# Patient Record
Sex: FEMALE | Race: WHITE | Employment: OTHER | URBAN - METROPOLITAN AREA
[De-identification: names, ages, dates, MRNs, and addresses within clinical notes are randomized per-mention and may not be internally consistent; named-entity substitution may affect disease eponyms.]

---

## 2017-01-31 ENCOUNTER — OFFICE VISIT (OUTPATIENT)
Dept: CARDIOLOGY CLINIC | Age: 82
End: 2017-01-31

## 2017-01-31 VITALS
SYSTOLIC BLOOD PRESSURE: 166 MMHG | WEIGHT: 127 LBS | HEIGHT: 59 IN | HEART RATE: 68 BPM | BODY MASS INDEX: 25.6 KG/M2 | DIASTOLIC BLOOD PRESSURE: 70 MMHG

## 2017-01-31 DIAGNOSIS — Z95.1 S/P CABG X 3: Primary | Chronic | ICD-10-CM

## 2017-01-31 DIAGNOSIS — R06.02 SOBOE (SHORTNESS OF BREATH ON EXERTION): ICD-10-CM

## 2017-01-31 PROCEDURE — 99214 OFFICE O/P EST MOD 30 MIN: CPT | Performed by: INTERNAL MEDICINE

## 2017-01-31 RX ORDER — CARVEDILOL 6.25 MG/1
6.25 TABLET ORAL 2 TIMES DAILY WITH MEALS
Qty: 180 TABLET | Refills: 3 | Status: ON HOLD | OUTPATIENT
Start: 2017-01-31

## 2017-01-31 RX ORDER — DIPHENHYDRAMINE HYDROCHLORIDE 25 MG/1
1 TABLET ORAL DAILY
Status: ON HOLD | COMMUNITY

## 2017-01-31 RX ORDER — TIMOLOL MALEATE 5 MG/ML
1 SOLUTION/ DROPS OPHTHALMIC NIGHTLY
Status: ON HOLD | COMMUNITY

## 2017-01-31 RX ORDER — TRIAMTERENE AND HYDROCHLOROTHIAZIDE 37.5; 25 MG/1; MG/1
1 TABLET ORAL 2 TIMES DAILY
Qty: 180 TABLET | Refills: 3 | Status: SHIPPED | OUTPATIENT
Start: 2017-01-31 | End: 2018-04-14 | Stop reason: SDUPTHER

## 2017-02-03 ENCOUNTER — TELEPHONE (OUTPATIENT)
Dept: CARDIOLOGY CLINIC | Age: 82
End: 2017-02-03

## 2017-02-03 ENCOUNTER — PROCEDURE VISIT (OUTPATIENT)
Dept: CARDIOLOGY CLINIC | Age: 82
End: 2017-02-03

## 2017-02-03 DIAGNOSIS — Z95.1 S/P CABG X 3: Chronic | ICD-10-CM

## 2017-02-03 DIAGNOSIS — R06.02 SOBOE (SHORTNESS OF BREATH ON EXERTION): Primary | ICD-10-CM

## 2017-02-03 LAB
LV EF: 53 %
LVEF MODALITY: NORMAL

## 2017-02-03 PROCEDURE — 93306 TTE W/DOPPLER COMPLETE: CPT | Performed by: INTERNAL MEDICINE

## 2017-02-04 DIAGNOSIS — I25.700 CORONARY ARTERY DISEASE INVOLVING CORONARY BYPASS GRAFT OF NATIVE HEART WITH UNSTABLE ANGINA PECTORIS (HCC): Primary | Chronic | ICD-10-CM

## 2017-02-06 ENCOUNTER — TELEPHONE (OUTPATIENT)
Dept: CARDIOLOGY CLINIC | Age: 82
End: 2017-02-06

## 2017-02-06 ENCOUNTER — HOSPITAL ENCOUNTER (OUTPATIENT)
Dept: GENERAL RADIOLOGY | Age: 82
Discharge: HOME | End: 2017-02-06

## 2017-02-06 DIAGNOSIS — Z01.811 PRE-OP CHEST EXAM: ICD-10-CM

## 2017-02-08 ENCOUNTER — TELEPHONE (OUTPATIENT)
Dept: CARDIOLOGY CLINIC | Age: 82
End: 2017-02-08

## 2017-02-17 ENCOUNTER — OFFICE VISIT (OUTPATIENT)
Dept: PULMONOLOGY | Age: 82
End: 2017-02-17

## 2017-02-17 VITALS
OXYGEN SATURATION: 93 % | HEIGHT: 59 IN | SYSTOLIC BLOOD PRESSURE: 132 MMHG | DIASTOLIC BLOOD PRESSURE: 80 MMHG | RESPIRATION RATE: 20 BRPM | HEART RATE: 71 BPM | WEIGHT: 126 LBS | BODY MASS INDEX: 25.4 KG/M2

## 2017-02-17 DIAGNOSIS — R06.02 SOBOE (SHORTNESS OF BREATH ON EXERTION): ICD-10-CM

## 2017-02-17 DIAGNOSIS — J44.9 COPD, MILD (HCC): ICD-10-CM

## 2017-02-17 DIAGNOSIS — I27.20 PULMONARY HTN (HCC): Primary | Chronic | ICD-10-CM

## 2017-02-17 PROCEDURE — 99213 OFFICE O/P EST LOW 20 MIN: CPT | Performed by: INTERNAL MEDICINE

## 2017-03-01 ENCOUNTER — PROCEDURE VISIT (OUTPATIENT)
Dept: CARDIOLOGY CLINIC | Age: 82
End: 2017-03-01

## 2017-03-01 VITALS — HEART RATE: 64 BPM | SYSTOLIC BLOOD PRESSURE: 136 MMHG | DIASTOLIC BLOOD PRESSURE: 80 MMHG

## 2017-03-01 DIAGNOSIS — Z95.0 CARDIAC PACEMAKER IN SITU: Primary | ICD-10-CM

## 2017-03-01 PROCEDURE — 93280 PM DEVICE PROGR EVAL DUAL: CPT | Performed by: INTERNAL MEDICINE

## 2017-03-06 ENCOUNTER — TELEPHONE (OUTPATIENT)
Dept: CARDIOLOGY CLINIC | Age: 82
End: 2017-03-06

## 2017-03-08 ENCOUNTER — OFFICE VISIT (OUTPATIENT)
Dept: CARDIOLOGY CLINIC | Age: 82
End: 2017-03-08

## 2017-03-08 VITALS
SYSTOLIC BLOOD PRESSURE: 132 MMHG | HEIGHT: 59 IN | HEART RATE: 68 BPM | WEIGHT: 128 LBS | BODY MASS INDEX: 25.8 KG/M2 | DIASTOLIC BLOOD PRESSURE: 72 MMHG

## 2017-03-08 DIAGNOSIS — Z95.0 CARDIAC PACEMAKER IN SITU: ICD-10-CM

## 2017-03-08 DIAGNOSIS — Z95.1 S/P CABG X 3: Primary | ICD-10-CM

## 2017-03-08 PROCEDURE — 99214 OFFICE O/P EST MOD 30 MIN: CPT | Performed by: INTERNAL MEDICINE

## 2017-03-28 ENCOUNTER — HOSPITAL ENCOUNTER (OUTPATIENT)
Dept: WOMENS IMAGING | Age: 82
Discharge: HOME | End: 2017-03-28
Attending: FAMILY MEDICINE | Admitting: FAMILY MEDICINE

## 2017-03-28 ENCOUNTER — TELEPHONE (OUTPATIENT)
Dept: CARDIOLOGY CLINIC | Age: 82
End: 2017-03-28

## 2017-03-28 DIAGNOSIS — Z95.1 S/P CABG X 3: Primary | Chronic | ICD-10-CM

## 2017-03-28 DIAGNOSIS — Z12.31 SCREENING MAMMOGRAM, ENCOUNTER FOR: ICD-10-CM

## 2017-04-14 ENCOUNTER — INITIAL CONSULT (OUTPATIENT)
Dept: CARDIOLOGY CLINIC | Age: 82
End: 2017-04-14

## 2017-04-14 VITALS
HEIGHT: 58 IN | BODY MASS INDEX: 26.95 KG/M2 | WEIGHT: 128.4 LBS | HEART RATE: 66 BPM | DIASTOLIC BLOOD PRESSURE: 80 MMHG | OXYGEN SATURATION: 93 % | SYSTOLIC BLOOD PRESSURE: 138 MMHG

## 2017-04-14 DIAGNOSIS — R06.02 SHORTNESS OF BREATH: ICD-10-CM

## 2017-04-14 DIAGNOSIS — I42.9 CARDIOMYOPATHY (HCC): Primary | ICD-10-CM

## 2017-04-14 PROCEDURE — 99204 OFFICE O/P NEW MOD 45 MIN: CPT | Performed by: INTERNAL MEDICINE

## 2017-04-23 ASSESSMENT — ENCOUNTER SYMPTOMS
CONSTIPATION: 0
BLOOD IN STOOL: 0
ABDOMINAL PAIN: 0
SHORTNESS OF BREATH: 1
WHEEZING: 0
COLOR CHANGE: 0
CHEST TIGHTNESS: 0
DIARRHEA: 0
VOMITING: 0
NAUSEA: 0
EYE PAIN: 0
BACK PAIN: 0
COUGH: 0
PHOTOPHOBIA: 0

## 2017-05-01 ENCOUNTER — PROCEDURE VISIT (OUTPATIENT)
Dept: CARDIOLOGY CLINIC | Age: 82
End: 2017-05-01

## 2017-05-01 DIAGNOSIS — R06.02 SOB (SHORTNESS OF BREATH): ICD-10-CM

## 2017-05-01 DIAGNOSIS — I42.9 CARDIOMYOPATHY (HCC): ICD-10-CM

## 2017-05-01 DIAGNOSIS — R00.2 HEART PALPITATIONS: ICD-10-CM

## 2017-05-01 DIAGNOSIS — R00.1 BRADYCARDIA: ICD-10-CM

## 2017-05-01 DIAGNOSIS — R06.02 SOB (SHORTNESS OF BREATH): Primary | ICD-10-CM

## 2017-05-01 LAB
LV EF: 53 %
LVEF MODALITY: NORMAL

## 2017-05-01 PROCEDURE — 78472 GATED HEART PLANAR SINGLE: CPT | Performed by: INTERNAL MEDICINE

## 2017-05-01 PROCEDURE — A9560 TC99M LABELED RBC: HCPCS | Performed by: INTERNAL MEDICINE

## 2017-05-03 ENCOUNTER — TELEPHONE (OUTPATIENT)
Dept: CARDIOLOGY CLINIC | Age: 82
End: 2017-05-03

## 2017-05-16 ENCOUNTER — OFFICE VISIT (OUTPATIENT)
Dept: PULMONOLOGY | Age: 82
End: 2017-05-16

## 2017-05-16 VITALS
SYSTOLIC BLOOD PRESSURE: 158 MMHG | RESPIRATION RATE: 20 BRPM | WEIGHT: 130 LBS | DIASTOLIC BLOOD PRESSURE: 80 MMHG | HEART RATE: 72 BPM | BODY MASS INDEX: 27.29 KG/M2 | OXYGEN SATURATION: 95 % | HEIGHT: 58 IN

## 2017-05-16 DIAGNOSIS — I27.20 PULMONARY HTN (HCC): Primary | Chronic | ICD-10-CM

## 2017-05-16 DIAGNOSIS — G47.33 OBSTRUCTIVE SLEEP APNEA: ICD-10-CM

## 2017-05-16 DIAGNOSIS — J44.9 COPD, MILD (HCC): ICD-10-CM

## 2017-05-16 PROCEDURE — 99213 OFFICE O/P EST LOW 20 MIN: CPT | Performed by: INTERNAL MEDICINE

## 2017-07-05 LAB
BASOPHILS ABSOLUTE: 0 /ΜL
BASOPHILS RELATIVE PERCENT: 0.7 %
CHOLESTEROL, TOTAL: 168 MG/DL
CHOLESTEROL/HDL RATIO: NORMAL
EOSINOPHILS ABSOLUTE: 0.4 /ΜL
EOSINOPHILS RELATIVE PERCENT: 5.4 %
HCT VFR BLD CALC: 46.1 % (ref 36–46)
HDLC SERPL-MCNC: 49 MG/DL (ref 35–70)
HEMOGLOBIN: 15.3 G/DL (ref 12–16)
LDL CHOLESTEROL CALCULATED: 100 MG/DL (ref 0–160)
LYMPHOCYTES ABSOLUTE: 0.9 /ΜL
LYMPHOCYTES RELATIVE PERCENT: 13.7 %
MCH RBC QN AUTO: 30.5 PG
MCHC RBC AUTO-ENTMCNC: 33.2 G/DL
MCV RBC AUTO: 91.8 FL
MONOCYTES ABSOLUTE: 0.5 /ΜL
MONOCYTES RELATIVE PERCENT: 7.4 %
NEUTROPHILS ABSOLUTE: 4.7 /ΜL
NEUTROPHILS RELATIVE PERCENT: 72.8 %
PLATELET # BLD: 405 K/ΜL
PMV BLD AUTO: 15.6 FL
RBC # BLD: 5.02 10^6/ΜL
TRIGL SERPL-MCNC: 94 MG/DL
VLDLC SERPL CALC-MCNC: 19 MG/DL
WBC # BLD: 6.5 10^3/ML

## 2017-07-17 LAB
ALBUMIN SERPL-MCNC: 4.8 G/DL
ALP BLD-CCNC: 134 U/L
ALT SERPL-CCNC: 134 U/L
AST SERPL-CCNC: 21 U/L
BILIRUB SERPL-MCNC: 1 MG/DL (ref 0.1–1.4)
BUN BLDV-MCNC: 9 MG/DL
CALCIUM SERPL-MCNC: 9.7 MG/DL
CHLORIDE BLD-SCNC: 97 MMOL/L
CO2: 30 MMOL/L
CREAT SERPL-MCNC: 0.6 MG/DL
GFR CALCULATED: 82
GLUCOSE BLD-MCNC: 98 MG/DL
POTASSIUM SERPL-SCNC: 4.6 MMOL/L
SODIUM BLD-SCNC: 138 MMOL/L
TOTAL PROTEIN: 6.7

## 2017-08-07 ENCOUNTER — OFFICE VISIT (OUTPATIENT)
Dept: PULMONOLOGY | Age: 82
End: 2017-08-07

## 2017-08-07 VITALS
BODY MASS INDEX: 27.29 KG/M2 | HEIGHT: 58 IN | OXYGEN SATURATION: 97 % | WEIGHT: 130 LBS | HEART RATE: 78 BPM | DIASTOLIC BLOOD PRESSURE: 74 MMHG | SYSTOLIC BLOOD PRESSURE: 122 MMHG

## 2017-08-07 DIAGNOSIS — G47.33 OBSTRUCTIVE SLEEP APNEA: Primary | ICD-10-CM

## 2017-08-07 DIAGNOSIS — R06.02 SOBOE (SHORTNESS OF BREATH ON EXERTION): ICD-10-CM

## 2017-08-07 DIAGNOSIS — J44.9 COPD, MILD (HCC): ICD-10-CM

## 2017-08-07 DIAGNOSIS — I27.20 PULMONARY HTN (HCC): Chronic | ICD-10-CM

## 2017-08-07 PROCEDURE — 99213 OFFICE O/P EST LOW 20 MIN: CPT | Performed by: INTERNAL MEDICINE

## 2017-08-30 ENCOUNTER — TELEPHONE (OUTPATIENT)
Dept: PULMONOLOGY | Age: 82
End: 2017-08-30

## 2017-08-30 ENCOUNTER — OFFICE VISIT (OUTPATIENT)
Dept: CARDIOLOGY CLINIC | Age: 82
End: 2017-08-30

## 2017-08-30 VITALS
BODY MASS INDEX: 27.33 KG/M2 | HEART RATE: 56 BPM | DIASTOLIC BLOOD PRESSURE: 98 MMHG | RESPIRATION RATE: 16 BRPM | WEIGHT: 130.2 LBS | SYSTOLIC BLOOD PRESSURE: 168 MMHG

## 2017-08-30 DIAGNOSIS — Z95.0 CARDIAC PACEMAKER IN SITU: ICD-10-CM

## 2017-08-30 DIAGNOSIS — R42 DIZZINESS: ICD-10-CM

## 2017-08-30 DIAGNOSIS — Z95.1 S/P CABG X 3: Primary | Chronic | ICD-10-CM

## 2017-08-30 PROCEDURE — 99214 OFFICE O/P EST MOD 30 MIN: CPT | Performed by: INTERNAL MEDICINE

## 2017-08-30 PROCEDURE — 93279 PRGRMG DEV EVAL PM/LDLS PM: CPT | Performed by: INTERNAL MEDICINE

## 2017-09-06 ENCOUNTER — PROCEDURE VISIT (OUTPATIENT)
Dept: CARDIOLOGY CLINIC | Age: 82
End: 2017-09-06

## 2017-09-06 DIAGNOSIS — Z95.1 S/P CABG X 3: Chronic | ICD-10-CM

## 2017-09-06 DIAGNOSIS — R42 DIZZINESS: Primary | ICD-10-CM

## 2017-09-06 PROCEDURE — 93880 EXTRACRANIAL BILAT STUDY: CPT | Performed by: INTERNAL MEDICINE

## 2017-09-07 ENCOUNTER — TELEPHONE (OUTPATIENT)
Dept: CARDIOLOGY CLINIC | Age: 82
End: 2017-09-07

## 2017-09-25 ENCOUNTER — HOSPITAL ENCOUNTER (OUTPATIENT)
Dept: SLEEP CENTER | Age: 82
Discharge: HOME | End: 2017-09-25
Attending: INTERNAL MEDICINE | Admitting: INTERNAL MEDICINE

## 2017-09-25 VITALS — BODY MASS INDEX: 27.29 KG/M2 | WEIGHT: 130 LBS | HEIGHT: 58 IN

## 2017-09-25 DIAGNOSIS — G47.33 OBSTRUCTIVE SLEEP APNEA: ICD-10-CM

## 2017-09-25 DIAGNOSIS — I27.20 PULMONARY HTN (HCC): Chronic | ICD-10-CM

## 2017-09-25 DIAGNOSIS — R06.02 SOBOE (SHORTNESS OF BREATH ON EXERTION): ICD-10-CM

## 2017-09-26 ENCOUNTER — TELEPHONE (OUTPATIENT)
Dept: PULMONOLOGY | Age: 82
End: 2017-09-26

## 2017-09-26 NOTE — TELEPHONE ENCOUNTER
Pt states she was recently taking Anoro and states she has a lot of heart issues.  Pt is concerned that taking the Anoro is causing leg, arm, and heart issues and would like to know what you would suggest

## 2017-10-12 ENCOUNTER — HOSPITAL ENCOUNTER (OUTPATIENT)
Dept: ULTRASOUND IMAGING | Age: 82
Discharge: HOME | End: 2017-10-12
Attending: FAMILY MEDICINE | Admitting: FAMILY MEDICINE

## 2017-10-12 DIAGNOSIS — M79.661 PAIN OF RIGHT LOWER LEG: ICD-10-CM

## 2017-10-12 DIAGNOSIS — M79.661 PAIN IN RIGHT LOWER LEG: ICD-10-CM

## 2017-10-12 DIAGNOSIS — R07.9 CHEST PAIN, UNSPECIFIED: ICD-10-CM

## 2017-10-12 DIAGNOSIS — M79.661 BILATERAL CALF PAIN: ICD-10-CM

## 2017-10-12 DIAGNOSIS — R60.1 GENERALIZED EDEMA: ICD-10-CM

## 2017-10-12 DIAGNOSIS — M79.662 BILATERAL CALF PAIN: ICD-10-CM

## 2017-10-12 DIAGNOSIS — R60.9 EDEMA, UNSPECIFIED TYPE: ICD-10-CM

## 2017-10-12 LAB
ALBUMIN SERPL-MCNC: 4.7 GM/DL (ref 3.4–5)
ALP BLD-CCNC: 122 IU/L (ref 40–128)
ALT SERPL-CCNC: 18 U/L (ref 10–40)
ANION GAP SERPL CALCULATED.3IONS-SCNC: 12 MMOL/L (ref 4–16)
AST SERPL-CCNC: 24 IU/L (ref 15–37)
BASOPHILS ABSOLUTE: 0.1 K/CU MM
BASOPHILS RELATIVE PERCENT: 0.8 % (ref 0–1)
BILIRUB SERPL-MCNC: 1.2 MG/DL (ref 0–1)
BUN BLDV-MCNC: 10 MG/DL (ref 6–23)
CALCIUM SERPL-MCNC: 9.9 MG/DL (ref 8.3–10.6)
CHLORIDE BLD-SCNC: 95 MMOL/L (ref 99–110)
CO2: 29 MMOL/L (ref 21–32)
CREAT SERPL-MCNC: 0.5 MG/DL (ref 0.6–1.1)
DIFFERENTIAL TYPE: ABNORMAL
EOSINOPHILS ABSOLUTE: 0.4 K/CU MM
EOSINOPHILS RELATIVE PERCENT: 4.2 % (ref 0–3)
GFR AFRICAN AMERICAN: >60 ML/MIN/1.73M2
GFR NON-AFRICAN AMERICAN: >60 ML/MIN/1.73M2
GLUCOSE FASTING: 92 MG/DL (ref 70–99)
HCT VFR BLD CALC: 46.6 % (ref 37–47)
HEMOGLOBIN: 15 GM/DL (ref 12.5–16)
IMMATURE NEUTROPHIL %: 0.5 % (ref 0–0.43)
INR BLD: 1.94 INDEX
LYMPHOCYTES ABSOLUTE: 1.2 K/CU MM
LYMPHOCYTES RELATIVE PERCENT: 14.2 % (ref 24–44)
MCH RBC QN AUTO: 30.6 PG (ref 27–31)
MCHC RBC AUTO-ENTMCNC: 32.2 % (ref 32–36)
MCV RBC AUTO: 95.1 FL (ref 78–100)
MONOCYTES ABSOLUTE: 0.6 K/CU MM
MONOCYTES RELATIVE PERCENT: 7.1 % (ref 0–4)
NUCLEATED RBC %: 0 %
PDW BLD-RTO: 15.7 % (ref 11.7–14.9)
PLATELET # BLD: 429 K/CU MM (ref 140–440)
PMV BLD AUTO: 11.5 FL (ref 7.5–11.1)
POTASSIUM SERPL-SCNC: 5 MMOL/L (ref 3.5–5.1)
PROTHROMBIN TIME: 22.6 SECONDS (ref 9.12–12.5)
RBC # BLD: 4.9 M/CU MM (ref 4.2–5.4)
SEGMENTED NEUTROPHILS ABSOLUTE COUNT: 6.1 K/CU MM
SEGMENTED NEUTROPHILS RELATIVE PERCENT: 73.2 % (ref 36–66)
SODIUM BLD-SCNC: 136 MMOL/L (ref 135–145)
TOTAL IMMATURE NEUTOROPHIL: 0.04 K/CU MM
TOTAL NUCLEATED RBC: 0 K/CU MM
TOTAL PROTEIN: 6.5 GM/DL (ref 6.4–8.2)
TSH HIGH SENSITIVITY: 0.28 UIU/ML (ref 0.27–4.2)
WBC # BLD: 8.3 K/CU MM (ref 4–10.5)

## 2017-11-02 ENCOUNTER — OFFICE VISIT (OUTPATIENT)
Dept: PULMONOLOGY | Age: 82
End: 2017-11-02

## 2017-11-02 VITALS
HEIGHT: 59 IN | BODY MASS INDEX: 27.2 KG/M2 | WEIGHT: 134.92 LBS | SYSTOLIC BLOOD PRESSURE: 118 MMHG | OXYGEN SATURATION: 92 % | HEART RATE: 86 BPM | DIASTOLIC BLOOD PRESSURE: 76 MMHG

## 2017-11-02 DIAGNOSIS — G47.33 OBSTRUCTIVE SLEEP APNEA: Primary | ICD-10-CM

## 2017-11-02 DIAGNOSIS — R06.02 SOBOE (SHORTNESS OF BREATH ON EXERTION): ICD-10-CM

## 2017-11-02 DIAGNOSIS — J44.9 COPD, MILD (HCC): ICD-10-CM

## 2017-11-02 DIAGNOSIS — I27.20 PULMONARY HTN (HCC): Chronic | ICD-10-CM

## 2017-11-02 PROCEDURE — 99213 OFFICE O/P EST LOW 20 MIN: CPT | Performed by: INTERNAL MEDICINE

## 2017-11-02 NOTE — PROGRESS NOTES
verbal recognition program and it was checked for errors. It is possible that there are still dictated errors within this office note. Any errors should be brought immediately to my attention for correction. All efforts were made to ensure that this office note is accurate.

## 2017-11-26 ENCOUNTER — HOSPITAL ENCOUNTER (OUTPATIENT)
Dept: SLEEP CENTER | Age: 82
Discharge: HOME | End: 2017-11-26
Attending: INTERNAL MEDICINE | Admitting: INTERNAL MEDICINE

## 2017-11-27 VITALS — BODY MASS INDEX: 27.01 KG/M2 | HEIGHT: 59 IN | WEIGHT: 134 LBS

## 2017-11-27 ASSESSMENT — SLEEP AND FATIGUE QUESTIONNAIRES
HOW LIKELY ARE YOU TO NOD OFF OR FALL ASLEEP WHILE SITTING AND TALKING TO SOMEONE: 0
HOW LIKELY ARE YOU TO NOD OFF OR FALL ASLEEP WHEN YOU ARE A PASSENGER IN A CAR FOR AN HOUR WITHOUT A BREAK: 1
NECK CIRCUMFERENCE (INCHES): 13.5
HOW LIKELY ARE YOU TO NOD OFF OR FALL ASLEEP WHILE SITTING AND READING: 3
HOW LIKELY ARE YOU TO NOD OFF OR FALL ASLEEP WHILE WATCHING TV: 2
HOW LIKELY ARE YOU TO NOD OFF OR FALL ASLEEP WHILE SITTING INACTIVE IN A PUBLIC PLACE: 0
HOW LIKELY ARE YOU TO NOD OFF OR FALL ASLEEP WHILE SITTING QUIETLY AFTER LUNCH WITHOUT ALCOHOL: 1
ESS TOTAL SCORE: 10
HOW LIKELY ARE YOU TO NOD OFF OR FALL ASLEEP WHILE LYING DOWN TO REST IN THE AFTERNOON WHEN CIRCUMSTANCES PERMIT: 3
HOW LIKELY ARE YOU TO NOD OFF OR FALL ASLEEP IN A CAR, WHILE STOPPED FOR A FEW MINUTES IN TRAFFIC: 0

## 2017-12-01 NOTE — PROGRESS NOTES
11/26/2017 sleep study results for Luiz Campbell  3/18/1929 are finalized and available. Please see media tab.     Electronically signed by Lesley Armenta on 12/1/2017 at 6:13 PM

## 2017-12-14 ENCOUNTER — OFFICE VISIT (OUTPATIENT)
Dept: PULMONOLOGY | Age: 82
End: 2017-12-14

## 2017-12-14 VITALS — HEART RATE: 50 BPM | OXYGEN SATURATION: 92 % | HEIGHT: 59 IN | BODY MASS INDEX: 27.02 KG/M2 | WEIGHT: 134.04 LBS

## 2017-12-14 DIAGNOSIS — J44.9 COPD, MILD (HCC): ICD-10-CM

## 2017-12-14 DIAGNOSIS — G47.33 OBSTRUCTIVE SLEEP APNEA: ICD-10-CM

## 2017-12-14 DIAGNOSIS — I27.20 PULMONARY HTN (HCC): Chronic | ICD-10-CM

## 2017-12-14 DIAGNOSIS — R06.02 SOBOE (SHORTNESS OF BREATH ON EXERTION): Primary | ICD-10-CM

## 2017-12-14 PROCEDURE — 99213 OFFICE O/P EST LOW 20 MIN: CPT | Performed by: INTERNAL MEDICINE

## 2018-03-01 ENCOUNTER — OFFICE VISIT (OUTPATIENT)
Dept: CARDIOLOGY CLINIC | Age: 83
End: 2018-03-01

## 2018-03-01 VITALS
WEIGHT: 127.8 LBS | DIASTOLIC BLOOD PRESSURE: 82 MMHG | HEIGHT: 59 IN | SYSTOLIC BLOOD PRESSURE: 130 MMHG | HEART RATE: 60 BPM | BODY MASS INDEX: 25.76 KG/M2

## 2018-03-01 DIAGNOSIS — Z95.1 S/P CABG X 3: Primary | ICD-10-CM

## 2018-03-01 DIAGNOSIS — Z95.0 CARDIAC PACEMAKER IN SITU: ICD-10-CM

## 2018-03-01 PROCEDURE — G8484 FLU IMMUNIZE NO ADMIN: HCPCS | Performed by: INTERNAL MEDICINE

## 2018-03-01 PROCEDURE — 4040F PNEUMOC VAC/ADMIN/RCVD: CPT | Performed by: INTERNAL MEDICINE

## 2018-03-01 PROCEDURE — 1123F ACP DISCUSS/DSCN MKR DOCD: CPT | Performed by: INTERNAL MEDICINE

## 2018-03-01 PROCEDURE — 99214 OFFICE O/P EST MOD 30 MIN: CPT | Performed by: INTERNAL MEDICINE

## 2018-03-01 PROCEDURE — 1090F PRES/ABSN URINE INCON ASSESS: CPT | Performed by: INTERNAL MEDICINE

## 2018-03-01 PROCEDURE — 1036F TOBACCO NON-USER: CPT | Performed by: INTERNAL MEDICINE

## 2018-03-01 PROCEDURE — G8427 DOCREV CUR MEDS BY ELIG CLIN: HCPCS | Performed by: INTERNAL MEDICINE

## 2018-03-01 PROCEDURE — 93280 PM DEVICE PROGR EVAL DUAL: CPT | Performed by: INTERNAL MEDICINE

## 2018-03-01 PROCEDURE — G8419 CALC BMI OUT NRM PARAM NOF/U: HCPCS | Performed by: INTERNAL MEDICINE

## 2018-03-01 PROCEDURE — G8598 ASA/ANTIPLAT THER USED: HCPCS | Performed by: INTERNAL MEDICINE

## 2018-03-01 NOTE — PROGRESS NOTES
CARDIOLOGY NOTE      3/1/2018    RE: Brenda Calvo  (3/18/1929)                               TO:  Dr. Denise Germain, DO      Thank you for involving me in taking care of your  patient Brenda Calvo, who is a  80y.o. year old      female with past medical  history of  CAD, HTN, hyperlipidimea, PPM  is  seen today Patient  during this  visit has no cardiac complains. Vitals:    03/01/18 1507   BP: 130/82   Pulse: 60       Current Outpatient Prescriptions   Medication Sig Dispense Refill    umeclidinium-vilanterol (ANORO ELLIPTA) 62.5-25 MCG/INH AEPB inhaler Inhale 1 puff into the lungs daily 1 puff 11    Biotin (BIOTIN 5000) 5 MG CAPS Take 1 capsule by mouth daily      Misc Natural Products (OSTEO BI-FLEX ADV DOUBLE ST PO) Take 1 tablet by mouth daily      Tafluprost (ZIOPTAN OP) Apply to eye      timolol (TIMOPTIC-XE) 0.25 % ophthalmic gel-forming 1 drop daily      triamterene-hydrochlorothiazide (MAXZIDE-25) 37.5-25 MG per tablet Take 1 tablet by mouth 2 times daily (Patient taking differently: Take 1 tablet by mouth daily ) 180 tablet 3    carvedilol (COREG) 6.25 MG tablet Take 1 tablet by mouth 2 times daily (with meals) 180 tablet 3    albuterol (PROVENTIL HFA;VENTOLIN HFA) 108 (90 BASE) MCG/ACT inhaler Inhale 2 puffs into the lungs 2 times daily AND EVERY 4-6 HOURS AS NEEDED      Multiple Vitamins-Minerals (ICAPS) CAPS Take 1 capsule by mouth daily.  loratadine (CLARITIN) 10 MG tablet Take 10 mg by mouth as needed.  vitamin B-12 (CYANOCOBALAMIN) 1000 MCG tablet Take 1,000 mcg by mouth daily.  Probiotic Product (PROBIOTIC DAILY PO) Take  by mouth.  levothyroxine (SYNTHROID) 88 MCG tablet Take 88 mcg by mouth daily.  aspirin 81 MG chewable tablet Take 81 mg by mouth daily.  warfarin (COUMADIN) 5 MG tablet Take 7.5 mg by mouth daily at 1800 1.5 Tablets daily      Nutritional Supplements (JUICE PLUS FIBRE PO) Take  by mouth daily.  Chewable given dietary advice. NCEP- ATP III guidelines reviewed with patient. -   Changes  in medicines made: No            -  Atrial fibrillation: has Paroxysmal  Atrial fibrillation. On coumadin      . chadsvasc of 6               ·  PACER  ANALYSIS:    Pacer analysis is reviewed and filed in the pacer chart. Analysis is consistent with normal  function with stable leads and appropriate battery status for the age of the device. Remaining average battery life is 23 months. Patient is using pacer  V pace95%. Recommend continued every three month check and follow up office visit as scheduled.     Jone Hayden MD, 3/1/2018 3:26 PM

## 2018-04-02 ENCOUNTER — HOSPITAL ENCOUNTER (OUTPATIENT)
Dept: GENERAL RADIOLOGY | Age: 83
Discharge: HOME | End: 2018-04-02
Attending: FAMILY MEDICINE | Admitting: FAMILY MEDICINE

## 2018-04-02 LAB
ALBUMIN SERPL-MCNC: 4.4 GM/DL (ref 3.4–5)
ALP BLD-CCNC: 116 IU/L (ref 40–128)
ALT SERPL-CCNC: 15 U/L (ref 10–40)
ANION GAP SERPL CALCULATED.3IONS-SCNC: 11 MMOL/L (ref 4–16)
AST SERPL-CCNC: 21 IU/L (ref 15–37)
BASOPHILS ABSOLUTE: 0.1 K/CU MM
BASOPHILS RELATIVE PERCENT: 1.1 % (ref 0–1)
BILIRUB SERPL-MCNC: 0.9 MG/DL (ref 0–1)
BUN BLDV-MCNC: 11 MG/DL (ref 6–23)
CALCIUM SERPL-MCNC: 10 MG/DL (ref 8.3–10.6)
CHLORIDE BLD-SCNC: 95 MMOL/L (ref 99–110)
CHOLESTEROL: 154 MG/DL
CO2: 30 MMOL/L (ref 21–32)
CREAT SERPL-MCNC: 0.5 MG/DL (ref 0.6–1.1)
DIFFERENTIAL TYPE: ABNORMAL
EOSINOPHILS ABSOLUTE: 0.3 K/CU MM
EOSINOPHILS RELATIVE PERCENT: 4.9 % (ref 0–3)
GFR AFRICAN AMERICAN: >60 ML/MIN/1.73M2
GFR NON-AFRICAN AMERICAN: >60 ML/MIN/1.73M2
GLUCOSE FASTING: 98 MG/DL (ref 70–99)
HCT VFR BLD CALC: 46.1 % (ref 37–47)
HDLC SERPL-MCNC: 52 MG/DL
HEMOGLOBIN: 14.7 GM/DL (ref 12.5–16)
IMMATURE NEUTROPHIL %: 0.5 % (ref 0–0.43)
LDL CHOLESTEROL DIRECT: 104 MG/DL
LYMPHOCYTES ABSOLUTE: 0.7 K/CU MM
LYMPHOCYTES RELATIVE PERCENT: 10.2 % (ref 24–44)
MCH RBC QN AUTO: 29.9 PG (ref 27–31)
MCHC RBC AUTO-ENTMCNC: 31.9 % (ref 32–36)
MCV RBC AUTO: 93.9 FL (ref 78–100)
MONOCYTES ABSOLUTE: 0.6 K/CU MM
MONOCYTES RELATIVE PERCENT: 8.6 % (ref 0–4)
NUCLEATED RBC %: 0 %
PDW BLD-RTO: 16.4 % (ref 11.7–14.9)
PLATELET # BLD: 379 K/CU MM (ref 140–440)
PMV BLD AUTO: 11.6 FL (ref 7.5–11.1)
POTASSIUM SERPL-SCNC: 5 MMOL/L (ref 3.5–5.1)
RBC # BLD: 4.91 M/CU MM (ref 4.2–5.4)
SEGMENTED NEUTROPHILS ABSOLUTE COUNT: 4.8 K/CU MM
SEGMENTED NEUTROPHILS RELATIVE PERCENT: 74.7 % (ref 36–66)
SODIUM BLD-SCNC: 136 MMOL/L (ref 135–145)
T3 FREE: 3 PG/ML (ref 2.3–4.2)
T4 FREE: 2.08 NG/DL (ref 0.9–1.8)
TOTAL IMMATURE NEUTOROPHIL: 0.03 K/CU MM
TOTAL NUCLEATED RBC: 0 K/CU MM
TOTAL PROTEIN: 6.4 GM/DL (ref 6.4–8.2)
TRIGL SERPL-MCNC: 68 MG/DL
TSH HIGH SENSITIVITY: 0.12 UIU/ML (ref 0.27–4.2)
VITAMIN D 25-HYDROXY: 31.63 NG/ML
WBC # BLD: 6.4 K/CU MM (ref 4–10.5)

## 2018-04-03 LAB — PARATHYROID HORMONE INTACT: 63

## 2018-04-12 ENCOUNTER — HOSPITAL ENCOUNTER (OUTPATIENT)
Dept: WOMENS IMAGING | Age: 83
Discharge: HOME | End: 2018-04-12
Attending: FAMILY MEDICINE | Admitting: FAMILY MEDICINE

## 2018-04-12 DIAGNOSIS — Z12.31 VISIT FOR SCREENING MAMMOGRAM: ICD-10-CM

## 2018-04-17 RX ORDER — TRIAMTERENE AND HYDROCHLOROTHIAZIDE 37.5; 25 MG/1; MG/1
1 TABLET ORAL DAILY
Qty: 180 TABLET | Refills: 3 | Status: SHIPPED | OUTPATIENT
Start: 2018-04-17 | End: 2019-05-09 | Stop reason: SDUPTHER

## 2018-04-30 ENCOUNTER — OFFICE VISIT (OUTPATIENT)
Dept: PULMONOLOGY | Age: 83
End: 2018-04-30

## 2018-04-30 VITALS
SYSTOLIC BLOOD PRESSURE: 138 MMHG | HEIGHT: 58 IN | WEIGHT: 127 LBS | OXYGEN SATURATION: 97 % | BODY MASS INDEX: 26.66 KG/M2 | HEART RATE: 80 BPM | DIASTOLIC BLOOD PRESSURE: 84 MMHG

## 2018-04-30 DIAGNOSIS — I27.20 PULMONARY HTN (HCC): Chronic | ICD-10-CM

## 2018-04-30 DIAGNOSIS — G47.33 OBSTRUCTIVE SLEEP APNEA: ICD-10-CM

## 2018-04-30 DIAGNOSIS — J44.9 COPD, MILD (HCC): Primary | ICD-10-CM

## 2018-04-30 DIAGNOSIS — R06.02 SOBOE (SHORTNESS OF BREATH ON EXERTION): ICD-10-CM

## 2018-04-30 PROCEDURE — 1090F PRES/ABSN URINE INCON ASSESS: CPT | Performed by: INTERNAL MEDICINE

## 2018-04-30 PROCEDURE — G8926 SPIRO NO PERF OR DOC: HCPCS | Performed by: INTERNAL MEDICINE

## 2018-04-30 PROCEDURE — G8419 CALC BMI OUT NRM PARAM NOF/U: HCPCS | Performed by: INTERNAL MEDICINE

## 2018-04-30 PROCEDURE — 99213 OFFICE O/P EST LOW 20 MIN: CPT | Performed by: INTERNAL MEDICINE

## 2018-04-30 PROCEDURE — G8427 DOCREV CUR MEDS BY ELIG CLIN: HCPCS | Performed by: INTERNAL MEDICINE

## 2018-04-30 PROCEDURE — 1123F ACP DISCUSS/DSCN MKR DOCD: CPT | Performed by: INTERNAL MEDICINE

## 2018-04-30 PROCEDURE — 1036F TOBACCO NON-USER: CPT | Performed by: INTERNAL MEDICINE

## 2018-04-30 PROCEDURE — G8598 ASA/ANTIPLAT THER USED: HCPCS | Performed by: INTERNAL MEDICINE

## 2018-04-30 PROCEDURE — 4040F PNEUMOC VAC/ADMIN/RCVD: CPT | Performed by: INTERNAL MEDICINE

## 2018-04-30 PROCEDURE — 3023F SPIROM DOC REV: CPT | Performed by: INTERNAL MEDICINE

## 2018-05-07 RX ORDER — UMECLIDINIUM BROMIDE AND VILANTEROL TRIFENATATE 62.5; 25 UG/1; UG/1
POWDER RESPIRATORY (INHALATION)
Qty: 1 PUFF | Refills: 11 | Status: SHIPPED | OUTPATIENT
Start: 2018-05-07 | End: 2018-10-03 | Stop reason: SDUPTHER

## 2018-07-03 ENCOUNTER — TELEPHONE (OUTPATIENT)
Dept: PULMONOLOGY | Age: 83
End: 2018-07-03

## 2018-07-03 ENCOUNTER — NURSE ONLY (OUTPATIENT)
Dept: PULMONOLOGY | Age: 83
End: 2018-07-03

## 2018-07-03 DIAGNOSIS — J44.9 COPD, MODERATE (HCC): Primary | ICD-10-CM

## 2018-07-03 PROCEDURE — 94060 EVALUATION OF WHEEZING: CPT | Performed by: INTERNAL MEDICINE

## 2018-07-03 NOTE — TELEPHONE ENCOUNTER
Pt would like to switch from Anoro to something that is not as strong. Pt states she is worried about the effects that Anoro may have on her Heart.

## 2018-07-09 NOTE — PROGRESS NOTES
Nicolás Scott came to office for a PFT. Her chief complaint is mild COPD with pulmonary hypertension. She demonstrates an FEV1 of 0.61 liters. She demonstrates  a moderate obstructive lung defect. She shows  no significant response to bronchodilators. Overall, her lung function has  remained stable or slightly decreased over the past year. I will make no  changes to her current bronchodilator therapy.  These results will be discussed with her at a later date or her next appointment

## 2018-07-09 NOTE — TELEPHONE ENCOUNTER
I spoke to Desirae directly over the phone concerning Anoro. It is my opinion that the cardiac risks are probably greater not using this medication or a similar medication for her moderate COPD than going without a similar medication.   She seemed to understand this and will continue using her present bronchodilator therapy

## 2018-08-30 ENCOUNTER — OFFICE VISIT (OUTPATIENT)
Dept: CARDIOLOGY CLINIC | Age: 83
End: 2018-08-30

## 2018-08-30 VITALS
OXYGEN SATURATION: 97 % | HEIGHT: 58 IN | SYSTOLIC BLOOD PRESSURE: 132 MMHG | HEART RATE: 62 BPM | WEIGHT: 130.6 LBS | BODY MASS INDEX: 27.41 KG/M2 | DIASTOLIC BLOOD PRESSURE: 72 MMHG

## 2018-08-30 DIAGNOSIS — Z95.0 CARDIAC PACEMAKER IN SITU: ICD-10-CM

## 2018-08-30 DIAGNOSIS — Z98.61 POST PTCA: Primary | Chronic | ICD-10-CM

## 2018-08-30 PROCEDURE — 1036F TOBACCO NON-USER: CPT | Performed by: INTERNAL MEDICINE

## 2018-08-30 PROCEDURE — G8598 ASA/ANTIPLAT THER USED: HCPCS | Performed by: INTERNAL MEDICINE

## 2018-08-30 PROCEDURE — G8427 DOCREV CUR MEDS BY ELIG CLIN: HCPCS | Performed by: INTERNAL MEDICINE

## 2018-08-30 PROCEDURE — G8419 CALC BMI OUT NRM PARAM NOF/U: HCPCS | Performed by: INTERNAL MEDICINE

## 2018-08-30 PROCEDURE — 1101F PT FALLS ASSESS-DOCD LE1/YR: CPT | Performed by: INTERNAL MEDICINE

## 2018-08-30 PROCEDURE — 4040F PNEUMOC VAC/ADMIN/RCVD: CPT | Performed by: INTERNAL MEDICINE

## 2018-08-30 PROCEDURE — 93279 PRGRMG DEV EVAL PM/LDLS PM: CPT | Performed by: INTERNAL MEDICINE

## 2018-08-30 PROCEDURE — 99214 OFFICE O/P EST MOD 30 MIN: CPT | Performed by: INTERNAL MEDICINE

## 2018-08-30 PROCEDURE — 1123F ACP DISCUSS/DSCN MKR DOCD: CPT | Performed by: INTERNAL MEDICINE

## 2018-08-30 PROCEDURE — 1090F PRES/ABSN URINE INCON ASSESS: CPT | Performed by: INTERNAL MEDICINE

## 2018-08-30 NOTE — PROGRESS NOTES
Fruit  Over The Counter       Cholecalciferol (VITAMIN D3) 2000 UNIT CAPS Take 1,000 Units by mouth daily Over The Counter, Also take \"prescribed Vitamin D 2 once a week on Monday\"        No current facility-administered medications for this visit. Allergies: Darvocet [propoxyphene n-acetaminophen] and Sulfa antibiotics  Past Medical History:   Diagnosis Date    Arthritis     Bradycardia 2001    requiring dual chamber pacemaker at [facility] CAD (coronary artery disease)     Cardiac pacemaker 10/2001    [employer] #[id number]  PPM- Serial # [id number]- Dr Denis Hairston CHF (congestive heart failure) ([address]     COPD, mild ([address] 2/17/2017    CVA (cerebrovascular accident) ([address]     DJD (degenerative joint disease) of cervical spine     C5-C6, C6-C7    Exhaustion of cardiac pacemaker battery 11/21/2011    PPM battery replacement- [facility]    Family history of cardiovascular disease     Glaucoma Dx 2010    H/O 24 hour EKG monitoring 8/6/2000 8/6/2000- Intermittent episonde of a-fib/flutter    H/O cardiac catheterization 4/20/2010, 2/1989 4/20/2010-Severe native vessel disease and has graft disease as well. LAD, CX totally occluded. RCA totally occluded in proximal segment. VG to RCA widely patent. PDA 90% LAD afer LIMA anastomosis 80-90% stenosis. Proceeded with PTCA with stent next day.  H/O cardiovascular stress test 10/14/2011, 6/2/2010,4/8/2010, 5/18/2009,4/6/2009, 10/30/2007, 11/12/2004, 11/6/2003, 8/9/2002, 8/9/2001,6/5/2000,     10/14/2011-Lexiscan-Abnormal Myocardial Perfusion study. Evidence of mild ischemia in the Left CX region. Abnomal study. Rest EF 63%. Global LV systolic function normal. No ECG changes. Unremarkable pharmacological stress test.    H/O cardiovascular stress test 6/10/2013    thallium--mild ischemia left circumflex EF63% no change from 10/2011 study.     H/O cardiovascular stress test 10/16/2014    cardiolite-mild ischemia left circumflex,EF70%    H/O BMI 27.30 kg/m²   Wt Readings from Last 3 Encounters:   08/30/18 130 lb 9.6 oz (59.2 kg)   04/30/18 127 lb (57.6 kg)   03/01/18 127 lb 12.8 oz (58 kg)     Body mass index is 27.3 kg/m². GENERAL - Alert, oriented, pleasant, in no apparent distress. Head unremarkable  Eyes  Not injected conjunctiva  ENT  normal mucosa  Neck - Supple. No jugular venous distention noted. No carotid bruits. Cardiovascular  Normal S1 and S2 without obvious murmur or gallop. Extremities - No cyanosis, clubbing, or significant edema. Pulmonary  No respiratory distress. No wheezes or rales. Pulses: Bilateral radial and pedal pulses normal  Abdomen  no tenderness  Musculoskeletal  normal strength  Neurologic    There are  no gross focal neurologic abnormalities. Skin-  No rash  Affect; normal mood    DATA:  Lab Results   Component Value Date    CKTOTAL 42 09/28/2013    TROPONINI <0.006 09/28/2013     BNP:    Lab Results   Component Value Date    BNP 90 09/28/2013     PT/INR:  No results found for: PTINR  No results found for: LABA1C  Lab Results   Component Value Date    CHOL 154 04/02/2018    TRIG 68 04/02/2018    HDL 52 04/02/2018    LDLCALC 100 07/05/2017    LDLDIRECT 104 (H) 04/02/2018     Lab Results   Component Value Date    ALT 15 04/02/2018    AST 21 04/02/2018     TSH:    Lab Results   Component Value Date    TSH 1.0 05/07/2010             Assessment/ Plan:     Patient seen , interviewed and examined                  -     CORONARY ARTERY DISEASE:  asymptomatic     All available  tests in chart reviewed. Management discussed . Testing ordered  no                                    -  Hypertension: Patients blood pressure is normal. Patient is advised about low sodium diet. Present medical regimen will not be changed.          - Pul HTN as per pulmonary                -  LIPID MANAGEMENT:  Available lipid  lab data reviewed  and patient was given dietary advice.  NCEP- ATP III guidelines reviewed with

## 2018-09-22 ENCOUNTER — HOSPITAL ENCOUNTER (EMERGENCY)
Age: 83
Discharge: HOME | End: 2018-09-22
Payer: MEDICARE

## 2018-09-22 VITALS
OXYGEN SATURATION: 98 % | SYSTOLIC BLOOD PRESSURE: 180 MMHG | HEART RATE: 87 BPM | TEMPERATURE: 98.2 F | RESPIRATION RATE: 20 BRPM | DIASTOLIC BLOOD PRESSURE: 94 MMHG

## 2018-09-22 DIAGNOSIS — R58 BLEEDING: Primary | ICD-10-CM

## 2018-09-22 PROCEDURE — 99283 EMERGENCY DEPT VISIT LOW MDM: CPT

## 2018-09-22 PROCEDURE — 6370000000 HC RX 637 (ALT 250 FOR IP)

## 2018-09-22 PROCEDURE — 4500000027

## 2018-09-22 PROCEDURE — 37719: CPT

## 2018-09-22 PROCEDURE — SP035

## 2018-09-22 RX ORDER — LIDOCAINE HYDROCHLORIDE AND EPINEPHRINE BITARTRATE 20; .01 MG/ML; MG/ML
20 INJECTION, SOLUTION SUBCUTANEOUS ONCE
Status: DISCONTINUED | OUTPATIENT
Start: 2018-09-22 | End: 2018-09-22

## 2018-09-22 RX ORDER — LIDOCAINE HYDROCHLORIDE AND EPINEPHRINE 10; 10 MG/ML; UG/ML
20 INJECTION, SOLUTION INFILTRATION; PERINEURAL ONCE
Status: DISCONTINUED | OUTPATIENT
Start: 2018-09-22 | End: 2018-09-22 | Stop reason: HOSPADM

## 2018-10-05 RX ORDER — UMECLIDINIUM BROMIDE AND VILANTEROL TRIFENATATE 62.5; 25 UG/1; UG/1
POWDER RESPIRATORY (INHALATION)
Qty: 1 PUFF | Refills: 11 | Status: SHIPPED | OUTPATIENT
Start: 2018-10-05 | End: 2019-04-09 | Stop reason: SDUPTHER

## 2018-10-29 ENCOUNTER — HOSPITAL ENCOUNTER (OUTPATIENT)
Age: 83
Discharge: HOME | End: 2018-10-29
Payer: MEDICARE

## 2018-10-29 ENCOUNTER — OFFICE VISIT (OUTPATIENT)
Dept: PULMONOLOGY | Age: 83
End: 2018-10-29
Payer: MEDICARE

## 2018-10-29 VITALS
WEIGHT: 130 LBS | BODY MASS INDEX: 27.29 KG/M2 | OXYGEN SATURATION: 96 % | HEART RATE: 78 BPM | DIASTOLIC BLOOD PRESSURE: 72 MMHG | SYSTOLIC BLOOD PRESSURE: 130 MMHG | HEIGHT: 58 IN | RESPIRATION RATE: 18 BRPM

## 2018-10-29 DIAGNOSIS — R06.02 SOBOE (SHORTNESS OF BREATH ON EXERTION): ICD-10-CM

## 2018-10-29 DIAGNOSIS — J44.9 COPD, MILD (HCC): Primary | ICD-10-CM

## 2018-10-29 DIAGNOSIS — I27.20 PULMONARY HTN (HCC): Chronic | ICD-10-CM

## 2018-10-29 DIAGNOSIS — G47.33 OBSTRUCTIVE SLEEP APNEA: ICD-10-CM

## 2018-10-29 LAB
CHOLESTEROL: 171 MG/DL
HDLC SERPL-MCNC: 49 MG/DL
LDL CHOLESTEROL DIRECT: 116 MG/DL
TRIGL SERPL-MCNC: 82 MG/DL

## 2018-10-29 PROCEDURE — 36415 COLL VENOUS BLD VENIPUNCTURE: CPT

## 2018-10-29 PROCEDURE — G8419 CALC BMI OUT NRM PARAM NOF/U: HCPCS | Performed by: INTERNAL MEDICINE

## 2018-10-29 PROCEDURE — 1036F TOBACCO NON-USER: CPT | Performed by: INTERNAL MEDICINE

## 2018-10-29 PROCEDURE — 4040F PNEUMOC VAC/ADMIN/RCVD: CPT | Performed by: INTERNAL MEDICINE

## 2018-10-29 PROCEDURE — G8484 FLU IMMUNIZE NO ADMIN: HCPCS | Performed by: INTERNAL MEDICINE

## 2018-10-29 PROCEDURE — G8926 SPIRO NO PERF OR DOC: HCPCS | Performed by: INTERNAL MEDICINE

## 2018-10-29 PROCEDURE — G8598 ASA/ANTIPLAT THER USED: HCPCS | Performed by: INTERNAL MEDICINE

## 2018-10-29 PROCEDURE — 1101F PT FALLS ASSESS-DOCD LE1/YR: CPT | Performed by: INTERNAL MEDICINE

## 2018-10-29 PROCEDURE — 3023F SPIROM DOC REV: CPT | Performed by: INTERNAL MEDICINE

## 2018-10-29 PROCEDURE — 1090F PRES/ABSN URINE INCON ASSESS: CPT | Performed by: INTERNAL MEDICINE

## 2018-10-29 PROCEDURE — 80061 LIPID PANEL: CPT

## 2018-10-29 PROCEDURE — 1123F ACP DISCUSS/DSCN MKR DOCD: CPT | Performed by: INTERNAL MEDICINE

## 2018-10-29 PROCEDURE — 99213 OFFICE O/P EST LOW 20 MIN: CPT | Performed by: INTERNAL MEDICINE

## 2018-10-29 PROCEDURE — 83721 ASSAY OF BLOOD LIPOPROTEIN: CPT

## 2018-10-29 PROCEDURE — G8427 DOCREV CUR MEDS BY ELIG CLIN: HCPCS | Performed by: INTERNAL MEDICINE

## 2018-11-13 ENCOUNTER — TELEPHONE (OUTPATIENT)
Dept: PULMONOLOGY | Age: 83
End: 2018-11-13

## 2018-12-21 ENCOUNTER — OFFICE VISIT (OUTPATIENT)
Dept: CARDIOLOGY CLINIC | Age: 83
End: 2018-12-21
Payer: MEDICARE

## 2018-12-21 VITALS
WEIGHT: 133 LBS | DIASTOLIC BLOOD PRESSURE: 80 MMHG | SYSTOLIC BLOOD PRESSURE: 150 MMHG | HEIGHT: 59 IN | HEART RATE: 77 BPM | BODY MASS INDEX: 26.81 KG/M2

## 2018-12-21 DIAGNOSIS — Z95.0 CARDIAC PACEMAKER: Primary | Chronic | ICD-10-CM

## 2018-12-21 DIAGNOSIS — I25.700 CORONARY ARTERY DISEASE INVOLVING CORONARY BYPASS GRAFT OF NATIVE HEART WITH UNSTABLE ANGINA PECTORIS (HCC): Chronic | ICD-10-CM

## 2018-12-21 PROCEDURE — 1101F PT FALLS ASSESS-DOCD LE1/YR: CPT | Performed by: INTERNAL MEDICINE

## 2018-12-21 PROCEDURE — 1036F TOBACCO NON-USER: CPT | Performed by: INTERNAL MEDICINE

## 2018-12-21 PROCEDURE — G8427 DOCREV CUR MEDS BY ELIG CLIN: HCPCS | Performed by: INTERNAL MEDICINE

## 2018-12-21 PROCEDURE — 1090F PRES/ABSN URINE INCON ASSESS: CPT | Performed by: INTERNAL MEDICINE

## 2018-12-21 PROCEDURE — G8484 FLU IMMUNIZE NO ADMIN: HCPCS | Performed by: INTERNAL MEDICINE

## 2018-12-21 PROCEDURE — 4040F PNEUMOC VAC/ADMIN/RCVD: CPT | Performed by: INTERNAL MEDICINE

## 2018-12-21 PROCEDURE — G8598 ASA/ANTIPLAT THER USED: HCPCS | Performed by: INTERNAL MEDICINE

## 2018-12-21 PROCEDURE — G8419 CALC BMI OUT NRM PARAM NOF/U: HCPCS | Performed by: INTERNAL MEDICINE

## 2018-12-21 PROCEDURE — 1123F ACP DISCUSS/DSCN MKR DOCD: CPT | Performed by: INTERNAL MEDICINE

## 2018-12-21 PROCEDURE — 99214 OFFICE O/P EST MOD 30 MIN: CPT | Performed by: INTERNAL MEDICINE

## 2018-12-21 NOTE — PROGRESS NOTES
4/20/2010, 2/1989 4/20/2010-Severe native vessel disease and has graft disease as well. LAD, CX totally occluded. RCA totally occluded in proximal segment. VG to RCA widely patent. PDA 90% LAD afer LIMA anastomosis 80-90% stenosis. Proceeded with PTCA with stent next day.  H/O cardiovascular stress test 10/14/2011, 6/2/2010,4/8/2010, 5/18/2009,4/6/2009, 10/30/2007, 11/12/2004, 11/6/2003, 8/9/2002, 8/9/2001,6/5/2000,     10/14/2011-Lexiscan-Abnormal Myocardial Perfusion study. Evidence of mild ischemia in the Left CX region. Abnomal study. Rest EF 63%. Global LV systolic function normal. No ECG changes. Unremarkable pharmacological stress test.    H/O cardiovascular stress test 6/10/2013    thallium--mild ischemia left circumflex EF63% no change from 10/2011 study.  H/O cardiovascular stress test 10/16/2014    cardiolite-mild ischemia left circumflex,EF70%    H/O chest x-ray 4/19/2009 4/19/2009-Stable cardiomegaly. No acute cardiopulmonary disease.  H/O Doppler ultrasound 3/31/2010    CAROTID- 3/31/2010-INtimal thickening but no significant atherosclerotic plaque noted in ANA PAULA. Doppler flow velocities within ANA PAULA are WNL. Heterogeneous, irregular atherosclerotic plaque noted in LICA. Doppler flow velocities within the LICA are elevated, consistent with a mild, less than 50% stenosis.  H/O Doppler ultrasound 5/24/2016    Carotid- normal study    H/O Doppler ultrasound 09/06/2017    carotid - normal study    H/O echocardiogram 10/13    EF=60%, Severe Pulm. HTN, & Sclerotic aortic valve w/stenosis.  H/O echocardiogram 10/16/14     EF 55-60% Normal LV. Normal LV systolic function. Severe tricuspid insufficiency with severe hypertension.      H/O echocardiogram 02/03/2017    heart cath performed this morning    History of complete ECG     10/14/2011(Lexiscan);5/6/2010, 4/30/2009,10/24/2008,9/21/2007, 10/13/2006    History of nuclear stress test 11/17/2016    lexiscan-normal,EF70%    HX OTHER MEDICAL 05/01/2017    MUGA-normal, EF53%    Hyperlipidemia     Hypertension     Mild intermittent asthma 7/28/2016    Nausea & vomiting     Obstructive sleep apnea 5/16/2017    Paroxysmal atrial fibrillation (HCC)     Post PTCA 4/21/2010    PTCA with 2.25 stent of the LIMA to LAD    Pulmonary HTN ([facility] [address]     Severe per last echo on 10/13. 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 Followed by MAZE procedure by pulmonary vein isolation.-  Dr John Kan S/P PTCA (percutaneous transluminal coronary angioplasty) 11/2012    PTCA with stent to RCA    Thyroid disease     hypothyroi    Unspecified cerebral artery occlusion with cerebral infarction Unsure When    No Residual     Past Surgical History:   Procedure Laterality Date    APPENDECTOMY  1941    CARDIAC SURGERY  4/09    CABG (3 Bypasses), One Heart Stent in 2010    COLONOSCOPY  In 2000's    X1    CORONARY ANGIOPLASTY WITH STENT PLACEMENT  4/21/2010    PTCA with stent LIMA ->LAD    CORONARY ARTERY BYPASS GRAFT  4/8/2009    LIMA->Diag,  LIMA -> LAD;  SVG->RCA going to the PDA.  Followed by MAZE procedure by pulmonary vein isolation.-  Dr Slim Blevins, TOTAL ABDOMINAL  1990's    MIDDLE EAR SURGERY      OTHER SURGICAL HISTORY      Ear surgery-hearing    PACEMAKER PLACEMENT      battery change 11/21/2011 [employer]    PTCA  11/2012    Ptca with stent to RCA    TONSILLECTOMY  1950's      As reviewed   Family History   Problem Relation Age of Onset    Cancer Mother         \"Liver Cancer\"    Arthritis Mother     Depression Mother     Hearing Loss Mother     High Blood Pressure Mother     High Cholesterol Mother     Mental Illness Mother     Miscarriages / [place] Mother     Heart Disease Father     Early Death Father 36        \"Instant Death\"    High Blood Pressure Father     High Cholesterol Father     Coronary Art Dis Father         Massive

## 2018-12-28 ENCOUNTER — PROCEDURE VISIT (OUTPATIENT)
Dept: CARDIOLOGY CLINIC | Age: 83
End: 2018-12-28
Payer: MEDICARE

## 2018-12-28 DIAGNOSIS — R06.02 SHORTNESS OF BREATH: ICD-10-CM

## 2018-12-28 DIAGNOSIS — I25.700 CORONARY ARTERY DISEASE INVOLVING CORONARY BYPASS GRAFT OF NATIVE HEART WITH UNSTABLE ANGINA PECTORIS (HCC): Chronic | ICD-10-CM

## 2018-12-28 DIAGNOSIS — R07.9 CHEST PAIN, UNSPECIFIED TYPE: Primary | ICD-10-CM

## 2018-12-28 LAB
LV EF: 60 %
LVEF MODALITY: NORMAL

## 2018-12-28 PROCEDURE — 93017 CV STRESS TEST TRACING ONLY: CPT | Performed by: INTERNAL MEDICINE

## 2018-12-28 PROCEDURE — A9500 TC99M SESTAMIBI: HCPCS | Performed by: INTERNAL MEDICINE

## 2018-12-28 PROCEDURE — 93018 CV STRESS TEST I&R ONLY: CPT | Performed by: INTERNAL MEDICINE

## 2018-12-28 PROCEDURE — 93016 CV STRESS TEST SUPVJ ONLY: CPT | Performed by: INTERNAL MEDICINE

## 2018-12-28 PROCEDURE — 78452 HT MUSCLE IMAGE SPECT MULT: CPT | Performed by: INTERNAL MEDICINE

## 2019-01-02 ENCOUNTER — TELEPHONE (OUTPATIENT)
Dept: CARDIOLOGY CLINIC | Age: 84
End: 2019-01-02

## 2019-01-09 ENCOUNTER — OFFICE VISIT (OUTPATIENT)
Dept: CARDIOLOGY CLINIC | Age: 84
End: 2019-01-09
Payer: MEDICARE

## 2019-01-09 VITALS
BODY MASS INDEX: 27.62 KG/M2 | WEIGHT: 131.6 LBS | HEIGHT: 58 IN | DIASTOLIC BLOOD PRESSURE: 78 MMHG | SYSTOLIC BLOOD PRESSURE: 140 MMHG

## 2019-01-09 DIAGNOSIS — R06.02 SHORTNESS OF BREATH: Primary | ICD-10-CM

## 2019-01-09 PROCEDURE — 4040F PNEUMOC VAC/ADMIN/RCVD: CPT | Performed by: INTERNAL MEDICINE

## 2019-01-09 PROCEDURE — 1090F PRES/ABSN URINE INCON ASSESS: CPT | Performed by: INTERNAL MEDICINE

## 2019-01-09 PROCEDURE — 1123F ACP DISCUSS/DSCN MKR DOCD: CPT | Performed by: INTERNAL MEDICINE

## 2019-01-09 PROCEDURE — 1101F PT FALLS ASSESS-DOCD LE1/YR: CPT | Performed by: INTERNAL MEDICINE

## 2019-01-09 PROCEDURE — G8484 FLU IMMUNIZE NO ADMIN: HCPCS | Performed by: INTERNAL MEDICINE

## 2019-01-09 PROCEDURE — 99212 OFFICE O/P EST SF 10 MIN: CPT | Performed by: INTERNAL MEDICINE

## 2019-01-09 PROCEDURE — G8419 CALC BMI OUT NRM PARAM NOF/U: HCPCS | Performed by: INTERNAL MEDICINE

## 2019-01-09 PROCEDURE — 1036F TOBACCO NON-USER: CPT | Performed by: INTERNAL MEDICINE

## 2019-01-09 PROCEDURE — G8598 ASA/ANTIPLAT THER USED: HCPCS | Performed by: INTERNAL MEDICINE

## 2019-01-09 PROCEDURE — G8427 DOCREV CUR MEDS BY ELIG CLIN: HCPCS | Performed by: INTERNAL MEDICINE

## 2019-04-02 ENCOUNTER — OFFICE VISIT (OUTPATIENT)
Dept: CARDIOLOGY CLINIC | Age: 84
End: 2019-04-02
Payer: MEDICARE

## 2019-04-02 VITALS
WEIGHT: 128 LBS | BODY MASS INDEX: 25.8 KG/M2 | HEART RATE: 67 BPM | DIASTOLIC BLOOD PRESSURE: 86 MMHG | HEIGHT: 59 IN | SYSTOLIC BLOOD PRESSURE: 134 MMHG

## 2019-04-02 DIAGNOSIS — R07.9 CHEST PAIN, UNSPECIFIED TYPE: Primary | ICD-10-CM

## 2019-04-02 DIAGNOSIS — Z95.0 CARDIAC PACEMAKER IN SITU: ICD-10-CM

## 2019-04-02 PROCEDURE — 93279 PRGRMG DEV EVAL PM/LDLS PM: CPT | Performed by: INTERNAL MEDICINE

## 2019-04-02 PROCEDURE — G8427 DOCREV CUR MEDS BY ELIG CLIN: HCPCS | Performed by: INTERNAL MEDICINE

## 2019-04-02 PROCEDURE — 99214 OFFICE O/P EST MOD 30 MIN: CPT | Performed by: INTERNAL MEDICINE

## 2019-04-02 PROCEDURE — 4040F PNEUMOC VAC/ADMIN/RCVD: CPT | Performed by: INTERNAL MEDICINE

## 2019-04-02 PROCEDURE — G8598 ASA/ANTIPLAT THER USED: HCPCS | Performed by: INTERNAL MEDICINE

## 2019-04-02 PROCEDURE — 1123F ACP DISCUSS/DSCN MKR DOCD: CPT | Performed by: INTERNAL MEDICINE

## 2019-04-02 PROCEDURE — 1090F PRES/ABSN URINE INCON ASSESS: CPT | Performed by: INTERNAL MEDICINE

## 2019-04-02 PROCEDURE — G8419 CALC BMI OUT NRM PARAM NOF/U: HCPCS | Performed by: INTERNAL MEDICINE

## 2019-04-02 PROCEDURE — 1036F TOBACCO NON-USER: CPT | Performed by: INTERNAL MEDICINE

## 2019-04-02 RX ORDER — RANOLAZINE 500 MG/1
500 TABLET, EXTENDED RELEASE ORAL 2 TIMES DAILY
Qty: 56 TABLET | Refills: 0 | Status: ON HOLD | COMMUNITY
Start: 2019-04-02 | End: 2020-12-04

## 2019-04-02 NOTE — PROGRESS NOTES
CARDIOLOGY NOTE      4/2/2019    RE: Rosemarie Villarreal  (3/18/1929)                               TO:  Dr. Fina Davidson, DO            CHIEF Albertdonna Watters is a 80 y.o. female who was seen today for management of  cad                                    HPI:   Patient is here for    - Coronary artery disease, has chest pain. Patient is  compliant with prescribed medicines. - Hypertension,isnot  well controlled, pt is  compliant with medicines  - Hyperlipidimea, lipids are in acceptable range. Pt  is  compliant with medicines                  The patient has cardiac complaints of recurrent moderate lt sided ch pain nonexertional with mild SOB for  afew days,  gets better with Tums.  Pain radiates to neck last for 15 minutes  Rosemarie Villarreal has the following history recorded in care path:  Patient Active Problem List    Diagnosis Date Noted    Dizziness 08/30/2017    Obstructive sleep apnea 05/16/2017    COPD, mild ([address] 02/17/2017    Coronary artery disease involving coronary bypass graft of native heart with unstable angina pectoris ([address]     SOBOE (shortness of breath on exertion) 01/31/2017    Bradycardia 08/11/2016    Hypertension 09/30/2013    TIA (transient ischemic attack) 09/29/2013    Confusion 09/29/2013    Headache 09/29/2013    CAD (coronary artery disease)     Pulmonary HTN ([address]     Post PTCA 04/21/2010    S/P CABG x 3 04/08/2009    Cardiac pacemaker 10/01/2001     Current Outpatient Medications   Medication Sig Dispense Refill    ANORO ELLIPTA 62.5-25 MCG/INH AEPB inhaler INHALE ONE DOSE BY MOUTH DAILY 1 puff 11    triamterene-hydrochlorothiazide (MAXZIDE-25) 37.5-25 MG per tablet Take 1 tablet by mouth daily 180 tablet 3    Biotin (BIOTIN 5000) 5 MG CAPS Take 1 capsule by mouth daily      Misc Natural Products (OSTEO BI-FLEX ADV DOUBLE ST PO) Take 1 tablet by mouth daily      Tafluprost (ZIOPTAN OP) Apply to eye      timolol (TIMOPTIC-XE) 0.25 % ophthalmic gel-forming 1 drop daily      carvedilol (COREG) 6.25 MG tablet Take 1 tablet by mouth 2 times daily (with meals) 180 tablet 3    albuterol (PROVENTIL HFA;VENTOLIN HFA) 108 (90 BASE) MCG/ACT inhaler Inhale 2 puffs into the lungs 2 times daily AND EVERY 4-6 HOURS AS NEEDED      Multiple Vitamins-Minerals (ICAPS) CAPS Take 1 capsule by mouth daily.  loratadine (CLARITIN) 10 MG tablet Take 10 mg by mouth as needed.  vitamin B-12 (CYANOCOBALAMIN) 1000 MCG tablet Take 1,000 mcg by mouth daily.  Probiotic Product (PROBIOTIC DAILY PO) Take  by mouth.  levothyroxine (SYNTHROID) 88 MCG tablet Take 88 mcg by mouth daily.  aspirin 81 MG chewable tablet Take 81 mg by mouth daily.  warfarin (COUMADIN) 5 MG tablet Take 7.5 mg by mouth daily at 1800 1.5 Tablets daily      Nutritional Supplements (JUICE PLUS FIBRE PO) Take  by mouth daily. Chewable \"Gummy\" One Vegetable , One Fruit  Over The Counter       Cholecalciferol (VITAMIN D3) 2000 UNIT CAPS Take 1,000 Units by mouth daily Over The Counter, Also take \"prescribed Vitamin D 2 once a week on Monday\"        No current facility-administered medications for this visit.       Allergies: Darvocet [propoxyphene n-acetaminophen] and Sulfa antibiotics  Past Medical History:   Diagnosis Date    Arthritis     Bradycardia 2001    requiring dual chamber pacemaker at [facility] CAD (coronary artery disease)     Cardiac pacemaker 10/2001    [employer] #[id number]  PPM- Serial # 26-[facility]- Dr Melina Miller CHF (congestive heart failure) [address] [address] [facility]     COPD, mild ([address] 2/17/2017    CVA (cerebrovascular accident) ([address]     DJD (degenerative joint disease) of cervical spine     C5-C6, C6-C7    Exhaustion of cardiac pacemaker battery 11/21/2011    PPM battery replacement- [employer]tronic    Family history of cardiovascular disease     Glaucoma Dx 2010    H/O 24 hour EKG monitoring 8/6/2000 8/6/2000- Intermittent episonde of a-fib/flutter    H/O cardiac catheterization 4/20/2010, 2/1989 4/20/2010-Severe native vessel disease and has graft disease as well. LAD, CX totally occluded. RCA totally occluded in proximal segment. VG to RCA widely patent. PDA 90% LAD afer LIMA anastomosis 80-90% stenosis. Proceeded with PTCA with stent next day.  H/O cardiovascular stress test 10/14/2011, 6/2/2010,4/8/2010, 5/18/2009,4/6/2009, 10/30/2007, 11/12/2004, 11/6/2003, 8/9/2002, 8/9/2001,6/5/2000,     10/14/2011-Lexiscan-Abnormal Myocardial Perfusion study. Evidence of mild ischemia in the Left CX region. Abnomal study. Rest EF 63%. Global LV systolic function normal. No ECG changes. Unremarkable pharmacological stress test.    H/O cardiovascular stress test 6/10/2013    thallium--mild ischemia left circumflex EF63% no change from 10/2011 study.  H/O cardiovascular stress test 10/16/2014    cardiolite-mild ischemia left circumflex,EF70%    H/O chest x-ray 4/19/2009 4/19/2009-Stable cardiomegaly. No acute cardiopulmonary disease.  H/O Doppler ultrasound 3/31/2010    CAROTID- 3/31/2010-INtimal thickening but no significant atherosclerotic plaque noted in ANA PAULA. Doppler flow velocities within ANA PAULA are WNL. Heterogeneous, irregular atherosclerotic plaque noted in LICA. Doppler flow velocities within the LICA are elevated, consistent with a mild, less than 50% stenosis.  H/O Doppler ultrasound 5/24/2016    Carotid- normal study    H/O Doppler ultrasound 09/06/2017    carotid - normal study    H/O echocardiogram 10/13    EF=60%, Severe Pulm. HTN, & Sclerotic aortic valve w/stenosis.  H/O echocardiogram 10/16/14     EF 55-60% Normal LV. Normal LV systolic function. Severe tricuspid insufficiency with severe hypertension.      H/O echocardiogram 02/03/2017    heart cath performed this morning    History of complete ECG     10/14/2011(Lexiscan);5/6/2010, 4/30/2009,10/24/2008,9/21/2007, 10/13/2006    History of nuclear stress test 11/17/2016 lexiscan-normal,EF70%    Hx of cardiovascular stress test 12/28/2018    EF 60%  Normal study.  HX OTHER MEDICAL 05/01/2017    MUGA-normal, EF53%    Hyperlipidemia     Hypertension     Mild intermittent asthma 7/28/2016    Nausea & vomiting     Obstructive sleep apnea 5/16/2017    Paroxysmal atrial fibrillation (HCC)     Post PTCA 4/21/2010    PTCA with 2.25 stent of the LIMA to LAD    Pulmonary HTN ([address]     Severe per last echo on 10/13. 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 Followed by MAZE procedure by pulmonary vein isolation.-  Dr Park Nugent S/P PTCA (percutaneous transluminal coronary angioplasty) 11/2012    PTCA with stent to RCA    Thyroid disease     hypothyroi    Unspecified cerebral artery occlusion with cerebral infarction Unsure When    No Residual     Past Surgical History:   Procedure Laterality Date    APPENDECTOMY  1941    CARDIAC SURGERY  4/09    CABG (3 Bypasses), One Heart Stent in 2010    COLONOSCOPY  In 2000's    X1    CORONARY ANGIOPLASTY WITH STENT PLACEMENT  4/21/2010    PTCA with stent LIMA ->LAD    CORONARY ARTERY BYPASS GRAFT  4/8/2009    LIMA->Diag,  LIMA -> LAD;  SVG->RCA going to the PDA.  Followed by MAZE procedure by pulmonary vein isolation.-  Dr Fabienne Aparicio, TOTAL ABDOMINAL  1990's    MIDDLE EAR SURGERY      OTHER SURGICAL HISTORY      Ear surgery-hearing    PACEMAKER PLACEMENT      battery change 11/21/2011 Medtronic    PTCA  11/2012    Ptca with stent to RCA    TONSILLECTOMY  1950's      As reviewed   Family History   Problem Relation Age of Onset    Cancer Mother         \"Liver Cancer\"    Arthritis Mother     Depression Mother     Hearing Loss Mother     High Blood Pressure Mother     High Cholesterol Mother     Mental Illness Mother     Miscarriages / Stillbirths Mother     Heart Disease Father     Early Death Father 36        \"Instant Death\"    High Blood Pressure Father     High Cholesterol Father     Coronary Art Dis Father         Massive MI    Heart Disease Sister     Depression Sister     Cancer Sister         \"Breast Cancer, Cancer Free Now\"    High Blood Pressure Sister     Other Daughter         \"She's Had Stomach Surgery\"    High Blood Pressure Son     High Blood Pressure Son     Early Death Paternal Grandfather      Social History     Tobacco Use    Smoking status: Former Smoker     Packs/day: 0.25     Years: 5.00     Pack years: 1.25     Types: Cigarettes     Last attempt to quit: 1970     Years since quittin.3    Smokeless tobacco: Never Used   Substance Use Topics    Alcohol use: Yes     Comment:      occasional wine  a week                         CAFFEINE: 3-4 cups coffee daily      Review of Systems:    Constitutional: Negative for diaphoresis and fatigue  Psychological:Negative for anxiety or depression  HENT: Negative for headaches, nasal congestion, sinus pain or vertigo  Eyes: Negative for visual disturbance. Endocrine: Negative for polydipsia/polyuria  Respiratory: Negative for shortness of breath  Cardiovascular:  cp  Gastrointestinal: Negative for abdominal pain or heartburn  Genito-Urinary: Negative for urinary frequency/urgency  Musculoskeletal: Negative for muscle pain, muscular weakness, negative for pain in arm and leg or swelling in foot and leg  Neurological: Negative for dizziness, headaches, memory loss, numbness/tingling, visual changes, syncope  Dermatological: Negative for rash    Objective:  /86   Pulse 67   Ht 4' 11\" (1.499 m)   Wt 128 lb (58.1 kg)   BMI 25.85 kg/m²   Wt Readings from Last 3 Encounters:   19 128 lb (58.1 kg)   19 131 lb 9.6 oz (59.7 kg)   18 133 lb (60.3 kg)     Body mass index is 25.85 kg/m². GENERAL - Alert, oriented, pleasant, in no apparent distress. EYES: No jaundice, no conjunctival pallor. SKIN: It is warm & dry. No rashes.  No Echhymosis    HEENT - No clinically significant abnormalities seen. Neck - Supple. No jugular venous distention noted. No carotid bruits. Cardiovascular - Normal S1 and S2 without obvious murmur or gallop. Extremities - No cyanosis, clubbing, or significant edema. Pulmonary - No respiratory distress. No wheezes or rales. Abdomen - No masses, tenderness, or organomegaly. Musculoskeletal - No significant edema. No joint deformities. No muscle wasting. Neurologic - Cranial nerves II through XII are grossly intact. There were no gross focal neurologic abnormalities. Lab Review   Lab Results   Component Value Date    CKTOTAL 42 09/28/2013     BNP:    Lab Results   Component Value Date    BNP 90 09/28/2013     PT/INR:    Lab Results   Component Value Date    INR 1.94 10/12/2017     No results found for: LABA1C  Lab Results   Component Value Date    WBC 6.4 04/02/2018    HCT 46.1 04/02/2018    MCV 93.9 04/02/2018     04/02/2018     Lab Results   Component Value Date    CHOL 171 10/29/2018    TRIG 82 10/29/2018    HDL 49 10/29/2018    LDLCALC 100 07/05/2017    LDLDIRECT 116 (H) 10/29/2018     Lab Results   Component Value Date    ALT 15 04/02/2018    AST 21 04/02/2018     BMP:    Lab Results   Component Value Date     04/02/2018    K 5.0 04/02/2018    CL 95 04/02/2018    CO2 30 04/02/2018    BUN 11 04/02/2018    CREATININE 0.5 04/02/2018     CMP:   Lab Results   Component Value Date     04/02/2018    K 5.0 04/02/2018    CL 95 04/02/2018    CO2 30 04/02/2018    BUN 11 04/02/2018    PROT 6.4 04/02/2018    PROT 6.8 11/28/2012     TSH:    Lab Results   Component Value Date    TSH 1.0 05/07/2010    TSHHS 0.122 04/02/2018       QUALITY MEASURES REVIEWED:    Impression:    1.  Chest pain, unspecified type       Patient Active Problem List   Diagnosis Code    CAD (coronary artery disease) I25.10    Cardiac pacemaker Z95.0    S/P CABG x 3 Z95.1    Post PTCA Z98.61    Pulmonary HTN ([address] I27.20    TIA (transient ischemic attack) G45.9    Confusion R41.0    Headache R51    Hypertension I10    Bradycardia R00.1    SOBOE (shortness of breath on exertion) R06.02    Coronary artery disease involving coronary bypass graft of native heart with unstable angina pectoris (HCC) I25.700    COPD, mild (HCC) J44.9    Obstructive sleep apnea G47.33    Dizziness R42       Assessment & Plan:    -     CORONARY ARTERY DISEASE:  symptomatic     All available  tests in chart reviewed. Management discussed . Testing ordered   [facility] offered , wants to wait , add ranexa 500 bid                                -  Hypertension: Patients blood pressure is abnormal. Patient is advised about low sodium diet. Present medical regimen will not be changed.          - Pul HTN as per pulmonary                -  LIPID MANAGEMENT:  Available lipid  lab data reviewed  and patient was given dietary advice. NCEP- ATP III guidelines reviewed with patient. -   Changes  in medicines made: No            -  Atrial fibrillation: has Paroxysmal  Atrial fibrillation. On coumadin      . chadsvasc of 6               PACER  ANALYSIS:    Pacer analysis is reviewed and filed in the pacer chart. Analysis is consistent with normal  function with stable leads and appropriate battery status for the age of the device. Remaining average battery life is 14 mos. Patient is using pacer %, V pace4%. Recommend continued every three month check and follow up office visit as scheduled.     Susana Ro MD, 4/2/2019 4:58 PM       Susana Ro MD, 4/2/2019 4:58 PM       ·                   [address]

## 2019-04-03 ENCOUNTER — TELEPHONE (OUTPATIENT)
Dept: CARDIOLOGY CLINIC | Age: 84
End: 2019-04-03

## 2019-04-03 NOTE — TELEPHONE ENCOUNTER
Left messages at both numbers to call the office and schedule pacemaker check for patient. Appointment was not scheduled when she was here on 4/02/19.

## 2019-04-04 ENCOUNTER — TELEPHONE (OUTPATIENT)
Dept: CARDIOLOGY CLINIC | Age: 84
End: 2019-04-04

## 2019-04-04 NOTE — TELEPHONE ENCOUNTER
Patient wants to try 1/2 dose. Patient advised it still having problems she can stop and let us know. Patient does state no chest pain.

## 2019-04-10 ENCOUNTER — TELEPHONE (OUTPATIENT)
Dept: CARDIOLOGY CLINIC | Age: 84
End: 2019-04-10

## 2019-04-25 NOTE — TELEPHONE ENCOUNTER
The patient is having side effects from the Xarelto samples and would like to speak with someone about something different

## 2019-05-09 RX ORDER — TRIAMTERENE AND HYDROCHLOROTHIAZIDE 37.5; 25 MG/1; MG/1
TABLET ORAL
Qty: 77 TABLET | Refills: 2 | Status: ON HOLD | OUTPATIENT
Start: 2019-05-09 | End: 2021-10-18

## 2019-05-10 ENCOUNTER — OFFICE VISIT (OUTPATIENT)
Dept: CARDIOLOGY CLINIC | Age: 84
End: 2019-05-10
Payer: MEDICARE

## 2019-05-10 VITALS
BODY MASS INDEX: 27.79 KG/M2 | HEART RATE: 68 BPM | WEIGHT: 132.4 LBS | DIASTOLIC BLOOD PRESSURE: 82 MMHG | SYSTOLIC BLOOD PRESSURE: 124 MMHG | HEIGHT: 58 IN

## 2019-05-10 DIAGNOSIS — Z95.0 CARDIAC PACEMAKER: Chronic | ICD-10-CM

## 2019-05-10 DIAGNOSIS — R06.02 SOB (SHORTNESS OF BREATH): ICD-10-CM

## 2019-05-10 DIAGNOSIS — I48.0 PAF (PAROXYSMAL ATRIAL FIBRILLATION) (HCC): ICD-10-CM

## 2019-05-10 DIAGNOSIS — I10 HYPERTENSION, UNSPECIFIED TYPE: Chronic | ICD-10-CM

## 2019-05-10 DIAGNOSIS — I25.700 CORONARY ARTERY DISEASE INVOLVING CORONARY BYPASS GRAFT OF NATIVE HEART WITH UNSTABLE ANGINA PECTORIS (HCC): Primary | Chronic | ICD-10-CM

## 2019-05-10 PROCEDURE — 1123F ACP DISCUSS/DSCN MKR DOCD: CPT | Performed by: NURSE PRACTITIONER

## 2019-05-10 PROCEDURE — 99214 OFFICE O/P EST MOD 30 MIN: CPT | Performed by: NURSE PRACTITIONER

## 2019-05-10 PROCEDURE — 1090F PRES/ABSN URINE INCON ASSESS: CPT | Performed by: NURSE PRACTITIONER

## 2019-05-10 PROCEDURE — G8427 DOCREV CUR MEDS BY ELIG CLIN: HCPCS | Performed by: NURSE PRACTITIONER

## 2019-05-10 PROCEDURE — 4040F PNEUMOC VAC/ADMIN/RCVD: CPT | Performed by: NURSE PRACTITIONER

## 2019-05-10 PROCEDURE — G8419 CALC BMI OUT NRM PARAM NOF/U: HCPCS | Performed by: NURSE PRACTITIONER

## 2019-05-10 PROCEDURE — G8598 ASA/ANTIPLAT THER USED: HCPCS | Performed by: NURSE PRACTITIONER

## 2019-05-10 PROCEDURE — 1036F TOBACCO NON-USER: CPT | Performed by: NURSE PRACTITIONER

## 2019-05-10 RX ORDER — FUROSEMIDE 20 MG/1
20 TABLET ORAL DAILY
Qty: 30 TABLET | Refills: 2 | Status: SHIPPED | OUTPATIENT
Start: 2019-05-10 | End: 2019-07-24

## 2019-05-10 NOTE — PROGRESS NOTES
Malinda Keith  [facility] [address] [facility],[address]  Phone: [phone]    Fax [phone]                  Aneudy Cardenas MD, Lorenza Zavala MD, Geno Otto MD, MD Jessi Ovalles MD Bobbe Mace, MD Shelle Lacy, APRN Meryl Meigs APRN Doyal Pacer, APRN      5/10/2019    RE: Leticia Montgomery  (3/18/1929)                               TO:  Dr. Justice Gibbons DO  The primary cardiologist is Dr. Jillian Mcgee    CC: CAD- HTN- HLD- PAF- PPM- SOB  HPI:    Thank you for involving me in taking care of your patient Leticia Montgomery. She is a 80y.o. year old female with a history of CAD- HTN- HLD- PAF- PPM and is being seen in the office today. She reports that is feeling more SOB. She notes that she is SOB with exertion. This has been going on for the past few weeks. She notes PND on occasion. She notes abdominal bloating as well. There is no chest pain. There are no reported palpitations or dizziness. She is compliant with medications. She is not active, however no organized exercise. She is using her Cpap at  and is sleeping well.         Vitals:    05/10/19 1508   BP: 124/82   Pulse: 68       Current Outpatient Medications   Medication Sig Dispense Refill    furosemide (LASIX) 20 MG tablet Take 1 tablet by mouth daily 30 tablet 2    triamterene-hydrochlorothiazide (MAXZIDE-25) 37.5-25 MG per tablet TAKE ONE TABLET BY MOUTH DAILY 77 tablet 2    umeclidinium-vilanterol (ANORO ELLIPTA) 62.5-25 MCG/INH AEPB inhaler INHALE ONE DOSE BY MOUTH DAILY 1 puff 11    ranolazine (RANEXA) 500 MG extended release tablet Take 1 tablet by mouth 2 times daily 56 tablet 0    Biotin (BIOTIN 5000) 5 MG CAPS Take 1 capsule by mouth daily      Misc Natural Products (OSTEO BI-FLEX ADV DOUBLE ST PO) Take 1 tablet by mouth daily      Tafluprost (ZIOPTAN OP) Apply to eye      timolol (TIMOPTIC-XE) 0.25 % ophthalmic gel-forming 1 drop daily      carvedilol (COREG) 6.25 MG tablet Take 1 tablet by mouth 2 times daily (with meals) 180 tablet 3    albuterol (PROVENTIL HFA;VENTOLIN HFA) 108 (90 BASE) MCG/ACT inhaler Inhale 2 puffs into the lungs 2 times daily AND EVERY 4-6 HOURS AS NEEDED      Multiple Vitamins-Minerals (ICAPS) CAPS Take 1 capsule by mouth daily.  loratadine (CLARITIN) 10 MG tablet Take 10 mg by mouth as needed.  vitamin B-12 (CYANOCOBALAMIN) 1000 MCG tablet Take 1,000 mcg by mouth daily.  Probiotic Product (PROBIOTIC DAILY PO) Take  by mouth.  levothyroxine (SYNTHROID) 88 MCG tablet Take 88 mcg by mouth daily.  aspirin 81 MG chewable tablet Take 81 mg by mouth daily.  warfarin (COUMADIN) 5 MG tablet Take 7.5 mg by mouth daily at 1800 1.5 Tablets daily      Nutritional Supplements (JUICE PLUS FIBRE PO) Take  by mouth daily. Chewable \"Gummy\" One Vegetable , One Fruit  Over The Counter       Cholecalciferol (VITAMIN D3) 2000 UNIT CAPS Take 1,000 Units by mouth daily Over The Counter, Also take \"prescribed Vitamin D 2 once a week on Monday\"        No current facility-administered medications for this visit.       Allergies: Darvocet [propoxyphene n-acetaminophen]; Ranexa [ranolazine er]; and Sulfa antibiotics  Past Medical History:   Diagnosis Date    Arthritis     Bradycardia 2001    [facility] CAD (coronary artery disease)     Cardiac pacemaker 10/2001    [employer] #[id number]  PPM- Serial # 26-[facility]- Dr Adelaida Hurtado CHF (congestive heart failure) ([facility] [address]     COPD, mild ([address] 2/17/2017    CVA (cerebrovascular accident) ([address]     DJD (degenerative joint disease) of cervical spine     C5-C6, C6-C7    Exhaustion of cardiac pacemaker battery 11/21/2011    PPM battery replacement- [employer]    Family history of cardiovascular disease     Glaucoma Dx 2010    H/O 24 hour EKG monitoring 8/6/2000 8/6/2000- Intermittent episonde of a-fib/flutter    H/O cardiac catheterization 4/20/2010, 2/1989 4/20/2010-Severe native vessel disease and has graft disease as well. LAD, CX totally occluded. RCA totally occluded in proximal segment. VG to RCA widely patent. PDA 90% LAD afer LIMA anastomosis 80-90% stenosis. Proceeded with PTCA with stent next day.  H/O cardiovascular stress test 10/14/2011, 6/2/2010,4/8/2010, 5/18/2009,4/6/2009, 10/30/2007, 11/12/2004, 11/6/2003, 8/9/2002, 8/9/2001,6/5/2000,     10/14/2011-Lexiscan-Abnormal Myocardial Perfusion study. Evidence of mild ischemia in the Left CX region. Abnomal study. Rest EF 63%. Global LV systolic function normal. No ECG changes. Unremarkable pharmacological stress test.    H/O cardiovascular stress test 6/10/2013    thallium--mild ischemia left circumflex EF63% no change from 10/2011 study.  H/O cardiovascular stress test 10/16/2014    cardiolite-mild ischemia left circumflex,EF70%    H/O chest x-ray 4/19/2009 4/19/2009-Stable cardiomegaly. No acute cardiopulmonary disease.  H/O Doppler ultrasound 3/31/2010    CAROTID- 3/31/2010-INtimal thickening but no significant atherosclerotic plaque noted in ANA PAULA. Doppler flow velocities within ANA PAULA are WNL. Heterogeneous, irregular atherosclerotic plaque noted in LICA. Doppler flow velocities within the LICA are elevated, consistent with a mild, less than 50% stenosis.  H/O Doppler ultrasound 5/24/2016    Carotid- normal study    H/O Doppler ultrasound 09/06/2017    carotid - normal study    H/O echocardiogram 10/13    EF=60%, Severe Pulm. HTN, & Sclerotic aortic valve w/stenosis.  H/O echocardiogram 10/16/14     EF 55-60% Normal LV. Normal LV systolic function. Severe tricuspid insufficiency with severe hypertension.      H/O echocardiogram 02/03/2017    heart cath performed this morning    History of complete ECG     10/14/2011(Lexiscan);5/6/2010, 4/30/2009,10/24/2008,9/21/2007, 10/13/2006    History of nuclear stress test 11/17/2016    lexiscan-normal,EF70%    Hx of cardiovascular stress test 12/28/2018    EF 60%  Normal study.  HX OTHER MEDICAL 05/01/2017    MUGA-normal, EF53%    Hyperlipidemia     Hypertension     Mild intermittent asthma 7/28/2016    Nausea & vomiting     Obstructive sleep apnea 5/16/2017    Paroxysmal atrial fibrillation (HCC)     Post PTCA 4/21/2010    PTCA with 2.25 stent of the LIMA to LAD    Pulmonary HTN ([address]     Severe per last echo on 10/13. 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 Followed by MAZE procedure by pulmonary vein isolation.-  Dr Ean Fraga S/P PTCA (percutaneous transluminal coronary angioplasty) 11/2012    PTCA with stent to RCA    Thyroid disease     hypothyroi    Unspecified cerebral artery occlusion with cerebral infarction Unsure When    No Residual     Past Surgical History:   Procedure Laterality Date    APPENDECTOMY  1941    CARDIAC SURGERY  4/09    CABG (3 Bypasses), One Heart Stent in 2010    COLONOSCOPY  In 2000's    X1    CORONARY ANGIOPLASTY WITH STENT PLACEMENT  4/21/2010    PTCA with stent LIMA ->LAD    CORONARY ARTERY BYPASS GRAFT  4/8/2009    LIMA->Diag,  LIMA -> LAD;  SVG->RCA going to the PDA.  Followed by MAZE procedure by pulmonary vein isolation.-  Dr Mitchel Ardon, TOTAL ABDOMINAL  1990's    MIDDLE EAR SURGERY      OTHER SURGICAL HISTORY      Ear surgery-hearing    PACEMAKER PLACEMENT      battery change 11/21/2011 [employer]tr[employer]    PTCA  11/2012    Ptca with stent to RCA    TONSILLECTOMY  65's     Family History   Problem Relation Age of Onset    Cancer Mother         \"Liver Cancer\"    Arthritis Mother     Depression Mother     Hearing Loss Mother     High Blood Pressure Mother     High Cholesterol Mother     Mental Illness Mother     Miscarriages / Stillbirths Mother     Heart Disease Father     Early Death Father 36        \"Instant Rate controlled yes. She is  on anticoagulation. Current medications include: warfarin   She is to continue current medications     PPM  Placed D/T bradycardia  Analysis reviewed. Estimated 14 months of battery life   Stable remote monitoring noted. Continue with Q3 month monitoring        pulmonary HTN   RVSP 85 mm HG   Followed per pulmonary     Fluid overload  Will add low dose lasix, check BMP repeat echo. History of severe TR with dilated RV which may be contributing factor. ? Right sided heart failure; ( PND abdominal bloating, edema, + JVD)  To change to low salt diet        Lifestyle and risk factor modificatons discussed. Various goals are discussed and questions answered. Continue current medications. Appropriate prescriptions are addressed. Questions answered and patient verbalizes understanding. Call for any problems, questions, or concerns.  OV to discuss results

## 2019-05-15 ENCOUNTER — HOSPITAL ENCOUNTER (OUTPATIENT)
Age: 84
Discharge: HOME | End: 2019-05-15
Payer: MEDICARE

## 2019-05-15 ENCOUNTER — TELEPHONE (OUTPATIENT)
Dept: CARDIOLOGY CLINIC | Age: 84
End: 2019-05-15

## 2019-05-15 LAB
ANION GAP SERPL CALCULATED.3IONS-SCNC: 13 MMOL/L (ref 4–16)
BUN BLDV-MCNC: 14 MG/DL (ref 6–23)
CALCIUM SERPL-MCNC: 9.6 MG/DL (ref 8.3–10.6)
CHLORIDE BLD-SCNC: 96 MMOL/L (ref 99–110)
CO2: 30 MMOL/L (ref 21–32)
CREAT SERPL-MCNC: 0.6 MG/DL (ref 0.6–1.1)
GFR AFRICAN AMERICAN: >60 ML/MIN/1.73M2
GFR NON-AFRICAN AMERICAN: >60 ML/MIN/1.73M2
GLUCOSE BLD-MCNC: 101 MG/DL (ref 70–99)
POTASSIUM SERPL-SCNC: 3.9 MMOL/L (ref 3.5–5.1)
SODIUM BLD-SCNC: 139 MMOL/L (ref 135–145)

## 2019-05-15 PROCEDURE — 36415 COLL VENOUS BLD VENIPUNCTURE: CPT

## 2019-05-15 PROCEDURE — 80048 BASIC METABOLIC PNL TOTAL CA: CPT

## 2019-05-15 NOTE — TELEPHONE ENCOUNTER
Called patient to schedule Echo, no answer. Left message for patient to call office to schedule.  [facility] NO AUTH NEEDED OK TO SCHEDULE

## 2019-05-16 ENCOUNTER — TELEPHONE (OUTPATIENT)
Dept: CARDIOLOGY CLINIC | Age: 84
End: 2019-05-16

## 2019-05-16 NOTE — TELEPHONE ENCOUNTER
The patient called and wants to know if the test was absolutely necessary   .  She would like someone to double check and get back with her if they would please

## 2019-05-20 ENCOUNTER — TELEPHONE (OUTPATIENT)
Dept: CARDIOLOGY CLINIC | Age: 84
End: 2019-05-20

## 2019-05-24 ENCOUNTER — TELEPHONE (OUTPATIENT)
Dept: CARDIOLOGY CLINIC | Age: 84
End: 2019-05-24

## 2019-07-10 ENCOUNTER — PROCEDURE VISIT (OUTPATIENT)
Dept: CARDIOLOGY CLINIC | Age: 84
End: 2019-07-10
Payer: MEDICARE

## 2019-07-10 VITALS — SYSTOLIC BLOOD PRESSURE: 148 MMHG | DIASTOLIC BLOOD PRESSURE: 78 MMHG | HEART RATE: 68 BPM

## 2019-07-10 DIAGNOSIS — Z95.0 CARDIAC PACEMAKER IN SITU: Primary | ICD-10-CM

## 2019-07-10 PROCEDURE — 93279 PRGRMG DEV EVAL PM/LDLS PM: CPT | Performed by: INTERNAL MEDICINE

## 2019-07-10 NOTE — PROCEDURES
Taryn Black  80 y.o., female  3/18/1929    Skippy Fraction, DO    Chief Complaint   Patient presents with    Procedure     pacemaker check     Vitals:    07/10/19 1431   BP: (!) 148/78   Pulse: 68     Pacer analysis is reviewed and filed in the pacer chart. Analysis is consistent with normal single-chamber non-MRI Medtronic Adapta pacer function with stable leads and appropriate battery status for the age of the device. Remaining battery is 11 months. Pacer is in the 5% pacing in the ventricle. Recommend continued every three month pacer check and follow up office visit as scheduled.     Mariposa Patterson MD, 7/10/2019 5:05 PM

## 2019-07-24 ENCOUNTER — OFFICE VISIT (OUTPATIENT)
Dept: CARDIOLOGY CLINIC | Age: 84
End: 2019-07-24
Payer: MEDICARE

## 2019-07-24 VITALS
HEIGHT: 58 IN | HEART RATE: 68 BPM | WEIGHT: 134 LBS | SYSTOLIC BLOOD PRESSURE: 164 MMHG | DIASTOLIC BLOOD PRESSURE: 86 MMHG | BODY MASS INDEX: 28.13 KG/M2

## 2019-07-24 DIAGNOSIS — I27.20 PULMONARY HTN (HCC): Primary | Chronic | ICD-10-CM

## 2019-07-24 DIAGNOSIS — I25.700 CORONARY ARTERY DISEASE INVOLVING CORONARY BYPASS GRAFT OF NATIVE HEART WITH UNSTABLE ANGINA PECTORIS (HCC): ICD-10-CM

## 2019-07-24 DIAGNOSIS — I10 HYPERTENSION, UNSPECIFIED TYPE: Chronic | ICD-10-CM

## 2019-07-24 DIAGNOSIS — I48.0 PAF (PAROXYSMAL ATRIAL FIBRILLATION) (HCC): ICD-10-CM

## 2019-07-24 DIAGNOSIS — Z95.0 CARDIAC PACEMAKER: Chronic | ICD-10-CM

## 2019-07-24 PROCEDURE — 1123F ACP DISCUSS/DSCN MKR DOCD: CPT | Performed by: NURSE PRACTITIONER

## 2019-07-24 PROCEDURE — 4040F PNEUMOC VAC/ADMIN/RCVD: CPT | Performed by: NURSE PRACTITIONER

## 2019-07-24 PROCEDURE — G8427 DOCREV CUR MEDS BY ELIG CLIN: HCPCS | Performed by: NURSE PRACTITIONER

## 2019-07-24 PROCEDURE — 1090F PRES/ABSN URINE INCON ASSESS: CPT | Performed by: NURSE PRACTITIONER

## 2019-07-24 PROCEDURE — 99213 OFFICE O/P EST LOW 20 MIN: CPT | Performed by: NURSE PRACTITIONER

## 2019-07-24 PROCEDURE — G8419 CALC BMI OUT NRM PARAM NOF/U: HCPCS | Performed by: NURSE PRACTITIONER

## 2019-07-24 PROCEDURE — G8598 ASA/ANTIPLAT THER USED: HCPCS | Performed by: NURSE PRACTITIONER

## 2019-07-24 PROCEDURE — 1036F TOBACCO NON-USER: CPT | Performed by: NURSE PRACTITIONER

## 2019-07-24 RX ORDER — POTASSIUM CHLORIDE 750 MG/1
10 TABLET, EXTENDED RELEASE ORAL DAILY
Qty: 30 TABLET | Refills: 1 | Status: SHIPPED | OUTPATIENT
Start: 2019-07-24 | End: 2019-08-08 | Stop reason: SDUPTHER

## 2019-07-24 RX ORDER — FUROSEMIDE 20 MG/1
40 TABLET ORAL DAILY
Qty: 30 TABLET | Refills: 2
Start: 2019-07-24 | End: 2019-08-08 | Stop reason: SDUPTHER

## 2019-07-24 NOTE — PROGRESS NOTES
EF 55-60% Normal LV. Normal LV systolic function. Severe tricuspid insufficiency with severe hypertension.  H/O echocardiogram 02/03/2017    heart cath performed this morning    History of complete ECG     10/14/2011(Lexiscan);5/6/2010, 4/30/2009,10/24/2008,9/21/2007, 10/13/2006    History of nuclear stress test 11/17/2016    lexiscan-normal,EF70%    Hx of cardiovascular stress test 12/28/2018    EF 60%  Normal study.  HX OTHER MEDICAL 05/01/2017    MUGA-normal, EF53%    Hyperlipidemia     Hypertension     Mild intermittent asthma 7/28/2016    Nausea & vomiting     Obstructive sleep apnea 5/16/2017    Paroxysmal atrial fibrillation (HCC)     Post PTCA 4/21/2010    PTCA with 2.25 stent of the LIMA to LAD    Pulmonary HTN ([address]     Severe per last echo on 10/13. 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 Followed by MAZE procedure by pulmonary vein isolation.-  Dr Agnieszka Borja S/P PTCA (percutaneous transluminal coronary angioplasty) 11/2012    PTCA with stent to RCA    Thyroid disease     hypothyroi    Unspecified cerebral artery occlusion with cerebral infarction Unsure When    No Residual     Past Surgical History:   Procedure Laterality Date    APPENDECTOMY  1941    CARDIAC SURGERY  4/09    CABG (3 Bypasses), One Heart Stent in 2010    COLONOSCOPY  In 2000's    X1    CORONARY ANGIOPLASTY WITH STENT PLACEMENT  4/21/2010    PTCA with stent LIMA ->LAD    CORONARY ARTERY BYPASS GRAFT  4/8/2009    LIMA->Diag,  LIMA -> LAD;  SVG->RCA going to the PDA.  Followed by MAZE procedure by pulmonary vein isolation.-  Dr Jomar Scales, TOTAL ABDOMINAL  1990's    MIDDLE EAR SURGERY      OTHER SURGICAL HISTORY      Ear surgery-hearing    PACEMAKER PLACEMENT      battery change 11/21/2011 [employer]    PTCA  11/2012    Ptca with stent to RCA    TONSILLECTOMY  1950's     Family History   Problem Relation Age

## 2019-08-08 ENCOUNTER — OFFICE VISIT (OUTPATIENT)
Dept: CARDIOLOGY CLINIC | Age: 84
End: 2019-08-08
Payer: MEDICARE

## 2019-08-08 VITALS
HEART RATE: 64 BPM | SYSTOLIC BLOOD PRESSURE: 136 MMHG | DIASTOLIC BLOOD PRESSURE: 70 MMHG | HEIGHT: 59 IN | BODY MASS INDEX: 25.8 KG/M2 | WEIGHT: 128 LBS

## 2019-08-08 DIAGNOSIS — I50.22 CHRONIC SYSTOLIC CONGESTIVE HEART FAILURE (HCC): Primary | ICD-10-CM

## 2019-08-08 DIAGNOSIS — I10 HYPERTENSION, UNSPECIFIED TYPE: Chronic | ICD-10-CM

## 2019-08-08 PROCEDURE — 99213 OFFICE O/P EST LOW 20 MIN: CPT | Performed by: NURSE PRACTITIONER

## 2019-08-08 RX ORDER — POTASSIUM CHLORIDE 750 MG/1
10 TABLET, EXTENDED RELEASE ORAL DAILY
Qty: 30 TABLET | Refills: 2 | Status: SHIPPED | OUTPATIENT
Start: 2019-08-08 | End: 2019-11-12 | Stop reason: ALTCHOICE

## 2019-08-08 RX ORDER — FUROSEMIDE 20 MG/1
40 TABLET ORAL DAILY
Qty: 30 TABLET | Refills: 3 | Status: SHIPPED | OUTPATIENT
Start: 2019-08-08 | End: 2019-11-12 | Stop reason: ALTCHOICE

## 2019-08-08 NOTE — PROGRESS NOTES
[facility] [place]  [address], [address]  Phone: [phone]    Fax [phone]                  Don Lees MD, Jackie Pennington MD, [address] Asha Ross MD, MD Philip Whittaker MD Milas Boer, MD MALLARD FILLMORE GATES, LISA Gordon, LISA Pascual, APRROLDAN Wiggins, APRROLDAN    CARDIOLOGY  NOTE      8/8/2019    RE: Joel Anaya  (3/18/1929)                               TO:  Dr. Sandra López DO  The primary cardiologist is Dr. Danika Ball    CC:   1. Chronic systolic congestive heart failure ([facility]    2. Hypertension, unspecified type        Patient denies all of the following:  Chest Pain  Palpitations  Shortness of Breath  Edema  Dizziness  Syncope      HPI: Thank you for involving me in taking care of your patient Joel Anaya, who is a  80y.o. year old female with a history as listed above. Patient is  active and female does walks regularly. Patient is compliant with she medications. Patient denies any cardiac complaints or needs.      Vitals:    08/08/19 1516   BP: 136/70   Pulse: 64       Current Outpatient Medications   Medication Sig Dispense Refill    furosemide (LASIX) 20 MG tablet Take 2 tablets by mouth daily 30 tablet 3    potassium chloride (KLOR-CON M) 10 MEQ extended release tablet Take 1 tablet by mouth daily 30 tablet 2    triamterene-hydrochlorothiazide (MAXZIDE-25) 37.5-25 MG per tablet TAKE ONE TABLET BY MOUTH DAILY 77 tablet 2    umeclidinium-vilanterol (ANORO ELLIPTA) 62.5-25 MCG/INH AEPB inhaler INHALE ONE DOSE BY MOUTH DAILY 1 puff 11    ranolazine (RANEXA) 500 MG extended release tablet Take 1 tablet by mouth 2 times daily 56 tablet 0    Biotin (BIOTIN 5000) 5 MG CAPS Take 1 capsule by mouth daily      Misc Natural Products (OSTEO BI-FLEX ADV DOUBLE ST PO) Take 1 tablet by mouth daily      Tafluprost (ZIOPTAN OP) Apply to eye      timolol (TIMOPTIC-XE) 0.25 % ophthalmic ECG     10/14/2011(Lexiscan);5/6/2010, 4/30/2009,10/24/2008,9/21/2007, 10/13/2006    History of nuclear stress test 11/17/2016    lexiscan-normal,EF70%    Hx of cardiovascular stress test 12/28/2018    EF 60%  Normal study.  HX OTHER MEDICAL 05/01/2017    MUGA-normal, EF53%    Hyperlipidemia     Hypertension     Mild intermittent asthma 7/28/2016    Nausea & vomiting     Obstructive sleep apnea 5/16/2017    Paroxysmal atrial fibrillation (HCC)     Post PTCA 4/21/2010    PTCA with 2.25 stent of the LIMA to LAD    Pulmonary HTN ([address]     Severe per last echo on 10/13. 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 Followed by MAZE procedure by pulmonary vein isolation.-  Dr Ramos Kamara S/P PTCA (percutaneous transluminal coronary angioplasty) 11/2012    PTCA with stent to RCA    Thyroid disease     hypothyroi    Unspecified cerebral artery occlusion with cerebral infarction Unsure When    No Residual     Past Surgical History:   Procedure Laterality Date    APPENDECTOMY  1941    CARDIAC SURGERY  4/09    CABG (3 Bypasses), One Heart Stent in 2010    COLONOSCOPY  In 2000's    X1    CORONARY ANGIOPLASTY WITH STENT PLACEMENT  4/21/2010    PTCA with stent LIMA ->LAD    CORONARY ARTERY BYPASS GRAFT  4/8/2009    LIMA->Diag,  LIMA -> LAD;  SVG->RCA going to the PDA.  Followed by MAZE procedure by pulmonary vein isolation.-  Dr Fidelina Boo, TOTAL ABDOMINAL  1990's    MIDDLE EAR SURGERY      OTHER SURGICAL HISTORY      Ear surgery-hearing    PACEMAKER PLACEMENT      battery change 11/21/2011 [employer]    PTCA  11/2012    Ptca with stent to RCA    TONSILLECTOMY  65's     Family History   Problem Relation Age of Onset    Cancer Mother         \"Liver Cancer\"    Arthritis Mother     Depression Mother     Hearing Loss Mother     High Blood Pressure Mother     High Cholesterol Mother     Mental Illness Readings from Last 3 Encounters:   08/08/19 128 lb (58.1 kg)   07/24/19 134 lb (60.8 kg)   05/10/19 132 lb 6.4 oz (60.1 kg)     Body mass index is 25.85 kg/m². GENERAL - Alert, oriented, pleasant, in no apparent distress. Head unremarkable  Eyes - pupils equal and reactive to light - bilateral conjunctiva are pink: sclera are white   ENT - external ears intact, nose is intact:  tongue is midline pink and moist  Neck - Supple. No jugular venous distention noted. Yes carotid bruits appreciated. Cardiovascular - Normal S1 and S2:  murmur appreciated Yes, No gallop. Regular rate- Yes,  rhythm regular-Yes. Extremities - No cyanosis, clubbing, mild edema to lower legs. Pulmonary - No respiratory distress. No wheezes or rales. Chest is clear  Pulses: Bilateral radial and pedal pulses normal  Abdomen -  Soft no tenderness, non distended   Musculoskeletal - Normal movement of all extremities   Neurologic - alert and oriented: There are no gross focal neurologic abnormalities.    Skin-  No rash: No echymosis   Affect- normal mood and pleasant       DATA:  Lab Results   Component Value Date    CKTOTAL 42 09/28/2013    TROPONINI <0.006 09/28/2013     BNP:    Lab Results   Component Value Date    BNP 90 09/28/2013     PT/INR:  No results found for: PTINR  No results found for: LABA1C  Lab Results   Component Value Date    CHOL 171 10/29/2018    TRIG 82 10/29/2018    HDL 49 10/29/2018    LDLCALC 100 07/05/2017    LDLDIRECT 116 (H) 10/29/2018     Lab Results   Component Value Date    ALT 15 04/02/2018    AST 21 04/02/2018     TSH:    Lab Results   Component Value Date    TSH 1.0 05/07/2010       Echo:2/3/17    Ejection fraction is visually estimated at 50-55%.   Moderate concentric left ventricular hypertrophy.  [place]  dilated left atrium.   Severely dilated right atrium.   Severely dilated right ventricle.   Mild to Moderate mitral regurgitation is present.   Severely elevated pulmonary artery pressure at 85

## 2019-09-13 ENCOUNTER — TELEPHONE (OUTPATIENT)
Dept: CARDIOLOGY CLINIC | Age: 84
End: 2019-09-13

## 2019-09-17 ENCOUNTER — OFFICE VISIT (OUTPATIENT)
Dept: CARDIOLOGY CLINIC | Age: 84
End: 2019-09-17
Payer: MEDICARE

## 2019-09-17 VITALS
BODY MASS INDEX: 26.45 KG/M2 | DIASTOLIC BLOOD PRESSURE: 80 MMHG | SYSTOLIC BLOOD PRESSURE: 128 MMHG | HEART RATE: 64 BPM | HEIGHT: 58 IN | WEIGHT: 126 LBS

## 2019-09-17 DIAGNOSIS — M79.89 SWELLING OF EXTREMITY: Primary | ICD-10-CM

## 2019-09-17 DIAGNOSIS — R06.02 SOBOE (SHORTNESS OF BREATH ON EXERTION): ICD-10-CM

## 2019-09-17 PROCEDURE — 1090F PRES/ABSN URINE INCON ASSESS: CPT | Performed by: INTERNAL MEDICINE

## 2019-09-17 PROCEDURE — 99214 OFFICE O/P EST MOD 30 MIN: CPT | Performed by: INTERNAL MEDICINE

## 2019-09-17 PROCEDURE — G8419 CALC BMI OUT NRM PARAM NOF/U: HCPCS | Performed by: INTERNAL MEDICINE

## 2019-09-17 PROCEDURE — 4040F PNEUMOC VAC/ADMIN/RCVD: CPT | Performed by: INTERNAL MEDICINE

## 2019-09-17 PROCEDURE — 1123F ACP DISCUSS/DSCN MKR DOCD: CPT | Performed by: INTERNAL MEDICINE

## 2019-09-17 PROCEDURE — G8428 CUR MEDS NOT DOCUMENT: HCPCS | Performed by: INTERNAL MEDICINE

## 2019-09-17 PROCEDURE — G8598 ASA/ANTIPLAT THER USED: HCPCS | Performed by: INTERNAL MEDICINE

## 2019-09-17 PROCEDURE — 1036F TOBACCO NON-USER: CPT | Performed by: INTERNAL MEDICINE

## 2019-09-17 NOTE — PROGRESS NOTES
Carotid- normal study    H/O Doppler ultrasound 09/06/2017    carotid - normal study    H/O echocardiogram 10/13    EF=60%, Severe Pulm. HTN, & Sclerotic aortic valve w/stenosis.  H/O echocardiogram 10/16/14     EF 55-60% Normal LV. Normal LV systolic function. Severe tricuspid insufficiency with severe hypertension.  H/O echocardiogram 02/03/2017    heart cath performed this morning    History of complete ECG     10/14/2011(Lexiscan);5/6/2010, 4/30/2009,10/24/2008,9/21/2007, 10/13/2006    History of nuclear stress test 11/17/2016    lexiscan-normal,EF70%    Hx of cardiovascular stress test 12/28/2018    EF 60%  Normal study.  HX OTHER MEDICAL 05/01/2017    MUGA-normal, EF53%    Hyperlipidemia     Hypertension     Mild intermittent asthma 7/28/2016    Nausea & vomiting     Obstructive sleep apnea 5/16/2017    Paroxysmal atrial fibrillation (HCC)     Post PTCA 4/21/2010    PTCA with 2.25 stent of the LIMA to LAD    Pulmonary HTN ([address]     Severe per last echo on 10/13. 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 Followed by MAZE procedure by pulmonary vein isolation.-  Dr Randell Rodgers S/P PTCA (percutaneous transluminal coronary angioplasty) 11/2012    PTCA with stent to RCA    Thyroid disease     hypothyroi    Unspecified cerebral artery occlusion with cerebral infarction Unsure When    No Residual     Past Surgical History:   Procedure Laterality Date    APPENDECTOMY  1941    CARDIAC SURGERY  4/09    CABG (3 Bypasses), One Heart Stent in 2010    COLONOSCOPY  In 2000's    X1    CORONARY ANGIOPLASTY WITH STENT PLACEMENT  4/21/2010    PTCA with stent LIMA ->LAD    CORONARY ARTERY BYPASS GRAFT  4/8/2009    LIMA->Diag,  LIMA -> LAD;  SVG->RCA going to the PDA.  Followed by MAZE procedure by pulmonary vein isolation.-  Dr Joyce Haddad, TOTAL ABDOMINAL  1990's    MIDDLE EAR SURGERY      OTHER swelling in foot and leg  Neurological: Negative for dizziness, headaches, memory loss, numbness/tingling, visual changes, syncope  Dermatological: Negative for rash    Objective:  /80   Pulse 64   Ht 4' 10\" (1.473 m)   Wt 126 lb (57.2 kg)   BMI 26.33 kg/m²   Wt Readings from Last 3 Encounters:   09/17/19 126 lb (57.2 kg)   08/08/19 128 lb (58.1 kg)   07/24/19 134 lb (60.8 kg)     Body mass index is 26.33 kg/m². GENERAL - Alert, oriented, pleasant, in no apparent distress. EYES: No jaundice, no conjunctival pallor. SKIN: It is warm & dry. No rashes. No Echhymosis    HEENT - No clinically significant abnormalities seen. Neck - Supple. No jugular venous distention noted. No carotid bruits. Cardiovascular - Normal S1 and S2 without obvious murmur or gallop. Extremities - No cyanosis, clubbing, or significant edema. Pulmonary - No respiratory distress. No wheezes or rales. Abdomen - No masses, tenderness, or organomegaly. Musculoskeletal - No significant edema. No joint deformities. No muscle wasting. Neurologic - Cranial nerves II through XII are grossly intact. There were no gross focal neurologic abnormalities.     Lab Review   Lab Results   Component Value Date    CKTOTAL 42 09/28/2013     BNP:    Lab Results   Component Value Date    BNP 90 09/28/2013     PT/INR:    Lab Results   Component Value Date    INR 1.94 10/12/2017     No results found for: LABA1C  Lab Results   Component Value Date    WBC 6.4 04/02/2018    HCT 46.1 04/02/2018    MCV 93.9 04/02/2018     04/02/2018     Lab Results   Component Value Date    CHOL 171 10/29/2018    TRIG 82 10/29/2018    HDL 49 10/29/2018    LDLCALC 100 07/05/2017    LDLDIRECT 116 (H) 10/29/2018     Lab Results   Component Value Date    ALT 15 04/02/2018    AST 21 04/02/2018     BMP:    Lab Results   Component Value Date     05/15/2019    K 3.9 05/15/2019    CL 96 05/15/2019    CO2 30 05/15/2019    BUN 14 05/15/2019    CREATININE 0.6

## 2019-09-18 ENCOUNTER — PROCEDURE VISIT (OUTPATIENT)
Dept: CARDIOLOGY CLINIC | Age: 84
End: 2019-09-18
Payer: MEDICARE

## 2019-09-18 DIAGNOSIS — M79.89 SWELLING OF EXTREMITY: ICD-10-CM

## 2019-09-18 DIAGNOSIS — R06.02 SOBOE (SHORTNESS OF BREATH ON EXERTION): ICD-10-CM

## 2019-09-18 DIAGNOSIS — R06.02 SOBOE (SHORTNESS OF BREATH ON EXERTION): Primary | ICD-10-CM

## 2019-09-18 LAB
LV EF: 53 %
LVEF MODALITY: NORMAL

## 2019-09-18 PROCEDURE — 93970 EXTREMITY STUDY: CPT | Performed by: INTERNAL MEDICINE

## 2019-09-18 PROCEDURE — 93306 TTE W/DOPPLER COMPLETE: CPT | Performed by: INTERNAL MEDICINE

## 2019-09-19 ENCOUNTER — TELEPHONE (OUTPATIENT)
Dept: CARDIOLOGY CLINIC | Age: 84
End: 2019-09-19

## 2019-09-19 DIAGNOSIS — I27.20 PULMONARY HTN (HCC): Primary | Chronic | ICD-10-CM

## 2019-09-26 ENCOUNTER — TELEPHONE (OUTPATIENT)
Dept: CARDIOLOGY CLINIC | Age: 84
End: 2019-09-26

## 2019-10-04 ENCOUNTER — TELEPHONE (OUTPATIENT)
Dept: CARDIOLOGY CLINIC | Age: 84
End: 2019-10-04

## 2019-10-04 ENCOUNTER — HOSPITAL ENCOUNTER (OUTPATIENT)
Age: 84
Discharge: HOME | End: 2019-10-04
Payer: MEDICARE

## 2019-10-04 LAB
ANION GAP SERPL CALCULATED.3IONS-SCNC: 12 MMOL/L (ref 4–16)
BUN BLDV-MCNC: 15 MG/DL (ref 6–23)
CALCIUM SERPL-MCNC: 10.2 MG/DL (ref 8.3–10.6)
CHLORIDE BLD-SCNC: 91 MMOL/L (ref 99–110)
CO2: 31 MMOL/L (ref 21–32)
CREAT SERPL-MCNC: 0.6 MG/DL (ref 0.6–1.1)
GFR AFRICAN AMERICAN: >60 ML/MIN/1.73M2
GFR NON-AFRICAN AMERICAN: >60 ML/MIN/1.73M2
GLUCOSE BLD-MCNC: 90 MG/DL (ref 70–99)
POTASSIUM SERPL-SCNC: 5 MMOL/L (ref 3.5–5.1)
PRO-BNP: 790.3 PG/ML
SODIUM BLD-SCNC: 134 MMOL/L (ref 135–145)

## 2019-10-04 PROCEDURE — 80048 BASIC METABOLIC PNL TOTAL CA: CPT

## 2019-10-04 PROCEDURE — 36415 COLL VENOUS BLD VENIPUNCTURE: CPT

## 2019-10-04 PROCEDURE — 83880 ASSAY OF NATRIURETIC PEPTIDE: CPT

## 2019-10-08 ENCOUNTER — HOSPITAL ENCOUNTER (OUTPATIENT)
Age: 84
Discharge: HOME | End: 2019-10-08
Payer: MEDICARE

## 2019-10-08 ENCOUNTER — INITIAL CONSULT (OUTPATIENT)
Dept: PULMONOLOGY | Age: 84
End: 2019-10-08
Payer: MEDICARE

## 2019-10-08 VITALS
BODY MASS INDEX: 26.24 KG/M2 | HEIGHT: 58 IN | DIASTOLIC BLOOD PRESSURE: 62 MMHG | OXYGEN SATURATION: 93 % | HEART RATE: 79 BPM | WEIGHT: 125 LBS | SYSTOLIC BLOOD PRESSURE: 122 MMHG | RESPIRATION RATE: 16 BRPM

## 2019-10-08 DIAGNOSIS — R06.02 SOBOE (SHORTNESS OF BREATH ON EXERTION): ICD-10-CM

## 2019-10-08 DIAGNOSIS — I27.20 PULMONARY HTN (HCC): Chronic | ICD-10-CM

## 2019-10-08 DIAGNOSIS — I27.20 PULMONARY HYPERTENSION (HCC): Primary | ICD-10-CM

## 2019-10-08 DIAGNOSIS — G47.33 OBSTRUCTIVE SLEEP APNEA: ICD-10-CM

## 2019-10-08 PROCEDURE — 4040F PNEUMOC VAC/ADMIN/RCVD: CPT | Performed by: INTERNAL MEDICINE

## 2019-10-08 PROCEDURE — G8419 CALC BMI OUT NRM PARAM NOF/U: HCPCS | Performed by: INTERNAL MEDICINE

## 2019-10-08 PROCEDURE — 99214 OFFICE O/P EST MOD 30 MIN: CPT | Performed by: INTERNAL MEDICINE

## 2019-10-08 PROCEDURE — 86430 RHEUMATOID FACTOR TEST QUAL: CPT

## 2019-10-08 PROCEDURE — 1090F PRES/ABSN URINE INCON ASSESS: CPT | Performed by: INTERNAL MEDICINE

## 2019-10-08 PROCEDURE — 86038 ANTINUCLEAR ANTIBODIES: CPT

## 2019-10-08 PROCEDURE — G8427 DOCREV CUR MEDS BY ELIG CLIN: HCPCS | Performed by: INTERNAL MEDICINE

## 2019-10-08 PROCEDURE — 1123F ACP DISCUSS/DSCN MKR DOCD: CPT | Performed by: INTERNAL MEDICINE

## 2019-10-08 PROCEDURE — G8484 FLU IMMUNIZE NO ADMIN: HCPCS | Performed by: INTERNAL MEDICINE

## 2019-10-08 PROCEDURE — 36415 COLL VENOUS BLD VENIPUNCTURE: CPT

## 2019-10-08 PROCEDURE — G8598 ASA/ANTIPLAT THER USED: HCPCS | Performed by: INTERNAL MEDICINE

## 2019-10-08 PROCEDURE — 1036F TOBACCO NON-USER: CPT | Performed by: INTERNAL MEDICINE

## 2019-10-08 ASSESSMENT — SLEEP AND FATIGUE QUESTIONNAIRES
HOW LIKELY ARE YOU TO NOD OFF OR FALL ASLEEP WHEN YOU ARE A PASSENGER IN A CAR FOR AN HOUR WITHOUT A BREAK: 3
HOW LIKELY ARE YOU TO NOD OFF OR FALL ASLEEP WHILE SITTING AND READING: 3
HOW LIKELY ARE YOU TO NOD OFF OR FALL ASLEEP WHILE WATCHING TV: 3
HOW LIKELY ARE YOU TO NOD OFF OR FALL ASLEEP IN A CAR, WHILE STOPPED FOR A FEW MINUTES IN TRAFFIC: 0
HOW LIKELY ARE YOU TO NOD OFF OR FALL ASLEEP WHILE SITTING AND TALKING TO SOMEONE: 0
HOW LIKELY ARE YOU TO NOD OFF OR FALL ASLEEP WHILE LYING DOWN TO REST IN THE AFTERNOON WHEN CIRCUMSTANCES PERMIT: 3
NECK CIRCUMFERENCE (INCHES): 13
HOW LIKELY ARE YOU TO NOD OFF OR FALL ASLEEP WHILE SITTING INACTIVE IN A PUBLIC PLACE: 3
HOW LIKELY ARE YOU TO NOD OFF OR FALL ASLEEP WHILE SITTING QUIETLY AFTER LUNCH WITHOUT ALCOHOL: 2
ESS TOTAL SCORE: 17

## 2019-10-08 ASSESSMENT — ENCOUNTER SYMPTOMS
EYE DISCHARGE: 0
BACK PAIN: 0
COUGH: 1
SHORTNESS OF BREATH: 1
ABDOMINAL PAIN: 0
EYE ITCHING: 0
ABDOMINAL DISTENTION: 0

## 2019-10-10 LAB
RHEUMATOID FACTOR: <10 IU/ML (ref 0–14)
RHEUMATOID FACTOR: NORMAL IU/ML (ref 0–14)

## 2019-10-11 ENCOUNTER — HOSPITAL ENCOUNTER (OUTPATIENT)
Age: 84
Discharge: HOME | End: 2019-10-11
Payer: MEDICARE

## 2019-10-11 ENCOUNTER — HOSPITAL ENCOUNTER (OUTPATIENT)
Dept: GENERAL RADIOLOGY | Age: 84
Discharge: HOME | End: 2019-10-11
Payer: MEDICARE

## 2019-10-11 ENCOUNTER — HOSPITAL ENCOUNTER (OUTPATIENT)
Dept: NUCLEAR MEDICINE | Age: 84
Discharge: HOME | End: 2019-10-11
Payer: MEDICARE

## 2019-10-11 DIAGNOSIS — I27.20 PULMONARY HYPERTENSION (HCC): ICD-10-CM

## 2019-10-11 DIAGNOSIS — R06.02 SOBOE (SHORTNESS OF BREATH ON EXERTION): ICD-10-CM

## 2019-10-11 LAB
ANTI-NUCLEAR ANTIBODY (ANA): NORMAL
ANTI-NUCLEAR ANTIBODY (ANA): NORMAL

## 2019-10-11 PROCEDURE — 78582 LUNG VENTILAT&PERFUS IMAGING: CPT

## 2019-10-11 PROCEDURE — 3430000000 HC RX DIAGNOSTIC RADIOPHARMACEUTICAL: Performed by: INTERNAL MEDICINE

## 2019-10-11 PROCEDURE — A9540 TC99M MAA: HCPCS | Performed by: INTERNAL MEDICINE

## 2019-10-11 PROCEDURE — 71046 X-RAY EXAM CHEST 2 VIEWS: CPT

## 2019-10-11 PROCEDURE — A9539 TC99M PENTETATE: HCPCS | Performed by: INTERNAL MEDICINE

## 2019-10-11 RX ORDER — KIT FOR THE PREPARATION OF TECHNETIUM TC 99M PENTETATE 20 MG/1
3 INJECTION, POWDER, LYOPHILIZED, FOR SOLUTION INTRAVENOUS; RESPIRATORY (INHALATION)
Status: COMPLETED | OUTPATIENT
Start: 2019-10-11 | End: 2019-10-11

## 2019-10-11 RX ADMIN — KIT FOR THE PREPARATION OF TECHNETIUM TC 99M PENTETATE 3 MILLICURIE: 20 INJECTION, POWDER, LYOPHILIZED, FOR SOLUTION INTRAVENOUS; RESPIRATORY (INHALATION) at 12:20

## 2019-10-11 RX ADMIN — Medication 5 MILLICURIE: at 12:35

## 2019-10-21 ENCOUNTER — PROCEDURE VISIT (OUTPATIENT)
Dept: CARDIOLOGY CLINIC | Age: 84
End: 2019-10-21
Payer: MEDICARE

## 2019-10-21 ENCOUNTER — TELEPHONE (OUTPATIENT)
Dept: CARDIOLOGY CLINIC | Age: 84
End: 2019-10-21

## 2019-10-21 VITALS — DIASTOLIC BLOOD PRESSURE: 68 MMHG | HEART RATE: 72 BPM | SYSTOLIC BLOOD PRESSURE: 134 MMHG

## 2019-10-21 DIAGNOSIS — Z95.0 CARDIAC PACEMAKER IN SITU: Primary | ICD-10-CM

## 2019-10-21 PROCEDURE — 93279 PRGRMG DEV EVAL PM/LDLS PM: CPT | Performed by: INTERNAL MEDICINE

## 2019-10-24 ENCOUNTER — TELEPHONE (OUTPATIENT)
Dept: CARDIOLOGY CLINIC | Age: 84
End: 2019-10-24

## 2019-10-24 ENCOUNTER — HOSPITAL ENCOUNTER (OUTPATIENT)
Age: 84
Discharge: HOME | End: 2019-10-24
Payer: MEDICARE

## 2019-10-24 LAB
ABO/RH: NORMAL
ANION GAP SERPL CALCULATED.3IONS-SCNC: 13 MMOL/L (ref 4–16)
ANTIBODY SCREEN: NEGATIVE
APTT: 34.3 SECONDS (ref 25.1–37.1)
BASOPHILS ABSOLUTE: 0.1 K/CU MM
BASOPHILS RELATIVE PERCENT: 0.6 % (ref 0–1)
BUN BLDV-MCNC: 11 MG/DL (ref 6–23)
CALCIUM SERPL-MCNC: 10.4 MG/DL (ref 8.3–10.6)
CHLORIDE BLD-SCNC: 89 MMOL/L (ref 99–110)
CO2: 30 MMOL/L (ref 21–32)
COMMENT: NORMAL
CREAT SERPL-MCNC: 0.6 MG/DL (ref 0.6–1.1)
DIFFERENTIAL TYPE: ABNORMAL
EOSINOPHILS ABSOLUTE: 0.3 K/CU MM
EOSINOPHILS RELATIVE PERCENT: 3.4 % (ref 0–3)
GFR AFRICAN AMERICAN: >60 ML/MIN/1.73M2
GFR NON-AFRICAN AMERICAN: >60 ML/MIN/1.73M2
GLUCOSE BLD-MCNC: 85 MG/DL (ref 70–99)
HCT VFR BLD CALC: 45.2 % (ref 37–47)
HEMOGLOBIN: 14.1 GM/DL (ref 12.5–16)
IMMATURE NEUTROPHIL %: 0.6 % (ref 0–0.43)
INR BLD: 1.88 INDEX
LYMPHOCYTES ABSOLUTE: 1.2 K/CU MM
LYMPHOCYTES RELATIVE PERCENT: 12.1 % (ref 24–44)
MCH RBC QN AUTO: 29.9 PG (ref 27–31)
MCHC RBC AUTO-ENTMCNC: 31.2 % (ref 32–36)
MCV RBC AUTO: 96 FL (ref 78–100)
MONOCYTES ABSOLUTE: 0.7 K/CU MM
MONOCYTES RELATIVE PERCENT: 7.5 % (ref 0–4)
NUCLEATED RBC %: 0 %
PDW BLD-RTO: 16.9 % (ref 11.7–14.9)
PLATELET # BLD: 528 K/CU MM (ref 140–440)
PMV BLD AUTO: 11.1 FL (ref 7.5–11.1)
POTASSIUM SERPL-SCNC: 4.9 MMOL/L (ref 3.5–5.1)
PROTHROMBIN TIME: 21.6 SECONDS (ref 11.7–14.5)
RBC # BLD: 4.71 M/CU MM (ref 4.2–5.4)
SEGMENTED NEUTROPHILS ABSOLUTE COUNT: 7.3 K/CU MM
SEGMENTED NEUTROPHILS RELATIVE PERCENT: 75.8 % (ref 36–66)
SODIUM BLD-SCNC: 132 MMOL/L (ref 135–145)
TOTAL IMMATURE NEUTOROPHIL: 0.06 K/CU MM
TOTAL NUCLEATED RBC: 0 K/CU MM
WBC # BLD: 9.6 K/CU MM (ref 4–10.5)

## 2019-10-24 PROCEDURE — 86901 BLOOD TYPING SEROLOGIC RH(D): CPT

## 2019-10-24 PROCEDURE — 36415 COLL VENOUS BLD VENIPUNCTURE: CPT

## 2019-10-24 PROCEDURE — 93005 ELECTROCARDIOGRAM TRACING: CPT | Performed by: INTERNAL MEDICINE

## 2019-10-24 PROCEDURE — 80048 BASIC METABOLIC PNL TOTAL CA: CPT

## 2019-10-24 PROCEDURE — 93010 ELECTROCARDIOGRAM REPORT: CPT | Performed by: INTERNAL MEDICINE

## 2019-10-24 PROCEDURE — 86900 BLOOD TYPING SEROLOGIC ABO: CPT

## 2019-10-24 PROCEDURE — 86850 RBC ANTIBODY SCREEN: CPT

## 2019-10-24 PROCEDURE — 85025 COMPLETE CBC W/AUTO DIFF WBC: CPT

## 2019-10-24 PROCEDURE — 85730 THROMBOPLASTIN TIME PARTIAL: CPT

## 2019-10-24 PROCEDURE — 85610 PROTHROMBIN TIME: CPT

## 2019-10-29 ENCOUNTER — HOSPITAL ENCOUNTER (OUTPATIENT)
Dept: CARDIAC CATH/INVASIVE PROCEDURES | Age: 84
Discharge: HOME | End: 2019-10-29
Attending: INTERNAL MEDICINE | Admitting: INTERNAL MEDICINE
Payer: MEDICARE

## 2019-10-29 VITALS
RESPIRATION RATE: 17 BRPM | TEMPERATURE: 97.1 F | SYSTOLIC BLOOD PRESSURE: 122 MMHG | HEART RATE: 71 BPM | BODY MASS INDEX: 26.24 KG/M2 | WEIGHT: 125 LBS | HEIGHT: 58 IN | DIASTOLIC BLOOD PRESSURE: 56 MMHG | OXYGEN SATURATION: 98 %

## 2019-10-29 PROCEDURE — 2500000003 HC RX 250 WO HCPCS

## 2019-10-29 PROCEDURE — 6360000004 HC RX CONTRAST MEDICATION

## 2019-10-29 PROCEDURE — 2709999900 HC NON-CHARGEABLE SUPPLY

## 2019-10-29 PROCEDURE — C1751 CATH, INF, PER/CENT/MIDLINE: HCPCS

## 2019-10-29 PROCEDURE — 6370000000 HC RX 637 (ALT 250 FOR IP): Performed by: INTERNAL MEDICINE

## 2019-10-29 PROCEDURE — 6360000002 HC RX W HCPCS

## 2019-10-29 PROCEDURE — 93451 RIGHT HEART CATH: CPT

## 2019-10-29 PROCEDURE — 2580000003 HC RX 258: Performed by: INTERNAL MEDICINE

## 2019-10-29 PROCEDURE — C1894 INTRO/SHEATH, NON-LASER: HCPCS

## 2019-10-29 PROCEDURE — 93451 RIGHT HEART CATH: CPT | Performed by: INTERNAL MEDICINE

## 2019-10-29 PROCEDURE — C1769 GUIDE WIRE: HCPCS

## 2019-10-29 RX ORDER — SODIUM CHLORIDE 9 MG/ML
INJECTION, SOLUTION INTRAVENOUS CONTINUOUS
Status: DISCONTINUED | OUTPATIENT
Start: 2019-10-29 | End: 2019-10-29 | Stop reason: HOSPADM

## 2019-10-29 RX ORDER — SODIUM CHLORIDE 0.9 % (FLUSH) 0.9 %
10 SYRINGE (ML) INJECTION EVERY 12 HOURS SCHEDULED
Status: DISCONTINUED | OUTPATIENT
Start: 2019-10-29 | End: 2019-10-29 | Stop reason: HOSPADM

## 2019-10-29 RX ORDER — SODIUM CHLORIDE 0.9 % (FLUSH) 0.9 %
10 SYRINGE (ML) INJECTION PRN
Status: DISCONTINUED | OUTPATIENT
Start: 2019-10-29 | End: 2019-10-29 | Stop reason: HOSPADM

## 2019-10-29 RX ORDER — DIPHENHYDRAMINE HCL 25 MG
25 TABLET ORAL ONCE
Status: COMPLETED | OUTPATIENT
Start: 2019-10-29 | End: 2019-10-29

## 2019-10-29 RX ORDER — ACETAMINOPHEN 325 MG/1
650 TABLET ORAL EVERY 4 HOURS PRN
Status: DISCONTINUED | OUTPATIENT
Start: 2019-10-29 | End: 2019-10-29 | Stop reason: HOSPADM

## 2019-10-29 RX ORDER — SODIUM CHLORIDE 9 MG/ML
INJECTION, SOLUTION INTRAVENOUS CONTINUOUS
Status: DISCONTINUED | OUTPATIENT
Start: 2019-10-29 | End: 2019-10-29 | Stop reason: ALTCHOICE

## 2019-10-29 RX ORDER — DIAZEPAM 5 MG/1
5 TABLET ORAL ONCE
Status: COMPLETED | OUTPATIENT
Start: 2019-10-29 | End: 2019-10-29

## 2019-10-29 RX ADMIN — DIAZEPAM 5 MG: 5 TABLET ORAL at 07:01

## 2019-10-29 RX ADMIN — SODIUM CHLORIDE: 9 INJECTION, SOLUTION INTRAVENOUS at 07:01

## 2019-10-29 RX ADMIN — DIPHENHYDRAMINE HYDROCHLORIDE 25 MG: 25 TABLET ORAL at 07:01

## 2019-10-30 ENCOUNTER — TELEPHONE (OUTPATIENT)
Dept: CARDIOLOGY CLINIC | Age: 84
End: 2019-10-30

## 2019-11-06 LAB
EKG ATRIAL RATE: 312 BPM
EKG DIAGNOSIS: NORMAL
EKG Q-T INTERVAL: 486 MS
EKG QRS DURATION: 154 MS
EKG QTC CALCULATION (BAZETT): 486 MS
EKG R AXIS: -85 DEGREES
EKG T AXIS: 104 DEGREES
EKG VENTRICULAR RATE: 60 BPM

## 2019-11-12 ENCOUNTER — OFFICE VISIT (OUTPATIENT)
Dept: CARDIOLOGY CLINIC | Age: 84
End: 2019-11-12
Payer: MEDICARE

## 2019-11-12 VITALS
WEIGHT: 127.8 LBS | HEIGHT: 58 IN | BODY MASS INDEX: 26.83 KG/M2 | DIASTOLIC BLOOD PRESSURE: 86 MMHG | SYSTOLIC BLOOD PRESSURE: 136 MMHG | HEART RATE: 70 BPM

## 2019-11-12 DIAGNOSIS — Z95.0 CARDIAC PACEMAKER: Chronic | ICD-10-CM

## 2019-11-12 DIAGNOSIS — I27.20 PULMONARY HTN (HCC): Chronic | ICD-10-CM

## 2019-11-12 DIAGNOSIS — I25.700 CORONARY ARTERY DISEASE INVOLVING CORONARY BYPASS GRAFT OF NATIVE HEART WITH UNSTABLE ANGINA PECTORIS (HCC): Chronic | ICD-10-CM

## 2019-11-12 DIAGNOSIS — I10 HYPERTENSION, UNSPECIFIED TYPE: Chronic | ICD-10-CM

## 2019-11-12 DIAGNOSIS — I25.810 CORONARY ARTERY DISEASE INVOLVING CORONARY BYPASS GRAFT OF NATIVE HEART WITHOUT ANGINA PECTORIS: ICD-10-CM

## 2019-11-12 DIAGNOSIS — I48.0 PAF (PAROXYSMAL ATRIAL FIBRILLATION) (HCC): Primary | ICD-10-CM

## 2019-11-12 PROCEDURE — 99214 OFFICE O/P EST MOD 30 MIN: CPT | Performed by: NURSE PRACTITIONER

## 2019-12-16 ENCOUNTER — OFFICE VISIT (OUTPATIENT)
Dept: CARDIOLOGY CLINIC | Age: 84
End: 2019-12-16
Payer: MEDICARE

## 2019-12-16 VITALS
WEIGHT: 126 LBS | BODY MASS INDEX: 26.45 KG/M2 | SYSTOLIC BLOOD PRESSURE: 142 MMHG | DIASTOLIC BLOOD PRESSURE: 78 MMHG | HEART RATE: 64 BPM | HEIGHT: 58 IN

## 2019-12-16 DIAGNOSIS — R07.9 CHEST PAIN, UNSPECIFIED TYPE: Primary | ICD-10-CM

## 2019-12-16 PROCEDURE — 1090F PRES/ABSN URINE INCON ASSESS: CPT | Performed by: INTERNAL MEDICINE

## 2019-12-16 PROCEDURE — 4040F PNEUMOC VAC/ADMIN/RCVD: CPT | Performed by: INTERNAL MEDICINE

## 2019-12-16 PROCEDURE — G8484 FLU IMMUNIZE NO ADMIN: HCPCS | Performed by: INTERNAL MEDICINE

## 2019-12-16 PROCEDURE — G8427 DOCREV CUR MEDS BY ELIG CLIN: HCPCS | Performed by: INTERNAL MEDICINE

## 2019-12-16 PROCEDURE — 99214 OFFICE O/P EST MOD 30 MIN: CPT | Performed by: INTERNAL MEDICINE

## 2019-12-16 PROCEDURE — G8417 CALC BMI ABV UP PARAM F/U: HCPCS | Performed by: INTERNAL MEDICINE

## 2019-12-16 PROCEDURE — 93000 ELECTROCARDIOGRAM COMPLETE: CPT | Performed by: INTERNAL MEDICINE

## 2019-12-16 PROCEDURE — 1036F TOBACCO NON-USER: CPT | Performed by: INTERNAL MEDICINE

## 2019-12-16 PROCEDURE — G8598 ASA/ANTIPLAT THER USED: HCPCS | Performed by: INTERNAL MEDICINE

## 2019-12-16 PROCEDURE — 1123F ACP DISCUSS/DSCN MKR DOCD: CPT | Performed by: INTERNAL MEDICINE

## 2019-12-16 RX ORDER — POTASSIUM CHLORIDE 750 MG/1
10 TABLET, FILM COATED, EXTENDED RELEASE ORAL DAILY
COMMUNITY
End: 2019-12-16

## 2019-12-16 RX ORDER — SPIRONOLACTONE 25 MG/1
25 TABLET ORAL DAILY
Qty: 30 TABLET | Refills: 3 | Status: SHIPPED | OUTPATIENT
Start: 2019-12-16 | End: 2020-06-15

## 2019-12-18 ENCOUNTER — HOSPITAL ENCOUNTER (OUTPATIENT)
Age: 84
Discharge: HOME | End: 2019-12-18
Payer: MEDICARE

## 2019-12-18 LAB
ANION GAP SERPL CALCULATED.3IONS-SCNC: 10 MMOL/L (ref 4–16)
BUN BLDV-MCNC: 12 MG/DL (ref 6–23)
CALCIUM SERPL-MCNC: 10.2 MG/DL (ref 8.3–10.6)
CHLORIDE BLD-SCNC: 90 MMOL/L (ref 99–110)
CO2: 31 MMOL/L (ref 21–32)
CREAT SERPL-MCNC: 0.6 MG/DL (ref 0.6–1.1)
GFR AFRICAN AMERICAN: >60 ML/MIN/1.73M2
GFR NON-AFRICAN AMERICAN: >60 ML/MIN/1.73M2
GLUCOSE BLD-MCNC: 100 MG/DL (ref 70–99)
POTASSIUM SERPL-SCNC: 4.7 MMOL/L (ref 3.5–5.1)
SODIUM BLD-SCNC: 131 MMOL/L (ref 135–145)

## 2019-12-18 PROCEDURE — 36415 COLL VENOUS BLD VENIPUNCTURE: CPT

## 2019-12-18 PROCEDURE — 80048 BASIC METABOLIC PNL TOTAL CA: CPT

## 2019-12-26 ENCOUNTER — TELEPHONE (OUTPATIENT)
Dept: CARDIOLOGY CLINIC | Age: 84
End: 2019-12-26

## 2020-01-27 ENCOUNTER — TELEPHONE (OUTPATIENT)
Dept: CARDIOLOGY CLINIC | Age: 85
End: 2020-01-27

## 2020-03-31 ENCOUNTER — TELEPHONE (OUTPATIENT)
Dept: PULMONOLOGY | Age: 85
End: 2020-03-31

## 2020-03-31 NOTE — TELEPHONE ENCOUNTER
Called patient to see if she would like to sign up for mychart. No answer and unable to leave a message.

## 2020-04-27 ENCOUNTER — TELEPHONE (OUTPATIENT)
Dept: CARDIOLOGY CLINIC | Age: 85
End: 2020-04-27

## 2020-04-27 NOTE — TELEPHONE ENCOUNTER
Patient called she has some swelling in her   Ankles & legs , This has been going on for a few weeks  Has a noticed some shortness of breath also

## 2020-05-19 ENCOUNTER — VIRTUAL VISIT (OUTPATIENT)
Dept: CARDIOLOGY CLINIC | Age: 85
End: 2020-05-19
Payer: MEDICARE

## 2020-05-19 VITALS
DIASTOLIC BLOOD PRESSURE: 64 MMHG | HEIGHT: 58 IN | TEMPERATURE: 96.6 F | SYSTOLIC BLOOD PRESSURE: 128 MMHG | BODY MASS INDEX: 26.45 KG/M2 | WEIGHT: 126 LBS

## 2020-05-19 PROCEDURE — 99443 PR PHYS/QHP TELEPHONE EVALUATION 21-30 MIN: CPT | Performed by: INTERNAL MEDICINE

## 2020-05-19 RX ORDER — POTASSIUM CHLORIDE 750 MG/1
10 TABLET, EXTENDED RELEASE ORAL DAILY
COMMUNITY
End: 2020-05-19 | Stop reason: SDUPTHER

## 2020-05-19 RX ORDER — TRIAMTERENE AND HYDROCHLOROTHIAZIDE 37.5; 25 MG/1; MG/1
TABLET ORAL
Qty: 77 TABLET | Refills: 2 | Status: CANCELLED | OUTPATIENT
Start: 2020-05-19

## 2020-05-19 RX ORDER — WARFARIN SODIUM 5 MG/1
7.5 TABLET ORAL DAILY
Qty: 30 TABLET | Refills: 2 | Status: SHIPPED | OUTPATIENT
Start: 2020-05-19 | End: 2020-07-08

## 2020-05-19 RX ORDER — POTASSIUM CHLORIDE 750 MG/1
10 TABLET, EXTENDED RELEASE ORAL DAILY
Qty: 90 TABLET | Refills: 2 | Status: SHIPPED | OUTPATIENT
Start: 2020-05-19 | End: 2020-12-09

## 2020-05-19 NOTE — PROGRESS NOTES
CARDIOLOGY NOTE      5/19/2020    RE: Portillomeet Espana  (3/18/1929)                               TO:  Dr. Maggy Hernandez, DO            CHIEF Ying Zuluaga is a 80 y.o. female who was seen today for management of  cad                        HPI:   Patient is here for    - Coronary artery disease, does not have chest pain. Patient is  compliant with prescribed medicines. - Hypertension,is  well controlled, pt is  compliant with medicines  - Hyperlipidimea, lipids are in acceptable range. Pt  is  compliant with medicines  - Atrial fibrillation, pt is  compliant with meds.  Patient does not have symptoms from atrial fibrillation  - PPM  stable                The patient has no cardiac comaplins  Portillo Givens has the following history recorded in care path:  Patient Active Problem List    Diagnosis Date Noted    PAF (paroxysmal atrial fibrillation) ([facility] 05/10/2019    Dizziness 08/30/2017    Obstructive sleep apnea 05/16/2017    COPD, mild ([facility] [address] 02/17/2017    Coronary artery disease involving coronary bypass graft of native heart without angina pectoris     SOBOE (shortness of breath on exertion) 01/31/2017    Bradycardia 08/11/2016    Hypertension 09/30/2013    TIA (transient ischemic attack) 09/29/2013    Confusion 09/29/2013    Headache 09/29/2013    CAD (coronary artery disease)     Pulmonary HTN ([facility] [address]     Post PTCA 04/21/2010    S/P CABG x 3 04/08/2009    Cardiac pacemaker 10/01/2001     Current Outpatient Medications   Medication Sig Dispense Refill    potassium chloride (KLOR-CON M) 10 MEQ extended release tablet Take 1 tablet by mouth daily 90 tablet 2    warfarin (COUMADIN) 5 MG tablet Take 1.5 tablets by mouth daily 1.5 Tablets daily 30 tablet 2    spironolactone (ALDACTONE) 25 MG tablet Take 1 tablet by mouth daily 30 tablet 3    CPAP Machine MISC by Does not apply route      triamterene-hydrochlorothiazide (MAXZIDE-25) 37.5-25 MG per tablet TAKE ONE TABLET BY MOUTH H/O echocardiogram 10/13    EF=60%, Severe Pulm. HTN, & Sclerotic aortic valve w/stenosis.  H/O echocardiogram 10/16/14     EF 55-60% Normal LV. Normal LV systolic function. Severe tricuspid insufficiency with severe hypertension.  H/O echocardiogram 02/03/2017    heart cath performed this morning    H/O echocardiogram 09/18/2019    EF 50-55%, Left atrium is mild to moderately dilated, right atrium is severely dilated, mildly dilated right ventricle, Mod MR, Severe TR, Severe Pulm HTN, no pericardial effusion     History of complete ECG     10/14/2011(Lexiscan);5/6/2010, 4/30/2009,10/24/2008,9/21/2007, 10/13/2006    History of nuclear stress test 11/17/2016    lexiscan-normal,EF70%    Hx of cardiovascular stress test 12/28/2018    EF 60%  Normal study.  HX OTHER MEDICAL 05/01/2017    MUGA-normal, EF53%    Hyperlipidemia     Hypertension     Mild intermittent asthma 7/28/2016    Nausea & vomiting     Obstructive sleep apnea 5/16/2017    Paroxysmal atrial fibrillation (HCC)     Post PTCA 4/21/2010    PTCA with 2.25 stent of the LIMA to LAD    Pulmonary HTN ([address]     Severe per last echo on 10/13. 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 Followed by MAZE procedure by pulmonary vein isolation.-  Dr Ricki Valentin S/P PTCA (percutaneous transluminal coronary angioplasty) 11/2012    PTCA with stent to RCA    Thyroid disease     hypothyroi    Unspecified cerebral artery occlusion with cerebral infarction Unsure When    No Residual     Past Surgical History:   Procedure Laterality Date    APPENDECTOMY  1941    CARDIAC SURGERY  4/09    CABG (3 Bypasses), One Heart Stent in 2010    COLONOSCOPY  In 2000's    X1    CORONARY ANGIOPLASTY WITH STENT PLACEMENT  4/21/2010    PTCA with stent LIMA ->LAD    CORONARY ARTERY BYPASS GRAFT  4/8/2009    LIMA->Diag,  LIMA -> LAD;  SVG->RCA going to the PDA.  Followed by MAZE urinary frequency/urgency  Musculoskeletal: Negative for muscle pain, muscular weakness, negative for pain in arm and leg or swelling in foot and leg  Neurological: Negative for dizziness, headaches, memory loss, numbness/tingling, visual changes, syncope  Dermatological: Negative for rash    Objective:  /64   Temp 96.6 °F (35.9 °C)   Ht 4' 10\" (1.473 m)   Wt 126 lb (57.2 kg)   BMI 26.33 kg/m²   Wt Readings from Last 3 Encounters:   05/19/20 126 lb (57.2 kg)   12/16/19 126 lb (57.2 kg)   11/12/19 127 lb 12.8 oz (58 kg)     Body mass index is 26.33 kg/m². GENERAL - Alert, oriented, pleasant, in no apparent distress. Lab Review   Lab Results   Component Value Date    CKTOTAL 42 09/28/2013     BNP:    Lab Results   Component Value Date    BNP 90 09/28/2013     PT/INR:    Lab Results   Component Value Date    INR 1.88 10/24/2019     No results found for: LABA1C  Lab Results   Component Value Date    WBC 9.6 10/24/2019    HCT 45.2 10/24/2019    MCV 96.0 10/24/2019     (H) 10/24/2019     Lab Results   Component Value Date    CHOL 171 10/29/2018    TRIG 82 10/29/2018    HDL 49 10/29/2018    LDLCALC 100 07/05/2017    LDLDIRECT 116 (H) 10/29/2018     Lab Results   Component Value Date    ALT 15 04/02/2018    AST 21 04/02/2018     BMP:    Lab Results   Component Value Date     12/18/2019    K 4.7 12/18/2019    CL 90 12/18/2019    CO2 31 12/18/2019    BUN 12 12/18/2019    CREATININE 0.6 12/18/2019     CMP:   Lab Results   Component Value Date     12/18/2019    K 4.7 12/18/2019    CL 90 12/18/2019    CO2 31 12/18/2019    BUN 12 12/18/2019    PROT 6.4 04/02/2018    PROT 6.8 11/28/2012     TSH:    Lab Results   Component Value Date    TSH 1.0 05/07/2010    TSHHS 0.122 04/02/2018       . Impression:    No diagnosis found.    Patient Active Problem List   Diagnosis Code    CAD (coronary artery disease) I25.10    Cardiac pacemaker Z95.0    S/P CABG x 3 Z95.1    Post PTCA Z98.61    Pulmonary

## 2020-06-15 RX ORDER — SPIRONOLACTONE 25 MG/1
25 TABLET ORAL DAILY
Qty: 30 TABLET | Refills: 3 | Status: SHIPPED | OUTPATIENT
Start: 2020-06-15 | End: 2021-01-27

## 2020-07-08 RX ORDER — WARFARIN SODIUM 5 MG/1
TABLET ORAL
Qty: 30 TABLET | Refills: 2 | Status: ON HOLD | OUTPATIENT
Start: 2020-07-08 | End: 2021-10-27 | Stop reason: HOSPADM

## 2020-07-23 ENCOUNTER — TELEPHONE (OUTPATIENT)
Dept: PULMONOLOGY | Age: 85
End: 2020-07-23

## 2020-07-29 RX ORDER — WARFARIN SODIUM 5 MG/1
TABLET ORAL
Qty: 30 TABLET | Refills: 2 | OUTPATIENT
Start: 2020-07-29

## 2020-11-18 ENCOUNTER — OFFICE VISIT (OUTPATIENT)
Dept: CARDIOLOGY CLINIC | Age: 85
End: 2020-11-18
Payer: MEDICARE

## 2020-11-18 VITALS
HEART RATE: 65 BPM | BODY MASS INDEX: 27.54 KG/M2 | DIASTOLIC BLOOD PRESSURE: 80 MMHG | WEIGHT: 136.6 LBS | SYSTOLIC BLOOD PRESSURE: 130 MMHG | HEIGHT: 59 IN

## 2020-11-18 PROBLEM — Z45.010 PACER AT END OF BATTERY LIFE: Status: ACTIVE | Noted: 2020-11-18

## 2020-11-18 PROCEDURE — 99214 OFFICE O/P EST MOD 30 MIN: CPT | Performed by: INTERNAL MEDICINE

## 2020-11-18 PROCEDURE — G8417 CALC BMI ABV UP PARAM F/U: HCPCS | Performed by: INTERNAL MEDICINE

## 2020-11-18 PROCEDURE — 93279 PRGRMG DEV EVAL PM/LDLS PM: CPT | Performed by: INTERNAL MEDICINE

## 2020-11-18 PROCEDURE — G8427 DOCREV CUR MEDS BY ELIG CLIN: HCPCS | Performed by: INTERNAL MEDICINE

## 2020-11-18 PROCEDURE — 1036F TOBACCO NON-USER: CPT | Performed by: INTERNAL MEDICINE

## 2020-11-18 PROCEDURE — G8484 FLU IMMUNIZE NO ADMIN: HCPCS | Performed by: INTERNAL MEDICINE

## 2020-11-18 PROCEDURE — 4040F PNEUMOC VAC/ADMIN/RCVD: CPT | Performed by: INTERNAL MEDICINE

## 2020-11-18 PROCEDURE — 1123F ACP DISCUSS/DSCN MKR DOCD: CPT | Performed by: INTERNAL MEDICINE

## 2020-11-18 PROCEDURE — 1090F PRES/ABSN URINE INCON ASSESS: CPT | Performed by: INTERNAL MEDICINE

## 2020-11-18 PROCEDURE — 93000 ELECTROCARDIOGRAM COMPLETE: CPT | Performed by: INTERNAL MEDICINE

## 2020-11-18 RX ORDER — ATORVASTATIN CALCIUM 10 MG/1
10 TABLET, FILM COATED ORAL NIGHTLY
Status: ON HOLD | COMMUNITY
Start: 2020-10-20

## 2020-11-18 NOTE — LETTER
Orlando Honeycutt Palm  3/18/1929  [id number]    Have you had any Chest Pain that is not new? - No  ? DO EKG IF: Patient has a Heart Rate above 100 or below 40     CAD (Coronary Artery Disease) patient should have one on file every 6 months        Have you had any Shortness of Breath - Yes   If Yes - When on exertion    Have you had any dizziness - No      ? Sitting wait 5 minutes do supine (laying down) wait 5 minutes then do standing - log each in \"vitals\" area in Epic  ? Be sure to ask what symptoms they are having if they get dizzy while completing ortho stats such as room spinning, nausea, etc.    Have you had any palpitations that are not new? - No      Do you have any edema - No  Vein \"LEG PROBLEM Questionnaire\"  1. Do you have prominent leg veins? No   2. Do you have any skin discoloration? No  3. Do you have any healed or active sores? No  4. Do you have swelling of the legs? No  5. Do you have a family history of varicose veins? No  6. Does your profession involve pro-longed        standing or heavy lifting? No  7. Have you been fighting overweight problems? No  8. Do you have restless legs? No  9. Do you have any night time cramps? No  10.  Do you have any of the following in your legs:        NONE     Do you have a surgery or procedure scheduled in the near future - No

## 2020-11-18 NOTE — PROGRESS NOTES
CARDIOLOGY NOTE      11/18/2020    RE: Rosemary oMndragon  (3/18/1929)                               TO:  Dr. Chula Roque,             CHIEF Tonia Cardenas is a 80 y.o. female who was seen today for management of  cad                                    HPI:                   The patient does not have cardiac complaints  Has aches and pains  Patient also seen  for    - Coronary artery disease, does not have chest pain. Patient is  compliant with prescribed medicines. - Hypertension,is  well controlled, pt is  compliant with medicines  - Hyperlipidimea, importance of hyperlipidimea discussed with pt. - Atrial fibrillation, pt is  compliant with meds.  Patient does not have symptoms from atrial fibrillation  - PPM  Need ismael change    Rosemary Mondragon has the following history recorded in care path:  Patient Active Problem List    Diagnosis Date Noted    [facility] 05/10/2019    Dizziness 08/30/2017    Obstructive sleep apnea 05/16/2017    COPD, mild ([facility] 02/17/2017    Coronary artery disease involving coronary bypass graft of native heart without angina pectoris     SOBOE (shortness of breath on exertion) 01/31/2017    Bradycardia 08/11/2016    Hypertension 09/30/2013    TIA (transient ischemic attack) 09/29/2013    Confusion 09/29/2013    Headache 09/29/2013    CAD (coronary artery disease)     Pulmonary HTN ([facility]     Post PTCA 04/21/2010    S/P CABG x 3 04/08/2009    Cardiac pacemaker 10/01/2001     Current Outpatient Medications   Medication Sig Dispense Refill    atorvastatin (LIPITOR) 10 MG tablet Take 10 mg by mouth daily      umeclidinium-vilanterol (ANORO ELLIPTA) 62.5-25 MCG/INH AEPB inhaler Inhale 1 puff into the lungs daily 1 each 11    warfarin (COUMADIN) 5 MG tablet TAKE 1 AND 1/2 TABLET BY MOUTH DAILY 30 tablet 2    spironolactone (ALDACTONE) 25 MG tablet Take 1 tablet by mouth daily 30 tablet 3    potassium chloride (KLOR-CON M) 10 MEQ extended release tablet Take 1 tablet by mouth daily 90 tablet 2    CPAP Machine MISC by Does not apply route      triamterene-hydrochlorothiazide (MAXZIDE-25) 37.5-25 MG per tablet TAKE ONE TABLET BY MOUTH DAILY 77 tablet 2    Biotin (BIOTIN 5000) 5 MG CAPS Take 1 capsule by mouth daily      Misc Natural Products (OSTEO BI-FLEX ADV DOUBLE ST PO) Take 1 tablet by mouth daily      timolol (TIMOPTIC-XE) 0.25 % ophthalmic gel-forming 1 drop daily      carvedilol (COREG) 6.25 MG tablet Take 1 tablet by mouth 2 times daily (with meals) 180 tablet 3    albuterol (PROVENTIL HFA;VENTOLIN HFA) 108 (90 BASE) MCG/ACT inhaler Inhale 2 puffs into the lungs 2 times daily AND EVERY 4-6 HOURS AS NEEDED      Multiple Vitamins-Minerals (ICAPS) CAPS Take 1 capsule by mouth daily.  loratadine (CLARITIN) 10 MG tablet Take 10 mg by mouth as needed.  vitamin B-12 (CYANOCOBALAMIN) 1000 MCG tablet Take 1,000 mcg by mouth daily.  levothyroxine (SYNTHROID) 88 MCG tablet Take 88 mcg by mouth daily.  aspirin 81 MG chewable tablet Take 81 mg by mouth daily.  ranolazine (RANEXA) 500 MG extended release tablet Take 1 tablet by mouth 2 times daily (Patient not taking: Reported on 11/18/2020) 56 tablet 0    Tafluprost (ZIOPTAN OP) Apply to eye       No current facility-administered medications for this visit. Allergies: Darvocet [propoxyphene n-acetaminophen]; Ranexa [ranolazine er]; Ranolazine; Sulfa antibiotics; Sulfasalazine; Adhesive tape; Allantoin; Bacitracin; Gramicidin; Neomycin;  Polymyxin b; Pramoxine hcl; and Silicone  Past Medical History:   Diagnosis Date    Arthritis     Bradycardia 2001    requiring dual chamber pacemaker at [facility] CAD (coronary artery disease)     Cardiac pacemaker 10/2001    [employer] [id number]  [facility]- Serial # [id number]-[facility]- Dr Nnamdi Holliday CHF (congestive heart failure) ([facility]     COPD, mild ([facility] 2/17/2017    CVA (cerebrovascular accident) ([facility] Stent in     COLONOSCOPY  In     X1    CORONARY ANGIOPLASTY WITH STENT PLACEMENT  2010    PTCA with stent LIMA ->LAD    CORONARY ARTERY BYPASS GRAFT  2009    LIMA->Diag,  LIMA -> LAD;  SVG->RCA going to the PDA. Followed by MAZE procedure by pulmonary vein isolation.-  Dr Doyle Richey, TOTAL ABDOMINAL      MIDDLE EAR SURGERY      OTHER SURGICAL HISTORY      Ear surgery-hearing    PACEMAKER PLACEMENT      battery change 2011 [employer]    PTCA  2012    Ptca with stent to RCA    TONSILLECTOMY  's      As reviewed   Family History   Problem Relation Age of Onset    Cancer Mother         \"Liver Cancer\"    Arthritis Mother     Depression Mother     Hearing Loss Mother     High Blood Pressure Mother     High Cholesterol Mother     Mental Illness Mother    [place] / Stillbirths Mother     Heart Disease Father     Early Death Father 36        \"Instant Death\"    High Blood Pressure Father     High Cholesterol Father     Coronary Art Dis Father         Massive MI    Heart Disease Sister     Depression Sister     Cancer Sister         \"Breast Cancer, Cancer Free Now\"    High Blood Pressure Sister     Other Daughter         \"She's Had Stomach Surgery\"    High Blood Pressure Son     High Blood Pressure Son     Early Death Paternal Grandfather      Social History     Tobacco Use    Smoking status: Former Smoker     Packs/day: 0.25     Years: 5.00     Pack years: 1.25     Types: Cigarettes     Last attempt to quit: 1970     Years since quittin.0    Smokeless tobacco: Never Used   Substance Use Topics    Alcohol use: Not Currently      Review of Systems:    Constitutional: Negative for diaphoresis and fatigue  Psychological:Negative for anxiety or depression  HENT: Negative for headaches, nasal congestion, sinus pain or vertigo  Eyes: Negative for visual disturbance.    Endocrine: Negative for polydipsia/polyuria  Respiratory: Negative for shortness of breath  Cardiovascular: Negative for chest pain, dyspnea on exertion, claudication, edema, irregular heartbeat, murmur, palpitations or shortness of breath  Gastrointestinal: Negative for abdominal pain or heartburn  Genito-Urinary: Negative for urinary frequency/urgency  Musculoskeletal: Negative for muscle pain, muscular weakness, negative for pain in arm and leg or swelling in foot and leg  Neurological: Negative for dizziness, headaches, memory loss, numbness/tingling, visual changes, syncope  Dermatological: Negative for rash    Objective:    Vitals:    11/18/20 1523   BP: 130/80   Pulse: 65   Weight: 136 lb 9.6 oz (62 kg)   Height: 4' 11\" (1.499 m)     /80   Pulse 65   Ht 4' 11\" (1.499 m)   Wt 136 lb 9.6 oz (62 kg)   BMI 27.59 kg/m²     No flowsheet data found. Wt Readings from Last 3 Encounters:   11/18/20 136 lb 9.6 oz (62 kg)   05/19/20 126 lb (57.2 kg)   12/16/19 126 lb (57.2 kg)     Body mass index is 27.59 kg/m². GENERAL - Alert, oriented, pleasant, in no apparent distress. EYES: No jaundice, no conjunctival pallor. SKIN: It is warm & dry. No rashes. No Echhymosis    HEENT - No clinically significant abnormalities seen. Neck - Supple. No jugular venous distention noted. No carotid bruits. Cardiovascular - Normal S1 and S2 without obvious murmur or gallop. Extremities - No cyanosis, clubbing, or significant edema. Pulmonary - No respiratory distress. No wheezes or rales. Abdomen - No masses, tenderness, or organomegaly. Musculoskeletal - No significant edema. No joint deformities. No muscle wasting. Neurologic - Cranial nerves II through XII are grossly intact. There were no gross focal neurologic abnormalities.     Lab Review   Lab Results   Component Value Date    CKTOTAL 42 09/28/2013     BNP:    Lab Results   Component Value Date    BNP 90 09/28/2013     PT/INR:    Lab Results   Component Value Date    INR 1.88 10/24/2019     No results found htn; was supposed to see pulmonary      Summary   Left ventricular systolic function is normal with an ejection fraction of   50-55%. The left atrium is mild to moderately dilated. The right atrium is severely dilated. Mildly dilated right ventricle. Doppler evaluation reveals moderate mitral and severe tricuspid   regurgitation. Right ventricular systolic pressure of 70 mmHg consistent with severe   pulmonary hypertension. No evidence of pericardial effusion. NEED PULM CONS      Signature      ------------------------------------------------------------------   Electronically signed by Krissy Mckeon MD   (Interpreting physician) on 09/18/2019 at 04:54 PM      -  LIPID MANAGEMENT:  Importance of lipid levels discussed with patient   and patient was given dietary advice. NCEP- ATP III guidelines reviewed with patient. -   Changes  in medicines made: No                                  - Atrial fibrillation, pt is  compliant with meds.  Patient does not have symptoms from atrial fibrillation        Hellen Dickerson MD    [facility] [place]

## 2020-11-18 NOTE — PROGRESS NOTES
(2009); Coronary angioplasty with stent (2010); other surgical history; Middle ear surgery; and Percutaneous Transluminal Coronary Angio (2012). Social History:   Social History     Tobacco Use    Smoking status: Former Smoker     Packs/day: 0.25     Years: 5.00     Pack years: 1.25     Types: Cigarettes     Last attempt to quit: 1970     Years since quittin.0    Smokeless tobacco: Never Used   Substance Use Topics    Alcohol use: Not Currently     Family history: family history includes Arthritis in her mother; Cancer in her mother and sister; Coronary Art Dis in her father; Depression in her mother and sister; Early Death in her paternal grandfather; Early Death (age of onset: 36) in her father; Hearing Loss in her mother; Heart Disease in her father and sister; High Blood Pressure in her father, mother, sister, son, and son; High Cholesterol in her father and mother; Mental Illness in her mother; Bullard Riis / Denette Alexandre in her mother; Other in her daughter. ALLERGIES:  Darvocet [propoxyphene n-acetaminophen]; Ranexa [ranolazine er]; Ranolazine; Sulfa antibiotics; Sulfasalazine; Adhesive tape; Allantoin; Bacitracin; Gramicidin; Neomycin; Polymyxin b; Pramoxine hcl; and Silicone  Prior to Admission medications    Medication Sig Start Date End Date Taking? Authorizing Provider   chlorhexidine gluconate (ANTISEPTIC SKIN CLEANSER) 4 % SOLN external solution Apply topically daily as needed (PRIOR TO PROCEDURE) Use this solution to scrub the area and take a shower 48 hours prior to the procedure and in the morning of procedure. 20  Yes Umu Carmona MD   [facility] NASAL) 2 % nasal ointment by Nasal route 2 times daily Take by Nasal route 2 times daily. for 5 days prior to the procedure.  20  Yes Umu Carmona MD   atorvastatin (LIPITOR) 10 MG tablet Take 10 mg by mouth daily 10/20/20   Historical Provider, MD   umeclidinium-vilanterol [facility] ELLIPTA) 62.5-25 MCG/INH AEPB inhaler Inhale 1 puff into the lungs daily 7/27/20   Gricel Liang MD   warfarin (COUMADIN) 5 MG tablet TAKE 1 AND 1/2 TABLET BY MOUTH DAILY 7/8/20   Aishwarya Mcdonald MD   spironolactone (ALDACTONE) 25 MG tablet Take 1 tablet by mouth daily 6/15/20   Aishwarya Mcdonald MD   potassium chloride (KLOR-CON M) 10 MEQ extended release tablet Take 1 tablet by mouth daily 5/19/20   Aishwarya Mcdonald MD   CPAP Machine MISC by Does not apply route    Historical Provider, MD   triamterene-hydrochlorothiazide (MAXZIDE-25) 37.5-25 MG per tablet TAKE ONE TABLET BY MOUTH DAILY 5/9/19   Aishwarya Mcdonald MD   ranolazine (RANEXA) 500 MG extended release tablet Take 1 tablet by mouth 2 times daily  Patient not taking: Reported on 11/18/2020 4/2/19   Aishwarya Mcdonald MD   Biotin (BIOTIN 5000) 5 MG CAPS Take 1 capsule by mouth daily    Historical Provider, MD   Misc Natural Products (OSTEO BI-FLEX ADV DOUBLE ST PO) Take 1 tablet by mouth daily    Historical Provider, MD   Tafluprost (Doneen Paterson OP) Apply to eye    Historical Provider, MD   timolol (TIMOPTIC-XE) 0.25 % ophthalmic gel-forming 1 drop daily    Historical Provider, MD   carvedilol (COREG) 6.25 MG tablet Take 1 tablet by mouth 2 times daily (with meals) 1/31/17   Aishwarya Mcdonald MD   albuterol (PROVENTIL HFA;VENTOLIN HFA) 108 (90 BASE) MCG/ACT inhaler Inhale 2 puffs into the lungs 2 times daily AND EVERY 4-6 HOURS AS NEEDED    Historical Provider, MD   Multiple Vitamins-Minerals (ICAPS) CAPS Take 1 capsule by mouth daily. Historical Provider, MD   loratadine (CLARITIN) 10 MG tablet Take 10 mg by mouth as needed. Historical Provider, MD   vitamin B-12 (CYANOCOBALAMIN) 1000 MCG tablet Take 1,000 mcg by mouth daily. Historical Provider, MD   levothyroxine (SYNTHROID) 88 MCG tablet Take 88 mcg by mouth daily. Historical Provider, MD   aspirin 81 MG chewable tablet Take 81 mg by mouth daily.       Historical Provider, MD      /18/20 12/16/19    /80 142/78   Pulse 65 64

## 2020-11-18 NOTE — ASSESSMENT & PLAN NOTE
Patient is at end-of-life parameters. Elective generator replacement on December 4 at 11 AM discussed multiple questions are answered. Potential risks and complications discussed and she will bless understanding. We will hold carvedilol starting 24 hours before the procedure and hold warfarin 48 hours before the procedure. Bactroban ointment bid to each nostril for five days prior to the procedure and Chlorhexidine shower day before and morning of the procedure as instructed. Informed consent is obtained.

## 2020-11-27 ENCOUNTER — HOSPITAL ENCOUNTER (OUTPATIENT)
Age: 85
Discharge: HOME | End: 2020-11-27
Payer: MEDICARE

## 2020-11-27 ENCOUNTER — HOSPITAL ENCOUNTER (OUTPATIENT)
Dept: GENERAL RADIOLOGY | Age: 85
Discharge: HOME | End: 2020-11-27
Payer: MEDICARE

## 2020-11-27 LAB
ANION GAP SERPL CALCULATED.3IONS-SCNC: 8 MMOL/L (ref 4–16)
BASOPHILS ABSOLUTE: 0.1 K/CU MM
BASOPHILS RELATIVE PERCENT: 0.7 % (ref 0–1)
BUN BLDV-MCNC: 10 MG/DL (ref 6–23)
CALCIUM SERPL-MCNC: 9.9 MG/DL (ref 8.3–10.6)
CHLORIDE BLD-SCNC: 97 MMOL/L (ref 99–110)
CO2: 29 MMOL/L (ref 21–32)
CREAT SERPL-MCNC: 0.6 MG/DL (ref 0.6–1.1)
DIFFERENTIAL TYPE: ABNORMAL
EOSINOPHILS ABSOLUTE: 0.4 K/CU MM
EOSINOPHILS RELATIVE PERCENT: 3.8 % (ref 0–3)
GFR AFRICAN AMERICAN: >60 ML/MIN/1.73M2
GFR NON-AFRICAN AMERICAN: >60 ML/MIN/1.73M2
GLUCOSE BLD-MCNC: 103 MG/DL (ref 70–99)
HCT VFR BLD CALC: 41.7 % (ref 37–47)
HEMOGLOBIN: 13.2 GM/DL (ref 12.5–16)
IMMATURE NEUTROPHIL %: 0.8 % (ref 0–0.43)
LYMPHOCYTES ABSOLUTE: 1.3 K/CU MM
LYMPHOCYTES RELATIVE PERCENT: 12.7 % (ref 24–44)
MAGNESIUM: 1.8 MG/DL (ref 1.8–2.4)
MCH RBC QN AUTO: 31.4 PG (ref 27–31)
MCHC RBC AUTO-ENTMCNC: 31.7 % (ref 32–36)
MCV RBC AUTO: 99.3 FL (ref 78–100)
MONOCYTES ABSOLUTE: 0.7 K/CU MM
MONOCYTES RELATIVE PERCENT: 7.5 % (ref 0–4)
NUCLEATED RBC %: 0 %
PDW BLD-RTO: 15.5 % (ref 11.7–14.9)
PHOSPHORUS: 3.1 MG/DL (ref 2.5–4.9)
PLATELET # BLD: 456 K/CU MM (ref 140–440)
PMV BLD AUTO: 11.3 FL (ref 7.5–11.1)
POTASSIUM SERPL-SCNC: 4.5 MMOL/L (ref 3.5–5.1)
RBC # BLD: 4.2 M/CU MM (ref 4.2–5.4)
SEGMENTED NEUTROPHILS ABSOLUTE COUNT: 7.3 K/CU MM
SEGMENTED NEUTROPHILS RELATIVE PERCENT: 74.5 % (ref 36–66)
SODIUM BLD-SCNC: 134 MMOL/L (ref 135–145)
TOTAL IMMATURE NEUTOROPHIL: 0.08 K/CU MM
TOTAL NUCLEATED RBC: 0 K/CU MM
WBC # BLD: 9.8 K/CU MM (ref 4–10.5)

## 2020-11-27 PROCEDURE — 36415 COLL VENOUS BLD VENIPUNCTURE: CPT

## 2020-11-27 PROCEDURE — 85025 COMPLETE CBC W/AUTO DIFF WBC: CPT

## 2020-11-27 PROCEDURE — 80048 BASIC METABOLIC PNL TOTAL CA: CPT

## 2020-11-27 PROCEDURE — 71046 X-RAY EXAM CHEST 2 VIEWS: CPT

## 2020-11-27 PROCEDURE — 83735 ASSAY OF MAGNESIUM: CPT

## 2020-11-27 PROCEDURE — 84100 ASSAY OF PHOSPHORUS: CPT

## 2020-11-30 ENCOUNTER — HOSPITAL ENCOUNTER (OUTPATIENT)
Age: 85
Discharge: HOME | End: 2020-11-30
Payer: MEDICARE

## 2020-11-30 PROCEDURE — U0002 COVID-19 LAB TEST NON-CDC: HCPCS

## 2020-11-30 PROCEDURE — C9803 HOPD COVID-19 SPEC COLLECT: HCPCS

## 2020-12-01 ENCOUNTER — TELEPHONE (OUTPATIENT)
Dept: CARDIOLOGY CLINIC | Age: 85
End: 2020-12-01

## 2020-12-01 LAB
SARS-COV-2: NOT DETECTED
SOURCE: NORMAL

## 2020-12-01 NOTE — TELEPHONE ENCOUNTER
[facility] is not wanting to Auth the procedure that is for the patient on 12/4/2020 the paperwork was givin marbin Tellez RN and she is going to call and push it through she is also going to scan in the paper work to the patient chart .

## 2020-12-03 NOTE — H&P
HISTORY AND PHYSICAL    Iggy Boudreaux, 80 y.o., female     Reason for admission: Permanent pacemaker generator replacement. Primary care physician:  Rich Sanchez DO    History of Present Illness: 77-year-old patient of Dr. Madeline Hussein and Dr. Bandar Flynn has history of chronic atrial fibrillation and coronary artery disease status post bypass surgery had a recent pacer analysis consistent with generator battery depletion. Elective generator replacement was discussed in detail with her and multiple questions were answered on November 18, 2020 and she is brought in for the procedure. Past medical history:  has a past medical history of Arthritis, Bradycardia, CAD (coronary artery disease), Cardiac pacemaker, CHF (congestive heart failure) ([address] COPD, mild ([address] CVA (cerebrovascular accident) ([address] DJD (degenerative joint disease) of cervical spine, Exhaustion of cardiac pacemaker battery, Family history of cardiovascular disease, Glaucoma, H/O 24 hour EKG monitoring, H/O cardiac catheterization, H/O cardiovascular stress test, H/O cardiovascular stress test, H/O cardiovascular stress test, H/O chest x-ray, H/O Doppler lower venous ultrasound, H/O Doppler ultrasound, H/O Doppler ultrasound, H/O Doppler ultrasound, H/O Doppler ultrasound, H/O echocardiogram, H/O echocardiogram, H/O echocardiogram, H/O echocardiogram, History of complete ECG, History of nuclear stress test, Hx of cardiovascular stress test, HX OTHER MEDICAL, Hyperlipidemia, Hypertension, Mild intermittent asthma, Nausea & vomiting, Obstructive sleep apnea, Paroxysmal atrial fibrillation ([address] Post PTCA, Pulmonary HTN ([address] PVD (peripheral vascular disease) ([address] S/P CABG x 3, S/P PTCA (percutaneous transluminal coronary angioplasty), Thyroid disease, and Unspecified cerebral artery occlusion with cerebral infarction. Past surgical history:  has a past surgical history that includes Appendectomy (1941); Tonsillectomy (1950's);  Cardiac surgery (4/09); Hysterectomy, total abdominal (1990's); Colonoscopy (In 2000's); pacemaker placement; Coronary artery bypass graft (4/8/2009); Coronary angioplasty with stent (4/21/2010); other surgical history; Middle ear surgery; and Percutaneous Transluminal Coronary Angio (11/2012). Social History:  reports that she quit smoking about 50 years ago. Her smoking use included cigarettes. She has a 1.25 pack-year smoking history. She has never used smokeless tobacco. She reports previous alcohol use. She reports that she does not use drugs. Family history: family history includes Arthritis in her mother; Cancer in her mother and sister; Coronary Art Dis in her father; Depression in her mother and sister; Early Death in her paternal grandfather; Early Death (age of onset: 36) in her father; Hearing Loss in her mother; Heart Disease in her father and sister; High Blood Pressure in her father, mother, sister, son, and son; High Cholesterol in her father and mother; Mental Illness in her mother; Princess Cables / Scarlet Dates in her mother; Other in her daughter. Allergies   Allergen Reactions    Darvocet [Propoxyphene N-Acetaminophen]     Ranexa [Ranolazine Er]      Sick to her stomach    Ranolazine      Sick to her stomach    Sulfa Antibiotics Itching    Sulfasalazine Itching    Adhesive Tape Rash    Allantoin Rash    Bacitracin Rash    Gramicidin Rash    Neomycin Rash    Polymyxin B Rash    Pramoxine Hcl Rash    Silicone Rash       Prior to Admission medications    Medication Sig Start Date End Date Taking? Authorizing Provider   atorvastatin (LIPITOR) 10 MG tablet Take 10 mg by mouth daily 10/20/20  Yes Historical Provider, MD   chlorhexidine gluconate (ANTISEPTIC SKIN CLEANSER) 4 % SOLN external solution Apply topically daily as needed (PRIOR TO PROCEDURE) Use this solution to scrub the area and take a shower 48 hours prior to the procedure and in the morning of procedure.  11/18/20  Yes Félix Manley MD   mupirocin (BACTROBAN NASAL) 2 % nasal ointment by Nasal route 2 times daily Take by Nasal route 2 times daily. for 5 days prior to the procedure. 11/18/20  Yes Ron Todd MD   umeclidinium-vilanterol [facility] ELLIPTA) 62.5-25 MCG/INH AEPB inhaler Inhale 1 puff into the lungs daily 7/27/20  Yes Edyth Severe, MD   spironolactone (ALDACTONE) 25 MG tablet Take 1 tablet by mouth daily 6/15/20  Yes Elvie Ogden MD   potassium chloride (KLOR-CON M) 10 MEQ extended release tablet Take 1 tablet by mouth daily 5/19/20  Yes Elvie Ogden MD   CPAP Machine MISC by Does not apply route   Yes Historical Provider, MD   triamterene-hydrochlorothiazide (MAXZIDE-25) 37.5-25 MG per tablet TAKE ONE TABLET BY MOUTH DAILY 5/9/19  Yes Elvie Ogden MD   Biotin (BIOTIN 5000) 5 MG CAPS Take 1 capsule by mouth daily   Yes Historical Provider, MD   Misc Natural Products (OSTEO BI-FLEX ADV DOUBLE ST PO) Take 1 tablet by mouth daily   Yes Historical Provider, MD   Tafluprost ([place] MOELLER) Apply to eye   Yes Historical Provider, MD   timolol (TIMOPTIC-XE) 0.25 % ophthalmic gel-forming 1 drop daily   Yes Historical Provider, MD   carvedilol (COREG) 6.25 MG tablet Take 1 tablet by mouth 2 times daily (with meals) 1/31/17  Yes Elvie Ogden MD   albuterol (PROVENTIL HFA;VENTOLIN HFA) 108 (90 BASE) MCG/ACT inhaler Inhale 2 puffs into the lungs 2 times daily AND EVERY 4-6 HOURS AS NEEDED   Yes Historical Provider, MD   Multiple Vitamins-Minerals (ICAPS) CAPS Take 1 capsule by mouth daily. Yes Historical Provider, MD   loratadine (CLARITIN) 10 MG tablet Take 10 mg by mouth as needed. Yes Historical Provider, MD   vitamin B-12 (CYANOCOBALAMIN) 1000 MCG tablet Take 1,000 mcg by mouth daily. Yes Historical Provider, MD   levothyroxine (SYNTHROID) 88 MCG tablet Take 88 mcg by mouth daily. Yes Historical Provider, MD   aspirin 81 MG chewable tablet Take 81 mg by mouth daily.      Yes Historical Provider, MD   warfarin (COUMADIN) 5 MG tablet TAKE 1 AND 1/2 TABLET BY MOUTH DAILY 7/8/20   Kailee Weber MD     /18/20 12/16/19     /80 142/78   Pulse 65 64   Weight 136 lb 9.6 oz (62 kg) 126 lb (57.2 kg)   Height 4' 11\" (1.499 m) 4' 10\" (1.473 m)   BMI (Calculated) 27.6 26.4             Wt Readings from Last 3 Encounters:   11/18/20 136 lb 9.6 oz (62 kg)   05/19/20 126 lb (57.2 kg)   12/16/19 126 lb (57.2 kg)      Constitutional:  Patient is elderly pleasant normally built and nourished female in no apparent distress. Eyes:  She is wearing glasses. Regular facemask. NECK: No JVP or thyromegaly  Cardiovascular: Auscultation: Normal S1 and S2. No significant murmurs noted. Sided pacemaker pocket is intact. Respiratory:  Respiratory effort is normal. Breath sounds are clear to auscultation. Extremities:  No edema, clubbing,  Cyanosis, petechiae. SKIN: Warm and well perfused, no pallor or cyanosis  Abdomen:  No masses or tenderness. No organomegaly noted. Musculoskeletal:  No obvious spinal deformities noted. Gait is steady. Muscle strength is normal.  Neurologic:  Oriented to time, place, and person and non-anxious. No focal neurological deficit noted. Psychiatric: Normal mood and effect.      Pacemaker analysis is reviewed is consistent with a non-MRI single-chamber pacemaker with battery voltage 2.59 consistent with replacement indicator for this device. Lead impedance is 509 programmed amplitude 2 Volts at pulse width of 0.4 ms.   We did a underlying rhythm analysis test by inhibiting the pacemaker and she does have a heart rate of 40 bpm and she tolerates it well and sitting position.     Lab Review   Recent Labs     12/04/20  0855   WBC 10.3   HGB 13.4   HCT 43.8   *      Lab Results   Component Value Date    INR 2.02 12/04/2020    PROTIME 24.6 (H) 12/04/2020     Assessment:    Active Hospital Problems    Diagnosis Date Noted    Pacer at end of battery life [Z45.010] 11/18/2020    PAF ([facility]oxysmal [facility] fibrillation) [facility] [I48.0] 05/10/2019    Bradycardia [R00.1] 08/11/2016    Post PTCA [Z98.61] 04/21/2010     Mallampati airway class score = 3  . Plan:    Patient has the procedure described . Potential risks, complications, alternatives are discussed in detail. Questions are encouraged and addressed to patient's satisfaction. Patient verbalized understanding and asked relevant questions and agreed to proceed with the procedure. Informed consent obtained.     Albin Pineda MD, 12/4/2020 10:05 AM

## 2020-12-04 ENCOUNTER — HOSPITAL ENCOUNTER (OUTPATIENT)
Dept: CARDIAC CATH/INVASIVE PROCEDURES | Age: 85
Discharge: HOME | End: 2020-12-04
Attending: INTERNAL MEDICINE | Admitting: INTERNAL MEDICINE
Payer: MEDICARE

## 2020-12-04 VITALS
BODY MASS INDEX: 27.21 KG/M2 | TEMPERATURE: 96.9 F | SYSTOLIC BLOOD PRESSURE: 155 MMHG | DIASTOLIC BLOOD PRESSURE: 84 MMHG | RESPIRATION RATE: 20 BRPM | HEART RATE: 61 BPM | WEIGHT: 135 LBS | HEIGHT: 59 IN | OXYGEN SATURATION: 100 %

## 2020-12-04 LAB
APTT: 32.3 SECONDS (ref 25.1–37.1)
HCT VFR BLD CALC: 43.8 % (ref 37–47)
HEMOGLOBIN: 13.4 GM/DL (ref 12.5–16)
INR BLD: 2.02 INDEX
MCH RBC QN AUTO: 30 PG (ref 27–31)
MCHC RBC AUTO-ENTMCNC: 30.6 % (ref 32–36)
MCV RBC AUTO: 98.2 FL (ref 78–100)
PDW BLD-RTO: 15.3 % (ref 11.7–14.9)
PLATELET # BLD: 474 K/CU MM (ref 140–440)
PMV BLD AUTO: 11.1 FL (ref 7.5–11.1)
PROTHROMBIN TIME: 24.6 SECONDS (ref 11.7–14.5)
RBC # BLD: 4.46 M/CU MM (ref 4.2–5.4)
WBC # BLD: 10.3 K/CU MM (ref 4–10.5)

## 2020-12-04 PROCEDURE — 2720000010 HC SURG SUPPLY STERILE

## 2020-12-04 PROCEDURE — 85730 THROMBOPLASTIN TIME PARTIAL: CPT

## 2020-12-04 PROCEDURE — C1786 PMKR, SINGLE, RATE-RESP: HCPCS

## 2020-12-04 PROCEDURE — 85610 PROTHROMBIN TIME: CPT

## 2020-12-04 PROCEDURE — 2500000003 HC RX 250 WO HCPCS

## 2020-12-04 PROCEDURE — 6360000002 HC RX W HCPCS

## 2020-12-04 PROCEDURE — 33227 REMOVE&REPLACE PM GEN SINGL: CPT

## 2020-12-04 PROCEDURE — 2709999900 HC NON-CHARGEABLE SUPPLY

## 2020-12-04 PROCEDURE — 85027 COMPLETE CBC AUTOMATED: CPT

## 2020-12-04 PROCEDURE — 33228 REMV&REPLC PM GEN DUAL LEAD: CPT

## 2020-12-04 PROCEDURE — 33227 REMOVE&REPLACE PM GEN SINGL: CPT | Performed by: INTERNAL MEDICINE

## 2020-12-04 RX ORDER — SODIUM CHLORIDE 0.9 % (FLUSH) 0.9 %
10 SYRINGE (ML) INJECTION EVERY 12 HOURS SCHEDULED
Status: DISCONTINUED | OUTPATIENT
Start: 2020-12-04 | End: 2020-12-04 | Stop reason: HOSPADM

## 2020-12-04 RX ORDER — SODIUM CHLORIDE 9 MG/ML
INJECTION, SOLUTION INTRAVENOUS CONTINUOUS
Status: DISCONTINUED | OUTPATIENT
Start: 2020-12-04 | End: 2020-12-04 | Stop reason: HOSPADM

## 2020-12-04 RX ORDER — SODIUM CHLORIDE 0.9 % (FLUSH) 0.9 %
10 SYRINGE (ML) INJECTION PRN
Status: DISCONTINUED | OUTPATIENT
Start: 2020-12-04 | End: 2020-12-04 | Stop reason: HOSPADM

## 2020-12-04 RX ORDER — CEFAZOLIN SODIUM 2 G/100ML
2 INJECTION, SOLUTION INTRAVENOUS
Status: DISCONTINUED | OUTPATIENT
Start: 2020-12-04 | End: 2020-12-04 | Stop reason: HOSPADM

## 2020-12-04 NOTE — OP NOTE
Operative Note          Procedure:  Single Chamber Pacemaker Generator Changeout  Pocket Modification, Sedation monitoring, Intraoperative ventricular lead analysis. Indication:  Status Post Pacemaker with battery Near \"End of Life\"  Symptomatic Bradycardia in this patient with chronic atrial fibrillation    Procedure  After informed consent is obtained the patient was brought in the cath lab on empty stomach and positively identified. Antibiotic prophylaxis and premedications given in holding unit. Patient is draped in the usual sterile manner. 2% Xylocaine local anesthesia was infused below the left clavicle from the midline laterally. An incision was made inferior and parallel to the clavicle. The incision was carried down to the existing pacemaker pocket. The existing pacemaker was disconnected and removed. The existing lead was tested. The lead  attached to a new single chamber pacemaker using the setscrews. The pocket was irrigated with antibiotic solution. The pulse generator and lead was coiled and placed in the pocket. The pocket was closed using multiple layers of suture and a dry sterile dressing was applied. There were no complications, patient tolerated the procedure well. The patient left the Cath lab in stable condition. Patient is on warfarin and extra time is taken to achieve good hemostasis. Estimated blood loss is 5-8 ml. No immediate complications noted. Device and Lead information:    Device made by [employer]. Model:  [id number]   Serial:  [id number]    Old Device - Medtronic - [id number] - Serial #- [id number] - [facility] - 11/21/2011    Ventricular Lead Model: [employer] - [id number]  Serial: [id number]  Implanted -  04/10/2001      Intra Operative Sensing and Capture thresholds:    Right Ventricle:      R wave:  13.0  mv, Impedance: 475 Ohms ,  Cap Threshold: [email or web address] 4ms    Device Programmed to:   Mode:  VVIR   Lower rate:     60                          Amplitude:                     in RA    N/A

## 2020-12-04 NOTE — PROGRESS NOTES
Discharge instructions reviewed with patient. Voices understanding. Ambulated without difficulty to the bathroom and returned to room without incident. I.V. removed and clean dry dressing applied. Pt dressing at this time.

## 2020-12-09 RX ORDER — POTASSIUM CHLORIDE 750 MG/1
10 TABLET, FILM COATED, EXTENDED RELEASE ORAL DAILY
Qty: 90 TABLET | Refills: 3 | Status: ON HOLD | OUTPATIENT
Start: 2020-12-09 | End: 2021-10-27 | Stop reason: HOSPADM

## 2020-12-14 ENCOUNTER — PROCEDURE VISIT (OUTPATIENT)
Dept: CARDIOLOGY CLINIC | Age: 85
End: 2020-12-14

## 2020-12-14 VITALS — DIASTOLIC BLOOD PRESSURE: 80 MMHG | TEMPERATURE: 96.4 F | HEART RATE: 60 BPM | SYSTOLIC BLOOD PRESSURE: 130 MMHG

## 2020-12-14 PROCEDURE — 99024 POSTOP FOLLOW-UP VISIT: CPT | Performed by: INTERNAL MEDICINE

## 2020-12-14 NOTE — PROGRESS NOTES
Pt is S/P pacemaker implantation and is here today for a site check. Removed tegadern and 10 Staples from incision site. Incision site clean, dry, free from drainage and well approximated. Some slight bruising at insertion noted. Site clean,  1/2 steri strip applied. Patient tolerated the procedure without difficulty. Educated the patient on incisional care and to call office for concerns or questions. Follow up appointment made. Patient verbalized understanding of all information given.

## 2021-01-06 ENCOUNTER — TELEPHONE (OUTPATIENT)
Dept: CARDIOLOGY CLINIC | Age: 86
End: 2021-01-06

## 2021-01-28 RX ORDER — SPIRONOLACTONE 25 MG/1
25 TABLET ORAL DAILY
Qty: 30 TABLET | Refills: 5 | Status: SHIPPED | OUTPATIENT
Start: 2021-01-28 | End: 2022-03-17

## 2021-02-18 ENCOUNTER — TELEPHONE (OUTPATIENT)
Dept: CARDIOLOGY CLINIC | Age: 86
End: 2021-02-18

## 2021-02-18 NOTE — LETTER
Cardiology [address]  Phone: [phone]  Fax: [phone]    2/18/2021        [place] [address]            Dear Larisa Simpson: This is your [facility] schedule. You can nela your calendar with these dates. Remember that your device is wireless and should automatically do these checks while you are sleeping. If for any reason I do not get your transmission then I will call you and ask that you send a manual transmission. If you have any questions or concerns, please call and ask for Yohana Blanc at [phone]. Thank you.

## 2021-02-24 ENCOUNTER — OFFICE VISIT (OUTPATIENT)
Dept: CARDIOLOGY CLINIC | Age: 86
End: 2021-02-24
Payer: MEDICARE

## 2021-02-24 VITALS
BODY MASS INDEX: 24.8 KG/M2 | SYSTOLIC BLOOD PRESSURE: 128 MMHG | DIASTOLIC BLOOD PRESSURE: 80 MMHG | WEIGHT: 123 LBS | HEART RATE: 76 BPM | HEIGHT: 59 IN

## 2021-02-24 DIAGNOSIS — Z95.1 S/P CABG X 3: Primary | ICD-10-CM

## 2021-02-24 PROCEDURE — 1036F TOBACCO NON-USER: CPT | Performed by: INTERNAL MEDICINE

## 2021-02-24 PROCEDURE — 1090F PRES/ABSN URINE INCON ASSESS: CPT | Performed by: INTERNAL MEDICINE

## 2021-02-24 PROCEDURE — 1123F ACP DISCUSS/DSCN MKR DOCD: CPT | Performed by: INTERNAL MEDICINE

## 2021-02-24 PROCEDURE — G8427 DOCREV CUR MEDS BY ELIG CLIN: HCPCS | Performed by: INTERNAL MEDICINE

## 2021-02-24 PROCEDURE — G8420 CALC BMI NORM PARAMETERS: HCPCS | Performed by: INTERNAL MEDICINE

## 2021-02-24 PROCEDURE — G8484 FLU IMMUNIZE NO ADMIN: HCPCS | Performed by: INTERNAL MEDICINE

## 2021-02-24 PROCEDURE — 4040F PNEUMOC VAC/ADMIN/RCVD: CPT | Performed by: INTERNAL MEDICINE

## 2021-02-24 PROCEDURE — 99214 OFFICE O/P EST MOD 30 MIN: CPT | Performed by: INTERNAL MEDICINE

## 2021-02-24 RX ORDER — FUROSEMIDE 20 MG/1
20 TABLET ORAL 2 TIMES DAILY
Status: ON HOLD | COMMUNITY
End: 2021-10-27 | Stop reason: HOSPADM

## 2021-02-24 NOTE — PROGRESS NOTES
Javon Haney  is a  Established patient  ,80 y.o.   female here for evaluation of the following chief complaint(s):    cad        SUBJECTIVE/OBJECTIVE:  HPI : h/o  Cad, htn, hyperlipidimea, ppm now here  Has been having sob , has no cp    Review of Systems     no    Vitals:    02/24/21 1510   BP: 128/80   Pulse: 76   Weight: 123 lb (55.8 kg)   Height: 4' 11\" (1.499 m)     /80   Pulse 76   Ht 4' 11\" (1.499 m)   Wt 123 lb (55.8 kg)   BMI 24.84 kg/m²   No flowsheet data found. Wt Readings from Last 3 Encounters:   02/24/21 123 lb (55.8 kg)   12/04/20 135 lb (61.2 kg)   11/18/20 136 lb 9.6 oz (62 kg)     Body mass index is 24.84 kg/m². Physical Exam     Neck: JVD      Lungs : clear    Cardio : Si and S2 audilble      Ext: edema      All pertinent data reviewed      Meds : reviewed         Tests ordered        ASSESSMENT/PLAN:               -     CORONARY ARTERY DISEASE:  asymptomatic     All available  tests in chart reviewed. Management discussed . Testing ordered  no                                 -  Hypertension: Patients blood pressure is normal. Patient is advised about low sodium diet. Present medical regimen will not be changed. - Atrial fibrillation, pt is  compliant with meds. Patient does not have symptoms from atrial fibrillation       ·  - ppm PACER  ANALYSIS:    Pacer analysis is reviewed and filed in the pacer chart. Analysis is consistent with normal  function with stable leads and appropriate battery status for the age of the device. Remaining average battery life is 13 yrs. Patient is using pacer A pace%, V pace99%. Recommend continued every three month check and follow up office visit as scheduled. Maria L Russo MD, 2/24/2021 3:20 PM             -  LIPID MANAGEMENT:  Importance of lipid levels discussed with patient   and patient was given dietary advice. NCEP- ATP III guidelines reviewed with patient. -   Changes  in medicines made:  No                                    An electronic signature was used to authenticate this note.     --Emily Rangel MD

## 2021-02-24 NOTE — LETTER
Jaxon Oleary Monday JESSICA Razia Bella  3/18/1929  [id number]    Have you had any Chest Pain that is not new? - No        Have you had any Shortness of Breath - Yes  If Yes - When on exertion    Have you had any dizziness - No    Have you had any palpitations that are not new? - No    Is the patient on any of the following medications -no    Do you have any edema - swelling in No      Vein \"LEG PROBLEM Questionnaire\"  1. Do you have prominent leg veins? No   2. Do you have any skin discoloration? No  3. Do you have any healed or active sores? No  4. Do you have swelling of the legs? No  5. Do you have a family history of varicose veins? No  6. Does your profession involve pro-longed        standing or heavy lifting? No  7. Have you been fighting overweight problems? No  8. Do you have restless legs? No  9. Do you have any night time cramps? No  10.  Do you have any of the following in your legs:        No    Do you have a surgery or procedure scheduled in the near future - No

## 2021-03-08 ENCOUNTER — PROCEDURE VISIT (OUTPATIENT)
Dept: CARDIOLOGY CLINIC | Age: 86
End: 2021-03-08
Payer: MEDICARE

## 2021-03-08 DIAGNOSIS — Z95.0 CARDIAC PACEMAKER IN SITU: Primary | ICD-10-CM

## 2021-03-08 DIAGNOSIS — I48.21 PERMANENT ATRIAL FIBRILLATION (HCC): ICD-10-CM

## 2021-03-08 PROCEDURE — 93296 REM INTERROG EVL PM/IDS: CPT | Performed by: INTERNAL MEDICINE

## 2021-03-08 PROCEDURE — 93294 REM INTERROG EVL PM/LDLS PM: CPT | Performed by: INTERNAL MEDICINE

## 2021-04-29 ENCOUNTER — TELEPHONE (OUTPATIENT)
Dept: CARDIOLOGY CLINIC | Age: 86
End: 2021-04-29

## 2021-04-29 ENCOUNTER — OFFICE VISIT (OUTPATIENT)
Dept: CARDIOLOGY CLINIC | Age: 86
End: 2021-04-29
Payer: MEDICARE

## 2021-04-29 DIAGNOSIS — T82.7XXA INFECTION OF PACEMAKER POCKET, INITIAL ENCOUNTER (HCC): ICD-10-CM

## 2021-04-29 PROCEDURE — 1090F PRES/ABSN URINE INCON ASSESS: CPT | Performed by: INTERNAL MEDICINE

## 2021-04-29 PROCEDURE — 1123F ACP DISCUSS/DSCN MKR DOCD: CPT | Performed by: INTERNAL MEDICINE

## 2021-04-29 PROCEDURE — 99214 OFFICE O/P EST MOD 30 MIN: CPT | Performed by: INTERNAL MEDICINE

## 2021-04-29 PROCEDURE — 4040F PNEUMOC VAC/ADMIN/RCVD: CPT | Performed by: INTERNAL MEDICINE

## 2021-04-29 PROCEDURE — 1036F TOBACCO NON-USER: CPT | Performed by: INTERNAL MEDICINE

## 2021-04-29 PROCEDURE — G8428 CUR MEDS NOT DOCUMENT: HCPCS | Performed by: INTERNAL MEDICINE

## 2021-04-29 PROCEDURE — G8420 CALC BMI NORM PARAMETERS: HCPCS | Performed by: INTERNAL MEDICINE

## 2021-04-29 RX ORDER — DOXYCYCLINE HYCLATE 100 MG
100 TABLET ORAL 2 TIMES DAILY
Qty: 14 TABLET | Refills: 0 | Status: SHIPPED | OUTPATIENT
Start: 2021-04-29 | End: 2021-05-06

## 2021-04-29 NOTE — PROGRESS NOTES
route    Historical Provider, MD   triamterene-hydrochlorothiazide (MAXZIDE-25) 37.5-25 MG per tablet TAKE ONE TABLET BY MOUTH DAILY 5/9/19   Paulette Mcdaniels MD   Biotin (BIOTIN 5000) 5 MG CAPS Take 1 capsule by mouth daily    Historical Provider, MD   Misc Natural Products (OSTEO BI-FLEX ADV DOUBLE ST PO) Take 1 tablet by mouth daily    Historical Provider, MD   Tafluprost ([place] OP) Apply to eye    Historical Provider, MD   timolol (TIMOPTIC-XE) 0.25 % ophthalmic gel-forming 1 drop daily    Historical Provider, MD   carvedilol (COREG) 6.25 MG tablet Take 1 tablet by mouth 2 times daily (with meals) 1/31/17   Paulette Mcdaniels MD   albuterol (PROVENTIL HFA;VENTOLIN HFA) 108 (90 BASE) MCG/ACT inhaler Inhale 2 puffs into the lungs 2 times daily AND EVERY 4-6 HOURS AS NEEDED    Historical Provider, MD   Multiple Vitamins-Minerals (ICAPS) CAPS Take 1 capsule by mouth daily. Historical Provider, MD   loratadine (CLARITIN) 10 MG tablet Take 10 mg by mouth as needed. Historical Provider, MD   vitamin B-12 (CYANOCOBALAMIN) 1000 MCG tablet Take 1,000 mcg by mouth daily. Historical Provider, MD   levothyroxine (SYNTHROID) 88 MCG tablet Take 88 mcg by mouth daily. Historical Provider, MD   aspirin 81 MG chewable tablet Take 81 mg by mouth daily.       Historical Provider, MD     Past Medical History:   Diagnosis Date    Arthritis     Bradycardia 2001    requiring dual chamber pacemaker [facility] CAD (coronary artery disease)     Cardiac pacemaker 10/2001    [employer] #[id number]  PPM- Serial # 26-[facility]- Dr Kong Shoemaker CHF (congestive heart failure) ([address]     COPD, mild ([address] 2/17/2017    CVA (cerebrovascular accident) ([address]     DJD (degenerative joint disease) of cervical spine     C5-C6, C6-C7    Exhaustion of cardiac pacemaker battery 11/21/2011    PPM battery replacement- Medtronic    Family history of cardiovascular disease     Glaucoma Dx 2010    H/O 24 hour EKG monitoring 8/6/2000 8/6/2000- Intermittent episonde of a-fib/flutter    H/O cardiac catheterization 4/20/2010, 2/1989 4/20/2010-Severe native vessel disease and has graft disease as well. LAD, CX totally occluded. RCA totally occluded in proximal segment. VG to RCA widely patent. PDA 90% LAD afer LIMA anastomosis 80-90% stenosis. Proceeded with PTCA with stent next day.  H/O cardiovascular stress test 10/14/2011, 6/2/2010,4/8/2010, 5/18/2009,4/6/2009, 10/30/2007, 11/12/2004, 11/6/2003, 8/9/2002, 8/9/2001,6/5/2000,     10/14/2011-Lexiscan-Abnormal Myocardial Perfusion study. Evidence of mild ischemia in the Left CX region. Abnomal study. Rest EF 63%. Global LV systolic function normal. No ECG changes. Unremarkable pharmacological stress test.    H/O cardiovascular stress test 6/10/2013    thallium--mild ischemia left circumflex EF63% no change from 10/2011 study.  H/O cardiovascular stress test 10/16/2014    cardiolite-mild ischemia left circumflex,EF70%    H/O chest x-ray 4/19/2009 4/19/2009-Stable cardiomegaly. No acute cardiopulmonary disease.  H/O Doppler lower venous ultrasound 09/18/2019    Significant reflux noted in RGSV, RGSV is extremely tortuous and small along the thigh and calf and would be highly unlikely to be accessed, RSSV is non compressible and has occlusive chronic SVT, LSSV is non compressible with occlusive chronic SVT, LGSV removed s/p CABG, Significant reflux in LGSV tributary,    H/O Doppler ultrasound 3/31/2010    CAROTID- 3/31/2010-INtimal thickening but no significant atherosclerotic plaque noted in ANA PAULA. Doppler flow velocities within ANA PAULA are WNL. Heterogeneous, irregular atherosclerotic plaque noted in LICA. Doppler flow velocities within the LICA are elevated, consistent with a mild, less than 50% stenosis.     H/O Doppler ultrasound 5/24/2016    Carotid- normal study    H/O Doppler ultrasound 09/06/2017    carotid - normal study    H/O Doppler ultrasound     H/O echocardiogram 10/13 EF=60%, Severe Pulm. HTN, & Sclerotic aortic valve w/stenosis.  H/O echocardiogram 10/16/14     EF 55-60% Normal LV. Normal LV systolic function. Severe tricuspid insufficiency with severe hypertension.  H/O echocardiogram 02/03/2017    heart cath performed this morning    H/O echocardiogram 09/18/2019    EF 50-55%, Left atrium is mild to moderately dilated, right atrium is severely dilated, mildly dilated right ventricle, Mod MR, Severe TR, Severe Pulm HTN, no pericardial effusion     History of complete ECG     10/14/2011(Lexiscan);5/6/2010, 4/30/2009,10/24/2008,9/21/2007, 10/13/2006    History of nuclear stress test 11/17/2016    lexiscan-normal,EF70%    Hx of cardiovascular stress test 12/28/2018    EF 60%  Normal study.  HX OTHER MEDICAL 05/01/2017    MUGA-normal, EF53%    Hyperlipidemia     Hypertension     Mild intermittent asthma 7/28/2016    Nausea & vomiting     Obstructive sleep apnea 5/16/2017    Paroxysmal atrial fibrillation (HCC)     Post PTCA 4/21/2010    PTCA with 2.25 stent of the LIMA to LAD    Pulmonary HTN ([address]     Severe per last echo on 10/13. 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 Followed by MAZE procedure by pulmonary vein isolation.-  Dr Last Santiago S/P PTCA (percutaneous transluminal coronary angioplasty) 11/2012    PTCA with stent to RCA    Thyroid disease     hypothyroi    Unspecified cerebral artery occlusion with cerebral infarction Unsure When    No Residual       Constitutional:  Patient is is afebrile. She is sitting in chair. Awake alert comfortable. .    She has a light gauze on the pacer incision. She has redness with some induration without any discharge. Pocket does not appear to be swollen. There is no erosion at this point.          Pertinent records reviewed and discussed with patient and results are as follow:    Lab Results   Component Value Date    WBC 10.3 12/04/2020    HGB 13.4 12/04/2020    HCT 43.8 12/04/2020     12/04/2020     Lab Results   Component Value Date    CHOL 171 10/29/2018    TRIG 82 10/29/2018    HDL 49 10/29/2018    LDLCALC 100 07/05/2017    LDLDIRECT 116 (H) 10/29/2018     Lab Results   Component Value Date    BUN 10 11/27/2020    CREATININE 0.6 11/27/2020     11/27/2020    K 4.5 11/27/2020     Lab Results   Component Value Date    INR 2.02 12/04/2020     ASSESSMENT/PLAN:    1. Infection of pacemaker pocket, initial encounter [facility])  Assessment & Plan:  Infection appears to be involving the skin and superficial.  It does not appear to be a pocket infection at present. The skin is cleaned with alcohol and triple antibiotic is applied and sterile gauze is applied with Tegaderm. I discussed the case with Dr. Jen Odom. We start her on doxycycline 100 mg twice a day and watch her closely. She is counseled to call us if she has any fever chills or any drainage for earlier appointment otherwise she will be followed in office in 3 to 4 days. I discussed the case with her son Ayleen Ho   who is here and questions are addressed. Possibility of deeper pocket infection is there if things do not get better she may need to be admitted for explantation and IV antibiotics. Follow-up in 4 days sooner if needed. On this date 4/29/2021 I have spent 20 minutes reviewing previous notes, test results and face to face with the patient discussing the diagnosis and importance of compliance with the treatment plan as well as documenting on the day of the visit. An electronic signature was used to authenticate this note.     --Que Yang MD

## 2021-04-29 NOTE — ASSESSMENT & PLAN NOTE
Infection appears to be involving the skin and superficial.  It does not appear to be a pocket infection at present. The skin is cleaned with alcohol and triple antibiotic is applied and sterile gauze is applied with Tegaderm. I discussed the case with Dr. Joshua Parekh. We start her on doxycycline 100 mg twice a day and watch her closely. She is counseled to call us if she has any fever chills or any drainage for earlier appointment otherwise she will be followed in office in 3 to 4 days. I discussed the case with her son Sandip Hawkins   who is here and questions are addressed. Possibility of deeper pocket infection is there if things do not get better she may need to be admitted for explantation and IV antibiotics.

## 2021-04-29 NOTE — TELEPHONE ENCOUNTER
Patient called this morning and said that her pacemaker is coming through her skin. I advised patient to go to ED and she said that she did not want to go their and sit due to COVID-19. I asked patient to come into the office so that I can see the pacemaker site. She agreed. She said that he son was currently picking up her grocery order and she would be in when he came back. I agreed.

## 2021-05-03 ENCOUNTER — NURSE ONLY (OUTPATIENT)
Dept: CARDIOLOGY CLINIC | Age: 86
End: 2021-05-03

## 2021-05-03 DIAGNOSIS — G89.18 PAIN AT SURGICAL SITE: ICD-10-CM

## 2021-05-03 PROCEDURE — 99024 POSTOP FOLLOW-UP VISIT: CPT | Performed by: INTERNAL MEDICINE

## 2021-05-07 ENCOUNTER — NURSE ONLY (OUTPATIENT)
Dept: CARDIOLOGY CLINIC | Age: 86
End: 2021-05-07

## 2021-05-07 ENCOUNTER — CLINICAL DOCUMENTATION (OUTPATIENT)
Dept: CARDIOLOGY CLINIC | Age: 86
End: 2021-05-07

## 2021-05-07 VITALS — TEMPERATURE: 98.6 F

## 2021-05-07 DIAGNOSIS — Z48.89 ENCOUNTER FOR EXAMINATION OF SURGICAL SITE: Primary | ICD-10-CM

## 2021-05-07 PROCEDURE — 99024 POSTOP FOLLOW-UP VISIT: CPT | Performed by: INTERNAL MEDICINE

## 2021-05-10 ENCOUNTER — TELEPHONE (OUTPATIENT)
Dept: CARDIOLOGY CLINIC | Age: 86
End: 2021-05-10

## 2021-05-10 NOTE — TELEPHONE ENCOUNTER
Patient was in the office on Friday for site check with [id number] [place] [address]. Photo of device site was sent to Hakan Land and Shannon Tomas for review. Per Hakan Land, patient to apply triple antibiotic cream and follow up with another site check on 5/12. Spoke with patient. She states it is very difficult for her to get into the office. She asked if she could have her son send a picture to Amanda Mack, [address] [place] [address]. Per Hakan Land, ok for patients son to send a photo in. Advised patient that she may still need to be seen in the office if site isn't looking better. She states site is looking really good and isn't bothering her much at all anymore. Will route message to Efraín Lou to notify. Will contact patient on 5/12 to make sure photo is sent.

## 2021-05-12 ENCOUNTER — TELEPHONE (OUTPATIENT)
Dept: CARDIOLOGY CLINIC | Age: 86
End: 2021-05-12

## 2021-05-12 NOTE — TELEPHONE ENCOUNTER
Spoke with patient. She sent picture of pacemaker site today per Nanette Medina request. Per Nanette Medina, site looks to be healing better. Patient feels that she is allergic to triple antibiotic cream because the site is itching and pink where she has applied it. Per Nanette Medina, ok for patient to stop using cream and come see him on Friday for further evaluation.

## 2021-05-14 ENCOUNTER — OFFICE VISIT (OUTPATIENT)
Dept: CARDIOLOGY CLINIC | Age: 86
End: 2021-05-14
Payer: MEDICARE

## 2021-05-14 DIAGNOSIS — Z95.0 CARDIAC PACEMAKER: Chronic | ICD-10-CM

## 2021-05-14 PROCEDURE — 99211 OFF/OP EST MAY X REQ PHY/QHP: CPT | Performed by: INTERNAL MEDICINE

## 2021-05-14 RX ORDER — CAMPHOR 0.45 %
GEL (GRAM) TOPICAL
Qty: 1 TUBE | Refills: 0 | Status: ON HOLD | OUTPATIENT
Start: 2021-05-14 | End: 2021-10-27 | Stop reason: HOSPADM

## 2021-05-14 NOTE — PROGRESS NOTES
Patient in the office today for site check. Hilario Alert looked at device site. Site healing much better. Patient does have red itching/rash area around site. She feels that she is allergic to triple antibiotic cream. Hilario Alert had advised patient to stop using antibiotic cream and leave site open to air. Per Hilario Alert, patient can use benadryl cream. Prescription written. Patient to keep follow up with  in August and call the office if she has any questions. Patient voiced understanding.

## 2021-05-18 ENCOUNTER — HOSPITAL ENCOUNTER (EMERGENCY)
Age: 86
Discharge: HOME | End: 2021-05-18
Payer: MEDICARE

## 2021-05-18 ENCOUNTER — TELEPHONE (OUTPATIENT)
Dept: GASTROENTEROLOGY | Age: 86
End: 2021-05-18

## 2021-05-18 ENCOUNTER — APPOINTMENT (OUTPATIENT)
Dept: CT IMAGING | Age: 86
End: 2021-05-18
Payer: MEDICARE

## 2021-05-18 ENCOUNTER — TELEPHONE (OUTPATIENT)
Dept: CARDIOLOGY CLINIC | Age: 86
End: 2021-05-18

## 2021-05-18 VITALS
RESPIRATION RATE: 18 BRPM | HEIGHT: 59 IN | TEMPERATURE: 98.1 F | SYSTOLIC BLOOD PRESSURE: 150 MMHG | WEIGHT: 130 LBS | HEART RATE: 60 BPM | DIASTOLIC BLOOD PRESSURE: 67 MMHG | OXYGEN SATURATION: 97 % | BODY MASS INDEX: 26.21 KG/M2

## 2021-05-18 DIAGNOSIS — R31.9 HEMATURIA, UNSPECIFIED TYPE: Primary | ICD-10-CM

## 2021-05-18 DIAGNOSIS — R82.71 BACTERIURIA: ICD-10-CM

## 2021-05-18 LAB
ALBUMIN SERPL-MCNC: 4.1 GM/DL (ref 3.4–5)
ALP BLD-CCNC: 127 IU/L (ref 40–129)
ALT SERPL-CCNC: 21 U/L (ref 10–40)
ANION GAP SERPL CALCULATED.3IONS-SCNC: 7 MMOL/L (ref 4–16)
AST SERPL-CCNC: 28 IU/L (ref 15–37)
BACTERIA: ABNORMAL /HPF
BASOPHILS ABSOLUTE: 0.1 K/CU MM
BASOPHILS RELATIVE PERCENT: 0.5 % (ref 0–1)
BILIRUB SERPL-MCNC: 1.3 MG/DL (ref 0–1)
BILIRUBIN URINE: NEGATIVE MG/DL
BLOOD, URINE: ABNORMAL
BUN BLDV-MCNC: 13 MG/DL (ref 6–23)
CALCIUM SERPL-MCNC: 10.1 MG/DL (ref 8.3–10.6)
CHLORIDE BLD-SCNC: 98 MMOL/L (ref 99–110)
CLARITY: ABNORMAL
CO2: 28 MMOL/L (ref 21–32)
COLOR: ABNORMAL
CREAT SERPL-MCNC: 0.6 MG/DL (ref 0.6–1.1)
DIFFERENTIAL TYPE: ABNORMAL
EOSINOPHILS ABSOLUTE: 0.3 K/CU MM
EOSINOPHILS RELATIVE PERCENT: 2.5 % (ref 0–3)
GFR AFRICAN AMERICAN: >60 ML/MIN/1.73M2
GFR NON-AFRICAN AMERICAN: >60 ML/MIN/1.73M2
GLUCOSE BLD-MCNC: 107 MG/DL (ref 70–99)
GLUCOSE, URINE: NEGATIVE MG/DL
HCT VFR BLD CALC: 41.7 % (ref 37–47)
HEMOGLOBIN: 13.6 GM/DL (ref 12.5–16)
IMMATURE NEUTROPHIL %: 0.4 % (ref 0–0.43)
INR BLD: 2.26 INDEX
KETONES, URINE: NEGATIVE MG/DL
LEUKOCYTE ESTERASE, URINE: ABNORMAL
LYMPHOCYTES ABSOLUTE: 0.6 K/CU MM
LYMPHOCYTES RELATIVE PERCENT: 5.9 % (ref 24–44)
MCH RBC QN AUTO: 29.3 PG (ref 27–31)
MCHC RBC AUTO-ENTMCNC: 32.6 % (ref 32–36)
MCV RBC AUTO: 89.9 FL (ref 78–100)
MONOCYTES ABSOLUTE: 0.7 K/CU MM
MONOCYTES RELATIVE PERCENT: 6.7 % (ref 0–4)
NITRITE URINE, QUANTITATIVE: NEGATIVE
NUCLEATED RBC %: 0 %
PDW BLD-RTO: 16.2 % (ref 11.7–14.9)
PH, URINE: 7 (ref 5–8)
PLATELET # BLD: 442 K/CU MM (ref 140–440)
PMV BLD AUTO: 11.1 FL (ref 7.5–11.1)
POTASSIUM SERPL-SCNC: 4.6 MMOL/L (ref 3.5–5.1)
PROTEIN UA: 100 MG/DL
PROTHROMBIN TIME: 27.6 SECONDS (ref 11.7–14.5)
RBC # BLD: 4.64 M/CU MM (ref 4.2–5.4)
RBC URINE: 206 /HPF (ref 0–6)
SEGMENTED NEUTROPHILS ABSOLUTE COUNT: 9.2 K/CU MM
SEGMENTED NEUTROPHILS RELATIVE PERCENT: 84 % (ref 36–66)
SODIUM BLD-SCNC: 133 MMOL/L (ref 135–145)
SPECIFIC GRAVITY UA: 1.01 (ref 1–1.03)
SQUAMOUS EPITHELIAL: 1 /HPF
TOTAL IMMATURE NEUTOROPHIL: 0.04 K/CU MM
TOTAL NUCLEATED RBC: 0 K/CU MM
TOTAL PROTEIN: 6.2 GM/DL (ref 6.4–8.2)
TRICHOMONAS: ABNORMAL /HPF
UROBILINOGEN, URINE: NEGATIVE MG/DL (ref 0.2–1)
WBC # BLD: 10.9 K/CU MM (ref 4–10.5)
WBC CLUMP: ABNORMAL /HPF
WBC UA: 131 /HPF (ref 0–5)
YEAST: ABNORMAL /HPF

## 2021-05-18 PROCEDURE — 80053 COMPREHEN METABOLIC PANEL: CPT

## 2021-05-18 PROCEDURE — 2580000003 HC RX 258: Performed by: PHYSICIAN ASSISTANT

## 2021-05-18 PROCEDURE — 85025 COMPLETE CBC W/AUTO DIFF WBC: CPT

## 2021-05-18 PROCEDURE — 99285 EMERGENCY DEPT VISIT HI MDM: CPT

## 2021-05-18 PROCEDURE — 74177 CT ABD & PELVIS W/CONTRAST: CPT

## 2021-05-18 PROCEDURE — 96374 THER/PROPH/DIAG INJ IV PUSH: CPT

## 2021-05-18 PROCEDURE — 81001 URINALYSIS AUTO W/SCOPE: CPT

## 2021-05-18 PROCEDURE — 85610 PROTHROMBIN TIME: CPT

## 2021-05-18 PROCEDURE — 6360000004 HC RX CONTRAST MEDICATION: Performed by: PHYSICIAN ASSISTANT

## 2021-05-18 RX ORDER — CEPHALEXIN 500 MG/1
500 CAPSULE ORAL 3 TIMES DAILY
Qty: 21 CAPSULE | Refills: 0 | Status: SHIPPED | OUTPATIENT
Start: 2021-05-18 | End: 2021-05-25

## 2021-05-18 RX ORDER — SODIUM CHLORIDE 0.9 % (FLUSH) 0.9 %
10 SYRINGE (ML) INJECTION
Status: COMPLETED | OUTPATIENT
Start: 2021-05-18 | End: 2021-05-18

## 2021-05-18 RX ADMIN — SODIUM CHLORIDE, PRESERVATIVE FREE 10 ML: 5 INJECTION INTRAVENOUS at 12:04

## 2021-05-18 RX ADMIN — IOPAMIDOL 75 ML: 755 INJECTION, SOLUTION INTRAVENOUS at 12:04

## 2021-05-18 NOTE — TELEPHONE ENCOUNTER
Dr Albino Joseph reviewed CT result, only concern is gallbladder which should be brought to attention of PCP.  Left message for Cornell Smith

## 2021-05-18 NOTE — ED NOTES
Bed: H-02  Expected date:   Expected time:   Means of arrival:   Comments:  40 Josefina Ball RN  05/18/21 6037

## 2021-05-18 NOTE — ED PROVIDER NOTES
eMERGENCY dEPARTMENT eNCOUnter      PCP: Al Lyles, [facility]    Chief Complaint   Patient presents with    Hematuria     symptoms started this morning    Rectal Bleeding       HPI    Aurther Megan is a 80 y.o. female with past medical history of CAD status post CABG x3, COPD, paroxysmal A. fib who presents with dysuria, hematuria and rectal bleeding. Patient states she was at her baseline state of health last evening, noticed symptoms starting with dysuria this morning. She states she noticed some blood in her urine and then had feeling of having to have a bowel movement. She had a loose stool this morning mixed with light red blood. She endorses intermittent abdominal pain with significant across epigastrium. No associated nausea, vomiting or fevers. She states that she has had a history of similar bleeding in the past and states that symptoms typically resolve with antibiotics. She has had a colonoscopy in the past.  She is anticoagulated on Coumadin. She is status post hysterectomy, appendectomy. No associated chest pain, shortness of breath, lightheadedness. REVIEW OF SYSTEMS    Constitutional:  Denies fever, chills, weight loss or weakness   HENT:  Denies sore throat or ear pain   Cardiovascular:  Denies chest pain, palpitations or swelling   Respiratory:  Denies cough or shortness of breath   GI:  See HPI above  : See HPI. No vaginal symptoms. Musculoskeletal:  Denies back pain or groin pain or masses. No pain or swelling of extremities.   Skin:  Denies rash  Neurologic:  Denies headache, focal weakness or sensory changes   Endocrine:  Denies polyuria or polydypsia   Lymphatic:  Denies swollen glands     All other review of systems are negative  See HPI and nursing notes for additional information     PAST MEDICAL & SURGICAL HISTORY    Past Medical History:   Diagnosis Date    Arthritis     Bradycardia 2001    requiring dual chamber pacemaker at [facility]  CAD (coronary artery disease)     Cardiac pacemaker 10/2001    [employer] #[id number]  PPM- Serial # 26-[facility]- Dr Vivian Schmitt CHF (congestive heart failure) ([facility]     COPD, mild ([facility] 2/17/2017    CVA (cerebrovascular accident) ([facility]     DJD (degenerative joint disease) of cervical spine     C5-C6, C6-C7    Exhaustion of cardiac pacemaker battery 11/21/2011    PPM battery replacement- [employer]    Family history of cardiovascular disease     Glaucoma Dx 2010    H/O 24 hour EKG monitoring 8/6/2000 8/6/2000- Intermittent episonde of a-fib/flutter    H/O cardiac catheterization 4/20/2010, 2/1989 4/20/2010-Severe native vessel disease and has graft disease as well. LAD, CX totally occluded. RCA totally occluded in proximal segment. VG to RCA widely patent. PDA 90% LAD afer LIMA anastomosis 80-90% stenosis. Proceeded with PTCA with stent next day.  H/O cardiovascular stress test 10/14/2011, 6/2/2010,4/8/2010, 5/18/2009,4/6/2009, 10/30/2007, 11/12/2004, 11/6/2003, 8/9/2002, 8/9/2001,6/5/2000,     10/14/2011-Lexiscan-Abnormal Myocardial Perfusion study. Evidence of mild ischemia in the Left CX region. Abnomal study. Rest EF 63%. Global LV systolic function normal. No ECG changes. Unremarkable pharmacological stress test.    H/O cardiovascular stress test 6/10/2013    thallium--mild ischemia left circumflex EF63% no change from 10/2011 study.  H/O cardiovascular stress test 10/16/2014    cardiolite-mild ischemia left circumflex,EF70%    H/O chest x-ray 4/19/2009 4/19/2009-Stable cardiomegaly. No acute cardiopulmonary disease.     H/O Doppler lower venous ultrasound 09/18/2019    Significant reflux noted in RGSV, RGSV is extremely tortuous and small along the thigh and calf and would be highly unlikely to be accessed, RSSV is non compressible and has occlusive chronic SVT, LSSV is non compressible with occlusive chronic SVT, LGSV removed s/p CABG, Significant reflux in LGSV tributary,    H/O Doppler ultrasound 3/31/2010    CAROTID- 3/31/2010-INtimal thickening but no significant atherosclerotic plaque noted in ANA PAULA. Doppler flow velocities within ANA PAULA are WNL. Heterogeneous, irregular atherosclerotic plaque noted in LICA. Doppler flow velocities within the LICA are elevated, consistent with a mild, less than 50% stenosis.  H/O Doppler ultrasound 5/24/2016    Carotid- normal study    H/O Doppler ultrasound 09/06/2017    carotid - normal study    H/O Doppler ultrasound     H/O echocardiogram 10/13    EF=60%, Severe Pulm. HTN, & Sclerotic aortic valve w/stenosis.  H/O echocardiogram 10/16/14     EF 55-60% Normal LV. Normal LV systolic function. Severe tricuspid insufficiency with severe hypertension.  H/O echocardiogram 02/03/2017    heart cath performed this morning    H/O echocardiogram 09/18/2019    EF 50-55%, Left atrium is mild to moderately dilated, right atrium is severely dilated, mildly dilated right ventricle, Mod MR, Severe TR, Severe Pulm HTN, no pericardial effusion     History of complete ECG     10/14/2011(Lexiscan);5/6/2010, 4/30/2009,10/24/2008,9/21/2007, 10/13/2006    History of nuclear stress test 11/17/2016    lexiscan-normal,EF70%    Hx of cardiovascular stress test 12/28/2018    EF 60%  Normal study.  HX OTHER MEDICAL 05/01/2017    MUGA-normal, EF53%    Hyperlipidemia     Hypertension     Mild intermittent asthma 7/28/2016    Nausea & vomiting     Obstructive sleep apnea 5/16/2017    Paroxysmal atrial fibrillation (HCC)     Post PTCA 4/21/2010    PTCA with 2.25 stent of the LIMA to LAD    Pulmonary HTN ([address]     Severe per last echo on 10/13. 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  Followed by MAZE procedure by pulmonary vein isolation.-  Dr Alfred Vo S/P PTCA (percutaneous transluminal coronary angioplasty) 11/2012    PTCA with stent to RCA    Thyroid disease hypothyroi    Unspecified cerebral artery occlusion with cerebral infarction Unsure When    No Residual     Past Surgical History:   Procedure Laterality Date    APPENDECTOMY  80    CARDIAC SURGERY  4/09    CABG (3 Bypasses), One Heart Stent in 2010    COLONOSCOPY  In 2000's    X1    CORONARY ANGIOPLASTY WITH STENT PLACEMENT  4/21/2010    PTCA with stent LIMA ->LAD    CORONARY ARTERY BYPASS GRAFT  4/8/2009    LIMA->Diag,  LIMA -> LAD;  SVG->RCA going to the PDA. Followed by MAZE procedure by pulmonary vein isolation.-  Dr Pete Escobedo, TOTAL ABDOMINAL  1990's    MIDDLE EAR SURGERY      OTHER SURGICAL HISTORY      Ear surgery-hearing    PACEMAKER PLACEMENT      battery change 11/21/2011 Medtronic    PTCA  11/2012    Ptca with stent to RCA    TONSILLECTOMY  1950's       CURRENT MEDICATIONS    Current Outpatient Rx   Medication Sig Dispense Refill    cephALEXin (KEFLEX) 500 MG capsule Take 1 capsule by mouth 3 times daily for 7 days 21 capsule 0    diphenhydrAMINE-zinc acetate (BENADRYL ITCH STOPPING) 1-0.1 % cream Apply topically 3 times daily as needed.  1 Tube 0    furosemide (LASIX) 20 MG tablet Take 20 mg by mouth 2 times daily      Probiotic Product (PROBIOTIC-10) CHEW Take by mouth      spironolactone (ALDACTONE) 25 MG tablet Take 1 tablet by mouth daily 30 tablet 5    potassium chloride (KLOR-CON) 10 MEQ extended release tablet Take 1 tablet by mouth daily 90 tablet 3    atorvastatin (LIPITOR) 10 MG tablet Take 10 mg by mouth daily      umeclidinium-vilanterol (ANORO ELLIPTA) 62.5-25 MCG/INH AEPB inhaler Inhale 1 puff into the lungs daily 1 each 11    warfarin (COUMADIN) 5 MG tablet TAKE 1 AND 1/2 TABLET BY MOUTH DAILY 30 tablet 2    CPAP Machine MISC by Does not apply route      triamterene-hydrochlorothiazide (MAXZIDE-25) 37.5-25 MG per tablet TAKE ONE TABLET BY MOUTH DAILY 77 tablet 2    Biotin (BIOTIN 5000) 5 MG CAPS Take 1 capsule by mouth daily      Misc Natural Products (OSTEO BI-FLEX ADV DOUBLE ST PO) Take 1 tablet by mouth daily      Tafluprost (ZIOPTAN OP) Apply to eye      timolol (TIMOPTIC-XE) 0.25 % ophthalmic gel-forming 1 drop daily      carvedilol (COREG) 6.25 MG tablet Take 1 tablet by mouth 2 times daily (with meals) 180 tablet 3    albuterol (PROVENTIL HFA;VENTOLIN HFA) 108 (90 BASE) MCG/ACT inhaler Inhale 2 puffs into the lungs 2 times daily AND EVERY 4-6 HOURS AS NEEDED      Multiple Vitamins-Minerals (ICAPS) CAPS Take 1 capsule by mouth daily.  loratadine (CLARITIN) 10 MG tablet Take 10 mg by mouth as needed.  vitamin B-12 (CYANOCOBALAMIN) 1000 MCG tablet Take 1,000 mcg by mouth daily.  levothyroxine (SYNTHROID) 88 MCG tablet Take 88 mcg by mouth daily.  aspirin 81 MG chewable tablet Take 81 mg by mouth daily. ALLERGIES    Allergies   Allergen Reactions    Darvocet [Propoxyphene N-Acetaminophen]     Ranexa [Ranolazine Er]      Sick to her stomach    Ranolazine      Sick to her stomach    Sulfa Antibiotics Itching    Sulfasalazine Itching    Adhesive Tape Rash    Allantoin Rash    Bacitracin Rash    Gramicidin Rash    Neomycin Rash    Polymyxin B Rash    Pramoxine Hcl Rash    Silicone Rash       SOCIAL AND FAMILY HISTORY    Social History     Socioeconomic History    Marital status:       Spouse name: None    Number of children: 3    Years of education: None    Highest education level: None   Occupational History    Occupation: RETIRED     Comment: from [employer] Use    Smoking status: Former Smoker     Packs/day: 0.25     Years: 5.00     Pack years: 1.25     Types: Cigarettes     Quit date: 1970     Years since quittin.5    Smokeless tobacco: Never Used   Substance and Sexual Activity    Alcohol use: Not Currently    Drug use: No    Sexual activity: Yes     Partners: Male     Comment:    Other Topics Concern    None   Social History Narrative    None Social Determinants of Health     Financial Resource Strain:     Difficulty of Paying Living Expenses:    Food Insecurity:     Worried About Running Out of Food in the Last Year:     [address] in the Last Year:    Transportation Needs:     Lack of Transportation (Medical):  Lack of Transportation (Non-Medical):    Physical Activity:     Days of Exercise per Week:     Minutes of Exercise per Session:    Stress:     Feeling of Stress :    Social Connections:     Frequency of Communication with Friends and Family:     Frequency of Social Gatherings with Friends and Family:     Attends [employer] Services:     Active Member of Clubs or Organizations:     Attends Club or Organization Meetings:     Marital Status:    Intimate Partner Violence:     Fear of Current or Ex-Partner:     Emotionally Abused:     Physically Abused:     Sexually Abused:      Family History   Problem Relation Age of Onset    Cancer Mother         \"Liver Cancer\"    Arthritis Mother     Depression Mother     Hearing Loss Mother     High Blood Pressure Mother     High Cholesterol Mother     Mental Illness Mother    Forest Turpin / Montez Eisenmenger Mother     Heart Disease Father     Early Death Father 36        \"Instant Death\"    High Blood Pressure Father     High Cholesterol Father     Coronary Art Dis Father         Massive MI    Heart Disease Sister     Depression Sister     Cancer Sister         \"Breast Cancer, Cancer Free Now\"    High Blood Pressure Sister     Other Daughter         \"She's Had Stomach Surgery\"    High Blood Pressure Son     High Blood Pressure Son     Early Death Paternal Grandfather        PHYSICAL EXAM    VITAL SIGNS: BP (!) 161/106   Pulse 61   Temp 98.1 °F (36.7 °C)   Resp 21   Ht 4' 11\" (1.499 m)   Wt 130 lb (59 kg)   SpO2 97%   BMI 26.26 kg/m²   Constitutional:  Well developed, well nourished. No distress  Eyes:  Sclera nonicteric, conjunctiva moist  HENT:  Atraumatic. PERRL.  EOMI. Moist mucus membranes. Neck/Lymphatics: supple, no JVD, no swollen nodes  Respiratory:  No retractions, no accessory muscle use, normal breath sounds   Cardiovascular:   normal rate, normal rhythm, no murmurs    GI:     No gross discoloration. Bowel sounds present, no audible bruits. Soft,  no distention, no guarding, no rigidity,   + epigastric abdominal tenderness, no rebound tenderness, no palpable pulsatile masses   No McBurney's point tenderness   Negative Rovsing sign    Negative Chatterjee's sign. Back:   No CVA tenderness to percussion.   Musculoskeletal:  No edema, no deformity  Vascular: DP pulses 2+ equal bilaterally  Integument: No rash, dry skin  Neurologic:  Alert & oriented, normal speech  Psychiatric: Cooperative, pleasant affect       LABS:  Results for orders placed or performed during the hospital encounter of 05/18/21   Urinalysis   Result Value Ref Range    Color, UA RED (A) YELLOW    Clarity, UA HAZY (A) CLEAR    Glucose, Urine NEGATIVE NEGATIVE MG/DL    Bilirubin Urine NEGATIVE NEGATIVE MG/DL    Ketones, Urine NEGATIVE NEGATIVE MG/DL    Specific Gravity, UA 1.006 1.001 - 1.035    Blood, Urine LARGE (A) NEGATIVE    pH, Urine 7.0 5.0 - 8.0    Protein,  (A) NEGATIVE MG/DL    Urobilinogen, Urine NEGATIVE 0.2 - 1.0 MG/DL    Nitrite Urine, Quantitative NEGATIVE NEGATIVE    Leukocyte Esterase, Urine MODERATE (A) NEGATIVE    RBC,  (H) 0 - 6 /HPF    WBC,  (H) 0 - 5 /HPF    Bacteria, UA MODERATE (A) NEGATIVE /HPF    WBC Clumps, UA MANY /HPF    Yeast, UA RARE /HPF    Squam Epithel, UA 1 /HPF    Trichomonas, UA NONE SEEN NONE SEEN /HPF   CBC auto diff   Result Value Ref Range    WBC 10.9 (H) 4.0 - 10.5 K/CU MM    RBC 4.64 4.2 - 5.4 M/CU MM    Hemoglobin 13.6 12.5 - 16.0 GM/DL    Hematocrit 41.7 37 - 47 %    MCV 89.9 78 - 100 FL    MCH 29.3 27 - 31 PG    MCHC 32.6 32.0 - 36.0 %    RDW 16.2 (H) 11.7 - 14.9 %    Platelets 308 (H) 095 - 440 K/CU MM    MPV 11.1 7.5 - 11.1 FL Differential Type AUTOMATED DIFFERENTIAL     Segs Relative 84.0 (H) 36 - 66 %    Lymphocytes % 5.9 (L) 24 - 44 %    Monocytes % 6.7 (H) 0 - 4 %    Eosinophils % 2.5 0 - 3 %    Basophils % 0.5 0 - 1 %    Segs Absolute 9.2 K/CU MM    Lymphocytes Absolute 0.6 K/CU MM    Monocytes Absolute 0.7 K/CU MM    Eosinophils Absolute 0.3 K/CU MM    Basophils Absolute 0.1 K/CU MM    Nucleated RBC % 0.0 %    Total Nucleated RBC 0.0 K/CU MM    Total Immature Neutrophil 0.04 K/CU MM    Immature Neutrophil % 0.4 0 - 0.43 %   CMP   Result Value Ref Range    Sodium 133 (L) 135 - 145 MMOL/L    Potassium 4.6 3.5 - 5.1 MMOL/L    Chloride 98 (L) 99 - 110 mMol/L    CO2 28 21 - 32 MMOL/L    BUN 13 6 - 23 MG/DL    CREATININE 0.6 0.6 - 1.1 MG/DL    Glucose 107 (H) 70 - 99 MG/DL    Calcium 10.1 8.3 - 10.6 MG/DL    Albumin 4.1 3.4 - 5.0 GM/DL    Total Protein 6.2 (L) 6.4 - 8.2 GM/DL    Total Bilirubin 1.3 (H) 0.0 - 1.0 MG/DL    ALT 21 10 - 40 U/L    AST 28 15 - 37 IU/L    Alkaline Phosphatase 127 40 - 129 IU/L    GFR Non-African American >60 >60 mL/min/1.73m2    GFR African American >60 >60 mL/min/1.73m2    Anion Gap 7 4 - 16   PT - INR   Result Value Ref Range    Protime 27.6 (H) 11.7 - 14.5 SECONDS    INR 2.26 INDEX           RADIOLOGY/PROCEDURES    CT ABDOMEN PELVIS W IV CONTRAST Additional Contrast? None   Preliminary Result   1. Gallbladder wall thickening measuring up to 10 mm likely related to   contracted gallbladder. Findings are otherwise nonspecific for acute   cholecystitis. 2. Otherwise, no acute abdominal or pelvic abnormality. 3. Colonic diverticulosis without acute diverticulitis. 4. Cardiomegaly with dilated right atrium. There is reflux of contrast into   the IVC and hepatic veins which can be seen in the setting of right heart   failure. ED COURSE & MEDICAL DECISION MAKING       Vital signs and nursing notes reviewed during ED course. I have independently evaluated this patient .   Supervising MD present in the Emergency Department, available for consultation, throughout entirety of  patient care. Patient presents as above. She is hypertensive with blood pressure 141/66, afebrile, not tachycardic, oxygenating well on room air. Overall comfortable, very well-appearing on initial evaluation. Very mild discomfort to palpation over epigastrium. Pain medication offered, however declined by patient. We will plan obtaining labs including INR and imaging of abdomen and pelvis. Lab work with normal hemoglobin of 13.6. INR therapeutic at 2.26. T bili slightly elevated at 1.3. Urinalysis with moderate bacteria, 131 white blood cells and 206 red blood cells. CT imaging with gallbladder wall thickening likely related to contracted gallbladder. Findings are otherwise nonspecific for acute cholecystitis. No acute abdominal or pelvic abnormality. Colonic diverticulosis without diverticulitis noted cardiomegaly with dilated right atrium noted. Subsequent evaluation, patient states that she has had a bowel movement here in the emergency department without any associated bleeding. States that she is also not noticed any blood in her urine here. She is resting comfortably, denies any pain or current symptoms. Above labs and imaging discussed with patient. Does look like that there is infection and urine so we will treat with antibiotics. Discussed that she will need GI follow-up for further evaluation if rectal bleeding recurs, she has seen Dr. Carmelita Vernon in the past to have colonoscopy. As she remains hemodynamically stable here, believe she is appropriate for further outpatient evaluation and treatment with her primary care provider, Dr. Dania Erazo and GI provider. Discussed immediately returning here with any new or worsening symptoms. We will start patient on Keflex. Diagnosis, disposition, and treatment plan reviewed with patient and/or family who understands and agrees.  Patient understands and agrees to follow up with PCP in 2 days. Patient understands and agrees to return to the emergency department for any new or worsening symptoms. Clinical  IMPRESSION    1. Hematuria, unspecified type    2. Bacteriuria        Disposition referral (if applicable):  Mirella Reinoso DO  [address]  [address]  [phone]    Schedule an appointment as soon as possible for a visit in 2 days  For recheck of symptoms treated for today    [facility] Emergency Department  [address] [facility] [phone]  Go to   If symptoms worsen    MD Jalyn [address]  [phone]    Schedule an appointment as soon as possible for a visit   for GI follow up      Disposition medications (if applicable):  New Prescriptions    CEPHALEXIN (KEFLEX) 500 MG CAPSULE    Take 1 capsule by mouth 3 times daily for 7 days         Comment: Please note this report has been produced using speech recognition software and may contain errors related to that system including errors in grammar, punctuation, and spelling, as well as words and phrases that may be inappropriate. If there are any questions or concerns please feel free to contact the dictating provider for clarification.         MARY Page  05/18/21 [id number]

## 2021-05-18 NOTE — TELEPHONE ENCOUNTER
Patient's family called she was taken to Ed   For some blood in her urine and had a CT  Done that showed some cardiac concerns   Please call Son Carin Jian

## 2021-05-18 NOTE — TELEPHONE ENCOUNTER
PATIENT WAS IN ED THIS MORNING FOR HEMATURIA AND DIVERTCULLOSIS. WHAT IS THE NEXT STEP?   MAKING APPT    [facility] [employer]   [phone]

## 2021-05-18 NOTE — ED NOTES
Pt ambulated to bathroom with gait steady.  Clean catch urine specimen obtained and sent to lab     Marcia Glaser RN  05/18/21 1141

## 2021-06-14 ENCOUNTER — PROCEDURE VISIT (OUTPATIENT)
Dept: CARDIOLOGY CLINIC | Age: 86
End: 2021-06-14
Payer: MEDICARE

## 2021-06-14 DIAGNOSIS — Z95.0 CARDIAC PACEMAKER IN SITU: Primary | ICD-10-CM

## 2021-06-14 DIAGNOSIS — I48.21 PERMANENT ATRIAL FIBRILLATION (HCC): ICD-10-CM

## 2021-06-14 PROCEDURE — 93294 REM INTERROG EVL PM/LDLS PM: CPT | Performed by: INTERNAL MEDICINE

## 2021-06-14 PROCEDURE — 93296 REM INTERROG EVL PM/IDS: CPT | Performed by: INTERNAL MEDICINE

## 2021-06-21 ENCOUNTER — OFFICE VISIT (OUTPATIENT)
Dept: GASTROENTEROLOGY | Age: 86
End: 2021-06-21
Payer: MEDICARE

## 2021-06-21 VITALS
BODY MASS INDEX: 26.61 KG/M2 | WEIGHT: 132 LBS | OXYGEN SATURATION: 94 % | HEART RATE: 71 BPM | HEIGHT: 59 IN | SYSTOLIC BLOOD PRESSURE: 138 MMHG | DIASTOLIC BLOOD PRESSURE: 68 MMHG | TEMPERATURE: 97.3 F

## 2021-06-21 DIAGNOSIS — K62.5 RECTAL BLEEDING: Primary | ICD-10-CM

## 2021-06-21 PROCEDURE — G8427 DOCREV CUR MEDS BY ELIG CLIN: HCPCS | Performed by: SPECIALIST

## 2021-06-21 PROCEDURE — 4040F PNEUMOC VAC/ADMIN/RCVD: CPT | Performed by: SPECIALIST

## 2021-06-21 PROCEDURE — G8417 CALC BMI ABV UP PARAM F/U: HCPCS | Performed by: SPECIALIST

## 2021-06-21 PROCEDURE — 1036F TOBACCO NON-USER: CPT | Performed by: SPECIALIST

## 2021-06-21 PROCEDURE — 99213 OFFICE O/P EST LOW 20 MIN: CPT | Performed by: SPECIALIST

## 2021-06-21 PROCEDURE — 1090F PRES/ABSN URINE INCON ASSESS: CPT | Performed by: SPECIALIST

## 2021-06-21 PROCEDURE — 1123F ACP DISCUSS/DSCN MKR DOCD: CPT | Performed by: SPECIALIST

## 2021-06-21 NOTE — PROGRESS NOTES
Gastroenterology Consult Note  Chula Espinalchjanelle. Yolande RHODES      Reason for Consult:  Rectal bleeding  Primary Care Physician:  Consuelo Rodriguez DO      History Obtained From:  patient    HISTORY OF PRESENT ILLNESS:          Was in ED recently with a UTI and small amount of rectal bleeding. Has frequent small but formed BM's in the morning- average of 3 per day in the mornings- sometimes up to 5. Has some RLQ and LUQ abd pain for years . Last colonoscopy was more than 10 years ago. No nausea, vomiting, weight loss. Past Medical History:        Diagnosis Date    Arthritis     Bradycardia 2001    requiring dual chamber pacemaker at [facility] CAD (coronary artery disease)     Cardiac pacemaker 10/2001    [employer] #[id number]  PPM- Serial # 26-[facility]- Dr Maris Villafana CHF (congestive heart failure) ([facility] [address]     COPD, mild ([address] 2/17/2017    CVA (cerebrovascular accident) ([address]     DJD (degenerative joint disease) of cervical spine     C5-C6, C6-C7    Exhaustion of cardiac pacemaker battery 11/21/2011    PPM battery replacement- Medtronic    Family history of cardiovascular disease     Glaucoma Dx 2010    H/O 24 hour EKG monitoring 8/6/2000 8/6/2000- Intermittent episonde of a-fib/flutter    H/O cardiac catheterization 4/20/2010, 2/1989 4/20/2010-Severe native vessel disease and has graft disease as well. LAD, CX totally occluded. RCA totally occluded in proximal segment. VG to RCA widely patent. PDA 90% LAD afer LIMA anastomosis 80-90% stenosis. Proceeded with PTCA with stent next day.  H/O cardiovascular stress test 10/14/2011, 6/2/2010,4/8/2010, 5/18/2009,4/6/2009, 10/30/2007, 11/12/2004, 11/6/2003, 8/9/2002, 8/9/2001,6/5/2000,     10/14/2011-Lexiscan-Abnormal Myocardial Perfusion study. Evidence of mild ischemia in the Left CX region. Abnomal study. Rest EF 63%. Global LV systolic function normal. No ECG changes.  Unremarkable pharmacological stress test.    H/O cardiovascular stress test 6/10/2013    thallium--mild ischemia left circumflex EF63% no change from 10/2011 study.  H/O cardiovascular stress test 10/16/2014    cardiolite-mild ischemia left circumflex,EF70%    H/O chest x-ray 4/19/2009 4/19/2009-Stable cardiomegaly. No acute cardiopulmonary disease.  H/O Doppler lower venous ultrasound 09/18/2019    Significant reflux noted in RGSV, RGSV is extremely tortuous and small along the thigh and calf and would be highly unlikely to be accessed, RSSV is non compressible and has occlusive chronic SVT, LSSV is non compressible with occlusive chronic SVT, LGSV removed s/p CABG, Significant reflux in LGSV tributary,    H/O Doppler ultrasound 3/31/2010    CAROTID- 3/31/2010-INtimal thickening but no significant atherosclerotic plaque noted in ANA PAULA. Doppler flow velocities within ANA PAULA are WNL. Heterogeneous, irregular atherosclerotic plaque noted in LICA. Doppler flow velocities within the LICA are elevated, consistent with a mild, less than 50% stenosis.  H/O Doppler ultrasound 5/24/2016    Carotid- normal study    H/O Doppler ultrasound 09/06/2017    carotid - normal study    H/O Doppler ultrasound     H/O echocardiogram 10/13    EF=60%, Severe Pulm. HTN, & Sclerotic aortic valve w/stenosis.  H/O echocardiogram 10/16/14     EF 55-60% Normal LV. Normal LV systolic function. Severe tricuspid insufficiency with severe hypertension.  H/O echocardiogram 02/03/2017    heart cath performed this morning    H/O echocardiogram 09/18/2019    EF 50-55%, Left atrium is mild to moderately dilated, right atrium is severely dilated, mildly dilated right ventricle, Mod MR, Severe TR, Severe Pulm HTN, no pericardial effusion     History of complete ECG     10/14/2011(Lexiscan);5/6/2010, 4/30/2009,10/24/2008,9/21/2007, 10/13/2006    History of nuclear stress test 11/17/2016    lexiscan-normal,EF70%    Hx of cardiovascular stress test 12/28/2018    EF 60%  Normal study.    [address] [place] [address] 05/01/2017    MUGA-normal, EF53%    Hyperlipidemia     Hypertension     Mild intermittent asthma 7/28/2016    Nausea & vomiting     Obstructive sleep apnea 5/16/2017    Paroxysmal atrial fibrillation (HCC)     Post PTCA 4/21/2010    PTCA with 2.25 stent of the LIMA to LAD    Pulmonary HTN ([facility] [address]     Severe per last echo on 10/13.  PVD (peripheral vascular disease) ([facility] [address]     S/P CABG x 3 4/8/2009    LIMA->Diag,  LIMA to LAD;  SVG->RCA going to the PDA. Followed by MAZE procedure by pulmonary vein isolation.-  Dr Som Shultz S/P PTCA (percutaneous transluminal coronary angioplasty) 11/2012    PTCA with stent to RCA    Thyroid disease     hypothyroi    Unspecified cerebral artery occlusion with cerebral infarction Unsure When    No Residual       Past Surgical History:        Procedure Laterality Date    APPENDECTOMY  1941    CARDIAC SURGERY  4/09    CABG (3 Bypasses), One Heart Stent in 2010    COLONOSCOPY  In 2000's    X1    CORONARY ANGIOPLASTY WITH STENT PLACEMENT  4/21/2010    PTCA with stent LIMA ->LAD    CORONARY ARTERY BYPASS GRAFT  4/8/2009    LIMA->Diag,  LIMA -> LAD;  SVG->RCA going to the PDA. Followed by MAZE procedure by pulmonary vein isolation.-  Dr Pete Smith, TOTAL ABDOMINAL  1990's    MIDDLE EAR SURGERY      OTHER SURGICAL HISTORY      Ear surgery-hearing    PACEMAKER PLACEMENT      battery change 11/21/2011 Medtronic    PTCA  11/2012    Ptca with stent to RCA    TONSILLECTOMY  1950's       Medications:   Prior to Admission medications    Medication Sig Start Date End Date Taking? Authorizing Provider   diphenhydrAMINE-zinc acetate (BENADRYL ITCH STOPPING) 1-0.1 % cream Apply topically 3 times daily as needed.  5/14/21  Yes Heath Noel MD   furosemide (LASIX) 20 MG tablet Take 20 mg by mouth 2 times daily   Yes Historical Provider, MD   Probiotic Product (PROBIOTIC-10) CHEW Take by mouth   Yes Historical Provider, MD   spironolactone (ALDACTONE) 25 MG tablet Take 1 tablet by mouth daily 1/28/21  Yes Maryjane Dewitt MD   potassium chloride (KLOR-CON) 10 MEQ extended release tablet Take 1 tablet by mouth daily 12/9/20  Yes Maryjane Dewitt MD   atorvastatin (LIPITOR) 10 MG tablet Take 10 mg by mouth daily 10/20/20  Yes Historical Provider, MD   umeclidinium-vilanterol (ANORO ELLIPTA) 62.5-25 MCG/INH AEPB inhaler Inhale 1 puff into the lungs daily 7/27/20  Yes Ladarius Boles MD   warfarin (COUMADIN) 5 MG tablet TAKE 1 AND 1/2 TABLET BY MOUTH DAILY 7/8/20  Yes Maryjane Dewitt MD   CPAP Machine MISC by Does not apply route   Yes Historical Provider, MD   triamterene-hydrochlorothiazide (MAXZIDE-25) 37.5-25 MG per tablet TAKE ONE TABLET BY MOUTH DAILY 5/9/19  Yes Maryjane Dewitt MD   Biotin (BIOTIN 5000) 5 MG CAPS Take 1 capsule by mouth daily   Yes Historical Provider, MD   Misc Natural Products (OSTEO BI-FLEX ADV DOUBLE ST PO) Take 1 tablet by mouth daily   Yes Historical Provider, MD   Tafluprost (Sharyle Rena OP) Apply to eye   Yes Historical Provider, MD   timolol (TIMOPTIC-XE) 0.25 % ophthalmic gel-forming 1 drop daily   Yes Historical Provider, MD   carvedilol (COREG) 6.25 MG tablet Take 1 tablet by mouth 2 times daily (with meals) 1/31/17  Yes Maryjane Dewitt MD   albuterol (PROVENTIL HFA;VENTOLIN HFA) 108 (90 BASE) MCG/ACT inhaler Inhale 2 puffs into the lungs 2 times daily AND EVERY 4-6 HOURS AS NEEDED   Yes Historical Provider, MD   Multiple Vitamins-Minerals (ICAPS) CAPS Take 1 capsule by mouth daily. Yes Historical Provider, MD   loratadine (CLARITIN) 10 MG tablet Take 10 mg by mouth as needed. Yes Historical Provider, MD   vitamin B-12 (CYANOCOBALAMIN) 1000 MCG tablet Take 1,000 mcg by mouth daily. Yes Historical Provider, MD   levothyroxine (SYNTHROID) 88 MCG tablet Take 88 mcg by mouth daily. Yes Historical Provider, MD   aspirin 81 MG chewable tablet Take 81 mg by mouth daily. Yes Historical Provider, MD       Allergies:     Allergies   Allergen Reactions    Darvocet [Propoxyphene N-Acetaminophen]     Ranexa [Ranolazine Er]      Sick to her stomach    Ranolazine      Sick to her stomach    Sulfa Antibiotics Itching    Sulfasalazine Itching    Adhesive Tape Rash    Allantoin Rash    Bacitracin Rash    Gramicidin Rash    Neomycin Rash    Polymyxin B Rash    Pramoxine Hcl Rash    Silicone Rash   .     Family History:   Family History   Problem Relation Age of Onset   [employer] Cancer Mother         \"Liver Cancer\"    Arthritis Mother     Depression Mother     Hearing Loss Mother     High Blood Pressure Mother     High Cholesterol Mother     Mental Illness Mother    [de-identified] / Eual Bold Mother     Heart Disease Father     Early Death Father 36        \"Instant Death\"   [employer] High Blood Pressure Father     High Cholesterol Father     Coronary Art Dis Father         Massive MI    Heart Disease Sister     Depression Sister     Cancer Sister         \"Breast Cancer, Cancer Free Now\"    High Blood Pressure Sister     Other Daughter         \"She's Had Stomach Surgery\"    High Blood Pressure Son     High Blood Pressure Son     Early Death Paternal Grandfather        ROS: see written ROS sheet in soft chart    PHYSICAL EXAM:    Vitals:  /68   Pulse 71   Temp 97.3 °F (36.3 °C)   Ht 4' 11\" (1.499 m)   Wt 132 lb (59.9 kg)   SpO2 94%   BMI 26.66 kg/m²   CONSTITUTIONAL: alert, cooperative, no apparent distress,   EYES:  pupils equal, round and reactive to light and sclera clear  ENT:  normocepalic, without obvious abnormality  NECK:  supple, symmetrical, trachea midline  HEMATOLOGIC/LYMPHATICS:  no cervical lymphadenopathy and no supraclavicular lymphadenopathy  LUNGS:  clear to auscultation  CARDIOVASCULAR:  regular rate and rhythm and no murmur noted  ABDOMEN:  normal bowel sounds, soft, non-distended, non-tender and no masses palpated, no hepatosplenomegally  NEUROLOGIC: no focal deficit detected  SKIN:  no lesions  EXTREMITIES: no clubbing,

## 2021-06-28 ENCOUNTER — TELEPHONE (OUTPATIENT)
Dept: PULMONOLOGY | Age: 86
End: 2021-06-28

## 2021-06-28 RX ORDER — UMECLIDINIUM BROMIDE AND VILANTEROL TRIFENATATE 62.5; 25 UG/1; UG/1
POWDER RESPIRATORY (INHALATION)
Refills: 11 | OUTPATIENT
Start: 2021-06-28

## 2021-08-25 ENCOUNTER — OFFICE VISIT (OUTPATIENT)
Dept: CARDIOLOGY CLINIC | Age: 86
End: 2021-08-25
Payer: MEDICARE

## 2021-08-25 VITALS
WEIGHT: 128 LBS | DIASTOLIC BLOOD PRESSURE: 76 MMHG | HEIGHT: 59 IN | SYSTOLIC BLOOD PRESSURE: 118 MMHG | BODY MASS INDEX: 25.8 KG/M2 | HEART RATE: 73 BPM

## 2021-08-25 DIAGNOSIS — Z95.1 S/P CABG X 3: Primary | ICD-10-CM

## 2021-08-25 PROCEDURE — G8427 DOCREV CUR MEDS BY ELIG CLIN: HCPCS | Performed by: INTERNAL MEDICINE

## 2021-08-25 PROCEDURE — 4040F PNEUMOC VAC/ADMIN/RCVD: CPT | Performed by: INTERNAL MEDICINE

## 2021-08-25 PROCEDURE — 1090F PRES/ABSN URINE INCON ASSESS: CPT | Performed by: INTERNAL MEDICINE

## 2021-08-25 PROCEDURE — 99214 OFFICE O/P EST MOD 30 MIN: CPT | Performed by: INTERNAL MEDICINE

## 2021-08-25 PROCEDURE — 1036F TOBACCO NON-USER: CPT | Performed by: INTERNAL MEDICINE

## 2021-08-25 PROCEDURE — G8417 CALC BMI ABV UP PARAM F/U: HCPCS | Performed by: INTERNAL MEDICINE

## 2021-08-25 PROCEDURE — 1123F ACP DISCUSS/DSCN MKR DOCD: CPT | Performed by: INTERNAL MEDICINE

## 2021-08-25 NOTE — PATIENT INSTRUCTIONS
Please be informed that if you contact our office outside of normal business hours the physician on call cannot help with any scheduling or rescheduling issues, procedure instruction questions or any type of medication issue. We advise you for any urgent/emergency that you go to the nearest emergency room! PLEASE CALL OUR OFFICE DURING NORMAL BUSINESS HOURS    Monday - Friday   8 am to 5 pm    [place]: [address]: [phone]    [place]:  [phone]    **It is YOUR responsibilty to bring medication bottles and/or updated medication list to [address] [place] [address].  This will allow us to better serve you and all your healthcare needs**

## 2021-08-25 NOTE — PROGRESS NOTES
CARDIOLOGY NOTE      8/25/2021    RE: Kobi Kebede  (3/18/1929)                               TO:  Dr. Sandy Valdez, DO            CHIEF Kimberly Torres is a 80 y.o. female who was seen today for management of  cad                                    HPI:                   The patient does not have cardiac complaints  Patient also seen  for    - Coronary artery disease, does not have chest pain. Patient is  compliant with prescribed medicines. ,htna  - Hyperlipidimea, importance of hyperlipidimea discussed with pt. '  - Atrial fibrillation, pt is  compliant with meds. Patient does not have symptoms from atrial fibrillation  - PPM    Kobi Kebede has the following history recorded in care path:  Patient Active Problem List    Diagnosis Date Noted    Infection of pacemaker pocket ([address] 04/29/2021    Pacer at end of battery life 11/18/2020    PAF (paroxysmal atrial fibrillation) ([address] 05/10/2019    Dizziness 08/30/2017    Obstructive sleep apnea 05/16/2017    COPD, mild ([address] 02/17/2017    Coronary artery disease involving coronary bypass graft of native heart without angina pectoris     SOBOE (shortness of breath on exertion) 01/31/2017    Bradycardia 08/11/2016    Hypertension 09/30/2013    TIA (transient ischemic attack) 09/29/2013    Confusion 09/29/2013    Headache 09/29/2013    CAD (coronary artery disease)     Pulmonary HTN ([address]     Post PTCA 04/21/2010    S/P CABG x 3 04/08/2009    Cardiac pacemaker 10/01/2001     Current Outpatient Medications   Medication Sig Dispense Refill    diphenhydrAMINE-zinc acetate (BENADRYL ITCH STOPPING) 1-0.1 % cream Apply topically 3 times daily as needed.  1 Tube 0    furosemide (LASIX) 20 MG tablet Take 20 mg by mouth 2 times daily       Probiotic Product (PROBIOTIC-10) CHEW Take by mouth      atorvastatin (LIPITOR) 10 MG tablet Take 10 mg by mouth daily       umeclidinium-vilanterol (ANORO ELLIPTA) 62.5-25 MCG/INH AEPB inhaler 10/2001    [employer] #[id number]  PPM- Serial # 26-[facility]- Dr Luis Russell CHF (congestive heart failure) ([facility])     COPD, mild ([facility] [address] 2/17/2017    CVA (cerebrovascular accident) ([facility] [address]     DJD (degenerative joint disease) of cervical spine     C5-C6, C6-C7    Exhaustion of cardiac pacemaker battery 11/21/2011    PPM battery replacement- Medtronic    Family history of cardiovascular disease     Glaucoma Dx 2010    H/O 24 hour EKG monitoring 8/6/2000 8/6/2000- Intermittent episonde of a-fib/flutter    H/O cardiac catheterization 4/20/2010, 2/1989 4/20/2010-Severe native vessel disease and has graft disease as well. LAD, CX totally occluded. RCA totally occluded in proximal segment. VG to RCA widely patent. PDA 90% LAD afer LIMA anastomosis 80-90% stenosis. Proceeded with PTCA with stent next day.  H/O cardiovascular stress test 10/14/2011, 6/2/2010,4/8/2010, 5/18/2009,4/6/2009, 10/30/2007, 11/12/2004, 11/6/2003, 8/9/2002, 8/9/2001,6/5/2000,     10/14/2011-Lexiscan-Abnormal Myocardial Perfusion study. Evidence of mild ischemia in the Left CX region. Abnomal study. Rest EF 63%. Global LV systolic function normal. No ECG changes. Unremarkable pharmacological stress test.    H/O cardiovascular stress test 6/10/2013    thallium--mild ischemia left circumflex EF63% no change from 10/2011 study.  H/O cardiovascular stress test 10/16/2014    cardiolite-mild ischemia left circumflex,EF70%    H/O chest x-ray 4/19/2009 4/19/2009-Stable cardiomegaly. No acute cardiopulmonary disease.     H/O Doppler lower venous ultrasound 09/18/2019    Significant reflux noted in RGSV, RGSV is extremely tortuous and small along the thigh and calf and would be highly unlikely to be accessed, RSSV is non compressible and has occlusive chronic SVT, LSSV is non compressible with occlusive chronic SVT, LGSV removed s/p CABG, Significant reflux in LGSV tributary,    H/O Doppler ultrasound 3/31/2010    CAROTID- 3/31/2010-INtimal thickening but no significant atherosclerotic plaque noted in ANA PAULA. Doppler flow velocities within ANA PAULA are WNL. Heterogeneous, irregular atherosclerotic plaque noted in LICA. Doppler flow velocities within the LICA are elevated, consistent with a mild, less than 50% stenosis.  H/O Doppler ultrasound 5/24/2016    Carotid- normal study    H/O Doppler ultrasound 09/06/2017    carotid - normal study    H/O Doppler ultrasound     H/O echocardiogram 10/13    EF=60%, Severe Pulm. HTN, & Sclerotic aortic valve w/stenosis.  H/O echocardiogram 10/16/14     EF 55-60% Normal LV. Normal LV systolic function. Severe tricuspid insufficiency with severe hypertension.  H/O echocardiogram 02/03/2017    heart cath performed this morning    H/O echocardiogram 09/18/2019    EF 50-55%, Left atrium is mild to moderately dilated, right atrium is severely dilated, mildly dilated right ventricle, Mod MR, Severe TR, Severe Pulm HTN, no pericardial effusion     History of complete ECG     10/14/2011(Lexiscan);5/6/2010, 4/30/2009,10/24/2008,9/21/2007, 10/13/2006    History of nuclear stress test 11/17/2016    lexiscan-normal,EF70%    Hx of cardiovascular stress test 12/28/2018    EF 60%  Normal study.  HX OTHER MEDICAL 05/01/2017    MUGA-normal, EF53%    Hyperlipidemia     Hypertension     Mild intermittent asthma 7/28/2016    Nausea & vomiting     Obstructive sleep apnea 5/16/2017    Paroxysmal atrial fibrillation (HCC)     Post PTCA 4/21/2010    PTCA with 2.25 stent of the LIMA to LAD    Pulmonary HTN ([address]     Severe per last echo on 10/13. 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  Followed by MAZE procedure by pulmonary vein isolation.-  Dr Connie Hankins S/P PTCA (percutaneous transluminal coronary angioplasty) 11/2012    PTCA with stent to RCA    Thyroid disease     hypothyroi    Unspecified cerebral artery occlusion with cerebral infarction Unsure When    No Residual     Past Surgical History:   Procedure Laterality Date    APPENDECTOMY  80    CARDIAC SURGERY      CABG (3 Bypasses), One Heart Stent in     COLONOSCOPY  In     X1    CORONARY ANGIOPLASTY WITH STENT PLACEMENT  2010    PTCA with stent LIMA ->LAD    CORONARY ARTERY BYPASS GRAFT  2009    LIMA->Diag,  LIMA -> LAD;  SVG->RCA going to the PDA.  Followed by MAZE procedure by pulmonary vein isolation.-  Dr Sánchez Hernandez, TOTAL ABDOMINAL      MIDDLE EAR SURGERY      OTHER SURGICAL HISTORY      Ear surgery-hearing    PACEMAKER PLACEMENT      battery change 2011 Medtronic    PTCA  2012    Ptca with stent to RCA    TONSILLECTOMY  's      As reviewed   Family History   Problem Relation Age of Onset    Cancer Mother         \"Liver Cancer\"    Arthritis Mother     Depression Mother     Hearing Loss Mother     High Blood Pressure Mother     High Cholesterol Mother     Mental Illness Mother    [de-identified] / Stillbirths Mother     Heart Disease Father     Early Death Father 36        \"Instant Death\"    High Blood Pressure Father     High Cholesterol Father     Coronary Art Dis Father         Massive MI    Heart Disease Sister     Depression Sister     Cancer Sister         \"Breast Cancer, Cancer Free Now\"    High Blood Pressure Sister     Other Daughter         \"She's Had Stomach Surgery\"    High Blood Pressure Son     High Blood Pressure Son     Early Death Paternal Grandfather      Social History     Tobacco Use    Smoking status: Former Smoker     Packs/day: 0.25     Years: 5.00     Pack years: 1.25     Types: Cigarettes     Quit date: 1970     Years since quittin.7    Smokeless tobacco: Never Used   Substance Use Topics    Alcohol use: Not Currently      Review of Systems:    Constitutional: Negative for diaphoresis and fatigue  Psychological:Negative for anxiety or depression  HENT: Negative for headaches, nasal congestion, sinus pain or vertigo  Eyes: Negative for visual disturbance. Endocrine: Negative for polydipsia/polyuria  Respiratory: Negative for shortness of breath  Cardiovascular: Negative for chest pain, dyspnea on exertion, claudication, edema, irregular heartbeat, murmur, palpitations or shortness of breath  Gastrointestinal: Negative for abdominal pain or heartburn  Genito-Urinary: Negative for urinary frequency/urgency  Musculoskeletal: Negative for muscle pain, muscular weakness, negative for pain in arm and leg or swelling in foot and leg  Neurological: Negative for dizziness, headaches, memory loss, numbness/tingling, visual changes, syncope  Dermatological: Negative for rash    Objective:    Vitals:    08/25/21 1507   BP: 118/76   Pulse: 73   Weight: 128 lb (58.1 kg)   Height: 4' 11\" (1.499 m)     /76   Pulse 73   Ht 4' 11\" (1.499 m)   Wt 128 lb (58.1 kg)   BMI 25.85 kg/m²     No flowsheet data found. Wt Readings from Last 3 Encounters:   08/25/21 128 lb (58.1 kg)   06/21/21 132 lb (59.9 kg)   05/18/21 130 lb (59 kg)     Body mass index is 25.85 kg/m². GENERAL - Alert, oriented, pleasant, in no apparent distress. EYES: No jaundice, no conjunctival pallor. SKIN: It is warm & dry. No rashes. No Echhymosis    HEENT  No clinically significant abnormalities seen. Neck - Supple. No jugular venous distention noted. No carotid bruits. Cardiovascular  Normal S1 and S2 without obvious murmur or gallop. Extremities - No cyanosis, clubbing, or significant edema. Pulmonary  No respiratory distress. No wheezes or rales. Abdomen  No masses, tenderness, or organomegaly. Musculoskeletal  No significant edema. No joint deformities. No muscle wasting. Neurologic  Cranial nerves II through XII are grossly intact. There were no gross focal neurologic abnormalities.     Lab Review   Lab Results   Component Value Date    CKTOTAL 43 09/28/2013     BNP:    Lab Results   Component Value Date    BNP 90 09/28/2013     PT/INR:    Lab Results   Component Value Date    INR 2.26 05/18/2021     No results found for: LABA1C  Lab Results   Component Value Date    WBC 10.9 (H) 05/18/2021    HCT 41.7 05/18/2021    MCV 89.9 05/18/2021     (H) 05/18/2021     Lab Results   Component Value Date    CHOL 171 10/29/2018    TRIG 82 10/29/2018    HDL 49 10/29/2018    LDLCALC 100 07/05/2017    LDLDIRECT 116 (H) 10/29/2018     Lab Results   Component Value Date    ALT 21 05/18/2021    AST 28 05/18/2021     BMP:    Lab Results   Component Value Date     05/18/2021    K 4.6 05/18/2021    CL 98 05/18/2021    CO2 28 05/18/2021    BUN 13 05/18/2021    CREATININE 0.6 05/18/2021     CMP:   Lab Results   Component Value Date     05/18/2021    K 4.6 05/18/2021    CL 98 05/18/2021    CO2 28 05/18/2021    BUN 13 05/18/2021    PROT 6.2 05/18/2021    PROT 6.8 11/28/2012     TSH:    Lab Results   Component Value Date    TSH 1.0 05/07/2010    TSHHS 0.122 04/02/2018           Assessment & Plan:               -     CORONARY ARTERY DISEASE:  asymptomatic     All available  tests in chart reviewed. Management discussed . Testing ordered  no                  On asa               -  Hypertension: Patients blood pressure is normal. Patient is advised about low sodium diet. Present medical regimen will not be changed. on maxzide    -  LIPID MANAGEMENT:  Importance of lipid levels discussed with patient   and patient was given dietary advice. NCEP- ATP III guidelines reviewed with patient. -   Changes  in medicines made: No                     On lipitor      - PPM:   On carelink              - Atrial fibrillation, pt is  compliant with meds.  Patient does not have symptoms from atrial fibrillation     - SAULO on CPAP     - has a daughter and son has cancer            Rosey Thompson MD    [facility]

## 2021-09-20 ENCOUNTER — PROCEDURE VISIT (OUTPATIENT)
Dept: CARDIOLOGY CLINIC | Age: 86
End: 2021-09-20
Payer: MEDICARE

## 2021-09-20 DIAGNOSIS — I48.21 PERMANENT ATRIAL FIBRILLATION (HCC): ICD-10-CM

## 2021-09-20 DIAGNOSIS — Z95.0 CARDIAC PACEMAKER IN SITU: Primary | ICD-10-CM

## 2021-09-20 PROCEDURE — 93294 REM INTERROG EVL PM/LDLS PM: CPT | Performed by: INTERNAL MEDICINE

## 2021-09-20 PROCEDURE — 93296 REM INTERROG EVL PM/IDS: CPT | Performed by: INTERNAL MEDICINE

## 2021-10-07 ENCOUNTER — APPOINTMENT (OUTPATIENT)
Dept: GENERAL RADIOLOGY | Age: 86
DRG: 640 | End: 2021-10-07
Payer: MEDICARE

## 2021-10-07 ENCOUNTER — HOSPITAL ENCOUNTER (INPATIENT)
Age: 86
LOS: 11 days | Discharge: NURSING, REHAB OR LONG-TERM CARE | DRG: 640 | End: 2021-10-18
Attending: EMERGENCY MEDICINE | Admitting: INTERNAL MEDICINE
Payer: MEDICARE

## 2021-10-07 ENCOUNTER — APPOINTMENT (OUTPATIENT)
Dept: CT IMAGING | Age: 86
DRG: 640 | End: 2021-10-07
Payer: MEDICARE

## 2021-10-07 DIAGNOSIS — E87.1 HYPONATREMIA: Primary | ICD-10-CM

## 2021-10-07 DIAGNOSIS — R79.1 ELEVATED INR: ICD-10-CM

## 2021-10-07 LAB
ALBUMIN SERPL-MCNC: 4 GM/DL (ref 3.4–5)
ALP BLD-CCNC: 164 IU/L (ref 40–128)
ALT SERPL-CCNC: 18 U/L (ref 10–40)
AMMONIA: 30 UMOL/L (ref 11–51)
ANION GAP SERPL CALCULATED.3IONS-SCNC: 11 MMOL/L (ref 4–16)
AST SERPL-CCNC: 24 IU/L (ref 15–37)
BASE EXCESS: 3 (ref 0–2.4)
BASOPHILS ABSOLUTE: 0.1 K/CU MM
BASOPHILS RELATIVE PERCENT: 0.5 % (ref 0–1)
BILIRUB SERPL-MCNC: 1.7 MG/DL (ref 0–1)
BUN BLDV-MCNC: 19 MG/DL (ref 6–23)
CALCIUM SERPL-MCNC: 9.6 MG/DL (ref 8.3–10.6)
CHLORIDE BLD-SCNC: 87 MMOL/L (ref 99–110)
CO2: 21 MMOL/L (ref 21–32)
COMMENT: ABNORMAL
CREAT SERPL-MCNC: 0.8 MG/DL (ref 0.6–1.1)
DIFFERENTIAL TYPE: ABNORMAL
EOSINOPHILS ABSOLUTE: 0.6 K/CU MM
EOSINOPHILS RELATIVE PERCENT: 4.5 % (ref 0–3)
GFR AFRICAN AMERICAN: >60 ML/MIN/1.73M2
GFR NON-AFRICAN AMERICAN: >60 ML/MIN/1.73M2
GLUCOSE BLD-MCNC: 132 MG/DL (ref 70–99)
HCO3 VENOUS: 24.2 MMOL/L (ref 19–25)
HCT VFR BLD CALC: 43.5 % (ref 37–47)
HEMOGLOBIN: 13.5 GM/DL (ref 12.5–16)
IMMATURE NEUTROPHIL %: 1.4 % (ref 0–0.43)
INR BLD: 4.92 INDEX
LYMPHOCYTES ABSOLUTE: 0.4 K/CU MM
LYMPHOCYTES RELATIVE PERCENT: 3 % (ref 24–44)
MCH RBC QN AUTO: 27.4 PG (ref 27–31)
MCHC RBC AUTO-ENTMCNC: 31 % (ref 32–36)
MCV RBC AUTO: 88.4 FL (ref 78–100)
MONOCYTES ABSOLUTE: 1 K/CU MM
MONOCYTES RELATIVE PERCENT: 7.2 % (ref 0–4)
NUCLEATED RBC %: 0 %
O2 SAT, VEN: 71 % (ref 50–70)
PCO2, VEN: 48 MMHG (ref 38–52)
PDW BLD-RTO: 16.4 % (ref 11.7–14.9)
PH VENOUS: 7.31 (ref 7.32–7.42)
PLATELET # BLD: 434 K/CU MM (ref 140–440)
PMV BLD AUTO: 11.1 FL (ref 7.5–11.1)
PO2, VEN: 41 MMHG (ref 28–48)
POTASSIUM SERPL-SCNC: 4.8 MMOL/L (ref 3.5–5.1)
PROTHROMBIN TIME: 64.5 SECONDS (ref 11.7–14.5)
RBC # BLD: 4.92 M/CU MM (ref 4.2–5.4)
SARS-COV-2, NAAT: NOT DETECTED
SEGMENTED NEUTROPHILS ABSOLUTE COUNT: 11 K/CU MM
SEGMENTED NEUTROPHILS RELATIVE PERCENT: 83.4 % (ref 36–66)
SODIUM BLD-SCNC: 118 MMOL/L (ref 136–145)
SODIUM BLD-SCNC: 119 MMOL/L (ref 135–145)
SOURCE: NORMAL
TOTAL IMMATURE NEUTOROPHIL: 0.18 K/CU MM
TOTAL NUCLEATED RBC: 0 K/CU MM
TOTAL PROTEIN: 6.7 GM/DL (ref 6.4–8.2)
TSH HIGH SENSITIVITY: 0.13 UIU/ML (ref 0.27–4.2)
VITAMIN B-12: 1752 PG/ML (ref 211–911)
WBC # BLD: 13.2 K/CU MM (ref 4–10.5)

## 2021-10-07 PROCEDURE — 2580000003 HC RX 258: Performed by: NURSE PRACTITIONER

## 2021-10-07 PROCEDURE — 72170 X-RAY EXAM OF PELVIS: CPT

## 2021-10-07 PROCEDURE — 87040 BLOOD CULTURE FOR BACTERIA: CPT

## 2021-10-07 PROCEDURE — 87635 SARS-COV-2 COVID-19 AMP PRB: CPT

## 2021-10-07 PROCEDURE — 82805 BLOOD GASES W/O2 SATURATION: CPT

## 2021-10-07 PROCEDURE — 84295 ASSAY OF SERUM SODIUM: CPT

## 2021-10-07 PROCEDURE — 72125 CT NECK SPINE W/O DYE: CPT

## 2021-10-07 PROCEDURE — 84443 ASSAY THYROID STIM HORMONE: CPT

## 2021-10-07 PROCEDURE — 70450 CT HEAD/BRAIN W/O DYE: CPT

## 2021-10-07 PROCEDURE — 85610 PROTHROMBIN TIME: CPT

## 2021-10-07 PROCEDURE — 82140 ASSAY OF AMMONIA: CPT

## 2021-10-07 PROCEDURE — 2580000003 HC RX 258: Performed by: EMERGENCY MEDICINE

## 2021-10-07 PROCEDURE — 80053 COMPREHEN METABOLIC PANEL: CPT

## 2021-10-07 PROCEDURE — 71045 X-RAY EXAM CHEST 1 VIEW: CPT

## 2021-10-07 PROCEDURE — 82607 VITAMIN B-12: CPT

## 2021-10-07 PROCEDURE — 1200000000 HC SEMI PRIVATE

## 2021-10-07 PROCEDURE — 85025 COMPLETE CBC W/AUTO DIFF WBC: CPT

## 2021-10-07 PROCEDURE — 99284 EMERGENCY DEPT VISIT MOD MDM: CPT

## 2021-10-07 RX ORDER — LEVOTHYROXINE SODIUM 88 UG/1
88 TABLET ORAL DAILY
Status: DISCONTINUED | OUTPATIENT
Start: 2021-10-07 | End: 2021-10-18 | Stop reason: HOSPADM

## 2021-10-07 RX ORDER — ONDANSETRON 4 MG/1
4 TABLET, ORALLY DISINTEGRATING ORAL EVERY 8 HOURS PRN
Status: DISCONTINUED | OUTPATIENT
Start: 2021-10-07 | End: 2021-10-18 | Stop reason: HOSPADM

## 2021-10-07 RX ORDER — POLYETHYLENE GLYCOL 3350 17 G/17G
17 POWDER, FOR SOLUTION ORAL DAILY PRN
Status: DISCONTINUED | OUTPATIENT
Start: 2021-10-07 | End: 2021-10-18 | Stop reason: HOSPADM

## 2021-10-07 RX ORDER — SODIUM CHLORIDE 9 MG/ML
INJECTION, SOLUTION INTRAVENOUS CONTINUOUS
Status: DISCONTINUED | OUTPATIENT
Start: 2021-10-07 | End: 2021-10-07

## 2021-10-07 RX ORDER — SODIUM CHLORIDE 9 MG/ML
25 INJECTION, SOLUTION INTRAVENOUS PRN
Status: DISCONTINUED | OUTPATIENT
Start: 2021-10-07 | End: 2021-10-18 | Stop reason: HOSPADM

## 2021-10-07 RX ORDER — ASPIRIN 81 MG/1
81 TABLET, CHEWABLE ORAL DAILY
Status: DISCONTINUED | OUTPATIENT
Start: 2021-10-07 | End: 2021-10-18 | Stop reason: HOSPADM

## 2021-10-07 RX ORDER — SODIUM CHLORIDE 9 MG/ML
INJECTION, SOLUTION INTRAVENOUS CONTINUOUS
Status: DISCONTINUED | OUTPATIENT
Start: 2021-10-07 | End: 2021-10-08

## 2021-10-07 RX ORDER — SODIUM CHLORIDE 0.9 % (FLUSH) 0.9 %
5-40 SYRINGE (ML) INJECTION EVERY 12 HOURS SCHEDULED
Status: DISCONTINUED | OUTPATIENT
Start: 2021-10-07 | End: 2021-10-18 | Stop reason: HOSPADM

## 2021-10-07 RX ORDER — LANOLIN ALCOHOL/MO/W.PET/CERES
1000 CREAM (GRAM) TOPICAL DAILY
Status: DISCONTINUED | OUTPATIENT
Start: 2021-10-07 | End: 2021-10-18 | Stop reason: HOSPADM

## 2021-10-07 RX ORDER — POTASSIUM CHLORIDE 750 MG/1
10 TABLET, FILM COATED, EXTENDED RELEASE ORAL DAILY
Status: DISCONTINUED | OUTPATIENT
Start: 2021-10-07 | End: 2021-10-12

## 2021-10-07 RX ORDER — ACETAMINOPHEN 650 MG/1
650 SUPPOSITORY RECTAL EVERY 6 HOURS PRN
Status: DISCONTINUED | OUTPATIENT
Start: 2021-10-07 | End: 2021-10-18 | Stop reason: HOSPADM

## 2021-10-07 RX ORDER — ACETAMINOPHEN 325 MG/1
650 TABLET ORAL EVERY 6 HOURS PRN
Status: DISCONTINUED | OUTPATIENT
Start: 2021-10-07 | End: 2021-10-18 | Stop reason: HOSPADM

## 2021-10-07 RX ORDER — TIMOLOL MALEATE 2.5 MG/ML
1 SOLUTION OPHTHALMIC DAILY
Status: DISCONTINUED | OUTPATIENT
Start: 2021-10-07 | End: 2021-10-07 | Stop reason: CLARIF

## 2021-10-07 RX ORDER — SODIUM CHLORIDE 0.9 % (FLUSH) 0.9 %
5-40 SYRINGE (ML) INJECTION PRN
Status: DISCONTINUED | OUTPATIENT
Start: 2021-10-07 | End: 2021-10-18 | Stop reason: HOSPADM

## 2021-10-07 RX ORDER — ATORVASTATIN CALCIUM 20 MG/1
10 TABLET, FILM COATED ORAL NIGHTLY
Status: DISCONTINUED | OUTPATIENT
Start: 2021-10-07 | End: 2021-10-18 | Stop reason: HOSPADM

## 2021-10-07 RX ORDER — GLIMEPIRIDE 2 MG/1
1 TABLET ORAL 2 TIMES DAILY
Status: DISCONTINUED | OUTPATIENT
Start: 2021-10-07 | End: 2021-10-18 | Stop reason: HOSPADM

## 2021-10-07 RX ORDER — ALBUTEROL SULFATE 90 UG/1
2 AEROSOL, METERED RESPIRATORY (INHALATION) 2 TIMES DAILY
Status: DISCONTINUED | OUTPATIENT
Start: 2021-10-07 | End: 2021-10-18 | Stop reason: HOSPADM

## 2021-10-07 RX ORDER — CARVEDILOL 6.25 MG/1
6.25 TABLET ORAL 2 TIMES DAILY WITH MEALS
Status: DISCONTINUED | OUTPATIENT
Start: 2021-10-07 | End: 2021-10-18 | Stop reason: HOSPADM

## 2021-10-07 RX ORDER — VITS A,C,E/LUTEIN/MINERALS 300MCG-200
1 TABLET ORAL DAILY
Status: DISCONTINUED | OUTPATIENT
Start: 2021-10-07 | End: 2021-10-18 | Stop reason: HOSPADM

## 2021-10-07 RX ORDER — ONDANSETRON 2 MG/ML
4 INJECTION INTRAMUSCULAR; INTRAVENOUS EVERY 6 HOURS PRN
Status: DISCONTINUED | OUTPATIENT
Start: 2021-10-07 | End: 2021-10-18 | Stop reason: HOSPADM

## 2021-10-07 RX ADMIN — SODIUM CHLORIDE, PRESERVATIVE FREE 10 ML: 5 INJECTION INTRAVENOUS at 22:02

## 2021-10-07 RX ADMIN — SODIUM CHLORIDE: 9 INJECTION, SOLUTION INTRAVENOUS at 15:15

## 2021-10-07 RX ADMIN — SODIUM CHLORIDE: 9 INJECTION, SOLUTION INTRAVENOUS at 22:05

## 2021-10-07 ASSESSMENT — ENCOUNTER SYMPTOMS
SHORTNESS OF BREATH: 0
EYE PAIN: 0
RHINORRHEA: 0
ABDOMINAL PAIN: 0
VOMITING: 0
BACK PAIN: 0
NAUSEA: 0
COUGH: 0
EYE DISCHARGE: 0

## 2021-10-07 NOTE — ED NOTES
Pt taken to restroom by wheelchair without incident. Complete bed change performed. Warm blankets and ice water given. Pt denies any further assistance or needs/wants.      Chaitanya Tabares RN  10/07/21 [id number]

## 2021-10-07 NOTE — ED PROVIDER NOTES
[address] ENCOUNTER      Pt Name: Ricky Hernandez  MRN: [id number]  [place] 3/18/1929  Date of evaluation: 10/7/2021  Provider: Yamil Trevino MD    CHIEF COMPLAINT       Chief Complaint   Patient presents with    Fall      Trauma Alert    Headache     Hematoma to back of head, no LOC         HISTORY OF PRESENT ILLNESS      Ricky Hernandez is a 80 y.o. female who presents to the emergency department  for   Chief Complaint   Patient presents with    Fall      Trauma Alert    Headache     Hematoma to back of head, no LOC       75-year-old female presents for evaluation of head injury after mechanical fall. She has a history of CAD, CABG and COPD with nighttime oxygen requirement. She was loading somewhat into her garage when she fell and hit her head. Denies any loss of consciousness. She is amnestic about events. She is not ambulated since the fall. She does come to the emergency department by EMS. She is on Coumadin. She is very hard of hearing. She is grossly moving all extremities emergency department. She does not note any remarkable abdominal pain or chest pain. She does have a contusion hematoma on the right side of her head. She has not identified exacerbating alleviating factors. We did obtain some collateral information from family. They do note that she has had a recent [employer] [employer] exposure. Nursing Notes, Triage Notes & Vital Signs were reviewed. REVIEW OF SYSTEMS    (2-9 systems for level 4, 10 or more for level 5)     Review of Systems   Constitutional: Negative for chills and fever. HENT: Negative for congestion and rhinorrhea. Eyes: Negative for pain and discharge. Respiratory: Negative for cough and shortness of breath. Cardiovascular: Negative for chest pain and palpitations. Gastrointestinal: Negative for abdominal pain, nausea and vomiting.    Endocrine: Negative for polydipsia and polyuria. Genitourinary: Negative for dysuria and flank pain. Musculoskeletal: Negative for back pain and neck pain. Skin: Negative for pallor and wound. Neurological: Negative for dizziness, facial asymmetry, light-headedness, numbness and headaches. Psychiatric/Behavioral: Negative for confusion. Except as noted above the remainder of the review of systems was reviewed and negative. PAST MEDICAL HISTORY     Past Medical History:   Diagnosis Date    Arthritis     Bradycardia 2001    requiring dual chamber pacemaker at [facility] CAD (coronary artery disease)     Cardiac pacemaker 10/2001    [employer] #[id number]  PPM- Serial # 26-[facility]- [facility] [place] Late CHF (congestive heart failure) ([address]     COPD, mild ([address] 2/17/2017    CVA (cerebrovascular accident) ([address]     DJD (degenerative joint disease) of cervical spine     C5-C6, C6-C7    Exhaustion of cardiac pacemaker battery 11/21/2011    PPM battery replacement- [employer]    Family history of cardiovascular disease     Glaucoma Dx 2010    H/O 24 hour EKG monitoring 8/6/2000 8/6/2000- Intermittent episonde of a-fib/flutter    H/O cardiac catheterization 4/20/2010, 2/1989 4/20/2010-Severe native vessel disease and has graft disease as well. LAD, CX totally occluded. RCA totally occluded in proximal segment. VG to RCA widely patent. PDA 90% LAD afer LIMA anastomosis 80-90% stenosis. Proceeded with PTCA with stent next day.  H/O cardiovascular stress test 10/14/2011, 6/2/2010,4/8/2010, 5/18/2009,4/6/2009, 10/30/2007, 11/12/2004, 11/6/2003, 8/9/2002, 8/9/2001,6/5/2000,     10/14/2011-Lexiscan-Abnormal Myocardial Perfusion study. Evidence of mild ischemia in the Left CX region. Abnomal study. Rest EF 63%. Global LV systolic function normal. No ECG changes.  Unremarkable pharmacological stress test.    H/O cardiovascular stress test 6/10/2013    thallium--mild ischemia left circumflex EF63% no change from 10/2011 study.  H/O cardiovascular stress test 10/16/2014    cardiolite-mild ischemia left circumflex,EF70%    H/O chest x-ray 4/19/2009 4/19/2009-Stable cardiomegaly. No acute cardiopulmonary disease.  H/O Doppler lower venous ultrasound 09/18/2019    Significant reflux noted in RGSV, RGSV is extremely tortuous and small along the thigh and calf and would be highly unlikely to be accessed, RSSV is non compressible and has occlusive chronic SVT, LSSV is non compressible with occlusive chronic SVT, LGSV removed s/p CABG, Significant reflux in LGSV tributary,    H/O Doppler ultrasound 3/31/2010    CAROTID- 3/31/2010-INtimal thickening but no significant atherosclerotic plaque noted in ANA PAULA. Doppler flow velocities within ANA PAULA are WNL. Heterogeneous, irregular atherosclerotic plaque noted in LICA. Doppler flow velocities within the LICA are elevated, consistent with a mild, less than 50% stenosis.  H/O Doppler ultrasound 5/24/2016    Carotid- normal study    H/O Doppler ultrasound 09/06/2017    carotid - normal study    H/O Doppler ultrasound     H/O echocardiogram 10/13    EF=60%, Severe Pulm. HTN, & Sclerotic aortic valve w/stenosis.  H/O echocardiogram 10/16/14     EF 55-60% Normal LV. Normal LV systolic function. Severe tricuspid insufficiency with severe hypertension.  H/O echocardiogram 02/03/2017    heart cath performed this morning    H/O echocardiogram 09/18/2019    EF 50-55%, Left atrium is mild to moderately dilated, right atrium is severely dilated, mildly dilated right ventricle, Mod MR, Severe TR, Severe Pulm HTN, no pericardial effusion     History of complete ECG     10/14/2011(Lexiscan);5/6/2010, 4/30/2009,10/24/2008,9/21/2007, 10/13/2006    History of nuclear stress test 11/17/2016    lexiscan-normal,EF70%    Hx of cardiovascular stress test 12/28/2018    EF 60%  Normal study.     HX OTHER MEDICAL 05/01/2017    MUGA-normal, EF53%    Hyperlipidemia     Hypertension  Mild intermittent asthma 7/28/2016    Nausea & vomiting     Obstructive sleep apnea 5/16/2017    Paroxysmal atrial fibrillation (HCC)     Post PTCA 4/21/2010    PTCA with 2.25 stent of the LIMA to LAD    Pulmonary HTN ([facility]     Severe per last echo on 10/13.  PVD (peripheral vascular disease) ([facility]     S/P CABG x 3 4/8/2009    LIMA->Diag,  LIMA to LAD;  SVG->RCA going to the PDA. Followed by MAZE procedure by pulmonary vein isolation.-  Dr Demarcus Ayala S/P PTCA (percutaneous transluminal coronary angioplasty) 11/2012    PTCA with stent to RCA    Thyroid disease     hypothyroi    Unspecified cerebral artery occlusion with cerebral infarction Unsure When    No Residual       Prior to Admission medications    Medication Sig Start Date End Date Taking? Authorizing Provider   diphenhydrAMINE-zinc acetate (BENADRYL ITCH STOPPING) 1-0.1 % cream Apply topically 3 times daily as needed.  5/14/21   Pradeep Gilford Girt, MD   furosemide (LASIX) 20 MG tablet Take 20 mg by mouth 2 times daily     Historical Provider, MD   Probiotic Product (PROBIOTIC-10) CHEW Take by mouth    Historical Provider, MD   spironolactone (ALDACTONE) 25 MG tablet Take 1 tablet by mouth daily  Patient not taking: Reported on 8/25/2021 1/28/21   Yamile Greco MD   potassium chloride (KLOR-CON) 10 MEQ extended release tablet Take 1 tablet by mouth daily  Patient not taking: Reported on 8/25/2021 12/9/20   Yamile Greco MD   atorvastatin (LIPITOR) 10 MG tablet Take 10 mg by mouth daily  10/20/20   Historical Provider, MD   umeclidinium-vilanterol (ANORO ELLIPTA) 62.5-25 MCG/INH AEPB inhaler Inhale 1 puff into the lungs daily 7/27/20   Carilyn Cheadle, MD   warfarin (COUMADIN) 5 MG tablet TAKE 1 AND 1/2 TABLET BY MOUTH DAILY 7/8/20   Yamile Greco MD   CPAP Machine MISC by Does not apply route    Historical Provider, MD   triamterene-hydrochlorothiazide (MAXZIDE-25) 37.5-25 MG per tablet TAKE ONE TABLET BY MOUTH DAILY 5/9/19   Yamile Greco MD Biotin (BIOTIN 5000) 5 MG CAPS Take 1 capsule by mouth daily    Historical Provider, MD   Misc Natural Products (OSTEO BI-FLEX ADV DOUBLE ST PO) Take 1 tablet by mouth daily    Historical Provider, MD   Tafluprost (Koleen Bale OP) Apply to eye    Historical Provider, MD   timolol (TIMOPTIC-XE) 0.25 % ophthalmic gel-forming 1 drop daily    Historical Provider, MD   carvedilol (COREG) 6.25 MG tablet Take 1 tablet by mouth 2 times daily (with meals) 1/31/17   Luma MedicMD layne   albuterol (PROVENTIL HFA;VENTOLIN HFA) 108 (90 BASE) MCG/ACT inhaler Inhale 2 puffs into the lungs 2 times daily AND EVERY 4-6 HOURS AS NEEDED    Historical Provider, MD   Multiple Vitamins-Minerals (ICAPS) CAPS Take 1 capsule by mouth daily. Historical Provider, MD   loratadine (CLARITIN) 10 MG tablet Take 10 mg by mouth as needed. Historical Provider, MD   vitamin B-12 (CYANOCOBALAMIN) 1000 MCG tablet Take 1,000 mcg by mouth daily. Historical Provider, MD   levothyroxine (SYNTHROID) 88 MCG tablet Take 88 mcg by mouth daily. Historical Provider, MD   aspirin 81 MG chewable tablet Take 81 mg by mouth daily.       Historical Provider, MD        Patient Active Problem List   Diagnosis    CAD (coronary artery disease)    Cardiac pacemaker    S/P CABG x 3    Post PTCA    Pulmonary HTN ([address]    TIA (transient ischemic attack)    Confusion    Headache    Hypertension    Bradycardia    SOBOE (shortness of breath on exertion)    Coronary artery disease involving coronary bypass graft of native heart without angina pectoris    COPD, mild ([address]    Obstructive sleep apnea    Dizziness    PAF (paroxysmal atrial fibrillation) ([address]    Pacer at end of battery life    Infection of pacemaker pocket ([address]    Hyponatremia         SURGICAL HISTORY       Past Surgical History:   Procedure Laterality Date    APPENDECTOMY  80    CARDIAC SURGERY  4/09    CABG (3 Bypasses), One Heart Stent in 2010    COLONOSCOPY  In 2000's    X1    CORONARY ANGIOPLASTY WITH STENT PLACEMENT  4/21/2010    PTCA with stent LIMA ->LAD    CORONARY ARTERY BYPASS GRAFT  4/8/2009    LIMA->Diag,  LIMA -> LAD;  SVG->RCA going to the PDA. Followed by MAZE procedure by pulmonary vein isolation.-  Dr Carl Hercules, TOTAL ABDOMINAL  1990's    MIDDLE EAR SURGERY      OTHER SURGICAL HISTORY      Ear surgery-hearing    PACEMAKER PLACEMENT      battery change 11/21/2011 [employer]tr[employer]    PTCA  11/2012    Ptca with stent to RCA    TONSILLECTOMY  1950's         CURRENT MEDICATIONS       Previous Medications    ALBUTEROL (PROVENTIL HFA;VENTOLIN HFA) 108 (90 BASE) MCG/ACT INHALER    Inhale 2 puffs into the lungs 2 times daily AND EVERY 4-6 HOURS AS NEEDED    ASPIRIN 81 MG CHEWABLE TABLET    Take 81 mg by mouth daily. ATORVASTATIN (LIPITOR) 10 MG TABLET    Take 10 mg by mouth daily     BIOTIN (BIOTIN 5000) 5 MG CAPS    Take 1 capsule by mouth daily    CARVEDILOL (COREG) 6.25 MG TABLET    Take 1 tablet by mouth 2 times daily (with meals)    CPAP MACHINE MISC    by Does not apply route    DIPHENHYDRAMINE-ZINC ACETATE (BENADRYL ITCH STOPPING) 1-0.1 % CREAM    Apply topically 3 times daily as needed. FUROSEMIDE (LASIX) 20 MG TABLET    Take 20 mg by mouth 2 times daily     LEVOTHYROXINE (SYNTHROID) 88 MCG TABLET    Take 88 mcg by mouth daily. LORATADINE (CLARITIN) 10 MG TABLET    Take 10 mg by mouth as needed. MISC NATURAL PRODUCTS (OSTEO BI-FLEX ADV DOUBLE ST PO)    Take 1 tablet by mouth daily    MULTIPLE VITAMINS-MINERALS (ICAPS) CAPS    Take 1 capsule by mouth daily.     POTASSIUM CHLORIDE (KLOR-CON) 10 MEQ EXTENDED RELEASE TABLET    Take 1 tablet by mouth daily    PROBIOTIC PRODUCT (PROBIOTIC-10) CHEW    Take by mouth    SPIRONOLACTONE (ALDACTONE) 25 MG TABLET    Take 1 tablet by mouth daily    TAFLUPROST (ZIOPTAN OP)    Apply to eye    TIMOLOL (TIMOPTIC-XE) 0.25 % OPHTHALMIC GEL-FORMING    1 drop daily    TRIAMTERENE-HYDROCHLOROTHIAZIDE (MAXZIDE-25) 37.5-25 MG PER TABLET    TAKE ONE TABLET BY MOUTH DAILY    UMECLIDINIUM-VILANTEROL (ANORO ELLIPTA) 62.5-25 MCG/INH AEPB INHALER    Inhale 1 puff into the lungs daily    VITAMIN B-12 (CYANOCOBALAMIN) 1000 MCG TABLET    Take 1,000 mcg by mouth daily. WARFARIN (COUMADIN) 5 MG TABLET    TAKE 1 AND 1/2 TABLET BY MOUTH DAILY       ALLERGIES     Darvocet [propoxyphene n-acetaminophen], Ranexa [ranolazine er], Ranolazine, Sulfa antibiotics, Sulfasalazine, Adhesive tape, Allantoin, Bacitracin, Gramicidin, Neomycin, Polymyxin b, Pramoxine hcl, and Silicone    FAMILY HISTORY       Family History   Problem Relation Age of Onset    Cancer Mother         \"Liver Cancer\"    Arthritis Mother     Depression Mother     Hearing Loss Mother     High Blood Pressure Mother     High Cholesterol Mother     Mental Illness Mother    [de-identified] / Vonda Anderson Mother     Heart Disease Father     Early Death Father 36        \"Instant Death\"    High Blood Pressure Father     High Cholesterol Father     Coronary Art Dis Father         Massive MI    Heart Disease Sister     Depression Sister     Cancer Sister         \"Breast Cancer, Cancer Free Now\"    High Blood Pressure Sister     Other Daughter         \"She's Had Stomach Surgery\"    High Blood Pressure Son     High Blood Pressure Son     Early Death Paternal Grandfather           SOCIAL HISTORY       Social History     Socioeconomic History    Marital status:       Spouse name: None    Number of children: 3    Years of education: None    Highest education level: None   Occupational History    Occupation: RETIRED     Comment: from [address] Use    Smoking status: Former Smoker     Packs/day: 0.25     Years: 5.00     Pack years: 1.25     Types: Cigarettes     Quit date: 1970     Years since quittin.8    Smokeless tobacco: Never Used   Substance and Sexual Activity    Alcohol use: Not Currently    Drug use: No    Sexual activity: Yes     Partners: Male     Comment:    Other Topics Concern    None   Social History Narrative    None     Social Determinants of Health     Financial Resource Strain:     Difficulty of Paying Living Expenses:    Food Insecurity:     Worried About Running Out of Food in the Last Year:     [address] in the Last Year:    Transportation Needs:     Lack of Transportation (Medical):  Lack of Transportation (Non-Medical):    Physical Activity:     Days of Exercise per Week:     Minutes of Exercise per Session:    Stress:     Feeling of Stress :    Social Connections:     Frequency of Communication with Friends and Family:     Frequency of Social Gatherings with Friends and Family:     Attends [employer] Services:     Active Member of Clubs or Organizations:     Attends Club or Organization Meetings:     Marital Status:    Intimate Partner Violence:     Fear of Current or Ex-Partner:     Emotionally Abused:     Physically Abused:     Sexually Abused:        SCREENINGS    [place] Coma Scale  Eye Opening: Spontaneous  Best Verbal Response: Oriented  Best Motor Response: Obeys commands  Vic Coma Scale Score: 15          PHYSICAL EXAM    (up to 7 for level 4, 8 or more for level 5)     ED Triage Vitals [10/07/21 1031]   BP Temp Temp Source Pulse Resp SpO2 Height Weight   (!) 165/90 97.6 °F (36.4 °C) Oral 85 20 93 % 5' (1.524 m) 125 lb (56.7 kg)       Physical Exam  Vitals reviewed. Constitutional:       Appearance: She is not toxic-appearing or diaphoretic. HENT:      Head: Normocephalic. Comments: Contusion on right posterior scalp     Nose: No congestion or rhinorrhea. Mouth/Throat:      Mouth: Mucous membranes are dry. Pharynx: No oropharyngeal exudate or posterior oropharyngeal erythema. Cardiovascular:      Rate and Rhythm: Normal rate. Heart sounds: No friction rub. No gallop. Pulmonary:      Comments:  Bl breath sounds  No retractions, no increased work of breathing  Chest:      Chest wall: No tenderness. Musculoskeletal:      Cervical back: Normal range of motion and neck supple. No tenderness. Lymphadenopathy:      Cervical: No cervical adenopathy. Skin:     General: Skin is warm. Findings: No erythema. Neurological:      Mental Status: She is alert. DIAGNOSTIC RESULTS     Labs Reviewed   COMPREHENSIVE METABOLIC PANEL W/ REFLEX TO MG FOR LOW K - Abnormal; Notable for the following components:       Result Value    Sodium 119 (*)     Chloride 87 (*)     Glucose 132 (*)     Total Bilirubin 1.7 (*)     Alkaline Phosphatase 164 (*)     All other components within normal limits   CBC WITH AUTO DIFFERENTIAL - Abnormal; Notable for the following components:    WBC 13.2 (*)     MCHC 31.0 (*)     RDW 16.4 (*)     Segs Relative 83.4 (*)     Lymphocytes % 3.0 (*)     Monocytes % 7.2 (*)     Eosinophils % 4.5 (*)     Immature Neutrophil % 1.4 (*)     All other components within normal limits   BLOOD GAS, VENOUS - Abnormal; Notable for the following components:    pH, Yunior 7.31 (*)     Base Excess 3 (*)     O2 Sat, Yunior 71.0 (*)     All other components within normal limits   PROTIME-INR - Abnormal; Notable for the following components:    Protime 64.5 (*)     All other components within normal limits   COVID-19, RAPID        RADIOLOGY:     Non-plain film images such as CT, Ultrasound and MRI are read by the radiologist. Plain radiographic images are visualized and preliminarily interpreted by the emergency physician. Interpretation per the Radiologist below, if available at the time of this note:    CT CERVICAL SPINE WO CONTRAST   Final Result   No acute abnormality of the cervical spine. CT HEAD WO CONTRAST   Final Result   1. No acute intracranial process identified   2. Right posterior parietal scalp soft tissue swelling. XR PELVIS (1-2 VIEWS)   Final Result   No pelvic fracture identified.          XR CHEST PORTABLE Final Result   1. No acute cardiopulmonary process identified. 2. Stable severe cardiomegaly. ED BEDSIDE ULTRASOUND:   Performed by ED Physician Jenni Adams MD       LABS:  Labs Reviewed   COMPREHENSIVE METABOLIC PANEL W/ REFLEX TO MG FOR LOW K - Abnormal; Notable for the following components:       Result Value    Sodium 119 (*)     Chloride 87 (*)     Glucose 132 (*)     Total Bilirubin 1.7 (*)     Alkaline Phosphatase 164 (*)     All other components within normal limits   CBC WITH AUTO DIFFERENTIAL - Abnormal; Notable for the following components:    WBC 13.2 (*)     MCHC 31.0 (*)     RDW 16.4 (*)     Segs Relative 83.4 (*)     Lymphocytes % 3.0 (*)     Monocytes % 7.2 (*)     Eosinophils % 4.5 (*)     Immature Neutrophil % 1.4 (*)     All other components within normal limits   BLOOD GAS, VENOUS - Abnormal; Notable for the following components:    pH, Yunior 7.31 (*)     Base Excess 3 (*)     O2 Sat, Yunior 71.0 (*)     All other components within normal limits   PROTIME-INR - Abnormal; Notable for the following components:    Protime 64.5 (*)     All other components within normal limits   COVID-19, RAPID       All other labs were within normal range or not returned as of this dictation. EMERGENCY DEPARTMENT COURSE and DIFFERENTIAL DIAGNOSIS/MDM:   Vitals:    Vitals:    10/07/21 1031 10/07/21 1037 10/07/21 1517   BP: (!) 165/90  (!) 158/92   Pulse: 85  60   Resp: 20  18   Temp: 97.6 °F (36.4 °C) 98.1 °F (36.7 °C)    TempSrc: Oral Oral    SpO2: 93%  93%   Weight: 125 lb (56.7 kg)     Height: 5' (1.524 m)             MDM  Number of Diagnoses or Management Options  Elevated INR  Hyponatremia  Diagnosis management comments: 77-year-old female who is on Coumadin presents after mechanical fall at home. Is attempted to let some internal garage when she fell and struck her head. No loss of conscious. She presents the emergency department for evaluation.   She has a history of COPD and a nighttime oxygen requirement. She presents with mildly low oxygen saturations and elevated blood pressure. Vitals otherwise unremarkable. She is have a hematoma on her right posterior scalp. She will extremity spontaneously. She is hard of hearing but is answering questions and following commands. Blood pressure is mildly elevated prolactin saturations are in the low to mid 90s on room air. No tachycardia. Did obtain a CT head, CT C-spine and chest x-ray and pelvis x-ray and those were all nonacute. Her labs do show hyponatremia with a sodium of 119. Her INR was also supratherapeutic at about 4. She does not have any significant signs of bleeding at this time. She is on multiple diuretic medications including Maxide, spironolactone and furosemide. These may be the cause of her hyponatremia. She is started on a low rate of sodium chloride and will be admitted for correction of her sodium. Family does note that she had a Covid19 exposure and so Covid19 test is ordered. Chest x-ray was overall nonacute. She is admitted in stable condition. Amount and/or Complexity of Data Reviewed  Clinical lab tests: reviewed  Tests in the radiology section of CPT®: reviewed        -  Patient seen and evaluated in the emergency department. -  Triage and nursing notes reviewed and incorporated. -  Old chart records reviewed and incorporated. -  Work-up included:  See above  -  Results discussed with patient. CRITICAL CARE TIME       Total critical care time today provided was 35 minutes. This excludes seperately billable procedure. Critical care time provided for hyponatremia, trauma alert that required close evaluation and/or intervention with concern for patient decompensation. This excludes seperately billable procedures and family discussion time.  Critical care time provided for obtaining history, conducting a physical exam, performing and monitoring interventions, ordering, collecting and interpreting tests, and establishing medical decision-making. There was a potential for life/limb threatening pathology requiring close evaluation and intervention with concern for patient decompensation. CONSULTS:  IP CONSULT TO HOSPITALIST  IP CONSULT TO PHARMACY    PROCEDURES:  None performed unless otherwise noted below     Procedures        FINAL IMPRESSION      1. Hyponatremia    2. Elevated INR          DISPOSITION/PLAN   DISPOSITION Admitted 10/07/2021 02:41:10 PM      PATIENT REFERRED TO:  No follow-up provider specified. DISCHARGE MEDICATIONS:  New Prescriptions    No medications on file       ED Provider Disposition Time  DISPOSITION Admitted 10/07/2021 02:41:10 PM      Appropriate personal protective equipment was worn during the patient's evaluation. These included surgical, eye protection, surgical mask or in 95 respirator and gloves. The patient was also placed in a surgical mask for the prevention of possible spread of respiratory viral illnesses. The Patient was instructed to read the package inserts with any medication that was prescribed. Major potential reactions and medication interactions were discussed. The Patient understands that there are numerous possible adverse reactions not covered. The patient was also instructed to arrange follow-up with his or her primary care provider for review of any pending labwork or incidental findings on any radiology results that were obtained. All efforts were made to discuss any incidental findings that require further monitoring. Controlled Substances Monitoring:     No flowsheet data found.     (Please note that portions of this note were completed with a voice recognition program.  Efforts were made to edit the dictations but occasionally words are mis-transcribed.)    Batsheva Tellez MD (electronically signed)  Attending Emergency Physician           Batsheva Tellez MD  10/07/21 1168

## 2021-10-07 NOTE — ED NOTES
Pt was found on the floor after defecating all over herself. No injuries noted, pt states she was trying to go to the restroom. Pt was cleaned up and checked for injuries, pt denies any injuries and was able to stand and shuffle back to her bed without incident. Small minor skin tear noticed on posterior, left upper arm. Wound cleaned and bandaged. Pt assisted back into bed and a sitter was requested at this time. I put pt hearing aids in. Pt states she is wanting to go home and sleep in her own bed. Charge RN Aleyda Martin ist, ER physician notified. Safe Care report completed.      Yesenia Schroeder, RN  10/07/21 [address], RN  10/07/21 2014

## 2021-10-07 NOTE — ED NOTES
Bed: 02TR-02  Expected date:   Expected time:   Means of arrival:   Comments:  EVS to sharon Pierre RN  10/07/21 1026

## 2021-10-07 NOTE — ED TRIAGE NOTES
Pt was walking out of her home, tripped and fell, striking the back of her head with a substantial hematoma resulting. Pt and bystanders report no LOC. Pt denies any pain or other discomforts at this time.

## 2021-10-07 NOTE — H&P
CAD, CVA, and CHF who presents with fall at home. Patient reported she tripped and fell to the floor, did not hit her head, did not lose consciousness. She was brought in for further evaluation. Patient has history of atrial fibrillation currently takes Coumadin. In the ED, patient blood pressure was elevated with SBP about 160. Labs reviewed hyponatremia with sodium 119, WBC is 13.2, PT was 64.5, INR 4.92.  CT of the head has no intracranial bleeding. CT of cervical spine was unremarkable. Due to hyponatremia, patient was admitted for further evaluation. Ten point ROS reviewed negative, unless as noted above    Objective:   No intake or output data in the 24 hours ending 10/07/21 1444   Vitals:   Vitals:    10/07/21 1037   BP:    Pulse:    Resp:    Temp: 98.1 °F (36.7 °C)   SpO2:      Physical Exam:   GEN Awake female, sitting upright in bed in no apparent distress. Appears given age. EYES Pupils are equally round. No scleral erythema, discharge, or conjunctivitis. HENT Mucous membranes are moist. Oral pharynx without exudates, no evidence of thrush. NECK Supple, no apparent thyromegaly or masses. RESP Clear to auscultation, no wheezes, rales or rhonchi. Symmetric chest movement while on room air. CARDIO/VASC S1/S2 auscultated. Regular rate without appreciable murmurs, rubs, or gallops. No JVD or carotid bruits. Peripheral pulses equal bilaterally and palpable. No peripheral edema. GI Abdomen is soft without significant tenderness, masses, or guarding. Bowel sounds are normoactive. Rectal exam deferred.  No costovertebral angle tenderness. Normal appearing external genitalia. Carnes catheter is not present. HEME/LYMPH No palpable cervical lymphadenopathy and no hepatosplenomegaly. No petechiae or ecchymoses. MSK No gross joint deformities. SKIN Normal coloration, warm, dry. NEURO Cranial nerves appear grossly intact, normal speech, no lateralizing weakness.   PSYCH Awake, alert, oriented x 4.  Affect appropriate. Past Medical History:      Past Medical History:   Diagnosis Date    Arthritis     Bradycardia 2001    [facility]iring dual chamber pacemaker [facility] CAD (coronary artery disease)     Cardiac pacemaker 10/2001    [employer] #[id number]  PPM- Serial # 26-[facility]- Dr Aurae Rodriguez CHF (congestive heart failure) ([facility] [address]     COPD, mild ([address] 2/17/2017    CVA (cerebrovascular accident) ([address]     DJD (degenerative joint disease) of cervical spine     C5-C6, C6-C7    Exhaustion of cardiac pacemaker battery 11/21/2011    PPM battery replacement- [employer]    Family history of cardiovascular disease     Glaucoma Dx 2010    H/O 24 hour EKG monitoring 8/6/2000 8/6/2000- Intermittent episonde of a-fib/flutter    H/O cardiac catheterization 4/20/2010, 2/1989 4/20/2010-Severe native vessel disease and has graft disease as well. LAD, CX totally occluded. RCA totally occluded in proximal segment. VG to RCA widely patent. PDA 90% LAD afer LIMA anastomosis 80-90% stenosis. Proceeded with PTCA with stent next day.  H/O cardiovascular stress test 10/14/2011, 6/2/2010,4/8/2010, 5/18/2009,4/6/2009, 10/30/2007, 11/12/2004, 11/6/2003, 8/9/2002, 8/9/2001,6/5/2000,     10/14/2011-Lexiscan-Abnormal Myocardial Perfusion study. Evidence of mild ischemia in the Left CX region. Abnomal study. Rest EF 63%. Global LV systolic function normal. No ECG changes. Unremarkable pharmacological stress test.    H/O cardiovascular stress test 6/10/2013    thallium--mild ischemia left circumflex EF63% no change from 10/2011 study.  H/O cardiovascular stress test 10/16/2014    cardiolite-mild ischemia left circumflex,EF70%    H/O chest x-ray 4/19/2009 4/19/2009-Stable cardiomegaly. No acute cardiopulmonary disease.     H/O Doppler lower venous ultrasound 09/18/2019    Significant reflux noted in RGSV, RGSV is extremely tortuous and small along the thigh and calf and would be highly unlikely to be accessed, RSSV is non compressible and has occlusive chronic SVT, LSSV is non compressible with occlusive chronic SVT, LGSV removed s/p CABG, Significant reflux in LGSV tributary,    H/O Doppler ultrasound 3/31/2010    CAROTID- 3/31/2010-INtimal thickening but no significant atherosclerotic plaque noted in ANA PAULA. Doppler flow velocities within ANA PAULA are WNL. Heterogeneous, irregular atherosclerotic plaque noted in LICA. Doppler flow velocities within the LICA are elevated, consistent with a mild, less than 50% stenosis.  H/O Doppler ultrasound 5/24/2016    Carotid- normal study    H/O Doppler ultrasound 09/06/2017    carotid - normal study    H/O Doppler ultrasound     H/O echocardiogram 10/13    EF=60%, Severe Pulm. HTN, & Sclerotic aortic valve w/stenosis.  H/O echocardiogram 10/16/14     EF 55-60% Normal LV. Normal LV systolic function. Severe tricuspid insufficiency with severe hypertension.  H/O echocardiogram 02/03/2017    heart cath performed this morning    H/O echocardiogram 09/18/2019    EF 50-55%, Left atrium is mild to moderately dilated, right atrium is severely dilated, mildly dilated right ventricle, Mod MR, Severe TR, Severe Pulm HTN, no pericardial effusion     History of complete ECG     10/14/2011(Lexiscan);5/6/2010, 4/30/2009,10/24/2008,9/21/2007, 10/13/2006    History of nuclear stress test 11/17/2016    lexiscan-normal,EF70%    Hx of cardiovascular stress test 12/28/2018    EF 60%  Normal study.  HX OTHER MEDICAL 05/01/2017    MUGA-normal, EF53%    Hyperlipidemia     Hypertension     Mild intermittent asthma 7/28/2016    Nausea & vomiting     Obstructive sleep apnea 5/16/2017    Paroxysmal atrial fibrillation (HCC)     Post PTCA 4/21/2010    PTCA with 2.25 stent of the LIMA to LAD    Pulmonary HTN ([address]     Severe per last echo on 10/13. 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 Followed by MAZE procedure by pulmonary vein isolation.-  Dr Maryellen Gonzalez S/P PTCA (percutaneous transluminal coronary angioplasty) 11/2012    PTCA with stent to RCA    Thyroid disease     hypothyroi    Unspecified cerebral artery occlusion with cerebral infarction Unsure When    No Residual     PSHX:  has a past surgical history that includes Appendectomy (1941); Tonsillectomy (1950's); Cardiac surgery (4/09); Hysterectomy, total abdominal (1990's); Colonoscopy (In 2000's); pacemaker placement; Coronary artery bypass graft (4/8/2009); Coronary angioplasty with stent (4/21/2010); other surgical history; Middle ear surgery; and Percutaneous Transluminal Coronary Angio (11/2012). Allergies: Allergies   Allergen Reactions    Darvocet [Propoxyphene N-Acetaminophen]     Ranexa [Ranolazine Er]      Sick to her stomach    Ranolazine      Sick to her stomach    Sulfa Antibiotics Itching    Sulfasalazine Itching    Adhesive Tape Rash    Allantoin Rash    Bacitracin Rash    Gramicidin Rash    Neomycin Rash    Polymyxin B Rash    Pramoxine Hcl Rash    Silicone Rash       FAM HX: family history includes Arthritis in her mother; Cancer in her mother and sister; Coronary Art Dis in her father; Depression in her mother and sister; Early Death in her paternal grandfather; Early Death (age of onset: 36) in her father; Hearing Loss in her mother; Heart Disease in her father and sister; High Blood Pressure in her father, mother, sister, son, and son; High Cholesterol in her father and mother; Mental Illness in her mother; Shaune Dross / Darrold Argue in her mother; Other in her daughter. Soc HX:   Social History     Socioeconomic History    Marital status:       Spouse name: None    Number of children: 4    Years of education: None    Highest education level: None   Occupational History    Occupation: RETIRED     Comment: from [employer]    Smoking status: Former Smoker Packs/day: 0.25     Years: 5.00     Pack years: 1.25     Types: Cigarettes     Quit date: 1970     Years since quittin.8    Smokeless tobacco: Never Used   Substance and Sexual Activity    Alcohol use: Not Currently    Drug use: No    Sexual activity: Yes     Partners: Male     Comment:    Other Topics Concern    None   Social History Narrative    None     Social Determinants of Health     Financial Resource Strain:     Difficulty of Paying Living Expenses:    Food Insecurity:     Worried About Running Out of Food in the Last Year:     Ran Out of Food in the Last Year:    Transportation Needs:     Lack of Transportation (Medical):      Lack of Transportation (Non-Medical):    Physical Activity:     Days of Exercise per Week:     Minutes of Exercise per Session:    Stress:     Feeling of Stress :    Social Connections:     Frequency of Communication with Friends and Family:     Frequency of Social Gatherings with Friends and Family:     Attends Mu-ism Services:     Active Member of Clubs or Organizations:     Attends Club or Organization Meetings:     Marital Status:    Intimate Partner Violence:     Fear of Current or Ex-Partner:     Emotionally Abused:     Physically Abused:     Sexually Abused:        Medications:   Medications:    Infusions:    sodium chloride       PRN Meds:        Electronically signed by LISA Pastor CNP on 10/7/2021 at 2:44 PM

## 2021-10-08 ENCOUNTER — APPOINTMENT (OUTPATIENT)
Dept: ULTRASOUND IMAGING | Age: 86
DRG: 640 | End: 2021-10-08
Payer: MEDICARE

## 2021-10-08 LAB
ALBUMIN SERPL-MCNC: 3.8 GM/DL (ref 3.4–5)
ALP BLD-CCNC: 152 IU/L (ref 40–129)
ALT SERPL-CCNC: 16 U/L (ref 10–40)
AST SERPL-CCNC: 23 IU/L (ref 15–37)
BACTERIA: ABNORMAL /HPF
BILIRUB SERPL-MCNC: 2 MG/DL (ref 0–1)
BILIRUBIN DIRECT: 1 MG/DL (ref 0–0.3)
BILIRUBIN URINE: NEGATIVE MG/DL
BILIRUBIN, INDIRECT: 1 MG/DL (ref 0–0.7)
BLOOD, URINE: NEGATIVE
CLARITY: CLEAR
COLOR: ABNORMAL
GLUCOSE, URINE: NEGATIVE MG/DL
HCT VFR BLD CALC: 40.3 % (ref 37–47)
HEMOGLOBIN: 12.9 GM/DL (ref 12.5–16)
INR BLD: 6.28 INDEX
INR BLD: 8.16 INDEX
KETONES, URINE: ABNORMAL MG/DL
LEUKOCYTE ESTERASE, URINE: ABNORMAL
MCH RBC QN AUTO: 27.6 PG (ref 27–31)
MCHC RBC AUTO-ENTMCNC: 32 % (ref 32–36)
MCV RBC AUTO: 86.3 FL (ref 78–100)
MUCUS: ABNORMAL HPF
NITRITE URINE, QUANTITATIVE: NEGATIVE
OSMOLALITY URINE: 383 MOS/L
PDW BLD-RTO: 16.3 % (ref 11.7–14.9)
PH, URINE: 5 (ref 5–8)
PLATELET # BLD: 443 K/CU MM (ref 140–440)
PMV BLD AUTO: 11.5 FL (ref 7.5–11.1)
PROTEIN UA: 30 MG/DL
PROTHROMBIN TIME: 107.5 SECONDS (ref 11.7–14.5)
PROTHROMBIN TIME: 82.5 SECONDS (ref 11.7–14.5)
RBC # BLD: 4.67 M/CU MM (ref 4.2–5.4)
RBC URINE: 5 /HPF (ref 0–6)
SODIUM BLD-SCNC: 116 MMOL/L (ref 135–145)
SODIUM BLD-SCNC: 119 MMOL/L (ref 135–145)
SODIUM BLD-SCNC: 119 MMOL/L (ref 135–145)
SODIUM BLD-SCNC: 121 MMOL/L (ref 135–145)
SODIUM URINE: 10 MMOL/L (ref 35–167)
SPECIFIC GRAVITY UA: 1.01 (ref 1–1.03)
SQUAMOUS EPITHELIAL: 1 /HPF
TOTAL PROTEIN: 5.8 GM/DL (ref 6.4–8.2)
TRANSITIONAL EPITHELIAL: <1 /HPF
TRICHOMONAS: ABNORMAL /HPF
UROBILINOGEN, URINE: NEGATIVE MG/DL (ref 0.2–1)
WBC # BLD: 16.7 K/CU MM (ref 4–10.5)
WBC UA: 5 /HPF (ref 0–5)

## 2021-10-08 PROCEDURE — 93880 EXTRACRANIAL BILAT STUDY: CPT

## 2021-10-08 PROCEDURE — 51798 US URINE CAPACITY MEASURE: CPT

## 2021-10-08 PROCEDURE — 83935 ASSAY OF URINE OSMOLALITY: CPT

## 2021-10-08 PROCEDURE — 6370000000 HC RX 637 (ALT 250 FOR IP): Performed by: NURSE PRACTITIONER

## 2021-10-08 PROCEDURE — 6360000002 HC RX W HCPCS: Performed by: INTERNAL MEDICINE

## 2021-10-08 PROCEDURE — 85610 PROTHROMBIN TIME: CPT

## 2021-10-08 PROCEDURE — 94761 N-INVAS EAR/PLS OXIMETRY MLT: CPT

## 2021-10-08 PROCEDURE — 85027 COMPLETE CBC AUTOMATED: CPT

## 2021-10-08 PROCEDURE — 2700000000 HC OXYGEN THERAPY PER DAY

## 2021-10-08 PROCEDURE — 2580000003 HC RX 258: Performed by: NURSE PRACTITIONER

## 2021-10-08 PROCEDURE — 80076 HEPATIC FUNCTION PANEL: CPT

## 2021-10-08 PROCEDURE — 81001 URINALYSIS AUTO W/SCOPE: CPT

## 2021-10-08 PROCEDURE — 36415 COLL VENOUS BLD VENIPUNCTURE: CPT

## 2021-10-08 PROCEDURE — 99221 1ST HOSP IP/OBS SF/LOW 40: CPT | Performed by: INTERNAL MEDICINE

## 2021-10-08 PROCEDURE — 84295 ASSAY OF SERUM SODIUM: CPT

## 2021-10-08 PROCEDURE — 84300 ASSAY OF URINE SODIUM: CPT

## 2021-10-08 PROCEDURE — 1200000000 HC SEMI PRIVATE

## 2021-10-08 PROCEDURE — APPSS60 APP SPLIT SHARED TIME 46-60 MINUTES: Performed by: NURSE PRACTITIONER

## 2021-10-08 RX ORDER — FUROSEMIDE 10 MG/ML
20 INJECTION INTRAMUSCULAR; INTRAVENOUS 2 TIMES DAILY
Status: DISCONTINUED | OUTPATIENT
Start: 2021-10-08 | End: 2021-10-09

## 2021-10-08 RX ADMIN — FUROSEMIDE 20 MG: 10 INJECTION, SOLUTION INTRAVENOUS at 22:27

## 2021-10-08 RX ADMIN — ACETAMINOPHEN 650 MG: 325 TABLET ORAL at 14:35

## 2021-10-08 RX ADMIN — TIOTROPIUM BROMIDE AND OLODATEROL 2 PUFF: 3.124; 2.736 SPRAY, METERED RESPIRATORY (INHALATION) at 00:17

## 2021-10-08 RX ADMIN — LEVOTHYROXINE SODIUM 88 MCG: 0.09 TABLET ORAL at 09:19

## 2021-10-08 RX ADMIN — TIMOLOL MALEATE 1 DROP: 2.5 SOLUTION/ DROPS OPHTHALMIC at 22:27

## 2021-10-08 RX ADMIN — SODIUM CHLORIDE: 9 INJECTION, SOLUTION INTRAVENOUS at 05:08

## 2021-10-08 RX ADMIN — FUROSEMIDE 20 MG: 10 INJECTION, SOLUTION INTRAVENOUS at 09:45

## 2021-10-08 RX ADMIN — SODIUM CHLORIDE, PRESERVATIVE FREE 10 ML: 5 INJECTION INTRAVENOUS at 18:47

## 2021-10-08 RX ADMIN — ATORVASTATIN CALCIUM 10 MG: 20 TABLET, FILM COATED ORAL at 22:27

## 2021-10-08 RX ADMIN — SODIUM CHLORIDE, PRESERVATIVE FREE 10 ML: 5 INJECTION INTRAVENOUS at 22:27

## 2021-10-08 RX ADMIN — CYANOCOBALAMIN TAB 1000 MCG 1000 MCG: 1000 TAB at 09:20

## 2021-10-08 RX ADMIN — TIMOLOL MALEATE 1 DROP: 2.5 SOLUTION/ DROPS OPHTHALMIC at 00:17

## 2021-10-08 RX ADMIN — CARVEDILOL 6.25 MG: 6.25 TABLET, FILM COATED ORAL at 09:18

## 2021-10-08 RX ADMIN — SODIUM CHLORIDE, PRESERVATIVE FREE 10 ML: 5 INJECTION INTRAVENOUS at 09:40

## 2021-10-08 RX ADMIN — POTASSIUM CHLORIDE 10 MEQ: 750 TABLET, FILM COATED, EXTENDED RELEASE ORAL at 09:19

## 2021-10-08 RX ADMIN — ASPIRIN 81 MG: 81 TABLET, CHEWABLE ORAL at 09:20

## 2021-10-08 RX ADMIN — Medication 1 TABLET: at 14:37

## 2021-10-08 ASSESSMENT — PAIN DESCRIPTION - ORIENTATION: ORIENTATION: LOWER

## 2021-10-08 ASSESSMENT — PAIN DESCRIPTION - ONSET: ONSET: ON-GOING

## 2021-10-08 ASSESSMENT — PAIN SCALES - GENERAL
PAINLEVEL_OUTOF10: 0
PAINLEVEL_OUTOF10: 6
PAINLEVEL_OUTOF10: 0
PAINLEVEL_OUTOF10: 3

## 2021-10-08 ASSESSMENT — PAIN DESCRIPTION - PAIN TYPE: TYPE: ACUTE PAIN

## 2021-10-08 ASSESSMENT — PAIN DESCRIPTION - LOCATION: LOCATION: HEAD;BACK

## 2021-10-08 ASSESSMENT — PAIN - FUNCTIONAL ASSESSMENT: PAIN_FUNCTIONAL_ASSESSMENT: PREVENTS OR INTERFERES SOME ACTIVE ACTIVITIES AND ADLS

## 2021-10-08 ASSESSMENT — PAIN DESCRIPTION - FREQUENCY: FREQUENCY: CONTINUOUS

## 2021-10-08 ASSESSMENT — PAIN DESCRIPTION - DESCRIPTORS: DESCRIPTORS: ACHING;SORE

## 2021-10-08 NOTE — PROGRESS NOTES
PS Dr Aakash Espinoza about the critical value sodium 119, waiting further orders will continue to monitor

## 2021-10-08 NOTE — ED NOTES
Pt is slightly AMS and is not able to swallow pills at this time, reaching out to hospitilist for IV meds that cant be missed.  Family states pt took all medications this morning            Maribeth Venegas RN  10/07/21 0207

## 2021-10-08 NOTE — PROGRESS NOTES
2 person skin assesment done by this RN and Rosi Fajardo RN. Issues noted on assessment.   Pt has rash on ble and bue

## 2021-10-08 NOTE — PROGRESS NOTES
Up to the bathroom with walker and 1 assist. Voided 300 ml of urine. Bladder scan =339 post void residual. Urine samples sent to the lab.

## 2021-10-08 NOTE — PROGRESS NOTES
PHARMACY ANTICOAGULATION MONITORING SERVICE    Tommy Walker is a 80 y.o. female on warfarin therapy for PAF. Pharmacy consulted by LISA Yates CNP for monitoring and adjustment of treatment. Indication for anticoagulation: PAF  INR goal: 2 - 3  Warfarin dose prior to admission: 7.5 mg po daily    Pertinent Laboratory Values   Recent Labs     10/07/21  1012   INR 4.92   HGB 13.5   HCT 43.5          Assessment/Plan:  Drug Interactions:   Aspirin (Home med)  Levothyroxine (Home med)  Acetaminophen (PRN)  INR supratherapeutic at 4.92  Will hold warfarin dosing at this time. Therapy will resume once INR falls within therapeutic range. Pharmacy will continue to monitor and adjust warfarin therapy as indicated    Thank you for the consult.   Soledad Yin [facility]  10/8/2021 12:54 AM

## 2021-10-08 NOTE — PROGRESS NOTES
Hospitalist Progress Note      Name:  Mari Navas /Age/Sex: 3/18/1929  (80 y.o. female)   MRN & CSN:  [id number] & [id number] Admission Date/Time: 10/7/2021 10:27 AM   Location:  [address] [place] [address] PCP: [address] [place] [address] Day: 2    Hospital Course:   Mari Navas is a 80 y.o. female with PMH A. fib, HTN, CHF, hypothyroidism, glaucoma and very hard of hearing presented to [facility] on 10/7/2021 after a fall at home. She was found to have severe hyponatremia and was hospitalized for further work-up/treatment and nephrology evaluation    Assessment and Plan:       Hyponatremia: Na 119 on arrival; appeared neurologically intact. Did not improve with IV fluids. IV fluids now stopped and on IV Lasix. Monitor serial sodium levels. Nephrology following    Fall: details unclear but sounds like a mechanical fall as she was attempting to load something in her garage when she fell. Head CT with right parietal scalp hematoma; otherwise nonacute. C-spine CT unremarkable. Bilateral carotid Dopplers with 0 to 50% stenosis. PT/OT eval penidng. Atrial fibrillation: per hx. Rate controlled. Cont home BB. INR supratherapeutic; 6.2. If continues to trend up give vitamin K. STAT INR pending. Risk may outweigh benefits of anticoagulation with advanced age and fall risk. Cardiology consulted     HTN: per hx. BP acceptable. Continue home BB. Monitor BP and titrate PRN     Chronic HFpEF: per hx. last TTE  with preserved EF. Appears euvolemic. Home spironolactone, Maxide on hold. On Lasix as noted above with hyponatremia    CAD: per hx. Denied chest pain. Cont home ASA, BB, statin. Hypothyroidism: per hx. Cont home levothyroxine. TSH pending    DVT prophylaxis: SCDs             Diet ADULT DIET;  Regular; Low Fat/Low Chol/High Fiber/2 gm Na   DVT Prophylaxis [] Lovenox, []  Heparin, [x] SCDs, []No VTE prophylaxis, patient ambulating   GI Prophylaxis [] PPI, [] H2 Blocker, [x] No GI prophylaxis, patient is receiving diet/Tube Feeds   Code Status Full Code   Disposition TBD; may need SNF    MDM [] Low, [] Moderate,[]  High  Patient's risk as above due to      Subjective:     Seen and examined at bedside. Patient is new to me, information obtained from chart review and patient report. Patient is very hard of hearing but appears alert and oriented. She can tell me her name, she knows she is in the hospital, accurately tells me the month and the year and who the president of [facility] is. Has no complaints. No family at bedside for collateral    Objective: Intake/Output Summary (Last 24 hours) at 10/8/2021 1337  Last data filed at 10/8/2021 0950  Gross per 24 hour   Intake 720.78 ml   Output 889 ml   Net -168.22 ml      Vitals:   Vitals:    10/08/21 0906   BP: 126/64   Pulse: 62   Resp: 20   Temp: 97.4 °F (36.3 °C)   SpO2: 96%         Labs:   CBC:   Lab Results   Component Value Date    WBC 16.7 10/08/2021    HGB 12.9 10/08/2021    HCT 40.3 10/08/2021    MCV 86.3 10/08/2021     10/08/2021     BMP:   Lab Results   Component Value Date     10/08/2021    K 4.8 10/07/2021    CL 87 10/07/2021    CO2 21 10/07/2021    PHOS 3.1 11/27/2020    BUN 19 10/07/2021    CREATININE 0.8 10/07/2021    CALCIUM 9.6 10/07/2021       Physical Exam:        General: 31-year-old who appears stated age, chronically ill and in no acute distress  Eyes: eyelids/lashes normal appearence. Pupils equal, round and reactive. No scleral erythema, discharge, or conjunctivitis. HENT: bilateral ear and nose normal in appearance. Mucous membranes moist. Hearing grossly intact. Neck supple, no apparent thyromegaly or masses. Respiratory: lungs lear to auscultation, no wheezes, rales or rhonchi. Symmetric chest movement while on room air. Cardiovascular: RRR, S1/S2 auscultated. No murmurs, rubs, or gallops. No JVD or carotid bruits. Peripheral pulses equal bilaterally and palpable. No peripheral edema. GI: abdomen soft. No tenderness, masses, or guarding. Bowel sounds are normoactive. Rectal exam deferred. : No costovertebral angle tenderness. Carnes catheter is not present. Heme/lymph: no palpable cervical lymphadenopathy and no hepatosplenomegaly. No petechiae or ecchymoses. Musculoskeletal: Full ROM of all 4 extremities, strength 5/5 in all extremities. No gross joint deformities. Skin: intact, no rashes or lesions. Neurological: cranial nerves 2-12 grossly intact. No slurred speech, no facial droop. Non-focal   Psych: LOC X4, calm and cooperative. Affect appropriate.       Medications:   Medications:    warfarin (COUMADIN) daily dosing (placeholder)   Other RX Placeholder    furosemide  20 mg IntraVENous BID    albuterol sulfate HFA  2 puff Inhalation BID    aspirin  81 mg Oral Daily    atorvastatin  10 mg Oral Nightly    carvedilol  6.25 mg Oral BID WC    levothyroxine  88 mcg Oral Daily    ocuvite-lutein  1 tablet Oral Daily    potassium chloride  10 mEq Oral Daily    vitamin B-12  1,000 mcg Oral Daily    sodium chloride flush  5-40 mL IntraVENous 2 times per day    tiotropium-olodaterol  2 puff Inhalation Daily    timolol  1 drop Both Eyes BID      Infusions:    sodium chloride       PRN Meds: sodium chloride flush, 5-40 mL, PRN  sodium chloride, 25 mL, PRN  ondansetron, 4 mg, Q8H PRN   Or  ondansetron, 4 mg, Q6H PRN  polyethylene glycol, 17 g, Daily PRN  acetaminophen, 650 mg, Q6H PRN   Or  acetaminophen, 650 mg, Q6H PRN          Electronically signed by LISA Lea CNP on 10/8/2021 at 1:37 PM

## 2021-10-08 NOTE — PROGRESS NOTES
Physical Therapy  RN requested therapy hold today due to very low sodium level. Will re-attempt at a later date.

## 2021-10-08 NOTE — CONSULTS
Patient:   Jeni Barcenas    Date:  10/08/21  :  3/18/1929, 80 y.o. Nephrologist: Kaur Diaz MD  Provider: Rosa Osorio DO    Reason for Consult: Acute on chronic hyponatremia    Consult requested by: Hannah Alonso MD    Chief Complaint:   Fall with headache and hematoma    HISTORY OF PRESENT ILLNESS:   20-year-old female was brought to the emergency room with above-mentioned symptoms. Unfortunately I was unable to get a good history from the patient-she is 89 and not feeling well so I completely understand. Apparently she fell in her garage and hit her head, without loss of consciousness. In the emergency department she was hemodynamically stable and underwent several diagnostic tests, which include biochemical and imaging. Imaging study which include carotid ultrasound, and several-CT including cervical CT, final CT, and head CT, along with x-ray of the pelvis and chest-fortunately no overt fracture-except posterior scalp soft tissue swelling-but does have significant cardiomegaly and bilateral pulmonary-suggestive of probably pulmonary edema-    Biochemical testing showed sodium of 119-with concomitant chloride of 87-but other acceptable electrolyte and creatinine was 0.8 and BUN of 19 . She was started on normal saline and subsequently admitted to    Unfortunately despite normal saline her sodium stays the same-renal consult was requested    Looks like she has had low sodium before-but not to the extent like this time-she was on Maxide at home-as well as loop and spironolactone    Unfortunately most of the history is taken from epic and other healthcare provider-supplemented by whatever the patient could tell me      PMH :   1.  Coronary artery disease-status post CABG x3 vessel  2. Dysrhythmia needing cardiac pacemaker  3. Presumed pulmonary hypertension  4,. Atherosclerotic cardiovascular disease including TIA  5. Atrial fibrillation  6.   Presumed COPD        PSH :  1./Status post CABG  2.  Status post pacemaker placement  3.   Also had hysterectomy, appendectomy and tonsillectomy-family also had coronary stent before      OB GYN Hx:    Patient  4 para 4  Presumably no history of eclampsia, preeclampsia, gestational diabetes or hypertension    Habits :   She quit smoking long time ago  Apparently only smoked for a few years  She does not consume alcohol or illicit drug      Soc Hx:  Her    She lives at home  Other details social history is unknown to [facility]   Currently mother had liver cancer  Father had hypertension  Also sister had hypertension and heart disease  No known kidney disease in the family        REVIEW OF SYSTEMS:     All pertinent ROS neg except   Fall hitting the head with hematoma    Current Facility-Administered Medications   Medication Dose Route Frequency Provider Last Rate Last Admin    warfarin (COUMADIN) daily dosing (placeholder)   Other RX Placeholder Jesús Hurtado, APRN - CNP        furosemide (LASIX) injection 20 mg  20 mg IntraVENous BID Kristel Lafleur MD   20 mg at 10/08/21 0945    albuterol sulfate  (90 Base) MCG/ACT inhaler 2 puff  2 puff Inhalation BID Jesús Hurtado, APRN - CNP        aspirin chewable tablet 81 mg  81 mg Oral Daily Jesús Broccoli, APRN - CNP   81 mg at 10/08/21 0920    atorvastatin (LIPITOR) tablet 10 mg  10 mg Oral Nightly Jesús Broccoli, APRN - CNP        carvedilol (COREG) tablet 6.25 mg  6.25 mg Oral BID WC Jesús Broccoli, APRN - CNP   6.25 mg at 10/08/21 0918    levothyroxine (SYNTHROID) tablet 88 mcg  88 mcg Oral Daily Jesús Broccoli, APRN - CNP   88 mcg at 10/08/21 0919    antioxidant multivitamin (OCUVITE) tablet  1 tablet Oral Daily Jesús Broccoli, APRN - CNP   1 tablet at 10/08/21 1437    potassium chloride (KLOR-CON) extended release tablet 10 mEq  10 mEq Oral Daily Dewight Broccoli, APRN - CNP   10 mEq at 10/08/21 0919    vitamin B-12 (CYANOCOBALAMIN) tablet 1,000 mcg 1,000 mcg Oral Daily Padmini Clause, APRN - CNP   1,000 mcg at 10/08/21 0920    sodium chloride flush 0.9 % injection 5-40 mL  5-40 mL IntraVENous 2 times per day Padmini Clause, APRN - CNP   10 mL at 10/08/21 0940    sodium chloride flush 0.9 % injection 5-40 mL  5-40 mL IntraVENous PRN Padmini Clause, APRN - CNP        0.9 % sodium chloride infusion  25 mL IntraVENous PRN Padmini Clause, APRN - CNP        ondansetron (ZOFRAN-ODT) disintegrating tablet 4 mg  4 mg Oral Q8H PRN Padmini Clause, APRN - CNP        Or    ondansetron (ZOFRAN) injection 4 mg  4 mg IntraVENous Q6H PRN Padmini Clause, APRN - CNP        polyethylene glycol (GLYCOLAX) packet 17 g  17 g Oral Daily PRN Padmini Clause, APRN - CNP        acetaminophen (TYLENOL) tablet 650 mg  650 mg Oral Q6H PRN Padmini Clause, APRN - CNP   650 mg at 10/08/21 1435    Or    acetaminophen (TYLENOL) suppository 650 mg  650 mg Rectal Q6H PRN Padmini Clause, APRN - CNP        tiotropium-olodaterol (STIOLTO) 2.5-2.5 MCG/ACT inhaler 2 puff  2 puff Inhalation Daily Padmini Clause, APRN - CNP   2 puff at 10/08/21 0017    timolol (TIMOPTIC) 0.25 % ophthalmic solution 1 drop  1 drop Both Eyes BID Padmini Clause, APRN - CNP   1 drop at 10/08/21 0017       BP (!) 128/56   Pulse 60   Temp 97.7 °F (36.5 °C) (Oral)   Resp 30   Ht 5' (1.524 m)   Wt 135 lb (61.2 kg)   SpO2 96%   BMI 26.37 kg/m²     PHYSICAL EXAM:  General appearance: Alert awake but not fully oriented-no overt distress-  Head: Supple  Heart: Seems regular rate and rhythm-well-healed incision previous sternotomy, left chest wall cardiac device, which is nontender     LUNGS: Coarse bilaterally on auscultation  Abdomen: Soft, nontender  Extremities: No edema  He has generalized maculopapular rash involving right upper extremity half of the trunk and lower extremity    LABS:  CBC:   Lab Results   Component Value Date    WBC 16.7 10/08/2021    WBC 13.2 10/07/2021    WBC 10.9 05/18/2021    HGB 12.9 10/08/2021    HGB 13.5 10/07/2021    HGB 13.6 05/18/2021     10/08/2021     10/07/2021     05/18/2021     Renal Panel:   Lab Results   Component Value Date     10/08/2021     10/08/2021     10/08/2021    K 4.8 10/07/2021    K 4.6 05/18/2021    K 4.5 11/27/2020    CL 87 10/07/2021    CL 98 05/18/2021    CL 97 11/27/2020    CO2 21 10/07/2021    CO2 28 05/18/2021    CO2 29 11/27/2020    BUN 19 10/07/2021    BUN 13 05/18/2021    BUN 10 11/27/2020    CREATININE 0.8 10/07/2021    CREATININE 0.6 05/18/2021    CREATININE 0.6 11/27/2020    GFRAA >60 10/07/2021    GFRAA >60 05/18/2021    GFRAA >60 11/27/2020    LABGLOM >60 10/07/2021    LABGLOM >60 05/18/2021    LABGLOM >60 11/27/2020    LABALBU 3.8 10/08/2021    LABALBU 4.0 10/07/2021    LABALBU 4.1 05/18/2021         Calcium:    Lab Results   Component Value Date    CALCIUM 9.6 10/07/2021    PTH 63 04/02/2018     Phosphorus:    Lab Results   Component Value Date    PHOS 3.1 11/27/2020       U/A:    Lab Results   Component Value Date    PROTEINU 30 10/08/2021    NITRU NEGATIVE 10/08/2021    COLORU KIET 10/08/2021    WBCUA 5 10/08/2021    RBCUA 5 10/08/2021    MUCUS RARE 10/08/2021    TRICHOMONAS NONE SEEN 10/08/2021    YEAST RARE 05/18/2021    BACTERIA OCCASIONAL 10/08/2021    CLARITYU CLEAR 10/08/2021    SPECGRAV 1.013 10/08/2021    UROBILINOGEN NEGATIVE 10/08/2021    BILIRUBINUR NEGATIVE 10/08/2021    BLOODU NEGATIVE 10/08/2021    KETUA MODERATE 10/08/2021           IMPRESSION:  1. Acute on chronic hyponatremia-suspect from hypervolemia-  2. Cardiomegaly with probable acute decompensated heart failure  3. Generalized rash-question etiology-  4.   Underlying atherosclerotic cardiovascular disease      PLAN:    Start with UA urinary indicis  Carnes insertion  Stop normal saline  Lasix 20 IV twice daily  Redo proBNP level tomorrow morning  Monitor sodium  If her sodium does not improve with IV loop-we will broaden the differential diagnosis-as she may have pulmonary fibrosis-rather than edema  Her thiazide has been discontinued  Follow clinically and biochemically

## 2021-10-08 NOTE — ED NOTES
This Rn requested a sitter whom, sits at bedside because pt jumped over railing falling on floor during day shift hours.       Ezekiel Laws RN  10/07/21 0709

## 2021-10-08 NOTE — CONSULTS
CARDIOLOGY CONSULT NOTE       Reason for consultation: Anticoagulation management    Referring physician:  Wilber Bowen MD     Primary care physician: Tavo Miguel      Dear  Dr. Wilber Bowen MD   Thanks for the consult. Chief Complaints :  Chief Complaint   Patient presents with    Fall      Trauma Alert    Headache     Hematoma to back of head, no LOC        History of present illness:Irene is a 80 y. o.year old who presents with mechanical fall and contusion with hematoma. Majority of information obtained through chart review due to patient's poor concentration. Patient was reportedly moving object in garage when she hit her head on unknown object. Patient has a history of atrial fibrillation and is on Coumadin for anticoagulation. On admission patient had elevated INR and significant hyponatremia. She had CABG x3 in 2009 LIMA-LAD, SVG-RCA and PDA. LHC in 2017 showed patent bypass grafts patient has single-chamber pacemaker, severe pulmonary hypertension, CVA, COPD, and dyslipidemia.      Past medical history:    has a past medical history of Arthritis, Bradycardia, CAD (coronary artery disease), Cardiac pacemaker, CHF (congestive heart failure) ([address] COPD, mild ([address] CVA (cerebrovascular accident) ([address] DJD (degenerative joint disease) of cervical spine, Exhaustion of cardiac pacemaker battery, Family history of cardiovascular disease, Glaucoma, H/O 24 hour EKG monitoring, H/O cardiac catheterization, H/O cardiovascular stress test, H/O cardiovascular stress test, H/O cardiovascular stress test, H/O chest x-ray, H/O Doppler lower venous ultrasound, H/O Doppler ultrasound, H/O Doppler ultrasound, H/O Doppler ultrasound, H/O Doppler ultrasound, H/O echocardiogram, H/O echocardiogram, H/O echocardiogram, H/O echocardiogram, History of complete ECG, History of nuclear stress test, Hx of cardiovascular stress test, HX OTHER MEDICAL, Hyperlipidemia, Hypertension, Mild intermittent asthma, Nausea & vomiting, Obstructive sleep apnea, Paroxysmal atrial fibrillation (HCC), Post PTCA, Pulmonary HTN ([facility] PVD (peripheral vascular disease) ([facility] [address] S/P CABG x 3, S/P PTCA (percutaneous transluminal coronary angioplasty), Thyroid disease, and Unspecified cerebral artery occlusion with cerebral infarction. Past surgical history:   has a past surgical history that includes Appendectomy (1941); Tonsillectomy (1950's); Cardiac surgery (4/09); Hysterectomy, total abdominal (1990's); Colonoscopy (In 2000's); pacemaker placement; Coronary artery bypass graft (4/8/2009); Coronary angioplasty with stent (4/21/2010); other surgical history; Middle ear surgery; and Percutaneous Transluminal Coronary Angio (11/2012). Social History:   reports that she quit smoking about 50 years ago. Her smoking use included cigarettes. She has a 1.25 pack-year smoking history. She has never used smokeless tobacco. She reports previous alcohol use. She reports that she does not use drugs.   Family history:   no family history of CAD, STROKE of DM at early age    Allergies   Allergen Reactions    Darvocet [Propoxyphene N-Acetaminophen]     Ranexa [Ranolazine Er]      Sick to her stomach    Ranolazine      Sick to her stomach    Sulfa Antibiotics Itching    Sulfasalazine Itching    Adhesive Tape Rash    Allantoin Rash    Bacitracin Rash    Gramicidin Rash    Neomycin Rash    Polymyxin B Rash    Pramoxine Hcl Rash    Silicone Rash       warfarin (COUMADIN) daily dosing (placeholder), RX Placeholder  furosemide (LASIX) injection 20 mg, BID  albuterol sulfate  (90 Base) MCG/ACT inhaler 2 puff, BID  aspirin chewable tablet 81 mg, Daily  atorvastatin (LIPITOR) tablet 10 mg, Nightly  carvedilol (COREG) tablet 6.25 mg, BID WC  levothyroxine (SYNTHROID) tablet 88 mcg, Daily  antioxidant multivitamin (OCUVITE) tablet, Daily  potassium chloride (KLOR-CON) extended release tablet 10 mEq, Daily  vitamin B-12 (CYANOCOBALAMIN) tablet 1,000 mcg, Daily  sodium chloride flush 0.9 % injection 5-40 mL, 2 times per day  sodium chloride flush 0.9 % injection 5-40 mL, PRN  0.9 % sodium chloride infusion, PRN  ondansetron (ZOFRAN-ODT) disintegrating tablet 4 mg, Q8H PRN   Or  ondansetron (ZOFRAN) injection 4 mg, Q6H PRN  polyethylene glycol (GLYCOLAX) packet 17 g, Daily PRN  acetaminophen (TYLENOL) tablet 650 mg, Q6H PRN   Or  acetaminophen (TYLENOL) suppository 650 mg, Q6H PRN  tiotropium-olodaterol (STIOLTO) 2.5-2.5 MCG/ACT inhaler 2 puff, Daily  timolol (TIMOPTIC) 0.25 % ophthalmic solution 1 drop, BID      Current Facility-Administered Medications   Medication Dose Route Frequency Provider Last Rate Last Admin    warfarin (COUMADIN) daily dosing (placeholder)   Other RX Placeholder LISA Levi - RANDI        furosemide (LASIX) injection 20 mg  20 mg IntraVENous BID Irene Tran MD   20 mg at 10/08/21 0945    albuterol sulfate  (90 Base) MCG/ACT inhaler 2 puff  2 puff Inhalation BID AnnieLISA Rollins - CNP        aspirin chewable tablet 81 mg  81 mg Oral Daily Warsaw Meme APRN - CNP   81 mg at 10/08/21 0920    atorvastatin (LIPITOR) tablet 10 mg  10 mg Oral Nightly Warsaw Meme APRN - RANDI        carvedilol (COREG) tablet 6.25 mg  6.25 mg Oral BID  Anniejorge Valentine APRN - CNP   6.25 mg at 10/08/21 0918    levothyroxine (SYNTHROID) tablet 88 mcg  88 mcg Oral Daily Warsaw Meme APRN - CNP   88 mcg at 10/08/21 0919    antioxidant multivitamin (OCUVITE) tablet  1 tablet Oral Daily Annie Valentine APRN - CNP        potassium chloride (KLOR-CON) extended release tablet 10 mEq  10 mEq Oral Daily Annie Meme APRN - CNP   10 mEq at 10/08/21 0919    vitamin B-12 (CYANOCOBALAMIN) tablet 1,000 mcg  1,000 mcg Oral Daily [place] Meme APRROLDAN - CNP   1,000 mcg at 10/08/21 0920    sodium chloride flush 0.9 % injection 5-40 mL  5-40 mL IntraVENous 2 times per day LISA Levi CNP   10 mL at 10/08/21 0940  sodium chloride flush 0.9 % injection 5-40 mL  5-40 mL IntraVENous PRN LISA Bro CNP        0.9 % sodium chloride infusion  25 mL IntraVENous PRN LISA Bro CNP        ondansetron (ZOFRAN-ODT) disintegrating tablet 4 mg  4 mg Oral Q8H PRN LISA Bro CNP        Or    ondansetron (ZOFRAN) injection 4 mg  4 mg IntraVENous Q6H PRN LISA Bro CNP        polyethylene glycol (GLYCOLAX) packet 17 g  17 g Oral Daily PRN LISA Bro - RANDI        acetaminophen (TYLENOL) tablet 650 mg  650 mg Oral Q6H PRN LISA Bro CNP        Or    acetaminophen (TYLENOL) suppository 650 mg  650 mg Rectal Q6H PRN LISA Bro CNP        tiotropium-olodaterol (STIOLTO) 2.5-2.5 MCG/ACT inhaler 2 puff  2 puff Inhalation Daily LISA Bro CNP   2 puff at 10/08/21 0017    timolol (TIMOPTIC) 0.25 % ophthalmic solution 1 drop  1 drop Both Eyes BID LISA Bro CNP   1 drop at 10/08/21 0017     Review of Systems:   Review of Systems   Unable to perform ROS: Mental status change          Physical Examination:    Physical Exam  Constitutional:       Appearance: She is well-developed. Cardiovascular:      Rate and Rhythm: Normal rate and regular rhythm. Pulses: Intact distal pulses. Dorsalis pedis pulses are 2+ on the right side and 2+ on the left side. Posterior tibial pulses are 2+ on the right side and 2+ on the left side. Heart sounds: Normal heart sounds, S1 normal and S2 normal.   Pulmonary:      Effort: Pulmonary effort is normal.      Breath sounds: Normal breath sounds. Musculoskeletal:         General: Normal range of motion. Skin:     General: Skin is warm and dry. Neurological:      Mental Status: She is alert. She is disoriented.            Medical decision making and Data review:    Lab Review   Recent Labs     10/08/21  0237   WBC 16.7*   HGB 12.9   HCT 40.3   *      Recent Labs     10/07/21  1012 10/07/21  2140 10/08/21  0758   *   < > 116*   K 4.8  --   --    CL 87*  --   --    CO2 21  --   --    BUN 19  --   --    CREATININE 0.8  --   --     < > = values in this interval not displayed. Recent Labs     10/08/21  0237   AST 23   ALT 16   BILIDIR 1.0*   BILITOT 2.0*   ALKPHOS 152*     No results for input(s): TROPONINT in the last 72 hours. No results for input(s): PROBNP in the last 72 hours. Lab Results   Component Value Date    INR 6.28 (HH) 10/08/2021    PROTIME 82.5 (H) 10/08/2021       EKG: (reviewed by myself)    Nella Seo   Left ventricular systolic function is normal with an ejection fraction of   50-55%. The left atrium is mild to moderately dilated. The right atrium is severely dilated. Mildly dilated right ventricle. Doppler evaluation reveals moderate mitral and severe tricuspid   regurgitation. Right ventricular systolic pressure of 70 mmHg consistent with severe   pulmonary hypertension. No evidence of pericardial effusion. NEED PULM CONS    LHC:2/*8/17  SEVERE NATIVE CAD    OCCLUDED LAD, CX (filling from collaterls), occluded RCA    LIMA to LAD and d1 patent (stents in LAD patent)    SVG to t RCA patent , patent stents    moderate pulmonary HTN ( PAP 45/20)     Chest Xray:(reviewed by myself)  XR PELVIS (1-2 VIEWS)    Result Date: 10/7/2021  EXAMINATION: ONE XRAY VIEW OF THE PELVIS 10/7/2021 10:57 am COMPARISON: None. HISTORY: ORDERING SYSTEM PROVIDED HISTORY: fall, concern for traumatic injury TECHNOLOGIST PROVIDED HISTORY: Reason for exam:->fall, concern for traumatic injury Reason for Exam: fall, concern for traumatic injury FINDINGS: The bony pelvis is intact. The femoral heads overlying the acetabula bilaterally. The sacroiliac joints are normal in appearance. No pelvic fracture identified.      CT HEAD WO CONTRAST    Result Date: 10/7/2021  EXAMINATION: CT OF THE HEAD WITHOUT CONTRAST  10/7/2021 8:33 pm TECHNIQUE: CT of the head was performed without the administration of intravenous contrast. Dose modulation, iterative reconstruction, and/or weight based adjustment of the mA/kV was utilized to reduce the radiation dose to as low as reasonably achievable. COMPARISON: 10/07/2021 CT head HISTORY: ORDERING SYSTEM PROVIDED HISTORY: unwitness TECHNOLOGIST PROVIDED HISTORY: Reason for exam:->unwitness Has a \"code stroke\" or \"stroke alert\" been called? ->No Reason for Exam: unwitness Acuity: Acute Type of Exam: Initial Mechanism of Injury: Pt was walking out of her home, tripped and fell, striking the back of her head with a substantial hematoma resulting FINDINGS: BRAIN/VENTRICLES: There is no acute intracranial hemorrhage, mass effect or midline shift. No abnormal extra-axial fluid collection. The gray-white differentiation is maintained without evidence of an acute infarct. There is no evidence of hydrocephalus. Involutional changes. Ectatic appearance of basilar artery noted unchanged. Basal ganglia hypodensities identified worrisome for chronic lacunar infarcts. .  Evidence of moderate severe chronic microvascular disease. Vascular calcifications. ORBITS: Cataract surgery. SINUSES: Mild paranasal sinus disease. SOFT TISSUES/SKULL:  No calvarial fracture moderate to severe soft tissue swelling identified overlying the right parietal temporal calvarium. No acute intracranial abnormality. Right-sided soft tissue swelling posteriorly. Chronic microvascular disease and involutional change. CT HEAD WO CONTRAST    Result Date: 10/7/2021  EXAMINATION: CT OF THE HEAD WITHOUT CONTRAST  10/7/2021 12:26 pm TECHNIQUE: CT of the head was performed without the administration of intravenous contrast. Dose modulation, iterative reconstruction, and/or weight based adjustment of the mA/kV was utilized to reduce the radiation dose to as low as reasonably achievable.  COMPARISON: 09/28/2013 HISTORY: ORDERING SYSTEM PROVIDED HISTORY: fall, head injury, concern for traumatic injury TECHNOLOGIST PROVIDED HISTORY: Reason for exam:->fall, head injury, concern for traumatic injury Has a \"code stroke\" or \"stroke alert\" been called? ->No Decision Support Exception - unselect if not a suspected or confirmed emergency medical condition->Emergency Medical Condition (MA) Reason for Exam: fall, head injury, concern for traumatic injury Acuity: Acute Type of Exam: Initial FINDINGS: BRAIN/VENTRICLES: There is no acute infarct or acute intracranial hemorrhage present. There is no mass effect or midline shift present. There is diffuse cerebral volume loss. There is periventricular hypoattenuation. No abnormal extra-axial fluid collection is identified. ORBITS: Limited evaluation of the orbits is unremarkable. SINUSES: The paranasal sinuses and mastoid air cells are clear. SOFT TISSUES/SKULL:  No skull fracture is identified. Right posterior parietal scalp soft tissue swelling is present     1. No acute intracranial process identified 2. Right posterior parietal scalp soft tissue swelling. CT CERVICAL SPINE WO CONTRAST    Result Date: 10/7/2021  EXAMINATION: CT OF THE CERVICAL SPINE WITHOUT CONTRAST 10/7/2021 12:42 pm TECHNIQUE: CT of the cervical spine was performed without the administration of intravenous contrast. Multiplanar reformatted images are provided for review. Dose modulation, iterative reconstruction, and/or weight based adjustment of the mA/kV was utilized to reduce the radiation dose to as low as reasonably achievable. COMPARISON: None.  HISTORY: ORDERING SYSTEM PROVIDED HISTORY: fall, head injury, concern for traumatic injury TECHNOLOGIST PROVIDED HISTORY: Reason for exam:->fall, head injury, concern for traumatic injury Decision Support Exception - unselect if not a suspected or confirmed emergency medical condition->Emergency Medical Condition (MA) Reason for Exam: fall, head injury, concern for traumatic injury Acuity: Acute Type of Exam: Initial FINDINGS: BONES/ALIGNMENT: There is no acute fracture or traumatic malalignment. DEGENERATIVE CHANGES: Moderate degenerative change identified within the cervical spine. SOFT TISSUES: There is no prevertebral soft tissue swelling. No acute abnormality of the cervical spine. XR CHEST PORTABLE    Result Date: 10/7/2021  EXAMINATION: ONE XRAY VIEW OF THE CHEST 10/7/2021 10:57 am COMPARISON: 11/27/2020 HISTORY: ORDERING SYSTEM PROVIDED HISTORY: fall, concern for traumatic injury TECHNOLOGIST PROVIDED HISTORY: Reason for exam:->fall, concern for traumatic injury Reason for Exam: fall, concern for traumatic injury FINDINGS: Severe cardiomegaly is unchanged. A left pacemaker is present. Chronic increased interstitial markings are noted throughout the lungs. There is no focal consolidation, pleural effusion or pneumothorax. There is no acute fracture identified. 1. No acute cardiopulmonary process identified. 2. Stable severe cardiomegaly. VL DUP CAROTID BILATERAL    Result Date: 10/8/2021  EXAMINATION: ULTRASOUND EVALUATION OF THE CAROTID ARTERIES 10/8/2021 TECHNIQUE: Duplex ultrasound using B-mode/gray scaled imaging, Doppler spectral analysis and color flow Doppler was obtained of the carotid arteries. COMPARISON: None HISTORY: ORDERING SYSTEM PROVIDED HISTORY: fall TECHNOLOGIST PROVIDED HISTORY: Reason for exam:->fall Reason for Exam: SYNCOPE Acuity: Acute Type of Exam: Initial FINDINGS: RIGHT: The right common carotid artery demonstrates peak systolic velocities of 73, 79 cm/sec in the proximal and distal segments respectively. The right internal carotid artery demonstrates the systolic velocities of 97, 58, 77 cm/sec in the proximal, mid and distal segments respectively. The external carotid artery is patent. The vertebral artery demonstrates normal antegrade flow. No significant atherosclerotic plaque.  ICA/CCA ratio of 1.2 LEFT: The left common carotid artery demonstrates peak systolic velocities of 459, 68 cm/sec in the proximal and distal segments respectively. The left internal carotid artery demonstrates the systolic velocities of 79, 89, 98 cm/sec in the proximal, mid and distal segments respectively. The external carotid artery is patent. The vertebral artery demonstrates normal antegrade flow. No significant atherosclerotic plaque. ICA/CCA ratio of 0.6     The right internal carotid artery demonstrates 0-50% stenosis. The left internal carotid artery demonstrates 0-50% stenosis. Bilateral vertebral arteries are patent with flow in the normal direction. All labs, medications and tests reviewed by myself including data  from outside source , patient and available family . Continue all other medications of all above medical condition listed as is. Impression:  Active Problems:    Hyponatremia  Resolved Problems:    * No resolved hospital problems. *      Assessment: 80 y. o.year old with PMH of  has a past medical history of Arthritis, Bradycardia, CAD (coronary artery disease), Cardiac pacemaker, CHF (congestive heart failure) ([address] COPD, mild ([address] CVA (cerebrovascular accident) ([address] DJD (degenerative joint disease) of cervical spine, Exhaustion of cardiac pacemaker battery, Family history of cardiovascular disease, Glaucoma, H/O 24 hour EKG monitoring, H/O cardiac catheterization, H/O cardiovascular stress test, H/O cardiovascular stress test, H/O cardiovascular stress test, H/O chest x-ray, H/O Doppler lower venous ultrasound, H/O Doppler ultrasound, H/O Doppler ultrasound, H/O Doppler ultrasound, H/O Doppler ultrasound, H/O echocardiogram, H/O echocardiogram, H/O echocardiogram, H/O echocardiogram, History of complete ECG, History of nuclear stress test, Hx of cardiovascular stress test, HX OTHER MEDICAL, Hyperlipidemia, Hypertension, Mild intermittent asthma, Nausea & vomiting, Obstructive sleep apnea, Paroxysmal atrial fibrillation ([address] Post PTCA, Pulmonary HTN ([address] PVD (peripheral vascular disease) ([address] S/P CABG x 3, S/P PTCA (percutaneous transluminal coronary angioplasty), Thyroid disease, and Unspecified cerebral artery occlusion with cerebral infarction. Plan and Recommendations:    1. Atrial Fibrillation: Chads Vasc score is 6, recent hematoma due to mechanical fall. Hold Coumadin due to elevated INR. Patient is 98 % V-paced. Patient living independently prior to hospitalization. She is V-paced without breakthrough episodes. Would not recommend anticoagulation given advanced age and high risk for fall. 2. CAD: CABG x 3 in 2009, LHC in pain showed occluded LAD, circumflex filling from collaterals, occluded RCA. LIMALAD and D1 patent, SVGRCA patent. Had normal nuc med in 2018. Continue with Coreg, Lipitor, aspirin. 3. Mechanical Fall: Denies syncope, contusion hematoma mild appears resolved. 4. Dyslipidemia: continue statins   5. PPM: 98% pacer dependent. 6. Hyponatremia: nephrology on board, . Did not respond to IV and became hypervolemic, Lasix restarted. 7. Severe pulmonary hypertension seen on right heart cath in 2019. Patient does not follow with pulmonary. Will sign off     Thank you  much for consult and giving us the opportunity in contributing in the care of this patient. Please feel free to call me for any questions. Shahzad Kee, LISA - CNP, 10/8/2021 1:30 PM           CARDIOLOGY ATTENDING ADDENDUM    I have seen, spoken to and examined this patient personally, independently of the nurse practitioner. I have reviewed the hospital care given to date and reviewed all pertinent labs and imaging. The plan was developed mutually at the time of the visit with the patient,  NP   and myself. I have spoken with patient, nursing staff and provided written and verbal instructions . The above note has been reviewed and I agree with the assessment, diagnosis, and treatment plan with changes made by me as follows       HPI:  I have reviewed the above HPI  And agree with above   Please review addendum/changes made to note above     Pt seen and examined in [facility] unit      Physical Exam:  General:    NAD  Head:normal  Eye:normal  Neck:  No JVD   Chest:  Clear to auscultation, respiration easy  Cardiovascular:  s1s2  Abdomen:   nontender  Extremities:   edema  Pulses; palpable  Neuro: grossly normal      MEDICAL DECISION MAKING;    I agree with the above plan, which was planned by myself and discussed with NP. We will advised against oral anticoagulation given fall risk, with recurrent falls, and advanced age. Severe hyponatremia: Management as per nephrology.     Please call us with any further questions    Dr. Sarah Penn MD

## 2021-10-08 NOTE — PROGRESS NOTES
PHARMACY ANTICOAGULATION MONITORING SERVICE    May Smalls is a 80 y.o. female on warfarin therapy for PAF. Pharmacy consulted by LISA Silva CNP for monitoring and adjustment of treatment. Indication for anticoagulation: PAF  INR goal: 2 - 3  Warfarin dose prior to admission: 7.5 mg po daily    Pertinent Laboratory Values   Recent Labs     10/07/21  1012 10/07/21  1012 10/08/21  0237   INR 4.92   < > 6.28*   HGB 13.5   < > 12.9   HCT 43.5   < > 40.3     --  443*    < > = values in this interval not displayed. Assessment/Plan:   Drug Interactions:   o Aspirin (Home med)  o Levothyroxine (Home med)  o Acetaminophen (PRN)   INR supratherapeutic on admission, remains supra-therapeutic today @6.28   Will continue to hold warfarin and look to restart once the INR is back down under 3.   Pharmacy will continue to monitor and adjust warfarin therapy as indicated    Thank you for the consult. ABBI Reed [facility]  10/8/2021 3:03 PM

## 2021-10-08 NOTE — CARE COORDINATION
Attempted to meet with pt to initiate discharge planning. Pt was sleeping soundly- did not respond to name/touch. CM to follow up.

## 2021-10-09 LAB
ALBUMIN SERPL-MCNC: 3.9 GM/DL (ref 3.4–5)
ALP BLD-CCNC: 144 IU/L (ref 40–128)
ALT SERPL-CCNC: 21 U/L (ref 10–40)
ANION GAP SERPL CALCULATED.3IONS-SCNC: 11 MMOL/L (ref 4–16)
AST SERPL-CCNC: 27 IU/L (ref 15–37)
BILIRUB SERPL-MCNC: 1.3 MG/DL (ref 0–1)
BUN BLDV-MCNC: 34 MG/DL (ref 6–23)
CALCIUM SERPL-MCNC: 9.5 MG/DL (ref 8.3–10.6)
CHLORIDE BLD-SCNC: 89 MMOL/L (ref 99–110)
CO2: 21 MMOL/L (ref 21–32)
CREAT SERPL-MCNC: 0.8 MG/DL (ref 0.6–1.1)
GFR AFRICAN AMERICAN: >60 ML/MIN/1.73M2
GFR NON-AFRICAN AMERICAN: >60 ML/MIN/1.73M2
GLUCOSE BLD-MCNC: 103 MG/DL (ref 70–99)
HCT VFR BLD CALC: 42.7 % (ref 37–47)
HEMOGLOBIN: 13.4 GM/DL (ref 12.5–16)
INR BLD: 6.84 INDEX
MAGNESIUM: 1.7 MG/DL (ref 1.8–2.4)
MCH RBC QN AUTO: 27.5 PG (ref 27–31)
MCHC RBC AUTO-ENTMCNC: 31.4 % (ref 32–36)
MCV RBC AUTO: 87.7 FL (ref 78–100)
PDW BLD-RTO: 16.6 % (ref 11.7–14.9)
PHOSPHORUS: 2.9 MG/DL (ref 2.5–4.9)
PLATELET # BLD: 441 K/CU MM (ref 140–440)
PMV BLD AUTO: 11.2 FL (ref 7.5–11.1)
POTASSIUM SERPL-SCNC: 4.4 MMOL/L (ref 3.5–5.1)
PRO-BNP: 7265 PG/ML
PROTHROMBIN TIME: 90 SECONDS (ref 11.7–14.5)
RBC # BLD: 4.87 M/CU MM (ref 4.2–5.4)
SODIUM BLD-SCNC: 119 MMOL/L (ref 135–145)
SODIUM BLD-SCNC: 120 MMOL/L (ref 135–145)
SODIUM BLD-SCNC: 121 MMOL/L (ref 135–145)
SODIUM BLD-SCNC: 121 MMOL/L (ref 135–145)
TOTAL PROTEIN: 5.9 GM/DL (ref 6.4–8.2)
WBC # BLD: 14.8 K/CU MM (ref 4–10.5)

## 2021-10-09 PROCEDURE — 97535 SELF CARE MNGMENT TRAINING: CPT

## 2021-10-09 PROCEDURE — 94664 DEMO&/EVAL PT USE INHALER: CPT

## 2021-10-09 PROCEDURE — 83735 ASSAY OF MAGNESIUM: CPT

## 2021-10-09 PROCEDURE — 97530 THERAPEUTIC ACTIVITIES: CPT

## 2021-10-09 PROCEDURE — 80053 COMPREHEN METABOLIC PANEL: CPT

## 2021-10-09 PROCEDURE — 97116 GAIT TRAINING THERAPY: CPT

## 2021-10-09 PROCEDURE — 83880 ASSAY OF NATRIURETIC PEPTIDE: CPT

## 2021-10-09 PROCEDURE — 1200000000 HC SEMI PRIVATE

## 2021-10-09 PROCEDURE — 6370000000 HC RX 637 (ALT 250 FOR IP): Performed by: NURSE PRACTITIONER

## 2021-10-09 PROCEDURE — 6370000000 HC RX 637 (ALT 250 FOR IP): Performed by: INTERNAL MEDICINE

## 2021-10-09 PROCEDURE — 97162 PT EVAL MOD COMPLEX 30 MIN: CPT

## 2021-10-09 PROCEDURE — 2580000003 HC RX 258: Performed by: NURSE PRACTITIONER

## 2021-10-09 PROCEDURE — 97166 OT EVAL MOD COMPLEX 45 MIN: CPT

## 2021-10-09 PROCEDURE — 84100 ASSAY OF PHOSPHORUS: CPT

## 2021-10-09 PROCEDURE — 94761 N-INVAS EAR/PLS OXIMETRY MLT: CPT

## 2021-10-09 PROCEDURE — 85027 COMPLETE CBC AUTOMATED: CPT

## 2021-10-09 PROCEDURE — 6360000002 HC RX W HCPCS: Performed by: INTERNAL MEDICINE

## 2021-10-09 PROCEDURE — 84295 ASSAY OF SERUM SODIUM: CPT

## 2021-10-09 PROCEDURE — 94640 AIRWAY INHALATION TREATMENT: CPT

## 2021-10-09 PROCEDURE — 36415 COLL VENOUS BLD VENIPUNCTURE: CPT

## 2021-10-09 PROCEDURE — 85610 PROTHROMBIN TIME: CPT

## 2021-10-09 RX ORDER — FUROSEMIDE 10 MG/ML
20 INJECTION INTRAMUSCULAR; INTRAVENOUS 3 TIMES DAILY
Status: DISCONTINUED | OUTPATIENT
Start: 2021-10-09 | End: 2021-10-11

## 2021-10-09 RX ORDER — PHYTONADIONE 5 MG/1
5 TABLET ORAL ONCE
Status: COMPLETED | OUTPATIENT
Start: 2021-10-09 | End: 2021-10-09

## 2021-10-09 RX ADMIN — Medication 1 TABLET: at 08:49

## 2021-10-09 RX ADMIN — ATORVASTATIN CALCIUM 10 MG: 20 TABLET, FILM COATED ORAL at 22:34

## 2021-10-09 RX ADMIN — CARVEDILOL 6.25 MG: 6.25 TABLET, FILM COATED ORAL at 08:49

## 2021-10-09 RX ADMIN — ASPIRIN 81 MG: 81 TABLET, CHEWABLE ORAL at 08:49

## 2021-10-09 RX ADMIN — TIMOLOL MALEATE 1 DROP: 2.5 SOLUTION/ DROPS OPHTHALMIC at 08:50

## 2021-10-09 RX ADMIN — SODIUM CHLORIDE, PRESERVATIVE FREE 10 ML: 5 INJECTION INTRAVENOUS at 08:50

## 2021-10-09 RX ADMIN — PHYTONADIONE 5 MG: 5 TABLET ORAL at 06:11

## 2021-10-09 RX ADMIN — CARVEDILOL 6.25 MG: 6.25 TABLET, FILM COATED ORAL at 16:51

## 2021-10-09 RX ADMIN — FUROSEMIDE 20 MG: 10 INJECTION, SOLUTION INTRAMUSCULAR; INTRAVENOUS at 22:34

## 2021-10-09 RX ADMIN — SODIUM CHLORIDE, PRESERVATIVE FREE 10 ML: 5 INJECTION INTRAVENOUS at 22:34

## 2021-10-09 RX ADMIN — TIOTROPIUM BROMIDE AND OLODATEROL 2 PUFF: 3.124; 2.736 SPRAY, METERED RESPIRATORY (INHALATION) at 08:03

## 2021-10-09 RX ADMIN — LEVOTHYROXINE SODIUM 88 MCG: 0.09 TABLET ORAL at 08:49

## 2021-10-09 RX ADMIN — ALBUTEROL SULFATE 2 PUFF: 90 AEROSOL, METERED RESPIRATORY (INHALATION) at 08:02

## 2021-10-09 RX ADMIN — FUROSEMIDE 20 MG: 10 INJECTION, SOLUTION INTRAVENOUS at 08:49

## 2021-10-09 RX ADMIN — CYANOCOBALAMIN TAB 1000 MCG 1000 MCG: 1000 TAB at 08:49

## 2021-10-09 RX ADMIN — POTASSIUM CHLORIDE 10 MEQ: 750 TABLET, FILM COATED, EXTENDED RELEASE ORAL at 08:49

## 2021-10-09 ASSESSMENT — PAIN SCALES - GENERAL
PAINLEVEL_OUTOF10: 0
PAINLEVEL_OUTOF10: 0

## 2021-10-09 NOTE — PROGRESS NOTES
Critical INR of 8.16 last night, Dr. Pio Silvestre notified, 5 mg of Vitamin K ordered. Will continue to monitor.

## 2021-10-09 NOTE — PROGRESS NOTES
Son [phone] Karan Lewis daughter wants to be updated on CT results. Tried to message Dr. Tushar Chaudhry but it sent me to Dr. Pablito Saunders but Dr. Pablito Saunders stated he is not the attending today.

## 2021-10-09 NOTE — PROGRESS NOTES
Physical Therapy  [facility] ACUTE CARE PHYSICAL THERAPY EVALUATION  Yazan Rojas, 3/18/1929, 4109/4109-A, 10/9/2021    History  [place]:  The primary encounter diagnosis was Hyponatremia. A diagnosis of Elevated INR was also pertinent to this visit. Patient  has a past medical history of Arthritis, Bradycardia, CAD (coronary artery disease), Cardiac pacemaker, CHF (congestive heart failure) ([address] COPD, mild ([address] CVA (cerebrovascular accident) ([address] DJD (degenerative joint disease) of cervical spine, Exhaustion of cardiac pacemaker battery, Family history of cardiovascular disease, Glaucoma, H/O 24 hour EKG monitoring, H/O cardiac catheterization, H/O cardiovascular stress test, H/O cardiovascular stress test, H/O cardiovascular stress test, H/O chest x-ray, H/O Doppler lower venous ultrasound, H/O Doppler ultrasound, H/O Doppler ultrasound, H/O Doppler ultrasound, H/O Doppler ultrasound, H/O echocardiogram, H/O echocardiogram, H/O echocardiogram, H/O echocardiogram, History of complete ECG, History of nuclear stress test, Hx of cardiovascular stress test, HX OTHER MEDICAL, Hyperlipidemia, Hypertension, Mild intermittent asthma, Nausea & vomiting, Obstructive sleep apnea, Paroxysmal atrial fibrillation ([address] Post PTCA, Pulmonary HTN ([address] PVD (peripheral vascular disease) ([address] S/P CABG x 3, S/P PTCA (percutaneous transluminal coronary angioplasty), Thyroid disease, and Unspecified cerebral artery occlusion with cerebral infarction. Patient  has a past surgical history that includes Appendectomy (1941); Tonsillectomy (1950's); Cardiac surgery (4/09); Hysterectomy, total abdominal (1990's); Colonoscopy (In 2000's); pacemaker placement; Coronary artery bypass graft (4/8/2009); Coronary angioplasty with stent (4/21/2010); other surgical history; Middle ear surgery; and Percutaneous Transluminal Coronary Angio (11/2012).     Subjective:  Patient states:  \"I'm ghulam to have made it to this age!\"  Pain:  Denies   Communication with other providers:   RN, co-eval with OTAyleen   Restrictions: general precautions, falls, LOU    Home Setup/Prior level of function  Social/Functional History  Lives With: Alone  Type of Home:  (Condo)  Home Layout: One level  Home Access: Level entry  Bathroom Shower/Tub: Walk-in shower  Bathroom Toilet: Standard  Bathroom Equipment: Shower chair, Grab bars in shower, Grab bars around toilet  [address]: 4 wheeled walker, Cane (Pt does not ambulate with AD at baseline.)  ADL Assistance: Independent  Homemaking Assistance: Independent  Homemaking Responsibilities: Yes  Ambulation Assistance: Independent  Transfer Assistance: Independent  Active : No  Patient's  Info: Daughter in law does grocery shopping. Examination of body systems (includes body structures/functions, activity/participation limitations):  · Observation: Supine in bed upon arrival. Cooperative to work with therapy   · Vision:  [employer]  · Hearing:  Very [place], hearing aids but did not help much  · Cardiopulmonary:  Stable vitals on room air   · Orientation: oriented to person,place, time. Slightly disoriented to situation. Musculoskeletal  · ROM R/L:  WFL BLEs   · Strength R/L:  BLEs grossly 4/5    Mobility/treatment:   · Rolling L/R:  NT   · Supine to sit:  [place] for trunk steadying/boost. HOB elevated ~60 deg with use of bed rail  · Transfers:   · Sit to stand: [place] from EOB and toilet. Poor ability to hear commands to transition UEs from seat surface to AD. Ended up pulling from AD to stand  · Stand to sit: [place] for controlling sit to low toilet and recliner. Dec command following due to being very [place] to transition UEs back to chair for support   · Step pivot: CGA with RW, [place] with HHA needing increased steadying assist   · Sitting balance:  Varied from SBA to [place]. Tactile cues for attaining midline. Demonstrated increased trunk sway in a circular motion.  Reported it was [employer] though did the same when sitting on the toilet. · Standing balance:  CGA to [place] while therapist assisted with tucker care. Demonstrated fwd lean on walker with dec awareness to walker moving in front of her. CGA while at sink performing hand hygiene tasks without UE support. ~2 minute + 5 minutes. · Gait: ~10ft with HHA and [place] for steadying assist. Timid steps with slow pace. 10ft with RW CGA for safety. VCS for body positioning within RW. Slightly improved fluency with RW though still demonstrated a shorter stride with slower pace  · Educated pt on POC, role of PT, DME. Cues and demonstrations provided for sequencing to inc safety and indep with mobility     [facility] 6 Clicks Inpatient Mobility:  AM-PAC Inpatient Mobility Raw Score : 18    Safety: patient left in chair with alarm and , call light within reach,  gait belt used. Assessment:  Pt is a 80year old female admitted after a fall sustaining a hematoma to the back of her head. Diagnosed with hyponatremia. Recommend subacute rehab once medically stable. At baseline she reports being indep with gross mobility and ADLs. She performed below her typical baseline this date and would benefit from continued therapy to address her current deficits, dec potential fall risk, and restore function. Complexity: Moderate  Prognosis: Good, no significant barriers to participation at this time. Plan Times per week: 3+/week  Discharge Recommendations: [address].  If pt elects to return home, would strongly recommend 24/7 supervision/assist and  PT with use of walker for mobility  Equipment: no needs at this time     Goals:  Short term goals  Time Frame for Short term goals: 1 week  Short term goal 1: Pt will perform bed mobility SBA  Short term goal 2: Pt will transfer from all surfaces SBA  Short term goal 3: Pt will ambulate 75ft with LRAD SBA  Short term goal 4: Pt will perform standing light dynamic activity without UE support x 3

## 2021-10-09 NOTE — PLAN OF CARE
Problem: Falls - Risk of:  Goal: Will remain free from falls  Description: Will remain free from falls  10/9/2021 1021 by Alok Coe RN  Outcome: Ongoing  10/9/2021 1018 by Alok Coe RN  Outcome: Ongoing  Goal: Absence of physical injury  Description: Absence of physical injury  10/9/2021 1021 by Alok Coe RN  Outcome: Ongoing  10/9/2021 1018 by Alok Coe RN  Outcome: Ongoing     Problem: Infection:  Goal: Will remain free from infection  Description: Will remain free from infection  10/9/2021 1021 by Alok Coe RN  Outcome: Ongoing  10/9/2021 1018 by Alok Coe RN  Outcome: Ongoing     Problem: Safety:  Goal: Free from accidental physical injury  Description: Free from accidental physical injury  10/9/2021 1021 by Alok Coe RN  Outcome: Ongoing  10/9/2021 1018 by Alok Coe RN  Outcome: Ongoing  Goal: Free from intentional harm  Description: Free from intentional harm  10/9/2021 1021 by Alok Coe RN  Outcome: Ongoing  10/9/2021 1018 by Alok Coe RN  Outcome: Ongoing     Problem: Daily Care:  Goal: Daily care needs are met  Description: Daily care needs are met  10/9/2021 1021 by Alok Coe RN  Outcome: Ongoing  10/9/2021 1018 by Alok Coe RN  Outcome: Met This Shift     Problem: Pain:  Goal: Patient's pain/discomfort is manageable  Description: Patient's pain/discomfort is manageable  10/9/2021 1021 by Alok Coe RN  Outcome: Ongoing  10/9/2021 1018 by Alok Coe RN  Outcome: Met This Shift     Problem: Skin Integrity:  Goal: Skin integrity will stabilize  Description: Skin integrity will stabilize  10/9/2021 1021 by Alok Coe RN  Outcome: Ongoing  10/9/2021 1018 by Alok Coe RN  Outcome: Ongoing     Problem: Discharge Planning:  Goal: Patients continuum of care needs are met  Description: Patients continuum of care needs are met  10/9/2021 1021 by Alok Coe RN  Outcome: Ongoing  10/9/2021 1018 by Alok Coe RN  Outcome: Ongoing Problem: Neurological  Goal: Maximum potential motor/sensory/cognitive function  10/9/2021 1021 by Judith Amin RN  Outcome: Ongoing  10/9/2021 1018 by Judith Amin RN  Outcome: Ongoing     Problem: Nutrition  Goal: Optimal nutrition therapy  10/9/2021 1021 by Judith Amin RN  Outcome: Ongoing  10/9/2021 1018 by Judith Amin RN  Outcome: Ongoing  Goal: Understanding of nutritional guidelines  10/9/2021 1021 by Judith Amin RN  Outcome: Ongoing  10/9/2021 1018 by Judith Amin RN  Outcome: Ongoing     Problem: Musculor/Skeletal Functional Status  Goal: Highest potential functional level  10/9/2021 1021 by Judith Amin RN  Outcome: Ongoing  10/9/2021 1018 by Judith Amin RN  Outcome: Ongoing  Goal: Absence of falls  10/9/2021 1021 by Judith Amin RN  Outcome: Ongoing  10/9/2021 1018 by Judith Amin RN  Outcome: Ongoing

## 2021-10-09 NOTE — PROGRESS NOTES
PHARMACY ANTICOAGULATION MONITORING SERVICE    Nela Sue is a 80 y.o. female on warfarin therapy for PAF. Pharmacy consulted by LISA Levi CNP for monitoring and adjustment of treatment. Indication for anticoagulation: PAF  INR goal: 2 - 3  Warfarin dose prior to admission: 7.5 mg po daily    Pertinent Laboratory Values   Recent Labs     10/07/21  1012 10/07/21  1012 10/08/21  0237 10/08/21  2215 10/09/21  0445   INR 4.92   < > 6.28*   < > 6.84*   HGB 13.5   < > 12.9  --  13.4   HCT 43.5   < > 40.3  --  42.7     --  443*  --  441*    < > = values in this interval not displayed.        Assessment/Plan:   Drug Interactions:   o Aspirin (Home med)  o Levothyroxine (Home med)  o Acetaminophen (PRN)   INR supratherapeutic on admission, remains supra-therapeutic today @6.84   Continue to hold warfarin and look to restart once the INR is back down under [place] will continue to monitor and adjust warfarin therapy as indicated    Thank you for the consult,  Emily Torrez [facility]  10/9/2021 5:14 PM

## 2021-10-09 NOTE — CONSULTS
[address] ACUTE CARE OCCUPATIONAL THERAPY EVALUATION    Juanjose Feliz, 3/18/1929, 4109/4109-A, 10/9/2021    Discharge Recommendation: [place]      History:  [place]:  The primary encounter diagnosis was Hyponatremia. A diagnosis of Elevated INR was also pertinent to this visit. Subjective:  Patient states: Agreeable to therapy. Pain: Pt denied pain this date (0/10). Communication with other providers: PT, RN  Restrictions: General Precautions, Fall Risk    Home Setup/Prior level of function:  Social/Functional History  Lives With: Alone  Type of Home:  (Condo)  Home Layout: One level  Home Access: Level entry  Bathroom Shower/Tub: Walk-in shower  Bathroom Toilet: Standard  Bathroom Equipment: Shower chair, Grab bars in shower, Grab bars around toilet  [address]: 4 wheeled walker, Cane (Pt does not ambulate with AD at baseline.)  ADL Assistance: Independent  Homemaking Assistance: Independent  Homemaking Responsibilities: Yes  Ambulation Assistance: Independent  Transfer Assistance: Independent  Active : No  Patient's  Info: Daughter in law does grocery shopping. Examination:  · Observation: Supine in bed upon arrival  · Vision: [employer] [facility]  · Hearing: [place]  · Vitals: Stable vitals throughout session    Body Systems and functions:  · ROM: WFL   · Strength: WFL   · Sensation: WFL  · Tone: Normal  · Coordination: WFL  · Perception: WNL    Activities of Daily Living (ADLs):  · Feeding: Danica  setup and increased time. · Grooming: CGA pt performed hand hygiene in standing with CGA for balance, VC throughout for sequencing, and increased time.    · UB bathing: SBA in seated with setup, increased time, VC.   · LB bathing: Chava in seated with assist for dynamic sitting balance and distal reaching, setup, increased time, VC.  · UB dressing: SBA in seated with setup, increased time, VC.    · LB dressing: MaxA pt required assistance to thread depend in seated and manage depend in standing this date. Setup, increased time, VC. · Toileting: MaxA pt required assistance for commode transfers dynamic sitting balance while on the commode, LB clothing management, and tucker care in standing. Cognitive and Psychosocial Functioning:  · Overall cognitive status: Oriented time, date, person, place, city. Intermittent episodes of confusion this date. · Affect: Normal     Balance:   · Sitting: SBA-Chava (pt sat EOB demo fair to fair+ dynamic sitting balance). · Standing: CGA-Chava (pt stood with RW. Demo fair dynamic standing balance). Functional Mobility:   · Bed Mobility: Chava (pt performed supine to seated bed mobility with assist for trunk support, and VC for sequencing throughout). · Ambulation: CGA-Chava (pt ambulated approx 10ft with unilateral HHA. Demo fair pace throughout. Pt then ambulated approx 10ft with CGA and RW, demo improved balance, however required VC for RW management. · Transfers:   · Chava (pt performed STS from EOB with unilateral HHA. Required VC throughout for sequencing and increased time). · Chava (stand to sit to commode)  · Chava (STS from commode with RW and VC throughout for sequencing)      AM-PAC 6 click short form for inpatient daily activity:   How much help from another person does the patient currently need. .. Unable  Dep A Lot  Max A A Lot   Mod A A Little  Min A A Little   CGA  SBA None   Mod I  Indep  Sup   1. Putting on and taking off regular lower body clothing? [] 1    [x] 2   [] 2   [] 3   [] 3   [] 4      2. Bathing (including washing, rinsing, drying)? [] 1   [] 2   [] 2 [x] 3 [] 3 [] 4   3. Toileting, which includes using toilet, bedpan, or urinal? [] 1    [x] 2   [] 2   [] 3   [] 3   [] 4     4. Putting on and taking off regular upper body clothing? [] 1   [] 2   [] 2   [] 3   [x] 3    [] 4      5. Taking care of personal grooming such as brushing teeth? [] 1   [] 2    [] 2 [] 3    [x] 3   [] 4      6. Eating meals?    [] 1   [] 2   [] 2   [] 3   [] 3   [x] 4      Raw Score:  17     [24=0% impaired(CH), 23=1-19%(CI), 20-22=20-39%(CJ), 15-19=40-59%(CK), 10-14=60-79%(CL), 7-9=80-99%(CM), 6=100%(CN)]    Treatment:  Therapeutic Activity Training:   Therapeutic activity training was instructed today. Cues were given for safety, sequence, UE/LE placement, awareness, and balance. Activities performed today included bed mobility training, sup-sit, sit-stand, ambulation. Self Care Training:   Cues were given for safety, sequence, UE/LE placement, visual cues, and balance. Activities performed today included dressing, toileting, hand hygiene. Safety Measures: Gait belt used, Left in chair, Alarm in place    Assessment:  Assessment  Performance deficits / Impairments: Decreased functional mobility , Decreased high-level IADLs, Decreased balance, Decreased ADL status, Decreased strength, Decreased endurance  Treatment Diagnosis: hyponatremia  Prognosis: Good  Decision Making: Medium Complexity  REQUIRES OT FOLLOW UP: Yes  Discharge Recommendations: [address]    Pt is a 80year old F admitted for hyponatremia. Pt reports that prior to admission she was IND in ADLs, IADLs, and functional mobility. This date, pt demo decreased strength, balance, activity tolerance, and overall functional mobility impacting ADL status. Pt is currently functioning below baseline and would benefit from skilled OT services at SNF. Plan:  Plan  Times per week: 2+  Times per day: Daily      Goals:  1. Pt will complete all aspects of bed mobility for EOB/OOB ADLs CGA. 2. Pt will complete LB bathing with Chava and AE as needed. 3. Pt will complete all aspects of LB dressing with Chava and AE as needed. 4. Pt will complete all functional transfers to and from bed, chair, toilet, shower chair with CGA and AD. 5. Pt will ambulate HH distance to bathroom for toileting with SBA. 6. Pt will complete all aspects of toileting task with Chava.   7. Pt will complete oral hygiene/grooming routine in standing at sink with SBA demo good dynamic standing balance for approx 8 minutes. 8. Pt will complete ther ex/ther act with focus on UB strengthening. Time:   Time in: 913  Time out: 950  Timed treatment minutes: 25  Total time: 37      Electronically signed by:       ADRIEL Shen/L, [address] [place] [id number]

## 2021-10-09 NOTE — PROGRESS NOTES
Hospitalist Progress Note      Name:  Kelli Hoyt /Age/Sex: 3/18/1929  (80 y.o. female)   MRN & CSN:  [id number] & [id number] Admission Date/Time: 10/7/2021 10:27 AM   Location:  [address] [place] [address] PCP: [address] Day: 3    Assessment and Plan:   Kelli Hoyt is a 80 y.o.  female  who presents with <principal problem not specified>    Hyponatremia: appreciate Nephrology input, loop diuretics, continue to monitor    2:Supratherapeutic INR: continue to hold per pharmacy    3: Acute decompensated heart failure on Lasix making good urine    4\" Dispo: continue to monitor     Diet ADULT DIET; Regular; Low Fat/Low Chol/High Fiber/2 gm Na   DVT Prophylaxis [] Lovenox, []  Heparin, [] SCDs, [] Ambulation   GI Prophylaxis [] PPI,  [] H2 Blocker,  [] Carafate,  [] Diet/Tube Feeds   Code Status Full Code   Disposition Patient requires continued admission due to hyponatremia    MDM [] Low, [] Moderate,[x]  High  Patient's risk as above due to advance age      History of Present Illness:     Chief Complaint: <principal problem not specified>  Kelli Hoyt is a 80 y.o.  female  who presents with supratherapeutic INR hyponatremia        Ten point ROS reviewed negative, unless as noted above    Objective: Intake/Output Summary (Last 24 hours) at 10/9/2021 1716  Last data filed at 10/9/2021 0703  Gross per 24 hour   Intake 120 ml   Output 1200 ml   Net -1080 ml      Vitals:   Vitals:    10/09/21 1522   BP: (!) 127/96   Pulse: 65   Resp: 16   Temp: 97.2 °F (36.2 °C)   SpO2: 96%     Physical Exam:   GEN Awake female, sitting upright in bed in no apparent distress. Appears given age.hard of hearing   RESP Crackles bilaterally  CARDIO/VASC S1/S2 auscultated. Regular rate without appreciable murmurs, rubs, or gallops. No JVD or carotid bruits. Peripheral pulses equal bilaterally and palpable. No peripheral edema. GI Abdomen is soft without significant tenderness, masses, or guarding.  Bowel sounds are normoactive. Rectal exam deferred. NEURO Cranial nerves appear grossly intact, normal speech, no lateralizing weakness. PSYCH Awake, alert, oriented x 4. Affect appropriate. Medications:   Medications:    furosemide  20 mg IntraVENous TID    warfarin (COUMADIN) daily dosing (placeholder)   Other RX Placeholder    albuterol sulfate HFA  2 puff Inhalation BID    aspirin  81 mg Oral Daily    atorvastatin  10 mg Oral Nightly    carvedilol  6.25 mg Oral BID WC    levothyroxine  88 mcg Oral Daily    ocuvite-lutein  1 tablet Oral Daily    potassium chloride  10 mEq Oral Daily    vitamin B-12  1,000 mcg Oral Daily    sodium chloride flush  5-40 mL IntraVENous 2 times per day    tiotropium-olodaterol  2 puff Inhalation Daily    timolol  1 drop Both Eyes BID      Infusions:    sodium chloride       PRN Meds: sodium chloride flush, 5-40 mL, PRN  sodium chloride, 25 mL, PRN  ondansetron, 4 mg, Q8H PRN   Or  ondansetron, 4 mg, Q6H PRN  polyethylene glycol, 17 g, Daily PRN  acetaminophen, 650 mg, Q6H PRN   Or  acetaminophen, 650 mg, Q6H PRN          Patient is still admitted because hyponatremia. The anticipated discharge is in greater than 24 hours.      Electronically signed by Comfort Lopez MD on 10/9/2021 at 5:16 PM

## 2021-10-10 LAB
ALBUMIN SERPL-MCNC: 3.4 GM/DL (ref 3.4–5)
ALP BLD-CCNC: 145 IU/L (ref 40–128)
ALT SERPL-CCNC: 22 U/L (ref 10–40)
ANION GAP SERPL CALCULATED.3IONS-SCNC: 12 MMOL/L (ref 4–16)
AST SERPL-CCNC: 22 IU/L (ref 15–37)
BILIRUB SERPL-MCNC: 1.2 MG/DL (ref 0–1)
BUN BLDV-MCNC: 27 MG/DL (ref 6–23)
CALCIUM SERPL-MCNC: 9.2 MG/DL (ref 8.3–10.6)
CHLORIDE BLD-SCNC: 90 MMOL/L (ref 99–110)
CO2: 23 MMOL/L (ref 21–32)
CREAT SERPL-MCNC: 0.7 MG/DL (ref 0.6–1.1)
GFR AFRICAN AMERICAN: >60 ML/MIN/1.73M2
GFR NON-AFRICAN AMERICAN: >60 ML/MIN/1.73M2
GLUCOSE BLD-MCNC: 100 MG/DL (ref 70–99)
INR BLD: 1.96 INDEX
MAGNESIUM: 1.5 MG/DL (ref 1.8–2.4)
PHOSPHORUS: 2.8 MG/DL (ref 2.5–4.9)
POTASSIUM SERPL-SCNC: 4.5 MMOL/L (ref 3.5–5.1)
PROTHROMBIN TIME: 25.5 SECONDS (ref 11.7–14.5)
SODIUM BLD-SCNC: 123 MMOL/L (ref 135–145)
SODIUM BLD-SCNC: 124 MMOL/L (ref 135–145)
SODIUM BLD-SCNC: 125 MMOL/L (ref 135–145)
TOTAL PROTEIN: 5.3 GM/DL (ref 6.4–8.2)

## 2021-10-10 PROCEDURE — 2700000000 HC OXYGEN THERAPY PER DAY

## 2021-10-10 PROCEDURE — 36415 COLL VENOUS BLD VENIPUNCTURE: CPT

## 2021-10-10 PROCEDURE — 6370000000 HC RX 637 (ALT 250 FOR IP): Performed by: NURSE PRACTITIONER

## 2021-10-10 PROCEDURE — 84100 ASSAY OF PHOSPHORUS: CPT

## 2021-10-10 PROCEDURE — 6370000000 HC RX 637 (ALT 250 FOR IP): Performed by: INTERNAL MEDICINE

## 2021-10-10 PROCEDURE — 85610 PROTHROMBIN TIME: CPT

## 2021-10-10 PROCEDURE — 94761 N-INVAS EAR/PLS OXIMETRY MLT: CPT

## 2021-10-10 PROCEDURE — 84295 ASSAY OF SERUM SODIUM: CPT

## 2021-10-10 PROCEDURE — 80053 COMPREHEN METABOLIC PANEL: CPT

## 2021-10-10 PROCEDURE — 6360000002 HC RX W HCPCS: Performed by: INTERNAL MEDICINE

## 2021-10-10 PROCEDURE — 2580000003 HC RX 258: Performed by: NURSE PRACTITIONER

## 2021-10-10 PROCEDURE — 83735 ASSAY OF MAGNESIUM: CPT

## 2021-10-10 PROCEDURE — 94640 AIRWAY INHALATION TREATMENT: CPT

## 2021-10-10 PROCEDURE — 1200000000 HC SEMI PRIVATE

## 2021-10-10 RX ORDER — WARFARIN SODIUM 5 MG/1
5 TABLET ORAL DAILY
Status: DISCONTINUED | OUTPATIENT
Start: 2021-10-10 | End: 2021-10-14

## 2021-10-10 RX ORDER — MAGNESIUM SULFATE IN WATER 40 MG/ML
2000 INJECTION, SOLUTION INTRAVENOUS ONCE
Status: COMPLETED | OUTPATIENT
Start: 2021-10-10 | End: 2021-10-10

## 2021-10-10 RX ADMIN — ALBUTEROL SULFATE 2 PUFF: 90 AEROSOL, METERED RESPIRATORY (INHALATION) at 13:11

## 2021-10-10 RX ADMIN — CYANOCOBALAMIN TAB 1000 MCG 1000 MCG: 1000 TAB at 09:29

## 2021-10-10 RX ADMIN — WARFARIN SODIUM 5 MG: 5 TABLET ORAL at 18:18

## 2021-10-10 RX ADMIN — ATORVASTATIN CALCIUM 10 MG: 20 TABLET, FILM COATED ORAL at 21:38

## 2021-10-10 RX ADMIN — Medication 1 TABLET: at 09:29

## 2021-10-10 RX ADMIN — ALBUTEROL SULFATE 2 PUFF: 90 AEROSOL, METERED RESPIRATORY (INHALATION) at 21:39

## 2021-10-10 RX ADMIN — TIMOLOL MALEATE 1 DROP: 2.5 SOLUTION/ DROPS OPHTHALMIC at 09:29

## 2021-10-10 RX ADMIN — MAGNESIUM SULFATE HEPTAHYDRATE 2000 MG: 40 INJECTION, SOLUTION INTRAVENOUS at 11:00

## 2021-10-10 RX ADMIN — ASPIRIN 81 MG: 81 TABLET, CHEWABLE ORAL at 09:29

## 2021-10-10 RX ADMIN — TIMOLOL MALEATE 1 DROP: 2.5 SOLUTION/ DROPS OPHTHALMIC at 00:42

## 2021-10-10 RX ADMIN — FUROSEMIDE 20 MG: 10 INJECTION, SOLUTION INTRAMUSCULAR; INTRAVENOUS at 12:21

## 2021-10-10 RX ADMIN — SODIUM CHLORIDE, PRESERVATIVE FREE 10 ML: 5 INJECTION INTRAVENOUS at 09:30

## 2021-10-10 RX ADMIN — FUROSEMIDE 20 MG: 10 INJECTION, SOLUTION INTRAMUSCULAR; INTRAVENOUS at 09:29

## 2021-10-10 RX ADMIN — CARVEDILOL 6.25 MG: 6.25 TABLET, FILM COATED ORAL at 16:53

## 2021-10-10 RX ADMIN — TIOTROPIUM BROMIDE AND OLODATEROL 2 PUFF: 3.124; 2.736 SPRAY, METERED RESPIRATORY (INHALATION) at 13:10

## 2021-10-10 RX ADMIN — CARVEDILOL 6.25 MG: 6.25 TABLET, FILM COATED ORAL at 09:29

## 2021-10-10 RX ADMIN — FUROSEMIDE 20 MG: 10 INJECTION, SOLUTION INTRAMUSCULAR; INTRAVENOUS at 22:13

## 2021-10-10 RX ADMIN — TIMOLOL MALEATE 1 DROP: 2.5 SOLUTION/ DROPS OPHTHALMIC at 21:41

## 2021-10-10 RX ADMIN — SODIUM CHLORIDE, PRESERVATIVE FREE 10 ML: 5 INJECTION INTRAVENOUS at 21:39

## 2021-10-10 RX ADMIN — POTASSIUM CHLORIDE 10 MEQ: 750 TABLET, FILM COATED, EXTENDED RELEASE ORAL at 09:29

## 2021-10-10 RX ADMIN — LEVOTHYROXINE SODIUM 88 MCG: 0.09 TABLET ORAL at 09:29

## 2021-10-10 ASSESSMENT — PAIN SCALES - GENERAL: PAINLEVEL_OUTOF10: 0

## 2021-10-10 NOTE — PROGRESS NOTES
PHARMACY ANTICOAGULATION MONITORING SERVICE    Jeni Barcenas is a 80 y.o. female on warfarin therapy for PAF. Pharmacy consulted by LISA Fields CNP for monitoring and adjustment of treatment. Indication for anticoagulation: PAF  INR goal: 2 - 3  Warfarin dose prior to admission: 7.5 mg po daily    Pertinent Laboratory Values   Recent Labs     10/08/21  0237 10/08/21  2215 10/09/21  0445 10/09/21  0445 10/10/21  0506   INR 6.28*   < > 6.84*   < > 1.96   HGB 12.9  --  13.4  --   --    HCT 40.3  --  42.7  --   --    *  --  441*  --   --     < > = values in this interval not displayed.        Assessment/Plan:   Drug Interactions:   o Aspirin (Home med)  o Levothyroxine (Home med)  o Acetaminophen (PRN)   INR supratherapeutic on admission, now below goal following held doses and vitamin K   Resume patient on warfarin 5 mg daily and look to make adjustments as needed based on INR trends, interacting medications, and other clinical factors   Pharmacy will continue to monitor and adjust warfarin therapy as indicated    Thank you for the consult,  Moy Ferrara, [facility]  10/10/2021 5:39 PM

## 2021-10-10 NOTE — PROGRESS NOTES
Nephrology Progress Note  10/10/2021 3:15 PM        Subjective:   Admit Date: 10/7/2021  PCP: Andrew Meade DO    Interval History: Seen earlier today, this is a late entry    Diet: Some oral intake    ROS: She is little bit of hard hearing  No PND orthopnea or shortness of breath  Alert awake but not completely oriented  1300 cc urine for the last shift    Data:     Current meds:    furosemide  20 mg IntraVENous TID    warfarin (COUMADIN) daily dosing (placeholder)   Other RX Placeholder    albuterol sulfate HFA  2 puff Inhalation BID    aspirin  81 mg Oral Daily    atorvastatin  10 mg Oral Nightly    carvedilol  6.25 mg Oral BID WC    levothyroxine  88 mcg Oral Daily    ocuvite-lutein  1 tablet Oral Daily    potassium chloride  10 mEq Oral Daily    vitamin B-12  1,000 mcg Oral Daily    sodium chloride flush  5-40 mL IntraVENous 2 times per day    tiotropium-olodaterol  2 puff Inhalation Daily    timolol  1 drop Both Eyes BID      sodium chloride           I/O last 3 completed shifts:  In: -   Out: 1300 [Urine:1300]    CBC:   Recent Labs     10/08/21  0237 10/09/21  0445   WBC 16.7* 14.8*   HGB 12.9 13.4   * 441*          Recent Labs     10/09/21  0445 10/09/21  1031 10/10/21  0506 10/10/21  0924 10/10/21  1426   *   < > 125* 123* 124*   K 4.4  --  4.5  --   --    CL 89*  --  90*  --   --    CO2 21  --  23  --   --    BUN 34*  --  27*  --   --    CREATININE 0.8  --  0.7  --   --    GLUCOSE 103*  --  100*  --   --     < > = values in this interval not displayed.        Lab Results   Component Value Date    CALCIUM 9.2 10/10/2021    PHOS 2.8 10/10/2021       Objective:     Vitals: BP (!) 147/68   Pulse 60   Temp 97.3 °F (36.3 °C) (Oral)   Resp 18   Ht 5' (1.524 m)   Wt 135 lb (61.2 kg)   SpO2 96%   BMI 26.37 kg/m²     General appearance: No acute distress  HEENT: No gross conjunctival pallor  Neck: Supple  Lungs: Positive crackles on auscultation  Heart: Irregularly irregular rhythm-left chest wall cardiac device-well-healed incision from previous sternotomy  Abdomen: Soft, nontender  Extremities: No edema      Problem List :         Impression :     1. Acute on chronic hyponatremia-sodium is increasing with IV loop-likely from hypervolemia  2. Probable acute decompensated heart failure-she of course has underlying coronary artery disease  3. Underlying dysrhythmia and COPD    Recommendation/Plan  :     1. No need for frequent sodium check  2. Check CMP magnesium phosphorus tomorrow morning  3. Keep IV diuretics now  4. She might be able to go home tomorrow with oral diuretics-with close follow-up  5.  Follow clinically and biochemically      Erick Ding MD MD

## 2021-10-10 NOTE — PROGRESS NOTES
Hospitalist Progress Note      Name:  Natasha Amaya /Age/Sex: 3/18/1929  (80 y.o. female)   MRN & CSN:  [id number] & [id number] Admission Date/Time: 10/7/2021 10:27 AM   Location:  [address] [place] [address] PCP: [address] Jennifer Day: 4    Assessment and Plan:   Natasha Amaya is a 80 y.o.  female  who presents with <principal problem not specified>    Hyponatremia: Secondary to: Hypervolemia appreciate nephrology input patient is responding to loop diuretics her sodium is trending up continue diuresis  Coagulopathy: Secondary to Coumadin patient INR significantly elevated on admission. She was seen by cardiology and they recommended stopping anticoagulation given her age and risk of falls  Frequent falls: PT O2 will likely need rehab discharge  Acute CHF exacerbation: Continue diuresis    Diet ADULT DIET; Regular; Low Fat/Low Chol/High Fiber/2 gm Na   DVT Prophylaxis [] Lovenox, []  Heparin, [] SCDs, [] Ambulation   GI Prophylaxis [] PPI,  [] H2 Blocker,  [] Carafate,  [] Diet/Tube Feeds   Code Status Full Code   Disposition Patient requires continued admission due to hyponatremia   MDM [] Low, [] Moderate,[x]  High  Patient's risk as above due to hypornatremia     History of Present Illness:     Chief Complaint: <principal problem not specified>  Natasha Amaya is a 80 y.o.  female  who presents with hyponatremia secondary to hypervolemia. Patient had a sodium of 119 on admission which is now trending up to 125. She was coagulopathic secondary to Coumadin which has since been discontinued by cardiology. Patient had a work-up including CT of the head spine pelvis these were all negative for fracture or bleed       Ten point ROS reviewed negative, unless as noted above    Objective:        Intake/Output Summary (Last 24 hours) at 10/10/2021 1730  Last data filed at 10/10/2021 1044  Gross per 24 hour   Intake    Output 1300 ml   Net -1300 ml      Vitals:   Vitals:    10/10/21 1518   BP: 111/60   Pulse: 59   Resp: 18   Temp: 98.1 °F (36.7 °C)   SpO2: 92%     Physical Exam:   GEN Awake female, sitting upright in bed in no apparent distress. Appears given age. HENT ecchymosis on scalp   Very hard of hearing NECK Supple, no apparent thyromegaly or masses. RESP decreased breath sounds bilaterally   CARDIO/VASC S1/S2 auscultated. Regular rate without appreciable murmurs, rubs, or gallops. No JVD or carotid bruits. Peripheral pulses equal bilaterally and palpable. No peripheral edema. GI Abdomen is soft without significant tenderness, masses, or guarding. Bowel sounds are normoactive. Rectal exam deferred.  No costovertebral angle tenderness. Normal appearing external genitalia. Carnes catheter is not present. HEME/LYMPH No palpable cervical lymphadenopathy and no hepatosplenomegaly. No petechiae or ecchymoses. MSK No gross joint deformities. SKIN ecchymosis  NEURO nonfocal  PSYCH  Affect appropriate. Medications:   Medications:    furosemide  20 mg IntraVENous TID    warfarin (COUMADIN) daily dosing (placeholder)   Other RX Placeholder    albuterol sulfate HFA  2 puff Inhalation BID    aspirin  81 mg Oral Daily    atorvastatin  10 mg Oral Nightly    carvedilol  6.25 mg Oral BID WC    levothyroxine  88 mcg Oral Daily    ocuvite-lutein  1 tablet Oral Daily    potassium chloride  10 mEq Oral Daily    vitamin B-12  1,000 mcg Oral Daily    sodium chloride flush  5-40 mL IntraVENous 2 times per day    tiotropium-olodaterol  2 puff Inhalation Daily    timolol  1 drop Both Eyes BID      Infusions:    sodium chloride       PRN Meds: sodium chloride flush, 5-40 mL, PRN  sodium chloride, 25 mL, PRN  ondansetron, 4 mg, Q8H PRN   Or  ondansetron, 4 mg, Q6H PRN  polyethylene glycol, 17 g, Daily PRN  acetaminophen, 650 mg, Q6H PRN   Or  acetaminophen, 650 mg, Q6H PRN          Patient is still admitted because hyponatremia the anticipated discharge is in greater than 24 hours. Electronically signed by Claudy Cook MD on 10/10/2021 at 5:30 PM

## 2021-10-11 LAB
ALBUMIN SERPL-MCNC: 3.9 GM/DL (ref 3.4–5)
ALP BLD-CCNC: 136 IU/L (ref 40–129)
ALT SERPL-CCNC: 22 U/L (ref 10–40)
ANION GAP SERPL CALCULATED.3IONS-SCNC: 13 MMOL/L (ref 4–16)
AST SERPL-CCNC: 18 IU/L (ref 15–37)
BILIRUB SERPL-MCNC: 1.4 MG/DL (ref 0–1)
BUN BLDV-MCNC: 20 MG/DL (ref 6–23)
CALCIUM SERPL-MCNC: 9.4 MG/DL (ref 8.3–10.6)
CHLORIDE BLD-SCNC: 89 MMOL/L (ref 99–110)
CO2: 27 MMOL/L (ref 21–32)
CREAT SERPL-MCNC: 0.4 MG/DL (ref 0.6–1.1)
GFR AFRICAN AMERICAN: >60 ML/MIN/1.73M2
GFR NON-AFRICAN AMERICAN: >60 ML/MIN/1.73M2
GLUCOSE BLD-MCNC: 249 MG/DL (ref 70–99)
INR BLD: 1.33 INDEX
MAGNESIUM: 1.5 MG/DL (ref 1.8–2.4)
PHOSPHORUS: 1.9 MG/DL (ref 2.5–4.9)
POTASSIUM SERPL-SCNC: 3.7 MMOL/L (ref 3.5–5.1)
PRO-BNP: 3952 PG/ML
PROTHROMBIN TIME: 17.2 SECONDS (ref 11.7–14.5)
SODIUM BLD-SCNC: 129 MMOL/L (ref 135–145)
TOTAL PROTEIN: 5.5 GM/DL (ref 6.4–8.2)

## 2021-10-11 PROCEDURE — 80053 COMPREHEN METABOLIC PANEL: CPT

## 2021-10-11 PROCEDURE — 99223 1ST HOSP IP/OBS HIGH 75: CPT | Performed by: INTERNAL MEDICINE

## 2021-10-11 PROCEDURE — 84295 ASSAY OF SERUM SODIUM: CPT

## 2021-10-11 PROCEDURE — 6360000002 HC RX W HCPCS: Performed by: NURSE PRACTITIONER

## 2021-10-11 PROCEDURE — 36415 COLL VENOUS BLD VENIPUNCTURE: CPT

## 2021-10-11 PROCEDURE — 6370000000 HC RX 637 (ALT 250 FOR IP): Performed by: NURSE PRACTITIONER

## 2021-10-11 PROCEDURE — 97535 SELF CARE MNGMENT TRAINING: CPT

## 2021-10-11 PROCEDURE — 2700000000 HC OXYGEN THERAPY PER DAY

## 2021-10-11 PROCEDURE — 2580000003 HC RX 258: Performed by: NURSE PRACTITIONER

## 2021-10-11 PROCEDURE — 6370000000 HC RX 637 (ALT 250 FOR IP): Performed by: INTERNAL MEDICINE

## 2021-10-11 PROCEDURE — 83735 ASSAY OF MAGNESIUM: CPT

## 2021-10-11 PROCEDURE — 97530 THERAPEUTIC ACTIVITIES: CPT

## 2021-10-11 PROCEDURE — 84100 ASSAY OF PHOSPHORUS: CPT

## 2021-10-11 PROCEDURE — 83880 ASSAY OF NATRIURETIC PEPTIDE: CPT

## 2021-10-11 PROCEDURE — 1200000000 HC SEMI PRIVATE

## 2021-10-11 PROCEDURE — 94761 N-INVAS EAR/PLS OXIMETRY MLT: CPT

## 2021-10-11 PROCEDURE — 85610 PROTHROMBIN TIME: CPT

## 2021-10-11 PROCEDURE — 94640 AIRWAY INHALATION TREATMENT: CPT

## 2021-10-11 RX ORDER — MAGNESIUM SULFATE IN WATER 40 MG/ML
2000 INJECTION, SOLUTION INTRAVENOUS ONCE
Status: COMPLETED | OUTPATIENT
Start: 2021-10-11 | End: 2021-10-11

## 2021-10-11 RX ORDER — TORSEMIDE 20 MG/1
20 TABLET ORAL DAILY
Status: DISCONTINUED | OUTPATIENT
Start: 2021-10-11 | End: 2021-10-13

## 2021-10-11 RX ADMIN — ATORVASTATIN CALCIUM 10 MG: 20 TABLET, FILM COATED ORAL at 21:33

## 2021-10-11 RX ADMIN — ALBUTEROL SULFATE 2 PUFF: 90 AEROSOL, METERED RESPIRATORY (INHALATION) at 08:08

## 2021-10-11 RX ADMIN — MAGNESIUM SULFATE 2000 MG: 2 INJECTION INTRAVENOUS at 12:45

## 2021-10-11 RX ADMIN — SODIUM CHLORIDE, PRESERVATIVE FREE 10 ML: 5 INJECTION INTRAVENOUS at 21:33

## 2021-10-11 RX ADMIN — LEVOTHYROXINE SODIUM 88 MCG: 0.09 TABLET ORAL at 09:51

## 2021-10-11 RX ADMIN — TIMOLOL MALEATE 1 DROP: 2.5 SOLUTION/ DROPS OPHTHALMIC at 09:53

## 2021-10-11 RX ADMIN — SODIUM CHLORIDE, PRESERVATIVE FREE 5 ML: 5 INJECTION INTRAVENOUS at 09:00

## 2021-10-11 RX ADMIN — TIOTROPIUM BROMIDE AND OLODATEROL 2 PUFF: 3.124; 2.736 SPRAY, METERED RESPIRATORY (INHALATION) at 08:08

## 2021-10-11 RX ADMIN — TORSEMIDE 20 MG: 20 TABLET ORAL at 09:51

## 2021-10-11 RX ADMIN — WARFARIN SODIUM 5 MG: 5 TABLET ORAL at 18:01

## 2021-10-11 RX ADMIN — ASPIRIN 81 MG: 81 TABLET, CHEWABLE ORAL at 09:51

## 2021-10-11 RX ADMIN — CYANOCOBALAMIN TAB 1000 MCG 1000 MCG: 1000 TAB at 09:51

## 2021-10-11 RX ADMIN — ALBUTEROL SULFATE 2 PUFF: 90 AEROSOL, METERED RESPIRATORY (INHALATION) at 21:17

## 2021-10-11 RX ADMIN — POTASSIUM CHLORIDE 10 MEQ: 750 TABLET, FILM COATED, EXTENDED RELEASE ORAL at 09:51

## 2021-10-11 RX ADMIN — TIMOLOL MALEATE 1 DROP: 2.5 SOLUTION/ DROPS OPHTHALMIC at 21:35

## 2021-10-11 ASSESSMENT — ENCOUNTER SYMPTOMS
VOMITING: 0
SHORTNESS OF BREATH: 0
COUGH: 0
NAUSEA: 0

## 2021-10-11 ASSESSMENT — PAIN SCALES - GENERAL
PAINLEVEL_OUTOF10: 0

## 2021-10-11 NOTE — PROGRESS NOTES
Occupational Therapy  Occupational Therapy Treatment Note      Name: Yazan Rojas MRN: [id number] :   3/18/1929   Date:  10/11/2021   Admission Date: 10/7/2021 Room:  [address] [place] [address]-A     Primary Problem: [place]:  The primary encounter diagnosis was Hyponatremia. A diagnosis of Elevated INR was also pertinent to this visit. Restrictions/Precautions:   General Precautions, Fall Risk    Communication with other providers:  RN    Subjective:  Patient states:  Agreeable to therapy. Pain: Pt denied pain this date (0/10). Objective:    Observation:  Pt was received supine in bed upon arrival.  Objective Measures:  Vitals stable throughout session. Treatment, including education:  Self Care Training:   Cues were given for safety, sequence, UE/LE placement, visual cues, and balance. Activities performed today included dressing, toileting, hand hygiene, and grooming. Pt performed supine to seated bed mobility from flat bed and Chava for trunk support, VC throughout for sequencing, and increased time. Pt sat EOB demo fair+ dynamic sitting balance with SBA. Pt attempted to don socks while seated upright at EOB, however was unable and socks were DEP donned for pt this date. Pt performed STS from EOB with CGA and ambulated approx 10ft to the bathroom for toileting with CGA and RW. DEP was doffed for pt in standing with MaxA d/t decreased command following and sequencing this date. Stand-sit to commode was performed with Chava for sequencing, positioning, and eccentric control. While seated on commode pt performed oral hygiene with SUP for setup, increased time, and VC. Pt demo good ability to perform tucker care in seated. Pt performed STS from commode with CGA and depend was managed in standing with ModA. Pt completed hand hygiene in standing at the sink with SBA for safety. Pt then ambulated approx 20ft with RW and SBA and slow pace throughout. Pt performed stand-sit to the chair with CGA.  In seated pt doffed gown and donned night gown with Chava to thread UB clothing. Pt then ambulated approx 100ft with RW and SBA-CGA and chair follow. Pt demo slow pace throughout and 1 LOB requiring CGA to recover. Pt performed stand-sit to chair with CGA. Pt was left seated upright in the chair, all needs met, alarm in place. Assessment / Impression:    Patient's tolerance of treatment: good  Adverse Reaction: None  Significant change in status and impact:  None  Barriers to improvement: Cognition      Plan for Next Session:    Continue with POC    Time in:  815  Time out:  908  Timed treatment minutes:  53  Total treatment time: 53      Electronically signed by:     Drake Odonnell OT,   10/11/2021, 8:31 AM

## 2021-10-11 NOTE — PROGRESS NOTES
Physician Progress Note      Laci Asp  CSN #:                  [id number]  :                       3/18/1929  ADMIT DATE:       10/7/2021 10:27 AM  DISCH DATE:  RESPONDING  PROVIDER #:        Zack Spence          QUERY TEXT:    Pt admitted with hyponatremia and has CHF documented. If possible, please   document in progress notes and discharge summary further specificity regarding   the type and acuity of CHF:    The medical record reflects the following:  Risk Factors: Age, a-fib  Clinical Indicators: 10/11 progress note \"Acute CHF exacerbation: Continue   diuresis. \", Pro-BNP 7265 - 8201, CXR on 10/7 \"No acute cardiopulmonary process   identified. \", Vesta Silvestre, APRN-CNP in 10. progress note \"Chronic   HFpEF: per hx. last TTE  with preserved EF. Appears euvolemic. \"  Treatment: IV Lasix 20 mg 2 - 3x/day, CARD consult, Pro-BNP, CXR. Thank you,  BHARATI Avina, RN, CDS  [phone]  Options provided:  -- Acute on Chronic Diastolic CHF/HFpEF  -- Acute Diastolic CHF/HFpEF  -- Chronic Diastolic CHF/HFpEF  -- Other - I will add my own diagnosis  -- Disagree - Not applicable / Not valid  -- Disagree - Clinically unable to determine / Unknown  -- Refer to Clinical Documentation Reviewer    PROVIDER RESPONSE TEXT:    This patient is in acute on chronic diastolic CHF/HFpEF.     Query created by: Teodoro Quinones on 10/11/2021 2:49 PM      Electronically signed by:  Zack Spence 10/11/2021 3:11 PM

## 2021-10-11 NOTE — CONSULTS
CARDIOLOGY CONSULT NOTE   Reason for consultation:  afib    Referring physician:  Derick England MD     Primary care physician: Joshua Godfrey      Dear  Dr. Derick England MD   Thanks for the consult. Chief Complaints :  Chief Complaint   Patient presents with    Fall      Trauma Alert    Headache     Hematoma to back of head, no LOC        History of present illness:Irene is a 80 y. o.year old who presents for fall in unclear circumstances at home she is somewhat confused and does not recall the event.   She is on chronic anticoagulation due to history of atrial fibrillation and pacemaker history of coronary artery disease s/p CABG in 2009 and stents in 2012 currently denies any chest pain was noted to be hyponatremic with supratherapeutic INR although her hematocrit and hemoglobin has been stable    Past medical history:    has a past medical history of Arthritis, Bradycardia, CAD (coronary artery disease), Cardiac pacemaker, CHF (congestive heart failure) ([address] COPD, mild ([address] CVA (cerebrovascular accident) ([address] DJD (degenerative joint disease) of cervical spine, Exhaustion of cardiac pacemaker battery, Family history of cardiovascular disease, Glaucoma, H/O 24 hour EKG monitoring, H/O cardiac catheterization, H/O cardiovascular stress test, H/O cardiovascular stress test, H/O cardiovascular stress test, H/O chest x-ray, H/O Doppler lower venous ultrasound, H/O Doppler ultrasound, H/O Doppler ultrasound, H/O Doppler ultrasound, H/O Doppler ultrasound, H/O echocardiogram, H/O echocardiogram, H/O echocardiogram, H/O echocardiogram, History of complete ECG, History of nuclear stress test, Hx of cardiovascular stress test, HX OTHER MEDICAL, Hyperlipidemia, Hypertension, Mild intermittent asthma, Nausea & vomiting, Obstructive sleep apnea, Paroxysmal atrial fibrillation ([address] Post PTCA, Pulmonary HTN ([address] PVD (peripheral vascular disease) ([address] S/P CABG x 3, S/P PTCA (percutaneous transluminal coronary angioplasty), Thyroid disease, and Unspecified cerebral artery occlusion with cerebral infarction. Past surgical history:   has a past surgical history that includes Appendectomy (1941); Tonsillectomy (1950's); Cardiac surgery (4/09); Hysterectomy, total abdominal (1990's); Colonoscopy (In 2000's); pacemaker placement; Coronary artery bypass graft (4/8/2009); Coronary angioplasty with stent (4/21/2010); other surgical history; Middle ear surgery; and Percutaneous Transluminal Coronary Angio (11/2012). Social History:   reports that she quit smoking about 50 years ago. Her smoking use included cigarettes. She has a 1.25 pack-year smoking history. She has never used smokeless tobacco. She reports previous alcohol use. She reports that she does not use drugs.   Family history:   no family history of CAD, STROKE of DM at early age    Allergies   Allergen Reactions    Darvocet [Propoxyphene N-Acetaminophen]     Ranexa [Ranolazine Er]      Sick to her stomach    Ranolazine      Sick to her stomach    Sulfa Antibiotics Itching    Sulfasalazine Itching    Adhesive Tape Rash    Allantoin Rash    Bacitracin Rash    Gramicidin Rash    Neomycin Rash    Polymyxin B Rash    Pramoxine Hcl Rash    Silicone Rash       torsemide (DEMADEX) tablet 20 mg, Daily  warfarin (COUMADIN) tablet 5 mg, Daily  albuterol sulfate  (90 Base) MCG/ACT inhaler 2 puff, BID  aspirin chewable tablet 81 mg, Daily  atorvastatin (LIPITOR) tablet 10 mg, Nightly  carvedilol (COREG) tablet 6.25 mg, BID WC  levothyroxine (SYNTHROID) tablet 88 mcg, Daily  antioxidant multivitamin (OCUVITE) tablet, Daily  potassium chloride (KLOR-CON) extended release tablet 10 mEq, Daily  vitamin B-12 (CYANOCOBALAMIN) tablet 1,000 mcg, Daily  sodium chloride flush 0.9 % injection 5-40 mL, 2 times per day  sodium chloride flush 0.9 % injection 5-40 mL, PRN  0.9 % sodium chloride infusion, PRN  ondansetron (ZOFRAN-ODT) disintegrating tablet 4 mg, Q8H PRN   Or  ondansetron (ZOFRAN) injection 4 mg, Q6H PRN  polyethylene glycol (GLYCOLAX) packet 17 g, Daily PRN  acetaminophen (TYLENOL) tablet 650 mg, Q6H PRN   Or  acetaminophen (TYLENOL) suppository 650 mg, Q6H PRN  tiotropium-olodaterol (STIOLTO) 2.5-2.5 MCG/ACT inhaler 2 puff, Daily  timolol (TIMOPTIC) 0.25 % ophthalmic solution 1 drop, BID      Current Facility-Administered Medications   Medication Dose Route Frequency Provider Last Rate Last Admin    torsemide (DEMADEX) tablet 20 mg  20 mg Oral Daily Darlin Muhammad MD   20 mg at 10/11/21 0951    warfarin (COUMADIN) tablet 5 mg  5 mg Oral Daily Arlon Comas, APRN - CNP   5 mg at 10/10/21 1818    albuterol sulfate  (90 Base) MCG/ACT inhaler 2 puff  2 puff Inhalation BID Arlon Comas, APRN - CNP   2 puff at 10/11/21 3199    aspirin chewable tablet 81 mg  81 mg Oral Daily Arlon Comas, APRN - CNP   81 mg at 10/11/21 0951    atorvastatin (LIPITOR) tablet 10 mg  10 mg Oral Nightly Arlon Comas, APRN - CNP   10 mg at 10/10/21 2138    carvedilol (COREG) tablet 6.25 mg  6.25 mg Oral BID WC Darlin Muhammad MD   6.25 mg at 10/10/21 1653    levothyroxine (SYNTHROID) tablet 88 mcg  88 mcg Oral Daily Arlon Comas, APRN - CNP   88 mcg at 10/11/21 0951    antioxidant multivitamin (OCUVITE) tablet  1 tablet Oral Daily Arlon Comas, APRN - CNP   1 tablet at 10/10/21 0929    potassium chloride (KLOR-CON) extended release tablet 10 mEq  10 mEq Oral Daily Arlon Comas, APRN - CNP   10 mEq at 10/11/21 0951    vitamin B-12 (CYANOCOBALAMIN) tablet 1,000 mcg  1,000 mcg Oral Daily Arlon Comas, APRN - CNP   1,000 mcg at 10/11/21 0951    sodium chloride flush 0.9 % injection 5-40 mL  5-40 mL IntraVENous 2 times per day Arlon Comas, APRN - CNP   5 mL at 10/11/21 0900    sodium chloride flush 0.9 % injection 5-40 mL  5-40 mL IntraVENous PRN Eloisa Forman, APRN - CNP   10 mL at 10/08/21 1847    0.9 % sodium chloride infusion  25 mL IntraVENous PRN Scotia Peers, APRN - CNP        ondansetron (ZOFRAN-ODT) disintegrating tablet 4 mg  4 mg Oral Q8H PRN Annie Peers, APRN - CNP        Or    ondansetron (ZOFRAN) injection 4 mg  4 mg IntraVENous Q6H PRN Scotia Peers, APRN - CNP        polyethylene glycol (GLYCOLAX) packet 17 g  17 g Oral Daily PRN Annie Peers, APRN - CNP        acetaminophen (TYLENOL) tablet 650 mg  650 mg Oral Q6H PRN Scotia Peers, APRN - CNP   650 mg at 10/08/21 1435    Or    acetaminophen (TYLENOL) suppository 650 mg  650 mg Rectal Q6H PRN Scotia Peers, APRN - CNP        tiotropium-olodaterol (STIOLTO) 2.5-2.5 MCG/ACT inhaler 2 puff  2 puff Inhalation Daily Scotia Peers, APRN - CNP   2 puff at 10/11/21 0808    timolol (TIMOPTIC) 0.25 % ophthalmic solution 1 drop  1 drop Both Eyes BID Annie Peers, APRN - CNP   1 drop at 10/11/21 9521     Review of Systems:   · Constitutional: No Fever or Weight Loss   · Eyes: No Decreased Vision  · ENT: No Headaches, Hearing Loss or Vertigo  · Cardiovascular: As per HPI  · Respiratory: As per HPI  · Gastrointestinal: No abdominal pain, appetite loss, blood in stools, constipation, diarrhea or heartburn  · Genitourinary: No dysuria, trouble voiding, or hematuria  · Musculoskeletal:  No gait disturbance, weakness or joint complaints  · Integumentary: No rash or pruritis  · Neurological: No TIA or stroke symptoms  · Psychiatric: No anxiety or depression  · Endocrine: No malaise, fatigue or temperature intolerance  · Hematologic/Lymphatic: No bleeding problems, blood clots or swollen lymph nodes  · Allergic/Immunologic: No nasal congestion or hives  All systems negative except as marked.         Physical Examination:    Vitals:    10/11/21 0803 10/11/21 0810 10/11/21 0812 10/11/21 1414   BP: (!) 104/50   (!) 111/44   Pulse: 58   65   Resp: 18   18   Temp: 97.5 °F (36.4 °C)   97.9 °F (36.6 °C)   TempSrc: Oral   Oral   SpO2: 97% 98% 96% 92%   Weight:       Height:           General Appearance: No distress, conversant    Constitutional:  Well developed, Well nourished, No acute distress, Non-toxic appearance. HENT:  Normocephalic, Atraumatic, Bilateral external ears normal, Oropharynx moist, No oral exudates, Nose normal. Neck- Normal range of motion, No tenderness, Supple, No stridor,no apical-carotid delay  Lymphatics : no palpable lymph nodes  Eyes:  PERRL, EOMI, Conjunctiva normal, No discharge. Respiratory:  Normal breath sounds, No respiratory distress, No wheezing, No chest tenderness. ,no use of accessory muscles, crackles Absent   Cardiovascular: (PMI) apex non displaced,no lifts no thrills, ankle swelling Absent  , 1+, s1 and s2 audible,Murmur. Present, JVD not noted    Abdomen /GI:  Bowel sounds normal, Soft, No tenderness, No masses, No gross visceromegaly   :  No costovertebral angle tenderness   Musculoskeletal:  No edema, no tenderness, no deformities. Back- no tenderness  Integument:  Well hydrated, no rash   Lymphatic:  No lymphadenopathy noted   Neurologic:  Alert & oriented x 3, CN 2-12 normal, normal motor function, normal sensory function, no focal deficits noted           Medical decision making and Data review:    Lab Review   Recent Labs     10/09/21  0445   WBC 14.8*   HGB 13.4   HCT 42.7   *      Recent Labs     10/11/21  1249   *   K 3.7   CL 89*   CO2 27   PHOS 1.9*   BUN 20   CREATININE 0.4*     Recent Labs     10/11/21  1249   AST 18   ALT 22   BILITOT 1.4*   ALKPHOS 136*     No results for input(s): TROPONINT in the last 72 hours. Recent Labs     10/09/21  0445 10/11/21  1249   PROBNP 7,265* 3,952*     Lab Results   Component Value Date    INR 1.33 10/11/2021    PROTIME 17.2 (H) 10/11/2021       EKG: (reviewed by myself)    ECHO:(reviewed by myself)    Chest Xray:(reviewed by myself)  XR PELVIS (1-2 VIEWS)    Result Date: 10/7/2021  EXAMINATION: ONE XRAY VIEW OF THE PELVIS 10/7/2021 10:57 am COMPARISON: None.  HISTORY: ORDERING SYSTEM PROVIDED HISTORY: fall, concern for traumatic injury TECHNOLOGIST PROVIDED HISTORY: Reason for exam:->fall, concern for traumatic injury Reason for Exam: fall, concern for traumatic injury FINDINGS: The bony pelvis is intact. The femoral heads overlying the acetabula bilaterally. The sacroiliac joints are normal in appearance. No pelvic fracture identified. CT HEAD WO CONTRAST    Result Date: 10/7/2021  EXAMINATION: CT OF THE HEAD WITHOUT CONTRAST  10/7/2021 8:33 pm TECHNIQUE: CT of the head was performed without the administration of intravenous contrast. Dose modulation, iterative reconstruction, and/or weight based adjustment of the mA/kV was utilized to reduce the radiation dose to as low as reasonably achievable. COMPARISON: 10/07/2021 CT head HISTORY: ORDERING SYSTEM PROVIDED HISTORY: unwitness TECHNOLOGIST PROVIDED HISTORY: Reason for exam:->unwitness Has a \"code stroke\" or \"stroke alert\" been called? ->No Reason for Exam: unwitness Acuity: Acute Type of Exam: Initial Mechanism of Injury: Pt was walking out of her home, tripped and fell, striking the back of her head with a substantial hematoma resulting FINDINGS: BRAIN/VENTRICLES: There is no acute intracranial hemorrhage, mass effect or midline shift. No abnormal extra-axial fluid collection. The gray-white differentiation is maintained without evidence of an acute infarct. There is no evidence of hydrocephalus. Involutional changes. Ectatic appearance of basilar artery noted unchanged. Basal ganglia hypodensities identified worrisome for chronic lacunar infarcts. .  Evidence of moderate severe chronic microvascular disease. Vascular calcifications. ORBITS: Cataract surgery. SINUSES: Mild paranasal sinus disease. SOFT TISSUES/SKULL:  No calvarial fracture moderate to severe soft tissue swelling identified overlying the right parietal temporal calvarium. No acute intracranial abnormality. Right-sided soft tissue swelling posteriorly.  Chronic microvascular disease and involutional change. CT HEAD WO CONTRAST    Result Date: 10/7/2021  EXAMINATION: CT OF THE HEAD WITHOUT CONTRAST  10/7/2021 12:26 pm TECHNIQUE: CT of the head was performed without the administration of intravenous contrast. Dose modulation, iterative reconstruction, and/or weight based adjustment of the mA/kV was utilized to reduce the radiation dose to as low as reasonably achievable. COMPARISON: 09/28/2013 HISTORY: ORDERING SYSTEM PROVIDED HISTORY: fall, head injury, concern for traumatic injury TECHNOLOGIST PROVIDED HISTORY: Reason for exam:->fall, head injury, concern for traumatic injury Has a \"code stroke\" or \"stroke alert\" been called? ->No Decision Support Exception - unselect if not a suspected or confirmed emergency medical condition->Emergency Medical Condition (MA) Reason for Exam: fall, head injury, concern for traumatic injury Acuity: Acute Type of Exam: Initial FINDINGS: BRAIN/VENTRICLES: There is no acute infarct or acute intracranial hemorrhage present. There is no mass effect or midline shift present. There is diffuse cerebral volume loss. There is periventricular hypoattenuation. No abnormal extra-axial fluid collection is identified. ORBITS: Limited evaluation of the orbits is unremarkable. SINUSES: The paranasal sinuses and mastoid air cells are clear. SOFT TISSUES/SKULL:  No skull fracture is identified. Right posterior parietal scalp soft tissue swelling is present     1. No acute intracranial process identified 2. Right posterior parietal scalp soft tissue swelling. CT CERVICAL SPINE WO CONTRAST    Result Date: 10/7/2021  EXAMINATION: CT OF THE CERVICAL SPINE WITHOUT CONTRAST 10/7/2021 12:42 pm TECHNIQUE: CT of the cervical spine was performed without the administration of intravenous contrast. Multiplanar reformatted images are provided for review.  Dose modulation, iterative reconstruction, and/or weight based adjustment of the mA/kV was utilized to reduce the radiation dose to as low as reasonably achievable. COMPARISON: None. HISTORY: ORDERING SYSTEM PROVIDED HISTORY: fall, head injury, concern for traumatic injury TECHNOLOGIST PROVIDED HISTORY: Reason for exam:->fall, head injury, concern for traumatic injury Decision Support Exception - unselect if not a suspected or confirmed emergency medical condition->Emergency Medical Condition (MA) Reason for Exam: fall, head injury, concern for traumatic injury Acuity: Acute Type of Exam: Initial FINDINGS: BONES/ALIGNMENT: There is no acute fracture or traumatic malalignment. DEGENERATIVE CHANGES: Moderate degenerative change identified within the cervical spine. SOFT TISSUES: There is no prevertebral soft tissue swelling. No acute abnormality of the cervical spine. XR CHEST PORTABLE    Result Date: 10/7/2021  EXAMINATION: ONE XRAY VIEW OF THE CHEST 10/7/2021 10:57 am COMPARISON: 11/27/2020 HISTORY: ORDERING SYSTEM PROVIDED HISTORY: fall, concern for traumatic injury TECHNOLOGIST PROVIDED HISTORY: Reason for exam:->fall, concern for traumatic injury Reason for Exam: fall, concern for traumatic injury FINDINGS: Severe cardiomegaly is unchanged. A left pacemaker is present. Chronic increased interstitial markings are noted throughout the lungs. There is no focal consolidation, pleural effusion or pneumothorax. There is no acute fracture identified. 1. No acute cardiopulmonary process identified. 2. Stable severe cardiomegaly. VL DUP CAROTID BILATERAL    Result Date: 10/9/2021  EXAMINATION: ULTRASOUND EVALUATION OF THE CAROTID ARTERIES 10/8/2021 TECHNIQUE: Duplex ultrasound using B-mode/gray scaled imaging, Doppler spectral analysis and color flow Doppler was obtained of the carotid arteries.  COMPARISON: None HISTORY: ORDERING SYSTEM PROVIDED HISTORY: fall TECHNOLOGIST PROVIDED HISTORY: Reason for exam:->fall Reason for Exam: SYNCOPE Acuity: Acute Type of Exam: Initial FINDINGS: RIGHT: The right common carotid artery demonstrates peak systolic velocities of 73, 79 cm/sec in the proximal and distal segments respectively. The right internal carotid artery demonstrates the systolic velocities of 97, 58, 77 cm/sec in the proximal, mid and distal segments respectively. The external carotid artery is patent. The vertebral artery demonstrates normal antegrade flow. No significant atherosclerotic plaque. ICA/CCA ratio of 1.2 LEFT: The left common carotid artery demonstrates peak systolic velocities of 778, 68 cm/sec in the proximal and distal segments respectively. The left internal carotid artery demonstrates the systolic velocities of 79, 89, 98 cm/sec in the proximal, mid and distal segments respectively. The external carotid artery is patent. The vertebral artery demonstrates normal antegrade flow. No significant atherosclerotic plaque. ICA/CCA ratio of 0.6     The right internal carotid artery demonstrates 0-50% stenosis. The left internal carotid artery demonstrates 0-50% stenosis. Bilateral vertebral arteries are patent with flow in the normal direction. All labs, medications and tests reviewed by myself including data  from outside source , patient and available family . Continue all other medications of all above medical condition listed as is. Impression:  Active Problems:    CAD (coronary artery disease)    Cardiac pacemaker    S/P CABG x 3    Post PTCA    Hypertension    Hyponatremia  Resolved Problems:    * No resolved hospital problems. *      Assessment: 80 y. o.year old with PMH of  has a past medical history of Arthritis, Bradycardia, CAD (coronary artery disease), Cardiac pacemaker, CHF (congestive heart failure) ([address] COPD, mild ([address] CVA (cerebrovascular accident) ([address] DJD (degenerative joint disease) of cervical spine, Exhaustion of cardiac pacemaker battery, Family history of cardiovascular disease, Glaucoma, H/O 24 hour EKG monitoring, H/O cardiac catheterization, H/O cardiovascular stress test, H/O cardiovascular stress test, H/O cardiovascular stress test, H/O chest x-ray, H/O Doppler lower venous ultrasound, H/O Doppler ultrasound, H/O Doppler ultrasound, H/O Doppler ultrasound, H/O Doppler ultrasound, H/O echocardiogram, H/O echocardiogram, H/O echocardiogram, H/O echocardiogram, History of complete ECG, History of nuclear stress test, Hx of cardiovascular stress test, HX OTHER MEDICAL, Hyperlipidemia, Hypertension, Mild intermittent asthma, Nausea & vomiting, Obstructive sleep apnea, Paroxysmal atrial fibrillation ([address] Post PTCA, Pulmonary HTN ([address] PVD (peripheral vascular disease) ([address] S/P CABG x 3, S/P PTCA (percutaneous transluminal coronary angioplasty), Thyroid disease, and Unspecified cerebral artery occlusion with cerebral infarction. Plan and Recommendations:    Hyponatremia in setting of confusion as per nephrology  Coagulopathy: Hold anticoagulation for now high risk for bleeding  HTN: stable, continue present medications and add  Coronary artery disease remote history of CABG and PCI currently stable angina free  DVT prophylaxis if no contraindication  6. Dyslipidemia: continue statins           Thank you  much for consult and giving us the opportunity in contributing in the care of this patient. Please feel free to call me for any questions.        Ayesha Malone MD, 10/11/2021 2:47 PM

## 2021-10-11 NOTE — PROGRESS NOTES
Progress Note  Date:10/11/2021       QI:[id number]  Patient Name:Irene Garcia     YOB: 1929     Age:89 y.o. Patient seen and examined in the room. Stated feels better. Patient wanted to go home with home health care rather than rehab center. Subjective    Subjective:  Symptoms:  Improved. No shortness of breath, malaise, cough, chest pain, weakness, headache or chest pressure. Diet:  Adequate intake. No nausea or vomiting. Activity level: Normal.    Pain:  She reports no pain. Review of Systems   Constitutional: Negative for fever. Respiratory: Negative for cough and shortness of breath. Cardiovascular: Negative for chest pain. Gastrointestinal: Negative for nausea and vomiting. Neurological: Negative for weakness. Objective         Vitals Last 24 Hours:  TEMPERATURE:  Temp  Av.8 °F (36.6 °C)  Min: 97.5 °F (36.4 °C)  Max: 98.1 °F (36.7 °C)  RESPIRATIONS RANGE: Resp  Av.8  Min: 15  Max: 18  PULSE OXIMETRY RANGE: SpO2  Av.6 %  Min: 92 %  Max: 98 %  PULSE RANGE: Pulse  Av.3  Min: 58  Max: 61  BLOOD PRESSURE RANGE: Systolic (72UVP), YDW:874 , Min:104 , YSM:279   ; Diastolic (58XHR), NPL:88, Min:50, Max:60    I/O (24Hr): Intake/Output Summary (Last 24 hours) at 10/11/2021 1053  Last data filed at 10/10/2021 2043  Gross per 24 hour   Intake 120 ml   Output 1750 ml   Net -1630 ml     Objective:  General Appearance: In no acute distress. Vital signs: (most recent): Blood pressure (!) 104/50, pulse 58, temperature 97.5 °F (36.4 °C), temperature source Oral, resp. rate 18, height 5' (1.524 m), weight 139 lb 5.3 oz (63.2 kg), SpO2 96 %. Vital signs are normal.  No fever. Output: Producing urine and producing stool. Lungs:  Normal effort and normal respiratory rate. Breath sounds clear to auscultation. Heart: Normal rate. Regular rhythm. S1 normal.    Abdomen: Abdomen is soft.   Bowel sounds are normal.   There is no abdominal tenderness. Neurological: Patient is alert. Skin:  Warm. Labs/Imaging/Diagnostics    Labs:  CBC:  Recent Labs     10/09/21  0445   WBC 14.8*   RBC 4.87   HGB 13.4   HCT 42.7   MCV 87.7   RDW 16.6*   *     CHEMISTRIES:  Recent Labs     10/09/21  0445 10/09/21  1031 10/10/21  0506 10/10/21  0924 10/10/21  1426   *   < > 125* 123* 124*   K 4.4  --  4.5  --   --    CL 89*  --  90*  --   --    CO2 21  --  23  --   --    BUN 34*  --  27*  --   --    CREATININE 0.8  --  0.7  --   --    GLUCOSE 103*  --  100*  --   --    PHOS 2.9  --  2.8  --   --    MG 1.7*  --  1.5*  --   --     < > = values in this interval not displayed. PT/INR:  Recent Labs     10/08/21  2215 10/09/21  0445 10/10/21  0506   PROTIME 107.5* 90.0* 25.5*   INR 8.16* 6.84* 1.96     APTT:No results for input(s): APTT in the last 72 hours. LIVER PROFILE:  Recent Labs     10/09/21  0445 10/10/21  0506   AST 27 22   ALT 21 22   BILITOT 1.3* 1.2*   ALKPHOS 144* 145*       Imaging Last 24 Hours:  No results found. Assessment//Plan           Hospital Problems         Last Modified POA    Hyponatremia 10/7/2021 Yes        Assessment & Plan  Nela Sue is a 80 y.o.  female  who presents with <principal problem not specified>     1. Hyponatremia: Secondary to: Hypervolemia appreciate nephrology input patient is responding to loop diuretics her sodium is trending up. Sodium 129 this morning, continue torsemide 20 mg daily. Plan to discharge tomorrow. 2. Coagulopathy: Secondary to Coumadin patient INR significantly elevated on admission. She was seen by cardiology and they recommended stopping anticoagulation given her age and risk of falls  3. Frequent falls: PT recommended home with home health care and 24-hour supervision. 4. Acute CHF exacerbation: Continue diuresis. 5. Hypomagnesemia, mag 1.5 this morning, received replacement, repeat labs in the morning.     Electronically signed by LISA Levi CNP on 10/11/21 at 10:53 AM EDT

## 2021-10-11 NOTE — PROGRESS NOTES
PHARMACY ANTICOAGULATION MONITORING SERVICE    Kelli Hoyt is a 80 y.o. female on warfarin therapy for PAF. Pharmacy consulted by LISA Gregg CNP for monitoring and adjustment of treatment. Indication for anticoagulation: PAF  INR goal: 2 - 3  Warfarin dose prior to admission: 7.5 mg po daily    Pertinent Laboratory Values   Recent Labs     10/09/21  0445 10/10/21  0506 10/11/21  1249   INR 6.84*   < > 1.33   HGB 13.4  --   --    HCT 42.7  --   --    *  --   --     < > = values in this interval not displayed. Assessment/Plan:   Drug Interactions:   o Aspirin (Home med)  o Levothyroxine (Home med)  o Acetaminophen (PRN)   INR supratherapeutic on admission, now subtherapeutic following held doses and vitamin K 5mg dose on 10/9. Warfarin just restarted last night with a 5mg dose.  Continue with reduced dose of warfarin 5mg daily and follow INR trends tomorrow. [address] [place] [address] will continue to monitor and adjust warfarin therapy as indicated    Thank you for the consult,  Aissatou Mccray.  Sanjana Holman, [facility]  10/11/2021 2:56 PM

## 2021-10-11 NOTE — PROGRESS NOTES
Nephrology Progress Note  10/11/2021 7:15 AM        Subjective:   Admit Date: 10/7/2021  PCP: Danyel Miller, DO    Interval History: No major event overnight    Diet: Some    ROS: No overt shortness of breath, PND or orthopnea  Had roughly 3 L/day of urine    Data:     Current meds:    warfarin  5 mg Oral Daily    furosemide  20 mg IntraVENous TID    albuterol sulfate HFA  2 puff Inhalation BID    aspirin  81 mg Oral Daily    atorvastatin  10 mg Oral Nightly    carvedilol  6.25 mg Oral BID WC    levothyroxine  88 mcg Oral Daily    ocuvite-lutein  1 tablet Oral Daily    potassium chloride  10 mEq Oral Daily    vitamin B-12  1,000 mcg Oral Daily    sodium chloride flush  5-40 mL IntraVENous 2 times per day    tiotropium-olodaterol  2 puff Inhalation Daily    timolol  1 drop Both Eyes BID      sodium chloride           I/O last 3 completed shifts: In: 120 [P.O.:120]  Out: 3050 [Urine:3050]    CBC:   Recent Labs     10/09/21  0445   WBC 14.8*   HGB 13.4   *          Recent Labs     10/09/21  0445 10/09/21  1031 10/10/21  0506 10/10/21  0924 10/10/21  1426   *   < > 125* 123* 124*   K 4.4  --  4.5  --   --    CL 89*  --  90*  --   --    CO2 21  --  23  --   --    BUN 34*  --  27*  --   --    CREATININE 0.8  --  0.7  --   --    GLUCOSE 103*  --  100*  --   --     < > = values in this interval not displayed.        Lab Results   Component Value Date    CALCIUM 9.2 10/10/2021    PHOS 2.8 10/10/2021       Objective:     Vitals: /60   Pulse 61   Temp 97.9 °F (36.6 °C) (Oral)   Resp 16   Ht 5' (1.524 m)   Wt 139 lb 5.3 oz (63.2 kg)   SpO2 96%   BMI 27.21 kg/m²     General appearance: Alert awake at least partially oriented, no distress  HEENT: 1+ conjunctival pallor  Neck: Supple  Lungs: Still has crackles on auscultation  Heart: Irregular irregular rhythm-left chest wall cardiac device-also has well-healed incision from previous sternotomy  Abdomen: Soft nontender  Extremities: No edema  Has Carnes      Problem List :         Impression :     1. Acute on chronic hyponatremia-hypokalemia was suspected  2. Also acute decompensated heart failure was suspected  3. He does have dysrhythmia as well as coronary artery disease  4. Fall with head injury-that is what led her to admission    Recommendation/Plan  :     1. May discharge from my standpoint  2. DC with torsemide 20 mg daily  3. She will need a CMP, magnesium, phosphorus in 1 week  4.  Follow-up with me in 2 weeks      Sarkis Ramesh MD MD

## 2021-10-11 NOTE — CARE COORDINATION
Met c pt and pt's dghtr at bedside to initiate discharge planning. Pt lives at home alone and typically does well. Pt was evaluated by therapy who rec'd SNF. Both pt and dghtr declined. Dghtr plans to take pt home and will stay with her upon discharge. Discussed HHC and pt/ dghtr agreeable no pref for provider. Sent referral to [facility] with [facility] to follow.

## 2021-10-12 LAB
ANION GAP SERPL CALCULATED.3IONS-SCNC: 9 MMOL/L (ref 4–16)
BUN BLDV-MCNC: 17 MG/DL (ref 6–23)
CALCIUM SERPL-MCNC: 9 MG/DL (ref 8.3–10.6)
CHLORIDE BLD-SCNC: 85 MMOL/L (ref 99–110)
CO2: 30 MMOL/L (ref 21–32)
CREAT SERPL-MCNC: 0.4 MG/DL (ref 0.6–1.1)
CULTURE: NORMAL
CULTURE: NORMAL
GFR AFRICAN AMERICAN: >60 ML/MIN/1.73M2
GFR NON-AFRICAN AMERICAN: >60 ML/MIN/1.73M2
GLUCOSE BLD-MCNC: 83 MG/DL (ref 70–99)
INR BLD: 1.25 INDEX
Lab: NORMAL
Lab: NORMAL
POTASSIUM SERPL-SCNC: 3.5 MMOL/L (ref 3.5–5.1)
PROTHROMBIN TIME: 16.1 SECONDS (ref 11.7–14.5)
SODIUM BLD-SCNC: 124 MMOL/L (ref 135–145)
SPECIMEN: NORMAL
SPECIMEN: NORMAL

## 2021-10-12 PROCEDURE — 85610 PROTHROMBIN TIME: CPT

## 2021-10-12 PROCEDURE — 99232 SBSQ HOSP IP/OBS MODERATE 35: CPT | Performed by: INTERNAL MEDICINE

## 2021-10-12 PROCEDURE — 1200000000 HC SEMI PRIVATE

## 2021-10-12 PROCEDURE — 97530 THERAPEUTIC ACTIVITIES: CPT

## 2021-10-12 PROCEDURE — 36415 COLL VENOUS BLD VENIPUNCTURE: CPT

## 2021-10-12 PROCEDURE — APPSS45 APP SPLIT SHARED TIME 31-45 MINUTES: Performed by: NURSE PRACTITIONER

## 2021-10-12 PROCEDURE — 6370000000 HC RX 637 (ALT 250 FOR IP): Performed by: INTERNAL MEDICINE

## 2021-10-12 PROCEDURE — 97535 SELF CARE MNGMENT TRAINING: CPT

## 2021-10-12 PROCEDURE — 80048 BASIC METABOLIC PNL TOTAL CA: CPT

## 2021-10-12 PROCEDURE — 94640 AIRWAY INHALATION TREATMENT: CPT

## 2021-10-12 PROCEDURE — 97116 GAIT TRAINING THERAPY: CPT

## 2021-10-12 PROCEDURE — 2580000003 HC RX 258: Performed by: NURSE PRACTITIONER

## 2021-10-12 PROCEDURE — 6370000000 HC RX 637 (ALT 250 FOR IP): Performed by: NURSE PRACTITIONER

## 2021-10-12 RX ORDER — SPIRONOLACTONE 25 MG/1
25 TABLET ORAL DAILY
Status: DISCONTINUED | OUTPATIENT
Start: 2021-10-12 | End: 2021-10-18 | Stop reason: HOSPADM

## 2021-10-12 RX ADMIN — ASPIRIN 81 MG: 81 TABLET, CHEWABLE ORAL at 10:37

## 2021-10-12 RX ADMIN — WARFARIN SODIUM 5 MG: 5 TABLET ORAL at 17:44

## 2021-10-12 RX ADMIN — CARVEDILOL 6.25 MG: 6.25 TABLET, FILM COATED ORAL at 10:36

## 2021-10-12 RX ADMIN — CYANOCOBALAMIN TAB 1000 MCG 1000 MCG: 1000 TAB at 10:37

## 2021-10-12 RX ADMIN — SPIRONOLACTONE 25 MG: 25 TABLET ORAL at 10:37

## 2021-10-12 RX ADMIN — SODIUM CHLORIDE, PRESERVATIVE FREE 10 ML: 5 INJECTION INTRAVENOUS at 19:58

## 2021-10-12 RX ADMIN — ALBUTEROL SULFATE 2 PUFF: 90 AEROSOL, METERED RESPIRATORY (INHALATION) at 20:36

## 2021-10-12 RX ADMIN — LEVOTHYROXINE SODIUM 88 MCG: 0.09 TABLET ORAL at 10:37

## 2021-10-12 RX ADMIN — SODIUM CHLORIDE, PRESERVATIVE FREE 10 ML: 5 INJECTION INTRAVENOUS at 10:37

## 2021-10-12 RX ADMIN — TIMOLOL MALEATE 1 DROP: 2.5 SOLUTION/ DROPS OPHTHALMIC at 10:37

## 2021-10-12 RX ADMIN — ALBUTEROL SULFATE 2 PUFF: 90 AEROSOL, METERED RESPIRATORY (INHALATION) at 11:18

## 2021-10-12 RX ADMIN — TIMOLOL MALEATE 1 DROP: 2.5 SOLUTION/ DROPS OPHTHALMIC at 19:58

## 2021-10-12 RX ADMIN — TIOTROPIUM BROMIDE AND OLODATEROL 2 PUFF: 3.124; 2.736 SPRAY, METERED RESPIRATORY (INHALATION) at 11:17

## 2021-10-12 RX ADMIN — Medication 1 TABLET: at 10:37

## 2021-10-12 RX ADMIN — TORSEMIDE 20 MG: 20 TABLET ORAL at 10:37

## 2021-10-12 RX ADMIN — CARVEDILOL 6.25 MG: 6.25 TABLET, FILM COATED ORAL at 17:44

## 2021-10-12 RX ADMIN — ATORVASTATIN CALCIUM 10 MG: 20 TABLET, FILM COATED ORAL at 19:58

## 2021-10-12 ASSESSMENT — PAIN SCALES - GENERAL: PAINLEVEL_OUTOF10: 0

## 2021-10-12 ASSESSMENT — ENCOUNTER SYMPTOMS
NAUSEA: 0
VOMITING: 0
SHORTNESS OF BREATH: 0
COUGH: 0

## 2021-10-12 NOTE — PLAN OF CARE
Problem: Falls - Risk of:  Goal: Will remain free from falls  Description: Will remain free from falls  Outcome: Ongoing  Goal: Absence of physical injury  Description: Absence of physical injury  Outcome: Ongoing     Problem: Infection:  Goal: Will remain free from infection  Description: Will remain free from infection  Outcome: Ongoing     Problem: Safety:  Goal: Free from accidental physical injury  Description: Free from accidental physical injury  Outcome: Ongoing  Goal: Free from intentional harm  Description: Free from intentional harm  Outcome: Ongoing     Problem: Daily Care:  Goal: Daily care needs are met  Description: Daily care needs are met  Outcome: Ongoing     Problem: Pain:  Goal: Patient's pain/discomfort is manageable  Description: Patient's pain/discomfort is manageable  Outcome: Ongoing     Problem: Skin Integrity:  Goal: Skin integrity will stabilize  Description: Skin integrity will stabilize  Outcome: Ongoing     Problem: Discharge Planning:  Goal: Patients continuum of care needs are met  Description: Patients continuum of care needs are met  Outcome: Ongoing     Problem: Neurological  Goal: Maximum potential motor/sensory/cognitive function  Outcome: Ongoing     Problem: Nutrition  Goal: Optimal nutrition therapy  Outcome: Ongoing  Goal: Understanding of nutritional guidelines  Outcome: Ongoing     Problem: Musculor/Skeletal Functional Status  Goal: Highest potential functional level  Outcome: Ongoing  Goal: Absence of falls  Outcome: Ongoing

## 2021-10-12 NOTE — PLAN OF CARE
Spoke with RN received word that MD requesting re-eval.    Discussed case with RN -- no new medical issues, patient leaning towards choosing home, which is not likley safest option, previous therapy recs encourage SNF. Reeval is not warranted in this situation -- patient medical condition has not significantly changed. We intend to continue providing care per established plans of care. We will be purposeful with discussion about safety with patient. CYNDY Box appreciates safety reinforcement.   Josephine, [address] [place], [place] [address]  1:53 PM 10/12/2021

## 2021-10-12 NOTE — CARE COORDINATION
Received referral for ARU. Patients primary insurance is [facility], pre-cert needed. Pre-cert initiated and pending at this time. Reference #[id number]. Will continue to follow for determination.

## 2021-10-12 NOTE — CARE COORDINATION
Spoke with Nito Bautista PT and Ayleen Walker OT who states pt did well with therapy today and they rec'd ARU (if not appropriate for ARU pt/dghtr would be open to Swing bed vs home with [facility] and family support). Referral sent to Koby santillan with ARU to review. Met c pt and dghtr, confirmed plan- advised CM will update once determination made.

## 2021-10-12 NOTE — PROGRESS NOTES
Occupational Therapy  Occupational Therapy Treatment Note      Name: Sj Saha MRN: [id number] :   3/18/1929   Date:  10/12/2021   Admission Date: 10/7/2021 Room:  [address] [place] [address]-A     Primary Problem: [place]:  The primary encounter diagnosis was Hyponatremia. A diagnosis of Elevated INR was also pertinent to this visit. Restrictions/Precautions:    General Precautions, Fall Risk     Communication with other providers:  PT, RN, LSW    Subjective:  Patient states:  Agreeable to therapy. Pain: Pt denied pain this date (0/10). Objective:    Observation: Pt was received seated upright in chair upon arrival.   Objective Measures:  Vitals stable throughout session. Treatment, including education:  Therapeutic Activity Training:   Therapeutic activity training was instructed today. Cues were given for safety, sequence, UE/LE placement, awareness, and balance. Activities performed today included bed mobility training, sup-sit, sit-stand, ambulation. Self Care Training:   Cues were given for safety, sequence, UE/LE placement, visual cues, and balance. Activities performed today included dressing, toileting, hand hygiene, and grooming. Pt performed STS from chair with Chava and RW. Pt then ambulated approx 10ft to the bathroom for toileting with CGA and RW. In standing, pt doffed depend with ModA. Pt performed stand-sit to commode with Chava for sequencing and eccentric control. Pt performed STS from commode with Chava and RW. Pt required CGA for dynamic standing balance while managing depend. Pt performed hand hygiene in standing at sink with CGA (pt noted to have washed hands 2x, VC required for sequencing). Pt then combed hair in standing with CGA for dynamic standing balance and Danica for task completion. Gown was doffed for pt in standing with ModA and clean gown was donned this date. Pt then ambulated approx 10ft to the chair with CGA and RW.  Pt performed stand-sit to chair with CGA for

## 2021-10-12 NOTE — PROGRESS NOTES
[facility] ([facility] PHYSICAL [facility]  [address]  Phone: [phone]    Fax [phone]                  Lucia Draper MD, Ad Fulton MD, Wilber Arnold MD, MD Ana Ann MD Refugia Newcomer, MD Cecilio Bach, MD Luan Crate, APRN      Tay Rothman, APRN  Ovidio Bueno, [address], LISA    Cardiology Progress Note     Today's Plan: sign off    Admit Date:  10/7/2021    Consult reason/ Seen today for: atrial fibrillation    Subjective and  Overnight Events:  Patient states that she is doing well. Denies any issues with shortness of breath or palpiations    Assessment / Plan / Recommendation:     1. Hyponatremia in setting of confusion as per nephrology  2. Coagulopathy:  On Coumadin but reevaluate anticoagulation long-term if she continues to have falls   3. HTN: stable, continue present medications. 4. Coronary artery disease remote history of CABG and PCI: Currently stable. angina free  5. DVT prophylaxis if no contraindication  6. Dyslipidemia: continue statins   7. Atrial fibrillation rate control strategy only. Rate is well controlled. Recommend keeping potassium 4-4.5 and magnesium 2-2.2 in patients with atrial fibrillation. History of Presenting Illness:    Chief complain on admission : 80 y. o.year old who is admitted for  Chief Complaint   Patient presents with    Fall      Trauma Alert    Headache     Hematoma to back of head, no LOC        Past medical history:    has a past medical history of Arthritis, Bradycardia, CAD (coronary artery disease), Cardiac pacemaker, CHF (congestive heart failure) ([address] COPD, mild ([address] CVA (cerebrovascular accident) ([address] DJD (degenerative joint disease) of cervical spine, Exhaustion of cardiac pacemaker battery, Family history of cardiovascular disease, Glaucoma, H/O 24 hour EKG monitoring, H/O cardiac catheterization, H/O cardiovascular stress test, H/O ocuvite-lutein  1 tablet Oral Daily    vitamin B-12  1,000 mcg Oral Daily    sodium chloride flush  5-40 mL IntraVENous 2 times per day    tiotropium-olodaterol  2 puff Inhalation Daily    timolol  1 drop Both Eyes BID      sodium chloride       sodium chloride flush, sodium chloride, ondansetron **OR** ondansetron, polyethylene glycol, acetaminophen **OR** acetaminophen    Lab Data:  CBC: No results for input(s): WBC, HGB, HCT, MCV, PLT in the last 72 hours. BMP:   Recent Labs     10/10/21  0506 10/10/21  0924 10/10/21  1426 10/11/21  1249 10/12/21  0718   *   < > 124* 129* 124*   K 4.5  --   --  3.7 3.5   CL 90*  --   --  89* 85*   CO2 23  --   --  27 30   PHOS 2.8  --   --  1.9*  --    BUN 27*  --   --  20 17   CREATININE 0.7  --   --  0.4* 0.4*    < > = values in this interval not displayed. PT/INR:   Recent Labs     10/10/21  0506 10/11/21  1249   PROTIME 25.5* 17.2*   INR 1.96 1.33     BNP:    Recent Labs     10/11/21  1249   PROBNP 3,952*     TROPONIN: No results for input(s): TROPONINT in the last 72 hours. ECHO :   echocardiogram       All labs, medications and tests reviewed by myself , continue all other medications of all above medical condition listed as is except for changes mentioned above. Thank yo    u very much for consult , please call with questions. Electronically signed by LISA Capone CNP on 10/12/2021 at 8:58 AM      CARDIOLOGY ATTENDING ADDENDUM    I have seen ,spoken to  and examined this patient personally, independently of the nurse practitioner. I have reviewed the hospital care given to date and reviewed all pertinent labs and imaging. The plan was developed mutually at the time of the visit with the patient,  NP   and myself. I have spoken with patient, nursing staff and provided written and verbal instructions . The above note has been reviewed and I agree with the assessment, diagnosis, and treatment plan with changes made by me as follows HPI:  I have reviewed the above HPI  And agree with above   [facility] is a 80 y. o.year old who and presents with had concerns including Fall ( Trauma Alert) and Headache (Hematoma to back of head, no LOC). Chief Complaint   Patient presents with    Fall      Trauma Alert    Headache     Hematoma to back of head, no LOC     Please review addendum/changes made to note above   Interval history:  Less confused     Physical Exam:  General:  Awake, alert, NAD  Head:normal  Eye:normal  Neck:  No JVD   Chest:  Clear to auscultation, respiration easy  Cardiovascular:  S1 and S2 audible, No added heart sounds, No signs of ankle edema, or volume overload, No evidence of JVD, No crackles  Abdomen:   nontender  Extremities:  *no  edema  Pulses; palpable  Neuro: grossly normal      MEDICAL DECISION MAKING;    I agree with the above plan, which was planned by myself and discussed with NP.   On Coumadin but reevaluate in case she has more falls  Angina free  Paced  Call with questions we will sign off    Bridgette Kincaid MD [facility] 10/12/21

## 2021-10-12 NOTE — PROGRESS NOTES
Progress Note  Date:10/12/2021       CZAX:[id number]  Patient Name:Irene Mcintyre     YOB: 1929     Age:89 y.o. Patient seen and examined in the room. Stated feels better. Patient wanted to go home with home health care rather than rehab center. Subjective    Subjective:  Symptoms:  Improved. No shortness of breath, malaise, cough, chest pain, weakness, headache or chest pressure. Diet:  Adequate intake. No nausea or vomiting. Activity level: Normal.    Pain:  She reports no pain. Review of Systems   Constitutional: Negative for fever. Respiratory: Negative for cough and shortness of breath. Cardiovascular: Negative for chest pain. Gastrointestinal: Negative for nausea and vomiting. Neurological: Negative for weakness. Objective         Vitals Last 24 Hours:  TEMPERATURE:  Temp  Av.2 °F (36.8 °C)  Min: 97.7 °F (36.5 °C)  Max: 98.6 °F (37 °C)  RESPIRATIONS RANGE: Resp  Av.5  Min: 14  Max: 18  PULSE OXIMETRY RANGE: SpO2  Av %  Min: 92 %  Max: 94 %  PULSE RANGE: Pulse  Av.2  Min: 59  Max: 62  BLOOD PRESSURE RANGE: Systolic (69UHU), QSQ:132 , Min:113 , CRY:265   ; Diastolic (52CMQ), TID:08, Min:48, Max:78    I/O (24Hr): Intake/Output Summary (Last 24 hours) at 10/12/2021 1852  Last data filed at 10/12/2021 1441  Gross per 24 hour   Intake    Output 2400 ml   Net -2400 ml     Objective:  General Appearance: In no acute distress. Vital signs: (most recent): Blood pressure (!) 144/76, pulse 60, temperature 98.6 °F (37 °C), temperature source Oral, resp. rate 14, height 5' (1.524 m), weight 138 lb 10.7 oz (62.9 kg), SpO2 92 %. Vital signs are normal.  No fever. Output: Producing urine and producing stool. Lungs:  Normal effort and normal respiratory rate. Breath sounds clear to auscultation. Heart: Normal rate. Regular rhythm. S1 normal.    Abdomen: Abdomen is soft. Bowel sounds are normal.   There is no abdominal tenderness. Neurological: Patient is alert. Skin:  Warm. Labs/Imaging/Diagnostics    Labs:  CBC:  No results for input(s): WBC, RBC, HGB, HCT, MCV, RDW, PLT in the last 72 hours. CHEMISTRIES:  Recent Labs     10/10/21  0506 10/10/21  0924 10/10/21  1426 10/11/21  1249 10/12/21  0718   *   < > 124* 129* 124*   K 4.5  --   --  3.7 3.5   CL 90*  --   --  89* 85*   CO2 23  --   --  27 30   BUN 27*  --   --  20 17   CREATININE 0.7  --   --  0.4* 0.4*   GLUCOSE 100*  --   --  249* 83   PHOS 2.8  --   --  1.9*  --    MG 1.5*  --   --  1.5*  --     < > = values in this interval not displayed. PT/INR:  Recent Labs     10/10/21  0506 10/11/21  1249 10/12/21  1300   PROTIME 25.5* 17.2* 16.1*   INR 1.96 1.33 1.25     APTT:No results for input(s): APTT in the last 72 hours. LIVER PROFILE:  Recent Labs     10/10/21  0506 10/11/21  1249   AST 22 18   ALT 22 22   BILITOT 1.2* 1.4*   ALKPHOS 145* 136*       Imaging Last 24 Hours:  No results found. Assessment//Plan           Hospital Problems         Last Modified POA    CAD (coronary artery disease) (Chronic) 10/11/2021 Yes    Cardiac pacemaker (Chronic) 10/11/2021 Yes    Overview Signed 11/6/2012 11:58 AM by MD Lio Baez Alfredo #[id number]  PPM- Serial # 26-[facility]- Dr Murray Virgen         S/P CABG x 3 (Chronic) 10/11/2021 Yes    Overview Signed 11/6/2012 11:58 AM by Lavern Garcia MD     LIMA->Diag,  LIMA to LAD;  SVG->RCA going to the PDA. Followed by MAZE procedure by pulmonary vein isolation.-  Dr Jessica Stafford PTCA (Chronic) 10/11/2021 Yes    Overview Signed 11/6/2012 11:58 AM by Lavern Garcia MD     PTCA with 2.25 stent of the LIMA to LAD         Hypertension (Chronic) 10/11/2021 Yes    Hyponatremia 10/7/2021 Yes        Assessment & Plan  Ricky Hernandez is a 80 y.o.  female  who presents with <principal problem not specified>     1.  Hyponatremia: Secondary to: Hypervolemia appreciate nephrology input patient is responding to loop diuretics her sodium is trending up. Sodium 129 this morning, continue torsemide 20 mg daily. Plan to discharge tomorrow. 2. Coagulopathy: Secondary to Coumadin patient INR significantly elevated on admission. She was seen by cardiology and they recommended stopping anticoagulation given her age and risk of falls  3. Frequent falls: PT recommended home with home health care and 24-hour supervision. 4. Acute CHF exacerbation: Continue diuresis. 5. Hypomagnesemia, mag 1.5 this morning, received replacement, repeat labs in the morning.     Electronically signed by LISA Thomas CNP on 10/11/21 at 10:53 AM EDT

## 2021-10-12 NOTE — PROGRESS NOTES
Nephrology Progress Note  10/12/2021 8:24 AM        Subjective:   Admit Date: 10/7/2021  PCP: Sharon Moran DO    Interval History: Sleeping-did wake up-she is with CPAP machine    Diet: Per patient some    ROS: No overt shortness of breath  Made more than 3 L/day-urine output        Data:     Current meds:    torsemide  20 mg Oral Daily    warfarin  5 mg Oral Daily    albuterol sulfate HFA  2 puff Inhalation BID    aspirin  81 mg Oral Daily    atorvastatin  10 mg Oral Nightly    carvedilol  6.25 mg Oral BID WC    levothyroxine  88 mcg Oral Daily    ocuvite-lutein  1 tablet Oral Daily    potassium chloride  10 mEq Oral Daily    vitamin B-12  1,000 mcg Oral Daily    sodium chloride flush  5-40 mL IntraVENous 2 times per day    tiotropium-olodaterol  2 puff Inhalation Daily    timolol  1 drop Both Eyes BID      sodium chloride           I/O last 3 completed shifts:  In: -   Out: 4196 [Urine:3075]    CBC: No results for input(s): WBC, HGB, PLT in the last 72 hours. Recent Labs     10/10/21  0506 10/10/21  0506 10/10/21  0924 10/10/21  1426 10/11/21  1249   *   < > 123* 124* 129*   K 4.5  --   --   --  3.7   CL 90*  --   --   --  89*   CO2 23  --   --   --  27   BUN 27*  --   --   --  20   CREATININE 0.7  --   --   --  0.4*   GLUCOSE 100*  --   --   --  249*    < > = values in this interval not displayed.        Lab Results   Component Value Date    CALCIUM 9.4 10/11/2021    PHOS 1.9 (L) 10/11/2021       Objective:     Vitals: BP (!) 138/48   Pulse 59   Temp 98.4 °F (36.9 °C) (Axillary)   Resp 18   Ht 5' (1.524 m)   Wt 138 lb 10.7 oz (62.9 kg)   SpO2 94%   BMI 27.08 kg/m²     General appearance: Thin, no acute distress  HEENT: 1+ conjunctival pallor  Neck: Supple  Lungs: Positive adventitious breath sounds-well-healed incision from previous sternotomy, also has left chest wall cardiac device  Heart: Irregularly irregular  Abdomen: Soft  Extremities: Mild pedal edema      Problem List : Impression :     1. Acute on chronic hyponatremia-hypervolemia was suspected-slowly improving  2. Acute decompensated heart failure-seems clinically better  3. Underlying coronary artery disease and dysrhythmia  4. Recent fall    Recommendation/Plan  :     1. May discharge with torsemide 20 daily  2. Continue potassium supplementation  3. .  Add spironolactone 25 daily  4. Low-salt, daily weight  5. Call me with weight gain more than 2 pounds  6. CMP, magnesium, phosphorus in 1 week  7.  Follow-up with me in 2 weeks      Breana Rodgers MD MD

## 2021-10-12 NOTE — PROGRESS NOTES
Physical Therapy    Physical Therapy Treatment Note  Name: Aldo Montgomery MRN: [id number] :   3/18/1929   Date:  10/12/2021   Admission Date: 10/7/2021 Room:  [address] [place], [place] [address]   Restrictions/Precautions:        general precautions, falls   Communication with other providers:  RN, OT, CM  Subjective:  Patient states:  \"I couldn't go home like this\"   Pain:   Location, Type, Intensity (0/10 to 10/10): Denies   Objective:    Observation:    Sitting in recliner. Daughter present. Pt cooperative with therapy. Very [place] needing inc VCs and demonstrations for sequencing. Treatment, including education/measures:  Sit to stand: [place] from recliner and toilet for steadying assist. Max VCs required for hand sequencing from grab bar/chair surface vs pulling on RW   Stand to sit: [place] for controlling sit to recliner and toilet. Unable to hear to follow cues for sequencing UEs back to recliner armrests but was able to reach for grab bar in the bathroom   Step pivot: CGA for safety with RW (2x). Slower pace with good use of walker    Ambulation: ~15ft x 2 with RW CGA for safety. Slower pace with short bilat step length. No major LOB noted though unsteady at times   Standing balance: CGA while at sink managing hygiene, grooming, and dressing tasks without UE support. Occasional anterior/posterior sway but able to correct. Educated pt and daughter thoroughly on discharge recommendations. Discussed ARU qualifications and requirements as well as home care with recommended 24/7 and DME needed if pt elects to return home.    Assessment / Impression:    Patient's tolerance of treatment:  Good   Adverse Reaction: no  Significant change in status and impact:  no  Barriers to improvement:  Activity tolerance, strength, balance, [place]  DME if pt were to d/c home: RW, BSC, shower chair   Plan for Next Session:    Activity tolerance, strength, balance, gait, transfers, bed mobility   Time in:  1415  Time out:  1440  Timed treatment minutes: 25  Total treatment time:  25    Previously filed items:  Social/Functional History  Lives With: Alone  Type of Home:  (Condo)  Home Layout: One level  Home Access: Level entry  Bathroom Shower/Tub: Walk-in shower  Bathroom Toilet: Standard  Bathroom Equipment: Shower chair, Grab bars in shower, Grab bars around toilet  Home Equipment: 4 wheeled walker, Cane (Pt does not ambulate with AD at baseline.)  ADL Assistance: Independent  Homemaking Assistance: Independent  Homemaking Responsibilities: Yes  Ambulation Assistance: Independent  Transfer Assistance: Independent  Active : No  Patient's  Info: Daughter in law does grocery shopping.   Short term goals  Time Frame for Short term goals: 1 week  Short term goal 1: Pt will perform bed mobility SBA  Short term goal 2: Pt will transfer from all surfaces SBA  Short term goal 3: Pt will ambulate 75ft with LRAD SBA  Short term goal 4: Pt will perform standing light dynamic activity without UE support x 3 minutes SBA       Electronically signed by:    DARWIN [id number]  10/12/2021, 2:20 PM

## 2021-10-13 LAB
ALBUMIN SERPL-MCNC: 3.5 GM/DL (ref 3.4–5)
ALP BLD-CCNC: 125 IU/L (ref 40–129)
ALT SERPL-CCNC: 18 U/L (ref 10–40)
ANION GAP SERPL CALCULATED.3IONS-SCNC: 10 MMOL/L (ref 4–16)
AST SERPL-CCNC: 17 IU/L (ref 15–37)
BACTERIA: ABNORMAL /HPF
BILIRUB SERPL-MCNC: 1.6 MG/DL (ref 0–1)
BILIRUBIN URINE: NEGATIVE MG/DL
BLOOD, URINE: ABNORMAL
BUN BLDV-MCNC: 13 MG/DL (ref 6–23)
CALCIUM SERPL-MCNC: 8.7 MG/DL (ref 8.3–10.6)
CHLORIDE BLD-SCNC: 85 MMOL/L (ref 99–110)
CLARITY: CLEAR
CO2: 31 MMOL/L (ref 21–32)
COLOR: YELLOW
CREAT SERPL-MCNC: 0.3 MG/DL (ref 0.6–1.1)
GFR AFRICAN AMERICAN: >60 ML/MIN/1.73M2
GFR NON-AFRICAN AMERICAN: >60 ML/MIN/1.73M2
GLUCOSE BLD-MCNC: 79 MG/DL (ref 70–99)
GLUCOSE, URINE: NEGATIVE MG/DL
INR BLD: 1.41 INDEX
KETONES, URINE: NEGATIVE MG/DL
LEUKOCYTE ESTERASE, URINE: ABNORMAL
MAGNESIUM: 1.4 MG/DL (ref 1.8–2.4)
MUCUS: ABNORMAL HPF
NITRITE URINE, QUANTITATIVE: NEGATIVE
PH, URINE: 6 (ref 5–8)
PHOSPHORUS: 2.4 MG/DL (ref 2.5–4.9)
POTASSIUM SERPL-SCNC: 3.6 MMOL/L (ref 3.5–5.1)
PRO-BNP: 2674 PG/ML
PROTEIN UA: NEGATIVE MG/DL
PROTHROMBIN TIME: 18.3 SECONDS (ref 11.7–14.5)
RBC URINE: 35 /HPF (ref 0–6)
RENAL EPITHELIAL, UA: <1 /HPF
SODIUM BLD-SCNC: 126 MMOL/L (ref 135–145)
SODIUM URINE: 21 MMOL/L (ref 35–167)
SPECIFIC GRAVITY UA: 1.01 (ref 1–1.03)
SQUAMOUS EPITHELIAL: <1 /HPF
T3 FREE: 1.6 PG/ML (ref 2.3–4.2)
T4 FREE: 1.6 NG/DL (ref 0.9–1.8)
TOTAL PROTEIN: 4.9 GM/DL (ref 6.4–8.2)
TRICHOMONAS: ABNORMAL /HPF
UROBILINOGEN, URINE: NEGATIVE MG/DL (ref 0.2–1)
WBC CLUMP: ABNORMAL /HPF
WBC UA: 19 /HPF (ref 0–5)

## 2021-10-13 PROCEDURE — 6370000000 HC RX 637 (ALT 250 FOR IP): Performed by: NURSE PRACTITIONER

## 2021-10-13 PROCEDURE — 6370000000 HC RX 637 (ALT 250 FOR IP): Performed by: INTERNAL MEDICINE

## 2021-10-13 PROCEDURE — 85610 PROTHROMBIN TIME: CPT

## 2021-10-13 PROCEDURE — 2580000003 HC RX 258: Performed by: NURSE PRACTITIONER

## 2021-10-13 PROCEDURE — 84300 ASSAY OF URINE SODIUM: CPT

## 2021-10-13 PROCEDURE — 36415 COLL VENOUS BLD VENIPUNCTURE: CPT

## 2021-10-13 PROCEDURE — 83880 ASSAY OF NATRIURETIC PEPTIDE: CPT

## 2021-10-13 PROCEDURE — 80053 COMPREHEN METABOLIC PANEL: CPT

## 2021-10-13 PROCEDURE — 83735 ASSAY OF MAGNESIUM: CPT

## 2021-10-13 PROCEDURE — 84439 ASSAY OF FREE THYROXINE: CPT

## 2021-10-13 PROCEDURE — 6360000002 HC RX W HCPCS: Performed by: INTERNAL MEDICINE

## 2021-10-13 PROCEDURE — 81001 URINALYSIS AUTO W/SCOPE: CPT

## 2021-10-13 PROCEDURE — 84481 FREE ASSAY (FT-3): CPT

## 2021-10-13 PROCEDURE — 94761 N-INVAS EAR/PLS OXIMETRY MLT: CPT

## 2021-10-13 PROCEDURE — 94640 AIRWAY INHALATION TREATMENT: CPT

## 2021-10-13 PROCEDURE — 1200000000 HC SEMI PRIVATE

## 2021-10-13 PROCEDURE — 84100 ASSAY OF PHOSPHORUS: CPT

## 2021-10-13 RX ORDER — MAGNESIUM SULFATE 4 G/50ML
4000 INJECTION INTRAVENOUS ONCE
Status: COMPLETED | OUTPATIENT
Start: 2021-10-13 | End: 2021-10-13

## 2021-10-13 RX ADMIN — SODIUM CHLORIDE, PRESERVATIVE FREE 10 ML: 5 INJECTION INTRAVENOUS at 09:22

## 2021-10-13 RX ADMIN — SODIUM CHLORIDE, PRESERVATIVE FREE 10 ML: 5 INJECTION INTRAVENOUS at 21:06

## 2021-10-13 RX ADMIN — TIMOLOL MALEATE 1 DROP: 2.5 SOLUTION/ DROPS OPHTHALMIC at 21:07

## 2021-10-13 RX ADMIN — Medication 1 TABLET: at 09:21

## 2021-10-13 RX ADMIN — CARVEDILOL 6.25 MG: 6.25 TABLET, FILM COATED ORAL at 16:49

## 2021-10-13 RX ADMIN — ATORVASTATIN CALCIUM 10 MG: 20 TABLET, FILM COATED ORAL at 21:06

## 2021-10-13 RX ADMIN — LEVOTHYROXINE SODIUM 88 MCG: 0.09 TABLET ORAL at 09:20

## 2021-10-13 RX ADMIN — ALBUTEROL SULFATE 2 PUFF: 90 AEROSOL, METERED RESPIRATORY (INHALATION) at 08:17

## 2021-10-13 RX ADMIN — WARFARIN SODIUM 5 MG: 5 TABLET ORAL at 16:50

## 2021-10-13 RX ADMIN — ALBUTEROL SULFATE 2 PUFF: 90 AEROSOL, METERED RESPIRATORY (INHALATION) at 21:29

## 2021-10-13 RX ADMIN — MAGNESIUM SULFATE HEPTAHYDRATE 4000 MG: 80 INJECTION, SOLUTION INTRAVENOUS at 09:23

## 2021-10-13 RX ADMIN — SPIRONOLACTONE 25 MG: 25 TABLET ORAL at 09:20

## 2021-10-13 RX ADMIN — TIOTROPIUM BROMIDE AND OLODATEROL 2 PUFF: 3.124; 2.736 SPRAY, METERED RESPIRATORY (INHALATION) at 08:18

## 2021-10-13 RX ADMIN — ASPIRIN 81 MG: 81 TABLET, CHEWABLE ORAL at 09:20

## 2021-10-13 RX ADMIN — CYANOCOBALAMIN TAB 1000 MCG 1000 MCG: 1000 TAB at 09:20

## 2021-10-13 RX ADMIN — CARVEDILOL 6.25 MG: 6.25 TABLET, FILM COATED ORAL at 09:20

## 2021-10-13 ASSESSMENT — PAIN SCALES - GENERAL
PAINLEVEL_OUTOF10: 0
PAINLEVEL_OUTOF10: 0

## 2021-10-13 NOTE — PROGRESS NOTES
Hospitalist Progress Note      Name:  Vani Lyle /Age/Sex: 3/18/1929  (80 y.o. female)   MRN & CSN:  [id number] & [id number] Admission Date/Time: 10/7/2021 10:27 AM   Location:  [address] [place] [address] PCP: Azalia Buckill Jennifer Day: 7    Assessment and Plan:   Vani Lyle is a 80 y.o.  female  who presents with     Hyponatremia: Improving, nephrology on board, appreciate recs   Coagulopathy: Secondary to Coumadin patient INR significantly elevated on admission.  She was seen by cardiology and they recommended stopping anticoagulation given her age and risk of falls  Frequent falls: Pursuing ARU placement versus inpatient rehab   Acute CHF exacerbation: Continue diuresis per nephro recs   5. Elevated TSH: ?Overtreatment hypothyroidism, check Free T3, T4, will adjust meds based on levels     History of Present Illness:     Chief Complaint: <principal problem not specified>  Vani Lyle is a 80 y.o.  female  who presents with above    No acute issues today      Ten point ROS reviewed negative, unless as noted above    Objective: Intake/Output Summary (Last 24 hours) at 10/13/2021 1811  Last data filed at 10/13/2021 1152  Gross per 24 hour   Intake 10 ml   Output 1880 ml   Net -1870 ml      Vitals:   Vitals:    10/13/21 1649   BP: (!) 149/68   Pulse: 94   Resp:    Temp:    SpO2:      Physical Exam:   GEN Awake female, sitting upright in bed in no apparent distress. Appears given age. EYES Pupils are equally round. No scleral erythema, discharge, or conjunctivitis. HENT Mucous membranes are moist. Oral pharynx without exudates, no evidence of thrush. NECK Supple, no apparent thyromegaly or masses. RESP Clear to auscultation, no wheezes, rales or rhonchi. Symmetric chest movement while on room air. CARDIO/VASC S1/S2 auscultated. Regular rate without appreciable murmurs, rubs, or gallops. No JVD or carotid bruits. Peripheral pulses equal bilaterally and palpable.  No peripheral edema.  GI Abdomen is soft without significant tenderness, masses, or guarding. Bowel sounds are normoactive. Rectal exam deferred.  No costovertebral angle tenderness. Normal appearing external genitalia. Carnes catheter is not present. HEME/LYMPH No palpable cervical lymphadenopathy and no hepatosplenomegaly. No petechiae or ecchymoses. MSK No gross joint deformities. SKIN Normal coloration, warm, dry. NEURO Cranial nerves appear grossly intact, normal speech, no lateralizing weakness. PSYCH Awake, alert, oriented x 4. Affect appropriate.     Medications:   Medications:    spironolactone  25 mg Oral Daily    warfarin  5 mg Oral Daily    albuterol sulfate HFA  2 puff Inhalation BID    aspirin  81 mg Oral Daily    atorvastatin  10 mg Oral Nightly    carvedilol  6.25 mg Oral BID WC    levothyroxine  88 mcg Oral Daily    ocuvite-lutein  1 tablet Oral Daily    vitamin B-12  1,000 mcg Oral Daily    sodium chloride flush  5-40 mL IntraVENous 2 times per day    tiotropium-olodaterol  2 puff Inhalation Daily    timolol  1 drop Both Eyes BID      Infusions:    sodium chloride       PRN Meds: sodium chloride flush, 5-40 mL, PRN  sodium chloride, 25 mL, PRN  ondansetron, 4 mg, Q8H PRN   Or  ondansetron, 4 mg, Q6H PRN  polyethylene glycol, 17 g, Daily PRN  acetaminophen, 650 mg, Q6H PRN   Or  acetaminophen, 650 mg, Q6H PRN          Electronically signed by Anastacio Levi MD on 10/13/2021 at 6:11 PM

## 2021-10-13 NOTE — PROGRESS NOTES
Nephrology Progress Note  10/13/2021 10:34 AM        Subjective:   Admit Date: 10/7/2021  PCP: Andrew Meade DO    Interval History: No major event that I am aware of    Diet: Good appetite eating her breakfast this morning    ROS: Alert awake without confusion  No shortness of breath  Made almost 2-1/2 L of urine for the last 24 hours    Data:     Current meds:    magnesium sulfate  4,000 mg IntraVENous Once    spironolactone  25 mg Oral Daily    warfarin  5 mg Oral Daily    albuterol sulfate HFA  2 puff Inhalation BID    aspirin  81 mg Oral Daily    atorvastatin  10 mg Oral Nightly    carvedilol  6.25 mg Oral BID WC    levothyroxine  88 mcg Oral Daily    ocuvite-lutein  1 tablet Oral Daily    vitamin B-12  1,000 mcg Oral Daily    sodium chloride flush  5-40 mL IntraVENous 2 times per day    tiotropium-olodaterol  2 puff Inhalation Daily    timolol  1 drop Both Eyes BID      sodium chloride           I/O last 3 completed shifts:  In: -   Out: 2450 [Urine:2450]    CBC: No results for input(s): WBC, HGB, PLT in the last 72 hours. Recent Labs     10/11/21  1249 10/12/21  0718 10/13/21  0630   * 124* 126*   K 3.7 3.5 3.6   CL 89* 85* 85*   CO2 27 30 31   BUN 20 17 13   CREATININE 0.4* 0.4* 0.3*   GLUCOSE 249* 83 79       Lab Results   Component Value Date    CALCIUM 8.7 10/13/2021    PHOS 2.4 (L) 10/13/2021       Objective:     Vitals: /76   Pulse 61   Temp 97.7 °F (36.5 °C) (Oral)   Resp 18   Ht 5' (1.524 m)   Wt 138 lb 10.7 oz (62.9 kg)   SpO2 93%   BMI 27.08 kg/m²     General appearance: No acute distress  HEENT: No gross conjunctival pallor  Neck: Supple  Lungs: Positive crackles  Heart: Irregular irregular rhythm-well-healed incision from previous sternotomy-left chest wall cardiac device  Abdomen: Soft nontender  Extremities: No edema  Has Carnes      Problem List :         Impression :     1.  Acute on chronic hyponatremia-hypervolemia was strongly suspected-labs showed variable sodium-sure whether is a lab error-true sodium  2. Acute decompensated heart failure was also suspected-underlying dysrhythmia and coronary artery disease  3. Recent fall of course  4. Hypomagnesemia likely from loop therapy-replete intravenously    Recommendation/Plan  :     1. Although the plan was to send her home with torsemide 20 daily  2. I will hold it until I get a proBNP level  3. Also check urine urine sodium  4. His proBNP level more than 1000-restart torsemide 20 in May discharge with it  5. Low-salt, daily weight  6. She will need a CMP, magnesium, phosphorus in 1 week  7.  Follow up with me in 2 weeks      Pura Saul MD MD

## 2021-10-14 LAB
INR BLD: 1.46 INDEX
PROTHROMBIN TIME: 18.9 SECONDS (ref 11.7–14.5)

## 2021-10-14 PROCEDURE — 2580000003 HC RX 258: Performed by: NURSE PRACTITIONER

## 2021-10-14 PROCEDURE — 6370000000 HC RX 637 (ALT 250 FOR IP): Performed by: INTERNAL MEDICINE

## 2021-10-14 PROCEDURE — 36415 COLL VENOUS BLD VENIPUNCTURE: CPT

## 2021-10-14 PROCEDURE — 97535 SELF CARE MNGMENT TRAINING: CPT

## 2021-10-14 PROCEDURE — 6370000000 HC RX 637 (ALT 250 FOR IP): Performed by: HOSPITALIST

## 2021-10-14 PROCEDURE — 97530 THERAPEUTIC ACTIVITIES: CPT

## 2021-10-14 PROCEDURE — 6370000000 HC RX 637 (ALT 250 FOR IP): Performed by: NURSE PRACTITIONER

## 2021-10-14 PROCEDURE — 97116 GAIT TRAINING THERAPY: CPT

## 2021-10-14 PROCEDURE — 85610 PROTHROMBIN TIME: CPT

## 2021-10-14 PROCEDURE — 94761 N-INVAS EAR/PLS OXIMETRY MLT: CPT

## 2021-10-14 PROCEDURE — 94640 AIRWAY INHALATION TREATMENT: CPT

## 2021-10-14 PROCEDURE — 1200000000 HC SEMI PRIVATE

## 2021-10-14 RX ORDER — TORSEMIDE 20 MG/1
20 TABLET ORAL DAILY
Status: DISCONTINUED | OUTPATIENT
Start: 2021-10-14 | End: 2021-10-18 | Stop reason: HOSPADM

## 2021-10-14 RX ORDER — WARFARIN SODIUM 6 MG/1
6 TABLET ORAL DAILY
Status: DISCONTINUED | OUTPATIENT
Start: 2021-10-14 | End: 2021-10-18 | Stop reason: HOSPADM

## 2021-10-14 RX ORDER — CETIRIZINE HYDROCHLORIDE 10 MG/1
10 TABLET ORAL DAILY
Status: DISCONTINUED | OUTPATIENT
Start: 2021-10-14 | End: 2021-10-18 | Stop reason: HOSPADM

## 2021-10-14 RX ADMIN — TIOTROPIUM BROMIDE AND OLODATEROL 2 PUFF: 3.124; 2.736 SPRAY, METERED RESPIRATORY (INHALATION) at 07:22

## 2021-10-14 RX ADMIN — ALBUTEROL SULFATE 2 PUFF: 90 AEROSOL, METERED RESPIRATORY (INHALATION) at 07:23

## 2021-10-14 RX ADMIN — ATORVASTATIN CALCIUM 10 MG: 20 TABLET, FILM COATED ORAL at 21:15

## 2021-10-14 RX ADMIN — SODIUM CHLORIDE, PRESERVATIVE FREE 10 ML: 5 INJECTION INTRAVENOUS at 09:45

## 2021-10-14 RX ADMIN — TORSEMIDE 20 MG: 20 TABLET ORAL at 09:44

## 2021-10-14 RX ADMIN — CARVEDILOL 6.25 MG: 6.25 TABLET, FILM COATED ORAL at 17:13

## 2021-10-14 RX ADMIN — TIMOLOL MALEATE 1 DROP: 2.5 SOLUTION/ DROPS OPHTHALMIC at 09:45

## 2021-10-14 RX ADMIN — ASPIRIN 81 MG: 81 TABLET, CHEWABLE ORAL at 09:44

## 2021-10-14 RX ADMIN — LEVOTHYROXINE SODIUM 88 MCG: 0.09 TABLET ORAL at 06:07

## 2021-10-14 RX ADMIN — TIMOLOL MALEATE 1 DROP: 2.5 SOLUTION/ DROPS OPHTHALMIC at 21:16

## 2021-10-14 RX ADMIN — SPIRONOLACTONE 25 MG: 25 TABLET ORAL at 09:44

## 2021-10-14 RX ADMIN — CYANOCOBALAMIN TAB 1000 MCG 1000 MCG: 1000 TAB at 09:44

## 2021-10-14 RX ADMIN — WARFARIN SODIUM 6 MG: 6 TABLET ORAL at 17:13

## 2021-10-14 RX ADMIN — Medication 1 TABLET: at 09:45

## 2021-10-14 RX ADMIN — CARVEDILOL 6.25 MG: 6.25 TABLET, FILM COATED ORAL at 09:44

## 2021-10-14 RX ADMIN — SODIUM CHLORIDE, PRESERVATIVE FREE 10 ML: 5 INJECTION INTRAVENOUS at 21:17

## 2021-10-14 RX ADMIN — CETIRIZINE HYDROCHLORIDE 10 MG: 10 TABLET, FILM COATED ORAL at 21:16

## 2021-10-14 ASSESSMENT — PAIN SCALES - WONG BAKER
WONGBAKER_NUMERICALRESPONSE: 0

## 2021-10-14 ASSESSMENT — PAIN SCALES - GENERAL
PAINLEVEL_OUTOF10: 0
PAINLEVEL_OUTOF10: 0

## 2021-10-14 NOTE — PROGRESS NOTES
Physical Therapy    Physical Therapy Treatment Note  Name: Kimberli Birmingham MRN: [id number] :   3/18/1929   Date:  10/14/2021   Admission Date: 10/7/2021 Room:  [address] [place] [address]   Restrictions/Precautions:        general precautions; fall risk      Subjective:  Patient states:  \"I would like to walk in the hallway; I need to go to the bathroom\"  Pain:   Location, Type, Intensity (0/10 to 10/10): Denies    Objective:    Observation:  Pt sitting upright In chair upon entry    Treatment, including education/measures:  Pt agreeable to participating in therapy at this time. Therapeutic Activity Training:   Therapeutic activity training was instructed today. Cues were given for safety, sequence, UE/LE placement, awareness, and balance. Activities performed today included bed mobility training, sup-sit, sit-stand. Pt completed sit to stand with minAx1 with verbal cues to push through chair and avoid pulling on walker. Pt completed stand to sit onto toilet with minAx1 with verbal cues to feel toilet against back of legs, reach back, and sit slowly. Pt completed sit to stand from toilet with minAx1 with verbal cues to push through grab bar and avoid pulling on walker. Pt completed stand to sit onto chair with minAx1 with verbal cues to feel chair against back of legs, reach back, and sit slowly. Gait Training:  Cues were given for safety, sequence, device management, balance, posture, awareness, path. Pt ambulated 45 feet + 45 feet with assist varying from CGAx1 to minAx1 with a front wheeled walker with a decreased gait speed, a decreased step length bilaterally, and an intermittent unsteady gait. Pt provided with verbal and tactile cues for BLE placement, walker placement, and sequence throughout ambulation. Pt provided with verbal and tactile cues to maintain upright posture in order to avoid COM shifting outside of [place].   Pt provided with verbal cues for directions in order to successfully navigate hallway and return to correct room. Safety  Patient left safely in the chair, with call light/phone in reach with alarm applied. Gait belt and mask were used for transfers and gait. Assessment / Impression:       Patient's tolerance of treatment:  Good; patient tolerated ambulation in hallway today  Adverse Reaction: none  Significant change in status and impact:  none  Barriers to improvement:  Generalized weakness      Plan for Next Session:    Continue progressing toward goals per plan of care. Progress independence with transfers and ambulation as tolerated and appropriate. Progress ambulation distance as tolerated and appropriate. Time in:  1141  Time out:  1220  Timed treatment minutes:  39  Total treatment time:  44    Previously filed items:  Social/Functional History  Lives With: Alone  Type of Home:  (Condo)  Home Layout: One level  Home Access: Level entry  Bathroom Shower/Tub: Walk-in shower  Bathroom Toilet: Standard  Bathroom Equipment: Shower chair, Grab bars in shower, Grab bars around toilet  Home Equipment: 4 wheeled walker, Cane (Pt does not ambulate with AD at baseline.)  ADL Assistance: Independent  Homemaking Assistance: Independent  Homemaking Responsibilities: Yes  Ambulation Assistance: Independent  Transfer Assistance: Independent  Active : No  Patient's  Info: Daughter in law does grocery shopping.   Short term goals  Time Frame for Short term goals: 1 week  Short term goal 1: Pt will perform bed mobility SBA  Short term goal 2: Pt will transfer from all surfaces SBA  Short term goal 3: Pt will ambulate 75ft with LRAD SBA  Short term goal 4: Pt will perform standing light dynamic activity without UE support x 3 minutes SBA       Electronically signed by:      Rubi Larose PT, DPT  License #: [id number]'

## 2021-10-14 NOTE — PROGRESS NOTES
PHARMACY ANTICOAGULATION MONITORING SERVICE    Shanika Knapp is a 80 y.o. female on warfarin therapy for PAF. Pharmacy consulted by LISA Quiroz CNP for monitoring and adjustment of treatment. Indication for anticoagulation: PAF  INR goal: 2 - 3  Warfarin dose prior to admission: 7.5 mg po daily    Pertinent Laboratory Values   Recent Labs     10/14/21  0630   INR 1.46       Assessment/Plan:   Drug Interactions:   o Aspirin (Home med)  o Levothyroxine (Home med)  o Acetaminophen (PRN)   INR supratherapeutic on admission, now subtherapeutic following held doses and vitamin K 5mg dose on 10/9. Warfarin just restarted on 10/10. INR now starting to trend back upward.  Increase warfarin dose slightly to 6mg daily and follow INR trends tomorrow  [address] [place] [address] will continue to monitor and adjust warfarin therapy as indicated    Thank you for the consult,  Sol Necessary.  Jenn Borges, [facility]  10/14/2021 3:25 PM

## 2021-10-14 NOTE — PLAN OF CARE
Problem: Falls - Risk of:  Goal: Will remain free from falls  Description: Will remain free from falls  Outcome: Ongoing  Goal: Absence of physical injury  Description: Absence of physical injury  Outcome: Ongoing     Problem: Safety:  Goal: Free from accidental physical injury  Description: Free from accidental physical injury  Outcome: Ongoing  Goal: Free from intentional harm  Description: Free from intentional harm  Outcome: Ongoing     Problem: Daily Care:  Goal: Daily care needs are met  Description: Daily care needs are met  Outcome: Ongoing     Problem: Neurological  Goal: Maximum potential motor/sensory/cognitive function  Outcome: Ongoing

## 2021-10-14 NOTE — PROGRESS NOTES
Occupational Therapy  Occupational Therapy Treatment Note      Name: Natasha Amaya MRN: [id number] :   3/18/1929   Date:  10/14/2021   Admission Date: 10/7/2021 Room:  [address] [place] [address]-A     Primary Problem: [place]:  The primary encounter diagnosis was Hyponatremia. A diagnosis of Elevated INR was also pertinent to this visit. Restrictions/Precautions:   General Precautions, Fall Risk    Communication with other providers:  PT, RN    Subjective:  Patient states:  Agreeable to therapy. Pain: Pt denied pain this date (0/10). Objective:    Observation:  Pt was received seated upright in chair upon arrival.   Objective Measures:  Vitals stable throughout session. Treatment, including education:  Self Care Training:   Cues were given for safety, sequence, UE/LE placement, visual cues, and balance. Activities performed today included dressing, toileting, hand hygiene, and grooming. Therapeutic Activity Training:   Therapeutic activity training was instructed today. Cues were given for safety, sequence, UE/LE placement, awareness, and balance. Activities performed today included bed mobility training, sup-sit, sit-stand, ambulation. Pt performed STS from chair with Chava, RW, VC throughout for sequencing, and increased time. Pt then ambulated approx 75ft x 2 with CGA, RW, and standing rest break between each bout. Pt returned to room and doffed depend in standing with Chava. Pt then performed stand-sit to commode with Chava for sequencing and eccentric control. Pt had BM and performed tucker care in seated with SBA. Pt performed STS from commode with Chava and RW. Pt required DEP assist to manage depend in standing this date. In standing at the sink pt performed hand hygiene, combed hair, and washed face/applied moisturizer with SBA-CGA for dynamic standing balance. Pt then ambulated approx 10ft to the chair with CGA and RW. Pt performed stand-sit to chair with Chava for sequencing and eccentric control. Pt was left seated upright in chair, all needs met, alarm in place. Assessment / Impression:    Patient's tolerance of treatment: good  Adverse Reaction: None  Significant change in status and impact:  None  Barriers to improvement: None      Plan for Next Session:    Continue with POC    Time in:  1141  Time out:  1220  Timed treatment minutes:  39  Total treatment time:  39      Electronically signed by:     Sheng Collins OT,   10/14/2021, 12:01 PM

## 2021-10-14 NOTE — PLAN OF CARE
Nutrition Problem #1: Inadequate oral intake  Intervention: Food and/or Nutrient Delivery: Continue Current Diet, Start Oral Nutrition Supplement  Nutritional Goals: Pt will consume at least 75% of meals TID

## 2021-10-14 NOTE — PROGRESS NOTES
Nephrology Progress Note  10/14/2021 7:37 AM        Subjective:   Admit Date: 10/7/2021  PCP: Jenny , DO    Interval History: No major event overnight that I am aware of    Diet: Reasonable    ROS: No overt shortness of breath  Urine output almost 1.5 L a day    Data:     Current meds:    spironolactone  25 mg Oral Daily    warfarin  5 mg Oral Daily    albuterol sulfate HFA  2 puff Inhalation BID    aspirin  81 mg Oral Daily    atorvastatin  10 mg Oral Nightly    carvedilol  6.25 mg Oral BID WC    levothyroxine  88 mcg Oral Daily    ocuvite-lutein  1 tablet Oral Daily    vitamin B-12  1,000 mcg Oral Daily    sodium chloride flush  5-40 mL IntraVENous 2 times per day    tiotropium-olodaterol  2 puff Inhalation Daily    timolol  1 drop Both Eyes BID      sodium chloride           I/O last 3 completed shifts: In: 10 [I.V.:10]  Out: 1430 [Urine:1430]    CBC: No results for input(s): WBC, HGB, PLT in the last 72 hours. Recent Labs     10/11/21  1249 10/12/21  0718 10/13/21  0630   * 124* 126*   K 3.7 3.5 3.6   CL 89* 85* 85*   CO2 27 30 31   BUN 20 17 13   CREATININE 0.4* 0.4* 0.3*   GLUCOSE 249* 83 79       Lab Results   Component Value Date    CALCIUM 8.7 10/13/2021    PHOS 2.4 (L) 10/13/2021       Objective:     Vitals: BP (!) 154/86   Pulse 60   Temp 98.1 °F (36.7 °C) (Oral)   Resp 16   Ht 5' (1.524 m)   Wt 138 lb 10.7 oz (62.9 kg)   SpO2 98%   BMI 27.08 kg/m²     General appearance: No acute distress, her head of the bed elevated about 30 to 40 degrees  HEENT: She has difficulty hearing, no gross conjunctival pallor  Neck: Supple  Lungs: Still crackles bilaterally  Heart: Seems regular rate and rhythm this morning-well-healed incision from previous sternotomy, she also have breast chest wall cardiac device which is nontender  Abdomen: Soft, nontender  Extremities: No overt edema  She has Carnes catheter      Problem List :         Impression :     1.  Acute on chronic hyponatremia-proBNP level still high so likely at least majority from hypervolemia-he is eating better so low protein diet is unlikely  2. Acute decompensated heart failure-she does have underlying coronary artery disease also complicated by dysrhythmia  3.  Recent fall as well as hypomagnesemia    Recommendation/Plan  :     Restart diuretics-we will try torsemide 20 mg daily  Spironolactone  Low-salt diet  Plant-based high-protein diet  May discharge from my standpoint  Need a CMP, magnesium, phosphorus weekly x4  Follow-up with me in 2 to 3 weeks    Kristan Sotelo MD MD  No major event

## 2021-10-14 NOTE — PROGRESS NOTES
Comprehensive Nutrition Assessment    Type and Reason for Visit:  Initial, RD Nutrition Re-Screen/LOS    Nutrition Recommendations/Plan:   · Replete phosphorus and magnesium levels   · Continue current diet   · Start low calorie, high protein oral nutrition supplement, encourage between meals   · Encourage adequate po intake, document in I/O     Nutrition Assessment:  Pt seen for length of stay. Presents with hyponatremia s/p fall, CAD s/p CABG x3. Pt on cardiac diet, reports poor appetite/intake, consuming 50% during admission due to low activity rate. C/o no BM over 10 days. PTA pt is active caring for condo, eating well with multiple meals with regular BMs. States UBW of 130 lbs. No recent wt loss. Pt was excited for PT/OT tx, encourage movement and adequate nutrition. Will follow as moderate nutrition risk. Malnutrition Assessment:  Malnutrition Status: At risk for malnutrition (Comment)    Context:  Acute Illness     Findings of the 6 clinical characteristics of malnutrition:  Energy Intake:  7 - 50% or less of estimated energy requirements for 5 or more days  Weight Loss:  No significant weight loss     Body Fat Loss:  1 - Mild body fat loss Orbital   Muscle Mass Loss:  1 - Mild muscle mass loss Temples (temporalis), Calf (gastrocnemius), Thigh (quadraceps)  Fluid Accumulation:  No significant fluid accumulation Extremities   Strength:  Not Performed    Estimated Daily Nutrient Needs:  Energy (kcal):  1386 + (at least 22 kcals/kg ABW); Weight Used for Energy Requirements:  Current     Protein (g):  45-54 (1-1.2 g/kg); Weight Used for Protein Requirements:  Ideal        Fluid (ml/day):  fluids per physician; Method Used for Fluid Requirements:  Other (Comment)      Nutrition Related Findings:  Mg 1.4, Na 126, T3 1.6, Phos 2.4  Plan for ARU     Wounds:  None       Current Nutrition Therapies:    ADULT DIET;  Regular; Low Fat/Low Chol/High Fiber/2 gm Na    Anthropometric Measures:  · Height: 5' (152.4

## 2021-10-14 NOTE — PROGRESS NOTES
Hospitalist Progress Note      Name:  Jnei Barcenas /Age/Sex: 3/18/1929  (80 y.o. female)   MRN & CSN:  [id number] & [id number] Admission Date/Time: 10/7/2021 10:27 AM   Location:  [address] [place] [address] PCP: [address] Jennifer Day: 8    Assessment and Plan:   Jeni Barcenas is a 80 y.o.  female  who presents with     1. Hyponatremia: Improving, nephrology on board, appreciate recs   2. Coagulopathy: Secondary to Coumadin patient INR significantly elevated on admission.  She was seen by cardiology and they recommended stopping anticoagulation given her age and risk of falls  3. Frequent falls: Pursuing ARU placement versus inpatient rehab   4. Acute CHF exacerbation: Continue diuresis per nephro recs   5. Elevated TSH: ?Overtreatment hypothyroidism, check Free T3, T4, will adjust meds based on levels     History of Present Illness:     Chief Complaint: <principal problem not specified>  Jeni Barcenas is a 80 y.o.  female  who presents with above    No acute issues today      Ten point ROS reviewed negative, unless as noted above    Objective: Intake/Output Summary (Last 24 hours) at 10/14/2021 1823  Last data filed at 10/14/2021 1540  Gross per 24 hour   Intake 240 ml   Output 2850 ml   Net -2610 ml      Vitals:   Vitals:    10/14/21 1539   BP: (!) 142/65   Pulse: 59   Resp: 16   Temp: 97.9 °F (36.6 °C)   SpO2: 90%     Physical Exam:   GEN Awake female, sitting upright in bed in no apparent distress. Appears given age. EYES Pupils are equally round. No scleral erythema, discharge, or conjunctivitis. HENT Mucous membranes are moist. Oral pharynx without exudates, no evidence of thrush. NECK Supple, no apparent thyromegaly or masses. RESP Clear to auscultation, no wheezes, rales or rhonchi. Symmetric chest movement while on room air. CARDIO/VASC S1/S2 auscultated. Regular rate without appreciable murmurs, rubs, or gallops. No JVD or carotid bruits.  Peripheral pulses equal bilaterally and palpable. No peripheral edema. GI Abdomen is soft without significant tenderness, masses, or guarding. Bowel sounds are normoactive. Rectal exam deferred.  No costovertebral angle tenderness. Normal appearing external genitalia. Carnes catheter is not present. HEME/LYMPH No palpable cervical lymphadenopathy and no hepatosplenomegaly. No petechiae or ecchymoses. MSK No gross joint deformities. SKIN Normal coloration, warm, dry. NEURO Cranial nerves appear grossly intact, normal speech, no lateralizing weakness. PSYCH Awake, alert, oriented x 4. Affect appropriate.     Medications:   Medications:    torsemide  20 mg Oral Daily    warfarin  6 mg Oral Daily    cetirizine  10 mg Oral Daily    spironolactone  25 mg Oral Daily    albuterol sulfate HFA  2 puff Inhalation BID    aspirin  81 mg Oral Daily    atorvastatin  10 mg Oral Nightly    carvedilol  6.25 mg Oral BID WC    levothyroxine  88 mcg Oral Daily    ocuvite-lutein  1 tablet Oral Daily    vitamin B-12  1,000 mcg Oral Daily    sodium chloride flush  5-40 mL IntraVENous 2 times per day    tiotropium-olodaterol  2 puff Inhalation Daily    timolol  1 drop Both Eyes BID      Infusions:    sodium chloride       PRN Meds: sodium chloride flush, 5-40 mL, PRN  sodium chloride, 25 mL, PRN  ondansetron, 4 mg, Q8H PRN   Or  ondansetron, 4 mg, Q6H PRN  polyethylene glycol, 17 g, Daily PRN  acetaminophen, 650 mg, Q6H PRN   Or  acetaminophen, 650 mg, Q6H PRN          Electronically signed by Marylen Beverage, MD on 10/14/2021 at 6:23 PM

## 2021-10-15 LAB
INR BLD: 1.57 INDEX
PROTHROMBIN TIME: 20.3 SECONDS (ref 11.7–14.5)

## 2021-10-15 PROCEDURE — 97530 THERAPEUTIC ACTIVITIES: CPT

## 2021-10-15 PROCEDURE — 94761 N-INVAS EAR/PLS OXIMETRY MLT: CPT

## 2021-10-15 PROCEDURE — 6370000000 HC RX 637 (ALT 250 FOR IP): Performed by: HOSPITALIST

## 2021-10-15 PROCEDURE — 6370000000 HC RX 637 (ALT 250 FOR IP): Performed by: INTERNAL MEDICINE

## 2021-10-15 PROCEDURE — 97116 GAIT TRAINING THERAPY: CPT

## 2021-10-15 PROCEDURE — 6370000000 HC RX 637 (ALT 250 FOR IP): Performed by: NURSE PRACTITIONER

## 2021-10-15 PROCEDURE — 1200000000 HC SEMI PRIVATE

## 2021-10-15 PROCEDURE — 2580000003 HC RX 258: Performed by: NURSE PRACTITIONER

## 2021-10-15 PROCEDURE — 85610 PROTHROMBIN TIME: CPT

## 2021-10-15 PROCEDURE — 36415 COLL VENOUS BLD VENIPUNCTURE: CPT

## 2021-10-15 RX ADMIN — CYANOCOBALAMIN TAB 1000 MCG 1000 MCG: 1000 TAB at 08:53

## 2021-10-15 RX ADMIN — WARFARIN SODIUM 6 MG: 6 TABLET ORAL at 18:16

## 2021-10-15 RX ADMIN — SODIUM CHLORIDE, PRESERVATIVE FREE 10 ML: 5 INJECTION INTRAVENOUS at 23:13

## 2021-10-15 RX ADMIN — Medication 1 TABLET: at 08:53

## 2021-10-15 RX ADMIN — CETIRIZINE HYDROCHLORIDE 10 MG: 10 TABLET, FILM COATED ORAL at 08:53

## 2021-10-15 RX ADMIN — CARVEDILOL 6.25 MG: 6.25 TABLET, FILM COATED ORAL at 18:16

## 2021-10-15 RX ADMIN — SPIRONOLACTONE 25 MG: 25 TABLET ORAL at 08:54

## 2021-10-15 RX ADMIN — CARVEDILOL 6.25 MG: 6.25 TABLET, FILM COATED ORAL at 08:54

## 2021-10-15 RX ADMIN — LEVOTHYROXINE SODIUM 88 MCG: 0.09 TABLET ORAL at 06:41

## 2021-10-15 RX ADMIN — SODIUM CHLORIDE, PRESERVATIVE FREE 10 ML: 5 INJECTION INTRAVENOUS at 08:53

## 2021-10-15 RX ADMIN — TORSEMIDE 20 MG: 20 TABLET ORAL at 08:54

## 2021-10-15 RX ADMIN — ASPIRIN 81 MG: 81 TABLET, CHEWABLE ORAL at 08:53

## 2021-10-15 ASSESSMENT — PAIN SCALES - GENERAL
PAINLEVEL_OUTOF10: 0

## 2021-10-15 ASSESSMENT — PAIN SCALES - WONG BAKER: WONGBAKER_NUMERICALRESPONSE: 0

## 2021-10-15 NOTE — CARE COORDINATION
Received call from [facility] Medicare stating patient has been denied for admission to ARU. Notified Candace Conklin. 1055:  Dr. Samantha Washington requesting to pursue expedited appeal.  Expedited appeal letter signed per MD and faxed to [facility] appeals department. Expedited appeal is pending at this time. Will continue to follow for determination.

## 2021-10-15 NOTE — PROGRESS NOTES
air.  CARDIO/VASC S1/S2 auscultated. Regular rate without appreciable murmurs, rubs, or gallops. GI Abdomen is soft without significant tenderness, masses, or guarding. MSK No gross joint deformities. SKIN Normal coloration, warm, dry. PSYCH Awake, alert, oriented x 4. Affect appropriate.       Medications:   Medications:    torsemide  20 mg Oral Daily    warfarin  6 mg Oral Daily    cetirizine  10 mg Oral Daily    spironolactone  25 mg Oral Daily    albuterol sulfate HFA  2 puff Inhalation BID    aspirin  81 mg Oral Daily    atorvastatin  10 mg Oral Nightly    carvedilol  6.25 mg Oral BID WC    levothyroxine  88 mcg Oral Daily    ocuvite-lutein  1 tablet Oral Daily    vitamin B-12  1,000 mcg Oral Daily    sodium chloride flush  5-40 mL IntraVENous 2 times per day    tiotropium-olodaterol  2 puff Inhalation Daily    timolol  1 drop Both Eyes BID      Infusions:    sodium chloride       PRN Meds: sodium chloride flush, 5-40 mL, PRN  sodium chloride, 25 mL, PRN  ondansetron, 4 mg, Q8H PRN   Or  ondansetron, 4 mg, Q6H PRN  polyethylene glycol, 17 g, Daily PRN  acetaminophen, 650 mg, Q6H PRN   Or  acetaminophen, 650 mg, Q6H PRN          Electronically signed by Fortino Whitney MD on 10/15/2021 at 7:30 PM

## 2021-10-15 NOTE — PROGRESS NOTES
Nephrology Progress Note  10/15/2021 8:38 AM        Subjective:   Admit Date: 10/7/2021  PCP: Maykel Disla DO    Interval History: Seen in early morning, no acute distress    Diet: Reasonable diet per patient    ROS: No overt shortness of breath, no orthopnea or PND  Seems to be alert awake and oriented  Urine output almost 2 L/day    Data:     Current meds:    torsemide  20 mg Oral Daily    warfarin  6 mg Oral Daily    cetirizine  10 mg Oral Daily    spironolactone  25 mg Oral Daily    albuterol sulfate HFA  2 puff Inhalation BID    aspirin  81 mg Oral Daily    atorvastatin  10 mg Oral Nightly    carvedilol  6.25 mg Oral BID WC    levothyroxine  88 mcg Oral Daily    ocuvite-lutein  1 tablet Oral Daily    vitamin B-12  1,000 mcg Oral Daily    sodium chloride flush  5-40 mL IntraVENous 2 times per day    tiotropium-olodaterol  2 puff Inhalation Daily    timolol  1 drop Both Eyes BID      sodium chloride           I/O last 3 completed shifts: In: 240 [P.O.:240]  Out: 1900 [Urine:1900]    CBC: No results for input(s): WBC, HGB, PLT in the last 72 hours. Recent Labs     10/13/21  0630   *   K 3.6   CL 85*   CO2 31   BUN 13   CREATININE 0.3*   GLUCOSE 79       Lab Results   Component Value Date    CALCIUM 8.7 10/13/2021    PHOS 2.4 (L) 10/13/2021       Objective:     Vitals: BP (!) 151/68   Pulse 59   Temp 97.7 °F (36.5 °C) (Oral)   Resp 16   Ht 5' (1.524 m)   Wt 138 lb 10.7 oz (62.9 kg)   SpO2 91%   BMI 27.08 kg/m²     General appearance: Alert awake, without distress  HEENT: No gross conjunctival pallor  Neck: Supple  Lungs: Coarse breath sound  Heart: Bradycardia this morning-well-healed incision from previous sternotomy-left chest wall cardiac device  Abdomen: Soft nontender  Extremities: No overt edema  Trice      Problem List :         Impression :     1. Acute on chronic hyponatremia-likely from hypervolemia  2.  Fluid overload-acute decompensated heart failure-clinically better  3. Recent fall-underlying dysrhythmia    Recommendation/Plan  :     1. May DC with torsemide spironolactone  2. DC [place]  3. Low-salt and daily weight  4. Call me with weight gain more than 2 pounds  5. CMP, magnesium, phosphorus weekly x4  6.  Follow-up with me in 2 to 3 weeks      Sidney Hoffman MD MD

## 2021-10-15 NOTE — PROGRESS NOTES
PHARMACY ANTICOAGULATION MONITORING SERVICE    Adán Johnson is a 80 y.o. female on warfarin therapy for PAF. Pharmacy consulted by LISA Mejia CNP for monitoring and adjustment of treatment. Indication for anticoagulation: PAF  INR goal: 2 - 3  Warfarin dose prior to admission: 7.5 mg po daily    Pertinent Laboratory Values   Recent Labs     10/15/21  0602   INR 1.57       Assessment/Plan:   Drug Interactions:   o Aspirin (Home med)  o Levothyroxine (Home med)  o Acetaminophen (PRN)   INR supratherapeutic on admission, now remains subtherapeutic and slowly trending back upward. Dose increased to 6mg daily last night.  Continue with increase warfarin dose of 6mg daily and follow INR trends tomorrow. [address] [place] [address] will continue to monitor and adjust warfarin therapy as indicated    Thank you for the consult,  Saumya Evans.  Akosua Alicea, [facility]  10/15/2021 3:29 PM

## 2021-10-15 NOTE — PROGRESS NOTES
Physical Therapy    Physical Therapy Treatment Note  Name: Kelli Hoyt MRN: [id number] :   3/18/1929   Date:  10/15/2021   Admission Date: 10/7/2021 Room:  [address] [place], [place] [address]   Restrictions/Precautions:         general precautions, falls   Communication with other providers:    Subjective:  Patient states: motivated and agreeable to tx. Pt stated\": I am so glad you came to take me for a walk\". Pain:   Location, Type, Intensity (0/10 to 10/10):  Pt denies having pain but did report the back of her head is sore. Objective:    Observation:  Alert and oriented  Treatment, including education/measures:  Sit<=>stand from chair and commode cga. Pt was unsteady x 1. Pt was sba for tucker care and standing at sink to wash hands. amb cga and cues for walker safety with turning with rw 10' x 1, 80' x 2 with 1 rest break. Safety  Patient left safely in the chair, with call light/phone in reach with alarm/LOU applied. Gait belt and mask were used for transfers and gait. Assessment / Impression:       Patient's tolerance of treatment:  good  Adverse Reaction: na  Significant change in status and impact:  na  Barriers to improvement:  Safety   Plan for Next Session:    Cont. POC  Time in:  1530  Time out:  1600  Timed treatment minutes:  30  Total treatment time:  30    Previously filed items:  Social/Functional History  Lives With: Alone  Type of Home:  (Condo)  Home Layout: One level  Home Access: Level entry  Bathroom Shower/Tub: Walk-in shower  Bathroom Toilet: Standard  Bathroom Equipment: Shower chair, Grab bars in shower, Grab bars around toilet  Home Equipment: 4 wheeled walker, Cane (Pt does not ambulate with AD at baseline.)  ADL Assistance: Independent  Homemaking Assistance: Independent  Homemaking Responsibilities: Yes  Ambulation Assistance: Independent  Transfer Assistance: Independent  Active : No  Patient's  Info: Daughter in law does grocery shopping.   Short term goals  Time Frame for Short term goals: 1 week  Short term goal 1: Pt will perform bed mobility SBA  Short term goal 2: Pt will transfer from all surfaces SBA  Short term goal 3: Pt will ambulate 75ft with LRAD SBA  Short term goal 4: Pt will perform standing light dynamic activity without UE support x 3 minutes SBA       Electronically signed by:    Felisha Trujillo PTA  10/15/2021, 8:38 AM

## 2021-10-16 LAB
ANION GAP SERPL CALCULATED.3IONS-SCNC: 9 MMOL/L (ref 4–16)
BUN BLDV-MCNC: 15 MG/DL (ref 6–23)
CALCIUM SERPL-MCNC: 9.2 MG/DL (ref 8.3–10.6)
CHLORIDE BLD-SCNC: 89 MMOL/L (ref 99–110)
CO2: 32 MMOL/L (ref 21–32)
CREAT SERPL-MCNC: 0.5 MG/DL (ref 0.6–1.1)
GFR AFRICAN AMERICAN: >60 ML/MIN/1.73M2
GFR NON-AFRICAN AMERICAN: >60 ML/MIN/1.73M2
GLUCOSE BLD-MCNC: 84 MG/DL (ref 70–99)
INR BLD: 1.83 INDEX
POTASSIUM SERPL-SCNC: 3.8 MMOL/L (ref 3.5–5.1)
PROTHROMBIN TIME: 23.7 SECONDS (ref 11.7–14.5)
SODIUM BLD-SCNC: 130 MMOL/L (ref 135–145)

## 2021-10-16 PROCEDURE — 85610 PROTHROMBIN TIME: CPT

## 2021-10-16 PROCEDURE — 36415 COLL VENOUS BLD VENIPUNCTURE: CPT

## 2021-10-16 PROCEDURE — 6370000000 HC RX 637 (ALT 250 FOR IP): Performed by: NURSE PRACTITIONER

## 2021-10-16 PROCEDURE — 6370000000 HC RX 637 (ALT 250 FOR IP): Performed by: HOSPITALIST

## 2021-10-16 PROCEDURE — 94761 N-INVAS EAR/PLS OXIMETRY MLT: CPT

## 2021-10-16 PROCEDURE — 80048 BASIC METABOLIC PNL TOTAL CA: CPT

## 2021-10-16 PROCEDURE — 6370000000 HC RX 637 (ALT 250 FOR IP): Performed by: INTERNAL MEDICINE

## 2021-10-16 PROCEDURE — 2580000003 HC RX 258: Performed by: NURSE PRACTITIONER

## 2021-10-16 PROCEDURE — 94640 AIRWAY INHALATION TREATMENT: CPT

## 2021-10-16 PROCEDURE — 1200000000 HC SEMI PRIVATE

## 2021-10-16 RX ADMIN — TIMOLOL MALEATE 1 DROP: 2.5 SOLUTION/ DROPS OPHTHALMIC at 11:07

## 2021-10-16 RX ADMIN — CARVEDILOL 6.25 MG: 6.25 TABLET, FILM COATED ORAL at 11:01

## 2021-10-16 RX ADMIN — SPIRONOLACTONE 25 MG: 25 TABLET ORAL at 11:02

## 2021-10-16 RX ADMIN — Medication 1 TABLET: at 11:05

## 2021-10-16 RX ADMIN — LEVOTHYROXINE SODIUM 88 MCG: 0.09 TABLET ORAL at 05:57

## 2021-10-16 RX ADMIN — WARFARIN SODIUM 6 MG: 6 TABLET ORAL at 18:36

## 2021-10-16 RX ADMIN — ASPIRIN 81 MG: 81 TABLET, CHEWABLE ORAL at 11:02

## 2021-10-16 RX ADMIN — CYANOCOBALAMIN TAB 1000 MCG 1000 MCG: 1000 TAB at 11:01

## 2021-10-16 RX ADMIN — TIOTROPIUM BROMIDE AND OLODATEROL 2 PUFF: 3.124; 2.736 SPRAY, METERED RESPIRATORY (INHALATION) at 12:22

## 2021-10-16 RX ADMIN — CETIRIZINE HYDROCHLORIDE 10 MG: 10 TABLET, FILM COATED ORAL at 11:01

## 2021-10-16 RX ADMIN — SODIUM CHLORIDE, PRESERVATIVE FREE 10 ML: 5 INJECTION INTRAVENOUS at 21:50

## 2021-10-16 RX ADMIN — ALBUTEROL SULFATE 2 PUFF: 90 AEROSOL, METERED RESPIRATORY (INHALATION) at 12:19

## 2021-10-16 RX ADMIN — ATORVASTATIN CALCIUM 10 MG: 20 TABLET, FILM COATED ORAL at 21:50

## 2021-10-16 RX ADMIN — SODIUM CHLORIDE, PRESERVATIVE FREE 10 ML: 5 INJECTION INTRAVENOUS at 11:02

## 2021-10-16 RX ADMIN — TIMOLOL MALEATE 1 DROP: 2.5 SOLUTION/ DROPS OPHTHALMIC at 21:49

## 2021-10-16 RX ADMIN — CARVEDILOL 6.25 MG: 6.25 TABLET, FILM COATED ORAL at 18:36

## 2021-10-16 RX ADMIN — TORSEMIDE 20 MG: 20 TABLET ORAL at 11:02

## 2021-10-16 ASSESSMENT — PAIN SCALES - GENERAL: PAINLEVEL_OUTOF10: 0

## 2021-10-16 ASSESSMENT — PAIN SCALES - WONG BAKER: WONGBAKER_NUMERICALRESPONSE: 0

## 2021-10-16 NOTE — PROGRESS NOTES
360 [P.O.:360]  Out: 1200 [Urine:1200]  I/O last 3 completed shifts: In: 360 [P.O.:360]  Out: 1200 [Urine:1200]  No intake/output data recorded. Vitals: BP (!) 105/51   Pulse 60   Temp 97.9 °F (36.6 °C) (Oral)   Resp 16   Ht 5' (1.524 m)   Wt 133 lb 6.1 oz (60.5 kg)   SpO2 93%   BMI 26.05 kg/m²  {  General appearance: awake weak  HEENT: Head: Normal, normocephalic, atraumatic. Neck: supple, symmetrical, trachea midline  Lungs: diminished breath sounds bilaterally  Heart: S1, S2 normal  Abdomen: abnormal findings:  soft nt  Extremities: edema trace  Neurologic: Mental status: alertness: Interactive        Assessment and Plan:      IMP:  Acute on chronic hyponatremia  2. Fluid overload with decompensated congestive heart failure  3. Prior falls with weakness    Plan     #1 sodium slowly improving 124 now up to 130. Will monitor for now  2.   Volume appears stable overall negative balance    #3 appear more alert will monitor overall affect and possible discharge planning if stable overall           Katherine Bloom MD, MD

## 2021-10-16 NOTE — PROGRESS NOTES
Physician Progress Note      Yandy Manning  CSN #:                  [id number]  :                       3/18/1929  ADMIT DATE:       10/7/2021 10:27 AM  DISCH DATE:  RESPONDING  PROVIDER #:        Dawit Plascencia          QUERY TEXT:    Pt admitted with hyponatremia. Pt noted to have confusion. If possible, please   document in the progress notes and discharge summary if you are evaluating   and / or treating any of the following: The medical record reflects the following:  Risk Factors: hyponatremia, age  Clinical Indicators: ED nursing note on 10/7 \"requested a sitter whom, sits at   bedside because pt jumped over railing falling on floor during day shift   hours. \", OT note on 10/9 \"Overall cognitive status: Oriented time, date,   person, place, city. Intermittent episodes of confusion this date. \",  Dr. Claire Melendrez consult note on 10/11 \"Hyponatremia in setting of confusion as per   nephrology\", noted in nursing flowsheets that pt is disoriented to situation. ,    -  129,  Treatment: Serial NA labs, head CT, sitter, nephron consult, 0.9% NS 75 ml/hr. Thank you,  JOSE L ClarkN, RN, CDS  [phone]  Options provided:  -- Metabolic encephalopathy  -- Other - I will add my own diagnosis  -- Disagree - Not applicable / Not valid  -- Disagree - Clinically unable to determine / Unknown  -- Refer to Clinical Documentation Reviewer    PROVIDER RESPONSE TEXT:    This patient has metabolic encephalopathy.     Query created by: Jaguar Lewis on 10/15/2021 9:46 AM      Electronically signed by:  Dawit Plascencia 10/16/2021 7:52 AM

## 2021-10-17 LAB
ANION GAP SERPL CALCULATED.3IONS-SCNC: 8 MMOL/L (ref 4–16)
BUN BLDV-MCNC: 14 MG/DL (ref 6–23)
CALCIUM SERPL-MCNC: 9.4 MG/DL (ref 8.3–10.6)
CHLORIDE BLD-SCNC: 90 MMOL/L (ref 99–110)
CO2: 33 MMOL/L (ref 21–32)
CREAT SERPL-MCNC: 0.4 MG/DL (ref 0.6–1.1)
GFR AFRICAN AMERICAN: >60 ML/MIN/1.73M2
GFR NON-AFRICAN AMERICAN: >60 ML/MIN/1.73M2
GLUCOSE BLD-MCNC: 92 MG/DL (ref 70–99)
INR BLD: 2.18 INDEX
POTASSIUM SERPL-SCNC: 4.1 MMOL/L (ref 3.5–5.1)
PROTHROMBIN TIME: 28.4 SECONDS (ref 11.7–14.5)
SODIUM BLD-SCNC: 131 MMOL/L (ref 135–145)

## 2021-10-17 PROCEDURE — 94761 N-INVAS EAR/PLS OXIMETRY MLT: CPT

## 2021-10-17 PROCEDURE — 6370000000 HC RX 637 (ALT 250 FOR IP): Performed by: NURSE PRACTITIONER

## 2021-10-17 PROCEDURE — 6370000000 HC RX 637 (ALT 250 FOR IP): Performed by: INTERNAL MEDICINE

## 2021-10-17 PROCEDURE — 80048 BASIC METABOLIC PNL TOTAL CA: CPT

## 2021-10-17 PROCEDURE — 1200000000 HC SEMI PRIVATE

## 2021-10-17 PROCEDURE — 6370000000 HC RX 637 (ALT 250 FOR IP): Performed by: HOSPITALIST

## 2021-10-17 PROCEDURE — 2580000003 HC RX 258: Performed by: NURSE PRACTITIONER

## 2021-10-17 PROCEDURE — 85610 PROTHROMBIN TIME: CPT

## 2021-10-17 PROCEDURE — 36415 COLL VENOUS BLD VENIPUNCTURE: CPT

## 2021-10-17 PROCEDURE — 94640 AIRWAY INHALATION TREATMENT: CPT

## 2021-10-17 RX ADMIN — ALBUTEROL SULFATE 2 PUFF: 90 AEROSOL, METERED RESPIRATORY (INHALATION) at 07:42

## 2021-10-17 RX ADMIN — SODIUM CHLORIDE, PRESERVATIVE FREE 10 ML: 5 INJECTION INTRAVENOUS at 08:58

## 2021-10-17 RX ADMIN — TORSEMIDE 20 MG: 20 TABLET ORAL at 08:57

## 2021-10-17 RX ADMIN — CYANOCOBALAMIN TAB 1000 MCG 1000 MCG: 1000 TAB at 08:57

## 2021-10-17 RX ADMIN — TIMOLOL MALEATE 1 DROP: 2.5 SOLUTION/ DROPS OPHTHALMIC at 08:58

## 2021-10-17 RX ADMIN — LEVOTHYROXINE SODIUM 88 MCG: 0.09 TABLET ORAL at 06:14

## 2021-10-17 RX ADMIN — ASPIRIN 81 MG: 81 TABLET, CHEWABLE ORAL at 08:56

## 2021-10-17 RX ADMIN — ATORVASTATIN CALCIUM 10 MG: 20 TABLET, FILM COATED ORAL at 20:22

## 2021-10-17 RX ADMIN — SPIRONOLACTONE 25 MG: 25 TABLET ORAL at 08:56

## 2021-10-17 RX ADMIN — WARFARIN SODIUM 6 MG: 6 TABLET ORAL at 16:16

## 2021-10-17 RX ADMIN — ACETAMINOPHEN 650 MG: 325 TABLET ORAL at 16:16

## 2021-10-17 RX ADMIN — CETIRIZINE HYDROCHLORIDE 10 MG: 10 TABLET, FILM COATED ORAL at 08:57

## 2021-10-17 RX ADMIN — Medication 1 TABLET: at 08:57

## 2021-10-17 RX ADMIN — TIOTROPIUM BROMIDE AND OLODATEROL 2 PUFF: 3.124; 2.736 SPRAY, METERED RESPIRATORY (INHALATION) at 07:45

## 2021-10-17 ASSESSMENT — PAIN SCALES - WONG BAKER: WONGBAKER_NUMERICALRESPONSE: 0

## 2021-10-17 ASSESSMENT — PAIN SCALES - GENERAL
PAINLEVEL_OUTOF10: 3
PAINLEVEL_OUTOF10: 0

## 2021-10-17 NOTE — PROGRESS NOTES
10/13/2021    CLARITYU CLEAR 10/13/2021    SPECGRAV 1.009 10/13/2021    UROBILINOGEN NEGATIVE 10/13/2021    BILIRUBINUR NEGATIVE 10/13/2021    BLOODU LARGE 10/13/2021    KETUA NEGATIVE 10/13/2021     ABG:  No results found for: PHART, ZUS1QDO, PO2ART, MYN3NHE, BEART, THGBART, EMY2YPN, A4VSULUA  HgBA1c:  No results found for: LABA1C  Microalbumen/Creatinine ratio:  No components found for: RUCREAT  TSH:    Lab Results   Component Value Date    TSH 1.0 05/07/2010     IRON:  No results found for: IRON  Iron Saturation:  No components found for: PERCENTFE  TIBC:  No results found for: TIBC  FERRITIN:  No results found for: FERRITIN  RPR:  No results found for: RPR  PATSY:    Lab Results   Component Value Date    PATSY None Detected 10/08/2019    PATSY  10/08/2019     (NOTE)  If suspicion of connective tissue disease is strong and PATSY EIA is   negative, consider testing for PATSY by IFA ([id number]). INTERPRETIVE INFORMATION: Anti-Nuclear Antibodies (PATSY), IgG by   SOBEIDA  Antinuclear Antibodies (PATSY), IgG by SOBEIDA: PATSY specimens are   screened using enzyme-linked immunosorbent assay (SOBEIDA)   methodology. All SOBEIDA results reported as Detected are further   tested by indirect fluorescent assay (IFA) using HEp-2 substrate   with an IgG-specific conjugate. The PATSY SOBEIDA screen is designed   to detect antibodies against dsDNA, histones, SS-A (Ro), SS-B   (La), Carlos, Carlos/RNP, Scl-70, Arlin-1, centromeric proteins, other   antigens extracted from the HEp-2 cell nucleus. PATSY SOBEIDA assays   have been reported to have lower sensitivities than PATSY IFA for   systemic autoimmune rheumatic diseases (SARD). Negative results do not necessarily rule out SARD. Performed by [facility] [place] [id number], [id number] [facility] [phone]  www. Martha Little MD, Lab. Director       24 Hour Urine for Creatinine Clearance:  No components found for: CREAT4, UHRS10, UTV10      Objective:   I/O: No intake/output data recorded.   No intake/output data recorded. No intake/output data recorded. Vitals: BP (!) 144/67   Pulse 59   Temp 98.5 °F (36.9 °C) (Oral)   Resp 16   Ht 5' (1.524 m)   Wt 133 lb 6.1 oz (60.5 kg)   SpO2 93%   BMI 26.05 kg/m²  {  General appearance: awake weak  HEENT: Head: Normal, normocephalic, atraumatic. Neck: supple, symmetrical, trachea midline  Lungs: diminished breath sounds bilaterally  Heart: S1, S2 normal  Abdomen: abnormal findings:  soft nt  Extremities: edema trace  Neurologic: Mental status: alertness: Interactive        Assessment and Plan:      IMP:  1 Acute on chronic hyponatremia  2. Fluid overload with decompensated congestive heart failure  3. Prior falls with weakness    Plan     #1 sodium low stable monitor for now  2. Try to maintain negative balance with diuresis with torsemide  3. INR 2.1 monitor closely for risk of falls and possible rehab  4.   We will follow monitor and above setting         Justin Bennett MD, MD

## 2021-10-17 NOTE — PROGRESS NOTES
PHARMACY ANTICOAGULATION MONITORING SERVICE    Juanjose Feliz is a 80 y.o. female on warfarin therapy for PAF. Pharmacy consulted by LISA Thomas CNP for monitoring and adjustment of treatment. Indication for anticoagulation: PAF  INR goal: 2 - 3  Warfarin dose prior to admission: 7.5 mg po daily    Pertinent Laboratory Values   Recent Labs     10/17/21  0623   INR 2.18     INR MONITORING  Lab Results   Component Value Date    INR 2.18 10/17/2021    INR 1.83 10/16/2021    INR 1.57 10/15/2021    INR 1.46 10/14/2021    INR 1.41 10/13/2021   _______________________________________________________________________  Assessment/Plan:  [facility] Drug Interactions:   o Aspirin (Home med)  o Levothyroxine (Home med)  o Acetaminophen (PRN)  o Atorvastatin (Home med)  o No New drug interactions- thus far- during this hospital say.  o No Tx bridge   INR supratherapeutic on admission, now WNL.  INR today= 2.18 = WNL.  CONTINUE SAME DOSE AND FREQUENCY =  6mg po daily.     Pharmacy will continue to monitor and adjust warfarin therapy as indicated    Thank you for the consult,  Ban Ruelas, [facility]  10/17/2021 12:09 PM

## 2021-10-17 NOTE — CARE COORDINATION
CM received call from [facility], requesting CM to call pt's daughter, Sky Christiansen at [phone] to discuss ARU denial.      CM contacted pt's daughter, Sky Christiansen ([phone]). CM introduced self and role. CM informed pt's daughter that the ARU referral had been denied by insurance but the pt's physician had appealed the decision. CM stated the decision could be available tomorrow 10/18/2021. Pt's daughter requested CM to call ([phone]) to provide updates. CM to continue to follow.        Darrick Bashir, MSSW, LSW

## 2021-10-17 NOTE — PROGRESS NOTES
Hospitalist Progress Note      Name:  Sj Saha /Age/Sex: 3/18/1929  (80 y.o. female)   MRN & CSN:  [id number] & [id number] Admission Date/Time: 10/7/2021 10:27 AM   Location:  [address] [place] [address] PCP: Azalia Rodriguez Day: 11    Assessment and Plan:   Sj Saha is a 80 y.o.  female  who presents with     Hyponatremia: Improving, nephrology on board, appreciate recs   Coagulopathy: Secondary to Coumadin patient INR significantly elevated on admission.  She was seen by cardiology and they recommended stopping anticoagulation given her age and risk of falls  Frequent falls: Pursuing ARU placement versus inpatient rehab   Acute CHF exacerbation: Continue diuresis per nephro recs   5. Elevated TSH: ?Overtreatment hypothyroidism, check Free T3, T4, will adjust meds based on levels--->T4 levels within normal limits, likely does not need any adjustments in regimen. T3 is low, however can fluctuate and not be a reliable indicator of thyroid function. Recommend outpatient labs in few months to continue monitoring. History of Present Illness:     Chief Complaint: <principal problem not specified>  Sj Saha is a 80 y.o.  female  who presents with above    No acute issues today, very pleasant     Ten point ROS reviewed negative, unless as noted above    Objective:     No intake or output data in the 24 hours ending 10/17/21 1614   Vitals:   Vitals:    10/17/21 1518   BP: (!) 120/52   Pulse: 59   Resp: 20   Temp: 98.9 °F (37.2 °C)   SpO2: 93%     Physical Exam:   GEN Awake female, sitting upright in bed in no apparent distress. Appears given age. EYES Pupils are equally round. No scleral erythema, discharge, or conjunctivitis. NECK Supple, no apparent thyromegaly or masses. RESP Clear to auscultation, no wheezes, rales or rhonchi. Symmetric chest movement while on room air. CARDIO/VASC S1/S2 auscultated. Regular rate without appreciable murmurs, rubs, or gallops.   GI Abdomen is soft without significant tenderness, masses, or guarding. MSK No gross joint deformities. SKIN Normal coloration, warm, dry. PSYCH Awake, alert, oriented x 4. Affect appropriate.       Medications:   Medications:    torsemide  20 mg Oral Daily    warfarin  6 mg Oral Daily    cetirizine  10 mg Oral Daily    spironolactone  25 mg Oral Daily    albuterol sulfate HFA  2 puff Inhalation BID    aspirin  81 mg Oral Daily    atorvastatin  10 mg Oral Nightly    carvedilol  6.25 mg Oral BID WC    levothyroxine  88 mcg Oral Daily    ocuvite-lutein  1 tablet Oral Daily    vitamin B-12  1,000 mcg Oral Daily    sodium chloride flush  5-40 mL IntraVENous 2 times per day    tiotropium-olodaterol  2 puff Inhalation Daily    timolol  1 drop Both Eyes BID      Infusions:    sodium chloride       PRN Meds: sodium chloride flush, 5-40 mL, PRN  sodium chloride, 25 mL, PRN  ondansetron, 4 mg, Q8H PRN   Or  ondansetron, 4 mg, Q6H PRN  polyethylene glycol, 17 g, Daily PRN  acetaminophen, 650 mg, Q6H PRN   Or  acetaminophen, 650 mg, Q6H PRN          Electronically signed by Eliza Laurent MD on 10/17/2021 at 4:14 PM

## 2021-10-18 ENCOUNTER — HOSPITAL ENCOUNTER (INPATIENT)
Age: 86
LOS: 9 days | Discharge: HOME HEALTH | DRG: 071 | End: 2021-10-27
Attending: PHYSICAL MEDICINE & REHABILITATION | Admitting: PHYSICAL MEDICINE & REHABILITATION
Payer: MEDICARE

## 2021-10-18 VITALS
BODY MASS INDEX: 26.19 KG/M2 | HEIGHT: 60 IN | HEART RATE: 63 BPM | WEIGHT: 133.38 LBS | DIASTOLIC BLOOD PRESSURE: 60 MMHG | TEMPERATURE: 97.5 F | RESPIRATION RATE: 18 BRPM | OXYGEN SATURATION: 95 % | SYSTOLIC BLOOD PRESSURE: 140 MMHG

## 2021-10-18 PROBLEM — G93.41 METABOLIC ENCEPHALOPATHY: Status: ACTIVE | Noted: 2021-10-18

## 2021-10-18 LAB
ANION GAP SERPL CALCULATED.3IONS-SCNC: 10 MMOL/L (ref 4–16)
BUN BLDV-MCNC: 14 MG/DL (ref 6–23)
CALCIUM SERPL-MCNC: 9.9 MG/DL (ref 8.3–10.6)
CHLORIDE BLD-SCNC: 88 MMOL/L (ref 99–110)
CO2: 31 MMOL/L (ref 21–32)
CREAT SERPL-MCNC: 0.6 MG/DL (ref 0.6–1.1)
GFR AFRICAN AMERICAN: >60 ML/MIN/1.73M2
GFR NON-AFRICAN AMERICAN: >60 ML/MIN/1.73M2
GLUCOSE BLD-MCNC: 138 MG/DL (ref 70–99)
GLUCOSE BLD-MCNC: 87 MG/DL (ref 70–99)
INR BLD: 2.44 INDEX
POTASSIUM SERPL-SCNC: 4.2 MMOL/L (ref 3.5–5.1)
PROTHROMBIN TIME: 31.7 SECONDS (ref 11.7–14.5)
SARS-COV-2, NAAT: NOT DETECTED
SODIUM BLD-SCNC: 129 MMOL/L (ref 135–145)

## 2021-10-18 PROCEDURE — 6370000000 HC RX 637 (ALT 250 FOR IP): Performed by: PHYSICAL MEDICINE & REHABILITATION

## 2021-10-18 PROCEDURE — 6370000000 HC RX 637 (ALT 250 FOR IP): Performed by: NURSE PRACTITIONER

## 2021-10-18 PROCEDURE — 6370000000 HC RX 637 (ALT 250 FOR IP): Performed by: INTERNAL MEDICINE

## 2021-10-18 PROCEDURE — 2580000003 HC RX 258: Performed by: HOSPITALIST

## 2021-10-18 PROCEDURE — 94664 DEMO&/EVAL PT USE INHALER: CPT

## 2021-10-18 PROCEDURE — 87635 SARS-COV-2 COVID-19 AMP PRB: CPT

## 2021-10-18 PROCEDURE — 97535 SELF CARE MNGMENT TRAINING: CPT

## 2021-10-18 PROCEDURE — 97530 THERAPEUTIC ACTIVITIES: CPT

## 2021-10-18 PROCEDURE — 94640 AIRWAY INHALATION TREATMENT: CPT

## 2021-10-18 PROCEDURE — 85610 PROTHROMBIN TIME: CPT

## 2021-10-18 PROCEDURE — 1280000000 HC REHAB R&B

## 2021-10-18 PROCEDURE — 82962 GLUCOSE BLOOD TEST: CPT

## 2021-10-18 PROCEDURE — 6370000000 HC RX 637 (ALT 250 FOR IP): Performed by: HOSPITALIST

## 2021-10-18 PROCEDURE — 94761 N-INVAS EAR/PLS OXIMETRY MLT: CPT

## 2021-10-18 PROCEDURE — 97116 GAIT TRAINING THERAPY: CPT

## 2021-10-18 PROCEDURE — 99223 1ST HOSP IP/OBS HIGH 75: CPT | Performed by: PHYSICAL MEDICINE & REHABILITATION

## 2021-10-18 PROCEDURE — 36415 COLL VENOUS BLD VENIPUNCTURE: CPT

## 2021-10-18 PROCEDURE — 80048 BASIC METABOLIC PNL TOTAL CA: CPT

## 2021-10-18 RX ORDER — SODIUM CHLORIDE 0.9 % (FLUSH) 0.9 %
5-40 SYRINGE (ML) INJECTION PRN
Status: CANCELLED | OUTPATIENT
Start: 2021-10-18

## 2021-10-18 RX ORDER — LEVOTHYROXINE SODIUM 88 UG/1
88 TABLET ORAL DAILY
Status: DISCONTINUED | OUTPATIENT
Start: 2021-10-19 | End: 2021-10-27 | Stop reason: HOSPADM

## 2021-10-18 RX ORDER — SODIUM CHLORIDE 9 MG/ML
25 INJECTION, SOLUTION INTRAVENOUS PRN
Status: DISCONTINUED | OUTPATIENT
Start: 2021-10-18 | End: 2021-10-26

## 2021-10-18 RX ORDER — ASPIRIN 81 MG/1
81 TABLET, CHEWABLE ORAL DAILY
Status: CANCELLED | OUTPATIENT
Start: 2021-10-19

## 2021-10-18 RX ORDER — WARFARIN SODIUM 6 MG/1
6 TABLET ORAL DAILY
Status: CANCELLED | OUTPATIENT
Start: 2021-10-18

## 2021-10-18 RX ORDER — TORSEMIDE 20 MG/1
20 TABLET ORAL DAILY
Status: DISCONTINUED | OUTPATIENT
Start: 2021-10-19 | End: 2021-10-27 | Stop reason: HOSPADM

## 2021-10-18 RX ORDER — ATORVASTATIN CALCIUM 10 MG/1
TABLET, FILM COATED ORAL
Status: ON HOLD | COMMUNITY
Start: 2021-07-13 | End: 2021-10-27 | Stop reason: HOSPADM

## 2021-10-18 RX ORDER — CARVEDILOL 6.25 MG/1
TABLET ORAL
Status: ON HOLD | COMMUNITY
Start: 2020-12-07 | End: 2021-10-27 | Stop reason: HOSPADM

## 2021-10-18 RX ORDER — SODIUM CHLORIDE 0.9 % (FLUSH) 0.9 %
5-40 SYRINGE (ML) INJECTION EVERY 12 HOURS SCHEDULED
Status: CANCELLED | OUTPATIENT
Start: 2021-10-18

## 2021-10-18 RX ORDER — ALBUTEROL SULFATE 90 UG/1
AEROSOL, METERED RESPIRATORY (INHALATION)
Status: ON HOLD | COMMUNITY
Start: 2021-09-29 | End: 2021-10-27 | Stop reason: HOSPADM

## 2021-10-18 RX ORDER — CETIRIZINE HYDROCHLORIDE 10 MG/1
10 TABLET ORAL DAILY
Status: CANCELLED | OUTPATIENT
Start: 2021-10-19

## 2021-10-18 RX ORDER — SODIUM CHLORIDE 0.9 % (FLUSH) 0.9 %
5-40 SYRINGE (ML) INJECTION EVERY 12 HOURS SCHEDULED
Status: DISCONTINUED | OUTPATIENT
Start: 2021-10-18 | End: 2021-10-27 | Stop reason: HOSPADM

## 2021-10-18 RX ORDER — ACETAMINOPHEN 325 MG/1
650 TABLET ORAL EVERY 6 HOURS PRN
Status: DISCONTINUED | OUTPATIENT
Start: 2021-10-18 | End: 2021-10-27

## 2021-10-18 RX ORDER — GLIMEPIRIDE 2 MG/1
1 TABLET ORAL 2 TIMES DAILY
Status: DISCONTINUED | OUTPATIENT
Start: 2021-10-18 | End: 2021-10-27 | Stop reason: HOSPADM

## 2021-10-18 RX ORDER — ONDANSETRON 2 MG/ML
4 INJECTION INTRAMUSCULAR; INTRAVENOUS EVERY 6 HOURS PRN
Status: CANCELLED | OUTPATIENT
Start: 2021-10-18

## 2021-10-18 RX ORDER — LEVOTHYROXINE SODIUM 88 UG/1
TABLET ORAL
Status: ON HOLD | COMMUNITY
Start: 2021-08-06 | End: 2021-10-27 | Stop reason: HOSPADM

## 2021-10-18 RX ORDER — SPIRONOLACTONE 25 MG/1
25 TABLET ORAL DAILY
Status: DISCONTINUED | OUTPATIENT
Start: 2021-10-19 | End: 2021-10-27 | Stop reason: HOSPADM

## 2021-10-18 RX ORDER — ASPIRIN 81 MG/1
81 TABLET ORAL DAILY
Status: ON HOLD | COMMUNITY
End: 2021-10-27 | Stop reason: HOSPADM

## 2021-10-18 RX ORDER — ACETAMINOPHEN 325 MG/1
650 TABLET ORAL EVERY 4 HOURS PRN
Status: DISCONTINUED | OUTPATIENT
Start: 2021-10-18 | End: 2021-10-27 | Stop reason: HOSPADM

## 2021-10-18 RX ORDER — POTASSIUM CHLORIDE 750 MG/1
10 TABLET, FILM COATED, EXTENDED RELEASE ORAL DAILY
Status: CANCELLED | OUTPATIENT
Start: 2021-10-18

## 2021-10-18 RX ORDER — VIT A/VIT C/VIT E/ZINC/COPPER 4296-226
1 CAPSULE ORAL DAILY
Status: CANCELLED | OUTPATIENT
Start: 2021-10-18

## 2021-10-18 RX ORDER — CARVEDILOL 6.25 MG/1
6.25 TABLET ORAL 2 TIMES DAILY WITH MEALS
Status: DISCONTINUED | OUTPATIENT
Start: 2021-10-18 | End: 2021-10-27 | Stop reason: HOSPADM

## 2021-10-18 RX ORDER — ATORVASTATIN CALCIUM 10 MG/1
10 TABLET, FILM COATED ORAL NIGHTLY
Status: DISCONTINUED | OUTPATIENT
Start: 2021-10-18 | End: 2021-10-27 | Stop reason: HOSPADM

## 2021-10-18 RX ORDER — DIPHENHYDRAMINE HYDROCHLORIDE 25 MG/1
1 TABLET ORAL DAILY
Status: CANCELLED | OUTPATIENT
Start: 2021-10-18

## 2021-10-18 RX ORDER — SODIUM CHLORIDE 0.9 % (FLUSH) 0.9 %
5-40 SYRINGE (ML) INJECTION PRN
Status: DISCONTINUED | OUTPATIENT
Start: 2021-10-18 | End: 2021-10-26

## 2021-10-18 RX ORDER — LANOLIN ALCOHOL/MO/W.PET/CERES
1000 CREAM (GRAM) TOPICAL DAILY
Status: CANCELLED | OUTPATIENT
Start: 2021-10-19

## 2021-10-18 RX ORDER — POTASSIUM CHLORIDE 750 MG/1
10 TABLET, FILM COATED, EXTENDED RELEASE ORAL DAILY
Status: DISCONTINUED | OUTPATIENT
Start: 2021-10-18 | End: 2021-10-27

## 2021-10-18 RX ORDER — WARFARIN SODIUM 6 MG/1
6 TABLET ORAL DAILY
Status: DISCONTINUED | OUTPATIENT
Start: 2021-10-18 | End: 2021-10-19

## 2021-10-18 RX ORDER — TRIAMTERENE AND HYDROCHLOROTHIAZIDE 37.5; 25 MG/1; MG/1
1 CAPSULE ORAL DAILY
Status: ON HOLD | COMMUNITY
Start: 2021-09-08 | End: 2021-10-27 | Stop reason: HOSPADM

## 2021-10-18 RX ORDER — ONDANSETRON 4 MG/1
4 TABLET, ORALLY DISINTEGRATING ORAL EVERY 8 HOURS PRN
Status: CANCELLED | OUTPATIENT
Start: 2021-10-18

## 2021-10-18 RX ORDER — WARFARIN SODIUM 5 MG/1
TABLET ORAL
Status: ON HOLD | COMMUNITY
Start: 2021-03-24 | End: 2021-10-27 | Stop reason: HOSPADM

## 2021-10-18 RX ORDER — GLIMEPIRIDE 2 MG/1
1 TABLET ORAL 2 TIMES DAILY
Status: CANCELLED | OUTPATIENT
Start: 2021-10-18

## 2021-10-18 RX ORDER — LEVOTHYROXINE SODIUM 88 UG/1
88 TABLET ORAL DAILY
Status: CANCELLED | OUTPATIENT
Start: 2021-10-19

## 2021-10-18 RX ORDER — SPIRONOLACTONE 25 MG/1
25 TABLET ORAL DAILY
Status: CANCELLED | OUTPATIENT
Start: 2021-10-19

## 2021-10-18 RX ORDER — POLYETHYLENE GLYCOL 3350 17 G/17G
17 POWDER, FOR SOLUTION ORAL DAILY PRN
Status: DISCONTINUED | OUTPATIENT
Start: 2021-10-18 | End: 2021-10-27 | Stop reason: HOSPADM

## 2021-10-18 RX ORDER — ACETAMINOPHEN 650 MG/1
650 SUPPOSITORY RECTAL EVERY 6 HOURS PRN
Status: DISCONTINUED | OUTPATIENT
Start: 2021-10-18 | End: 2021-10-18

## 2021-10-18 RX ORDER — CARVEDILOL 6.25 MG/1
6.25 TABLET ORAL 2 TIMES DAILY WITH MEALS
Status: CANCELLED | OUTPATIENT
Start: 2021-10-18

## 2021-10-18 RX ORDER — LANOLIN ALCOHOL/MO/W.PET/CERES
1000 CREAM (GRAM) TOPICAL DAILY
Status: DISCONTINUED | OUTPATIENT
Start: 2021-10-19 | End: 2021-10-27 | Stop reason: HOSPADM

## 2021-10-18 RX ORDER — TORSEMIDE 20 MG/1
20 TABLET ORAL DAILY
Status: CANCELLED | OUTPATIENT
Start: 2021-10-19

## 2021-10-18 RX ORDER — CYCLOSPORINE 0.5 MG/ML
EMULSION OPHTHALMIC
Status: ON HOLD | COMMUNITY
Start: 2021-07-30 | End: 2021-10-27 | Stop reason: HOSPADM

## 2021-10-18 RX ORDER — ALBUTEROL SULFATE 90 UG/1
2 AEROSOL, METERED RESPIRATORY (INHALATION) 2 TIMES DAILY
Status: CANCELLED | OUTPATIENT
Start: 2021-10-18

## 2021-10-18 RX ORDER — SODIUM CHLORIDE 9 MG/ML
25 INJECTION, SOLUTION INTRAVENOUS PRN
Status: CANCELLED | OUTPATIENT
Start: 2021-10-18

## 2021-10-18 RX ORDER — ACETAMINOPHEN 325 MG/1
650 TABLET ORAL EVERY 6 HOURS PRN
Status: CANCELLED | OUTPATIENT
Start: 2021-10-18

## 2021-10-18 RX ORDER — VITS A,C,E/LUTEIN/MINERALS 300MCG-200
1 TABLET ORAL DAILY
Status: CANCELLED | OUTPATIENT
Start: 2021-10-19

## 2021-10-18 RX ORDER — DIPHENHYDRAMINE HYDROCHLORIDE 25 MG/1
1 TABLET ORAL DAILY
Status: DISCONTINUED | OUTPATIENT
Start: 2021-10-18 | End: 2021-10-20

## 2021-10-18 RX ORDER — VITS A,C,E/LUTEIN/MINERALS 300MCG-200
1 TABLET ORAL DAILY
Status: DISCONTINUED | OUTPATIENT
Start: 2021-10-19 | End: 2021-10-27 | Stop reason: HOSPADM

## 2021-10-18 RX ORDER — ALBUTEROL SULFATE 90 UG/1
2 AEROSOL, METERED RESPIRATORY (INHALATION) 3 TIMES DAILY PRN
Status: CANCELLED | OUTPATIENT
Start: 2021-10-18

## 2021-10-18 RX ORDER — ACETAMINOPHEN 650 MG/1
650 SUPPOSITORY RECTAL EVERY 6 HOURS PRN
Status: CANCELLED | OUTPATIENT
Start: 2021-10-18

## 2021-10-18 RX ORDER — ALBUTEROL SULFATE 90 UG/1
2 AEROSOL, METERED RESPIRATORY (INHALATION) 3 TIMES DAILY PRN
Status: DISCONTINUED | OUTPATIENT
Start: 2021-10-18 | End: 2021-10-27

## 2021-10-18 RX ORDER — LORAZEPAM 0.5 MG/1
0.5 TABLET ORAL EVERY 8 HOURS PRN
Status: ON HOLD | COMMUNITY
Start: 2021-09-07 | End: 2021-10-27 | Stop reason: HOSPADM

## 2021-10-18 RX ORDER — POLYETHYLENE GLYCOL 3350 17 G/17G
17 POWDER, FOR SOLUTION ORAL DAILY PRN
Status: DISCONTINUED | OUTPATIENT
Start: 2021-10-18 | End: 2021-10-18

## 2021-10-18 RX ORDER — POLYETHYLENE GLYCOL 400 AND PROPYLENE GLYCOL 4; 3 MG/ML; MG/ML
1-2 SOLUTION/ DROPS OPHTHALMIC 2 TIMES DAILY
Status: ON HOLD | COMMUNITY
Start: 2021-03-23 | End: 2021-10-27 | Stop reason: HOSPADM

## 2021-10-18 RX ORDER — VIT A/VIT C/VIT E/ZINC/COPPER 4296-226
1 CAPSULE ORAL DAILY
Status: DISCONTINUED | OUTPATIENT
Start: 2021-10-18 | End: 2021-10-18 | Stop reason: SDUPTHER

## 2021-10-18 RX ORDER — ONDANSETRON 2 MG/ML
4 INJECTION INTRAMUSCULAR; INTRAVENOUS EVERY 6 HOURS PRN
Status: DISCONTINUED | OUTPATIENT
Start: 2021-10-18 | End: 2021-10-27

## 2021-10-18 RX ORDER — CETIRIZINE HYDROCHLORIDE 10 MG/1
10 TABLET ORAL DAILY
Status: DISCONTINUED | OUTPATIENT
Start: 2021-10-19 | End: 2021-10-27 | Stop reason: HOSPADM

## 2021-10-18 RX ORDER — ALBUTEROL SULFATE 90 UG/1
2 AEROSOL, METERED RESPIRATORY (INHALATION) 2 TIMES DAILY
Status: DISCONTINUED | OUTPATIENT
Start: 2021-10-18 | End: 2021-10-27 | Stop reason: HOSPADM

## 2021-10-18 RX ORDER — ASPIRIN 81 MG/1
81 TABLET, CHEWABLE ORAL DAILY
Status: DISCONTINUED | OUTPATIENT
Start: 2021-10-19 | End: 2021-10-27 | Stop reason: HOSPADM

## 2021-10-18 RX ORDER — ATORVASTATIN CALCIUM 10 MG/1
10 TABLET, FILM COATED ORAL NIGHTLY
Status: CANCELLED | OUTPATIENT
Start: 2021-10-18

## 2021-10-18 RX ORDER — ONDANSETRON 4 MG/1
4 TABLET, ORALLY DISINTEGRATING ORAL EVERY 8 HOURS PRN
Status: DISCONTINUED | OUTPATIENT
Start: 2021-10-18 | End: 2021-10-27 | Stop reason: HOSPADM

## 2021-10-18 RX ORDER — UMECLIDINIUM BROMIDE AND VILANTEROL TRIFENATATE 62.5; 25 UG/1; UG/1
1 POWDER RESPIRATORY (INHALATION) DAILY
Status: ON HOLD | COMMUNITY
Start: 2021-06-30 | End: 2021-10-27 | Stop reason: HOSPADM

## 2021-10-18 RX ORDER — POLYETHYLENE GLYCOL 3350 17 G/17G
17 POWDER, FOR SOLUTION ORAL DAILY PRN
Status: CANCELLED | OUTPATIENT
Start: 2021-10-18

## 2021-10-18 RX ADMIN — TORSEMIDE 20 MG: 20 TABLET ORAL at 09:43

## 2021-10-18 RX ADMIN — ALBUTEROL SULFATE 2 PUFF: 90 AEROSOL, METERED RESPIRATORY (INHALATION) at 19:10

## 2021-10-18 RX ADMIN — SPIRONOLACTONE 25 MG: 25 TABLET ORAL at 09:43

## 2021-10-18 RX ADMIN — Medication 1 TABLET: at 09:43

## 2021-10-18 RX ADMIN — WARFARIN SODIUM 6 MG: 6 TABLET ORAL at 17:49

## 2021-10-18 RX ADMIN — CARVEDILOL 6.25 MG: 6.25 TABLET, FILM COATED ORAL at 17:48

## 2021-10-18 RX ADMIN — TIOTROPIUM BROMIDE AND OLODATEROL 2 PUFF: 3.124; 2.736 SPRAY, METERED RESPIRATORY (INHALATION) at 07:59

## 2021-10-18 RX ADMIN — CETIRIZINE HYDROCHLORIDE 10 MG: 10 TABLET, FILM COATED ORAL at 09:43

## 2021-10-18 RX ADMIN — ATORVASTATIN CALCIUM 10 MG: 10 TABLET, FILM COATED ORAL at 21:56

## 2021-10-18 RX ADMIN — TIMOLOL MALEATE 1 DROP: 2.5 SOLUTION/ DROPS OPHTHALMIC at 09:43

## 2021-10-18 RX ADMIN — POTASSIUM CHLORIDE 10 MEQ: 750 TABLET, FILM COATED, EXTENDED RELEASE ORAL at 17:49

## 2021-10-18 RX ADMIN — CARVEDILOL 6.25 MG: 6.25 TABLET, FILM COATED ORAL at 09:43

## 2021-10-18 RX ADMIN — ACETAMINOPHEN 650 MG: 325 TABLET ORAL at 21:55

## 2021-10-18 RX ADMIN — LEVOTHYROXINE SODIUM 88 MCG: 0.09 TABLET ORAL at 09:43

## 2021-10-18 RX ADMIN — CYANOCOBALAMIN TAB 1000 MCG 1000 MCG: 1000 TAB at 09:43

## 2021-10-18 RX ADMIN — ALBUTEROL SULFATE 2 PUFF: 90 AEROSOL, METERED RESPIRATORY (INHALATION) at 07:58

## 2021-10-18 RX ADMIN — ASPIRIN 81 MG: 81 TABLET, CHEWABLE ORAL at 09:43

## 2021-10-18 RX ADMIN — SODIUM CHLORIDE, PRESERVATIVE FREE 10 ML: 5 INJECTION INTRAVENOUS at 22:04

## 2021-10-18 RX ADMIN — TIMOLOL MALEATE 1 DROP: 2.5 SOLUTION/ DROPS OPHTHALMIC at 21:55

## 2021-10-18 ASSESSMENT — PAIN SCALES - GENERAL
PAINLEVEL_OUTOF10: 4
PAINLEVEL_OUTOF10: 0

## 2021-10-18 NOTE — PROGRESS NOTES
Physical Therapy    Physical Therapy Treatment Note  Name: Ricky Hernandez MRN: [id number] :   3/18/1929   Date:  10/18/2021   Admission Date: 10/7/2021 Room:  [address] [place], [place] [address]   Restrictions/Precautions:        general precautions, falls, LOU   Subjective:  Patient states:  \"I'm going downstairs\"   Pain:   Location, Type, Intensity (0/10 to 10/10):  7/10 right shoulder and back (\"from lying here\")   Objective:    Observation:    Supine in bed. Cooperative with therapy. [place] needing inc VCs and demonstrations throughout session. Treatment, including education/measures:  Supine to sit: SBA with HOB elevated ~60 deg and use of bed rail   Sit to stand: CGA for safety from EOB and toilet. Dec carry over with sequencing UEs from seat surface/grab bar to RW. Educated on safer hand sequencing technique at end of session   Stand to sit: CGA to toilet and recliner. Dec carry over sequencing UEs from RW to grab bar/armrests for better support. Educated on safer hand sequencing technique at end of session   Step pivot:  CGA for safety with RW (2x)   Ambulation: ~10ft + 150ft with RW CGA to close SBA for safety. Fair and consistent pace. No major LOB noted but unsteady at times. Sitting balance: SBA at EOB, SBA to CGA at toilet performing tucker care and LB dressing tasks without UE support x ~10 minutes total.   Standing balance: CGA for safety at sink performing hand hygiene and grooming tasks x ~3 minutes without UE support. Educated pt on POC, role of PT, safe DME use.  Cues and demonstrations provided for sequencing to inc safety and indep with mobility   Assessment / Impression:    Patient's tolerance of treatment:  Good   Adverse Reaction: no  Significant change in status and impact:  no  Barriers to improvement:  Activity tolerance, strength, balance,insight, safety awareness, carry over/STM   Plan for Next Session:    Activity tolerance, strength, balance, gait, transfers, bed mobility   Time in:  0854  Time out: 4118  Timed treatment minutes:  28  Total treatment time:  28    Previously filed items:  Social/Functional History  Lives With: Alone  Type of Home:  (Condo)  Home Layout: One level  Home Access: Level entry  Bathroom Shower/Tub: Walk-in shower  Bathroom Toilet: Standard  Bathroom Equipment: Shower chair, Grab bars in shower, Grab bars around toilet  Home Equipment: 4 wheeled walker, Cane (Pt does not ambulate with AD at baseline.)  ADL Assistance: Independent  Homemaking Assistance: Independent  Homemaking Responsibilities: Yes  Ambulation Assistance: Independent  Transfer Assistance: Independent  Active : No  Patient's  Info: Daughter in law does grocery shopping.   Short term goals  Time Frame for Short term goals: 1 week  Short term goal 1: Pt will perform bed mobility SBA  Short term goal 2: Pt will transfer from all surfaces SBA  Short term goal 3: Pt will ambulate 75ft with LRAD SBA  Short term goal 4: Pt will perform standing light dynamic activity without UE support x 3 minutes SBA       Electronically signed by:    Karey Naylor [id number]  10/18/2021, 9:12 AM

## 2021-10-18 NOTE — PROGRESS NOTES
Occupational Therapy  . Occupational Therapy Treatment Note      Name: Nela Sue MRN: [id number] :   3/18/1929   Date:  10/18/2021   Admission Date: 10/7/2021 Room:  [address] [place] [address]A     Primary Problem:      Restrictions/Precautions:    General precautions, fall risk        Communication with other providers:  Per chart review, patient ok for tx. Subjective:  Patient states:  I can go now or later. Pain: denies   (location, type, intensity)    Objective:    Observation:  seated on recliner. Wanting to go \"downtstairs\" to therapy. Pleasant. Objective Measures:  Pocket tele    Treatment, including education:      ADL activity training was instructed today. Cues were given for safety, sequence, UE/LE placement, visual cues, and balance. Activities performed today included dressing, toileting, hand hygiene, and grooming. Oral care- Mod I in seated. Extensive oral care routine. patient demo putting in dentures, however did not have fixodent to hold dentures in place. Provided with denture cup to place dentures in.   toileting- CGA/SBA for safety. Tucker care in seated. Patient demo depend hike down, forgot to pull up depends and started walking towards sink requiring Mod A for assist plus vcs. Hand hygiene- CGA in stance at sink  Facial hygiene Mod I  Seated    Therapeutic activity training was instructed today. Cues were given for safety, sequence, UE/LE placement, awareness, and balance. Activities performed today included, sit-stand, SPT. Stand to FWW from recliner CGA for safety. Functional mobility x10 feet x2 with CGA for safety plus cues for walker management. Sit to commode- CGA for slow descent. patient demo good balance  weight shifting during tucker care in seated. Stand from commode- Min A   Stance at sink- CGA for safety plus cues for walker placement ar sink.    Sit to recliner- CGA    Patient educated on role of OT , benefits of OT and rationale for therapeutic intervention. All therapeutic intervention performed c emphasis on dynamic balance / standing tolerance to increase strength, endurance and activity tolerance for increased [place] c ADL tasks and func transfers / mobility. Patient left safely in bedside chair at end of session, with call light in reach, alarm on and nursing aware. Gait belt was used for func transfers / mobility.         Assessment / Impression:    Patient's tolerance of treatment: well  Adverse Reaction: none  Significant change in status and impact:  none  Barriers to improvement: weakness,       Plan for Next Session:    Continue with OT POC      Time in:  1429  Time out:  1502  Timed treatment minutes:  31  Total treatment time:  31      Electronically signed by:    LULA Young COTA/L [id number]  10/18/2021, 2:46 PM

## 2021-10-18 NOTE — CARE COORDINATION
Met with patientand discussed ARU. Explained to patient the required 3 hours of therapy a day. Also explained the average length of stay is 11 days, could be longer or shorter depending on recommendations of therapy and Dr. Polo Sanchez. Patient expresses her understanding and states she's agreeable to admit to ARU. Per patient her goal is to return home at discharge from ARU. Called and spoke with patients daughter and DIL regarding ARU. Per Shelton Crigler (patients daughter) goal is for patient to return home at discharge and her daughter will be staying with her at discharge. Patient has been approved for ARU per [facility] Medicare representative. Notified Debra Mcclain. COVID negative test noted.

## 2021-10-18 NOTE — H&P
(Pt does not ambulate with AD at baseline.)  ADL Assistance: [address]: Independent  Homemaking Responsibilities: Yes  Ambulation Assistance: Independent  Transfer Assistance: Independent  Active : No  Patient's  Info: Daughter in law does grocery shopping.     She reports that she quit smoking about 50 years ago. Her smoking use included cigarettes. She has a 1.25 pack-year smoking history. She has never used smokeless tobacco. She reports previous alcohol use. She reports that she does not use drugs. Prior (baseline) level of function: Modified independent with mobility and self-care. Current level of function: Moderate physical assistance needed for mobility and self-care. Allergies:  Darvocet [propoxyphene n-acetaminophen], Ranexa [ranolazine er], Ranolazine, Sulfa antibiotics, Sulfasalazine, Adhesive tape, Allantoin, Bacitracin, Gramicidin, Neomycin, Polymyxin b, Pramoxine hcl, and Silicone    Past Medical History:   Past Medical History:   Diagnosis Date    Arthritis     Bradycardia 2001    requiring dual chamber pacemaker at [facility] CAD (coronary artery disease)     Cardiac pacemaker 10/2001    [employer] #[id number]  PPM- Serial # [id number]- Dr Jensen Smiley CHF (congestive heart failure) ([facility] [address]     COPD, mild ([facility] [address] 2/17/2017    CVA (cerebrovascular accident) ([facility] [address] [facility]     DJD (degenerative joint disease) of cervical spine     C5-C6, C6-C7    Exhaustion of cardiac pacemaker battery 11/21/2011    PPM battery replacement- [employer]    Family history of cardiovascular disease     Glaucoma Dx 2010    H/O 24 hour EKG monitoring 8/6/2000 8/6/2000- Intermittent episonde of a-fib/flutter    H/O cardiac catheterization 4/20/2010, 2/1989 4/20/2010-Severe native vessel disease and has graft disease as well. LAD, CX totally occluded. RCA totally occluded in proximal segment. VG to RCA widely patent.   PDA 90% LAD afer LIMA anastomosis 80-90% stenosis. Proceeded with PTCA with stent next day.  H/O cardiovascular stress test 10/14/2011, 6/2/2010,4/8/2010, 5/18/2009,4/6/2009, 10/30/2007, 11/12/2004, 11/6/2003, 8/9/2002, 8/9/2001,6/5/2000,     10/14/2011-Lexiscan-Abnormal Myocardial Perfusion study. Evidence of mild ischemia in the Left CX region. Abnomal study. Rest EF 63%. Global LV systolic function normal. No ECG changes. Unremarkable pharmacological stress test.    H/O cardiovascular stress test 6/10/2013    thallium--mild ischemia left circumflex EF63% no change from 10/2011 study.  H/O cardiovascular stress test 10/16/2014    cardiolite-mild ischemia left circumflex,EF70%    H/O chest x-ray 4/19/2009 4/19/2009-Stable cardiomegaly. No acute cardiopulmonary disease.  H/O Doppler lower venous ultrasound 09/18/2019    Significant reflux noted in RGSV, RGSV is extremely tortuous and small along the thigh and calf and would be highly unlikely to be accessed, RSSV is non compressible and has occlusive chronic SVT, LSSV is non compressible with occlusive chronic SVT, LGSV removed s/p CABG, Significant reflux in LGSV tributary,    H/O Doppler ultrasound 3/31/2010    CAROTID- 3/31/2010-INtimal thickening but no significant atherosclerotic plaque noted in ANA PAULA. Doppler flow velocities within ANA PAULA are WNL. Heterogeneous, irregular atherosclerotic plaque noted in LICA. Doppler flow velocities within the LICA are elevated, consistent with a mild, less than 50% stenosis.  H/O Doppler ultrasound 5/24/2016    Carotid- normal study    H/O Doppler ultrasound 09/06/2017    carotid - normal study    H/O Doppler ultrasound     H/O echocardiogram 10/13    EF=60%, Severe Pulm. HTN, & Sclerotic aortic valve w/stenosis.  H/O echocardiogram 10/16/14     EF 55-60% Normal LV. Normal LV systolic function. Severe tricuspid insufficiency with severe hypertension.      H/O echocardiogram 02/03/2017    heart cath performed this morning    H/O echocardiogram 09/18/2019    EF 50-55%, Left atrium is mild to moderately dilated, right atrium is severely dilated, mildly dilated right ventricle, Mod MR, Severe TR, Severe Pulm HTN, no pericardial effusion     History of complete ECG     10/14/2011(Lexiscan);5/6/2010, 4/30/2009,10/24/2008,9/21/2007, 10/13/2006    History of nuclear stress test 11/17/2016    lexiscan-normal,EF70%    Hx of cardiovascular stress test 12/28/2018    EF 60%  Normal study.  HX OTHER MEDICAL 05/01/2017    MUGA-normal, EF53%    Hyperlipidemia     Hypertension     Mild intermittent asthma 7/28/2016    Nausea & vomiting     Obstructive sleep apnea 5/16/2017    Paroxysmal atrial fibrillation (HCC)     Post PTCA 4/21/2010    PTCA with 2.25 stent of the LIMA to LAD    Pulmonary HTN ([address]     Severe per last echo on 10/13. 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 Followed by MAZE procedure by pulmonary vein isolation.-  Dr Chacha Medrano S/P PTCA (percutaneous transluminal coronary angioplasty) 11/2012    PTCA with stent to RCA    Thyroid disease     hypothyroi    Unspecified cerebral artery occlusion with cerebral infarction Unsure When    No Residual        Past Surgical History:     Past Surgical History:   Procedure Laterality Date    APPENDECTOMY  80    CARDIAC SURGERY  4/09    CABG (3 Bypasses), One Heart Stent in 2010    COLONOSCOPY  In 2000's    X1    CORONARY ANGIOPLASTY WITH STENT PLACEMENT  4/21/2010    PTCA with stent LIMA ->LAD    CORONARY ARTERY BYPASS GRAFT  4/8/2009    LIMA->Diag,  LIMA -> LAD;  SVG->RCA going to the PDA.  Followed by MAZE procedure by pulmonary vein isolation.-  Dr Tamera Frank, TOTAL ABDOMINAL  1990's    MIDDLE EAR SURGERY      OTHER SURGICAL HISTORY      Ear surgery-hearing    PACEMAKER PLACEMENT      battery change 11/21/2011 [employer]    PTCA  11/2012    Ptca with stent to RCA    TONSILLECTOMY 1950's       Current Medications:     Current Facility-Administered Medications:     0.9 % sodium chloride infusion, 25 mL, IntraVENous, PRN, Court Harper MD    acetaminophen (TYLENOL) tablet 650 mg, 650 mg, Oral, Q6H PRN **OR** [DISCONTINUED] acetaminophen (TYLENOL) suppository 650 mg, 650 mg, Rectal, Q6H PRN, Court Harper MD    ondansetron (ZOFRAN-ODT) disintegrating tablet 4 mg, 4 mg, Oral, Q8H PRN **OR** ondansetron (ZOFRAN) injection 4 mg, 4 mg, IntraVENous, Q6H PRN, Court Harper MD    sodium chloride flush 0.9 % injection 5-40 mL, 5-40 mL, IntraVENous, 2 times per day, Court Harper MD    sodium chloride flush 0.9 % injection 5-40 mL, 5-40 mL, IntraVENous, PRN, Court Harper MD    albuterol sulfate  (90 Base) MCG/ACT inhaler 2 puff, 2 puff, Inhalation, BID, Court Harper MD    albuterol sulfate  (90 Base) MCG/ACT inhaler 2 puff, 2 puff, Inhalation, TID PRN, Court Harper MD    [START ON 10/19/2021] antioxidant multivitamin (OCUVITE) tablet, 1 tablet, Oral, Daily, Court Harper MD    [START ON 10/19/2021] aspirin chewable tablet 81 mg, 81 mg, Oral, Daily, Court Harper MD    atorvastatin (LIPITOR) tablet 10 mg, 10 mg, Oral, Nightly, Court Harper MD    Biotin CAPS 1 capsule, 1 capsule, Oral, Daily, Court Harper MD    carvedilol (COREG) tablet 6.25 mg, 6.25 mg, Oral, BID WC, Court Harper MD, 6.25 mg at 10/18/21 1748    [START ON 10/19/2021] cetirizine (ZYRTEC) tablet 10 mg, 10 mg, Oral, Daily, Scott Crenshaw MD    [START ON 10/19/2021] levothyroxine (SYNTHROID) tablet 88 mcg, 88 mcg, Oral, Daily, Court Harper MD    potassium chloride (KLOR-CON) extended release tablet 10 mEq, 10 mEq, Oral, Daily, Court Harper MD, 10 mEq at 10/18/21 1749    [START ON 10/19/2021] spironolactone (ALDACTONE) tablet 25 mg, 25 mg, Oral, Daily, Court Harper MD    timolol (TIMOPTIC) 0.25 % ophthalmic solution 1 drop, 1 drop, Both Eyes, BID, Jaquelyn Seip, MD    [START ON 10/19/2021] tiotropium-olodaterol (STIOLTO) 2.5-2.5 MCG/ACT inhaler 2 puff, 2 puff, Inhalation, Daily, Court Harper MD    [START ON 10/19/2021] torsemide (DEMADEX) tablet 20 mg, 20 mg, Oral, Daily, Jaquelyn Seip, MD    [START ON 10/19/2021] vitamin B-12 (CYANOCOBALAMIN) tablet 1,000 mcg, 1,000 mcg, Oral, Daily, Court Harper MD    warfarin (COUMADIN) tablet 6 mg, 6 mg, Oral, Daily, Court Harper MD, 6 mg at 10/18/21 1749    acetaminophen (TYLENOL) tablet 650 mg, 650 mg, Oral, Q4H PRN, C Kaia Saenz MD    polyethylene glycol [facility]) packet 17 g, 17 g, Oral, Daily PRN, C Kaia Saenz MD    Family History:   Family History   Problem Relation Age of Onset    Cancer Mother         \"Liver Cancer\"    Arthritis Mother     Depression Mother     Hearing Loss Mother     High Blood Pressure Mother     High Cholesterol Mother     Mental Illness Mother    Sarika Grooms / [place] Mother     Heart Disease Father     Early Death Father 36        \"Instant Death\"    High Blood Pressure Father     High Cholesterol Father     Coronary Art Dis Father         Massive MI    Heart Disease Sister     Depression Sister     Cancer Sister         \"Breast Cancer, Cancer Free Now\"    High Blood Pressure Sister     Other Daughter         \"She's Had Stomach Surgery\"    High Blood Pressure Son     High Blood Pressure Son     Early Death Paternal Grandfather        Exam:    Blood pressure 116/70, pulse 67, height 5' (1.524 m), weight 133 lb 6.1 oz (60.5 kg). General: Patient was seen sitting up in bed. She is hard of hearing and easily distracted. Inconsistently answers simple questions and struggles to follow single step commands. HEENT: No tenderness to palpation about her cranium. Neck supple within about 40 degrees of active cervical spine rotation bilaterally. Full visual field. MMM. Clear speech.     Pulmonary: Unlabored and clear.    Cardiac: Pansystolic murmur. Regular rate and rhythm. Abdomen: Patient's abdomen was soft and nondistended. Bowel sounds were present throughout. There was no rebound, guarding or masses noted. Spinal exam: Prominent bony landmarks but no skin breakdown. Upper extremities: Slow and deliberate with arm movements which can bring hands up to meet mine. Normal tone. No bruises or tremor. Fair  strength. Lower extremities: Very limited hip and knee movements with back pain. 1+ edema about her ankles. Heels are clear. Sitting balance was fair. Standing balance was poor. Lab Results   Component Value Date    WBC 14.8 (H) 10/09/2021    HGB 13.4 10/09/2021    HCT 42.7 10/09/2021    MCV 87.7 10/09/2021     (H) 10/09/2021     Lab Results   Component Value Date    INR 2.44 10/18/2021    INR 2.18 10/17/2021    INR 1.83 10/16/2021    PROTIME 31.7 (H) 10/18/2021    PROTIME 28.4 (H) 10/17/2021    PROTIME 23.7 (H) 10/16/2021     Lab Results   Component Value Date    CREATININE 0.6 10/18/2021    BUN 14 10/18/2021     (L) 10/18/2021    K 4.2 10/18/2021    CL 88 (L) 10/18/2021    CO2 31 10/18/2021     Lab Results   Component Value Date    ALT 18 10/13/2021    AST 17 10/13/2021    ALKPHOS 125 10/13/2021    BILITOT 1.6 (H) 10/13/2021         Impression: 80-year-old female with chronic diastolic congestive heart failure, hypothyroidism and hypertension who is developed hyponatremia concurrent with a fall with a blow to her head. She has also had acute urinary retention requiring Carnes catheter placement. Strengths for the patient: Independent habits prior to admission, accessible home and some experience with adaptive equipment. Limitations/barriers for the patient: Her age, she lives alone and her urinary retention. Recommendation: Acute inpatient rehabilitation with occupational and physical therapy 180 minutes 5 out of every 7 days.  Will address basic and  advancing

## 2021-10-18 NOTE — PLAN OF CARE
ARU Interdisciplinary Plan of Care (IPOC)  [facility] Dr. Keith Bella [facility], [address]  [phone]  Fax: [phone] JESSICA Chavis    : 3/18/1929  Acct #: [de-identified]  MRN: [id number]   PHYSICIAN:  Payton Duggan MD  Primary Active Problems:   Active Hospital Problems    Diagnosis Date Noted    Metabolic encephalopathy [V03.12] 10/18/2021       Rehabilitation Diagnosis:     Metabolic encephalopathy [P77.84]  Metabolic encephalopathy [I47.89]       ADMIT DATE:10/18/2021   CARE PLAN     NURSING:  Macie Comment while on this unit will:      Bowel and Bladder   [x] Be continent of bowel and bladder      [x] Have an adequate number of bowel movements   [x] Urinate with no urinary retention >300ml in bladder   [] Bladder Scan: (details)   [] Complete bladder protocol with connolly removal   [] Initiate Bladder Program to toilet every ___ hours   [] Initiate Bowel Program to toilet every ___hours   [] Bladder training    [] Bowel training  Pulmonary   [x] Maintain O2 SATs at 92% or greater  Pain Management   [x] Have pain managed while on ARU        [] Be pain free by discharge    [] Medication Management and Education  Maintenance of Skin Integrity/Wound Management   [x] Have no skin breakdown while on ARU   [] Have improved skin integrity via wound measurements   [] Have no signs/symptoms of infection via infection protection and monitoring at the          wound site  Fall Prevention   [x] Be free from injury during hospitalization via fall prevention measures     [] Disease management and Education  Precautions   [] Weight Bearing Precautions   [] Swallowing Precautions   [] Monitoring of Risks of Complications   [] DVT Prophylaxis    [] Fluid/electrolyte/Nutrition Management    [] Complete education with patient/family with understanding demonstrated for          in-room safety with transfers to bed, toilet, wheelchair, shower as well as                bathroom activities and hygiene. [] Adjustment   [] Other:   Nursing interventions may include bowel/bladder training, education for medical assistive devices, medication education, O2 saturation management, energy conservation, stress management techniques, fall prevention, alarms protocol, seating and positioning, skin/wound care, pressure relief instruction,dressing changes,  infection protection, DVT prophylaxis, and/or assistance with in room safety with transfers to bed, toilet, wheelchair, shower as well as bathroom activities and hygiene. Patient/caregiver education for:   [] Disease/sustained injury/management      [] Medication Use   [] Surgical intervention   [] Safety/Precautions   [] Body mechanics and or joint protection   [] Health maintenance         PHYSICAL THERAPY:  Goals:                               Long term goals  Time Frame for Long term goals : 5-7 days STG=LTG  Long term goal 1: Pt will perform all bed mobility with mod I  Long term goal 2: Pt will perform sit to stand, pivot and car transfers  with mod I  Long term goal 3: Pt will ambulate 150 feet on level surfaces and 10' on unlevel surface with  Jose  using 4ww  Long term goal 4: Pt will ascend/descend curb step with  4ww    and up to  12   steps with  rail(s) with supervision  Long term goal 5: Pt will retrieve light item from floor with  mod I using 4ww  These goals were reviewed with this patient at the time of assessment and Jay Cook is in agreement. Plan of Care: Pt to be seen 5 days per week for a minimum of 60 minutes for 7 days.                 Current Treatment Recommendations: Strengthening, Transfer Training, Endurance Training, Neuromuscular Re-education, Patient/Caregiver Education & Training, Equipment Evaluation, Education, & procurement, Balance Training, IADL Training, Gait Training, Home Exercise Program, Functional Mobility Training, Stair training, Safety Education & Training community reintegration,animal assisted therapy, and concurrent/group therapy.     PT IRF-YAMILKA scores and goals for initial assessment:   Bed Mobility:   Sit to Lying  Assistance Needed: Supervision or touching assistance  CARE Score: 4  Discharge Goal: Independent  Roll Left and Right  Assistance Needed: Independent  CARE Score: 6  Discharge Goal: Independent  Lying to Sitting on Side of Bed  Assistance Needed: Supervision or touching assistance  Comment: mod difficulty due to \"soreness\"  CARE Score: 4  Discharge Goal: Independent    Transfers:    Sit to Stand  Assistance Needed: Supervision or touching assistance  CARE Score: 4  Discharge Goal: Independent  Chair/Bed-to-Chair Transfer  Assistance Needed: Supervision or touching assistance  CARE Score: 4  Discharge Goal: Independent    Car Transfer  Assistance Needed: Supervision or touching assistance  CARE Score: 4  Discharge Goal: Independent    Ambulation:    Walking Ability  Does the Patient Walk?: Yes     Walk 10 Feet  Assistance Needed: Supervision or touching assistance  Comment: supervision 4ww  CARE Score: 4  Discharge Goal: Independent     Walk 50 Feet with Two Turns  Assistance Needed: Supervision or touching assistance  Comment: supervision with 4ww  CARE Score: 4  Discharge Goal: Independent     Walk 150 Feet  Assistance Needed: Supervision or touching assistance  Comment: supervision 4ww  CARE Score: 4  Discharge Goal: Independent     Walking 10 Feet on Uneven Surfaces  Assistance Needed: Supervision or touching assistance  Comment: CG 4ww  CARE Score: 4  Discharge Goal: Independent     1 Step (Curb)  Assistance Needed: Partial/moderate assistance  Comment: min assist with 4ww  CARE Score: 3  Discharge Goal: Supervision or touching assistance     4 Steps  Assistance Needed: Supervision or touching assistance  Comment: CG B rails  CARE Score: 4  Discharge Goal: Supervision or touching assistance     12 Steps  Assistance Needed: Supervision or touching assistance  Comment: CG with B rails, HR increased from 65 to 92, recovered to 80 within one min  CARE Score: 4  Discharge Goal: Supervision or touching assistance    Gait Deviations: []None []Slow alisha  [] Increased MANSOOR  [] Staggers []Deviated Path  [] Decreased step length  [] Decreased step height  []Decreased arm swing  [] Shuffles  [] Decreased head and trunk rotation  []other:        Wheelchair:  w/c Ability: Wheelchair Ability  Uses a Wheelchair and/or Scooter?: No                Balance:        Object: Picking Up Object  Assistance Needed: Supervision or touching assistance  Comment: supervision, no device  CARE Score: 4  Discharge Goal: Independent  OCCUPATIONAL THERAPY:  Goals:             Short term goals  Time Frame for Short term goals: STGs=LTGs :  Long term goals  Time Frame for Long term goals : 7-10 days or until d/c  Long term goal 1: Pt will complete grooming tasks Ind  Long term goal 2: Pt will complete total body bathing c supervision  Long term goal 3: Pt will complete UB dressing c setup  Long term goal 4: Pt will complete LB dressing c setup  Long term goal 5: Pt will doff/don footwear c setup  Long term goals 6: Pt will complete toileting mod I  Long term goal 7: Pt will complete functional transfers (bed, chair, shower, toilet) c DME PRN and mod I  Long term goal 8: Pt will perform therex/therax to facilitate increased strength/endurance/ax tolerance (c emphasis on dynamic standing balance/tolerance > 8 mins, BUE endurance, cognitive retraining) c SBA  Long term goal 9: Pt will complete simple homemaking tasks c DME PRN and S :    These goals were reviewed with this patient at the time of assessment and Tommy Walker is in agreement    Plan of Care:  Pt to be seen 5 days per week for a minimum of 60 minutes for 10 days.           Plan  Times per day: Daily  Current Treatment Recommendations: Strengthening, Balance Training, Functional Mobility Training, Endurance Training, perform hygiene seated, a bit perseverative  CARE Score: 4  Discharge Goal: Independent      Toilet Transfers: Toilet Transfer  Assistance Needed: Supervision or touching assistance  Comment: SBA c 4WW, cues to lock brakes and hand placement  CARE Score: 4  Discharge Goal: Independent      SPEECH THERAPY: (If ordered)  Plan of Care and Goals:   LTG   Short-term Goals  Timeframe for Short-term Goals: 3x/week x1 week 30 mins min  LTG: Improve overall fx for safe return home with modified independence  Goal 1: Pt will complete simulated med mgmt to determine needs post discharge by 10/22/21  Goal 2: Pt will demonstrate carryover of learned safety sequences in fx tasks with min cues. Patient/family involved in developing goals and treatment plan: pt in agreement                                                          LTG:                           Treatments may include speech/language/communication therapy, cognitive training, animal assisted therapy, group therapy, education, and/or dysphagia therapy based on the above goals. Co-treats where appropriate with PT or OT to facilitate patient goals in functional tasks. These goals were reviewed with this patient at the time of assessment and Lemuel Mancuso is in agreement. CASE MANAGEMENT:  Goals:   Assist patient/family with discharge planning, patient/family counseling, assistance in obtaining recommended equipment and other services, and coordination with insurance during ARU stay. Patient Goals: Return to maximum level of independent function. Activities Prior to Admit:   Homemaking Responsibilities: Yes  Active : No  Mode of Transportation: Car     Leisure & Hobbies: housework, tv, pt manages meds in a weekly pill box, finances are managed by son and dtr. Pt has macular degeneration. Able to read with magnifying glass.          Intensity of Therapy  Lemuel Mancuso will be seen a minimum of 3 hours of therapy per day/a minimum of 5 out of 7 days per week. [] In this rare instance due to the nature of this patient's medical involvement, this patient will be seen 15 hours per week (900 minutes within a 7 day period). Treatments may include therapeutic exercises, gait training, neuromuscular re-ed, transfer training, community reintegration, bed mobility, w/c mobility and training, self care, home mgmt, cognitive training, energy conservation,dysphagia tx, speech/language/communication therapy, group therapy, and patient/family education. In addition, dietician/nutritionist may monitor calorie count as well as intake and collaboratively work with SLP on dietary upgrades. Neuropsychology/Psychology may evaluate and provide necessary support. Group therapy as appropriate to facilitate improved endurance, STR, COORD, function, safety, transfers, awareness and insight into deficits, problem solving, memory, and social interaction and engagement. Medical issues being managed closely and that require 24 hour availability of a physician:   [x] Swallowing Precautions                                     [] Weight bearing precautions   [] Wound Care                             [x] Infection Prevention   [x] DVT Prophylaxis/assessment              [x] Monitoring for complications    [x] Fall Precautions/Prevention                         [x] Fluid/Electrolyte/Nutrition Balance   [x] Voice Protection                           [x] Medication Management   [x] Respiratory                   [x] Pain Mgmt   [x] Bowel/Bladder Fx    Medical Prognosis: [] Good  [x] Fair    [] Guarded   Total expected IRF days 14                                            Physician anticipated functional outcomes:  FWW and HHC OT/PT and supervision.   Rehab Goals:   [] Return to premorbid function of_______________________________.    [] Independent   [] Mod I  [x] Supervision  [] CGA   [] Min A   [] Mod A  Level for ambulation []without assistive device  [x] with assistive device        [] Independent   [] Mod I  [x] Supervision [] CGA   [] Min A   [] Mod A  Level for transfers []without assistive device  [x] with assistive device         [] Independent   [] Mod I  [x] Supervision [] CGA   [] Min A   [] Mod A Level with ADL's []without assistive device   [x] with assistive device     ___________________     Level with cognitive skills requiring [] No assist [x] Supervision  [] Active Assist/Cues     [] Maximize level of mobility and ADL's to decrease burden on caregiver    IPOC brief synthesis of Preadmission Screen, Post-Admission Evaluation, and Therapy Evaluations: Acute inpatient rehabilitation with occupational and physical therapy 180 minutes 5 out of every 7 days. Will address basic and  advancing mobility with self-care instruction and adaptive equipment training. Caregiver education will be offered. Expected length of stay  prior to a supervised level of function for discharge home with a walker and C OT/PT is 2 weeks.     Additional recommendation:     1. Metabolic encephalopathy with gait disturbance: Patient requires daily occupational and physical therapy with speech-language pathology. Must provide treatment in a calm and consistent environment to improve retention. She needs adaptive equipment training, pulmonary hygiene measures and nutritional support. Bowel and bladder retraining, DVT prophylaxis and probable caregiver education. 2. DVT prophylaxis: Patient is anticoagulated for her cardiac disease. This will protect her against acute DVT as her INR is therapeutic at this point. Target INR is 2.32.7.  GI prophylaxis offered. 3. Acute urinary retention: Carnes catheter now. Bladder training with a voiding trial as she strengthens. Minimizing anticholinergics. 4. Chronic diastolic congestive heart failure: Daily weights. Coreg for afterload reduction. Demadex and Aldactone for diuresis. 5. Hypothyroidism: Replacing her thyroid with supplement.

## 2021-10-18 NOTE — PROGRESS NOTES
Hospitalist Progress Note      Name:  Yves Caruso /Age/Sex: 3/18/1929  (80 y.o. female)   MRN & CSN:  [id number] & [id number] Admission Date/Time: 10/7/2021 10:27 AM   Location:  [address] [place] [address] PCP: Azalia Rodriguez Day: 12    Assessment and Plan:   Yves Caruso is a 80 y.o.  female  who presents with     1. Hyponatremia: Improving, nephrology on board, appreciate recs   2. Coagulopathy: Secondary to Coumadin patient INR significantly elevated on admission.  She was seen by cardiology and they recommended stopping anticoagulation given her age and risk of falls  3. Frequent falls: Pursuing ARU placement versus inpatient rehab   4. Acute CHF exacerbation: Continue diuresis per nephro recs   5. Elevated TSH: ?Overtreatment hypothyroidism, check Free T3, T4, will adjust meds based on levels--->T4 levels within normal limits, likely does not need any adjustments in regimen. T3 is low, however can fluctuate and not be a reliable indicator of thyroid function. Recommend outpatient labs in few months to continue monitoring. History of Present Illness:     Chief Complaint: <principal problem not specified>  Yves Caruso is a 80 y.o.  female  who presents with above    No acute issues today, very pleasant     Ten point ROS reviewed negative, unless as noted above    Objective:     No intake or output data in the 24 hours ending 10/18/21 0911   Vitals:   Vitals:    10/18/21 0429   BP: (!) 162/85   Pulse: 60   Resp:    Temp: 97.7 °F (36.5 °C)   SpO2: 96%     Physical Exam:   GEN Awake female, sitting upright in bed in no apparent distress. Appears given age. EYES Pupils are equally round. No scleral erythema, discharge, or conjunctivitis. NECK Supple, no apparent thyromegaly or masses. RESP Clear to auscultation, no wheezes, rales or rhonchi. Symmetric chest movement while on room air. CARDIO/VASC S1/S2 auscultated.  Regular rate without appreciable murmurs, rubs, or gallops. GI Abdomen is soft without significant tenderness, masses, or guarding. MSK No gross joint deformities. SKIN Normal coloration, warm, dry. PSYCH Awake, alert, oriented x 4. Affect appropriate.       Medications:   Medications:    torsemide  20 mg Oral Daily    warfarin  6 mg Oral Daily    cetirizine  10 mg Oral Daily    spironolactone  25 mg Oral Daily    albuterol sulfate HFA  2 puff Inhalation BID    aspirin  81 mg Oral Daily    atorvastatin  10 mg Oral Nightly    carvedilol  6.25 mg Oral BID WC    levothyroxine  88 mcg Oral Daily    ocuvite-lutein  1 tablet Oral Daily    vitamin B-12  1,000 mcg Oral Daily    sodium chloride flush  5-40 mL IntraVENous 2 times per day    tiotropium-olodaterol  2 puff Inhalation Daily    timolol  1 drop Both Eyes BID      Infusions:    sodium chloride       PRN Meds: sodium chloride flush, 5-40 mL, PRN  sodium chloride, 25 mL, PRN  ondansetron, 4 mg, Q8H PRN   Or  ondansetron, 4 mg, Q6H PRN  polyethylene glycol, 17 g, Daily PRN  acetaminophen, 650 mg, Q6H PRN   Or  acetaminophen, 650 mg, Q6H PRN          Electronically signed by Fortino Whitney MD on 10/18/2021 at 9:11 AM

## 2021-10-18 NOTE — PLAN OF CARE
Problem: Infection:  Goal: Will remain free from infection  Description: Will remain free from infection  Outcome: Ongoing     Problem: Safety:  Goal: Free from accidental physical injury  Description: Free from accidental physical injury  Outcome: Ongoing  Goal: Free from intentional harm  Description: Free from intentional harm  Outcome: Ongoing     Problem: Daily Care:  Goal: Daily care needs are met  Description: Daily care needs are met  Outcome: Ongoing     Problem: Pain:  Goal: Patient's pain/discomfort is manageable  Description: Patient's pain/discomfort is manageable  Outcome: Ongoing     Problem: Skin Integrity:  Goal: Skin integrity will stabilize  Description: Skin integrity will stabilize  Outcome: Ongoing     Problem: Discharge Planning:  Goal: Patients continuum of care needs are met  Description: Patients continuum of care needs are met  Outcome: Ongoing     Problem: Falls - Risk of:  Goal: Will remain free from falls  Description: Will remain free from falls  Outcome: Ongoing  Goal: Absence of physical injury  Description: Absence of physical injury  Outcome: Ongoing     Problem: Infection:  Goal: Will remain free from infection  Description: Will remain free from infection  Outcome: Ongoing     Problem: Safety:  Goal: Free from accidental physical injury  Description: Free from accidental physical injury  Outcome: Ongoing  Goal: Free from intentional harm  Description: Free from intentional harm  Outcome: Ongoing     Problem: Daily Care:  Goal: Daily care needs are met  Description: Daily care needs are met  Outcome: Ongoing     Problem: Pain:  Goal: Patient's pain/discomfort is manageable  Description: Patient's pain/discomfort is manageable  Outcome: Ongoing     Problem: Skin Integrity:  Goal: Skin integrity will stabilize  Description: Skin integrity will stabilize  Outcome: Ongoing     Problem: Discharge Planning:  Goal: Patients continuum of care needs are met  Description: Patients continuum of care needs are met  Outcome: Ongoing     Problem: Neurological  Goal: Maximum potential motor/sensory/cognitive function  Outcome: Ongoing     Problem: Nutrition  Goal: Optimal nutrition therapy  Outcome: Ongoing  Goal: Understanding of nutritional guidelines  Outcome: Ongoing     Problem: Musculor/Skeletal Functional Status  Goal: Highest potential functional level  Outcome: Ongoing  Goal: Absence of falls  Outcome: Ongoing

## 2021-10-18 NOTE — PROGRESS NOTES
PHARMACY ANTICOAGULATION MONITORING SERVICE    Tommy Walker is a 80 y.o. female on warfarin therapy for PAF. Pharmacy consulted by LISA Yates CNP for monitoring and adjustment of treatment. Indication for anticoagulation: PAF  INR goal: 2 - 3  Warfarin dose prior to admission: 7.5 mg po daily    Pertinent Laboratory Values   Recent Labs     10/18/21  0713   INR 2.44     INR MONITORING  Lab Results   Component Value Date    INR 2.44 10/18/2021    INR 2.18 10/17/2021    INR 1.83 10/16/2021    INR 1.57 10/15/2021    INR 1.46 10/14/2021     Assessment/Plan:   Drug Interactions:   o Aspirin (Home med)  o Levothyroxine (Home med)  o Acetaminophen (PRN)  o Atorvastatin (Home med)  o No New drug interactions- thus far- during this hospital say.  o No Tx bridge   INR therapeutic the past 2 days.  Continue warfarin 6mg daily and follow INR trends tomorrow. [address] [place] [address] will continue to monitor and adjust warfarin therapy as indicated    Thank you for the consult,  Stephanie Ayala.  Pietro Lui, [facility]  10/18/2021 2:19 PM

## 2021-10-18 NOTE — PROGRESS NOTES
Nephrology Progress Note  10/18/2021 8:16 AM        Subjective:   Admit Date: 10/7/2021  PCP: Len Ye DO    Interval History: Weekend event briefly reviewed    Diet: Reasonable per patient    ROS: No shortness of breath, PND or orthopnea  Urine output is not recorded    Data:     Current meds:    torsemide  20 mg Oral Daily    warfarin  6 mg Oral Daily    cetirizine  10 mg Oral Daily    spironolactone  25 mg Oral Daily    albuterol sulfate HFA  2 puff Inhalation BID    aspirin  81 mg Oral Daily    atorvastatin  10 mg Oral Nightly    carvedilol  6.25 mg Oral BID WC    levothyroxine  88 mcg Oral Daily    ocuvite-lutein  1 tablet Oral Daily    vitamin B-12  1,000 mcg Oral Daily    sodium chloride flush  5-40 mL IntraVENous 2 times per day    tiotropium-olodaterol  2 puff Inhalation Daily    timolol  1 drop Both Eyes BID      sodium chloride           No intake/output data recorded. CBC: No results for input(s): WBC, HGB, PLT in the last 72 hours. Recent Labs     10/16/21  0545 10/17/21  0623 10/18/21  0713   * 131* 129*   K 3.8 4.1 4.2   CL 89* 90* 88*   CO2 32 33* 31   BUN 15 14 14   CREATININE 0.5* 0.4* 0.6   GLUCOSE 84 92 87       Lab Results   Component Value Date    CALCIUM 9.9 10/18/2021    PHOS 2.4 (L) 10/13/2021       Objective:     Vitals: BP (!) 162/85   Pulse 60   Temp 97.7 °F (36.5 °C) (Axillary)   Resp 19   Ht 5' (1.524 m)   Wt 133 lb 6.1 oz (60.5 kg)   SpO2 96%   BMI 26.05 kg/m²     General appearance: No acute distress  HEENT: 1+ conjunctival pallor  Neck: Supple  Lungs: Right basilar crackles, left lung coarse breath sound  Heart: Seems regular rate and rhythm-well-healed incision from previous sternotomy, she also has left chest wall cardiac device  Abdomen: Nontender  Extremities: No overt edema      Problem List :         Impression :     1.  Acute on chronic hyponatremia-hypervolemia suspected-also may had low protein diet-sodium level is acceptable  2. Fluid overload-acceptable-  3. Fall as well as underlying dysrhythmia and atherosclerotic cardiovascular disease  4. Hypertension    Recommendation/Plan  :     1. She is waiting for her rehab placement  2. Okay to discharge from my standpoint  3. Low-salt, daily weight  4. May DC with torsemide and spironolactone  5. Plant-based high-protein diet  6.  Outpatient follow-up with marzena Hoffman MD MD

## 2021-10-18 NOTE — CARE COORDINATION
Spoke with Myranda from ARU and pt has been approved. Sent PS to Dr. Joshua Ingram to update.  Requested updated Covid

## 2021-10-18 NOTE — PROGRESS NOTES
Pt arrived on ARU unit at this time. Alert and pleasant with some forgetfulness. Able to indicate where she is and voice name and . No signs of distress. Vitals obtained. Oriented to room and use of call light. Bed alarm on. LOU in place for supervision. Skin check completed and no wounds noted. Skin pink with ecchymosis noted on legs and arms. Noted bruising to Bilateral upper arms purple in color and scattered. Noted Purple/red bruise to left side rib cage area. Denies any pain at this time. Skin check completed by this RN and [employer]. Pt tolerated well. IV Saline lock intact to left posterior hand. Dry and intact. Dressing clean and dry.

## 2021-10-19 LAB
INR BLD: 3 INDEX
PROTHROMBIN TIME: 39.1 SECONDS (ref 11.7–14.5)

## 2021-10-19 PROCEDURE — 99232 SBSQ HOSP IP/OBS MODERATE 35: CPT | Performed by: PHYSICAL MEDICINE & REHABILITATION

## 2021-10-19 PROCEDURE — 85610 PROTHROMBIN TIME: CPT

## 2021-10-19 PROCEDURE — 6370000000 HC RX 637 (ALT 250 FOR IP): Performed by: HOSPITALIST

## 2021-10-19 PROCEDURE — 97162 PT EVAL MOD COMPLEX 30 MIN: CPT

## 2021-10-19 PROCEDURE — 92523 SPEECH SOUND LANG COMPREHEN: CPT

## 2021-10-19 PROCEDURE — 2580000003 HC RX 258: Performed by: HOSPITALIST

## 2021-10-19 PROCEDURE — 97535 SELF CARE MNGMENT TRAINING: CPT

## 2021-10-19 PROCEDURE — 94761 N-INVAS EAR/PLS OXIMETRY MLT: CPT

## 2021-10-19 PROCEDURE — 1280000000 HC REHAB R&B

## 2021-10-19 PROCEDURE — 97530 THERAPEUTIC ACTIVITIES: CPT

## 2021-10-19 PROCEDURE — 6370000000 HC RX 637 (ALT 250 FOR IP): Performed by: PHYSICAL MEDICINE & REHABILITATION

## 2021-10-19 PROCEDURE — 94664 DEMO&/EVAL PT USE INHALER: CPT

## 2021-10-19 PROCEDURE — 36415 COLL VENOUS BLD VENIPUNCTURE: CPT

## 2021-10-19 PROCEDURE — 97116 GAIT TRAINING THERAPY: CPT

## 2021-10-19 PROCEDURE — 97166 OT EVAL MOD COMPLEX 45 MIN: CPT

## 2021-10-19 PROCEDURE — 94640 AIRWAY INHALATION TREATMENT: CPT

## 2021-10-19 PROCEDURE — 94150 VITAL CAPACITY TEST: CPT

## 2021-10-19 RX ORDER — WARFARIN SODIUM 3 MG/1
3 TABLET ORAL DAILY
Status: DISCONTINUED | OUTPATIENT
Start: 2021-10-19 | End: 2021-10-20

## 2021-10-19 RX ADMIN — TIMOLOL MALEATE 1 DROP: 2.5 SOLUTION/ DROPS OPHTHALMIC at 21:26

## 2021-10-19 RX ADMIN — WARFARIN SODIUM 3 MG: 3 TABLET ORAL at 17:30

## 2021-10-19 RX ADMIN — ATORVASTATIN CALCIUM 10 MG: 10 TABLET, FILM COATED ORAL at 21:26

## 2021-10-19 RX ADMIN — CYANOCOBALAMIN TAB 1000 MCG 1000 MCG: 1000 TAB at 08:24

## 2021-10-19 RX ADMIN — CETIRIZINE HYDROCHLORIDE 10 MG: 10 TABLET, FILM COATED ORAL at 08:24

## 2021-10-19 RX ADMIN — ACETAMINOPHEN 650 MG: 325 TABLET ORAL at 11:26

## 2021-10-19 RX ADMIN — SPIRONOLACTONE 25 MG: 25 TABLET ORAL at 08:24

## 2021-10-19 RX ADMIN — LEVOTHYROXINE SODIUM 88 MCG: 0.09 TABLET ORAL at 06:24

## 2021-10-19 RX ADMIN — POTASSIUM CHLORIDE 10 MEQ: 750 TABLET, FILM COATED, EXTENDED RELEASE ORAL at 08:24

## 2021-10-19 RX ADMIN — SODIUM CHLORIDE, PRESERVATIVE FREE 10 ML: 5 INJECTION INTRAVENOUS at 08:25

## 2021-10-19 RX ADMIN — TIMOLOL MALEATE 1 DROP: 2.5 SOLUTION/ DROPS OPHTHALMIC at 08:28

## 2021-10-19 RX ADMIN — Medication 1 TABLET: at 08:25

## 2021-10-19 RX ADMIN — CARVEDILOL 6.25 MG: 6.25 TABLET, FILM COATED ORAL at 08:24

## 2021-10-19 RX ADMIN — TORSEMIDE 20 MG: 20 TABLET ORAL at 08:24

## 2021-10-19 RX ADMIN — SODIUM CHLORIDE, PRESERVATIVE FREE 10 ML: 5 INJECTION INTRAVENOUS at 21:26

## 2021-10-19 RX ADMIN — ALBUTEROL SULFATE 2 PUFF: 90 AEROSOL, METERED RESPIRATORY (INHALATION) at 20:43

## 2021-10-19 RX ADMIN — ASPIRIN 81 MG: 81 TABLET, CHEWABLE ORAL at 08:24

## 2021-10-19 RX ADMIN — CARVEDILOL 6.25 MG: 6.25 TABLET, FILM COATED ORAL at 17:31

## 2021-10-19 ASSESSMENT — PAIN SCALES - WONG BAKER
WONGBAKER_NUMERICALRESPONSE: 0

## 2021-10-19 ASSESSMENT — PAIN SCALES - GENERAL
PAINLEVEL_OUTOF10: 0
PAINLEVEL_OUTOF10: 5
PAINLEVEL_OUTOF10: 2
PAINLEVEL_OUTOF10: 0

## 2021-10-19 NOTE — PROGRESS NOTES
Comprehensive Nutrition Assessment    Type and Reason for Visit:  Reassess    Nutrition Recommendations/Plan:   Continue current cardiac diet at this time   Resume oral nutrition supplement as needed/accepted by patient   Will continue to follow up during stay     Nutrition Assessment:  Rehab admit with hx fall, metabolic encephalopathy, CHF with fluid overload. Currently on cardiac diet with 1800 ml fluid restriction. Recent meal intake %. Will continue to follow at moderate nutrition risk with hx reduced appetite with intake starting to improve. Malnutrition Assessment:  Malnutrition Status: At risk for malnutrition (Comment)    Context:  Acute Illness       Estimated Daily Nutrient Needs:  Energy (kcal):  4948-7138 (25-27 ezequiel/kg); Weight Used for Energy Requirements:  Current     Protein (g):  59 ( 1 g/kg); Weight Used for Protein Requirements:  Ideal        Fluid (ml/day):  1600; Method Used for Fluid Requirements:  1 ml/kcal      Nutrition Related Findings:  not available on visit today, meds noted: coumadin, occuvite, biotin, B-12, KCl      Wounds:  None       Current Nutrition Therapies:    ADULT DIET; Regular; Low Fat/Low Chol/High Fiber/2 gm Na; 1800 ml    Anthropometric Measures:  · Height: 4' 11\" (149.9 cm)  · Current Body Weight: 130 lb 15.3 oz (59.4 kg)   · Admission Body Weight: 135 lb (61.2 kg)    · Usual Body Weight: 130 lb (59 kg) (per hx -patient stated)     · Ideal Body Weight: 95 lbs; % Ideal Body Weight 137.8 %   · BMI: 26.4  · Adjusted Body Weight:  ; No Adjustment   · BMI Categories: Overweight (BMI 25.0-29. 9)       Nutrition Diagnosis:   · Predicted inadequate energy intake related to other (comment) (hx reduced appetite in illness) as evidenced by poor intake prior to admission      Nutrition Interventions:   Food and/or Nutrient Delivery:  Continue Current Diet, Snacks (Comment), Start Oral Nutrition Supplement  Nutrition Education/Counseling:  No recommendation at this time Coordination of Nutrition Care:  Continue to monitor while inpatient    Goals:  Patient will consume at least 50-75% at meals during stay       Nutrition Monitoring and Evaluation:   Behavioral-Environmental Outcomes:  None Identified   Food/Nutrient Intake Outcomes:  Food and Nutrient Intake  Physical Signs/Symptoms Outcomes:  Biochemical Data, Meal Time Behavior, Skin, Weight     Discharge Planning:     Too soon to determine     Electronically signed by Eloisa Rey RD, LD on 10/19/21 at 2:46 PM EDT    Contact: [phone]

## 2021-10-19 NOTE — PROGRESS NOTES
Pt due for PT INR this am at 0600 and noted that lab has still not been drawn. Lab notified and state they have one phlebot and will notify them that they need to draw this. Lab states they will come draw. Pt on coumadin and need PTINR in order to take coumadin safely and effectively. Charge nurse notified.

## 2021-10-19 NOTE — PLAN OF CARE
Problem: Infection:  Goal: Will remain free from infection  Description: Will remain free from infection  Outcome: Ongoing     Problem: Safety:  Goal: Free from accidental physical injury  Description: Free from accidental physical injury  Outcome: Ongoing  Goal: Free from intentional harm  Description: Free from intentional harm  Outcome: Ongoing     Problem: Daily Care:  Goal: Daily care needs are met  Description: Daily care needs are met  Outcome: Ongoing     Problem: Pain:  Goal: Patient's pain/discomfort is manageable  Description: Patient's pain/discomfort is manageable  Outcome: Ongoing  Goal: Pain level will decrease  Description: Pain level will decrease  Outcome: Ongoing  Goal: Control of acute pain  Description: Control of acute pain  Outcome: Ongoing  Goal: Control of chronic pain  Description: Control of chronic pain  Outcome: Ongoing     Problem: Skin Integrity:  Goal: Skin integrity will stabilize  Description: Skin integrity will stabilize  Outcome: Ongoing     Problem: Discharge Planning:  Goal: Patients continuum of care needs are met  Description: Patients continuum of care needs are met  Outcome: Ongoing     Problem: Falls - Risk of:  Goal: Will remain free from falls  Description: Will remain free from falls  Outcome: Ongoing  Goal: Absence of physical injury  Description: Absence of physical injury  Outcome: Ongoing     Problem: Infection:  Goal: Will remain free from infection  Description: Will remain free from infection  Outcome: Ongoing     Problem: Safety:  Goal: Free from accidental physical injury  Description: Free from accidental physical injury  Outcome: Ongoing  Goal: Free from intentional harm  Description: Free from intentional harm  Outcome: Ongoing     Problem: Daily Care:  Goal: Daily care needs are met  Description: Daily care needs are met  Outcome: Ongoing     Problem: Pain:  Goal: Patient's pain/discomfort is manageable  Description: Patient's pain/discomfort is manageable  Outcome: Ongoing     Problem: Skin Integrity:  Goal: Skin integrity will stabilize  Description: Skin integrity will stabilize  Outcome: Ongoing     Problem: Discharge Planning:  Goal: Patients continuum of care needs are met  Description: Patients continuum of care needs are met  Outcome: Ongoing     Problem: Neurological  Goal: Maximum potential motor/sensory/cognitive function  Outcome: Ongoing     Problem: Nutrition  Goal: Optimal nutrition therapy  Outcome: Ongoing  Goal: Understanding of nutritional guidelines  Outcome: Ongoing     Problem: Musculor/Skeletal Functional Status  Goal: Highest potential functional level  Outcome: Ongoing  Goal: Absence of falls  Outcome: Ongoing

## 2021-10-19 NOTE — PROGRESS NOTES
Physical Therapy  [facility] ARU PHYSICAL THERAPY EVALUATION    Chart Review:  Past Medical History:   Diagnosis Date    Arthritis     Bradycardia 2001    requiring dual chamber pacemaker at [facility] CAD (coronary artery disease)     Cardiac pacemaker 10/2001    [employer] #[id number]  PPM- Serial # [id number]- Dr Cara Goldman CHF (congestive heart failure) ([facility] [address]     COPD, mild ([address] 2/17/2017    CVA (cerebrovascular accident) ([address]     DJD (degenerative joint disease) of cervical spine     C5-C6, C6-C7    Exhaustion of cardiac pacemaker battery 11/21/2011    PPM battery replacement- [employer]    Family history of cardiovascular disease     Glaucoma Dx 2010    H/O 24 hour EKG monitoring 8/6/2000 8/6/2000- Intermittent episonde of a-fib/flutter    H/O cardiac catheterization 4/20/2010, 2/1989 4/20/2010-Severe native vessel disease and has graft disease as well. LAD, CX totally occluded. RCA totally occluded in proximal segment. VG to RCA widely patent. PDA 90% LAD afer LIMA anastomosis 80-90% stenosis. Proceeded with PTCA with stent next day.  H/O cardiovascular stress test 10/14/2011, 6/2/2010,4/8/2010, 5/18/2009,4/6/2009, 10/30/2007, 11/12/2004, 11/6/2003, 8/9/2002, 8/9/2001,6/5/2000,     10/14/2011-Lexiscan-Abnormal Myocardial Perfusion study. Evidence of mild ischemia in the Left CX region. Abnomal study. Rest EF 63%. Global LV systolic function normal. No ECG changes. Unremarkable pharmacological stress test.    H/O cardiovascular stress test 6/10/2013    thallium--mild ischemia left circumflex EF63% no change from 10/2011 study.  H/O cardiovascular stress test 10/16/2014    cardiolite-mild ischemia left circumflex,EF70%    H/O chest x-ray 4/19/2009 4/19/2009-Stable cardiomegaly. No acute cardiopulmonary disease.     H/O Doppler lower venous ultrasound 09/18/2019    Significant reflux noted in RGSV, RGSV is extremely tortuous and small along the thigh and calf and would be highly unlikely to be accessed, RSSV is non compressible and has occlusive chronic SVT, LSSV is non compressible with occlusive chronic SVT, LGSV removed s/p CABG, Significant reflux in LGSV tributary,    H/O Doppler ultrasound 3/31/2010    CAROTID- 3/31/2010-INtimal thickening but no significant atherosclerotic plaque noted in ANA PAULA. Doppler flow velocities within ANA PAULA are WNL. Heterogeneous, irregular atherosclerotic plaque noted in LICA. Doppler flow velocities within the LICA are elevated, consistent with a mild, less than 50% stenosis.  H/O Doppler ultrasound 5/24/2016    Carotid- normal study    H/O Doppler ultrasound 09/06/2017    carotid - normal study    H/O Doppler ultrasound     H/O echocardiogram 10/13    EF=60%, Severe Pulm. HTN, & Sclerotic aortic valve w/stenosis.  H/O echocardiogram 10/16/14     EF 55-60% Normal LV. Normal LV systolic function. Severe tricuspid insufficiency with severe hypertension.  H/O echocardiogram 02/03/2017    heart cath performed this morning    H/O echocardiogram 09/18/2019    EF 50-55%, Left atrium is mild to moderately dilated, right atrium is severely dilated, mildly dilated right ventricle, Mod MR, Severe TR, Severe Pulm HTN, no pericardial effusion     History of complete ECG     10/14/2011(Lexiscan);5/6/2010, 4/30/2009,10/24/2008,9/21/2007, 10/13/2006    History of nuclear stress test 11/17/2016    lexiscan-normal,EF70%    Hx of cardiovascular stress test 12/28/2018    EF 60%  Normal study.  HX OTHER MEDICAL 05/01/2017    MUGA-normal, EF53%    Hyperlipidemia     Hypertension     Mild intermittent asthma 7/28/2016    Nausea & vomiting     Obstructive sleep apnea 5/16/2017    Paroxysmal atrial fibrillation (HCC)     Post PTCA 4/21/2010    PTCA with 2.25 stent of the LIMA to LAD    Pulmonary HTN ([address]     Severe per last echo on 10/13.    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 Followed by MAZE procedure by pulmonary vein isolation.-  Dr House Dys S/P PTCA (percutaneous transluminal coronary angioplasty) 11/2012    PTCA with stent to RCA    Thyroid disease     hypothyroi    Unspecified cerebral artery occlusion with cerebral infarction Unsure When    No Residual     Past Surgical History:   Procedure Laterality Date    APPENDECTOMY  1941    CARDIAC SURGERY  4/09    CABG (3 Bypasses), One Heart Stent in 2010    COLONOSCOPY  In 2000's    X1    CORONARY ANGIOPLASTY WITH STENT PLACEMENT  4/21/2010    PTCA with stent LIMA ->LAD    CORONARY ARTERY BYPASS GRAFT  4/8/2009    LIMA->Diag,  LIMA -> LAD;  SVG->RCA going to the PDA.  Followed by MAZE procedure by pulmonary vein isolation.-  Dr Ortega Median, TOTAL ABDOMINAL  1990's    MIDDLE EAR SURGERY      OTHER SURGICAL HISTORY      Ear surgery-hearing    PACEMAKER PLACEMENT      battery change 11/21/2011 [employer]    PTCA  11/2012    Ptca with stent to RCA    TONSILLECTOMY  1950's     Fall History: Pt states only this fall in past year, but is questionable historian  Social History:  Social/Functional History  Lives With: Alone  Type of Home: House (condo)  Home Layout: One level  Home Access: Level entry  Bathroom Shower/Tub: Walk-in shower  Bathroom Toilet: Standard  Bathroom Equipment: Shower chair, Grab bars in shower, Grab bars around toilet  Bathroom Accessibility: Walker accessible  Home Equipment: 4 wheeled walker, Cane, Grab bars (Pt uses 4ww at night for her evening routine, no device during the day)  ADL Assistance: Independent  Homemaking Assistance: Independent  Homemaking Responsibilities: Yes  Meal Prep Responsibility: Primary  Laundry Responsibility: Primary  Cleaning Responsibility: Primary  Bill Paying/Finance Responsibility: No (son does due to pt's macular degeneration)  Shopping Responsibility: No (DIL)  Health Care Management: Primary (fills up a weekly pill box)  Ambulation Goal: Independent  Toilet Transfer  Assistance Needed: Supervision or touching assistance  CARE Score: 4  Car Transfer  Assistance Needed: Supervision or touching assistance  CARE Score: 4  Discharge Goal: Independent    Ambulation:   Device used PTA: none during the day, 4ww in the evenings   Walking Ability  Does the Patient Walk?: Yes     Walk 10 Feet  Assistance Needed: Supervision or touching assistance  Comment: supervision 4ww  CARE Score: 4  Discharge Goal: Independent     Walk 50 Feet with Two Turns  Assistance Needed: Supervision or touching assistance  Comment: supervision with 4ww  CARE Score: 4  Discharge Goal: Independent     Walk 150 Feet  Assistance Needed: Supervision or touching assistance  Comment: supervision 4ww  CARE Score: 4  Discharge Goal: Independent     Walking 10 Feet on Uneven Surfaces  Assistance Needed: Supervision or touching assistance  Comment: CG 4ww  CARE Score: 4  Discharge Goal: Independent     1 Step (Curb)  Assistance Needed: Partial/moderate assistance  Comment: min assist with 4ww  CARE Score: 3  Discharge Goal: Supervision or touching assistance     4 Steps  Assistance Needed: Supervision or touching assistance  Comment: CG B rails  CARE Score: 4  Discharge Goal: Supervision or touching assistance     12 Steps  Assistance Needed: Supervision or touching assistance  Comment: CG with B rails, HR increased from 65 to 92, recovered to 80 within one min  CARE Score: 4  Discharge Goal: Supervision or touching assistance    Gait Deviations: []None []Slow alisha  [] Increased MANSOOR  [] Staggers []Deviated Path  [] Decreased step length  [] Decreased step height  []Decreased arm swing  [] Shuffles  [] Decreased head and trunk rotation  []other:        Wheelchair:  w/c Ability: Wheelchair Ability  Uses a Wheelchair and/or Scooter?: No                Balance:        Object: Picking Up Object  Assistance Needed: Supervision or touching assistance  Comment: supervision, no device  CARE Score: 4  Discharge Goal: Independent    Assessment:   The patient is a 80year old female admitted onto ARU after hospitalization for fall with hematoma on back of head. Pt fell again in ED. Pt found to have metabolic encephalopathy, CHF with fluid overload, along with chronic hyponatremia. Pt on O2 initially but now is on room air.      Body structures, Functions, Activity limitations: Decreased functional mobility , Decreased safe awareness, Decreased balance, Decreased ADL status, Decreased cognition, Decreased vision/visual deficit, Decreased endurance, Decreased high-level IADLs, Decreased strength     Prognosis: Good  Decision Making: Medium Complexity  Clinical Presentation: evolving presentation with changing characteristics      Patient education:   ARU schedule, ARU expectations for participation, plan of care,   Treatment Initiated:  Functional mobility training, gait training, patient education  Barriers to Improvement:  Cognition, advanced age  Discharge Recommendations:  home with 24 hour supervision initially  Equipment Recommendations:  2ww    Goals:  Patient Goals   Patient goals : return home with drop in help     Long term goals  Time Frame for Long term goals : 5-7 days STG=LTG  Long term goal 1: Pt will perform all bed mobility with mod I  Long term goal 2: Pt will perform sit to stand, pivot and car transfers  with mod I  Long term goal 3: Pt will ambulate 150 feet on level surfaces and 10' on unlevel surface with  Jose  using 4ww  Long term goal 4: Pt will ascend/descend curb step with  4ww    and up to  12   steps with  rail(s) with supervision  Long term goal 5: Pt will retrieve light item from floor with  mod I using 4ww  Plan:    REQUIRES PT FOLLOW UP: Yes  Pt will be seen at least 60 minutes per day for a minimum of 5 days per week, plus group therapy as appropriate  Plan  Current Treatment Recommendations: Strengthening, Transfer Training, Endurance Training, Neuromuscular Re-education, Patient/Caregiver Education & Training, Equipment Evaluation, Education, & procurement, Balance Training, IADL Training, Gait Training, Home Exercise Program, Functional Mobility Training, Stair training, Safety Education & Training    PT Individual Minutes  Time In: 0830  Time Out: 0945  Minutes: 76                 PT Evaluation 15   Gait Training 30   Therapeutic Exercise    Neuro Re-Ed    Therapeutic Activity 30   Wheelchair Propulsion    Group    Other:    TOTAL 75       Electronically signed by:    Zita Valerio, PT, PT   10/19/2021,

## 2021-10-19 NOTE — PROGRESS NOTES
Facility/Department: [facility] ARU  Initial Speech/Language/Cognitive Assessment    NAME: Darlene Fall  : 3/18/1929   MRN: [id number]  ADMISSION DATE: 10/18/2021  ADMITTING DIAGNOSIS: has CAD (coronary artery disease); Cardiac pacemaker; S/P CABG x 3; Post PTCA; Pulmonary HTN ([address] TIA (transient ischemic attack); Confusion; Headache; Hypertension; Bradycardia; SOBOE (shortness of breath on exertion); Coronary artery disease involving coronary bypass graft of native heart without angina pectoris; COPD, mild ([address] Obstructive sleep apnea; Dizziness; PAF (paroxysmal atrial fibrillation) ([facility] Pacer at end of battery life; Infection of pacemaker pocket ([facility] Hyponatremia; and Metabolic encephalopathy on their problem list.  DATE ONSET: 10/7/21    Date of Eval: 10/19/2021   Evaluating Therapist: LASHONDA Nesbitt    RECENT RESULTS  CT OF HEAD/MRI: Radiology Findings  CT of head: Impression No acute intracranial abnormality. Right-sided soft tissue swelling posteriorly. Chronic microvascular disease and involutional change. Primary Complaint: I can't piece together what happened when I fell. Pain:  Pain Assessment  Pain Assessment: 0-10  Pain Level: 2  Jacobs-Baker Pain Rating: No hurt  Patient's Stated Pain Goal: No pain  Response to Pain Intervention: Patient Satisfied    Assessment:  Per PAS: Darlene Fall is a 80 y.o. female with past medical history of hypertension, hyperlipidemia, glaucoma, hypothyroidism, CAD, CVA, and CHF who presented on with fall at home. Patient reported she tripped and fell to the floor, did not hit her head, did not lose consciousness. In the ED, patient blood pressure was elevated with SBP about 160. Labs reviewed hyponatremia with sodium 119. Patient also presenting with AMS and remains cognitively impaired throughout admission. Per MD patient dx with Acute on chronic hyponatremia-hypervolemia.  Patient also being medically managed for acute CHF exacerbation, and Coagulopathy. Patient lives alone and is IND with ADL's and ambulation. Currently patient is sba-maxA with ADL's and Celso with RW for transfers and ambulation. COVID negative on 10/7 and 10/18. Medical/Surgical History   Arthritis    Bradycardia 2001   requiring dual chamber pacemaker at [facility] MARYANA CAD (coronary artery disease)    Cardiac pacemaker 10/2001   [employer] #[id number] PPM- Serial # [id number]-[facility]- Dr Sterling Tellez CHF (congestive heart failure) ([facility]    COPD, mild ([address] 2/17/2017  CVA (cerebrovascular accident) ([facility]    DJD (degenerative joint disease) of cervical spine     C5-C6, C6-C7  Exhaustion of cardiac pacemaker battery 11/21/2011   PPM battery replacement- [employer]  Family history of cardiovascular disease    Glaucoma Dx 2010  H/O 24 hour EKG monitoring 8/6/2000 8/6/2000- Intermittent episonde of a-fib/flutter  H/O cardiac catheterization 4/20/2010, 2/1989 4/20/2010-Severe native vessel disease and has graft disease as well. LAD, CX totally occluded. RCA totally occluded in proximal segment. VG to RCA widely patent. PDA 90% LAD afer LIMA anastomosis 80-90% stenosis. Proceeded with PTCA with stent next day.  H/O cardiovascular stress test 10/14/2011, 6/2/2010,4/8/2010, 5/18/2009,4/6/2009, 10/30/2007, 11/12/2004, 11/6/2003, 8/9/2002, 8/9/2001,6/5/2000,   10/14/2011-Lexiscan-Abnormal Myocardial Perfusion study. Evidence of mild ischemia in the Left CX region. Abnomal study. Rest EF 63%. Global LV systolic function normal. No ECG changes. Unremarkable pharmacological stress test.  H/O cardiovascular stress test 6/10/2013   thallium--mild ischemia left circumflex EF63% no change from 10/2011 study.  H/O cardiovascular stress test 10/16/2014   cardiolite-mild ischemia left circumflex,EF70%  H/O chest x-ray 4/19/2009 4/19/2009-Stable cardiomegaly. No acute cardiopulmonary disease.   H/O Doppler lower venous ultrasound 09/18/2019   Significant reflux noted in RGSV, RGSV is extremely tortuous and small along the thigh and calf and would be highly unlikely to be accessed, RSSV is non compressible and has occlusive chronic SVT, LSSV is non compressible with occlusive chronic SVT, LGSV removed s/p CABG, Significant reflux in LGSV tributary,  H/O Doppler ultrasound 3/31/2010   CAROTID- 3/31/2010-INtimal thickening but no significant atherosclerotic plaque noted in ANA PAULA. Doppler flow velocities within ANA PAULA are WNL. Heterogeneous, irregular atherosclerotic plaque noted in LICA. Doppler flow velocities within the LICA are elevated, consistent with a mild, less than 50% stenosis.  H/O Doppler ultrasound 5/24/2016   Carotid- normal study  H/O Doppler ultrasound 09/06/2017   carotid - normal study  H/O Doppler ultrasound    H/O echocardiogram 10/13   EF=60%, Severe Pulm. HTN, & Sclerotic aortic valve w/stenosis.  H/O echocardiogram 10/16/14   EF 55-60% Normal LV. Normal LV systolic function. Severe tricuspid insufficiency with severe hypertension.  H/O echocardiogram 02/03/2017   heart cath performed this morning  H/O echocardiogram 09/18/2019   EF 50-55%, Left atrium is mild to moderately dilated, right atrium is severely dilated, mildly dilated right ventricle, Mod MR, Severe TR, Severe Pulm HTN, no pericardial effusion  History of complete ECG     10/14/2011(Lexiscan);5/6/2010, 4/30/2009,10/24/2008,9/21/2007, 10/13/2006  History of nuclear stress test 11/17/2016   lexiscan-normal,EF70%  Hx of cardiovascular stress test 12/28/2018   EF 60% Normal study.  HX OTHER MEDICAL 05/01/2017   MUGA-normal, EF53%  Hyperlipidemia    Hypertension    Mild intermittent asthma 7/28/2016  Nausea & vomiting    Obstructive sleep apnea 5/16/2017  Paroxysmal atrial fibrillation (HCC)    Post PTCA 4/21/2010   PTCA with 2.25 stent of the LIMA to LAD  Pulmonary HTN ([address]     Severe per last echo on 10/13.   PVD (peripheral vascular disease) ([address]    S/P CABG x 3 4/8/2009   LIMA->Diag, LIMA to LAD; SVG->RCA going to the PDA. Followed by MAZE procedure by pulmonary vein isolation.- Dr Renea Wyman S/P PTCA (percutaneous transluminal coronary angioplasty) 11/2012   PTCA with stent to RCA  Thyroid disease     hypothyroi  Unspecified cerebral artery occlusion with cerebral infarction Unsure When   No Residual   PSHX: has a past surgical history     Cognitive Diagnosis: Mild cognitive impairment characterized by working memory deficits, reduced problem solving and mental flexibility, and some reduced attn. Pt does improve with repetition and practical application and choice cues. Communication Diagnosis: WFL        Recommendations:  Requires SLP Intervention: Yes  Duration/Frequency of Treatment: 3x/week x1 week 30 mins min  D/C Recommendations: To be determined       Plan:   Goals:  Short-term Goals  Timeframe for Short-term Goals: 3x/week x1 week 30 mins min  LTG: Improve overall fx for safe return home with modified independence  Goal 1: Pt will complete simulated med mgmt to determine needs post discharge by 10/22/21  Goal 2: Pt will demonstrate carryover of learned safety sequences in fx tasks with min cues. Patient/family involved in developing goals and treatment plan: pt in agreement    Subjective:   Previous level of function and limitations: Darlene Fall was independent with activities of daily living prior to admit. General  Chart Reviewed: Yes  Family / Caregiver Present: No  Subjective  Subjective: alert and cooperative  Social/Functional History  Active : No  Patient's  Info: Daughter in law does grocery shopping. Education: 12th grade. 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 Pt has macular degeneration. Able to read with magnifying glass.   IADL Comments: cooking and cleaning, cousin comes every other Wed to clean and help  Additional response: 7/11  Strategies that improved performance included:   [x] repetition   [x] practical application    [] spaced retrieval    [x] choice cues    Pt taken to bathroom. Use RW with steadying assist.  Pt managed clothing and hygiene, washed hands at sink, and put in dentures with adhesive. QI scores added. Cues for safety were needed with RW. Prognosis:  Speech Therapy Prognosis  Prognosis: Good  Prognosis Considerations: Age;Previous Level of Function;Participation Level; Potential  Individuals consulted  Consulted and agree with results and recommendations: Patient    Education:  Patient Education: Results and recommendations; rehab expectations  Patient Education Response: Verbalizes understanding  Safety Devices in place: Yes  Type of devices:  All fall risk precautions in place (LOU active)    Therapy Time:   Individual Concurrent Group Co-treatment   Time In 1100         Time Out 1200         Minutes 60  Extra time needed for all tasks                 Jd Li, CCC-SLP  10/19/2021 4:36 PM

## 2021-10-20 LAB
INR BLD: 2.89 INDEX
PROTHROMBIN TIME: 37.7 SECONDS (ref 11.7–14.5)

## 2021-10-20 PROCEDURE — 97129 THER IVNTJ 1ST 15 MIN: CPT

## 2021-10-20 PROCEDURE — 6370000000 HC RX 637 (ALT 250 FOR IP): Performed by: HOSPITALIST

## 2021-10-20 PROCEDURE — 97130 THER IVNTJ EA ADDL 15 MIN: CPT

## 2021-10-20 PROCEDURE — 97116 GAIT TRAINING THERAPY: CPT

## 2021-10-20 PROCEDURE — 36415 COLL VENOUS BLD VENIPUNCTURE: CPT

## 2021-10-20 PROCEDURE — 97535 SELF CARE MNGMENT TRAINING: CPT

## 2021-10-20 PROCEDURE — 6370000000 HC RX 637 (ALT 250 FOR IP): Performed by: PHYSICAL MEDICINE & REHABILITATION

## 2021-10-20 PROCEDURE — 85610 PROTHROMBIN TIME: CPT

## 2021-10-20 PROCEDURE — 97530 THERAPEUTIC ACTIVITIES: CPT

## 2021-10-20 PROCEDURE — 99232 SBSQ HOSP IP/OBS MODERATE 35: CPT | Performed by: PHYSICAL MEDICINE & REHABILITATION

## 2021-10-20 PROCEDURE — 97110 THERAPEUTIC EXERCISES: CPT

## 2021-10-20 PROCEDURE — 94761 N-INVAS EAR/PLS OXIMETRY MLT: CPT

## 2021-10-20 PROCEDURE — 1280000000 HC REHAB R&B

## 2021-10-20 PROCEDURE — 94640 AIRWAY INHALATION TREATMENT: CPT

## 2021-10-20 PROCEDURE — 2580000003 HC RX 258: Performed by: HOSPITALIST

## 2021-10-20 RX ORDER — WARFARIN SODIUM 5 MG/1
5 TABLET ORAL DAILY
Status: DISCONTINUED | OUTPATIENT
Start: 2021-10-20 | End: 2021-10-27 | Stop reason: HOSPADM

## 2021-10-20 RX ORDER — GLUCOSAMINE/D3/BOSWELLIA SERRA 1500MG-400
10000 TABLET ORAL DAILY
Status: DISCONTINUED | OUTPATIENT
Start: 2021-10-20 | End: 2021-10-27 | Stop reason: HOSPADM

## 2021-10-20 RX ADMIN — TIMOLOL MALEATE 1 DROP: 2.5 SOLUTION/ DROPS OPHTHALMIC at 08:40

## 2021-10-20 RX ADMIN — SODIUM CHLORIDE, PRESERVATIVE FREE 10 ML: 5 INJECTION INTRAVENOUS at 11:27

## 2021-10-20 RX ADMIN — CYANOCOBALAMIN TAB 1000 MCG 1000 MCG: 1000 TAB at 08:40

## 2021-10-20 RX ADMIN — TIMOLOL MALEATE 1 DROP: 2.5 SOLUTION/ DROPS OPHTHALMIC at 21:13

## 2021-10-20 RX ADMIN — LEVOTHYROXINE SODIUM 88 MCG: 0.09 TABLET ORAL at 06:04

## 2021-10-20 RX ADMIN — SODIUM CHLORIDE, PRESERVATIVE FREE 10 ML: 5 INJECTION INTRAVENOUS at 21:18

## 2021-10-20 RX ADMIN — POTASSIUM CHLORIDE 10 MEQ: 750 TABLET, FILM COATED, EXTENDED RELEASE ORAL at 08:40

## 2021-10-20 RX ADMIN — ATORVASTATIN CALCIUM 10 MG: 10 TABLET, FILM COATED ORAL at 21:13

## 2021-10-20 RX ADMIN — Medication 1 TABLET: at 08:40

## 2021-10-20 RX ADMIN — CETIRIZINE HYDROCHLORIDE 10 MG: 10 TABLET, FILM COATED ORAL at 08:39

## 2021-10-20 RX ADMIN — ALBUTEROL SULFATE 2 PUFF: 90 AEROSOL, METERED RESPIRATORY (INHALATION) at 20:28

## 2021-10-20 RX ADMIN — TORSEMIDE 20 MG: 20 TABLET ORAL at 08:40

## 2021-10-20 RX ADMIN — CARVEDILOL 6.25 MG: 6.25 TABLET, FILM COATED ORAL at 08:40

## 2021-10-20 RX ADMIN — Medication 10000 MCG: at 11:26

## 2021-10-20 RX ADMIN — SPIRONOLACTONE 25 MG: 25 TABLET ORAL at 08:40

## 2021-10-20 RX ADMIN — ACETAMINOPHEN 650 MG: 325 TABLET ORAL at 06:04

## 2021-10-20 RX ADMIN — ALBUTEROL SULFATE 2 PUFF: 90 AEROSOL, METERED RESPIRATORY (INHALATION) at 07:01

## 2021-10-20 RX ADMIN — ACETAMINOPHEN 650 MG: 325 TABLET ORAL at 17:51

## 2021-10-20 RX ADMIN — TIOTROPIUM BROMIDE AND OLODATEROL 2 PUFF: 3.124; 2.736 SPRAY, METERED RESPIRATORY (INHALATION) at 07:00

## 2021-10-20 RX ADMIN — ASPIRIN 81 MG: 81 TABLET, CHEWABLE ORAL at 08:39

## 2021-10-20 RX ADMIN — WARFARIN SODIUM 5 MG: 5 TABLET ORAL at 17:48

## 2021-10-20 ASSESSMENT — PAIN DESCRIPTION - PAIN TYPE
TYPE: ACUTE PAIN;CHRONIC PAIN
TYPE: ACUTE PAIN

## 2021-10-20 ASSESSMENT — PAIN DESCRIPTION - ONSET: ONSET: ON-GOING

## 2021-10-20 ASSESSMENT — PAIN SCALES - WONG BAKER
WONGBAKER_NUMERICALRESPONSE: 0
WONGBAKER_NUMERICALRESPONSE: 0

## 2021-10-20 ASSESSMENT — PAIN DESCRIPTION - LOCATION
LOCATION: ABDOMEN
LOCATION: SHOULDER

## 2021-10-20 ASSESSMENT — PAIN DESCRIPTION - DESCRIPTORS
DESCRIPTORS: ACHING
DESCRIPTORS: ACHING;DISCOMFORT

## 2021-10-20 ASSESSMENT — PAIN DESCRIPTION - FREQUENCY
FREQUENCY: INTERMITTENT
FREQUENCY: INTERMITTENT

## 2021-10-20 ASSESSMENT — PAIN SCALES - GENERAL
PAINLEVEL_OUTOF10: 0
PAINLEVEL_OUTOF10: 10
PAINLEVEL_OUTOF10: 10
PAINLEVEL_OUTOF10: 0
PAINLEVEL_OUTOF10: 10

## 2021-10-20 ASSESSMENT — PAIN - FUNCTIONAL ASSESSMENT
PAIN_FUNCTIONAL_ASSESSMENT: ACTIVITIES ARE NOT PREVENTED
PAIN_FUNCTIONAL_ASSESSMENT: ACTIVITIES ARE NOT PREVENTED

## 2021-10-20 ASSESSMENT — PAIN DESCRIPTION - ORIENTATION
ORIENTATION: RIGHT
ORIENTATION: LOWER;MID

## 2021-10-20 NOTE — PROGRESS NOTES
Physical Therapy  [x] daily progress note       [] discharge       Patient Name:  Srinivasa Corrigan   :  3/18/1929 MRN: [id number]  Room:  [address] [place] [address] Date of Admission: 10/18/2021  Rehabilitation Diagnosis:   Metabolic encephalopathy [C95.29]  Metabolic encephalopathy [X46.77]       Date 10/20/2021       Day of ARU Week:  3   Time IN/OUT 1300 - 1400   Individual Tx Minutes 60   Group Tx Minutes    Co-Treat Minutes    Concurrent Tx Minutes    TOTAL Tx Time Mins 60   Variance Time    Variance Time []   Refusal due to:     []   Medical hold/reason:    []   Illness   []   Off Unit for test/procedure  []   Extra time needed to complete task  []   Therapeutic need  []   Other (specify):   Restrictions Restrictions/Precautions  Restrictions/Precautions: General Precautions, Fall Risk (1800mL fluid restriction)      Interdisciplinary communication [x]   Cleared for therapy per nursing     []   RN notified about issues during session  []   RN updated on pt performance  []   Spoke with   []   Spoke with OT  []   Spoke with MD  []   Other:    Subjective observations and cognitive status: I'll do whatever you tell me I need to so I can get this done.      Pain level/location:  Denies   Discharge recommendations  Anticipated discharge date:  TBD  Destination: []home alone   []home alone with assist PRN     [] home w/ family      [] Continuous supervision  []SNF    [] Assisted living     [x] Other: home with 24 hour supervision initially  Continued therapy: []HHC PT  []OUTPATIENT PT   [] No Further PT  []SNF PT  Caregiver training recommended: [x]Yes  [] No   Equipment needs: 2 [employer]     PT IRF-YAMILKA scores and goals for discharge assessment:     Sit to Stand  Assistance Needed: Supervision or touching assistance  Comment: CGA from recliner and EOB  CARE Score: 4  Discharge Goal: Independent    Chair/Bed-to-Chair Transfer  Assistance Needed: Supervision or touching assistance  Comment: CGA w/ FWW; stand step  CARE Score: 4  Discharge Goal: Independent    Car Transfer  Assistance Needed: Partial/moderate assistance  Comment: SBA for LE mgmt; min A to stand from passenger side  CARE Score: 3  Discharge Goal: Independent    Walk 10 Feet?   Walk 10 Feet?: Yes    1 Step  1 Step?: Yes    Picking Up Object  Assistance Needed: Partial/moderate assistance  Comment: min A to steady; min cues for set-up, completion  CARE Score: 3  Discharge Goal: Independent    Wheelchair Ability  Uses a Wheelchair and/or Scooter?: Yes    Wheel 50 Feet with Two Turns  Assistance Needed: Supervision or touching assistance  Comment: CGA  CARE Score: 4  Discharge Goal: Independent    Wheel 150 Feet  Assistance Needed: Partial/moderate assistance  Comment: min A 2/2 onset of fatigue  CARE Score: 3  Discharge Goal: Independent              Walking Ability  Does the Patient Walk?: Yes    Walk 10 Feet  Assistance Needed: Supervision or touching assistance  Comment: sup using 4WW  CARE Score: 4  Discharge Goal: Independent    Walk 50 Feet with Two Turns  Assistance Needed: Supervision or touching assistance  Comment: sup; 4WW  CARE Score: 4  Discharge Goal: Independent    Walk 150 Feet  Assistance Needed: Supervision or touching assistance  Comment: sup using 4WW  CARE Score: 4  Discharge Goal: Independent    Walking 10 Feet on Uneven Surfaces  Assistance Needed: Partial/moderate assistance  Comment: min A to attend to balance disturbance; dec safety awareness navigating over uneven surfaces  CARE Score: 3  Discharge Goal: Independent    1 Step (Curb)  Assistance Needed: Supervision or touching assistance, Partial/moderate assistance  Comment: min A for AD mgmt; mod cues for sequencing and set-up  CARE Score: 4  Discharge Goal: Independent    4 Steps  Assistance Needed: Supervision or touching assistance  Comment: CGA  CARE Score: 4  Discharge Goal: Independent    12 Steps  Assistance Needed: Supervision or touching assistance  Comment: CGA  CARE Score: 4  Discharge Goal: Independent      Additional Therapeutic activities/exercises completed this date:     []   Nu-step:  Time:        Level:         #Steps:       []   Rebounder:    []  Seated     []  Standing        [x]   Balance training  Standing static balance while accepting perturbations, standing dynamic reaching, semi-tandem standing       [x]   Postural training  attention to AD mgmt during functional mobility; postural corrections x 5 to emphasize improved posture/body positioning during functional mobility training   []   Supine ther ex (reps/sets):     [x]   Seated ther ex (reps/sets):  2 x 10 marches, ankle pumps, LAQ's ; hip abd kicks, mini squats   []   Standing ther ex (reps/sets):     []   Other: Toileting activity completed with    [x]   Other: ambulation, W/C training, stairs to QI distances ; refer to QI for further details    Comments:      Patient/Caregiver Education and Training:   [x]   Role of PT  [x]   Education about Dx  [x]   Use of call light for assist   []   HEP provided and explained   [x]   Treatment plan reviewed  [x]   Home safety  [x]   Wheelchair mobility/management   [x]   Body mechanics  [x]   Bed Mobility/Transfer technique  [x]   Gait technique/sequencing  [x]   Proper use of assistive device/adaptive equipment  [x]   Stair training/Advanced mobility safety and technique  [x]   Reinforced patient's precautions/mobility while maintaining precautions  [x]   Postural awareness  []   Family/caregiver training  []   Progress was updated and reviewed in Rehabtracker with patient and/or family this date. []   Other:    Treatment Plan for Next Session: Cont w/ est POC; strength, inc functional mobility, address balance, reinforce precautions, challenge endurance      Assessment: This pt demonstrated a positive response to today's treatment as evidenced by no reported pain and cont inc in functional activity.  The patient is making good progress toward established goals as evidenced by QI scores. Ongoing deficits are observed in the areas of functional mobility, balance, endurance, strength and continued focus on these deficits are recommended. Treatment/Activity Tolerance:   [x] Tolerated treatment with no adverse effects    [] Patient limited by fatigue  [] Patient limited by pain   [] Patient limited by medical complications:    [] Adverse reaction to Tx:   [] Significant change in status    Safety:       []  bed alarm set    [x]  chair alarm set    []  Pt refused alarms                []  Telesitter activated      [x]  Gait belt used during tx session      []other:       Number of Minutes/Billable Intervention  Gait Training 30   Therapeutic Exercise 20   Neuro Re-Ed    Therapeutic Activity    Wheelchair Propulsion 10   Group    Other:    TOTAL 60     Social History  Social/Functional History  Lives With: Alone  Type of Home: House (condo)  Home Layout: One level  Home Access: Level entry  Bathroom Shower/Tub: Walk-in shower  Bathroom Toilet: Standard  Bathroom Equipment: Shower chair, Grab bars in shower, Grab bars around toilet  Bathroom Accessibility: Walker accessible  Home Equipment: 4 wheeled walker, Cane, Grab bars (Pt uses 4ww at night for her evening routine, no device during the day)  ADL Assistance: Independent  Homemaking Assistance: Independent  Homemaking Responsibilities: Yes  Meal Prep Responsibility: Primary  Laundry Responsibility: Primary  Cleaning Responsibility: Primary  Bill Paying/Finance Responsibility: No (son does due to pt's macular degeneration)  Shopping Responsibility: No (DIL)  Health Care Management: Primary (fills up a weekly pill box)  Ambulation Assistance: Independent  Transfer Assistance: Independent  Active : No  Patient's  Info: Daughter in law does grocery shopping. Mode of Transportation: Car  Education: 12th grade.   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 Pt has macular degeneration. Able to read with magnifying glass. IADL Comments: cooking and cleaning, cousin comes every other Wed to clean and help  Additional Comments: Pt states fall precipitating this admission is her only fall this year, but pt is a questionable historian. Pt sleeps in flat bed with c-pap    Objective                                                                                    Goals:  (Update in navigator)   : Long term goals  Time Frame for Long term goals : 5-7 days STG=LTG  Long term goal 1: Pt will perform all bed mobility with mod I  Long term goal 2: Pt will perform sit to stand, pivot and car transfers  with mod I  Long term goal 3: Pt will ambulate 150 feet on level surfaces and 10' on unlevel surface with  Jose  using 4ww  Long term goal 4: Pt will ascend/descend curb step with  4ww    and up to  12   steps with  rail(s) with supervision  Long term goal 5: Pt will retrieve light item from floor with  mod I using 4ww:        Plan of Care                                                                              Times per week: 5 days per week for a minimum of 60 minutes/day plus group as appropriate for 60 minutes.   Treatment to include Current Treatment Recommendations: Strengthening, Transfer Training, Endurance Training, Neuromuscular Re-education, Patient/Caregiver Education & Training, Equipment Evaluation, Education, & procurement, Balance Training, IADL Training, Gait Training, Home Exercise Program, Functional Mobility Training, Stair training, Safety Education & Training    Electronically signed by   Guilford Nageotte PT, DPT  10/20/2021, 5:46 PM

## 2021-10-20 NOTE — PLAN OF CARE
continuum of care needs are met  10/20/2021 1550 by True Dudley RN  Outcome: Ongoing  10/20/2021 0154 by Vilma Alfred RN  Outcome: Ongoing     Problem: Falls - Risk of:  Goal: Will remain free from falls  Description: Will remain free from falls  10/20/2021 1550 by True Dudley RN  Outcome: Ongoing  10/20/2021 0154 by Vilma Alfred RN  Outcome: Ongoing  Goal: Absence of physical injury  Description: Absence of physical injury  10/20/2021 1550 by True Dudley RN  Outcome: Ongoing  10/20/2021 0154 by Vilma Alfred RN  Outcome: Ongoing     Problem: Infection:  Goal: Will remain free from infection  Description: Will remain free from infection  10/20/2021 1550 by True Dudley RN  Outcome: Ongoing  10/20/2021 0154 by Vilma Alfred RN  Outcome: Ongoing     Problem: Safety:  Goal: Free from accidental physical injury  Description: Free from accidental physical injury  10/20/2021 1550 by True Dudley RN  Outcome: Ongoing  10/20/2021 0154 by Vilma Alfred RN  Outcome: Ongoing  Goal: Free from intentional harm  Description: Free from intentional harm  10/20/2021 1550 by True Dudley RN  Outcome: Ongoing  10/20/2021 0154 by Vilma Alfred RN  Outcome: Ongoing     Problem: Daily Care:  Goal: Daily care needs are met  Description: Daily care needs are met  10/20/2021 1550 by True Dudley RN  Outcome: Ongoing  10/20/2021 0154 by Vilma Alfred RN  Outcome: Ongoing     Problem: Pain:  Goal: Patient's pain/discomfort is manageable  Description: Patient's pain/discomfort is manageable  10/20/2021 1550 by True Dudley RN  Outcome: Ongoing  10/20/2021 0154 by Vilma Alfred RN  Outcome: Ongoing     Problem: Skin Integrity:  Goal: Skin integrity will stabilize  Description: Skin integrity will stabilize  10/20/2021 1550 by True Dudley RN  Outcome: Ongoing  10/20/2021 0154 by Vilma Alfred RN  Outcome: Ongoing     Problem: Discharge Planning:  Goal: Patients continuum of care needs are met  Description: Patients continuum of care needs are met  10/20/2021 1550 by Nikki Garcia RN  Outcome: Ongoing  10/20/2021 0154 by Srinivasa Lerner RN  Outcome: Ongoing     Problem: Neurological  Goal: Maximum potential motor/sensory/cognitive function  10/20/2021 1550 by Nikki Garcia RN  Outcome: Ongoing  10/20/2021 0154 by Srinivasa Lerner RN  Outcome: Ongoing     Problem: Nutrition  Goal: Optimal nutrition therapy  10/20/2021 1550 by Nikki Garcia RN  Outcome: Ongoing  10/20/2021 0154 by Srinivasa Lerner RN  Outcome: Ongoing  Goal: Understanding of nutritional guidelines  10/20/2021 1550 by Nikki Garcia RN  Outcome: Ongoing  10/20/2021 0154 by Srinivasa Lerner RN  Outcome: Ongoing     Problem: Musculor/Skeletal Functional Status  Goal: Highest potential functional level  10/20/2021 1550 by Nikki Garcia RN  Outcome: Ongoing  10/20/2021 0154 by Srinivasa Lerner RN  Outcome: Ongoing  Goal: Absence of falls  10/20/2021 1550 by Nikki Garcia RN  Outcome: Ongoing  10/20/2021 0154 by Srinivasa Lerner RN  Outcome: Ongoing

## 2021-10-20 NOTE — PATIENT CARE CONFERENCE
Needed: Independent  CARE Score: 6  Discharge Goal: Independent    Lying to Sitting on Side of Bed  Assistance Needed: Independent  CARE Score: 6  Discharge Goal: Independent    Transfers:    Sit to Stand  Assistance Needed: Supervision or touching assistance  Comment: CGA from recliner and EOB  CARE Score: 4  Discharge Goal: Independent    Chair/Bed-to-Chair Transfer  Assistance Needed: Supervision or touching assistance  Comment: supervision  CARE Score: 4  Discharge Goal: Independent        Car Transfer  Assistance Needed: Supervision or touching assistance  Comment: supervision  CARE Score: 4  Discharge Goal: Independent    Ambulation:    Walking Ability  Does the Patient Walk?: Yes     Walk 10 Feet  Assistance Needed: Supervision or touching assistance  Comment: sup using 4WW  CARE Score: 4  Discharge Goal: Independent     Walk 50 Feet with Two Turns  Assistance Needed: Supervision or touching assistance  Comment: sup; 4WW  CARE Score: 4  Discharge Goal: Independent     Walk 150 Feet  Assistance Needed: Supervision or touching assistance  Comment: sup using 4WW  CARE Score: 4  Discharge Goal: Independent     Walking 10 Feet on Uneven Surfaces  Assistance Needed: Supervision or touching assistance  Comment: CG with verbal cues for awareness and strategies, 4ww  CARE Score: 4  Discharge Goal: Independent     1 Step (Curb)  Assistance Needed: Supervision or touching assistance  Comment: CG with cues for sequence  CARE Score: 4  Discharge Goal: Supervision or touching assistance     4 Steps  Assistance Needed: Supervision or touching assistance  Comment: CGA  CARE Score: 4  Discharge Goal: Supervision or touching assistance     12 Steps  Assistance Needed: Supervision or touching assistance  Comment: CGA  CARE Score: 4  Discharge Goal: Supervision or touching assistance           Wheelchair:  w/c Ability: Wheelchair Ability  Uses a Wheelchair and/or Scooter?: Yes    Wheel 50 Feet with Two Turns  Assistance Needed: Supervision or touching assistance  Comment: CGA  CARE Score: 4    Wheel 150 Feet  Assistance Needed: Partial/moderate assistance  Comment: min A 2/2 onset of fatigue  CARE Score: 3              Balance:        Object: Picking Up Object  Assistance Needed: Supervision or touching assistance  Comment: min A to steady; min cues for set-up, completion  CARE Score: 4  Discharge Goal: Independent    Fall Risk: [x]  Yes  []  No    OCCUPATIONAL THERAPY  (Updated in QI)  Short term goals  Time Frame for Short term goals: STGs=LTGs :   Long term goals  Time Frame for Long term goals : 7-10 days or until d/c  Long term goal 1: Pt will complete grooming tasks Ind  Long term goal 2: Pt will complete total body bathing c supervision  Long term goal 3: Pt will complete UB dressing c setup  Long term goal 4: Pt will complete LB dressing c setup  Long term goal 5: Pt will doff/don footwear c setup  Long term goals 6: Pt will complete toileting mod I  Long term goal 7: Pt will complete functional transfers (bed, chair, shower, toilet) c DME PRN and mod I  Long term goal 8: Pt will perform therex/therax to facilitate increased strength/endurance/ax tolerance (c emphasis on dynamic standing balance/tolerance > 8 mins, BUE endurance, cognitive retraining) c SBA  Long term goal 9: Pt will complete simple homemaking tasks c DME PRN and S :                                       OT IRF-YAMILKA scores and goals since initial assessment:    ADLs:    Eating: Eating  Assistance Needed: Setup or clean-up assistance  Comment: d/t low vision  CARE Score: 5  Discharge Goal: Set-up or clean-up assistance       Oral Hygiene: Oral Hygiene  Assistance Needed: Supervision or touching assistance  Comment: in stance at sink  CARE Score: 4  Discharge Goal: Independent    UB/LB Bathing: Shower/Bathe Self  Assistance Needed: Supervision or touching assistance  Comment: at sink, increased time, especially for distal reach to feet  CARE Score: 4  Discharge Short-term Goals: 3x/week x1 week 30 mins min  LTG: Improve overall fx for safe return home with modified independence  Goal 1: Pt will complete simulated med mgmt to determine needs post discharge by 10/22/21  Goal met 10/20/21  Pt would benefit by med box set up upon discharge by family or NSG. Goal 2: Pt will demonstrate carryover of learned safety sequences in fx tasks with min cues. Partially met, continue--verbal cues are needed for problem solving and sequencing for new changes in fx. At this time pt may benefit from more support at home due to initiation, processing, sequencing, and problem solving issues. Ongoing impairments or deficits or barriers: Cognition  Areas where progress has been made: Pt showing carryover of practiced tasks. Strategies that improve performance:repetition, visual cues; [place] so pt does miss info    Nursing Current Medical Status:   [] Is continent of bowel and bladder     [x] Is incontinent of  bladder    [x] Has had an adequate number of bowel movements   [] Urinates with no urinary retention >300ml in bladder   [] Targeting bladder protocol with connolly removal   [x] Maintaining O2 SATs at 92% or greater   [] Has pain managed while on ARU         [] Has had no skin breakdown while on ARU   [] Has improved skin integrity via wound measurements   [] Has no signs/symptoms of infection at the wound site   [] Pressure wounds Stage/Location:    [] Arrived on unit with pressure wound  [] Has been free from injury during hospitalization   [] Has experienced a fall during hospitalization  [] Ongoing education with patient/family with understanding demonstrated for:  [] Receives IV Fluids  [x] Other: PT 29.7--Picking        NUTRITION  Weight: 128 lb 1.4 oz (58.1 kg) / Body mass index is 25.87 kg/m². Current diet: ADULT DIET;  Regular; Low Fat/Low Chol/High Fiber/2 gm Na; 1800 ml  Intake: Usual meal intake %      Medical improvements/barriers: cognition, urinary frequency, safety/sequencing cues needed        Team goals for next treatment period/Intervention for current barriers:   [x] Pt will increase activity tolerance for daily tasks. [] Pt will improve bed mobility with reduced assist.  [x] Pt will improve safety in fx tasks with reduced cues/assist  [x] Pt will improve transfers with reduced assist  [x] Pt will improve toileting with reduced assist  [x] Pt will improve ADL's with use of adaptive equipment with reduced assist  [] Pt will improve pain mgmt for maximum participation in tx program  [] Pt will improve communication to get basic needs met on unit  [] Pt will improve swallowing for safe diet advancement with use of strategies  []  Plan for discharge to home. Patient Strengths: Motivated, Cooperative and Pleasant    Justification for Continued Stay  Based on my medical assessment of the patient and review of information from the interdisciplinary team as part of this weekly team conference, the patient continues to meet the following criteria for IRF level of care:   The patient requires active and ongoing intervention of multiple therapy disciplines   The patient requires and intensive rehabilitation therapy program   The patient requires continued physician supervision by a rehabilitation physician   The patient requires 24 hours rehab nursing care   The patient requires an intensive and coordinated interdisciplinary team approach to the delivery of rehabilitative care.      Assessment/Plan   [x]  The patient is making good progression towards their long term goals and is actively participating in and has a reasonable expectation to continue to benefit from the intensive rehabilitation therapy program   []  The estimated discharge date has been changed from initial team conference due to:   []  The estimated discharge destination has been changed from initial team conference due to:         Ongoing tx following discharge: [x]HHC  OT  PT []OUTPATIENT     [] No Further Treatment     [] Family/Caregiver Training  []  Transitional Living Arrangement   [] Home Assessment (date  )     [] Family Conference   []  Therapeutic Pass       []  Other: (specify)    Estimated Discharge Date: 10/27/21    Estimated Discharge Destination: []home alone   []home alone with assist prn  []Continuous supervision [x]Return home with s/o/spouse/family   [] Assisted living    []SNF     Team members participating in today's conference. [x] Araceli Gee, Medical Director  [x] Jing Dobbs,    [] Miguel Bello, Nurse Mgr [x] Saida Johnston, Nurse Supervisor   []  Rikki Nageotte, PT   [] Ashley Campoverde, OT   [x]  Barbara Hyman, PT  [x] Lázaro Gross OT      [x]  Norma Gandhi, SLP    []  Sole March, SLP   [] Jeff Vasquez, LASHONDA   []  Flor Vigil, RD     [x] Davina Curran,     []Muna Hinojosa,     [] Christophe Moscoso RN[] Adarsh Christine RN     [] Nicole Phoenix RN    [] Pablito Buitrago RN    [] Zeina Tucker,  CYNDY  [] Carmen Ash, CYNDY    []     I have led this Team Conference and agree with the plan, Justina Foster MD, 10/21/2021, 12:57 PM  Goals have been updated to reflect recent status.     Team conference note transcribed this date by: Frank Rodrigez MA, Wicho Aleman, Therapy Coordinator

## 2021-10-20 NOTE — PLAN OF CARE
Problem: Infection:  Goal: Will remain free from infection  Description: Will remain free from infection  10/20/2021 0154 by Jadon Hanna RN  Outcome: Ongoing  10/19/2021 1257 by Claudia Avalos RN  Outcome: Ongoing     Problem: Safety:  Goal: Free from accidental physical injury  Description: Free from accidental physical injury  10/20/2021 0154 by Jadon Hanna RN  Outcome: Ongoing  10/19/2021 1257 by Claudia Avalos RN  Outcome: Ongoing  Goal: Free from intentional harm  Description: Free from intentional harm  10/20/2021 0154 by Jadon Hanna RN  Outcome: Ongoing  10/19/2021 1257 by Claudia Avalos RN  Outcome: Ongoing     Problem: Daily Care:  Goal: Daily care needs are met  Description: Daily care needs are met  10/20/2021 0154 by Jadon Hanna RN  Outcome: Ongoing  10/19/2021 1257 by Claudia Avalos RN  Outcome: Ongoing     Problem: Pain:  Goal: Patient's pain/discomfort is manageable  Description: Patient's pain/discomfort is manageable  10/20/2021 0154 by Jadon Hanna RN  Outcome: Ongoing  10/19/2021 1257 by Claudia Avalos RN  Outcome: Ongoing  Goal: Pain level will decrease  Description: Pain level will decrease  10/20/2021 0154 by Jadon Hanna RN  Outcome: Ongoing  10/19/2021 1257 by Claudia Avalos RN  Outcome: Ongoing  Goal: Control of acute pain  Description: Control of acute pain  10/20/2021 0154 by Jadon Hanna RN  Outcome: Ongoing  10/19/2021 1257 by Claudia Avalos RN  Outcome: Ongoing  Goal: Control of chronic pain  Description: Control of chronic pain  10/20/2021 0154 by Jdaon Hanna RN  Outcome: Ongoing  10/19/2021 1257 by Claudia Avalos RN  Outcome: Ongoing     Problem: Skin Integrity:  Goal: Skin integrity will stabilize  Description: Skin integrity will stabilize  10/20/2021 0154 by Jadon Hanna RN  Outcome: Ongoing  10/19/2021 1257 by Claudia Avalos RN  Outcome: Ongoing     Problem: Discharge Planning:  Goal: Patients continuum of care needs are met  Description: Patients continuum of care needs are met  10/20/2021 0154 by Amanda Cuellar RN  Outcome: Ongoing  10/19/2021 1257 by Kelsey Sibley RN  Outcome: Ongoing     Problem: Infection:  Goal: Will remain free from infection  Description: Will remain free from infection  10/20/2021 0154 by Amanda Cuellar RN  Outcome: Ongoing  10/19/2021 1257 by Kelsey Sibley RN  Outcome: Ongoing     Problem: Safety:  Goal: Free from accidental physical injury  Description: Free from accidental physical injury  10/20/2021 0154 by Amanda Cuellar RN  Outcome: Ongoing  10/19/2021 1257 by Kelsey Sibley RN  Outcome: Ongoing  Goal: Free from intentional harm  Description: Free from intentional harm  10/20/2021 0154 by Amanda Cuellar RN  Outcome: Ongoing  10/19/2021 1257 by Kelsey Sibley RN  Outcome: Ongoing     Problem: Daily Care:  Goal: Daily care needs are met  Description: Daily care needs are met  10/20/2021 0154 by Amanda Cuellar RN  Outcome: Ongoing  10/19/2021 1257 by Kelsey Sibley RN  Outcome: Ongoing     Problem: Pain:  Goal: Patient's pain/discomfort is manageable  Description: Patient's pain/discomfort is manageable  10/20/2021 0154 by Amanda Cuellar RN  Outcome: Ongoing  10/19/2021 1257 by Kelsey Sibley RN  Outcome: Ongoing     Problem: Skin Integrity:  Goal: Skin integrity will stabilize  Description: Skin integrity will stabilize  10/20/2021 0154 by Amanda Cuellar RN  Outcome: Ongoing  10/19/2021 1257 by Kelsey Sibley RN  Outcome: Ongoing     Problem: Discharge Planning:  Goal: Patients continuum of care needs are met  Description: Patients continuum of care needs are met  10/20/2021 0154 by Amanda Cuellar RN  Outcome: Ongoing  10/19/2021 1257 by Kelsey Sibley RN  Outcome: Ongoing     Problem: Falls - Risk of:  Goal: Will remain free from falls  Description: Will remain free from falls  10/20/2021 0154 by Amanda Cuellar RN  Outcome: Ongoing  10/19/2021 1257 by Kelsey Sibley RN  Outcome: Ongoing  Goal: Absence of physical injury  Description: Absence of physical injury  10/20/2021 0154 by Jadon Hanna RN  Outcome: Ongoing  10/19/2021 1257 by Claudia Avalos RN  Outcome: Ongoing     Problem: Neurological  Goal: Maximum potential motor/sensory/cognitive function  10/20/2021 0154 by Jadon Hanna RN  Outcome: Ongoing  10/19/2021 1257 by Claudia Avalos RN  Outcome: Ongoing     Problem: Nutrition  Goal: Optimal nutrition therapy  10/20/2021 0154 by Jadon Hanna RN  Outcome: Ongoing  10/19/2021 1257 by Claudia Avalos RN  Outcome: Ongoing  Goal: Understanding of nutritional guidelines  10/20/2021 0154 by Jadon Hanna RN  Outcome: Ongoing  10/19/2021 1257 by Claudia Avalos RN  Outcome: Ongoing     Problem: Musculor/Skeletal Functional Status  Goal: Highest potential functional level  10/20/2021 0154 by Jadon Hanna RN  Outcome: Ongoing  10/19/2021 1257 by Claudia Avalos RN  Outcome: Ongoing  Goal: Absence of falls  10/20/2021 0154 by Jadon Hanna RN  Outcome: Ongoing  10/19/2021 1257 by Claudia Avalos RN  Outcome: Ongoing

## 2021-10-20 NOTE — PROGRESS NOTES
Score: 4  Discharge Goal: Set-up or clean-up assistance         LB Dressing: Lower Body Dressing  Assistance Needed: Partial/moderate assistance  Comment: was able to thread BLEs seated but donned pants backwards and 2* fatigue required assist to correct. Able to perform pants management  CARE Score: 3  Discharge Goal: Set-up or clean-up assistance    Donning and [place] Footwear: Putting On/Taking Off Footwear  Assistance Needed: Partial/moderate assistance  Comment: able to doff socks, required partial assist for R sock 2* fatigue and feeling winded; required cueing to perform figure 4 position to reduce level of exertion. Able to don shoes  CARE Score: 3  Discharge Goal: Set-up or clean-up assistance      Toileting: Toileting Hygiene  Assistance Needed: Supervision or touching assistance  Comment: SBA  CARE Score: 4  Discharge Goal: Independent      Toilet Transfers: Toilet Transfer  Assistance Needed: Supervision or touching assistance  Comment: SBA c 4WW, cues to lock brakes and hand placement  CARE Score: 4  Discharge Goal: Independent  Device Used:    []   Standard Toilet         [x]   Grab Bars           []  Bedside Commode       []   Elevated Toilet          []   Other:        Bed Mobility:           []   Pt received out of bed   Supine --> Sit:  Mod I       Transfers:    Sit--> Stand:  SBA  Stand --> Sit:   SBA  Stand-Pivot:   SBA, cues for 4WW  Other:    Assistive device required for transfer:   4WW    Functional Mobility:    Assistance:  SBA within room  Device:   []   Rolling Walker     []   Standard Walker []   Wheelchair        []   [employer]       [x]   4-Wheeled [place]         []   Cardiac Walker       []   Other:        Homemaking Tasks:  Pt required SBA and safety cues to use 4WW to retrieve clothing from closet, also required min cues for organization of task.       Additional Therapeutic activities/exercises completed this date:     [x]   ADL Training   [x]   Balance/Postural training     [x] Bed/Transfer Training   [x]   Endurance Training   []   Neuromuscular Re-ed   []   Nu-step:  Time:        Level:         #Steps:       []   Rebounder:    []  Seated     []  Standing        []   Supine Ther Ex (reps/sets):     [x]   Seated Ther Ex (reps/sets): To increase BUE endurance for ADLs and transfers, pt completed arm ergometer on min resistance x5 mins c 0 rest breaks   []   Standing Ther Ex (reps/sets):     []   Other:      Comments: All intervention performed to increase pt's endurance, ax tolerance, balance, and I c ADLs/IADLs and functional transfers/mobility. Patient/Caregiver Education and Training:   []   [employer] Equipment Use  [x]   Bed Mobility/Transfer Technique/Safety  []   Energy Conservation Tips  []   Family training  [x]   Postural Awareness  [x]   Safety During Functional Activities  []   Reinforced Patient's Precautions   []   Progress was updated and reviewed in Rehabtracker with patient and/or family this         date. Treatment Plan for Next Session: Continue OT POC     Assessment: This pt demonstrated a  Positive  response to today's treatment as evidenced by good engagement. The patient is making progress toward established goals as evidenced by QI scores. Ongoing deficits are observed in the areas of endurance, cognition and continued focus on this is recommended.        Treatment/Activity Tolerance:   [x] Tolerated treatment with no adverse effects    [] Patient limited by fatigue  [] Patient limited by pain   [] Patient limited by medical complications:    [] Adverse reaction to Tx:   [] Significant change in status    Safety:       []  bed alarm set    [x]  chair alarm set    []  Pt refused alarms                []  Telesitter activated      [x]  Gait belt used during tx session      []other:       Number of Minutes/Billable Intervention  Therapeutic Exercise    ADL Self-care 50   Neuro Re-Ed    Therapeutic Activity 10   Group    Other:    TOTAL 60       Social History  Social/Functional History  Lives With: Alone  Type of Home: House (condo)  Home Layout: One level  Home Access: Level entry  Bathroom Shower/Tub: Walk-in shower  Bathroom Toilet: Standard  Bathroom Equipment: Shower chair, Grab bars in shower, Grab bars around toilet  Bathroom Accessibility: Walker accessible  Home Equipment: 4 wheeled walker, Cane, Grab bars (Pt uses 4ww at night for her evening routine, no device during the day)  ADL Assistance: Independent  Homemaking Assistance: Independent  Homemaking Responsibilities: Yes  Meal Prep Responsibility: Primary  Laundry Responsibility: Primary  Cleaning Responsibility: Primary  Bill Paying/Finance Responsibility: No (son does due to pt's macular degeneration)  Shopping Responsibility: No (DIL)  Health Care Management: Primary (fills up a weekly pill box)  Ambulation Assistance: Independent  Transfer Assistance: Independent  Active : No  Patient's  Info: Daughter in law does grocery shopping. Mode of Transportation: Car  Education: 12th grade. 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 Pt has macular degeneration. Able to read with magnifying glass. IADL Comments: cooking and cleaning, cousin comes every other Wed to clean and help  Additional Comments: Pt states fall precipitating this admission is her only fall this year, but pt is a questionable historian.  Pt sleeps in flat bed with c-pap    Objective                                                                                    Goals:  (Update in navigator)  Short term goals  Time Frame for Short term goals: STGs=LTGs:  Long term goals  Time Frame for Long term goals : 7-10 days or until d/c  Long term goal 1: Pt will complete grooming tasks Ind  Long term goal 2: Pt will complete total body bathing c supervision  Long term goal 3: Pt will complete UB dressing c setup  Long term goal 4: Pt will complete LB dressing c setup  Long term goal 5: Pt will doff/don footwear c setup  Long term goals 6: Pt will complete toileting mod I  Long term goal 7: Pt will complete functional transfers (bed, chair, shower, toilet) c DME PRN and mod I  Long term goal 8: Pt will perform therex/therax to facilitate increased strength/endurance/ax tolerance (c emphasis on dynamic standing balance/tolerance > 8 mins, BUE endurance, cognitive retraining) c SBA  Long term goal 9: Pt will complete simple homemaking tasks c DME PRN and S:        Plan of Care                                                                              Times per week: 5 days per week for a minimum of 60 minutes/day plus group as appropriate for 60 minutes. Treatment to include Plan  Times per day: Daily  Current Treatment Recommendations: Strengthening, Balance Training, Functional Mobility Training, Endurance Training, Safety Education & Training, Patient/Caregiver Education & Training, Equipment Evaluation, Education, & procurement, Pain Management, Self-Care / ADL, Home Management Training, Cognitive/Perceptual Training    Electronically signed by   Xuan Graf MS, OTR/L  License #[id number]  10/20/2021, 12:59 PM

## 2021-10-20 NOTE — PROGRESS NOTES
Natasha Amaya    : 3/18/1929  Acct #: [de-identified]  MRN: [id number]              PM&R Progress Note      Admitting diagnosis: Metabolic encephalopathy ( [address])     Comorbid diagnoses impacting rehabilitation: Generalized weakness, gait disturbance, hyponatremia, acute urinary retention, essential hypertension, hypothyroidism, chronic diastolic congestive heart failure     Chief complaint: Some soreness in her shoulders. Did not sleep well. Prior (baseline) level of function: Independent.     Current level of function:         Current  IRF-YAMILKA and Goals:   Occupational Therapy:    Short term goals  Time Frame for Short term goals: STGs=LTGs :   Long term goals  Time Frame for Long term goals : 7-10 days or until d/c  Long term goal 1: Pt will complete grooming tasks Ind  Long term goal 2: Pt will complete total body bathing c supervision  Long term goal 3: Pt will complete UB dressing c setup  Long term goal 4: Pt will complete LB dressing c setup  Long term goal 5: Pt will doff/don footwear c setup  Long term goals 6: Pt will complete toileting mod I  Long term goal 7: Pt will complete functional transfers (bed, chair, shower, toilet) c DME PRN and mod I  Long term goal 8: Pt will perform therex/therax to facilitate increased strength/endurance/ax tolerance (c emphasis on dynamic standing balance/tolerance > 8 mins, BUE endurance, cognitive retraining) c SBA  Long term goal 9: Pt will complete simple homemaking tasks c DME PRN and S :                                       Eating: Eating  Assistance Needed: Setup or clean-up assistance  Comment: d/t low vision  CARE Score: 5  Discharge Goal: Set-up or clean-up assistance       Oral Hygiene: Oral Hygiene  Assistance Needed: Supervision or touching assistance  Comment: in stance, some perseveration noted  CARE Score: 4  Discharge Goal: Independent    UB/LB Bathing: Shower/Bathe Self  Assistance Needed: Supervision or touching assistance  Comment: at sink, increased time, especially for distal reach to feet  CARE Score: 4  Discharge Goal: Supervision or touching assistance    UB Dressing: Upper Body Dressing  Assistance Needed: Supervision or touching assistance  Comment: to don button up top  CARE Score: 4  Discharge Goal: Set-up or clean-up assistance         LB Dressing: Lower Body Dressing  Assistance Needed: Supervision or touching assistance  Comment: able to thread BLEs in brief and pants, SBA for pants management up  CARE Score: 4  Discharge Goal: Set-up or clean-up assistance    Donning and [place] Footwear: Putting On/Taking Off Footwear  Assistance Needed: Partial/moderate assistance  Comment: able to doff socks and don R sock, L sock became folded down inside itself and pt unable to correct (vision vs cognition?) requiring assist  CARE Score: 3  Discharge Goal: Set-up or clean-up assistance      Toileting: Toileting Hygiene  Assistance Needed: Supervision or touching assistance  Comment: CGA in stance for pants management, able to perform hygiene seated, a bit perseverative  CARE Score: 4  Discharge Goal: Independent      Toilet Transfers:   Toilet Transfer  Assistance Needed: Supervision or touching assistance  Comment: SBA c 4WW, cues to lock brakes and hand placement  CARE Score: 4  Discharge Goal: Independent    Physical Therapy:         Long term goals  Time Frame for Long term goals : 5-7 days STG=LTG  Long term goal 1: Pt will perform all bed mobility with mod I  Long term goal 2: Pt will perform sit to stand, pivot and car transfers  with mod I  Long term goal 3: Pt will ambulate 150 feet on level surfaces and 10' on unlevel surface with  Jose  using 4ww  Long term goal 4: Pt will ascend/descend curb step with  4ww    and up to  12   steps with  rail(s) with supervision  Long term goal 5: Pt will retrieve light item from floor with  mod I using 4ww      Bed Mobility:   Sit to Lying  Assistance Needed: Supervision or touching assistance  CARE Score: 4  Discharge Goal: Independent  Roll Left and Right  Assistance Needed: Independent  CARE Score: 6  Discharge Goal: Independent  Lying to Sitting on Side of Bed  Assistance Needed: Supervision or touching assistance  Comment: mod difficulty due to \"soreness\"  CARE Score: 4  Discharge Goal: Independent    Transfers:    Sit to Stand  Assistance Needed: Supervision or touching assistance  CARE Score: 4  Discharge Goal: Independent  Chair/Bed-to-Chair Transfer  Assistance Needed: Supervision or touching assistance  CARE Score: 4  Discharge Goal: Independent  Toilet Transfer  Assistance Needed: Supervision or touching assistance  CARE Score: 4  Car Transfer  Assistance Needed: Supervision or touching assistance  CARE Score: 4  Discharge Goal: Independent    Ambulation:    Walking Ability  Does the Patient Walk?: Yes     Walk 10 Feet  Assistance Needed: Supervision or touching assistance  Comment: supervision 4ww  CARE Score: 4  Discharge Goal: Independent     Walk 50 Feet with Two Turns  Assistance Needed: Supervision or touching assistance  Comment: supervision with 4ww  CARE Score: 4  Discharge Goal: Independent     Walk 150 Feet  Assistance Needed: Supervision or touching assistance  Comment: supervision 4ww  CARE Score: 4  Discharge Goal: Independent     Walking 10 Feet on Uneven Surfaces  Assistance Needed: Supervision or touching assistance  Comment: CG 4ww  CARE Score: 4  Discharge Goal: Independent     1 Step (Curb)  Assistance Needed: Partial/moderate assistance  Comment: min assist with 4ww  CARE Score: 3  Discharge Goal: Supervision or touching assistance     4 Steps  Assistance Needed: Supervision or touching assistance  Comment: CG B rails  CARE Score: 4  Discharge Goal: Supervision or touching assistance     12 Steps  Assistance Needed: Supervision or touching assistance  Comment: CG with B rails, HR increased from 65 to 92, recovered to 80 within one min  CARE Score: 4  Discharge Goal: Supervision or touching assistance       Wheelchair:  w/c Ability: Wheelchair Ability  Uses a Wheelchair and/or Scooter?: No                Balance:        Object: Picking Up Object  Assistance Needed: Supervision or touching assistance  Comment: supervision, no device  CARE Score: 4  Discharge Goal: Independent    I      Exam:    Blood pressure 139/67, pulse 60, temperature 97.5 °F (36.4 °C), temperature source Oral, resp. rate 16, height 4' 11\" (1.499 m), weight 140 lb 3.4 oz (63.6 kg), SpO2 94 %. General: Observed sitting up in bedside chair. Talkative with her family. Her dysuria is performed. HEENT: MMM. Guarded cervical rotation bilaterally. No JVD. Pulmonary: Shallow respirations with blunting of the bases. Cardiac: Regular rate and rhythm. Distant heart sounds. Abdomen: Patient's abdomen is soft and nondistended. Bowel sounds were present throughout. There was no rebound, guarding or masses noted. Upper extremities: Able to bring her hands up to meet mine. Fair  strength. Cecilia Loges coordination. Lower extremities: 1+ edema about the calves and ankles. Heels clear. No signs of DVT. Sitting balance was fair+. Standing balance was poor.     Lab Results   Component Value Date    WBC 14.8 (H) 10/09/2021    HGB 13.4 10/09/2021    HCT 42.7 10/09/2021    MCV 87.7 10/09/2021     (H) 10/09/2021     Lab Results   Component Value Date    INR 3.00 10/19/2021    INR 2.44 10/18/2021    INR 2.18 10/17/2021    PROTIME 39.1 (H) 10/19/2021    PROTIME 31.7 (H) 10/18/2021    PROTIME 28.4 (H) 10/17/2021     Lab Results   Component Value Date    CREATININE 0.6 10/18/2021    BUN 14 10/18/2021     (L) 10/18/2021    K 4.2 10/18/2021    CL 88 (L) 10/18/2021    CO2 31 10/18/2021     Lab Results   Component Value Date    ALT 18 10/13/2021    AST 17 10/13/2021    ALKPHOS 125 10/13/2021    BILITOT 1.6 (H) 10/13/2021       Expected length of stay  prior to a supervised level of function for discharge home with a walker and [facility] OT/PT is 2 weeks. Recommendations:    1. Metabolic encephalopathy with gait disturbance: Developing the routine for her daily occupational and physical therapy with speech-language pathology. Developing a plan for treatment in a calm and consistent environment to improve retention. Ongoing adaptive equipment training, pulmonary hygiene measures and nutritional support. Bowel and bladder retraining, DVT prophylaxis and probable caregiver education. Verbal cues and moderate physical assistance for transfers. 2. DVT prophylaxis: Patient is anticoagulated for her cardiac disease. This will protect her against acute DVT as her INR is therapeutic at this point. Target INR is 2.32.7.  GI prophylaxis offered. No signs of acute blood loss. 3. Acute urinary retention: Carnes catheter now. Bladder training with a voiding trial as she strengthens. Minimizing anticholinergics. No clots in the catheter bag.  4. Chronic diastolic congestive heart failure: Daily weights. Coreg for afterload reduction. Demadex and Aldactone for diuresis. Daily weight is steady at this point. 5. Hypothyroidism: Replacing her thyroid with supplement. Monitoring her vital signs and activity tolerance. 6. Hyponatremia: Cautious use of diuretics. Periodic monitoring of her chemistries.     Continue restricting her fluids to 1800 cc daily.

## 2021-10-20 NOTE — PROGRESS NOTES
Occupational Therapy  Physical Rehabilitation: OCCUPATIONAL THERAPY     [x] daily progress note       [] discharge       Patient Name:  Kimberli Birmingham   :  3/18/1929 MRN: [id number]  Room:  [address] [place] [address] Date of Admission: 10/18/2021  Rehabilitation Diagnosis:   Metabolic encephalopathy [M71.26]  Metabolic encephalopathy [T86.88]       Date 10/20/2021       Day of ARU Week:  3   Time IN/OUT [id number]   Individual Tx Minutes 30   Group Tx Minutes    Co-Treat Minutes    Concurrent Tx Minutes    TOTAL Tx Time Mins 30   Variance Time    Variance Time []   Refusal due to:     []   Medical hold/reason:    []   Illness   []   Off Unit for test/procedure  []   Extra time needed to complete task  []   Therapeutic need  []   Other (specify):   Restrictions Restrictions/Precautions: General Precautions, Fall Risk (1800mL fluid restriction)         Communication with other providers: [x]   OK to see per nursing:     []   Spoke with team member regarding:      Subjective observations and cognitive status: Pt sitting in chair on approach. Pleasant and agreeable to therapy session.     Pain level/location:    10       Location: none     Discharge recommendations  Anticipated discharge date:  TBD   Destination: []home alone   []home alone w assist prn   [] home w/ family    [] Continuous supervision       []SNF    [] Assisted living     [] Other:   Continued therapy: []HHC OT  []OUTPATIENT  OT   [] No Further OT  Equipment needs: TBD      Toileting:   Declined need       Toilet Transfers:   NA   Device Used:    []   Standard Toilet         []   Grab Bars           []  Bedside Commode       []   Elevated Toilet          []   Other:        Bed Mobility:           [x]   Pt received out of bed     Transfers:    Sit--> Stand:  SBA c cues for hand placement   Stand --> Sit:   SBA c cues for hand placement   Stand-Pivot:   SBA   Other:    Assistive device required for transfer:   RW       Functional Mobility:  Throughout room and around therapy gym   Assistance:  SBA   Device:   []   Rolling Walker     []   Standard Walker []   Wheelchair        []   Sana Fercho       [x]   4-Wheeled Angela Khan         []   Cardiac Walker       []   Other:            Additional Therapeutic activities/exercises completed this date:     []   ADL Training   [x]   Balance/Postural training: Pt stood x 2 min + 2 min at counter with SBA while completing dynamic stand task while reaching outside base of support to facilitate increased balance for ADL tasks and transfers. [x]   Bed/Transfer Training: Instructed pt in functional transfer training x 10 transfers c mod/max cueing for walker safety and hand placement.    []   Endurance Training   []   Neuromuscular Re-ed   []   Nu-step:  Time:        Level:         #Steps:       []   Rebounder:    []  Seated     []  Standing        []   Supine Ther Ex (reps/sets):     []   Seated Ther Ex (reps/sets):     []   Standing Ther Ex (reps/sets):     []   Other:      Comments:      Patient/Caregiver Education and Training:   []   [employer] Equipment Use  []   Bed Mobility/Transfer Technique/Safety  []   Energy Conservation Tips  []   Family training  []   Postural Awareness  []   Safety During Functional Activities  []   Reinforced Patient's Precautions   []   Progress was updated and reviewed in Rehabtracker with patient and/or family this         date. Treatment Plan for Next Session: Continue OT POC       Assessment: This pt demonstrated a positive response to today's treatment as evidenced by actively engaged in therapy sessions. The patient is making progress toward established goals as evidenced by QI scores. Ongoing deficits are observed in the areas of safety awareness and functional transfers and continued focus on this is recommended.        Treatment/Activity Tolerance:   [x] Tolerated treatment with no adverse effects    [] Patient limited by fatigue  [] Patient limited by pain   [] Patient limited by medical complications:    [] Adverse reaction to Tx:   [] Significant change in status    Safety:       []  bed alarm set    [x]  chair alarm set    []  Pt refused alarms                []  Telesitter activated      [x]  Gait belt used during tx session      []other:       Number of Minutes/Billable Intervention  Therapeutic Exercise    ADL Self-care    Neuro Re-Ed    Therapeutic Activity 30   Group    Other:    TOTAL 30       Social History  Social/Functional History  Lives With: Alone  Type of Home: House (condo)  Home Layout: One level  Home Access: Level entry  Bathroom Shower/Tub: Walk-in shower  Bathroom Toilet: Standard  Bathroom Equipment: Shower chair, Grab bars in shower, Grab bars around toilet  Bathroom Accessibility: Walker accessible  Home Equipment: 4 wheeled walker, Cane, Grab bars (Pt uses 4ww at night for her evening routine, no device during the day)  ADL Assistance: Independent  Homemaking Assistance: Independent  Homemaking Responsibilities: Yes  Meal Prep Responsibility: Primary  Laundry Responsibility: Primary  Cleaning Responsibility: Primary  Bill Paying/Finance Responsibility: No (son does due to pt's macular degeneration)  Shopping Responsibility: No (DIL)  Health Care Management: Primary (fills up a weekly pill box)  Ambulation Assistance: Independent  Transfer Assistance: Independent  Active : No  Patient's  Info: Daughter in law does grocery shopping. Mode of Transportation: Car  Education: 12th grade. 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 Pt has macular degeneration. Able to read with magnifying glass. IADL Comments: cooking and cleaning, cousin comes every other Wed to clean and help  Additional Comments: Pt states fall precipitating this admission is her only fall this year, but pt is a questionable historian.  Pt sleeps in flat bed with c-pap    Objective                                                                                    Goals:  (Update in navigator)  Short term goals  Time Frame for Short term goals: STGs=LTGs:  Long term goals  Time Frame for Long term goals : 7-10 days or until d/c  Long term goal 1: Pt will complete grooming tasks Ind  Long term goal 2: Pt will complete total body bathing c supervision  Long term goal 3: Pt will complete UB dressing c setup  Long term goal 4: Pt will complete LB dressing c setup  Long term goal 5: Pt will doff/don footwear c setup  Long term goals 6: Pt will complete toileting mod I  Long term goal 7: Pt will complete functional transfers (bed, chair, shower, toilet) c DME PRN and mod I  Long term goal 8: Pt will perform therex/therax to facilitate increased strength/endurance/ax tolerance (c emphasis on dynamic standing balance/tolerance > 8 mins, BUE endurance, cognitive retraining) c SBA  Long term goal 9: Pt will complete simple homemaking tasks c DME PRN and S:        Plan of Care                                                                              Times per week: 5 days per week for a minimum of 60 minutes/day plus group as appropriate for 60 minutes.   Treatment to include Plan  Times per day: Daily  Current Treatment Recommendations: Strengthening, Balance Training, Functional Mobility Training, Endurance Training, Safety Education & Training, Patient/Caregiver Education & Training, Equipment Evaluation, Education, & procurement, Pain Management, Self-Care / ADL, Home Management Training, Cognitive/Perceptual Training    Electronically signed by   LULA Rizo,  10/20/2021, 8:29 AM

## 2021-10-20 NOTE — PROGRESS NOTES
[facility]  ACUTE REHAB UNIT  SPEECH/LANGUAGE PATHOLOGY      [x] Daily           [] Discharge    Patient:Irene Mcintyre      :3/18/1929  KRN:[id number]  Rehab Dx/Hx: Metabolic encephalopathy [G01.79]  Metabolic encephalopathy [M02.85]   Allergies   Allergen Reactions    Darvocet [Propoxyphene N-Acetaminophen]     Ranexa [Ranolazine Er]      Sick to her stomach    Ranolazine      Sick to her stomach    Sulfa Antibiotics Itching    Sulfasalazine Itching    Adhesive Tape Rash    Allantoin Rash    Bacitracin Rash    Gramicidin Rash    Neomycin Rash    Polymyxin B Rash    Pramoxine Hcl Rash    Silicone Rash     Precautions: ; Restrictions/Precautions: General Precautions, Fall Risk (1800mL fluid restriction)          Home Situation/IADL:   Social/Functional History  Lives With: Alone  Type of Home: House (condo)  Home Layout: One level  Home Access: Level entry  Bathroom Shower/Tub: Walk-in shower  Bathroom Toilet: Standard  Bathroom Equipment: Shower chair, Grab bars in shower, Grab bars around toilet  Bathroom Accessibility: Walker accessible  Home Equipment: 4 wheeled walker, Cane, Grab bars (Pt uses 4ww at night for her evening routine, no device during the day)  ADL Assistance: Independent  Homemaking Assistance: Independent  Homemaking Responsibilities: Yes  Meal Prep Responsibility: Primary  Laundry Responsibility: Primary  Cleaning Responsibility: Primary  Bill Paying/Finance Responsibility: No (son does due to pt's macular degeneration)  Shopping Responsibility: No (DIL)  Health Care Management: Primary (fills up a weekly pill box)  Ambulation Assistance: Independent  Transfer Assistance: Independent  Active : No  Patient's  Info: Daughter in law does grocery shopping. Mode of Transportation: Car  Education: 12th grade.   Type of occupation: Homemaker, [employer], [employer]  Leisure & Hobbies: housework, tv, pt manages meds in a weekly pill box, finances are managed by son and dtr. Pt has macular degeneration. Able to read with magnifying glass. IADL Comments: cooking and cleaning, cousin comes every other Wed to clean and help  Additional Comments: Pt states fall precipitating this admission is her only fall this year, but pt is a questionable historian. Pt sleeps in flat bed with c-pap      Date of Admit: 10/18/2021  Room #: 1024/1024-A     ST Number of Minutes/Billable Intervention  Cog/Memory Deficits 30    Aphasia/Language     Dysarthria/Speech     Apraxia/Speech     Dysphagia/Swallowing     Group     Other    TOTAL Minutes Billed  30    Variance          Date: 10/20/2021  Day of ARU Week:  3       SLP Individual Minutes  Time In: 1050  Time Out: 1120  Minutes: 30         Variance/Reason:  [] Refusal due to   [] Medical hold/reason  [] Illness   [] Off Unit for test/procedure  [] Extra time needed to complete task  [] Other (specify)    Activity completed: Pt seen for medication mgmt tasks this date. Tasks were impeded by [facility] with pt hearing aide batteries dead; family to bring more in. Extra time needed for all tasks due to hearing issues and need for repetition    Pain: denies  Current Diet: ADULT DIET; Regular; Low Fat/Low Chol/High Fiber/2 gm Na; 1800 ml  Subjective: alert and motivated; very pleasant. Pt has shown some tangential responses. At times pt will redirect self and others requires cues. Goals and POC: Co-treats where appropriate with PT or OT to facilitate patient goals in functional tasks. LTG                           Short-term Goals  Timeframe for Short-term Goals: 3x/week x1 week 30 mins min  LTG: Improve overall fx for safe return home with modified independence  Goal 1: Pt will complete simulated med mgmt to determine needs post discharge by 10/22/21  Completed med mgmt tasks this date. Pt asked to read and interpret simulated pill bottles. Pt was asked to assign times for meds and give rationale.   Pt required mod cues to complete tasks due to [facility] Pt 5/8  62% with mod cues. Pt reportedly sets up pill box at home. Was able to read pill bottles with magnifying lens but would transpose numbers for pills which is a concern. Review of current meds shows some familiarity but pt would benefit from oversight of meds post discharge. She could take once med box was filled but due to vision and cognition would benefit from assist from family or NSG to set up. Goal 2: Pt will demonstrate carryover of learned safety sequences in fx tasks with min cues. At the end of session pt looked for call button \"In case I need something. \"                                   The patient demonstrated a positive response to todays treatment and is making gradual progress toward established goals. Ongoing deficits are observed in the areas of cognition and memory and continued focus on fx safety and sequencing is recommended. Alarm placed: []bed [x]chair   []other:   []LOU activated        Barriers to progress:   [] Fatigue        [x] Cognitive Deficits   [x] Memory Deficits   [x] Reduced Attn   [] Self Limiting Behaviors    [] Reduced insight/awareness     [x] Visual Deficits  Macular degeneration   [] Premorbid Conditions  [] Impulsivity     [] Other      Education/Interventions used this date: Recommendations given to pt regarding assist with meds post discharge and she was in agreement. []   Progress was updated and reviewed in Rehabtracker with patient and/or family this         date. [] Attending Care Conference for pt this date. See Team Patient Care Conference Note for updates.     Interventions used this date:  [] Speech/Language Treatment    [] Instruction in HEP    Group   [] Dysphagia Treatment   [x] Cognitive Skill Daniel    Other:         Assessment / Impression                                                          Treatment/Activity Tolerance:   [x] Tolerated Treatment well:     [] Patient limited by fatigue/pain: [] Patient limited by medical complications:    [] Adverse Reaction to Tx:   [] Significant change in status:      Electronically Signed by  LASHONDA Moya, MA, CCC-SLP    10/20/2021  3:59 PM

## 2021-10-20 NOTE — CARE COORDINATION
Case Management Admission Note      Patient:Irene Yu      :3/18/1929  JCB:[id number]  Rehab Dx/Hx: Metabolic encephalopathy [C42.96]  Metabolic encephalopathy [F62.50]    Chief Complaint:   Past Medical History:   Diagnosis Date    Arthritis     Bradycardia     requiring dual chamber pacemaker at [facility] CAD (coronary artery disease)     Cardiac pacemaker 10/2001    [employer] #[id number]  PPM- Serial # 26-[facility]- Dr Jensen Smiley CHF (congestive heart failure) ([facility] [address]     COPD, mild ([address] 2017    CVA (cerebrovascular accident) ([facility] [address] [address]     DJD (degenerative joint disease) of cervical spine     C5-C6, C6-C7    Exhaustion of cardiac pacemaker battery 2011    PPM battery replacement- Medtronic    Family history of cardiovascular disease     Glaucoma Dx     H/O 24 hour EKG monitoring 2000- Intermittent episonde of a-fib/flutter    H/O cardiac catheterization 2010, 2010-Severe native vessel disease and has graft disease as well. LAD, CX totally occluded. RCA totally occluded in proximal segment. VG to RCA widely patent. PDA 90% LAD afer LIMA anastomosis 80-90% stenosis. Proceeded with PTCA with stent next day.  H/O cardiovascular stress test 10/14/2011, 2010,2010, 2009,2009, 10/30/2007, 2004, 2003, 2002, 2001,2000,     10/14/2011-Lexiscan-Abnormal Myocardial Perfusion study. Evidence of mild ischemia in the Left CX region. Abnomal study. Rest EF 63%. Global LV systolic function normal. No ECG changes. Unremarkable pharmacological stress test.    H/O cardiovascular stress test 6/10/2013    thallium--mild ischemia left circumflex EF63% no change from 10/2011 study.  H/O cardiovascular stress test 10/16/2014    cardiolite-mild ischemia left circumflex,EF70%    H/O chest x-ray 2009-Stable cardiomegaly. No acute cardiopulmonary disease.     H/O Doppler lower venous ultrasound 09/18/2019    Significant reflux noted in RGSV, RGSV is extremely tortuous and small along the thigh and calf and would be highly unlikely to be accessed, RSSV is non compressible and has occlusive chronic SVT, LSSV is non compressible with occlusive chronic SVT, LGSV removed s/p CABG, Significant reflux in LGSV tributary,    H/O Doppler ultrasound 3/31/2010    CAROTID- 3/31/2010-INtimal thickening but no significant atherosclerotic plaque noted in ANA PAULA. Doppler flow velocities within ANA PAULA are WNL. Heterogeneous, irregular atherosclerotic plaque noted in LICA. Doppler flow velocities within the LICA are elevated, consistent with a mild, less than 50% stenosis.  H/O Doppler ultrasound 5/24/2016    Carotid- normal study    H/O Doppler ultrasound 09/06/2017    carotid - normal study    H/O Doppler ultrasound     H/O echocardiogram 10/13    EF=60%, Severe Pulm. HTN, & Sclerotic aortic valve w/stenosis.  H/O echocardiogram 10/16/14     EF 55-60% Normal LV. Normal LV systolic function. Severe tricuspid insufficiency with severe hypertension.  H/O echocardiogram 02/03/2017    heart cath performed this morning    H/O echocardiogram 09/18/2019    EF 50-55%, Left atrium is mild to moderately dilated, right atrium is severely dilated, mildly dilated right ventricle, Mod MR, Severe TR, Severe Pulm HTN, no pericardial effusion     History of complete ECG     10/14/2011(Lexiscan);5/6/2010, 4/30/2009,10/24/2008,9/21/2007, 10/13/2006    History of nuclear stress test 11/17/2016    lexiscan-normal,EF70%    Hx of cardiovascular stress test 12/28/2018    EF 60%  Normal study.     HX OTHER MEDICAL 05/01/2017    MUGA-normal, EF53%    Hyperlipidemia     Hypertension     Mild intermittent asthma 7/28/2016    Nausea & vomiting     Obstructive sleep apnea 5/16/2017    Paroxysmal atrial fibrillation (HCC)     Post PTCA 4/21/2010    PTCA with 2.25 stent of the LIMA to LAD    Pulmonary HTN (HCC)     Severe per last echo on 10/13. 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 Followed by MAZE procedure by pulmonary vein isolation.-  Dr Mauro Rene S/P PTCA (percutaneous transluminal coronary angioplasty) 11/2012    PTCA with stent to RCA    Thyroid disease     hypothyroi    Unspecified cerebral artery occlusion with cerebral infarction Unsure When    No Residual     Past Surgical History:   Procedure Laterality Date    APPENDECTOMY  1941    CARDIAC SURGERY  4/09    CABG (3 Bypasses), One Heart Stent in 2010    COLONOSCOPY  In 2000's    X1    CORONARY ANGIOPLASTY WITH STENT PLACEMENT  4/21/2010    PTCA with stent LIMA ->LAD    CORONARY ARTERY BYPASS GRAFT  4/8/2009    LIMA->Diag,  LIMA -> LAD;  SVG->RCA going to the PDA.  Followed by MAZE procedure by pulmonary vein isolation.-  Dr Jose Allison, TOTAL ABDOMINAL  1990's    MIDDLE EAR SURGERY      OTHER SURGICAL HISTORY      Ear surgery-hearing    PACEMAKER PLACEMENT      battery change 11/21/2011 Medtronic    PTCA  11/2012    Ptca with stent to RCA    TONSILLECTOMY  1950's     Allergies   Allergen Reactions    Darvocet [Propoxyphene N-Acetaminophen]     Ranexa [Ranolazine Er]      Sick to her stomach    Ranolazine      Sick to her stomach    Sulfa Antibiotics Itching    Sulfasalazine Itching    Adhesive Tape Rash    Allantoin Rash    Bacitracin Rash    Gramicidin Rash    Neomycin Rash    Polymyxin B Rash    Pramoxine Hcl Rash    Silicone Rash     Precautions: falls    Date of Admit: 10/18/2021  Room #: 5173/7346-T      Current functional status at time of admit:        Home Living/DME Available:      Type of Home: House (condo)  Home Access: Level entry  Bathroom Shower/Tub: Walk-in shower  Bathroom Toilet: Standard  Bathroom Equipment: Shower chair, Grab bars in shower, Grab bars around toilet  Home Equipment: 4 wheeled walker, Ashly Yani, Grab bars (Pt

## 2021-10-21 LAB
INR BLD: 2.28 INDEX
PROTHROMBIN TIME: 29.7 SECONDS (ref 11.7–14.5)

## 2021-10-21 PROCEDURE — 1280000000 HC REHAB R&B

## 2021-10-21 PROCEDURE — L8501 TRACHEOSTOMY SPEAKING VALVE: HCPCS

## 2021-10-21 PROCEDURE — 94640 AIRWAY INHALATION TREATMENT: CPT

## 2021-10-21 PROCEDURE — 6370000000 HC RX 637 (ALT 250 FOR IP): Performed by: PHYSICAL MEDICINE & REHABILITATION

## 2021-10-21 PROCEDURE — 94761 N-INVAS EAR/PLS OXIMETRY MLT: CPT

## 2021-10-21 PROCEDURE — 2580000003 HC RX 258: Performed by: HOSPITALIST

## 2021-10-21 PROCEDURE — 85610 PROTHROMBIN TIME: CPT

## 2021-10-21 PROCEDURE — 97116 GAIT TRAINING THERAPY: CPT

## 2021-10-21 PROCEDURE — 6370000000 HC RX 637 (ALT 250 FOR IP): Performed by: HOSPITALIST

## 2021-10-21 PROCEDURE — 97530 THERAPEUTIC ACTIVITIES: CPT

## 2021-10-21 PROCEDURE — 97130 THER IVNTJ EA ADDL 15 MIN: CPT

## 2021-10-21 PROCEDURE — 97129 THER IVNTJ 1ST 15 MIN: CPT

## 2021-10-21 PROCEDURE — 36415 COLL VENOUS BLD VENIPUNCTURE: CPT

## 2021-10-21 RX ADMIN — CYANOCOBALAMIN TAB 1000 MCG 1000 MCG: 1000 TAB at 07:58

## 2021-10-21 RX ADMIN — TIMOLOL MALEATE 1 DROP: 2.5 SOLUTION/ DROPS OPHTHALMIC at 23:42

## 2021-10-21 RX ADMIN — CARVEDILOL 6.25 MG: 6.25 TABLET, FILM COATED ORAL at 07:58

## 2021-10-21 RX ADMIN — CETIRIZINE HYDROCHLORIDE 10 MG: 10 TABLET, FILM COATED ORAL at 07:58

## 2021-10-21 RX ADMIN — TIMOLOL MALEATE 1 DROP: 2.5 SOLUTION/ DROPS OPHTHALMIC at 07:57

## 2021-10-21 RX ADMIN — SODIUM CHLORIDE, PRESERVATIVE FREE 10 ML: 5 INJECTION INTRAVENOUS at 23:42

## 2021-10-21 RX ADMIN — ACETAMINOPHEN 650 MG: 325 TABLET ORAL at 18:22

## 2021-10-21 RX ADMIN — ASPIRIN 81 MG: 81 TABLET, CHEWABLE ORAL at 07:58

## 2021-10-21 RX ADMIN — SPIRONOLACTONE 25 MG: 25 TABLET ORAL at 07:58

## 2021-10-21 RX ADMIN — WARFARIN SODIUM 5 MG: 5 TABLET ORAL at 18:29

## 2021-10-21 RX ADMIN — LEVOTHYROXINE SODIUM 88 MCG: 0.09 TABLET ORAL at 06:40

## 2021-10-21 RX ADMIN — POTASSIUM CHLORIDE 10 MEQ: 750 TABLET, FILM COATED, EXTENDED RELEASE ORAL at 07:58

## 2021-10-21 RX ADMIN — ATORVASTATIN CALCIUM 10 MG: 10 TABLET, FILM COATED ORAL at 23:42

## 2021-10-21 RX ADMIN — Medication 1 TABLET: at 07:57

## 2021-10-21 RX ADMIN — TORSEMIDE 20 MG: 20 TABLET ORAL at 07:59

## 2021-10-21 RX ADMIN — Medication 10000 MCG: at 07:56

## 2021-10-21 RX ADMIN — ALBUTEROL SULFATE 2 PUFF: 90 AEROSOL, METERED RESPIRATORY (INHALATION) at 20:40

## 2021-10-21 ASSESSMENT — PAIN SCALES - GENERAL
PAINLEVEL_OUTOF10: 10
PAINLEVEL_OUTOF10: 0
PAINLEVEL_OUTOF10: 4
PAINLEVEL_OUTOF10: 0

## 2021-10-21 ASSESSMENT — PAIN DESCRIPTION - FREQUENCY: FREQUENCY: CONTINUOUS

## 2021-10-21 ASSESSMENT — PAIN DESCRIPTION - DESCRIPTORS: DESCRIPTORS: ACHING;DISCOMFORT

## 2021-10-21 ASSESSMENT — PAIN DESCRIPTION - ONSET: ONSET: ON-GOING

## 2021-10-21 ASSESSMENT — PAIN - FUNCTIONAL ASSESSMENT: PAIN_FUNCTIONAL_ASSESSMENT: PREVENTS OR INTERFERES SOME ACTIVE ACTIVITIES AND ADLS

## 2021-10-21 ASSESSMENT — PAIN DESCRIPTION - ORIENTATION: ORIENTATION: RIGHT

## 2021-10-21 ASSESSMENT — PAIN DESCRIPTION - PROGRESSION: CLINICAL_PROGRESSION: NOT CHANGED

## 2021-10-21 ASSESSMENT — PAIN SCALES - WONG BAKER: WONGBAKER_NUMERICALRESPONSE: 0

## 2021-10-21 NOTE — CARE COORDINATION
LSW met with patient following Care Conference. LSW informed patient that there is no recommended DME. Patient agrees. LSW then informed of recommendation for [facility], OT, and Nursing and patient is agreeable to all. Patient verbalized understanding and will choose an agency closer to discharge. D/C Plan:  Date:  Oct. 27th  DME:  None recommended  HHC:  PT, OT, Nursing (Agency TBD)  To:   Home alone or with daughter (family will transport)

## 2021-10-21 NOTE — PLAN OF CARE
Problem: Infection:  Goal: Will remain free from infection  Description: Will remain free from infection  10/21/2021 0443 by Griselda Mauricio LPN  Outcome: Ongoing  10/20/2021 1550 by Myles Keith RN  Outcome: Ongoing     Problem: Safety:  Goal: Free from accidental physical injury  Description: Free from accidental physical injury  10/21/2021 0443 by Griselda Mauricio LPN  Outcome: Ongoing  10/20/2021 1550 by Myles Keith RN  Outcome: Ongoing  Goal: Free from intentional harm  Description: Free from intentional harm  10/21/2021 0443 by Griselda Mauricio LPN  Outcome: Ongoing  10/20/2021 1550 by Myles Keith RN  Outcome: Ongoing     Problem: Daily Care:  Goal: Daily care needs are met  Description: Daily care needs are met  10/21/2021 0443 by Griselda Mauricio LPN  Outcome: Ongoing  10/20/2021 1550 by Myles Keith RN  Outcome: Ongoing     Problem: Pain:  Goal: Patient's pain/discomfort is manageable  Description: Patient's pain/discomfort is manageable  10/21/2021 0443 by Griselda Mauricio LPN  Outcome: Ongoing  10/20/2021 1550 by Myles Keith RN  Outcome: Ongoing  Goal: Pain level will decrease  Description: Pain level will decrease  10/21/2021 0443 by Griselda Mauricio LPN  Outcome: Ongoing  10/20/2021 1550 by Myles Keith RN  Outcome: Ongoing  Goal: Control of acute pain  Description: Control of acute pain  10/21/2021 0443 by Griselda Mauricio LPN  Outcome: Ongoing  10/20/2021 1550 by Myles Keith RN  Outcome: Ongoing  Goal: Control of chronic pain  Description: Control of chronic pain  10/21/2021 0443 by Griselda Mauricio LPN  Outcome: Ongoing  10/20/2021 1550 by Myles Keith RN  Outcome: Ongoing     Problem: Skin Integrity:  Goal: Skin integrity will stabilize  Description: Skin integrity will stabilize  10/21/2021 0443 by Griselda Mauricio LPN  Outcome: Ongoing  10/20/2021 1550 by Myles Keith RN  Outcome: Ongoing     Problem: Discharge Planning:  Goal: Patients continuum of care needs are met  Description: Patients continuum of care needs are met  10/21/2021 0443 by Danish Gonzalez LPN  Outcome: Ongoing  10/20/2021 1550 by Monique Smalls RN  Outcome: Ongoing     Problem: Infection:  Goal: Will remain free from infection  Description: Will remain free from infection  10/21/2021 0443 by Danish Gonzalez LPN  Outcome: Ongoing  10/20/2021 1550 by Monique Smalls RN  Outcome: Ongoing     Problem: Safety:  Goal: Free from accidental physical injury  Description: Free from accidental physical injury  10/21/2021 0443 by Danish Gonzalez LPN  Outcome: Ongoing  10/20/2021 1550 by Monique Smalls RN  Outcome: Ongoing  Goal: Free from intentional harm  Description: Free from intentional harm  10/21/2021 0443 by Danish Gonzalez LPN  Outcome: Ongoing  10/20/2021 1550 by Monique Smalls RN  Outcome: Ongoing     Problem: Daily Care:  Goal: Daily care needs are met  Description: Daily care needs are met  10/21/2021 0443 by Danish Gonzalez LPN  Outcome: Ongoing  10/20/2021 1550 by Monique Smalls RN  Outcome: Ongoing     Problem: Pain:  Goal: Patient's pain/discomfort is manageable  Description: Patient's pain/discomfort is manageable  10/21/2021 0443 by Danish Gonzalez LPN  Outcome: Ongoing  10/20/2021 1550 by Monique Smalls RN  Outcome: Ongoing     Problem: Skin Integrity:  Goal: Skin integrity will stabilize  Description: Skin integrity will stabilize  10/21/2021 0443 by Danish Gonzalez LPN  Outcome: Ongoing  10/20/2021 1550 by Monique Smalls RN  Outcome: Ongoing     Problem: Discharge Planning:  Goal: Patients continuum of care needs are met  Description: Patients continuum of care needs are met  10/21/2021 0443 by Danish Gonzalez LPN  Outcome: Ongoing  10/20/2021 1550 by Monique Smalls RN  Outcome: Ongoing     Problem: Falls - Risk of:  Goal: Will remain free from falls  Description: Will remain free from falls  10/21/2021 0443 by Danish Gonzalez LPN  Outcome: Ongoing  10/20/2021 1550 by Monique Smalls RN  Outcome: Ongoing  Goal: Absence of physical injury  Description: Absence of physical injury  10/21/2021 0443 by Brigitte Asif LPN  Outcome: Ongoing  10/20/2021 1550 by Mi Harrison RN  Outcome: Ongoing     Problem: Neurological  Goal: Maximum potential motor/sensory/cognitive function  10/21/2021 0443 by Brigitte Asif LPN  Outcome: Ongoing  10/20/2021 1550 by Mi Harrison RN  Outcome: Ongoing     Problem: Nutrition  Goal: Optimal nutrition therapy  10/21/2021 0443 by Brigitte Asif LPN  Outcome: Ongoing  10/20/2021 1550 by Mi Harrison RN  Outcome: Ongoing  Goal: Understanding of nutritional guidelines  10/21/2021 0443 by Brigitte Asif LPN  Outcome: Ongoing  10/20/2021 1550 by Mi Harrison RN  Outcome: Ongoing     Problem: Musculor/Skeletal Functional Status  Goal: Highest potential functional level  10/21/2021 0443 by Brigitte Asif LPN  Outcome: Ongoing  10/20/2021 1550 by Mi Harrison RN  Outcome: Ongoing  Goal: Absence of falls  10/21/2021 0443 by Brigitte Asif LPN  Outcome: Ongoing  10/20/2021 1550 by Mi Harrison RN  Outcome: Ongoing     Problem: Skin Integrity:  Goal: Will show no infection signs and symptoms  Description: Will show no infection signs and symptoms  Outcome: Ongoing  Goal: Absence of new skin breakdown  Description: Absence of new skin breakdown  Outcome: Ongoing

## 2021-10-21 NOTE — PROGRESS NOTES
Physical Therapy  [x] daily progress note       [] discharge       Patient Name:  Josse Pack   :  3/18/1929 MRN: [id number]  Room:  [address] [place] [address] Date of Admission: 10/18/2021  Rehabilitation Diagnosis:   Metabolic encephalopathy [X74.65]  Metabolic encephalopathy [B14.76]       Date 10/21/2021       Day of ARU Week:  4   Time IN//931   Individual Tx Minutes 60   Group Tx Minutes    Co-Treat Minutes    Concurrent Tx Minutes    TOTAL Tx Time Mins 60   Variance Time    Variance Time []   Refusal due to:     []   Medical hold/reason:    []   Illness   []   Off Unit for test/procedure  []   Extra time needed to complete task  []   Therapeutic need  []   Other (specify):   Restrictions Restrictions/Precautions  Restrictions/Precautions: General Precautions, Fall Risk (1800mL fluid restriction)      Interdisciplinary communication [x]   Cleared for therapy per nursing     []   RN notified about issues during session  []   RN updated on pt performance  []   Spoke with   []   Spoke with OT  []   Spoke with MD  []   Other:    Subjective observations and cognitive status: Pt finishing breakfast, agreeable to therapy.       Pain level/location:    /10       Location:    Discharge recommendations  Anticipated discharge date:  tbd  Destination: []home alone   []home alone with assist PRN     [] home w/ family      [] Continuous supervision  []SNF    [] Assisted living     [] Other:  Continued therapy: [x]HHC PT  []OUTPATIENT PT   [] No Further PT  []SNF PT  Caregiver training recommended: [x]Yes  [] No   Equipment needs: 4ww     PT IRF-YAMILKA scores and goals for discharge assessment:   Roll Left and Right  Assistance Needed: Independent  CARE Score: 6  Discharge Goal: Independent    Sit to Lying  Assistance Needed: Independent  CARE Score: 6  Discharge Goal: Independent    Lying to Sitting on Side of Bed  Assistance Needed: Independent  CARE Score: 6  Discharge Goal: Independent    Sit to Stand  Assistance Needed: Supervision or touching assistance  Comment: CGA from recliner and EOB  CARE Score: 4  Discharge Goal: Independent    Chair/Bed-to-Chair Transfer  Assistance Needed: Supervision or touching assistance  Comment: supervision  CARE Score: 4  Discharge Goal: Independent    Toilet Transfer  Assistance Needed: Supervision or touching assistance  Comment: SBA c 4WW, cues to lock brakes and hand placement  CARE Score: 4  Discharge Goal: Independent    Car Transfer  Assistance Needed: Supervision or touching assistance  Comment: supervision  CARE Score: 4  Discharge Goal: Independent    Walk 10 Feet?   Walk 10 Feet?: Yes    1 Step  1 Step?: Yes    Picking Up Object  Assistance Needed: Supervision or touching assistance  Comment: CG with cues to lock brakes on 4ww  CARE Score: 4  Discharge Goal: Independent                          Walking Ability  Does the Patient Walk?: Yes    Walk 10 Feet  Assistance Needed: Supervision or touching assistance  Comment: sup using 4WW  CARE Score: 4  Discharge Goal: Independent    Walk 50 Feet with Two Turns  Assistance Needed: Supervision or touching assistance  Comment: sup; 4WW  CARE Score: 4  Discharge Goal: Independent    Walk 150 Feet  Assistance Needed: Supervision or touching assistance  Comment: sup using 4WW  CARE Score: 4  Discharge Goal: Independent    Walking 10 Feet on Uneven Surfaces  Assistance Needed: Supervision or touching assistance  Comment: CG with verbal cues for awareness and strategies, 4ww  CARE Score: 4  Discharge Goal: Independent    1 Step (Curb)  Assistance Needed: Supervision or touching assistance  Comment: CG with cues for sequence  CARE Score: 4  Discharge Goal: Independent              Additional Therapeutic activities/exercises completed this date:     []   Nu-step:  Time:        Level:         #Steps:       []   Rebounder:    []  Seated     []  Standing        []   Balance training         []   Postural training    []   Supine ther ex (reps/sets):     []   Seated ther ex (reps/sets):     []   Standing ther ex (reps/sets):     []   Other: Toileting activity completed with    []   Other:    Comments: gait trial with no device with pt requiring min assist with wide MANSOOR, variable step length and width with moderate path deviation, very little arm swing with trunk guarding and pt verbalizing no awareness of difficulties. Introduced cane as pt has used this some in past. On straight line gait pt was improved in path deviation and consistency of step pattern, however when introduced turns pt needed min assist to correct LOB with pt noting that she did lose her balance, but not concerned by it. Patient/Caregiver Education and Training:   []   Role of PT  [x]   Education about Dx  [x]   Use of call light for assist   []   HEP provided and explained   []   Treatment plan reviewed  []   Home safety  []   Wheelchair mobility/management   []   Body mechanics  [x]   Bed Mobility/Transfer technique  [x]   Gait technique/sequencing  []   Proper use of assistive device/adaptive equipment  [x]   Stair training/Advanced mobility safety and technique  []   Reinforced patient's precautions/mobility while maintaining precautions  []   Postural awareness  []   Family/caregiver training  []   Progress was updated and reviewed in Rehabtracker with patient and/or family this date. []   Other:    Treatment Plan for Next Session: progression of safety in functional gait, continue to use 4ww. Balance challenges     Assessment: This pt demonstrated a positive   response to today's treatment as evidenced by improved QI scores from yesterday. The patient is making progress toward established goals as evidenced by QI scores. Ongoing deficits are observed in the areas of safety in function and continued focus on balance in dual tasking are recommended.        Treatment/Activity Tolerance:   [] Tolerated treatment with no adverse effects    [x] Patient limited by fatigue  [] Patient limited by pain   [] Patient limited by medical complications:    [] Adverse reaction to Tx:   [] Significant change in status    Safety:       []  bed alarm set    [x]  chair alarm set    []  Pt refused alarms                []  Telesitter activated      [x]  Gait belt used during tx session      []other:       Number of Minutes/Billable Intervention  Gait Training 30   Therapeutic Exercise    Neuro Re-Ed    Therapeutic Activity 30   Wheelchair Propulsion    Group    Other:    TOTAL 60     Social History  Social/Functional History  Lives With: Alone  Type of Home: House (condo)  Home Layout: One level  Home Access: Level entry  Bathroom Shower/Tub: Walk-in shower  Bathroom Toilet: Standard  Bathroom Equipment: Shower chair, Grab bars in shower, Grab bars around toilet  Bathroom Accessibility: Walker accessible  Home Equipment: 4 wheeled walker, Cane, Grab bars (Pt uses 4ww at night for her evening routine, no device during the day)  ADL Assistance: [address] [place] [address] Avenue: Independent  Homemaking Responsibilities: Yes  Meal Prep Responsibility: Primary  Laundry Responsibility: Primary  Cleaning Responsibility: Primary  Bill Paying/Finance Responsibility: No (son does due to pt's macular degeneration)  Shopping Responsibility: No (DIL)  Health Care Management: Primary (fills up a weekly pill box)  Ambulation Assistance: Independent  Transfer Assistance: Independent  Active : No  Patient's  Info: Daughter in law does grocery shopping. Mode of Transportation: Car  Education: 12th grade. Type of occupation: Homemaker, photography, [address]: housework, tv, pt manages meds in a weekly pill box, finances are managed by son and dtr. Pt has macular degeneration. Able to read with magnifying glass.   IADL Comments: cooking and cleaning, cousin comes every other Wed to clean and help  Additional Comments: Pt states fall precipitating this admission is her only fall this year, but pt is a questionable historian. Pt sleeps in flat bed with c-pap    Objective                                                                                    Goals:  (Update in navigator)   : Long term goals  Time Frame for Long term goals : 5-7 days STG=LTG  Long term goal 1: Pt will perform all bed mobility with mod I  Long term goal 2: Pt will perform sit to stand, pivot and car transfers  with mod I  Long term goal 3: Pt will ambulate 150 feet on level surfaces and 10' on unlevel surface with  Jose  using 4ww  Long term goal 4: Pt will ascend/descend curb step with  4ww    and up to  12   steps with  rail(s) with supervision  Long term goal 5: Pt will retrieve light item from floor with  mod I using 4ww:        Plan of Care                                                                              Times per week: 5 days per week for a minimum of 60 minutes/day plus group as appropriate for 60 minutes.   Treatment to include Current Treatment Recommendations: Strengthening, Transfer Training, Endurance Training, Neuromuscular Re-education, Patient/Caregiver Education & Training, Equipment Evaluation, Education, & procurement, Balance Training, IADL Training, Gait Training, Home Exercise Program, Functional Mobility Training, Stair training, Safety Education & Training    Electronically signed by   Harry Bell PT,   10/21/2021, 9:38 AM

## 2021-10-21 NOTE — PROGRESS NOTES
Occupational Therapy  Physical Rehabilitation: OCCUPATIONAL THERAPY     [x] daily progress note       [] discharge       Patient Name:  Nilsa Mo   :  3/18/1929 MRN: [id number]  Room:  [address] [place], [place] [address] Date of Admission: 10/18/2021  Rehabilitation Diagnosis:   Metabolic encephalopathy [C40.51]  Metabolic encephalopathy [C08.86]       Date 10/21/2021       Day of ARU Week:  4   Time IN/OUT 1100/1130  1308/1403   Individual Tx Minutes 30+55   Group Tx Minutes    Co-Treat Minutes    Concurrent Tx Minutes    TOTAL Tx Time Mins 85   Variance Time -5; see SLP note for additional minutes   Variance Time []   Refusal due to:     []   Medical hold/reason:    []   Illness   []   Off Unit for test/procedure  []   Extra time needed to complete task  []   Therapeutic need  []   Other (specify):   Restrictions Restrictions/Precautions: General Precautions, Fall Risk (1800mL fluid restriction)         Communication with other providers: [x]   OK to see per nursing:     []   Spoke with team member regarding:      Subjective observations and cognitive status: Pt pleasant and agreeable to both tx sessions     Pain level/location:    /10       Location: Denied   Discharge recommendations  Anticipated discharge date:  10/27  Destination: []home alone   []home alone w assist prn   [] home w/ family    [x] Continuous supervision       []SNF    [] Assisted living     [] Other:   Continued therapy: [x]HHC OT  []OUTPATIENT  OT   [] No Further OT  Equipment needs: None     Toileting:    In PM: SBA       Toilet Transfers: SBA c 4WW, improved recall of brake management  Device Used:    []   Standard Toilet         [x]   Grab Bars           []  Bedside Commode       []   Elevated Toilet          []   Other:        Bed Mobility:           [x]   Pt received out of bed       Transfers:    Sit--> Stand:  Supervision  Stand --> Sit:   Supervision  Stand-Pivot:  SBA  Other:    Assistive device required for transfer:   4WW      Functional Mobility:    Assistance: In AM and PM: SBA ~120 ft x2. In AM pt cue dependent to navigate unit, in PM pt still required >50% cues   Device:   []   Rolling Walker     []   Standard [employer] []   Wheelchair        []   Ebenezer Grounds       [x]   4-Wheeled [employer]         []   Cardiac [employer]       []   Other:        Homemaking Tasks:   Educated pt on 4WW safety during IADL tasks such as meal preparation. Pt reported typically sitting on seat of 4WW to eat, but otherwise did not use in kitchen PTA. Educated pt that recommendation now is for pt to use 4WW all the time. After visual demonstrations, pt required 1 cue initially not to abandon 4WW, and to lock brakes while 4WW is off to the side; however pt then demo'ed carryover remainder of meal prep task using microwave. Even after visual demonstration however, pt required multiple attempts to safely bring 4WW behind her to then simulate sitting to eat at her counter. Pt agreeable to continuing to address 4WW safety during this tasks as ongoing carryover of safety needs to be assessed over treatment sessions. Additional Therapeutic activities/exercises completed this date:     []   ADL Training   [x]   Balance/Postural training: Pt stood x  6.5 min + 6 min at counter with SBA while completing dynamic stand task while reaching outside base of support to facilitate increased balance for ADL tasks and transfers. Pt required min cues for executive functioning (planning, organization) and error ID/correction during novel TTA       [x]   Bed/Transfer Training   [x]   Endurance Training   []   Neuromuscular Re-ed   []   Nu-step:  Time:        Level:         #Steps:       []   Rebounder:    []  Seated     []  Standing        []   Supine Ther Ex (reps/sets):     [x]   Seated Ther Ex (reps/sets):   To increase BUE endurance for ADLs and transfers, pt completed 1 set x10 reps of the following 2 exercises using blue theraband: alternating shoulder presses and simultaneous shoulder horizontal abduction/adduction. Educated pt on plan to continue education on HEP in future tx session      []   Standing Ther Ex (reps/sets):     []   Other:      Comments: All intervention performed to increase pt's endurance, ax tolerance, balance,  and I c ADLs/IADLs and functional transfers/mobility. Patient/Caregiver Education and Training:   []   [employer] Equipment Use  [x]   Bed Mobility/Transfer Technique/Safety  []   Energy Conservation Tips  []   Family training  [x]   Postural Awareness  [x]   Safety During Functional Activities  []   Reinforced Patient's Precautions   []   Progress was updated and reviewed in Rehabtracker with patient and/or family this         date. Treatment Plan for Next Session: Continue OT POC    Assessment: This pt demonstrated a positive  response to today's treatment as evidenced by good engagement in safety training. The patient is making progress toward established goals as evidenced by QI scores. Ongoing deficits are observed in the areas of safety, endurance and continued focus on this is recommended.        Treatment/Activity Tolerance:   [x] Tolerated treatment with no adverse effects    [] Patient limited by fatigue  [] Patient limited by pain   [] Patient limited by medical complications:    [] Adverse reaction to Tx:   [] Significant change in status    Safety:       []  bed alarm set    [x]  chair alarm set    []  Pt refused alarms                [x]  Telesitter activated      [x]  Gait belt used during tx session      []other:       Number of Minutes/Billable Intervention  Therapeutic Exercise    ADL Self-care    Neuro Re-Ed    Therapeutic Activity 85   Group    Other:    TOTAL 85       Social History  Social/Functional History  Lives With: Alone  Type of Home: House (condo)  Home Layout: One level  Home Access: Level entry  Bathroom Shower/Tub: Walk-in shower  Bathroom Toilet: Standard  Bathroom Equipment: Shower chair, Grab bars in shower, Grab bars around toilet  Bathroom Accessibility: Valentino Sings accessible  Home Equipment: 4 wheeled walker, Cane, Grab bars (Pt uses 4ww at night for her evening routine, no device during the day)  ADL Assistance: [address]: Independent  Homemaking Responsibilities: Yes  Meal Prep Responsibility: Primary  Laundry Responsibility: Primary  Cleaning Responsibility: Primary  Bill Paying/Finance Responsibility: No (son does due to pt's macular degeneration)  Shopping Responsibility: No (DIL)  Health Care Management: Primary (fills up a weekly pill box)  Ambulation Assistance: Independent  Transfer Assistance: Independent  Active : No  Patient's  Info: Daughter in law does grocery shopping. Mode of Transportation: Car  Education: 12th grade. 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 Pt has macular degeneration. Able to read with magnifying glass. IADL Comments: cooking and cleaning, cousin comes every other Wed to clean and help  Additional Comments: Pt states fall precipitating this admission is her only fall this year, but pt is a questionable historian.  Pt sleeps in flat bed with c-pap    Objective                                                                                    Goals:  (Update in navigator)  Short term goals  Time Frame for Short term goals: STGs=LTGs:  Long term goals  Time Frame for Long term goals : 7-10 days or until d/c  Long term goal 1: Pt will complete grooming tasks Ind  Long term goal 2: Pt will complete total body bathing c supervision  Long term goal 3: Pt will complete UB dressing c setup  Long term goal 4: Pt will complete LB dressing c setup  Long term goal 5: Pt will doff/don footwear c setup  Long term goals 6: Pt will complete toileting mod I  Long term goal 7: Pt will complete functional transfers (bed, chair, shower, toilet) c DME PRN and mod I  Long term goal 8: Pt will perform therex/therax to facilitate increased strength/endurance/ax tolerance (c emphasis on dynamic standing balance/tolerance > 8 mins, BUE endurance, cognitive retraining) c SBA  Long term goal 9: Pt will complete simple homemaking tasks c DME PRN and S:        Plan of Care                                                                              Times per week: 5 days per week for a minimum of 60 minutes/day plus group as appropriate for 60 minutes. Treatment to include Plan  Times per day: Daily  Current Treatment Recommendations: Strengthening, Balance Training, Functional Mobility Training, Endurance Training, Safety Education & Training, Patient/Caregiver Education & Training, Equipment Evaluation, Education, & procurement, Pain Management, Self-Care / ADL, Home Management Training, Cognitive/Perceptual Training    Electronically signed by   iKmberly Serrano MS, OTR/L  License #[id number]  10/21/2021, 1:19 PM

## 2021-10-21 NOTE — PROGRESS NOTES
Kimberli Birmingham    : 3/18/1929  Acct #: [de-identified]  MRN: [id number]              PM&R Progress Note      Admitting diagnosis: Metabolic encephalopathy ( [place] [address])     Comorbid diagnoses impacting rehabilitation: Generalized weakness, gait disturbance, hyponatremia, acute urinary retention, essential hypertension, hypothyroidism, chronic diastolic congestive heart failure     Chief complaint: No blood in her sputum or urine. Prior (baseline) level of function: Independent.     Current level of function:         Current  IRF-YAMILKA and Goals:   Occupational Therapy:    Short term goals  Time Frame for Short term goals: STGs=LTGs :   Long term goals  Time Frame for Long term goals : 7-10 days or until d/c  Long term goal 1: Pt will complete grooming tasks Ind  Long term goal 2: Pt will complete total body bathing c supervision  Long term goal 3: Pt will complete UB dressing c setup  Long term goal 4: Pt will complete LB dressing c setup  Long term goal 5: Pt will doff/don footwear c setup  Long term goals 6: Pt will complete toileting mod I  Long term goal 7: Pt will complete functional transfers (bed, chair, shower, toilet) c DME PRN and mod I  Long term goal 8: Pt will perform therex/therax to facilitate increased strength/endurance/ax tolerance (c emphasis on dynamic standing balance/tolerance > 8 mins, BUE endurance, cognitive retraining) c SBA  Long term goal 9: Pt will complete simple homemaking tasks c DME PRN and S :                                       Eating: Eating  Assistance Needed: Setup or clean-up assistance  Comment: d/t low vision  CARE Score: 5  Discharge Goal: Set-up or clean-up assistance       Oral Hygiene: Oral Hygiene  Assistance Needed: Supervision or touching assistance  Comment: in stance at sink  CARE Score: 4  Discharge Goal: Independent    UB/LB Bathing: Shower/Bathe Self  Assistance Needed: Supervision or touching assistance  Comment: at sink, increased time, especially for distal reach to feet  CARE Score: 4  Discharge Goal: Supervision or touching assistance    UB Dressing: Upper Body Dressing  Assistance Needed: Supervision or touching assistance  Comment: to don bra and pullover shirt  CARE Score: 4  Discharge Goal: Set-up or clean-up assistance         LB Dressing: Lower Body Dressing  Assistance Needed: Partial/moderate assistance  Comment: was able to thread BLEs seated but donned pants backwards and 2* fatigue required assist to correct. Able to perform pants management  CARE Score: 3  Discharge Goal: Set-up or clean-up assistance    Donning and [place] Footwear: Putting On/Taking Off Footwear  Assistance Needed: Partial/moderate assistance  Comment: able to doff socks, required partial assist for R sock 2* fatigue and feeling winded; required cueing to perform figure 4 position to reduce level of exertion. Able to don shoes  CARE Score: 3  Discharge Goal: Set-up or clean-up assistance      Toileting: Toileting Hygiene  Assistance Needed: Supervision or touching assistance  Comment: SBA  CARE Score: 4  Discharge Goal: Independent      Toilet Transfers:   Toilet Transfer  Assistance Needed: Supervision or touching assistance  Comment: SBA c 4WW, cues to lock brakes and hand placement  CARE Score: 4  Discharge Goal: Independent    Physical Therapy:         Long term goals  Time Frame for Long term goals : 5-7 days STG=LTG  Long term goal 1: Pt will perform all bed mobility with mod I  Long term goal 2: Pt will perform sit to stand, pivot and car transfers  with mod I  Long term goal 3: Pt will ambulate 150 feet on level surfaces and 10' on unlevel surface with  Jose  using 4ww  Long term goal 4: Pt will ascend/descend curb step with  4ww    and up to  12   steps with  rail(s) with supervision  Long term goal 5: Pt will retrieve light item from floor with  mod I using 4ww      Bed Mobility:   Sit to Lying  Assistance Needed: Supervision or touching assistance  CARE Score: 4  Discharge Goal: Independent  Roll Left and Right  Assistance Needed: Independent  CARE Score: 6  Discharge Goal: Independent  Lying to Sitting on Side of Bed  Assistance Needed: Supervision or touching assistance  Comment: mod difficulty due to \"soreness\"  CARE Score: 4  Discharge Goal: Independent    Transfers:    Sit to Stand  Assistance Needed: Supervision or touching assistance  Comment: CGA from recliner and EOB  CARE Score: 4  Discharge Goal: Independent  Chair/Bed-to-Chair Transfer  Assistance Needed: Supervision or touching assistance  Comment: CGA w/ FWW; stand step  CARE Score: 4  Discharge Goal: Independent  Toilet Transfer  Assistance Needed: Supervision or touching assistance  CARE Score: 4  Car Transfer  Assistance Needed: Partial/moderate assistance  Comment: SBA for LE mgmt; min A to stand from passenger side  CARE Score: 3  Discharge Goal: Independent    Ambulation:    Walking Ability  Does the Patient Walk?: Yes     Walk 10 Feet  Assistance Needed: Supervision or touching assistance  Comment: sup using 4WW  CARE Score: 4  Discharge Goal: Independent     Walk 50 Feet with Two Turns  Assistance Needed: Supervision or touching assistance  Comment: sup; 4WW  CARE Score: 4  Discharge Goal: Independent     Walk 150 Feet  Assistance Needed: Supervision or touching assistance  Comment: sup using 4WW  CARE Score: 4  Discharge Goal: Independent     Walking 10 Feet on Uneven Surfaces  Assistance Needed: Partial/moderate assistance  Comment: min A to attend to balance disturbance; dec safety awareness navigating over uneven surfaces  CARE Score: 3  Discharge Goal: Independent     1 Step (Curb)  Assistance Needed: Supervision or touching assistance, Partial/moderate assistance  Comment: min A for AD mgmt; mod cues for sequencing and set-up  CARE Score: 4  Discharge Goal: Independent     4 Steps  Assistance Needed: Supervision or touching assistance  Comment: CGA  CARE Score: 4  Discharge Goal: Independent     12 Component Value Date    ALT 18 10/13/2021    AST 17 10/13/2021    ALKPHOS 125 10/13/2021    BILITOT 1.6 (H) 10/13/2021       Expected length of stay  prior to a supervised level of function for discharge home with a walker and C OT/PT is 2 weeks. Recommendations:    1. Metabolic encephalopathy with gait disturbance:  Response to verbal cues to engage in her daily occupational and physical therapy with speech-language pathology.    Developing a plan for treatment in a calm and consistent environment to improve retention.    Ongoing adaptive equipment training, pulmonary hygiene measures and nutritional support.  Bowel and bladder retraining, DVT prophylaxis and probable caregiver education. Verbal cues and minimum to moderate physical assistance for transfers. 2. DVT prophylaxis: Patient is anticoagulated for her cardiac disease.  This will protect her against acute DVT as her INR is therapeutic at this point.  Target INR is 2.32.7.  GI prophylaxis offered. No new bruising or swelling. 3. Acute urinary retention: Carnes catheter now.  Bladder training with a voiding trial as she strengthens.  Minimizing anticholinergics. No clots in the catheter bag.  4. Chronic diastolic congestive heart failure: Daily weights.  Coreg for afterload reduction.  Demadex and Aldactone for diuresis. Daily weight measurements are little volatile at the moment. Their accuracy is in question. Blood pressure is just above target range. 5. Hypothyroidism: Replacing her thyroid with supplement.  Monitoring her vital signs and activity tolerance.   6. Hyponatremia: Cautious use of diuretics.  Periodic monitoring of her chemistries, will recheck sodium after the weekend.    Continue restricting her fluids to 1800 cc daily.

## 2021-10-21 NOTE — PROGRESS NOTES
[facility]  ACUTE REHAB UNIT  SPEECH/LANGUAGE PATHOLOGY      [x] Daily           [] Discharge    Patient:Irene Farmer      :3/18/1929  UKV:[id number]  Rehab Dx/Hx: Metabolic encephalopathy [X30.19]  Metabolic encephalopathy [E92.04]   Allergies   Allergen Reactions    Darvocet [Propoxyphene N-Acetaminophen]     Ranexa [Ranolazine Er]      Sick to her stomach    Ranolazine      Sick to her stomach    Sulfa Antibiotics Itching    Sulfasalazine Itching    Adhesive Tape Rash    Allantoin Rash    Bacitracin Rash    Gramicidin Rash    Neomycin Rash    Polymyxin B Rash    Pramoxine Hcl Rash    Silicone Rash     Precautions:  Restrictions/Precautions: General Precautions, Fall Risk (1800mL fluid restriction)          Home Situation/IADL:   Social/Functional History  Lives With: Alone  Type of Home: House (condo)  Home Layout: One level  Home Access: Level entry  Bathroom Shower/Tub: Walk-in shower  Bathroom Toilet: Standard  Bathroom Equipment: Shower chair, Grab bars in shower, Grab bars around toilet  Bathroom Accessibility: Walker accessible  Home Equipment: 4 wheeled walker, Cane, Grab bars (Pt uses 4ww at night for her evening routine, no device during the day)  ADL Assistance: Independent  Homemaking Assistance: Independent  Homemaking Responsibilities: Yes  Meal Prep Responsibility: Primary  Laundry Responsibility: Primary  Cleaning Responsibility: Primary  Bill Paying/Finance Responsibility: No (son does due to pt's macular degeneration)  Shopping Responsibility: No (DIL)  Health Care Management: Primary (fills up a weekly pill box)  Ambulation Assistance: Independent  Transfer Assistance: Independent  Active : No  Patient's  Info: Daughter in law does grocery shopping. Mode of Transportation: Car  Education: 12th grade.   Type of occupation: Homemaker, [employer], [employer] laundry  Leisure & Hobbies: housework, tv, pt manages meds in a weekly pill box, finances are managed by son and dtr. Pt has macular degeneration. Able to read with magnifying glass. IADL Comments: cooking and cleaning, cousin comes every other Wed to clean and help  Additional Comments: Pt states fall precipitating this admission is her only fall this year, but pt is a questionable historian. Pt sleeps in flat bed with c-pap      Date of Admit: 10/18/2021  Room #: 1024/1024-A     ST Number of Minutes/Billable Intervention  Cog/Memory Deficits 35    Aphasia/Language     Dysarthria/Speech     Apraxia/Speech     Dysphagia/Swallowing     Group     Other    TOTAL Minutes Billed  35    Variance          Date: 10/21/2021  Day of ARU Week:  4       SLP Individual Minutes  Time In: 3197  Time Out: 1100  Minutes: 35         Variance/Reason:  [] Refusal due to   [] Medical hold/reason  [] Illness   [] Off Unit for test/procedure  [] Extra time needed to complete task  [] Other (specify)    Activity completed: Pt seen for sequencing and safety for walker safety in a variety of situations around room. Pt wanted to go brush her teeth and put partials in, toilet and move around her room. Pain: denies  Current Diet: ADULT DIET; Regular; Low Fat/Low Chol/High Fiber/2 gm Na; 1800 ml  Subjective: Pt is responsive to cues; gets confused mid task and needs cues on how to continue. Goals and POC: Co-treats where appropriate with PT or OT to facilitate patient goals in functional tasks. LTG                           Short-term Goals  Timeframe for Short-term Goals: 3x/week x1 week 30 mins min  LTG: Improve overall fx for safe return home with modified independence    Goal 1: Pt will demonstrate carryover of learned safety sequences in fx tasks with min cues. Spoke with PT who indicated pt walker is a new device for her with sequencing cues needed. Targeted during session with carryover for pt.   Tasks targeted included locking breaks when stopped which pt demonstrated good carryover with cues mod to none by end of session. Additionally worked on carrying items and where she could put them. Pt did not problem solve that she could use her walker seat to hold her tooth care supplies as she walked to the bathroom or her pull-up when going to toilet. Sequencing cues were needed with incontinence in her pull up. Pt ripped off pull up and couldn't problem solve that to put the new one on she would need to remove her pants and shoes. Using pull ups is new for pt along with the incontinence. Will notify NSG that this is occurring. Cues were needed to keep walker close in tighter spaces as she wants to push it far out. At times pt will get lost within a task and require cues to put her back on the right sequence. The patient demonstrated a positive response to todays treatment and is making gradual progress toward established goals as evidenced by good response to cues. Ongoing deficits are observed in the areas of cognition and memory for problem solving and sequencing with carryover for new tasks and continued focus on fx safety is recommended. Alarm placed: []bed [x]chair   []other:   [] activated        Barriers to progress:   [] Fatigue        [x] Cognitive Deficits   [x] Memory Deficits   [] Reduced Attn   [] Self Limiting Behaviors    [] Reduced insight/awareness     [] Visual Deficits   [] Premorbid Conditions  [] Impulsivity     [] Other      Education/Interventions used this date: see above with cues needed    []   Progress was updated and reviewed in Rehabtracker with patient and/or family this         date. [x] Attending Care Conference for pt this date. See Team Patient Care Conference Note for updates.     Interventions used this date:  [] Speech/Language Treatment    [] Instruction in HEP    Group   [] Dysphagia Treatment   [x] Cognitive Skill Daniel    Other:         Assessment / Impression Treatment/Activity Tolerance:   [x] Tolerated Treatment well:     [] Patient limited by fatigue/pain:       [] Patient limited by medical complications:    [] Adverse Reaction to Tx:   [] Significant change in status:      Electronically Signed by  LASHONDA Conti, MA, CCC-SLP    10/21/2021  11:48 AM

## 2021-10-22 LAB
INR BLD: 2.47 INDEX
PROTHROMBIN TIME: 32.2 SECONDS (ref 11.7–14.5)

## 2021-10-22 PROCEDURE — 94640 AIRWAY INHALATION TREATMENT: CPT

## 2021-10-22 PROCEDURE — 36415 COLL VENOUS BLD VENIPUNCTURE: CPT

## 2021-10-22 PROCEDURE — 6370000000 HC RX 637 (ALT 250 FOR IP): Performed by: HOSPITALIST

## 2021-10-22 PROCEDURE — 97110 THERAPEUTIC EXERCISES: CPT

## 2021-10-22 PROCEDURE — 97129 THER IVNTJ 1ST 15 MIN: CPT

## 2021-10-22 PROCEDURE — 6370000000 HC RX 637 (ALT 250 FOR IP): Performed by: PHYSICAL MEDICINE & REHABILITATION

## 2021-10-22 PROCEDURE — 97530 THERAPEUTIC ACTIVITIES: CPT

## 2021-10-22 PROCEDURE — 1280000000 HC REHAB R&B

## 2021-10-22 PROCEDURE — 85610 PROTHROMBIN TIME: CPT

## 2021-10-22 PROCEDURE — 97116 GAIT TRAINING THERAPY: CPT

## 2021-10-22 PROCEDURE — 97535 SELF CARE MNGMENT TRAINING: CPT

## 2021-10-22 PROCEDURE — 94761 N-INVAS EAR/PLS OXIMETRY MLT: CPT

## 2021-10-22 PROCEDURE — 2580000003 HC RX 258: Performed by: HOSPITALIST

## 2021-10-22 PROCEDURE — 97130 THER IVNTJ EA ADDL 15 MIN: CPT

## 2021-10-22 RX ADMIN — SPIRONOLACTONE 25 MG: 25 TABLET ORAL at 11:22

## 2021-10-22 RX ADMIN — CARVEDILOL 6.25 MG: 6.25 TABLET, FILM COATED ORAL at 19:13

## 2021-10-22 RX ADMIN — ALBUTEROL SULFATE 2 PUFF: 90 AEROSOL, METERED RESPIRATORY (INHALATION) at 07:47

## 2021-10-22 RX ADMIN — TORSEMIDE 20 MG: 20 TABLET ORAL at 11:22

## 2021-10-22 RX ADMIN — ATORVASTATIN CALCIUM 10 MG: 10 TABLET, FILM COATED ORAL at 20:51

## 2021-10-22 RX ADMIN — CYANOCOBALAMIN TAB 1000 MCG 1000 MCG: 1000 TAB at 11:22

## 2021-10-22 RX ADMIN — Medication 1 TABLET: at 11:25

## 2021-10-22 RX ADMIN — LEVOTHYROXINE SODIUM 88 MCG: 0.09 TABLET ORAL at 05:41

## 2021-10-22 RX ADMIN — CARVEDILOL 6.25 MG: 6.25 TABLET, FILM COATED ORAL at 11:25

## 2021-10-22 RX ADMIN — POTASSIUM CHLORIDE 10 MEQ: 750 TABLET, FILM COATED, EXTENDED RELEASE ORAL at 11:22

## 2021-10-22 RX ADMIN — ACETAMINOPHEN 650 MG: 325 TABLET ORAL at 19:13

## 2021-10-22 RX ADMIN — Medication 10000 MCG: at 11:25

## 2021-10-22 RX ADMIN — TIOTROPIUM BROMIDE AND OLODATEROL 2 PUFF: 3.124; 2.736 SPRAY, METERED RESPIRATORY (INHALATION) at 07:47

## 2021-10-22 RX ADMIN — CETIRIZINE HYDROCHLORIDE 10 MG: 10 TABLET, FILM COATED ORAL at 11:23

## 2021-10-22 RX ADMIN — SODIUM CHLORIDE, PRESERVATIVE FREE 10 ML: 5 INJECTION INTRAVENOUS at 21:08

## 2021-10-22 RX ADMIN — WARFARIN SODIUM 5 MG: 5 TABLET ORAL at 19:13

## 2021-10-22 RX ADMIN — ALBUTEROL SULFATE 2 PUFF: 90 AEROSOL, METERED RESPIRATORY (INHALATION) at 19:37

## 2021-10-22 RX ADMIN — ASPIRIN 81 MG: 81 TABLET, CHEWABLE ORAL at 11:22

## 2021-10-22 RX ADMIN — TIMOLOL MALEATE 1 DROP: 2.5 SOLUTION/ DROPS OPHTHALMIC at 20:51

## 2021-10-22 RX ADMIN — TIMOLOL MALEATE 1 DROP: 2.5 SOLUTION/ DROPS OPHTHALMIC at 11:27

## 2021-10-22 ASSESSMENT — PAIN DESCRIPTION - LOCATION: LOCATION: GENERALIZED

## 2021-10-22 ASSESSMENT — PAIN SCALES - GENERAL
PAINLEVEL_OUTOF10: 0
PAINLEVEL_OUTOF10: 10

## 2021-10-22 NOTE — PROGRESS NOTES
Physical Therapy      [x] daily progress note       [] discharge       Patient Name:  Adán Ingram   :  3/18/1929 MRN: [id number]  Room:  [address] [place], [place] [address] Date of Admission: 10/18/2021  Rehabilitation Diagnosis:   Metabolic encephalopathy [M69.79]  Metabolic encephalopathy [E22.02]       Date 10/22/2021       Day of ARU Week:  5   Time IN/OUT 6653-5916   Individual Tx Minutes 66   TOTAL Tx Time Mins 66   Variance Time +6 ( communicated with Raeann Lowery OT regarding 6' overage)   Variance Time []   Refusal due to:     []   Medical hold/reason:    []   Illness   []   Off Unit for test/procedure  []   Extra time needed to complete task  []   Therapeutic need  []   Other (specify):   Restrictions Restrictions/Precautions  Restrictions/Precautions: General Precautions, Fall Risk (1800mL fluid restriction)      Communication with other providers: [x]   OK to see per nursing:     []   Spoke with team member regarding:      Subjective observations and cognitive status: Pt up in recliner, pleasant and agreeable; nsg present for assessment and medication at beginning of session     Pain level/location: 0/10       Location:    Discharge recommendations  Anticipated discharge date:  tbd  Destination: []?home alone   []?home alone with assist PRN     []? home w/ family      []? Continuous supervision  []? SNF    []? Assisted living     []? Other:  Continued therapy: [x]? [facility] Hubbard PT  []? OUTPATIENT PT   []? No Further PT  []? SNF PT  Caregiver training recommended: [x]? Yes  []? No   Equipment needs: 4ww     Bed Mobility:           [x]   Pt received out of bed     Transfers:    Sit--> Stand:  Supervision/CG-SBA; min-mod cues to keep brakes locked before standing and pushing up from chair vs pulling up on AD.   Stand --> Sit:   Supervision/ CG-SBA; pt automatically locks rollator brakes and reaches back without cueing  Stand-Pivot:   Supervision/ SB-CGA  Assistive device required for transfer:   Rollator / No AD    Gait:    Distance: 385'+35'/ 50' x 2    Assistance:  Supervision / CG-SBA   Device:  Rollator/ No AD  Gait Quality:   pt with improved speed, step length and heel to pattern amb with rollator; without AD slower speed, decreased stepl length, slightly guarded with LOB occurring with initial amb but improved second trial.     Additional Therapeutic activities/exercises completed this date:     []   Nu-step:  Time:        Level:         #Steps:       []   Rebounder:    []  Seated     []  Standing        [x]   Balance training: Dynamic ax at countertop with 1 UE support for fwd/backward walking, side stepping, tandem walking fwd req CG-SBA for balance with LOB x 2 during tandem walking. []   Postural training    []   Supine ther ex (reps/sets):     []   Seated ther ex (reps/sets):     []   Standing ther ex (reps/sets):     []   Picked up object from floor                       []   Reacher used   [x]   Other: Amb 10' x 3 trials fwd and around chair without AD, pt with improved balance progressing from Min A to CG-SBA by third trial.    [x]   Other: Amb around DR in small spaces and around obstacles while reaching for cones at all heights including to floor. Pt req CG-SBA for balance throughout ax, min cues for finding 1/4 cones due to visual deficit.    []   Other:      Patient/Caregiver Education and Training:   [x]   Bed Mobility/Transfer technique/safety  [x]   Gait technique/sequencing  [x]   Proper use of assistive device  []   Advanced mobility safety and technique  []   Reinforced patient's precautions with mobility/functional tasks  []   Postural awareness  []   Family training  []   Other:    Treatment Plan for Next Session: dynamic balance, gait progression        Treatment/Activity Tolerance:   [x] Tolerated treatment with no adverse effects    [] Patient limited by fatigue  [] Patient limited by pain   [] Patient limited by medical complications:    [] Adverse reaction to Tx:   [] Significant change in status    Safety: []  bed alarm set    []  chair alarm set    []  Pt refused alarms                []  Telesitter activated      []  Gait belt used during tx session      []other:         Number of Minutes/Billable Intervention  Gait Training 30   Therapeutic Exercise    Neuro Re-Ed    Therapeutic Activity 36   Wheelchair Propulsion    Group    Other:    TOTAL 66         Social History  Social/Functional History  Lives With: Alone  Type of Home: House (condo)  Home Layout: One level  Home Access: Level entry  Bathroom Shower/Tub: Walk-in shower  Bathroom Toilet: Standard  Bathroom Equipment: Shower chair, Grab bars in shower, Grab bars around toilet  Bathroom Accessibility: Walker accessible  Home Equipment: 4 wheeled walker, Cane, Grab bars (Pt uses 4ww at night for her evening routine, no device during the day)  ADL Assistance: Independent  Homemaking Assistance: Independent  Homemaking Responsibilities: Yes  Meal Prep Responsibility: Primary  Laundry Responsibility: Primary  Cleaning Responsibility: Primary  Bill Paying/Finance Responsibility: No (son does due to pt's macular degeneration)  Shopping Responsibility: No (DIL)  Health Care Management: Primary (fills up a weekly pill box)  Ambulation Assistance: Independent  Transfer Assistance: Independent  Active : No  Patient's  Info: Daughter in law does grocery shopping. Mode of Transportation: Car  Education: 12th grade. 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 Pt has macular degeneration. Able to read with magnifying glass. IADL Comments: cooking and cleaning, cousin comes every other Wed to clean and help  Additional Comments: Pt states fall precipitating this admission is her only fall this year, but pt is a questionable historian.  Pt sleeps in flat bed with c-pap    Objective Goals:  (Update in navigator)   : Long term goals  Time Frame for Long term goals : 5-7 days STG=LTG  Long term goal 1: Pt will perform all bed mobility with mod I  Long term goal 2: Pt will perform sit to stand, pivot and car transfers  with mod I  Long term goal 3: Pt will ambulate 150 feet on level surfaces and 10' on unlevel surface with  Jose  using 4ww  Long term goal 4: Pt will ascend/descend curb step with  4ww    and up to  12   steps with  rail(s) with supervision  Long term goal 5: Pt will retrieve light item from floor with  mod I using 4ww:        Plan of Care                                                                              Times per week: 5 days per week for a minimum of 60 minutes/day plus group as appropriate for 60 minutes.   Treatment to include Current Treatment Recommendations: Strengthening, Transfer Training, Endurance Training, Neuromuscular Re-education, Patient/Caregiver Education & Training, Equipment Evaluation, Education, & procurement, Balance Training, IADL Training, Gait Training, Home Exercise Program, Functional Mobility Training, Stair training, Safety Education & Training    Electronically signed by   Ilya Anaya PTA #[id number]  10/22/2021, 8:52 AM

## 2021-10-22 NOTE — PROGRESS NOTES
Occupational Therapy    Physical Rehabilitation: OCCUPATIONAL THERAPY     [x] daily progress note       [] discharge       Patient Name:  Jose Guadalupe Carlson   :  3/18/1929 MRN: [id number]  Room:  [address] [place] [address] Date of Admission: 10/18/2021  Rehabilitation Diagnosis:   Metabolic encephalopathy [Y49.25]  Metabolic encephalopathy [I04.63]       Date 10/22/2021       Day of ARU Week:  5   Time IN//1056   Individual Tx Minutes 60   Group Tx Minutes    Co-Treat Minutes    Concurrent Tx Minutes    TOTAL Tx Time Mins 60   Variance Time    Variance Time []   Refusal due to:     []   Medical hold/reason:    []   Illness   []   Off Unit for test/procedure  []   Extra time needed to complete task  []   Therapeutic need  []   Other (specify):   Restrictions Restrictions/Precautions: General Precautions, Fall Risk (1800mL fluid restriction)         Communication with other providers: [x]   OK to see per nursing:     []   Spoke with team member regarding:      Subjective observations and cognitive status: Pt received from SLP, pleasant and agreeable to tx session. Pain level/location:    /10       Location: Denied   Discharge recommendations  Anticipated discharge date:  10/27  Destination: []?home alone   []?home alone w assist prn   []? home w/ family    [x]? Continuous supervision       []? SNF    []? Assisted living     []? Other:   Continued therapy: [x]? Zion Hubbard OT  []? OUTPATIENT  OT   []?  No Further OT  Equipment needs: None       ADLs:     Oral Hygiene: Oral Hygiene  Assistance Needed: Supervision or touching assistance  Comment: in stance at sink  CARE Score: 4  Discharge Goal: Independent    UB/LB Bathing: Shower/Bathe Self  Assistance Needed: Supervision or touching assistance  Comment: supervision for full shower  CARE Score: 4  Discharge Goal: Supervision or touching assistance    UB Dressing: Upper Body Dressing  Assistance Needed: Setup or clean-up assistance  Comment: to don pullover sweatshirt  CARE Score: transfers/mobility. Patient/Caregiver Education and Training:   []   [employer] Equipment Use  [x]   Bed Mobility/Transfer Technique/Safety  []   Energy Conservation Tips  []   Family training  [x]   Postural Awareness  [x]   Safety During Functional Activities  []   Reinforced Patient's Precautions   []   Progress was updated and reviewed in Rehabtracker with patient and/or family this         date. Treatment Plan for Next Session: Continue OT POC     Assessment: This pt demonstrated a positive response to today's treatment as evidenced by improved LB dressing and footwear. The patient is making progress toward established goals as evidenced by QI scores. Ongoing deficits are observed in the areas of safety, memory and continued focus on this is recommended.        Treatment/Activity Tolerance:   [x] Tolerated treatment with no adverse effects    [] Patient limited by fatigue  [] Patient limited by pain   [] Patient limited by medical complications:    [] Adverse reaction to Tx:   [] Significant change in status    Safety:       []  bed alarm set    [x]  chair alarm set    []  Pt refused alarms                [x]  Telesitter activated      [x]  Gait belt used during tx session      []other:       Number of Minutes/Billable Intervention  Therapeutic Exercise 10   ADL Self-care 50   Neuro Re-Ed    Therapeutic Activity    Group    Other:    TOTAL 60       Social History  Social/Functional History  Lives With: Alone  Type of Home: House (condo)  Home Layout: One level  Home Access: Level entry  Bathroom Shower/Tub: Walk-in shower  Bathroom Toilet: Standard  Bathroom Equipment: Shower chair, Grab bars in shower, Grab bars around toilet  Bathroom Accessibility: Walker accessible  Home Equipment: 4 wheeled walker, Cane, Grab bars (Pt uses 4ww at night for her evening routine, no device during the day)  ADL Assistance: Independent  Homemaking Assistance: Independent  Homemaking Responsibilities: Yes  Meal Prep Responsibility: Primary  Laundry Responsibility: Primary  Cleaning Responsibility: Primary  Bill Paying/Finance Responsibility: No (son does due to pt's macular degeneration)  Shopping Responsibility: No (DIL)  Health Care Management: Primary (fills up a weekly pill box)  Ambulation Assistance: Independent  Transfer Assistance: Independent  Active : No  Patient's  Info: Daughter in law does grocery shopping. Mode of Transportation: Car  Education: 12th grade. 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 Pt has macular degeneration. Able to read with magnifying glass. IADL Comments: cooking and cleaning, cousin comes every other Wed to clean and help  Additional Comments: Pt states fall precipitating this admission is her only fall this year, but pt is a questionable historian.  Pt sleeps in flat bed with c-pap    Objective                                                                                    Goals:  (Update in navigator)  Short term goals  Time Frame for Short term goals: STGs=LTGs:  Long term goals  Time Frame for Long term goals : 7-10 days or until d/c  Long term goal 1: Pt will complete grooming tasks Ind  Long term goal 2: Pt will complete total body bathing c supervision  Long term goal 3: Pt will complete UB dressing c setup  Long term goal 4: Pt will complete LB dressing c setup  Long term goal 5: Pt will doff/don footwear c setup  Long term goals 6: Pt will complete toileting mod I  Long term goal 7: Pt will complete functional transfers (bed, chair, shower, toilet) c DME PRN and mod I  Long term goal 8: Pt will perform therex/therax to facilitate increased strength/endurance/ax tolerance (c emphasis on dynamic standing balance/tolerance > 8 mins, BUE endurance, cognitive retraining) c SBA  Long term goal 9: Pt will complete simple homemaking tasks c DME PRN and S:        Plan of Care                                                                              Times per week: 5 days per week for a minimum of 60 minutes/day plus group as appropriate for 60 minutes. Treatment to include Plan  Times per day: Daily  Current Treatment Recommendations: Strengthening, Balance Training, Functional Mobility Training, Endurance Training, Safety Education & Training, Patient/Caregiver Education & Training, Equipment Evaluation, Education, & procurement, Pain Management, Self-Care / ADL, Home Management Training, Cognitive/Perceptual Training    Electronically signed by   Henry Donohue MS, OTR/L  License #[id number]  10/22/2021, 10:37 AM

## 2021-10-22 NOTE — PROGRESS NOTES
[facility]  ACUTE REHAB UNIT  SPEECH/LANGUAGE PATHOLOGY      [x] Daily           [] Discharge    Patient:Irene Ascencio      :3/18/1929  MNK:[id number]  Rehab Dx/Hx: Metabolic encephalopathy [D36.11]  Metabolic encephalopathy [V94.24]   Allergies   Allergen Reactions    Darvocet [Propoxyphene N-Acetaminophen]     Ranexa [Ranolazine Er]      Sick to her stomach    Ranolazine      Sick to her stomach    Sulfa Antibiotics Itching    Sulfasalazine Itching    Adhesive Tape Rash    Allantoin Rash    Bacitracin Rash    Gramicidin Rash    Neomycin Rash    Polymyxin B Rash    Pramoxine Hcl Rash    Silicone Rash     Precautions:  Restrictions/Precautions: General Precautions, Fall Risk (1800mL fluid restriction)          Home Situation/IADL:   Social/Functional History  Lives With: Alone  Type of Home: House (condo)  Home Layout: One level  Home Access: Level entry  Bathroom Shower/Tub: Walk-in shower  Bathroom Toilet: Standard  Bathroom Equipment: Shower chair, Grab bars in shower, Grab bars around toilet  Bathroom Accessibility: Walker accessible  Home Equipment: 4 wheeled walker, Cane, Grab bars (Pt uses 4ww at night for her evening routine, no device during the day)  ADL Assistance: Independent  Homemaking Assistance: Independent  Homemaking Responsibilities: Yes  Meal Prep Responsibility: Primary  Laundry Responsibility: Primary  Cleaning Responsibility: Primary  Bill Paying/Finance Responsibility: No (son does due to pt's macular degeneration)  Shopping Responsibility: No (DIL)  Health Care Management: Primary (fills up a weekly pill box)  Ambulation Assistance: Independent  Transfer Assistance: Independent  Active : No  Patient's  Info: Daughter in law does grocery shopping. Mode of Transportation: Car  Education: 12th grade.   Type of occupation: Homemaker, [employer], [employer] laundry  Leisure & Hobbies: housework, tv, pt manages meds in a weekly pill box, finances are managed by son and dtr. Pt has macular degeneration. Able to read with magnifying glass. IADL Comments: cooking and cleaning, cousin comes every other Wed to clean and help  Additional Comments: Pt states fall precipitating this admission is her only fall this year, but pt is a questionable historian. Pt sleeps in flat bed with c-pap      Date of Admit: 10/18/2021  Room #: 1024/1024-A     ST Number of Minutes/Billable Intervention  Cog/Memory Deficits 16+42    Aphasia/Language     Dysarthria/Speech     Apraxia/Speech     Dysphagia/Swallowing     Group     Other    TOTAL Minutes Billed  58    Variance    +28 needed extra time to complete tasks; OT dalton notified      Date: 10/22/2021  Day of ARU Week:  5       SLP Individual Minutes  Time In: 1301  Time Out: 9876  Minutes: 42       SLP Individual Minutes  Time In: 0940  Time Out: 8298  Minutes: 16    Variance/Reason:  [] Refusal due to   [] Medical hold/reason  [] Illness   [] Off Unit for test/procedure  [] Extra time needed to complete task  [] Other (specify)    Activity completed: Sequencing for safety in fx via cog demo and practical application. Pain: denies  Current Diet: ADULT DIET; Regular; Low Fat/Low Chol/High Fiber/2 gm Na; 1800 ml  Subjective: alert and motivated. Showing carryover and talking herself through tasks. Goals and POC: Co-treats where appropriate with PT or OT to facilitate patient goals in functional tasks. LTG                           Short-term Goals  Timeframe for Short-term Goals: 3x/week x1 week 30 mins min  LTG: Improve overall fx for safe return home with modified independence  Goal 1: Pt will complete simulated med mgmt to determine needs post discharge by 10/22/21  Goal met 10/20/21  Pt would benefit by med box set up upon discharge by family or NSG. Goal 2: Pt will demonstrate carryover of learned safety sequences in fx tasks with min cues.    Safety and problem solving were targeted with pt  asked to identify and correct problems demonstrated by this therapist regarding: walker safety for obtaining items, transfers (sit to stand/stand to sit), managing situations for items too high/low, tight spaces, energy conservation. Otf Hernandez Pt also used bathroom so targeted fx sequencing and safety in small space. Pt with perseverations on tasks and even with questions; gets distracted and confused with cues to redirect. Mod cues were needed for verbal tasks with repetition for clarification and simplification. 60% acc this date. Practical application around room for transfers from chair and on and off toilet showed perseverations in movements and distraction. Pt left pants around her ankles and was about to walk after toileting. Pt has a sink routine when washing her hands that she completes by wiping down sink and then wiping handles of the walker. Each time she would unlock the walker brakes go back to wiping counter and back up and try to reach for walker as it was moving away from her rather than turn around and take the walker. Pt did not show awareness and behaviors in sequencing change from session to session necessitate cueing. Reviewed daily routine with pt and info provided was inconsistent with what she told OT. Pt reports she will make coffee and go sit at her kitchen table to eat breakfast but reported to OT that she uses her walker seat and eats at the counter. Carryover is observed within session once cued but doesn't continue beyond in a consistent manner. Extend POC x3 sessions x1 week 30 mins min. The patient demonstrated a fair response to todays treatment and is making slow progress toward established goals. Ongoing deficits are observed in the areas of cognition and memory and continued focus on fx sequencing, problem solving and carryover of safety is recommended.      Alarm placed: []bed [x]chair   []other:   []LOU activated        Barriers to progress:   [] Fatigue        [x] Cognitive Deficits   [x] Memory Deficits   [] Reduced Attn   [] Self Limiting Behaviors    [x] Reduced insight/awareness     [x] Visual Deficits--mac degeneration   [] Premorbid Conditions  [] Impulsivity     [] Other      Education/Interventions used this date: safety and sequencing expectations    []   Progress was updated and reviewed in Rehabtracker with patient and/or family this         date. [] Attending Care Conference for pt this date. See Team Patient Care Conference Note for updates.     Interventions used this date:  [] Speech/Language Treatment    [] Instruction in HEP    Group   [] Dysphagia Treatment   [x] Cognitive Skill Daniel    Other:         Assessment / Impression                                                          Treatment/Activity Tolerance:   [x] Tolerated Treatment well:     [] Patient limited by fatigue/pain:       [] Patient limited by medical complications:    [] Adverse Reaction to Tx:   [] Significant change in status:      Electronically Signed by  LASHONDA Moya, [place], [address] [place] [address]    10/22/2021  2:01 PM

## 2021-10-23 LAB
INR BLD: 2.37 INDEX
PROTHROMBIN TIME: 30.9 SECONDS (ref 11.7–14.5)

## 2021-10-23 PROCEDURE — 97535 SELF CARE MNGMENT TRAINING: CPT

## 2021-10-23 PROCEDURE — 6370000000 HC RX 637 (ALT 250 FOR IP): Performed by: HOSPITALIST

## 2021-10-23 PROCEDURE — 2580000003 HC RX 258: Performed by: HOSPITALIST

## 2021-10-23 PROCEDURE — 94761 N-INVAS EAR/PLS OXIMETRY MLT: CPT

## 2021-10-23 PROCEDURE — 97530 THERAPEUTIC ACTIVITIES: CPT

## 2021-10-23 PROCEDURE — 1280000000 HC REHAB R&B

## 2021-10-23 PROCEDURE — 97116 GAIT TRAINING THERAPY: CPT

## 2021-10-23 PROCEDURE — 94150 VITAL CAPACITY TEST: CPT

## 2021-10-23 PROCEDURE — 36415 COLL VENOUS BLD VENIPUNCTURE: CPT

## 2021-10-23 PROCEDURE — 97112 NEUROMUSCULAR REEDUCATION: CPT

## 2021-10-23 PROCEDURE — 85610 PROTHROMBIN TIME: CPT

## 2021-10-23 PROCEDURE — 94640 AIRWAY INHALATION TREATMENT: CPT

## 2021-10-23 PROCEDURE — 6370000000 HC RX 637 (ALT 250 FOR IP): Performed by: PHYSICAL MEDICINE & REHABILITATION

## 2021-10-23 RX ADMIN — TIMOLOL MALEATE 1 DROP: 2.5 SOLUTION/ DROPS OPHTHALMIC at 10:36

## 2021-10-23 RX ADMIN — TIMOLOL MALEATE 1 DROP: 2.5 SOLUTION/ DROPS OPHTHALMIC at 21:55

## 2021-10-23 RX ADMIN — TORSEMIDE 20 MG: 20 TABLET ORAL at 10:32

## 2021-10-23 RX ADMIN — POTASSIUM CHLORIDE 10 MEQ: 750 TABLET, FILM COATED, EXTENDED RELEASE ORAL at 10:32

## 2021-10-23 RX ADMIN — CETIRIZINE HYDROCHLORIDE 10 MG: 10 TABLET, FILM COATED ORAL at 10:35

## 2021-10-23 RX ADMIN — POLYETHYLENE GLYCOL (3350) 17 G: 17 POWDER, FOR SOLUTION ORAL at 10:56

## 2021-10-23 RX ADMIN — SPIRONOLACTONE 25 MG: 25 TABLET ORAL at 10:32

## 2021-10-23 RX ADMIN — ALBUTEROL SULFATE 2 PUFF: 90 AEROSOL, METERED RESPIRATORY (INHALATION) at 19:40

## 2021-10-23 RX ADMIN — Medication 1 TABLET: at 10:36

## 2021-10-23 RX ADMIN — CYANOCOBALAMIN TAB 1000 MCG 1000 MCG: 1000 TAB at 10:32

## 2021-10-23 RX ADMIN — CARVEDILOL 6.25 MG: 6.25 TABLET, FILM COATED ORAL at 10:34

## 2021-10-23 RX ADMIN — LEVOTHYROXINE SODIUM 88 MCG: 0.09 TABLET ORAL at 05:58

## 2021-10-23 RX ADMIN — WARFARIN SODIUM 5 MG: 5 TABLET ORAL at 17:20

## 2021-10-23 RX ADMIN — CARVEDILOL 6.25 MG: 6.25 TABLET, FILM COATED ORAL at 17:08

## 2021-10-23 RX ADMIN — SODIUM CHLORIDE, PRESERVATIVE FREE 10 ML: 5 INJECTION INTRAVENOUS at 10:38

## 2021-10-23 RX ADMIN — ASPIRIN 81 MG: 81 TABLET, CHEWABLE ORAL at 10:32

## 2021-10-23 RX ADMIN — ACETAMINOPHEN 650 MG: 325 TABLET ORAL at 17:08

## 2021-10-23 RX ADMIN — ATORVASTATIN CALCIUM 10 MG: 10 TABLET, FILM COATED ORAL at 21:54

## 2021-10-23 ASSESSMENT — PAIN - FUNCTIONAL ASSESSMENT: PAIN_FUNCTIONAL_ASSESSMENT: PREVENTS OR INTERFERES SOME ACTIVE ACTIVITIES AND ADLS

## 2021-10-23 ASSESSMENT — PAIN SCALES - GENERAL
PAINLEVEL_OUTOF10: 7
PAINLEVEL_OUTOF10: 0
PAINLEVEL_OUTOF10: 0
PAINLEVEL_OUTOF10: 7

## 2021-10-23 ASSESSMENT — PAIN DESCRIPTION - PAIN TYPE: TYPE: ACUTE PAIN

## 2021-10-23 ASSESSMENT — PAIN DESCRIPTION - FREQUENCY: FREQUENCY: CONTINUOUS

## 2021-10-23 ASSESSMENT — PAIN DESCRIPTION - DESCRIPTORS: DESCRIPTORS: ACHING

## 2021-10-23 ASSESSMENT — PAIN DESCRIPTION - ONSET: ONSET: ON-GOING

## 2021-10-23 ASSESSMENT — PAIN DESCRIPTION - LOCATION: LOCATION: GENERALIZED

## 2021-10-23 ASSESSMENT — PAIN DESCRIPTION - PROGRESSION: CLINICAL_PROGRESSION: GRADUALLY WORSENING

## 2021-10-23 NOTE — PROGRESS NOTES
Physical Therapy  [x] daily progress note       [] discharge       Patient Name:  Mari Navas   :  3/18/1929 MRN: [id number]  Room:  [address] [place] [address] Date of Admission: 10/18/2021  Rehabilitation Diagnosis:   Metabolic encephalopathy [C04.45]  Metabolic encephalopathy [T24.19]       Date 10/23/2021       Day of ARU Week:  6   Time IN//930   Individual Tx Minutes 60   Group Tx Minutes    Co-Treat Minutes    Concurrent Tx Minutes    TOTAL Tx Time Mins 60   Variance Time    Variance Time []   Refusal due to:     []   Medical hold/reason:    []   Illness   []   Off Unit for test/procedure  []   Extra time needed to complete task  []   Therapeutic need  []   Other (specify):   Restrictions Restrictions/Precautions  Restrictions/Precautions: General Precautions, Fall Risk (1800mL fluid restriction)      Interdisciplinary communication [x]   Cleared for therapy per nursing     []   RN notified about issues during session  []   RN updated on pt performance  []   Spoke with   []   Spoke with OT  []   Spoke with MD  []   Other:    Subjective observations and cognitive status: Pt completing wash up with aide, agreeable to therapy     Pain level/location:   0 /10       Location:    Discharge recommendations  Anticipated discharge date:  10/27  Destination: []home alone   []home alone with assist PRN     [] home w/ family      [x] Continuous supervision recommended initially  []SNF    [] Assisted living     [] Other:  Continued therapy: [x]HHC PT  []OUTPATIENT  PT   [] No Further PT  []SNF PT  Caregiver training recommended: [x]Yes  [] No   Equipment needs: pt has 4ww     Bed Mobility:           [x]   Pt received out of bed     Transfers:    Sit--> Stand:  Supervision with inconsistency in locking brakes and cues needed  Stand --> Sit:   Supervision as above    Gait:    Distance:  900'   Assistance:  supervision  Device:  4ww  Gait Quality:  Continuous step pattern, improving step consistency until 700' mobility safety and technique  []   Reinforced patient's precautions/mobility while maintaining precautions  []   Postural awareness  []   Family/caregiver training  []   Progress was updated and reviewed in Rehabtracker with patient and/or family this date. []   Other:      Treatment Plan for Next Session:  Continue to reinforce safety in home situations    Assessment:   Assessment: This pt demonstrated a neutral response to today's treatment as evidenced by improved gait distance, no improvements on safety. The patient is making slow progress toward established goals as evidenced by QI scores. Ongoing deficits are observed in the areas of safety , balanceand continued focus on balance and safety is recommended.      Treatment/Activity Tolerance:   [x] Tolerated treatment with no adverse effects    [] Patient limited by fatigue  [] Patient limited by pain   [] Patient limited by medical complications:    [] Adverse reaction to Tx:   [] Significant change in status    Barrier/s to progress/learning:   []   None  [x]   Cognition  []   Hearing deficit  []   Pre-morbid mental/psychological status   []   Motivation  []   Communication  []   Anxiety  []   Vision deficit  []   Attention  []   Other:      Safety:       []  bed alarm set    [x]  chair alarm set    []  Pt refused alarms                []  Telesitter activated      [x]  Gait belt used during tx session      []other:         Number of Minutes/Billable Intervention  Gait Training 15   Therapeutic Exercise    Neuro Re-Ed 15   Therapeutic Activity 30   Wheelchair Propulsion    Group    Other:    TOTAL 60         Social History  Social/Functional History  Lives With: Alone  Type of Home: House (condo)  Home Layout: One level  Home Access: Level entry  Bathroom Shower/Tub: Walk-in shower  Bathroom Toilet: Standard  Bathroom Equipment: Shower chair, Grab bars in shower, Grab bars around toilet  Bathroom Accessibility: Walker accessible  Home Equipment: 4 wheeled walker, Cane, Grab bars (Pt uses 4ww at night for her evening routine, no device during the day)  ADL Assistance: [address]: Independent  Homemaking Responsibilities: Yes  Meal Prep Responsibility: Primary  Laundry Responsibility: Primary  Cleaning Responsibility: Primary  Bill Paying/Finance Responsibility: No (son does due to pt's macular degeneration)  Shopping Responsibility: No (DIL)  Health Care Management: Primary (fills up a weekly pill box)  Ambulation Assistance: Independent  Transfer Assistance: Independent  Active : No  Patient's  Info: Daughter in law does grocery shopping. Mode of Transportation: Car  Education: 12th grade. 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 Pt has macular degeneration. Able to read with magnifying glass. IADL Comments: cooking and cleaning, cousin comes every other Wed to clean and help  Additional Comments: Pt states fall precipitating this admission is her only fall this year, but pt is a questionable historian. Pt sleeps in flat bed with c-pap    Objective                                                                                    Goals:  (Update in navigator)   :   Long term goals  Time Frame for Long term goals : 5-7 days STG=LTG  Long term goal 1: Pt will perform all bed mobility with mod I  Long term goal 2: Pt will perform sit to stand, pivot and car transfers  with mod I  Long term goal 3: Pt will ambulate 150 feet on level surfaces and 10' on unlevel surface with  Jose  using 4ww  Long term goal 4: Pt will ascend/descend curb step with  4ww    and up to  12   steps with  rail(s) with supervision  Long term goal 5: Pt will retrieve light item from floor with  mod I using 4ww:        Plan of Care                                                                              Times per week: 5 days per week for a minimum of 60 minutes/day plus group as appropriate for 60 minutes.   Treatment to include Current Treatment Recommendations: Strengthening, Transfer Training, Endurance Training, Neuromuscular Re-education, Patient/Caregiver Education & Training, Equipment Evaluation, Education, & procurement, Balance Training, IADL Training, Gait Training, Home Exercise Program, Functional Mobility Training, Stair training, Safety Education & Training    Electronically signed by   Nikhil Collins PT,  10/23/2021, 12:02 PM

## 2021-10-23 NOTE — PROGRESS NOTES
Physical Rehabilitation: OCCUPATIONAL THERAPY     [x] daily progress note       [] discharge       Patient Name:  Jeni Barcenas   :  3/18/1929 MRN: [id number]  Room:  [address] [place], [place] [address] Date of Admission: 10/18/2021  Rehabilitation Diagnosis:   Metabolic encephalopathy [U01.68]  Metabolic encephalopathy [B22.83]       Date 10/23/2021       Day of ARU Week:  6   Time IN/OUT  1048 / 1248   Individual Tx Minutes 120   Group Tx Minutes    Co-Treat Minutes    Concurrent Tx Minutes    TOTAL Tx Time Mins 120   Variance Time    Variance Time []   Refusal due to:     []   Medical hold/reason:    []   Illness   []   Off Unit for test/procedure  []   Extra time needed to complete task  []   Therapeutic need  []   Other (specify):   Restrictions Restrictions/Precautions: General Precautions, Fall Risk (1800mL fluid restriction)         Communication with other providers: [x]   OK to see per nursing:     [x]   Spoke with team member regarding:      Subjective observations and cognitive status:      Pain level/location:   0 /10       Location: n/a    Discharge recommendations  Anticipated discharge date:  10/27  Destination: []home alone   []home alone w assist prn   [] home w/ family    [x] Continuous supervision       []SNF    [] Assisted living     [] Other:   Continued therapy: []HHC OT  []OUTPATIENT  OT   [] No Further OT  Equipment needs: none     Toileting: On arrival patient reported that she feels like she has to have a BM. Patient was unable to have a BM. Patient voided. Patient completed toileting hygiene and clothing management with SBA. Toilet Transfers:   Completed  transfer using [id number]. Min verbal cues  for locking and checking brakes. Patient  Demo LOB prior to completing transfer due to stepping on wheel min A to regain balance. Patient completed toilet transfer with CGA. 2nd toilet transfer completed with sup and verbal cues to lock brakes on 4ww for safety.      Device Used:    [x]   Standard Toilet [x]   Grab Bars           []  Bedside Commode       []   Elevated Toilet          []   Other:        Bed Mobility:           [x]   Pt received out of bed   Patient sitting in recliner     Transfers:    Sit--> Stand:  Min verbal cues for locking brakes prior to standing; SUP to stand from recliner and w/c   Stand --> Sit:   Sup; to stand from recliner and w/c   Stand-Pivot:   SBA for safety   Other:    Assistive device required for transfer:   4ww       Functional Mobility:    Assistance:  SBA in room and gym environment   Device:   []   Rolling Walker     []   Standard Walker []   Wheelchair        []   [employer]       [x]   4-Wheeled [place] Showman         []   Cardiac [employer] Showman       []   Other:        Homemaking Tasks:   Cleaning dishes at sink. Additional Therapeutic activities/exercises completed this date:     [x]   ADL Training   [x]   Balance/Postural training     []   Bed/Transfer Training   [x]   Endurance Training   []   Neuromuscular Re-ed   []   Nu-step:  Time:        Level:         #Steps:       []   Rebounder:    []  Seated     []  Standing        []   Supine Ther Ex (reps/sets):     []   Seated Ther Ex (reps/sets):     []   Standing Ther Ex (reps/sets):     []   Other:    Comments: Patient completed functional mobility to/from bathroom. Patient completed a toilet transfer. Patient completed a sink bath. Patient completed grooming tasks sitting at sink. Patient required total A to apply lotion on bilateral feet and to don compression socks. Patient applied lotion to UEs and LEs. Patient was able to thread feet into briefs and pants then completed clothing management with SUP/ SBA. Patient verbal cues to sequence dressing (doffing pants to be able to don briefs). Patient donned shoes and was able to reach to feet. To improve standing endurance and UE endurance/ strength pt completed arm bike while standing for 10.30 minutes. Patient required a rest break in w/c after arm bike.  Patient ambulated in the kitchen to be able to complete IADLs of cleaning dishes. Patient required verbal cues to problem-solve placement of 4WW to reach sink and dishes. Patient placed 4WW to left side and locked the breaks. Patient used sink to stabilize self intermittently. Patient cleaned three dishes standing for approx. 7 minutes. Patient was returned to room via w/c. Patient completed eating and patient required assist to open 1x container. Patient completed a toilet transfer at end of session with SUP. Patient returned to recliner at end of session. Patient/Caregiver Education and Training:   []   [employer] Equipment Use  []   Bed Mobility/Transfer Technique/Safety  [x]   Energy Conservation Tips  []   Family training  []   Postural Awareness  [x]   Safety During Functional Activities  []   Reinforced Patient's Precautions   [x]   Progress was updated and reviewed in Rehabtracker with patient and/or family this         date. Treatment Plan for Next Session: Continue OT POC     Assessment: This pt demonstrated a positive  response to today's treatment as evidenced by improved standing endurance while completing UB exercises. The patient is making progress toward established goals as evidenced by QI scores. Ongoing deficits are observed in the areas of safety awareness with use of 4WW and memory and continued focus on this is recommended.        Treatment/Activity Tolerance:   [x] Tolerated treatment with no adverse effects    [] Patient limited by fatigue  [] Patient limited by pain   [] Patient limited by medical complications:    [] Adverse reaction to Tx:   [] Significant change in status    Safety:       []  bed alarm set    [x]  chair alarm set    []  Pt refused alarms                []  Telesitter activated      [x]  Gait belt used during tx session      []other:       Number of Minutes/Billable Intervention  Therapeutic Exercise    ADL Self-care 60   Neuro Re-Ed    Therapeutic Activity 60   Group    Other:    TOTAL 120 Social History  Social/Functional History  Lives With: Alone  Type of Home: House (condo)  Home Layout: One level  Home Access: Level entry  Bathroom Shower/Tub: Walk-in shower  Bathroom Toilet: Standard  Bathroom Equipment: Shower chair, Grab bars in shower, Grab bars around toilet  Bathroom Accessibility: Walker accessible  Home Equipment: 4 wheeled walker, Cane, Grab bars (Pt uses 4ww at night for her evening routine, no device during the day)  ADL Assistance: Independent  Homemaking Assistance: Independent  Homemaking Responsibilities: Yes  Meal Prep Responsibility: Primary  Laundry Responsibility: Primary  Cleaning Responsibility: Primary  Bill Paying/Finance Responsibility: No (son does due to pt's macular degeneration)  Shopping Responsibility: No (DIL)  Health Care Management: Primary (fills up a weekly pill box)  Ambulation Assistance: Independent  Transfer Assistance: Independent  Active : No  Patient's  Info: Daughter in law does grocery shopping. Mode of Transportation: Car  Education: 12th grade. 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 Pt has macular degeneration. Able to read with magnifying glass. IADL Comments: cooking and cleaning, cousin comes every other Wed to clean and help  Additional Comments: Pt states fall precipitating this admission is her only fall this year, but pt is a questionable historian.  Pt sleeps in flat bed with c-pap    Objective                                                                                    Goals:  (Update in navigator)  Short term goals  Time Frame for Short term goals: STGs=LTGs:  Long term goals  Time Frame for Long term goals : 7-10 days or until d/c  Long term goal 1: Pt will complete grooming tasks Ind  Long term goal 2: Pt will complete total body bathing c supervision  Long term goal 3: Pt will complete UB dressing c setup  Long term goal 4: Pt will complete LB dressing c setup  Long term goal 5: Pt will doff/don footwear c setup  Long term goals 6: Pt will complete toileting mod I  Long term goal 7: Pt will complete functional transfers (bed, chair, shower, toilet) c DME PRN and mod I  Long term goal 8: Pt will perform therex/therax to facilitate increased strength/endurance/ax tolerance (c emphasis on dynamic standing balance/tolerance > 8 mins, BUE endurance, cognitive retraining) c SBA  Long term goal 9: Pt will complete simple homemaking tasks c DME PRN and S:        Plan of Care                                                                              Times per week: 5 days per week for a minimum of 60 minutes/day plus group as appropriate for 60 minutes.   Treatment to include Plan  Times per day: Daily  Current Treatment Recommendations: Strengthening, Balance Training, Functional Mobility Training, Endurance Training, Safety Education & Training, Patient/Caregiver Education & Training, Equipment Evaluation, Education, & procurement, Pain Management, Self-Care / ADL, Home Management Training, Cognitive/Perceptual Training    Electronically signed by   ADRIEL Mosley/L, [facility]  [id number]  10/23/2021, 11:01 AM

## 2021-10-23 NOTE — PROGRESS NOTES
Jay Hand    : 3/18/1929  Acct #: [de-identified]  MRN: [id number]              PM&R Progress Note      Admitting diagnosis: ***    Comorbid diagnoses impacting rehabilitation: ***    Chief complaint: ***    Prior (baseline) level of function: Independent.     Current level of function:         Current  IRF-YAMILKA and Goals:   Occupational Therapy:    Short term goals  Time Frame for Short term goals: STGs=LTGs :   Long term goals  Time Frame for Long term goals : 7-10 days or until d/c  Long term goal 1: Pt will complete grooming tasks Ind  Long term goal 2: Pt will complete total body bathing c supervision  Long term goal 3: Pt will complete UB dressing c setup  Long term goal 4: Pt will complete LB dressing c setup  Long term goal 5: Pt will doff/don footwear c setup  Long term goals 6: Pt will complete toileting mod I  Long term goal 7: Pt will complete functional transfers (bed, chair, shower, toilet) c DME PRN and mod I  Long term goal 8: Pt will perform therex/therax to facilitate increased strength/endurance/ax tolerance (c emphasis on dynamic standing balance/tolerance > 8 mins, BUE endurance, cognitive retraining) c SBA  Long term goal 9: Pt will complete simple homemaking tasks c DME PRN and S :                                       Eating: Eating  Assistance Needed: Setup or clean-up assistance  Comment: d/t low vision  CARE Score: 5  Discharge Goal: Set-up or clean-up assistance       Oral Hygiene: Oral Hygiene  Assistance Needed: Supervision or touching assistance  Comment: in stance at sink  CARE Score: 4  Discharge Goal: Independent    UB/LB Bathing: Shower/Bathe Self  Assistance Needed: Supervision or touching assistance  Comment: supervision for full shower  CARE Score: 4  Discharge Goal: Supervision or touching assistance    UB Dressing: Upper Body Dressing  Assistance Needed: Setup or clean-up assistance  Comment: to don pullover sweatshirt  CARE Score: 5  Discharge Goal: Set-up or clean-up Transfer  Assistance Needed: Supervision or touching assistance  Comment: supervision  CARE Score: 4  Discharge Goal: Independent  Toilet Transfer  Assistance Needed: Supervision or touching assistance  CARE Score: 4  Car Transfer  Assistance Needed: Supervision or touching assistance  Comment: supervision  CARE Score: 4  Discharge Goal: Independent    Ambulation:    Walking Ability  Does the Patient Walk?: Yes     Walk 10 Feet  Assistance Needed: Supervision or touching assistance  Comment: sup using 4WW  CARE Score: 4  Discharge Goal: Independent     Walk 50 Feet with Two Turns  Assistance Needed: Supervision or touching assistance  Comment: sup; 4WW  CARE Score: 4  Discharge Goal: Independent     Walk 150 Feet  Assistance Needed: Supervision or touching assistance  Comment: sup using 4WW  CARE Score: 4  Discharge Goal: Independent     Walking 10 Feet on Uneven Surfaces  Assistance Needed: Supervision or touching assistance  Comment: CG with verbal cues for awareness and strategies, 4ww  CARE Score: 4  Discharge Goal: Independent     1 Step (Curb)  Assistance Needed: Supervision or touching assistance  Comment: CG with cues for sequence  CARE Score: 4  Discharge Goal: Supervision or touching assistance     4 Steps  Assistance Needed: Supervision or touching assistance  Comment: CGA  CARE Score: 4  Discharge Goal: Supervision or touching assistance     12 Steps  Assistance Needed: Supervision or touching assistance  Comment: CGA  CARE Score: 4  Discharge Goal: Supervision or touching assistance       Wheelchair:  w/c Ability: Wheelchair Ability  Uses a Wheelchair and/or Scooter?: Yes  Wheel 50 Feet with Two Turns  Assistance Needed: Supervision or touching assistance  Comment: CGA  CARE Score: 4  Wheel 150 Feet  Assistance Needed: Partial/moderate assistance  Comment: min A 2/2 onset of fatigue  CARE Score: 3          Balance:        Object: Picking Up Object  Assistance Needed: Supervision or touching assistance  Comment: min A to steady; min cues for set-up, completion  CARE Score: 4  Discharge Goal: Independent    I      Exam:    Blood pressure 125/66, pulse 62, temperature 97.5 °F (36.4 °C), temperature source Oral, resp. rate 16, height 4' 11\" (1.499 m), weight 129 lb 1.6 oz (58.6 kg), SpO2 91 %. General: ***    HEENT: ***    Pulmonary: ***    Cardiac: ***    Abdomen: Patient's abdomen is soft and nondistended. Bowel sounds were present throughout. There was no rebound, guarding or masses noted. Upper extremities: ***    Lower extremities: ***    Sitting balance was ***. Standing balance was ***.     Lab Results   Component Value Date    WBC 14.8 (H) 10/09/2021    HGB 13.4 10/09/2021    HCT 42.7 10/09/2021    MCV 87.7 10/09/2021     (H) 10/09/2021     Lab Results   Component Value Date    INR 2.47 10/22/2021    INR 2.28 10/21/2021    INR 2.89 10/20/2021    PROTIME 32.2 (H) 10/22/2021    PROTIME 29.7 (H) 10/21/2021    PROTIME 37.7 (H) 10/20/2021     Lab Results   Component Value Date    CREATININE 0.6 10/18/2021    BUN 14 10/18/2021     (L) 10/18/2021    K 4.2 10/18/2021    CL 88 (L) 10/18/2021    CO2 31 10/18/2021     Lab Results   Component Value Date    ALT 18 10/13/2021    AST 17 10/13/2021    ALKPHOS 125 10/13/2021    BILITOT 1.6 (H) 10/13/2021       Expected length of stay  prior to a supervised level of function for discharge home with a walker and HHC OT/PT is ***    Recommendations:    ***

## 2021-10-24 LAB
INR BLD: 2.41 INDEX
PROTHROMBIN TIME: 31.4 SECONDS (ref 11.7–14.5)

## 2021-10-24 PROCEDURE — 6370000000 HC RX 637 (ALT 250 FOR IP): Performed by: HOSPITALIST

## 2021-10-24 PROCEDURE — 36415 COLL VENOUS BLD VENIPUNCTURE: CPT

## 2021-10-24 PROCEDURE — 94640 AIRWAY INHALATION TREATMENT: CPT

## 2021-10-24 PROCEDURE — 85610 PROTHROMBIN TIME: CPT

## 2021-10-24 PROCEDURE — 94150 VITAL CAPACITY TEST: CPT

## 2021-10-24 PROCEDURE — 1280000000 HC REHAB R&B

## 2021-10-24 PROCEDURE — 94761 N-INVAS EAR/PLS OXIMETRY MLT: CPT

## 2021-10-24 PROCEDURE — 6370000000 HC RX 637 (ALT 250 FOR IP): Performed by: PHYSICAL MEDICINE & REHABILITATION

## 2021-10-24 RX ADMIN — LEVOTHYROXINE SODIUM 88 MCG: 0.09 TABLET ORAL at 05:57

## 2021-10-24 RX ADMIN — CYANOCOBALAMIN TAB 1000 MCG 1000 MCG: 1000 TAB at 10:22

## 2021-10-24 RX ADMIN — ASPIRIN 81 MG: 81 TABLET, CHEWABLE ORAL at 10:22

## 2021-10-24 RX ADMIN — ATORVASTATIN CALCIUM 10 MG: 10 TABLET, FILM COATED ORAL at 20:14

## 2021-10-24 RX ADMIN — TIMOLOL MALEATE 1 DROP: 2.5 SOLUTION/ DROPS OPHTHALMIC at 20:17

## 2021-10-24 RX ADMIN — POTASSIUM CHLORIDE 10 MEQ: 750 TABLET, FILM COATED, EXTENDED RELEASE ORAL at 10:23

## 2021-10-24 RX ADMIN — ALBUTEROL SULFATE 2 PUFF: 90 AEROSOL, METERED RESPIRATORY (INHALATION) at 06:57

## 2021-10-24 RX ADMIN — Medication 1 TABLET: at 10:22

## 2021-10-24 RX ADMIN — TIMOLOL MALEATE 1 DROP: 2.5 SOLUTION/ DROPS OPHTHALMIC at 10:23

## 2021-10-24 RX ADMIN — SPIRONOLACTONE 25 MG: 25 TABLET ORAL at 10:23

## 2021-10-24 RX ADMIN — CETIRIZINE HYDROCHLORIDE 10 MG: 10 TABLET, FILM COATED ORAL at 10:23

## 2021-10-24 RX ADMIN — TORSEMIDE 20 MG: 20 TABLET ORAL at 10:23

## 2021-10-24 RX ADMIN — CARVEDILOL 6.25 MG: 6.25 TABLET, FILM COATED ORAL at 10:22

## 2021-10-24 RX ADMIN — WARFARIN SODIUM 5 MG: 5 TABLET ORAL at 17:36

## 2021-10-24 RX ADMIN — Medication 10000 MCG: at 10:23

## 2021-10-24 RX ADMIN — CARVEDILOL 6.25 MG: 6.25 TABLET, FILM COATED ORAL at 17:36

## 2021-10-24 ASSESSMENT — PAIN DESCRIPTION - DESCRIPTORS: DESCRIPTORS: ACHING

## 2021-10-24 ASSESSMENT — PAIN DESCRIPTION - ONSET: ONSET: ON-GOING

## 2021-10-24 ASSESSMENT — PAIN DESCRIPTION - LOCATION: LOCATION: GENERALIZED

## 2021-10-24 ASSESSMENT — PAIN SCALES - GENERAL
PAINLEVEL_OUTOF10: 10
PAINLEVEL_OUTOF10: 0

## 2021-10-24 ASSESSMENT — PAIN SCALES - WONG BAKER: WONGBAKER_NUMERICALRESPONSE: 0

## 2021-10-24 ASSESSMENT — PAIN DESCRIPTION - PAIN TYPE: TYPE: ACUTE PAIN

## 2021-10-24 ASSESSMENT — PAIN DESCRIPTION - PROGRESSION: CLINICAL_PROGRESSION: NOT CHANGED

## 2021-10-24 ASSESSMENT — PAIN DESCRIPTION - FREQUENCY: FREQUENCY: CONTINUOUS

## 2021-10-25 LAB
ANION GAP SERPL CALCULATED.3IONS-SCNC: 11 MMOL/L (ref 4–16)
BUN BLDV-MCNC: 17 MG/DL (ref 6–23)
CALCIUM SERPL-MCNC: 9.7 MG/DL (ref 8.3–10.6)
CHLORIDE BLD-SCNC: 93 MMOL/L (ref 99–110)
CO2: 24 MMOL/L (ref 21–32)
CREAT SERPL-MCNC: 0.5 MG/DL (ref 0.6–1.1)
GFR AFRICAN AMERICAN: >60 ML/MIN/1.73M2
GFR NON-AFRICAN AMERICAN: >60 ML/MIN/1.73M2
GLUCOSE BLD-MCNC: 165 MG/DL (ref 70–99)
HCT VFR BLD CALC: 45.3 % (ref 37–47)
HEMOGLOBIN: 14.2 GM/DL (ref 12.5–16)
INR BLD: 2.37 INDEX
MCH RBC QN AUTO: 27.6 PG (ref 27–31)
MCHC RBC AUTO-ENTMCNC: 31.3 % (ref 32–36)
MCV RBC AUTO: 88 FL (ref 78–100)
PDW BLD-RTO: 18.6 % (ref 11.7–14.9)
PLATELET # BLD: 527 K/CU MM (ref 140–440)
PMV BLD AUTO: 10.8 FL (ref 7.5–11.1)
POTASSIUM SERPL-SCNC: 4.5 MMOL/L (ref 3.5–5.1)
PROTHROMBIN TIME: 30.8 SECONDS (ref 11.7–14.5)
RBC # BLD: 5.15 M/CU MM (ref 4.2–5.4)
SODIUM BLD-SCNC: 128 MMOL/L (ref 135–145)
WBC # BLD: 8.3 K/CU MM (ref 4–10.5)

## 2021-10-25 PROCEDURE — 6370000000 HC RX 637 (ALT 250 FOR IP): Performed by: PHYSICAL MEDICINE & REHABILITATION

## 2021-10-25 PROCEDURE — 97129 THER IVNTJ 1ST 15 MIN: CPT

## 2021-10-25 PROCEDURE — 6370000000 HC RX 637 (ALT 250 FOR IP): Performed by: HOSPITALIST

## 2021-10-25 PROCEDURE — 85610 PROTHROMBIN TIME: CPT

## 2021-10-25 PROCEDURE — 97116 GAIT TRAINING THERAPY: CPT

## 2021-10-25 PROCEDURE — 97112 NEUROMUSCULAR REEDUCATION: CPT

## 2021-10-25 PROCEDURE — 85027 COMPLETE CBC AUTOMATED: CPT

## 2021-10-25 PROCEDURE — 36415 COLL VENOUS BLD VENIPUNCTURE: CPT

## 2021-10-25 PROCEDURE — 1280000000 HC REHAB R&B

## 2021-10-25 PROCEDURE — 80048 BASIC METABOLIC PNL TOTAL CA: CPT

## 2021-10-25 PROCEDURE — 97530 THERAPEUTIC ACTIVITIES: CPT

## 2021-10-25 PROCEDURE — 94761 N-INVAS EAR/PLS OXIMETRY MLT: CPT

## 2021-10-25 PROCEDURE — 97130 THER IVNTJ EA ADDL 15 MIN: CPT

## 2021-10-25 PROCEDURE — 97535 SELF CARE MNGMENT TRAINING: CPT

## 2021-10-25 RX ADMIN — POTASSIUM CHLORIDE 10 MEQ: 750 TABLET, FILM COATED, EXTENDED RELEASE ORAL at 08:40

## 2021-10-25 RX ADMIN — CARVEDILOL 6.25 MG: 6.25 TABLET, FILM COATED ORAL at 08:40

## 2021-10-25 RX ADMIN — Medication 1 TABLET: at 08:41

## 2021-10-25 RX ADMIN — LEVOTHYROXINE SODIUM 88 MCG: 0.09 TABLET ORAL at 05:30

## 2021-10-25 RX ADMIN — ACETAMINOPHEN 650 MG: 325 TABLET ORAL at 05:36

## 2021-10-25 RX ADMIN — ATORVASTATIN CALCIUM 10 MG: 10 TABLET, FILM COATED ORAL at 20:33

## 2021-10-25 RX ADMIN — WARFARIN SODIUM 5 MG: 5 TABLET ORAL at 16:36

## 2021-10-25 RX ADMIN — ACETAMINOPHEN 650 MG: 325 TABLET ORAL at 20:41

## 2021-10-25 RX ADMIN — TIMOLOL MALEATE 1 DROP: 2.5 SOLUTION/ DROPS OPHTHALMIC at 08:41

## 2021-10-25 RX ADMIN — Medication 10000 MCG: at 08:39

## 2021-10-25 RX ADMIN — TIMOLOL MALEATE 1 DROP: 2.5 SOLUTION/ DROPS OPHTHALMIC at 20:33

## 2021-10-25 RX ADMIN — CETIRIZINE HYDROCHLORIDE 10 MG: 10 TABLET, FILM COATED ORAL at 08:40

## 2021-10-25 RX ADMIN — TORSEMIDE 20 MG: 20 TABLET ORAL at 08:40

## 2021-10-25 RX ADMIN — CARVEDILOL 6.25 MG: 6.25 TABLET, FILM COATED ORAL at 16:36

## 2021-10-25 RX ADMIN — ASPIRIN 81 MG: 81 TABLET, CHEWABLE ORAL at 08:40

## 2021-10-25 RX ADMIN — SPIRONOLACTONE 25 MG: 25 TABLET ORAL at 08:40

## 2021-10-25 RX ADMIN — CYANOCOBALAMIN TAB 1000 MCG 1000 MCG: 1000 TAB at 08:41

## 2021-10-25 ASSESSMENT — PAIN SCALES - GENERAL
PAINLEVEL_OUTOF10: 0
PAINLEVEL_OUTOF10: 0
PAINLEVEL_OUTOF10: 1
PAINLEVEL_OUTOF10: 0
PAINLEVEL_OUTOF10: 0
PAINLEVEL_OUTOF10: 1
PAINLEVEL_OUTOF10: 10

## 2021-10-25 NOTE — PROGRESS NOTES
[facility]  ACUTE REHAB UNIT  SPEECH/LANGUAGE PATHOLOGY      [x] Daily           [] Discharge    Patient:Irene Flores      :3/18/1929  PGK:[id number]  Rehab Dx/Hx: Metabolic encephalopathy [Z25.58]  Metabolic encephalopathy [S72.11]   Allergies   Allergen Reactions    Darvocet [Propoxyphene N-Acetaminophen]     Ranexa [Ranolazine Er]      Sick to her stomach    Ranolazine      Sick to her stomach    Sulfa Antibiotics Itching    Sulfasalazine Itching    Adhesive Tape Rash    Allantoin Rash    Bacitracin Rash    Gramicidin Rash    Neomycin Rash    Polymyxin B Rash    Pramoxine Hcl Rash    Silicone Rash     Precautions: ; Restrictions/Precautions: General Precautions, Fall Risk (1800mL fluid restriction)          Home Situation/IADL:   Social/Functional History  Lives With: Alone  Type of Home: House (condo)  Home Layout: One level  Home Access: Level entry  Bathroom Shower/Tub: Walk-in shower  Bathroom Toilet: Standard  Bathroom Equipment: Shower chair, Grab bars in shower, Grab bars around toilet  Bathroom Accessibility: Walker accessible  Home Equipment: 4 wheeled walker, Cane, Grab bars (Pt uses 4ww at night for her evening routine, no device during the day)  ADL Assistance: Independent  Homemaking Assistance: Independent  Homemaking Responsibilities: Yes  Meal Prep Responsibility: Primary  Laundry Responsibility: Primary  Cleaning Responsibility: Primary  Bill Paying/Finance Responsibility: No (son does due to pt's macular degeneration)  Shopping Responsibility: No (DIL)  Health Care Management: Primary (fills up a weekly pill box)  Ambulation Assistance: Independent  Transfer Assistance: Independent  Active : No  Patient's  Info: Daughter in law does grocery shopping. Mode of Transportation: Car  Education: 12th grade.   Type of occupation: Homemaker, [employer], [employer]  Leisure & Hobbies: housework, tv, pt manages meds in a weekly pill box, finances are managed by son and dtr. Pt has macular degeneration. Able to read with magnifying glass. IADL Comments: cooking and cleaning, cousin comes every other Wed to clean and help  Additional Comments: Pt states fall precipitating this admission is her only fall this year, but pt is a questionable historian. Pt sleeps in flat bed with c-pap      Date of Admit: 10/18/2021  Room #: 1024/1024-A     ST Number of Minutes/Billable Intervention  Cog/Memory Deficits  30   Aphasia/Language     Dysarthria/Speech     Apraxia/Speech     Dysphagia/Swallowing     Group     Other    TOTAL Minutes Billed  30    Variance          Date: 10/25/2021  Day of ARU Week:  1       SLP Individual Minutes  Time In: 8238  Time Out: 6143  Minutes: 30         Variance/Reason:  [] Refusal due to   [] Medical hold/reason  [] Illness   [] Off Unit for test/procedure  [] Extra time needed to complete task  [] Other (specify)    Activity completed: Pt seen for cognition for sequencing and carryover of learned tasks around the unit. Pt taken out of her room for safety and monitoring, sequencing for brakes for 4WW in given situations, and problem solving given situations such as fatigue, items on the floor, maneuvering around a crowded space. Pain: denies  Current Diet: ADULT DIET; Regular; Low Fat/Low Chol/High Fiber/2 gm Na; 1800 ml  Subjective: pt incontinent of urine with clothing change needed. Pt expressed frustration; notified NSG this appears to be worsening as typically pull up is damp with pt reporting \"small trickle\" when she stands to full breakthrough to clothing. Ongoing cues for safety needed in fx as pt gets distracted. Met pt's son briefly who reported pt will have continuous supervision upon discharge and stated \"If we can't be there we will bring someone in that can be. \"      Goals and POC: Co-treats where appropriate with PT or OT to facilitate patient goals in functional tasks.   LTG                           Short-term Goals  Timeframe for Short-term Goals: 3x/week x1 week 30 mins min  LTG: Improve overall fx for safe return home with modified independence  Goal 1: Pt will complete simulated med mgmt to determine needs post discharge by 10/22/21  Goal met 10/20/21  Pt would benefit by med box set up upon discharge by family or NSG. Goal 2: Pt will demonstrate carryover of learned safety sequences in fx tasks with min cues. Pt continues to require min cues for brake mgmt on 4ww. Pt gets confused when to lock and unlock and when put in different situations gets distracted and requires redirect. Repetition shows improvement however carryover from session to session does not happen without cues. PT had focused morning session on brakes and she continued to need cues for this therapist.  Problem solving required mod cues. Spoke with pt's son who expressed understanding of the need for supervision at home. The patient demonstrated a fair response to todays treatment and is making slow progress toward established goals as evidenced by the need for cues. Ongoing deficits are observed in the areas of cognition and memory and continued focus on fx safety cues is recommended. Alarm placed: []bed [x]chair   []other:   [x] activated        Barriers to progress:   [] Fatigue        [x] Cognitive Deficits   [x] Memory Deficits   [x] Reduced Attn   [] Self Limiting Behaviors    [x] Reduced insight/awareness     [] Visual Deficits   [] Premorbid Conditions  [] Impulsivity     [] Other      Education/Interventions used this date: fx safety    []   Progress was updated and reviewed in Rehabtracker with patient and/or family this         date. [] Attending Care Conference for pt this date. See Team Patient Care Conference Note for updates.     Interventions used this date:  [] Speech/Language Treatment    [] Instruction in HEP    Group   [] Dysphagia Treatment   [x] Cognitive Skill Daniel    Other: Assessment / Impression                                                          Treatment/Activity Tolerance:   [x] Tolerated Treatment well:     [] Patient limited by fatigue/pain:       [] Patient limited by medical complications:    [] Adverse Reaction to Tx:   [] Significant change in status:      Electronically Signed by  LASHONDA Woo, [place], [address] [place] [address]    10/25/2021  2:02 PM

## 2021-10-25 NOTE — PROGRESS NOTES
Comprehensive Nutrition Assessment    Type and Reason for Visit:  Reassess    Nutrition Recommendations/Plan:   · Continue current diet order  · Encourage adequate high fiber intake     Nutrition Assessment:  Currently on cardiac, low sodium diet with fluid restriction. Pt's appetite has improved with increased activity from PT/OT. Able to feed self, independent with eating. Per PT, pt's family assist with groceries and some cooking. +disorientation to time. Will follow as moderate nutrition risk. Malnutrition Assessment:  Malnutrition Status: At risk for malnutrition (Comment)    Context:  Acute Illness       Estimated Daily Nutrient Needs:  Energy (kcal):  5338-9980 (25-27 ezequiel/kg); Weight Used for Energy Requirements:  Current     Protein (g):  59 ( 1 g/kg); Weight Used for Protein Requirements:  Ideal        Fluid (ml/day):  1600; Method Used for Fluid Requirements:  1 ml/kcal      Nutrition Related Findings:  rx: ocuvite, coumadin; pt receiving PT at visit  Labs: 128, Glu 165    Wounds:  None       Current Nutrition Therapies:    ADULT DIET; Regular; Low Fat/Low Chol/High Fiber/2 gm Na; 1800 ml    Anthropometric Measures:  · Height: 4' 11.02\" (149.9 cm)  · Current Body Weight: 135 lb 9.3 oz (61.5 kg)   · Admission Body Weight: 135 lb (61.2 kg)    · Usual Body Weight: 130 lb (59 kg) (per hx -patient stated)     · Ideal Body Weight: 95 lbs; % Ideal Body Weight 142.7 %   · BMI: 27.4   · BMI Categories: Overweight (BMI 25.0-29. 9)       Nutrition Diagnosis:   · Predicted inadequate energy intake related to other (comment) (hx reduced appetite in illness) as evidenced by poor intake prior to admission    Nutrition Interventions:   Food and/or Nutrient Delivery:  Continue Current Diet, Snacks (Comment)  Nutrition Education/Counseling:  No recommendation at this time   Coordination of Nutrition Care:  Continue to monitor while inpatient, Coordination of Community Care    Goals:  Patient will consume at least 50-75% at meals during stay       Nutrition Monitoring and Evaluation:   Behavioral-Environmental Outcomes:  None Identified   Food/Nutrient Intake Outcomes:  Food and Nutrient Intake  Physical Signs/Symptoms Outcomes:  Biochemical Data, Fluid Status or Edema, Weight, Meal Time Behavior     Discharge Planning:    Continue current diet     Electronically signed by Randi Tiwari RD, LD on 10/25/21 at 1:32 PM EDT    Contact: [phone]

## 2021-10-25 NOTE — PLAN OF CARE
Problem: Infection:  Goal: Will remain free from infection  Description: Will remain free from infection  Outcome: Ongoing     Problem: Safety:  Goal: Free from accidental physical injury  Description: Free from accidental physical injury  Outcome: Ongoing  Goal: Free from intentional harm  Description: Free from intentional harm  Outcome: Ongoing     Problem: Daily Care:  Goal: Daily care needs are met  Description: Daily care needs are met  Outcome: Ongoing     Problem: Pain:  Goal: Patient's pain/discomfort is manageable  Description: Patient's pain/discomfort is manageable  Outcome: Ongoing  Goal: Pain level will decrease  Description: Pain level will decrease  Outcome: Ongoing  Goal: Control of acute pain  Description: Control of acute pain  Outcome: Ongoing  Goal: Control of chronic pain  Description: Control of chronic pain  Outcome: Ongoing     Problem: Skin Integrity:  Goal: Skin integrity will stabilize  Description: Skin integrity will stabilize  Outcome: Ongoing     Problem: Discharge Planning:  Goal: Patients continuum of care needs are met  Description: Patients continuum of care needs are met  Outcome: Ongoing     Problem: Falls - Risk of:  Goal: Will remain free from falls  Description: Will remain free from falls  Outcome: Ongoing  Goal: Absence of physical injury  Description: Absence of physical injury  Outcome: Ongoing     Problem: Infection:  Goal: Will remain free from infection  Description: Will remain free from infection  Outcome: Ongoing     Problem: Safety:  Goal: Free from accidental physical injury  Description: Free from accidental physical injury  Outcome: Ongoing  Goal: Free from intentional harm  Description: Free from intentional harm  Outcome: Ongoing     Problem: Daily Care:  Goal: Daily care needs are met  Description: Daily care needs are met  Outcome: Ongoing     Problem: Pain:  Goal: Patient's pain/discomfort is manageable  Description: Patient's pain/discomfort is manageable  Outcome: Ongoing     Problem: Skin Integrity:  Goal: Skin integrity will stabilize  Description: Skin integrity will stabilize  Outcome: Ongoing     Problem: Discharge Planning:  Goal: Patients continuum of care needs are met  Description: Patients continuum of care needs are met  Outcome: Ongoing     Problem: Neurological  Goal: Maximum potential motor/sensory/cognitive function  Outcome: Ongoing     Problem: Nutrition  Goal: Optimal nutrition therapy  Outcome: Ongoing  Goal: Understanding of nutritional guidelines  Outcome: Ongoing     Problem: Musculor/Skeletal Functional Status  Goal: Highest potential functional level  Outcome: Ongoing  Goal: Absence of falls  Outcome: Ongoing     Problem: Skin Integrity:  Goal: Will show no infection signs and symptoms  Description: Will show no infection signs and symptoms  Outcome: Ongoing  Goal: Absence of new skin breakdown  Description: Absence of new skin breakdown  Outcome: Ongoing

## 2021-10-25 NOTE — FLOWSHEET NOTE
[x] daily progress note       [] discharge       Patient Name:  Farrah Car   :  3/18/1929 MRN: [id number]  Room:  [address] [place] [address] Date of Admission: 10/18/2021  Rehabilitation Diagnosis:   Metabolic encephalopathy [U86.46]  Metabolic encephalopathy [G74.88]       Date 10/25/2021       Day of ARU Week:  1   Time IN/OUT 7659-9789   Individual Tx Minutes 60   TOTAL Tx Time Mins 60   Restrictions Restrictions/Precautions  Restrictions/Precautions: General Precautions, Fall Risk (1800mL fluid restriction)      Communication with other providers: [x]   OK to see per nursing:     []   Spoke with team member regarding:      Subjective observations and cognitive status: Pt seen sitting up in recliner at beginning of treatment. Agreeable to therapy. Pain level/location: 0/10          Discharge recommendations  Anticipated discharge date:  10/27/2021  Destination: []?home alone   []?home alone with assist PRN     []? home w/ family      [x]? Continuous supervision recommended initially  []? SNF    []? Assisted living     []? Other:  Continued therapy: [x]? [facility]  []? OUTPATIENT  PT   []? No Further PT  []? SNF PT  Caregiver training recommended: [x]? Yes  []? No   Equipment needs: pt has 4ww      [x]   Pt received out of bed     Transfers:    Sit--> Stand:  Supervision (min vcs for locking rollator brakes and for proper hand placement)   Stand --> Sit:  Supervision (min vcs for locking rollator brakes and for proper hand placement)   Assistive device required for transfer:  rollator    Gait:    Distance: 698'+60'   Assistance:  Supervision   Device:  rollator   Gait Quality:  Reciprocal pattern   Pt practiced multiple trials (17 trials) of ambulating towards chair with rollator and practicing safe sequencing with locking rollator brakes and positioning self safely for stand->sit. Pt initially required min-mod vcs for safety and sequencing with rollator brakes, and for proper hand placement.  Pt did well on last 4 trials and did not require vcs for safety or sequencing. Stairs   # Completed: 15  Assistance:  Supervision   Supportive Device:  2 rails     Uneven Surfaces:  (2 trials)      Assistance:Supervision   Device:   Rollator  Surfaces Completed:   [x]  Carpeted Surface with bean bags beneath      []  Throw rugs       []  Ramp       []  Outdoor pavements        []  Grass             []  Loose gravel        []  Other:      2 trials: Pt amb up/down 4\" curb step SBA with rollator. Mod vcs for proper management of rollator, sequencing of locking of rollator brakes. Patient/Caregiver Education and Training:   [x]   Bed Mobility/Transfer technique/safety  [x]   Gait technique/sequencing  [x]   Proper use of assistive device  [x]   Advanced mobility safety and technique  []   Reinforced patient's precautions/mobility while maintaining precautions  []   Postural awareness  []   Family training    Treatment Plan for Next Session: gait; transfers; advanced gait; stair training; exercises; balance training      Assessment: This pt demonstrated a positive response to today's treatment as evidenced by improved mobility. The patient is making progress toward established goals as evidenced by QI scores. Ongoing deficits are observed in the areas of strength, balance, safety, and endurance and continued focus on strengthening, balance training, safety training, and endurance training is recommended. Treatment/Activity Tolerance:   [x] Tolerated treatment with no adverse effects    [] Patient limited by fatigue  [] Patient limited by pain   [] Patient limited by medical complications:    [] Adverse reaction to Tx:   [] Significant change in status    Safety:       []  bed alarm set    [x]  chair alarm set    []  Pt refused alarms                [x]  Telesitter activated      [x]  Gait belt used during tx session      [x]other: pt left sitting up in recliner with call light at end of treatment.           Number of Minutes/Billable Intervention  Gait Training 45   Therapeutic Exercise    Neuro Re-Ed    Therapeutic Activity 15   Wheelchair Propulsion    Group    Other:    TOTAL 60         Social History  Social/Functional History  Lives With: Alone  Type of Home: House (condo)  Home Layout: One level  Home Access: Level entry  Bathroom Shower/Tub: Walk-in shower  Bathroom Toilet: Standard  Bathroom Equipment: Shower chair, Grab bars in shower, Grab bars around toilet  Bathroom Accessibility: Adam Jan accessible  Home Equipment: 4 wheeled walker, Cane, Grab bars (Pt uses 4ww at night for her evening routine, no device during the day)  ADL Assistance: Independent  Homemaking Assistance: Independent  Homemaking Responsibilities: Yes  Meal Prep Responsibility: Primary  Laundry Responsibility: Primary  Cleaning Responsibility: Primary  Bill Paying/Finance Responsibility: No (son does due to pt's macular degeneration)  Shopping Responsibility: No (DIL)  Health Care Management: Primary (fills up a weekly pill box)  Ambulation Assistance: Independent  Transfer Assistance: Independent  Active : No  Patient's  Info: Daughter in law does grocery shopping. Mode of Transportation: Car  Education: 12th grade. 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 Pt has macular degeneration. Able to read with magnifying glass. IADL Comments: cooking and cleaning, cousin comes every other Wed to clean and help  Additional Comments: Pt states fall precipitating this admission is her only fall this year, but pt is a questionable historian. Pt sleeps in flat bed with c-pap    Objective                                                                                    Goals:  (Update in navigator)   :   Long term goals  Time Frame for Long term goals : 5-7 days STG=LTG  Long term goal 1: Pt will perform all bed mobility with mod I  Long term goal 2: Pt will perform sit to stand, pivot and car transfers  with mod I  Long term goal 3: Pt will ambulate 150 feet on level surfaces and 10' on unlevel surface with  Jose  using 4ww  Long term goal 4: Pt will ascend/descend curb step with  4ww    and up to  12   steps with  rail(s) with supervision  Long term goal 5: Pt will retrieve light item from floor with  mod I using 4ww:        Plan of Care                                                                              Times per week: 5 days per week for a minimum of 60 minutes/day plus group as appropriate for 60 minutes.   Treatment to include Current Treatment Recommendations: Strengthening, Transfer Training, Endurance Training, Neuromuscular Re-education, Patient/Caregiver Education & Training, Equipment Evaluation, Education, & procurement, Balance Training, IADL Training, Gait Training, Home Exercise Program, Functional Mobility Training, Stair training, Safety Education & Training    Electronically signed by   Ant Bingham [id number]  10/25/2021, 10:04 AM

## 2021-10-25 NOTE — PROGRESS NOTES
Occupational Therapy    Physical Rehabilitation: OCCUPATIONAL THERAPY     [x] daily progress note       [] discharge       Patient Name:  Farrah Car   :  3/18/1929 MRN: [id number]  Room:  [address] [place], [place] [address] Date of Admission: 10/18/2021  Rehabilitation Diagnosis:   Metabolic encephalopathy [F83.20]  Metabolic encephalopathy [I96.71]       Date 10/25/2021       Day of ARU Week:  1   Time IN//930   Individual Tx Minutes 60   Group Tx Minutes    Co-Treat Minutes    Concurrent Tx Minutes    TOTAL Tx Time Mins 60   Variance Time    Variance Time []   Refusal due to:     []   Medical hold/reason:    []   Illness   []   Off Unit for test/procedure  []   Extra time needed to complete task  []   Therapeutic need  []   Other (specify):   Restrictions Restrictions/Precautions: General Precautions, Fall Risk (1800mL fluid restriction)         Communication with other providers: [x]   OK to see per nursing:     []   Spoke with team member regarding:      Subjective observations and cognitive status: Pt in high fowlers on approach, finishing breakfast and agreeable to tx session. Pain level/location:    /10       Location: Denied   Discharge recommendations  Anticipated discharge date:  10/27  Destination: []??home alone   []? ?home alone w assist prn   []? ? home w/ family    [x]? ? Continuous supervision       []? ?SNF    []? ? Assisted living     []? ? Other:   Continued therapy: [x]? ?[facility] OT  []??OUTPATIENT  OT   []?? No Further OT  Equipment needs: None       ADLs:     Oral Hygiene: Oral Hygiene  Assistance Needed: Independent  Comment: in stance at sink  CARE Score: 6  Discharge Goal: Independent    UB/LB Bathing: Shower/Bathe Self  Assistance Needed: Setup or clean-up assistance  Comment: declined full shower, performed sinkside bathing  CARE Score: 5  Discharge Goal: Supervision or touching assistance    UB Dressing: Upper Body Dressing  Assistance Needed: Independent  Comment: demo'ed good 4WW safety during item retrieval  CARE Score: 6  Discharge Goal: Set-up or clean-up assistance         LB Dressing: Lower Body Dressing  Assistance Needed: Independent  Comment: demo'ed good 4WW safety during item retrieval  CARE Score: 6  Discharge Goal: Set-up or clean-up assistance    Donning and [place] Footwear: Putting On/Taking Off Footwear  Assistance Needed: Independent  Comment: to doff/don shoes  CARE Score: 6  Discharge Goal: Set-up or clean-up assistance      Toileting: Toileting Hygiene  Assistance Needed: Supervision or touching assistance  Comment: supervision; required safety cue not to walk away from toilet with pants down (pt requesting to take off dirty clothes, educated to sit on commode to do this)  CARE Score: 4  Discharge Goal: Independent      Toilet Transfers: Toilet Transfer  Assistance Needed: Supervision or touching assistance  Comment: supervision c 2UK; cue for brake management  CARE Score: 4  Discharge Goal: Independent  Device Used:    []   Standard Toilet         [x]   Grab Bars           []  Bedside Commode       []   Elevated Toilet          []   Other:        Bed Mobility:           []   Pt received out of bed   Supine --> Sit:  Mod I       Transfers:    Sit--> Stand:    Mod I; despite inconsistent 4WW brake management is steady during task  Stand --> Sit:  Mod I; despite inconsistent 4WW brake management is steady during task  Stand-Pivot:   Supervision; at end of session did not recall to lock brakes or reach back from chair  Other:    Assistive device required for transfer:  4WW      Functional Mobility:    Assistance:  Supervision within room  Device:   []   Rolling Walker     []   Standard Guerrero Foster []   Wheelchair        []   Diane Peals       [x]   4-Wheeled Guerrero Foster         []   Cardiac Guerrero Foster       []   Other:          Additional Therapeutic activities/exercises completed this date:     [x]   ADL Training   [x]   Balance/Postural training     [x]   Bed/Transfer Training   [x]   Endurance Training   [] Neuromuscular Re-ed   []   Nu-step:  Time:        Level:         #Steps:       []   Rebounder:    []  Seated     []  Standing        []   Supine Ther Ex (reps/sets):     []   Seated Ther Ex (reps/sets):     []   Standing Ther Ex (reps/sets):     []   Other:      Comments: All intervention performed to increase pt's endurance, ax tolerance, balance, safety and I c ADLs/IADLs and functional transfers/mobility. Patient/Caregiver Education and Training:   []   [employer] Equipment Use  [x]   Bed Mobility/Transfer Technique/Safety  []   Energy Conservation Tips  []   Family training  [x]   Postural Awareness  [x]   Safety During Functional Activities  []   Reinforced Patient's Precautions   []   Progress was updated and reviewed in Rehabtracker with patient and/or family this         date.     Treatment Plan for Next Session: Continue OT POC     Assessment:  Pt ranges from a supervision-mod I level for tasks d/t inconsistent 4WW safety (for example was safe during dressing tasks and object retrieval; was unsafe after toileting in a way to safely sequence tasks)      Treatment/Activity Tolerance:   [x] Tolerated treatment with no adverse effects    [] Patient limited by fatigue  [] Patient limited by pain   [] Patient limited by medical complications:    [] Adverse reaction to Tx:   [] Significant change in status    Safety:       []  bed alarm set    [x]  chair alarm set    []  Pt refused alarms                [x]  Telesitter activated      [x]  Gait belt used during tx session      []other:       Number of Minutes/Billable Intervention  Therapeutic Exercise    ADL Self-care 60   Neuro Re-Ed    Therapeutic Activity    Group    Other:    TOTAL 60       Social History  Social/Functional History  Lives With: Alone  Type of Home: House (condo)  Home Layout: One level  Home Access: Level entry  Bathroom Shower/Tub: Walk-in shower  Bathroom Toilet: Standard  Bathroom Equipment: Shower chair, Grab bars in shower, Grab bars DME PRN and mod I  Long term goal 8: Pt will perform therex/therax to facilitate increased strength/endurance/ax tolerance (c emphasis on dynamic standing balance/tolerance > 8 mins, BUE endurance, cognitive retraining) c SBA  Long term goal 9: Pt will complete simple homemaking tasks c DME PRN and S:        Plan of Care                                                                              Times per week: 5 days per week for a minimum of 60 minutes/day plus group as appropriate for 60 minutes. Treatment to include Plan  Times per day: Daily  Current Treatment Recommendations: Strengthening, Balance Training, Functional Mobility Training, Endurance Training, Safety Education & Training, Patient/Caregiver Education & Training, Equipment Evaluation, Education, & procurement, Pain Management, Self-Care / ADL, Home Management Training, Cognitive/Perceptual Training    Electronically signed by   Julian King MS, OTR/L  License #[id number]  10/25/2021, 10:00 AM

## 2021-10-25 NOTE — PROGRESS NOTES
Physical Therapy  [x] daily progress note       [] discharge       Patient Name:  Jeni Barcenas   :  3/18/1929 MRN: [id number]  Room:  [address] [place] [address] Date of Admission: 10/18/2021  Rehabilitation Diagnosis:   Metabolic encephalopathy [L22.46]  Metabolic encephalopathy [V71.20]       Date 10/25/2021       Day of ARU Week:  1   Time IN/OUT 1444/1514   Individual Tx Minutes 30   Group Tx Minutes    Co-Treat Minutes    Concurrent Tx Minutes    TOTAL Tx Time Mins 30   Variance Time    Variance Time []   Refusal due to:     []   Medical hold/reason:    []   Illness   []   Off Unit for test/procedure  []   Extra time needed to complete task  []   Therapeutic need  []   Other (specify):   Restrictions Restrictions/Precautions  Restrictions/Precautions: General Precautions, Fall Risk (1800mL fluid restriction)      Interdisciplinary communication [x]   Cleared for therapy per nursing     []   RN notified about issues during session  []   RN updated on pt performance  [x]   Spoke with : regarding recommendation for life alert system  []   Spoke with OT  []   Spoke with MD  []   Other:    Subjective observations and cognitive status: Pt in recliner and agreeable to therapy. Pt states after training \"well I'll certainly try to do what you want\".       Pain level/location:   0 /10       Location:    Discharge recommendations  Anticipated discharge date:  10/27  Destination: []home alone   [x]home alone with assist PRN     [] home w/ family      [x] recommend Continuous supervision  []SNF    [] Assisted living     [] Other:  Continued therapy: [x]HHC PT  []OUTPATIENT  PT   [] No Further PT  []SNF PT  Caregiver training recommended: []Yes  [] No   Equipment needs: pt has 4ww           Additional Therapeutic activities/exercises completed this date:     []   Nu-step:  Time:        Level:         #Steps:       []   Rebounder:    []  Seated     []  Standing        []   Balance training         []   Postural training    []   Supine ther ex (reps/sets):     []   Seated ther ex (reps/sets):     []   Standing ther ex (reps/sets):     []   Picking up object from floor (standing):                   []   Reacher used   []   Other:   []   Other: pt ambulated without device this session to administer DGI. Pt was close supervision with occasional CG needed. Pt demonstrates mild path deviation, variable step width and length, variable alisha/discontinuous steps at timess  The Dynamic Gait Index was administered to Yazan Rojas on 10/25/2021. This index assesses the likelihood of falling in older adults. A 4 point scale was used with \"0\" being the lowest level of function and \"3\" the highest.    The following 8 facets of gait were assessed:  Task Score Pre-Treatment   Date: Score Post-Treatment  Date:   Gait Level Surface at 20'  2   Change in Gait Speed  2   Gait with Horizontal Head Turns (look right, left, center)  1   Gait with Vertical Head Turns  (look up, down, straight)  1   Gait and Pivot Turn (walk, turn, stop)  1   Step Over Obstacle (step over a box)  1   Step Around Obstacles (Move around cones)  2   Steps (rail vs no rail; alternating vs 2 feet to a stair)  2     Interpretation:  12/24(score is predictive of falls)      Total score: 24 points    < 19/24 = predictive of falls in the elderly     >22/24 = safe ambulators      Comments:  Reviewed results and meaning of test with elevated fall risk. Educated pt on ways to minimize fall risk including use of 4ww at all times, using safe transfer and walker practices, keeping activity within her level of ability. Also discussed recommendation for increased supervision and assist for higher level tasks, as well as having a system to prevent prolonged response to a fall. Pt states she is seriously considering some kind of life alert system.      Patient/Caregiver Education and Training:   []   Role of PT  []   Education about Dx  [x]   Use of call light for assist: pt asked after training and after 2 session of needing increased cues today if she could walk in her room alone. Re-educated on fall risk and need to use nurse call light   []   Wheelchair mobility/management  []   HEP provided and explained   []   Treatment plan reviewed  []   Home safety  []   Body mechanics  []   Positioning  [x]   Bed Mobility/Transfer technique  [x]   Gait technique/sequencing  []   Proper use of assistive device/adaptive equipment  [x]   Stair training/Advanced mobility safety and technique  []   Reinforced patient's precautions/mobility while maintaining precautions  []   Postural awareness  []   Family/caregiver training  []   Progress was updated and reviewed in Rehabtracker with patient and/or family this date. []   Other:      Treatment Plan for Next Session: balance training, safety with gait in 4ww    Assessment:   Assessment: This pt demonstrated a positive   response to today's treatment as evidenced by no decline. The patient is making slight progress toward established goals as evidenced by QI scores.    Treatment/Activity Tolerance:   [x] Tolerated treatment with no adverse effects    [] Patient limited by fatigue  [] Patient limited by pain   [] Patient limited by medical complications:    [] Adverse reaction to Tx:   [] Significant change in status    Barrier/s to progress/learning:   []   None  [x]   Cognition  []   Hearing deficit  []   Pre-morbid mental/psychological status   []   Motivation  []   Communication  []   Anxiety  []   Vision deficit  []   Attention  []   Other:      Safety:       []  bed alarm set    [x]  chair alarm set    []  Pt refused alarms                [x]  Telesitter activated      [x]  Gait belt used during tx session      []other:         Number of Minutes/Billable Intervention  Gait Training 20   Therapeutic Exercise    Neuro Re-Ed 10   Therapeutic Activity    Wheelchair Propulsion    Group    Other:    TOTAL 30         Social History  Social/Functional History  Lives With: Alone  Type of Home: House (condo)  Home Layout: One level  Home Access: Level entry  Bathroom Shower/Tub: Walk-in shower  Bathroom Toilet: Standard  Bathroom Equipment: Shower chair, Grab bars in shower, Grab bars around toilet  Bathroom Accessibility: Walker accessible  Home Equipment: 4 wheeled walker, Cane, Grab bars (Pt uses 4ww at night for her evening routine, no device during the day)  ADL Assistance: Independent  Homemaking Assistance: Independent  Homemaking Responsibilities: Yes  Meal Prep Responsibility: Primary  Laundry Responsibility: Primary  Cleaning Responsibility: Primary  Bill Paying/Finance Responsibility: No (son does due to pt's macular degeneration)  Shopping Responsibility: No (DIL)  Health Care Management: Primary (fills up a weekly pill box)  Ambulation Assistance: Independent  Transfer Assistance: Independent  Active : No  Patient's  Info: Daughter in law does grocery shopping. Mode of Transportation: Car  Education: 12th grade. 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 Pt has macular degeneration. Able to read with magnifying glass. IADL Comments: cooking and cleaning, cousin comes every other Wed to clean and help  Additional Comments: Pt states fall precipitating this admission is her only fall this year, but pt is a questionable historian. Pt sleeps in flat bed with c-pap    Objective                                                                                    Goals:  (Update in navigator)   :   Long term goals  Time Frame for Long term goals : 5-7 days STG=LTG  Long term goal 1: Pt will perform all bed mobility with mod I  Long term goal 2: Pt will perform sit to stand, pivot and car transfers  with mod I  Long term goal 3: Pt will ambulate 150 feet on level surfaces and 10' on unlevel surface with  Jose  using 4ww  Long term goal 4: Pt will ascend/descend curb step with  4ww    and up to  12   steps with  rail(s) with supervision  Long term goal 5: Pt will retrieve light item from floor with  mod I using 4ww:        Plan of Care                                                                              Times per week: 5 days per week for a minimum of 60 minutes/day plus group as appropriate for 60 minutes.   Treatment to include Current Treatment Recommendations: Strengthening, Transfer Training, Endurance Training, Neuromuscular Re-education, Patient/Caregiver Education & Training, Equipment Evaluation, Education, & procurement, Balance Training, IADL Training, Gait Training, Home Exercise Program, Functional Mobility Training, Stair training, Safety Education & Training    Electronically signed by   Malini Silverio PT,  10/25/2021, 2:56 PM

## 2021-10-26 LAB
INR BLD: 2.75 INDEX
PROTHROMBIN TIME: 35.8 SECONDS (ref 11.7–14.5)

## 2021-10-26 PROCEDURE — 85610 PROTHROMBIN TIME: CPT

## 2021-10-26 PROCEDURE — 1280000000 HC REHAB R&B

## 2021-10-26 PROCEDURE — 97116 GAIT TRAINING THERAPY: CPT

## 2021-10-26 PROCEDURE — 6370000000 HC RX 637 (ALT 250 FOR IP): Performed by: PHYSICAL MEDICINE & REHABILITATION

## 2021-10-26 PROCEDURE — 97129 THER IVNTJ 1ST 15 MIN: CPT

## 2021-10-26 PROCEDURE — 97530 THERAPEUTIC ACTIVITIES: CPT

## 2021-10-26 PROCEDURE — 97130 THER IVNTJ EA ADDL 15 MIN: CPT

## 2021-10-26 PROCEDURE — 97535 SELF CARE MNGMENT TRAINING: CPT

## 2021-10-26 PROCEDURE — 94150 VITAL CAPACITY TEST: CPT

## 2021-10-26 PROCEDURE — 99232 SBSQ HOSP IP/OBS MODERATE 35: CPT | Performed by: PHYSICAL MEDICINE & REHABILITATION

## 2021-10-26 PROCEDURE — 94761 N-INVAS EAR/PLS OXIMETRY MLT: CPT

## 2021-10-26 PROCEDURE — 36415 COLL VENOUS BLD VENIPUNCTURE: CPT

## 2021-10-26 PROCEDURE — 6370000000 HC RX 637 (ALT 250 FOR IP): Performed by: HOSPITALIST

## 2021-10-26 PROCEDURE — 97110 THERAPEUTIC EXERCISES: CPT

## 2021-10-26 PROCEDURE — 94640 AIRWAY INHALATION TREATMENT: CPT

## 2021-10-26 RX ADMIN — TIMOLOL MALEATE 1 DROP: 2.5 SOLUTION/ DROPS OPHTHALMIC at 09:19

## 2021-10-26 RX ADMIN — Medication 10000 MCG: at 09:15

## 2021-10-26 RX ADMIN — POLYETHYLENE GLYCOL (3350) 17 G: 17 POWDER, FOR SOLUTION ORAL at 18:39

## 2021-10-26 RX ADMIN — ATORVASTATIN CALCIUM 10 MG: 10 TABLET, FILM COATED ORAL at 21:26

## 2021-10-26 RX ADMIN — LEVOTHYROXINE SODIUM 88 MCG: 0.09 TABLET ORAL at 06:22

## 2021-10-26 RX ADMIN — SPIRONOLACTONE 25 MG: 25 TABLET ORAL at 09:15

## 2021-10-26 RX ADMIN — CYANOCOBALAMIN TAB 1000 MCG 1000 MCG: 1000 TAB at 09:15

## 2021-10-26 RX ADMIN — Medication 1 TABLET: at 09:14

## 2021-10-26 RX ADMIN — ACETAMINOPHEN 650 MG: 325 TABLET ORAL at 06:23

## 2021-10-26 RX ADMIN — TIOTROPIUM BROMIDE AND OLODATEROL 2 PUFF: 3.124; 2.736 SPRAY, METERED RESPIRATORY (INHALATION) at 08:25

## 2021-10-26 RX ADMIN — ASPIRIN 81 MG: 81 TABLET, CHEWABLE ORAL at 09:14

## 2021-10-26 RX ADMIN — ALBUTEROL SULFATE 2 PUFF: 90 AEROSOL, METERED RESPIRATORY (INHALATION) at 08:24

## 2021-10-26 RX ADMIN — TORSEMIDE 20 MG: 20 TABLET ORAL at 09:15

## 2021-10-26 RX ADMIN — CARVEDILOL 6.25 MG: 6.25 TABLET, FILM COATED ORAL at 16:55

## 2021-10-26 RX ADMIN — ACETAMINOPHEN 650 MG: 325 TABLET ORAL at 21:26

## 2021-10-26 RX ADMIN — WARFARIN SODIUM 5 MG: 5 TABLET ORAL at 16:55

## 2021-10-26 RX ADMIN — TIMOLOL MALEATE 1 DROP: 2.5 SOLUTION/ DROPS OPHTHALMIC at 21:26

## 2021-10-26 RX ADMIN — POTASSIUM CHLORIDE 10 MEQ: 750 TABLET, FILM COATED, EXTENDED RELEASE ORAL at 09:15

## 2021-10-26 RX ADMIN — CETIRIZINE HYDROCHLORIDE 10 MG: 10 TABLET, FILM COATED ORAL at 09:14

## 2021-10-26 ASSESSMENT — PAIN SCALES - GENERAL
PAINLEVEL_OUTOF10: 5
PAINLEVEL_OUTOF10: 0
PAINLEVEL_OUTOF10: 2

## 2021-10-26 ASSESSMENT — PAIN SCALES - WONG BAKER: WONGBAKER_NUMERICALRESPONSE: 0

## 2021-10-26 NOTE — PROGRESS NOTES
[facility]  ACUTE REHAB UNIT  SPEECH/LANGUAGE PATHOLOGY      [x] Daily           [x] Discharge    Patient:Irene Parisi      :3/18/1929  [facility]:[id number]  Rehab Dx/Hx: Metabolic encephalopathy [N05.76]  Metabolic encephalopathy [K57.95]   Allergies   Allergen Reactions    Darvocet [Propoxyphene N-Acetaminophen]     Ranexa [Ranolazine Er]      Sick to her stomach    Ranolazine      Sick to her stomach    Sulfa Antibiotics Itching    Sulfasalazine Itching    Adhesive Tape Rash    Allantoin Rash    Bacitracin Rash    Gramicidin Rash    Neomycin Rash    Polymyxin B Rash    Pramoxine Hcl Rash    Silicone Rash     Precautions: ; Restrictions/Precautions: General Precautions, Fall Risk (1800mL fluid restriction)          Home Situation/IADL:   Social/Functional History  Lives With: Alone  Type of Home: House (condo)  Home Layout: One level  Home Access: Level entry  Bathroom Shower/Tub: Walk-in shower  Bathroom Toilet: Standard  Bathroom Equipment: Shower chair, Grab bars in shower, Grab bars around toilet  Bathroom Accessibility: Walker accessible  Home Equipment: 4 wheeled walker, Cane, Grab bars (Pt uses 4ww at night for her evening routine, no device during the day)  ADL Assistance: Independent  Homemaking Assistance: Independent  Homemaking Responsibilities: Yes  Meal Prep Responsibility: Primary  Laundry Responsibility: Primary  Cleaning Responsibility: Primary  Bill Paying/Finance Responsibility: No (son does due to pt's macular degeneration)  Shopping Responsibility: No (DIL)  Health Care Management: Primary (fills up a weekly pill box)  Ambulation Assistance: Independent  Transfer Assistance: Independent  Active : No  Patient's  Info: Daughter in law does grocery shopping. Mode of Transportation: Car  Education: 12th grade.   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 Pt has macular degeneration. Able to read with magnifying glass. IADL Comments: cooking and cleaning, cousin comes every other Wed to clean and help  Additional Comments: Pt states fall precipitating this admission is her only fall this year, but pt is a questionable historian. Pt sleeps in flat bed with c-pap      Date of Admit: 10/18/2021  Room #: 1024/1024-A     ST Number of Minutes/Billable Intervention  Cog/Memory Deficits 30    Aphasia/Language     Dysarthria/Speech     Apraxia/Speech     Dysphagia/Swallowing     Group     Other    TOTAL Minutes Billed  30    Variance          Date: 10/26/2021  Day of ARU Week:  2       SLP Individual Minutes  Time In: 8741  Time Out: 8265  Minutes: 6       SLP Individual Minutes  Time In: 6405  Time Out: 1207  Minutes: 24    Variance/Reason:  [] Refusal due to   [] Medical hold/reason  [] Illness   [] Off Unit for test/procedure  [] Extra time needed to complete task  [] Other (specify)    Activity completed: Pt seen for cognition and memory with education and planning for discharge. Pain: denies  Current Diet: ADULT DIET; Regular; Low Fat/Low Chol/High Fiber/2 gm Na; 1800 ml  Subjective: alert and cooperative      Goals and POC: Co-treats where appropriate with PT or OT to facilitate patient goals in functional tasks. LTG                           Short-term Goals  Timeframe for Short-term Goals: 3x/week x1 week 30 mins min  LTG: Improve overall fx for safe return home with modified independence  Goal 1: Pt will complete simulated med mgmt to determine needs post discharge by 10/22/21  Goal met 10/20/21  Pt would benefit by med box set up upon discharge by family or NSG. Goal 2: Pt will demonstrate carryover of learned safety sequences in fx tasks with min cues. Pt continues to need intermittent min cues for safety with 4WW.   Targeted memory strategies this date for continued recall at home for safety and sequencing her day including stimulation activities, calendar reminders, a car emergency kit was put together for pt to have in the event of urinary incontinence along with a list of reminders to help with safety. Pt will have ongoing supervision with family for cues as well as looking into a Life alert button. Discussed how that typically works as pt had many questions and appeared satisfied. Handouts were left for memory stim tasks and to help with recall of info. Pt able to verbalize walker safety but does perseverate at times on a task and doesn't monitor for safety. No further ST recommended at this time. Prognosis for improvement is fair based on observations and repetition with sporadic carryover that continues to require cues for familiar tasks. Pt is pleasant and motivated. Pt with mild cognitive memory deficits. The patient demonstrated a positive response to todays treatment and is making slow progress toward established goals as evidenced by improved performance with cues. Ongoing deficits are observed in the areas of cognition and memory and continued focus on fx safety with cues is recommended. Alarm placed: []bed [x]chair   []other:   [x]LOU activated        Barriers to progress:   [] Fatigue        [x] Cognitive Deficits   [x] Memory Deficits   [x] Reduced Attn   [] Self Limiting Behaviors    [] Reduced insight/awareness     [] Visual Deficits   [] Premorbid Conditions  [] Impulsivity     [] Other      Education/Interventions used this date: see above    []   Progress was updated and reviewed in Rehabtracker with patient and/or family this         date. [] Attending Care Conference for pt this date. See Team Patient Care Conference Note for updates.     Interventions used this date:  [] Speech/Language Treatment    [] Instruction in HEP    Group   [] Dysphagia Treatment   [x] Cognitive Skill Daniel    Other:         Assessment / Impression Treatment/Activity Tolerance:   [x] Tolerated Treatment well:     [] Patient limited by fatigue/pain:       [] Patient limited by medical complications:    [] Adverse Reaction to Tx:   [] Significant change in status:      Electronically Signed by  Robbert Goldberg, SLP, Phillip Miles    10/26/2021  2:07 PM

## 2021-10-26 NOTE — PROGRESS NOTES
Occupational Therapy    Physical Rehabilitation: OCCUPATIONAL THERAPY     [x] daily progress note       [x] discharge       Patient Name:  Sallie Gonzalez   :  3/18/1929 MRN: [id number]  Room:  [address] [place], [place] [address] Date of Admission: 10/18/2021  Rehabilitation Diagnosis:   Metabolic encephalopathy [P29.60]  Metabolic encephalopathy [Z15.32]       Date 10/26/2021       Day of ARU Week:  2   Time IN//1047  1300/1330   Individual Tx Minutes 60+30   Group Tx Minutes    Co-Treat Minutes    Concurrent Tx Minutes    TOTAL Tx Time Mins 90   Variance Time    Variance Time []   Refusal due to:     []   Medical hold/reason:    []   Illness   []   Off Unit for test/procedure  []   Extra time needed to complete task  []   Therapeutic need  []   Other (specify):   Restrictions Restrictions/Precautions: General Precautions, Fall Risk (1800mL fluid restriction)         Communication with other providers: [x]   OK to see per nursing:     []   Spoke with team member regarding:      Subjective observations and cognitive status: AM: Pt in bed, pleasant and agreeable to tx session    PM: Pt in recliner dozing, woke up easily and agreeable to tx session     Pain level/location:    /10       Location: Denied   Discharge recommendations  Anticipated discharge date:  10/27  Destination: []???home alone   []? ??home alone w assist prn   []? ?? home w/ family    [x]? ?? Continuous supervision       []? ??SNF    []??? Assisted living     []? ?? Other:   Continued therapy: [x]? ? ? [facility] OT  []???OUTPATIENT  OT   []??? No Further OT  Equipment needs: None       ADLs:      Eating: Eating  Assistance Needed: Setup or clean-up assistance  Comment: d/t low vision  CARE Score: 5  Discharge Goal: Set-up or clean-up assistance       Oral Hygiene: Oral Hygiene  Assistance Needed: Independent  Comment: in stance at sink  CARE Score: 6  Discharge Goal: Independent    UB/LB Bathing: Shower/Bathe Self  Assistance Needed: Independent  Comment: for sinkside bathing, declined full shower  CARE Score: 6  Discharge Goal: Supervision or touching assistance    UB Dressing: Upper Body Dressing  Assistance Needed: Independent  Comment: including good 4WW safety to retrieve clothing  CARE Score: 6  Discharge Goal: Set-up or clean-up assistance         LB Dressing: Lower Body Dressing  Assistance Needed: Independent  Comment: including good 4WW safety to retrieve clothing  CARE Score: 6  Discharge Goal: Set-up or clean-up assistance    Donning and [place] Footwear: Putting On/Taking Off Footwear  Assistance Needed: Independent  Comment: to doff/don shoes  CARE Score: 6  Discharge Goal: Set-up or clean-up assistance      Toileting: Toileting Hygiene  Assistance Needed: Independent  Comment: mod I, improved safety this session. Performed in AM and PM, in PM pt required increased time to also change clothing d/t bladder incontinence  CARE Score: 6  Discharge Goal: Independent      Toilet Transfers: Toilet Transfer  Assistance Needed: Independent  Comment: mod I c [id number], good [id number] management today  CARE Score: 6  Discharge Goal: Independent  Device Used:    []   Standard Toilet         [x]   Grab Bars           []  Bedside Commode       []   Elevated Toilet          []   Other:        Bed Mobility:           []   Pt received out of bed   Supine --> Sit:  Mod I       Transfers:    Sit--> Stand: Mod I   Stand --> Sit:   Mod I   Stand-Pivot: Mod I   Other:    Assistive device required for transfer:   4WW      Functional Mobility:    Assistance:   Mod I within room and ~30 ft x2 in hallway in PM  Device:   []   Rolling Walker     []   Standard Eula Rota []   Wheelchair        []   [employer]       [x]   4-Wheeled Eula Rota         []   Cardiac Eula Rota       []   Other:         Additional Therapeutic activities/exercises completed this date:     [x]   ADL Training   [x]   Balance/Postural training     [x]   Bed/Transfer Training   [x]   Endurance Training   []   Neuromuscular Re-ed   [] Nu-step:  Time:        Level:         #Steps:       []   Rebounder:    []  Seated     []  Standing        []   Supine Ther Ex (reps/sets):     [x]   Seated Ther Ex (reps/sets): To increase BUE endurance for ADLs and transfers, pt completed arm ergometer on min resistance x 6 mins, 0 rest breaks    []   Standing Ther Ex (reps/sets):     []   Other:      Comments: All intervention performed to increase pt's endurance, ax tolerance, balance,  and I c ADLs/IADLs and functional transfers/mobility. Patient/Caregiver Education and Training:   []   [employer] Equipment Use  [x]   Bed Mobility/Transfer Technique/Safety  []   Energy Conservation Tips  []   Family training  [x]   Postural Awareness  [x]   Safety During Functional Activities  []   Reinforced Patient's Precautions   []   Progress was updated and reviewed in Rehabtracker with patient and/or family this         date.     Treatment Plan for Next Session: Planned d/c from ARU next date      Assessment:  Pt has met mod I goals in a structured setting, however 2* inconsistency with safety and impaired memory initial supervision is recommended at discharge      Treatment/Activity Tolerance:   [x] Tolerated treatment with no adverse effects    [] Patient limited by fatigue  [] Patient limited by pain   [] Patient limited by medical complications:    [] Adverse reaction to Tx:   [] Significant change in status    Safety:       []  bed alarm set    [x]  chair alarm set    []  Pt refused alarms                []  Telesitter activated      [x]  Gait belt used during tx session      []other:       Number of Minutes/Billable Intervention  Therapeutic Exercise    ADL Self-care 75   Neuro Re-Ed    Therapeutic Activity 15   Group    Other:    TOTAL 90       Social History  Social/Functional History  Lives With: Alone  Type of Home: House (condo)  Home Layout: One level  Home Access: Level entry  Bathroom Shower/Tub: Walk-in shower  Bathroom Toilet: Standard  Bathroom Equipment: Shower chair, Grab bars in shower, Grab bars around toilet  Bathroom Accessibility: Walker accessible  Home Equipment: 4 wheeled walker, Cane, Grab bars (Pt uses 4ww at night for her evening routine, no device during the day)  ADL Assistance: [address]: Independent  Homemaking Responsibilities: Yes  Meal Prep Responsibility: Primary  Laundry Responsibility: Primary  Cleaning Responsibility: Primary  Bill Paying/Finance Responsibility: No (son does due to pt's macular degeneration)  Shopping Responsibility: No (DIL)  Health Care Management: Primary (fills up a weekly pill box)  Ambulation Assistance: Independent  Transfer Assistance: Independent  Active : No  Patient's  Info: Daughter in law does grocery shopping. Mode of Transportation: Car  Education: 12th grade. 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 Pt has macular degeneration. Able to read with magnifying glass. IADL Comments: cooking and cleaning, cousin comes every other Wed to clean and help  Additional Comments: Pt states fall precipitating this admission is her only fall this year, but pt is a questionable historian.  Pt sleeps in flat bed with c-pap    Objective                                                                                    Goals:  (Update in navigator)  Short term goals  Time Frame for Short term goals: STGs=LTGs:  Long term goals  Time Frame for Long term goals : 7-10 days or until d/c  Long term goal 1: Pt will complete grooming tasks Ind  Long term goal 2: Pt will complete total body bathing c supervision  Long term goal 3: Pt will complete UB dressing c setup  Long term goal 4: Pt will complete LB dressing c setup  Long term goal 5: Pt will doff/don footwear c setup  Long term goals 6: Pt will complete toileting mod I  Long term goal 7: Pt will complete functional transfers (bed, chair, shower, toilet) c DME PRN and mod I  Long term goal 8: Pt will perform therex/therax to facilitate increased strength/endurance/ax tolerance (c emphasis on dynamic standing balance/tolerance > 8 mins, BUE endurance, cognitive retraining) c SBA  Long term goal 9: Pt will complete simple homemaking tasks c DME PRN and S:        Plan of Care                                                                              Times per week: 5 days per week for a minimum of 60 minutes/day plus group as appropriate for 60 minutes. Treatment to include Plan  Times per day: Daily  Current Treatment Recommendations: Strengthening, Balance Training, Functional Mobility Training, Endurance Training, Safety Education & Training, Patient/Caregiver Education & Training, Equipment Evaluation, Education, & procurement, Pain Management, Self-Care / ADL, Home Management Training, Cognitive/Perceptual Training    Electronically signed by   Anthony Gomez MS, OTR/L  License #[id number]  10/26/2021, 3:44 PM

## 2021-10-26 NOTE — PROGRESS NOTES
Nilsa Mo    : 3/18/1929  Acct #: [de-identified]  MRN: [id number]              PM&R Progress Note      Admitting diagnosis: ***    Comorbid diagnoses impacting rehabilitation: ***    Chief complaint: ***    Prior (baseline) level of function: Independent.     Current level of function:         Current  IRF-YAMILKA and Goals:   Occupational Therapy:    Short term goals  Time Frame for Short term goals: STGs=LTGs :   Long term goals  Time Frame for Long term goals : 7-10 days or until d/c  Long term goal 1: Pt will complete grooming tasks Ind  Long term goal 2: Pt will complete total body bathing c supervision  Long term goal 3: Pt will complete UB dressing c setup  Long term goal 4: Pt will complete LB dressing c setup  Long term goal 5: Pt will doff/don footwear c setup  Long term goals 6: Pt will complete toileting mod I  Long term goal 7: Pt will complete functional transfers (bed, chair, shower, toilet) c DME PRN and mod I  Long term goal 8: Pt will perform therex/therax to facilitate increased strength/endurance/ax tolerance (c emphasis on dynamic standing balance/tolerance > 8 mins, BUE endurance, cognitive retraining) c SBA  Long term goal 9: Pt will complete simple homemaking tasks c DME PRN and S :                                       Eating: Eating  Assistance Needed: Setup or clean-up assistance  Comment: d/t low vision  CARE Score: 5  Discharge Goal: Set-up or clean-up assistance       Oral Hygiene: Oral Hygiene  Assistance Needed: Independent  Comment: in stance at sink  CARE Score: 6  Discharge Goal: Independent    UB/LB Bathing: Shower/Bathe Self  Assistance Needed: Setup or clean-up assistance  Comment: declined full shower, performed sinkside bathing  CARE Score: 5  Discharge Goal: Supervision or touching assistance    UB Dressing: Upper Body Dressing  Assistance Needed: Independent  Comment: demo'ed good 4WW safety during item retrieval  CARE Score: 6  Discharge Goal: Set-up or clean-up assistance         LB Dressing: Lower Body Dressing  Assistance Needed: Independent  Comment: demo'ed good 4WW safety during item retrieval  CARE Score: 6  Discharge Goal: Set-up or clean-up assistance    Donning and [place] Footwear: Putting On/Taking Off Footwear  Assistance Needed: Independent  Comment: to doff/don shoes  CARE Score: 6  Discharge Goal: Set-up or clean-up assistance      Toileting: Toileting Hygiene  Assistance Needed: Supervision or touching assistance  Comment: supervision; required safety cue not to walk away from toilet with pants down (pt requesting to take off dirty clothes, educated to sit on commode to do this)  CARE Score: 4  Discharge Goal: Independent      Toilet Transfers:   Toilet Transfer  Assistance Needed: Supervision or touching assistance  Comment: supervision c 7KC; cue for brake management  CARE Score: 4  Discharge Goal: Independent    Physical Therapy:         Long term goals  Time Frame for Long term goals : 5-7 days STG=LTG  Long term goal 1: Pt will perform all bed mobility with mod I  Long term goal 2: Pt will perform sit to stand, pivot and car transfers  with mod I  Long term goal 3: Pt will ambulate 150 feet on level surfaces and 10' on unlevel surface with  Jose  using 4ww  Long term goal 4: Pt will ascend/descend curb step with  4ww    and up to  12   steps with  rail(s) with supervision  Long term goal 5: Pt will retrieve light item from floor with  mod I using 4ww      Bed Mobility:   Sit to Lying  Assistance Needed: Independent  CARE Score: 6  Discharge Goal: Independent  Roll Left and Right  Assistance Needed: Independent  CARE Score: 6  Discharge Goal: Independent  Lying to Sitting on Side of Bed  Assistance Needed: Independent  Comment: mod difficulty due to \"soreness\"  CARE Score: 6  Discharge Goal: Independent    Transfers:    Sit to Stand  Assistance Needed: Supervision or touching assistance  Comment: CGA from recliner and EOB  CARE Score: 4  Discharge Goal: Needed: Supervision or touching assistance  Comment: min A to steady; min cues for set-up, completion  CARE Score: 4  Discharge Goal: Independent    I      Exam:    Blood pressure 133/64, pulse 61, temperature 97.7 °F (36.5 °C), temperature source Oral, resp. rate 16, height 4' 11.02\" (1.499 m), weight 141 lb 5 oz (64.1 kg), SpO2 93 %. General: ***    HEENT: ***    Pulmonary: ***    Cardiac: ***    Abdomen: Patient's abdomen is soft and nondistended. Bowel sounds were present throughout. There was no rebound, guarding or masses noted. Upper extremities: ***    Lower extremities: ***    Sitting balance was ***. Standing balance was ***.     Lab Results   Component Value Date    WBC 8.3 10/25/2021    HGB 14.2 10/25/2021    HCT 45.3 10/25/2021    MCV 88.0 10/25/2021     (H) 10/25/2021     Lab Results   Component Value Date    INR 2.37 10/25/2021    INR 2.41 10/24/2021    INR 2.37 10/23/2021    PROTIME 30.8 (H) 10/25/2021    PROTIME 31.4 (H) 10/24/2021    PROTIME 30.9 (H) 10/23/2021     Lab Results   Component Value Date    CREATININE 0.5 (L) 10/25/2021    BUN 17 10/25/2021     (L) 10/25/2021    K 4.5 10/25/2021    CL 93 (L) 10/25/2021    CO2 24 10/25/2021     Lab Results   Component Value Date    ALT 18 10/13/2021    AST 17 10/13/2021    ALKPHOS 125 10/13/2021    BILITOT 1.6 (H) 10/13/2021       Expected length of stay  prior to a supervised level of function for discharge home with a walker and HHC OT/PT is ***    Recommendations:    ***

## 2021-10-26 NOTE — PROGRESS NOTES
Physical Therapy  [x] daily progress note       [x] discharge       Patient Name:  Kaleigh Banegas   :  3/18/1929 MRN: [id number]  Room:  [address] [place] [address] Date of Admission: 10/18/2021  Rehabilitation Diagnosis:   Metabolic encephalopathy [U89.50]  Metabolic encephalopathy [Q51.63]       Date 10/26/2021       Day of ARU Week:  2   Time IN/OUT 1402/1502   Individual Tx Minutes 60   Group Tx Minutes    Co-Treat Minutes    Concurrent Tx Minutes    TOTAL Tx Time Mins 60   Variance Time    Variance Time []   Refusal due to:     []   Medical hold/reason:    []   Illness   []   Off Unit for test/procedure  []   Extra time needed to complete task  []   Therapeutic need  []   Other (specify):   Restrictions Restrictions/Precautions  Restrictions/Precautions: General Precautions, Fall Risk (1800mL fluid restriction)      Interdisciplinary communication [x]   Cleared for therapy per nursing     []   RN notified about issues during session  []   RN updated on pt performance  []   Spoke with   []   Spoke with OT  []   Spoke with MD  []   Other:    Subjective observations and cognitive status: Pt in recliner, states she is tired from her other therapies, but agreeable to therapy   Pain level/location:   0 /10       Location:    Discharge recommendations  Anticipated discharge date:  10/27  Destination: []home alone   []home alone with assist PRN     [] home w/ family      [x] Continuous supervision to be provided by son and DIL  []SNF    [] Assisted living     [] Other:  Continued therapy: [x]HHC PT  []OUTPATIENT PT   [] No Further PT  []SNF PT  Caregiver training recommended: []Yes  [] No   Equipment needs: 4ww(pt has)     PT IRF-YAMILKA scores and goals for discharge assessment:   Roll Left and Right  Assistance Needed: Independent  CARE Score: 6  Discharge Goal: Independent    Sit to Lying  Assistance Needed: Independent  CARE Score: 6  Discharge Goal: Independent    Lying to Sitting on Side of Bed  Assistance Needed: Independent  Comment: mod difficulty due to \"soreness\"  CARE Score: 6  Discharge Goal: Independent    Sit to Stand  Assistance Needed: Independent  Comment: no use of UEs  CARE Score: 6  Discharge Goal: Independent    Chair/Bed-to-Chair Transfer  Assistance Needed: Independent  Comment: 4ww  CARE Score: 6  Discharge Goal: Independent        Car Transfer  Assistance Needed: Independent  Comment: 4ww  CARE Score: 6  Discharge Goal: Independent    Walk 10 Feet?   Walk 10 Feet?: Yes    1 Step  1 Step?: Yes    Picking Up Object  Assistance Needed: Independent  Comment: recalled all safety instructions  CARE Score: 6  Discharge Goal: Independent                          Walking Ability  Does the Patient Walk?: Yes    Walk 10 Feet  Assistance Needed: Supervision or touching assistance  Comment: pt took hands off 4ww to push up sleeve while continuing to walk  CARE Score: 4  Discharge Goal: Independent    Walk 50 Feet with Two Turns  Assistance Needed: Supervision or touching assistance  Comment: sup; 4WW  CARE Score: 4  Discharge Goal: Independent    Walk 150 Feet  Assistance Needed: Supervision or touching assistance  Comment: sup using 4WW  CARE Score: 4  Discharge Goal: Independent    Walking 10 Feet on Uneven Surfaces  Assistance Needed: Supervision or touching assistance  Comment: supervision with cues for uneven surface awareness  CARE Score: 4  Discharge Goal: Independent    1 Step (Curb)  Assistance Needed: Supervision or touching assistance  Comment: supervision  CARE Score: 4  Discharge Goal: Supervision or touching assistance    4 Steps  Assistance Needed: Supervision or touching assistance  Comment: supervision B rails  CARE Score: 4  Discharge Goal: Supervision or touching assistance    12 Steps  Assistance Needed: Supervision or touching assistance  Comment: supervision B rails  CARE Score: 4  Discharge Goal: Supervision or touching assistance      Additional Therapeutic activities/exercises completed this date:     []   Nu-step:  Time:        Level:         #Steps:       []   Rebounder:    []  Seated     []  Standing        []   Balance training         []   Postural training    []   Supine ther ex (reps/sets):     []   Seated ther ex (reps/sets):     []   Standing ther ex (reps/sets):     []   Other: Toileting activity completed with    []   Other:    Comments:      Patient/Caregiver Education and Training:   []   Role of PT  []   Education about Dx  []   Use of call light for assist   [x]   HEP provided and explained   []   Treatment plan reviewed  [x]   Home safety  []   Wheelchair mobility/management   []   Body mechanics  [x]   Bed Mobility/Transfer technique  [x]   Gait technique/sequencing  []   Proper use of assistive device/adaptive equipment  [x]   Stair training/Advanced mobility safety and technique  []   Reinforced patient's precautions/mobility while maintaining precautions  []   Postural awareness  []   Family/caregiver training  []   Progress was updated and reviewed in Rehabtracker with patient and/or family this date. []   Other:    Treatment Plan for Next Session: n/a      Assessment:  Pt met or nearly met all goals, with cognition and safety limiting her ability to be independent in some basic tasks. Pt's safety continues to decline with complexity of task and 24 hour supervision is recommended initially. Pt scored in high fall risk category on multiple balance/gait tests and it is recommended to use 4ww at all times. Pt is recommended to follow up with [facility]  PT to progress safety and gait as well as balance as pt was previously walking without a device.     Treatment/Activity Tolerance:   [] Tolerated treatment with no adverse effects    [x] Patient limited by fatigue  [] Patient limited by pain   [] Patient limited by medical complications:    [] Adverse reaction to Tx:   [] Significant change in status    Safety:       []  bed alarm set    [x]  chair alarm set    []  Pt refused alarms []  Telesitter activated      [x]  Gait belt used during tx session      []other:       Number of Minutes/Billable Intervention  Gait Training 30   Therapeutic Exercise 15   Neuro Re-Ed    Therapeutic Activity 15   Wheelchair Propulsion    Group    Other:    TOTAL 60     Social History  Social/Functional History  Lives With: Alone  Type of Home: House (condo)  Home Layout: One level  Home Access: Level entry  Bathroom Shower/Tub: Walk-in shower  Bathroom Toilet: Standard  Bathroom Equipment: Shower chair, Grab bars in shower, Grab bars around toilet  Bathroom Accessibility: Walker accessible  Home Equipment: 4 wheeled walker, Cane, Grab bars (Pt uses 4ww at night for her evening routine, no device during the day)  ADL Assistance: [facility]: Independent  Homemaking Responsibilities: Yes  Meal Prep Responsibility: Primary  Laundry Responsibility: Primary  Cleaning Responsibility: Primary  Bill Paying/Finance Responsibility: No (son does due to pt's macular degeneration)  Shopping Responsibility: No (DIL)  Health Care Management: Primary (fills up a weekly pill box)  Ambulation Assistance: Independent  Transfer Assistance: Independent  Active : No  Patient's  Info: Daughter in law does grocery shopping. Mode of Transportation: Car  Education: 12th grade. 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 Pt has macular degeneration. Able to read with magnifying glass. IADL Comments: cooking and cleaning, cousin comes every other Wed to clean and help  Additional Comments: Pt states fall precipitating this admission is her only fall this year, but pt is a questionable historian. Pt sleeps in flat bed with c-pap    Objective                                                                                    Goals:  (Update in navigator)   :   Long term goals  Time Frame for Long term goals : 5-7 days STG=LTG  Long term goal 1: Pt will perform all bed mobility with mod I  Long term goal 2: Pt will perform sit to stand, pivot and car transfers  with mod I  Long term goal 3: Pt will ambulate 150 feet on level surfaces and 10' on unlevel surface with  Jose  using 4ww  Long term goal 4: Pt will ascend/descend curb step with  4ww    and up to  12   steps with  rail(s) with supervision  Long term goal 5: Pt will retrieve light item from floor with  mod I using 4ww:        Plan of Care                                                                              Times per week: 5 days per week for a minimum of 60 minutes/day plus group as appropriate for 60 minutes.   Treatment to include Current Treatment Recommendations: Strengthening, Transfer Training, Endurance Training, Neuromuscular Re-education, Patient/Caregiver Education & Training, Equipment Evaluation, Education, & procurement, Balance Training, IADL Training, Gait Training, Home Exercise Program, Functional Mobility Training, Stair training, Safety Education & Training    Electronically signed by   Isac Quezada PT,   10/26/2021, 2:56 PM

## 2021-10-26 NOTE — CARE COORDINATION
LSW called patient's daughter-in-law Jimena ([phone]) to discuss discharge plan. LSW informed that patient is set to D/C tomorrow. Patient has recommended DME and will have referral for [facility], OT, and Nursing. Jimena verbalized understanding. Jimena then asked about patient progress. LSW informed of how patient has been doing in PT and OT sessions. LSW informed that patient has made great progress and main concern is patient being left alone due to confusion. Jimena states family is coming in this week due to a wedding in the family on Saturday. Cam  is outside on grass and asked if patient would be able to maneuver on this surface. LSW checked PT notes and informed that patient has been practicing on this, but that this question would be better addressed to therapy staff. Jimena states that family will be staying the night with patient until Sunday morning and asked if patient could be left alone at that time. LSW informed that I could not speak with any certainty except that therapy team has recommended continuous supervision. Current plan is to take patient home and see how she does. LSW informed on getting additional resources if needed. LSW explained what patient's insurance would cover and that they would have to look at private pay for the Aide services they are wanting to stay with patient through night. Jimena then asked about blood tests that patient had the previous day and bump on patient head. LSW to inform patient's Nurse of concerns so they can be address when Jimena visits later tonight. Jimena also informed that her , patient's son, will be here tomorrow at 5 PM to transport patient home. Jimena asked for LSW to update patient's daughter Sanjuanita Devine. 10:00 AM  LSW called patient's daughter Sanjuanita Devine ([phone]) to inform of discharge plan. Bonita verbalized understanding with current plan.   LSW ensured understanding of what [facility] services would be covered at discharge and how to obtain additional if needed. LSW asked of any additional needs and Bonita inquired to Life [employer] buttons. LSW to find information and include in patient discharge information. 10:15 AM  Information on [employer] obtained online as it may be free to patient with current [facility] Medicare coverage. Information printed and to be given to patient and/or family at discharge. 11:00 AM  LSW called patient's PCP, Dr. Nasra Sargent ([phone]), to set hospital follow-up. Message left on confidential scheduling line and LSW awaits call back. LSW then called office of Dr. Kyle Number ([phone]) and Nephrology follow-up set for 11/02/21 at 9:15 AM.    11:25 AM  [facility] referral for PT, OT, and Nursing given to [facility] with [facility].

## 2021-10-27 VITALS
OXYGEN SATURATION: 94 % | WEIGHT: 142.2 LBS | BODY MASS INDEX: 28.67 KG/M2 | HEIGHT: 59 IN | HEART RATE: 60 BPM | TEMPERATURE: 97.3 F | DIASTOLIC BLOOD PRESSURE: 74 MMHG | RESPIRATION RATE: 18 BRPM | SYSTOLIC BLOOD PRESSURE: 162 MMHG

## 2021-10-27 LAB
ANION GAP SERPL CALCULATED.3IONS-SCNC: 11 MMOL/L (ref 4–16)
BUN BLDV-MCNC: 20 MG/DL (ref 6–23)
CALCIUM SERPL-MCNC: 10.3 MG/DL (ref 8.3–10.6)
CHLORIDE BLD-SCNC: 93 MMOL/L (ref 99–110)
CO2: 28 MMOL/L (ref 21–32)
CREAT SERPL-MCNC: 0.7 MG/DL (ref 0.6–1.1)
GFR AFRICAN AMERICAN: >60 ML/MIN/1.73M2
GFR NON-AFRICAN AMERICAN: >60 ML/MIN/1.73M2
GLUCOSE BLD-MCNC: 89 MG/DL (ref 70–99)
INR BLD: 2.62 INDEX
POTASSIUM SERPL-SCNC: 4.8 MMOL/L (ref 3.5–5.1)
PROTHROMBIN TIME: 34.1 SECONDS (ref 11.7–14.5)
SODIUM BLD-SCNC: 132 MMOL/L (ref 135–145)

## 2021-10-27 PROCEDURE — 94761 N-INVAS EAR/PLS OXIMETRY MLT: CPT

## 2021-10-27 PROCEDURE — 94640 AIRWAY INHALATION TREATMENT: CPT

## 2021-10-27 PROCEDURE — 6370000000 HC RX 637 (ALT 250 FOR IP): Performed by: PHYSICAL MEDICINE & REHABILITATION

## 2021-10-27 PROCEDURE — 99239 HOSP IP/OBS DSCHRG MGMT >30: CPT | Performed by: PHYSICAL MEDICINE & REHABILITATION

## 2021-10-27 PROCEDURE — 94150 VITAL CAPACITY TEST: CPT

## 2021-10-27 PROCEDURE — 85610 PROTHROMBIN TIME: CPT

## 2021-10-27 PROCEDURE — 80048 BASIC METABOLIC PNL TOTAL CA: CPT

## 2021-10-27 PROCEDURE — 6370000000 HC RX 637 (ALT 250 FOR IP): Performed by: HOSPITALIST

## 2021-10-27 PROCEDURE — 36415 COLL VENOUS BLD VENIPUNCTURE: CPT

## 2021-10-27 RX ORDER — TORSEMIDE 20 MG/1
20 TABLET ORAL DAILY
Qty: 30 TABLET | Refills: 0 | Status: SHIPPED | OUTPATIENT
Start: 2021-10-28 | End: 2022-03-17

## 2021-10-27 RX ORDER — WARFARIN SODIUM 5 MG/1
5 TABLET ORAL DAILY
Qty: 30 TABLET | Refills: 0 | Status: ON HOLD | OUTPATIENT
Start: 2021-10-27 | End: 2022-03-24 | Stop reason: HOSPADM

## 2021-10-27 RX ADMIN — CARVEDILOL 6.25 MG: 6.25 TABLET, FILM COATED ORAL at 16:59

## 2021-10-27 RX ADMIN — WARFARIN SODIUM 5 MG: 5 TABLET ORAL at 16:59

## 2021-10-27 RX ADMIN — TORSEMIDE 20 MG: 20 TABLET ORAL at 09:34

## 2021-10-27 RX ADMIN — ASPIRIN 81 MG: 81 TABLET, CHEWABLE ORAL at 09:33

## 2021-10-27 RX ADMIN — POTASSIUM CHLORIDE 10 MEQ: 750 TABLET, FILM COATED, EXTENDED RELEASE ORAL at 09:33

## 2021-10-27 RX ADMIN — SPIRONOLACTONE 25 MG: 25 TABLET ORAL at 09:32

## 2021-10-27 RX ADMIN — Medication 1 TABLET: at 09:32

## 2021-10-27 RX ADMIN — TIOTROPIUM BROMIDE AND OLODATEROL 2 PUFF: 3.124; 2.736 SPRAY, METERED RESPIRATORY (INHALATION) at 09:03

## 2021-10-27 RX ADMIN — ALBUTEROL SULFATE 2 PUFF: 90 AEROSOL, METERED RESPIRATORY (INHALATION) at 09:02

## 2021-10-27 RX ADMIN — LEVOTHYROXINE SODIUM 88 MCG: 0.09 TABLET ORAL at 06:32

## 2021-10-27 RX ADMIN — CETIRIZINE HYDROCHLORIDE 10 MG: 10 TABLET, FILM COATED ORAL at 09:33

## 2021-10-27 RX ADMIN — CARVEDILOL 6.25 MG: 6.25 TABLET, FILM COATED ORAL at 09:38

## 2021-10-27 RX ADMIN — CYANOCOBALAMIN TAB 1000 MCG 1000 MCG: 1000 TAB at 09:32

## 2021-10-27 RX ADMIN — Medication 10000 MCG: at 09:34

## 2021-10-27 RX ADMIN — ACETAMINOPHEN 650 MG: 325 TABLET ORAL at 06:32

## 2021-10-27 RX ADMIN — TIMOLOL MALEATE 1 DROP: 2.5 SOLUTION/ DROPS OPHTHALMIC at 09:34

## 2021-10-27 ASSESSMENT — PAIN SCALES - GENERAL
PAINLEVEL_OUTOF10: 0
PAINLEVEL_OUTOF10: 0
PAINLEVEL_OUTOF10: 2
PAINLEVEL_OUTOF10: 2
PAINLEVEL_OUTOF10: 0

## 2021-10-27 NOTE — PROGRESS NOTES
shower  CARE Score: 6  Discharge Goal: Supervision or touching assistance    UB Dressing: Upper Body Dressing  Assistance Needed: Independent  Comment: including good 4WW safety to retrieve clothing  CARE Score: 6  Discharge Goal: Set-up or clean-up assistance         LB Dressing: Lower Body Dressing  Assistance Needed: Independent  Comment: including good 4WW safety to retrieve clothing  CARE Score: 6  Discharge Goal: Set-up or clean-up assistance    Donning and [place] Footwear: Putting On/Taking Off Footwear  Assistance Needed: Independent  Comment: to doff/don shoes  CARE Score: 6  Discharge Goal: Set-up or clean-up assistance      Toileting: Toileting Hygiene  Assistance Needed: Independent  Comment: mod I, improved safety this session  CARE Score: 6  Discharge Goal: Independent      Toilet Transfers:   Toilet Transfer  Assistance Needed: Independent  Comment: mod I c [id number], good [id number] management today  CARE Score: 6  Discharge Goal: Independent    Physical Therapy:         Long term goals  Time Frame for Long term goals : 5-7 days STG=LTG  Long term goal 1: Pt will perform all bed mobility with mod I  Long term goal 2: Pt will perform sit to stand, pivot and car transfers  with mod I  Long term goal 3: Pt will ambulate 150 feet on level surfaces and 10' on unlevel surface with  Jose  using 4ww  Long term goal 4: Pt will ascend/descend curb step with  4ww    and up to  12   steps with  rail(s) with supervision  Long term goal 5: Pt will retrieve light item from floor with  mod I using 4ww      Bed Mobility:   Sit to Lying  Assistance Needed: Independent  CARE Score: 6  Discharge Goal: Independent  Roll Left and Right  Assistance Needed: Independent  CARE Score: 6  Discharge Goal: Independent  Lying to Sitting on Side of Bed  Assistance Needed: Independent  Comment: mod difficulty due to \"soreness\"  CARE Score: 6  Discharge Goal: Independent    Transfers:    Sit to Stand  Assistance Needed: Independent  Comment: no use of UEs  CARE Score: 6  Discharge Goal: Independent  Chair/Bed-to-Chair Transfer  Assistance Needed: Independent  Comment: 4ww  CARE Score: 6  Discharge Goal: Independent  Toilet Transfer  Assistance Needed: Supervision or touching assistance  CARE Score: 4  Car Transfer  Assistance Needed: Independent  Comment: 4ww  CARE Score: 6  Discharge Goal: Independent    Ambulation:    Walking Ability  Does the Patient Walk?: Yes     Walk 10 Feet  Assistance Needed: Supervision or touching assistance  Comment: pt took hands off 4ww to push up sleeve while continuing to walk  CARE Score: 4  Discharge Goal: Independent     Walk 50 Feet with Two Turns  Assistance Needed: Supervision or touching assistance  Comment: sup; 4WW  CARE Score: 4  Discharge Goal: Independent     Walk 150 Feet  Assistance Needed: Supervision or touching assistance  Comment: sup using 4WW  CARE Score: 4  Discharge Goal: Independent     Walking 10 Feet on Uneven Surfaces  Assistance Needed: Supervision or touching assistance  Comment: supervision with cues for uneven surface awareness  CARE Score: 4  Discharge Goal: Independent     1 Step (Curb)  Assistance Needed: Supervision or touching assistance  Comment: supervision  CARE Score: 4  Discharge Goal: Supervision or touching assistance     4 Steps  Assistance Needed: Supervision or touching assistance  Comment: supervision B rails  CARE Score: 4  Discharge Goal: Supervision or touching assistance     12 Steps  Assistance Needed: Supervision or touching assistance  Comment: supervision B rails  CARE Score: 4  Discharge Goal: Supervision or touching assistance       Wheelchair:  w/c Ability: Wheelchair Ability  Uses a Wheelchair and/or Scooter?: No  Wheel 50 Feet with Two Turns  Assistance Needed: Supervision or touching assistance  Comment: CGA  CARE Score: 4  Wheel 150 Feet  Assistance Needed: Partial/moderate assistance  Comment: min A 2/2 onset of fatigue  CARE Score: 3          Balance:       Pick Up Object: Picking Up Object  Assistance Needed: Independent  Comment: recalled all safety instructions  CARE Score: 6  Discharge Goal: Independent    I      Exam:    Blood pressure 136/68, pulse 61, temperature 98.1 °F (36.7 °C), temperature source Oral, resp. rate 16, height 4' 11.02\" (1.499 m), weight 139 lb 15.9 oz (63.5 kg), SpO2 94 %. General: Up in a bedside chair with legs elevated. Gazing about the room. Passive in conversation but pleasant. HEENT: MMM. No JVD or adenopathy. Clear speech. Pulmonary: Blunted breath sounds inferiorly without wheezes or rales. Cardiac: Regular rate and rhythm. Abdomen: Patient's abdomen is soft and nondistended. Bowel sounds were present throughout. There was no rebound, guarding or masses noted. Upper extremities: Slow and deliberate with bringing her hands up to meet mine. Functional  strength with fair coordination. No new bruising. Lower extremities: Trace edema. Heels clear. Calf soft. Sitting balance was good. Standing balance was fair-.    Lab Results   Component Value Date    WBC 8.3 10/25/2021    HGB 14.2 10/25/2021    HCT 45.3 10/25/2021    MCV 88.0 10/25/2021     (H) 10/25/2021     Lab Results   Component Value Date    INR 2.75 10/26/2021    INR 2.37 10/25/2021    INR 2.41 10/24/2021    PROTIME 35.8 (H) 10/26/2021    PROTIME 30.8 (H) 10/25/2021    PROTIME 31.4 (H) 10/24/2021     Lab Results   Component Value Date    CREATININE 0.5 (L) 10/25/2021    BUN 17 10/25/2021     (L) 10/25/2021    K 4.5 10/25/2021    CL 93 (L) 10/25/2021    CO2 24 10/25/2021     Lab Results   Component Value Date    ALT 18 10/13/2021    AST 17 10/13/2021    ALKPHOS 125 10/13/2021    BILITOT 1.6 (H) 10/13/2021       Expected length of stay  prior to a supervised level of function for discharge home with a walker and [facility] OT/PT is 10/27/2021. Recommendations:    1.  Metabolic encephalopathy with gait disturbance:   Reasonable engagement in the daily occupational and physical therapy with speech-language pathology.  Transitioning to home care therapies now.  Ongoing adaptive equipment training, pulmonary hygiene measures and nutritional support. Mabel Kenyon has now completed caregiver education with her family and staff.  Verbal cues and CGA physical assistance for transfers. 2. DVT prophylaxis: Patient is anticoagulated for her cardiac disease.  This protects her against acute DVT as her INR is therapeutic at this point.  Target INR is 2.32.7.  GI prophylaxis offered.  INR therapeutic. Slight adjustment to her Coumadin. 3. Acute urinary retention: Her voiding trial was progressing well. Some urinary incontinence noted, however. Minimizing anticholinergics.   4. Chronic diastolic congestive heart failure: Daily weights.  Coreg for afterload reduction.  Demadex and Aldactone for diuresis.  Daily weight measurements oscillate around a reasonable endpoint. Blood pressure is within the target range. 5. Hypothyroidism: Replacing her thyroid with supplement.  Monitoring her vital signs and activity tolerance.   6. Hyponatremia: Cautious use of diuretics.  Periodic monitoring of her chemistries, will recheck sodium in the a.m. before discharge.  Continue restricting her fluids to 1800 cc daily.

## 2021-10-27 NOTE — CARE COORDINATION
LSW again attempted to set patient's follow-up with PCP, Dr. Brooke Salmeron ([phone]). Message left on confidential voicemail of office CMA and LSW informed of pending discharge and asked for them to contact patient to set. 4:25 PM  Patient follow-ups set. There was no new recommended DME. [facility] order sent to [facility] for PT, OT, and Nursing . Caregiver training was not recommended prior to discharge. Patient to discharge to home with alone and with family staying. Patient's son to provide transport home at 5 PM and Nursing is aware. Patient is set for discharge from case management standpoint.

## 2021-10-27 NOTE — PROGRESS NOTES
[facility] Liaison Jimena bangura daughter in law. .. aware of discharge & will initiate [facility].

## 2021-10-28 NOTE — CARE COORDINATION
10/28/21  9:40 AM  Discharge information submitted via routed fax to [facility] Medicare at [phone].

## 2021-11-16 NOTE — DISCHARGE SUMMARY
Patient Name: Darlene Fall  Patient :  3/18/1929  Patient MRN:   [id number]      Admission Date:  10/18/2021  Discharge Date:   10/27/2021. Admitting diagnosis: Metabolic encephalopathy ( [address]. 1)     Comorbid diagnoses impacting rehabilitation: Generalized weakness, gait disturbance, hyponatremia, acute urinary retention, essential hypertension, hypothyroidism, chronic diastolic congestive heart failure    Discharging diagnosis: Metabolic encephalopathy ( [address] 2. 1)     Comorbid diagnoses impacting rehabilitation: Generalized weakness, gait disturbance, hyponatremia, acute urinary retention, essential hypertension, hypothyroidism, chronic diastolic congestive heart failure    History of present illness: Patient is a 80-year-old right-hand-dominant female who presented to our ED on 10/7/2021 after suffering an unprovoked fall at home with trauma to her head. She does not believe there was any loss of consciousness. She has significant bleeding (she is on anticoagulation). Her INR was supratherapeutic but there was no intracranial hemorrhage noted. Blood pressures fluctuated significantly. She had neck stiffness but no long bone fractures. Early on in her admission it was recognized that her mental status was not at her baseline. At first a postconcussive syndrome was considered but then she was noted to have significant hyponatremia. She was diagnosed with a metabolic encephalopathy and was unable to do her own toileting, transfers and self-care. Her activity tolerance began slowly building and she became more appropriate with staff. Prior (baseline) level of function: Independent.     Current level of function:         Current  IRF-YAMILKA and Goals:   Occupational Therapy:    Short term goals  Time Frame for Short term goals: STGs=LTGs :   Long term goals  Time Frame for Long term goals : 7-10 days or until d/c  Long term goal 1: Pt will complete grooming tasks Ind  Long term goal 2: Pt will Independent  Comment: mod I c [id number], good [id number] management today  CARE Score: 6  Discharge Goal: Independent    Physical Therapy:         Long term goals  Time Frame for Long term goals : 5-7 days STG=LTG  Long term goal 1: Pt will perform all bed mobility with mod I  Long term goal 2: Pt will perform sit to stand, pivot and car transfers  with mod I  Long term goal 3: Pt will ambulate 150 feet on level surfaces and 10' on unlevel surface with  Jose  using 4ww  Long term goal 4: Pt will ascend/descend curb step with  4ww    and up to  12   steps with  rail(s) with supervision  Long term goal 5: Pt will retrieve light item from floor with  mod I using 4ww      Bed Mobility:   Sit to Lying  Assistance Needed: Independent  CARE Score: 6  Discharge Goal: Independent  Roll Left and Right  Assistance Needed: Independent  CARE Score: 6  Discharge Goal: Independent  Lying to Sitting on Side of Bed  Assistance Needed: Independent  Comment: mod difficulty due to \"soreness\"  CARE Score: 6  Discharge Goal: Independent    Transfers:    Sit to Stand  Assistance Needed: Independent  Comment: no use of UEs  CARE Score: 6  Discharge Goal: Independent  Chair/Bed-to-Chair Transfer  Assistance Needed: Independent  Comment: 4ww  CARE Score: 6  Discharge Goal: Independent  Toilet Transfer  Assistance Needed: Supervision or touching assistance  CARE Score: 4  Car Transfer  Assistance Needed: Independent  Comment: 4ww  CARE Score: 6  Discharge Goal: Independent    Ambulation:    Walking Ability  Does the Patient Walk?: Yes     Walk 10 Feet  Assistance Needed: Supervision or touching assistance  Comment: pt took hands off 4ww to push up sleeve while continuing to walk  CARE Score: 4  Discharge Goal: Independent     Walk 50 Feet with Two Turns  Assistance Needed: Supervision or touching assistance  Comment: sup; 7LT  CARE Score: 4  Discharge Goal: Independent     Walk 150 Feet  Assistance Needed: Supervision or touching assistance  Comment: sup using 4WW  CARE Score: 4  Discharge Goal: Independent     Walking 10 Feet on Uneven Surfaces  Assistance Needed: Supervision or touching assistance  Comment: supervision with cues for uneven surface awareness  CARE Score: 4  Discharge Goal: Independent     1 Step (Curb)  Assistance Needed: Supervision or touching assistance  Comment: supervision  CARE Score: 4  Discharge Goal: Supervision or touching assistance     4 Steps  Assistance Needed: Supervision or touching assistance  Comment: supervision B rails  CARE Score: 4  Discharge Goal: Supervision or touching assistance     12 Steps  Assistance Needed: Supervision or touching assistance  Comment: supervision B rails  CARE Score: 4  Discharge Goal: Supervision or touching assistance       Wheelchair:  w/c Ability: Wheelchair Ability  Uses a Wheelchair and/or Scooter?: No  Wheel 50 Feet with Two Turns  Assistance Needed: Supervision or touching assistance  Comment: CGA  CARE Score: 4  Wheel 150 Feet  Assistance Needed: Partial/moderate assistance  Comment: min A 2/2 onset of fatigue  CARE Score: 3          Balance:        Object: Picking Up Object  Assistance Needed: Independent  Comment: recalled all safety instructions  CARE Score: 6  Discharge Goal: Independent    I      Exam:    Blood pressure (!) 162/74, pulse 60, temperature 97.3 °F (36.3 °C), resp. rate 18, height 4' 11.02\" (1.499 m), weight 142 lb 3.2 oz (64.5 kg), SpO2 94 %. General: Sitting in a wheelchair. Using her legs to turn it some. Good trunk control. Fair problem solving. Oriented x3. HEENT: Neck supple. MMM. No JVD or adenopathy. Clear speech. Pulmonary: Shallow respirations without wheezes or rales. Cardiac: Regular rate and rhythm. Abdomen: Patient's abdomen is soft and nondistended. Bowel sounds were present throughout. There was no rebound, guarding or masses noted. Upper extremities: Slow and deliberate with bringing her hands up to meet mine.   Functional  strength with fair coordination. No new bruising. Lower extremities: Trace edema. Heels clear. Calf soft. Sitting balance was good. Standing balance was fair-.    Lab Results   Component Value Date    WBC 8.3 10/25/2021    HGB 14.2 10/25/2021    HCT 45.3 10/25/2021    MCV 88.0 10/25/2021     (H) 10/25/2021     Lab Results   Component Value Date    INR 2.62 10/27/2021    INR 2.75 10/26/2021    INR 2.37 10/25/2021    PROTIME 34.1 (H) 10/27/2021    PROTIME 35.8 (H) 10/26/2021    PROTIME 30.8 (H) 10/25/2021     Lab Results   Component Value Date    CREATININE 0.7 10/27/2021    BUN 20 10/27/2021     (L) 10/27/2021    K 4.8 10/27/2021    CL 93 (L) 10/27/2021    CO2 28 10/27/2021     Lab Results   Component Value Date    ALT 18 10/13/2021    AST 17 10/13/2021    ALKPHOS 125 10/13/2021    BILITOT 1.6 (H) 10/13/2021           The patient presented to the ARU with the above history requiring a multidisciplinary treatment plan including close medical supervision by the [facility] Rodriguez Director. The patient participated in the prescribed therapy treatment plan with reasonable compliance and progressive tolerance. They avoided significant medical complications. By the time of discharge the patient had become somewhat safer with adaptive equipment to transfer and toilet with a FWW with the supervision of family/caregivers. The patient was tolerating an oral diet without choking/coughing and was back to their baseline with regards to bowel and bladder control. Discharge instructions were reviewed with the patient and her family. The patient is to follow up with the PCP in 1 week. No driving. [facility] PT/OT/RN will be arranged.        Medication List      START taking these medications    torsemide 20 MG tablet  Commonly known as: DEMADEX  Take 1 tablet by mouth daily        CHANGE how you take these medications    albuterol sulfate  (90 Base) MCG/ACT inhaler  What changed: Another medication with the same name was removed. Continue taking this medication, and follow the directions you see here. aspirin 81 MG chewable tablet  What changed: Another medication with the same name was removed. Continue taking this medication, and follow the directions you see here. atorvastatin 10 MG tablet  Commonly known as: LIPITOR  What changed: Another medication with the same name was removed. Continue taking this medication, and follow the directions you see here. carvedilol 6.25 MG tablet  Commonly known as: COREG  Take 1 tablet by mouth 2 times daily (with meals)  What changed: Another medication with the same name was removed. Continue taking this medication, and follow the directions you see here. Synthroid 88 MCG tablet  Generic drug: levothyroxine  What changed: Another medication with the same name was removed. Continue taking this medication, and follow the directions you see here. umeclidinium-vilanterol 62.5-25 MCG/INH Aepb inhaler  Commonly known as: ANORO ELLIPTA  Inhale 1 puff into the lungs daily  What changed: Another medication with the same name was removed. Continue taking this medication, and follow the directions you see here. warfarin 5 MG tablet  Commonly known as: COUMADIN  Take 1 tablet by mouth daily  What changed:   · See the new instructions. · Another medication with the same name was removed. Continue taking this medication, and follow the directions you see here.         CONTINUE taking these medications    Biotin 5000 5 MG Caps  Generic drug: Biotin     CPAP Machine Misc     ICaps Caps     loratadine 10 MG tablet  Commonly known as: CLARITIN     OSTEO BI-FLEX ADV DOUBLE ST PO     Probiotic-10 Chew     spironolactone 25 MG tablet  Commonly known as: ALDACTONE  Take 1 tablet by mouth daily     timolol 0.25 % ophthalmic gel-forming  Commonly known as: TIMOPTIC-XE     vitamin B-12 1000 MCG tablet  Commonly known as: CYANOCOBALAMIN     vitamin D 25 MCG (1000 UT) Caps STOP taking these medications    Benadryl Itch Stopping 1-0.1 % cream  Generic drug: diphenhydrAMINE-zinc acetate     Lasix 20 MG tablet  Generic drug: furosemide     LORazepam 0.5 MG tablet  Commonly known as: ATIVAN     potassium chloride 10 MEQ extended release tablet  Commonly known as: KLOR-CON     Restasis 0.05 % ophthalmic emulsion  Generic drug: cycloSPORINE     Systane 0.4-0.3 % ophthalmic solution  Generic drug: polyethyl glycol-propyl glycol 0.4-0.3 %     triamterene-hydroCHLOROthiazide 37.5-25 MG per capsule  Commonly known as: Yecenia MOELLER           Where to Get Your Medications      You can get these medications from any pharmacy    Bring a paper prescription for each of these medications  · torsemide 20 MG tablet  · warfarin 5 MG tablet         CONDITION ON DISCHARGE: Stable. The prognosis is fair- for further improvements in ADL's and safety with adapted gait/transfers. Record review, patient exam, discharge instructions, medication reconciliation and summary for this discharge visit took more than 30 minutes.

## 2021-11-23 ENCOUNTER — HOSPITAL ENCOUNTER (OUTPATIENT)
Age: 86
Setting detail: SPECIMEN
Discharge: HOME | End: 2021-11-23
Payer: MEDICARE

## 2021-11-23 LAB
BACTERIA: ABNORMAL /HPF
BILIRUBIN URINE: NEGATIVE MG/DL
BLOOD, URINE: NEGATIVE
CLARITY: CLEAR
COLOR: YELLOW
GLUCOSE, URINE: NEGATIVE MG/DL
KETONES, URINE: NEGATIVE MG/DL
LEUKOCYTE ESTERASE, URINE: ABNORMAL
NITRITE URINE, QUANTITATIVE: NEGATIVE
PH, URINE: 5 (ref 5–8)
PROTEIN UA: NEGATIVE MG/DL
RBC URINE: 1 /HPF (ref 0–6)
SPECIFIC GRAVITY UA: 1 (ref 1–1.03)
SQUAMOUS EPITHELIAL: 1 /HPF
TRANSITIONAL EPITHELIAL: <1 /HPF
TRICHOMONAS: ABNORMAL /HPF
UROBILINOGEN, URINE: NEGATIVE MG/DL (ref 0.2–1)
WBC CLUMP: ABNORMAL /HPF
WBC UA: 21 /HPF (ref 0–5)
YEAST: ABNORMAL /HPF

## 2021-11-23 PROCEDURE — 81001 URINALYSIS AUTO W/SCOPE: CPT

## 2021-11-23 PROCEDURE — 87086 URINE CULTURE/COLONY COUNT: CPT

## 2021-11-24 LAB
CULTURE: NORMAL
Lab: NORMAL
SPECIMEN: NORMAL

## 2021-12-02 NOTE — DISCHARGE SUMMARY
age.  Kimberlyn Viera are equally round. No scleral erythema, discharge, or conjunctivitis. HENT Mucous membranes are moist. Oral pharynx without exudates, no evidence of thrush. NECK Supple, no apparent thyromegaly or masses. RESP Clear to auscultation, no wheezes, rales or rhonchi. Symmetric chest movement while on room air. CARDIO/VASC S1/S2 auscultated. Regular rate without appreciable murmurs, rubs, or gallops. No JVD or carotid bruits. Peripheral pulses equal bilaterally and palpable. No peripheral edema. GI Abdomen is soft without significant tenderness, masses, or guarding. Bowel sounds are normoactive. Rectal exam deferred.  No costovertebral angle tenderness. Normal appearing external genitalia. Carnes catheter is not present. HEME/LYMPH No palpable cervical lymphadenopathy and no hepatosplenomegaly. No petechiae or ecchymoses. MSK No gross joint deformities. SKIN Normal coloration, warm, dry. NEURO Cranial nerves appear grossly intact, normal speech, no lateralizing weakness. PSYCH Awake, alert, oriented x 4. Affect appropriate. BMP/CBC  No results for input(s): NA, K, CL, CO2, BUN, CREATININE, WBC, HEMOGLOBIN, HCT, PLT in the last 72 hours.     Invalid input(s): GLU    Discharge Time of 35 minutes    Electronically signed by Earlene Mayfield MD on 12/1/2021 at 9:59 PM

## 2021-12-29 ENCOUNTER — PROCEDURE VISIT (OUTPATIENT)
Dept: CARDIOLOGY CLINIC | Age: 86
End: 2021-12-29
Payer: MEDICARE

## 2021-12-29 ENCOUNTER — TELEPHONE (OUTPATIENT)
Dept: CARDIOLOGY CLINIC | Age: 86
End: 2021-12-29

## 2021-12-29 DIAGNOSIS — I48.21 PERMANENT ATRIAL FIBRILLATION (HCC): ICD-10-CM

## 2021-12-29 DIAGNOSIS — Z95.0 CARDIAC PACEMAKER IN SITU: Primary | ICD-10-CM

## 2021-12-29 PROCEDURE — 93296 REM INTERROG EVL PM/IDS: CPT | Performed by: INTERNAL MEDICINE

## 2021-12-29 PROCEDURE — 93294 REM INTERROG EVL PM/LDLS PM: CPT | Performed by: INTERNAL MEDICINE

## 2022-02-25 LAB
ALBUMIN SERPL-MCNC: 4.5 G/DL
ALP BLD-CCNC: 182 U/L
ALT SERPL-CCNC: 12 U/L
ANION GAP SERPL CALCULATED.3IONS-SCNC: NORMAL MMOL/L
AST SERPL-CCNC: 23 U/L
BASOPHILS ABSOLUTE: 0.1 /ΜL
BASOPHILS RELATIVE PERCENT: 1 %
BILIRUB SERPL-MCNC: 1.2 MG/DL (ref 0.1–1.4)
BUN BLDV-MCNC: 16 MG/DL
CALCIUM SERPL-MCNC: 10.5 MG/DL
CHLORIDE BLD-SCNC: 95 MMOL/L
CO2: 25 MMOL/L
CREAT SERPL-MCNC: 0.97 MG/DL
EOSINOPHILS ABSOLUTE: 0.4 /ΜL
EOSINOPHILS RELATIVE PERCENT: 5 %
GFR CALCULATED: NORMAL
GLUCOSE BLD-MCNC: 106 MG/DL
HCT VFR BLD CALC: 37.4 % (ref 36–46)
HEMOGLOBIN: 11.9 G/DL (ref 12–16)
LYMPHOCYTES ABSOLUTE: 1 /ΜL
LYMPHOCYTES RELATIVE PERCENT: 12 %
MCH RBC QN AUTO: 28 PG
MCHC RBC AUTO-ENTMCNC: 31.8 G/DL
MCV RBC AUTO: 88 FL
MONOCYTES ABSOLUTE: 0.6 /ΜL
MONOCYTES RELATIVE PERCENT: 7 %
NEUTROPHILS ABSOLUTE: 6.2 /ΜL
NEUTROPHILS RELATIVE PERCENT: 74 %
PLATELET # BLD: 467 K/ΜL
PMV BLD AUTO: ABNORMAL FL
POTASSIUM SERPL-SCNC: 4.7 MMOL/L
RBC # BLD: 4.25 10^6/ΜL
SODIUM BLD-SCNC: 137 MMOL/L
TOTAL PROTEIN: 6.7
WBC # BLD: 8.4 10^3/ML

## 2022-03-01 ENCOUNTER — HOSPITAL ENCOUNTER (OUTPATIENT)
Dept: GENERAL RADIOLOGY | Age: 87
Discharge: HOME | End: 2022-03-01
Payer: MEDICARE

## 2022-03-01 ENCOUNTER — OFFICE VISIT (OUTPATIENT)
Dept: CARDIOLOGY CLINIC | Age: 87
End: 2022-03-01
Payer: MEDICARE

## 2022-03-01 ENCOUNTER — HOSPITAL ENCOUNTER (OUTPATIENT)
Age: 87
Discharge: HOME | End: 2022-03-01
Payer: MEDICARE

## 2022-03-01 VITALS
WEIGHT: 146 LBS | HEIGHT: 59 IN | DIASTOLIC BLOOD PRESSURE: 74 MMHG | SYSTOLIC BLOOD PRESSURE: 132 MMHG | BODY MASS INDEX: 29.43 KG/M2 | HEART RATE: 61 BPM

## 2022-03-01 DIAGNOSIS — R06.02 SOB (SHORTNESS OF BREATH): ICD-10-CM

## 2022-03-01 DIAGNOSIS — Z95.1 S/P CABG X 3: ICD-10-CM

## 2022-03-01 DIAGNOSIS — Z95.1 S/P CABG X 3: Primary | ICD-10-CM

## 2022-03-01 PROCEDURE — G8427 DOCREV CUR MEDS BY ELIG CLIN: HCPCS | Performed by: INTERNAL MEDICINE

## 2022-03-01 PROCEDURE — 1123F ACP DISCUSS/DSCN MKR DOCD: CPT | Performed by: INTERNAL MEDICINE

## 2022-03-01 PROCEDURE — G8484 FLU IMMUNIZE NO ADMIN: HCPCS | Performed by: INTERNAL MEDICINE

## 2022-03-01 PROCEDURE — 99214 OFFICE O/P EST MOD 30 MIN: CPT | Performed by: INTERNAL MEDICINE

## 2022-03-01 PROCEDURE — 1036F TOBACCO NON-USER: CPT | Performed by: INTERNAL MEDICINE

## 2022-03-01 PROCEDURE — 4040F PNEUMOC VAC/ADMIN/RCVD: CPT | Performed by: INTERNAL MEDICINE

## 2022-03-01 PROCEDURE — 71046 X-RAY EXAM CHEST 2 VIEWS: CPT

## 2022-03-01 PROCEDURE — 1090F PRES/ABSN URINE INCON ASSESS: CPT | Performed by: INTERNAL MEDICINE

## 2022-03-01 PROCEDURE — 93000 ELECTROCARDIOGRAM COMPLETE: CPT | Performed by: INTERNAL MEDICINE

## 2022-03-01 PROCEDURE — G8417 CALC BMI ABV UP PARAM F/U: HCPCS | Performed by: INTERNAL MEDICINE

## 2022-03-01 RX ORDER — CYCLOSPORINE 0.5 MG/ML
1 EMULSION OPHTHALMIC 2 TIMES DAILY
COMMUNITY
End: 2022-07-07 | Stop reason: CLARIF

## 2022-03-01 RX ORDER — UMECLIDINIUM BROMIDE AND VILANTEROL TRIFENATATE 62.5; 25 UG/1; UG/1
POWDER RESPIRATORY (INHALATION)
COMMUNITY
Start: 2021-07-30 | End: 2022-03-01

## 2022-03-01 RX ORDER — CIPROFLOXACIN HYDROCHLORIDE 3.5 MG/ML
SOLUTION/ DROPS TOPICAL
COMMUNITY
Start: 2021-08-10 | End: 2022-07-07 | Stop reason: ALTCHOICE

## 2022-03-01 NOTE — PATIENT INSTRUCTIONS
Please be informed that if you contact our office outside of normal business hours the physician on call cannot help with any scheduling or rescheduling issues, procedure instruction questions or any type of medication issue. We advise you for any urgent/emergency that you go to the nearest emergency room! PLEASE CALL OUR OFFICE DURING NORMAL BUSINESS HOURS    Monday - Friday   8 am to 5 pm    [place]: [address]: [phone]    [place]:  [phone]    **It is YOUR responsibilty to bring medication bottles and/or updated medication list to [address] [place] [address].  This will allow us to better serve you and all your healthcare needs**

## 2022-03-01 NOTE — PROGRESS NOTES
CARDIOLOGY NOTE      3/1/2022    RE: Annalisa Pimentel  (3/18/1929)                               TO:  Dr. Maulik Pack DO            CHIEF Oksana Hanson is a 80 y.o. female who was seen today for management of coronary artery disease                                    HPI:                   Pt has h/o coronary disease post CABG post PCI, hypertension, hyperlipidemia, permanent pacemaker implantation, obstructive sleep apnea, hypothyroidism, seen today for follow-up.  Pt has increasing shortness of breath her saw her PCP had a BNP level which was not elevated for her age has no chest pain    Annalisa Pimentel has the following history recorded in care path:  Patient Active Problem List    Diagnosis Date Noted    Generalized weakness     Gait disturbance     Acute urinary retention     Hypothyroidism (acquired)     Chronic diastolic (congestive) heart failure (HCC)     Metabolic encephalopathy 87/28/9523    Hyponatremia 10/07/2021    Infection of pacemaker pocket ([address] 04/29/2021    Pacer at end of battery life 11/18/2020    PAF (paroxysmal atrial fibrillation) ([facility] [address] 05/10/2019    Dizziness 08/30/2017    Obstructive sleep apnea 05/16/2017    COPD, mild ([address] 02/17/2017    Coronary artery disease involving coronary bypass graft of native heart without angina pectoris     SOBOE (shortness of breath on exertion) 01/31/2017    Bradycardia 08/11/2016    Essential hypertension 09/30/2013    TIA (transient ischemic attack) 09/29/2013    Confusion 09/29/2013    Headache 09/29/2013    CAD (coronary artery disease)     Pulmonary HTN ([address]     Post PTCA 04/21/2010    S/P CABG x 3 04/08/2009    Cardiac pacemaker 10/01/2001     Current Outpatient Medications   Medication Sig Dispense Refill    cycloSPORINE (RESTASIS) 0.05 % ophthalmic emulsion 1 drop 2 times daily      ciprofloxacin (CILOXAN) 0.3 % ophthalmic solution 3 drops WEEKLY (route: otic (ear))      warfarin (COUMADIN) 5 MG tablet Take 1 tablet by mouth daily 30 tablet 0    torsemide (DEMADEX) 20 MG tablet Take 1 tablet by mouth daily 30 tablet 0    vitamin D 25 MCG (1000 UT) CAPS Take 5,000 Units by mouth daily      Probiotic Product (PROBIOTIC-10) CHEW Take by mouth      atorvastatin (LIPITOR) 10 MG tablet Take 10 mg by mouth daily       umeclidinium-vilanterol (ANORO ELLIPTA) 62.5-25 MCG/INH AEPB inhaler Inhale 1 puff into the lungs daily 1 each 11    CPAP Machine MISC by Does not apply route      Biotin (BIOTIN 5000) 5 MG CAPS Take 1 capsule by mouth daily      Misc Natural Products (OSTEO BI-FLEX ADV DOUBLE ST PO) Take 1 tablet by mouth daily      timolol (TIMOPTIC-XE) 0.25 % ophthalmic gel-forming 1 drop daily      carvedilol (COREG) 6.25 MG tablet Take 1 tablet by mouth 2 times daily (with meals) 180 tablet 3    albuterol (PROVENTIL HFA;VENTOLIN HFA) 108 (90 BASE) MCG/ACT inhaler Inhale 2 puffs into the lungs 2 times daily AND EVERY 4-6 HOURS AS NEEDED      Multiple Vitamins-Minerals (ICAPS) CAPS Take 1 capsule by mouth daily.  loratadine (CLARITIN) 10 MG tablet Take 10 mg by mouth as needed.  vitamin B-12 (CYANOCOBALAMIN) 1000 MCG tablet Take 1,000 mcg by mouth daily.  levothyroxine (SYNTHROID) 88 MCG tablet Take 88 mcg by mouth daily.  aspirin 81 MG chewable tablet Take 81 mg by mouth daily.  spironolactone (ALDACTONE) 25 MG tablet Take 1 tablet by mouth daily (Patient not taking: Reported on 8/25/2021) 30 tablet 5     No current facility-administered medications for this visit.      Allergies: Darvocet [propoxyphene n-acetaminophen], Ranexa [ranolazine er], Ranolazine, Sulfa antibiotics, Sulfasalazine, Adhesive tape, Allantoin, Bacitracin, Gramicidin, Neomycin, Polymyxin b, Pramoxine hcl, and Silicone  Past Medical History:   Diagnosis Date    Arthritis     Bradycardia 2001    requiring dual chamber pacemaker at [facility] CAD (coronary artery disease)     Cardiac pacemaker 10/2001    [employer] #[id number]  PPM- Serial # 26-[facility]- Dr Seema Ireland CHF (congestive heart failure) ([facility])     COPD, mild ([facility] [address] 2/17/2017    CVA (cerebrovascular accident) ([facility] [address]     DJD (degenerative joint disease) of cervical spine     C5-C6, C6-C7    Exhaustion of cardiac pacemaker battery 11/21/2011    PPM battery replacement- Medtronic    Family history of cardiovascular disease     Glaucoma Dx 2010    H/O 24 hour EKG monitoring 8/6/2000 8/6/2000- Intermittent episonde of a-fib/flutter    H/O cardiac catheterization 4/20/2010, 2/1989 4/20/2010-Severe native vessel disease and has graft disease as well. LAD, CX totally occluded. RCA totally occluded in proximal segment. VG to RCA widely patent. PDA 90% LAD afer LIMA anastomosis 80-90% stenosis. Proceeded with PTCA with stent next day.  H/O cardiovascular stress test 10/14/2011, 6/2/2010,4/8/2010, 5/18/2009,4/6/2009, 10/30/2007, 11/12/2004, 11/6/2003, 8/9/2002, 8/9/2001,6/5/2000,     10/14/2011-Lexiscan-Abnormal Myocardial Perfusion study. Evidence of mild ischemia in the Left CX region. Abnomal study. Rest EF 63%. Global LV systolic function normal. No ECG changes. Unremarkable pharmacological stress test.    H/O cardiovascular stress test 6/10/2013    thallium--mild ischemia left circumflex EF63% no change from 10/2011 study.  H/O cardiovascular stress test 10/16/2014    cardiolite-mild ischemia left circumflex,EF70%    H/O chest x-ray 4/19/2009 4/19/2009-Stable cardiomegaly. No acute cardiopulmonary disease.     H/O Doppler lower venous ultrasound 09/18/2019    Significant reflux noted in RGSV, RGSV is extremely tortuous and small along the thigh and calf and would be highly unlikely to be accessed, RSSV is non compressible and has occlusive chronic SVT, LSSV is non compressible with occlusive chronic SVT, LGSV removed s/p CABG, Significant reflux in LGSV tributary,    H/O Doppler ultrasound 3/31/2010    CAROTID- 3/31/2010-INtimal thickening but no significant atherosclerotic plaque noted in ANA PAULA. Doppler flow velocities within ANA PAULA are WNL. Heterogeneous, irregular atherosclerotic plaque noted in LICA. Doppler flow velocities within the LICA are elevated, consistent with a mild, less than 50% stenosis.  H/O Doppler ultrasound 5/24/2016    Carotid- normal study    H/O Doppler ultrasound 09/06/2017    carotid - normal study    H/O Doppler ultrasound     H/O echocardiogram 10/13    EF=60%, Severe Pulm. HTN, & Sclerotic aortic valve w/stenosis.  H/O echocardiogram 10/16/14     EF 55-60% Normal LV. Normal LV systolic function. Severe tricuspid insufficiency with severe hypertension.  H/O echocardiogram 02/03/2017    heart cath performed this morning    H/O echocardiogram 09/18/2019    EF 50-55%, Left atrium is mild to moderately dilated, right atrium is severely dilated, mildly dilated right ventricle, Mod MR, Severe TR, Severe Pulm HTN, no pericardial effusion     History of complete ECG     10/14/2011(Lexiscan);5/6/2010, 4/30/2009,10/24/2008,9/21/2007, 10/13/2006    History of nuclear stress test 11/17/2016    lexiscan-normal,EF70%    Hx of cardiovascular stress test 12/28/2018    EF 60%  Normal study.  HX OTHER MEDICAL 05/01/2017    MUGA-normal, EF53%    Hyperlipidemia     Hypertension     Mild intermittent asthma 7/28/2016    Nausea & vomiting     Obstructive sleep apnea 5/16/2017    Paroxysmal atrial fibrillation (HCC)     Post PTCA 4/21/2010    PTCA with 2.25 stent of the LIMA to LAD    Pulmonary HTN ([address]     Severe per last echo on 10/13. 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  Followed by MAZE procedure by pulmonary vein isolation.-  Dr Kallie Yin S/P PTCA (percutaneous transluminal coronary angioplasty) 11/2012    PTCA with stent to RCA    Thyroid disease     hypothyroi    Unspecified cerebral artery occlusion with cerebral infarction Unsure When    No Residual     Past Surgical History:   Procedure Laterality Date    APPENDECTOMY  80    CARDIAC SURGERY      CABG (3 Bypasses), One Heart Stent in     COLONOSCOPY  In     X1    CORONARY ANGIOPLASTY WITH STENT PLACEMENT  2010    PTCA with stent LIMA ->LAD    CORONARY ARTERY BYPASS GRAFT  2009    LIMA->Diag,  LIMA -> LAD;  SVG->RCA going to the PDA.  Followed by MAZE procedure by pulmonary vein isolation.-  Dr Omari Perez, TOTAL ABDOMINAL      MIDDLE EAR SURGERY      OTHER SURGICAL HISTORY      Ear surgery-hearing    PACEMAKER PLACEMENT      battery change 2011 Medtronic    PTCA  2012    Ptca with stent to RCA    TONSILLECTOMY  's      As reviewed   Family History   Problem Relation Age of Onset    Cancer Mother         \"Liver Cancer\"    Arthritis Mother     Depression Mother     Hearing Loss Mother     High Blood Pressure Mother     High Cholesterol Mother     Mental Illness Mother    Raynette Epp / Stillbirths Mother     Heart Disease Father     Early Death Father 36        \"Instant Death\"   [place] High Blood Pressure Father     High Cholesterol Father     Coronary Art Dis Father         Massive MI    Heart Disease Sister     Depression Sister     Cancer Sister         \"Breast Cancer, Cancer Free Now\"    High Blood Pressure Sister     Other Daughter         \"She's Had Stomach Surgery\"    High Blood Pressure Son     High Blood Pressure Son     Early Death Paternal Grandfather      Social History     Tobacco Use    Smoking status: Former Smoker     Packs/day: 0.25     Years: 5.00     Pack years: 1.25     Types: Cigarettes     Quit date: 1970     Years since quittin.2    Smokeless tobacco: Never Used   Substance Use Topics    Alcohol use: Not Currently        Objective:    Vitals:    22 1528   BP: 132/74   Site: Right Upper Arm   Position: Sitting   Cuff Size: Medium Adult   Pulse: 61   Weight: 146 lb (66.2 kg)   Height: 4' 11\" (1.499 m)     /74 (Site: Right Upper Arm, Position: Sitting, Cuff Size: Medium Adult)   Pulse 61   Ht 4' 11\" (1.499 m)   Wt 146 lb (66.2 kg)   BMI 29.49 kg/m²     No flowsheet data found. Wt Readings from Last 3 Encounters:   03/01/22 146 lb (66.2 kg)   10/27/21 142 lb 3.2 oz (64.5 kg)   10/16/21 133 lb 6.1 oz (60.5 kg)     Body mass index is 29.49 kg/m². GENERAL - Alert, oriented, pleasant, in no apparent distress. EYES: No jaundice, no conjunctival pallor. SKIN: It is warm & dry. No rashes. No Echhymosis    HEENT - No clinically significant abnormalities seen. Neck - Supple. No jugular venous distention noted. No carotid bruits. Cardiovascular - Normal S1 and S2 without obvious murmur or gallop. Extremities - No cyanosis, clubbing, or significant edema. Pulmonary - No respiratory distress. No wheezes or rales. Abdomen - No masses, tenderness, or organomegaly. Musculoskeletal - No significant edema. No joint deformities. No muscle wasting. Neurologic - Cranial nerves II through XII are grossly intact. There were no gross focal neurologic abnormalities.     Lab Review   Lab Results   Component Value Date    CKTOTAL 42 09/28/2013     BNP:    Lab Results   Component Value Date    BNP 90 09/28/2013     PT/INR:    Lab Results   Component Value Date    INR 2.62 10/27/2021     No results found for: LABA1C  Lab Results   Component Value Date    WBC 8.3 10/25/2021    HCT 45.3 10/25/2021    MCV 88.0 10/25/2021     (H) 10/25/2021     Lab Results   Component Value Date    CHOL 171 10/29/2018    TRIG 82 10/29/2018    HDL 49 10/29/2018    LDLCALC 100 07/05/2017    LDLDIRECT 116 (H) 10/29/2018     Lab Results   Component Value Date    ALT 18 10/13/2021    AST 17 10/13/2021     BMP:    Lab Results   Component Value Date     10/27/2021    K 4.8 10/27/2021    CL 93 10/27/2021    CO2 28 10/27/2021    BUN 20 10/27/2021 CREATININE 0.7 10/27/2021     CMP:   Lab Results   Component Value Date     10/27/2021    K 4.8 10/27/2021    CL 93 10/27/2021    CO2 28 10/27/2021    BUN 20 10/27/2021    PROT 4.9 10/13/2021    PROT 6.8 11/28/2012     TSH:    Lab Results   Component Value Date    TSH 1.0 05/07/2010    TSHHS 0.133 10/07/2021           Assessment & Plan:    -Shortness of breath is probably secondary to pulmonary hypertension her PA pressure was markedly elevated on the last echocardiogram I will obtain a chest x-ray and an echocardiogram to further evaluate her BNP level was not elevated to her age suggestive of euvolemic state           -     CORONARY ARTERY DISEASE:  asymptomatic     All available  tests in chart reviewed. Management discussed . Testing ordered  no                 On aspirin compliant                -  Hypertension: Patients blood pressure is normal. Patient is advised about low sodium diet. Present medical regimen will not be changed. On Aldactone, torsemide, Coreg compliant with blood pressures well controlled         - Pul HTN: Last echo showed severe pulmonary hypertension and going to repeat an echocardiogram to see what is the status of that           -  LIPID MANAGEMENT:  Importance of lipid levels discussed with patient   and patient was given dietary advice. NCEP- ATP III guidelines reviewed with patient. -   Changes  in medicines made: No     Lipitor 10 mg p.o. daily  Last LDL on file is 116                           -Permanent pacemaker implantation  Last [facility] check is as follows patient is in VVIR given that she is in atrial fibrillation  Pacer analysis is reviewed is consistent with normal dual-chamber MRI safe Medtronic pacer function with stable leads and appropriate battery status for the age of the device. Remaining average battery life is 11.4 years.  Device is programmed to VVIR mode lower rate of 60 bpm and 99% pacing in the ventricle.     Recommend continued every three month check and follow up office visit as scheduled.     Duong Kaiser MD, 12/29/2021 6:04 PM     -Hypothyroidism on Synthroid 88 mcg p.o. daily last TSH on file is 1.6    -Obstructive sleep apnea on CPAP which might be causing her pulmonary-    - Atrial fibrillation, pt is  compliant with meds. Patient does not have symptoms from atrial fibrillation on Coumadin    - CVI; patient has bilateral lower extremity swelling more on the left side and please note that she has reflux we have discussed the possibility of ablation which was deferred  Advise her for compression socks for now  Conclusions        Summary        Significant reflux noted of the Right GSV at the SFJ (4.0s).  The Right GSV is extremely tortuous and small along the thigh and calf and    would be highly unlikely to be accessed.    The Right SSV is non-compressible and has occlusive chronic SVT of the    proximal, mid and distal portions.        The Left Small Saphenous Vein is non-compressible with occlusive chronic SVT    of the proximal, mid and distal portions.    Left GSV was removed s/p CABG.    Significant reflux noted in the Left GSV Tributary at the mid calf (0.8s).    GSV Tributary runs 15 CM from mid calf to knee and is not tortuous.        Recommendations        COMPRESSION SOCKS BILATERLLAY    SEE DR SMILEY FOR V ABLATION         Monty Tong MD    [facility] [place]    Please note this report has been partially produced using speech recognition software and may contain errors related to that system including errors in grammar, punctuation, and spelling, as well as words and phrases that may be inappropriate. If there are any questions or concerns please feel free to contact the dictating provider for clarification.

## 2022-03-15 ENCOUNTER — OFFICE VISIT (OUTPATIENT)
Dept: PULMONOLOGY | Age: 87
End: 2022-03-15
Payer: MEDICARE

## 2022-03-15 VITALS
HEART RATE: 72 BPM | DIASTOLIC BLOOD PRESSURE: 80 MMHG | SYSTOLIC BLOOD PRESSURE: 118 MMHG | WEIGHT: 158 LBS | OXYGEN SATURATION: 96 % | BODY MASS INDEX: 31.85 KG/M2 | HEIGHT: 59 IN

## 2022-03-15 DIAGNOSIS — G47.33 OBSTRUCTIVE SLEEP APNEA: Primary | ICD-10-CM

## 2022-03-15 DIAGNOSIS — I27.81 COR PULMONALE (CHRONIC) (HCC): ICD-10-CM

## 2022-03-15 DIAGNOSIS — I50.812 CHRONIC RIGHT-SIDED HEART FAILURE (HCC): ICD-10-CM

## 2022-03-15 DIAGNOSIS — I27.20 SEVERE PULMONARY HYPERTENSION (HCC): ICD-10-CM

## 2022-03-15 DIAGNOSIS — R06.02 SHORTNESS OF BREATH: ICD-10-CM

## 2022-03-15 DIAGNOSIS — J44.9 MODERATE COPD (CHRONIC OBSTRUCTIVE PULMONARY DISEASE) (HCC): ICD-10-CM

## 2022-03-15 PROCEDURE — 99213 OFFICE O/P EST LOW 20 MIN: CPT | Performed by: INTERNAL MEDICINE

## 2022-03-15 PROCEDURE — 1090F PRES/ABSN URINE INCON ASSESS: CPT | Performed by: INTERNAL MEDICINE

## 2022-03-15 PROCEDURE — G8427 DOCREV CUR MEDS BY ELIG CLIN: HCPCS | Performed by: INTERNAL MEDICINE

## 2022-03-15 PROCEDURE — G8484 FLU IMMUNIZE NO ADMIN: HCPCS | Performed by: INTERNAL MEDICINE

## 2022-03-15 PROCEDURE — G8417 CALC BMI ABV UP PARAM F/U: HCPCS | Performed by: INTERNAL MEDICINE

## 2022-03-15 PROCEDURE — 4040F PNEUMOC VAC/ADMIN/RCVD: CPT | Performed by: INTERNAL MEDICINE

## 2022-03-15 PROCEDURE — 1036F TOBACCO NON-USER: CPT | Performed by: INTERNAL MEDICINE

## 2022-03-15 PROCEDURE — 1123F ACP DISCUSS/DSCN MKR DOCD: CPT | Performed by: INTERNAL MEDICINE

## 2022-03-15 PROCEDURE — 3023F SPIROM DOC REV: CPT | Performed by: INTERNAL MEDICINE

## 2022-03-15 NOTE — PROGRESS NOTES
SUBJECTIVE:  Chief Complaint: Severe pulmonary pretension, cor pulmonale, right heart failure, shortness of breath, obstructive sleep apnea on CPAP, moderate COPD  I was called by Irene's family to see her soon as possible because of worsening shortness of breath. At that time I was not aware that she carries a history of severe pulmonary hypertension and had seen Dr. Angela Sloan in 2019. I had seen her previous to that. She continues wear CPAP at night. She is not on supplemental oxygen. She denies any recent bronchitic infections. Her major complaint is severe shortness of breath and very severe generalized swelling and edema throughout her body. She is on diuretics which is directed by both cardiology and her family physician. She was to be scheduled for a right heart catheterization and further evaluation and treatment for pulmonary hypertension 2019 but had other cardiac problems including pacemaker insertion and heart failure and did not complete that evaluation. She denies hemoptysis and is not having chest pain. She has had no known COVID-19 exposure or infection      ROS:  Constitution:  HEENT: Negative for ear, throat pain  Cardiovascular: Negative for chest pain, syncope, edema  Pulmonary: See HPI  Musculoskeletal: Negative for DVT, myalgias, arthralgias    OBJECTIVE:  /80   Pulse 72   Ht 4' 11\" (1.499 m)   Wt 158 lb (71.7 kg)   SpO2 96%   BMI 31.91 kg/m²      Physical Exam:  Constitutional:  She appears in mild to moderate respiratory distress even at rest.  She does have a generalized anasarcous appearance  Neck:  Supple, No palpable lymphadenopathy, No JVD, mild facial edema  Cardiovascular:  S1, S2 Normal, Regular rhythm, no murmurs or gallops, No pericardial  rubs.   Pulmonary: Breath sounds are mildly diminished bilaterally but I hear no wheezing or rhonchi and there is no pleural rubs  Abdomen: Not examined, generalized swelling of the abdominal wall  Extremities: Lower leg edema, No DVT  Neurologic: Oriented x3, No focal deficits    Radiology: Chest x-ray on 3/1/2022 demonstrated no acute abnormality with moderate to severe cardiomegaly and chronic reticular opacities which are stable but no new infiltrates or consolidation  PFT: No recent PFT available with last being done in 2018 showing moderate COPD      Echocardiogram: 9/18/2019    Left ventricular systolic function is normal with an ejection fraction of   50-55%. The left atrium is mild to moderately dilated. The right atrium is severely dilated. Mildly dilated right ventricle. Doppler evaluation reveals moderate mitral and severe tricuspid   regurgitation. Right ventricular systolic pressure of 70 mmHg consistent with severe   pulmonary hypertension. No evidence of pericardial effusion. NEED PULM CONS    Home oxygen saturation study:  O2 saturation on room air at rest97%  O2 saturation on room air with ambulation97% although she became very short of breath with minimal exertion  ASSESSMENT:    1. Obstructive sleep apnea    2. Cor pulmonale (chronic) (HCC)    3. Chronic right-sided heart failure ([address]    4. Severe pulmonary hypertension ([address]    5. Shortness of breath    6. Moderate COPD (chronic obstructive pulmonary disease) (HCC)          PLAN:   At this time she does not qualify for home oxygen during the day. I will obtain a nocturnal oxygen saturation study while on CPAP to see if she would benefit from supplemental oxygen at nighttime. She is scheduled for an repeat echocardiogram next week I think it is very important for her to get back to see Dr. Fernando Wright for her pulmonary hypertension and reconsider right heart catheterization and possible medical therapy for pulmonary hypertension. This will be done in conjunction with cardiology. I also think she will need adjustments in her diuretic therapy and will speak directly to Dr. Leatha Ponce concerning this.     We have discussed the need to maintain yearly flu immunization, pneumococcal vaccination. We have discussed Coronavirus precaution including handwashing practice, wiping items touched in public such as gas pumps, door handles, shopping carts, etc. Self monitoring for infection - fever, chills, cough, SOB. Should they develop symptoms they should call office for further instructions. Return in about 3 weeks (around 4/5/2022) for Recheck for Shortness of Breath, recheck for pulmonary hypertension. This dictation was performed with a verbal recognition program and it was checked for errors. It is possible that there are still dictated errors within this office note. Any errors should be brought immediately to my attention for correction. All efforts were made to ensure that this office note is accurate.

## 2022-03-17 ENCOUNTER — TELEPHONE (OUTPATIENT)
Dept: CARDIOLOGY CLINIC | Age: 87
End: 2022-03-17

## 2022-03-17 RX ORDER — TORSEMIDE 20 MG/1
20 TABLET ORAL 2 TIMES DAILY
COMMUNITY
End: 2022-04-01

## 2022-03-17 RX ORDER — SPIRONOLACTONE 50 MG/1
50 TABLET, FILM COATED ORAL 2 TIMES DAILY
Status: ON HOLD | COMMUNITY
End: 2022-03-24 | Stop reason: SDUPTHER

## 2022-03-17 NOTE — TELEPHONE ENCOUNTER
Patient states she has swelling in legs, face and stomach which she had at last OV. Attempted to review meds but patient unsure. Spoke with pill pack at [facility] and Dr Suman Lopez increased Torsemide to 20 mg BID and Aldactone to 50mg BID today and she just got that pack. med list updated and patient advised.

## 2022-03-18 ENCOUNTER — APPOINTMENT (OUTPATIENT)
Dept: GENERAL RADIOLOGY | Age: 87
DRG: 291 | End: 2022-03-18
Payer: MEDICARE

## 2022-03-18 ENCOUNTER — APPOINTMENT (OUTPATIENT)
Dept: CT IMAGING | Age: 87
DRG: 291 | End: 2022-03-18
Payer: MEDICARE

## 2022-03-18 ENCOUNTER — APPOINTMENT (OUTPATIENT)
Dept: ULTRASOUND IMAGING | Age: 87
DRG: 291 | End: 2022-03-18
Payer: MEDICARE

## 2022-03-18 ENCOUNTER — HOSPITAL ENCOUNTER (INPATIENT)
Age: 87
LOS: 1 days | Discharge: HOME HEALTH | DRG: 291 | End: 2022-03-24
Attending: EMERGENCY MEDICINE | Admitting: INTERNAL MEDICINE
Payer: MEDICARE

## 2022-03-18 DIAGNOSIS — T14.8XXA AVULSION FRACTURE: ICD-10-CM
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
## 2022-03-18 DIAGNOSIS — M54.9 BACK PAIN, UNSPECIFIED BACK LOCATION, UNSPECIFIED BACK PAIN LATERALITY, UNSPECIFIED CHRONICITY: ICD-10-CM

## 2022-03-18 PROCEDURE — 73080 X-RAY EXAM OF ELBOW: CPT

## 2022-03-18 PROCEDURE — 72125 CT NECK SPINE W/O DYE: CPT

## 2022-03-18 PROCEDURE — 73110 X-RAY EXAM OF WRIST: CPT

## 2022-03-18 PROCEDURE — 83880 ASSAY OF NATRIURETIC PEPTIDE: CPT

## 2022-03-18 PROCEDURE — 72128 CT CHEST SPINE W/O DYE: CPT

## 2022-03-18 PROCEDURE — 82550 ASSAY OF CK (CPK): CPT

## 2022-03-18 PROCEDURE — 71045 X-RAY EXAM CHEST 1 VIEW: CPT

## 2022-03-18 PROCEDURE — 74176 CT ABD & PELVIS W/O CONTRAST: CPT

## 2022-03-18 PROCEDURE — 73030 X-RAY EXAM OF SHOULDER: CPT

## 2022-03-18 PROCEDURE — 84484 ASSAY OF TROPONIN QUANT: CPT

## 2022-03-18 PROCEDURE — 83735 ASSAY OF MAGNESIUM: CPT

## 2022-03-18 PROCEDURE — 83605 ASSAY OF LACTIC ACID: CPT

## 2022-03-18 PROCEDURE — 81001 URINALYSIS AUTO W/SCOPE: CPT

## 2022-03-18 PROCEDURE — 73060 X-RAY EXAM OF HUMERUS: CPT

## 2022-03-18 PROCEDURE — 80053 COMPREHEN METABOLIC PANEL: CPT

## 2022-03-18 PROCEDURE — 83690 ASSAY OF LIPASE: CPT

## 2022-03-18 PROCEDURE — 73130 X-RAY EXAM OF HAND: CPT

## 2022-03-18 PROCEDURE — 85025 COMPLETE CBC W/AUTO DIFF WBC: CPT

## 2022-03-18 PROCEDURE — 93970 EXTREMITY STUDY: CPT

## 2022-03-18 PROCEDURE — 84443 ASSAY THYROID STIM HORMONE: CPT

## 2022-03-18 PROCEDURE — 73090 X-RAY EXAM OF FOREARM: CPT

## 2022-03-18 PROCEDURE — 76376 3D RENDER W/INTRP POSTPROCES: CPT

## 2022-03-18 PROCEDURE — 70450 CT HEAD/BRAIN W/O DYE: CPT

## 2022-03-18 RX ORDER — ONDANSETRON 2 MG/ML
4 INJECTION INTRAMUSCULAR; INTRAVENOUS EVERY 30 MIN PRN
Status: DISCONTINUED | OUTPATIENT
Start: 2022-03-18 | End: 2022-03-24 | Stop reason: HOSPADM

## 2022-03-18 RX ORDER — SODIUM CHLORIDE 9 MG/ML
INJECTION, SOLUTION INTRAVENOUS CONTINUOUS
Status: DISCONTINUED | OUTPATIENT
Start: 2022-03-18 | End: 2022-03-19

## 2022-03-18 RX ORDER — FENTANYL CITRATE 50 UG/ML
12.5 INJECTION, SOLUTION INTRAMUSCULAR; INTRAVENOUS ONCE
Status: COMPLETED | OUTPATIENT
Start: 2022-03-18 | End: 2022-03-19

## 2022-03-19 LAB
ALBUMIN SERPL-MCNC: 3.5 GM/DL (ref 3.4–5)
ALP BLD-CCNC: 173 IU/L (ref 40–129)
ALT SERPL-CCNC: 16 U/L (ref 10–40)
ANION GAP SERPL CALCULATED.3IONS-SCNC: 13 MMOL/L (ref 4–16)
ANION GAP SERPL CALCULATED.3IONS-SCNC: 14 MMOL/L (ref 4–16)
ANION GAP SERPL CALCULATED.3IONS-SCNC: 15 MMOL/L (ref 4–16)
ANISOCYTOSIS: ABNORMAL
AST SERPL-CCNC: 32 IU/L (ref 15–37)
BACTERIA: NEGATIVE /HPF
BACTERIA: NEGATIVE /HPF
BASOPHILS ABSOLUTE: 0.1 K/CU MM
BASOPHILS ABSOLUTE: 0.1 K/CU MM
BASOPHILS RELATIVE PERCENT: 0.5 % (ref 0–1)
BASOPHILS RELATIVE PERCENT: 0.6 % (ref 0–1)
BILIRUB SERPL-MCNC: 1.6 MG/DL (ref 0–1)
BILIRUBIN URINE: NEGATIVE MG/DL
BILIRUBIN URINE: NEGATIVE MG/DL
BLOOD, URINE: ABNORMAL
BLOOD, URINE: NEGATIVE
BUN BLDV-MCNC: 27 MG/DL (ref 6–23)
BUN BLDV-MCNC: 30 MG/DL (ref 6–23)
BUN BLDV-MCNC: 31 MG/DL (ref 6–23)
CALCIUM SERPL-MCNC: 10 MG/DL (ref 8.3–10.6)
CALCIUM SERPL-MCNC: 9.6 MG/DL (ref 8.3–10.6)
CALCIUM SERPL-MCNC: 9.9 MG/DL (ref 8.3–10.6)
CHLORIDE BLD-SCNC: 88 MMOL/L (ref 99–110)
CHLORIDE BLD-SCNC: 90 MMOL/L (ref 99–110)
CHLORIDE BLD-SCNC: 92 MMOL/L (ref 99–110)
CLARITY: ABNORMAL
CLARITY: CLEAR
CO2: 22 MMOL/L (ref 21–32)
CO2: 23 MMOL/L (ref 21–32)
CO2: 24 MMOL/L (ref 21–32)
COLOR: YELLOW
COLOR: YELLOW
CREAT SERPL-MCNC: 0.8 MG/DL (ref 0.6–1.1)
CREAT SERPL-MCNC: 0.9 MG/DL (ref 0.6–1.1)
CREAT SERPL-MCNC: 1 MG/DL (ref 0.6–1.1)
CREATININE URINE: 57.2 MG/DL
DIFFERENTIAL TYPE: ABNORMAL
DIFFERENTIAL TYPE: ABNORMAL
EOSINOPHILS ABSOLUTE: 0.3 K/CU MM
EOSINOPHILS ABSOLUTE: 0.4 K/CU MM
EOSINOPHILS RELATIVE PERCENT: 3 % (ref 0–3)
EOSINOPHILS RELATIVE PERCENT: 3.2 % (ref 0–3)
FERRITIN: 26 NG/ML (ref 15–150)
FOLATE: 15.4 NG/ML (ref 3.1–17.5)
GFR AFRICAN AMERICAN: >60 ML/MIN/1.73M2
GFR NON-AFRICAN AMERICAN: 52 ML/MIN/1.73M2
GFR NON-AFRICAN AMERICAN: 58 ML/MIN/1.73M2
GFR NON-AFRICAN AMERICAN: >60 ML/MIN/1.73M2
GLUCOSE BLD-MCNC: 106 MG/DL (ref 70–99)
GLUCOSE BLD-MCNC: 153 MG/DL (ref 70–99)
GLUCOSE BLD-MCNC: 154 MG/DL (ref 70–99)
GLUCOSE, URINE: NEGATIVE MG/DL
GLUCOSE, URINE: NEGATIVE MG/DL
HCT VFR BLD CALC: 34.5 % (ref 37–47)
HCT VFR BLD CALC: 36.5 % (ref 37–47)
HEMOGLOBIN: 10.4 GM/DL (ref 12.5–16)
HEMOGLOBIN: 11.4 GM/DL (ref 12.5–16)
IMMATURE NEUTROPHIL %: 1.1 % (ref 0–0.43)
IMMATURE NEUTROPHIL %: 1.3 % (ref 0–0.43)
INR BLD: 1.26 INDEX
INR BLD: 1.36 INDEX
IRON: 18 UG/DL (ref 37–145)
KETONES, URINE: NEGATIVE MG/DL
KETONES, URINE: NEGATIVE MG/DL
LACTATE: 1.2 MMOL/L (ref 0.4–2)
LEUKOCYTE ESTERASE, URINE: ABNORMAL
LEUKOCYTE ESTERASE, URINE: ABNORMAL
LIPASE: 50 IU/L (ref 13–60)
LYMPHOCYTES ABSOLUTE: 0.8 K/CU MM
LYMPHOCYTES ABSOLUTE: 1 K/CU MM
LYMPHOCYTES RELATIVE PERCENT: 7 % (ref 24–44)
LYMPHOCYTES RELATIVE PERCENT: 8.5 % (ref 24–44)
MAGNESIUM: 2.1 MG/DL (ref 1.8–2.4)
MCH RBC QN AUTO: 26.3 PG (ref 27–31)
MCH RBC QN AUTO: 26.3 PG (ref 27–31)
MCHC RBC AUTO-ENTMCNC: 30.1 % (ref 32–36)
MCHC RBC AUTO-ENTMCNC: 31.2 % (ref 32–36)
MCV RBC AUTO: 84.1 FL (ref 78–100)
MCV RBC AUTO: 87.3 FL (ref 78–100)
MONOCYTES ABSOLUTE: 0.9 K/CU MM
MONOCYTES ABSOLUTE: 1 K/CU MM
MONOCYTES RELATIVE PERCENT: 7.7 % (ref 0–4)
MONOCYTES RELATIVE PERCENT: 8.5 % (ref 0–4)
NITRITE URINE, QUANTITATIVE: NEGATIVE
NITRITE URINE, QUANTITATIVE: NEGATIVE
NON SQUAM EPI CELLS: 1 /HPF
NUCLEATED RBC %: 0 %
NUCLEATED RBC %: 0 %
OSMOLALITY URINE: 382 MOS/L (ref 292–1090)
OSMOLALITY: 282 MOS/L (ref 280–300)
OVALOCYTES: ABNORMAL
PCT TRANSFERRIN: 7 % (ref 10–44)
PDW BLD-RTO: 16 % (ref 11.7–14.9)
PDW BLD-RTO: 16.1 % (ref 11.7–14.9)
PH, URINE: 6.5 (ref 5–8)
PH, URINE: 7.5 (ref 5–8)
PLATELET # BLD: 488 K/CU MM (ref 140–440)
PLATELET # BLD: 520 K/CU MM (ref 140–440)
PMV BLD AUTO: 11.1 FL (ref 7.5–11.1)
PMV BLD AUTO: 11.2 FL (ref 7.5–11.1)
POTASSIUM SERPL-SCNC: 4.6 MMOL/L (ref 3.5–5.1)
POTASSIUM SERPL-SCNC: 4.9 MMOL/L (ref 3.5–5.1)
POTASSIUM SERPL-SCNC: 5.1 MMOL/L (ref 3.5–5.1)
PRO-BNP: 1565 PG/ML
PROTEIN UA: 100 MG/DL
PROTEIN UA: NEGATIVE MG/DL
PROTHROMBIN TIME: 16.3 SECONDS (ref 11.7–14.5)
PROTHROMBIN TIME: 17.6 SECONDS (ref 11.7–14.5)
RBC # BLD: 3.95 M/CU MM (ref 4.2–5.4)
RBC # BLD: 4.34 M/CU MM (ref 4.2–5.4)
RBC URINE: 429 /HPF (ref 0–6)
RBC URINE: <1 /HPF (ref 0–6)
RETICULOCYTE COUNT PCT: 2.9 % (ref 0.2–2.2)
SEGMENTED NEUTROPHILS ABSOLUTE COUNT: 9 K/CU MM
SEGMENTED NEUTROPHILS ABSOLUTE COUNT: 9.2 K/CU MM
SEGMENTED NEUTROPHILS RELATIVE PERCENT: 79.1 % (ref 36–66)
SEGMENTED NEUTROPHILS RELATIVE PERCENT: 79.5 % (ref 36–66)
SODIUM BLD-SCNC: 125 MMOL/L (ref 135–145)
SODIUM BLD-SCNC: 127 MMOL/L (ref 135–145)
SODIUM BLD-SCNC: 129 MMOL/L (ref 135–145)
SODIUM URINE: 46 MMOL/L (ref 35–167)
SPECIFIC GRAVITY UA: 1.01 (ref 1–1.03)
SPECIFIC GRAVITY UA: 1.02 (ref 1–1.03)
SQUAMOUS EPITHELIAL: 1 /HPF
TOTAL CK: 89 IU/L (ref 26–140)
TOTAL IMMATURE NEUTOROPHIL: 0.12 K/CU MM
TOTAL IMMATURE NEUTOROPHIL: 0.15 K/CU MM
TOTAL IRON BINDING CAPACITY: 272 UG/DL (ref 250–450)
TOTAL NUCLEATED RBC: 0 K/CU MM
TOTAL NUCLEATED RBC: 0 K/CU MM
TOTAL PROTEIN: 6.2 GM/DL (ref 6.4–8.2)
TRICHOMONAS: ABNORMAL /HPF
TROPONIN T: <0.01 NG/ML
TSH HIGH SENSITIVITY: 3.44 UIU/ML (ref 0.27–4.2)
UNSATURATED IRON BINDING CAPACITY: 254 UG/DL (ref 110–370)
UROBILINOGEN, URINE: 0.2 MG/DL (ref 0.2–1)
UROBILINOGEN, URINE: 0.2 MG/DL (ref 0.2–1)
VITAMIN B-12: 1227 PG/ML (ref 211–911)
WBC # BLD: 11.4 K/CU MM (ref 4–10.5)
WBC # BLD: 11.6 K/CU MM (ref 4–10.5)
WBC CLUMP: ABNORMAL /HPF
WBC UA: 3 /HPF (ref 0–5)
WBC UA: 777 /HPF (ref 0–5)

## 2022-03-19 PROCEDURE — 6360000002 HC RX W HCPCS: Performed by: EMERGENCY MEDICINE

## 2022-03-19 PROCEDURE — 87077 CULTURE AEROBIC IDENTIFY: CPT

## 2022-03-19 PROCEDURE — 96375 TX/PRO/DX INJ NEW DRUG ADDON: CPT

## 2022-03-19 PROCEDURE — 2580000003 HC RX 258: Performed by: EMERGENCY MEDICINE

## 2022-03-19 PROCEDURE — 94640 AIRWAY INHALATION TREATMENT: CPT

## 2022-03-19 PROCEDURE — 83550 IRON BINDING TEST: CPT

## 2022-03-19 PROCEDURE — 6370000000 HC RX 637 (ALT 250 FOR IP): Performed by: INTERNAL MEDICINE

## 2022-03-19 PROCEDURE — 87086 URINE CULTURE/COLONY COUNT: CPT

## 2022-03-19 PROCEDURE — 83930 ASSAY OF BLOOD OSMOLALITY: CPT

## 2022-03-19 PROCEDURE — 94761 N-INVAS EAR/PLS OXIMETRY MLT: CPT

## 2022-03-19 PROCEDURE — 85045 AUTOMATED RETICULOCYTE COUNT: CPT

## 2022-03-19 PROCEDURE — 85025 COMPLETE CBC W/AUTO DIFF WBC: CPT

## 2022-03-19 PROCEDURE — 83935 ASSAY OF URINE OSMOLALITY: CPT

## 2022-03-19 PROCEDURE — 36415 COLL VENOUS BLD VENIPUNCTURE: CPT

## 2022-03-19 PROCEDURE — 6360000002 HC RX W HCPCS: Performed by: INTERNAL MEDICINE

## 2022-03-19 PROCEDURE — 96376 TX/PRO/DX INJ SAME DRUG ADON: CPT

## 2022-03-19 PROCEDURE — 85610 PROTHROMBIN TIME: CPT

## 2022-03-19 PROCEDURE — G0378 HOSPITAL OBSERVATION PER HR: HCPCS

## 2022-03-19 PROCEDURE — 83540 ASSAY OF IRON: CPT

## 2022-03-19 PROCEDURE — 93005 ELECTROCARDIOGRAM TRACING: CPT | Performed by: EMERGENCY MEDICINE

## 2022-03-19 PROCEDURE — 82570 ASSAY OF URINE CREATININE: CPT

## 2022-03-19 PROCEDURE — 82728 ASSAY OF FERRITIN: CPT

## 2022-03-19 PROCEDURE — 96374 THER/PROPH/DIAG INJ IV PUSH: CPT

## 2022-03-19 PROCEDURE — 99284 EMERGENCY DEPT VISIT MOD MDM: CPT

## 2022-03-19 PROCEDURE — 96361 HYDRATE IV INFUSION ADD-ON: CPT

## 2022-03-19 PROCEDURE — 82607 VITAMIN B-12: CPT

## 2022-03-19 PROCEDURE — 84300 ASSAY OF URINE SODIUM: CPT

## 2022-03-19 PROCEDURE — 6370000000 HC RX 637 (ALT 250 FOR IP): Performed by: PHYSICIAN ASSISTANT

## 2022-03-19 PROCEDURE — 80048 BASIC METABOLIC PNL TOTAL CA: CPT

## 2022-03-19 PROCEDURE — 82746 ASSAY OF FOLIC ACID SERUM: CPT

## 2022-03-19 RX ORDER — LIDOCAINE 4 G/G
1 PATCH TOPICAL DAILY
Status: DISCONTINUED | OUTPATIENT
Start: 2022-03-19 | End: 2022-03-24 | Stop reason: HOSPADM

## 2022-03-19 RX ORDER — ACETAMINOPHEN 325 MG/1
650 TABLET ORAL EVERY 4 HOURS PRN
Status: DISCONTINUED | OUTPATIENT
Start: 2022-03-19 | End: 2022-03-24 | Stop reason: HOSPADM

## 2022-03-19 RX ORDER — CARVEDILOL 6.25 MG/1
6.25 TABLET ORAL 2 TIMES DAILY WITH MEALS
Status: DISCONTINUED | OUTPATIENT
Start: 2022-03-19 | End: 2022-03-24 | Stop reason: HOSPADM

## 2022-03-19 RX ORDER — LANOLIN ALCOHOL/MO/W.PET/CERES
9 CREAM (GRAM) TOPICAL NIGHTLY PRN
Status: DISCONTINUED | OUTPATIENT
Start: 2022-03-19 | End: 2022-03-24 | Stop reason: HOSPADM

## 2022-03-19 RX ORDER — FENTANYL CITRATE 50 UG/ML
12.5 INJECTION, SOLUTION INTRAMUSCULAR; INTRAVENOUS ONCE
Status: COMPLETED | OUTPATIENT
Start: 2022-03-19 | End: 2022-03-19

## 2022-03-19 RX ORDER — ASPIRIN 81 MG/1
81 TABLET, CHEWABLE ORAL DAILY
Status: DISCONTINUED | OUTPATIENT
Start: 2022-03-19 | End: 2022-03-24 | Stop reason: HOSPADM

## 2022-03-19 RX ORDER — FUROSEMIDE 10 MG/ML
40 INJECTION INTRAMUSCULAR; INTRAVENOUS ONCE
Status: COMPLETED | OUTPATIENT
Start: 2022-03-19 | End: 2022-03-19

## 2022-03-19 RX ORDER — POLYVINYL ALCOHOL 14 MG/ML
1 SOLUTION/ DROPS OPHTHALMIC PRN
Status: DISCONTINUED | OUTPATIENT
Start: 2022-03-19 | End: 2022-03-24 | Stop reason: HOSPADM

## 2022-03-19 RX ORDER — SPIRONOLACTONE 50 MG/1
50 TABLET, FILM COATED ORAL 2 TIMES DAILY
Status: DISCONTINUED | OUTPATIENT
Start: 2022-03-19 | End: 2022-03-24 | Stop reason: HOSPADM

## 2022-03-19 RX ORDER — LANOLIN ALCOHOL/MO/W.PET/CERES
500 CREAM (GRAM) TOPICAL DAILY
Status: DISCONTINUED | OUTPATIENT
Start: 2022-03-20 | End: 2022-03-24 | Stop reason: HOSPADM

## 2022-03-19 RX ORDER — LANOLIN ALCOHOL/MO/W.PET/CERES
1000 CREAM (GRAM) TOPICAL DAILY
Status: DISCONTINUED | OUTPATIENT
Start: 2022-03-19 | End: 2022-03-19

## 2022-03-19 RX ORDER — ATORVASTATIN CALCIUM 20 MG/1
10 TABLET, FILM COATED ORAL DAILY
Status: DISCONTINUED | OUTPATIENT
Start: 2022-03-19 | End: 2022-03-24 | Stop reason: HOSPADM

## 2022-03-19 RX ORDER — HYDROCODONE BITARTRATE AND ACETAMINOPHEN 5; 325 MG/1; MG/1
1 TABLET ORAL EVERY 6 HOURS PRN
Status: DISCONTINUED | OUTPATIENT
Start: 2022-03-19 | End: 2022-03-24 | Stop reason: HOSPADM

## 2022-03-19 RX ORDER — GLIMEPIRIDE 2 MG/1
1 TABLET ORAL 2 TIMES DAILY
Status: DISCONTINUED | OUTPATIENT
Start: 2022-03-19 | End: 2022-03-24 | Stop reason: HOSPADM

## 2022-03-19 RX ORDER — LEVOTHYROXINE SODIUM 88 UG/1
88 TABLET ORAL DAILY
Status: DISCONTINUED | OUTPATIENT
Start: 2022-03-19 | End: 2022-03-24 | Stop reason: HOSPADM

## 2022-03-19 RX ORDER — FERROUS SULFATE 325(65) MG
325 TABLET ORAL 2 TIMES DAILY WITH MEALS
Status: DISCONTINUED | OUTPATIENT
Start: 2022-03-19 | End: 2022-03-24 | Stop reason: HOSPADM

## 2022-03-19 RX ORDER — ALBUTEROL SULFATE 90 UG/1
2 AEROSOL, METERED RESPIRATORY (INHALATION) 2 TIMES DAILY
Status: DISCONTINUED | OUTPATIENT
Start: 2022-03-19 | End: 2022-03-21

## 2022-03-19 RX ORDER — WARFARIN SODIUM 5 MG/1
5 TABLET ORAL DAILY
Status: DISCONTINUED | OUTPATIENT
Start: 2022-03-19 | End: 2022-03-24

## 2022-03-19 RX ADMIN — CARVEDILOL 6.25 MG: 6.25 TABLET, FILM COATED ORAL at 11:35

## 2022-03-19 RX ADMIN — SODIUM CHLORIDE: 9 INJECTION, SOLUTION INTRAVENOUS at 00:53

## 2022-03-19 RX ADMIN — WARFARIN SODIUM 5 MG: 5 TABLET ORAL at 18:55

## 2022-03-19 RX ADMIN — FUROSEMIDE 40 MG: 10 INJECTION, SOLUTION INTRAMUSCULAR; INTRAVENOUS at 12:06

## 2022-03-19 RX ADMIN — FENTANYL CITRATE 12.5 MCG: 50 INJECTION INTRAMUSCULAR; INTRAVENOUS at 06:59

## 2022-03-19 RX ADMIN — TIMOLOL MALEATE 1 DROP: 2.5 SOLUTION/ DROPS OPHTHALMIC at 20:28

## 2022-03-19 RX ADMIN — FENTANYL CITRATE 12.5 MCG: 50 INJECTION INTRAMUSCULAR; INTRAVENOUS at 00:50

## 2022-03-19 RX ADMIN — ONDANSETRON 4 MG: 2 INJECTION INTRAMUSCULAR; INTRAVENOUS at 20:28

## 2022-03-19 RX ADMIN — FERROUS SULFATE TAB 325 MG (65 MG ELEMENTAL FE) 325 MG: 325 (65 FE) TAB at 18:55

## 2022-03-19 RX ADMIN — ACETAMINOPHEN 650 MG: 325 TABLET ORAL at 11:39

## 2022-03-19 RX ADMIN — ATORVASTATIN CALCIUM 10 MG: 20 TABLET, FILM COATED ORAL at 11:35

## 2022-03-19 RX ADMIN — HYDROCODONE BITARTRATE AND ACETAMINOPHEN 1 TABLET: 5; 325 TABLET ORAL at 22:14

## 2022-03-19 RX ADMIN — ONDANSETRON 4 MG: 2 INJECTION INTRAMUSCULAR; INTRAVENOUS at 00:51

## 2022-03-19 RX ADMIN — CYANOCOBALAMIN TAB 1000 MCG 1000 MCG: 1000 TAB at 11:35

## 2022-03-19 RX ADMIN — ASPIRIN 81 MG 81 MG: 81 TABLET ORAL at 11:35

## 2022-03-19 RX ADMIN — ALBUTEROL SULFATE 2 PUFF: 90 AEROSOL, METERED RESPIRATORY (INHALATION) at 20:36

## 2022-03-19 RX ADMIN — TIMOLOL MALEATE 1 DROP: 2.5 SOLUTION/ DROPS OPHTHALMIC at 11:36

## 2022-03-19 RX ADMIN — LEVOTHYROXINE SODIUM 88 MCG: 0.09 TABLET ORAL at 11:35

## 2022-03-19 RX ADMIN — CARVEDILOL 6.25 MG: 6.25 TABLET, FILM COATED ORAL at 18:55

## 2022-03-19 ASSESSMENT — PAIN DESCRIPTION - PROGRESSION
CLINICAL_PROGRESSION: NOT CHANGED

## 2022-03-19 ASSESSMENT — PAIN SCALES - GENERAL
PAINLEVEL_OUTOF10: 0
PAINLEVEL_OUTOF10: 0
PAINLEVEL_OUTOF10: 8
PAINLEVEL_OUTOF10: 8
PAINLEVEL_OUTOF10: 0
PAINLEVEL_OUTOF10: 7
PAINLEVEL_OUTOF10: 5
PAINLEVEL_OUTOF10: 0

## 2022-03-19 ASSESSMENT — PAIN DESCRIPTION - ONSET
ONSET: ON-GOING
ONSET: ON-GOING

## 2022-03-19 ASSESSMENT — ENCOUNTER SYMPTOMS
SHORTNESS OF BREATH: 1
ALLERGIC/IMMUNOLOGIC NEGATIVE: 1
EYES NEGATIVE: 1
BACK PAIN: 1
ABDOMINAL DISTENTION: 1

## 2022-03-19 ASSESSMENT — PAIN DESCRIPTION - DESCRIPTORS
DESCRIPTORS: ACHING;DISCOMFORT
DESCRIPTORS: ACHING;DISCOMFORT

## 2022-03-19 ASSESSMENT — PAIN - FUNCTIONAL ASSESSMENT
PAIN_FUNCTIONAL_ASSESSMENT: PREVENTS OR INTERFERES SOME ACTIVE ACTIVITIES AND ADLS
PAIN_FUNCTIONAL_ASSESSMENT: PREVENTS OR INTERFERES SOME ACTIVE ACTIVITIES AND ADLS

## 2022-03-19 ASSESSMENT — PAIN DESCRIPTION - LOCATION
LOCATION: SHOULDER;BACK
LOCATION: SHOULDER

## 2022-03-19 ASSESSMENT — PAIN DESCRIPTION - PAIN TYPE
TYPE: ACUTE PAIN
TYPE: ACUTE PAIN

## 2022-03-19 ASSESSMENT — PAIN DESCRIPTION - ORIENTATION
ORIENTATION: RIGHT
ORIENTATION: RIGHT;LOWER

## 2022-03-19 ASSESSMENT — PAIN DESCRIPTION - FREQUENCY
FREQUENCY: CONTINUOUS
FREQUENCY: CONTINUOUS

## 2022-03-19 ASSESSMENT — LIFESTYLE VARIABLES: HOW OFTEN DO YOU HAVE A DRINK CONTAINING ALCOHOL: NEVER

## 2022-03-19 NOTE — PROGRESS NOTES
Note to Dr Marilyn Brown: Good morning. This patient is a new admission. HX of CHF. Noted on assessment VS stable; Oxygenation 97% on Room Air;  crackles bilateral lower bases Infusing NS @75. Continue?

## 2022-03-19 NOTE — ED PROVIDER NOTES
[address] [facility]      TRIAGE CHIEF COMPLAINT:   Back Pain and Fall (3/14)      CHAIRS:  Minor Remedies is a 80 y.o. female that presents with a complaint of swelling to her abdomen, legs, shortness of breath, frequent falls, right arm pain and back pain after fall. Patient lives alone has been seen her doctor put on diuretics. States still having swelling weight gain. She states she has been falling she fell on Monday she has bruising to her right arm no current blood thinners. Denies history of DVT or AAA. Denies any fevers nausea vomiting chest pain no abdominal pain is distention denies any bowel or bladder symptoms. No weakness numbness or tingling. No other questions or concerns brought in by family. She does live alone. REVIEW OF SYSTEMS:  At least 10 systems reviewed and otherwise acutely negative except as in the [address] Review of Systems   Constitutional: Positive for activity change and fatigue. HENT: Negative. Eyes: Negative. Respiratory: Positive for shortness of breath. Cardiovascular: Positive for leg swelling. Gastrointestinal: Positive for abdominal distention. Endocrine: Negative. Genitourinary: Negative. Musculoskeletal: Positive for arthralgias, back pain, joint swelling and myalgias. Skin: Negative. Allergic/Immunologic: Negative. Neurological: Positive for weakness and headaches. Hematological: Bruises/bleeds easily. Psychiatric/Behavioral: Negative. All other systems reviewed and are negative.       Past Medical History:   Diagnosis Date    Arthritis     Bradycardia 2001    requiring dual chamber pacemaker at [facility] CAD (coronary artery disease)     Cardiac pacemaker 10/2001    [id number]  [facility] ([place] # [id number]- Dr Arias Every CHF (congestive heart failure) ([address]     COPD, mild ([address] 2/17/2017    Cor pulmonale (chronic) ([address] 3/15/2022    CVA (cerebrovascular accident) ([address]     DJD (degenerative joint disease) of cervical spine     C5-C6, C6-C7    Exhaustion of cardiac pacemaker battery 11/21/2011    PPM battery replacement- [employer]    Family history of cardiovascular disease     Glaucoma Dx 2010    H/O 24 hour EKG monitoring 8/6/2000 8/6/2000- Intermittent episonde of a-fib/flutter    H/O cardiac catheterization 4/20/2010, 2/1989 4/20/2010-Severe native vessel disease and has graft disease as well. LAD, CX totally occluded. RCA totally occluded in proximal segment. VG to RCA widely patent. PDA 90% LAD afer LIMA anastomosis 80-90% stenosis. Proceeded with PTCA with stent next day.  H/O cardiovascular stress test 10/14/2011, 6/2/2010,4/8/2010, 5/18/2009,4/6/2009, 10/30/2007, 11/12/2004, 11/6/2003, 8/9/2002, 8/9/2001,6/5/2000,     10/14/2011-Lexiscan-Abnormal Myocardial Perfusion study. Evidence of mild ischemia in the Left CX region. Abnomal study. Rest EF 63%. Global LV systolic function normal. No ECG changes. Unremarkable pharmacological stress test.    H/O cardiovascular stress test 6/10/2013    thallium--mild ischemia left circumflex EF63% no change from 10/2011 study.  H/O cardiovascular stress test 10/16/2014    cardiolite-mild ischemia left circumflex,EF70%    H/O chest x-ray 4/19/2009 4/19/2009-Stable cardiomegaly. No acute cardiopulmonary disease.  H/O Doppler lower venous ultrasound 09/18/2019    Significant reflux noted in RGSV, RGSV is extremely tortuous and small along the thigh and calf and would be highly unlikely to be accessed, RSSV is non compressible and has occlusive chronic SVT, LSSV is non compressible with occlusive chronic SVT, LGSV removed s/p CABG, Significant reflux in LGSV tributary,    H/O Doppler ultrasound 3/31/2010    CAROTID- 3/31/2010-INtimal thickening but no significant atherosclerotic plaque noted in ANA PAULA. Doppler flow velocities within ANA PAULA are WNL. Heterogeneous, irregular atherosclerotic plaque noted in LICA.  Doppler flow velocities within the LICA are elevated, consistent with a mild, less than 50% stenosis.  H/O Doppler ultrasound 5/24/2016    Carotid- normal study    H/O Doppler ultrasound 09/06/2017    carotid - normal study    H/O Doppler ultrasound     H/O echocardiogram 10/13    EF=60%, Severe Pulm. HTN, & Sclerotic aortic valve w/stenosis.  H/O echocardiogram 10/16/14     EF 55-60% Normal LV. Normal LV systolic function. Severe tricuspid insufficiency with severe hypertension.  H/O echocardiogram 02/03/2017    heart cath performed this morning    H/O echocardiogram 09/18/2019    EF 50-55%, Left atrium is mild to moderately dilated, right atrium is severely dilated, mildly dilated right ventricle, Mod MR, Severe TR, Severe Pulm HTN, no pericardial effusion     History of complete ECG     10/14/2011(Lexiscan);5/6/2010, 4/30/2009,10/24/2008,9/21/2007, 10/13/2006    History of nuclear stress test 11/17/2016    lexiscan-normal,EF70%    Hx of cardiovascular stress test 12/28/2018    EF 60%  Normal study.  HX OTHER MEDICAL 05/01/2017    MUGA-normal, EF53%    Hyperlipidemia     Hypertension     Mild intermittent asthma 7/28/2016    Moderate COPD (chronic obstructive pulmonary disease) (HCC) 3/15/2022    Nausea & vomiting     Obstructive sleep apnea 5/16/2017    Paroxysmal atrial fibrillation (HCC)     Post PTCA 4/21/2010    PTCA with 2.25 stent of the LIMA to LAD    Pulmonary HTN ([address]     Severe per last echo on 10/13. 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  Followed by MAZE procedure by pulmonary vein isolation.-  Dr Hugo Snyder S/P PTCA (percutaneous transluminal coronary angioplasty) 11/2012    PTCA with stent to RCA    Severe pulmonary hypertension ([address] 3/15/2022    Shortness of breath 3/15/2022    Thyroid disease     hypothyroi    Unspecified cerebral artery occlusion with cerebral infarction Unsure When No Residual     Past Surgical History:   Procedure Laterality Date    APPENDECTOMY  80    CARDIAC SURGERY      CABG (3 Bypasses), One Heart Stent in     COLONOSCOPY  In     X1    CORONARY ANGIOPLASTY WITH STENT PLACEMENT  2010    PTCA with stent LIMA ->LAD    CORONARY ARTERY BYPASS GRAFT  2009    LIMA->Diag,  LIMA -> LAD;  SVG->RCA going to the PDA. Followed by MAZE procedure by pulmonary vein isolation.-  Dr Nick Michelle, TOTAL ABDOMINAL  's    MIDDLE EAR SURGERY      OTHER SURGICAL HISTORY      Ear surgery-hearing    PACEMAKER PLACEMENT      battery change 2011 Medtronic    PTCA  2012    Ptca with stent to RCA    TONSILLECTOMY  65's     Family History   Problem Relation Age of Onset    Cancer Mother         \"Liver Cancer\"    Arthritis Mother     Depression Mother     Hearing Loss Mother     High Blood Pressure Mother     High Cholesterol Mother     Mental Illness Mother    [de-identified] / [place] Mother     Heart Disease Father     Early Death Father 36        \"Instant Death\"    High Blood Pressure Father     High Cholesterol Father     Coronary Art Dis Father         Massive MI    Heart Disease Sister     Depression Sister     Cancer Sister         \"Breast Cancer, Cancer Free Now\"    High Blood Pressure Sister     Other Daughter         \"She's Had Stomach Surgery\"    High Blood Pressure Son     High Blood Pressure Son     Early Death Paternal Grandfather      Social History     Socioeconomic History    Marital status:       Spouse name: Not on file    Number of children: 3    Years of education: Not on file    Highest education level: Not on file   Occupational History    Occupation: RETIRED     Comment: from 8 [employer] Use    Smoking status: Former Smoker     Packs/day: 0.25     Years: 5.00     Pack years: 1.25     Types: Cigarettes     Quit date: 1970     Years since quittin.3    Smokeless tobacco: Never Used   Substance and Sexual Activity    Alcohol use: Not Currently    Drug use: No    Sexual activity: Yes     Partners: Male     Comment:    Other Topics Concern    Not on file   Social History Narrative    Not on file     Social Determinants of Health     Financial Resource Strain:     Difficulty of Paying Living Expenses: Not on file   Food Insecurity:     Worried About Running Out of Food in the Last Year: Not on file    Sarah of Food in the Last Year: Not on file   Transportation Needs:     Lack of Transportation (Medical): Not on file    Lack of Transportation (Non-Medical):  Not on file   Physical Activity:     Days of Exercise per Week: Not on file    Minutes of Exercise per Session: Not on file   Stress:     Feeling of Stress : Not on file   Social Connections:     Frequency of Communication with Friends and Family: Not on file    Frequency of Social Gatherings with Friends and Family: Not on file    Attends [employer] Services: Not on file    Active Member of [address] [place] [address] or Organizations: Not on file    Attends Club or Organization Meetings: Not on file    Marital Status: Not on file   Intimate Partner Violence:     Fear of Current or Ex-Partner: Not on file    Emotionally Abused: Not on file    Physically Abused: Not on file    Sexually Abused: Not on file   Housing Stability:     Unable to Pay for Housing in the Last Year: Not on file    Number of [place] in the Last Year: Not on file    Unstable Housing in the Last Year: Not on file     Current Facility-Administered Medications   Medication Dose Route Frequency Provider Last Rate Last Admin    albuterol sulfate  (90 Base) MCG/ACT inhaler 2 puff  2 puff Inhalation BID Za Lutz MD        aspirin chewable tablet 81 mg  81 mg Oral Daily Za Lutz MD        atorvastatin (LIPITOR) tablet 10 mg  10 mg Oral Daily Za Lutz MD        carvedilol (COREG) tablet 6.25 mg  6.25 mg Oral BID JARED Cintron MD        polyvinyl alcohol (LIQUIFILM TEARS) 1.4 % ophthalmic solution 1 drop  1 drop Both Eyes PRN Barrett Cintron MD        levothyroxine (SYNTHROID) tablet 88 mcg  88 mcg Oral Daily Barrett Cintron MD        [facility]] spironolactone (ALDACTONE) tablet 50 mg  50 mg Oral BID Barrett Cintron MD        timolol (TIMOPTIC) 0.25 % ophthalmic solution 1 drop  1 drop Both Eyes BID Barrett Cintron MD        tiotropium-olodaterol (STIOLTO) 2.5-2.5 MCG/ACT inhaler 2 puff  2 puff Inhalation Daily Barrett Cintron MD        vitamin B-12 (CYANOCOBALAMIN) tablet 1,000 mcg  1,000 mcg Oral Daily Barrett Cintron MD        warfarin (COUMADIN) tablet 5 mg  5 mg Oral Daily Barrett Cintron MD        acetaminophen (TYLENOL) tablet 650 mg  650 mg Oral Q4H PRN Claribel Miller PA-C        lidocaine 4 % external patch 1 patch  1 patch TransDERmal Daily Claribel Miller PA-C        melatonin tablet 9 mg  9 mg Oral Nightly PRN Claribel Miller PA-C        ondansetron [facility]) injection 4 mg  4 mg IntraVENous Q30 Min PRN Barrett Cintron MD   4 mg at 03/19/22 0051      Allergies   Allergen Reactions    Darvocet [Propoxyphene N-Acetaminophen]     Ranexa [Ranolazine Er]      Sick to her stomach    Ranolazine      Sick to her stomach    Sulfa Antibiotics Itching    Sulfasalazine Itching    Adhesive Tape Rash    Allantoin Rash    Bacitracin Rash    Gramicidin Rash    Neomycin Rash    Polymyxin B Rash    Pramoxine Hcl Rash    Silicone Rash     Current Facility-Administered Medications   Medication Dose Route Frequency Provider Last Rate Last Admin    albuterol sulfate  (90 Base) MCG/ACT inhaler 2 puff  2 puff Inhalation BID Barrett Cintron MD        aspirin chewable tablet 81 mg  81 mg Oral Daily Barrett Cintron MD        atorvastatin (LIPITOR) tablet 10 mg  10 mg Oral Daily Barrett Cintron MD        carvedilol (COREG) tablet 6.25 mg  6.25 mg Oral BID  Barrett Cintron MD       Lauren Me polyvinyl alcohol (LIQUIFILM TEARS) 1.4 % ophthalmic solution 1 drop  1 drop Both Eyes PRN Katie Frances MD        levothyroxine (SYNTHROID) tablet 88 mcg  88 mcg Oral Daily Katie Frances MD        [facility] [place] by provider] spironolactone (ALDACTONE) tablet 50 mg  50 mg Oral BID Katie Frances MD        timolol (TIMOPTIC) 0.25 % ophthalmic solution 1 drop  1 drop Both Eyes BID Katie Frances MD        tiotropium-olodaterol (STIOLTO) 2.5-2.5 MCG/ACT inhaler 2 puff  2 puff Inhalation Daily Katie Frances MD        vitamin B-12 (CYANOCOBALAMIN) tablet 1,000 mcg  1,000 mcg Oral Daily Katie Frances MD        warfarin (COUMADIN) tablet 5 mg  5 mg Oral Daily Katie Frances MD        acetaminophen (TYLENOL) tablet 650 mg  650 mg Oral Q4H PRN Roshan Muller PA-C        lidocaine 4 % external patch 1 patch  1 patch TransDERmal Daily Claribel Miller PA-C        melatonin tablet 9 mg  9 mg Oral Nightly PRN Roshan Muller PA-C        ondansetron [facility]) injection 4 mg  4 mg IntraVENous Q30 Min PRN Katie Frances MD   4 mg at 03/19/22 0051       Nursing Notes Reviewed    VITAL SIGNS:  ED Triage Vitals [03/18/22 2008]   Enc Vitals Group      BP (!) 112/53      Pulse 60      Resp 16      Temp       Temp src       SpO2 95 %      Weight       Height       Head Circumference       Peak Flow       Pain Score       Pain Loc       Pain Edu? Excl. in [address] PHYSICAL EXAM:  Physical Exam  Vitals and nursing note reviewed. Constitutional:       General: She is not in acute distress. Appearance: Normal appearance. She is well-developed and well-groomed. She is not ill-appearing, toxic-appearing or diaphoretic. HENT:      Head: Normocephalic and atraumatic. Right Ear: External ear normal.      Left Ear: External ear normal.   Eyes:      General: No scleral icterus. Right eye: No discharge. Left eye: No discharge. Extraocular Movements: Extraocular movements intact. Conjunctiva/sclera: Conjunctivae normal.   Cardiovascular:      Rate and Rhythm: Normal rate and regular rhythm. Pulses: Normal pulses. Heart sounds: Normal heart sounds. No murmur heard. No friction rub. No gallop. Pulmonary:      Effort: Pulmonary effort is normal. No respiratory distress. Breath sounds: Normal breath sounds. No stridor. No wheezing, rhonchi or rales. Abdominal:      General: Bowel sounds are normal. There is distension. Palpations: Abdomen is soft. There is no mass. Tenderness: There is no abdominal tenderness. There is no guarding or rebound. Negative signs include Chatterjee's sign, Rovsing's sign and McBurney's sign. Hernia: No hernia is present. Musculoskeletal:         General: Swelling, tenderness and signs of injury present. No deformity. Normal range of motion. Right upper arm: Tenderness present. Right elbow: Tenderness present. Right forearm: Tenderness present. Cervical back: Normal and normal range of motion. No rigidity or tenderness. Thoracic back: Tenderness present. Lumbar back: Tenderness present. Right lower leg: Swelling present. No tenderness. Edema present. Left lower leg: Swelling present. No tenderness. Edema present. Skin:     General: Skin is warm. Coloration: Skin is not jaundiced or pale. Findings: Bruising present. No erythema, lesion or rash. Neurological:      General: No focal deficit present. Mental Status: She is alert and oriented to person, place, and time. GCS: GCS eye subscore is 4. GCS verbal subscore is 5. GCS motor subscore is 6. Cranial Nerves: Cranial nerves are intact. No cranial nerve deficit, dysarthria or facial asymmetry. Sensory: Sensation is intact. No sensory deficit. Motor: Motor function is intact. No weakness, tremor, atrophy, abnormal muscle tone or seizure activity. Coordination: Coordination is intact.  Coordination normal.   Psychiatric:         Mood and Affect: Mood normal.         Behavior: Behavior normal. Behavior is cooperative. Thought Content:  Thought content normal.         Judgment: Judgment normal.           I have reviewed andinterpreted all of the currently available lab results from this visit (if applicable):    Results for orders placed or performed during the hospital encounter of 03/18/22   CBC with Auto Differential   Result Value Ref Range    WBC 11.4 (H) 4.0 - 10.5 K/CU MM    RBC 4.34 4.2 - 5.4 M/CU MM    Hemoglobin 11.4 (L) 12.5 - 16.0 GM/DL    Hematocrit 36.5 (L) 37 - 47 %    MCV 84.1 78 - 100 FL    MCH 26.3 (L) 27 - 31 PG    MCHC 31.2 (L) 32.0 - 36.0 %    RDW 16.0 (H) 11.7 - 14.9 %    Platelets 740 (H) 121 - 440 K/CU MM    MPV 11.1 7.5 - 11.1 FL    Differential Type AUTOMATED DIFFERENTIAL     Segs Relative 79.1 (H) 36 - 66 %    Lymphocytes % 8.5 (L) 24 - 44 %    Monocytes % 7.7 (H) 0 - 4 %    Eosinophils % 3.0 0 - 3 %    Basophils % 0.6 0 - 1 %    Segs Absolute 9.0 K/CU MM    Lymphocytes Absolute 1.0 K/CU MM    Monocytes Absolute 0.9 K/CU MM    Eosinophils Absolute 0.3 K/CU MM    Basophils Absolute 0.1 K/CU MM    Nucleated RBC % 0.0 %    Total Nucleated RBC 0.0 K/CU MM    Total Immature Neutrophil 0.12 K/CU MM    Immature Neutrophil % 1.1 (H) 0 - 0.43 %   Comprehensive Metabolic Panel   Result Value Ref Range    Sodium 125 (L) 135 - 145 MMOL/L    Potassium 4.6 3.5 - 5.1 MMOL/L    Chloride 88 (L) 99 - 110 mMol/L    CO2 23 21 - 32 MMOL/L    BUN 30 (H) 6 - 23 MG/DL    CREATININE 0.9 0.6 - 1.1 MG/DL    Glucose 106 (H) 70 - 99 MG/DL    Calcium 9.6 8.3 - 10.6 MG/DL    Albumin 3.5 3.4 - 5.0 GM/DL    Total Protein 6.2 (L) 6.4 - 8.2 GM/DL    Total Bilirubin 1.6 (H) 0.0 - 1.0 MG/DL    ALT 16 10 - 40 U/L    AST 32 15 - 37 IU/L    Alkaline Phosphatase 173 (H) 40 - 129 IU/L    GFR Non- 58 (L) >60 mL/min/1.73m2    GFR African American >60 >60 mL/min/1.73m2    Anion Gap 14 4 - 16   Troponin Result Value Ref Range    Troponin T <0.010 <0.01 NG/ML   Brain Natriuretic Peptide   Result Value Ref Range    Pro-BNP 1,565 (H) <300 PG/ML   CK   Result Value Ref Range    Total CK 89 26 - 140 IU/L   Lactic Acid   Result Value Ref Range    Lactate 1.2 0.4 - 2.0 mMOL/L   Magnesium   Result Value Ref Range    Magnesium 2.1 1.8 - 2.4 mg/dl   TSH   Result Value Ref Range    TSH, High Sensitivity 3.440 0.270 - 4.20 uIu/ml   Urinalysis   Result Value Ref Range    Color, UA YELLOW YELLOW    Clarity, UA CLEAR CLEAR    Glucose, Urine NEGATIVE NEGATIVE MG/DL    Bilirubin Urine NEGATIVE NEGATIVE MG/DL    Ketones, Urine NEGATIVE NEGATIVE MG/DL    Specific Gravity, UA 1.015 1.001 - 1.035    Blood, Urine NEGATIVE NEGATIVE    pH, Urine 7.5 5.0 - 8.0    Protein, UA NEGATIVE NEGATIVE MG/DL    Urobilinogen, Urine 0.2 0.2 - 1.0 MG/DL    Nitrite Urine, Quantitative NEGATIVE NEGATIVE    Leukocyte Esterase, Urine SMALL (A) NEGATIVE    RBC, UA <1 0 - 6 /HPF    WBC, UA 3 0 - 5 /HPF    Bacteria, UA NEGATIVE NEGATIVE /HPF    Squam Epithel, UA 1 /HPF    non squam epi cells 1 /HPF   Lipase   Result Value Ref Range    Lipase 50 13 - 60 IU/L   Protime-INR   Result Value Ref Range    Protime 16.3 (H) 11.7 - 14.5 SECONDS    INR 1.26 INDEX   Protime-INR   Result Value Ref Range    Protime 17.6 (H) 11.7 - 14.5 SECONDS    INR 1.36 INDEX   ANEMIA PANEL   Result Value Ref Range    WBC 11.6 (H) 4.0 - 10.5 K/CU MM    RBC 3.95 (L) 4.2 - 5.4 M/CU MM    Hemoglobin 10.4 (L) 12.5 - 16.0 GM/DL    Hematocrit 34.5 (L) 37 - 47 %    MCV 87.3 78 - 100 FL    MCH 26.3 (L) 27 - 31 PG    MCHC 30.1 (L) 32.0 - 36.0 %    RDW 16.1 (H) 11.7 - 14.9 %    Platelets 573 (H) 485 - 440 K/CU MM    MPV 11.2 (H) 7.5 - 11.1 FL    Differential Type AUTOMATED DIFFERENTIAL     Segs Relative 79.5 (H) 36 - 66 %    Lymphocytes % 7.0 (L) 24 - 44 %    Monocytes % 8.5 (H) 0 - 4 %    Eosinophils % 3.2 (H) 0 - 3 %    Basophils % 0.5 0 - 1 %    Segs Absolute 9.2 K/CU MM    Lymphocytes a cardiologist)    12 lead EKG per my interpretation:  Pace rhythm ventricular 60  Axis is Left  QTc is  398  There is inverted T waves I, AVL  There is no ST changes      MDM:      Patient here with complaint of frequent falls, back pain, headache, right arm pain after fall. No blood thinners. Also complains of shortness of breath, peripheral edema leg swelling abdominal swelling. Denies any chest pain or abdominal pain no diarrhea constipation. She did not mention any urine complaints to me but then mention later that she had some dysuria. Pending urinalysis. Patient lives alone family brought her in. On arrival she otherwise appears well she has been seeing her doctor and she was put on diuretics. On arrival her blood pressure was around 93 systolic. I did order gentle IV fluids but she does appear to be fluid overloaded also has peripheral edema abdominal distention, pitting edema to both lower extremities. She has good pulses good sensation no weakness numbness or tingling. Right arm has normal range of motion but is tender, bruised. Compartments are soft. She has good pulses. Patient likely has CHF. Denies any history of renal failure. Family brought her and she was alone she is a fall risk at home likely needs to be admitted for PT OT evaluation cardiology evaluation. Imaging of her head and spine came back as negative. Chest x-ray shows some mild CHF, pleural effusion currently not requiring oxygen. I gave her pain medicine nausea medicine as needed. Pending lab work. Again EKG is a paced rhythm similar T waves in lead I, aVL. Patient again likely needs admission but pending lab work I suspect again that she has CHF but currently no distress. I did sign out patient to Dr. Anna Fernandez will follow up with labs in disposition. Ultrasound performed for legs no DVT. Patient likely needs admission and patient okay with plan. Signed out. Stable.       CLINICAL IMPRESSION:  Final diagnoses: Fall, initial encounter   Back pain, unspecified back location, unspecified back pain laterality, unspecified chronicity   Avulsion fracture       (Please note that portions of this note may have been completed with a voice recognition program. Efforts were made to edit the dictations but occasionally words aremis-transcribed.)    DISPOSITION REFERRAL (if applicable):  No follow-up provider specified.     DISPOSITION MEDICATIONS (if applicable):  Current Discharge Medication List             DO Fab Britton DO  03/19/22 2115

## 2022-03-19 NOTE — H&P
History and Physical      Name:  Lord Lopez /Age/Sex: 3/18/1929  (80 y.o. female)   MRN & CSN:  [id number] & [id number] Encounter Date/Time: 3/19/2022 7:21 AM EDT   Location:  [facility]- PCP: [address] [place] [address] Day: 2    Assessment and Plan:   Medical decision making:   Hyponatremia  o History of hyponatremia, hospitalized last year for hyponatremia. Patient states that approximate 3 weeks ago, her spironolactone was increased to twice daily by her PCP. Her torsemide has been the same dose. Appears hypervolemic.  o Check serum osmolality and urine osmolality, urine creatinine and sodium to calculate FENa  o Giving one dose lasix given clinical hypervolemia, reassess BMP after  o Gentle fluids started in ER - hold in setting of clinical hypervolemia  o Monitor Na levels q6h, avoid overcorrection to avoid central pontine myelinolysis  o D5W if too-rapid overcorrection  o Consider nephrology consult - saw Dr. Zeb Borges last hospitalization     Generalized debility and frequent falls   Questionable avulsion fracture of olecranon   Right shoulder pain  o XR shoulder, humerus, right forearm, wrist hand normal. CT abdomen pelvis with no acute trauma. Lumbar, thoracic, cervical CT without acute fracure. Head CT without acute traumatic findings. Was hospitalized and required rehab last fall for falls. o X-ray of elbow shows questionable avulsion fracture of olecranon. Patient placed in a sling in ER. She has no point tenderness in the elbow region. o DVT study negative lower extremities  o May need discussion on risk vs benefit of continuing warfarin given falls  o CM consult     Pulmonary vascular congestion with right pleural effusion, cardiomegaly   Mass-like opacity of right lung base   History of severe pulmonary hypertension and HFpEF  o On room air. No respiratory distress or difficulty breathing. Seen on XR imaging  o Last echo 2019 EF 50-55%, severe pulmonary HTN noted. Severe tricuspd regergitation and moderate mitral valve regurgitation  o Monitor     Cirrhosis and ascites  o Seen on CT imaging     Hyperbilirubinemia  o Upon trending, history of elevated bilirubin  o Monitor, CT abdomen showing cirrhosis as stated above     Normocytic anemia  o Anemia panel   o Continue vitamin B12      Chronic conditions: home medications continued unless contraindicated   Cirrhosis    Bradycardia s/p pacemaker - EKG showing ventricular paced rhythm   CAD s/p CABG x 3 in 2009   CHF   COPD-continue Ellipta and albuterol   CVA-continue home aspirin   HLD-continue Lipitor   HTN   SAULO - nighttime CPAP   Paroxysmal atrial fibrillation - continue warfarin and Coreg   Pulmonary HTN seen on echo   PVD   Hypothyroidism-continue home Synthroid   Valvular heart disease      Disposition:   Current Living situation: Home alone in condo  Expected Disposition: Possibly home  Estimated D/C: Several days  Diet  regular   DVT Prophylaxis-warfarin [] Lovenox, []  Heparin, [] SCDs, [] Ambulation,  [] Eliquis, [] Xarelto   Code Status  full   Surrogate Decision Maker/ POA  son     History from:   Patient and EMR   History of Present Illness:   Chief Complaint: Yogi Wilder is a 80 y.o. female with pmh of cirrhosis, bradycardia status post pacemaker, CAD status post CABG, CHF, COPD, CVA, hyperlipidemia, hypertension, SAULO, paroxysmal atrial fibrillation, pulmonary hypertension, PVD, hypothyroidism who presents to the ER for evaluation of a fall in her garage. Patient denies any prodromal symptoms. She denies chest pain, shortness of breath, dizziness. She does not use a walker at home and is ambulatory. Her son was coming home right as she fell and there is no prolonged downtime. She has been eating and drinking normal amounts.   She states that her PCP increase her spironolactone and torsemide from daily to twice daily two to three weeks ago due to increased swelling in her lower extremities. Patient was found to have hyponatremia. She had scans of her head, back, abdomen, and right arm. Questionable avulsion fracture of right elbow however patient does not have any tenderness in her right elbow. She is placed in a sling. She was started on gentle IV fluids. Patient's past medical, social, and family history have been reviewed. Review of Systems:    10 point system reviewed, negative, unless as noted in above HPI. Objective:   No intake or output data in the 24 hours ending 03/19/22 0721   Vitals:   Vitals:    03/19/22 0300 03/19/22 0330 03/19/22 0400 03/19/22 0430   BP: (!) 145/85 (!) 144/80 138/61 133/73   Pulse:       Resp:       Temp:       TempSrc:       SpO2: 96% 95% 96% 92%     Medications Prior to Admission     Prior to Admission medications    Medication Sig Start Date End Date Taking?  Authorizing Provider   spironolactone (ALDACTONE) 50 MG tablet Take 50 mg by mouth 2 times daily    Historical Provider, MD   torsemide (DEMADEX) 20 MG tablet Take 20 mg by mouth 2 times daily    Historical Provider, MD   cycloSPORINE (RESTASIS) 0.05 % ophthalmic emulsion 1 drop 2 times daily    Historical Provider, MD   ciprofloxacin (CILOXAN) 0.3 % ophthalmic solution 3 drops WEEKLY (route: otic (ear)) 8/10/21   Historical Provider, MD   warfarin (COUMADIN) 5 MG tablet Take 1 tablet by mouth daily 10/27/21   ERICA Rodriguez MD   vitamin D 25 MCG (1000 UT) CAPS Take 5,000 Units by mouth daily    Historical Provider, MD   Probiotic Product (PROBIOTIC-10) CHEW Take by mouth    Historical Provider, MD   atorvastatin (LIPITOR) 10 MG tablet Take 10 mg by mouth daily  10/20/20   Historical Provider, MD   umeclidinium-vilanterol (ANORO ELLIPTA) 62.5-25 MCG/INH AEPB inhaler Inhale 1 puff into the lungs daily 7/27/20   Annemarie Parish MD   CPAP Machine MISC by Does not apply route    Historical Provider, MD   Biotin (BIOTIN 5000) 5 MG CAPS Take 1 capsule by mouth daily    Historical Provider, MD   Misc Natural Products (OSTEO BI-FLEX ADV DOUBLE ST PO) Take 1 tablet by mouth daily    Historical Provider, MD   timolol (TIMOPTIC-XE) 0.25 % ophthalmic gel-forming 1 drop daily    Historical Provider, MD   carvedilol (COREG) 6.25 MG tablet Take 1 tablet by mouth 2 times daily (with meals) 1/31/17   Carlos De Anda MD   albuterol (PROVENTIL HFA;VENTOLIN HFA) 108 (90 BASE) MCG/ACT inhaler Inhale 2 puffs into the lungs 2 times daily AND EVERY 4-6 HOURS AS NEEDED    Historical Provider, MD   Multiple Vitamins-Minerals (ICAPS) CAPS Take 1 capsule by mouth daily. Historical Provider, MD   loratadine (CLARITIN) 10 MG tablet Take 10 mg by mouth as needed. Historical Provider, MD   vitamin B-12 (CYANOCOBALAMIN) 1000 MCG tablet Take 1,000 mcg by mouth daily. Historical Provider, MD   levothyroxine (SYNTHROID) 88 MCG tablet Take 88 mcg by mouth daily. Historical Provider, MD   aspirin 81 MG chewable tablet Take 81 mg by mouth daily. Historical Provider, MD     Physical Exam:      GEN -Awake. NAD. Appears given age. EYES -PERRLA. No scleral erythema, discharge, or conjunctivitis. HENT -MM are moist. Oral pharynx without exudates, no evidence of thrush. NECK -Supple, no apparent thyromegaly or masses. RESP -CTA, no wheezes, rales or rhonchi. Symmetric chest movement while on room air. C/V -S1/S2 auscultated. RRR without appreciable M/R/G. No JVD or carotid bruits. Peripheral pulses equal bilaterally and palpable. Cap refill <3 sec. 1/2+ pitting edema bilateral lower extremities  GI -Abdomen is soft non distended and without significant tenderness to palpation. + BS. No masses or guarding.  -No CVA/ flank tenderness. Carnes catheter is not present. LYMPH-No palpable cervical lymphadenopathy and no hepatosplenomegaly. No petechiae or ecchymoses. MS -No gross joint deformities. No C-spine, T-spine, or L-spine tenderness point tenderness or step-off/deformities.   Generalized tenderness in the back region. Normal strength of upper and lower extremities. Bruising noted to right elbow. No point tenderness along the olecranon process of the right elbow. Generalized tenderness along the right posterior shoulder region. SKIN -Normal coloration, warm, dry. Catheline David, alert, oriented x  person, place, time, situation. Cranial nerves appear grossly intact, normal speech, no lateralizing weakness. PSYC- Appropriate affect. Past Medical History:   PMHx   Past Medical History:   Diagnosis Date    Arthritis     Bradycardia 2001    [facility] dual chamber pacemaker [facility] CAD (coronary artery disease)     Cardiac pacemaker 10/2001    [employer] #[id number]  [facility]- Serial # [id number]-[facility]- Dr Ervin Benavides CHF (congestive heart failure) ([address]     COPD, mild ([address] 2/17/2017    Cor pulmonale (chronic) ([address] 3/15/2022    CVA (cerebrovascular accident) ([address]     DJD (degenerative joint disease) of cervical spine     C5-C6, C6-C7    Exhaustion of cardiac pacemaker battery 11/21/2011    PPM battery replacement- [employer]    Family history of cardiovascular disease     Glaucoma Dx 2010    H/O 24 hour EKG monitoring 8/6/2000 8/6/2000- Intermittent episonde of a-fib/flutter    H/O cardiac catheterization 4/20/2010, 2/1989 4/20/2010-Severe native vessel disease and has graft disease as well. LAD, CX totally occluded. RCA totally occluded in proximal segment. VG to RCA widely patent. PDA 90% LAD afer LIMA anastomosis 80-90% stenosis. Proceeded with PTCA with stent next day.  H/O cardiovascular stress test 10/14/2011, 6/2/2010,4/8/2010, 5/18/2009,4/6/2009, 10/30/2007, 11/12/2004, 11/6/2003, 8/9/2002, 8/9/2001,6/5/2000,     10/14/2011-Lexiscan-Abnormal Myocardial Perfusion study. Evidence of mild ischemia in the Left CX region. Abnomal study. Rest EF 63%. Global LV systolic function normal. No ECG changes.  Unremarkable pharmacological stress test.    H/O cardiovascular stress test 6/10/2013    thallium--mild ischemia left circumflex EF63% no change from 10/2011 study.  H/O cardiovascular stress test 10/16/2014    cardiolite-mild ischemia left circumflex,EF70%    H/O chest x-ray 4/19/2009 4/19/2009-Stable cardiomegaly. No acute cardiopulmonary disease.  H/O Doppler lower venous ultrasound 09/18/2019    Significant reflux noted in RGSV, RGSV is extremely tortuous and small along the thigh and calf and would be highly unlikely to be accessed, RSSV is non compressible and has occlusive chronic SVT, LSSV is non compressible with occlusive chronic SVT, LGSV removed s/p CABG, Significant reflux in LGSV tributary,    H/O Doppler ultrasound 3/31/2010    CAROTID- 3/31/2010-INtimal thickening but no significant atherosclerotic plaque noted in ANA PAULA. Doppler flow velocities within ANA PAULA are WNL. Heterogeneous, irregular atherosclerotic plaque noted in LICA. Doppler flow velocities within the LICA are elevated, consistent with a mild, less than 50% stenosis.  H/O Doppler ultrasound 5/24/2016    Carotid- normal study    H/O Doppler ultrasound 09/06/2017    carotid - normal study    H/O Doppler ultrasound     H/O echocardiogram 10/13    EF=60%, Severe Pulm. HTN, & Sclerotic aortic valve w/stenosis.  H/O echocardiogram 10/16/14     EF 55-60% Normal LV. Normal LV systolic function. Severe tricuspid insufficiency with severe hypertension.  H/O echocardiogram 02/03/2017    heart cath performed this morning    H/O echocardiogram 09/18/2019    EF 50-55%, Left atrium is mild to moderately dilated, right atrium is severely dilated, mildly dilated right ventricle, Mod MR, Severe TR, Severe Pulm HTN, no pericardial effusion     History of complete ECG     10/14/2011(Lexiscan);5/6/2010, 4/30/2009,10/24/2008,9/21/2007, 10/13/2006    History of nuclear stress test 11/17/2016    lexiscan-normal,EF70%    Hx of cardiovascular stress test 12/28/2018    EF 60%  Normal study.    [address] [place] [address] 05/01/2017    MUGA-normal, EF53%    Hyperlipidemia     Hypertension     Mild intermittent asthma 7/28/2016    Moderate COPD (chronic obstructive pulmonary disease) (HCC) 3/15/2022    Nausea & vomiting     Obstructive sleep apnea 5/16/2017    Paroxysmal atrial fibrillation (HCC)     Post PTCA 4/21/2010    PTCA with 2.25 stent of the LIMA to LAD    Pulmonary HTN ([address]     Severe per last echo on 10/13. 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 Followed by MAZE procedure by pulmonary vein isolation.-  Dr Pierce Garcia S/P PTCA (percutaneous transluminal coronary angioplasty) 11/2012    PTCA with stent to RCA    Severe pulmonary hypertension ([address] 3/15/2022    Shortness of breath 3/15/2022    Thyroid disease     hypothyroi    Unspecified cerebral artery occlusion with cerebral infarction Unsure When    No Residual     PSHX:  has a past surgical history that includes Appendectomy (1941); Tonsillectomy (1950's); Cardiac surgery (4/09); Hysterectomy, total abdominal (1990's); Colonoscopy (In 2000's); pacemaker placement; Coronary artery bypass graft (4/8/2009); Coronary angioplasty with stent (4/21/2010); other surgical history; Middle ear surgery; and Percutaneous Transluminal Coronary Angio (11/2012). Allergies:    Allergies   Allergen Reactions    Darvocet [Propoxyphene N-Acetaminophen]     Ranexa [Ranolazine Er]      Sick to her stomach    Ranolazine      Sick to her stomach    Sulfa Antibiotics Itching    Sulfasalazine Itching    Adhesive Tape Rash    Allantoin Rash    Bacitracin Rash    Gramicidin Rash    Neomycin Rash    Polymyxin B Rash    Pramoxine Hcl Rash    Silicone Rash     Fam HX: family history includes Arthritis in her mother; Cancer in her mother and sister; Coronary Art Dis in her father; Depression in her mother and sister; Early Death in her paternal grandfather; Early Death (age of onset: 36) in her father; Hearing Loss in her mother; Heart Disease in her father and sister; High Blood Pressure in her father, mother, sister, son, and son; High Cholesterol in her father and mother; Mental Illness in her mother; [place] / [place] in her mother; Other in her daughter. Soc HX:   Social History     Socioeconomic History    Marital status:      Spouse name: None    Number of children: 3    Years of education: None    Highest education level: None   Occupational History    Occupation: RETIRED     Comment: from [employer] Use    Smoking status: Former Smoker     Packs/day: 0.25     Years: 5.00     Pack years: 1.25     Types: Cigarettes     Quit date: 1970     Years since quittin.3    Smokeless tobacco: Never Used   Substance and Sexual Activity    Alcohol use: Not Currently    Drug use: No    Sexual activity: Yes     Partners: Male     Comment:    Other Topics Concern    None   Social History Narrative    None     Social Determinants of Health     Financial Resource Strain:     Difficulty of Paying Living Expenses: Not on file   Food Insecurity:     Worried About Running Out of Food in the Last Year: Not on file    Sarah of Food in the Last Year: Not on file   Transportation Needs:     Lack of Transportation (Medical): Not on file    Lack of Transportation (Non-Medical):  Not on file   Physical Activity:     Days of Exercise per Week: Not on file    Minutes of Exercise per Session: Not on file   Stress:     Feeling of Stress : Not on file   Social Connections:     Frequency of Communication with Friends and Family: Not on file    Frequency of Social Gatherings with Friends and Family: Not on file    Attends Baptism Services: Not on file    Active Member of Clubs or Organizations: Not on file    Attends Club or Organization Meetings: Not on file    Marital Status: Not on file   Intimate Partner Violence:     Fear of Current or Ex-Partner: Not on file    Emotionally Abused: Not on file    Physically Abused: Not on file    Sexually Abused: Not on file   Housing Stability:     Unable to Pay for Housing in the Last Year: Not on file    Number of Places Lived in the Last Year: Not on file    Unstable Housing in the Last Year: Not on file     Medications:   Medications:    albuterol sulfate HFA  2 puff Inhalation BID    aspirin  81 mg Oral Daily    atorvastatin  10 mg Oral Daily    carvedilol  6.25 mg Oral BID WC    levothyroxine  88 mcg Oral Daily    spironolactone  50 mg Oral BID    timolol  1 drop Both Eyes BID    tiotropium-olodaterol  2 puff Inhalation Daily    vitamin B-12  1,000 mcg Oral Daily    warfarin  5 mg Oral Daily      Infusions:    sodium chloride 75 mL/hr at 03/19/22 0241     PRN Meds: polyvinyl alcohol, 1 drop, PRN  ondansetron, 4 mg, Q30 Min PRN      Labs    CT ABDOMEN PELVIS WO CONTRAST Additional Contrast? None    Result Date: 3/18/2022  EXAMINATION: CT OF THE HEAD WITHOUT CONTRAST; CT OF THE THORACIC SPINE WITHOUT CONTRAST; CT OF THE LUMBAR SPINE WITHOUT CONTRAST; CT OF THE CERVICAL SPINE WITHOUT CONTRAST; CT OF THE ABDOMEN AND PELVIS WITHOUT CONTRAST  3/18/2022 10:56 pm TECHNIQUE: CT of the head was performed without the administration of intravenous contrast. Dose modulation, iterative reconstruction, and/or weight based adjustment of the mA/kV was utilized to reduce the radiation dose to as low as reasonably achievable.; CT of the thoracic spine was performed without the administration of intravenous contrast. Multiplanar reformatted images are provided for review. Dose modulation, iterative reconstruction, and/or weight based adjustment of the mA/kV was utilized to reduce the radiation dose to as low as reasonably achievable.; CT of the lumbar spine was performed without the administration of intravenous contrast. Multiplanar reformatted images are provided for review.   Adjustment of mA and/or kV according to patient size was utilized. Dose modulation, iterative reconstruction, and/or weight based adjustment of the mA/kV was utilized to reduce the radiation dose to as low as reasonably achievable.; CT of the cervical spine was performed without the administration of intravenous contrast. Multiplanar reformatted images are provided for review. Dose modulation, iterative reconstruction, and/or weight based adjustment of the mA/kV was utilized to reduce the radiation dose to as low as reasonably achievable.; CT of the abdomen and pelvis was performed without the administration of intravenous contrast. Multiplanar reformatted images are provided for review. Dose modulation, iterative reconstruction, and/or weight based adjustment of the mA/kV was utilized to reduce the radiation dose to as low as reasonably achievable. COMPARISON: 05/18/2021 HISTORY: ORDERING SYSTEM PROVIDED HISTORY: HEAD TRAUMA, CLOSED, MILD, GCS >= 13, NO RISK FACTORS, NEURO EXAM NORMAL TECHNOLOGIST PROVIDED HISTORY: Has a \"code stroke\" or \"stroke alert\" been called? ->No Reason for exam:->fall/head injury Decision Support Exception - unselect if not a suspected or confirmed emergency medical condition->Emergency Medical Condition (MA) Reason for Exam: fall, head/neck pain. Additional signs and symptoms: no Relevant Medical/Surgical History: none FINDINGS: Cervical: BONES/ALIGNMENT: There is no acute fracture or traumatic malalignment. DEGENERATIVE CHANGES: Multilevel disc and facet degenerative disease most pronounced at C4-C5, C5-C6 and C6-C7. Grade 1 anterolisthesis of C4-C5. SOFT TISSUES: There is no prevertebral soft tissue swelling. Thoracic: BONES/ALIGNMENT: Unchanged chronic superior endplate compression fracture of T12 with 60% height loss. No retropulsion. . DEGENERATIVE CHANGES: No significant degenerative changes. SOFT TISSUES: There is no prevertebral soft tissue swelling.  Lumbar: BONES/ALIGNMENT: There is no acute fracture or traumatic malalignment. DEGENERATIVE CHANGES: Multilevel severe disc and facet degenerative disease with vacuum phenomenon. Grade 1 anterolisthesis at L5-S1. High-level neural foraminal narrowing at L5-S1. SOFT TISSUES: There is no prevertebral soft tissue swelling. Head CT: There is no acute infarction, intracranial hemorrhage or intracranial mass lesion. No mass effect, midline shift or extra-axial collection is noted. There are moderate nonspecific foci of periventricular and subcortical cerebral white matter hypodensity, most likely representing chronic microangiopathic disease in this age group. Chronic lacunar infarction right cerebellar hemisphere. The brain parenchyma is otherwise normal. The cerebellar tonsils are in normal position. The ventricles, sulci, and cisterns are mildly prominent suggestive of moderate generalized volume loss. The globes and orbits are within normal limits. The visualized extracranial structures including paranasal sinuses and mastoid air cells are unremarkable. No fracture is identified. CT abdomen and pelvis: Visualized lower chest: Massive cardiomegaly, predominantly involving right atrium and cardiac pacemaker leads. Small bilateral pleural effusion and atelectatic changes within the right lower lobe. Organs: The liver are has shrunken nodular contour suggestive of cirrhosis. There is associated mild splenomegaly. Small amount of ascites is also noted within the right upper quadrant area. The liver, spleen, adrenal glands, gallbladder, pancreas, kidneys and ureters and pelvic organs including the urinary bladder appear unremarkable. Peritoneum / Retroperitoneum:  No free air. Small free fluid adjacent to the liver. No pathologically enlarged lymphadenopathy. The vasculature do not demonstrate acute abnormality. GI Tract:  No distention or wall thickening. Diverticulosis with no evidence of diverticulitis. Bones and Soft Tissues: No fracture.   No concerning lytic or sclerotic lesions are noted. Cervical spine CT: No acute abnormality of the cervical spine. Thoracic spine CT: Chronic superior endplate compression fracture of T12 with 60% height loss. No acute fracture. Lumbar spine CT: No acute osseous abnormality. Head CT: No evidence for acute intracranial hemorrhage, territorial infarction or intracranial mass lesion. Moderate chronic microangiopathic ischemic disease. Moderate volume loss. CT of the abdomen and pelvis: No evidence of acute traumatic injury within the abdomen and pelvis. Massive cardiomegaly predominantly involving the right atrium. Findings suggestive of cirrhosis with a small amount of ascites within the right upper quadrant area. Diverticulosis with no evidence of diverticulitis. RECOMMENDATIONS: Unavailable     XR SHOULDER RIGHT (MIN 2 VIEWS)    Result Date: 3/18/2022  EXAMINATION: TWO XRAY VIEWS OF THE RIGHT SHOULDER; THREE XRAY VIEWS OF THE RIGHT HAND; TWO XRAY VIEWS OF THE RIGHT FOREARM; ONE XRAY VIEW OF THE CHEST;   XRAY VIEWS OF THE RIGHT WRIST; THREE XRAY VIEWS OF THE RIGHT ELBOW; TWO XRAY VIEWS OF THE RIGHT HUMERUS 3/18/2022 11:05 pm; 3/18/2022 11:04 pm; 3/18/2022 11:01 pm; 3/18/2022 11:06 pm; 3/18/2022 11:03 pm COMPARISON: None.  HISTORY: ORDERING SYSTEM PROVIDED HISTORY: pain TECHNOLOGIST PROVIDED HISTORY: Reason for exam:->pain Reason for Exam: pain fall Additional signs and symptoms: none Relevant Medical/Surgical History: none; ORDERING SYSTEM PROVIDED HISTORY: pain TECHNOLOGIST PROVIDED HISTORY: Reason for exam:->pain Reason for Exam: pain Additional signs and symptoms: fall Relevant Medical/Surgical History: none; ORDERING SYSTEM PROVIDED HISTORY: dyspnea TECHNOLOGIST PROVIDED HISTORY: Reason for exam:->dyspnea Reason for Exam: dyspnea Additional signs and symptoms: fall Relevant Medical/Surgical History: CAD, CHF, COPD; ORDERING SYSTEM PROVIDED HISTORY: pain/ TECHNOLOGIST PROVIDED HISTORY: Reason for exam:->pain/ Reason for Exam: pain fall Relevant Medical/Surgical History: none FINDINGS: Chest x-ray: No pneumothorax or pulmonary contusion or effusion is identified. There is pulmonary vascular congestion with a probable mild right pleural effusion. There is cardiomegaly. The patient is status post median sternotomy. A pulse generator is present over the left anterior chest wall with a single lead projecting over the heart. The thoracic aorta is tortuous. There is nonspecific masslike opacity at the right lung base measuring approximately 4.0 x 7.6 cm in diameter. Right shoulder: No acute fracture or dislocation is identified. Right humerus: No acute fracture or dislocation is identified. Right elbow: No dislocation or effusion is identified. There may be an avulsion fracture off of the olecranon, with associated soft tissue swelling. Right forearm: No acute fracture or dislocation of the forearm is identified. Right wrist: No acute fracture or dislocation is identified. There are severe degenerative changes along the radial aspect of the hand with carpal-carpal and carpometacarpal joint space narrowing, periarticular sclerosis, and osteophyte formation. Linear calcification within the expected location of the triangular fibrocartilaginous complex is consistent with chondrocalcinosis. Right hand: No acute fracture or dislocation is identified. Possible avulsion fracture of the right elbow. Otherwise, no acute posttraumatic abnormality detected. Mild CHF with trace right pleural effusion. XR HUMERUS RIGHT (MIN 2 VIEWS)    Result Date: 3/18/2022  EXAMINATION: TWO XRAY VIEWS OF THE RIGHT SHOULDER; THREE XRAY VIEWS OF THE RIGHT HAND; TWO XRAY VIEWS OF THE RIGHT FOREARM; ONE XRAY VIEW OF THE CHEST;   XRAY VIEWS OF THE RIGHT WRIST; THREE XRAY VIEWS OF THE RIGHT ELBOW; TWO XRAY VIEWS OF THE RIGHT HUMERUS 3/18/2022 11:05 pm; 3/18/2022 11:04 pm; 3/18/2022 11:01 pm; 3/18/2022 11:06 pm; 3/18/2022 11:03 pm COMPARISON: None.  HISTORY: ORDERING SYSTEM PROVIDED HISTORY: pain TECHNOLOGIST PROVIDED HISTORY: Reason for exam:->pain Reason for Exam: pain fall Additional signs and symptoms: none Relevant Medical/Surgical History: none; ORDERING SYSTEM PROVIDED HISTORY: pain TECHNOLOGIST PROVIDED HISTORY: Reason for exam:->pain Reason for Exam: pain Additional signs and symptoms: fall Relevant Medical/Surgical History: none; ORDERING SYSTEM PROVIDED HISTORY: dyspnea TECHNOLOGIST PROVIDED HISTORY: Reason for exam:->dyspnea Reason for Exam: dyspnea Additional signs and symptoms: fall Relevant Medical/Surgical History: CAD, CHF, COPD; ORDERING SYSTEM PROVIDED HISTORY: pain/ TECHNOLOGIST PROVIDED HISTORY: Reason for exam:->pain/ Reason for Exam: pain fall Relevant Medical/Surgical History: none FINDINGS: Chest x-ray: No pneumothorax or pulmonary contusion or effusion is identified. There is pulmonary vascular congestion with a probable mild right pleural effusion. There is cardiomegaly. The patient is status post median sternotomy. A pulse generator is present over the left anterior chest wall with a single lead projecting over the heart. The thoracic aorta is tortuous. There is nonspecific masslike opacity at the right lung base measuring approximately 4.0 x 7.6 cm in diameter. Right shoulder: No acute fracture or dislocation is identified. Right humerus: No acute fracture or dislocation is identified. Right elbow: No dislocation or effusion is identified. There may be an avulsion fracture off of the olecranon, with associated soft tissue swelling. Right forearm: No acute fracture or dislocation of the forearm is identified. Right wrist: No acute fracture or dislocation is identified. There are severe degenerative changes along the radial aspect of the hand with carpal-carpal and carpometacarpal joint space narrowing, periarticular sclerosis, and osteophyte formation.   Linear calcification within the expected location of the triangular fibrocartilaginous complex is consistent with chondrocalcinosis. Right hand: No acute fracture or dislocation is identified. Possible avulsion fracture of the right elbow. Otherwise, no acute posttraumatic abnormality detected. Mild CHF with trace right pleural effusion. XR ELBOW RIGHT (MIN 3 VIEWS)    Result Date: 3/18/2022  EXAMINATION: TWO XRAY VIEWS OF THE RIGHT SHOULDER; THREE XRAY VIEWS OF THE RIGHT HAND; TWO XRAY VIEWS OF THE RIGHT FOREARM; ONE XRAY VIEW OF THE CHEST;   XRAY VIEWS OF THE RIGHT WRIST; THREE XRAY VIEWS OF THE RIGHT ELBOW; TWO XRAY VIEWS OF THE RIGHT HUMERUS 3/18/2022 11:05 pm; 3/18/2022 11:04 pm; 3/18/2022 11:01 pm; 3/18/2022 11:06 pm; 3/18/2022 11:03 pm COMPARISON: None. HISTORY: ORDERING SYSTEM PROVIDED HISTORY: pain TECHNOLOGIST PROVIDED HISTORY: Reason for exam:->pain Reason for Exam: pain fall Additional signs and symptoms: none Relevant Medical/Surgical History: none; ORDERING SYSTEM PROVIDED HISTORY: pain TECHNOLOGIST PROVIDED HISTORY: Reason for exam:->pain Reason for Exam: pain Additional signs and symptoms: fall Relevant Medical/Surgical History: none; ORDERING SYSTEM PROVIDED HISTORY: dyspnea TECHNOLOGIST PROVIDED HISTORY: Reason for exam:->dyspnea Reason for Exam: dyspnea Additional signs and symptoms: fall Relevant Medical/Surgical History: CAD, CHF, COPD; ORDERING SYSTEM PROVIDED HISTORY: pain/ TECHNOLOGIST PROVIDED HISTORY: Reason for exam:->pain/ Reason for Exam: pain fall Relevant Medical/Surgical History: none FINDINGS: Chest x-ray: No pneumothorax or pulmonary contusion or effusion is identified. There is pulmonary vascular congestion with a probable mild right pleural effusion. There is cardiomegaly. The patient is status post median sternotomy. A pulse generator is present over the left anterior chest wall with a single lead projecting over the heart. The thoracic aorta is tortuous.   There is nonspecific masslike opacity at the right lung base measuring approximately 4.0 x 7.6 cm in diameter. Right shoulder: No acute fracture or dislocation is identified. Right humerus: No acute fracture or dislocation is identified. Right elbow: No dislocation or effusion is identified. There may be an avulsion fracture off of the olecranon, with associated soft tissue swelling. Right forearm: No acute fracture or dislocation of the forearm is identified. Right wrist: No acute fracture or dislocation is identified. There are severe degenerative changes along the radial aspect of the hand with carpal-carpal and carpometacarpal joint space narrowing, periarticular sclerosis, and osteophyte formation. Linear calcification within the expected location of the triangular fibrocartilaginous complex is consistent with chondrocalcinosis. Right hand: No acute fracture or dislocation is identified. Possible avulsion fracture of the right elbow. Otherwise, no acute posttraumatic abnormality detected. Mild CHF with trace right pleural effusion. XR RADIUS ULNA RIGHT (2 VIEWS)    Result Date: 3/18/2022  EXAMINATION: TWO XRAY VIEWS OF THE RIGHT SHOULDER; THREE XRAY VIEWS OF THE RIGHT HAND; TWO XRAY VIEWS OF THE RIGHT FOREARM; ONE XRAY VIEW OF THE CHEST;   XRAY VIEWS OF THE RIGHT WRIST; THREE XRAY VIEWS OF THE RIGHT ELBOW; TWO XRAY VIEWS OF THE RIGHT HUMERUS 3/18/2022 11:05 pm; 3/18/2022 11:04 pm; 3/18/2022 11:01 pm; 3/18/2022 11:06 pm; 3/18/2022 11:03 pm COMPARISON: None.  HISTORY: ORDERING SYSTEM PROVIDED HISTORY: pain TECHNOLOGIST PROVIDED HISTORY: Reason for exam:->pain Reason for Exam: pain fall Additional signs and symptoms: none Relevant Medical/Surgical History: none; ORDERING SYSTEM PROVIDED HISTORY: pain TECHNOLOGIST PROVIDED HISTORY: Reason for exam:->pain Reason for Exam: pain Additional signs and symptoms: fall Relevant Medical/Surgical History: none; ORDERING SYSTEM PROVIDED HISTORY: dyspnea TECHNOLOGIST PROVIDED HISTORY: Reason for exam:->dyspnea Reason for Exam: dyspnea Additional signs and symptoms: fall Relevant Medical/Surgical History: CAD, CHF, COPD; ORDERING SYSTEM PROVIDED HISTORY: pain/ TECHNOLOGIST PROVIDED HISTORY: Reason for exam:->pain/ Reason for Exam: pain fall Relevant Medical/Surgical History: none FINDINGS: Chest x-ray: No pneumothorax or pulmonary contusion or effusion is identified. There is pulmonary vascular congestion with a probable mild right pleural effusion. There is cardiomegaly. The patient is status post median sternotomy. A pulse generator is present over the left anterior chest wall with a single lead projecting over the heart. The thoracic aorta is tortuous. There is nonspecific masslike opacity at the right lung base measuring approximately 4.0 x 7.6 cm in diameter. Right shoulder: No acute fracture or dislocation is identified. Right humerus: No acute fracture or dislocation is identified. Right elbow: No dislocation or effusion is identified. There may be an avulsion fracture off of the olecranon, with associated soft tissue swelling. Right forearm: No acute fracture or dislocation of the forearm is identified. Right wrist: No acute fracture or dislocation is identified. There are severe degenerative changes along the radial aspect of the hand with carpal-carpal and carpometacarpal joint space narrowing, periarticular sclerosis, and osteophyte formation. Linear calcification within the expected location of the triangular fibrocartilaginous complex is consistent with chondrocalcinosis. Right hand: No acute fracture or dislocation is identified. Possible avulsion fracture of the right elbow. Otherwise, no acute posttraumatic abnormality detected. Mild CHF with trace right pleural effusion.      XR WRIST RIGHT (MIN 3 VIEWS)    Result Date: 3/18/2022  EXAMINATION: TWO XRAY VIEWS OF THE RIGHT SHOULDER; THREE XRAY VIEWS OF THE RIGHT HAND; TWO XRAY VIEWS OF THE RIGHT FOREARM; ONE XRAY VIEW OF THE CHEST;   XRAY VIEWS OF THE RIGHT WRIST; THREE XRAY VIEWS OF THE RIGHT ELBOW; TWO XRAY VIEWS OF THE RIGHT HUMERUS 3/18/2022 11:05 pm; 3/18/2022 11:04 pm; 3/18/2022 11:01 pm; 3/18/2022 11:06 pm; 3/18/2022 11:03 pm COMPARISON: None. HISTORY: ORDERING SYSTEM PROVIDED HISTORY: pain TECHNOLOGIST PROVIDED HISTORY: Reason for exam:->pain Reason for Exam: pain fall Additional signs and symptoms: none Relevant Medical/Surgical History: none; ORDERING SYSTEM PROVIDED HISTORY: pain TECHNOLOGIST PROVIDED HISTORY: Reason for exam:->pain Reason for Exam: pain Additional signs and symptoms: fall Relevant Medical/Surgical History: none; ORDERING SYSTEM PROVIDED HISTORY: dyspnea TECHNOLOGIST PROVIDED HISTORY: Reason for exam:->dyspnea Reason for Exam: dyspnea Additional signs and symptoms: fall Relevant Medical/Surgical History: CAD, CHF, COPD; ORDERING SYSTEM PROVIDED HISTORY: pain/ TECHNOLOGIST PROVIDED HISTORY: Reason for exam:->pain/ Reason for Exam: pain fall Relevant Medical/Surgical History: none FINDINGS: Chest x-ray: No pneumothorax or pulmonary contusion or effusion is identified. There is pulmonary vascular congestion with a probable mild right pleural effusion. There is cardiomegaly. The patient is status post median sternotomy. A pulse generator is present over the left anterior chest wall with a single lead projecting over the heart. The thoracic aorta is tortuous. There is nonspecific masslike opacity at the right lung base measuring approximately 4.0 x 7.6 cm in diameter. Right shoulder: No acute fracture or dislocation is identified. Right humerus: No acute fracture or dislocation is identified. Right elbow: No dislocation or effusion is identified. There may be an avulsion fracture off of the olecranon, with associated soft tissue swelling. Right forearm: No acute fracture or dislocation of the forearm is identified. Right wrist: No acute fracture or dislocation is identified.   There are severe degenerative changes along the radial aspect of the hand with carpal-carpal and carpometacarpal joint space narrowing, periarticular sclerosis, and osteophyte formation. Linear calcification within the expected location of the triangular fibrocartilaginous complex is consistent with chondrocalcinosis. Right hand: No acute fracture or dislocation is identified. Possible avulsion fracture of the right elbow. Otherwise, no acute posttraumatic abnormality detected. Mild CHF with trace right pleural effusion. XR HAND RIGHT (MIN 3 VIEWS)    Result Date: 3/18/2022  EXAMINATION: TWO XRAY VIEWS OF THE RIGHT SHOULDER; THREE XRAY VIEWS OF THE RIGHT HAND; TWO XRAY VIEWS OF THE RIGHT FOREARM; ONE XRAY VIEW OF THE CHEST;   XRAY VIEWS OF THE RIGHT WRIST; THREE XRAY VIEWS OF THE RIGHT ELBOW; TWO XRAY VIEWS OF THE RIGHT HUMERUS 3/18/2022 11:05 pm; 3/18/2022 11:04 pm; 3/18/2022 11:01 pm; 3/18/2022 11:06 pm; 3/18/2022 11:03 pm COMPARISON: None. HISTORY: ORDERING SYSTEM PROVIDED HISTORY: pain TECHNOLOGIST PROVIDED HISTORY: Reason for exam:->pain Reason for Exam: pain fall Additional signs and symptoms: none Relevant Medical/Surgical History: none; ORDERING SYSTEM PROVIDED HISTORY: pain TECHNOLOGIST PROVIDED HISTORY: Reason for exam:->pain Reason for Exam: pain Additional signs and symptoms: fall Relevant Medical/Surgical History: none; ORDERING SYSTEM PROVIDED HISTORY: dyspnea TECHNOLOGIST PROVIDED HISTORY: Reason for exam:->dyspnea Reason for Exam: dyspnea Additional signs and symptoms: fall Relevant Medical/Surgical History: CAD, CHF, COPD; ORDERING SYSTEM PROVIDED HISTORY: pain/ TECHNOLOGIST PROVIDED HISTORY: Reason for exam:->pain/ Reason for Exam: pain fall Relevant Medical/Surgical History: none FINDINGS: Chest x-ray: No pneumothorax or pulmonary contusion or effusion is identified. There is pulmonary vascular congestion with a probable mild right pleural effusion. There is cardiomegaly. The patient is status post median sternotomy.   A pulse generator is present over the left anterior chest wall with a single lead projecting over the heart. The thoracic aorta is tortuous. There is nonspecific masslike opacity at the right lung base measuring approximately 4.0 x 7.6 cm in diameter. Right shoulder: No acute fracture or dislocation is identified. Right humerus: No acute fracture or dislocation is identified. Right elbow: No dislocation or effusion is identified. There may be an avulsion fracture off of the olecranon, with associated soft tissue swelling. Right forearm: No acute fracture or dislocation of the forearm is identified. Right wrist: No acute fracture or dislocation is identified. There are severe degenerative changes along the radial aspect of the hand with carpal-carpal and carpometacarpal joint space narrowing, periarticular sclerosis, and osteophyte formation. Linear calcification within the expected location of the triangular fibrocartilaginous complex is consistent with chondrocalcinosis. Right hand: No acute fracture or dislocation is identified. Possible avulsion fracture of the right elbow. Otherwise, no acute posttraumatic abnormality detected. Mild CHF with trace right pleural effusion. CT HEAD WO CONTRAST    Result Date: 3/18/2022  EXAMINATION: CT OF THE HEAD WITHOUT CONTRAST; CT OF THE THORACIC SPINE WITHOUT CONTRAST; CT OF THE LUMBAR SPINE WITHOUT CONTRAST; CT OF THE CERVICAL SPINE WITHOUT CONTRAST; CT OF THE ABDOMEN AND PELVIS WITHOUT CONTRAST  3/18/2022 10:56 pm TECHNIQUE: CT of the head was performed without the administration of intravenous contrast. Dose modulation, iterative reconstruction, and/or weight based adjustment of the mA/kV was utilized to reduce the radiation dose to as low as reasonably achievable.; CT of the thoracic spine was performed without the administration of intravenous contrast. Multiplanar reformatted images are provided for review.  Dose modulation, iterative reconstruction, and/or weight based adjustment of the mA/kV was utilized to reduce the radiation dose to as low as reasonably achievable.; CT of the lumbar spine was performed without the administration of intravenous contrast. Multiplanar reformatted images are provided for review. Adjustment of mA and/or kV according to patient size was utilized. Dose modulation, iterative reconstruction, and/or weight based adjustment of the mA/kV was utilized to reduce the radiation dose to as low as reasonably achievable.; CT of the cervical spine was performed without the administration of intravenous contrast. Multiplanar reformatted images are provided for review. Dose modulation, iterative reconstruction, and/or weight based adjustment of the mA/kV was utilized to reduce the radiation dose to as low as reasonably achievable.; CT of the abdomen and pelvis was performed without the administration of intravenous contrast. Multiplanar reformatted images are provided for review. Dose modulation, iterative reconstruction, and/or weight based adjustment of the mA/kV was utilized to reduce the radiation dose to as low as reasonably achievable. COMPARISON: 05/18/2021 HISTORY: ORDERING SYSTEM PROVIDED HISTORY: HEAD TRAUMA, CLOSED, MILD, GCS >= 13, NO RISK FACTORS, NEURO EXAM NORMAL TECHNOLOGIST PROVIDED HISTORY: Has a \"code stroke\" or \"stroke alert\" been called? ->No Reason for exam:->fall/head injury Decision Support Exception - unselect if not a suspected or confirmed emergency medical condition->Emergency Medical Condition ([place]) Reason for Exam: fall, head/neck pain. Additional signs and symptoms: no Relevant Medical/Surgical History: none FINDINGS: Cervical: BONES/ALIGNMENT: There is no acute fracture or traumatic malalignment. DEGENERATIVE CHANGES: Multilevel disc and facet degenerative disease most pronounced at C4-C5, C5-C6 and C6-C7. Grade 1 anterolisthesis of C4-C5. SOFT TISSUES: There is no prevertebral soft tissue swelling.  Thoracic: BONES/ALIGNMENT: Unchanged chronic superior endplate compression fracture of T12 with 60% height loss. No retropulsion. . DEGENERATIVE CHANGES: No significant degenerative changes. SOFT TISSUES: There is no prevertebral soft tissue swelling. Lumbar: BONES/ALIGNMENT: There is no acute fracture or traumatic malalignment. DEGENERATIVE CHANGES: Multilevel severe disc and facet degenerative disease with vacuum phenomenon. Grade 1 anterolisthesis at L5-S1. High-level neural foraminal narrowing at L5-S1. SOFT TISSUES: There is no prevertebral soft tissue swelling. Head CT: There is no acute infarction, intracranial hemorrhage or intracranial mass lesion. No mass effect, midline shift or extra-axial collection is noted. There are moderate nonspecific foci of periventricular and subcortical cerebral white matter hypodensity, most likely representing chronic microangiopathic disease in this age group. Chronic lacunar infarction right cerebellar hemisphere. The brain parenchyma is otherwise normal. The cerebellar tonsils are in normal position. The ventricles, sulci, and cisterns are mildly prominent suggestive of moderate generalized volume loss. The globes and orbits are within normal limits. The visualized extracranial structures including paranasal sinuses and mastoid air cells are unremarkable. No fracture is identified. CT abdomen and pelvis: Visualized lower chest: Massive cardiomegaly, predominantly involving right atrium and cardiac pacemaker leads. Small bilateral pleural effusion and atelectatic changes within the right lower lobe. Organs: The liver are has shrunken nodular contour suggestive of cirrhosis. There is associated mild splenomegaly. Small amount of ascites is also noted within the right upper quadrant area. The liver, spleen, adrenal glands, gallbladder, pancreas, kidneys and ureters and pelvic organs including the urinary bladder appear unremarkable. Peritoneum / Retroperitoneum:  No free air. Small free fluid adjacent to the liver.   No pathologically enlarged lymphadenopathy. The vasculature do not demonstrate acute abnormality. GI Tract:  No distention or wall thickening. Diverticulosis with no evidence of diverticulitis. Bones and Soft Tissues: No fracture. No concerning lytic or sclerotic lesions are noted. Cervical spine CT: No acute abnormality of the cervical spine. Thoracic spine CT: Chronic superior endplate compression fracture of T12 with 60% height loss. No acute fracture. Lumbar spine CT: No acute osseous abnormality. Head CT: No evidence for acute intracranial hemorrhage, territorial infarction or intracranial mass lesion. Moderate chronic microangiopathic ischemic disease. Moderate volume loss. CT of the abdomen and pelvis: No evidence of acute traumatic injury within the abdomen and pelvis. Massive cardiomegaly predominantly involving the right atrium. Findings suggestive of cirrhosis with a small amount of ascites within the right upper quadrant area. Diverticulosis with no evidence of diverticulitis. RECOMMENDATIONS: Unavailable     CT CERVICAL SPINE WO CONTRAST    Result Date: 3/18/2022  EXAMINATION: CT OF THE HEAD WITHOUT CONTRAST; CT OF THE THORACIC SPINE WITHOUT CONTRAST; CT OF THE LUMBAR SPINE WITHOUT CONTRAST; CT OF THE CERVICAL SPINE WITHOUT CONTRAST; CT OF THE ABDOMEN AND PELVIS WITHOUT CONTRAST  3/18/2022 10:56 pm TECHNIQUE: CT of the head was performed without the administration of intravenous contrast. Dose modulation, iterative reconstruction, and/or weight based adjustment of the mA/kV was utilized to reduce the radiation dose to as low as reasonably achievable.; CT of the thoracic spine was performed without the administration of intravenous contrast. Multiplanar reformatted images are provided for review.  Dose modulation, iterative reconstruction, and/or weight based adjustment of the mA/kV was utilized to reduce the radiation dose to as low as reasonably achievable.; CT of the lumbar spine was performed without the administration of intravenous contrast. Multiplanar reformatted images are provided for review. Adjustment of mA and/or kV according to patient size was utilized. Dose modulation, iterative reconstruction, and/or weight based adjustment of the mA/kV was utilized to reduce the radiation dose to as low as reasonably achievable.; CT of the cervical spine was performed without the administration of intravenous contrast. Multiplanar reformatted images are provided for review. Dose modulation, iterative reconstruction, and/or weight based adjustment of the mA/kV was utilized to reduce the radiation dose to as low as reasonably achievable.; CT of the abdomen and pelvis was performed without the administration of intravenous contrast. Multiplanar reformatted images are provided for review. Dose modulation, iterative reconstruction, and/or weight based adjustment of the mA/kV was utilized to reduce the radiation dose to as low as reasonably achievable. COMPARISON: 05/18/2021 HISTORY: ORDERING SYSTEM PROVIDED HISTORY: HEAD TRAUMA, CLOSED, MILD, GCS >= 13, NO RISK FACTORS, NEURO EXAM NORMAL TECHNOLOGIST PROVIDED HISTORY: Has a \"code stroke\" or \"stroke alert\" been called? ->No Reason for exam:->fall/head injury Decision Support Exception - unselect if not a suspected or confirmed emergency medical condition->Emergency Medical Condition ([place]) Reason for Exam: fall, head/neck pain. Additional signs and symptoms: no Relevant Medical/Surgical History: none FINDINGS: Cervical: BONES/ALIGNMENT: There is no acute fracture or traumatic malalignment. DEGENERATIVE CHANGES: Multilevel disc and facet degenerative disease most pronounced at C4-C5, C5-C6 and C6-C7. Grade 1 anterolisthesis of C4-C5. SOFT TISSUES: There is no prevertebral soft tissue swelling. Thoracic: BONES/ALIGNMENT: Unchanged chronic superior endplate compression fracture of T12 with 60% height loss. No retropulsion. . DEGENERATIVE CHANGES: No significant degenerative changes. SOFT TISSUES: There is no prevertebral soft tissue swelling. Lumbar: BONES/ALIGNMENT: There is no acute fracture or traumatic malalignment. DEGENERATIVE CHANGES: Multilevel severe disc and facet degenerative disease with vacuum phenomenon. Grade 1 anterolisthesis at L5-S1. High-level neural foraminal narrowing at L5-S1. SOFT TISSUES: There is no prevertebral soft tissue swelling. Head CT: There is no acute infarction, intracranial hemorrhage or intracranial mass lesion. No mass effect, midline shift or extra-axial collection is noted. There are moderate nonspecific foci of periventricular and subcortical cerebral white matter hypodensity, most likely representing chronic microangiopathic disease in this age group. Chronic lacunar infarction right cerebellar hemisphere. The brain parenchyma is otherwise normal. The cerebellar tonsils are in normal position. The ventricles, sulci, and cisterns are mildly prominent suggestive of moderate generalized volume loss. The globes and orbits are within normal limits. The visualized extracranial structures including paranasal sinuses and mastoid air cells are unremarkable. No fracture is identified. CT abdomen and pelvis: Visualized lower chest: Massive cardiomegaly, predominantly involving right atrium and cardiac pacemaker leads. Small bilateral pleural effusion and atelectatic changes within the right lower lobe. Organs: The liver are has shrunken nodular contour suggestive of cirrhosis. There is associated mild splenomegaly. Small amount of ascites is also noted within the right upper quadrant area. The liver, spleen, adrenal glands, gallbladder, pancreas, kidneys and ureters and pelvic organs including the urinary bladder appear unremarkable. Peritoneum / Retroperitoneum:  No free air. Small free fluid adjacent to the liver. No pathologically enlarged lymphadenopathy. The vasculature do not demonstrate acute abnormality.  GI Tract:  No distention or wall thickening. Diverticulosis with no evidence of diverticulitis. Bones and Soft Tissues: No fracture. No concerning lytic or sclerotic lesions are noted. Cervical spine CT: No acute abnormality of the cervical spine. Thoracic spine CT: Chronic superior endplate compression fracture of T12 with 60% height loss. No acute fracture. Lumbar spine CT: No acute osseous abnormality. Head CT: No evidence for acute intracranial hemorrhage, territorial infarction or intracranial mass lesion. Moderate chronic microangiopathic ischemic disease. Moderate volume loss. CT of the abdomen and pelvis: No evidence of acute traumatic injury within the abdomen and pelvis. Massive cardiomegaly predominantly involving the right atrium. Findings suggestive of cirrhosis with a small amount of ascites within the right upper quadrant area. Diverticulosis with no evidence of diverticulitis. RECOMMENDATIONS: Unavailable     CT THORACIC SPINE WO CONTRAST    Result Date: 3/18/2022  EXAMINATION: CT OF THE HEAD WITHOUT CONTRAST; CT OF THE THORACIC SPINE WITHOUT CONTRAST; CT OF THE LUMBAR SPINE WITHOUT CONTRAST; CT OF THE CERVICAL SPINE WITHOUT CONTRAST; CT OF THE ABDOMEN AND PELVIS WITHOUT CONTRAST  3/18/2022 10:56 pm TECHNIQUE: CT of the head was performed without the administration of intravenous contrast. Dose modulation, iterative reconstruction, and/or weight based adjustment of the mA/kV was utilized to reduce the radiation dose to as low as reasonably achievable.; CT of the thoracic spine was performed without the administration of intravenous contrast. Multiplanar reformatted images are provided for review. Dose modulation, iterative reconstruction, and/or weight based adjustment of the mA/kV was utilized to reduce the radiation dose to as low as reasonably achievable.; CT of the lumbar spine was performed without the administration of intravenous contrast. Multiplanar reformatted images are provided for review.   Adjustment of mA and/or sclerotic lesions are noted. Cervical spine CT: No acute abnormality of the cervical spine. Thoracic spine CT: Chronic superior endplate compression fracture of T12 with 60% height loss. No acute fracture. Lumbar spine CT: No acute osseous abnormality. Head CT: No evidence for acute intracranial hemorrhage, territorial infarction or intracranial mass lesion. Moderate chronic microangiopathic ischemic disease. Moderate volume loss. CT of the abdomen and pelvis: No evidence of acute traumatic injury within the abdomen and pelvis. Massive cardiomegaly predominantly involving the right atrium. Findings suggestive of cirrhosis with a small amount of ascites within the right upper quadrant area. Diverticulosis with no evidence of diverticulitis. RECOMMENDATIONS: Unavailable     XR CHEST PORTABLE    Result Date: 3/18/2022  EXAMINATION: TWO XRAY VIEWS OF THE RIGHT SHOULDER; THREE XRAY VIEWS OF THE RIGHT HAND; TWO XRAY VIEWS OF THE RIGHT FOREARM; ONE XRAY VIEW OF THE CHEST;   XRAY VIEWS OF THE RIGHT WRIST; THREE XRAY VIEWS OF THE RIGHT ELBOW; TWO XRAY VIEWS OF THE RIGHT HUMERUS 3/18/2022 11:05 pm; 3/18/2022 11:04 pm; 3/18/2022 11:01 pm; 3/18/2022 11:06 pm; 3/18/2022 11:03 pm COMPARISON: None.  HISTORY: ORDERING SYSTEM PROVIDED HISTORY: pain TECHNOLOGIST PROVIDED HISTORY: Reason for exam:->pain Reason for Exam: pain fall Additional signs and symptoms: none Relevant Medical/Surgical History: none; ORDERING SYSTEM PROVIDED HISTORY: pain TECHNOLOGIST PROVIDED HISTORY: Reason for exam:->pain Reason for Exam: pain Additional signs and symptoms: fall Relevant Medical/Surgical History: none; ORDERING SYSTEM PROVIDED HISTORY: dyspnea TECHNOLOGIST PROVIDED HISTORY: Reason for exam:->dyspnea Reason for Exam: dyspnea Additional signs and symptoms: fall Relevant Medical/Surgical History: CAD, CHF, COPD; ORDERING SYSTEM PROVIDED HISTORY: pain/ TECHNOLOGIST PROVIDED HISTORY: Reason for exam:->pain/ Reason for Exam: pain fall Relevant Medical/Surgical History: none FINDINGS: Chest x-ray: No pneumothorax or pulmonary contusion or effusion is identified. There is pulmonary vascular congestion with a probable mild right pleural effusion. There is cardiomegaly. The patient is status post median sternotomy. A pulse generator is present over the left anterior chest wall with a single lead projecting over the heart. The thoracic aorta is tortuous. There is nonspecific masslike opacity at the right lung base measuring approximately 4.0 x 7.6 cm in diameter. Right shoulder: No acute fracture or dislocation is identified. Right humerus: No acute fracture or dislocation is identified. Right elbow: No dislocation or effusion is identified. There may be an avulsion fracture off of the olecranon, with associated soft tissue swelling. Right forearm: No acute fracture or dislocation of the forearm is identified. Right wrist: No acute fracture or dislocation is identified. There are severe degenerative changes along the radial aspect of the hand with carpal-carpal and carpometacarpal joint space narrowing, periarticular sclerosis, and osteophyte formation. Linear calcification within the expected location of the triangular fibrocartilaginous complex is consistent with chondrocalcinosis. Right hand: No acute fracture or dislocation is identified. Possible avulsion fracture of the right elbow. Otherwise, no acute posttraumatic abnormality detected. Mild CHF with trace right pleural effusion. VL DUP LOWER EXTREMITY VENOUS BILATERAL    Result Date: 3/18/2022  EXAMINATION: DUPLEX VENOUS ULTRASOUND OF THE BILATERAL LOWER EXTREMITIES3/18/2022 10:59 pm TECHNIQUE: Duplex ultrasound using B-mode/gray scaled imaging, Doppler spectral analysis and color flow Doppler was obtained of the deep venous structures of the lower bilateral extremities. COMPARISON: 10/12/2017.  HISTORY: ORDERING SYSTEM PROVIDED HISTORY: pain/swelling TECHNOLOGIST PROVIDED HISTORY: Reason for exam:->pain/swelling Reason for Exam: Bilat leg swelling and pain FINDINGS: Suboptimal evaluation secondary to body habitus and soft tissue edema. The visualized veins of the bilateral lower extremities are patent and free of echogenic thrombus. The veins demonstrate good compressibility with normal color flow study and spectral analysis. Suboptimal evaluation secondary to body habitus and soft tissue edema. No evidence of DVT in either lower extremity. CT LUMBAR RECONSTRUCTION WO POST PROCESS    Result Date: 3/18/2022  EXAMINATION: CT OF THE HEAD WITHOUT CONTRAST; CT OF THE THORACIC SPINE WITHOUT CONTRAST; CT OF THE LUMBAR SPINE WITHOUT CONTRAST; CT OF THE CERVICAL SPINE WITHOUT CONTRAST; CT OF THE ABDOMEN AND PELVIS WITHOUT CONTRAST  3/18/2022 10:56 pm TECHNIQUE: CT of the head was performed without the administration of intravenous contrast. Dose modulation, iterative reconstruction, and/or weight based adjustment of the mA/kV was utilized to reduce the radiation dose to as low as reasonably achievable.; CT of the thoracic spine was performed without the administration of intravenous contrast. Multiplanar reformatted images are provided for review. Dose modulation, iterative reconstruction, and/or weight based adjustment of the mA/kV was utilized to reduce the radiation dose to as low as reasonably achievable.; CT of the lumbar spine was performed without the administration of intravenous contrast. Multiplanar reformatted images are provided for review. Adjustment of mA and/or kV according to patient size was utilized. Dose modulation, iterative reconstruction, and/or weight based adjustment of the mA/kV was utilized to reduce the radiation dose to as low as reasonably achievable.; CT of the cervical spine was performed without the administration of intravenous contrast. Multiplanar reformatted images are provided for review.  Dose modulation, iterative reconstruction, and/or weight based adjustment of the mA/kV was utilized to reduce the radiation dose to as low as reasonably achievable.; CT of the abdomen and pelvis was performed without the administration of intravenous contrast. Multiplanar reformatted images are provided for review. Dose modulation, iterative reconstruction, and/or weight based adjustment of the mA/kV was utilized to reduce the radiation dose to as low as reasonably achievable. COMPARISON: 05/18/2021 HISTORY: ORDERING SYSTEM PROVIDED HISTORY: HEAD TRAUMA, CLOSED, MILD, GCS >= 13, NO RISK FACTORS, NEURO EXAM NORMAL TECHNOLOGIST PROVIDED HISTORY: Has a \"code stroke\" or \"stroke alert\" been called? ->No Reason for exam:->fall/head injury Decision Support Exception - unselect if not a suspected or confirmed emergency medical condition->Emergency Medical Condition ([place]) Reason for Exam: fall, head/neck pain. Additional signs and symptoms: no Relevant Medical/Surgical History: none FINDINGS: Cervical: BONES/ALIGNMENT: There is no acute fracture or traumatic malalignment. DEGENERATIVE CHANGES: Multilevel disc and facet degenerative disease most pronounced at C4-C5, C5-C6 and C6-C7. Grade 1 anterolisthesis of C4-C5. SOFT TISSUES: There is no prevertebral soft tissue swelling. Thoracic: BONES/ALIGNMENT: Unchanged chronic superior endplate compression fracture of T12 with 60% height loss. No retropulsion. . DEGENERATIVE CHANGES: No significant degenerative changes. SOFT TISSUES: There is no prevertebral soft tissue swelling. Lumbar: BONES/ALIGNMENT: There is no acute fracture or traumatic malalignment. DEGENERATIVE CHANGES: Multilevel severe disc and facet degenerative disease with vacuum phenomenon. Grade 1 anterolisthesis at L5-S1. High-level neural foraminal narrowing at L5-S1. SOFT TISSUES: There is no prevertebral soft tissue swelling. Head CT: There is no acute infarction, intracranial hemorrhage or intracranial mass lesion.  No mass effect, midline shift or extra-axial collection is noted. There are moderate nonspecific foci of periventricular and subcortical cerebral white matter hypodensity, most likely representing chronic microangiopathic disease in this age group. Chronic lacunar infarction right cerebellar hemisphere. The brain parenchyma is otherwise normal. The cerebellar tonsils are in normal position. The ventricles, sulci, and cisterns are mildly prominent suggestive of moderate generalized volume loss. The globes and orbits are within normal limits. The visualized extracranial structures including paranasal sinuses and mastoid air cells are unremarkable. No fracture is identified. CT abdomen and pelvis: Visualized lower chest: Massive cardiomegaly, predominantly involving right atrium and cardiac pacemaker leads. Small bilateral pleural effusion and atelectatic changes within the right lower lobe. Organs: The liver are has shrunken nodular contour suggestive of cirrhosis. There is associated mild splenomegaly. Small amount of ascites is also noted within the right upper quadrant area. The liver, spleen, adrenal glands, gallbladder, pancreas, kidneys and ureters and pelvic organs including the urinary bladder appear unremarkable. Peritoneum / Retroperitoneum:  No free air. Small free fluid adjacent to the liver. No pathologically enlarged lymphadenopathy. The vasculature do not demonstrate acute abnormality. GI Tract:  No distention or wall thickening. Diverticulosis with no evidence of diverticulitis. Bones and Soft Tissues: No fracture. No concerning lytic or sclerotic lesions are noted. Cervical spine CT: No acute abnormality of the cervical spine. Thoracic spine CT: Chronic superior endplate compression fracture of T12 with 60% height loss. No acute fracture. Lumbar spine CT: No acute osseous abnormality. Head CT: No evidence for acute intracranial hemorrhage, territorial infarction or intracranial mass lesion. Moderate chronic microangiopathic ischemic disease. Moderate volume loss. CT of the abdomen and pelvis: No evidence of acute traumatic injury within the abdomen and pelvis. Massive cardiomegaly predominantly involving the right atrium. Findings suggestive of cirrhosis with a small amount of ascites within the right upper quadrant area. Diverticulosis with no evidence of diverticulitis. RECOMMENDATIONS: Unavailable       CBC:   Recent Labs     03/18/22 2359   WBC 11.4*   HGB 11.4*   *     BMP:    Recent Labs     03/18/22 2359   *   K 4.6   CL 88*   CO2 23   BUN 30*   CREATININE 0.9   GLUCOSE 106*     Hepatic:   Recent Labs     03/18/22 2359   AST 32   ALT 16   BILITOT 1.6*   ALKPHOS 173*     Lipids:   Lab Results   Component Value Date    CHOL 171 10/29/2018    CHOL 168 07/05/2017    HDL 49 10/29/2018    TRIG 82 10/29/2018     Hemoglobin A1C: No results found for: LABA1C  TSH:   Lab Results   Component Value Date    TSH 1.0 05/07/2010     Troponin:   Lab Results   Component Value Date    TROPONINT <0.010 03/18/2022     Lactic Acid: No results for input(s): LACTA in the last 72 hours.   BNP:   Recent Labs     03/18/22 2359   PROBNP 1,565*     UA:  Lab Results   Component Value Date    NITRU NEGATIVE 03/18/2022    COLORU YELLOW 03/18/2022    WBCUA 3 03/18/2022    RBCUA <1 03/18/2022    MUCUS RARE 10/13/2021    TRICHOMONAS NONE SEEN 11/23/2021    YEAST RARE 11/23/2021    BACTERIA NEGATIVE 03/18/2022    CLARITYU CLEAR 03/18/2022    SPECGRAV 1.015 03/18/2022    LEUKOCYTESUR SMALL 03/18/2022    UROBILINOGEN 0.2 03/18/2022    BILIRUBINUR NEGATIVE 03/18/2022    BLOODU NEGATIVE 03/18/2022    KETUA NEGATIVE 03/18/2022     Urine Cultures: No results found for: LABURIN  Blood Cultures: No results found for: BC  No results found for: BLOODCULT2  Organism: No results found for: ORG  Imaging/Diagnostics Last 24 Hours   CT ABDOMEN PELVIS WO CONTRAST Additional Contrast? None    Result Date: 3/18/2022  EXAMINATION: CT OF THE HEAD WITHOUT CONTRAST; CT OF THE THORACIC SPINE WITHOUT CONTRAST; CT OF THE LUMBAR SPINE WITHOUT CONTRAST; CT OF THE CERVICAL SPINE WITHOUT CONTRAST; CT OF THE ABDOMEN AND PELVIS WITHOUT CONTRAST  3/18/2022 10:56 pm TECHNIQUE: CT of the head was performed without the administration of intravenous contrast. Dose modulation, iterative reconstruction, and/or weight based adjustment of the mA/kV was utilized to reduce the radiation dose to as low as reasonably achievable.; CT of the thoracic spine was performed without the administration of intravenous contrast. Multiplanar reformatted images are provided for review. Dose modulation, iterative reconstruction, and/or weight based adjustment of the mA/kV was utilized to reduce the radiation dose to as low as reasonably achievable.; CT of the lumbar spine was performed without the administration of intravenous contrast. Multiplanar reformatted images are provided for review. Adjustment of mA and/or kV according to patient size was utilized. Dose modulation, iterative reconstruction, and/or weight based adjustment of the mA/kV was utilized to reduce the radiation dose to as low as reasonably achievable.; CT of the cervical spine was performed without the administration of intravenous contrast. Multiplanar reformatted images are provided for review. Dose modulation, iterative reconstruction, and/or weight based adjustment of the mA/kV was utilized to reduce the radiation dose to as low as reasonably achievable.; CT of the abdomen and pelvis was performed without the administration of intravenous contrast. Multiplanar reformatted images are provided for review. Dose modulation, iterative reconstruction, and/or weight based adjustment of the mA/kV was utilized to reduce the radiation dose to as low as reasonably achievable.  COMPARISON: 05/18/2021 HISTORY: ORDERING SYSTEM PROVIDED HISTORY: HEAD TRAUMA, CLOSED, MILD, GCS >= 13, NO RISK FACTORS, NEURO EXAM NORMAL TECHNOLOGIST PROVIDED HISTORY: Has a \"code stroke\" or \"stroke alert\" been called? ->No Reason for exam:->fall/head injury Decision Support Exception - unselect if not a suspected or confirmed emergency medical condition->Emergency Medical Condition ([place]) Reason for Exam: fall, head/neck pain. Additional signs and symptoms: no Relevant Medical/Surgical History: none FINDINGS: Cervical: BONES/ALIGNMENT: There is no acute fracture or traumatic malalignment. DEGENERATIVE CHANGES: Multilevel disc and facet degenerative disease most pronounced at C4-C5, C5-C6 and C6-C7. Grade 1 anterolisthesis of C4-C5. SOFT TISSUES: There is no prevertebral soft tissue swelling. Thoracic: BONES/ALIGNMENT: Unchanged chronic superior endplate compression fracture of T12 with 60% height loss. No retropulsion. . DEGENERATIVE CHANGES: No significant degenerative changes. SOFT TISSUES: There is no prevertebral soft tissue swelling. Lumbar: BONES/ALIGNMENT: There is no acute fracture or traumatic malalignment. DEGENERATIVE CHANGES: Multilevel severe disc and facet degenerative disease with vacuum phenomenon. Grade 1 anterolisthesis at L5-S1. High-level neural foraminal narrowing at L5-S1. SOFT TISSUES: There is no prevertebral soft tissue swelling. Head CT: There is no acute infarction, intracranial hemorrhage or intracranial mass lesion. No mass effect, midline shift or extra-axial collection is noted. There are moderate nonspecific foci of periventricular and subcortical cerebral white matter hypodensity, most likely representing chronic microangiopathic disease in this age group. Chronic lacunar infarction right cerebellar hemisphere. The brain parenchyma is otherwise normal. The cerebellar tonsils are in normal position. The ventricles, sulci, and cisterns are mildly prominent suggestive of moderate generalized volume loss. The globes and orbits are within normal limits. The visualized extracranial structures including paranasal sinuses and mastoid air cells are unremarkable.  No fracture is identified. CT abdomen and pelvis: Visualized lower chest: Massive cardiomegaly, predominantly involving right atrium and cardiac pacemaker leads. Small bilateral pleural effusion and atelectatic changes within the right lower lobe. Organs: The liver are has shrunken nodular contour suggestive of cirrhosis. There is associated mild splenomegaly. Small amount of ascites is also noted within the right upper quadrant area. The liver, spleen, adrenal glands, gallbladder, pancreas, kidneys and ureters and pelvic organs including the urinary bladder appear unremarkable. Peritoneum / Retroperitoneum:  No free air. Small free fluid adjacent to the liver. No pathologically enlarged lymphadenopathy. The vasculature do not demonstrate acute abnormality. GI Tract:  No distention or wall thickening. Diverticulosis with no evidence of diverticulitis. Bones and Soft Tissues: No fracture. No concerning lytic or sclerotic lesions are noted. Cervical spine CT: No acute abnormality of the cervical spine. Thoracic spine CT: Chronic superior endplate compression fracture of T12 with 60% height loss. No acute fracture. Lumbar spine CT: No acute osseous abnormality. Head CT: No evidence for acute intracranial hemorrhage, territorial infarction or intracranial mass lesion. Moderate chronic microangiopathic ischemic disease. Moderate volume loss. CT of the abdomen and pelvis: No evidence of acute traumatic injury within the abdomen and pelvis. Massive cardiomegaly predominantly involving the right atrium. Findings suggestive of cirrhosis with a small amount of ascites within the right upper quadrant area. Diverticulosis with no evidence of diverticulitis.  RECOMMENDATIONS: Unavailable     XR SHOULDER RIGHT (MIN 2 VIEWS)    Result Date: 3/18/2022  EXAMINATION: TWO XRAY VIEWS OF THE RIGHT SHOULDER; THREE XRAY VIEWS OF THE RIGHT HAND; TWO XRAY VIEWS OF THE RIGHT FOREARM; ONE XRAY VIEW OF THE CHEST;   XRAY VIEWS OF THE RIGHT WRIST; THREE XRAY VIEWS OF THE RIGHT ELBOW; TWO XRAY VIEWS OF THE RIGHT HUMERUS 3/18/2022 11:05 pm; 3/18/2022 11:04 pm; 3/18/2022 11:01 pm; 3/18/2022 11:06 pm; 3/18/2022 11:03 pm COMPARISON: None. HISTORY: ORDERING SYSTEM PROVIDED HISTORY: pain TECHNOLOGIST PROVIDED HISTORY: Reason for exam:->pain Reason for Exam: pain fall Additional signs and symptoms: none Relevant Medical/Surgical History: none; ORDERING SYSTEM PROVIDED HISTORY: pain TECHNOLOGIST PROVIDED HISTORY: Reason for exam:->pain Reason for Exam: pain Additional signs and symptoms: fall Relevant Medical/Surgical History: none; ORDERING SYSTEM PROVIDED HISTORY: dyspnea TECHNOLOGIST PROVIDED HISTORY: Reason for exam:->dyspnea Reason for Exam: dyspnea Additional signs and symptoms: fall Relevant Medical/Surgical History: CAD, CHF, COPD; ORDERING SYSTEM PROVIDED HISTORY: pain/ TECHNOLOGIST PROVIDED HISTORY: Reason for exam:->pain/ Reason for Exam: pain fall Relevant Medical/Surgical History: none FINDINGS: Chest x-ray: No pneumothorax or pulmonary contusion or effusion is identified. There is pulmonary vascular congestion with a probable mild right pleural effusion. There is cardiomegaly. The patient is status post median sternotomy. A pulse generator is present over the left anterior chest wall with a single lead projecting over the heart. The thoracic aorta is tortuous. There is nonspecific masslike opacity at the right lung base measuring approximately 4.0 x 7.6 cm in diameter. Right shoulder: No acute fracture or dislocation is identified. Right humerus: No acute fracture or dislocation is identified. Right elbow: No dislocation or effusion is identified. There may be an avulsion fracture off of the olecranon, with associated soft tissue swelling. Right forearm: No acute fracture or dislocation of the forearm is identified. Right wrist: No acute fracture or dislocation is identified.   There are severe degenerative changes along the radial aspect of the hand with carpal-carpal and carpometacarpal joint space narrowing, periarticular sclerosis, and osteophyte formation. Linear calcification within the expected location of the triangular fibrocartilaginous complex is consistent with chondrocalcinosis. Right hand: No acute fracture or dislocation is identified. Possible avulsion fracture of the right elbow. Otherwise, no acute posttraumatic abnormality detected. Mild CHF with trace right pleural effusion. XR HUMERUS RIGHT (MIN 2 VIEWS)    Result Date: 3/18/2022  EXAMINATION: TWO XRAY VIEWS OF THE RIGHT SHOULDER; THREE XRAY VIEWS OF THE RIGHT HAND; TWO XRAY VIEWS OF THE RIGHT FOREARM; ONE XRAY VIEW OF THE CHEST;   XRAY VIEWS OF THE RIGHT WRIST; THREE XRAY VIEWS OF THE RIGHT ELBOW; TWO XRAY VIEWS OF THE RIGHT HUMERUS 3/18/2022 11:05 pm; 3/18/2022 11:04 pm; 3/18/2022 11:01 pm; 3/18/2022 11:06 pm; 3/18/2022 11:03 pm COMPARISON: None. HISTORY: ORDERING SYSTEM PROVIDED HISTORY: pain TECHNOLOGIST PROVIDED HISTORY: Reason for exam:->pain Reason for Exam: pain fall Additional signs and symptoms: none Relevant Medical/Surgical History: none; ORDERING SYSTEM PROVIDED HISTORY: pain TECHNOLOGIST PROVIDED HISTORY: Reason for exam:->pain Reason for Exam: pain Additional signs and symptoms: fall Relevant Medical/Surgical History: none; ORDERING SYSTEM PROVIDED HISTORY: dyspnea TECHNOLOGIST PROVIDED HISTORY: Reason for exam:->dyspnea Reason for Exam: dyspnea Additional signs and symptoms: fall Relevant Medical/Surgical History: CAD, CHF, COPD; ORDERING SYSTEM PROVIDED HISTORY: pain/ TECHNOLOGIST PROVIDED HISTORY: Reason for exam:->pain/ Reason for Exam: pain fall Relevant Medical/Surgical History: none FINDINGS: Chest x-ray: No pneumothorax or pulmonary contusion or effusion is identified. There is pulmonary vascular congestion with a probable mild right pleural effusion. There is cardiomegaly. The patient is status post median sternotomy.   A pulse generator is present over the left anterior chest wall with a single lead projecting over the heart. The thoracic aorta is tortuous. There is nonspecific masslike opacity at the right lung base measuring approximately 4.0 x 7.6 cm in diameter. Right shoulder: No acute fracture or dislocation is identified. Right humerus: No acute fracture or dislocation is identified. Right elbow: No dislocation or effusion is identified. There may be an avulsion fracture off of the olecranon, with associated soft tissue swelling. Right forearm: No acute fracture or dislocation of the forearm is identified. Right wrist: No acute fracture or dislocation is identified. There are severe degenerative changes along the radial aspect of the hand with carpal-carpal and carpometacarpal joint space narrowing, periarticular sclerosis, and osteophyte formation. Linear calcification within the expected location of the triangular fibrocartilaginous complex is consistent with chondrocalcinosis. Right hand: No acute fracture or dislocation is identified. Possible avulsion fracture of the right elbow. Otherwise, no acute posttraumatic abnormality detected. Mild CHF with trace right pleural effusion. XR ELBOW RIGHT (MIN 3 VIEWS)    Result Date: 3/18/2022  EXAMINATION: TWO XRAY VIEWS OF THE RIGHT SHOULDER; THREE XRAY VIEWS OF THE RIGHT HAND; TWO XRAY VIEWS OF THE RIGHT FOREARM; ONE XRAY VIEW OF THE CHEST;   XRAY VIEWS OF THE RIGHT WRIST; THREE XRAY VIEWS OF THE RIGHT ELBOW; TWO XRAY VIEWS OF THE RIGHT HUMERUS 3/18/2022 11:05 pm; 3/18/2022 11:04 pm; 3/18/2022 11:01 pm; 3/18/2022 11:06 pm; 3/18/2022 11:03 pm COMPARISON: None.  HISTORY: ORDERING SYSTEM PROVIDED HISTORY: pain TECHNOLOGIST PROVIDED HISTORY: Reason for exam:->pain Reason for Exam: pain fall Additional signs and symptoms: none Relevant Medical/Surgical History: none; ORDERING SYSTEM PROVIDED HISTORY: pain TECHNOLOGIST PROVIDED HISTORY: Reason for exam:->pain Reason for Exam: pain Additional signs and symptoms: fall Relevant Medical/Surgical History: none; ORDERING SYSTEM PROVIDED HISTORY: dyspnea TECHNOLOGIST PROVIDED HISTORY: Reason for exam:->dyspnea Reason for Exam: dyspnea Additional signs and symptoms: fall Relevant Medical/Surgical History: CAD, CHF, COPD; ORDERING SYSTEM PROVIDED HISTORY: pain/ TECHNOLOGIST PROVIDED HISTORY: Reason for exam:->pain/ Reason for Exam: pain fall Relevant Medical/Surgical History: none FINDINGS: Chest x-ray: No pneumothorax or pulmonary contusion or effusion is identified. There is pulmonary vascular congestion with a probable mild right pleural effusion. There is cardiomegaly. The patient is status post median sternotomy. A pulse generator is present over the left anterior chest wall with a single lead projecting over the heart. The thoracic aorta is tortuous. There is nonspecific masslike opacity at the right lung base measuring approximately 4.0 x 7.6 cm in diameter. Right shoulder: No acute fracture or dislocation is identified. Right humerus: No acute fracture or dislocation is identified. Right elbow: No dislocation or effusion is identified. There may be an avulsion fracture off of the olecranon, with associated soft tissue swelling. Right forearm: No acute fracture or dislocation of the forearm is identified. Right wrist: No acute fracture or dislocation is identified. There are severe degenerative changes along the radial aspect of the hand with carpal-carpal and carpometacarpal joint space narrowing, periarticular sclerosis, and osteophyte formation. Linear calcification within the expected location of the triangular fibrocartilaginous complex is consistent with chondrocalcinosis. Right hand: No acute fracture or dislocation is identified. Possible avulsion fracture of the right elbow. Otherwise, no acute posttraumatic abnormality detected. Mild CHF with trace right pleural effusion.      XR RADIUS ULNA RIGHT (2 VIEWS)    Result Date: 3/18/2022  EXAMINATION: TWO XRAY VIEWS OF THE RIGHT SHOULDER; THREE XRAY VIEWS OF THE RIGHT HAND; TWO XRAY VIEWS OF THE RIGHT FOREARM; ONE XRAY VIEW OF THE CHEST;   XRAY VIEWS OF THE RIGHT WRIST; THREE XRAY VIEWS OF THE RIGHT ELBOW; TWO XRAY VIEWS OF THE RIGHT HUMERUS 3/18/2022 11:05 pm; 3/18/2022 11:04 pm; 3/18/2022 11:01 pm; 3/18/2022 11:06 pm; 3/18/2022 11:03 pm COMPARISON: None. HISTORY: ORDERING SYSTEM PROVIDED HISTORY: pain TECHNOLOGIST PROVIDED HISTORY: Reason for exam:->pain Reason for Exam: pain fall Additional signs and symptoms: none Relevant Medical/Surgical History: none; ORDERING SYSTEM PROVIDED HISTORY: pain TECHNOLOGIST PROVIDED HISTORY: Reason for exam:->pain Reason for Exam: pain Additional signs and symptoms: fall Relevant Medical/Surgical History: none; ORDERING SYSTEM PROVIDED HISTORY: dyspnea TECHNOLOGIST PROVIDED HISTORY: Reason for exam:->dyspnea Reason for Exam: dyspnea Additional signs and symptoms: fall Relevant Medical/Surgical History: CAD, CHF, COPD; ORDERING SYSTEM PROVIDED HISTORY: pain/ TECHNOLOGIST PROVIDED HISTORY: Reason for exam:->pain/ Reason for Exam: pain fall Relevant Medical/Surgical History: none FINDINGS: Chest x-ray: No pneumothorax or pulmonary contusion or effusion is identified. There is pulmonary vascular congestion with a probable mild right pleural effusion. There is cardiomegaly. The patient is status post median sternotomy. A pulse generator is present over the left anterior chest wall with a single lead projecting over the heart. The thoracic aorta is tortuous. There is nonspecific masslike opacity at the right lung base measuring approximately 4.0 x 7.6 cm in diameter. Right shoulder: No acute fracture or dislocation is identified. Right humerus: No acute fracture or dislocation is identified. Right elbow: No dislocation or effusion is identified. There may be an avulsion fracture off of the olecranon, with associated soft tissue swelling.  Right forearm: No acute fracture or dislocation of the forearm is identified. Right wrist: No acute fracture or dislocation is identified. There are severe degenerative changes along the radial aspect of the hand with carpal-carpal and carpometacarpal joint space narrowing, periarticular sclerosis, and osteophyte formation. Linear calcification within the expected location of the triangular fibrocartilaginous complex is consistent with chondrocalcinosis. Right hand: No acute fracture or dislocation is identified. Possible avulsion fracture of the right elbow. Otherwise, no acute posttraumatic abnormality detected. Mild CHF with trace right pleural effusion. XR WRIST RIGHT (MIN 3 VIEWS)    Result Date: 3/18/2022  EXAMINATION: TWO XRAY VIEWS OF THE RIGHT SHOULDER; THREE XRAY VIEWS OF THE RIGHT HAND; TWO XRAY VIEWS OF THE RIGHT FOREARM; ONE XRAY VIEW OF THE CHEST;   XRAY VIEWS OF THE RIGHT WRIST; THREE XRAY VIEWS OF THE RIGHT ELBOW; TWO XRAY VIEWS OF THE RIGHT HUMERUS 3/18/2022 11:05 pm; 3/18/2022 11:04 pm; 3/18/2022 11:01 pm; 3/18/2022 11:06 pm; 3/18/2022 11:03 pm COMPARISON: None.  HISTORY: ORDERING SYSTEM PROVIDED HISTORY: pain TECHNOLOGIST PROVIDED HISTORY: Reason for exam:->pain Reason for Exam: pain fall Additional signs and symptoms: none Relevant Medical/Surgical History: none; ORDERING SYSTEM PROVIDED HISTORY: pain TECHNOLOGIST PROVIDED HISTORY: Reason for exam:->pain Reason for Exam: pain Additional signs and symptoms: fall Relevant Medical/Surgical History: none; ORDERING SYSTEM PROVIDED HISTORY: dyspnea TECHNOLOGIST PROVIDED HISTORY: Reason for exam:->dyspnea Reason for Exam: dyspnea Additional signs and symptoms: fall Relevant Medical/Surgical History: CAD, CHF, COPD; ORDERING SYSTEM PROVIDED HISTORY: pain/ TECHNOLOGIST PROVIDED HISTORY: Reason for exam:->pain/ Reason for Exam: pain fall Relevant Medical/Surgical History: none FINDINGS: Chest x-ray: No pneumothorax or pulmonary contusion or effusion is identified. There is pulmonary vascular congestion with a probable mild right pleural effusion. There is cardiomegaly. The patient is status post median sternotomy. A pulse generator is present over the left anterior chest wall with a single lead projecting over the heart. The thoracic aorta is tortuous. There is nonspecific masslike opacity at the right lung base measuring approximately 4.0 x 7.6 cm in diameter. Right shoulder: No acute fracture or dislocation is identified. Right humerus: No acute fracture or dislocation is identified. Right elbow: No dislocation or effusion is identified. There may be an avulsion fracture off of the olecranon, with associated soft tissue swelling. Right forearm: No acute fracture or dislocation of the forearm is identified. Right wrist: No acute fracture or dislocation is identified. There are severe degenerative changes along the radial aspect of the hand with carpal-carpal and carpometacarpal joint space narrowing, periarticular sclerosis, and osteophyte formation. Linear calcification within the expected location of the triangular fibrocartilaginous complex is consistent with chondrocalcinosis. Right hand: No acute fracture or dislocation is identified. Possible avulsion fracture of the right elbow. Otherwise, no acute posttraumatic abnormality detected. Mild CHF with trace right pleural effusion. XR HAND RIGHT (MIN 3 VIEWS)    Result Date: 3/18/2022  EXAMINATION: TWO XRAY VIEWS OF THE RIGHT SHOULDER; THREE XRAY VIEWS OF THE RIGHT HAND; TWO XRAY VIEWS OF THE RIGHT FOREARM; ONE XRAY VIEW OF THE CHEST;   XRAY VIEWS OF THE RIGHT WRIST; THREE XRAY VIEWS OF THE RIGHT ELBOW; TWO XRAY VIEWS OF THE RIGHT HUMERUS 3/18/2022 11:05 pm; 3/18/2022 11:04 pm; 3/18/2022 11:01 pm; 3/18/2022 11:06 pm; 3/18/2022 11:03 pm COMPARISON: None.  HISTORY: ORDERING SYSTEM PROVIDED HISTORY: pain TECHNOLOGIST PROVIDED HISTORY: Reason for exam:->pain Reason for Exam: pain fall Additional signs and symptoms: none Relevant Medical/Surgical History: none; ORDERING SYSTEM PROVIDED HISTORY: pain TECHNOLOGIST PROVIDED HISTORY: Reason for exam:->pain Reason for Exam: pain Additional signs and symptoms: fall Relevant Medical/Surgical History: none; ORDERING SYSTEM PROVIDED HISTORY: dyspnea TECHNOLOGIST PROVIDED HISTORY: Reason for exam:->dyspnea Reason for Exam: dyspnea Additional signs and symptoms: fall Relevant Medical/Surgical History: CAD, CHF, COPD; ORDERING SYSTEM PROVIDED HISTORY: pain/ TECHNOLOGIST PROVIDED HISTORY: Reason for exam:->pain/ Reason for Exam: pain fall Relevant Medical/Surgical History: none FINDINGS: Chest x-ray: No pneumothorax or pulmonary contusion or effusion is identified. There is pulmonary vascular congestion with a probable mild right pleural effusion. There is cardiomegaly. The patient is status post median sternotomy. A pulse generator is present over the left anterior chest wall with a single lead projecting over the heart. The thoracic aorta is tortuous. There is nonspecific masslike opacity at the right lung base measuring approximately 4.0 x 7.6 cm in diameter. Right shoulder: No acute fracture or dislocation is identified. Right humerus: No acute fracture or dislocation is identified. Right elbow: No dislocation or effusion is identified. There may be an avulsion fracture off of the olecranon, with associated soft tissue swelling. Right forearm: No acute fracture or dislocation of the forearm is identified. Right wrist: No acute fracture or dislocation is identified. There are severe degenerative changes along the radial aspect of the hand with carpal-carpal and carpometacarpal joint space narrowing, periarticular sclerosis, and osteophyte formation. Linear calcification within the expected location of the triangular fibrocartilaginous complex is consistent with chondrocalcinosis. Right hand: No acute fracture or dislocation is identified.      Possible avulsion fracture of the right elbow. Otherwise, no acute posttraumatic abnormality detected. Mild CHF with trace right pleural effusion. CT HEAD WO CONTRAST    Result Date: 3/18/2022  EXAMINATION: CT OF THE HEAD WITHOUT CONTRAST; CT OF THE THORACIC SPINE WITHOUT CONTRAST; CT OF THE LUMBAR SPINE WITHOUT CONTRAST; CT OF THE CERVICAL SPINE WITHOUT CONTRAST; CT OF THE ABDOMEN AND PELVIS WITHOUT CONTRAST  3/18/2022 10:56 pm TECHNIQUE: CT of the head was performed without the administration of intravenous contrast. Dose modulation, iterative reconstruction, and/or weight based adjustment of the mA/kV was utilized to reduce the radiation dose to as low as reasonably achievable.; CT of the thoracic spine was performed without the administration of intravenous contrast. Multiplanar reformatted images are provided for review. Dose modulation, iterative reconstruction, and/or weight based adjustment of the mA/kV was utilized to reduce the radiation dose to as low as reasonably achievable.; CT of the lumbar spine was performed without the administration of intravenous contrast. Multiplanar reformatted images are provided for review. Adjustment of mA and/or kV according to patient size was utilized. Dose modulation, iterative reconstruction, and/or weight based adjustment of the mA/kV was utilized to reduce the radiation dose to as low as reasonably achievable.; CT of the cervical spine was performed without the administration of intravenous contrast. Multiplanar reformatted images are provided for review. Dose modulation, iterative reconstruction, and/or weight based adjustment of the mA/kV was utilized to reduce the radiation dose to as low as reasonably achievable.; CT of the abdomen and pelvis was performed without the administration of intravenous contrast. Multiplanar reformatted images are provided for review.  Dose modulation, iterative reconstruction, and/or weight based adjustment of the mA/kV was utilized to reduce the radiation dose to as low as reasonably achievable. COMPARISON: 05/18/2021 HISTORY: ORDERING SYSTEM PROVIDED HISTORY: HEAD TRAUMA, CLOSED, MILD, GCS >= 13, NO RISK FACTORS, NEURO EXAM NORMAL TECHNOLOGIST PROVIDED HISTORY: Has a \"code stroke\" or \"stroke alert\" been called? ->No Reason for exam:->fall/head injury Decision Support Exception - unselect if not a suspected or confirmed emergency medical condition->Emergency Medical Condition ([place]) Reason for Exam: fall, head/neck pain. Additional signs and symptoms: no Relevant Medical/Surgical History: none FINDINGS: Cervical: BONES/ALIGNMENT: There is no acute fracture or traumatic malalignment. DEGENERATIVE CHANGES: Multilevel disc and facet degenerative disease most pronounced at C4-C5, C5-C6 and C6-C7. Grade 1 anterolisthesis of C4-C5. SOFT TISSUES: There is no prevertebral soft tissue swelling. Thoracic: BONES/ALIGNMENT: Unchanged chronic superior endplate compression fracture of T12 with 60% height loss. No retropulsion. . DEGENERATIVE CHANGES: No significant degenerative changes. SOFT TISSUES: There is no prevertebral soft tissue swelling. Lumbar: BONES/ALIGNMENT: There is no acute fracture or traumatic malalignment. DEGENERATIVE CHANGES: Multilevel severe disc and facet degenerative disease with vacuum phenomenon. Grade 1 anterolisthesis at L5-S1. High-level neural foraminal narrowing at L5-S1. SOFT TISSUES: There is no prevertebral soft tissue swelling. Head CT: There is no acute infarction, intracranial hemorrhage or intracranial mass lesion. No mass effect, midline shift or extra-axial collection is noted. There are moderate nonspecific foci of periventricular and subcortical cerebral white matter hypodensity, most likely representing chronic microangiopathic disease in this age group. Chronic lacunar infarction right cerebellar hemisphere. The brain parenchyma is otherwise normal. The cerebellar tonsils are in normal position.  The ventricles, sulci, and cisterns are mildly prominent suggestive of moderate generalized volume loss. The globes and orbits are within normal limits. The visualized extracranial structures including paranasal sinuses and mastoid air cells are unremarkable. No fracture is identified. CT abdomen and pelvis: Visualized lower chest: Massive cardiomegaly, predominantly involving right atrium and cardiac pacemaker leads. Small bilateral pleural effusion and atelectatic changes within the right lower lobe. Organs: The liver are has shrunken nodular contour suggestive of cirrhosis. There is associated mild splenomegaly. Small amount of ascites is also noted within the right upper quadrant area. The liver, spleen, adrenal glands, gallbladder, pancreas, kidneys and ureters and pelvic organs including the urinary bladder appear unremarkable. Peritoneum / Retroperitoneum:  No free air. Small free fluid adjacent to the liver. No pathologically enlarged lymphadenopathy. The vasculature do not demonstrate acute abnormality. GI Tract:  No distention or wall thickening. Diverticulosis with no evidence of diverticulitis. Bones and Soft Tissues: No fracture. No concerning lytic or sclerotic lesions are noted. Cervical spine CT: No acute abnormality of the cervical spine. Thoracic spine CT: Chronic superior endplate compression fracture of T12 with 60% height loss. No acute fracture. Lumbar spine CT: No acute osseous abnormality. Head CT: No evidence for acute intracranial hemorrhage, territorial infarction or intracranial mass lesion. Moderate chronic microangiopathic ischemic disease. Moderate volume loss. CT of the abdomen and pelvis: No evidence of acute traumatic injury within the abdomen and pelvis. Massive cardiomegaly predominantly involving the right atrium. Findings suggestive of cirrhosis with a small amount of ascites within the right upper quadrant area. Diverticulosis with no evidence of diverticulitis.  RECOMMENDATIONS: Unavailable     CT CERVICAL SPINE WO CONTRAST    Result Date: 3/18/2022  EXAMINATION: CT OF THE HEAD WITHOUT CONTRAST; CT OF THE THORACIC SPINE WITHOUT CONTRAST; CT OF THE LUMBAR SPINE WITHOUT CONTRAST; CT OF THE CERVICAL SPINE WITHOUT CONTRAST; CT OF THE ABDOMEN AND PELVIS WITHOUT CONTRAST  3/18/2022 10:56 pm TECHNIQUE: CT of the head was performed without the administration of intravenous contrast. Dose modulation, iterative reconstruction, and/or weight based adjustment of the mA/kV was utilized to reduce the radiation dose to as low as reasonably achievable.; CT of the thoracic spine was performed without the administration of intravenous contrast. Multiplanar reformatted images are provided for review. Dose modulation, iterative reconstruction, and/or weight based adjustment of the mA/kV was utilized to reduce the radiation dose to as low as reasonably achievable.; CT of the lumbar spine was performed without the administration of intravenous contrast. Multiplanar reformatted images are provided for review. Adjustment of mA and/or kV according to patient size was utilized. Dose modulation, iterative reconstruction, and/or weight based adjustment of the mA/kV was utilized to reduce the radiation dose to as low as reasonably achievable.; CT of the cervical spine was performed without the administration of intravenous contrast. Multiplanar reformatted images are provided for review. Dose modulation, iterative reconstruction, and/or weight based adjustment of the mA/kV was utilized to reduce the radiation dose to as low as reasonably achievable.; CT of the abdomen and pelvis was performed without the administration of intravenous contrast. Multiplanar reformatted images are provided for review. Dose modulation, iterative reconstruction, and/or weight based adjustment of the mA/kV was utilized to reduce the radiation dose to as low as reasonably achievable.  COMPARISON: 05/18/2021 HISTORY: ORDERING SYSTEM PROVIDED HISTORY: HEAD TRAUMA, CLOSED, MILD, GCS >= 13, NO RISK FACTORS, NEURO EXAM NORMAL TECHNOLOGIST PROVIDED HISTORY: Has a \"code stroke\" or \"stroke alert\" been called? ->No Reason for exam:->fall/head injury Decision Support Exception - unselect if not a suspected or confirmed emergency medical condition->Emergency Medical Condition ([place]) Reason for Exam: fall, head/neck pain. Additional signs and symptoms: no Relevant Medical/Surgical History: none FINDINGS: Cervical: BONES/ALIGNMENT: There is no acute fracture or traumatic malalignment. DEGENERATIVE CHANGES: Multilevel disc and facet degenerative disease most pronounced at C4-C5, C5-C6 and C6-C7. Grade 1 anterolisthesis of C4-C5. SOFT TISSUES: There is no prevertebral soft tissue swelling. Thoracic: BONES/ALIGNMENT: Unchanged chronic superior endplate compression fracture of T12 with 60% height loss. No retropulsion. . DEGENERATIVE CHANGES: No significant degenerative changes. SOFT TISSUES: There is no prevertebral soft tissue swelling. Lumbar: BONES/ALIGNMENT: There is no acute fracture or traumatic malalignment. DEGENERATIVE CHANGES: Multilevel severe disc and facet degenerative disease with vacuum phenomenon. Grade 1 anterolisthesis at L5-S1. High-level neural foraminal narrowing at L5-S1. SOFT TISSUES: There is no prevertebral soft tissue swelling. Head CT: There is no acute infarction, intracranial hemorrhage or intracranial mass lesion. No mass effect, midline shift or extra-axial collection is noted. There are moderate nonspecific foci of periventricular and subcortical cerebral white matter hypodensity, most likely representing chronic microangiopathic disease in this age group. Chronic lacunar infarction right cerebellar hemisphere. The brain parenchyma is otherwise normal. The cerebellar tonsils are in normal position. The ventricles, sulci, and cisterns are mildly prominent suggestive of moderate generalized volume loss.  The globes and orbits are within normal limits. The visualized extracranial structures including paranasal sinuses and mastoid air cells are unremarkable. No fracture is identified. CT abdomen and pelvis: Visualized lower chest: Massive cardiomegaly, predominantly involving right atrium and cardiac pacemaker leads. Small bilateral pleural effusion and atelectatic changes within the right lower lobe. Organs: The liver are has shrunken nodular contour suggestive of cirrhosis. There is associated mild splenomegaly. Small amount of ascites is also noted within the right upper quadrant area. The liver, spleen, adrenal glands, gallbladder, pancreas, kidneys and ureters and pelvic organs including the urinary bladder appear unremarkable. Peritoneum / Retroperitoneum:  No free air. Small free fluid adjacent to the liver. No pathologically enlarged lymphadenopathy. The vasculature do not demonstrate acute abnormality. GI Tract:  No distention or wall thickening. Diverticulosis with no evidence of diverticulitis. Bones and Soft Tissues: No fracture. No concerning lytic or sclerotic lesions are noted. Cervical spine CT: No acute abnormality of the cervical spine. Thoracic spine CT: Chronic superior endplate compression fracture of T12 with 60% height loss. No acute fracture. Lumbar spine CT: No acute osseous abnormality. Head CT: No evidence for acute intracranial hemorrhage, territorial infarction or intracranial mass lesion. Moderate chronic microangiopathic ischemic disease. Moderate volume loss. CT of the abdomen and pelvis: No evidence of acute traumatic injury within the abdomen and pelvis. Massive cardiomegaly predominantly involving the right atrium. Findings suggestive of cirrhosis with a small amount of ascites within the right upper quadrant area. Diverticulosis with no evidence of diverticulitis.  RECOMMENDATIONS: Unavailable     CT THORACIC SPINE WO CONTRAST    Result Date: 3/18/2022  EXAMINATION: CT OF THE HEAD WITHOUT CONTRAST; CT OF THE THORACIC SPINE WITHOUT CONTRAST; CT OF THE LUMBAR SPINE WITHOUT CONTRAST; CT OF THE CERVICAL SPINE WITHOUT CONTRAST; CT OF THE ABDOMEN AND PELVIS WITHOUT CONTRAST  3/18/2022 10:56 pm TECHNIQUE: CT of the head was performed without the administration of intravenous contrast. Dose modulation, iterative reconstruction, and/or weight based adjustment of the mA/kV was utilized to reduce the radiation dose to as low as reasonably achievable.; CT of the thoracic spine was performed without the administration of intravenous contrast. Multiplanar reformatted images are provided for review. Dose modulation, iterative reconstruction, and/or weight based adjustment of the mA/kV was utilized to reduce the radiation dose to as low as reasonably achievable.; CT of the lumbar spine was performed without the administration of intravenous contrast. Multiplanar reformatted images are provided for review. Adjustment of mA and/or kV according to patient size was utilized. Dose modulation, iterative reconstruction, and/or weight based adjustment of the mA/kV was utilized to reduce the radiation dose to as low as reasonably achievable.; CT of the cervical spine was performed without the administration of intravenous contrast. Multiplanar reformatted images are provided for review. Dose modulation, iterative reconstruction, and/or weight based adjustment of the mA/kV was utilized to reduce the radiation dose to as low as reasonably achievable.; CT of the abdomen and pelvis was performed without the administration of intravenous contrast. Multiplanar reformatted images are provided for review. Dose modulation, iterative reconstruction, and/or weight based adjustment of the mA/kV was utilized to reduce the radiation dose to as low as reasonably achievable.  COMPARISON: 05/18/2021 HISTORY: ORDERING SYSTEM PROVIDED HISTORY: HEAD TRAUMA, CLOSED, MILD, GCS >= 13, NO RISK FACTORS, NEURO EXAM NORMAL TECHNOLOGIST PROVIDED HISTORY: Has a \"code stroke\" or \"stroke alert\" been called? ->No Reason for exam:->fall/head injury Decision Support Exception - unselect if not a suspected or confirmed emergency medical condition->Emergency Medical Condition (MA) Reason for Exam: fall, head/neck pain. Additional signs and symptoms: no Relevant Medical/Surgical History: none FINDINGS: Cervical: BONES/ALIGNMENT: There is no acute fracture or traumatic malalignment. DEGENERATIVE CHANGES: Multilevel disc and facet degenerative disease most pronounced at C4-C5, C5-C6 and C6-C7. Grade 1 anterolisthesis of C4-C5. SOFT TISSUES: There is no prevertebral soft tissue swelling. Thoracic: BONES/ALIGNMENT: Unchanged chronic superior endplate compression fracture of T12 with 60% height loss. No retropulsion. . DEGENERATIVE CHANGES: No significant degenerative changes. SOFT TISSUES: There is no prevertebral soft tissue swelling. Lumbar: BONES/ALIGNMENT: There is no acute fracture or traumatic malalignment. DEGENERATIVE CHANGES: Multilevel severe disc and facet degenerative disease with vacuum phenomenon. Grade 1 anterolisthesis at L5-S1. High-level neural foraminal narrowing at L5-S1. SOFT TISSUES: There is no prevertebral soft tissue swelling. Head CT: There is no acute infarction, intracranial hemorrhage or intracranial mass lesion. No mass effect, midline shift or extra-axial collection is noted. There are moderate nonspecific foci of periventricular and subcortical cerebral white matter hypodensity, most likely representing chronic microangiopathic disease in this age group. Chronic lacunar infarction right cerebellar hemisphere. The brain parenchyma is otherwise normal. The cerebellar tonsils are in normal position. The ventricles, sulci, and cisterns are mildly prominent suggestive of moderate generalized volume loss. The globes and orbits are within normal limits.  The visualized extracranial structures including paranasal sinuses and mastoid air cells are unremarkable. No fracture is identified. CT abdomen and pelvis: Visualized lower chest: Massive cardiomegaly, predominantly involving right atrium and cardiac pacemaker leads. Small bilateral pleural effusion and atelectatic changes within the right lower lobe. Organs: The liver are has shrunken nodular contour suggestive of cirrhosis. There is associated mild splenomegaly. Small amount of ascites is also noted within the right upper quadrant area. The liver, spleen, adrenal glands, gallbladder, pancreas, kidneys and ureters and pelvic organs including the urinary bladder appear unremarkable. Peritoneum / Retroperitoneum:  No free air. Small free fluid adjacent to the liver. No pathologically enlarged lymphadenopathy. The vasculature do not demonstrate acute abnormality. GI Tract:  No distention or wall thickening. Diverticulosis with no evidence of diverticulitis. Bones and Soft Tissues: No fracture. No concerning lytic or sclerotic lesions are noted. Cervical spine CT: No acute abnormality of the cervical spine. Thoracic spine CT: Chronic superior endplate compression fracture of T12 with 60% height loss. No acute fracture. Lumbar spine CT: No acute osseous abnormality. Head CT: No evidence for acute intracranial hemorrhage, territorial infarction or intracranial mass lesion. Moderate chronic microangiopathic ischemic disease. Moderate volume loss. CT of the abdomen and pelvis: No evidence of acute traumatic injury within the abdomen and pelvis. Massive cardiomegaly predominantly involving the right atrium. Findings suggestive of cirrhosis with a small amount of ascites within the right upper quadrant area. Diverticulosis with no evidence of diverticulitis.  RECOMMENDATIONS: Unavailable     XR CHEST PORTABLE    Result Date: 3/18/2022  EXAMINATION: TWO XRAY VIEWS OF THE RIGHT SHOULDER; THREE XRAY VIEWS OF THE RIGHT HAND; TWO XRAY VIEWS OF THE RIGHT FOREARM; ONE XRAY VIEW OF THE CHEST;   XRAY VIEWS OF THE RIGHT WRIST; THREE XRAY VIEWS OF THE RIGHT ELBOW; TWO XRAY VIEWS OF THE RIGHT HUMERUS 3/18/2022 11:05 pm; 3/18/2022 11:04 pm; 3/18/2022 11:01 pm; 3/18/2022 11:06 pm; 3/18/2022 11:03 pm COMPARISON: None. HISTORY: ORDERING SYSTEM PROVIDED HISTORY: pain TECHNOLOGIST PROVIDED HISTORY: Reason for exam:->pain Reason for Exam: pain fall Additional signs and symptoms: none Relevant Medical/Surgical History: none; ORDERING SYSTEM PROVIDED HISTORY: pain TECHNOLOGIST PROVIDED HISTORY: Reason for exam:->pain Reason for Exam: pain Additional signs and symptoms: fall Relevant Medical/Surgical History: none; ORDERING SYSTEM PROVIDED HISTORY: dyspnea TECHNOLOGIST PROVIDED HISTORY: Reason for exam:->dyspnea Reason for Exam: dyspnea Additional signs and symptoms: fall Relevant Medical/Surgical History: CAD, CHF, COPD; ORDERING SYSTEM PROVIDED HISTORY: pain/ TECHNOLOGIST PROVIDED HISTORY: Reason for exam:->pain/ Reason for Exam: pain fall Relevant Medical/Surgical History: none FINDINGS: Chest x-ray: No pneumothorax or pulmonary contusion or effusion is identified. There is pulmonary vascular congestion with a probable mild right pleural effusion. There is cardiomegaly. The patient is status post median sternotomy. A pulse generator is present over the left anterior chest wall with a single lead projecting over the heart. The thoracic aorta is tortuous. There is nonspecific masslike opacity at the right lung base measuring approximately 4.0 x 7.6 cm in diameter. Right shoulder: No acute fracture or dislocation is identified. Right humerus: No acute fracture or dislocation is identified. Right elbow: No dislocation or effusion is identified. There may be an avulsion fracture off of the olecranon, with associated soft tissue swelling. Right forearm: No acute fracture or dislocation of the forearm is identified. Right wrist: No acute fracture or dislocation is identified.   There are severe degenerative changes along the radial aspect of the hand with carpal-carpal and carpometacarpal joint space narrowing, periarticular sclerosis, and osteophyte formation. Linear calcification within the expected location of the triangular fibrocartilaginous complex is consistent with chondrocalcinosis. Right hand: No acute fracture or dislocation is identified. Possible avulsion fracture of the right elbow. Otherwise, no acute posttraumatic abnormality detected. Mild CHF with trace right pleural effusion. VL DUP LOWER EXTREMITY VENOUS BILATERAL    Result Date: 3/18/2022  EXAMINATION: DUPLEX VENOUS ULTRASOUND OF THE BILATERAL LOWER EXTREMITIES3/18/2022 10:59 pm TECHNIQUE: Duplex ultrasound using B-mode/gray scaled imaging, Doppler spectral analysis and color flow Doppler was obtained of the deep venous structures of the lower bilateral extremities. COMPARISON: 10/12/2017. HISTORY: ORDERING SYSTEM PROVIDED HISTORY: pain/swelling TECHNOLOGIST PROVIDED HISTORY: Reason for exam:->pain/swelling Reason for Exam: Bilat leg swelling and pain FINDINGS: Suboptimal evaluation secondary to body habitus and soft tissue edema. The visualized veins of the bilateral lower extremities are patent and free of echogenic thrombus. The veins demonstrate good compressibility with normal color flow study and spectral analysis. Suboptimal evaluation secondary to body habitus and soft tissue edema. No evidence of DVT in either lower extremity.      CT LUMBAR RECONSTRUCTION WO POST PROCESS    Result Date: 3/18/2022  EXAMINATION: CT OF THE HEAD WITHOUT CONTRAST; CT OF THE THORACIC SPINE WITHOUT CONTRAST; CT OF THE LUMBAR SPINE WITHOUT CONTRAST; CT OF THE CERVICAL SPINE WITHOUT CONTRAST; CT OF THE ABDOMEN AND PELVIS WITHOUT CONTRAST  3/18/2022 10:56 pm TECHNIQUE: CT of the head was performed without the administration of intravenous contrast. Dose modulation, iterative reconstruction, and/or weight based adjustment of the mA/kV was utilized to reduce the radiation dose to as low as reasonably achievable.; CT of the thoracic spine was performed without the administration of intravenous contrast. Multiplanar reformatted images are provided for review. Dose modulation, iterative reconstruction, and/or weight based adjustment of the mA/kV was utilized to reduce the radiation dose to as low as reasonably achievable.; CT of the lumbar spine was performed without the administration of intravenous contrast. Multiplanar reformatted images are provided for review. Adjustment of mA and/or kV according to patient size was utilized. Dose modulation, iterative reconstruction, and/or weight based adjustment of the mA/kV was utilized to reduce the radiation dose to as low as reasonably achievable.; CT of the cervical spine was performed without the administration of intravenous contrast. Multiplanar reformatted images are provided for review. Dose modulation, iterative reconstruction, and/or weight based adjustment of the mA/kV was utilized to reduce the radiation dose to as low as reasonably achievable.; CT of the abdomen and pelvis was performed without the administration of intravenous contrast. Multiplanar reformatted images are provided for review. Dose modulation, iterative reconstruction, and/or weight based adjustment of the mA/kV was utilized to reduce the radiation dose to as low as reasonably achievable. COMPARISON: 05/18/2021 HISTORY: ORDERING SYSTEM PROVIDED HISTORY: HEAD TRAUMA, CLOSED, MILD, GCS >= 13, NO RISK FACTORS, NEURO EXAM NORMAL TECHNOLOGIST PROVIDED HISTORY: Has a \"code stroke\" or \"stroke alert\" been called? ->No Reason for exam:->fall/head injury Decision Support Exception - unselect if not a suspected or confirmed emergency medical condition->Emergency Medical Condition (MA) Reason for Exam: fall, head/neck pain.  Additional signs and symptoms: no Relevant Medical/Surgical History: none FINDINGS: Cervical: BONES/ALIGNMENT: There is no acute fracture or traumatic malalignment. DEGENERATIVE CHANGES: Multilevel disc and facet degenerative disease most pronounced at C4-C5, C5-C6 and C6-C7. Grade 1 anterolisthesis of C4-C5. SOFT TISSUES: There is no prevertebral soft tissue swelling. Thoracic: BONES/ALIGNMENT: Unchanged chronic superior endplate compression fracture of T12 with 60% height loss. No retropulsion. . DEGENERATIVE CHANGES: No significant degenerative changes. SOFT TISSUES: There is no prevertebral soft tissue swelling. Lumbar: BONES/ALIGNMENT: There is no acute fracture or traumatic malalignment. DEGENERATIVE CHANGES: Multilevel severe disc and facet degenerative disease with vacuum phenomenon. Grade 1 anterolisthesis at L5-S1. High-level neural foraminal narrowing at L5-S1. SOFT TISSUES: There is no prevertebral soft tissue swelling. Head CT: There is no acute infarction, intracranial hemorrhage or intracranial mass lesion. No mass effect, midline shift or extra-axial collection is noted. There are moderate nonspecific foci of periventricular and subcortical cerebral white matter hypodensity, most likely representing chronic microangiopathic disease in this age group. Chronic lacunar infarction right cerebellar hemisphere. The brain parenchyma is otherwise normal. The cerebellar tonsils are in normal position. The ventricles, sulci, and cisterns are mildly prominent suggestive of moderate generalized volume loss. The globes and orbits are within normal limits. The visualized extracranial structures including paranasal sinuses and mastoid air cells are unremarkable. No fracture is identified. CT abdomen and pelvis: Visualized lower chest: Massive cardiomegaly, predominantly involving right atrium and cardiac pacemaker leads. Small bilateral pleural effusion and atelectatic changes within the right lower lobe. Organs: The liver are has shrunken nodular contour suggestive of cirrhosis. There is associated mild splenomegaly.   Small amount of ascites is also noted within the right upper quadrant area. The liver, spleen, adrenal glands, gallbladder, pancreas, kidneys and ureters and pelvic organs including the urinary bladder appear unremarkable. Peritoneum / Retroperitoneum:  No free air. Small free fluid adjacent to the liver. No pathologically enlarged lymphadenopathy. The vasculature do not demonstrate acute abnormality. GI Tract:  No distention or wall thickening. Diverticulosis with no evidence of diverticulitis. Bones and Soft Tissues: No fracture. No concerning lytic or sclerotic lesions are noted. Cervical spine CT: No acute abnormality of the cervical spine. Thoracic spine CT: Chronic superior endplate compression fracture of T12 with 60% height loss. No acute fracture. Lumbar spine CT: No acute osseous abnormality. Head CT: No evidence for acute intracranial hemorrhage, territorial infarction or intracranial mass lesion. Moderate chronic microangiopathic ischemic disease. Moderate volume loss. CT of the abdomen and pelvis: No evidence of acute traumatic injury within the abdomen and pelvis. Massive cardiomegaly predominantly involving the right atrium. Findings suggestive of cirrhosis with a small amount of ascites within the right upper quadrant area. Diverticulosis with no evidence of diverticulitis.  RECOMMENDATIONS: Unavailable       Personally reviewed Lab Studies, Imaging, and discussed case with Dr. Anushka Diaz PA-C  Hospitalist  3/19/2022, 7:21 AM

## 2022-03-19 NOTE — PROGRESS NOTES
Anemia panel came back with low iron and elevated B12. I added iron tabs and halved home B12 dose.     Santina Aschoff, PA-C  Hospitalist  3/19/2022, 3:31 PM

## 2022-03-19 NOTE — PROGRESS NOTES
Note to Dr Aranza Ugalde: Please put in a diet order. Patient is also having dysuria and frequency. Burns with urination.  UA done in ED

## 2022-03-19 NOTE — ED PROVIDER NOTES
12:50 AM  Care patient transferred from Dr. Master Flores to me. Plan to follow-up on labs, and imaging and disposition patient appropriately. Patient had a fall on 314, and complains of right arm and back pain. Per family at bedside patient has had more frequent falls over the last few days. Patient also admits to shortness of breath. EKG- paced rhythm, rate 60, QRS 72, QTc 398, no acute ST elevation. #2 EKG done on 3/1/2022. Labs and imaging reviewed. Patient with mild leukocytosis of 11.4, hemoglobin 11.4, platelets 410. BUN and creatinine are within acceptable limits, patient sodium is 125, she is given gentle IV fluids for this. Acid is less than 2. Urine without signs of infection. CK, lipase and magnesium within acceptable limits. Troponin is not detectable. Mild elevation in BNP, 1565. CT head no acute disease. CT of the C, T, L-spine without acute process. CT of the abdomen pelvis without acute traumatic injury. Plain films of the right upper extremity with possible avulsion fraction of the right elbow, otherwise no acute posttraumatic abnormality detected. Mild CHF with trace right pleural effusion. No evidence of DVT in either lower extremity. Given patient's hyponatremia and frequent falls, will admit for further evaluation and management. All labs and imaging discussed with patient and family, they are agreeable with plan of care. Case discussed with hospitalist who agrees with admission.      [id number] [facility],   03/19/22 [id number]

## 2022-03-19 NOTE — PROGRESS NOTES
Note to Aboutialib for Aissatou Christine PA-C: Sodium level at 1619 was 129 Also please look at the UA.  Changed from previous one done in ED

## 2022-03-19 NOTE — ED TRIAGE NOTES
Pt reports to Ed for complaints of right arm pain and back pain following a fall on 3/14. Pt denies hitting her head. PERRLA. Pt skin warm and dry. Pt respirations easy and unlabored. Pt ambulated from EMS cot to ED stretcher.

## 2022-03-19 NOTE — ED NOTES
Pt doesn't want sling placed at this time. Pt states \"I just want to sleep for a bit\".      Lance Ang RN  03/19/22 [id number]

## 2022-03-19 NOTE — PROGRESS NOTES
Note to Dr. Johnathon Schultz: Good morning. I was atttempting to reach [facility]. Elida has extensive bruising to right arm and chest area. I marked the areas on admission this morning and the areas have grown. Soft to touch and not painful.   Just updating

## 2022-03-20 LAB
ANION GAP SERPL CALCULATED.3IONS-SCNC: 11 MMOL/L (ref 4–16)
ANION GAP SERPL CALCULATED.3IONS-SCNC: 13 MMOL/L (ref 4–16)
BUN BLDV-MCNC: 27 MG/DL (ref 6–23)
BUN BLDV-MCNC: 28 MG/DL (ref 6–23)
CALCIUM SERPL-MCNC: 10.1 MG/DL (ref 8.3–10.6)
CALCIUM SERPL-MCNC: 10.2 MG/DL (ref 8.3–10.6)
CHLORIDE BLD-SCNC: 93 MMOL/L (ref 99–110)
CHLORIDE BLD-SCNC: 93 MMOL/L (ref 99–110)
CO2: 23 MMOL/L (ref 21–32)
CO2: 25 MMOL/L (ref 21–32)
CREAT SERPL-MCNC: 0.9 MG/DL (ref 0.6–1.1)
CREAT SERPL-MCNC: 0.9 MG/DL (ref 0.6–1.1)
CULTURE: ABNORMAL
CULTURE: ABNORMAL
EKG ATRIAL RATE: 60 BPM
EKG DIAGNOSIS: NORMAL
EKG P AXIS: -18 DEGREES
EKG Q-T INTERVAL: 398 MS
EKG QRS DURATION: 72 MS
EKG QTC CALCULATION (BAZETT): 398 MS
EKG R AXIS: -72 DEGREES
EKG T AXIS: 55 DEGREES
EKG VENTRICULAR RATE: 60 BPM
GFR AFRICAN AMERICAN: >60 ML/MIN/1.73M2
GFR AFRICAN AMERICAN: >60 ML/MIN/1.73M2
GFR NON-AFRICAN AMERICAN: 58 ML/MIN/1.73M2
GFR NON-AFRICAN AMERICAN: 58 ML/MIN/1.73M2
GLUCOSE BLD-MCNC: 104 MG/DL (ref 70–99)
GLUCOSE BLD-MCNC: 112 MG/DL (ref 70–99)
HCT VFR BLD CALC: 38.5 % (ref 37–47)
HEMOGLOBIN: 11.8 GM/DL (ref 12.5–16)
INR BLD: 1.23 INDEX
Lab: ABNORMAL
MCH RBC QN AUTO: 26.3 PG (ref 27–31)
MCHC RBC AUTO-ENTMCNC: 30.6 % (ref 32–36)
MCV RBC AUTO: 85.7 FL (ref 78–100)
PDW BLD-RTO: 16.4 % (ref 11.7–14.9)
PLATELET # BLD: 520 K/CU MM (ref 140–440)
PMV BLD AUTO: 11.1 FL (ref 7.5–11.1)
POTASSIUM SERPL-SCNC: 4.8 MMOL/L (ref 3.5–5.1)
POTASSIUM SERPL-SCNC: 5.1 MMOL/L (ref 3.5–5.1)
PROTHROMBIN TIME: 15.9 SECONDS (ref 11.7–14.5)
RBC # BLD: 4.49 M/CU MM (ref 4.2–5.4)
SODIUM BLD-SCNC: 127 MMOL/L (ref 135–145)
SODIUM BLD-SCNC: 131 MMOL/L (ref 135–145)
SPECIMEN: ABNORMAL
WBC # BLD: 12.9 K/CU MM (ref 4–10.5)

## 2022-03-20 PROCEDURE — 97535 SELF CARE MNGMENT TRAINING: CPT

## 2022-03-20 PROCEDURE — 85027 COMPLETE CBC AUTOMATED: CPT

## 2022-03-20 PROCEDURE — G0378 HOSPITAL OBSERVATION PER HR: HCPCS

## 2022-03-20 PROCEDURE — 97530 THERAPEUTIC ACTIVITIES: CPT

## 2022-03-20 PROCEDURE — 93010 ELECTROCARDIOGRAM REPORT: CPT | Performed by: INTERNAL MEDICINE

## 2022-03-20 PROCEDURE — 97162 PT EVAL MOD COMPLEX 30 MIN: CPT

## 2022-03-20 PROCEDURE — 6370000000 HC RX 637 (ALT 250 FOR IP): Performed by: INTERNAL MEDICINE

## 2022-03-20 PROCEDURE — 97116 GAIT TRAINING THERAPY: CPT

## 2022-03-20 PROCEDURE — 94761 N-INVAS EAR/PLS OXIMETRY MLT: CPT

## 2022-03-20 PROCEDURE — 6370000000 HC RX 637 (ALT 250 FOR IP): Performed by: PHYSICIAN ASSISTANT

## 2022-03-20 PROCEDURE — 80048 BASIC METABOLIC PNL TOTAL CA: CPT

## 2022-03-20 PROCEDURE — 96376 TX/PRO/DX INJ SAME DRUG ADON: CPT

## 2022-03-20 PROCEDURE — 85610 PROTHROMBIN TIME: CPT

## 2022-03-20 PROCEDURE — 94640 AIRWAY INHALATION TREATMENT: CPT

## 2022-03-20 PROCEDURE — 6360000002 HC RX W HCPCS: Performed by: INTERNAL MEDICINE

## 2022-03-20 PROCEDURE — 97166 OT EVAL MOD COMPLEX 45 MIN: CPT

## 2022-03-20 PROCEDURE — 36415 COLL VENOUS BLD VENIPUNCTURE: CPT

## 2022-03-20 RX ORDER — FUROSEMIDE 10 MG/ML
20 INJECTION INTRAMUSCULAR; INTRAVENOUS 2 TIMES DAILY
Status: DISCONTINUED | OUTPATIENT
Start: 2022-03-20 | End: 2022-03-21

## 2022-03-20 RX ORDER — FUROSEMIDE 10 MG/ML
40 INJECTION INTRAMUSCULAR; INTRAVENOUS 2 TIMES DAILY
Status: DISCONTINUED | OUTPATIENT
Start: 2022-03-20 | End: 2022-03-20

## 2022-03-20 RX ADMIN — FERROUS SULFATE TAB 325 MG (65 MG ELEMENTAL FE) 325 MG: 325 (65 FE) TAB at 08:38

## 2022-03-20 RX ADMIN — FUROSEMIDE 20 MG: 10 INJECTION INTRAMUSCULAR; INTRAVENOUS at 18:01

## 2022-03-20 RX ADMIN — HYDROCODONE BITARTRATE AND ACETAMINOPHEN 1 TABLET: 5; 325 TABLET ORAL at 19:04

## 2022-03-20 RX ADMIN — CARVEDILOL 6.25 MG: 6.25 TABLET, FILM COATED ORAL at 08:39

## 2022-03-20 RX ADMIN — ASPIRIN 81 MG 81 MG: 81 TABLET ORAL at 08:38

## 2022-03-20 RX ADMIN — ATORVASTATIN CALCIUM 10 MG: 20 TABLET, FILM COATED ORAL at 08:38

## 2022-03-20 RX ADMIN — CARVEDILOL 6.25 MG: 6.25 TABLET, FILM COATED ORAL at 17:59

## 2022-03-20 RX ADMIN — WARFARIN SODIUM 5 MG: 5 TABLET ORAL at 08:38

## 2022-03-20 RX ADMIN — ALBUTEROL SULFATE 2 PUFF: 90 AEROSOL, METERED RESPIRATORY (INHALATION) at 08:41

## 2022-03-20 RX ADMIN — ALBUTEROL SULFATE 2 PUFF: 90 AEROSOL, METERED RESPIRATORY (INHALATION) at 20:32

## 2022-03-20 RX ADMIN — CYANOCOBALAMIN TAB 1000 MCG 500 MCG: 1000 TAB at 08:39

## 2022-03-20 RX ADMIN — FERROUS SULFATE TAB 325 MG (65 MG ELEMENTAL FE) 325 MG: 325 (65 FE) TAB at 18:00

## 2022-03-20 RX ADMIN — TIOTROPIUM BROMIDE AND OLODATEROL 2 PUFF: 3.124; 2.736 SPRAY, METERED RESPIRATORY (INHALATION) at 08:42

## 2022-03-20 RX ADMIN — LEVOTHYROXINE SODIUM 88 MCG: 0.09 TABLET ORAL at 08:38

## 2022-03-20 RX ADMIN — TIMOLOL MALEATE 1 DROP: 2.5 SOLUTION/ DROPS OPHTHALMIC at 08:42

## 2022-03-20 RX ADMIN — TIMOLOL MALEATE 1 DROP: 2.5 SOLUTION/ DROPS OPHTHALMIC at 21:35

## 2022-03-20 RX ADMIN — HYDROCODONE BITARTRATE AND ACETAMINOPHEN 1 TABLET: 5; 325 TABLET ORAL at 08:37

## 2022-03-20 ASSESSMENT — PAIN SCALES - GENERAL
PAINLEVEL_OUTOF10: 0
PAINLEVEL_OUTOF10: 6
PAINLEVEL_OUTOF10: 0
PAINLEVEL_OUTOF10: 8
PAINLEVEL_OUTOF10: 0

## 2022-03-20 ASSESSMENT — PAIN DESCRIPTION - FREQUENCY
FREQUENCY: INTERMITTENT
FREQUENCY: INTERMITTENT

## 2022-03-20 ASSESSMENT — PAIN DESCRIPTION - DESCRIPTORS
DESCRIPTORS: ACHING;DISCOMFORT
DESCRIPTORS: ACHING;DISCOMFORT

## 2022-03-20 ASSESSMENT — PAIN DESCRIPTION - ORIENTATION
ORIENTATION: RIGHT;LOWER
ORIENTATION: RIGHT;LOWER

## 2022-03-20 ASSESSMENT — PAIN DESCRIPTION - PAIN TYPE
TYPE: ACUTE PAIN
TYPE: ACUTE PAIN

## 2022-03-20 ASSESSMENT — PAIN DESCRIPTION - ONSET
ONSET: ON-GOING
ONSET: GRADUAL

## 2022-03-20 ASSESSMENT — PAIN DESCRIPTION - LOCATION
LOCATION: SHOULDER
LOCATION: SHOULDER

## 2022-03-20 ASSESSMENT — PAIN - FUNCTIONAL ASSESSMENT: PAIN_FUNCTIONAL_ASSESSMENT: PREVENTS OR INTERFERES SOME ACTIVE ACTIVITIES AND ADLS

## 2022-03-20 NOTE — CONSULTS
[facility] ACUTE CARE PHYSICAL THERAPY EVALUATION  Beatriz Pak, 3/18/1929, [id number], 3/20/2022    History  [place]:  The primary encounter diagnosis was Fall, initial encounter. Diagnoses of Back pain, unspecified back location, unspecified back pain laterality, unspecified chronicity and Avulsion fracture were also pertinent to this visit. Patient  has a past medical history of Arthritis, Bradycardia, CAD (coronary artery disease), Cardiac pacemaker, CHF (congestive heart failure) ([address] COPD, mild ([address] Cor pulmonale (chronic) ([address] CVA (cerebrovascular accident) ([address] DJD (degenerative joint disease) of cervical spine, Exhaustion of cardiac pacemaker battery, Family history of cardiovascular disease, Glaucoma, H/O 24 hour EKG monitoring, H/O cardiac catheterization, H/O cardiovascular stress test, H/O cardiovascular stress test, H/O cardiovascular stress test, H/O chest x-ray, H/O Doppler lower venous ultrasound, H/O Doppler ultrasound, H/O Doppler ultrasound, H/O Doppler ultrasound, H/O Doppler ultrasound, H/O echocardiogram, H/O echocardiogram, H/O echocardiogram, H/O echocardiogram, History of complete ECG, History of nuclear stress test, Hx of cardiovascular stress test, HX OTHER MEDICAL, Hyperlipidemia, Hypertension, Mild intermittent asthma, Moderate COPD (chronic obstructive pulmonary disease) ([address] Nausea & vomiting, Obstructive sleep apnea, Paroxysmal atrial fibrillation ([address] Post PTCA, Pulmonary HTN ([address] PVD (peripheral vascular disease) ([address] S/P CABG x 3, S/P PTCA (percutaneous transluminal coronary angioplasty), Severe pulmonary hypertension ([address] Shortness of breath, Thyroid disease, and Unspecified cerebral artery occlusion with cerebral infarction. Patient  has a past surgical history that includes Appendectomy (1941); Tonsillectomy (1950's); Cardiac surgery (4/09); Hysterectomy, total abdominal (1990's);  Colonoscopy (In 2000's); pacemaker placement; Coronary artery bypass graft (4/8/2009); Coronary angioplasty with stent (4/21/2010); other surgical history; Middle ear surgery; and Percutaneous Transluminal Coronary Angio (11/2012). Subjective:  Patient states: \"I fell into the TV\". Pt and son report a few falls in the last 6 months. Pain: Pt denies pain. Communication with other providers:  Handoff to RN, OT. Restrictions: R shoulder in sling, fall risk. Home Setup/Prior level of function  Social/Functional History  Lives With: Alone  Type of Home: Apartment  Home Layout: One level  Home Access: Level entry  Bathroom Shower/Tub: Tub/Shower unit,Walk-in shower  Bathroom Toilet: Standard  Bathroom Equipment: Grab bars in shower,Grab bars around toilet  Home Equipment: 4 wheeled walker  ADL Assistance: [address] [place] [address] Avenue: Independent (Has a relative that comes and cleans every other Wednesday)  Homemaking Responsibilities: Yes  Ambulation Assistance: Independent  Transfer Assistance: Independent  Active : No  Occupation: Retired   Additional: Per son's report, pt's daughter may come stay with her at times and he will be able to stop by for a few hours a day. Examination of body systems (includes body structures/functions, activity/participation limitations):  · Observation: Pt upright in chair upon arrival and agreeable to therapy. Son present. · Vision: Glasses  · Hearing: Limited  · Cardiopulmonary: Vitals intact, HR 61bpm, SO2 94%, RR 22 breaths per minute  · Cognition: AOX3, see OT/SLP note for further evaluation. Musculoskeletal  · ROM R/L:  WFL. · Strength R/L: 3+/5, Fair+ in function and endurance. · Neuro: No deficits found, coordination and light touch intact      Mobility:  · Rolling L/R: Mod I  · Supine to sit: Mod I  · Transfers: STS CGA with front wheeled walker, pt pushing off of L UE with R UE in sling, cues on avoiding use of R UE and to focus on upright posture  · Sitting balance: Good.     · Standing balance: Good, CGA with single UE assist for balance. · Gait: Pt ambulated ~120 feet with front wheeled walker PT CGA and holding onto R side/directing R side of walker. Pt educated to use walker that supports R UE to dec overuse/weight bearing of R arm. [facility] 6 Clicks Inpatient Mobility:  AM-PAC Inpatient Mobility Raw Score : 20    Treatment:  Sit to stand pushing off L UE from recliner chair to walker CGA, cues to maintain precautions and avoid use of R UE. Stand to sit pt reaching back with L UE, cues on eccentric control for safe descent to chair. Sit to/from stand at toilet min A, cues for hand placement of L UE and assist provided at walker due inability to assist with R UE. Standing balance at toilet for pericare CGA and cues to use L UE while maintaining balance. Assisted provided at donning/doffing brief. Standing balance at sink to wash hands CGA, continued cues on use of L UE to perform task. Ambulation x 120 feet CGA with assist provided at R side of walker to help with stirring/control of AD. Pt has shortened step length, dec alisha and forward flexed posture. Safety: patient left in recliner, son in room, call light within reach, RN notified, gait belt used. Assessment:  Pt is a 80year old female admitted to the hospital s/p fall, initial encounter. Diagnoses of Back pain, unspecified back location, unspecified back pain laterality, unspecified chronicity and Avulsion fracture were also pertinent to this visit. She is currently functioning below her baseline with impairments in her strength, balance, endurance and transfer/gait quality. She has functional impairments with walking, getting up from/seated to the toilet, and with her dynamic balance. Her MANSOOR/COG is thrown off at this time d/t her R UE in sling and she has increased difficulty with ADLs as a result. She will benefit from acute care PT followed by home health assist/home health PT.     Complexity: Mod  Prognosis: Good, no significant barriers to participation at this time. Plan Times per week: 3+/week     Equipment: [address] [facility] wheeled walker (with R UE assist), gait belt    Goals:  Short term goals  Time Frame for Short term goals: 1 Week  Short term goal 1: Patient will be able to perfom STS transfer from bed or standard chair mod I. Short term goal 2: Patient will be able to perform toilet mod I. Short term goal 3: Patient will be able to stand > 1 minute with one or no UE assist in order to perform tucker-care. Short term goal 4: Patient will ambulate > 200 feet with front wheeled walker or cane mod I      Treatment plan:  Bed mobility, transfers, balance, gait, TA, TX, endurance training, pt education, HEP education.     Recommendations for NURSING mobility: CGA with front wheeled walker (with arm rest to rest R UE on)    Time:   Time in: 1133  Time out: 1207  Timed treatment minutes: 24 minutes  Total time: 34 minutes    Electronically signed by:    Nick Lawson PT  3/20/2022, 12:53 PM

## 2022-03-20 NOTE — PROGRESS NOTES
Occupational [address] [place] [address] CARE OCCUPATIONAL THERAPY EVALUATION    Adele Jones, 3/18/1929, 2028/2028-A, 3/20/2022    Discharge Recommendation: Home with initial 24 hour supervision/assistance and daily HH aide vs family assist      History:  [place]:  The primary encounter diagnosis was Fall, initial encounter. Diagnoses of Back pain, unspecified back location, unspecified back pain laterality, unspecified chronicity and Avulsion fracture were also pertinent to this visit. Subjective:  Patient states: \"This arm won't hold me back for long.  I'm not going around in this sling like this forever\"  Pain: Pt reported no pain  Communication with other providers: co-eval w/ PT Shant Rivera RN  Restrictions: General Precautions, Fall Risk, R UE in sling    Home Setup/Prior level of function:  Social/Functional History  Lives With: Alone  Type of Home: Apartment  Home Layout: One level  Home Access: Level entry  Bathroom Shower/Tub: Tub/Shower unit,Walk-in shower  Bathroom Toilet: Standard  Bathroom Equipment: Grab bars in shower,Grab bars around toilet  Home Equipment: 4 wheeled walker  ADL Assistance: [address] [facility] [address]: Independent (Has a relative that comes and cleans every other Wednesday)  Homemaking Responsibilities: Yes  Ambulation Assistance: Independent  Transfer Assistance: Independent  Active : No  Occupation: Retired     Examination:  · Observation: Seated in [address] [place] [address] upon arrival, agreeable to therapy rhina. Son present  · Vision: [facility]  · Hearing: WFL  · Vitals: Stable vitals throughout session on room air    [address] and functions:  · ROM: WFL   · Strength: L UE grossly 4-/5 across all major joints, R UE NT  · Sensation: WFL  · Tone: Normal  · Coordination: WFL B UE FM, WFL L UE GM (R UE NT)  · Perception: WNL    Activities of Daily Living (ADLs):  · Feeding: set up A while seated  · Grooming: SBA  while standing at the sink, extra time to complete d/t use of non-dominant L UE  · UB bathing: min A   · LB bathing: min A   · UB dressing: min A w/ cuing to avoid using  R UE  · LB dressing: min A w/ cuing to avoid using R UE  · Toileting: min A clothing mgmt to pull up R side of depends, hygiene w/ SBA and extra time to complete d/t using non-dominant L UE    Cognitive and Psychosocial Functioning:  · Overall cognitive status: alert and oriented, WFL  · Affect: Normal       Functional Mobility:  · Supine to sit:  NT  · Transfers: STS from recliner w/ CGA using FWW w/ cues to avoid use of R UE  · Sitting balance:  SBA for dynamic and static. · Standing balance:  CGA dynamic, SBA static. · Functional Mobility: CGA for long distance ambulation using FWW w/ PT assisting R side FWW mgmt  · Toilet/Shower Transfers: SBA toilet transfer        AM-PAC 6 click short form for inpatient daily activity:   How much help from another person does the patient currently need. .. Unable  Dep A Lot  Max A A Lot   Mod A A Little  Min A A Little   CGA  SBA None   Mod I  Indep  Sup   1. Putting on and taking off regular lower body clothing? [] 1    [] 2   [] 2   [x] 3   [] 3   [] 4      2. Bathing (including washing, rinsing, drying)? [] 1   [] 2   [] 2 [x] 3 [] 3 [] 4   3. Toileting, which includes using toilet, bedpan, or urinal? [] 1    [] 2   [] 2   [x] 3   [] 3   [] 4     4. Putting on and taking off regular upper body clothing? [] 1   [] 2   [] 2   [x] 3   [] 3    [] 4      5. Taking care of personal grooming such as brushing teeth? [] 1   [] 2    [] 2 [] 3    [x] 3   [] 4      6. Eating meals? [] 1   [] 2   [] 2   [] 3   [] 3   [x] 4      Raw Score:  19     [24=0% impaired(CH), 23=1-19%(CI), 20-22=20-39%(CJ), 15-19=40-59%(CK), 10-14=60-79%(CL), 7-9=80-99%(CM), 6=100%(CN)]     Treatment:    Self Care Training:   Cues were given for safety, sequence, UE/LE placement, visual cues, and balance.     Activities performed today included toilet transfer/hygiene and clothing mgmt, hand hygiene while standing at the sink w/ FWW. Therapeutic Activity Training:   Therapeutic activity training was instructed today. Cues were given for safety, sequence, UE/LE placement, awareness, and balance. Activities performed today included sit-stand, long distance ambulation functional household distance using FWW w/ PT to assist w/ R side of FWW. Educated pt and son on role of OT, therapy POC and functional goals, progression w/ ADLs and transfers, d/c recommendations     Safety Measures: Gait belt used, Left in recliner, RN notified  Recommendations for NURSING activity:  Up to chair for all 3 meals and up to bathroom for all toileting needs     Assessment:  Pt is a 80 y o F admitted d/t fall, dx w/ hyponatremia and questionable avulsion fx of R olecranon w/ req to wear sling. Pt at baseline is independent for ADLs, independent for high level IADLs, and mod I for functional transfers/mobility w/ 1AJ. Pt currently presents w/ deficits in ADL and high level IADL independence, functional ADL transfers, strength, and functional activity tolerance. Continued OT services recommended to increase safety and independence with ADL routine and to address remaining functional deficits. Pt would benefit from continued acute care OT services w/ discharge to home w/ initial 24 hour supervision/assistance and daily [facility] aide until R UE sling no longer req. Complexity: Moderate  Prognosis: Good, no significant barriers to participation at this time. Plan  Times per week: 3+  Current Treatment Recommendations: Strengthening,ROM,Balance Training,Endurance Training,Functional Mobility [employer] Education & Training,Patient/Caregiver Education & Training,Equipment Evaluation, Education, & procurement,Self-Care / ADL,Cognitive/Perceptual Training,Positioning,Home Management Training         Goals:  1.  Pt will complete all aspects of bed mobility for EOB/OOB ADLs w/ mod I.  2. Pt will complete UB ADLs w/ SBA. 3. Pt will complete LB ADLs w/ SBA. 4. Pt will complete all functional transfers to and from bed, chair, toilet, shower chair w/ supervision. 5. Pt will ambulate functional household distance w/ supervision. 6. Pt will complete all aspects of toileting task w/ supervision. 7. Pt will perform therex/theract in order to increase strength and functional activity tolerance necessary for increased independence w/ ADL routine.     Pt goal: go home, get stronger  Time Frame for STGs: discharge        Time:   Time in: 1133  Time out: 1207  Total time: 34  Timed treatment minutes: 24        Electronically signed by:      Blaine Riedel, OTR/AYLA  [id number]

## 2022-03-20 NOTE — PROGRESS NOTES
03/20/22 0027   NIV Type   NIV Started/Stopped On   Equipment Type Home   Mode CPAP   Mask Type   (home interface)   Settings/Measurements   CPAP/EPAP 10 cmH2O  (home settings)   Resp 23   FiO2  21 %

## 2022-03-20 NOTE — PROGRESS NOTES
Hospital Medicine: Progress Note     Jillian Cordova  3/18/1929         Admit Date: 3/18/2022   Primary Care Physician:  Afshan Connell,     /65   Pulse 89   Temp 97.3 °F (36.3 °C) (Oral)   Resp 26   Ht 4' 11\" (1.499 m)   Wt 146 lb 6.2 oz (66.4 kg)   SpO2 95%   BMI 29.57 kg/m²     Assessment and Plan:    59-year-old female with background history of CAD: P A. fib and hypertension who presented to the ED after a fall at home. Before happened 3 days prior to presentation. She was brought in by his son due to concern for increasing generalized edema. At the ED biochemical studies showed low sodium, x-ray with questionable avulsion fracture of the right elbow. Abdominal CT showed findings suggestive of cirrhosis with a small amount of ascites within the right upper quadrant area. Given his massive cardiomegaly predominantly involving the right atrium. She was admitted for further evaluation and management. Hyponatremia:  Sodium noted at 125 on presentation. Likely volume expansion from fluid retention. Check urine sodium and osmolality. Interval rise in sodium to 127. Clinically overloaded. Continue Lasix. Monitor electrolyte closely. Fall:   Suffered a unwitnessed fall at home. Stated that she landed on her right side. Noted ecchymosis in the right arm and chest/torso area. Consult for blood shows fracture of the olecranon. DVT study is negative. Fall precautions. Continue to monitor. CAD:  Prior three-vessel CABG in 2009. Denies chest pain. Continue medical management. Cardiomegaly:  Predominantly involving the right atrium. She has known pulmonary hypertension with markedly elevated PA pressure. BNP of 1565. Continue diuresis. Repeat 2D echocardiogram.  Cardiology consultation. Paroxysmal atrial fibrillation:  HR is controlled. Continue Coreg and warfarin. INR of 1.26 on 3/19.     COPD:   Not in acute exacerbation. Continue Ellipta and albuterol. Probable cirrhosis:  Imaging studies with cirrhotic liver. Liver enzymes are okay. She will need further work-up when acute process is resolved. Right-sided ecchymosis:  Due to fall. Imaging also showed chronic compression fracture of T12. Pain control. Monitor for resolution of ecchymosis. Hemoglobin is holding. Discharge plan: Continue diuresis, await echo. Cardiology consultation. Activity as tolerated. Discharge planning. Interval History:    Seen and examined at bedside, sitting up in the chair, awake and alert, no apparent distress. Her son and daughter-in-law were present. She denies shortness of breath or chest pain. Physical exam:  General appearance: Elderly female, sitting up in the chair, conversant, no acute distress. Head/EENT: ncat, perrl, eomi, mmm. Neck: supple, no meningismus or JVD. Chest: symmetric with equal expansion, no retractions. Lungs: Bibasilar rhonchi. Heart: normal s1s2, no added sounds. Abdomen: soft, +ve bowel sound,non tender. Extremities: BLE pitting edema, good ROM. Pulses: palpable and strong bilaterally. Skin: warm and dry, ecchymosis of the right medial arm and chest.  Neurologic: no focal deficits. Data Review:     CBC: Recent Labs     03/18/22  2359 03/19/22  0848 03/20/22  0547   WBC 11.4* 11.6* 12.9*   HGB 11.4* 10.4* 11.8*   * 488* 520*     [place].  Panel:    Recent Labs     03/19/22  1619 03/19/22  1933 03/20/22  0547   * 127* 127*   K 4.9 5.1 5.1   CL 92* 90* 93*   CO2 24 22 23   BUN 31* 27* 28*   CREATININE 0.8 1.0 0.9   GLUCOSE 153* 154* 112*     Hepatic:   Recent Labs     03/18/22  2359   AST 32   ALT 16   BILITOT 1.6*   ALKPHOS 173*     INR:   Recent Labs     03/19/22  0702 03/19/22  0848 03/20/22  0547   INR 1.26 1.36 1.23      Meds:    albuterol sulfate HFA  2 puff Inhalation BID    aspirin  81 mg Oral Daily    atorvastatin  10 mg Oral Daily    carvedilol 6.25 mg Oral BID     levothyroxine  88 mcg Oral Daily    [Held by provider] spironolactone  50 mg Oral BID    timolol  1 drop Both Eyes BID    tiotropium-olodaterol  2 puff Inhalation Daily    warfarin  5 mg Oral Daily    lidocaine  1 patch TransDERmal Daily    vitamin B-12  500 mcg Oral Daily    ferrous sulfate  325 mg Oral BID JARED Carballo MD    Note: Computer voice recognition system was used in parts of this documentation. There is a possibility of sound alike word errors inherent to this technology that may be missed during proof-reading and may alter the context of this note.

## 2022-03-21 LAB
ANION GAP SERPL CALCULATED.3IONS-SCNC: 12 MMOL/L (ref 4–16)
BASOPHILS ABSOLUTE: 0.1 K/CU MM
BASOPHILS RELATIVE PERCENT: 0.8 % (ref 0–1)
BUN BLDV-MCNC: 24 MG/DL (ref 6–23)
CALCIUM SERPL-MCNC: 9.7 MG/DL (ref 8.3–10.6)
CHLORIDE BLD-SCNC: 94 MMOL/L (ref 99–110)
CO2: 27 MMOL/L (ref 21–32)
CREAT SERPL-MCNC: 0.9 MG/DL (ref 0.6–1.1)
DIFFERENTIAL TYPE: ABNORMAL
EOSINOPHILS ABSOLUTE: 0.5 K/CU MM
EOSINOPHILS RELATIVE PERCENT: 4.6 % (ref 0–3)
GFR AFRICAN AMERICAN: >60 ML/MIN/1.73M2
GFR NON-AFRICAN AMERICAN: 58 ML/MIN/1.73M2
GLUCOSE BLD-MCNC: 91 MG/DL (ref 70–99)
HCT VFR BLD CALC: 40.2 % (ref 37–47)
HEMOGLOBIN: 12 GM/DL (ref 12.5–16)
IMMATURE NEUTROPHIL %: 1.3 % (ref 0–0.43)
INR BLD: 1.34 INDEX
LV EF: 53 %
LVEF MODALITY: NORMAL
LYMPHOCYTES ABSOLUTE: 0.9 K/CU MM
LYMPHOCYTES RELATIVE PERCENT: 8.3 % (ref 24–44)
MAGNESIUM: 2 MG/DL (ref 1.8–2.4)
MCH RBC QN AUTO: 26.7 PG (ref 27–31)
MCHC RBC AUTO-ENTMCNC: 29.9 % (ref 32–36)
MCV RBC AUTO: 89.5 FL (ref 78–100)
MONOCYTES ABSOLUTE: 0.9 K/CU MM
MONOCYTES RELATIVE PERCENT: 7.7 % (ref 0–4)
NUCLEATED RBC %: 0.2 %
PDW BLD-RTO: 16.7 % (ref 11.7–14.9)
PLATELET # BLD: 508 K/CU MM (ref 140–440)
PMV BLD AUTO: 10.8 FL (ref 7.5–11.1)
POTASSIUM SERPL-SCNC: 4.5 MMOL/L (ref 3.5–5.1)
PRO-BNP: 1658 PG/ML
PROTHROMBIN TIME: 17.4 SECONDS (ref 11.7–14.5)
RBC # BLD: 4.49 M/CU MM (ref 4.2–5.4)
SEGMENTED NEUTROPHILS ABSOLUTE COUNT: 8.5 K/CU MM
SEGMENTED NEUTROPHILS RELATIVE PERCENT: 77.3 % (ref 36–66)
SODIUM BLD-SCNC: 133 MMOL/L (ref 135–145)
TOTAL IMMATURE NEUTOROPHIL: 0.14 K/CU MM
TOTAL NUCLEATED RBC: 0 K/CU MM
WBC # BLD: 11 K/CU MM (ref 4–10.5)

## 2022-03-21 PROCEDURE — 83880 ASSAY OF NATRIURETIC PEPTIDE: CPT

## 2022-03-21 PROCEDURE — 93306 TTE W/DOPPLER COMPLETE: CPT

## 2022-03-21 PROCEDURE — G0378 HOSPITAL OBSERVATION PER HR: HCPCS

## 2022-03-21 PROCEDURE — 96365 THER/PROPH/DIAG IV INF INIT: CPT

## 2022-03-21 PROCEDURE — 99214 OFFICE O/P EST MOD 30 MIN: CPT | Performed by: INTERNAL MEDICINE

## 2022-03-21 PROCEDURE — 80048 BASIC METABOLIC PNL TOTAL CA: CPT

## 2022-03-21 PROCEDURE — 2700000000 HC OXYGEN THERAPY PER DAY

## 2022-03-21 PROCEDURE — 2580000003 HC RX 258: Performed by: INTERNAL MEDICINE

## 2022-03-21 PROCEDURE — 6370000000 HC RX 637 (ALT 250 FOR IP): Performed by: INTERNAL MEDICINE

## 2022-03-21 PROCEDURE — 83735 ASSAY OF MAGNESIUM: CPT

## 2022-03-21 PROCEDURE — 97530 THERAPEUTIC ACTIVITIES: CPT

## 2022-03-21 PROCEDURE — 94640 AIRWAY INHALATION TREATMENT: CPT

## 2022-03-21 PROCEDURE — 85025 COMPLETE CBC W/AUTO DIFF WBC: CPT

## 2022-03-21 PROCEDURE — 6370000000 HC RX 637 (ALT 250 FOR IP): Performed by: PHYSICIAN ASSISTANT

## 2022-03-21 PROCEDURE — 6360000002 HC RX W HCPCS: Performed by: NURSE PRACTITIONER

## 2022-03-21 PROCEDURE — 85610 PROTHROMBIN TIME: CPT

## 2022-03-21 PROCEDURE — 94761 N-INVAS EAR/PLS OXIMETRY MLT: CPT

## 2022-03-21 PROCEDURE — 96376 TX/PRO/DX INJ SAME DRUG ADON: CPT

## 2022-03-21 PROCEDURE — 6360000002 HC RX W HCPCS: Performed by: INTERNAL MEDICINE

## 2022-03-21 PROCEDURE — 97116 GAIT TRAINING THERAPY: CPT

## 2022-03-21 RX ORDER — ALBUTEROL SULFATE 90 UG/1
2 AEROSOL, METERED RESPIRATORY (INHALATION) 4 TIMES DAILY
Status: DISCONTINUED | OUTPATIENT
Start: 2022-03-21 | End: 2022-03-24 | Stop reason: HOSPADM

## 2022-03-21 RX ORDER — FUROSEMIDE 10 MG/ML
80 INJECTION INTRAMUSCULAR; INTRAVENOUS 2 TIMES DAILY
Status: DISCONTINUED | OUTPATIENT
Start: 2022-03-21 | End: 2022-03-24 | Stop reason: HOSPADM

## 2022-03-21 RX ADMIN — HYDROCODONE BITARTRATE AND ACETAMINOPHEN 1 TABLET: 5; 325 TABLET ORAL at 07:03

## 2022-03-21 RX ADMIN — CEFTRIAXONE SODIUM 1000 MG: 1 INJECTION, POWDER, FOR SOLUTION INTRAMUSCULAR; INTRAVENOUS at 10:42

## 2022-03-21 RX ADMIN — FUROSEMIDE 20 MG: 10 INJECTION INTRAMUSCULAR; INTRAVENOUS at 09:11

## 2022-03-21 RX ADMIN — ALBUTEROL SULFATE 2 PUFF: 90 AEROSOL, METERED RESPIRATORY (INHALATION) at 07:11

## 2022-03-21 RX ADMIN — CARVEDILOL 6.25 MG: 6.25 TABLET, FILM COATED ORAL at 09:11

## 2022-03-21 RX ADMIN — LEVOTHYROXINE SODIUM 88 MCG: 0.09 TABLET ORAL at 07:03

## 2022-03-21 RX ADMIN — ASPIRIN 81 MG 81 MG: 81 TABLET ORAL at 09:11

## 2022-03-21 RX ADMIN — FERROUS SULFATE TAB 325 MG (65 MG ELEMENTAL FE) 325 MG: 325 (65 FE) TAB at 16:47

## 2022-03-21 RX ADMIN — WARFARIN SODIUM 5 MG: 5 TABLET ORAL at 09:11

## 2022-03-21 RX ADMIN — ATORVASTATIN CALCIUM 10 MG: 20 TABLET, FILM COATED ORAL at 09:10

## 2022-03-21 RX ADMIN — CARVEDILOL 6.25 MG: 6.25 TABLET, FILM COATED ORAL at 16:48

## 2022-03-21 RX ADMIN — TIMOLOL MALEATE 1 DROP: 2.5 SOLUTION/ DROPS OPHTHALMIC at 10:03

## 2022-03-21 RX ADMIN — TIOTROPIUM BROMIDE AND OLODATEROL 2 PUFF: 3.124; 2.736 SPRAY, METERED RESPIRATORY (INHALATION) at 07:11

## 2022-03-21 RX ADMIN — FUROSEMIDE 80 MG: 10 INJECTION, SOLUTION INTRAMUSCULAR; INTRAVENOUS at 16:48

## 2022-03-21 RX ADMIN — ALBUTEROL SULFATE 2 PUFF: 90 AEROSOL, METERED RESPIRATORY (INHALATION) at 21:04

## 2022-03-21 RX ADMIN — ALBUTEROL SULFATE 2 PUFF: 90 AEROSOL, METERED RESPIRATORY (INHALATION) at 17:17

## 2022-03-21 RX ADMIN — CYANOCOBALAMIN TAB 1000 MCG 500 MCG: 1000 TAB at 10:03

## 2022-03-21 RX ADMIN — FERROUS SULFATE TAB 325 MG (65 MG ELEMENTAL FE) 325 MG: 325 (65 FE) TAB at 09:11

## 2022-03-21 RX ADMIN — ALBUTEROL SULFATE 2 PUFF: 90 AEROSOL, METERED RESPIRATORY (INHALATION) at 12:57

## 2022-03-21 RX ADMIN — TIMOLOL MALEATE 1 DROP: 2.5 SOLUTION/ DROPS OPHTHALMIC at 20:43

## 2022-03-21 ASSESSMENT — PAIN DESCRIPTION - PAIN TYPE
TYPE: ACUTE PAIN
TYPE: ACUTE PAIN

## 2022-03-21 ASSESSMENT — PAIN SCALES - GENERAL
PAINLEVEL_OUTOF10: 0
PAINLEVEL_OUTOF10: 8

## 2022-03-21 ASSESSMENT — PAIN DESCRIPTION - PROGRESSION
CLINICAL_PROGRESSION: NOT CHANGED

## 2022-03-21 ASSESSMENT — PAIN DESCRIPTION - ONSET: ONSET: ON-GOING

## 2022-03-21 ASSESSMENT — PAIN DESCRIPTION - DESCRIPTORS: DESCRIPTORS: ACHING;DISCOMFORT

## 2022-03-21 ASSESSMENT — PAIN DESCRIPTION - LOCATION: LOCATION: SHOULDER

## 2022-03-21 ASSESSMENT — PAIN DESCRIPTION - ORIENTATION: ORIENTATION: RIGHT;LOWER

## 2022-03-21 ASSESSMENT — PAIN DESCRIPTION - FREQUENCY: FREQUENCY: INTERMITTENT

## 2022-03-21 NOTE — CONSULTS
INPATIENT CARDIOLOGY CONSULT NOTE         Reason for consultation:  Shortness of breath    Referring physician:  Rodney Ac MD     Primary care physician: Chula Roque DO      Dear Rodney Ac MD Thank you for the consult    Chief Complaint   Patient presents with    Back Pain    Fall     3/14       History of present illness:Irene is a 80 y. o.year old who  presents with   Chief Complaint   Patient presents with    Back Pain    Fall     3/14     Patient is a pleasant 80-year-old female who presents to the hospital with chief complaint of back pain, fall, mild shortness of breath. Patient has prior medical history significant for mechanical falls and confusion, history of atrial fibrillation on warfarin, history of coronary disease s/p CABG x3 in 2009 including BROOKS LAD SVG RCA and PDA grafts. Left heart cath in 2017 showing patent bypass grafts, history of single-chamber pacemaker, history of pulmonary hypertension, history of stroke COPD and hyperlipidemia. Patient denies any current chest pain, has chronic shortness of breath. Cardiology is consulted to evaluate patient for volume overload      Upon admission her sodium was noted to be 1 29-1 27 range. Creatinine normal.  White cell count 12.9  Negative cardiac troponin    proBNP elevated at 1565, however it is chronically elevated and usually much higher.     EKG shows ventricular paced rhythm at 60 bpm        Past medical history:    has a past medical history of Arthritis, Bradycardia, CAD (coronary artery disease), Cardiac pacemaker, CHF (congestive heart failure) ([address] COPD, mild ([address] Cor pulmonale (chronic) ([address] CVA (cerebrovascular accident) ([address] DJD (degenerative joint disease) of cervical spine, Exhaustion of cardiac pacemaker battery, Family history of cardiovascular disease, Glaucoma, H/O 24 hour EKG monitoring, H/O cardiac catheterization, H/O cardiovascular stress test, H/O cardiovascular stress test, H/O cardiovascular stress test, H/O chest x-ray, H/O Doppler lower venous ultrasound, H/O Doppler ultrasound, H/O Doppler ultrasound, H/O Doppler ultrasound, H/O Doppler ultrasound, H/O echocardiogram, H/O echocardiogram, H/O echocardiogram, H/O echocardiogram, History of complete ECG, History of nuclear stress test, Hx of cardiovascular stress test, HX OTHER MEDICAL, Hyperlipidemia, Hypertension, Mild intermittent asthma, Moderate COPD (chronic obstructive pulmonary disease) ([address] Nausea & vomiting, Obstructive sleep apnea, Paroxysmal atrial fibrillation ([address] Post PTCA, Pulmonary HTN ([address] PVD (peripheral vascular disease) ([address] S/P CABG x 3, S/P PTCA (percutaneous transluminal coronary angioplasty), Severe pulmonary hypertension ([address] Shortness of breath, Thyroid disease, and Unspecified cerebral artery occlusion with cerebral infarction. Past surgical history:   has a past surgical history that includes Appendectomy (1941); Tonsillectomy (1950's); Cardiac surgery (4/09); Hysterectomy, total abdominal (1990's); Colonoscopy (In 2000's); pacemaker placement; Coronary artery bypass graft (4/8/2009); Coronary angioplasty with stent (4/21/2010); other surgical history; Middle ear surgery; and Percutaneous Transluminal Coronary Angio (11/2012). Social History:   reports that she quit smoking about 51 years ago. Her smoking use included cigarettes. She has a 1.25 pack-year smoking history. She has never used smokeless tobacco. She reports previous alcohol use. She reports that she does not use drugs.   Family history:   no family history of CAD, STROKE of DM    Allergies   Allergen Reactions    Darvocet [Propoxyphene N-Acetaminophen]     Ranexa [Ranolazine Er]      Sick to her stomach    Ranolazine      Sick to her stomach    Sulfa Antibiotics Itching    Sulfasalazine Itching    Adhesive Tape Rash    Allantoin Rash    Bacitracin Rash    Gramicidin Rash    Neomycin Rash    Polymyxin B Rash    Pramoxine Hcl Rash    Silicone Rash       cefTRIAXone (ROCEPHIN) 1000 mg IVPB in 50 mL D5W minibag, Q24H  albuterol sulfate  (90 Base) MCG/ACT inhaler 2 puff, 4x daily  furosemide (LASIX) injection 20 mg, BID  aspirin chewable tablet 81 mg, Daily  atorvastatin (LIPITOR) tablet 10 mg, Daily  carvedilol (COREG) tablet 6.25 mg, BID   polyvinyl alcohol (LIQUIFILM TEARS) 1.4 % ophthalmic solution 1 drop, PRN  levothyroxine (SYNTHROID) tablet 88 mcg, Daily  [Held by provider] spironolactone (ALDACTONE) tablet 50 mg, BID  timolol (TIMOPTIC) 0.25 % ophthalmic solution 1 drop, BID  tiotropium-olodaterol (STIOLTO) 2.5-2.5 MCG/ACT inhaler 2 puff, Daily  warfarin (COUMADIN) tablet 5 mg, Daily  acetaminophen (TYLENOL) tablet 650 mg, Q4H PRN  lidocaine 4 % external patch 1 patch, Daily  melatonin tablet 9 mg, Nightly PRN  HYDROcodone-acetaminophen (NORCO) 5-325 MG per tablet 1 tablet, Q6H PRN  vitamin B-12 (CYANOCOBALAMIN) tablet 500 mcg, Daily  ferrous sulfate (IRON 325) tablet 325 mg, BID WC  ondansetron (ZOFRAN) injection 4 mg, Q30 Min PRN      Current Facility-Administered Medications   Medication Dose Route Frequency Provider Last Rate Last Admin    cefTRIAXone (ROCEPHIN) 1000 mg IVPB in 50 mL D5W minibag  1,000 mg IntraVENous Q24H Ayleen Rodriguez MD   Stopped at 03/21/22 1113    albuterol sulfate  (90 Base) MCG/ACT inhaler 2 puff  2 puff Inhalation 4x daily Ayleen Rodriguez MD        furosemide (LASIX) injection 20 mg  20 mg IntraVENous BID Ayleen Rodriguez MD   20 mg at 03/21/22 0911    aspirin chewable tablet 81 mg  81 mg Oral Daily Heaven Menezes MD   81 mg at 03/21/22 0911    atorvastatin (LIPITOR) tablet 10 mg  10 mg Oral Daily Heaven Menezes MD   10 mg at 03/21/22 0910    carvedilol (COREG) tablet 6.25 mg  6.25 mg Oral BID  Heaven Menezes MD   6.25 mg at 03/21/22 0911    polyvinyl alcohol (LIQUIFILM TEARS) 1.4 % ophthalmic solution 1 drop  1 drop Both Eyes NITZA Menezes MD       [facility] levothyroxine (SYNTHROID) tablet 88 mcg  88 mcg Oral Daily Alize Melendez MD   88 mcg at 03/21/22 0703    [Held by provider] spironolactone (ALDACTONE) tablet 50 mg  50 mg Oral BID Alize Melendez MD        timolol (TIMOPTIC) 0.25 % ophthalmic solution 1 drop  1 drop Both Eyes BID Alize Melendez MD   1 drop at 03/21/22 1003    tiotropium-olodaterol (STIOLTO) 2.5-2.5 MCG/ACT inhaler 2 puff  2 puff Inhalation Daily Alize Melendez MD   2 puff at 03/21/22 0711    warfarin (COUMADIN) tablet 5 mg  5 mg Oral Daily Alize Melendez MD   5 mg at 03/21/22 0911    acetaminophen (TYLENOL) tablet 650 mg  650 mg Oral Q4H PRN Nikhil Robbins PA-C   650 mg at 03/19/22 1139    lidocaine 4 % external patch 1 patch  1 patch TransDERmal Daily Nikhil Robbins PA-C   1 patch at 03/21/22 0912    melatonin tablet 9 mg  9 mg Oral Nightly PRN Leafcarolyn Robbins PA-C        HYDROcodone-acetaminophen [facility]) 5-325 MG per tablet 1 tablet  1 tablet Oral Q6H PRN Leafcarolyn El PA-C   1 tablet at 03/21/22 0703    vitamin B-12 (CYANOCOBALAMIN) tablet 500 mcg  500 mcg Oral Daily Nikhil Robbins PA-C   500 mcg at 03/21/22 1003    ferrous sulfate (IRON 325) tablet 325 mg  325 mg Oral BID WC Nikhil Robbins PA-C   325 mg at 03/21/22 0911    ondansetron (ZOFRAN) injection 4 mg  4 mg IntraVENous Q30 Min PRN Alize Melendez MD   4 mg at 03/19/22 2028         Review of Systems:     · Constitutional: No Fever or Weight Loss   · Eyes: No Decreased Vision  · ENT: No Headaches, Hearing Loss or Vertigo  · Cardiovascular:   no chest pain,  no dyspnea on exertion,  no palpitations or loss of consciousness  · Respiratory: No cough or wheezing    · Gastrointestinal: No abdominal pain, appetite loss, blood in stools, constipation, diarrhea or heartburn  · Genitourinary: No dysuria, trouble voiding, or hematuria  · Musculoskeletal:  No gait disturbance, weakness or joint complaints  · Integumentary: No rash or pruritis  · Neurological: No TIA or stroke symptoms  · Psychiatric: No anxiety or depression  · Endocrine: No malaise, fatigue or temperature intolerance  · Hematologic/Lymphatic: No bleeding problems, blood clots or swollen lymph nodes  · Allergic/Immunologic: No nasal congestion or hives    All other systems were reviewed and were negative otherwise. Physical Examination:      Vitals:    03/21/22 0811   BP: (!) 104/59   Pulse: 60   Resp: 17   Temp: 97.5 °F (36.4 °C)   SpO2: 91%      Wt Readings from Last 3 Encounters:   03/21/22 150 lb 2.1 oz (68.1 kg)   03/15/22 158 lb (71.7 kg)   03/01/22 146 lb (66.2 kg)     Body mass index is 30.32 kg/m². General Appearance:  No distress, conversant  Constitutional:  Well developed, Well nourished  HEENT:  Normocephalic, Atraumatic, Oropharynx moist   Nose normal. Neck Supple Carotid: no carotid bruit  Eyes:  Conjunctiva normal, No discharge. Respiratory:    Normal breath sounds, No respiratory distress, No wheezing, no use of accessory muscles, diaphragm movement is normal  No chest Tenderness  Cardiovascular: S1-S2 No murmurs auscultated. No rubs, thrills or gallops. Normal  rhythm. Pedal pulses are normal. No pedal edema  GI:  Soft Non tender, non distended. Musculoskeletal:   No tenderness, No cyanosis, No clubbing. Integument:  Warm, Dry, No erythema, No rash. Lymphatic:  No lymphadenopathy noted. Neurologic:  Alert & oriented x 3  No focal deficits noted. Psychiatric:  Affect normal, Judgment normal, Mood normal.       Lab Review     Recent Labs     03/20/22  0547   WBC 12.9*   HGB 11.8*   HCT 38.5   *      Recent Labs     03/20/22  1815   *   K 4.8   CL 93*   CO2 25   BUN 27*   CREATININE 0.9     Recent Labs     03/18/22  2359   AST 32   ALT 16   BILITOT 1.6*   ALKPHOS 173*     No results for input(s): TROPONINI in the last 72 hours.   Lab Results   Component Value Date    BNP 90 09/28/2013     Lab Results   Component Value Date    INR 1.23 03/20/2022    PROTIME 15.9 (H) 03/20/2022         All labs, images, EKGs were personally reviewed      Assessment: 80 y. o.year old with PMH of  has a past medical history of Arthritis, Bradycardia, CAD (coronary artery disease), Cardiac pacemaker, CHF (congestive heart failure) ([address] COPD, mild (H[facility]), Cor pulmonale (chronic) ([address] CVA (cerebrovascular accident) ([facility] DJD (degenerative joint disease) of cervical spine, Exhaustion of cardiac pacemaker battery, Family history of cardiovascular disease, Glaucoma, H/O 24 hour EKG monitoring, H/O cardiac catheterization, H/O cardiovascular stress test, H/O cardiovascular stress test, H/O cardiovascular stress test, H/O chest x-ray, H/O Doppler lower venous ultrasound, H/O Doppler ultrasound, H/O Doppler ultrasound, H/O Doppler ultrasound, H/O Doppler ultrasound, H/O echocardiogram, H/O echocardiogram, H/O echocardiogram, H/O echocardiogram, History of complete ECG, History of nuclear stress test, Hx of cardiovascular stress test, HX OTHER MEDICAL, Hyperlipidemia, Hypertension, Mild intermittent asthma, Moderate COPD (chronic obstructive pulmonary disease) ([facility] Nausea & vomiting, Obstructive sleep apnea, Paroxysmal atrial fibrillation ([facility] Post PTCA, Pulmonary HTN ([facility] PVD (peripheral vascular disease) ([facility] S/P CABG x 3, S/P PTCA (percutaneous transluminal coronary angioplasty), Severe pulmonary hypertension ([facility] Shortness of breath, Thyroid disease, and Unspecified cerebral artery occlusion with cerebral infarction. Medical Decision Making :       1. Mild acute on Ch.Diastolic Heart Failure: Last Echo in 2019 shows preserved LVEF. Moderate mitral and severe tricuspid regurgitation and severe pulmonary hypertension with RVSP of 70 mmHg. We will repeat echocardiogram.  Cont IV  Lasix, Strick I/O Monitoring, Daily weights and Low salt diet. 2. Hyponatremia -improving. Continue with Lasix  3. Atrial fibrillation, rate control strategy. Currently paced rhythm. INR target 2-3.   Last INR 1.23.  4. Hyperlipidemia: Continue with statins  5. History of coronary disease, CABG as outlined above in HPI, stable. Cardiac troponin negative. Patient denies any chest pain. Continue with aspirin statins and beta-blockers  6. Hyperlipidemia: Continue with Lipitor 10 mg daily  7.  Essential hypertension: Continue with Coreg 6.25 mg p.o. twice daily        Thank you for the consult    Dr. Simone Kearns  3/21/2022 11:33 AM

## 2022-03-21 NOTE — PROGRESS NOTES
03/21/22 0321   NIV Type   NIV Started/Stopped On   Equipment Type Home   Mode CPAP   Mask Type Other (Comment)  (home interface)   Mask Size   (home interface)   Settings/Measurements   CPAP/EPAP 10 cmH2O   Resp 18   FiO2  21 %

## 2022-03-21 NOTE — PROGRESS NOTES
PHARMACY ANTICOAGULATION MONITORING SERVICE    Jillian Cordova is a 80 y.o. female on warfarin therapy for A-fib. Pharmacy consulted by Dr. Martha Bronson for monitoring and adjustment of treatment. Indication for anticoagulation: A-fib  INR goal: 2-3  Warfarin dose prior to admission: warfarin 5 mg daily    Pertinent Laboratory Values Recent Labs     03/19/22  0848 03/19/22  0848 03/20/22  0547 03/20/22  0547 03/21/22  1500   INR 1.36   < > 1.23   < > 1.34   HGB 10.4*   < > 11.8*   < > 12.0*   HCT 34.5*   < > 38.5   < > 40.2   *  --  520*  --  508*    < > = values in this interval not displayed.        Assessment/Plan:   Drug Interactions:  o Aspirin (home medication), antiplatelet, may increase risk of bleeding (dual therapy clinically appropriate)  o Levothyroxine (home medication) may increase effects of warfarin   INR 1.34, sub-therapeutic   Continue warfarin 5 mg daily  [address] [place] [address] will continue to monitor and adjust warfarin therapy as indicated    Thank you for the consultOmer Modesto State HospitalMARCIN [facility], PharmD  3/21/2022 5:04 PM

## 2022-03-21 NOTE — PROGRESS NOTES
Hospital Medicine: Progress Note     Padmini Tenorio  3/18/1929         Admit Date: 3/18/2022   Primary Care Physician:  Gamal Hood, DO    /67   Pulse 63   Temp 97.6 °F (36.4 °C) (Axillary)   Resp 18   Ht 4' 11\" (1.499 m)   Wt 150 lb 2.1 oz (68.1 kg)   SpO2 96%   BMI 30.32 kg/m²     Assessment and Plan:    40-year-old female with background history of CAD, PAFIB and HTN who presented to the ED after a fall at home. The fall happened 3 days prior to presentation. She was brought in by his son due to concern for increasing generalized edema. At the ED biochemical studies showed low sodium, x-ray with questionable avulsion fracture of the right elbow. Abdominal CT showed findings suggestive of cirrhosis with a small amount of ascites within the right upper quadrant area. CXR with massive cardiomegaly predominantly involving the right atrium. She was admitted for further evaluation and management. Hyponatremia:  Likely due to volume expansion for fluid retention. Noted interval improvement in Na to 133. Continue diuresis. Monitor electrolytes closely. Fall:   Suffered an unwitnessed fall at home. Stated that she landed on her right side. Noted ecchymosis in the right arm and chest/torso area. Cxr showed fracture of the olecranon. DVT study is negative. Fall precautions, PT/OT eval.  Continue to monitor. CAD:  Prior three-vessel CABG in 2009. Denies chest pain. Continue medical management. Cardiology input noted. Cardiomegaly:  Predominantly involving the right atrium. She has known pulmonary hypertension with markedly elevated PA pressure. pBNP of 1565 -> 1658  2D echo with concentric LVH, normal LV systolic function, EF 50 to 55%. Bilateral atrial enlargement with severe tricuspid regurg. PPM is in situ. Continue diuresis and other medical management.     Paroxysmal atrial fibrillation:  HR is controlled. Continue Coreg and warfarin. INR of 1.34. COPD:   Not in acute exacerbation. Continue Ellipta and albuterol. UTI:  Reviewed UA. Urine culture growing Aerococcus. Treating with Rocephin. Probable cirrhosis:  Imaging studies with cirrhotic liver. Liver enzymes are okay. She will need further work-up when acute process is resolved. Right-sided ecchymosis:  Due to fall. Imaging also showed chronic compression fracture of T12. Pain control. Monitor for resolution of ecchymosis. Hemoglobin is holding. Fall precautions, PT eval.    Discharge plan: Continue diuresis, obtain daily weights. PT/OT evaluation in anticipation for discharge. Interval History:    She was seen and examined at bedside, resting in bed, awake and alert, no apparent distress. Stated that she did not get enough sleep last night. Denies discomfort. Nursing has no pressing concerns. Physical exam:  General appearance: Elderly female, resting in bed, conversant, no acute distress. His son was present. Head/EENT: ncat, perrl, eomi, mmm. Neck: supple, no meningismus or JVD. Chest: symmetric with equal expansion, no retractions. Lungs: Bibasilar rhonchi. Heart: normal s1s2, no audible murmurs. Abdomen: soft, +ve bowel sound,non tender. Extremities: BLE pitting edema, good ROM. Pulses: palpable and strong bilaterally. Skin: warm and dry, ecchymosis of the right medial arm and chest.  Neurologic: no focal deficits. Data Review:     CBC:   Recent Labs     03/19/22  0848 03/20/22  0547 03/21/22  1500   WBC 11.6* 12.9* 11.0*   HGB 10.4* 11.8* 12.0*   * 520* 508*     [place].  Panel:    Recent Labs     03/20/22  0547 03/20/22  1815 03/21/22  1500   * 131* 133*   K 5.1 4.8 4.5   CL 93* 93* 94*   CO2 23 25 27   BUN 28* 27* 24*   CREATININE 0.9 0.9 0.9   GLUCOSE 112* 104* 91     Hepatic:   Recent Labs     03/18/22  2359   AST 32   ALT 16   BILITOT 1.6*   ALKPHOS 173*     INR:   Recent Labs 03/19/22  0848 03/20/22  0547 03/21/22  1500   INR 1.36 1.23 1.34      Meds:    cefTRIAXone (ROCEPHIN) IV  1,000 mg IntraVENous Q24H    albuterol sulfate HFA  2 puff Inhalation 4x daily    furosemide  80 mg IntraVENous BID    aspirin  81 mg Oral Daily    atorvastatin  10 mg Oral Daily    carvedilol  6.25 mg Oral BID     levothyroxine  88 mcg Oral Daily    [Held by provider] spironolactone  50 mg Oral BID    timolol  1 drop Both Eyes BID    tiotropium-olodaterol  2 puff Inhalation Daily    warfarin  5 mg Oral Daily    lidocaine  1 patch TransDERmal Daily    vitamin B-12  500 mcg Oral Daily    ferrous sulfate  325 mg Oral BID      Gómez Corrigan MD    Note: Computer voice recognition system was used in parts of this documentation. There is a possibility of sound alike word errors inherent to this technology that may be missed during proof-reading and may alter the context of this note.

## 2022-03-22 LAB
ANION GAP SERPL CALCULATED.3IONS-SCNC: 14 MMOL/L (ref 4–16)
BASOPHILS ABSOLUTE: 0.1 K/CU MM
BASOPHILS RELATIVE PERCENT: 0.7 % (ref 0–1)
BUN BLDV-MCNC: 28 MG/DL (ref 6–23)
CALCIUM SERPL-MCNC: 10 MG/DL (ref 8.3–10.6)
CHLORIDE BLD-SCNC: 93 MMOL/L (ref 99–110)
CO2: 25 MMOL/L (ref 21–32)
CREAT SERPL-MCNC: 0.9 MG/DL (ref 0.6–1.1)
DIFFERENTIAL TYPE: ABNORMAL
EOSINOPHILS ABSOLUTE: 0.6 K/CU MM
EOSINOPHILS RELATIVE PERCENT: 5.4 % (ref 0–3)
GFR AFRICAN AMERICAN: >60 ML/MIN/1.73M2
GFR NON-AFRICAN AMERICAN: 58 ML/MIN/1.73M2
GLUCOSE BLD-MCNC: 112 MG/DL (ref 70–99)
HCT VFR BLD CALC: 39.6 % (ref 37–47)
HEMOGLOBIN: 12.2 GM/DL (ref 12.5–16)
IMMATURE NEUTROPHIL %: 1.6 % (ref 0–0.43)
INR BLD: 1.54 INDEX
LYMPHOCYTES ABSOLUTE: 1 K/CU MM
LYMPHOCYTES RELATIVE PERCENT: 8.5 % (ref 24–44)
MAGNESIUM: 2 MG/DL (ref 1.8–2.4)
MCH RBC QN AUTO: 26.8 PG (ref 27–31)
MCHC RBC AUTO-ENTMCNC: 30.8 % (ref 32–36)
MCV RBC AUTO: 86.8 FL (ref 78–100)
MONOCYTES ABSOLUTE: 0.8 K/CU MM
MONOCYTES RELATIVE PERCENT: 6.7 % (ref 0–4)
NUCLEATED RBC %: 0 %
PDW BLD-RTO: 17 % (ref 11.7–14.9)
PLATELET # BLD: 511 K/CU MM (ref 140–440)
PMV BLD AUTO: 10.8 FL (ref 7.5–11.1)
POTASSIUM SERPL-SCNC: 5.1 MMOL/L (ref 3.5–5.1)
PROTHROMBIN TIME: 20 SECONDS (ref 11.7–14.5)
RBC # BLD: 4.56 M/CU MM (ref 4.2–5.4)
SEGMENTED NEUTROPHILS ABSOLUTE COUNT: 8.9 K/CU MM
SEGMENTED NEUTROPHILS RELATIVE PERCENT: 77.1 % (ref 36–66)
SODIUM BLD-SCNC: 132 MMOL/L (ref 135–145)
TOTAL IMMATURE NEUTOROPHIL: 0.18 K/CU MM
TOTAL NUCLEATED RBC: 0 K/CU MM
WBC # BLD: 11.6 K/CU MM (ref 4–10.5)

## 2022-03-22 PROCEDURE — 6360000002 HC RX W HCPCS: Performed by: NURSE PRACTITIONER

## 2022-03-22 PROCEDURE — 6360000002 HC RX W HCPCS: Performed by: INTERNAL MEDICINE

## 2022-03-22 PROCEDURE — G0378 HOSPITAL OBSERVATION PER HR: HCPCS

## 2022-03-22 PROCEDURE — 96366 THER/PROPH/DIAG IV INF ADDON: CPT

## 2022-03-22 PROCEDURE — 6370000000 HC RX 637 (ALT 250 FOR IP): Performed by: INTERNAL MEDICINE

## 2022-03-22 PROCEDURE — 6370000000 HC RX 637 (ALT 250 FOR IP): Performed by: PHYSICIAN ASSISTANT

## 2022-03-22 PROCEDURE — 2700000000 HC OXYGEN THERAPY PER DAY

## 2022-03-22 PROCEDURE — 99226 PR SBSQ OBSERVATION CARE/DAY 35 MINUTES: CPT | Performed by: INTERNAL MEDICINE

## 2022-03-22 PROCEDURE — 83735 ASSAY OF MAGNESIUM: CPT

## 2022-03-22 PROCEDURE — 85025 COMPLETE CBC W/AUTO DIFF WBC: CPT

## 2022-03-22 PROCEDURE — 94640 AIRWAY INHALATION TREATMENT: CPT

## 2022-03-22 PROCEDURE — 85610 PROTHROMBIN TIME: CPT

## 2022-03-22 PROCEDURE — 94761 N-INVAS EAR/PLS OXIMETRY MLT: CPT

## 2022-03-22 PROCEDURE — 2580000003 HC RX 258: Performed by: INTERNAL MEDICINE

## 2022-03-22 PROCEDURE — 94618 PULMONARY STRESS TESTING: CPT

## 2022-03-22 PROCEDURE — 80048 BASIC METABOLIC PNL TOTAL CA: CPT

## 2022-03-22 PROCEDURE — 96376 TX/PRO/DX INJ SAME DRUG ADON: CPT

## 2022-03-22 PROCEDURE — 36415 COLL VENOUS BLD VENIPUNCTURE: CPT

## 2022-03-22 RX ORDER — POLYETHYLENE GLYCOL 3350 17 G/17G
17 POWDER, FOR SOLUTION ORAL DAILY
Status: DISCONTINUED | OUTPATIENT
Start: 2022-03-22 | End: 2022-03-24 | Stop reason: HOSPADM

## 2022-03-22 RX ADMIN — ASPIRIN 81 MG 81 MG: 81 TABLET ORAL at 08:41

## 2022-03-22 RX ADMIN — CEFTRIAXONE SODIUM 1000 MG: 1 INJECTION, POWDER, FOR SOLUTION INTRAMUSCULAR; INTRAVENOUS at 10:38

## 2022-03-22 RX ADMIN — TIMOLOL MALEATE 1 DROP: 2.5 SOLUTION/ DROPS OPHTHALMIC at 08:53

## 2022-03-22 RX ADMIN — CARVEDILOL 6.25 MG: 6.25 TABLET, FILM COATED ORAL at 08:41

## 2022-03-22 RX ADMIN — LEVOTHYROXINE SODIUM 88 MCG: 0.09 TABLET ORAL at 08:41

## 2022-03-22 RX ADMIN — HYDROCODONE BITARTRATE AND ACETAMINOPHEN 1 TABLET: 5; 325 TABLET ORAL at 02:14

## 2022-03-22 RX ADMIN — ALBUTEROL SULFATE 2 PUFF: 90 AEROSOL, METERED RESPIRATORY (INHALATION) at 14:36

## 2022-03-22 RX ADMIN — ATORVASTATIN CALCIUM 10 MG: 20 TABLET, FILM COATED ORAL at 08:41

## 2022-03-22 RX ADMIN — POLYETHYLENE GLYCOL (3350) 17 G: 17 POWDER, FOR SOLUTION ORAL at 14:04

## 2022-03-22 RX ADMIN — ALBUTEROL SULFATE 2 PUFF: 90 AEROSOL, METERED RESPIRATORY (INHALATION) at 09:02

## 2022-03-22 RX ADMIN — FUROSEMIDE 80 MG: 10 INJECTION, SOLUTION INTRAMUSCULAR; INTRAVENOUS at 17:15

## 2022-03-22 RX ADMIN — TIOTROPIUM BROMIDE AND OLODATEROL 2 PUFF: 3.124; 2.736 SPRAY, METERED RESPIRATORY (INHALATION) at 09:00

## 2022-03-22 RX ADMIN — CARVEDILOL 6.25 MG: 6.25 TABLET, FILM COATED ORAL at 17:15

## 2022-03-22 RX ADMIN — TIMOLOL MALEATE 1 DROP: 2.5 SOLUTION/ DROPS OPHTHALMIC at 22:02

## 2022-03-22 RX ADMIN — FERROUS SULFATE TAB 325 MG (65 MG ELEMENTAL FE) 325 MG: 325 (65 FE) TAB at 17:15

## 2022-03-22 RX ADMIN — FERROUS SULFATE TAB 325 MG (65 MG ELEMENTAL FE) 325 MG: 325 (65 FE) TAB at 08:41

## 2022-03-22 RX ADMIN — FUROSEMIDE 80 MG: 10 INJECTION, SOLUTION INTRAMUSCULAR; INTRAVENOUS at 08:42

## 2022-03-22 RX ADMIN — CYANOCOBALAMIN TAB 1000 MCG 500 MCG: 1000 TAB at 08:41

## 2022-03-22 RX ADMIN — WARFARIN SODIUM 5 MG: 5 TABLET ORAL at 08:41

## 2022-03-22 RX ADMIN — HYDROCODONE BITARTRATE AND ACETAMINOPHEN 1 TABLET: 5; 325 TABLET ORAL at 22:20

## 2022-03-22 ASSESSMENT — PAIN SCALES - GENERAL
PAINLEVEL_OUTOF10: 7

## 2022-03-22 ASSESSMENT — PAIN DESCRIPTION - PROGRESSION
CLINICAL_PROGRESSION: NOT CHANGED

## 2022-03-22 ASSESSMENT — PAIN DESCRIPTION - FREQUENCY: FREQUENCY: INTERMITTENT

## 2022-03-22 ASSESSMENT — PAIN DESCRIPTION - PAIN TYPE
TYPE: CHRONIC PAIN
TYPE: ACUTE PAIN
TYPE: ACUTE PAIN

## 2022-03-22 ASSESSMENT — PAIN DESCRIPTION - ONSET: ONSET: ON-GOING

## 2022-03-22 ASSESSMENT — PAIN DESCRIPTION - ORIENTATION
ORIENTATION: RIGHT
ORIENTATION: UPPER

## 2022-03-22 ASSESSMENT — PAIN DESCRIPTION - DESCRIPTORS: DESCRIPTORS: ACHING;DISCOMFORT

## 2022-03-22 ASSESSMENT — PAIN DESCRIPTION - LOCATION
LOCATION: BACK;BUTTOCKS;CHEST
LOCATION: BACK
LOCATION: BACK;BUTTOCKS;CHEST

## 2022-03-22 NOTE — PROGRESS NOTES
Physical Therapy    Physical Therapy Treatment Note  Name: Skinny Cantu MRN: [id number] :   3/18/1929   Date:  3/21/2022   Admission Date: 3/18/2022 Room:  -A   Restrictions/Precautions:        general precautions; fall risk    Subjective:  Patient states:  \"I can walk in the hallway if you want me to\"  Pain:   Location, Type, Intensity (0/10 to 10/10): denies    Objective:    Observation:  Pt supine in bed upon entry    Treatment, including education/measures:  Pt agreeable to participating in therapy at this time. Therapeutic Activity Training:   Therapeutic activity training was instructed today. Cues were given for safety, sequence, UE/LE placement, awareness, and balance. Activities performed today included bed mobility training, sup-sit, sit-stand. Pt completed supine to sit with CGAx1 with HOB elevated and with increased time for task completion. Pt completed sit to stand from bed with CGAx1. Pt completed stand to sit onto chair with CGAx1 with initial verbal cues to feel chair against back of legs, reach back, and sit slowly. Gait Training:  Cues were given for safety, sequence, device management, balance, posture, awareness, path. Pt ambulated 100 feet + 100 feet with CGAx1 with a front wheeled walker with a decreased gait speed, a decreased step length bilaterally, and an intermittent deviated path toward walls in hallway. Pt provided with initial verbal and tactile cues for BLE placement, walker placement, and sequence. Pt provided with verbal cues to ambulate in a straight path in order to avoid deviating toward walls in hallway. Pt provided with verbal cues for directions in order to successfully navigate hallway and return to correct room. Safety  Patient left safely in the chair, with call light/phone in reach with alarm applied. Gait belt and mask were used for transfers and gait.         Assessment / Impression:       Patient's tolerance of treatment:  Good; patient tolerated ambulation in hallway today  Adverse Reaction: none  Significant change in status and impact:  none  Barriers to improvement:  Generalized weakenss    Plan for Next Session:    Continue progressing toward goals per plan of care. Progress independence with transfers and ambulation as tolerated and appropriate. Progress ambulation distance as tolerated and appropriate. Time in:  1225  Time out:  1308  Timed treatment minutes:  42  Total treatment time:  43    Previously filed items:  Social/Functional History  Lives With: Alone  Type of Home: Apartment  Home Layout: One level  Home Access: Level entry  Bathroom Shower/Tub: Tub/Shower unit,Walk-in shower  Bathroom Toilet: Standard  Bathroom Equipment: Grab bars in shower,Grab bars around toilet  Home Equipment: 4 wheeled walker  ADL Assistance: [address]: Independent (Has a relative that comes and cleans every other Wednesday)  Homemaking Responsibilities: Yes  Ambulation Assistance: Independent  Transfer Assistance: Independent  Active : No  Occupation: Retired  Short term goals  Time Frame for [employer] term goals: 1 Week  Short term goal 1: Patient will be able to perfom STS transfer from bed or standard chair mod I. Short term goal 2: Patient will be able to perform toilet mod I. Short term goal 3: Patient will be able to stand > 1 minute with one or no UE assist in order to perform tucker-care.   Short term goal 4: Patient will ambulate > 200 feet with front wheeled walker or cane mod I       Electronically signed by:      Dann Castleman, PT, DPT  License #: [id number]

## 2022-03-22 NOTE — PROGRESS NOTES
PHARMACY ANTICOAGULATION MONITORING SERVICE    Fantasma Levi is a 80 y.o. female on warfarin therapy for A-fib. Pharmacy consulted by Dr. Sole Nam for monitoring and adjustment of treatment. Indication for anticoagulation: A-fib  INR goal: 2-3  Warfarin dose prior to admission: warfarin 5 mg daily    Pertinent Laboratory Values   Recent Labs     03/20/22  0547 03/20/22  0547 03/21/22  1500 03/21/22  1500 03/22/22  0203   INR 1.23   < > 1.34   < > 1.54   HGB 11.8*   < > 12.0*   < > 12.2*   HCT 38.5   < > 40.2   < > 39.6   *  --  508*  --  511*    < > = values in this interval not displayed.        Assessment/Plan:   Drug Interactions:  o Aspirin (home medication), antiplatelet, may increase risk of bleeding (dual therapy clinically appropriate)  o Levothyroxine (home medication) may increase effects of warfarin   INR 1.54, sub-therapeutic, trending up appropriately   Continue warfarin 5 mg daily  [address] [place] [address] will continue to monitor and adjust warfarin therapy as indicated    Thank you for the consult,  Destiny Choi [facility], PharmD  3/22/2022 5:08 PM

## 2022-03-22 NOTE — PROGRESS NOTES
Pt does not qualify for home oxygen while awake. Will complete Overnight Oximetry if the pt stays another night.

## 2022-03-22 NOTE — PROGRESS NOTES
CARDIOLOGY PROGRESS NOTE                                                  Name:  Padmini Tenorio /Age/Sex: 3/18/1929  (80 y.o. female)   MRN & CSN:  [id number] & [id number] Admission Date/Time: 3/18/2022  8:07 PM   Location:  -A PCP: Gamal Hood DO         Admit Date:  3/18/2022  Hospital Day: 5      SUBJECTIVE:   Seen patient as follow up as consultation for CHF     No chest pain. No shortness of breath  No palpations    TELEMETRY: SR       Intake/Output Summary (Last 24 hours) at 3/22/2022 1333  Last data filed at 3/22/2022 3468  Gross per 24 hour   Intake 720 ml   Output 2800 ml   Net -2080 ml       Assessment/Plan:        1. acute on Ch.Diastolic Heart Failure:     Echocardiogram outlined below. LV ejection fraction 55%  Severely enlarged right ventricle/Bi atrial enlargement with pulmonary hypertension. RVSP 57. IVC 3.6 cm  Continue with IV Lasix. Dose was increased to 80 mg IV twice daily. Continue      2. Hyponatremia -improving. Continue with Lasix  3. Atrial fibrillation, rate control strategy. Currently paced rhythm. INR target 2-3. Last INR 1.23.  4. History of pacemaker: Stable. Last interrogation in 2021, VVIR in A. fib. Battery longevity 11.4 years. 5. Hyperlipidemia: Continue with statins  6. History of coronary disease,  Cardiac troponin negative. Patient denies any chest pain. Continue with aspirin statins and beta-blockers  7. Hyperlipidemia: Continue with Lipitor 10 mg daily  8. Essential hypertension: Continue with Coreg 6.25 mg p.o. twice daily       Echo 3/21/22     Left ventricular systolic function is normal.   Concentric LVH   Ejection fraction is visually estimated at 50-55%. Small left ventricle cavity due to right ventricle volume overload. Bilateral atrial enlargement   Severely dilated right ventricle; right heart strain measurements were   normal.   PPM wiring visualized within the right heart.    Severe tricuspid regurgitation; RVSP: 57 mmHg due to free flow   regurgitation. No evidence of any pericardial effusion. Inferior vena cava is dilated, measuring at 3.6 cm, and does not collapse   with respiration. Past medical history:    has a past medical history of Arthritis, Bradycardia, CAD (coronary artery disease), Cardiac pacemaker, CHF (congestive heart failure) ([address] COPD, mild ([address] Cor pulmonale (chronic) ([address] CVA (cerebrovascular accident) ([facility] [address] DJD (degenerative joint disease) of cervical spine, Exhaustion of cardiac pacemaker battery, Family history of cardiovascular disease, Glaucoma, H/O 24 hour EKG monitoring, H/O cardiac catheterization, H/O cardiovascular stress test, H/O cardiovascular stress test, H/O cardiovascular stress test, H/O chest x-ray, H/O Doppler lower venous ultrasound, H/O Doppler ultrasound, H/O Doppler ultrasound, H/O Doppler ultrasound, H/O Doppler ultrasound, H/O echocardiogram, H/O echocardiogram, H/O echocardiogram, H/O echocardiogram, History of complete ECG, History of nuclear stress test, Hx of cardiovascular stress test, HX OTHER MEDICAL, Hyperlipidemia, Hypertension, Mild intermittent asthma, Moderate COPD (chronic obstructive pulmonary disease) ([facility] [address] Nausea & vomiting, Obstructive sleep apnea, Paroxysmal atrial fibrillation ([facility] [address] Post PTCA, Pulmonary HTN ([facility] [address] PVD (peripheral vascular disease) ([facility] [address] S/P CABG x 3, S/P PTCA (percutaneous transluminal coronary angioplasty), Severe pulmonary hypertension ([facility] [address] Shortness of breath, Thyroid disease, and Unspecified cerebral artery occlusion with cerebral infarction. Past surgical history:   has a past surgical history that includes Appendectomy (1941); Tonsillectomy (1950's); Cardiac surgery (4/09); Hysterectomy, total abdominal (1990's); Colonoscopy (In 2000's); pacemaker placement; Coronary artery bypass graft (4/8/2009);  Coronary angioplasty with stent (4/21/2010); other surgical history; Middle ear surgery; and Percutaneous Transluminal Musculoskeletal: No edema, No tenderness, No cyanosis, No clubbing. Back- No tenderness. Integument:  Warm, Dry, No erythema, No rash. Lymphatic:  No lymphadenopathy noted. Neurologic:  Alert & oriented x 3, No focal deficits noted. Psychiatric:  Affect normal, Judgment normal, Mood normal.           MEDICATIONS:     polyethylene glycol  17 g Oral Daily    cefTRIAXone (ROCEPHIN) IV  1,000 mg IntraVENous Q24H    albuterol sulfate HFA  2 puff Inhalation 4x daily    furosemide  80 mg IntraVENous BID    aspirin  81 mg Oral Daily    atorvastatin  10 mg Oral Daily    carvedilol  6.25 mg Oral BID WC    levothyroxine  88 mcg Oral Daily    [Held by provider] spironolactone  50 mg Oral BID    timolol  1 drop Both Eyes BID    tiotropium-olodaterol  2 puff Inhalation Daily    warfarin  5 mg Oral Daily    lidocaine  1 patch TransDERmal Daily    vitamin B-12  500 mcg Oral Daily    ferrous sulfate  325 mg Oral BID WC       polyvinyl alcohol, acetaminophen, melatonin, HYDROcodone-acetaminophen, ondansetron  Allergies   Allergen Reactions    Darvocet [Propoxyphene N-Acetaminophen]     Ranexa [Ranolazine Er]      Sick to her stomach    Ranolazine      Sick to her stomach    Sulfa Antibiotics Itching    Sulfasalazine Itching    Adhesive Tape Rash    Allantoin Rash    Bacitracin Rash    Gramicidin Rash    Neomycin Rash    Polymyxin B Rash    Pramoxine Hcl Rash    Silicone Rash       Lab Data:  CBC:   Recent Labs     03/20/22  0547 03/21/22  1500 03/22/22  0203   WBC 12.9* 11.0* 11.6*   HGB 11.8* 12.0* 12.2*   HCT 38.5 40.2 39.6   MCV 85.7 89.5 86.8   * 508* 511*     BMP:   Recent Labs     03/20/22  1815 03/21/22  1500 03/22/22  0203   * 133* 132*   K 4.8 4.5 5.1   CL 93* 94* 93*   CO2 25 27 25   BUN 27* 24* 28*   CREATININE 0.9 0.9 0.9     LIVER PROFILE: No results for input(s): AST, ALT, LIPASE, BILIDIR, BILITOT, ALKPHOS in the last 72 hours. Invalid input(s):   AMYLASE, ALB  PT/INR:   Recent Labs     03/20/22  0547 03/21/22  1500 03/22/22  0203   PROTIME 15.9* 17.4* 20.0*   INR 1.23 1.34 1.54        Laree Fothergill, MD, MD 3/22/2022 1:33 PM

## 2022-03-22 NOTE — PROGRESS NOTES
3/22/2022 1:52 PM  Patient Room #: 2028/2028-A  Patient Name: Nichole Butcher    (Step 1 Done by RN if possible otherwise call Pulmonary Diagnostics)  1. Place patient on room air at rest for at least 30 minutes. If patient falls below 88% before 30 minutes then you can record the level and stop. Record room air saturation level 95__ %. If patient is at 88% or below, they will qualify for home oxygen and you can stop. If level does not fall below 88%, fill in level above. If indicated continue to Step 2. Signature:_Maurisio Meyer RRT____ Date: 03/22/2022___  (Step 2&3 Done by P)  2. Ambulate patient on room air until saturation falls below 89%. Record level of room air saturation with ambulation__90_ %. Next, place patient back on ___lpm oxygen and ambulate, record level __%. (Note:  this level must show improvement from room air level done with ambulation.)  If patients saturation on room air with ambulation is 88% or below AND patient shows improvement with oxygen during ambulation, they will qualify for home oxygen and you can stop. If patient does not drop below 89%, then patient should have an overnight oximetry trending on room air to see if level falls below 88%. Complete level in Step 3 below. 3. Room air overnight oximetry level 88 % for___  cumulative minutes. If patients room air oxygen level is < 89% for at least 5 cumulative minutes, patient will qualify for home oxygen and you can stop. (Attach Night Trending Report)    Complete order below: Diagnosis:_COPD, CHF, CAD___  Home oxygen at:  Length of Need: X? Lifetime ?  3 Months     ___lpm or __%   via  [] nasal cannula  []mask  [] other: Conserving Device         []continuous []  with activity  []  Nocturnal   [] Portable Tanks []  Concentrator  [] Conserving Device        Therapist Signature:_______     Date:  ___  Physician Signature:  __Electronically Signed in EMR_    Date:___  Physician Printed Name:

## 2022-03-23 PROBLEM — I50.43 CHF (CONGESTIVE HEART FAILURE), NYHA CLASS I, ACUTE ON CHRONIC, COMBINED (HCC): Status: ACTIVE | Noted: 2022-03-23

## 2022-03-23 LAB
ANION GAP SERPL CALCULATED.3IONS-SCNC: 9 MMOL/L (ref 4–16)
BUN BLDV-MCNC: 22 MG/DL (ref 6–23)
CALCIUM SERPL-MCNC: 10.1 MG/DL (ref 8.3–10.6)
CHLORIDE BLD-SCNC: 92 MMOL/L (ref 99–110)
CO2: 32 MMOL/L (ref 21–32)
CREAT SERPL-MCNC: 0.9 MG/DL (ref 0.6–1.1)
GFR AFRICAN AMERICAN: >60 ML/MIN/1.73M2
GFR NON-AFRICAN AMERICAN: 58 ML/MIN/1.73M2
GLUCOSE BLD-MCNC: 103 MG/DL (ref 70–99)
HCT VFR BLD CALC: 41.1 % (ref 37–47)
HEMOGLOBIN: 12.5 GM/DL (ref 12.5–16)
INR BLD: 1.8 INDEX
MCH RBC QN AUTO: 26.3 PG (ref 27–31)
MCHC RBC AUTO-ENTMCNC: 30.4 % (ref 32–36)
MCV RBC AUTO: 86.5 FL (ref 78–100)
PDW BLD-RTO: 18.4 % (ref 11.7–14.9)
PLATELET # BLD: 513 K/CU MM (ref 140–440)
PMV BLD AUTO: 11 FL (ref 7.5–11.1)
POTASSIUM SERPL-SCNC: 4 MMOL/L (ref 3.5–5.1)
PROTHROMBIN TIME: 23.3 SECONDS (ref 11.7–14.5)
RBC # BLD: 4.75 M/CU MM (ref 4.2–5.4)
SODIUM BLD-SCNC: 133 MMOL/L (ref 135–145)
WBC # BLD: 10.3 K/CU MM (ref 4–10.5)

## 2022-03-23 PROCEDURE — 6370000000 HC RX 637 (ALT 250 FOR IP): Performed by: INTERNAL MEDICINE

## 2022-03-23 PROCEDURE — 97116 GAIT TRAINING THERAPY: CPT

## 2022-03-23 PROCEDURE — 6370000000 HC RX 637 (ALT 250 FOR IP): Performed by: PHYSICIAN ASSISTANT

## 2022-03-23 PROCEDURE — 94762 N-INVAS EAR/PLS OXIMTRY CONT: CPT

## 2022-03-23 PROCEDURE — 94640 AIRWAY INHALATION TREATMENT: CPT

## 2022-03-23 PROCEDURE — 85027 COMPLETE CBC AUTOMATED: CPT

## 2022-03-23 PROCEDURE — G0378 HOSPITAL OBSERVATION PER HR: HCPCS

## 2022-03-23 PROCEDURE — 36415 COLL VENOUS BLD VENIPUNCTURE: CPT

## 2022-03-23 PROCEDURE — 6360000002 HC RX W HCPCS: Performed by: NURSE PRACTITIONER

## 2022-03-23 PROCEDURE — 96376 TX/PRO/DX INJ SAME DRUG ADON: CPT

## 2022-03-23 PROCEDURE — 6360000002 HC RX W HCPCS: Performed by: INTERNAL MEDICINE

## 2022-03-23 PROCEDURE — 96366 THER/PROPH/DIAG IV INF ADDON: CPT

## 2022-03-23 PROCEDURE — 99233 SBSQ HOSP IP/OBS HIGH 50: CPT | Performed by: INTERNAL MEDICINE

## 2022-03-23 PROCEDURE — 80048 BASIC METABOLIC PNL TOTAL CA: CPT

## 2022-03-23 PROCEDURE — 97530 THERAPEUTIC ACTIVITIES: CPT

## 2022-03-23 PROCEDURE — 2580000003 HC RX 258: Performed by: INTERNAL MEDICINE

## 2022-03-23 PROCEDURE — 1200000000 HC SEMI PRIVATE

## 2022-03-23 PROCEDURE — 85610 PROTHROMBIN TIME: CPT

## 2022-03-23 RX ADMIN — FERROUS SULFATE TAB 325 MG (65 MG ELEMENTAL FE) 325 MG: 325 (65 FE) TAB at 16:45

## 2022-03-23 RX ADMIN — ALBUTEROL SULFATE 2 PUFF: 90 AEROSOL, METERED RESPIRATORY (INHALATION) at 21:41

## 2022-03-23 RX ADMIN — Medication 9 MG: at 23:46

## 2022-03-23 RX ADMIN — FERROUS SULFATE TAB 325 MG (65 MG ELEMENTAL FE) 325 MG: 325 (65 FE) TAB at 12:52

## 2022-03-23 RX ADMIN — WARFARIN SODIUM 5 MG: 5 TABLET ORAL at 09:44

## 2022-03-23 RX ADMIN — ASPIRIN 81 MG 81 MG: 81 TABLET ORAL at 09:45

## 2022-03-23 RX ADMIN — ALBUTEROL SULFATE 2 PUFF: 90 AEROSOL, METERED RESPIRATORY (INHALATION) at 12:25

## 2022-03-23 RX ADMIN — TIMOLOL MALEATE 1 DROP: 2.5 SOLUTION/ DROPS OPHTHALMIC at 09:54

## 2022-03-23 RX ADMIN — CYANOCOBALAMIN TAB 1000 MCG 500 MCG: 1000 TAB at 09:44

## 2022-03-23 RX ADMIN — ATORVASTATIN CALCIUM 10 MG: 20 TABLET, FILM COATED ORAL at 09:45

## 2022-03-23 RX ADMIN — LEVOTHYROXINE SODIUM 88 MCG: 0.09 TABLET ORAL at 07:58

## 2022-03-23 RX ADMIN — ALBUTEROL SULFATE 2 PUFF: 90 AEROSOL, METERED RESPIRATORY (INHALATION) at 16:14

## 2022-03-23 RX ADMIN — TIMOLOL MALEATE 1 DROP: 2.5 SOLUTION/ DROPS OPHTHALMIC at 20:59

## 2022-03-23 RX ADMIN — FUROSEMIDE 80 MG: 10 INJECTION, SOLUTION INTRAMUSCULAR; INTRAVENOUS at 16:45

## 2022-03-23 RX ADMIN — POLYETHYLENE GLYCOL (3350) 17 G: 17 POWDER, FOR SOLUTION ORAL at 09:44

## 2022-03-23 RX ADMIN — CARVEDILOL 6.25 MG: 6.25 TABLET, FILM COATED ORAL at 16:45

## 2022-03-23 RX ADMIN — FUROSEMIDE 80 MG: 10 INJECTION, SOLUTION INTRAMUSCULAR; INTRAVENOUS at 09:44

## 2022-03-23 RX ADMIN — CARVEDILOL 6.25 MG: 6.25 TABLET, FILM COATED ORAL at 12:53

## 2022-03-23 RX ADMIN — TIOTROPIUM BROMIDE AND OLODATEROL 2 PUFF: 3.124; 2.736 SPRAY, METERED RESPIRATORY (INHALATION) at 12:25

## 2022-03-23 RX ADMIN — CEFTRIAXONE SODIUM 1000 MG: 1 INJECTION, POWDER, FOR SOLUTION INTRAMUSCULAR; INTRAVENOUS at 10:14

## 2022-03-23 ASSESSMENT — PAIN SCALES - GENERAL
PAINLEVEL_OUTOF10: 0
PAINLEVEL_OUTOF10: 0

## 2022-03-23 NOTE — PLAN OF CARE
Problem: Falls - Risk of:  Goal: Will remain free from falls  Description: Will remain free from falls  Outcome: Met This Shift  Goal: Absence of physical injury  Description: Absence of physical injury  Outcome: Met This Shift     Problem: Pain:  Goal: Pain level will decrease  Description: Pain level will decrease  Outcome: Met This Shift  Goal: Control of acute pain  Description: Control of acute pain  Outcome: Met This Shift  Goal: Control of chronic pain  Description: Control of chronic pain  Outcome: Met This Shift     Problem: Fluid Volume:  Goal: Ability to achieve a balanced intake and output will improve  Description: Ability to achieve a balanced intake and output will improve  Outcome: Met This Shift     Problem: Physical Regulation:  Goal: Will show no signs and symptoms of electrolyte imbalance  Description: Will show no signs and symptoms of electrolyte imbalance  Outcome: Met This Shift     Problem: Physical Regulation:  Goal: Ability to maintain clinical measurements within normal limits will improve  Description: Ability to maintain clinical measurements within normal limits will improve  Outcome: Ongoing  Note: Routine labs drawn

## 2022-03-23 NOTE — PROGRESS NOTES
PHARMACY ANTICOAGULATION MONITORING SERVICE    Jomar Lujan is a 80 y.o. female on warfarin therapy for A-fib. Pharmacy consulted by Dr. Demond Chun for monitoring and adjustment of treatment. Indication for anticoagulation: A-fib  INR goal: 2-3  Warfarin dose prior to admission: warfarin 5 mg daily    Pertinent Laboratory Values   Recent Labs     03/21/22  1500 03/21/22  1500 03/22/22  0203   INR 1.34   < > 1.54   HGB 12.0*   < > 12.2*   HCT 40.2   < > 39.6   *  --  511*    < > = values in this interval not displayed. Assessment/Plan:   INR not drawn yet, but warfarin 5mg already administered today.    Continue warfarin 5 mg daily   Pharmacy will continue to monitor and adjust warfarin therapy as indicated    Thank you for the consult,  Yadira Aj [facility], PharmD  3/23/2022 1:33 PM

## 2022-03-23 NOTE — PROGRESS NOTES
CARDIOLOGY PROGRESS NOTE                                                  Name:  Wilfred Rice /Age/Sex: 3/18/1929  (80 y.o. female)   MRN & CSN:  [id number] & [id number] Admission Date/Time: 3/18/2022  8:07 PM   Location:  -A PCP: Dalton Boo DO         Admit Date:  3/18/2022  Hospital Day: 6      SUBJECTIVE:   Seen patient as follow up as consultation for CHF     No chest pain. + shortness of breath  No palpations    TELEMETRY: SR       Intake/Output Summary (Last 24 hours) at 3/23/2022 1100  Last data filed at 3/23/2022 0815  Gross per 24 hour   Intake 200 ml   Output 3200 ml   Net -3000 ml       Assessment/Plan:        1. Acute on Ch.Diastolic Heart Failure: (RV failure)    Echocardiogram outlined below. LV ejection fraction 55%  Severely enlarged right ventricle/Bi atrial enlargement with pulmonary hypertension. RVSP 57. IVC 3.6 cm  Continue with IV Lasix. 80 mg BID    Negative fluid balance    Aldactone on hold due to hyperkalemia    2. Hyponatremia -improving. Continue with Lasix    3. Atrial fibrillation, rate control strategy. Currently paced rhythm. INR target 2-3     4. History of pacemaker: Stable. Last interrogation in 2021, VVIR in A. fib. Battery longevity 11.4 years. 5. Hyperlipidemia: Continue with statins    6. History of coronary disease,  Cardiac troponin negative. Patient denies any chest pain. Continue with aspirin statins and beta-blockers    7. Hyperlipidemia: Continue with Lipitor 10 mg daily    8. Essential hypertension: Continue with Coreg 6.25 mg p.o. twice daily       Echo 3/21/22     Left ventricular systolic function is normal.   Concentric LVH   Ejection fraction is visually estimated at 50-55%. Small left ventricle cavity due to right ventricle volume overload. Bilateral atrial enlargement   Severely dilated right ventricle; right heart strain measurements were   normal.   PPM wiring visualized within the right heart.    Severe tricuspid regurgitation; RVSP: 57 mmHg due to free flow   regurgitation. No evidence of any pericardial effusion. Inferior vena cava is dilated, measuring at 3.6 cm, and does not collapse   with respiration. Past medical history:    has a past medical history of Arthritis, Bradycardia, CAD (coronary artery disease), Cardiac pacemaker, CHF (congestive heart failure) ([address] COPD, mild ([address] Cor pulmonale (chronic) ([address] CVA (cerebrovascular accident) ([facility] [address] DJD (degenerative joint disease) of cervical spine, Exhaustion of cardiac pacemaker battery, Family history of cardiovascular disease, Glaucoma, H/O 24 hour EKG monitoring, H/O cardiac catheterization, H/O cardiovascular stress test, H/O cardiovascular stress test, H/O cardiovascular stress test, H/O chest x-ray, H/O Doppler lower venous ultrasound, H/O Doppler ultrasound, H/O Doppler ultrasound, H/O Doppler ultrasound, H/O Doppler ultrasound, H/O echocardiogram, H/O echocardiogram, H/O echocardiogram, H/O echocardiogram, History of complete ECG, History of nuclear stress test, Hx of cardiovascular stress test, HX OTHER MEDICAL, Hyperlipidemia, Hypertension, Mild intermittent asthma, Moderate COPD (chronic obstructive pulmonary disease) ([facility] [address] Nausea & vomiting, Obstructive sleep apnea, Paroxysmal atrial fibrillation ([facility] [address] Post PTCA, Pulmonary HTN ([facility] [address] PVD (peripheral vascular disease) ([facility] [address] S/P CABG x 3, S/P PTCA (percutaneous transluminal coronary angioplasty), Severe pulmonary hypertension ([facility] [address] Shortness of breath, Thyroid disease, and Unspecified cerebral artery occlusion with cerebral infarction. Past surgical history:   has a past surgical history that includes Appendectomy (1941); Tonsillectomy (1950's); Cardiac surgery (4/09); Hysterectomy, total abdominal (1990's); Colonoscopy (In 2000's); pacemaker placement; Coronary artery bypass graft (4/8/2009);  Coronary angioplasty with stent (4/21/2010); other surgical history; Middle ear surgery; and tenderness. Musculoskeletal: No edema, No tenderness, No cyanosis, No clubbing. Back- No tenderness. Integument:  Warm, Dry, No erythema, No rash. Lymphatic:  No lymphadenopathy noted. Neurologic:  Alert & oriented x 3, No focal deficits noted. Psychiatric:  Affect normal, Judgment normal, Mood normal.           MEDICATIONS:     polyethylene glycol  17 g Oral Daily    cefTRIAXone (ROCEPHIN) IV  1,000 mg IntraVENous Q24H    albuterol sulfate HFA  2 puff Inhalation 4x daily    furosemide  80 mg IntraVENous BID    aspirin  81 mg Oral Daily    atorvastatin  10 mg Oral Daily    carvedilol  6.25 mg Oral BID WC    levothyroxine  88 mcg Oral Daily    [Held by provider] spironolactone  50 mg Oral BID    timolol  1 drop Both Eyes BID    tiotropium-olodaterol  2 puff Inhalation Daily    warfarin  5 mg Oral Daily    lidocaine  1 patch TransDERmal Daily    vitamin B-12  500 mcg Oral Daily    ferrous sulfate  325 mg Oral BID WC       polyvinyl alcohol, acetaminophen, melatonin, HYDROcodone-acetaminophen, ondansetron  Allergies   Allergen Reactions    Darvocet [Propoxyphene N-Acetaminophen]     Ranexa [Ranolazine Er]      Sick to her stomach    Ranolazine      Sick to her stomach    Sulfa Antibiotics Itching    Sulfasalazine Itching    Adhesive Tape Rash    Allantoin Rash    Bacitracin Rash    Gramicidin Rash    Neomycin Rash    Polymyxin B Rash    Pramoxine Hcl Rash    Silicone Rash       Lab Data:  CBC:   Recent Labs     03/21/22  1500 03/22/22  0203   WBC 11.0* 11.6*   HGB 12.0* 12.2*   HCT 40.2 39.6   MCV 89.5 86.8   * 511*     BMP:   Recent Labs     03/20/22  1815 03/21/22  1500 03/22/22  0203   * 133* 132*   K 4.8 4.5 5.1   CL 93* 94* 93*   CO2 25 27 25   BUN 27* 24* 28*   CREATININE 0.9 0.9 0.9     LIVER PROFILE: No results for input(s): AST, ALT, LIPASE, BILIDIR, BILITOT, ALKPHOS in the last 72 hours. Invalid input(s):   AMYLASE,  ALB  PT/INR:   Recent Labs 03/21/22  1500 03/22/22  0203   PROTIME 17.4* 20.0*   INR 1.34 1.54        Laree Fothergill, MD, MD 3/23/2022 11:00 AM

## 2022-03-23 NOTE — PROGRESS NOTES
Physical Therapy    Physical Therapy Treatment Note  Name: Viviana Harvey MRN: [id number] :   3/18/1929   Date:  3/23/2022   Admission Date: 3/18/2022 Room:  -A   Restrictions/Precautions:       general precautions    Subjective:  Patient states: \"Are we going to walk again? \"  Pain:   Location, Type, Intensity (0/10 to 10/10): Denies    Objective:    Observation:  Pt sitting upright in chair upon entry    Treatment, including education/measures:  Pt agreeable to participating in therapy at this time. Therapeutic Activity Training:   Therapeutic activity training was instructed today. Cues were given for safety, sequence, UE/LE placement, awareness, and balance. Activities performed today included bed mobility training, sup-sit, sit-stand. Pt completed sit to stand from chair with SBAx1 with verbal cues to push through chair and avoid pulling on walker. Pt completed stand to sit onto chair with SBAx1 with verbal cues to feel chair against back of legs, reach back, and sit slowly. Gait Training:  Cues were given for safety, sequence, device management, balance, posture, awareness, path. Pt ambulated 150 feet + 150 feet with SBAx1 with a front wheeled walker with a decreased gait speed and a decreased step length bilaterally. Pt provided with initial verbal and tactile cues for BLE placement, walker placement, and sequence throughout ambulation. Pt provided with initial verbal cues for directions in order to successfully navigate hallway and return to correct room. Therapeutic Exercise:  Cues were given for technique, safety, recruitment, and rationale. Cues were verbal and/or tactile. Pt completed 3 sets of 10 reps of BLE LAQs and ankle pumps in available and allowed ROM. Safety  Patient left safely in the chair, with call light/phone in reach with alarm applied. Gait belt and mask were used for transfers and gait.         Assessment / Impression:       Patient's tolerance of treatment:  Good; patient tolerated ambulation in hallway today   Adverse Reaction: none  Significant change in status and impact:  none  Barriers to improvement:  Decreased endurance      Plan for Next Session:    Continue progressing toward goals per plan of care. Progress independence with transfers and ambulation as tolerated and appropriate. Progress ambulation distance as tolerated and appropriate. Time in:  1646  Time out:  1710  Timed treatment minutes:  24  Total treatment time:  24    Previously filed items:  Social/Functional History  Lives With: Alone  Type of Home: Apartment  Home Layout: One level  Home Access: Level entry  Bathroom Shower/Tub: Tub/Shower unit,Walk-in shower  Bathroom Toilet: Standard  Bathroom Equipment: Grab bars in shower,Grab bars around toilet  Home Equipment: 4 wheeled walker  ADL Assistance: [address]: Independent (Has a relative that comes and cleans every other Wednesday)  Homemaking Responsibilities: Yes  Ambulation Assistance: Independent  Transfer Assistance: Independent  Active : No  Occupation: Retired  Short term goals  Time Frame for [employer] term goals: 1 Week  Short term goal 1: Patient will be able to perfom STS transfer from bed or standard chair mod I. Short term goal 2: Patient will be able to perform toilet mod I. Short term goal 3: Patient will be able to stand > 1 minute with one or no UE assist in order to perform tucker-care.   Short term goal 4: Patient will ambulate > 200 feet with front wheeled walker or cane mod I       Electronically signed by:      Dale Celis PT, DPT  License #: [id number]

## 2022-03-23 NOTE — PROGRESS NOTES
Hospital Medicine: Progress Note     Iggy Boudreaux  3/18/1929         Admit Date: 3/18/2022   Primary Care Physician:  Rich Sanchez,     BP (!) 111/52   Pulse 61   Temp 98.2 °F (36.8 °C) (Axillary)   Resp 25   Ht 4' 11\" (1.499 m)   Wt 147 lb 14.9 oz (67.1 kg)   SpO2 91%   BMI 29.88 kg/m²     Assessment and Plan:    42-year-old female with background history of CAD, PAFIB and HTN who presented to the ED after a fall at home. The fall happened 3 days prior to presentation. She was brought in by his son due to concern for increasing generalized edema. At the ED biochemical studies showed low sodium, x-ray with questionable avulsion fracture of the right elbow. Abdominal CT showed findings suggestive of cirrhosis with a small amount of ascites within the right upper quadrant area. CXR with massive cardiomegaly predominantly involving the right atrium. She was admitted for further evaluation and management. Acute on chronic hear failure - RV failure/diastolic  -Echo with severely enlarged right ventricule  -On IV Lasix  -Spironolactone on hold due to elevated potassium level    Hyponatremia:  Likely due to volume expansion for fluid retention. Noted interval improvement in Na to 133. Change IV Lasix to PO soon  Monitor electrolytes closely. Fall:   Suffered an unwitnessed fall at home. Stated that she landed on her right side. Noted ecchymosis in the right arm and chest/torso area. Cxr showed fracture of the olecranon. DVT study is negative. Fall precautions, PT/OT eval.  Continue to monitor. CAD:  Prior three-vessel CABG in 2009. Continue medical management. Cardiology input noted. Cardiomegaly:  Predominantly involving the right atrium. She has known pulmonary hypertension with markedly elevated PA pressure. pBNP of 1565 -> 1658  2D echo with concentric LVH, normal LV systolic function, EF 50 to 55%. Bilateral atrial enlargement with severe tricuspid regurg. PPM is in situ. Continue diuresis and other medical management. Paroxysmal atrial fibrillation:  HR is controlled. Continue Coreg and warfarin  INR of 1.54. COPD:   Not in acute exacerbation. Continue Ellipta and albuterol. UTI:  Reviewed UA. Urine culture growing Aerococcus. Treating with Rocephin. Probable cirrhosis:  Imaging studies with cirrhotic liver. Liver enzymes are okay. She will need further work-up when acute process is resolved. Right-sided ecchymosis:  Due to fall. Imaging also showed chronic compression fracture of T12. Pain control. Monitor for resolution of ecchymosis. Hemoglobin is holding. Fall precautions, PT eval.    Discharge plan: Continue diuresis, obtain daily weights.    -Home health care is being arranged  -On 3/23 family is not ready yet for dc, anticipate dc tomorrow    Interval History:    Sitting up in the chair. Family indicated that the lower extremity edema has significantly impmroved    Physical Exam  HENT:      Nose: No rhinorrhea. Eyes:      General:         Right eye: No discharge. Left eye: No discharge. Pulmonary:      Effort: Pulmonary effort is normal.   Skin:     Coloration: Skin is not jaundiced. Neurological:      Cranial Nerves: No cranial nerve deficit. Data Review:     CBC:   Recent Labs     03/21/22  1500 03/22/22  0203   WBC 11.0* 11.6*   HGB 12.0* 12.2*   * 511*     [facility]. Panel:    Recent Labs     03/21/22  1500 03/22/22  0203 03/23/22  1327   * 132* 133*   K 4.5 5.1 4.0   CL 94* 93* 92*   CO2 27 25 32   BUN 24* 28* 22   CREATININE 0.9 0.9 0.9   GLUCOSE 91 112* 103*     Hepatic:   No results for input(s): AST, ALT, ALB, BILITOT, ALKPHOS in the last 72 hours.   INR:   Recent Labs     03/21/22  1500 03/22/22  0203 03/23/22  1327   INR 1.34 1.54 1.80      Meds:    polyethylene glycol  17 g Oral Daily    cefTRIAXone (ROCEPHIN) IV  1,000 mg IntraVENous Q24H    albuterol sulfate HFA  2 puff Inhalation 4x daily    furosemide  80 mg IntraVENous BID    aspirin  81 mg Oral Daily    atorvastatin  10 mg Oral Daily    carvedilol  6.25 mg Oral BID     levothyroxine  88 mcg Oral Daily    [Held by provider] spironolactone  50 mg Oral BID    timolol  1 drop Both Eyes BID    tiotropium-olodaterol  2 puff Inhalation Daily    warfarin  5 mg Oral Daily    lidocaine  1 patch TransDERmal Daily    vitamin B-12  500 mcg Oral Daily    ferrous sulfate  325 mg Oral BID      Janes Barrera MD

## 2022-03-24 VITALS
HEART RATE: 61 BPM | SYSTOLIC BLOOD PRESSURE: 118 MMHG | RESPIRATION RATE: 17 BRPM | HEIGHT: 59 IN | OXYGEN SATURATION: 97 % | DIASTOLIC BLOOD PRESSURE: 63 MMHG | WEIGHT: 145.06 LBS | TEMPERATURE: 97.7 F | BODY MASS INDEX: 29.24 KG/M2

## 2022-03-24 PROBLEM — G47.33 OSA ON CPAP: Status: ACTIVE | Noted: 2022-03-24

## 2022-03-24 PROBLEM — S22.089A T12 VERTEBRAL FRACTURE (HCC): Status: ACTIVE | Noted: 2022-03-24

## 2022-03-24 PROBLEM — Z91.89 AT RISK FOR INJURY ASSOCIATED WITH ANTICOAGULATION: Status: ACTIVE | Noted: 2022-03-24

## 2022-03-24 PROBLEM — Z99.89 OSA ON CPAP: Status: ACTIVE | Noted: 2022-01-01

## 2022-03-24 PROBLEM — R93.2 ABNORMAL LIVER CT: Status: ACTIVE | Noted: 2022-03-24

## 2022-03-24 LAB
ANION GAP SERPL CALCULATED.3IONS-SCNC: 12 MMOL/L (ref 4–16)
BUN BLDV-MCNC: 23 MG/DL (ref 6–23)
CALCIUM SERPL-MCNC: 10.2 MG/DL (ref 8.3–10.6)
CHLORIDE BLD-SCNC: 91 MMOL/L (ref 99–110)
CO2: 30 MMOL/L (ref 21–32)
CREAT SERPL-MCNC: 0.9 MG/DL (ref 0.6–1.1)
GFR AFRICAN AMERICAN: >60 ML/MIN/1.73M2
GFR NON-AFRICAN AMERICAN: 58 ML/MIN/1.73M2
GLUCOSE BLD-MCNC: 84 MG/DL (ref 70–99)
INR BLD: 1.88 INDEX
POTASSIUM SERPL-SCNC: 4.2 MMOL/L (ref 3.5–5.1)
PROTHROMBIN TIME: 24.4 SECONDS (ref 11.7–14.5)
SODIUM BLD-SCNC: 133 MMOL/L (ref 135–145)

## 2022-03-24 PROCEDURE — 6370000000 HC RX 637 (ALT 250 FOR IP): Performed by: PHYSICIAN ASSISTANT

## 2022-03-24 PROCEDURE — 94640 AIRWAY INHALATION TREATMENT: CPT

## 2022-03-24 PROCEDURE — 99233 SBSQ HOSP IP/OBS HIGH 50: CPT | Performed by: INTERNAL MEDICINE

## 2022-03-24 PROCEDURE — 6360000002 HC RX W HCPCS: Performed by: INTERNAL MEDICINE

## 2022-03-24 PROCEDURE — 6370000000 HC RX 637 (ALT 250 FOR IP): Performed by: INTERNAL MEDICINE

## 2022-03-24 PROCEDURE — 94761 N-INVAS EAR/PLS OXIMETRY MLT: CPT

## 2022-03-24 PROCEDURE — 2580000003 HC RX 258: Performed by: INTERNAL MEDICINE

## 2022-03-24 PROCEDURE — 80048 BASIC METABOLIC PNL TOTAL CA: CPT

## 2022-03-24 PROCEDURE — 85610 PROTHROMBIN TIME: CPT

## 2022-03-24 PROCEDURE — 6360000002 HC RX W HCPCS: Performed by: NURSE PRACTITIONER

## 2022-03-24 PROCEDURE — 36415 COLL VENOUS BLD VENIPUNCTURE: CPT

## 2022-03-24 RX ORDER — SPIRONOLACTONE 50 MG/1
50 TABLET, FILM COATED ORAL DAILY
Qty: 30 TABLET | Refills: 0 | Status: ON HOLD | OUTPATIENT
Start: 2022-03-24

## 2022-03-24 RX ORDER — TAFLUPROST 0 MG/.3ML
1 SOLUTION/ DROPS OPHTHALMIC
Status: ON HOLD | COMMUNITY
Start: 2022-01-25

## 2022-03-24 RX ORDER — POLYETHYLENE GLYCOL 3350 17 G/17G
17 POWDER, FOR SOLUTION ORAL DAILY PRN
Qty: 30 EACH | Refills: 1 | Status: SHIPPED | OUTPATIENT
Start: 2022-03-24 | End: 2022-04-23

## 2022-03-24 RX ORDER — FERROUS SULFATE 325(65) MG
325 TABLET ORAL EVERY OTHER DAY
Qty: 15 TABLET | Refills: 1 | Status: SHIPPED | OUTPATIENT
Start: 2022-03-24 | End: 2022-04-19 | Stop reason: SDUPTHER

## 2022-03-24 RX ORDER — LIDOCAINE 4 G/G
1 PATCH TOPICAL DAILY
Qty: 30 PATCH | Refills: 0 | Status: SHIPPED | OUTPATIENT
Start: 2022-03-25 | End: 2022-09-16

## 2022-03-24 RX ORDER — AMOXICILLIN 500 MG/1
500 CAPSULE ORAL 3 TIMES DAILY
Qty: 18 CAPSULE | Refills: 0 | Status: SHIPPED | OUTPATIENT
Start: 2022-03-24 | End: 2022-03-30

## 2022-03-24 RX ADMIN — ALBUTEROL SULFATE 2 PUFF: 90 AEROSOL, METERED RESPIRATORY (INHALATION) at 12:03

## 2022-03-24 RX ADMIN — POLYETHYLENE GLYCOL (3350) 17 G: 17 POWDER, FOR SOLUTION ORAL at 08:46

## 2022-03-24 RX ADMIN — ATORVASTATIN CALCIUM 10 MG: 20 TABLET, FILM COATED ORAL at 08:53

## 2022-03-24 RX ADMIN — FUROSEMIDE 80 MG: 10 INJECTION, SOLUTION INTRAMUSCULAR; INTRAVENOUS at 08:53

## 2022-03-24 RX ADMIN — LEVOTHYROXINE SODIUM 88 MCG: 0.09 TABLET ORAL at 06:13

## 2022-03-24 RX ADMIN — ASPIRIN 81 MG 81 MG: 81 TABLET ORAL at 08:53

## 2022-03-24 RX ADMIN — TIOTROPIUM BROMIDE AND OLODATEROL 2 PUFF: 3.124; 2.736 SPRAY, METERED RESPIRATORY (INHALATION) at 08:41

## 2022-03-24 RX ADMIN — ALBUTEROL SULFATE 2 PUFF: 90 AEROSOL, METERED RESPIRATORY (INHALATION) at 08:41

## 2022-03-24 RX ADMIN — CEFTRIAXONE SODIUM 1000 MG: 1 INJECTION, POWDER, FOR SOLUTION INTRAMUSCULAR; INTRAVENOUS at 09:11

## 2022-03-24 RX ADMIN — FERROUS SULFATE TAB 325 MG (65 MG ELEMENTAL FE) 325 MG: 325 (65 FE) TAB at 08:53

## 2022-03-24 RX ADMIN — TIMOLOL MALEATE 1 DROP: 2.5 SOLUTION/ DROPS OPHTHALMIC at 08:56

## 2022-03-24 RX ADMIN — CYANOCOBALAMIN TAB 1000 MCG 500 MCG: 1000 TAB at 08:53

## 2022-03-24 RX ADMIN — CARVEDILOL 6.25 MG: 6.25 TABLET, FILM COATED ORAL at 08:53

## 2022-03-24 ASSESSMENT — PAIN SCALES - GENERAL
PAINLEVEL_OUTOF10: 0
PAINLEVEL_OUTOF10: 0

## 2022-03-24 NOTE — PROGRESS NOTES
.Outpatient Pharmacy Progress Note for Meds-to-Beds    Total number of Prescriptions Filled: 1  The following medications were dispensed to the patient during the discharge process:  Amoxicillin 500mg    Additional Documentation:  Medications given to nurse   to provide to patient due to COVID-19 precautions   The following prescription(s) were not covered under the patient's insurance because they can be purchased as OTC medications:  PEG 3350, lidocaine, and ferrous sulfate      Thank you for letting us serve your patients.   [facility]    [address], [address] [facility]    Phone: [phone]    Fax: [phone]

## 2022-03-24 NOTE — PROGRESS NOTES
Discharge note: Reviewed discharge instructions with patient and patient's son Jaydon Fowler), who was included with patient's permission. Also had reviewed instructions over the phone about 30min prior to patient's daughter-in-law (Jimena) with patient's permission. After review, patient, son, and DIL provided proper teachback about taking medications as prescribed (new Amoxicillin - given Rx delivered by Pharmacy, new OTC meds iron, miralax, lidocaine patch; change frequency of Spironolactone; stop Warfarin - which Jimena confirmed patient hadn't been taking for months), making/keeping follow-up appointments (PCP, Cardiology, Ortho), weighing self daily (report weight gain of >2lbs/day or >5lbs/week), coordinating with home health care [facility]), and returning for new or worsening problems. IV access and telemetry had already been discontinued. Patient dressed in own clothing. Patient/son state have all belongings, including CPAP machine and dentures. Patient taken by wheelchair escort to Main Entrance, accompanied by son.

## 2022-03-24 NOTE — PROGRESS NOTES
CARDIOLOGY PROGRESS NOTE                                                  Name:  Beatriz Pak /Age/Sex: 3/18/1929  (80 y.o. female)   MRN & CSN:  [id number] & [id number] Admission Date/Time: 3/18/2022  8:07 PM   Location:  -A PCP: Marika Vivas DO         Admit Date:  3/18/2022  Hospital Day: 7      SUBJECTIVE:   Seen patient as follow up as consultation for CHF     No chest pain. + shortness of breath  No palpations    TELEMETRY: SR       Intake/Output Summary (Last 24 hours) at 3/24/2022 1102  Last data filed at 3/24/2022 2336  Gross per 24 hour   Intake 100 ml   Output --   Net 100 ml       Assessment/Plan:        1. Acute on Ch.Diastolic Heart Failure: (RV failure)    Echocardiogram outlined below. LV ejection fraction 55%  Severely enlarged right ventricle/Bi atrial enlargement with pulmonary hypertension. RVSP 57. IVC 3.6 cm  Stop IV Lasix  Start patient on oral Lasix 60 mg daily  Negative fluid balance    Aldactone on hold due to hyperkalemia    2. Hyponatremia -improving. Continue with Lasix    3. Atrial fibrillation, rate control strategy. Currently paced rhythm. Patient does not take warfarin at home, due to high fall risk. Discussed with hospitalist physician. 4. History of pacemaker: Stable. Last interrogation in 2021, VVIR in A. fib. Battery longevity 11.4 years. 5. Hyperlipidemia: Continue with statins    6. History of coronary disease,  Cardiac troponin negative. Patient denies any chest pain. Continue with aspirin statins and beta-blockers    7. Hyperlipidemia: Continue with Lipitor 10 mg daily    8. Essential hypertension: Continue with Coreg 6.25 mg p.o. twice daily       Echo 3/21/22     Left ventricular systolic function is normal.   Concentric LVH   Ejection fraction is visually estimated at 50-55%. Small left ventricle cavity due to right ventricle volume overload.    Bilateral atrial enlargement   Severely dilated right ventricle; right heart strain measurements were   normal.   PPM wiring visualized within the right heart. Severe tricuspid regurgitation; RVSP: 57 mmHg due to free flow   regurgitation. No evidence of any pericardial effusion. Inferior vena cava is dilated, measuring at 3.6 cm, and does not collapse   with respiration. Past medical history:    has a past medical history of Arthritis, Bradycardia, CAD (coronary artery disease), Cardiac pacemaker, CHF (congestive heart failure) ([address] COPD, mild ([address] Cor pulmonale (chronic) ([address] CVA (cerebrovascular accident) ([address] DJD (degenerative joint disease) of cervical spine, Exhaustion of cardiac pacemaker battery, Family history of cardiovascular disease, Glaucoma, H/O 24 hour EKG monitoring, H/O cardiac catheterization, H/O cardiovascular stress test, H/O cardiovascular stress test, H/O cardiovascular stress test, H/O chest x-ray, H/O Doppler lower venous ultrasound, H/O Doppler ultrasound, H/O Doppler ultrasound, H/O Doppler ultrasound, H/O Doppler ultrasound, H/O echocardiogram, H/O echocardiogram, H/O echocardiogram, H/O echocardiogram, History of complete ECG, History of nuclear stress test, Hx of cardiovascular stress test, HX OTHER MEDICAL, Hyperlipidemia, Hypertension, Mild intermittent asthma, Moderate COPD (chronic obstructive pulmonary disease) ([address] Nausea & vomiting, Obstructive sleep apnea, Paroxysmal atrial fibrillation ([address] Post PTCA, Pulmonary HTN ([address] PVD (peripheral vascular disease) ([address] S/P CABG x 3, S/P PTCA (percutaneous transluminal coronary angioplasty), Severe pulmonary hypertension ([address] Shortness of breath, Thyroid disease, and Unspecified cerebral artery occlusion with cerebral infarction. Past surgical history:   has a past surgical history that includes Appendectomy (1941); Tonsillectomy (1950's); Cardiac surgery (4/09); Hysterectomy, total abdominal (1990's);  Colonoscopy (In 2000's); pacemaker placement; Coronary artery bypass graft (4/8/2009); Coronary angioplasty with stent (4/21/2010); other surgical history; Middle ear surgery; and Percutaneous Transluminal Coronary Angio (11/2012). Social History:   reports that she quit smoking about 51 years ago. Her smoking use included cigarettes. She has a 1.25 pack-year smoking history. She has never used smokeless tobacco. She reports previous alcohol use. She reports that she does not use drugs. Family history:  family history includes Arthritis in her mother; Cancer in her mother and sister; Coronary Art Dis in her father; Depression in her mother and sister; Early Death in her paternal grandfather; Early Death (age of onset: 36) in her father; Hearing Loss in her mother; Heart Disease in her father and sister; High Blood Pressure in her father, mother, sister, son, and son; High Cholesterol in her father and mother; Mental Illness in her mother; Nadean Irma / Lethaniel Slates in her mother; Other in her daughter. OBJECTIVE:     /89   Pulse 62   Temp 97.5 °F (36.4 °C) (Oral)   Resp 14   Ht 4' 11\" (1.499 m)   Wt 145 lb 1 oz (65.8 kg)   SpO2 97%   BMI 29.30 kg/m²       Intake/Output Summary (Last 24 hours) at 3/24/2022 1102  Last data filed at 3/24/2022 0625  Gross per 24 hour   Intake 100 ml   Output --   Net 100 ml       Physical Exam:    Constitutional:  Well developed, Well nourished, No acute distress, Non-toxic appearance. HENT:  Normocephalic, Atraumatic, Bilateral external ears normal, Oropharynx moist, No oral exudates, Nose normal. Neck- Normal range of motion, No tenderness, Supple, No stridor. Eyes:  EOMI, Conjunctiva normal, No discharge. Respiratory:  Normal breath sounds, No respiratory distress, No wheezing, No chest tenderness. ,no use of accessory muscles, diaphragm movement is normal  Cardiovascular D2-T1 + systolic Murmurs, added sounds. Normal rate rhythm. No rubs gallops. Carotid pulses and amplitude are normal no bruit noted.   Pedal pulses normal femoral pulses normal.  +1 pedal edema  GI:  Bowel sounds normal, Soft, No tenderness  : No CVA tenderness. Musculoskeletal: No edema, No tenderness, No cyanosis, No clubbing. Back- No tenderness. Integument:  Warm, Dry, No erythema, No rash. Lymphatic:  No lymphadenopathy noted. Neurologic:  Alert & oriented x 3, No focal deficits noted.    Psychiatric:  Affect normal, Judgment normal, Mood normal.           MEDICATIONS:     cefTRIAXone (ROCEPHIN) IV  1,000 mg IntraVENous Q24H    polyethylene glycol  17 g Oral Daily    albuterol sulfate HFA  2 puff Inhalation 4x daily    furosemide  80 mg IntraVENous BID    aspirin  81 mg Oral Daily    atorvastatin  10 mg Oral Daily    carvedilol  6.25 mg Oral BID WC    levothyroxine  88 mcg Oral Daily    [Held by provider] spironolactone  50 mg Oral BID    timolol  1 drop Both Eyes BID    tiotropium-olodaterol  2 puff Inhalation Daily    lidocaine  1 patch TransDERmal Daily    vitamin B-12  500 mcg Oral Daily    ferrous sulfate  325 mg Oral BID WC       polyvinyl alcohol, acetaminophen, melatonin, HYDROcodone-acetaminophen, ondansetron  Allergies   Allergen Reactions    Darvocet [Propoxyphene N-Acetaminophen]     Ranexa [Ranolazine Er]      Sick to her stomach    Ranolazine      Sick to her stomach    Sulfa Antibiotics Itching    Sulfasalazine Itching    Adhesive Tape Rash    Allantoin Rash    Bacitracin Rash    Gramicidin Rash    Neomycin Rash    Polymyxin B Rash    Pramoxine Hcl Rash    Silicone Rash       Lab Data:  CBC:   Recent Labs     03/21/22  1500 03/22/22  0203 03/23/22  2217   WBC 11.0* 11.6* 10.3   HGB 12.0* 12.2* 12.5   HCT 40.2 39.6 41.1   MCV 89.5 86.8 86.5   * 511* 513*     BMP:   Recent Labs     03/21/22  1500 03/22/22  0203 03/23/22  1327   * 132* 133*   K 4.5 5.1 4.0   CL 94* 93* 92*   CO2 27 25 32   BUN 24* 28* 22   CREATININE 0.9 0.9 0.9     LIVER PROFILE: No results for input(s): AST, ALT, LIPASE, BILIDIR, BILITOT, ALKPHOS in

## 2022-03-29 ENCOUNTER — HOSPITAL ENCOUNTER (OUTPATIENT)
Age: 87
Setting detail: SPECIMEN
Discharge: HOME | End: 2022-03-29
Payer: MEDICARE

## 2022-03-29 LAB
ANION GAP SERPL CALCULATED.3IONS-SCNC: 13 MMOL/L (ref 4–16)
BASOPHILS ABSOLUTE: 0.1 K/CU MM
BASOPHILS RELATIVE PERCENT: 0.9 % (ref 0–1)
BUN BLDV-MCNC: 27 MG/DL (ref 6–23)
CALCIUM SERPL-MCNC: 9.2 MG/DL (ref 8.3–10.6)
CHLORIDE BLD-SCNC: 92 MMOL/L (ref 99–110)
CO2: 27 MMOL/L (ref 21–32)
CREAT SERPL-MCNC: 1 MG/DL (ref 0.6–1.1)
DIFFERENTIAL TYPE: ABNORMAL
EOSINOPHILS ABSOLUTE: 0.4 K/CU MM
EOSINOPHILS RELATIVE PERCENT: 4 % (ref 0–3)
GFR AFRICAN AMERICAN: >60 ML/MIN/1.73M2
GFR NON-AFRICAN AMERICAN: 52 ML/MIN/1.73M2
GLUCOSE BLD-MCNC: 113 MG/DL (ref 70–99)
HCT VFR BLD CALC: 38.2 % (ref 37–47)
HEMOGLOBIN: 11.6 GM/DL (ref 12.5–16)
IMMATURE NEUTROPHIL %: 0.6 % (ref 0–0.43)
LYMPHOCYTES ABSOLUTE: 0.6 K/CU MM
LYMPHOCYTES RELATIVE PERCENT: 6.8 % (ref 24–44)
MCH RBC QN AUTO: 27 PG (ref 27–31)
MCHC RBC AUTO-ENTMCNC: 30.4 % (ref 32–36)
MCV RBC AUTO: 89 FL (ref 78–100)
MONOCYTES ABSOLUTE: 0.6 K/CU MM
MONOCYTES RELATIVE PERCENT: 6.4 % (ref 0–4)
NUCLEATED RBC %: 0 %
PDW BLD-RTO: 20.5 % (ref 11.7–14.9)
PLATELET # BLD: 380 K/CU MM (ref 140–440)
PMV BLD AUTO: 11.9 FL (ref 7.5–11.1)
POTASSIUM SERPL-SCNC: 4.5 MMOL/L (ref 3.5–5.1)
RBC # BLD: 4.29 M/CU MM (ref 4.2–5.4)
SEGMENTED NEUTROPHILS ABSOLUTE COUNT: 7.3 K/CU MM
SEGMENTED NEUTROPHILS RELATIVE PERCENT: 81.3 % (ref 36–66)
SODIUM BLD-SCNC: 132 MMOL/L (ref 135–145)
TOTAL IMMATURE NEUTOROPHIL: 0.05 K/CU MM
TOTAL NUCLEATED RBC: 0 K/CU MM
WBC # BLD: 9 K/CU MM (ref 4–10.5)

## 2022-03-29 PROCEDURE — 80048 BASIC METABOLIC PNL TOTAL CA: CPT

## 2022-03-29 PROCEDURE — 85025 COMPLETE CBC W/AUTO DIFF WBC: CPT

## 2022-03-30 NOTE — DISCHARGE SUMMARY
Discharge Summary    Name:  Marge Garcia /Age/Sex: 3/18/1929 (13 y.o. female)   Admit Date: 3/18/2022  Discharge Date: 3/24/22   MRN & CSN:  [id number] & [id number] Encounter Date and Time 3/24/22 1:50 PM EDT    Attending:  Dr. Destiny Saldana Discharging Provider: Mila Barreto MD       Hospital Course:     Brief HPI and Hospital course: Marge Garcia is a very pleasant 80 y.o. female with a known history of severe pulmonary hypertension. She follows with Dr. Julieta Garcia of cardiology. Recently she had been noticing significant lower extremity and abdominal swelling. Her diuretics had been increased 1 week prior to admission. Despite that she did not improve. She fell, presented to the ED and was admitted. While here she was diuresed with IV loop diuretics. She responded well. The edema is much better. She is now stable, and has been discharged home with home health care. She was here from  until  (6 days). Problem list    Acute on chronic heart failure/cor pulmonale  -Heart failure is felt to be right ventricular/diastolic-  -Echocardiogram shows a severely enlarged right ventricle, severe tricuspid regurgitation, and an RVSP of 57 due to free flow regurgitation.  -She had gained 18 pounds per family, and had a swollen abdomen and significant lower extremity edema  -She was given  furosemide 80 mg IV twice daily while here, and responded well  -She was discharged home with the medications listed below  -Loop diuretic: Continue torsemide 20 mg twice daily, which had been recently increased about 1 week prior to arrival to this dose  -Spironolactone: The dose has been increased to 50 mg twice daily recently, about 1 week prior to admission, and will go ahead and lowered to 50 mg daily as her potassium level was up to 5.1 at times while here  -Beta-blocker: Was on Coreg 6.25 mg twice daily at home-continue at the same dose  -ACE inhibitor/Entresto: EF is not low.   She is not on these medications    Sleep apnea  -She has CPAP at home. SAULO can lead to CHF if not optimally treated. I communicated with her pulmonologist Dr. Dimas Jensen who indicated that the patient has been referred to Dr. Cliff Gan for further management of SAULO. Other problems     Abnormal imaging of liver with suspected cirrhosis  -CT scan done in the ED because of a fall showed findings suggestive of cirrhosis. This can be followed up as outpatient. Aerococcus UTI-she had difficulty telling me if she had symptoms or not. Urine culture did grow this organism. We will treat with amoxicillin. Coronary artery disease status post three-vessel CABG in 2009  -She apparently had an RCA stent in 2012  -Continue aspirin     Paroxysmal atrial fibrillation  -Continue Coreg for rate control     Pacemaker present-it was placed in 2001     Anticoagulation for atrial fibrillation-high risk-she has been taken off of Coumadin in the past per Dr. Dinorah Mcconnell who had spoken to cardiology about this in the past.  Thus, I deleted Coumadin from her [facility] chart and informed Dr. Serene Pleitez of cardiology who was covering today, and he agreed. The patient had fallen last year, and fell prior admission again, thus agree that she is high risk. Moderate COPD - she sees Dr. Dimas Jensen - quit smoking when she was young in 1970     Hyponatremia-sodium level was 125 upon admission. This is likely due to fluid overload. With diuresis sodium improved to 132. Fall prior to admission  -She had an unwitnessed fall at home and stated that she landed on her right side  -She was noted to have ecchymosis on the right arm and chest/torso area on admission  -She has been taken off Coumadin as mentioned above     Suspected avulsion fracture of her right elbow-this finding was seen on right forearm x-ray-I communicated with Dr. Dash Pop about this. A sling was recommended. I spoke to her son. He indicated that she did not tolerate the sling.   The patient Biotin     carvedilol 6.25 MG tablet  Commonly known as: COREG  Take 1 tablet by mouth 2 times daily (with meals)     CETIRIZINE HCL PO     Ciloxan 0.3 % ophthalmic solution  Generic drug: ciprofloxacin     CPAP Machine Misc     cycloSPORINE 0.05 % ophthalmic emulsion  Commonly known as: RESTASIS     ICaps Caps     OSTEO BI-FLEX ADV DOUBLE ST PO     Probiotic-10 Chew     Proctozone-HC 2.5 % Crea rectal cream  Generic drug: hydrocortisone     Synthroid 88 MCG tablet  Generic drug: levothyroxine     timolol 0.25 % ophthalmic gel-forming  Commonly known as: TIMOPTIC-XE     torsemide 20 MG tablet  Commonly known as: DEMADEX     umeclidinium-vilanterol 62.5-25 MCG/INH Aepb inhaler  Commonly known as: ANORO ELLIPTA  Inhale 1 puff into the lungs daily     vitamin B-12 1000 MCG tablet  Commonly known as: CYANOCOBALAMIN     vitamin D 25 MCG (1000 UT) Caps     Zioptan 0.0015 % Soln  Generic drug: Tafluprost (PF)        STOP taking these medications    warfarin 5 MG tablet  Commonly known as: COUMADIN           Where to Get Your Medications      These medications were sent to [facility], [address] [place] [address]    Phone: [phone]   · spironolactone 50 MG tablet     These medications were sent to [address] [place] [address] [address] [id number]    Phone: [phone]   · amoxicillin 500 MG capsule  · ferrous sulfate 325 (65 Fe) MG tablet  · lidocaine 4 % external patch  · polyethylene glycol 17 g packet        Objective Findings at Discharge:   /63   Pulse 61   Temp 97.7 °F (36.5 °C) (Oral)   Resp 17   Ht 4' 11\" (1.499 m)   Wt 145 lb 1 oz (65.8 kg)   SpO2 97%   BMI 29.30 kg/m²       Physical Exam:   General: NAD  Respiratory effort nonlabored at rest        Labs and Imaging VL DUP LOWER EXTREMITY VENOUS BILATERAL [[id number]] Collected: 03/18/22 2345     Order Status: Completed Updated: 03/18/22 2349     Narrative:       EXAMINATION:   DUPLEX VENOUS ULTRASOUND OF THE BILATERAL LOWER EXTREMITIES3/18/2022 10:59 pm     TECHNIQUE:   Duplex ultrasound using B-mode/gray scaled imaging, Doppler spectral analysis   and color flow Doppler was obtained of the deep venous structures of the   lower bilateral extremities. COMPARISON:   10/12/2017. HISTORY:   ORDERING SYSTEM PROVIDED HISTORY: pain/swelling   TECHNOLOGIST PROVIDED HISTORY:   Reason for exam:->pain/swelling   Reason for Exam: Bilat leg swelling and pain     FINDINGS:   Suboptimal evaluation secondary to body habitus and soft tissue edema. The visualized veins of the bilateral lower extremities are patent and free   of echogenic thrombus. The veins demonstrate good compressibility with normal   color flow study and spectral analysis. Impression:       Suboptimal evaluation secondary to body habitus and soft tissue edema. No evidence of DVT in either lower extremity. CT LUMBAR RECONSTRUCTION WO POST PROCESS [[id number]] Collected: 03/18/22 2310     Order Status: Completed Updated: 03/18/22 2344     Narrative:       EXAMINATION:   CT OF THE HEAD WITHOUT CONTRAST; CT OF THE THORACIC SPINE WITHOUT CONTRAST;   CT OF THE LUMBAR SPINE WITHOUT CONTRAST; CT OF THE CERVICAL SPINE WITHOUT   CONTRAST; CT OF THE ABDOMEN AND PELVIS WITHOUT CONTRAST  3/18/2022 10:56 pm     TECHNIQUE:   CT of the head was performed without the administration of intravenous   contrast. Dose modulation, iterative reconstruction, and/or weight based   adjustment of the mA/kV was utilized to reduce the radiation dose to as low   as reasonably achievable.; CT of the thoracic spine was performed without the   administration of intravenous contrast. Multiplanar reformatted images are   provided for review.  Dose modulation, iterative reconstruction, and/or weight   based adjustment of the mA/kV was utilized to reduce the radiation dose to as   low as reasonably achievable.; CT of the lumbar spine was performed without   the administration of intravenous contrast. Multiplanar reformatted images   are provided for review. Adjustment of mA and/or kV according to patient   size was utilized. Dose modulation, iterative reconstruction, and/or weight   based adjustment of the mA/kV was utilized to reduce the radiation dose to as   low as reasonably achievable.; CT of the cervical spine was performed without   the administration of intravenous contrast. Multiplanar reformatted images   are provided for review. Dose modulation, iterative reconstruction, and/or   weight based adjustment of the mA/kV was utilized to reduce the radiation   dose to as low as reasonably achievable.; CT of the abdomen and pelvis was   performed without the administration of intravenous contrast. Multiplanar   reformatted images are provided for review. Dose modulation, iterative   reconstruction, and/or weight based adjustment of the mA/kV was utilized to   reduce the radiation dose to as low as reasonably achievable. COMPARISON:   05/18/2021     HISTORY:   ORDERING SYSTEM PROVIDED HISTORY: HEAD TRAUMA, CLOSED, MILD, GCS >= 13, NO   RISK FACTORS, NEURO EXAM NORMAL   TECHNOLOGIST PROVIDED HISTORY:   Has a \"code stroke\" or \"stroke alert\" been called? ->No   Reason for exam:->fall/head injury   Decision Support Exception - unselect if not a suspected or confirmed   emergency medical condition->Emergency Medical Condition (MA)   Reason for Exam: fall, head/neck pain. Additional signs and symptoms: no   Relevant Medical/Surgical History: none     FINDINGS:   Cervical:     BONES/ALIGNMENT: There is no acute fracture or traumatic malalignment. DEGENERATIVE CHANGES: Multilevel disc and facet degenerative disease most   pronounced at C4-C5, C5-C6 and C6-C7.   Grade 1 anterolisthesis of C4-C5.     SOFT TISSUES: There is no prevertebral soft tissue swelling. Thoracic:     BONES/ALIGNMENT: Unchanged chronic superior endplate compression fracture of   T12 with 60% height loss. No retropulsion. .     DEGENERATIVE CHANGES: No significant degenerative changes. SOFT TISSUES: There is no prevertebral soft tissue swelling. Lumbar:     BONES/ALIGNMENT: There is no acute fracture or traumatic malalignment. DEGENERATIVE CHANGES: Multilevel severe disc and facet degenerative disease   with vacuum phenomenon. Grade 1 anterolisthesis at L5-S1. High-level neural   foraminal narrowing at L5-S1. SOFT TISSUES: There is no prevertebral soft tissue swelling. Head CT:     There is no acute infarction, intracranial hemorrhage or intracranial mass   lesion. No mass effect, midline shift or extra-axial collection is noted. There are moderate nonspecific foci of periventricular and subcortical   cerebral white matter hypodensity, most likely representing chronic   microangiopathic disease in this age group. Chronic lacunar infarction right   cerebellar hemisphere. The brain parenchyma is otherwise normal. The   cerebellar tonsils are in normal position. The ventricles, sulci, and cisterns are mildly prominent suggestive of   moderate generalized volume loss. The globes and orbits are within normal limits. The visualized extracranial   structures including paranasal sinuses and mastoid air cells are   unremarkable. No fracture is identified. CT abdomen and pelvis:     Visualized lower chest: Massive cardiomegaly, predominantly involving right   atrium and cardiac pacemaker leads. Small bilateral pleural effusion and   atelectatic changes within the right lower lobe. Organs: The liver are has shrunken nodular contour suggestive of cirrhosis. There is associated mild splenomegaly. Small amount of ascites is also noted   within the right upper quadrant area.   The liver, spleen, adrenal glands,   gallbladder, pancreas, kidneys and ureters and pelvic organs including the   urinary bladder appear unremarkable. Peritoneum / Retroperitoneum:  No free air. Small free fluid adjacent to the   liver. No pathologically enlarged lymphadenopathy. The vasculature do not   demonstrate acute abnormality. GI Tract:  No distention or wall thickening. Diverticulosis with no evidence   of diverticulitis. Bones and Soft Tissues: No fracture. No concerning lytic or sclerotic   lesions are noted. Impression:         Cervical spine CT: No acute abnormality of the cervical spine. Thoracic spine CT: Chronic superior endplate compression fracture of T12 with   60% height loss. No acute fracture. Lumbar spine CT: No acute osseous abnormality. Head CT: No evidence for acute intracranial hemorrhage, territorial   infarction or intracranial mass lesion. Moderate chronic microangiopathic ischemic disease. Moderate volume loss. CT of the abdomen and pelvis: No evidence of acute traumatic injury within   the abdomen and pelvis. Massive cardiomegaly predominantly involving the right atrium. Findings suggestive of cirrhosis with a small amount of ascites within the   right upper quadrant area. Diverticulosis with no evidence of diverticulitis.      RECOMMENDATIONS:   Unavailable      CT HEAD WO CONTRAST [[id number]] Collected: 03/18/22 2310     Order Status: Completed Updated: 03/18/22 2344     Narrative:       EXAMINATION:   CT OF THE HEAD WITHOUT CONTRAST; CT OF THE THORACIC SPINE WITHOUT CONTRAST;   CT OF THE LUMBAR SPINE WITHOUT CONTRAST; CT OF THE CERVICAL SPINE WITHOUT   CONTRAST; CT OF THE ABDOMEN AND PELVIS WITHOUT CONTRAST  3/18/2022 10:56 pm     TECHNIQUE:   CT of the head was performed without the administration of intravenous   contrast. Dose modulation, iterative reconstruction, and/or weight based   adjustment of the mA/kV was utilized to reduce the radiation dose to as low   as reasonably achievable.; CT of the thoracic spine was performed without the   administration of intravenous contrast. Multiplanar reformatted images are   provided for review. Dose modulation, iterative reconstruction, and/or weight   based adjustment of the mA/kV was utilized to reduce the radiation dose to as   low as reasonably achievable.; CT of the lumbar spine was performed without   the administration of intravenous contrast. Multiplanar reformatted images   are provided for review. Adjustment of mA and/or kV according to patient   size was utilized. Dose modulation, iterative reconstruction, and/or weight   based adjustment of the mA/kV was utilized to reduce the radiation dose to as   low as reasonably achievable.; CT of the cervical spine was performed without   the administration of intravenous contrast. Multiplanar reformatted images   are provided for review. Dose modulation, iterative reconstruction, and/or   weight based adjustment of the mA/kV was utilized to reduce the radiation   dose to as low as reasonably achievable.; CT of the abdomen and pelvis was   performed without the administration of intravenous contrast. Multiplanar   reformatted images are provided for review. Dose modulation, iterative   reconstruction, and/or weight based adjustment of the mA/kV was utilized to   reduce the radiation dose to as low as reasonably achievable. COMPARISON:   05/18/2021     HISTORY:   ORDERING SYSTEM PROVIDED HISTORY: HEAD TRAUMA, CLOSED, MILD, GCS >= 13, NO   RISK FACTORS, NEURO EXAM NORMAL   TECHNOLOGIST PROVIDED HISTORY:   Has a \"code stroke\" or \"stroke alert\" been called? ->No   Reason for exam:->fall/head injury   Decision Support Exception - unselect if not a suspected or confirmed   emergency medical condition->Emergency Medical Condition (MA)   Reason for Exam: fall, head/neck pain.    Additional signs and symptoms: no   Relevant Medical/Surgical History: none     FINDINGS:   Cervical:     BONES/ALIGNMENT: There is no acute fracture or traumatic malalignment. DEGENERATIVE CHANGES: Multilevel disc and facet degenerative disease most   pronounced at C4-C5, C5-C6 and C6-C7. Grade 1 anterolisthesis of C4-C5. SOFT TISSUES: There is no prevertebral soft tissue swelling. Thoracic:     BONES/ALIGNMENT: Unchanged chronic superior endplate compression fracture of   T12 with 60% height loss. No retropulsion. .     DEGENERATIVE CHANGES: No significant degenerative changes. SOFT TISSUES: There is no prevertebral soft tissue swelling. Lumbar:     BONES/ALIGNMENT: There is no acute fracture or traumatic malalignment. DEGENERATIVE CHANGES: Multilevel severe disc and facet degenerative disease   with vacuum phenomenon. Grade 1 anterolisthesis at L5-S1. High-level neural   foraminal narrowing at L5-S1. SOFT TISSUES: There is no prevertebral soft tissue swelling. Head CT:     There is no acute infarction, intracranial hemorrhage or intracranial mass   lesion. No mass effect, midline shift or extra-axial collection is noted. There are moderate nonspecific foci of periventricular and subcortical   cerebral white matter hypodensity, most likely representing chronic   microangiopathic disease in this age group. Chronic lacunar infarction right   cerebellar hemisphere. The brain parenchyma is otherwise normal. The   cerebellar tonsils are in normal position. The ventricles, sulci, and cisterns are mildly prominent suggestive of   moderate generalized volume loss. The globes and orbits are within normal limits. The visualized extracranial   structures including paranasal sinuses and mastoid air cells are   unremarkable. No fracture is identified. CT abdomen and pelvis:     Visualized lower chest: Massive cardiomegaly, predominantly involving right   atrium and cardiac pacemaker leads.   Small bilateral pleural effusion and atelectatic changes within the right lower lobe. Organs: The liver are has shrunken nodular contour suggestive of cirrhosis. There is associated mild splenomegaly. Small amount of ascites is also noted   within the right upper quadrant area. The liver, spleen, adrenal glands,   gallbladder, pancreas, kidneys and ureters and pelvic organs including the   urinary bladder appear unremarkable. Peritoneum / Retroperitoneum:  No free air. Small free fluid adjacent to the   liver. No pathologically enlarged lymphadenopathy. The vasculature do not   demonstrate acute abnormality. GI Tract:  No distention or wall thickening. Diverticulosis with no evidence   of diverticulitis. Bones and Soft Tissues: No fracture. No concerning lytic or sclerotic   lesions are noted. Impression:         Cervical spine CT: No acute abnormality of the cervical spine. Thoracic spine CT: Chronic superior endplate compression fracture of T12 with   60% height loss. No acute fracture. Lumbar spine CT: No acute osseous abnormality. Head CT: No evidence for acute intracranial hemorrhage, territorial   infarction or intracranial mass lesion. Moderate chronic microangiopathic ischemic disease. Moderate volume loss. CT of the abdomen and pelvis: No evidence of acute traumatic injury within   the abdomen and pelvis. Massive cardiomegaly predominantly involving the right atrium. Findings suggestive of cirrhosis with a small amount of ascites within the   right upper quadrant area. Diverticulosis with no evidence of diverticulitis.      RECOMMENDATIONS:   Unavailable      CT CERVICAL SPINE WO CONTRAST [[id number]] Collected: 03/18/22 2310     Order Status: Completed Updated: 03/18/22 7687     Narrative:       EXAMINATION:   CT OF THE HEAD WITHOUT CONTRAST; CT OF THE THORACIC SPINE WITHOUT CONTRAST;   CT OF THE LUMBAR SPINE WITHOUT CONTRAST; CT OF THE CERVICAL SPINE WITHOUT   CONTRAST; CT OF THE ABDOMEN AND PELVIS WITHOUT CONTRAST  3/18/2022 10:56 pm     TECHNIQUE:   CT of the head was performed without the administration of intravenous   contrast. Dose modulation, iterative reconstruction, and/or weight based   adjustment of the mA/kV was utilized to reduce the radiation dose to as low   as reasonably achievable.; CT of the thoracic spine was performed without the   administration of intravenous contrast. Multiplanar reformatted images are   provided for review. Dose modulation, iterative reconstruction, and/or weight   based adjustment of the mA/kV was utilized to reduce the radiation dose to as   low as reasonably achievable.; CT of the lumbar spine was performed without   the administration of intravenous contrast. Multiplanar reformatted images   are provided for review. Adjustment of mA and/or kV according to patient   size was utilized. Dose modulation, iterative reconstruction, and/or weight   based adjustment of the mA/kV was utilized to reduce the radiation dose to as   low as reasonably achievable.; CT of the cervical spine was performed without   the administration of intravenous contrast. Multiplanar reformatted images   are provided for review. Dose modulation, iterative reconstruction, and/or   weight based adjustment of the mA/kV was utilized to reduce the radiation   dose to as low as reasonably achievable.; CT of the abdomen and pelvis was   performed without the administration of intravenous contrast. Multiplanar   reformatted images are provided for review. Dose modulation, iterative   reconstruction, and/or weight based adjustment of the mA/kV was utilized to   reduce the radiation dose to as low as reasonably achievable. COMPARISON:   05/18/2021     HISTORY:   ORDERING SYSTEM PROVIDED HISTORY: HEAD TRAUMA, CLOSED, MILD, GCS >= 13, NO   RISK FACTORS, NEURO EXAM NORMAL   TECHNOLOGIST PROVIDED HISTORY:   Has a \"code stroke\" or \"stroke alert\" been called? ->No   Reason for exam:->fall/head injury   Decision Support Exception - unselect if not a suspected or confirmed   emergency medical condition->Emergency Medical Condition (MA)   Reason for Exam: fall, head/neck pain. Additional signs and symptoms: no   Relevant Medical/Surgical History: none     FINDINGS:   Cervical:     BONES/ALIGNMENT: There is no acute fracture or traumatic malalignment. DEGENERATIVE CHANGES: Multilevel disc and facet degenerative disease most   pronounced at C4-C5, C5-C6 and C6-C7. Grade 1 anterolisthesis of C4-C5. SOFT TISSUES: There is no prevertebral soft tissue swelling. Thoracic:     BONES/ALIGNMENT: Unchanged chronic superior endplate compression fracture of   T12 with 60% height loss. No retropulsion. .     DEGENERATIVE CHANGES: No significant degenerative changes. SOFT TISSUES: There is no prevertebral soft tissue swelling. Lumbar:     BONES/ALIGNMENT: There is no acute fracture or traumatic malalignment. DEGENERATIVE CHANGES: Multilevel severe disc and facet degenerative disease   with vacuum phenomenon. Grade 1 anterolisthesis at L5-S1. High-level neural   foraminal narrowing at L5-S1. SOFT TISSUES: There is no prevertebral soft tissue swelling. Head CT:     There is no acute infarction, intracranial hemorrhage or intracranial mass   lesion. No mass effect, midline shift or extra-axial collection is noted. There are moderate nonspecific foci of periventricular and subcortical   cerebral white matter hypodensity, most likely representing chronic   microangiopathic disease in this age group. Chronic lacunar infarction right   cerebellar hemisphere. The brain parenchyma is otherwise normal. The   cerebellar tonsils are in normal position. The ventricles, sulci, and cisterns are mildly prominent suggestive of   moderate generalized volume loss. The globes and orbits are within normal limits.  The visualized extracranial   structures including paranasal sinuses and mastoid air cells are   unremarkable. No fracture is identified. CT abdomen and pelvis:     Visualized lower chest: Massive cardiomegaly, predominantly involving right   atrium and cardiac pacemaker leads. Small bilateral pleural effusion and   atelectatic changes within the right lower lobe. Organs: The liver are has shrunken nodular contour suggestive of cirrhosis. There is associated mild splenomegaly. Small amount of ascites is also noted   within the right upper quadrant area. The liver, spleen, adrenal glands,   gallbladder, pancreas, kidneys and ureters and pelvic organs including the   urinary bladder appear unremarkable. Peritoneum / Retroperitoneum:  No free air. Small free fluid adjacent to the   liver. No pathologically enlarged lymphadenopathy. The vasculature do not   demonstrate acute abnormality. GI Tract:  No distention or wall thickening. Diverticulosis with no evidence   of diverticulitis. Bones and Soft Tissues: No fracture. No concerning lytic or sclerotic   lesions are noted. Impression:         Cervical spine CT: No acute abnormality of the cervical spine. Thoracic spine CT: Chronic superior endplate compression fracture of T12 with   60% height loss. No acute fracture. Lumbar spine CT: No acute osseous abnormality. Head CT: No evidence for acute intracranial hemorrhage, territorial   infarction or intracranial mass lesion. Moderate chronic microangiopathic ischemic disease. Moderate volume loss. CT of the abdomen and pelvis: No evidence of acute traumatic injury within   the abdomen and pelvis. Massive cardiomegaly predominantly involving the right atrium. Findings suggestive of cirrhosis with a small amount of ascites within the   right upper quadrant area. Diverticulosis with no evidence of diverticulitis.      RECOMMENDATIONS:   Unavailable      CT THORACIC SPINE WO CONTRAST [[id number]] Collected: 03/18/22 2310     Order Status: Completed Updated: 03/18/22 2344     Narrative:       EXAMINATION:   CT OF THE HEAD WITHOUT CONTRAST; CT OF THE THORACIC SPINE WITHOUT CONTRAST;   CT OF THE LUMBAR SPINE WITHOUT CONTRAST; CT OF THE CERVICAL SPINE WITHOUT   CONTRAST; CT OF THE ABDOMEN AND PELVIS WITHOUT CONTRAST  3/18/2022 10:56 pm     TECHNIQUE:   CT of the head was performed without the administration of intravenous   contrast. Dose modulation, iterative reconstruction, and/or weight based   adjustment of the mA/kV was utilized to reduce the radiation dose to as low   as reasonably achievable.; CT of the thoracic spine was performed without the   administration of intravenous contrast. Multiplanar reformatted images are   provided for review. Dose modulation, iterative reconstruction, and/or weight   based adjustment of the mA/kV was utilized to reduce the radiation dose to as   low as reasonably achievable.; CT of the lumbar spine was performed without   the administration of intravenous contrast. Multiplanar reformatted images   are provided for review. Adjustment of mA and/or kV according to patient   size was utilized. Dose modulation, iterative reconstruction, and/or weight   based adjustment of the mA/kV was utilized to reduce the radiation dose to as   low as reasonably achievable.; CT of the cervical spine was performed without   the administration of intravenous contrast. Multiplanar reformatted images   are provided for review. Dose modulation, iterative reconstruction, and/or   weight based adjustment of the mA/kV was utilized to reduce the radiation   dose to as low as reasonably achievable.; CT of the abdomen and pelvis was   performed without the administration of intravenous contrast. Multiplanar   reformatted images are provided for review.  Dose modulation, iterative   reconstruction, and/or weight based adjustment of the mA/kV was utilized to   reduce the radiation dose to as low as reasonably achievable. COMPARISON:   05/18/2021     HISTORY:   ORDERING SYSTEM PROVIDED HISTORY: HEAD TRAUMA, CLOSED, MILD, GCS >= 13, NO   RISK FACTORS, NEURO EXAM NORMAL   TECHNOLOGIST PROVIDED HISTORY:   Has a \"code stroke\" or \"stroke alert\" been called? ->No   Reason for exam:->fall/head injury   Decision Support Exception - unselect if not a suspected or confirmed   emergency medical condition->Emergency Medical Condition (MA)   Reason for Exam: fall, head/neck pain. Additional signs and symptoms: no   Relevant Medical/Surgical History: none     FINDINGS:   Cervical:     BONES/ALIGNMENT: There is no acute fracture or traumatic malalignment. DEGENERATIVE CHANGES: Multilevel disc and facet degenerative disease most   pronounced at C4-C5, C5-C6 and C6-C7. Grade 1 anterolisthesis of C4-C5. SOFT TISSUES: There is no prevertebral soft tissue swelling. Thoracic:     BONES/ALIGNMENT: Unchanged chronic superior endplate compression fracture of   T12 with 60% height loss. No retropulsion. .     DEGENERATIVE CHANGES: No significant degenerative changes. SOFT TISSUES: There is no prevertebral soft tissue swelling. Lumbar:     BONES/ALIGNMENT: There is no acute fracture or traumatic malalignment. DEGENERATIVE CHANGES: Multilevel severe disc and facet degenerative disease   with vacuum phenomenon. Grade 1 anterolisthesis at L5-S1. High-level neural   foraminal narrowing at L5-S1. SOFT TISSUES: There is no prevertebral soft tissue swelling. Head CT:     There is no acute infarction, intracranial hemorrhage or intracranial mass   lesion. No mass effect, midline shift or extra-axial collection is noted. There are moderate nonspecific foci of periventricular and subcortical   cerebral white matter hypodensity, most likely representing chronic   microangiopathic disease in this age group. Chronic lacunar infarction right   cerebellar hemisphere.   The brain parenchyma is otherwise normal. The cerebellar tonsils are in normal position. The ventricles, sulci, and cisterns are mildly prominent suggestive of   moderate generalized volume loss. The globes and orbits are within normal limits. The visualized extracranial   structures including paranasal sinuses and mastoid air cells are   unremarkable. No fracture is identified. CT abdomen and pelvis:     Visualized lower chest: Massive cardiomegaly, predominantly involving right   atrium and cardiac pacemaker leads. Small bilateral pleural effusion and   atelectatic changes within the right lower lobe. Organs: The liver are has shrunken nodular contour suggestive of cirrhosis. There is associated mild splenomegaly. Small amount of ascites is also noted   within the right upper quadrant area. The liver, spleen, adrenal glands,   gallbladder, pancreas, kidneys and ureters and pelvic organs including the   urinary bladder appear unremarkable. Peritoneum / Retroperitoneum:  No free air. Small free fluid adjacent to the   liver. No pathologically enlarged lymphadenopathy. The vasculature do not   demonstrate acute abnormality. GI Tract:  No distention or wall thickening. Diverticulosis with no evidence   of diverticulitis. Bones and Soft Tissues: No fracture. No concerning lytic or sclerotic   lesions are noted. Impression:         Cervical spine CT: No acute abnormality of the cervical spine. Thoracic spine CT: Chronic superior endplate compression fracture of T12 with   60% height loss. No acute fracture. Lumbar spine CT: No acute osseous abnormality. Head CT: No evidence for acute intracranial hemorrhage, territorial   infarction or intracranial mass lesion. Moderate chronic microangiopathic ischemic disease. Moderate volume loss. CT of the abdomen and pelvis: No evidence of acute traumatic injury within   the abdomen and pelvis.      Massive cardiomegaly predominantly involving the right atrium. Findings suggestive of cirrhosis with a small amount of ascites within the   right upper quadrant area. Diverticulosis with no evidence of diverticulitis. RECOMMENDATIONS:   Unavailable      CT ABDOMEN PELVIS WO CONTRAST Additional Contrast? None [[id number]] Collected: 03/18/22 2310     Order Status: Completed Updated: 03/18/22 2344     Narrative:       EXAMINATION:   CT OF THE HEAD WITHOUT CONTRAST; CT OF THE THORACIC SPINE WITHOUT CONTRAST;   CT OF THE LUMBAR SPINE WITHOUT CONTRAST; CT OF THE CERVICAL SPINE WITHOUT   CONTRAST; CT OF THE ABDOMEN AND PELVIS WITHOUT CONTRAST  3/18/2022 10:56 pm     TECHNIQUE:   CT of the head was performed without the administration of intravenous   contrast. Dose modulation, iterative reconstruction, and/or weight based   adjustment of the mA/kV was utilized to reduce the radiation dose to as low   as reasonably achievable.; CT of the thoracic spine was performed without the   administration of intravenous contrast. Multiplanar reformatted images are   provided for review. Dose modulation, iterative reconstruction, and/or weight   based adjustment of the mA/kV was utilized to reduce the radiation dose to as   low as reasonably achievable.; CT of the lumbar spine was performed without   the administration of intravenous contrast. Multiplanar reformatted images   are provided for review. Adjustment of mA and/or kV according to patient   size was utilized. Dose modulation, iterative reconstruction, and/or weight   based adjustment of the mA/kV was utilized to reduce the radiation dose to as   low as reasonably achievable.; CT of the cervical spine was performed without   the administration of intravenous contrast. Multiplanar reformatted images   are provided for review.  Dose modulation, iterative reconstruction, and/or   weight based adjustment of the mA/kV was utilized to reduce the radiation   dose to as low as reasonably achievable.; CT of the abdomen and pelvis was   performed without the administration of intravenous contrast. Multiplanar   reformatted images are provided for review. Dose modulation, iterative   reconstruction, and/or weight based adjustment of the mA/kV was utilized to   reduce the radiation dose to as low as reasonably achievable. COMPARISON:   05/18/2021     HISTORY:   ORDERING SYSTEM PROVIDED HISTORY: HEAD TRAUMA, CLOSED, MILD, GCS >= 13, NO   RISK FACTORS, NEURO EXAM NORMAL   TECHNOLOGIST PROVIDED HISTORY:   Has a \"code stroke\" or \"stroke alert\" been called? ->No   Reason for exam:->fall/head injury   Decision Support Exception - unselect if not a suspected or confirmed   emergency medical condition->Emergency Medical Condition (MA)   Reason for Exam: fall, head/neck pain. Additional signs and symptoms: no   Relevant Medical/Surgical History: none     FINDINGS:   Cervical:     BONES/ALIGNMENT: There is no acute fracture or traumatic malalignment. DEGENERATIVE CHANGES: Multilevel disc and facet degenerative disease most   pronounced at C4-C5, C5-C6 and C6-C7. Grade 1 anterolisthesis of C4-C5. SOFT TISSUES: There is no prevertebral soft tissue swelling. Thoracic:     BONES/ALIGNMENT: Unchanged chronic superior endplate compression fracture of   T12 with 60% height loss. No retropulsion. .     DEGENERATIVE CHANGES: No significant degenerative changes. SOFT TISSUES: There is no prevertebral soft tissue swelling. Lumbar:     BONES/ALIGNMENT: There is no acute fracture or traumatic malalignment. DEGENERATIVE CHANGES: Multilevel severe disc and facet degenerative disease   with vacuum phenomenon. Grade 1 anterolisthesis at L5-S1. High-level neural   foraminal narrowing at L5-S1. SOFT TISSUES: There is no prevertebral soft tissue swelling. Head CT:     There is no acute infarction, intracranial hemorrhage or intracranial mass   lesion. No mass effect, midline shift or extra-axial collection is noted.      There are moderate nonspecific foci of periventricular and subcortical   cerebral white matter hypodensity, most likely representing chronic   microangiopathic disease in this age group. Chronic lacunar infarction right   cerebellar hemisphere. The brain parenchyma is otherwise normal. The   cerebellar tonsils are in normal position. The ventricles, sulci, and cisterns are mildly prominent suggestive of   moderate generalized volume loss. The globes and orbits are within normal limits. The visualized extracranial   structures including paranasal sinuses and mastoid air cells are   unremarkable. No fracture is identified. CT abdomen and pelvis:     Visualized lower chest: Massive cardiomegaly, predominantly involving right   atrium and cardiac pacemaker leads. Small bilateral pleural effusion and   atelectatic changes within the right lower lobe. Organs: The liver are has shrunken nodular contour suggestive of cirrhosis. There is associated mild splenomegaly. Small amount of ascites is also noted   within the right upper quadrant area. The liver, spleen, adrenal glands,   gallbladder, pancreas, kidneys and ureters and pelvic organs including the   urinary bladder appear unremarkable. Peritoneum / Retroperitoneum:  No free air. Small free fluid adjacent to the   liver. No pathologically enlarged lymphadenopathy. The vasculature do not   demonstrate acute abnormality. GI Tract:  No distention or wall thickening. Diverticulosis with no evidence   of diverticulitis. Bones and Soft Tissues: No fracture. No concerning lytic or sclerotic   lesions are noted. Impression:         Cervical spine CT: No acute abnormality of the cervical spine. Thoracic spine CT: Chronic superior endplate compression fracture of T12 with   60% height loss. No acute fracture. Lumbar spine CT: No acute osseous abnormality.      Head CT: No evidence for acute intracranial hemorrhage, territorial infarction or intracranial mass lesion. Moderate chronic microangiopathic ischemic disease. Moderate volume loss. CT of the abdomen and pelvis: No evidence of acute traumatic injury within   the abdomen and pelvis. Massive cardiomegaly predominantly involving the right atrium. Findings suggestive of cirrhosis with a small amount of ascites within the   right upper quadrant area. Diverticulosis with no evidence of diverticulitis. RECOMMENDATIONS:   Unavailable      XR HUMERUS RIGHT (MIN 2 VIEWS) [[id number]] Collected: 03/18/22 2327     Order Status: Completed Updated: 03/18/22 2335     Narrative:       EXAMINATION:   TWO XRAY VIEWS OF THE RIGHT SHOULDER; THREE XRAY VIEWS OF THE RIGHT HAND; TWO   XRAY VIEWS OF THE RIGHT FOREARM; ONE XRAY VIEW OF THE CHEST;   XRAY VIEWS OF   THE RIGHT WRIST; THREE XRAY VIEWS OF THE RIGHT ELBOW; TWO XRAY VIEWS OF THE   RIGHT HUMERUS     3/18/2022 11:05 pm; 3/18/2022 11:04 pm; 3/18/2022 11:01 pm; 3/18/2022 11:06   pm; 3/18/2022 11:03 pm     COMPARISON:   None.      HISTORY:   ORDERING SYSTEM PROVIDED HISTORY: pain   TECHNOLOGIST PROVIDED HISTORY:   Reason for exam:->pain   Reason for Exam: pain fall   Additional signs and symptoms: none   Relevant Medical/Surgical History: none; ORDERING SYSTEM PROVIDED HISTORY:   pain   TECHNOLOGIST PROVIDED HISTORY:   Reason for exam:->pain   Reason for Exam: pain   Additional signs and symptoms: fall   Relevant Medical/Surgical History: none; ORDERING SYSTEM PROVIDED HISTORY:   dyspnea   TECHNOLOGIST PROVIDED HISTORY:   Reason for exam:->dyspnea   Reason for Exam: dyspnea   Additional signs and symptoms: fall   Relevant Medical/Surgical History: CAD, CHF, COPD; ORDERING SYSTEM PROVIDED   HISTORY: pain/   TECHNOLOGIST PROVIDED HISTORY:   Reason for exam:->pain/   Reason for Exam: pain fall   Relevant Medical/Surgical History: none     FINDINGS:   Chest x-ray: No pneumothorax or pulmonary contusion or effusion is identified. There is pulmonary vascular congestion with a probable mild   right pleural effusion. There is cardiomegaly. The patient is status post   median sternotomy. A pulse generator is present over the left anterior chest   wall with a single lead projecting over the heart. The thoracic aorta is   tortuous. There is nonspecific masslike opacity at the right lung base   measuring approximately 4.0 x 7.6 cm in diameter. Right shoulder: No acute fracture or dislocation is identified. Right humerus: No acute fracture or dislocation is identified. Right elbow: No dislocation or effusion is identified. There may be an   avulsion fracture off of the olecranon, with associated soft tissue swelling. Right forearm: No acute fracture or dislocation of the forearm is identified. Right wrist: No acute fracture or dislocation is identified. There are   severe degenerative changes along the radial aspect of the hand with   carpal-carpal and carpometacarpal joint space narrowing, periarticular   sclerosis, and osteophyte formation. Linear calcification within the   expected location of the triangular fibrocartilaginous complex is consistent   with chondrocalcinosis. Right hand: No acute fracture or dislocation is identified. Impression:       Possible avulsion fracture of the right elbow. Otherwise, no acute posttraumatic abnormality detected. Mild CHF with trace right pleural effusion.       XR ELBOW RIGHT (MIN 3 VIEWS) [[id number]] Collected: 03/18/22 2327     Order Status: Completed Updated: 03/18/22 2335     Narrative:       EXAMINATION:   TWO XRAY VIEWS OF THE RIGHT SHOULDER; THREE XRAY VIEWS OF THE RIGHT HAND; TWO   XRAY VIEWS OF THE RIGHT FOREARM; ONE XRAY VIEW OF THE CHEST;   XRAY VIEWS OF   THE RIGHT WRIST; THREE XRAY VIEWS OF THE RIGHT ELBOW; TWO XRAY VIEWS OF THE   RIGHT HUMERUS     3/18/2022 11:05 pm; 3/18/2022 11:04 pm; 3/18/2022 11:01 pm; 3/18/2022 11:06   pm; 3/18/2022 11:03 pm     COMPARISON:   None. HISTORY:   ORDERING SYSTEM PROVIDED HISTORY: pain   TECHNOLOGIST PROVIDED HISTORY:   Reason for exam:->pain   Reason for Exam: pain fall   Additional signs and symptoms: none   Relevant Medical/Surgical History: none; ORDERING SYSTEM PROVIDED HISTORY:   pain   TECHNOLOGIST PROVIDED HISTORY:   Reason for exam:->pain   Reason for Exam: pain   Additional signs and symptoms: fall   Relevant Medical/Surgical History: none; ORDERING SYSTEM PROVIDED HISTORY:   dyspnea   TECHNOLOGIST PROVIDED HISTORY:   Reason for exam:->dyspnea   Reason for Exam: dyspnea   Additional signs and symptoms: fall   Relevant Medical/Surgical History: CAD, CHF, COPD; ORDERING SYSTEM PROVIDED   HISTORY: pain/   TECHNOLOGIST PROVIDED HISTORY:   Reason for exam:->pain/   Reason for Exam: pain fall   Relevant Medical/Surgical History: none     FINDINGS:   Chest x-ray: No pneumothorax or pulmonary contusion or effusion is   identified. There is pulmonary vascular congestion with a probable mild   right pleural effusion. There is cardiomegaly. The patient is status post   median sternotomy. A pulse generator is present over the left anterior chest   wall with a single lead projecting over the heart. The thoracic aorta is   tortuous. There is nonspecific masslike opacity at the right lung base   measuring approximately 4.0 x 7.6 cm in diameter. Right shoulder: No acute fracture or dislocation is identified. Right humerus: No acute fracture or dislocation is identified. Right elbow: No dislocation or effusion is identified. There may be an   avulsion fracture off of the olecranon, with associated soft tissue swelling. Right forearm: No acute fracture or dislocation of the forearm is identified. Right wrist: No acute fracture or dislocation is identified.   There are   severe degenerative changes along the radial aspect of the hand with   carpal-carpal and carpometacarpal joint space narrowing, periarticular   sclerosis, and osteophyte formation. Linear calcification within the   expected location of the triangular fibrocartilaginous complex is consistent   with chondrocalcinosis. Right hand: No acute fracture or dislocation is identified. Impression:       Possible avulsion fracture of the right elbow. Otherwise, no acute posttraumatic abnormality detected. Mild CHF with trace right pleural effusion. XR WRIST RIGHT (MIN 3 VIEWS) [[id number]] Collected: 03/18/22 2327     Order Status: Completed Updated: 03/18/22 2335     Narrative:       EXAMINATION:   TWO XRAY VIEWS OF THE RIGHT SHOULDER; THREE XRAY VIEWS OF THE RIGHT HAND; TWO   XRAY VIEWS OF THE RIGHT FOREARM; ONE XRAY VIEW OF THE CHEST;   XRAY VIEWS OF   THE RIGHT WRIST; THREE XRAY VIEWS OF THE RIGHT ELBOW; TWO XRAY VIEWS OF THE   RIGHT HUMERUS     3/18/2022 11:05 pm; 3/18/2022 11:04 pm; 3/18/2022 11:01 pm; 3/18/2022 11:06   pm; 3/18/2022 11:03 pm     COMPARISON:   None. HISTORY:   ORDERING SYSTEM PROVIDED HISTORY: pain   TECHNOLOGIST PROVIDED HISTORY:   Reason for exam:->pain   Reason for Exam: pain fall   Additional signs and symptoms: none   Relevant Medical/Surgical History: none; ORDERING SYSTEM PROVIDED HISTORY:   pain   TECHNOLOGIST PROVIDED HISTORY:   Reason for exam:->pain   Reason for Exam: pain   Additional signs and symptoms: fall   Relevant Medical/Surgical History: none; ORDERING SYSTEM PROVIDED HISTORY:   dyspnea   TECHNOLOGIST PROVIDED HISTORY:   Reason for exam:->dyspnea   Reason for Exam: dyspnea   Additional signs and symptoms: fall   Relevant Medical/Surgical History: CAD, CHF, COPD; ORDERING SYSTEM PROVIDED   HISTORY: pain/   TECHNOLOGIST PROVIDED HISTORY:   Reason for exam:->pain/   Reason for Exam: pain fall   Relevant Medical/Surgical History: none     FINDINGS:   Chest x-ray: No pneumothorax or pulmonary contusion or effusion is   identified.   There is pulmonary vascular congestion with a probable mild   right pleural effusion. There is cardiomegaly. The patient is status post   median sternotomy. A pulse generator is present over the left anterior chest   wall with a single lead projecting over the heart. The thoracic aorta is   tortuous. There is nonspecific masslike opacity at the right lung base   measuring approximately 4.0 x 7.6 cm in diameter. Right shoulder: No acute fracture or dislocation is identified. Right humerus: No acute fracture or dislocation is identified. Right elbow: No dislocation or effusion is identified. There may be an   avulsion fracture off of the olecranon, with associated soft tissue swelling. Right forearm: No acute fracture or dislocation of the forearm is identified. Right wrist: No acute fracture or dislocation is identified. There are   severe degenerative changes along the radial aspect of the hand with   carpal-carpal and carpometacarpal joint space narrowing, periarticular   sclerosis, and osteophyte formation. Linear calcification within the   expected location of the triangular fibrocartilaginous complex is consistent   with chondrocalcinosis. Right hand: No acute fracture or dislocation is identified. Impression:       Possible avulsion fracture of the right elbow. Otherwise, no acute posttraumatic abnormality detected. Mild CHF with trace right pleural effusion. XR CHEST PORTABLE [[id number]] Collected: 03/18/22 2327     Order Status: Completed Updated: 03/18/22 2335     Narrative:       EXAMINATION:   TWO XRAY VIEWS OF THE RIGHT SHOULDER; THREE XRAY VIEWS OF THE RIGHT HAND; TWO   XRAY VIEWS OF THE RIGHT FOREARM; ONE XRAY VIEW OF THE CHEST;   XRAY VIEWS OF   THE RIGHT WRIST; THREE XRAY VIEWS OF THE RIGHT ELBOW; TWO XRAY VIEWS OF THE   RIGHT HUMERUS     3/18/2022 11:05 pm; 3/18/2022 11:04 pm; 3/18/2022 11:01 pm; 3/18/2022 11:06   pm; 3/18/2022 11:03 pm     COMPARISON:   None.      HISTORY:   ORDERING SYSTEM PROVIDED HISTORY: pain   TECHNOLOGIST PROVIDED HISTORY:   Reason for exam:->pain   Reason for Exam: pain fall   Additional signs and symptoms: none   Relevant Medical/Surgical History: none; ORDERING SYSTEM PROVIDED HISTORY:   pain   TECHNOLOGIST PROVIDED HISTORY:   Reason for exam:->pain   Reason for Exam: pain   Additional signs and symptoms: fall   Relevant Medical/Surgical History: none; ORDERING SYSTEM PROVIDED HISTORY:   dyspnea   TECHNOLOGIST PROVIDED HISTORY:   Reason for exam:->dyspnea   Reason for Exam: dyspnea   Additional signs and symptoms: fall   Relevant Medical/Surgical History: CAD, CHF, COPD; ORDERING SYSTEM PROVIDED   HISTORY: pain/   TECHNOLOGIST PROVIDED HISTORY:   Reason for exam:->pain/   Reason for Exam: pain fall   Relevant Medical/Surgical History: none     FINDINGS:   Chest x-ray: No pneumothorax or pulmonary contusion or effusion is   identified. There is pulmonary vascular congestion with a probable mild   right pleural effusion. There is cardiomegaly. The patient is status post   median sternotomy. A pulse generator is present over the left anterior chest   wall with a single lead projecting over the heart. The thoracic aorta is   tortuous. There is nonspecific masslike opacity at the right lung base   measuring approximately 4.0 x 7.6 cm in diameter. Right shoulder: No acute fracture or dislocation is identified. Right humerus: No acute fracture or dislocation is identified. Right elbow: No dislocation or effusion is identified. There may be an   avulsion fracture off of the olecranon, with associated soft tissue swelling. Right forearm: No acute fracture or dislocation of the forearm is identified. Right wrist: No acute fracture or dislocation is identified. There are   severe degenerative changes along the radial aspect of the hand with   carpal-carpal and carpometacarpal joint space narrowing, periarticular   sclerosis, and osteophyte formation.   Linear calcification within the   expected location of the triangular fibrocartilaginous complex is consistent   with chondrocalcinosis. Right hand: No acute fracture or dislocation is identified. Impression:       Possible avulsion fracture of the right elbow. Otherwise, no acute posttraumatic abnormality detected. Mild CHF with trace right pleural effusion. XR SHOULDER RIGHT (MIN 2 VIEWS) [[id number]] Collected: 03/18/22 2327     Order Status: Completed Updated: 03/18/22 2335     Narrative:       EXAMINATION:   TWO XRAY VIEWS OF THE RIGHT SHOULDER; THREE XRAY VIEWS OF THE RIGHT HAND; TWO   XRAY VIEWS OF THE RIGHT FOREARM; ONE XRAY VIEW OF THE CHEST;   XRAY VIEWS OF   THE RIGHT WRIST; THREE XRAY VIEWS OF THE RIGHT ELBOW; TWO XRAY VIEWS OF THE   RIGHT HUMERUS     3/18/2022 11:05 pm; 3/18/2022 11:04 pm; 3/18/2022 11:01 pm; 3/18/2022 11:06   pm; 3/18/2022 11:03 pm     COMPARISON:   None. HISTORY:   ORDERING SYSTEM PROVIDED HISTORY: pain   TECHNOLOGIST PROVIDED HISTORY:   Reason for exam:->pain   Reason for Exam: pain fall   Additional signs and symptoms: none   Relevant Medical/Surgical History: none; ORDERING SYSTEM PROVIDED HISTORY:   pain   TECHNOLOGIST PROVIDED HISTORY:   Reason for exam:->pain   Reason for Exam: pain   Additional signs and symptoms: fall   Relevant Medical/Surgical History: none; ORDERING SYSTEM PROVIDED HISTORY:   dyspnea   TECHNOLOGIST PROVIDED HISTORY:   Reason for exam:->dyspnea   Reason for Exam: dyspnea   Additional signs and symptoms: fall   Relevant Medical/Surgical History: CAD, CHF, COPD; ORDERING SYSTEM PROVIDED   HISTORY: pain/   TECHNOLOGIST PROVIDED HISTORY:   Reason for exam:->pain/   Reason for Exam: pain fall   Relevant Medical/Surgical History: none     FINDINGS:   Chest x-ray: No pneumothorax or pulmonary contusion or effusion is   identified. There is pulmonary vascular congestion with a probable mild   right pleural effusion. There is cardiomegaly.   The patient is status post   median sternotomy. A pulse generator is present over the left anterior chest   wall with a single lead projecting over the heart. The thoracic aorta is   tortuous. There is nonspecific masslike opacity at the right lung base   measuring approximately 4.0 x 7.6 cm in diameter. Right shoulder: No acute fracture or dislocation is identified. Right humerus: No acute fracture or dislocation is identified. Right elbow: No dislocation or effusion is identified. There may be an   avulsion fracture off of the olecranon, with associated soft tissue swelling. Right forearm: No acute fracture or dislocation of the forearm is identified. Right wrist: No acute fracture or dislocation is identified. There are   severe degenerative changes along the radial aspect of the hand with   carpal-carpal and carpometacarpal joint space narrowing, periarticular   sclerosis, and osteophyte formation. Linear calcification within the   expected location of the triangular fibrocartilaginous complex is consistent   with chondrocalcinosis. Right hand: No acute fracture or dislocation is identified. Impression:       Possible avulsion fracture of the right elbow. Otherwise, no acute posttraumatic abnormality detected. Mild CHF with trace right pleural effusion. XR RADIUS ULNA RIGHT (2 VIEWS) [[id number]] Collected: 03/18/22 2327     Order Status: Completed Updated: 03/18/22 2335     Narrative:       EXAMINATION:   TWO XRAY VIEWS OF THE RIGHT SHOULDER; THREE XRAY VIEWS OF THE RIGHT HAND; TWO   XRAY VIEWS OF THE RIGHT FOREARM; ONE XRAY VIEW OF THE CHEST;   XRAY VIEWS OF   THE RIGHT WRIST; THREE XRAY VIEWS OF THE RIGHT ELBOW; TWO XRAY VIEWS OF THE   RIGHT HUMERUS     3/18/2022 11:05 pm; 3/18/2022 11:04 pm; 3/18/2022 11:01 pm; 3/18/2022 11:06   pm; 3/18/2022 11:03 pm     COMPARISON:   None.      HISTORY:   ORDERING SYSTEM PROVIDED HISTORY: pain   TECHNOLOGIST PROVIDED HISTORY: Reason for exam:->pain   Reason for Exam: pain fall   Additional signs and symptoms: none   Relevant Medical/Surgical History: none; ORDERING SYSTEM PROVIDED HISTORY:   pain   TECHNOLOGIST PROVIDED HISTORY:   Reason for exam:->pain   Reason for Exam: pain   Additional signs and symptoms: fall   Relevant Medical/Surgical History: none; ORDERING SYSTEM PROVIDED HISTORY:   dyspnea   TECHNOLOGIST PROVIDED HISTORY:   Reason for exam:->dyspnea   Reason for Exam: dyspnea   Additional signs and symptoms: fall   Relevant Medical/Surgical History: CAD, CHF, COPD; ORDERING SYSTEM PROVIDED   HISTORY: pain/   TECHNOLOGIST PROVIDED HISTORY:   Reason for exam:->pain/   Reason for Exam: pain fall   Relevant Medical/Surgical History: none     FINDINGS:   Chest x-ray: No pneumothorax or pulmonary contusion or effusion is   identified. There is pulmonary vascular congestion with a probable mild   right pleural effusion. There is cardiomegaly. The patient is status post   median sternotomy. A pulse generator is present over the left anterior chest   wall with a single lead projecting over the heart. The thoracic aorta is   tortuous. There is nonspecific masslike opacity at the right lung base   measuring approximately 4.0 x 7.6 cm in diameter. Right shoulder: No acute fracture or dislocation is identified. Right humerus: No acute fracture or dislocation is identified. Right elbow: No dislocation or effusion is identified. There may be an   avulsion fracture off of the olecranon, with associated soft tissue swelling. Right forearm: No acute fracture or dislocation of the forearm is identified. Right wrist: No acute fracture or dislocation is identified. There are   severe degenerative changes along the radial aspect of the hand with   carpal-carpal and carpometacarpal joint space narrowing, periarticular   sclerosis, and osteophyte formation.   Linear calcification within the   expected location of the triangular fibrocartilaginous complex is consistent   with chondrocalcinosis. Right hand: No acute fracture or dislocation is identified. Impression:       Possible avulsion fracture of the right elbow. Otherwise, no acute posttraumatic abnormality detected. Mild CHF with trace right pleural effusion. XR HAND RIGHT (MIN 3 VIEWS) [[id number]] Collected: 03/18/22 2327     Order Status: Completed Updated: 03/18/22 2335     Narrative:       EXAMINATION:   TWO XRAY VIEWS OF THE RIGHT SHOULDER; THREE XRAY VIEWS OF THE RIGHT HAND; TWO   XRAY VIEWS OF THE RIGHT FOREARM; ONE XRAY VIEW OF THE CHEST;   XRAY VIEWS OF   THE RIGHT WRIST; THREE XRAY VIEWS OF THE RIGHT ELBOW; TWO XRAY VIEWS OF THE   RIGHT HUMERUS     3/18/2022 11:05 pm; 3/18/2022 11:04 pm; 3/18/2022 11:01 pm; 3/18/2022 11:06   pm; 3/18/2022 11:03 pm     COMPARISON:   None. HISTORY:   ORDERING SYSTEM PROVIDED HISTORY: pain   TECHNOLOGIST PROVIDED HISTORY:   Reason for exam:->pain   Reason for Exam: pain fall   Additional signs and symptoms: none   Relevant Medical/Surgical History: none; ORDERING SYSTEM PROVIDED HISTORY:   pain   TECHNOLOGIST PROVIDED HISTORY:   Reason for exam:->pain   Reason for Exam: pain   Additional signs and symptoms: fall   Relevant Medical/Surgical History: none; ORDERING SYSTEM PROVIDED HISTORY:   dyspnea   TECHNOLOGIST PROVIDED HISTORY:   Reason for exam:->dyspnea   Reason for Exam: dyspnea   Additional signs and symptoms: fall   Relevant Medical/Surgical History: CAD, CHF, COPD; ORDERING SYSTEM PROVIDED   HISTORY: pain/   TECHNOLOGIST PROVIDED HISTORY:   Reason for exam:->pain/   Reason for Exam: pain fall   Relevant Medical/Surgical History: none     FINDINGS:   Chest x-ray: No pneumothorax or pulmonary contusion or effusion is   identified. There is pulmonary vascular congestion with a probable mild   right pleural effusion. There is cardiomegaly. The patient is status post   median sternotomy.   A pulse generator is present over the left anterior chest   wall with a single lead projecting over the heart. The thoracic aorta is   tortuous. There is nonspecific masslike opacity at the right lung base   measuring approximately 4.0 x 7.6 cm in diameter. Right shoulder: No acute fracture or dislocation is identified. Right humerus: No acute fracture or dislocation is identified. Right elbow: No dislocation or effusion is identified. There may be an   avulsion fracture off of the olecranon, with associated soft tissue swelling. Right forearm: No acute fracture or dislocation of the forearm is identified. Right wrist: No acute fracture or dislocation is identified. There are   severe degenerative changes along the radial aspect of the hand with   carpal-carpal and carpometacarpal joint space narrowing, periarticular   sclerosis, and osteophyte formation. Linear calcification within the   expected location of the triangular fibrocartilaginous complex is consistent   with chondrocalcinosis. Right hand: No acute fracture or dislocation is identified. Impression:       Possible avulsion fracture of the right elbow. Otherwise, no acute posttraumatic abnormality detected. Mild CHF with trace right pleural effusion.       CT LUMBAR SPINE WO CONTRAST [[id number]]      Order Status: Canceled          Time Spent Discharging patient 45 minutes    Electronically signed by Carolin Erazo MD on 3/30/2022 at 9:25 AM

## 2022-04-01 ENCOUNTER — OFFICE VISIT (OUTPATIENT)
Dept: CARDIOLOGY CLINIC | Age: 87
End: 2022-04-01
Payer: MEDICARE

## 2022-04-01 VITALS
HEART RATE: 60 BPM | BODY MASS INDEX: 27.01 KG/M2 | HEIGHT: 59 IN | WEIGHT: 134 LBS | DIASTOLIC BLOOD PRESSURE: 68 MMHG | OXYGEN SATURATION: 96 % | SYSTOLIC BLOOD PRESSURE: 130 MMHG

## 2022-04-01 DIAGNOSIS — I27.81 COR PULMONALE (CHRONIC) (HCC): ICD-10-CM

## 2022-04-01 DIAGNOSIS — I50.32 CHRONIC DIASTOLIC (CONGESTIVE) HEART FAILURE (HCC): Primary | ICD-10-CM

## 2022-04-01 PROCEDURE — 1036F TOBACCO NON-USER: CPT | Performed by: NURSE PRACTITIONER

## 2022-04-01 PROCEDURE — 1090F PRES/ABSN URINE INCON ASSESS: CPT | Performed by: NURSE PRACTITIONER

## 2022-04-01 PROCEDURE — 1123F ACP DISCUSS/DSCN MKR DOCD: CPT | Performed by: NURSE PRACTITIONER

## 2022-04-01 PROCEDURE — 99214 OFFICE O/P EST MOD 30 MIN: CPT | Performed by: NURSE PRACTITIONER

## 2022-04-01 PROCEDURE — G8417 CALC BMI ABV UP PARAM F/U: HCPCS | Performed by: NURSE PRACTITIONER

## 2022-04-01 PROCEDURE — 1111F DSCHRG MED/CURRENT MED MERGE: CPT | Performed by: NURSE PRACTITIONER

## 2022-04-01 PROCEDURE — G8427 DOCREV CUR MEDS BY ELIG CLIN: HCPCS | Performed by: NURSE PRACTITIONER

## 2022-04-01 PROCEDURE — 4040F PNEUMOC VAC/ADMIN/RCVD: CPT | Performed by: NURSE PRACTITIONER

## 2022-04-01 RX ORDER — TORSEMIDE 20 MG/1
20 TABLET ORAL 2 TIMES DAILY
Qty: 60 TABLET | Refills: 5 | Status: SHIPPED | OUTPATIENT
Start: 2022-04-01 | End: 2022-04-05

## 2022-04-01 RX ORDER — TORSEMIDE 20 MG/1
20 TABLET ORAL 2 TIMES DAILY
Qty: 40 TABLET | Refills: 5 | Status: SHIPPED | OUTPATIENT
Start: 2022-04-01 | End: 2022-04-01

## 2022-04-01 RX ORDER — TORSEMIDE 20 MG/1
20 TABLET ORAL PRN
Qty: 30 TABLET | Refills: 0 | Status: SHIPPED | OUTPATIENT
Start: 2022-04-01 | End: 2022-10-19 | Stop reason: SDUPTHER

## 2022-04-01 NOTE — PROGRESS NOTES
4/1/2022  Primary cardiologist: Dr. Jan Purcell  is an established 80 y.o.  female here for a follow up from [facility] for Heart Failure       SUBJECTIVE/OBJECTIVE:    CC: HFpEF/ shortness of breath   HPI : Cheyenne Crenshaw is a pleasant 77-year-old female patient who is is being seen today for follow-up hospital for CHF/shortness of breath. She presented to the ED after a fall. Cardiology was consulted for elevated BNP and shortness of breath shortness of breath and was noted to be in acute on chronic diastolic heart failure. She has a history of atrial fibrillation, permanent pacemaker, hyperlipidemia, coronary artery disease s/p CABG x3 in 2009 including BROOKS LAD SVG RCA and PDA grafts, pulmonary hypertension. BMP on admission 1565: Chest x-ray demonstrated pulmonary vascular restrictions with probable mild right pleural effusion:   Echocardiogram demonstrated severe dilated right ventricle : Bilateral atrial argument: Small left ventricle cavity due to RV overload    Cheyenne Crenshaw reports she has ongoing shortness of breath abdominal bloating and lower extremity edema. She is short of breath with exertion and at rest.    ROS    Vitals:    04/01/22 1448   Weight: 134 lb (60.8 kg)   Height: 4' 11\" (1.499 m)     No flowsheet data found. Wt Readings from Last 3 Encounters:   04/01/22 134 lb (60.8 kg)   03/24/22 145 lb 1 oz (65.8 kg)   03/15/22 158 lb (71.7 kg)     Body mass index is 27.06 kg/m².     Physical Exam           Current Outpatient Medications   Medication Sig Dispense Refill    spironolactone (ALDACTONE) 50 MG tablet Take 1 tablet by mouth daily 30 tablet 0    PROCTOZONE-HC 2.5 % CREA rectal cream INSERT RECTALLY TWICE DAILY as directed AS NEEDED FOR HEMORRHOIDS      ZIOPTAN 0.0015 % SOLN instill ONE drop IN EACH EYE DAILY      CETIRIZINE HCL PO Take by mouth      ferrous sulfate (IRON 325) 325 (65 Fe) MG tablet Take 1 tablet by mouth every other day 15 tablet 1    polyethylene glycol (GLYCOLAX) 17 g packet Severely dilated right ventricle; right heart strain measurements were   normal.   PPM wiring visualized within the right heart. Severe tricuspid regurgitation; RVSP: 57 mmHg due to free flow   regurgitation. No evidence of any pericardial effusion. Inferior vena cava is dilated, measuring at 3.6 cm, and does not collapse   with respiration. ASSESSMENT/PLAN:    Acute on chronic diastolic heart failure with a component of right heart failure  Continues with ongoing shortness of breath and peripheral edema. To take extra Demadex in the morning for the next 2 days then to use extra Demadex as needed for weight gain of 3 pounds overnight or increase in peripheral edema. Continue  Demadex 20 mg twice daily , Aldactone 50 mg twice daily. Will refer to outpatient heart failure center for management of fluid status. Continue with carvedilol  Need close monitoring of electrolytes as previously had hyperkalemia wit use of aldactone    Obstructive sleep apnea  Noted to have severely dilated right heart with severe tricuspid regurg RVSP 57 mmHg due to the FreeFlow regurgitation  Encouraged to use CPAP at at bedtime and with naps throughout the day to decrease thoracic pressure    Coronary artery disease  Troponin less than 0.010  Denies chest pain. Her shortness of breath is secondary to heart failure  Continue with atorvastatin, aspirin and carvedilol: Tolerating medications without side effect    Hypertension  Blood pressure is controlled with use of carvedilol: Continue present dose    Medications reviewed and confirmed with patient     Tests ordered: Heart failure center      Follow-up  6 months      Signed:  LISA Panchal CNP, 4/1/2022, 2:58 PM    An electronic signature was used to authenticate this note.     Please note this report has been partially produced using speech recognition software and may contain errors related to that system including errors in grammar, punctuation, and spelling, as well as words and phrases that may be inappropriate. If there are any questions or concerns please feel free to contact the dictating provider for clarification.

## 2022-04-01 NOTE — PATIENT INSTRUCTIONS
Please be informed that if you contact our office outside of normal business hours the physician on call cannot help with any scheduling or rescheduling issues, procedure instruction questions or any type of medication issue. We advise you for any urgent/emergency that you go to the nearest emergency room! PLEASE CALL OUR OFFICE DURING NORMAL BUSINESS HOURS    Monday - Friday   8 am to 5 pm    [place]: [address]: [phone]    [place]:  [phone]    **It is YOUR responsibilty to bring medication bottles and/or updated medication list to [address] [place] [address].  This will allow us to better serve you and all your healthcare needs**

## 2022-04-01 NOTE — LETTER
Josie Fuller  3/18/1929  89    Have you had any Chest Pain that is not new? - No    Have you had any Shortness of Breath - Yes  If Yes - When at rest and on exertion    Have you had any dizziness - No    Have you had any palpitations that are not new?  - No    Do you have any edema - swelling in legs    If Yes - CHECK TO SEE IF THE EDEMA IS PITTING  How long have they been having edema - Years  If Yes - Have they worn compression stockings No    When did you have your last labs drawn 3/29/22    Do you have a surgery or procedure scheduled in the near future - No

## 2022-04-05 ENCOUNTER — OFFICE VISIT (OUTPATIENT)
Dept: CARDIOLOGY CLINIC | Age: 87
End: 2022-04-05
Payer: MEDICARE

## 2022-04-05 VITALS
HEIGHT: 59 IN | SYSTOLIC BLOOD PRESSURE: 114 MMHG | DIASTOLIC BLOOD PRESSURE: 60 MMHG | OXYGEN SATURATION: 95 % | HEART RATE: 60 BPM | WEIGHT: 143 LBS | BODY MASS INDEX: 28.83 KG/M2

## 2022-04-05 DIAGNOSIS — I50.33 CHF (CONGESTIVE HEART FAILURE), NYHA CLASS IV, ACUTE ON CHRONIC, DIASTOLIC (HCC): Primary | ICD-10-CM

## 2022-04-05 PROCEDURE — 1090F PRES/ABSN URINE INCON ASSESS: CPT | Performed by: NURSE PRACTITIONER

## 2022-04-05 PROCEDURE — 99215 OFFICE O/P EST HI 40 MIN: CPT | Performed by: NURSE PRACTITIONER

## 2022-04-05 PROCEDURE — 1111F DSCHRG MED/CURRENT MED MERGE: CPT | Performed by: NURSE PRACTITIONER

## 2022-04-05 PROCEDURE — 4040F PNEUMOC VAC/ADMIN/RCVD: CPT | Performed by: NURSE PRACTITIONER

## 2022-04-05 PROCEDURE — G8427 DOCREV CUR MEDS BY ELIG CLIN: HCPCS | Performed by: NURSE PRACTITIONER

## 2022-04-05 PROCEDURE — G8417 CALC BMI ABV UP PARAM F/U: HCPCS | Performed by: NURSE PRACTITIONER

## 2022-04-05 PROCEDURE — 1036F TOBACCO NON-USER: CPT | Performed by: NURSE PRACTITIONER

## 2022-04-05 PROCEDURE — 1123F ACP DISCUSS/DSCN MKR DOCD: CPT | Performed by: NURSE PRACTITIONER

## 2022-04-05 RX ORDER — TORSEMIDE 20 MG/1
20 TABLET ORAL 2 TIMES DAILY
Qty: 60 TABLET | Refills: 5 | Status: ON HOLD
Start: 2022-04-05 | End: 2022-07-09 | Stop reason: HOSPADM

## 2022-04-05 ASSESSMENT — ENCOUNTER SYMPTOMS
SHORTNESS OF BREATH: 1
COUGH: 0

## 2022-04-05 NOTE — PROGRESS NOTES
physical activity brings on discomfort and symptoms occur at rest.    Sodium Restrictions: 2g  Fluid Restrictions: 48-64 oz/day  Sodium and fluid restriction compliance: poor reviewed today      Past Medical History:   Diagnosis Date    Arthritis     Bradycardia 2001    requiring dual chamber pacemaker at [facility] CAD (coronary artery disease)     Cardiac pacemaker 10/2001    [id number]  [facility]- Serial # [id number]- Dr Luis Russell CHF (congestive heart failure) ([address]     COPD, mild ([address] 2/17/2017    Cor pulmonale (chronic) ([address] 3/15/2022    CVA (cerebrovascular accident) ([address]     DJD (degenerative joint disease) of cervical spine     C5-C6, C6-C7    Exhaustion of cardiac pacemaker battery 11/21/2011    PPM battery replacement- [employer]    Family history of cardiovascular disease     Glaucoma Dx 2010    H/O 24 hour EKG monitoring 8/6/2000 8/6/2000- Intermittent episonde of a-fib/flutter    H/O cardiac catheterization 4/20/2010, 2/1989 4/20/2010-Severe native vessel disease and has graft disease as well. LAD, CX totally occluded. RCA totally occluded in proximal segment. VG to RCA widely patent. PDA 90% LAD afer LIMA anastomosis 80-90% stenosis. Proceeded with PTCA with stent next day.  H/O cardiovascular stress test 10/14/2011, 6/2/2010,4/8/2010, 5/18/2009,4/6/2009, 10/30/2007, 11/12/2004, 11/6/2003, 8/9/2002, 8/9/2001,6/5/2000,     10/14/2011-Lexiscan-Abnormal Myocardial Perfusion study. Evidence of mild ischemia in the Left CX region. Abnomal study. Rest EF 63%. Global LV systolic function normal. No ECG changes. Unremarkable pharmacological stress test.    H/O cardiovascular stress test 6/10/2013    thallium--mild ischemia left circumflex EF63% no change from 10/2011 study.  H/O cardiovascular stress test 10/16/2014    cardiolite-mild ischemia left circumflex,EF70%    H/O chest x-ray 4/19/2009 4/19/2009-Stable cardiomegaly. No acute cardiopulmonary disease.  H/O Doppler lower venous ultrasound 09/18/2019    Significant reflux noted in RGSV, RGSV is extremely tortuous and small along the thigh and calf and would be highly unlikely to be accessed, RSSV is non compressible and has occlusive chronic SVT, LSSV is non compressible with occlusive chronic SVT, LGSV removed s/p CABG, Significant reflux in LGSV tributary,    H/O Doppler ultrasound 3/31/2010    CAROTID- 3/31/2010-INtimal thickening but no significant atherosclerotic plaque noted in ANA PAULA. Doppler flow velocities within ANA PAULA are WNL. Heterogeneous, irregular atherosclerotic plaque noted in LICA. Doppler flow velocities within the LICA are elevated, consistent with a mild, less than 50% stenosis.  H/O Doppler ultrasound 5/24/2016    Carotid- normal study    H/O Doppler ultrasound 09/06/2017    carotid - normal study    H/O Doppler ultrasound     H/O echocardiogram 10/13    EF=60%, Severe Pulm. HTN, & Sclerotic aortic valve w/stenosis.  H/O echocardiogram 10/16/14     EF 55-60% Normal LV. Normal LV systolic function. Severe tricuspid insufficiency with severe hypertension.  H/O echocardiogram 02/03/2017    heart cath performed this morning    H/O echocardiogram 09/18/2019    EF 50-55%, Left atrium is mild to moderately dilated, right atrium is severely dilated, mildly dilated right ventricle, Mod MR, Severe TR, Severe Pulm HTN, no pericardial effusion     History of complete ECG     10/14/2011(Lexiscan);5/6/2010, 4/30/2009,10/24/2008,9/21/2007, 10/13/2006    History of nuclear stress test 11/17/2016    lexiscan-normal,EF70%    Hx of cardiovascular stress test 12/28/2018    EF 60%  Normal study.     HX OTHER MEDICAL 05/01/2017    MUGA-normal, EF53%    Hyperlipidemia     Hypertension     Mild intermittent asthma 7/28/2016    Moderate COPD (chronic obstructive pulmonary disease) ([facility]) 3/15/2022    Nausea & vomiting     Obstructive sleep apnea 5/16/2017    Paroxysmal atrial fibrillation ([facility])  Post PTCA 4/21/2010    PTCA with 2.25 stent of the LIMA to LAD    Pulmonary HTN ([address]     Severe per last echo on 10/13. 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 Followed by MAZE procedure by pulmonary vein isolation.-  Dr Rosey King S/P PTCA (percutaneous transluminal coronary angioplasty) 11/2012    PTCA with stent to RCA    Severe pulmonary hypertension ([address] 3/15/2022    Shortness of breath 3/15/2022    Thyroid disease     hypothyroi    Unspecified cerebral artery occlusion with cerebral infarction Unsure When    No Residual     Past Surgical History:   Procedure Laterality Date    APPENDECTOMY  1941    CARDIAC SURGERY  4/09    CABG (3 Bypasses), One Heart Stent in 2010    COLONOSCOPY  In 2000's    X1    CORONARY ANGIOPLASTY WITH STENT PLACEMENT  4/21/2010    PTCA with stent LIMA ->LAD    CORONARY ARTERY BYPASS GRAFT  4/8/2009    LIMA->Diag,  LIMA -> LAD;  SVG->RCA going to the PDA.  Followed by MAZE procedure by pulmonary vein isolation.-  Dr Natasha Cassidy, TOTAL ABDOMINAL  1990's    MIDDLE EAR SURGERY      OTHER SURGICAL HISTORY      Ear surgery-hearing    PACEMAKER PLACEMENT      battery change 11/21/2011 [employer]    PTCA  11/2012    Ptca with stent to RCA    TONSILLECTOMY  65's     Family History   Problem Relation Age of Onset    Cancer Mother         \"Liver Cancer\"    Arthritis Mother     Depression Mother     Hearing Loss Mother     High Blood Pressure Mother     High Cholesterol Mother     Mental Illness Mother    [de-identified] / [place] Mother     Heart Disease Father     Early Death Father 36        \"Instant Death\"    High Blood Pressure Father     High Cholesterol Father     Coronary Art Dis Father         Massive MI    Heart Disease Sister     Depression Sister     Cancer Sister         \"Breast Cancer, Cancer Free Now\"    High Blood Pressure Sister     Other Daughter         \"She's Had Stomach Surgery\"    High Blood Pressure Son     High Blood Pressure Son     Early Death Paternal Grandfather      Social History     Tobacco Use    Smoking status: Former Smoker     Packs/day: 0.25     Years: 5.00     Pack years: 1.25     Types: Cigarettes     Quit date: 1970     Years since quittin.3    Smokeless tobacco: Never Used   Substance Use Topics    Alcohol use: Not Currently     Current Outpatient Medications   Medication Sig Dispense Refill    torsemide (DEMADEX) 20 MG tablet Take 1 tablet by mouth as needed (for fluid retention or weight gain of 3 lbs overnight) This is in addition to the 20 mg bid 30 tablet 0    torsemide (DEMADEX) 20 MG tablet Take 1 tablet by mouth 2 times daily 60 tablet 5    spironolactone (ALDACTONE) 50 MG tablet Take 1 tablet by mouth daily 30 tablet 0    PROCTOZONE-HC 2.5 % CREA rectal cream INSERT RECTALLY TWICE DAILY as directed AS NEEDED FOR HEMORRHOIDS      ZIOPTAN 0.0015 % SOLN instill ONE drop IN EACH EYE DAILY      CETIRIZINE HCL PO Take by mouth      ferrous sulfate (IRON 325) 325 (65 Fe) MG tablet Take 1 tablet by mouth every other day 15 tablet 1    polyethylene glycol (GLYCOLAX) 17 g packet Take 17 g by mouth daily as needed for Constipation 30 each 1    lidocaine 4 % external patch Place 1 patch onto the skin daily 30 patch 0    cycloSPORINE (RESTASIS) 0.05 % ophthalmic emulsion 1 drop 2 times daily      ciprofloxacin (CILOXAN) 0.3 % ophthalmic solution 3 drops WEEKLY (route: otic (ear))      vitamin D 25 MCG (1000 UT) CAPS Take 5,000 Units by mouth daily      Probiotic Product (PROBIOTIC-10) CHEW Take by mouth      atorvastatin (LIPITOR) 10 MG tablet Take 10 mg by mouth daily       umeclidinium-vilanterol (ANORO ELLIPTA) 62.5-25 MCG/INH AEPB inhaler Inhale 1 puff into the lungs daily 1 each 11    CPAP Machine MISC by Does not apply route      Biotin (BIOTIN 5000) 5 MG CAPS Take 1 capsule by mouth daily      Misc Natural Products (OSTEO BI-FLEX ADV DOUBLE ST PO) Take 1 tablet by mouth daily      timolol (TIMOPTIC-XE) 0.25 % ophthalmic gel-forming 1 drop daily (Patient not taking: Reported on 4/1/2022)      carvedilol (COREG) 6.25 MG tablet Take 1 tablet by mouth 2 times daily (with meals) 180 tablet 3    albuterol (PROVENTIL HFA;VENTOLIN HFA) 108 (90 BASE) MCG/ACT inhaler Inhale 2 puffs into the lungs 2 times daily AND EVERY 4-6 HOURS AS NEEDED      Multiple Vitamins-Minerals (ICAPS) CAPS Take 1 capsule by mouth daily.  vitamin B-12 (CYANOCOBALAMIN) 1000 MCG tablet Take 1,000 mcg by mouth daily.  levothyroxine (SYNTHROID) 88 MCG tablet Take 88 mcg by mouth daily.  aspirin 81 MG chewable tablet Take 81 mg by mouth daily. No current facility-administered medications for this visit. Allergies   Allergen Reactions    Darvocet [Propoxyphene N-Acetaminophen]     Ranexa [Ranolazine Er]      Sick to her stomach    Ranolazine      Sick to her stomach    Sulfa Antibiotics Itching    Sulfasalazine Itching    Adhesive Tape Rash    Allantoin Rash    Bacitracin Rash    Gramicidin Rash    Neomycin Rash    Polymyxin B Rash    Pramoxine Hcl Rash    Silicone Rash       SUBJECTIVE:     Review of Systems   Constitutional: Positive for fatigue. Negative for fever. Respiratory: Positive for shortness of breath. Negative for cough. Cardiovascular: Positive for chest pain and leg swelling. Negative for palpitations. Musculoskeletal: Negative for arthralgias and gait problem. Neurological: Positive for dizziness. Negative for syncope, weakness, light-headedness and headaches.        OBJECTIVE:   Today's Vitals:  /60   Pulse 60   Ht 4' 11\" (1.499 m)   Wt 143 lb (64.9 kg)   SpO2 95%   BMI 28.88 kg/m²     Wt Readings from Last 3 Encounters:   04/05/22 143 lb (64.9 kg)   04/01/22 134 lb (60.8 kg)   03/24/22 145 lb 1 oz (65.8 kg)     BP Readings from Last 3 Encounters: CL 92 03/29/2022    CO2 27 03/29/2022    BUN 27 03/29/2022    CREATININE 1.0 03/29/2022    GFRAA >60 03/29/2022    LABGLOM 52 03/29/2022    GLUCOSE 113 03/29/2022    CALCIUM 9.2 03/29/2022     Hepatic Function Panel:    Lab Results   Component Value Date    ALKPHOS 173 03/18/2022    ALT 16 03/18/2022    AST 32 03/18/2022    PROT 6.2 03/18/2022    PROT 6.8 11/28/2012    BILITOT 1.6 03/18/2022    BILIDIR 1.0 10/08/2021    IBILI 1.0 10/08/2021    LABALBU 3.5 03/18/2022     Magnesium:    Lab Results   Component Value Date    MG 2.0 03/22/2022     PT/INR:    Lab Results   Component Value Date    PROTIME 24.4 03/24/2022    PROTIME 39.6 11/18/2011    INR 1.88 03/24/2022     Lipids:    Lab Results   Component Value Date    TRIG 82 10/29/2018    HDL 49 10/29/2018    LDLCALC 100 07/05/2017    LDLDIRECT 116 10/29/2018       Iron Studies:  No components found for: FE,  TIBC,  FERRITIN    Iron Deficiency Anemia:  Yes IV Iron Therapy: Will check anemia panel in 3 months. 2017 ACC/AHA HF Guidelines:   intravenous iron replacement in patients with New York Heart Association (NYHA) class II and III HF and iron deficiency (ferritin <100 ng/ml or 100-300 ng/ml if transferrin saturation <20%), to improve functional status and QoL. ASSESSMENT AND PLAN:     No problem-specific Assessment & Plan notes found for this encounter. · Beta blocker carvedilol. · On diuretic? toresemide 20 mg BID  · IF NYHA class II-IV with eGFR >30/mil/min/173.m2, K <5.0 mEq/l and EF < 35% Aldactone? Yes  · ICD counseling: NA EF >40%  · SLGT2 inhibitor?  N/A  · Continue with daily weights- to bring in records on each office visit  · Fluid restriction of 2 Liters per day  · Limit sodium in diet to around 4179-4646 mg/day  · Monitor BP at home- to bring in records on each office visit   · Increase exercise as able    Referal to cardiac rehab if < 40% NA      Son had misunderstood that patient was to take torsemide 40 mg in the morning and 20 mg in the afternoon for two days then take torsemide BID. So she has not been getting any torsemide for 4 days. She appears overloaded will resume torsemide 20 mg BID and have patient follow up in 2 weeks with labs in 1 week. She is very fatigued and likely it is due to the fluid overload. Patient was instructed to call the [facility] for changes in the following symptoms:    Weight gain of 3 pounds in 1 day or 5 pounds in 1 week   Increased shortness of breath   Shortness of breath while laying down   Cough   Chest pain   Swelling in feet, ankles or legs   Tenderness or bloating in the abdomen   Fatigue    Decreased appetite or nausea    Confusion      Return in about 2 weeks (around 4/19/2022). or sooner if needed     Patient given educational materials - see patient instructions. We discussed the importance of weighing oneself and recording daily. We also discussed the importance of a lowsodium diet, higher sodium foods to avoid and better low sodium food options. Discussed use, benefit, and side effects of prescribed medications. All patient questions answered. Patient verbalizes understanding of plan of care using teach back method, and is agreeable to the treatment plan.      Copy of note to be sent to consulting provider and primary cardiologist   Electronicallysigned by LISA Bello CNP on 4/5/2022 at 11:10 AM

## 2022-04-05 NOTE — PROGRESS NOTES
CHF CLINICAL STAFF DOCUMENTATION    Have you had any Chest Pain? - Yes  Radiates from anterior chest around L side to back. Worse  9/10 with ovement.     Do you had any Shortness of Breath - Yes  If Yes - When at rest and on exertion    Have you had any dizziness - Yes  When do you feel dizzy walking   How long does it last .5-10  seconds     Do you have any edema -  Yes  swelling in feet ankles legs      Are you on fluid restrictions - No    Amount -   Are you on sodium restrictions - No   Amount -     Do you feel fatigued - Yes    Do you have a cough - No    Lung sounds -    Normal - Yes   Abnormal -     Do you have abdominal bloating - Yes    How is your appetite - \"good    Do you have difficulty sleeping - No  While laying - No    Do you have a history of sleep apnea - Yes    6 min. walk  Pre heart rate 60  Time walked 6 min  Distance 560   Post heart rate 67     Have you had Covid - No    Have you had Vaccine for Covid - Yes    Have you had Flu Vaccine - Yes    Have you had Pneumonia Vaccine - No

## 2022-04-06 ENCOUNTER — TELEPHONE (OUTPATIENT)
Dept: CARDIOLOGY CLINIC | Age: 87
End: 2022-04-06

## 2022-04-08 ENCOUNTER — PROCEDURE VISIT (OUTPATIENT)
Dept: CARDIOLOGY CLINIC | Age: 87
End: 2022-04-08
Payer: MEDICARE

## 2022-04-08 DIAGNOSIS — I48.21 PERMANENT ATRIAL FIBRILLATION (HCC): ICD-10-CM

## 2022-04-08 DIAGNOSIS — Z95.0 CARDIAC PACEMAKER IN SITU: Primary | ICD-10-CM

## 2022-04-09 PROCEDURE — 93294 REM INTERROG EVL PM/LDLS PM: CPT | Performed by: INTERNAL MEDICINE

## 2022-04-09 PROCEDURE — 93296 REM INTERROG EVL PM/IDS: CPT | Performed by: INTERNAL MEDICINE

## 2022-04-19 ENCOUNTER — NURSE ONLY (OUTPATIENT)
Dept: CARDIOLOGY CLINIC | Age: 87
End: 2022-04-19
Payer: MEDICARE

## 2022-04-19 VITALS
OXYGEN SATURATION: 94 % | SYSTOLIC BLOOD PRESSURE: 96 MMHG | HEART RATE: 60 BPM | HEIGHT: 59 IN | BODY MASS INDEX: 27.24 KG/M2 | WEIGHT: 135.13 LBS | DIASTOLIC BLOOD PRESSURE: 60 MMHG

## 2022-04-19 DIAGNOSIS — I50.43 CHF (CONGESTIVE HEART FAILURE), NYHA CLASS I, ACUTE ON CHRONIC, COMBINED (HCC): Primary | ICD-10-CM

## 2022-04-19 DIAGNOSIS — D50.8 IRON DEFICIENCY ANEMIA SECONDARY TO INADEQUATE DIETARY IRON INTAKE: ICD-10-CM

## 2022-04-19 PROCEDURE — 99214 OFFICE O/P EST MOD 30 MIN: CPT | Performed by: NURSE PRACTITIONER

## 2022-04-19 RX ORDER — FERROUS SULFATE 325(65) MG
325 TABLET ORAL
Qty: 30 TABLET | Refills: 3 | Status: SHIPPED | OUTPATIENT
Start: 2022-04-19 | End: 2022-09-16

## 2022-04-19 ASSESSMENT — ENCOUNTER SYMPTOMS
COUGH: 0
SHORTNESS OF BREATH: 1

## 2022-04-19 NOTE — PROGRESS NOTES
Date:        Time spent educating: This R.N.  provided Teaching following the Heart Failure book, the HF Zones, and the Sodium Content pamphlet. We reviewed the HF zones, signs and symptoms to report on day 1 of onset, medication compliance, daily weights and low sodium diet. Advised patient you can reduce the risk for heart failure exacerbations by modifying/controlling the risk factors they have. Pt informed to take medications as prescribed, follow a cardiac heart healthy / low sodium diet and exercise as tolerated.

## 2022-04-19 NOTE — PROGRESS NOTES
CHF CLINICAL STAFF DOCUMENTATION    Have you had any Chest Pain? - No    Do you had any Shortness of Breath - Yes  If Yes - When on exertion    Have you had any dizziness - yes  When do you feel dizzy with position change   How long does it last .1  minutes     Do you have any edema -  No  swelling in no      Are you on fluid restrictions - Yes    Amount -   Are you on sodium restrictions - Yes   Amount -     Do you feel fatigued - No    Do you have a cough - No    Lung sounds -    Normal - Yes   Abnormal -     Do you have abdominal bloating - No    How is your appetite - \"good    Do you have difficulty sleeping - No  While laying - sleep in a recliner    Do you have a history of sleep apnea - No    6 min. walk  Pre heart rate 60  Time walked 6 min  Distance 410 feet   80     Post heart rate 80    Have you had Covid - No    Have you had Vaccine for Covid - Yes    Have you had Flu Vaccine - Yes    Have you had Pneumonia Vaccine - not sure

## 2022-04-19 NOTE — PROGRESS NOTES
[address]      Visit Date: 4/19/2022  Cardiologist:  Dr. Ania Rosa  Primary Care Physician: Dr. Chula Roque,     Rosemary Mondragon is a 80 y.o. female who presents today for:  CC:   1. CHF (congestive heart failure), NYHA class I, acute on chronic, combined ([facility] [address]    2. Iron deficiency anemia secondary to inadequate dietary iron intake        HPI:   Rosemary Mondragon is a 80 y.o. female who presents to the office for a new patient visit in the heart failure clinic. She has been increasingly needing hospitalization from CHF over the last year. She reports that she is feeling much better than last OV. She has significantly less swelling and her breathing is not labored. She denies any dizziness light headed or near syncope. Chief Complaint   Patient presents with    Follow-up     last visit 4/5/22 - states her symptoms have improved mildly since last visit     Patient has:  Last hospital admission related to Heart Failure:  3/18/2022   baseline      baseline weight 140   Chest Pain: no  Worsening SOB/orthopnea/PND: no  Edema: improved significantly   Any extra diuretic use: yes  Weight gain: no  Compliant checking home weight: yes  Fatigued: no  Abdominal bloating: no  Appetite: better  Difficulty sleeping: improved  SAULO: yes  Cough: no  Compliant checking blood pressure: no, home care checks three times a week  Compliant with medication regimen: yes     Vaccinations:    flu Yes  pneumonia Yes  COVID 19 Yes      Device: No NA  EF 55%      Activity: improved  Can you walk 1-2 blocks or do a moderate amount of house/yard work? No    NYHA Class: III   Class I   Patients with no limitation of activities; they suffer no symptoms from ordinary activities. Class II   Patients with slight, mild limitation of activity; they are comfortable with rest or with mild exertion.    Class III   Patients with marked limitation of activity; they are comfortable only at rest.   Class IV   Patients who should be at complete rest, confined to bed or chair; any physical activity brings on discomfort and symptoms occur at rest.    Sodium Restrictions: 2g  Fluid Restrictions: 48-64 oz/day  Sodium and fluid restriction compliance: improved. Past Medical History:   Diagnosis Date    Arthritis     Bradycardia 2001    requiring dual chamber pacemaker at [facility] CAD (coronary artery disease)     Cardiac pacemaker 10/2001    [employer] #[id number]  [facility]- Serial # [id number]- Dr Sonya Patel CHF (congestive heart failure) ([address]     COPD, mild ([address] 2/17/2017    Cor pulmonale (chronic) ([address] 3/15/2022    CVA (cerebrovascular accident) ([address]     DJD (degenerative joint disease) of cervical spine     C5-C6, C6-C7    Exhaustion of cardiac pacemaker battery 11/21/2011    PPM battery replacement- [employer]    Family history of cardiovascular disease     Glaucoma Dx 2010    H/O 24 hour EKG monitoring 8/6/2000 8/6/2000- Intermittent episonde of a-fib/flutter    H/O cardiac catheterization 4/20/2010, 2/1989 4/20/2010-Severe native vessel disease and has graft disease as well. LAD, CX totally occluded. RCA totally occluded in proximal segment. VG to RCA widely patent. PDA 90% LAD afer LIMA anastomosis 80-90% stenosis. Proceeded with PTCA with stent next day.  H/O cardiovascular stress test 10/14/2011, 6/2/2010,4/8/2010, 5/18/2009,4/6/2009, 10/30/2007, 11/12/2004, 11/6/2003, 8/9/2002, 8/9/2001,6/5/2000,     10/14/2011-Lexiscan-Abnormal Myocardial Perfusion study. Evidence of mild ischemia in the Left CX region. Abnomal study. Rest EF 63%. Global LV systolic function normal. No ECG changes. Unremarkable pharmacological stress test.    H/O cardiovascular stress test 6/10/2013    thallium--mild ischemia left circumflex EF63% no change from 10/2011 study.     H/O cardiovascular stress test 10/16/2014    cardiolite-mild ischemia left circumflex,EF70%    H/O chest x-ray 4/19/2009 4/19/2009-Stable cardiomegaly. No acute cardiopulmonary disease.  H/O Doppler lower venous ultrasound 09/18/2019    Significant reflux noted in RGSV, RGSV is extremely tortuous and small along the thigh and calf and would be highly unlikely to be accessed, RSSV is non compressible and has occlusive chronic SVT, LSSV is non compressible with occlusive chronic SVT, LGSV removed s/p CABG, Significant reflux in LGSV tributary,    H/O Doppler ultrasound 3/31/2010    CAROTID- 3/31/2010-INtimal thickening but no significant atherosclerotic plaque noted in ANA PAULA. Doppler flow velocities within ANA PAULA are WNL. Heterogeneous, irregular atherosclerotic plaque noted in LICA. Doppler flow velocities within the LICA are elevated, consistent with a mild, less than 50% stenosis.  H/O Doppler ultrasound 5/24/2016    Carotid- normal study    H/O Doppler ultrasound 09/06/2017    carotid - normal study    H/O Doppler ultrasound     H/O echocardiogram 10/13    EF=60%, Severe Pulm. HTN, & Sclerotic aortic valve w/stenosis.  H/O echocardiogram 10/16/14     EF 55-60% Normal LV. Normal LV systolic function. Severe tricuspid insufficiency with severe hypertension.  H/O echocardiogram 02/03/2017    heart cath performed this morning    H/O echocardiogram 09/18/2019    EF 50-55%, Left atrium is mild to moderately dilated, right atrium is severely dilated, mildly dilated right ventricle, Mod MR, Severe TR, Severe Pulm HTN, no pericardial effusion     History of complete ECG     10/14/2011(Lexiscan);5/6/2010, 4/30/2009,10/24/2008,9/21/2007, 10/13/2006    History of nuclear stress test 11/17/2016    lexiscan-normal,EF70%    Hx of cardiovascular stress test 12/28/2018    EF 60%  Normal study.     HX OTHER MEDICAL 05/01/2017    MUGA-normal, EF53%    Hyperlipidemia     Hypertension     Mild intermittent asthma 7/28/2016    Moderate COPD (chronic obstructive pulmonary disease) (HCC) 3/15/2022    Nausea & vomiting     Obstructive sleep apnea 5/16/2017    Paroxysmal atrial fibrillation ([address]     Post PTCA 4/21/2010    PTCA with 2.25 stent of the LIMA to LAD    Pulmonary HTN ([address]     Severe per last echo on 10/13. 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 Followed by MAZE procedure by pulmonary vein isolation.-  Dr Sam Garcia S/P PTCA (percutaneous transluminal coronary angioplasty) 11/2012    PTCA with stent to RCA    Severe pulmonary hypertension ([address] 3/15/2022    Shortness of breath 3/15/2022    Thyroid disease     hypothyroi    Unspecified cerebral artery occlusion with cerebral infarction Unsure When    No Residual     Past Surgical History:   Procedure Laterality Date    APPENDECTOMY  1941    CARDIAC SURGERY  4/09    CABG (3 Bypasses), One Heart Stent in 2010    COLONOSCOPY  In 2000's    X1    CORONARY ANGIOPLASTY WITH STENT PLACEMENT  4/21/2010    PTCA with stent LIMA ->LAD    CORONARY ARTERY BYPASS GRAFT  4/8/2009    LIMA->Diag,  LIMA -> LAD;  SVG->RCA going to the PDA.  Followed by MAZE procedure by pulmonary vein isolation.-  Dr Andreea Hayden, TOTAL ABDOMINAL  1990's    MIDDLE EAR SURGERY      OTHER SURGICAL HISTORY      Ear surgery-hearing    PACEMAKER PLACEMENT      battery change 11/21/2011 [employer]    PTCA  11/2012    Ptca with stent to RCA    TONSILLECTOMY  65's     Family History   Problem Relation Age of Onset    Cancer Mother         \"Liver Cancer\"    Arthritis Mother     Depression Mother     Hearing Loss Mother     High Blood Pressure Mother     High Cholesterol Mother     Mental Illness Mother    [de-identified] / Stillbirths Mother     Heart Disease Father     Early Death Father 36        \"Instant Death\"   [place] High Blood Pressure Father     High Cholesterol Father     Coronary Art Dis Father         Massive MI    Heart Disease Sister     Depression Sister     Cancer Sister         \"Breast Cancer, Cancer Free Now\"    High Blood Pressure Sister     Other Daughter         \"She's Had Stomach Surgery\"    High Blood Pressure Son     High Blood Pressure Son     Early Death Paternal Grandfather      Social History     Tobacco Use    Smoking status: Former Smoker     Packs/day: 0.25     Years: 5.00     Pack years: 1.25     Types: Cigarettes     Quit date: 1970     Years since quittin.4    Smokeless tobacco: Never Used   Substance Use Topics    Alcohol use: Not Currently     Comment: no more than 3 cups of coffee each day     Current Outpatient Medications   Medication Sig Dispense Refill    torsemide (DEMADEX) 20 MG tablet Take 1 tablet by mouth 2 times daily 60 tablet 5    torsemide (DEMADEX) 20 MG tablet Take 1 tablet by mouth as needed (for fluid retention or weight gain of 3 lbs overnight) This is in addition to the 20 mg bid 30 tablet 0    cycloSPORINE (RESTASIS) 0.05 % ophthalmic emulsion 1 drop 2 times daily      vitamin D 25 MCG (1000 UT) CAPS Take 5,000 Units by mouth daily      atorvastatin (LIPITOR) 10 MG tablet Take 10 mg by mouth daily       umeclidinium-vilanterol (ANORO ELLIPTA) 62.5-25 MCG/INH AEPB inhaler Inhale 1 puff into the lungs daily 1 each 11    CPAP Machine MISC by Does not apply route      Biotin (BIOTIN 5000) 5 MG CAPS Take 1 capsule by mouth daily      carvedilol (COREG) 6.25 MG tablet Take 1 tablet by mouth 2 times daily (with meals) 180 tablet 3    albuterol (PROVENTIL HFA;VENTOLIN HFA) 108 (90 BASE) MCG/ACT inhaler Inhale 2 puffs into the lungs 2 times daily AND EVERY 4-6 HOURS AS NEEDED      Multiple Vitamins-Minerals (ICAPS) CAPS Take 1 capsule by mouth daily.  levothyroxine (SYNTHROID) 88 MCG tablet Take 88 mcg by mouth daily.  aspirin 81 MG chewable tablet Take 81 mg by mouth daily.         spironolactone (ALDACTONE) 50 MG tablet Take 1 tablet by mouth daily 30 tablet 0    PROCTOZONE-HC 2.5 % CREA rectal cream INSERT RECTALLY TWICE DAILY as directed AS NEEDED FOR HEMORRHOIDS      ZIOPTAN 0.0015 % SOLN instill ONE drop IN EACH EYE DAILY      CETIRIZINE HCL PO Take by mouth      ferrous sulfate (IRON 325) 325 (65 Fe) MG tablet Take 1 tablet by mouth every other day (Patient not taking: Reported on 4/19/2022) 15 tablet 1    polyethylene glycol (GLYCOLAX) 17 g packet Take 17 g by mouth daily as needed for Constipation 30 each 1    lidocaine 4 % external patch Place 1 patch onto the skin daily (Patient not taking: Reported on 4/19/2022) 30 patch 0    ciprofloxacin (CILOXAN) 0.3 % ophthalmic solution 3 drops WEEKLY (route: otic (ear)) (Patient not taking: Reported on 4/19/2022)      Probiotic Product (PROBIOTIC-10) CHEW Take by mouth      Misc Natural Products (OSTEO BI-FLEX ADV DOUBLE ST PO) Take 1 tablet by mouth daily      timolol (TIMOPTIC-XE) 0.25 % ophthalmic gel-forming 1 drop daily (Patient not taking: Reported on 4/1/2022)      vitamin B-12 (CYANOCOBALAMIN) 1000 MCG tablet Take 1,000 mcg by mouth daily. No current facility-administered medications for this visit. Allergies   Allergen Reactions    Darvocet [Propoxyphene N-Acetaminophen]     Ranexa [Ranolazine Er]      Sick to her stomach    Ranolazine      Sick to her stomach    Sulfa Antibiotics Itching    Sulfasalazine Itching    Adhesive Tape Rash    Allantoin Rash    Bacitracin Rash    Gramicidin Rash    Neomycin Rash    Polymyxin B Rash    Pramoxine Hcl Rash    Silicone Rash       SUBJECTIVE:     Review of Systems   Constitutional: Negative for fatigue and fever. Respiratory: Positive for shortness of breath (chronic). Negative for cough. Cardiovascular: Negative for chest pain, palpitations and leg swelling. Musculoskeletal: Negative for arthralgias and gait problem. Neurological: Positive for dizziness (with eye drops only now). Negative for syncope, weakness, light-headedness and headaches.        OBJECTIVE:   Today's Vitals:  BP 96/60 (Site: Right Upper Arm, Position: Sitting, Cuff Size: Medium Adult)   Pulse 60   Ht 4' 11\" (1.499 m)   Wt 135 lb 2 oz (61.3 kg)   SpO2 94%   BMI 27.29 kg/m²     Wt Readings from Last 3 Encounters:   04/19/22 135 lb 2 oz (61.3 kg)   04/05/22 143 lb (64.9 kg)   04/01/22 134 lb (60.8 kg)     BP Readings from Last 3 Encounters:   04/19/22 96/60   04/05/22 114/60   04/01/22 130/68     Pulse Readings from Last 3 Encounters:   04/19/22 60   04/05/22 60   04/01/22 60     Body mass index is 27.29 kg/m². Physical Exam  Vitals reviewed. Constitutional:       General: She is not in acute distress. Appearance: Normal appearance. She is not ill-appearing. HENT:      Head: Atraumatic. Neck:      Vascular: No carotid bruit. Cardiovascular:      Rate and Rhythm: Normal rate and regular rhythm. Pulses: Normal pulses. Heart sounds: Normal heart sounds. No murmur heard. Pulmonary:      Effort: Pulmonary effort is normal. No respiratory distress. Breath sounds: Normal breath sounds. Musculoskeletal:         General: No swelling or deformity. Cervical back: Neck supple. No muscular tenderness. Neurological:      Mental Status: She is alert. 6 minute walk test:  Time walked 00: 06: 00  Distance walked 410 ft  Required Oxygen No    Most recent ECHO:   3/21/2022   Summary   Left ventricular systolic function is normal.   Concentric LVH   Ejection fraction is visually estimated at 50-55%. Small left ventricle cavity due to right ventricle volume overload. Bilateral atrial enlargement   Severely dilated right ventricle; right heart strain measurements were   normal.   PPM wiring visualized within the right heart. Severe tricuspid regurgitation; RVSP: 57 mmHg due to free flow   regurgitation. No evidence of any pericardial effusion. Inferior vena cava is dilated, measuring at 3.6 cm, and does not collapse   with respiration.          Results reviewed:  BNP:   Lab Results   Component Value Date    BNP 90 09/28/2013    PROBNP 1,658 (H) 03/21/2022     CBC:   Lab Results   Component Value Date    WBC 9.0 03/29/2022    RBC 4.29 03/29/2022    HGB 11.6 03/29/2022    HCT 38.2 03/29/2022     03/29/2022     CMP:    Lab Results   Component Value Date     03/29/2022    K 4.5 03/29/2022    CL 92 03/29/2022    CO2 27 03/29/2022    BUN 27 03/29/2022    CREATININE 1.0 03/29/2022    GFRAA >60 03/29/2022    LABGLOM 52 03/29/2022    GLUCOSE 113 03/29/2022    CALCIUM 9.2 03/29/2022     Hepatic Function Panel:    Lab Results   Component Value Date    ALKPHOS 173 03/18/2022    ALT 16 03/18/2022    AST 32 03/18/2022    PROT 6.2 03/18/2022    PROT 6.8 11/28/2012    BILITOT 1.6 03/18/2022    BILIDIR 1.0 10/08/2021    IBILI 1.0 10/08/2021    LABALBU 3.5 03/18/2022     Magnesium:    Lab Results   Component Value Date    MG 2.0 03/22/2022     PT/INR:    Lab Results   Component Value Date    PROTIME 24.4 03/24/2022    PROTIME 39.6 11/18/2011    INR 1.88 03/24/2022     Lipids:    Lab Results   Component Value Date    TRIG 82 10/29/2018    HDL 49 10/29/2018    LDLCALC 100 07/05/2017    LDLDIRECT 116 10/29/2018       Iron Studies:  No components found for: FE,  TIBC,  FERRITIN    Iron Deficiency Anemia:  Yes IV Iron Therapy: Will check anemia panel in 3 months. 2017 ACC/AHA HF Guidelines:   intravenous iron replacement in patients with New York Heart Association (NYHA) class II and III HF and iron deficiency (ferritin <100 ng/ml or 100-300 ng/ml if transferrin saturation <20%), to improve functional status and QoL. ASSESSMENT AND PLAN:     CHF (congestive heart failure), NYHA class I, acute on chronic, combined (HCC)    · Beta blocker carvedilol. · On diuretic? toresemide 20 mg BID  · IF NYHA class II-IV with eGFR >30/mil/min/173.m2, K <5.0 mEq/l and EF < 35% Aldactone? Yes  · ICD counseling: NA EF >40%  · SLGT2 inhibitor?  N/A  · Continue with daily weights- to bring in records on each office visit  · Fluid restriction of 2 Liters per day  · Limit sodium in diet to around 6727-0941 mg/day  · Monitor BP at home- to bring in records on each office visit   · Increase exercise as able    Referal to cardiac rehab if < 40% NA    She is feeling better. Will continue torsemide 20 mg BID for now. Discussed symptoms of hypotension and dehydration. Patient and son state understanding. Iron deficiency anemia secondary to inadequate dietary iron intake  Recommend to restart iron replacement as she is slightly anemic with trans ferritin of 7 and iron level of 18. Patient was instructed to call the [facility] for changes in the following symptoms:    Weight gain of 3 pounds in 1 day or 5 pounds in 1 week   Increased shortness of breath   Shortness of breath while laying down   Cough   Chest pain   Swelling in feet, ankles or legs   Tenderness or bloating in the abdomen   Fatigue    Decreased appetite or nausea    Confusion      Return in about 2 months (around 6/19/2022). or sooner if needed     Patient given educational materials - see patient instructions. We discussed the importance of weighing oneself and recording daily. We also discussed the importance of a lowsodium diet, higher sodium foods to avoid and better low sodium food options. Discussed use, benefit, and side effects of prescribed medications. All patient questions answered. Patient verbalizes understanding of plan of care using teach back method, and is agreeable to the treatment plan.      Copy of note to be sent to consulting provider and primary cardiologist   Electronicallysigned by LISA Baires CNP on 4/19/2022 at 11:28 AM

## 2022-06-28 ENCOUNTER — NURSE ONLY (OUTPATIENT)
Dept: CARDIOLOGY CLINIC | Age: 87
End: 2022-06-28
Payer: MEDICARE

## 2022-06-28 VITALS
SYSTOLIC BLOOD PRESSURE: 98 MMHG | HEIGHT: 59 IN | DIASTOLIC BLOOD PRESSURE: 58 MMHG | BODY MASS INDEX: 26.61 KG/M2 | HEART RATE: 60 BPM | RESPIRATION RATE: 16 BRPM | OXYGEN SATURATION: 96 % | WEIGHT: 132 LBS

## 2022-06-28 DIAGNOSIS — I50.32 CHRONIC DIASTOLIC (CONGESTIVE) HEART FAILURE (HCC): Primary | ICD-10-CM

## 2022-06-28 DIAGNOSIS — D50.8 IRON DEFICIENCY ANEMIA SECONDARY TO INADEQUATE DIETARY IRON INTAKE: ICD-10-CM

## 2022-06-28 PROCEDURE — 1123F ACP DISCUSS/DSCN MKR DOCD: CPT | Performed by: NURSE PRACTITIONER

## 2022-06-28 PROCEDURE — 99214 OFFICE O/P EST MOD 30 MIN: CPT | Performed by: NURSE PRACTITIONER

## 2022-06-28 ASSESSMENT — ENCOUNTER SYMPTOMS
SHORTNESS OF BREATH: 1
COUGH: 0

## 2022-06-28 NOTE — PROGRESS NOTES
[address]      Visit Date: 6/28/2022  Cardiologist:  Dr. Tejal Guerra  Primary Care Physician: Dr. Ronnie Patel DO    Jessie Riley is a 80 y.o. female who presents today for:  CC:   1. Chronic diastolic (congestive) heart failure ([facility] [address]    2. Iron deficiency anemia secondary to inadequate dietary iron intake        HPI:   Jessie Riley is a 80 y.o. female who presents to the office for a new patient visit in the heart failure clinic. She has been increasingly needing hospitalization from CHF over the last year. She reports that she is feeling much better than last OV. She has significantly less swelling and her breathing is not labored. She denies any dizziness light headed or near syncope. Chief Complaint   Patient presents with    Follow-up     2 month f/u HF      Patient has:  Last hospital admission related to Heart Failure:  3/18/2022   baseline      baseline weight 140   Chest Pain: no  Worsening SOB/orthopnea/PND: yes, she thinks it is about the same. Edema: patient feels resolved. Any extra diuretic use: not since last OV  Weight gain: no  Compliant checking home weight: yes  Fatigued: no  Abdominal bloating: no  Appetite: better  Difficulty sleeping: improved  SAULO: yes  Cough: no  Compliant checking blood pressure: no, home care checks three times a week  Compliant with medication regimen: yes     Vaccinations:    flu Yes  pneumonia Yes  COVID 19 Yes      Device: No NA  EF 55%      Activity: improved  Can you walk 1-2 blocks or do a moderate amount of house/yard work? No    NYHA Class: III   Class I   Patients with no limitation of activities; they suffer no symptoms from ordinary activities. Class II   Patients with slight, mild limitation of activity; they are comfortable with rest or with mild exertion.    Class III   Patients with marked limitation of activity; they are comfortable only at rest.   Class IV   Patients who should be at complete rest, confined to bed or chair; any physical activity brings on discomfort and symptoms occur at rest.    Sodium Restrictions: 2g  Fluid Restrictions: 48-64 oz/day  Sodium and fluid restriction compliance: improved. Past Medical History:   Diagnosis Date    Arthritis     Bradycardia 2001    requiring dual chamber pacemaker at [facility] CAD (coronary artery disease)     Cardiac pacemaker 10/2001    [employer] #[id number]  [facility]- Serial # [id number]- Dr Melissa Bullock CHF (congestive heart failure) ([address]     COPD, mild ([address] 2/17/2017    Cor pulmonale (chronic) ([address] 3/15/2022    CVA (cerebrovascular accident) ([address]     DJD (degenerative joint disease) of cervical spine     C5-C6, C6-C7    Exhaustion of cardiac pacemaker battery 11/21/2011    PPM battery replacement- [employer]    Family history of cardiovascular disease     Glaucoma Dx 2010    H/O 24 hour EKG monitoring 8/6/2000 8/6/2000- Intermittent episonde of a-fib/flutter    H/O cardiac catheterization 4/20/2010, 2/1989 4/20/2010-Severe native vessel disease and has graft disease as well. LAD, CX totally occluded. RCA totally occluded in proximal segment. VG to RCA widely patent. PDA 90% LAD afer LIMA anastomosis 80-90% stenosis. Proceeded with PTCA with stent next day.  H/O cardiovascular stress test 10/14/2011, 6/2/2010,4/8/2010, 5/18/2009,4/6/2009, 10/30/2007, 11/12/2004, 11/6/2003, 8/9/2002, 8/9/2001,6/5/2000,     10/14/2011-Lexiscan-Abnormal Myocardial Perfusion study. Evidence of mild ischemia in the Left CX region. Abnomal study. Rest EF 63%. Global LV systolic function normal. No ECG changes. Unremarkable pharmacological stress test.    H/O cardiovascular stress test 6/10/2013    thallium--mild ischemia left circumflex EF63% no change from 10/2011 study.  H/O cardiovascular stress test 10/16/2014    cardiolite-mild ischemia left circumflex,EF70%    H/O chest x-ray 4/19/2009 4/19/2009-Stable cardiomegaly.  No acute cardiopulmonary disease.  H/O Doppler lower venous ultrasound 09/18/2019    Significant reflux noted in RGSV, RGSV is extremely tortuous and small along the thigh and calf and would be highly unlikely to be accessed, RSSV is non compressible and has occlusive chronic SVT, LSSV is non compressible with occlusive chronic SVT, LGSV removed s/p CABG, Significant reflux in LGSV tributary,    H/O Doppler ultrasound 3/31/2010    CAROTID- 3/31/2010-INtimal thickening but no significant atherosclerotic plaque noted in ANA PAULA. Doppler flow velocities within ANA PAULA are WNL. Heterogeneous, irregular atherosclerotic plaque noted in LICA. Doppler flow velocities within the LICA are elevated, consistent with a mild, less than 50% stenosis.  H/O Doppler ultrasound 5/24/2016    Carotid- normal study    H/O Doppler ultrasound 09/06/2017    carotid - normal study    H/O Doppler ultrasound     H/O echocardiogram 10/13    EF=60%, Severe Pulm. HTN, & Sclerotic aortic valve w/stenosis.  H/O echocardiogram 10/16/14     EF 55-60% Normal LV. Normal LV systolic function. Severe tricuspid insufficiency with severe hypertension.  H/O echocardiogram 02/03/2017    heart cath performed this morning    H/O echocardiogram 09/18/2019    EF 50-55%, Left atrium is mild to moderately dilated, right atrium is severely dilated, mildly dilated right ventricle, Mod MR, Severe TR, Severe Pulm HTN, no pericardial effusion     History of complete ECG     10/14/2011(Lexiscan);5/6/2010, 4/30/2009,10/24/2008,9/21/2007, 10/13/2006    History of nuclear stress test 11/17/2016    lexiscan-normal,EF70%    Hx of cardiovascular stress test 12/28/2018    EF 60%  Normal study.     HX OTHER MEDICAL 05/01/2017    MUGA-normal, EF53%    Hyperlipidemia     Hypertension     Mild intermittent asthma 7/28/2016    Moderate COPD (chronic obstructive pulmonary disease) (HCC) 3/15/2022    Nausea & vomiting     Obstructive sleep apnea 5/16/2017    Paroxysmal Now\"    High Blood Pressure Sister     Other Daughter         \"She's Had Stomach Surgery\"    High Blood Pressure Son     High Blood Pressure Son     Early Death Paternal Grandfather      Social History     Tobacco Use    Smoking status: Former Smoker     Packs/day: 0.25     Years: 5.00     Pack years: 1.25     Types: Cigarettes     Quit date: 1970     Years since quittin.6    Smokeless tobacco: Never Used   Substance Use Topics    Alcohol use: Not Currently     Comment: no more than 3 cups of coffee each day     Current Outpatient Medications   Medication Sig Dispense Refill    ferrous sulfate (IRON 325) 325 (65 Fe) MG tablet Take 1 tablet by mouth daily (with breakfast) 30 tablet 3    torsemide (DEMADEX) 20 MG tablet Take 1 tablet by mouth 2 times daily 60 tablet 5    torsemide (DEMADEX) 20 MG tablet Take 1 tablet by mouth as needed (for fluid retention or weight gain of 3 lbs overnight) This is in addition to the 20 mg bid (Patient taking differently: Take 20 mg by mouth as needed (for fluid retention or weight gain of 3 lbs overnight) This is in addition to the 20 mg bid-  Takes demadex 20 mg (2 tablets in a.m.) and 1 tablet at night) 30 tablet 0    vitamin D 25 MCG (1000 UT) CAPS Take 5,000 Units by mouth daily      Probiotic Product (PROBIOTIC-10) CHEW Take by mouth      atorvastatin (LIPITOR) 10 MG tablet Take 10 mg by mouth daily       CPAP Machine MISC by Does not apply route      Biotin (BIOTIN 5000) 5 MG CAPS Take 1 capsule by mouth daily      Misc Natural Products (OSTEO BI-FLEX ADV DOUBLE ST PO) Take 1 tablet by mouth daily      carvedilol (COREG) 6.25 MG tablet Take 1 tablet by mouth 2 times daily (with meals) 180 tablet 3    albuterol (PROVENTIL HFA;VENTOLIN HFA) 108 (90 BASE) MCG/ACT inhaler Inhale 2 puffs into the lungs 2 times daily AND EVERY 4-6 HOURS AS NEEDED      Multiple Vitamins-Minerals (ICAPS) CAPS Take 1 capsule by mouth daily.       vitamin B-12 (CYANOCOBALAMIN) 1000 MCG tablet Take 1,000 mcg by mouth daily.  levothyroxine (SYNTHROID) 88 MCG tablet Take 88 mcg by mouth daily.  aspirin 81 MG chewable tablet Take 81 mg by mouth daily.  spironolactone (ALDACTONE) 50 MG tablet Take 1 tablet by mouth daily (Patient taking differently: Take 25 mg by mouth daily ) 30 tablet 0    PROCTOZONE-HC 2.5 % CREA rectal cream INSERT RECTALLY TWICE DAILY as directed AS NEEDED FOR HEMORRHOIDS      ZIOPTAN 0.0015 % SOLN instill ONE drop IN EACH EYE DAILY      CETIRIZINE HCL PO Take by mouth (Patient not taking: Reported on 6/28/2022)      lidocaine 4 % external patch Place 1 patch onto the skin daily (Patient not taking: Reported on 4/19/2022) 30 patch 0    cycloSPORINE (RESTASIS) 0.05 % ophthalmic emulsion 1 drop 2 times daily (Patient not taking: Reported on 6/28/2022)      ciprofloxacin (CILOXAN) 0.3 % ophthalmic solution 3 drops WEEKLY (route: otic (ear)) (Patient not taking: Reported on 4/19/2022)      umeclidinium-vilanterol (ANORO ELLIPTA) 62.5-25 MCG/INH AEPB inhaler Inhale 1 puff into the lungs daily 1 each 11    timolol (TIMOPTIC-XE) 0.25 % ophthalmic gel-forming 1 drop daily (Patient not taking: Reported on 4/1/2022)       No current facility-administered medications for this visit. Allergies   Allergen Reactions    Darvocet [Propoxyphene N-Acetaminophen]     Ranexa [Ranolazine Er]      Sick to her stomach    Ranolazine      Sick to her stomach    Sulfa Antibiotics Itching    Sulfasalazine Itching    Adhesive Tape Rash    Allantoin Rash    Bacitracin Rash    Gramicidin Rash    Neomycin Rash    Polymyxin B Rash    Pramoxine Hcl Rash    Silicone Rash       SUBJECTIVE:     Review of Systems   Constitutional: Negative for fatigue and fever. Respiratory: Positive for shortness of breath (chronic). Negative for cough. Cardiovascular: Negative for chest pain, palpitations and leg swelling.    Musculoskeletal: Negative for arthralgias and gait problem. Neurological: Negative for dizziness (resolved), syncope, weakness, light-headedness and headaches. OBJECTIVE:   Today's Vitals:  BP (!) 98/58 (Site: Right Upper Arm, Position: Sitting, Cuff Size: Medium Adult)   Pulse 60   Resp 16   Ht 4' 11\" (1.499 m)   Wt 132 lb (59.9 kg)   SpO2 96%   BMI 26.66 kg/m²     Wt Readings from Last 3 Encounters:   06/28/22 132 lb (59.9 kg)   04/19/22 135 lb 2 oz (61.3 kg)   04/05/22 143 lb (64.9 kg)     BP Readings from Last 3 Encounters:   06/28/22 (!) 98/58   04/19/22 96/60   04/05/22 114/60     Pulse Readings from Last 3 Encounters:   06/28/22 60   04/19/22 60   04/05/22 60     Body mass index is 26.66 kg/m². Physical Exam  Vitals reviewed. Constitutional:       General: She is not in acute distress. Appearance: Normal appearance. She is not ill-appearing. HENT:      Head: Atraumatic. Neck:      Vascular: No carotid bruit. Cardiovascular:      Rate and Rhythm: Normal rate and regular rhythm. Pulses: Normal pulses. Heart sounds: Normal heart sounds. No murmur heard. Pulmonary:      Effort: Pulmonary effort is normal. No respiratory distress. Breath sounds: Normal breath sounds. Musculoskeletal:         General: No swelling or deformity. Cervical back: Neck supple. No muscular tenderness. Neurological:      Mental Status: She is alert. 6 minute walk test:  Not done today. Required Oxygen No    Most recent ECHO:   3/21/2022   Summary   Left ventricular systolic function is normal.   Concentric LVH   Ejection fraction is visually estimated at 50-55%. Small left ventricle cavity due to right ventricle volume overload. Bilateral atrial enlargement   Severely dilated right ventricle; right heart strain measurements were   normal.   PPM wiring visualized within the right heart. Severe tricuspid regurgitation; RVSP: 57 mmHg due to free flow regurgitation.    No evidence of any pericardial effusion. Inferior vena cava is dilated, measuring at 3.6 cm, and does not collapse with respiration. Results reviewed:  BNP:   Lab Results   Component Value Date    BNP 90 09/28/2013    PROBNP 1,658 (H) 03/21/2022     CBC:   Lab Results   Component Value Date    WBC 9.0 03/29/2022    RBC 4.29 03/29/2022    HGB 11.6 03/29/2022    HCT 38.2 03/29/2022     03/29/2022     CMP:    Lab Results   Component Value Date     03/29/2022    K 4.5 03/29/2022    CL 92 03/29/2022    CO2 27 03/29/2022    BUN 27 03/29/2022    CREATININE 1.0 03/29/2022    GFRAA >60 03/29/2022    LABGLOM 52 03/29/2022    GLUCOSE 113 03/29/2022    CALCIUM 9.2 03/29/2022     Hepatic Function Panel:    Lab Results   Component Value Date    ALKPHOS 173 03/18/2022    ALT 16 03/18/2022    AST 32 03/18/2022    PROT 6.2 03/18/2022    PROT 6.8 11/28/2012    BILITOT 1.6 03/18/2022    BILIDIR 1.0 10/08/2021    IBILI 1.0 10/08/2021    LABALBU 3.5 03/18/2022     Magnesium:    Lab Results   Component Value Date    MG 2.0 03/22/2022     PT/INR:    Lab Results   Component Value Date    PROTIME 24.4 03/24/2022    PROTIME 39.6 11/18/2011    INR 1.88 03/24/2022     Lipids:    Lab Results   Component Value Date    TRIG 82 10/29/2018    HDL 49 10/29/2018    LDLCALC 100 07/05/2017    LDLDIRECT 116 10/29/2018       Iron Studies:  No components found for: FE,  TIBC,  FERRITIN    Iron Deficiency Anemia:  Yes IV Iron Therapy: Will check anemia panel in 3 months. 2017 ACC/AHA HF Guidelines:   intravenous iron replacement in patients with New York Heart Association (NYHA) class II and III HF and iron deficiency (ferritin <100 ng/ml or 100-300 ng/ml if transferrin saturation <20%), to improve functional status and QoL. ASSESSMENT AND PLAN:     CHF (congestive heart failure), NYHA class I, acute on chronic, combined (HCC)    · Beta blocker carvedilol.   · On diuretic? toresemide 40 mg morning and 20 mg afternoon  · IF NYHA class II-IV with eGFR >30/mil/min/173.m2, K <5.0 mEq/l and EF < 35% Aldactone? Yes  · ICD counseling: NA EF >40%  · SLGT2 inhibitor? N/A  · Continue with daily weights- to bring in records on each office visit  · Fluid restriction of 2 Liters per day  · Limit sodium in diet to around 3096-4592 mg/day  · Monitor BP at home- to bring in records on each office visit   · Increase exercise as able    Referal to cardiac rehab if < 40% NA    Continue torsemide 40 mg in morning and 20 mg in afternoon. She appears Euvolemic  Check CBC and iron panel piror to next OV. Patient was instructed to call the [address] for changes in the following symptoms:    Weight gain of 3 pounds in 1 day or 5 pounds in 1 week   Increased shortness of breath   Shortness of breath while laying down   Cough   Chest pain   Swelling in feet, ankles or legs   Tenderness or bloating in the abdomen   Fatigue    Decreased appetite or nausea    Confusion      Return in about 6 months (around 12/28/2022). or sooner if needed     Patient given educational materials - see patient instructions. We discussed the importance of weighing oneself and recording daily. We also discussed the importance of a lowsodium diet, higher sodium foods to avoid and better low sodium food options. Discussed use, benefit, and side effects of prescribed medications. All patient questions answered. Patient verbalizes understanding of plan of care using teach back method, and is agreeable to the treatment plan.      Copy of note to be sent to consulting provider and primary cardiologist   Electronicallysigned by LISA Covarrubias CNP on 6/28/2022 at 11:36 AM

## 2022-06-28 NOTE — PROGRESS NOTES
CHF CLINICAL STAFF DOCUMENTATION    Have you had any Chest Pain? - No    Do you had any Shortness of Breath - Yes  If Yes - When on exertion    Have you had any dizziness - No  When do you feel dizzy none   How long does it last .none  none     Do you have any edema -  No  swelling in none      Are you on fluid restrictions - Yes    Amount - 64 oz   Are you on sodium restrictions - Yes   Amount - 2 gm +      Do you feel fatigued - No    Do you have a cough - No    Lung sounds -    Normal - Yes   Abnormal -     Do you have abdominal bloating - No    How is your appetite - good    Do you have difficulty sleeping - No  Able to lie flat? - Yes    Do you have a history of sleep apnea - yes  CPAP yes        6 min.  Walk: deferred c/o shortness of breath coming into office from parking lot   Pre heart rate   Time walked   Distance    Post heart rate        Have you had Vaccine for Covid - Yes    Have you had Flu Vaccine - N/A    Have you had Pneumonia Vaccine - Yes

## 2022-07-07 ENCOUNTER — HOSPITAL ENCOUNTER (INPATIENT)
Age: 87
LOS: 2 days | Discharge: HOME | DRG: 291 | End: 2022-07-09
Attending: EMERGENCY MEDICINE | Admitting: HOSPITALIST
Payer: MEDICARE

## 2022-07-07 ENCOUNTER — APPOINTMENT (OUTPATIENT)
Dept: CT IMAGING | Age: 87
DRG: 291 | End: 2022-07-07
Payer: MEDICARE

## 2022-07-07 ENCOUNTER — APPOINTMENT (OUTPATIENT)
Dept: GENERAL RADIOLOGY | Age: 87
DRG: 291 | End: 2022-07-07
Payer: MEDICARE

## 2022-07-07 DIAGNOSIS — I50.9 CONGESTIVE HEART FAILURE, UNSPECIFIED HF CHRONICITY, UNSPECIFIED HEART FAILURE TYPE (HCC): ICD-10-CM

## 2022-07-07 DIAGNOSIS — R06.00 DYSPNEA AND RESPIRATORY ABNORMALITIES: ICD-10-CM

## 2022-07-07 DIAGNOSIS — M80.08XA AGE-RELATED OSTEOPOROSIS WITH CURRENT PATHOLOGICAL FRACTURE OF VERTEBRA, INITIAL ENCOUNTER (HCC): ICD-10-CM

## 2022-07-07 DIAGNOSIS — J90 PLEURAL EFFUSION: ICD-10-CM

## 2022-07-07 DIAGNOSIS — R07.9 CHEST PAIN, UNSPECIFIED TYPE: Primary | ICD-10-CM

## 2022-07-07 DIAGNOSIS — R79.89 ELEVATED BRAIN NATRIURETIC PEPTIDE (BNP) LEVEL: ICD-10-CM

## 2022-07-07 DIAGNOSIS — R06.89 DYSPNEA AND RESPIRATORY ABNORMALITIES: ICD-10-CM

## 2022-07-07 DIAGNOSIS — S22.39XA CLOSED FRACTURE OF ONE RIB, UNSPECIFIED LATERALITY, INITIAL ENCOUNTER: ICD-10-CM

## 2022-07-07 LAB
ALBUMIN SERPL-MCNC: 4.4 GM/DL (ref 3.4–5)
ALP BLD-CCNC: 220 IU/L (ref 40–129)
ALT SERPL-CCNC: 19 U/L (ref 10–40)
ANION GAP SERPL CALCULATED.3IONS-SCNC: 11 MMOL/L (ref 4–16)
AST SERPL-CCNC: 28 IU/L (ref 15–37)
BACTERIA: NEGATIVE /HPF
BASOPHILS ABSOLUTE: 0.1 K/CU MM
BASOPHILS RELATIVE PERCENT: 0.7 % (ref 0–1)
BILIRUB SERPL-MCNC: 1.1 MG/DL (ref 0–1)
BILIRUBIN URINE: NEGATIVE MG/DL
BLOOD, URINE: ABNORMAL
BUN BLDV-MCNC: 31 MG/DL (ref 6–23)
CALCIUM SERPL-MCNC: 10.1 MG/DL (ref 8.3–10.6)
CHLORIDE BLD-SCNC: 99 MMOL/L (ref 99–110)
CLARITY: CLEAR
CO2: 28 MMOL/L (ref 21–32)
COLOR: YELLOW
CREAT SERPL-MCNC: 0.7 MG/DL (ref 0.6–1.1)
DIFFERENTIAL TYPE: ABNORMAL
EKG DIAGNOSIS: NORMAL
EKG P AXIS: 13 DEGREES
EKG Q-T INTERVAL: 414 MS
EKG QRS DURATION: 74 MS
EKG QTC CALCULATION (BAZETT): 420 MS
EKG R AXIS: -71 DEGREES
EKG T AXIS: 85 DEGREES
EKG VENTRICULAR RATE: 62 BPM
EOSINOPHILS ABSOLUTE: 0.4 K/CU MM
EOSINOPHILS RELATIVE PERCENT: 4.1 % (ref 0–3)
GFR AFRICAN AMERICAN: >60 ML/MIN/1.73M2
GFR NON-AFRICAN AMERICAN: >60 ML/MIN/1.73M2
GLUCOSE BLD-MCNC: 92 MG/DL (ref 70–99)
GLUCOSE, URINE: NEGATIVE MG/DL
HCT VFR BLD CALC: 41.1 % (ref 37–47)
HEMOGLOBIN: 12.8 GM/DL (ref 12.5–16)
HYALINE CASTS: 5 /LPF
IMMATURE NEUTROPHIL %: 0.8 % (ref 0–0.43)
KETONES, URINE: NEGATIVE MG/DL
LACTATE: 1 MMOL/L (ref 0.4–2)
LEUKOCYTE ESTERASE, URINE: ABNORMAL
LIPASE: 41 IU/L (ref 13–60)
LYMPHOCYTES ABSOLUTE: 0.8 K/CU MM
LYMPHOCYTES RELATIVE PERCENT: 9.2 % (ref 24–44)
MCH RBC QN AUTO: 31.4 PG (ref 27–31)
MCHC RBC AUTO-ENTMCNC: 31.1 % (ref 32–36)
MCV RBC AUTO: 101 FL (ref 78–100)
MONOCYTES ABSOLUTE: 0.7 K/CU MM
MONOCYTES RELATIVE PERCENT: 7.6 % (ref 0–4)
MUCUS: ABNORMAL HPF
NITRITE URINE, QUANTITATIVE: NEGATIVE
NUCLEATED RBC %: 0 %
PDW BLD-RTO: 19.2 % (ref 11.7–14.9)
PH, URINE: 6.5 (ref 5–8)
PLATELET # BLD: 314 K/CU MM (ref 140–440)
PMV BLD AUTO: 11 FL (ref 7.5–11.1)
POTASSIUM SERPL-SCNC: 4 MMOL/L (ref 3.5–5.1)
PRO-BNP: 1251 PG/ML
PROTEIN UA: NEGATIVE MG/DL
RBC # BLD: 4.07 M/CU MM (ref 4.2–5.4)
RBC URINE: 2 /HPF (ref 0–6)
SEGMENTED NEUTROPHILS ABSOLUTE COUNT: 6.8 K/CU MM
SEGMENTED NEUTROPHILS RELATIVE PERCENT: 77.6 % (ref 36–66)
SODIUM BLD-SCNC: 138 MMOL/L (ref 135–145)
SPECIFIC GRAVITY UA: 1.01 (ref 1–1.03)
TOTAL IMMATURE NEUTOROPHIL: 0.07 K/CU MM
TOTAL NUCLEATED RBC: 0 K/CU MM
TOTAL PROTEIN: 6.8 GM/DL (ref 6.4–8.2)
TRICHOMONAS: ABNORMAL /HPF
TROPONIN T: 0.01 NG/ML
TROPONIN T: <0.01 NG/ML
TSH HIGH SENSITIVITY: 5.21 UIU/ML (ref 0.27–4.2)
UROBILINOGEN, URINE: 0.2 MG/DL (ref 0.2–1)
WBC # BLD: 8.8 K/CU MM (ref 4–10.5)
WBC CLUMP: ABNORMAL /HPF
WBC UA: 53 /HPF (ref 0–5)

## 2022-07-07 PROCEDURE — 36415 COLL VENOUS BLD VENIPUNCTURE: CPT

## 2022-07-07 PROCEDURE — 83690 ASSAY OF LIPASE: CPT

## 2022-07-07 PROCEDURE — 2140000000 HC CCU INTERMEDIATE R&B

## 2022-07-07 PROCEDURE — 71275 CT ANGIOGRAPHY CHEST: CPT

## 2022-07-07 PROCEDURE — 99285 EMERGENCY DEPT VISIT HI MDM: CPT

## 2022-07-07 PROCEDURE — 83880 ASSAY OF NATRIURETIC PEPTIDE: CPT

## 2022-07-07 PROCEDURE — 85025 COMPLETE CBC W/AUTO DIFF WBC: CPT

## 2022-07-07 PROCEDURE — G0378 HOSPITAL OBSERVATION PER HR: HCPCS

## 2022-07-07 PROCEDURE — 71045 X-RAY EXAM CHEST 1 VIEW: CPT

## 2022-07-07 PROCEDURE — 84443 ASSAY THYROID STIM HORMONE: CPT

## 2022-07-07 PROCEDURE — 94660 CPAP INITIATION&MGMT: CPT

## 2022-07-07 PROCEDURE — 2580000003 HC RX 258: Performed by: EMERGENCY MEDICINE

## 2022-07-07 PROCEDURE — 93010 ELECTROCARDIOGRAM REPORT: CPT | Performed by: INTERNAL MEDICINE

## 2022-07-07 PROCEDURE — 81001 URINALYSIS AUTO W/SCOPE: CPT

## 2022-07-07 PROCEDURE — 94640 AIRWAY INHALATION TREATMENT: CPT

## 2022-07-07 PROCEDURE — 93005 ELECTROCARDIOGRAM TRACING: CPT | Performed by: EMERGENCY MEDICINE

## 2022-07-07 PROCEDURE — 80053 COMPREHEN METABOLIC PANEL: CPT

## 2022-07-07 PROCEDURE — 99222 1ST HOSP IP/OBS MODERATE 55: CPT | Performed by: INTERNAL MEDICINE

## 2022-07-07 PROCEDURE — 6370000000 HC RX 637 (ALT 250 FOR IP): Performed by: NURSE PRACTITIONER

## 2022-07-07 PROCEDURE — 2580000003 HC RX 258: Performed by: NURSE PRACTITIONER

## 2022-07-07 PROCEDURE — 83605 ASSAY OF LACTIC ACID: CPT

## 2022-07-07 PROCEDURE — 6360000004 HC RX CONTRAST MEDICATION: Performed by: EMERGENCY MEDICINE

## 2022-07-07 PROCEDURE — 2700000000 HC OXYGEN THERAPY PER DAY

## 2022-07-07 PROCEDURE — 84484 ASSAY OF TROPONIN QUANT: CPT

## 2022-07-07 RX ORDER — ASPIRIN 81 MG/1
81 TABLET, CHEWABLE ORAL DAILY
Status: DISCONTINUED | OUTPATIENT
Start: 2022-07-08 | End: 2022-07-09 | Stop reason: HOSPADM

## 2022-07-07 RX ORDER — TRAZODONE HYDROCHLORIDE 50 MG/1
50-100 TABLET ORAL NIGHTLY PRN
Status: ON HOLD | COMMUNITY

## 2022-07-07 RX ORDER — MORPHINE SULFATE 2 MG/ML
2 INJECTION, SOLUTION INTRAMUSCULAR; INTRAVENOUS EVERY 30 MIN PRN
Status: DISCONTINUED | OUTPATIENT
Start: 2022-07-07 | End: 2022-07-09 | Stop reason: HOSPADM

## 2022-07-07 RX ORDER — LEVOTHYROXINE SODIUM 88 UG/1
88 TABLET ORAL DAILY
Status: DISCONTINUED | OUTPATIENT
Start: 2022-07-08 | End: 2022-07-09 | Stop reason: HOSPADM

## 2022-07-07 RX ORDER — SODIUM CHLORIDE 9 MG/ML
INJECTION, SOLUTION INTRAVENOUS PRN
Status: DISCONTINUED | OUTPATIENT
Start: 2022-07-07 | End: 2022-07-09 | Stop reason: HOSPADM

## 2022-07-07 RX ORDER — SODIUM CHLORIDE 0.9 % (FLUSH) 0.9 %
5-40 SYRINGE (ML) INJECTION PRN
Status: DISCONTINUED | OUTPATIENT
Start: 2022-07-07 | End: 2022-07-09 | Stop reason: HOSPADM

## 2022-07-07 RX ORDER — ACETAMINOPHEN 650 MG/1
650 SUPPOSITORY RECTAL EVERY 6 HOURS PRN
Status: DISCONTINUED | OUTPATIENT
Start: 2022-07-07 | End: 2022-07-09 | Stop reason: HOSPADM

## 2022-07-07 RX ORDER — LIDOCAINE 4 G/G
1 PATCH TOPICAL DAILY
Status: DISCONTINUED | OUTPATIENT
Start: 2022-07-07 | End: 2022-07-09 | Stop reason: HOSPADM

## 2022-07-07 RX ORDER — ONDANSETRON 2 MG/ML
4 INJECTION INTRAMUSCULAR; INTRAVENOUS EVERY 30 MIN PRN
Status: DISCONTINUED | OUTPATIENT
Start: 2022-07-07 | End: 2022-07-09 | Stop reason: HOSPADM

## 2022-07-07 RX ORDER — ATORVASTATIN CALCIUM 10 MG/1
10 TABLET, FILM COATED ORAL DAILY
Status: DISCONTINUED | OUTPATIENT
Start: 2022-07-07 | End: 2022-07-09 | Stop reason: HOSPADM

## 2022-07-07 RX ORDER — ONDANSETRON 4 MG/1
4 TABLET, ORALLY DISINTEGRATING ORAL EVERY 8 HOURS PRN
Status: DISCONTINUED | OUTPATIENT
Start: 2022-07-07 | End: 2022-07-09 | Stop reason: HOSPADM

## 2022-07-07 RX ORDER — ACETAMINOPHEN 500 MG
1000 TABLET ORAL EVERY 4 HOURS PRN
Status: ON HOLD | COMMUNITY

## 2022-07-07 RX ORDER — GLIMEPIRIDE 2 MG/1
1 TABLET ORAL DAILY
Status: DISCONTINUED | OUTPATIENT
Start: 2022-07-08 | End: 2022-07-09 | Stop reason: HOSPADM

## 2022-07-07 RX ORDER — POLYETHYLENE GLYCOL 3350 17 G/17G
17 POWDER, FOR SOLUTION ORAL DAILY PRN
Status: DISCONTINUED | OUTPATIENT
Start: 2022-07-07 | End: 2022-07-09 | Stop reason: HOSPADM

## 2022-07-07 RX ORDER — CARVEDILOL 6.25 MG/1
6.25 TABLET ORAL 2 TIMES DAILY WITH MEALS
Status: DISCONTINUED | OUTPATIENT
Start: 2022-07-07 | End: 2022-07-09 | Stop reason: HOSPADM

## 2022-07-07 RX ORDER — 0.9 % SODIUM CHLORIDE 0.9 %
10 VIAL (ML) INJECTION
Status: COMPLETED | OUTPATIENT
Start: 2022-07-07 | End: 2022-07-07

## 2022-07-07 RX ORDER — ACETAMINOPHEN 325 MG/1
650 TABLET ORAL EVERY 6 HOURS PRN
Status: DISCONTINUED | OUTPATIENT
Start: 2022-07-07 | End: 2022-07-09 | Stop reason: HOSPADM

## 2022-07-07 RX ORDER — SODIUM CHLORIDE 0.9 % (FLUSH) 0.9 %
5-40 SYRINGE (ML) INJECTION EVERY 12 HOURS SCHEDULED
Status: DISCONTINUED | OUTPATIENT
Start: 2022-07-07 | End: 2022-07-09 | Stop reason: HOSPADM

## 2022-07-07 RX ORDER — LATANOPROST 50 UG/ML
1 SOLUTION/ DROPS OPHTHALMIC NIGHTLY
Status: DISCONTINUED | OUTPATIENT
Start: 2022-07-07 | End: 2022-07-09 | Stop reason: HOSPADM

## 2022-07-07 RX ORDER — GLIMEPIRIDE 2 MG/1
1 TABLET ORAL 2 TIMES DAILY
Status: DISCONTINUED | OUTPATIENT
Start: 2022-07-07 | End: 2022-07-07

## 2022-07-07 RX ORDER — ONDANSETRON 2 MG/ML
4 INJECTION INTRAMUSCULAR; INTRAVENOUS EVERY 6 HOURS PRN
Status: DISCONTINUED | OUTPATIENT
Start: 2022-07-07 | End: 2022-07-09 | Stop reason: HOSPADM

## 2022-07-07 RX ORDER — ENOXAPARIN SODIUM 100 MG/ML
30 INJECTION SUBCUTANEOUS DAILY
Status: DISCONTINUED | OUTPATIENT
Start: 2022-07-08 | End: 2022-07-09 | Stop reason: HOSPADM

## 2022-07-07 RX ORDER — FUROSEMIDE 10 MG/ML
20 INJECTION INTRAMUSCULAR; INTRAVENOUS ONCE
Status: DISCONTINUED | OUTPATIENT
Start: 2022-07-07 | End: 2022-07-09 | Stop reason: HOSPADM

## 2022-07-07 RX ORDER — ALBUTEROL SULFATE 90 UG/1
2 AEROSOL, METERED RESPIRATORY (INHALATION) EVERY 4 HOURS PRN
Status: DISCONTINUED | OUTPATIENT
Start: 2022-07-07 | End: 2022-07-09 | Stop reason: HOSPADM

## 2022-07-07 RX ORDER — SPIRONOLACTONE 50 MG/1
25 TABLET, FILM COATED ORAL DAILY
Status: DISCONTINUED | OUTPATIENT
Start: 2022-07-08 | End: 2022-07-09 | Stop reason: HOSPADM

## 2022-07-07 RX ORDER — TORSEMIDE 20 MG/1
20 TABLET ORAL DAILY
Status: DISCONTINUED | OUTPATIENT
Start: 2022-07-08 | End: 2022-07-09 | Stop reason: HOSPADM

## 2022-07-07 RX ADMIN — CARVEDILOL 6.25 MG: 6.25 TABLET, FILM COATED ORAL at 21:39

## 2022-07-07 RX ADMIN — LATANOPROST 1 DROP: 50 SOLUTION/ DROPS OPHTHALMIC at 21:39

## 2022-07-07 RX ADMIN — ATORVASTATIN CALCIUM 10 MG: 10 TABLET, FILM COATED ORAL at 21:39

## 2022-07-07 RX ADMIN — ALBUTEROL SULFATE 2 PUFF: 90 AEROSOL, METERED RESPIRATORY (INHALATION) at 21:52

## 2022-07-07 RX ADMIN — SODIUM CHLORIDE, PRESERVATIVE FREE 10 ML: 5 INJECTION INTRAVENOUS at 21:41

## 2022-07-07 RX ADMIN — Medication 10 ML: at 14:17

## 2022-07-07 RX ADMIN — IOPAMIDOL 75 ML: 755 INJECTION, SOLUTION INTRAVENOUS at 14:18

## 2022-07-07 ASSESSMENT — ENCOUNTER SYMPTOMS
SHORTNESS OF BREATH: 1
GASTROINTESTINAL NEGATIVE: 1
BACK PAIN: 1
EYES NEGATIVE: 1
ALLERGIC/IMMUNOLOGIC NEGATIVE: 1

## 2022-07-07 ASSESSMENT — PAIN SCALES - GENERAL: PAINLEVEL_OUTOF10: 0

## 2022-07-07 NOTE — CONSULTS
Chart reviewed  Full note to follow                      Name:  Bir Thompson /Age/Sex: 3/18/1929  (80 y.o. female)   MRN & CSN:  [id number] & [id number] Admission Date/Time: 2022 12:09 PM   Location:   PCP: [address] [place] [address] Day: 1          Referring physician:  Carolin Rose MD         Reason for consultation: Chest pain chest pain        Thanks for referral.    Information source: Patient/staff/chart    CC; chest pain      HPI:   Thank you for involving me in taking  care of Bri Thompson who  is a 80 y. o.year  Old female  Presents with history of CAD CABG PCI, atrial fibrillation, hypertension, hyperlipidemia, permanent pacemaker implantation not with complains of chest pain her EKG and tropes are unremarkable however the pain has been getting worse over the past few days I have seen her for the similar complaints in an office , and the work-up has been done in the remote past for recurrence of CAD              Past medical history:    has a past medical history of Arthritis, Bradycardia, CAD (coronary artery disease), Cardiac pacemaker, CHF (congestive heart failure) ([address] COPD, mild ([address] Cor pulmonale (chronic) ([address] CVA (cerebrovascular accident) ([address] DJD (degenerative joint disease) of cervical spine, Exhaustion of cardiac pacemaker battery, Family history of cardiovascular disease, Glaucoma, H/O 24 hour EKG monitoring, H/O cardiac catheterization, H/O cardiovascular stress test, H/O cardiovascular stress test, H/O cardiovascular stress test, H/O chest x-ray, H/O Doppler lower venous ultrasound, H/O Doppler ultrasound, H/O Doppler ultrasound, H/O Doppler ultrasound, H/O Doppler ultrasound, H/O echocardiogram, H/O echocardiogram, H/O echocardiogram, H/O echocardiogram, History of complete ECG, History of nuclear stress test, Hx of cardiovascular stress test, HX OTHER MEDICAL, Hyperlipidemia, Hypertension, Mild intermittent asthma, Moderate COPD (chronic obstructive pulmonary disease) ([facility] Nausea & vomiting, Obstructive sleep apnea, Paroxysmal atrial fibrillation (HCC), Post PTCA, Pulmonary HTN ([facility] PVD (peripheral vascular disease) ([facility] S/P CABG x 3, S/P PTCA (percutaneous transluminal coronary angioplasty), Severe pulmonary hypertension ([facility] Shortness of breath, Thyroid disease, and Unspecified cerebral artery occlusion with cerebral infarction. Past surgical history:   has a past surgical history that includes Appendectomy (1941); Tonsillectomy (1950's); Cardiac surgery (4/09); Hysterectomy, total abdominal (1990's); Colonoscopy (In 2000's); pacemaker placement; Coronary artery bypass graft (4/8/2009); Coronary angioplasty with stent (4/21/2010); other surgical history; Middle ear surgery; and Percutaneous Transluminal Coronary Angio (11/2012). Social History:   reports that she quit smoking about 51 years ago. Her smoking use included cigarettes. She has a 1.25 pack-year smoking history. She has never used smokeless tobacco. She reports previous alcohol use. She reports that she does not use drugs. Family history:  family history includes Arthritis in her mother; Cancer in her mother and sister; Coronary Art Dis in her father; Depression in her mother and sister; Early Death in her paternal grandfather; Early Death (age of onset: 36) in her father; Hearing Loss in her mother; Heart Disease in her father and sister; High Blood Pressure in her father, mother, sister, son, and son; High Cholesterol in her father and mother; Mental Illness in her mother; Jigar Basket / Edyth Santos in her mother; Other in her daughter.     Allergies   Allergen Reactions    Darvocet [Propoxyphene N-Acetaminophen]     Ranexa [Ranolazine Er]      Sick to her stomach    Ranolazine      Sick to her stomach    Sulfa Antibiotics Itching    Sulfasalazine Itching    Adhesive Tape Rash    Allantoin Rash    Bacitracin Rash    Gramicidin Rash    Neomycin Rash    Polymyxin B Rash    Pramoxine Hcl Rash    Silicone Rash       morphine injection 2 mg, Q30 Min PRN  ondansetron (ZOFRAN) injection 4 mg, Q30 Min PRN  furosemide (LASIX) injection 20 mg, Once  lidocaine 4 % external patch 1 patch, Daily      Current Facility-Administered Medications   Medication Dose Route Frequency Provider Last Rate Last Admin    morphine injection 2 mg  2 mg IntraVENous Q30 Min PRN Kohler Ralphs, DO        ondansetron (ZOFRAN) injection 4 mg  4 mg IntraVENous Q30 Min PRN Kohler Ralphs, DO        furosemide (LASIX) injection 20 mg  20 mg IntraVENous Once Kohler Ralphs, DO        lidocaine 4 % external patch 1 patch  1 patch TransDERmal Daily Haley Cortez, APRN - CNP         Current Outpatient Medications   Medication Sig Dispense Refill    Turmeric (QC TUMERIC COMPLEX PO) Take 1 capsule by mouth daily      traZODone (DESYREL) 50 MG tablet Take  mg by mouth nightly as needed for Sleep      acetaminophen (TYLENOL) 500 MG tablet Take 1,000 mg by mouth every 4 hours      ferrous sulfate (IRON 325) 325 (65 Fe) MG tablet Take 1 tablet by mouth daily (with breakfast) 30 tablet 3    torsemide (DEMADEX) 20 MG tablet Take 1 tablet by mouth 2 times daily (Patient taking differently: Take 20 mg by mouth at bedtime Takes in the afternoon) 60 tablet 5    torsemide (DEMADEX) 20 MG tablet Take 1 tablet by mouth as needed (for fluid retention or weight gain of 3 lbs overnight) This is in addition to the 20 mg bid (Patient taking differently: Take 40 mg by mouth every morning This is in addition to the 20 mg bid-  Takes demadex 20 mg (2 tablets in a.m.) and 1 tablet at night) 30 tablet 0    spironolactone (ALDACTONE) 50 MG tablet Take 1 tablet by mouth daily (Patient taking differently: Take 25 mg by mouth daily ) 30 tablet 0    PROCTOZONE-HC 2.5 % CREA rectal cream INSERT RECTALLY TWICE DAILY as directed AS NEEDED FOR HEMORRHOIDS (Patient not taking: Reported on 7/7/2022)      ZIOPTAN 0.0015 % SOLN Place 1 drop into both eyes Daily with supper       CETIRIZINE HCL PO Take by mouth (Patient not taking: Reported on 6/28/2022)      lidocaine 4 % external patch Place 1 patch onto the skin daily (Patient not taking: Reported on 4/19/2022) 30 patch 0    vitamin D 25 MCG (1000 UT) CAPS Take 5,000 Units by mouth daily      Probiotic Product (PROBIOTIC-10) CHEW Take by mouth (Patient not taking: Reported on 7/7/2022)      atorvastatin (LIPITOR) 10 MG tablet Take 10 mg by mouth daily       umeclidinium-vilanterol (ANORO ELLIPTA) 62.5-25 MCG/INH AEPB inhaler Inhale 1 puff into the lungs daily 1 each 11    CPAP Machine MISC by Does not apply route      Biotin (BIOTIN 5000) 5 MG CAPS Take 1 capsule by mouth daily      Misc Natural Products (OSTEO BI-FLEX ADV DOUBLE ST PO) Take 1 tablet by mouth daily      timolol (TIMOPTIC-XE) 0.25 % ophthalmic gel-forming Place 1 drop into both eyes daily       carvedilol (COREG) 6.25 MG tablet Take 1 tablet by mouth 2 times daily (with meals) 180 tablet 3    albuterol (PROVENTIL HFA;VENTOLIN HFA) 108 (90 BASE) MCG/ACT inhaler Inhale 2 puffs into the lungs 2 times daily AND EVERY 4-6 HOURS AS NEEDED      Multiple Vitamins-Minerals (ICAPS) CAPS Take 1 capsule by mouth 2 times daily       vitamin B-12 (CYANOCOBALAMIN) 1000 MCG tablet Take 1,000 mcg by mouth daily.  levothyroxine (SYNTHROID) 88 MCG tablet Take 88 mcg by mouth daily.  aspirin 81 MG chewable tablet Take 81 mg by mouth daily. Review of Systems:  All 14 systems reviewed, all negative except for  c all that lives with    Physical Examination:    /86   Pulse 62   Temp 97.9 °F (36.6 °C) (Oral)   Resp 16   Wt 132 lb (59.9 kg)   SpO2 98%   BMI 26.66 kg/m²      Wt Readings from Last 3 Encounters:   07/07/22 132 lb (59.9 kg)   06/28/22 132 lb (59.9 kg)   04/19/22 135 lb 2 oz (61.3 kg)     Body mass index is 26.66 kg/m².       General Appearance: Fair  Head: normocephalic     Eyes: normal, noninjected conjunctiva    ENT: normal mucosa, noninjected throat, normal     NECK: No JVP  No thyromegaly        Cardiovascular: No thrills palpated   Auscultation: Normal S1 and S2, no murmur   carotid bruit no   Abdominal Aorta no bruit    Respiratory:    Breath sounds diminished bilaterally    Extremities: Trace edema clubbing ,   no cyanosis    SKIN: Warm and well perfused, no pallor or cyanosis    Vascular exam:  Pedal Pulses: Palp bilaterally        Abdomen:  No masses or tenderness. No organomegaly noted. Neurological:  Oriented to time, place, and person   No focal neurological deficit noted.   Psychiatric:normal mood, no anxiety    Lab Review   Recent Results (from the past 24 hour(s))   EKG 12 Lead    Collection Time: 07/07/22 12:22 PM   Result Value Ref Range    Ventricular Rate 62 BPM    QRS Duration 74 ms    Q-T Interval 414 ms    QTc Calculation (Bazett) 420 ms    P Axis 13 degrees    R Axis -71 degrees    T Axis 85 degrees    Diagnosis       Ventricular-paced rhythm  Abnormal ECG  When compared with ECG of 19-MAR-2022 00:57,  No significant change was found    Confirmed by [facility] Marcella RHODES ([id number]) on 7/7/2022 2:48:07 PM     CBC with Auto Differential    Collection Time: 07/07/22 12:26 PM   Result Value Ref Range    WBC 8.8 4.0 - 10.5 K/CU MM    RBC 4.07 (L) 4.2 - 5.4 M/CU MM    Hemoglobin 12.8 12.5 - 16.0 GM/DL    Hematocrit 41.1 37 - 47 %    .0 (H) 78 - 100 FL    MCH 31.4 (H) 27 - 31 PG    MCHC 31.1 (L) 32.0 - 36.0 %    RDW 19.2 (H) 11.7 - 14.9 %    Platelets 562 209 - 355 K/CU MM    MPV 11.0 7.5 - 11.1 FL    Differential Type AUTOMATED DIFFERENTIAL     Segs Relative 77.6 (H) 36 - 66 %    Lymphocytes % 9.2 (L) 24 - 44 %    Monocytes % 7.6 (H) 0 - 4 %    Eosinophils % 4.1 (H) 0 - 3 %    Basophils % 0.7 0 - 1 %    Segs Absolute 6.8 K/CU MM    Lymphocytes Absolute 0.8 K/CU MM    Monocytes Absolute 0.7 K/CU MM    Eosinophils Absolute 0.4 K/CU MM    Basophils Absolute 0.1 K/CU MM

## 2022-07-07 NOTE — ED PROVIDER NOTES
[facility]      TRIAGE CHIEF COMPLAINT:   Chest Pain (L sided, radiates to back )      [place]:  Darlene Fall is a 80 y.o. female that presents patient here with complaint of left-sided chest pain rating to her back. Again symptoms started recently. She has a known T7 fracture. Patient states she has been having back pain that radiates to her chest and vice versa. She denies any fevers nausea vomiting and abdominal pain or swelling. She has no history of CHF. She has been short of breath. She has a pacemaker. She used to be on Coumadin but no longer. No other questions or concerns. REVIEW OF SYSTEMS:  At least 10 systems reviewed and otherwise acutely negative except as in the [address] Review of Systems   Constitutional: Negative. HENT: Negative. Eyes: Negative. Respiratory: Positive for shortness of breath. Cardiovascular: Positive for chest pain. Gastrointestinal: Negative. Endocrine: Negative. Genitourinary: Negative. Musculoskeletal: Positive for back pain. Skin: Negative. Allergic/Immunologic: Negative. Neurological: Negative. Hematological: Negative. Psychiatric/Behavioral: Negative. All other systems reviewed and are negative.       Past Medical History:   Diagnosis Date    Arthritis     Bradycardia 2001    requiring dual chamber pacemaker at [facility] CAD (coronary artery disease)     Cardiac pacemaker 10/2001    [employer] #[id number]  [facility]- Serial # [id number]- Dr Ata Nelson CHF (congestive heart failure) ([address]     COPD, mild ([address] 2/17/2017    Cor pulmonale (chronic) ([address] 3/15/2022    CVA (cerebrovascular accident) ([address]     DJD (degenerative joint disease) of cervical spine     C5-C6, C6-C7    Exhaustion of cardiac pacemaker battery 11/21/2011    PPM battery replacement- [employer]    Family history of cardiovascular disease     Glaucoma Dx 2010    H/O 24 hour EKG monitoring 8/6/2000 8/6/2000- Intermittent episonde of a-fib/flutter    H/O cardiac catheterization 4/20/2010, 2/1989 4/20/2010-Severe native vessel disease and has graft disease as well. LAD, CX totally occluded. RCA totally occluded in proximal segment. VG to RCA widely patent. PDA 90% LAD afer LIMA anastomosis 80-90% stenosis. Proceeded with PTCA with stent next day.  H/O cardiovascular stress test 10/14/2011, 6/2/2010,4/8/2010, 5/18/2009,4/6/2009, 10/30/2007, 11/12/2004, 11/6/2003, 8/9/2002, 8/9/2001,6/5/2000,     10/14/2011-Lexiscan-Abnormal Myocardial Perfusion study. Evidence of mild ischemia in the Left CX region. Abnomal study. Rest EF 63%. Global LV systolic function normal. No ECG changes. Unremarkable pharmacological stress test.    H/O cardiovascular stress test 6/10/2013    thallium--mild ischemia left circumflex EF63% no change from 10/2011 study.  H/O cardiovascular stress test 10/16/2014    cardiolite-mild ischemia left circumflex,EF70%    H/O chest x-ray 4/19/2009 4/19/2009-Stable cardiomegaly. No acute cardiopulmonary disease.  H/O Doppler lower venous ultrasound 09/18/2019    Significant reflux noted in RGSV, RGSV is extremely tortuous and small along the thigh and calf and would be highly unlikely to be accessed, RSSV is non compressible and has occlusive chronic SVT, LSSV is non compressible with occlusive chronic SVT, LGSV removed s/p CABG, Significant reflux in LGSV tributary,    H/O Doppler ultrasound 3/31/2010    CAROTID- 3/31/2010-INtimal thickening but no significant atherosclerotic plaque noted in ANA PAULA. Doppler flow velocities within ANA PAULA are WNL. Heterogeneous, irregular atherosclerotic plaque noted in LICA. Doppler flow velocities within the LICA are elevated, consistent with a mild, less than 50% stenosis.     H/O Doppler ultrasound 5/24/2016    Carotid- normal study    H/O Doppler ultrasound 09/06/2017    carotid - normal study    H/O Doppler ultrasound     H/O echocardiogram 10/13 EF=60%, Severe Pulm. HTN, & Sclerotic aortic valve w/stenosis.  H/O echocardiogram 10/16/14     EF 55-60% Normal LV. Normal LV systolic function. Severe tricuspid insufficiency with severe hypertension.  H/O echocardiogram 02/03/2017    heart cath performed this morning    H/O echocardiogram 09/18/2019    EF 50-55%, Left atrium is mild to moderately dilated, right atrium is severely dilated, mildly dilated right ventricle, Mod MR, Severe TR, Severe Pulm HTN, no pericardial effusion     History of complete ECG     10/14/2011(Lexiscan);5/6/2010, 4/30/2009,10/24/2008,9/21/2007, 10/13/2006    History of nuclear stress test 11/17/2016    lexiscan-normal,EF70%    Hx of cardiovascular stress test 12/28/2018    EF 60%  Normal study.  HX OTHER MEDICAL 05/01/2017    MUGA-normal, EF53%    Hyperlipidemia     Hypertension     Mild intermittent asthma 7/28/2016    Moderate COPD (chronic obstructive pulmonary disease) (HCC) 3/15/2022    Nausea & vomiting     Obstructive sleep apnea 5/16/2017    Paroxysmal atrial fibrillation (HCC)     Post PTCA 4/21/2010    PTCA with 2.25 stent of the LIMA to LAD    Pulmonary HTN ([address]     Severe per last echo on 10/13. 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  Followed by MAZE procedure by pulmonary vein isolation.-  Dr Jessika Goldman S/P PTCA (percutaneous transluminal coronary angioplasty) 11/2012    PTCA with stent to RCA    Severe pulmonary hypertension ([address] 3/15/2022    Shortness of breath 3/15/2022    Thyroid disease     hypothyroi    Unspecified cerebral artery occlusion with cerebral infarction Unsure When    No Residual     Past Surgical History:   Procedure Laterality Date    APPENDECTOMY  1941    CARDIAC SURGERY  4/09    CABG (3 Bypasses), One Heart Stent in 2010    COLONOSCOPY  In 2000's    X1    CORONARY ANGIOPLASTY WITH STENT PLACEMENT  4/21/2010    PTCA with stent LIMA ->LAD    CORONARY ARTERY BYPASS GRAFT  2009    LIMA->Diag,  LIMA -> LAD;  SVG->RCA going to the PDA. Followed by MAZE procedure by pulmonary vein isolation.-  Dr Rita Boswell, TOTAL ABDOMINAL (CERVIX REMOVED)  's    MIDDLE EAR SURGERY      OTHER SURGICAL HISTORY      Ear surgery-hearing    PACEMAKER PLACEMENT      battery change 2011 Medtronic    PTCA  2012    Ptca with stent to RCA    TONSILLECTOMY  65's     Family History   Problem Relation Age of Onset    Cancer Mother         \"Liver Cancer\"    Arthritis Mother     Depression Mother     Hearing Loss Mother     High Blood Pressure Mother     High Cholesterol Mother     Mental Illness Mother    [de-identified] / Stillbirths Mother     Heart Disease Father     Early Death Father 36        \"Instant Death\"    High Blood Pressure Father     High Cholesterol Father     Coronary Art Dis Father         Massive MI    Heart Disease Sister     Depression Sister     Cancer Sister         \"Breast Cancer, Cancer Free Now\"    High Blood Pressure Sister     Other Daughter         \"She's Had Stomach Surgery\"    High Blood Pressure Son     High Blood Pressure Son     Early Death Paternal Grandfather      Social History     Socioeconomic History    Marital status:       Spouse name: Not on file    Number of children: 3    Years of education: Not on file    Highest education level: Not on file   Occupational History    Occupation: RETIRED     Comment: from [address] Use    Smoking status: Former Smoker     Packs/day: 0.25     Years: 5.00     Pack years: 1.25     Types: Cigarettes     Quit date: 1970     Years since quittin.6    Smokeless tobacco: Never Used   Substance and Sexual Activity    Alcohol use: Not Currently     Comment: no more than 3 cups of coffee each day    Drug use: No    Sexual activity: Yes     Partners: Male     Comment:    Other Topics Concern    Not on file   Social History Narrative    Not on file     Social Determinants of Health     Financial Resource Strain:     Difficulty of Paying Living Expenses: Not on file   Food Insecurity:     Worried About Running Out of Food in the Last Year: Not on file    Sarah of Food in the Last Year: Not on file   Transportation Needs:     Lack of Transportation (Medical): Not on file    Lack of Transportation (Non-Medical):  Not on file   Physical Activity:     Days of Exercise per Week: Not on file    Minutes of Exercise per Session: Not on file   Stress:     Feeling of Stress : Not on file   Social Connections:     Frequency of Communication with Friends and Family: Not on file    Frequency of Social Gatherings with Friends and Family: Not on file    Attends Latter-day Services: Not on file    Active Member of [address] [place] [address] [employer] or Organizations: Not on file    Attends Club or Organization Meetings: Not on file    Marital Status: Not on file   Intimate Partner Violence:     Fear of Current or Ex-Partner: Not on file    Emotionally Abused: Not on file    Physically Abused: Not on file    Sexually Abused: Not on file   Housing Stability:     Unable to Pay for Housing in the Last Year: Not on file    Number of [place] in the Last Year: Not on file    Unstable Housing in the Last Year: Not on file     Current Facility-Administered Medications   Medication Dose Route Frequency Provider Last Rate Last Admin    morphine injection 2 mg  2 mg IntraVENous Q30 Min PRN Visteon Adyoulike, DO        [facility]ron [facility] injection 4 mg  4 mg IntraVENous Q30 Min PRN Visteon Adyoulike, DO        furosemide (LASIX) injection 20 mg  20 mg IntraVENous Once Visteon Adyoulike, DO         Current Outpatient Medications   Medication Sig Dispense Refill    Turmeric (QC TUMERIC COMPLEX PO) Take 1 capsule by mouth daily      traZODone (DESYREL) 50 MG tablet Take  mg by mouth nightly as needed for Sleep      acetaminophen (TYLENOL) 500 MG tablet Take 1,000 mg by mouth every 4 hours      ferrous sulfate (IRON 325) 325 (65 Fe) MG tablet Take 1 tablet by mouth daily (with breakfast) 30 tablet 3    torsemide (DEMADEX) 20 MG tablet Take 1 tablet by mouth 2 times daily (Patient taking differently: Take 20 mg by mouth at bedtime Takes in the afternoon) 60 tablet 5    torsemide (DEMADEX) 20 MG tablet Take 1 tablet by mouth as needed (for fluid retention or weight gain of 3 lbs overnight) This is in addition to the 20 mg bid (Patient taking differently: Take 40 mg by mouth every morning This is in addition to the 20 mg bid-  Takes demadex 20 mg (2 tablets in a.m.) and 1 tablet at night) 30 tablet 0    spironolactone (ALDACTONE) 50 MG tablet Take 1 tablet by mouth daily (Patient taking differently: Take 25 mg by mouth daily ) 30 tablet 0    PROCTOZONE-HC 2.5 % CREA rectal cream INSERT RECTALLY TWICE DAILY as directed AS NEEDED FOR HEMORRHOIDS (Patient not taking: Reported on 7/7/2022)      ZIOPTAN 0.0015 % SOLN Place 1 drop into both eyes Daily with supper       CETIRIZINE HCL PO Take by mouth (Patient not taking: Reported on 6/28/2022)      lidocaine 4 % external patch Place 1 patch onto the skin daily (Patient not taking: Reported on 4/19/2022) 30 patch 0    vitamin D 25 MCG (1000 UT) CAPS Take 5,000 Units by mouth daily      Probiotic Product (PROBIOTIC-10) CHEW Take by mouth (Patient not taking: Reported on 7/7/2022)      atorvastatin (LIPITOR) 10 MG tablet Take 10 mg by mouth daily       umeclidinium-vilanterol (ANORO ELLIPTA) 62.5-25 MCG/INH AEPB inhaler Inhale 1 puff into the lungs daily 1 each 11    CPAP Machine MISC by Does not apply route      Biotin (BIOTIN 5000) 5 MG CAPS Take 1 capsule by mouth daily      Misc Natural Products (OSTEO BI-FLEX ADV DOUBLE ST PO) Take 1 tablet by mouth daily      timolol (TIMOPTIC-XE) 0.25 % ophthalmic gel-forming Place 1 drop into both eyes daily       carvedilol (COREG) 6.25 MG tablet Take 1 tablet by mouth 2 times daily (with meals) 180 tablet 3    albuterol (PROVENTIL HFA;VENTOLIN HFA) 108 (90 BASE) MCG/ACT inhaler Inhale 2 puffs into the lungs 2 times daily AND EVERY 4-6 HOURS AS NEEDED      Multiple Vitamins-Minerals (ICAPS) CAPS Take 1 capsule by mouth 2 times daily       vitamin B-12 (CYANOCOBALAMIN) 1000 MCG tablet Take 1,000 mcg by mouth daily.  levothyroxine (SYNTHROID) 88 MCG tablet Take 88 mcg by mouth daily.  aspirin 81 MG chewable tablet Take 81 mg by mouth daily.           Allergies   Allergen Reactions    Darvocet [Propoxyphene N-Acetaminophen]     Ranexa [Ranolazine Er]      Sick to her stomach    Ranolazine      Sick to her stomach    Sulfa Antibiotics Itching    Sulfasalazine Itching    Adhesive Tape Rash    Allantoin Rash    Bacitracin Rash    Gramicidin Rash    Neomycin Rash    Polymyxin B Rash    Pramoxine Hcl Rash    Silicone Rash     Current Facility-Administered Medications   Medication Dose Route Frequency Provider Last Rate Last Admin    morphine injection 2 mg  2 mg IntraVENous Q30 Min PRN Alex James, DO        ondansetron (ZOFRAN) injection 4 mg  4 mg IntraVENous Q30 Min PRN Alex Little, DO        furosemide (LASIX) injection 20 mg  20 mg IntraVENous Once Alex Erika, DO         Current Outpatient Medications   Medication Sig Dispense Refill    Turmeric (QC TUMERIC COMPLEX PO) Take 1 capsule by mouth daily      traZODone (DESYREL) 50 MG tablet Take  mg by mouth nightly as needed for Sleep      acetaminophen (TYLENOL) 500 MG tablet Take 1,000 mg by mouth every 4 hours      ferrous sulfate (IRON 325) 325 (65 Fe) MG tablet Take 1 tablet by mouth daily (with breakfast) 30 tablet 3    torsemide (DEMADEX) 20 MG tablet Take 1 tablet by mouth 2 times daily (Patient taking differently: Take 20 mg by mouth at bedtime Takes in the afternoon) 60 tablet 5    torsemide (DEMADEX) 20 MG tablet Take 1 tablet by mouth as needed (for fluid retention or weight gain of 3 lbs overnight) This is in addition to the 20 mg bid (Patient taking differently: Take 40 mg by mouth every morning This is in addition to the 20 mg bid-  Takes demadex 20 mg (2 tablets in a.m.) and 1 tablet at night) 30 tablet 0    spironolactone (ALDACTONE) 50 MG tablet Take 1 tablet by mouth daily (Patient taking differently: Take 25 mg by mouth daily ) 30 tablet 0    PROCTOZONE-HC 2.5 % CREA rectal cream INSERT RECTALLY TWICE DAILY as directed AS NEEDED FOR HEMORRHOIDS (Patient not taking: Reported on 7/7/2022)      ZIOPTAN 0.0015 % SOLN Place 1 drop into both eyes Daily with supper       CETIRIZINE HCL PO Take by mouth (Patient not taking: Reported on 6/28/2022)      lidocaine 4 % external patch Place 1 patch onto the skin daily (Patient not taking: Reported on 4/19/2022) 30 patch 0    vitamin D 25 MCG (1000 UT) CAPS Take 5,000 Units by mouth daily      Probiotic Product (PROBIOTIC-10) CHEW Take by mouth (Patient not taking: Reported on 7/7/2022)      atorvastatin (LIPITOR) 10 MG tablet Take 10 mg by mouth daily       umeclidinium-vilanterol (ANORO ELLIPTA) 62.5-25 MCG/INH AEPB inhaler Inhale 1 puff into the lungs daily 1 each 11    CPAP Machine MISC by Does not apply route      Biotin (BIOTIN 5000) 5 MG CAPS Take 1 capsule by mouth daily      Misc Natural Products (OSTEO BI-FLEX ADV DOUBLE ST PO) Take 1 tablet by mouth daily      timolol (TIMOPTIC-XE) 0.25 % ophthalmic gel-forming Place 1 drop into both eyes daily       carvedilol (COREG) 6.25 MG tablet Take 1 tablet by mouth 2 times daily (with meals) 180 tablet 3    albuterol (PROVENTIL HFA;VENTOLIN HFA) 108 (90 BASE) MCG/ACT inhaler Inhale 2 puffs into the lungs 2 times daily AND EVERY 4-6 HOURS AS NEEDED      Multiple Vitamins-Minerals (ICAPS) CAPS Take 1 capsule by mouth 2 times daily       vitamin B-12 (CYANOCOBALAMIN) 1000 MCG tablet Take 1,000 mcg by mouth daily.       levothyroxine (SYNTHROID) 88 MCG tablet Take 88 mcg by mouth daily.  aspirin 81 MG chewable tablet Take 81 mg by mouth daily. Nursing Notes Reviewed    VITAL SIGNS:  ED Triage Vitals   Enc Vitals Group      BP       Pulse       Resp       Temp       Temp src       SpO2       Weight       Height       Head Circumference       Peak Flow       Pain Score       Pain Loc       Pain Edu? Excl. in [address]? PHYSICAL EXAM:  Physical Exam  Vitals and nursing note reviewed. Constitutional:       General: She is not in acute distress. Appearance: Normal appearance. She is not ill-appearing, toxic-appearing or diaphoretic. HENT:      Head: Normocephalic and atraumatic. Right Ear: External ear normal.      Left Ear: External ear normal.   Eyes:      General: No scleral icterus. Right eye: No discharge. Left eye: No discharge. Extraocular Movements: Extraocular movements intact. Conjunctiva/sclera: Conjunctivae normal.   Cardiovascular:      Rate and Rhythm: Normal rate and regular rhythm. Pulses: Normal pulses. Heart sounds: Normal heart sounds. No murmur heard. No friction rub. No gallop. Pulmonary:      Effort: No respiratory distress. Breath sounds: Normal breath sounds. No wheezing, rhonchi or rales. Chest:      Chest wall: Tenderness present. Abdominal:      General: Bowel sounds are normal. There is no distension. Palpations: There is no mass. Tenderness: There is no abdominal tenderness. There is no guarding or rebound. Negative signs include Chatterjee's sign, Rovsing's sign and McBurney's sign. Hernia: No hernia is present. Musculoskeletal:         General: Tenderness present. No swelling, deformity or signs of injury. Normal range of motion. Cervical back: Normal range of motion. No rigidity. Right lower leg: No edema. Left lower leg: No edema. Skin:     General: Skin is warm.       Coloration: Skin is not jaundiced or pale.      Findings: No bruising, erythema, lesion or rash. Neurological:      General: No focal deficit present. Mental Status: She is alert and oriented to person, place, and time. GCS: GCS eye subscore is 4. GCS verbal subscore is 5. GCS motor subscore is 6. Cranial Nerves: Cranial nerves are intact. No cranial nerve deficit, dysarthria or facial asymmetry. Sensory: Sensation is intact. No sensory deficit. Motor: Motor function is intact. No weakness, tremor, atrophy, abnormal muscle tone or seizure activity. Coordination: Coordination normal.   Psychiatric:         Mood and Affect: Mood normal.         Behavior: Behavior normal.         Thought Content:  Thought content normal.         Judgment: Judgment normal.           I have reviewed andinterpreted all of the currently available lab results from this visit (if applicable):    Results for orders placed or performed during the hospital encounter of 07/07/22   CBC with Auto Differential   Result Value Ref Range    WBC 8.8 4.0 - 10.5 K/CU MM    RBC 4.07 (L) 4.2 - 5.4 M/CU MM    Hemoglobin 12.8 12.5 - 16.0 GM/DL    Hematocrit 41.1 37 - 47 %    .0 (H) 78 - 100 FL    MCH 31.4 (H) 27 - 31 PG    MCHC 31.1 (L) 32.0 - 36.0 %    RDW 19.2 (H) 11.7 - 14.9 %    Platelets 404 297 - 260 K/CU MM    MPV 11.0 7.5 - 11.1 FL    Differential Type AUTOMATED DIFFERENTIAL     Segs Relative 77.6 (H) 36 - 66 %    Lymphocytes % 9.2 (L) 24 - 44 %    Monocytes % 7.6 (H) 0 - 4 %    Eosinophils % 4.1 (H) 0 - 3 %    Basophils % 0.7 0 - 1 %    Segs Absolute 6.8 K/CU MM    Lymphocytes Absolute 0.8 K/CU MM    Monocytes Absolute 0.7 K/CU MM    Eosinophils Absolute 0.4 K/CU MM    Basophils Absolute 0.1 K/CU MM    Nucleated RBC % 0.0 %    Total Nucleated RBC 0.0 K/CU MM    Total Immature Neutrophil 0.07 K/CU MM    Immature Neutrophil % 0.8 (H) 0 - 0.43 %   Comprehensive Metabolic Panel   Result Value Ref Range    Sodium 138 135 - 145 MMOL/L    Potassium 4.0 3.5 - 5.1 MMOL/L    Chloride 99 99 - 110 mMol/L    CO2 28 21 - 32 MMOL/L    BUN 31 (H) 6 - 23 MG/DL    CREATININE 0.7 0.6 - 1.1 MG/DL    Glucose 92 70 - 99 MG/DL    Calcium 10.1 8.3 - 10.6 MG/DL    Albumin 4.4 3.4 - 5.0 GM/DL    Total Protein 6.8 6.4 - 8.2 GM/DL    Total Bilirubin 1.1 (H) 0.0 - 1.0 MG/DL    ALT 19 10 - 40 U/L    AST 28 15 - 37 IU/L    Alkaline Phosphatase 220 (H) 40 - 129 IU/L    GFR Non-African American >60 >60 mL/min/1.73m2    GFR African American >60 >60 mL/min/1.73m2    Anion Gap 11 4 - 16   Lipase   Result Value Ref Range    Lipase 41 13 - 60 IU/L   Troponin   Result Value Ref Range    Troponin T 0.014 (H) <0.01 NG/ML   Brain Natriuretic Peptide   Result Value Ref Range    Pro-BNP 1,251 (H) <300 PG/ML   Lactic Acid   Result Value Ref Range    Lactate 1.0 0.4 - 2.0 mMOL/L   EKG 12 Lead   Result Value Ref Range    Ventricular Rate 62 BPM    QRS Duration 74 ms    Q-T Interval 414 ms    QTc Calculation (Bazett) 420 ms    P Axis 13 degrees    R Axis -71 degrees    T Axis 85 degrees    Diagnosis       Ventricular-paced rhythm  Abnormal ECG  When compared with ECG of 19-MAR-2022 00:57,  No significant change was found    Confirmed by [facility] Katie RHODES Covert ([id number]) on 7/7/2022 2:48:07 PM          Radiographs (if obtained):  [] The following radiograph was interpreted by myself in the absence of a radiologist:  [x] Radiologist's Report Reviewed:    CXR, CT PE    EKG (if obtained): (All EKG's are interpreted by myself in the absence of a cardiologist)    12 lead EKG per my interpretation:  Paced 62  Axis is   Normal  QTc is  420  There is specific T wave changes appreciated. Inverted T wave in aVL, lead I, V2  There is no specific ST wave changes appreciated. Prior EKG to compare with was not available       MDM:    Patient with complaint of chest pain shortness of breath back pain. Again she states she is a known T7 fracture.   She has been having left-sided chest pain shortness of breath that radiates to her back and chest.  She has been on Coumadin not anymore. Denies any fevers nausea vomiting she has been short of breath as mentioned. No abdominal pain no swelling. On arrival she otherwise appears well she is very hard of hearing. She has a pacemaker. Pacemaker was interrogated awaiting hear back from the representative. Otherwise work-up performed lab so far otherwise negative I did scan her chest look for dissection, PE given chest pain back pain. She has no abdominal pain. Otherwise patient stable. She was given pain nausea medicine. Patient recheck doing well. I did put her on oxygen given that she was 92% room air. Imaging and labs performed she has mild elevation troponin, BNP x-ray, CT scan shows pleural effusion she does have a broken eighth rib, no pneumothorax she has a trace blood. She denies any recent fall or trauma. Work-up otherwise negative I will give her Lasix I will contact her cardiologist and will admit her to hospital medicine given chest pain shortness of breath CHF, abnormal imaging and labs otherwise stable. Admitted. CLINICAL IMPRESSION:  Final diagnoses:   Chest pain, unspecified type   Elevated brain natriuretic peptide (BNP) level   Pleural effusion   Closed fracture of one rib, unspecified laterality, initial encounter   Congestive heart failure, unspecified HF chronicity, unspecified heart failure type (HCC)   Dyspnea and respiratory abnormalities       (Please note that portions of this note may have been completed with a voice recognition program. Efforts were made to edit the dictations but occasionally words aremis-transcribed.)    DISPOSITION REFERRAL (if applicable):  No follow-up provider specified.     DISPOSITION MEDICATIONS (if applicable):  New Prescriptions    No medications on file          [facility], [facility],   07/07/22 [id number]

## 2022-07-07 NOTE — FLOWSHEET NOTE
This note also relates to the following rows which could not be included:  Heart Rate - Cannot attach notes to unvalidated device data  BP - Cannot attach notes to unvalidated device data  MAP (mmHg) - Cannot attach notes to unvalidated device data  SpO2 - Cannot attach notes to unvalidated device data

## 2022-07-07 NOTE — ED NOTES
Medication History  [facility]    Patient Name: Yazan Rojas 3/18/1929     Medication history has been completed by: Chinmay Ayala CPhT    Source(s) of information: patient supplied medication list from retail pharmacy, insurance claims     Primary Care Physician: Geovanna Wilson DO     Pharmacy: [facility]    Allergies as of 07/07/2022 - Fully Reviewed 07/07/2022   Allergen Reaction Noted    Darvocet [propoxyphene n-acetaminophen]      Ranexa [ranolazine er]  04/25/2019    Ranolazine  04/25/2019    Sulfa antibiotics Itching 11/18/2011    Sulfasalazine Itching 11/18/2011    Adhesive tape Rash 06/22/2009    Allantoin Rash 06/22/2009    Bacitracin Rash 06/22/2009    Gramicidin Rash 06/22/2009    Neomycin Rash 06/22/2009    Polymyxin b Rash 06/22/2009    Pramoxine hcl Rash 52/54/1930    Silicone Rash 30/50/4745        Prior to Admission medications    Medication Sig Start Date End Date Taking?  Authorizing Provider   Turmeric (QC TUMERIC COMPLEX PO) Take 1 capsule by mouth daily   Yes Historical Provider, MD   traZODone (DESYREL) 50 MG tablet Take  mg by mouth nightly as needed for Sleep   Yes Historical Provider, MD   acetaminophen (TYLENOL) 500 MG tablet Take 1,000 mg by mouth every 4 hours   Yes Historical Provider, MD   ferrous sulfate (IRON 325) 325 (65 Fe) MG tablet Take 1 tablet by mouth daily (with breakfast) 4/19/22   Tylene LISA Jara CNP   torsemide (DEMADEX) 20 MG tablet Take 1 tablet by mouth Take 20 mg by mouth in afternoon 4/5/22   Tylene Campanile, APRN - CNP   torsemide (DEMADEX) 20 MG tablet  Take 40 mg by mouth every morning  Take demadex 20 mg (2 tablets in a.m.) and 1 tablet at night 4/1/22   LISA Sanabria CNP   spironolactone (ALDACTONE) 50 MG tablet Take 1 tablet by mouth daily   Take 25 mg by mouth daily  3/24/22   Helga Reyes MD   PROCTOZONE-HC 2.5 % CREA rectal cream INSERT RECTALLY TWICE DAILY as directed AS NEEDED FOR HEMORRHOIDS  Patient not taking: Reported on 7/7/2022 2/24/22   Historical Provider, MD   ZIOPTAN 0.0015 % SOLN Place 1 drop into both eyes Daily with supper  1/25/22   Historical Provider, MD   CETIRIZINE HCL PO Take by mouth  Patient not taking: Reported on 6/28/2022    Historical Provider, MD   lidocaine 4 % external patch Place 1 patch onto the skin daily  Patient not taking: Reported on 4/19/2022 3/25/22   Jero Edgar MD   vitamin D 25 MCG (1000 UT) CAPS Take 5,000 Units by mouth daily    Historical Provider, MD   Probiotic Product (PROBIOTIC-10) CHEW Take by mouth  Patient not taking: Reported on 7/7/2022    Historical Provider, MD   atorvastatin (LIPITOR) 10 MG tablet Take 10 mg by mouth daily  10/20/20   Historical Provider, MD   umeclidinium-vilanterol (ANORO ELLIPTA) 62.5-25 MCG/INH AEPB inhaler Inhale 1 puff into the lungs daily 7/27/20   Bon Brenner MD   CPAP Machine MISC by Does not apply route    Historical Provider, MD   Biotin (BIOTIN 5000) 5 MG CAPS Take 1 capsule by mouth daily    Historical Provider, MD   Misc Natural Products (OSTEO BI-FLEX ADV DOUBLE ST PO) Take 1 tablet by mouth daily    Historical Provider, MD   timolol (TIMOPTIC-XE) 0.25 % ophthalmic gel-forming Place 1 drop into both eyes daily     Historical Provider, MD   carvedilol (COREG) 6.25 MG tablet Take 1 tablet by mouth 2 times daily (with meals) 1/31/17   Edwin Nino MD   albuterol (PROVENTIL HFA;VENTOLIN HFA) 108 (90 BASE) MCG/ACT inhaler Inhale 2 puffs into the lungs 2 times daily AND EVERY 4-6 HOURS AS NEEDED    Historical Provider, MD   Multiple Vitamins-Minerals (ICAPS) CAPS Take 1 capsule by mouth 2 times daily     Historical Provider, MD   vitamin B-12 (CYANOCOBALAMIN) 1000 MCG tablet Take 1,000 mcg by mouth daily. Historical Provider, MD   levothyroxine (SYNTHROID) 88 MCG tablet Take 88 mcg by mouth daily. Historical Provider, MD   aspirin 81 MG chewable tablet Take 81 mg by mouth daily.       Historical Provider, MD Medications added or changed (ex. new medication, dosage change, interval change, formulation change):  Trazodone prn (added)  Tylenol (added)  Torsemide dosage clarified takes 40 mg q am and 20 mg in afternoon    Medications removed from list (include reason, ex. noncompliance, medication cost, therapy complete etc.):   Ciprofloxacin ear soln therapy complete  Restasis no longer using    Comments:  Medication list provided by daughter n law. Reviewed with patient. Patient receives medications pre-packaged from retail pharmacy, insurance claims verified. Patient reports she has taken first dose of medications today.     To my knowledge the above medication history is accurate as of 7/7/2022 1:37 PM.   Kristin Scott CPhT   7/7/2022 1:37 PM

## 2022-07-07 NOTE — ED TRIAGE NOTES
Pt presents to ED via EMS for complaints of chest pain. Pt states \"my chest hurts on the left side and it goes to my back. \" Pt received one nitro and 324mg ASA en route via EMS, with no relief. Pt AxOx4 during triage. Pt hard of hearing, daughter en route to be historian.

## 2022-07-07 NOTE — ED NOTES
PT declining pain medication at this time. Is aware we have a standing order for pain if needed.      Marilu Logan, RN  07/07/22 [id number]

## 2022-07-07 NOTE — PROGRESS NOTES
4 Eyes Skin Assessment     NAME:  Cristina Guaman OF BIRTH:  3/18/1929  MEDICAL RECORD NUMBER:  [id number]    The patient is being assess for  Admission    I agree that 2 RN's have performed a thorough Head to Toe Skin Assessment on the patient. ALL assessment sites listed below have been assessed. Areas assessed by both nurses:    Head, Face, Ears, Shoulders, Back, Chest, Arms, Elbows, Hands, Sacrum. Buttock, Coccyx, Ischium and Legs. Feet and Heels        Does the Patient have a Wound?  No noted wound(s)       Cruz Prevention initiated:  No   Wound Care Orders initiated:  No    Pressure Injury (Stage 3,4, Unstageable, DTI, NWPT, and Complex wounds) if present place referral/consult order under [de-identified] No    New and Established Ostomies if present place consult order under : No      Nurse 1 eSignature: Electronically signed by Mandeep Blackwood LPN on 2/2/97 at 7:07 PM EDT    **SHARE this note so that the co-signing nurse is able to place an eSignature**    Nurse 2 eSignature: {Esignature:[id number]}

## 2022-07-08 ENCOUNTER — APPOINTMENT (OUTPATIENT)
Dept: NUCLEAR MEDICINE | Age: 87
DRG: 291 | End: 2022-07-08
Payer: MEDICARE

## 2022-07-08 DIAGNOSIS — M80.08XA AGE-RELATED OSTEOPOROSIS WITH CURRENT PATHOLOGICAL FRACTURE OF VERTEBRA, INITIAL ENCOUNTER (HCC): ICD-10-CM

## 2022-07-08 LAB
ALBUMIN SERPL-MCNC: 4.3 GM/DL (ref 3.4–5)
ALP BLD-CCNC: 222 IU/L (ref 40–128)
ALT SERPL-CCNC: 17 U/L (ref 10–40)
ANION GAP SERPL CALCULATED.3IONS-SCNC: 9 MMOL/L (ref 4–16)
AST SERPL-CCNC: 23 IU/L (ref 15–37)
BILIRUB SERPL-MCNC: 1.1 MG/DL (ref 0–1)
BUN BLDV-MCNC: 27 MG/DL (ref 6–23)
CALCIUM SERPL-MCNC: 10.3 MG/DL (ref 8.3–10.6)
CHLORIDE BLD-SCNC: 97 MMOL/L (ref 99–110)
CHOLESTEROL: 105 MG/DL
CO2: 31 MMOL/L (ref 21–32)
CREAT SERPL-MCNC: 0.7 MG/DL (ref 0.6–1.1)
EKG ATRIAL RATE: 61 BPM
EKG DIAGNOSIS: NORMAL
EKG Q-T INTERVAL: 480 MS
EKG QRS DURATION: 142 MS
EKG QTC CALCULATION (BAZETT): 480 MS
EKG R AXIS: -88 DEGREES
EKG T AXIS: 112 DEGREES
EKG VENTRICULAR RATE: 60 BPM
GFR AFRICAN AMERICAN: >60 ML/MIN/1.73M2
GFR NON-AFRICAN AMERICAN: >60 ML/MIN/1.73M2
GLUCOSE BLD-MCNC: 95 MG/DL (ref 70–99)
HCT VFR BLD CALC: 40.4 % (ref 37–47)
HDLC SERPL-MCNC: 45 MG/DL
HEMOGLOBIN: 12.7 GM/DL (ref 12.5–16)
LDL CHOLESTEROL CALCULATED: 46 MG/DL
LV EF: 53 %
LV EF: 60 %
LVEF MODALITY: NORMAL
LVEF MODALITY: NORMAL
MCH RBC QN AUTO: 31.5 PG (ref 27–31)
MCHC RBC AUTO-ENTMCNC: 31.4 % (ref 32–36)
MCV RBC AUTO: 100.2 FL (ref 78–100)
PDW BLD-RTO: 19.2 % (ref 11.7–14.9)
PLATELET # BLD: 317 K/CU MM (ref 140–440)
PMV BLD AUTO: 11.7 FL (ref 7.5–11.1)
POTASSIUM SERPL-SCNC: 4.1 MMOL/L (ref 3.5–5.1)
PRO-BNP: 1317 PG/ML
RBC # BLD: 4.03 M/CU MM (ref 4.2–5.4)
SODIUM BLD-SCNC: 137 MMOL/L (ref 135–145)
TOTAL PROTEIN: 6.2 GM/DL (ref 6.4–8.2)
TRIGL SERPL-MCNC: 69 MG/DL
TROPONIN T: <0.01 NG/ML
WBC # BLD: 8 K/CU MM (ref 4–10.5)

## 2022-07-08 PROCEDURE — 2580000003 HC RX 258: Performed by: NURSE PRACTITIONER

## 2022-07-08 PROCEDURE — 93005 ELECTROCARDIOGRAM TRACING: CPT | Performed by: NURSE PRACTITIONER

## 2022-07-08 PROCEDURE — 99232 SBSQ HOSP IP/OBS MODERATE 35: CPT | Performed by: INTERNAL MEDICINE

## 2022-07-08 PROCEDURE — 78452 HT MUSCLE IMAGE SPECT MULT: CPT

## 2022-07-08 PROCEDURE — G0378 HOSPITAL OBSERVATION PER HR: HCPCS

## 2022-07-08 PROCEDURE — 80061 LIPID PANEL: CPT

## 2022-07-08 PROCEDURE — 93306 TTE W/DOPPLER COMPLETE: CPT

## 2022-07-08 PROCEDURE — 93017 CV STRESS TEST TRACING ONLY: CPT

## 2022-07-08 PROCEDURE — 83880 ASSAY OF NATRIURETIC PEPTIDE: CPT

## 2022-07-08 PROCEDURE — 2140000000 HC CCU INTERMEDIATE R&B

## 2022-07-08 PROCEDURE — 85027 COMPLETE CBC AUTOMATED: CPT

## 2022-07-08 PROCEDURE — A9500 TC99M SESTAMIBI: HCPCS | Performed by: INTERNAL MEDICINE

## 2022-07-08 PROCEDURE — 3430000000 HC RX DIAGNOSTIC RADIOPHARMACEUTICAL: Performed by: INTERNAL MEDICINE

## 2022-07-08 PROCEDURE — 6360000002 HC RX W HCPCS: Performed by: INTERNAL MEDICINE

## 2022-07-08 PROCEDURE — 94761 N-INVAS EAR/PLS OXIMETRY MLT: CPT

## 2022-07-08 PROCEDURE — 96372 THER/PROPH/DIAG INJ SC/IM: CPT

## 2022-07-08 PROCEDURE — 94640 AIRWAY INHALATION TREATMENT: CPT

## 2022-07-08 PROCEDURE — 36415 COLL VENOUS BLD VENIPUNCTURE: CPT

## 2022-07-08 PROCEDURE — 2700000000 HC OXYGEN THERAPY PER DAY

## 2022-07-08 PROCEDURE — 6370000000 HC RX 637 (ALT 250 FOR IP): Performed by: NURSE PRACTITIONER

## 2022-07-08 PROCEDURE — 93010 ELECTROCARDIOGRAM REPORT: CPT | Performed by: INTERNAL MEDICINE

## 2022-07-08 PROCEDURE — 80053 COMPREHEN METABOLIC PANEL: CPT

## 2022-07-08 PROCEDURE — 6360000002 HC RX W HCPCS: Performed by: NURSE PRACTITIONER

## 2022-07-08 PROCEDURE — APPSS45 APP SPLIT SHARED TIME 31-45 MINUTES: Performed by: NURSE PRACTITIONER

## 2022-07-08 RX ORDER — DIPHENHYDRAMINE HYDROCHLORIDE 50 MG/ML
50 INJECTION INTRAMUSCULAR; INTRAVENOUS
Status: CANCELLED | OUTPATIENT
Start: 2022-07-18

## 2022-07-08 RX ORDER — ONDANSETRON 2 MG/ML
8 INJECTION INTRAMUSCULAR; INTRAVENOUS
Status: CANCELLED | OUTPATIENT
Start: 2022-07-18

## 2022-07-08 RX ORDER — EPINEPHRINE 1 MG/ML
0.3 INJECTION, SOLUTION, CONCENTRATE INTRAVENOUS PRN
Status: CANCELLED | OUTPATIENT
Start: 2022-07-18

## 2022-07-08 RX ORDER — SODIUM CHLORIDE 9 MG/ML
INJECTION, SOLUTION INTRAVENOUS CONTINUOUS
Status: CANCELLED | OUTPATIENT
Start: 2022-07-18

## 2022-07-08 RX ORDER — FAMOTIDINE 10 MG/ML
20 INJECTION, SOLUTION INTRAVENOUS
Status: CANCELLED | OUTPATIENT
Start: 2022-07-18

## 2022-07-08 RX ORDER — ACETAMINOPHEN 325 MG/1
650 TABLET ORAL
Status: CANCELLED | OUTPATIENT
Start: 2022-07-18

## 2022-07-08 RX ORDER — ALBUTEROL SULFATE 90 UG/1
4 AEROSOL, METERED RESPIRATORY (INHALATION) PRN
Status: CANCELLED | OUTPATIENT
Start: 2022-07-18

## 2022-07-08 RX ADMIN — TORSEMIDE 20 MG: 20 TABLET ORAL at 12:20

## 2022-07-08 RX ADMIN — CARVEDILOL 6.25 MG: 6.25 TABLET, FILM COATED ORAL at 12:20

## 2022-07-08 RX ADMIN — SODIUM CHLORIDE, PRESERVATIVE FREE 10 ML: 5 INJECTION INTRAVENOUS at 20:30

## 2022-07-08 RX ADMIN — POLYETHYLENE GLYCOL (3350) 17 G: 17 POWDER, FOR SOLUTION ORAL at 20:29

## 2022-07-08 RX ADMIN — ENOXAPARIN SODIUM 30 MG: 100 INJECTION SUBCUTANEOUS at 12:43

## 2022-07-08 RX ADMIN — LEVOTHYROXINE SODIUM 88 MCG: 0.09 TABLET ORAL at 05:28

## 2022-07-08 RX ADMIN — TIOTROPIUM BROMIDE AND OLODATEROL 2 PUFF: 3.124; 2.736 SPRAY, METERED RESPIRATORY (INHALATION) at 07:39

## 2022-07-08 RX ADMIN — KIT FOR THE PREPARATION OF TECHNETIUM TC99M SESTAMIBI 10 MILLICURIE: 1 INJECTION, POWDER, LYOPHILIZED, FOR SOLUTION PARENTERAL at 12:04

## 2022-07-08 RX ADMIN — ASPIRIN 81 MG CHEWABLE TABLET 81 MG: 81 TABLET CHEWABLE at 12:19

## 2022-07-08 RX ADMIN — ATORVASTATIN CALCIUM 10 MG: 10 TABLET, FILM COATED ORAL at 20:29

## 2022-07-08 RX ADMIN — KIT FOR THE PREPARATION OF TECHNETIUM TC99M SESTAMIBI 30 MILLICURIE: 1 INJECTION, POWDER, LYOPHILIZED, FOR SOLUTION PARENTERAL at 12:04

## 2022-07-08 RX ADMIN — ACETAMINOPHEN 650 MG: 325 TABLET ORAL at 20:29

## 2022-07-08 RX ADMIN — ALBUTEROL SULFATE 2 PUFF: 90 AEROSOL, METERED RESPIRATORY (INHALATION) at 20:37

## 2022-07-08 RX ADMIN — SPIRONOLACTONE 25 MG: 50 TABLET ORAL at 12:19

## 2022-07-08 RX ADMIN — LATANOPROST 1 DROP: 50 SOLUTION/ DROPS OPHTHALMIC at 20:29

## 2022-07-08 RX ADMIN — CARVEDILOL 6.25 MG: 6.25 TABLET, FILM COATED ORAL at 17:20

## 2022-07-08 RX ADMIN — REGADENOSON 0.4 MG: 0.08 INJECTION, SOLUTION INTRAVENOUS at 10:15

## 2022-07-08 ASSESSMENT — PAIN SCALES - GENERAL
PAINLEVEL_OUTOF10: 4
PAINLEVEL_OUTOF10: 0

## 2022-07-08 ASSESSMENT — PAIN - FUNCTIONAL ASSESSMENT: PAIN_FUNCTIONAL_ASSESSMENT: PREVENTS OR INTERFERES SOME ACTIVE ACTIVITIES AND ADLS

## 2022-07-08 ASSESSMENT — PAIN DESCRIPTION - FREQUENCY: FREQUENCY: INTERMITTENT

## 2022-07-08 ASSESSMENT — PAIN DESCRIPTION - ORIENTATION: ORIENTATION: LEFT

## 2022-07-08 ASSESSMENT — PAIN DESCRIPTION - ONSET: ONSET: ON-GOING

## 2022-07-08 ASSESSMENT — PAIN DESCRIPTION - LOCATION: LOCATION: RIB CAGE

## 2022-07-08 ASSESSMENT — PAIN DESCRIPTION - PAIN TYPE: TYPE: ACUTE PAIN

## 2022-07-08 ASSESSMENT — PAIN DESCRIPTION - DESCRIPTORS: DESCRIPTORS: SHARP

## 2022-07-08 NOTE — PROGRESS NOTES
Cardiology Progress Note     Today's Plan alicia scan     Admit Date:  7/7/2022    Consult reason/ Seen today for: chest pain    Subjective and  Overnight Events:  States she had left sided chest pain during the night- lasting for few minutes- non radiating - not exacerbated with movement - went away on own    Telemetry  V paced     Assessment / Plan:     Chest pain - continue with intermittent chest pain  Troponin negative x 2  Stress test normal perfusion :  EF normal    Severe pulmonary hypertension: RVSP 77 mmHG with severely dilated right heart -    Right heart failure with cor pulmonale-on loop     Pacemaker -dual chamber pacemaker ; stable readings    Hyperlipidemia- on atorvastatin     History of Presenting Illness:    Chief complain on admission : 80 y. o.year old who is admitted for  Chief Complaint   Patient presents with    Chest Pain     L sided, radiates to back         Past medical history:    has a past medical history of Arthritis, Bradycardia, CAD (coronary artery disease), Cardiac pacemaker, CHF (congestive heart failure) ([address] COPD, mild ([address] Cor pulmonale (chronic) ([address] CVA (cerebrovascular accident) ([address] DJD (degenerative joint disease) of cervical spine, Exhaustion of cardiac pacemaker battery, Family history of cardiovascular disease, Glaucoma, H/O 24 hour EKG monitoring, H/O cardiac catheterization, H/O cardiovascular stress test, H/O cardiovascular stress test, H/O cardiovascular stress test, H/O chest x-ray, H/O Doppler lower venous ultrasound, H/O Doppler ultrasound, H/O Doppler ultrasound, H/O Doppler ultrasound, H/O Doppler ultrasound, H/O echocardiogram, H/O echocardiogram, H/O echocardiogram, H/O echocardiogram, History of complete ECG, History of nuclear stress test, Hx of cardiovascular stress test, HX OTHER MEDICAL, Hyperlipidemia, Hypertension, Mild intermittent asthma, Moderate COPD (chronic obstructive pulmonary disease) [address] [address] Nausea & vomiting, Obstructive sleep apnea, Paroxysmal atrial fibrillation (HCC), Post PTCA, Pulmonary HTN ([facility] [address] PVD (peripheral vascular disease) ([facility] [address] S/P CABG x 3, S/P PTCA (percutaneous transluminal coronary angioplasty), Severe pulmonary hypertension ([facility] [address] Shortness of breath, Thyroid disease, and Unspecified cerebral artery occlusion with cerebral infarction. Past surgical history:   has a past surgical history that includes Appendectomy (1941); Tonsillectomy (1950's); Cardiac surgery (4/09); Hysterectomy, total abdominal (1990's); Colonoscopy (In 2000's); pacemaker placement; Coronary artery bypass graft (4/8/2009); Coronary angioplasty with stent (4/21/2010); other surgical history; Middle ear surgery; and Percutaneous Transluminal Coronary Angio (11/2012). Social History:   reports that she quit smoking about 51 years ago. Her smoking use included cigarettes. She has a 1.25 pack-year smoking history. She has never used smokeless tobacco. She reports previous alcohol use. She reports that she does not use drugs. Family history:  family history includes Arthritis in her mother; Cancer in her mother and sister; Coronary Art Dis in her father; Depression in her mother and sister; Early Death in her paternal grandfather; Early Death (age of onset: 36) in her father; Hearing Loss in her mother; Heart Disease in her father and sister; High Blood Pressure in her father, mother, sister, son, and son; High Cholesterol in her father and mother; Mental Illness in her mother; Jose Alfredo Roly / [place] in her mother; Other in her daughter.     Allergies   Allergen Reactions    Darvocet [Propoxyphene N-Acetaminophen]     Ranexa [Ranolazine Er]      Sick to her stomach    Ranolazine      Sick to her stomach    Sulfa Antibiotics Itching    Sulfasalazine Itching    Adhesive Tape Rash    Allantoin Rash    Bacitracin Rash    Gramicidin Rash    Neomycin Rash    Polymyxin B Rash    Pramoxine Hcl Rash    Silicone Rash       Review of Systems:   All 14 systems were reviewed and are negative  Except for the positive findings which are documented     BP (!) 112/59   Pulse 60   Temp (!) 96.3 °F (35.7 °C) (Axillary)   Resp 21   Ht 4' 11\" (1.499 m)   Wt 134 lb 8 oz (61 kg)   SpO2 97%   BMI 27.17 kg/m²   No intake or output data in the 24 hours ending 07/08/22 0843    Physical Exam:  Physical Exam  Vitals reviewed. Constitutional:       Appearance: Normal appearance. HENT:      Head: Normocephalic and atraumatic. Neck:      Vascular: No carotid bruit. Cardiovascular:      Rate and Rhythm: Normal rate. Pulses: Normal pulses. Heart sounds: Normal heart sounds. Pulmonary:      Effort: Pulmonary effort is normal.      Breath sounds: Normal breath sounds. No wheezing. Abdominal:      General: There is no distension. Tenderness: There is no abdominal tenderness. Musculoskeletal:      Cervical back: No tenderness. Right lower leg: No edema. Left lower leg: No edema. Skin:     General: Skin is warm and dry. Capillary Refill: Capillary refill takes less than 2 seconds. Neurological:      General: No focal deficit present.    Psychiatric:         Mood and Affect: Mood normal.          Medications:    furosemide  20 mg IntraVENous Once    sodium chloride flush  5-40 mL IntraVENous 2 times per day    aspirin  81 mg Oral Daily    enoxaparin  30 mg SubCUTAneous Daily    atorvastatin  10 mg Oral Daily    levothyroxine  88 mcg Oral Daily    spironolactone  25 mg Oral Daily    torsemide  20 mg Oral Daily    tiotropium-olodaterol  2 puff Inhalation Daily    latanoprost  1 drop Both Eyes Nightly    lidocaine  1 patch TransDERmal Daily    carvedilol  6.25 mg Oral BID WC    timolol  1 drop Both Eyes Daily      sodium chloride       morphine, ondansetron, sodium chloride flush, sodium chloride, ondansetron **OR** ondansetron, acetaminophen **OR** acetaminophen, polyethylene glycol, albuterol sulfate HFA    Lab Data:  CBC:   Recent Labs     22  1226   WBC 8.8   HGB 12.8   HCT 41.1   .0*        BMP:   Recent Labs     22  1226      K 4.0   CL 99   CO2 28   BUN 31*   CREATININE 0.7     PT/INR: No results for input(s): PROTIME, INR in the last 72 hours. BNP:    Recent Labs     22  1226   PROBNP 1,251*     TROPONIN:   Recent Labs     22  1226 22  1937 22  2344   TROPONINT 0.014* <0.010 <0.010              Impression:  Principal Problem:    Chest pain  Resolved Problems:    * No resolved hospital problems. *       All labs, medications and tests reviewed by myself, continue all other medications of all above medical condition listed as is except for changes mentioned above. Thank you   Please call with questions. Electronically signed by LISA Gibbons CNP on 2022 at 8:43 AM     I have seen ,spoken to  and examined this patient personally, independently of the MOOKIE. I have reviewed the hospital care given to date and reviewed all pertinent labs and imaging. I have spoken with patient, nursing staff and provided written and verbal instructions . The above note has been reviewed     CARDIOLOGY ATTENDING ADDENDUM    HPI:  I have reviewed the above HPI  And agree with above     Pulse Range: Pulse  Av.4  Min: 60  Max: 68    Blood Presuure Range:  Systolic (37ICL), EUK:556 , Min:112 , XRB:869   ; Diastolic (16FNW), GDW:26, Min:59, Max:86      Pulse ox Range: SpO2  Av.3 %  Min: 95 %  Max: 100 %    24hr I & O:  No intake or output data in the 24 hours ending 22 1228      /72   Pulse 68   Temp 97.9 °F (36.6 °C) (Oral)   Resp 25   Ht 4' 11\" (1.499 m)   Wt 134 lb 8 oz (61 kg)   SpO2 97%   BMI 27.17 kg/m²       Physical Exam:  General:  Awake, alert, NAD  Head:normal  Eye:normal  Neck:  No JVD   Chest:  Clear to auscultation, respiration easy  Cardiovascular:  RRR

## 2022-07-08 NOTE — CARE COORDINATION
CM met with pt to initiate discharge planning. Role of CM explained. Pt has insurance and is able to afford medication. Pt has PCP. Patient plans to return home on discharge. Pt has the following DME:  walker with a seat and is normally independent with ADL's,  family assists patient at home as needed. In  The recent past she has had [address] with [facility]. No needs at this time. CM contact information given to pt. CM team available as needs arise.

## 2022-07-08 NOTE — PROGRESS NOTES
Admitting diagnosis-Chest pain  Cardiologist- Martha   Ejection fraction - 50 to 55% with severely dilated right ventricle, severe tricuspid regurgitation as of 3/21/22  Pro BNP-1,317 on 7/8/22  Cardiac rehabilitation referral-N/A  Smoking Cessation information and referral-N/A   Pneumonia vaccine- up to date   PCP-ANDREA [address] [place] [address] DO   Patient has a digital scale-yes   Able to obtain medications without difficulty- yes     Met with patient and daughter. Re-introduced myself as the Heart failure education R.N. Patient and daughter were recently seen at [facility] heart failure clinic on 6/28/22. Patient receives ongoing heart failure education and support through the clinic. Per daughter, patient's pain is most likely d/t rib fracture and pt. will be going home with 24 hour care provided by family for the next week.

## 2022-07-08 NOTE — PLAN OF CARE
Problem: Safety - Adult  Goal: Free from fall injury  7/8/2022 0930 by Nadja Monroe RN  Outcome: Progressing  7/7/2022 2243 by Ankit Bravo RN  Outcome: Progressing     Problem: ABCDS Injury Assessment  Goal: Absence of physical injury  7/8/2022 0930 by Nadja Monroe RN  Outcome: Progressing  7/7/2022 2243 by Ankit Bravo RN  Outcome: Progressing     Problem: Discharge Planning  Goal: Discharge to home or other facility with appropriate resources  Outcome: Progressing  Flowsheets (Taken 7/7/2022 2123 by Ankit Bravo RN)  Discharge to home or other facility with appropriate resources:   Identify barriers to discharge with patient and caregiver   Arrange for needed discharge resources and transportation as appropriate   Identify discharge learning needs (meds, wound care, etc)   Refer to discharge planning if patient needs post-hospital services based on physician order or complex needs related to functional status, cognitive ability or social support system     Problem: Pain  Goal: Verbalizes/displays adequate comfort level or baseline comfort level  7/8/2022 0930 by Nadja Monroe RN  Outcome: Progressing  7/7/2022 2243 by Ankit Bravo RN  Outcome: Progressing

## 2022-07-08 NOTE — PROGRESS NOTES
Hospitalist Progress Note      Name:  Gisela Hannah /Age/Sex: 3/18/1929  (80 y.o. female)   MRN & CSN:  [id number] & [id number] Admission Date/Time: 2022 12:09 PM   Location:  [address] [place], [place] [address] PCP: Azalia Rehill Avdonna Day: 2    Assessment and Plan:   Gisela Hannah is a 80 y.o.  female  who presents with Chest pain    # Chest pain with Elevated troponin/Hx CAD/CABG/PCI - C/RHC- 2017 showed severe native CAD, occluded LAD, CX (filling from collaterals ), occluded RCA LIMA to LAD and D1 patent stents in LAD patent, SVG to RCA patent, patent stents, moderate pulmonary hypertension. Cardiology consulted in ED. Continue to trend trops, TTE, tsh, asa, statin, beta-blocker BP allows, anticoag as per Cardiology recommendations. Further management per Cardiology eval/recs. Patient has a normal Cardiolite and normal EF. Echocardiogram reviewed. Left ventricular systolic function is normal.   Ejection fraction is visually estimated at 50-55%. Small left ventricle cavity due to right ventricle volume overload. Mildly dilated left atrium. Severely dilated right heart. PPM wiring visualized in right heart. Mild to moderate mitral regurgitation. Severe tricuspid regurgitation; RVSP: 77 mmHg suggestive of severe PHTN. No evidence of any pericardial effusion. IVC and hepatic veins are dilated. Inferior vena cava is dilated, measuring   at 4.3 cm, and does not collapse with respiration.     # Acute on chronic diastolic heart failure -TTE 3/21/2022 shows EF 50 to 55%, severely dilated right ventricle, severe tricuspid regurgitation. CTA chest noted for mild to moderate pulm edema and trace bilateral pleural effusions, BNP of 1251. Cardiology consulted in ED. F/u TTE, repeat trops, continue IV diuresis as per Cardiology recommendations, resume beta-blocker as BP allows, supplemental O2 as needed, daily wts, strict I/O, cardiac diet. Place on tele.   Further management per Cardiology     # Left rib fracture, nontraumatic -  Left posterior lateral sixth rib fracture without pneumothorax on CT imaging. Patient denies acute injury or falls. Suspect contrib to chest pain as well. Pain management, repeat chest imaging in am.     # Pyuria - f/u cx, pt asymptomatic     # Essential hypertension -we will hold antihypertensives as BP trending lower monitor for resuming     # SAULO on CPAP -resume CPAP per home setting     # s/p pacemaker     Other chronic medical conditions-resume home medications unless contraindicated  # History of COPD -not in exacerbation, resume home inhalers  # History of Pulmonary hypertension - Followed by Pulmonology  # History of paroxysmal atrial fibrillation -no longer on AC, managed on aspirin and beta-blocker  # History of TIA -on antiplatelet statin  # Mixed hyperlipidemia - on statin  # Acquired hypothyroidism -resume levothyroxine replacement, follow-up TSH  # Hx T7 veterbal fx -pain management as needed     Present on Admission:   Chest pain    Diet ADULT DIET; Regular   DVT Prophylaxis [] Lovenox, []  Heparin, [] SCDs, [] Ambulation   GI Prophylaxis [] PPI,  [] H2 Blocker,  [] Carafate,  [] Diet/Tube Feeds   Code Status Full Code   Disposition Patient requires continued admission due to chest pain   MDM [] Low, [] Moderate,[x]  High  Patient's risk as above due to chest pain     History of Present Illness:     Chief Complaint: Chest pain  Radha Lewis is a 80 y.o.  female  who presents with chest pain. Subjective-patient examined at bedside. She is comfortable in bed but still complains of chest soreness. Lidocaine patch has been applied to the chest.  She has a normal Cardiolite stress test with normal EF. Echocardiogram has been reviewed. Discussed with family at bedside that patient would not be able to manage herself at home given her acute pain. No further intervention from cardiology at this point.   Plan to discharge patient tomorrow to home if family can take care of her otherwise skilled nursing facility is an option. Ten point ROS reviewed negative, unless as noted above    Objective:   No intake or output data in the 24 hours ending 07/08/22 1222   Vitals:   Vitals:    07/08/22 1201   BP:    Pulse:    Resp:    Temp: 97.9 °F (36.6 °C)   SpO2: 97%          No results for input(s): PH, XFC2BLR, PO2ART, BE, YCF2XWK, CO2CT, O2SAT, LABCARB in the last 72 hours. Invalid input(s): METHGBART           Physical Exam:   GEN Awake female, sitting upright in bed in no apparent distress. Appears given age. EYES Pupils are equally round. No scleral erythema, discharge, or conjunctivitis. HENT Mucous membranes are moist. Oral pharynx without exudates, no evidence of thrush. NECK Supple, no apparent thyromegaly or masses. RESP Clear to auscultation, no wheezes, rales or rhonchi. Symmetric chest movement while on room air. CARDIO/VASC S1/S2 auscultated. Regular rate without appreciable murmurs, rubs, or gallops. No JVD or carotid bruits. Peripheral pulses equal bilaterally and palpable. No peripheral edema. GI Abdomen is soft without significant tenderness, masses, or guarding. Bowel sounds are normoactive. Rectal exam deferred.  No costovertebral angle tenderness. Normal appearing external genitalia. Carnes catheter is not present. HEME/LYMPH No palpable cervical lymphadenopathy and no hepatosplenomegaly. No petechiae or ecchymoses. MSK No gross joint deformities. SKIN Normal coloration, warm, dry. NEURO Cranial nerves appear grossly intact, normal speech, no lateralizing weakness. PSYCH Awake, alert, oriented x 4. Affect appropriate.     Data:   CBC with Differential:    Lab Results   Component Value Date/Time    WBC 8.0 07/08/2022 08:06 AM    RBC 4.03 07/08/2022 08:06 AM    HGB 12.7 07/08/2022 08:06 AM    HCT 40.4 07/08/2022 08:06 AM     07/08/2022 08:06 AM    .2 07/08/2022 08:06 AM    MCH 31.5 07/08/2022 08:06 AM MCHC 31.4 07/08/2022 08:06 AM    RDW 19.2 07/08/2022 08:06 AM    SEGSPCT 77.6 07/07/2022 12:26 PM    LYMPHOPCT 9.2 07/07/2022 12:26 PM    MONOPCT 7.6 07/07/2022 12:26 PM    EOSPCT 5 02/25/2022 12:00 AM    BASOPCT 0.7 07/07/2022 12:26 PM    MONOSABS 0.7 07/07/2022 12:26 PM    LYMPHSABS 0.8 07/07/2022 12:26 PM    EOSABS 0.4 07/07/2022 12:26 PM    BASOSABS 0.1 07/07/2022 12:26 PM    DIFFTYPE AUTOMATED DIFFERENTIAL 07/07/2022 12:26 PM       CMP:     Lab Results   Component Value Date/Time     07/08/2022 08:06 AM    K 4.1 07/08/2022 08:06 AM    CL 97 07/08/2022 08:06 AM    CO2 31 07/08/2022 08:06 AM    BUN 27 07/08/2022 08:06 AM    CREATININE 0.7 07/08/2022 08:06 AM    GFRAA >60 07/08/2022 08:06 AM    LABGLOM >60 07/08/2022 08:06 AM    GLUCOSE 95 07/08/2022 08:06 AM    PROT 6.2 07/08/2022 08:06 AM    PROT 6.8 11/28/2012 02:41 PM    LABALBU 4.3 07/08/2022 08:06 AM    CALCIUM 10.3 07/08/2022 08:06 AM    BILITOT 1.1 07/08/2022 08:06 AM    ALKPHOS 222 07/08/2022 08:06 AM    AST 23 07/08/2022 08:06 AM    ALT 17 07/08/2022 08:06 AM       Troponin:  Lab Results   Component Value Date/Time    TROPONINT <0.010 07/07/2022 11:44 PM       U/A:    Lab Results   Component Value Date/Time    COLORU YELLOW 07/07/2022 02:13 PM    PROTEINU NEGATIVE 07/07/2022 02:13 PM    WBCUA 53 07/07/2022 02:13 PM    RBCUA 2 07/07/2022 02:13 PM    MUCUS RARE 07/07/2022 02:13 PM    TRICHOMONAS NONE SEEN 07/07/2022 02:13 PM    YEAST RARE 11/23/2021 07:30 AM    BACTERIA NEGATIVE 07/07/2022 02:13 PM    CLARITYU CLEAR 07/07/2022 02:13 PM    SPECGRAV 1.010 07/07/2022 02:13 PM    LEUKOCYTESUR SMALL 07/07/2022 02:13 PM    UROBILINOGEN 0.2 07/07/2022 02:13 PM    BILIRUBINUR NEGATIVE 07/07/2022 02:13 PM    BLOODU TRACE 07/07/2022 02:13 PM       Urine Culture:  No components found for: CURINE    Radiology results:  NM MYOCARDIAL SPECT REST EXERCISE OR RX   Final Result      XR CHEST PORTABLE   Final Result   No significant change from the chest x-ray. Left posterolateral 6th rib fracture best seen on recent CT. CTA PULMONARY W CONTRAST   Final Result   1. No evidence of pulmonary embolism, noting that evaluation of the distal   pulmonary arteries is somewhat limited due to respiratory motion artifact. 2.  Cardiomegaly including severe right atrial dilation. Mild-to-moderate   pulmonary edema and trace bilateral pleural effusions. Dilation of the   central pulmonary arteries likely reflects pulmonary hypertension. 3.  Acute appearing, displaced left sixth posterolateral rib fracture with   trace adjacent blood products. XR CHEST PORTABLE   Final Result   Mild pulmonary vascular congestion. Small right pleural effusion.              Medications:   Medications:    furosemide  20 mg IntraVENous Once    sodium chloride flush  5-40 mL IntraVENous 2 times per day    aspirin  81 mg Oral Daily    enoxaparin  30 mg SubCUTAneous Daily    atorvastatin  10 mg Oral Daily    levothyroxine  88 mcg Oral Daily    spironolactone  25 mg Oral Daily    torsemide  20 mg Oral Daily    tiotropium-olodaterol  2 puff Inhalation Daily    latanoprost  1 drop Both Eyes Nightly    lidocaine  1 patch TransDERmal Daily    carvedilol  6.25 mg Oral BID WC    timolol  1 drop Both Eyes Daily      Infusions:    sodium chloride       PRN Meds: morphine, 2 mg, Q30 Min PRN  ondansetron, 4 mg, Q30 Min PRN  sodium chloride flush, 5-40 mL, PRN  sodium chloride, , PRN  ondansetron, 4 mg, Q8H PRN   Or  ondansetron, 4 mg, Q6H PRN  acetaminophen, 650 mg, Q6H PRN   Or  acetaminophen, 650 mg, Q6H PRN  polyethylene glycol, 17 g, Daily PRN  albuterol sulfate HFA, 2 puff, Q4H PRN          Electronically signed by Adriana Myers MD on 7/8/2022 at 12:22 PM

## 2022-07-09 VITALS
SYSTOLIC BLOOD PRESSURE: 130 MMHG | TEMPERATURE: 98 F | BODY MASS INDEX: 26.75 KG/M2 | HEIGHT: 59 IN | WEIGHT: 132.7 LBS | DIASTOLIC BLOOD PRESSURE: 96 MMHG | OXYGEN SATURATION: 93 % | RESPIRATION RATE: 26 BRPM | HEART RATE: 60 BPM

## 2022-07-09 PROCEDURE — G0378 HOSPITAL OBSERVATION PER HR: HCPCS

## 2022-07-09 PROCEDURE — 94761 N-INVAS EAR/PLS OXIMETRY MLT: CPT

## 2022-07-09 PROCEDURE — 2700000000 HC OXYGEN THERAPY PER DAY

## 2022-07-09 PROCEDURE — 6360000002 HC RX W HCPCS: Performed by: NURSE PRACTITIONER

## 2022-07-09 PROCEDURE — 6370000000 HC RX 637 (ALT 250 FOR IP): Performed by: NURSE PRACTITIONER

## 2022-07-09 PROCEDURE — 94640 AIRWAY INHALATION TREATMENT: CPT

## 2022-07-09 PROCEDURE — 94660 CPAP INITIATION&MGMT: CPT

## 2022-07-09 PROCEDURE — 96372 THER/PROPH/DIAG INJ SC/IM: CPT

## 2022-07-09 RX ORDER — OXYCODONE HYDROCHLORIDE 5 MG/1
5 TABLET ORAL EVERY 6 HOURS PRN
Qty: 12 TABLET | Refills: 0 | Status: SHIPPED | OUTPATIENT
Start: 2022-07-09 | End: 2022-07-12

## 2022-07-09 RX ADMIN — TIMOLOL MALEATE 1 DROP: 2.5 SOLUTION/ DROPS OPHTHALMIC at 09:23

## 2022-07-09 RX ADMIN — SPIRONOLACTONE 25 MG: 50 TABLET ORAL at 09:19

## 2022-07-09 RX ADMIN — ALBUTEROL SULFATE 2 PUFF: 90 AEROSOL, METERED RESPIRATORY (INHALATION) at 07:30

## 2022-07-09 RX ADMIN — POLYETHYLENE GLYCOL (3350) 17 G: 17 POWDER, FOR SOLUTION ORAL at 09:19

## 2022-07-09 RX ADMIN — LEVOTHYROXINE SODIUM 88 MCG: 0.09 TABLET ORAL at 09:20

## 2022-07-09 RX ADMIN — CARVEDILOL 6.25 MG: 6.25 TABLET, FILM COATED ORAL at 09:27

## 2022-07-09 RX ADMIN — TIOTROPIUM BROMIDE AND OLODATEROL 2 PUFF: 3.124; 2.736 SPRAY, METERED RESPIRATORY (INHALATION) at 07:30

## 2022-07-09 RX ADMIN — ASPIRIN 81 MG CHEWABLE TABLET 81 MG: 81 TABLET CHEWABLE at 09:19

## 2022-07-09 RX ADMIN — TORSEMIDE 20 MG: 20 TABLET ORAL at 09:20

## 2022-07-09 RX ADMIN — ENOXAPARIN SODIUM 30 MG: 100 INJECTION SUBCUTANEOUS at 09:20

## 2022-07-09 NOTE — DISCHARGE SUMMARY
Discharge Summary    Name:  Diego Mary /Age/Sex: 3/18/1929  (80 y.o. female)   MRN & CSN:  [id number] & [id number] Admission Date/Time: 2022 12:09 PM   Attending:  Margret Goldmann, MD Discharging Physician: Margret Goldmann, MD     Hospital Course:   Diego Mary is a 80 y.o.  female  who presents with Chest pain    Diego Mary is a 80 y.o.  female  who presents with Chest pain     # Chest pain with Elevated troponin/Hx CAD/CABG/PCI - LHC/RHC- 2017 showed severe native CAD, occluded LAD, CX (filling from collaterals ), occluded RCA LIMA to LAD and D1 patent stents in LAD patent, SVG to RCA patent, patent stents, moderate pulmonary hypertension.  Cardiology consulted in ED.  Continue to trend trops, TTE, tsh, asa, statin, beta-blocker BP allows, anticoag as per Cardiology recommendations. Further management per Cardiology eval/recs. Patient has a normal Cardiolite and normal EF. Echocardiogram reviewed.   Left ventricular systolic function is normal.   Ejection fraction is visually estimated at 50-55%.   Small left ventricle cavity due to right ventricle volume overload.  Charolotte Brow dilated left atrium.   Severely dilated right heart.   PPM wiring visualized in right heart.   Mild to moderate mitral regurgitation.   Severe tricuspid regurgitation; RVSP: 77 mmHg suggestive of severe PHTN.   No evidence of any pericardial effusion.  [address] and hepatic veins are dilated. Inferior vena cava is dilated, measuring   at 4.3 cm, and does not collapse with respiration.     # Acute on chronic diastolic heart failure -TTE 3/21/2022 shows EF 50 to 55%, severely dilated right ventricle, severe tricuspid regurgitation.  CTA chest noted for mild to moderate pulm edema and trace bilateral pleural effusions, BNP BZ 9888.  Cardiology consulted in ED.  F/u TTE, repeat trops, continue IV diuresis as per Cardiology recommendations, resume beta-blocker as BP allows, supplemental O2 as needed, daily wts, .       # Left rib fracture, nontraumatic -  Left posterior lateral sixth rib fracture without pneumothorax on CT imaging.  Patient denies acute injury or falls.  Suspect contrib to chest pain as well. Pain management, repeat chest imaging in am.     # Pyuria - f/u cx, pt asymptomatic     # Essential hypertension -we will hold antihypertensives as BP trending lower monitor for resuming     # SAULO on CPAP -resume CPAP per home setting     # s/p pacemaker     Other chronic medical conditions-resume home medications unless contraindicated  # History of COPD -not in exacerbation, resume home inhalers  # History of Pulmonary hypertension - Followed by Pulmonology  # History of paroxysmal atrial fibrillation -no longer on AC, managed on aspirin and beta-blocker  # History of TIA -on antiplatelet statin  # Mixed hyperlipidemia - on statin  # Acquired hypothyroidism -resume levothyroxine replacement, follow-up TSH  # Hx T7 veterbal fx -pain management as needed    The patient expressed appropriate understanding of and agreement with the discharge recommendations, medications, and plan. Consults this admission:  IP CONSULT TO HOSPITALIST  IP CONSULT TO CARDIOLOGY    Discharge Instruction:   Follow up appointments: As scheduled  Primary care physician:  within 2 weeks    Diet:  regular diet   Activity: activity as tolerated  Disposition: Discharged to:   [x]Home, [][facility], []SNF, []Acute Rehab, []Hospice   Condition on discharge: Stable    Discharge Medications:        Medication List      START taking these medications    oxyCODONE 5 MG immediate release tablet  Commonly known as: Roxicodone  Take 1 tablet by mouth every 6 hours as needed for Pain for up to 3 days. Intended supply: 3 days.  Take lowest dose possible to manage pain        CHANGE how you take these medications    spironolactone 50 MG tablet  Commonly known as: ALDACTONE  Take 1 tablet by mouth daily  What changed: how much to take     torsemide 20 MG tablet  Commonly known as: DEMADEX  Take 1 tablet by mouth as needed (for fluid retention or weight gain of 3 lbs overnight) This is in addition to the 20 mg bid  What changed:   · how much to take  · when to take this  · additional instructions  · Another medication with the same name was removed. Continue taking this medication, and follow the directions you see here.         CONTINUE taking these medications    acetaminophen 500 MG tablet  Commonly known as: TYLENOL     albuterol sulfate  (90 Base) MCG/ACT inhaler  Commonly known as: PROVENTIL;VENTOLIN;PROAIR     aspirin 81 MG chewable tablet     atorvastatin 10 MG tablet  Commonly known as: LIPITOR     Biotin 5000 5 MG Caps  Generic drug: Biotin     carvedilol 6.25 MG tablet  Commonly known as: COREG  Take 1 tablet by mouth 2 times daily (with meals)     CETIRIZINE HCL PO     CPAP Machine Misc     ferrous sulfate 325 (65 Fe) MG tablet  Commonly known as: IRON 325  Take 1 tablet by mouth daily (with breakfast)     ICaps Caps     lidocaine 4 % external patch  Place 1 patch onto the skin daily     OSTEO BI-FLEX ADV DOUBLE ST PO     Probiotic-10 Chew     Proctozone-HC 2.5 % Crea rectal cream  Generic drug: hydrocortisone     QC TUMERIC COMPLEX PO     Synthroid 88 MCG tablet  Generic drug: levothyroxine     timolol 0.25 % ophthalmic gel-forming  Commonly known as: TIMOPTIC-XE     traZODone 50 MG tablet  Commonly known as: DESYREL     umeclidinium-vilanterol 62.5-25 MCG/INH Aepb inhaler  Commonly known as: ANORO ELLIPTA  Inhale 1 puff into the lungs daily     vitamin B-12 1000 MCG tablet  Commonly known as: CYANOCOBALAMIN     vitamin D 25 MCG (1000 UT) Caps     Zioptan 0.0015 % Soln  Generic drug: Tafluprost (PF)        STOP taking these medications    Ciloxan 0.3 % ophthalmic solution  Generic drug: ciprofloxacin           Where to Get Your Medications      You can get these medications from any pharmacy    Bring a paper prescription for each of these medications  · oxyCODONE 5 MG immediate release tablet         Objective Findings at Discharge:   /66   Pulse 60   Temp 97.6 °F (36.4 °C) (Axillary)   Resp 19   Ht 4' 11\" (1.499 m)   Wt 132 lb 11.2 oz (60.2 kg)   SpO2 93%   BMI 26.80 kg/m²            PHYSICAL EXAM   GEN Awake female, sitting upright in bed in no apparent distress. Appears given age. EYES Pupils are equally round. No scleral erythema, discharge, or conjunctivitis. HENT Mucous membranes are moist. Oral pharynx without exudates, no evidence of thrush. NECK Supple, no apparent thyromegaly or masses. RESP Clear to auscultation, no wheezes, rales or rhonchi. Symmetric chest movement while on room air. CARDIO/VASC S1/S2 auscultated. Regular rate without appreciable murmurs, rubs, or gallops. No JVD or carotid bruits. Peripheral pulses equal bilaterally and palpable. No peripheral edema. GI Abdomen is soft without significant tenderness, masses, or guarding. Bowel sounds are normoactive. Rectal exam deferred.  No costovertebral angle tenderness. Normal appearing external genitalia. Carnes catheter is not present. HEME/LYMPH No palpable cervical lymphadenopathy and no hepatosplenomegaly. No petechiae or ecchymoses. MSK No gross joint deformities. SKIN Normal coloration, warm, dry. NEURO Cranial nerves appear grossly intact, normal speech, no lateralizing weakness. PSYCH Awake, alert, oriented x 4. Affect appropriate.     BMP/CBC  Recent Labs     07/07/22  1226 07/08/22  0806    137   K 4.0 4.1   CL 99 97*   CO2 28 31   BUN 31* 27*   CREATININE 0.7 0.7   WBC 8.8 8.0   HCT 41.1 40.4    317       IMAGING:  Reviewed    Discharge Time of 35 minutes    Electronically signed by Gallo Cool MD on 7/9/2022 at 10:33 AM

## 2022-07-09 NOTE — PROGRESS NOTES
3225 Discharge instructions reviewed with pt. Questions answered. Verbalized understanding. Daughter in law here  Pt states she qualifies for home oxygen. Message sent to DR. Adams he declined pt needing it . Three was and is no order for home evle.

## 2022-07-09 NOTE — PROGRESS NOTES
Hospitalist Progress Note      Name:  Shanika Knapp /Age/Sex: 3/18/1929  (80 y.o. female)   MRN & CSN:  [id number] & [id number] Admission Date/Time: 2022 12:09 PM   Location:  [address] [place] [address] PCP: Azalia Rehill Avdonna Day: 3    Assessment and Plan:   Shanika Knapp is a 80 y.o.  female  who presents with Chest pain    # Chest pain with Elevated troponin/Hx CAD/CABG/PCI - C/RHC- 2017 showed severe native CAD, occluded LAD, CX (filling from collaterals ), occluded RCA LIMA to LAD and D1 patent stents in LAD patent, SVG to RCA patent, patent stents, moderate pulmonary hypertension. Cardiology consulted in ED. Continue to trend trops, TTE, tsh, asa, statin, beta-blocker BP allows, anticoag as per Cardiology recommendations. Further management per Cardiology eval/recs. Patient has a normal Cardiolite and normal EF. Echocardiogram reviewed. Left ventricular systolic function is normal.   Ejection fraction is visually estimated at 50-55%. Small left ventricle cavity due to right ventricle volume overload. Mildly dilated left atrium. Severely dilated right heart. PPM wiring visualized in right heart. Mild to moderate mitral regurgitation. Severe tricuspid regurgitation; RVSP: 77 mmHg suggestive of severe PHTN. No evidence of any pericardial effusion. IVC and hepatic veins are dilated. Inferior vena cava is dilated, measuring   at 4.3 cm, and does not collapse with respiration.     # Acute on chronic diastolic heart failure -TTE 3/21/2022 shows EF 50 to 55%, severely dilated right ventricle, severe tricuspid regurgitation. CTA chest noted for mild to moderate pulm edema and trace bilateral pleural effusions, BNP of 1251. Cardiology consulted in ED. F/u TTE, repeat trops, continue IV diuresis as per Cardiology recommendations, resume beta-blocker as BP allows, supplemental O2 as needed, daily wts, strict I/O, cardiac diet. Place on tele.   Further management per Cardiology     # Left rib fracture, nontraumatic -  Left posterior lateral sixth rib fracture without pneumothorax on CT imaging. Patient denies acute injury or falls. Suspect contrib to chest pain as well. Pain management, repeat chest imaging in am.     # Pyuria - f/u cx, pt asymptomatic     # Essential hypertension -we will hold antihypertensives as BP trending lower monitor for resuming     # SAULO on CPAP -resume CPAP per home setting     # s/p pacemaker     Other chronic medical conditions-resume home medications unless contraindicated  # History of COPD -not in exacerbation, resume home inhalers  # History of Pulmonary hypertension - Followed by Pulmonology  # History of paroxysmal atrial fibrillation -no longer on AC, managed on aspirin and beta-blocker  # History of TIA -on antiplatelet statin  # Mixed hyperlipidemia - on statin  # Acquired hypothyroidism -resume levothyroxine replacement, follow-up TSH  # Hx T7 veterbal fx -pain management as needed     Present on Admission:   Chest pain    Diet ADULT DIET; Regular   DVT Prophylaxis [] Lovenox, []  Heparin, [] SCDs, [] Ambulation   GI Prophylaxis [] PPI,  [] H2 Blocker,  [] Carafate,  [] Diet/Tube Feeds   Code Status Full Code   Disposition Patient requires continued admission due to chest pain   MDM [] Low, [] Moderate,[x]  High  Patient's risk as above due to chest pain     History of Present Illness:     Chief Complaint: Chest pain  Mari Navas is a 80 y.o.  female  who presents with chest pain. Subjective-patient examined at bedside. She is comfortable in bed. No events overnight. Cardiac investigations reveal no evidence of acute ischemia. No further intervention from cardiology. Patient has already declined nursing home placement. I have been told that patient's daughter is coming to take care of her at home. Patient will be discharged later today.     Ten point ROS reviewed negative, unless as noted above    Objective: CTA PULMONARY W CONTRAST   Final Result   1. No evidence of pulmonary embolism, noting that evaluation of the distal   pulmonary arteries is somewhat limited due to respiratory motion artifact. 2.  Cardiomegaly including severe right atrial dilation. Mild-to-moderate   pulmonary edema and trace bilateral pleural effusions. Dilation of the   central pulmonary arteries likely reflects pulmonary hypertension. 3.  Acute appearing, displaced left sixth posterolateral rib fracture with   trace adjacent blood products. XR CHEST PORTABLE   Final Result   Mild pulmonary vascular congestion. Small right pleural effusion.              Medications:   Medications:    furosemide  20 mg IntraVENous Once    sodium chloride flush  5-40 mL IntraVENous 2 times per day    aspirin  81 mg Oral Daily    enoxaparin  30 mg SubCUTAneous Daily    atorvastatin  10 mg Oral Daily    levothyroxine  88 mcg Oral Daily    spironolactone  25 mg Oral Daily    torsemide  20 mg Oral Daily    tiotropium-olodaterol  2 puff Inhalation Daily    latanoprost  1 drop Both Eyes Nightly    lidocaine  1 patch TransDERmal Daily    carvedilol  6.25 mg Oral BID WC    timolol  1 drop Both Eyes Daily      Infusions:    sodium chloride       PRN Meds: morphine, 2 mg, Q30 Min PRN  ondansetron, 4 mg, Q30 Min PRN  sodium chloride flush, 5-40 mL, PRN  sodium chloride, , PRN  ondansetron, 4 mg, Q8H PRN   Or  ondansetron, 4 mg, Q6H PRN  acetaminophen, 650 mg, Q6H PRN   Or  acetaminophen, 650 mg, Q6H PRN  polyethylene glycol, 17 g, Daily PRN  albuterol sulfate HFA, 2 puff, Q4H PRN          Electronically signed by Gallo Cool MD on 7/9/2022 at 10:28 AM

## 2022-07-09 NOTE — PROGRESS NOTES
07/09/22 0253   NIV Type   $NIV $Daily Charge   NIV Started/Stopped On   Equipment Type Autopap   Mode CPAP   Mask Type Full face mask   Mask Size Small   Bonnet size Small   Settings/Measurements   CPAP/EPAP 5.6 cmH2O   Resp 26   O2 Flow Rate (L/min) 2 L/min   Vt Exhaled 147 mL   Minute Volume 6.6 Liters   Mask Leak (lpm) 34 lpm   Comfort Level Fair   Using Accessory Muscles Yes   SpO2 94   Patient's Home Machine No

## 2022-07-10 PROCEDURE — 93294 REM INTERROG EVL PM/LDLS PM: CPT | Performed by: INTERNAL MEDICINE

## 2022-07-10 PROCEDURE — 93296 REM INTERROG EVL PM/IDS: CPT | Performed by: INTERNAL MEDICINE

## 2022-07-11 ENCOUNTER — PROCEDURE VISIT (OUTPATIENT)
Dept: CARDIOLOGY CLINIC | Age: 87
End: 2022-07-11
Payer: MEDICARE

## 2022-07-11 DIAGNOSIS — I48.21 PERMANENT ATRIAL FIBRILLATION (HCC): ICD-10-CM

## 2022-07-11 DIAGNOSIS — Z95.0 CARDIAC PACEMAKER IN SITU: Primary | ICD-10-CM

## 2022-07-15 ENCOUNTER — HOSPITAL ENCOUNTER (OUTPATIENT)
Dept: INFUSION THERAPY | Age: 87
Setting detail: INFUSION SERIES
Discharge: HOME | End: 2022-07-15
Payer: MEDICARE

## 2022-07-15 VITALS
RESPIRATION RATE: 16 BRPM | SYSTOLIC BLOOD PRESSURE: 123 MMHG | DIASTOLIC BLOOD PRESSURE: 59 MMHG | HEART RATE: 65 BPM | OXYGEN SATURATION: 94 % | TEMPERATURE: 97.3 F

## 2022-07-15 DIAGNOSIS — M80.08XA AGE-RELATED OSTEOPOROSIS WITH CURRENT PATHOLOGICAL FRACTURE OF VERTEBRA, INITIAL ENCOUNTER (HCC): Primary | ICD-10-CM

## 2022-07-15 PROCEDURE — 6360000002 HC RX W HCPCS

## 2022-07-15 PROCEDURE — 99211 OFF/OP EST MAY X REQ PHY/QHP: CPT

## 2022-07-15 PROCEDURE — 96372 THER/PROPH/DIAG INJ SC/IM: CPT

## 2022-07-15 RX ORDER — SODIUM CHLORIDE 9 MG/ML
INJECTION, SOLUTION INTRAVENOUS CONTINUOUS
Status: CANCELLED | OUTPATIENT
Start: 2022-08-12

## 2022-07-15 RX ORDER — ACETAMINOPHEN 325 MG/1
650 TABLET ORAL
Status: CANCELLED | OUTPATIENT
Start: 2022-08-12

## 2022-07-15 RX ORDER — EPINEPHRINE 1 MG/ML
0.3 INJECTION, SOLUTION, CONCENTRATE INTRAVENOUS PRN
Status: CANCELLED | OUTPATIENT
Start: 2022-08-12

## 2022-07-15 RX ORDER — ONDANSETRON 2 MG/ML
8 INJECTION INTRAMUSCULAR; INTRAVENOUS
Status: CANCELLED | OUTPATIENT
Start: 2022-08-12

## 2022-07-15 RX ORDER — DIPHENHYDRAMINE HYDROCHLORIDE 50 MG/ML
50 INJECTION INTRAMUSCULAR; INTRAVENOUS
Status: CANCELLED | OUTPATIENT
Start: 2022-08-12

## 2022-07-15 RX ORDER — ALBUTEROL SULFATE 90 UG/1
4 AEROSOL, METERED RESPIRATORY (INHALATION) PRN
Status: CANCELLED | OUTPATIENT
Start: 2022-08-12

## 2022-07-15 RX ORDER — FAMOTIDINE 10 MG/ML
20 INJECTION, SOLUTION INTRAVENOUS
Status: CANCELLED | OUTPATIENT
Start: 2022-08-12

## 2022-07-15 RX ADMIN — ROMOSOZUMAB-AQQG 105 MG: 105 INJECTION, SOLUTION SUBCUTANEOUS at 14:11

## 2022-07-29 ENCOUNTER — HOSPITAL ENCOUNTER (OUTPATIENT)
Dept: WOUND CARE | Age: 87
Discharge: HOME | End: 2022-07-29
Payer: MEDICARE

## 2022-07-29 VITALS
HEART RATE: 67 BPM | SYSTOLIC BLOOD PRESSURE: 113 MMHG | RESPIRATION RATE: 16 BRPM | TEMPERATURE: 97.3 F | DIASTOLIC BLOOD PRESSURE: 72 MMHG

## 2022-07-29 DIAGNOSIS — L97.821 NON-PRESSURE CHRONIC ULCER OF OTHER PART OF LEFT LOWER LEG LIMITED TO BREAKDOWN OF SKIN (HCC): ICD-10-CM

## 2022-07-29 DIAGNOSIS — L97.929 IDIOPATHIC CHRONIC VENOUS HYPERTENSION OF LEFT LEG WITH ULCER (HCC): ICD-10-CM

## 2022-07-29 DIAGNOSIS — I87.312 IDIOPATHIC CHRONIC VENOUS HYPERTENSION OF LEFT LEG WITH ULCER (HCC): ICD-10-CM

## 2022-07-29 PROCEDURE — 97597 DBRDMT OPN WND 1ST 20 CM/<: CPT

## 2022-07-29 PROCEDURE — 87070 CULTURE OTHR SPECIMN AEROBIC: CPT

## 2022-07-29 PROCEDURE — 87077 CULTURE AEROBIC IDENTIFY: CPT

## 2022-07-29 PROCEDURE — 87075 CULTR BACTERIA EXCEPT BLOOD: CPT

## 2022-07-29 PROCEDURE — 11042 DBRDMT SUBQ TIS 1ST 20SQCM/<: CPT

## 2022-07-29 PROCEDURE — 99213 OFFICE O/P EST LOW 20 MIN: CPT

## 2022-07-29 RX ORDER — BETAMETHASONE DIPROPIONATE 0.05 %
OINTMENT (GRAM) TOPICAL ONCE
Status: CANCELLED | OUTPATIENT
Start: 2022-07-29 | End: 2022-07-29

## 2022-07-29 RX ORDER — CLOBETASOL PROPIONATE 0.5 MG/G
OINTMENT TOPICAL ONCE
Status: CANCELLED | OUTPATIENT
Start: 2022-07-29 | End: 2022-07-29

## 2022-07-29 ASSESSMENT — PAIN SCALES - GENERAL: PAINLEVEL_OUTOF10: 0

## 2022-07-29 NOTE — DISCHARGE INSTRUCTIONS
PHYSICIAN ORDERS AND DISCHARGE INSTRUCTIONS    NOTE: Upon discharge from the [address], you will receive a patient experience survey. We would be grateful if you would take the time to fill this survey out. Wound care order history:     MAY's   Right  1.2     Left 1.16   Date 2022   Vascular studies:   Date    Imaging:   Date    Cultures:   Date    Grafts:  Date    Antibiotics: KEFLEX PRIOR TO APPT. ON 2022              Earlier Wound care treatments:     Primary care physician:     Continuing wound care orders and information:              Residence:  PRIVATE               Continue home health care with; Wound Medications:    Wound cleansing:      Do not scrub or use excessive force. Wash hands with soap and water before and after dressing changes. Prior to applying a clean dressing, cleanse wound with normal saline,          wound cleanser, or mild soap and water. Ask the physician or nurse before getting the wound(s) wet in a shower   Daily Wound management:    Keep weight off wounds and reposition every 2 hours. Avoid standing for long periods of time. Apply wraps/stockings in AM and remove at bedtime. If swelling is present, elevate legs to the level of the heart or above for 30 minutes 4-5 times a day and/or when sitting. When taking antibiotics take entire prescription as ordered by physician do not stop taking until medicine is all gone. Orders for this week: 2022         LEFT LOWER LEG-- [address] WITH SOAP AND WATER, PAT DRY. COVER WITH CALCIUM ALGINATE                                                  COVER WITH SORBEX                                                  WRAP WITH CONFORM AND TAPE. TUBI F TO BILATERAL LOWER LEGS.   WRAP WITH ACE WRAP     Pharm:  RX:  Consult:  Central [phone]    Follow up with Dr Tom Naranjo In 1 weeks in the wound care center  Primary wound care

## 2022-07-29 NOTE — H&P
[facility] Initial Visit      Aldo Montgomery  AGE: 80 y.o. GENDER: female  : 3/18/1929  EPISODE DATE:  2022   Referred by: PCP    Subjective:     CHIEF COMPLAINT : left leg ulcer and drainage     HISTORY of PRESENT ILLNESS      Aldo Montgomery is a 80 y.o. female who presents to the [address] [place] [address] for an initial visit for evaluation and treatment of Chronic venous  ulcer(s) of  right calf. The condition is of mild severity. The ulcer has been present for 2 weeks. The underlying cause is thought to be edema. She has known CHF and recently had exacerbation. She had weepy left leg and open wound posteriorly. She was seen by PCP, who applied compression with ace wrap and referred to the wound center, she also was on antibiotics. Today, she has had improvement in the wound bed and improvement in the drainage. The patient has significant underlying medical conditions as below.      Wound Pain Timing/Severity: none  Quality of pain: N/A  Severity of pain:  3 / 10   Modifying Factors: edema  Associated Signs/Symptoms: drainage        PAST MEDICAL HISTORY        Diagnosis Date    Age-related osteoporosis with current pathological fracture of vertebra ([address] 2022    Arthritis     Bradycardia     requiring dual chamber pacemaker at [facility]    CAD (coronary artery disease)     Cardiac pacemaker 10/2001    [employer] #[id number]  PPM- Serial # [id number]- Dr Jaylyn Bella    CHF (congestive heart failure) (HCC)     COPD, mild ([address] 2017    Cor pulmonale (chronic) ([address] 3/15/2022    CVA (cerebrovascular accident) ([address]     DJD (degenerative joint disease) of cervical spine     C5-C6, C6-C7    Exhaustion of cardiac pacemaker battery 2011    PPM battery replacement- [employer]tr[employer]    Family history of cardiovascular disease     Glaucoma Dx     H/O 24 hour EKG monitoring 2000- Intermittent episonde of a-fib/flutter    H/O cardiac catheterization 2010, 1989 4/20/2010-Severe native vessel disease and has graft disease as well. LAD, CX totally occluded. RCA totally occluded in proximal segment. VG to RCA widely patent. PDA 90% LAD afer LIMA anastomosis 80-90% stenosis. Proceeded with PTCA with stent next day. H/O cardiovascular stress test 10/14/2011, 6/2/2010,4/8/2010, 5/18/2009,4/6/2009, 10/30/2007, 11/12/2004, 11/6/2003, 8/9/2002, 8/9/2001,6/5/2000,     10/14/2011-Lexiscan-Abnormal Myocardial Perfusion study. Evidence of mild ischemia in the Left CX region. Abnomal study. Rest EF 63%. Global LV systolic function normal. No ECG changes. Unremarkable pharmacological stress test.    H/O cardiovascular stress test 6/10/2013    thallium--mild ischemia left circumflex EF63% no change from 10/2011 study. H/O cardiovascular stress test 10/16/2014    cardiolite-mild ischemia left circumflex,EF70%    H/O chest x-ray 4/19/2009 4/19/2009-Stable cardiomegaly. No acute cardiopulmonary disease. H/O Doppler lower venous ultrasound 09/18/2019    Significant reflux noted in RGSV, RGSV is extremely tortuous and small along the thigh and calf and would be highly unlikely to be accessed, RSSV is non compressible and has occlusive chronic SVT, LSSV is non compressible with occlusive chronic SVT, LGSV removed s/p CABG, Significant reflux in LGSV tributary,    H/O Doppler ultrasound 3/31/2010    CAROTID- 3/31/2010-INtimal thickening but no significant atherosclerotic plaque noted in ANA PAULA. Doppler flow velocities within ANA PAULA are WNL. Heterogeneous, irregular atherosclerotic plaque noted in LICA. Doppler flow velocities within the LICA are elevated, consistent with a mild, less than 50% stenosis. H/O Doppler ultrasound 5/24/2016    Carotid- normal study    H/O Doppler ultrasound 09/06/2017    carotid - normal study    H/O Doppler ultrasound     H/O echocardiogram 10/13    EF=60%, Severe Pulm. HTN, & Sclerotic aortic valve w/stenosis.     H/O echocardiogram 10/16/14     EF 55-60% Normal LV. Normal LV systolic function. Severe tricuspid insufficiency with severe hypertension. H/O echocardiogram 02/03/2017    heart cath performed this morning    H/O echocardiogram 09/18/2019    EF 50-55%, Left atrium is mild to moderately dilated, right atrium is severely dilated, mildly dilated right ventricle, Mod MR, Severe TR, Severe Pulm HTN, no pericardial effusion     History of complete ECG     10/14/2011(Lexiscan);5/6/2010, 4/30/2009,10/24/2008,9/21/2007, 10/13/2006    History of nuclear stress test 11/17/2016    lexiscan-normal,EF70%    Hx of cardiovascular stress test 12/28/2018    EF 60%  Normal study. HX OTHER MEDICAL 05/01/2017    MUGA-normal, EF53%    Hyperlipidemia     Hypertension     Mild intermittent asthma 7/28/2016    Moderate COPD (chronic obstructive pulmonary disease) ([facility]) 3/15/2022    Nausea & vomiting     Obstructive sleep apnea 5/16/2017    Paroxysmal atrial fibrillation ([address]     Post PTCA 4/21/2010    PTCA with 2.25 stent of the LIMA to LAD    Pulmonary HTN ([address]     Severe per last echo on 10/13. 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  Followed by MAZE procedure by pulmonary vein isolation.-  Dr Ang Campos    S/P PTCA (percutaneous transluminal coronary angioplasty) 11/2012    PTCA with stent to RCA    Severe pulmonary hypertension ([address] 3/15/2022    Shortness of breath 3/15/2022    Thyroid disease     hypothyroi    Unspecified cerebral artery occlusion with cerebral infarction Unsure When    No Residual    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 ([address] 7/29/2022    WD-Non-pressure chronic ulcer of other part of left lower leg limited to breakdown of skin ([address] 7/29/2022       PAST SURGICAL HISTORY    Past Surgical History:   Procedure Laterality Date    APPENDECTOMY  1941    CARDIAC SURGERY  4/09    CABG (3 Bypasses), One Heart Stent in 2010    COLONOSCOPY  In 's    X1    CORONARY ANGIOPLASTY WITH STENT PLACEMENT  2010    PTCA with stent LIMA ->LAD    CORONARY ARTERY BYPASS GRAFT  2009    LIMA->Diag,  LIMA -> LAD;  SVG->RCA going to the PDA.  Followed by MAZE procedure by pulmonary vein isolation.-  Dr Coral Kaiser, TOTAL ABDOMINAL (CERVIX REMOVED)      MIDDLE EAR SURGERY      OTHER SURGICAL HISTORY      Ear surgery-hearing    PACEMAKER PLACEMENT      battery change 2011 Medtronic    PTCA  2012    Ptca with stent to RCA    TONSILLECTOMY  's       FAMILY HISTORY    Family History   Problem Relation Age of Onset    Cancer Mother         \"Liver Cancer\"    Arthritis Mother     Depression Mother     Hearing Loss Mother     High Blood Pressure Mother     High Cholesterol Mother     Mental Illness Mother     Miscarriages / Stillbirths Mother     Heart Disease Father     Early Death Father 36        \"Instant Death\"    High Blood Pressure Father     High Cholesterol Father     Coronary Art Dis Father         Massive MI    Heart Disease Sister     Depression Sister     Cancer Sister         \"Breast Cancer, Cancer Free Now\"    High Blood Pressure Sister     Other Daughter         \"She's Had Stomach Surgery\"    High Blood Pressure Son     High Blood Pressure Son     Early Death Paternal Grandfather        SOCIAL HISTORY    Social History     Tobacco Use    Smoking status: Former     Packs/day: 0.25     Years: 5.00     Pack years: 1.25     Types: Cigarettes     Quit date: 1970     Years since quittin.6    Smokeless tobacco: Never   Vaping Use    Vaping Use: Never used   Substance Use Topics    Alcohol use: Not Currently     Comment: no more than 3 cups of coffee each day    Drug use: No       ALLERGIES    Allergies   Allergen Reactions    Darvocet [Propoxyphene N-Acetaminophen]     Ranexa [Ranolazine Er]      Sick to her stomach    Ranolazine      Sick to her stomach    Sulfa Antibiotics Itching    Sulfasalazine Itching CAPS Take 1 capsule by mouth daily      Misc Natural Products (OSTEO BI-FLEX ADV DOUBLE ST PO) Take 1 tablet by mouth daily      timolol (TIMOPTIC-XE) 0.25 % ophthalmic gel-forming Place 1 drop into both eyes daily       carvedilol (COREG) 6.25 MG tablet Take 1 tablet by mouth 2 times daily (with meals) 180 tablet 3    albuterol (PROVENTIL HFA;VENTOLIN HFA) 108 (90 BASE) MCG/ACT inhaler Inhale 2 puffs into the lungs 2 times daily AND EVERY 4-6 HOURS AS NEEDED      Multiple Vitamins-Minerals (ICAPS) CAPS Take 1 capsule by mouth 2 times daily       vitamin B-12 (CYANOCOBALAMIN) 1000 MCG tablet Take 1,000 mcg by mouth daily. levothyroxine (SYNTHROID) 88 MCG tablet Take 88 mcg by mouth daily. aspirin 81 MG chewable tablet Take 81 mg by mouth daily. No current facility-administered medications on file prior to encounter.        PROBLEM LIST    Patient Active Problem List   Diagnosis    CAD (coronary artery disease)    Cardiac pacemaker    S/P CABG x 3    Post PTCA    TIA (transient ischemic attack)    Confusion    Headache    Essential hypertension    Bradycardia    Coronary artery disease involving coronary bypass graft of native heart without angina pectoris    Obstructive sleep apnea    Dizziness    PAF (paroxysmal atrial fibrillation) ([address]    Pacer at end of battery life    Infection of pacemaker pocket ([address]    Hyponatremia    Metabolic encephalopathy    Generalized weakness    Gait disturbance    Acute urinary retention    Hypothyroidism (acquired)    Chronic diastolic (congestive) heart failure (HCC)    Cor pulmonale (chronic) (HCC)    Chronic right-sided heart failure (HCC)    Severe pulmonary hypertension (HCC)    Shortness of breath    Moderate COPD (chronic obstructive pulmonary disease) (HCC)    CHF (congestive heart failure), NYHA class I, acute on chronic, combined (HCC)    Abnormal liver CT -findings suggestive of cirrhosis     At risk for injury associated with anticoagulation    T12 vertebral fracture (HCC)    SAULO on CPAP    Iron deficiency anemia secondary to inadequate dietary iron intake    Chest pain    Age-related osteoporosis with current pathological fracture of vertebra [facility])    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 ([facility]    WD-Non-pressure chronic ulcer of other part of left lower leg limited to breakdown of skin ([address]       REVIEW OF SYSTEMS    Pertinent items are noted in HPI. Objective:      /72   Pulse 67   Temp 97.3 °F (36.3 °C) (Temporal)   Resp 16     PHYSICAL EXAM  GEN: she looks well, no distress, not short of breath and appears at least 10-15 years younger than stated age. Dermatologic exam: Visual inspection of the periwound reveals the skin to be edematous  Wound exam: see wound description below in procedure note      Assessment:       Aldo Montgomery  appears to have a non-healing wound of the left leg. The etiology of the wound is felt to be venous. There are multiple complicating factors including edema. A comprehensive wound management program would be helpful to heal this wound. Assessments completed include fall risk and nutritional, functional,and psychological status. At this time appropriate care would include: periodic debridement and wound care as below. She will have to have compression stocking long-term. This will be difficult as she doubts she will be able to put on her own stocking. Problem List Items Addressed This Visit       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 ([address]    Relevant Orders    Culture, Wound    WD-Non-pressure chronic ulcer of other part of left lower leg limited to breakdown of skin ([address]         Procedure Note    Indications:  Based on my examination of this patient's wound(s) today, sharp excision into necrotic epidermis and dermis is required to promote healing and evaluate the extent of previous healing.     Performed by: Yunior Nicholas MD    Consent obtained: Yes    Time out taken:  No    Pain Control: lidocaine      Debridement:Non-excisional Debridement    Using #15 blade scalpel the wound(s) was/were sharply debrided down through and including the removal of epidermis and dermis. Devitalized Tissue Debrided:  fibrin, biofilm, and slough    Pre Debridement Measurements:  Are located in the Wound Documentation Flow Sheet    All active wounds listed below with today's date are evaluated  Wound(s)    debrided this date include # : 1     Post  Debridement Measurements:  Wound 07/29/22 Tibial Left;Posterior #1 (Active)   Wound Image   07/29/22 1402   Dressing Status New dressing applied 07/29/22 1435   Wound Cleansed Wound cleanser 07/29/22 1402   Wound Length (cm) 1 cm 07/29/22 1402   Wound Width (cm) 1.2 cm 07/29/22 1402   Wound Depth (cm) 0.1 cm 07/29/22 1402   Wound Surface Area (cm^2) 1.2 cm^2 07/29/22 1402   Wound Volume (cm^3) 0.12 cm^3 07/29/22 1402   Post-Procedure Length (cm) 1 cm 07/29/22 1430   Post-Procedure Width (cm) 1.2 cm 07/29/22 1430   Post-Procedure Depth (cm) 0.1 cm 07/29/22 1430   Post-Procedure Surface Area (cm^2) 1.2 cm^2 07/29/22 1430   Post-Procedure Volume (cm^3) 0.12 cm^3 07/29/22 1430   Distance Tunneling (cm) 0 cm 07/29/22 1402   Tunneling Position ___ O'Clock 0 07/29/22 1402   Undermining Starts ___ O'Clock 0 07/29/22 1402   Undermining Ends___ O'Clock 0 07/29/22 1402   Undermining Maxium Distance (cm) 0 07/29/22 1402   Wound Assessment Pink/red 07/29/22 1402   Drainage Amount Large 07/29/22 1402   Drainage Description Clear 07/29/22 1402   Odor None 07/29/22 1402   Safia-wound Assessment Intact 07/29/22 1402   Margins Defined edges 07/29/22 1402   Number of days: 0       Percent of Wound(s) Debrided: 100%    Total  Area  Debrided:  1.2 sq cm     Bleeding:  Minimal    Hemostasis Achieved:  by pressure    Procedural Pain:  2  / 10     Post Procedural Pain:  1 / 10     Response to treatment:  Well tolerated by patient.        Plan: Discharge Instructions         PHYSICIAN ORDERS AND DISCHARGE INSTRUCTIONS    NOTE: Upon discharge from the [address], you will receive a patient experience survey. We would be grateful if you would take the time to fill this survey out. Wound care order history:     MAY's   Right  1.2     Left 1.16   Date 2022   Vascular studies:   Date    Imaging:   Date    Cultures:   Date    Grafts:  Date    Antibiotics: KEFLEX PRIOR TO APPT. ON 2022              Earlier Wound care treatments:     Primary care physician:     Continuing wound care orders and information:              Residence:  PRIVATE               Continue home health care with; Wound Medications:    Wound cleansing:      Do not scrub or use excessive force. Wash hands with soap and water before and after dressing changes. Prior to applying a clean dressing, cleanse wound with normal saline,          wound cleanser, or mild soap and water. Ask the physician or nurse before getting the wound(s) wet in a shower   Daily Wound management:    Keep weight off wounds and reposition every 2 hours. Avoid standing for long periods of time. Apply wraps/stockings in AM and remove at bedtime. If swelling is present, elevate legs to the level of the heart or above for 30 minutes 4-5 times a day and/or when sitting. When taking antibiotics take entire prescription as ordered by physician do not stop taking until medicine is all gone. Orders for this week: 2022         LEFT LOWER LEG-- [address] WITH SOAP AND WATER, PAT DRY. COVER WITH CALCIUM ALGINATE                                                  COVER WITH SORBEX                                                  WRAP WITH CONFORM AND TAPE. TUBI F TO BILATERAL LOWER LEGS.   WRAP WITH ACE WRAP     Pharm:  RX:  Consult:  Central [phone]    Follow up with Dr Pily Purcell In 1 weeks in the wound [facility]  Primary wound care provider:  Call  for any questions or concerns.   Date__________   Time____________      Signature____________________________________________________         Treatment Note Wound 07/29/22 Tibial Left;Posterior #1-Dressing/Treatment:  (calcium alginate,sorbex,conform,tubi f)    Written Patient Dismissal Instructions Given          Electronically signed by Petar Calle MD on 7/29/2022 at 5:08 PM

## 2022-08-03 LAB
CULTURE: ABNORMAL
CULTURE: ABNORMAL
Lab: ABNORMAL
SPECIMEN: ABNORMAL

## 2022-08-05 ENCOUNTER — HOSPITAL ENCOUNTER (OUTPATIENT)
Dept: WOUND CARE | Age: 87
Discharge: HOME | End: 2022-08-05
Payer: MEDICARE

## 2022-08-05 VITALS
HEART RATE: 61 BPM | SYSTOLIC BLOOD PRESSURE: 121 MMHG | DIASTOLIC BLOOD PRESSURE: 74 MMHG | TEMPERATURE: 97.4 F | RESPIRATION RATE: 16 BRPM

## 2022-08-05 DIAGNOSIS — L97.821 NON-PRESSURE CHRONIC ULCER OF OTHER PART OF LEFT LOWER LEG LIMITED TO BREAKDOWN OF SKIN (HCC): ICD-10-CM

## 2022-08-05 DIAGNOSIS — L97.929 IDIOPATHIC CHRONIC VENOUS HYPERTENSION OF LEFT LEG WITH ULCER (HCC): Primary | ICD-10-CM

## 2022-08-05 DIAGNOSIS — I87.312 IDIOPATHIC CHRONIC VENOUS HYPERTENSION OF LEFT LEG WITH ULCER (HCC): Primary | ICD-10-CM

## 2022-08-05 PROCEDURE — 99213 OFFICE O/P EST LOW 20 MIN: CPT

## 2022-08-05 RX ORDER — CEPHALEXIN 500 MG/1
500 CAPSULE ORAL 3 TIMES DAILY
COMMUNITY
Start: 2022-07-26 | End: 2022-08-06

## 2022-08-05 RX ORDER — BETAMETHASONE DIPROPIONATE 0.05 %
OINTMENT (GRAM) TOPICAL ONCE
Status: CANCELLED | OUTPATIENT
Start: 2022-08-05 | End: 2022-08-05

## 2022-08-05 RX ORDER — CLOBETASOL PROPIONATE 0.5 MG/G
OINTMENT TOPICAL ONCE
Status: CANCELLED | OUTPATIENT
Start: 2022-08-05 | End: 2022-08-05

## 2022-08-05 ASSESSMENT — PAIN DESCRIPTION - ONSET: ONSET: ON-GOING

## 2022-08-05 ASSESSMENT — PAIN DESCRIPTION - DESCRIPTORS: DESCRIPTORS: SORE

## 2022-08-05 ASSESSMENT — PAIN DESCRIPTION - PAIN TYPE: TYPE: CHRONIC PAIN

## 2022-08-05 ASSESSMENT — PAIN DESCRIPTION - FREQUENCY: FREQUENCY: INTERMITTENT

## 2022-08-05 ASSESSMENT — PAIN - FUNCTIONAL ASSESSMENT: PAIN_FUNCTIONAL_ASSESSMENT: PREVENTS OR INTERFERES SOME ACTIVE ACTIVITIES AND ADLS

## 2022-08-05 ASSESSMENT — PAIN DESCRIPTION - ORIENTATION: ORIENTATION: LEFT;RIGHT

## 2022-08-05 ASSESSMENT — PAIN DESCRIPTION - LOCATION: LOCATION: LEG

## 2022-08-05 NOTE — PROGRESS NOTES
Wound Care Center Progress Note With Procedure    Srinivasa Corrigan  AGE: 80 y.o. GENDER: female  : 3/18/1929  EPISODE DATE:  2022     Subjective:     Chief Complaint   Patient presents with    Wound Check     Right leg         HISTORY of PRESENT ILLNESS      Srinivasa Corrigan is a 80 y.o. female who presents to the [address] [place] [address] for a visit for evaluation and treatment of Chronic venous  ulcer(s) of  right calf. The condition is of mild severity. The ulcer has been present for 2 weeks. The underlying cause is thought to be edema. She has known CHF and recently had exacerbation. She had weepy left leg and open wound posteriorly. She was seen by PCP, who applied compression with ace wrap and referred to the wound center, she also was on antibiotics. Today, she has had improvement in the wound bed and improvement in the drainage. The patient has significant underlying medical conditions as below. Patient states that she is still having some irritation, itching, and pain  Reviewed culture and was unremarkable  Patient tolerated wrap for entire week    Patient educated on the 6 essential components necessary for wound healing: Circulation, Debridements, Proper Dressings and Topical Wound Products, Infection Control, Edema Control and Offloading. Patient educated on those factors that negatively effect or impact wound healing: smoking, obesity, uncontrolled diabetes, anticoagulant and immunosuppressive regimens, inadequate nutrition, untreated arterial and venous disease if applicable and measures to manage edema.        Wound Pain Timing/Severity: none  Quality of pain: N/A  Severity of pain:  3 / 10  Modifying Factors: edema  Associated Signs/Symptoms: drainage        PAST MEDICAL HISTORY        Diagnosis Date    Age-related osteoporosis with current pathological fracture of vertebra ([address] 2022    Arthritis     Bradycardia     requiring dual chamber pacemaker at [facility]    CAD (coronary artery disease)     Cardiac pacemaker 10/2001    [employer] #[id number]  PPM- Serial # [id number]- Dr Isamar Heard    CHF (congestive heart failure) (HCC)     COPD, mild ([facility] 2/17/2017    Cor pulmonale (chronic) ([facility] 3/15/2022    CVA (cerebrovascular accident) ([facility]     DJD (degenerative joint disease) of cervical spine     C5-C6, C6-C7    Exhaustion of cardiac pacemaker battery 11/21/2011    PPM battery replacement- [employer]    Family history of cardiovascular disease     Glaucoma Dx 2010    H/O 24 hour EKG monitoring 8/6/2000 8/6/2000- Intermittent episonde of a-fib/flutter    H/O cardiac catheterization 4/20/2010, 2/1989 4/20/2010-Severe native vessel disease and has graft disease as well. LAD, CX totally occluded. RCA totally occluded in proximal segment. VG to RCA widely patent. PDA 90% LAD afer LIMA anastomosis 80-90% stenosis. Proceeded with PTCA with stent next day. H/O cardiovascular stress test 10/14/2011, 6/2/2010,4/8/2010, 5/18/2009,4/6/2009, 10/30/2007, 11/12/2004, 11/6/2003, 8/9/2002, 8/9/2001,6/5/2000,     10/14/2011-Lexiscan-Abnormal Myocardial Perfusion study. Evidence of mild ischemia in the Left CX region. Abnomal study. Rest EF 63%. Global LV systolic function normal. No ECG changes. Unremarkable pharmacological stress test.    H/O cardiovascular stress test 6/10/2013    thallium--mild ischemia left circumflex EF63% no change from 10/2011 study. H/O cardiovascular stress test 10/16/2014    cardiolite-mild ischemia left circumflex,EF70%    H/O chest x-ray 4/19/2009 4/19/2009-Stable cardiomegaly. No acute cardiopulmonary disease.     H/O Doppler lower venous ultrasound 09/18/2019    Significant reflux noted in RGSV, RGSV is extremely tortuous and small along the thigh and calf and would be highly unlikely to be accessed, RSSV is non compressible and has occlusive chronic SVT, LSSV is non compressible with occlusive chronic SVT, LGSV removed s/p CABG, Significant reflux in LGSV tributary,    H/O Doppler ultrasound 3/31/2010    CAROTID- 3/31/2010-INtimal thickening but no significant atherosclerotic plaque noted in ANA PAULA. Doppler flow velocities within ANA PAULA are WNL. Heterogeneous, irregular atherosclerotic plaque noted in LICA. Doppler flow velocities within the LICA are elevated, consistent with a mild, less than 50% stenosis. H/O Doppler ultrasound 5/24/2016    Carotid- normal study    H/O Doppler ultrasound 09/06/2017    carotid - normal study    H/O Doppler ultrasound     H/O echocardiogram 10/13    EF=60%, Severe Pulm. HTN, & Sclerotic aortic valve w/stenosis. H/O echocardiogram 10/16/14     EF 55-60% Normal LV. Normal LV systolic function. Severe tricuspid insufficiency with severe hypertension. H/O echocardiogram 02/03/2017    heart cath performed this morning    H/O echocardiogram 09/18/2019    EF 50-55%, Left atrium is mild to moderately dilated, right atrium is severely dilated, mildly dilated right ventricle, Mod MR, Severe TR, Severe Pulm HTN, no pericardial effusion     History of complete ECG     10/14/2011(Lexiscan);5/6/2010, 4/30/2009,10/24/2008,9/21/2007, 10/13/2006    History of nuclear stress test 11/17/2016    lexiscan-normal,EF70%    Hx of cardiovascular stress test 12/28/2018    EF 60%  Normal study. HX OTHER MEDICAL 05/01/2017    MUGA-normal, EF53%    Hyperlipidemia     Hypertension     Mild intermittent asthma 7/28/2016    Moderate COPD (chronic obstructive pulmonary disease) (HCC) 3/15/2022    Nausea & vomiting     Obstructive sleep apnea 5/16/2017    Paroxysmal atrial fibrillation ([address]     Post PTCA 4/21/2010    PTCA with 2.25 stent of the LIMA to LAD    Pulmonary HTN ([address]     Severe per last echo on 10/13. 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  Followed by MAZE procedure by pulmonary vein isolation.-  Dr Mansoor Dunlap    S/P PTCA (percutaneous transluminal coronary angioplasty) 11/2012    PTCA with stent to RCA    Severe pulmonary hypertension ([address] 3/15/2022    Shortness of breath 3/15/2022    Thyroid disease     hypothyroi    Unspecified cerebral artery occlusion with cerebral infarction Unsure When    No Residual    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 ([address] 7/29/2022    WD-Non-pressure chronic ulcer of other part of left lower leg limited to breakdown of skin ([address] 7/29/2022       PAST SURGICAL HISTORY    Past Surgical History:   Procedure Laterality Date    APPENDECTOMY  1941    CARDIAC SURGERY  4/09    CABG (3 Bypasses), One Heart Stent in 2010    COLONOSCOPY  In 2000's    X1    CORONARY ANGIOPLASTY WITH STENT PLACEMENT  4/21/2010    PTCA with stent LIMA ->LAD    CORONARY ARTERY BYPASS GRAFT  4/8/2009    LIMA->Diag,  LIMA -> LAD;  SVG->RCA going to the PDA.  Followed by MAZE procedure by pulmonary vein isolation.-  Dr Rodriguez Nurse, TOTAL ABDOMINAL (CERVIX REMOVED)  1990's    MIDDLE EAR SURGERY      OTHER SURGICAL HISTORY      Ear surgery-hearing    PACEMAKER PLACEMENT      battery change 11/21/2011 [employer]tr[employer]    PTCA  11/2012    Ptca with stent to RCA    TONSILLECTOMY  1950's       FAMILY HISTORY    Family History   Problem Relation Age of Onset    Cancer Mother         \"Liver Cancer\"    Arthritis Mother     Depression Mother     Hearing Loss Mother     High Blood Pressure Mother     High Cholesterol Mother     Mental Illness Mother     Miscarriages / Stillbirths Mother     Heart Disease Father     Early Death Father 36        \"Instant Death\"    High Blood Pressure Father     High Cholesterol Father     Coronary Art Dis Father         Massive MI    Heart Disease Sister     Depression Sister     Cancer Sister         \"Breast Cancer, Cancer Free Now\"    High Blood Pressure Sister     Other Daughter         \"She's Had Stomach Surgery\"    High Blood Pressure Son     High Blood Pressure Son     Early Death Paternal Grandfather SOCIAL HISTORY    Social History     Tobacco Use    Smoking status: Former     Packs/day: 0.25     Years: 5.00     Pack years: 1.25     Types: Cigarettes     Quit date: 1970     Years since quittin.7    Smokeless tobacco: Never   Vaping Use    Vaping Use: Never used   Substance Use Topics    Alcohol use: Not Currently     Comment: no more than 3 cups of coffee each day    Drug use: No       ALLERGIES    Allergies   Allergen Reactions    Darvocet [Propoxyphene N-Acetaminophen]     Ranexa [Ranolazine Er]      Sick to her stomach    Ranolazine      Sick to her stomach    Sulfa Antibiotics Itching    Sulfasalazine Itching    Adhesive Tape Rash    Allantoin Rash    Bacitracin Rash    Gramicidin Rash    Neomycin Rash    Polymyxin B Rash    Pramoxine Hcl Rash    Silicone Rash       MEDICATIONS    Current Outpatient Medications on File Prior to Encounter   Medication Sig Dispense Refill    cephALEXin (KEFLEX) 500 MG capsule Take 500 mg by mouth in the morning and 500 mg at noon and 500 mg before bedtime.       Turmeric (QC TUMERIC COMPLEX PO) Take 1 capsule by mouth daily      traZODone (DESYREL) 50 MG tablet Take  mg by mouth nightly as needed for Sleep      acetaminophen (TYLENOL) 500 MG tablet Take 1,000 mg by mouth every 4 hours      ferrous sulfate (IRON 325) 325 (65 Fe) MG tablet Take 1 tablet by mouth daily (with breakfast) 30 tablet 3    torsemide (DEMADEX) 20 MG tablet Take 1 tablet by mouth as needed (for fluid retention or weight gain of 3 lbs overnight) This is in addition to the 20 mg bid (Patient taking differently: Take 40 mg by mouth every morning This is in addition to the 20 mg bid-  Takes demadex 20 mg (2 tablets in a.m.) and 1 tablet at night) 30 tablet 0    spironolactone (ALDACTONE) 50 MG tablet Take 1 tablet by mouth daily (Patient taking differently: Take 25 mg by mouth in the morning.) 30 tablet 0    PROCTOZONE-HC 2.5 % CREA rectal cream INSERT RECTALLY TWICE DAILY as directed AS NEEDED FOR HEMORRHOIDS (Patient not taking: No sig reported)      ZIOPTAN 0.0015 % SOLN Place 1 drop into both eyes Daily with supper       CETIRIZINE HCL PO Take by mouth (Patient not taking: No sig reported)      lidocaine 4 % external patch Place 1 patch onto the skin daily (Patient not taking: No sig reported) 30 patch 0    vitamin D 25 MCG (1000 UT) CAPS Take 5,000 Units by mouth daily      Probiotic Product (PROBIOTIC-10) CHEW Take by mouth (Patient not taking: No sig reported)      atorvastatin (LIPITOR) 10 MG tablet Take 10 mg by mouth daily       umeclidinium-vilanterol (ANORO ELLIPTA) 62.5-25 MCG/INH AEPB inhaler Inhale 1 puff into the lungs daily 1 each 11    CPAP Machine MISC by Does not apply route      Biotin 5 MG CAPS Take 1 capsule by mouth daily      Misc Natural Products (OSTEO BI-FLEX ADV DOUBLE ST PO) Take 1 tablet by mouth daily      timolol (TIMOPTIC-XE) 0.25 % ophthalmic gel-forming Place 1 drop into both eyes daily       carvedilol (COREG) 6.25 MG tablet Take 1 tablet by mouth 2 times daily (with meals) 180 tablet 3    albuterol (PROVENTIL HFA;VENTOLIN HFA) 108 (90 BASE) MCG/ACT inhaler Inhale 2 puffs into the lungs 2 times daily AND EVERY 4-6 HOURS AS NEEDED      Multiple Vitamins-Minerals (ICAPS) CAPS Take 1 capsule by mouth 2 times daily       vitamin B-12 (CYANOCOBALAMIN) 1000 MCG tablet Take 1,000 mcg by mouth daily. levothyroxine (SYNTHROID) 88 MCG tablet Take 88 mcg by mouth daily. aspirin 81 MG chewable tablet Take 81 mg by mouth daily. No current facility-administered medications on file prior to encounter. REVIEW OF SYSTEMS    Pertinent items are noted in HPI. Constitutional: Negative for systemic symptoms including fever, chills and malaise. Objective:      /74   Pulse 61   Temp 97.4 °F (36.3 °C) (Temporal)   Resp 16     PHYSICAL EXAM      General: The patient is in no acute distress.     Mental status:  Patient is appropriate, is oriented to place and plan of care. Dermatologic exam: Visual inspection of the periwound reveals the skin to be dry and edematous  Wound exam: see wound description below in procedure note      Assessment:   \  Problem List Items Addressed This Visit          Circulatory    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 ([address] - Primary    Relevant Orders    Initiate Outpatient Wound Care Protocol       Other    WD-Non-pressure chronic ulcer of other part of left lower leg limited to breakdown of skin ([address]       Patient will not tolerate debridement    Patient has ride trouble and can only come every 2 weeks at most    Updating home health orders for regular changes  Patient states that she has received her supplies       Plan:       Discharge Instructions         [address]     NOTE: Upon discharge from the [address], you will receive a patient experience survey. We would be grateful if you would take the time to fill this survey out. Wound care order history:                 MAY's   Right  1.2     Left 1.16              Date 7/29/2022              Vascular studies:   Date              Imaging:   Date              Cultures:   Date              Grafts:  Date              Antibiotics: KEFLEX PRIOR TO APPT. ON 7/29/2022              Earlier Wound care treatments:                          Primary care physician:     Continuing wound care orders and information:              Residence:  PRIVATE               Continue home health care with; Wound Medications:              Wound cleansing:                          Do not scrub or use excessive force. Wash hands with soap and water before and after dressing changes. Prior to applying a clean dressing, cleanse wound with normal saline,                                wound cleanser, or mild soap and water.                           Ask the physician or nurse before getting the wound(s) wet in a shower              Daily Wound management:                          Keep weight off wounds and reposition every 2 hours. Avoid standing for long periods of time. Apply wraps/stockings in AM and remove at bedtime. If swelling is present, elevate legs to the level of the heart or above for 30 minutes 4-5 times a day and/or when sitting. When taking antibiotics take entire prescription as ordered by physician do not stop taking until medicine is all gone. Orders for this week: 2022                      LEFT LOWER LEG-- [address] WITH SOAP AND WATER, PAT DRY. COVER WITH CALCIUM ALGINATE                                                  COVER WITH SORBEX                                                  WRAP WITH CONFORM AND TAPE. TUBI F TO BILATERAL LOWER LEGS. WRAP WITH ACE WRAP     Pharm:  RX:  Consult:  Central [phone]     Follow up with Dr Sendy Red In 1 weeks in the wound care center  Primary wound care provider:  Call [phone] for any questions or concerns.   Date__________   Time____________        Treatment Note      Written Patient Dismissal Instructions Given            Electronically signed by LISA Gunn CNP on 2022 at 2:29 PM

## 2022-08-05 NOTE — DISCHARGE INSTRUCTIONS
PHYSICIAN ORDERS AND DISCHARGE INSTRUCTIONS     NOTE: Upon discharge from the [address], you will receive a patient experience survey. We would be grateful if you would take the time to fill this survey out. Wound care order history:                 MAY's   Right  1.2     Left 1.16              Date 7/29/2022              Vascular studies:   Date              Imaging:   Date              Cultures:   Date              Grafts:  Date              Antibiotics: KEFLEX PRIOR TO APPT. ON 7/29/2022              Earlier Wound care treatments:                          Primary care physician:     Continuing wound care orders and information:              Residence:  PRIVATE               Continue home health care with; Wound Medications:              Wound cleansing:                          Do not scrub or use excessive force. Wash hands with soap and water before and after dressing changes. Prior to applying a clean dressing, cleanse wound with normal saline,                                wound cleanser, or mild soap and water. Ask the physician or nurse before getting the wound(s) wet in a shower              Daily Wound management:                          Keep weight off wounds and reposition every 2 hours. Avoid standing for long periods of time. Apply wraps/stockings in AM and remove at bedtime. If swelling is present, elevate legs to the level of the heart or above for 30 minutes 4-5 times a day and/or when sitting. When taking antibiotics take entire prescription as ordered by physician do not stop taking until medicine is all gone. Orders for this week: 8/5/2022                      LEFT LOWER LEG-- [address] WITH SOAP AND WATER, PAT DRY. COVER WITH CALCIUM ALGINATE                                                  COVER WITH SORBEX                                                  WRAP WITH CONFORM AND TAPE. TUBI F TO BILATERAL LOWER LEGS. WRAP WITH ACE WRAP     Pharm:  RX:  Consult:  Central [phone]     Follow up with Dr Seven Gomes In 1 weeks in the wound care center  Primary wound care provider:  Call [phone] for any questions or concerns.   Date__________   Time____________

## 2022-08-17 ENCOUNTER — HOSPITAL ENCOUNTER (OUTPATIENT)
Dept: INFUSION THERAPY | Age: 87
Setting detail: INFUSION SERIES
Discharge: HOME | End: 2022-08-17
Payer: MEDICARE

## 2022-08-17 VITALS
HEART RATE: 82 BPM | OXYGEN SATURATION: 98 % | SYSTOLIC BLOOD PRESSURE: 141 MMHG | DIASTOLIC BLOOD PRESSURE: 76 MMHG | TEMPERATURE: 97.3 F | RESPIRATION RATE: 16 BRPM

## 2022-08-17 DIAGNOSIS — M80.08XA AGE-RELATED OSTEOPOROSIS WITH CURRENT PATHOLOGICAL FRACTURE OF VERTEBRA, INITIAL ENCOUNTER (HCC): Primary | ICD-10-CM

## 2022-08-17 PROCEDURE — 6360000002 HC RX W HCPCS

## 2022-08-17 PROCEDURE — 96372 THER/PROPH/DIAG INJ SC/IM: CPT

## 2022-08-17 PROCEDURE — 99211 OFF/OP EST MAY X REQ PHY/QHP: CPT

## 2022-08-17 RX ORDER — ALBUTEROL SULFATE 90 UG/1
4 AEROSOL, METERED RESPIRATORY (INHALATION) PRN
Status: CANCELLED | OUTPATIENT
Start: 2022-09-09

## 2022-08-17 RX ORDER — ACETAMINOPHEN 325 MG/1
650 TABLET ORAL
Status: CANCELLED | OUTPATIENT
Start: 2022-09-09

## 2022-08-17 RX ORDER — EPINEPHRINE 1 MG/ML
0.3 INJECTION, SOLUTION, CONCENTRATE INTRAVENOUS PRN
Status: CANCELLED | OUTPATIENT
Start: 2022-09-09

## 2022-08-17 RX ORDER — DIPHENHYDRAMINE HYDROCHLORIDE 50 MG/ML
50 INJECTION INTRAMUSCULAR; INTRAVENOUS
Status: CANCELLED | OUTPATIENT
Start: 2022-09-09

## 2022-08-17 RX ORDER — FAMOTIDINE 10 MG/ML
20 INJECTION, SOLUTION INTRAVENOUS
Status: CANCELLED | OUTPATIENT
Start: 2022-09-09

## 2022-08-17 RX ORDER — SODIUM CHLORIDE 9 MG/ML
INJECTION, SOLUTION INTRAVENOUS CONTINUOUS
Status: CANCELLED | OUTPATIENT
Start: 2022-09-09

## 2022-08-17 RX ORDER — ONDANSETRON 2 MG/ML
8 INJECTION INTRAMUSCULAR; INTRAVENOUS
Status: CANCELLED | OUTPATIENT
Start: 2022-09-09

## 2022-08-17 RX ADMIN — ROMOSOZUMAB-AQQG 105 MG: 105 INJECTION, SOLUTION SUBCUTANEOUS at 13:01

## 2022-08-17 NOTE — DISCHARGE INSTRUCTIONS
Evenity injections:  Increase liquid intake for the next 2-3 days, especially water. You may experience increase bone pain after injection. Take pain medication as directed   May be sore at injection site. Call your doctor should you experience jaw pain or new dental complaints  Continue home meds, diet and activity  Call your Doctor for any specific questions or problems. Prolia injection is given every month for 12 months. Your next appt is Sept. 14th    Thank you for choosing [facility]. It is our pleasure to serve you.       Hours 8:00 am - 5:00 pm  Phone Number: [phone]

## 2022-08-17 NOTE — PROGRESS NOTES
Pt taken to room 3, oriented to room, bed/chair controls, and call light. Needs met at present. Call light in reach. Pt aware of and agreeable for plan of care.

## 2022-08-17 NOTE — PROGRESS NOTES
Tolerated Injections well. Reviewed discharge instruction, voiced understanding. Copies of AVS given. Pt discharged home. Pt to exit via [address] per volunteer.     Orders Placed This Encounter   Medications    romosozumab-aqqg (EVENITY) injection 105 mg    romosozumab-aqqg (EVENITY) injection 105 mg

## 2022-08-19 ENCOUNTER — HOSPITAL ENCOUNTER (OUTPATIENT)
Dept: WOUND CARE | Age: 87
Discharge: HOME | End: 2022-08-19
Payer: MEDICARE

## 2022-08-19 VITALS
RESPIRATION RATE: 16 BRPM | DIASTOLIC BLOOD PRESSURE: 70 MMHG | HEART RATE: 73 BPM | SYSTOLIC BLOOD PRESSURE: 113 MMHG | TEMPERATURE: 97.2 F

## 2022-08-19 DIAGNOSIS — L97.929 IDIOPATHIC CHRONIC VENOUS HYPERTENSION OF LEFT LEG WITH ULCER (HCC): Primary | ICD-10-CM

## 2022-08-19 DIAGNOSIS — I87.312 IDIOPATHIC CHRONIC VENOUS HYPERTENSION OF LEFT LEG WITH ULCER (HCC): Primary | ICD-10-CM

## 2022-08-19 DIAGNOSIS — L97.822 NON-PRESSURE CHRONIC ULCER OF OTHER PART OF LEFT LOWER LEG WITH FAT LAYER EXPOSED (HCC): ICD-10-CM

## 2022-08-19 PROCEDURE — 11042 DBRDMT SUBQ TIS 1ST 20SQCM/<: CPT

## 2022-08-19 RX ORDER — CLOBETASOL PROPIONATE 0.5 MG/G
OINTMENT TOPICAL ONCE
Status: CANCELLED | OUTPATIENT
Start: 2022-08-19 | End: 2022-08-19

## 2022-08-19 RX ORDER — BETAMETHASONE DIPROPIONATE 0.05 %
OINTMENT (GRAM) TOPICAL ONCE
Status: CANCELLED | OUTPATIENT
Start: 2022-08-19 | End: 2022-08-19

## 2022-08-19 ASSESSMENT — PAIN SCALES - GENERAL: PAINLEVEL_OUTOF10: 0

## 2022-08-19 NOTE — PROGRESS NOTES
Wound Care Center Progress Note With Procedure    Katrina Bueno  AGE: 80 y.o. GENDER: female  : 3/18/1929  EPISODE DATE:  2022     Subjective:     No chief complaint on file. HISTORY of PRESENT ILLNESS      Katrina Bueno is a 80 y.o. female who presents today for wound evaluation of Chronic venous ulcer(s) of the left leg. The ulcer is of moderate severity. The underlying cause of the wound is trauma/venous. She is in for f/u- the wound is tender and about the same today. Wound Pain Timing/Severity: mild  Quality of pain: tender  Severity of pain:  5 / 10   Modifying Factors: edema and venous stasis  Associated Signs/Symptoms: drainage and pain        PAST MEDICAL HISTORY        Diagnosis Date    Age-related osteoporosis with current pathological fracture of vertebra ([address] 2022    Arthritis     Bradycardia     requiring dual chamber pacemaker at [facility]    CAD (coronary artery disease)     Cardiac pacemaker 10/2001    [employer] #[id number]  PPM- Serial # [id number]- Dr Mary Roberts    CHF (congestive heart failure) (HCC)     COPD, mild ([address] 2017    Cor pulmonale (chronic) ([address] 3/15/2022    CVA (cerebrovascular accident) ([address]     DJD (degenerative joint disease) of cervical spine     C5-C6, C6-C7    Exhaustion of cardiac pacemaker battery 2011    PPM battery replacement- [employer]    Family history of cardiovascular disease     Glaucoma Dx     H/O 24 hour EKG monitoring 2000- Intermittent episonde of a-fib/flutter    H/O cardiac catheterization 2010, 2010-Severe native vessel disease and has graft disease as well. LAD, CX totally occluded. RCA totally occluded in proximal segment. VG to RCA widely patent. PDA 90% LAD afer LIMA anastomosis 80-90% stenosis. Proceeded with PTCA with stent next day.     H/O cardiovascular stress test 10/14/2011, 2010,2010, 2009,2009, 10/30/2007, 2004, 2003, 8/9/2002, 8/9/2001,6/5/2000,     10/14/2011-Lexiscan-Abnormal Myocardial Perfusion study. Evidence of mild ischemia in the Left CX region. Abnomal study. Rest EF 63%. Global LV systolic function normal. No ECG changes. Unremarkable pharmacological stress test.    H/O cardiovascular stress test 6/10/2013    thallium--mild ischemia left circumflex EF63% no change from 10/2011 study. H/O cardiovascular stress test 10/16/2014    cardiolite-mild ischemia left circumflex,EF70%    H/O chest x-ray 4/19/2009 4/19/2009-Stable cardiomegaly. No acute cardiopulmonary disease. H/O Doppler lower venous ultrasound 09/18/2019    Significant reflux noted in RGSV, RGSV is extremely tortuous and small along the thigh and calf and would be highly unlikely to be accessed, RSSV is non compressible and has occlusive chronic SVT, LSSV is non compressible with occlusive chronic SVT, LGSV removed s/p CABG, Significant reflux in LGSV tributary,    H/O Doppler ultrasound 3/31/2010    CAROTID- 3/31/2010-INtimal thickening but no significant atherosclerotic plaque noted in ANA PAULA. Doppler flow velocities within ANA PAULA are WNL. Heterogeneous, irregular atherosclerotic plaque noted in LICA. Doppler flow velocities within the LICA are elevated, consistent with a mild, less than 50% stenosis. H/O Doppler ultrasound 5/24/2016    Carotid- normal study    H/O Doppler ultrasound 09/06/2017    carotid - normal study    H/O Doppler ultrasound     H/O echocardiogram 10/13    EF=60%, Severe Pulm. HTN, & Sclerotic aortic valve w/stenosis. H/O echocardiogram 10/16/14     EF 55-60% Normal LV. Normal LV systolic function. Severe tricuspid insufficiency with severe hypertension.      H/O echocardiogram 02/03/2017    heart cath performed this morning    H/O echocardiogram 09/18/2019    EF 50-55%, Left atrium is mild to moderately dilated, right atrium is severely dilated, mildly dilated right ventricle, Mod MR, Severe TR, Severe Pulm HTN, no pericardial effusion     History of complete ECG     10/14/2011(Lexiscan);5/6/2010, 4/30/2009,10/24/2008,9/21/2007, 10/13/2006    History of nuclear stress test 11/17/2016    lexiscan-normal,EF70%    Hx of cardiovascular stress test 12/28/2018    EF 60%  Normal study. HX OTHER MEDICAL 05/01/2017    MUGA-normal, EF53%    Hyperlipidemia     Hypertension     Mild intermittent asthma 7/28/2016    Moderate COPD (chronic obstructive pulmonary disease) ([facility]) 3/15/2022    Nausea & vomiting     Obstructive sleep apnea 5/16/2017    Paroxysmal atrial fibrillation ([address]     Post PTCA 4/21/2010    PTCA with 2.25 stent of the LIMA to LAD    Pulmonary HTN ([address]     Severe per last echo on 10/13. 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 Followed by MAZE procedure by pulmonary vein isolation.-  Dr Pinky Joe    S/P PTCA (percutaneous transluminal coronary angioplasty) 11/2012    PTCA with stent to RCA    Severe pulmonary hypertension ([address] 3/15/2022    Shortness of breath 3/15/2022    Thyroid disease     hypothyroi    Unspecified cerebral artery occlusion with cerebral infarction Unsure When    No Residual    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 ([address] 7/29/2022    WD-Non-pressure chronic ulcer of other part of left lower leg limited to breakdown of skin ([address] 7/29/2022       PAST SURGICAL HISTORY    Past Surgical History:   Procedure Laterality Date    APPENDECTOMY  1941    CARDIAC SURGERY  4/09    CABG (3 Bypasses), One Heart Stent in 2010    COLONOSCOPY  In 2000's    X1    CORONARY ANGIOPLASTY WITH STENT PLACEMENT  4/21/2010    PTCA with stent LIMA ->LAD    CORONARY ARTERY BYPASS GRAFT  4/8/2009    LIMA->Diag,  LIMA -> LAD;  SVG->RCA going to the PDA.  Followed by MAZE procedure by pulmonary vein isolation.-  Dr Mason Moore, TOTAL ABDOMINAL (CERVIX REMOVED)  1990's    MIDDLE EAR SURGERY      OTHER SURGICAL HISTORY Ear surgery-hearing    PACEMAKER PLACEMENT      battery change 2011 Medtronic    PTCA  2012    Ptca with stent to RCA    TONSILLECTOMY  1950's       FAMILY HISTORY    Family History   Problem Relation Age of Onset    Cancer Mother         \"Liver Cancer\"    Arthritis Mother     Depression Mother     Hearing Loss Mother     High Blood Pressure Mother     High Cholesterol Mother     Mental Illness Mother     Miscarriages / Stillbirths Mother     Heart Disease Father     Early Death Father 36        \"Instant Death\"    High Blood Pressure Father     High Cholesterol Father     Coronary Art Dis Father         Massive MI    Heart Disease Sister     Depression Sister     Cancer Sister         \"Breast Cancer, Cancer Free Now\"    High Blood Pressure Sister     Other Daughter         \"She's Had Stomach Surgery\"    High Blood Pressure Son     High Blood Pressure Son     Early Death Paternal Grandfather        SOCIAL HISTORY    Social History     Tobacco Use    Smoking status: Former     Packs/day: 0.25     Years: 5.00     Pack years: 1.25     Types: Cigarettes     Quit date: 1970     Years since quittin.7    Smokeless tobacco: Never   Vaping Use    Vaping Use: Never used   Substance Use Topics    Alcohol use: Not Currently     Comment: no more than 3 cups of coffee each day    Drug use: No       ALLERGIES    Allergies   Allergen Reactions    Darvocet [Propoxyphene N-Acetaminophen]     Ranexa [Ranolazine Er]      Sick to her stomach    Ranolazine      Sick to her stomach    Sulfa Antibiotics Itching    Sulfasalazine Itching    Adhesive Tape Rash    Allantoin Rash    Bacitracin Rash    Gramicidin Rash    Neomycin Rash    Polymyxin B Rash    Pramoxine Hcl Rash    Silicone Rash       MEDICATIONS    Current Outpatient Medications on File Prior to Encounter   Medication Sig Dispense Refill    Turmeric (QC TUMERIC COMPLEX PO) Take 1 capsule by mouth daily      traZODone (DESYREL) 50 MG tablet Take  mg by mouth nightly as needed for Sleep      acetaminophen (TYLENOL) 500 MG tablet Take 1,000 mg by mouth every 4 hours      ferrous sulfate (IRON 325) 325 (65 Fe) MG tablet Take 1 tablet by mouth daily (with breakfast) 30 tablet 3    torsemide (DEMADEX) 20 MG tablet Take 1 tablet by mouth as needed (for fluid retention or weight gain of 3 lbs overnight) This is in addition to the 20 mg bid (Patient taking differently: Take 40 mg by mouth every morning This is in addition to the 20 mg bid-  Takes demadex 20 mg (2 tablets in a.m.) and 1 tablet at night) 30 tablet 0    spironolactone (ALDACTONE) 50 MG tablet Take 1 tablet by mouth daily (Patient taking differently: Take 25 mg by mouth in the morning.) 30 tablet 0    PROCTOZONE-HC 2.5 % CREA rectal cream INSERT RECTALLY TWICE DAILY as directed AS NEEDED FOR HEMORRHOIDS (Patient not taking: No sig reported)      ZIOPTAN 0.0015 % SOLN Place 1 drop into both eyes Daily with supper       CETIRIZINE HCL PO Take by mouth (Patient not taking: No sig reported)      lidocaine 4 % external patch Place 1 patch onto the skin daily (Patient not taking: No sig reported) 30 patch 0    vitamin D 25 MCG (1000 UT) CAPS Take 5,000 Units by mouth daily      Probiotic Product (PROBIOTIC-10) CHEW Take by mouth (Patient not taking: No sig reported)      atorvastatin (LIPITOR) 10 MG tablet Take 10 mg by mouth daily       umeclidinium-vilanterol (ANORO ELLIPTA) 62.5-25 MCG/INH AEPB inhaler Inhale 1 puff into the lungs daily 1 each 11    CPAP Machine MISC by Does not apply route      Biotin 5 MG CAPS Take 1 capsule by mouth daily      Misc Natural Products (OSTEO BI-FLEX ADV DOUBLE ST PO) Take 1 tablet by mouth daily      timolol (TIMOPTIC-XE) 0.25 % ophthalmic gel-forming Place 1 drop into both eyes daily       carvedilol (COREG) 6.25 MG tablet Take 1 tablet by mouth 2 times daily (with meals) 180 tablet 3    albuterol (PROVENTIL HFA;VENTOLIN HFA) 108 (90 BASE) MCG/ACT inhaler Inhale 2 puffs into the lungs 2 times daily AND EVERY 4-6 HOURS AS NEEDED      Multiple Vitamins-Minerals (ICAPS) CAPS Take 1 capsule by mouth 2 times daily       vitamin B-12 (CYANOCOBALAMIN) 1000 MCG tablet Take 1,000 mcg by mouth daily. levothyroxine (SYNTHROID) 88 MCG tablet Take 88 mcg by mouth daily. aspirin 81 MG chewable tablet Take 81 mg by mouth daily. No current facility-administered medications on file prior to encounter. REVIEW OF SYSTEMS    Pertinent items are noted in HPI. Constitutional: Negative for systemic symptoms including fever, chills and malaise. Objective:      /70   Pulse 73   Temp 97.2 °F (36.2 °C) (Temporal)   Resp 16     PHYSICAL EXAM      General: The patient is in no acute distress. Mental status:  Patient is appropriate, is  oriented to place and plan of care. Dermatologic exam: Visual inspection of the periwound reveals the skin to be normal in turgor and texture  Wound exam: see wound description below in procedure note      Assessment:     Problem List Items Addressed This Visit       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 ([address] - Primary    Relevant Orders    Initiate Outpatient Wound Care Protocol    WD-Non-pressure chronic ulcer of other part of left lower leg limited to breakdown of skin ([address]     Procedure Note    Indications:  Based on my examination of this patient's wound(s) today, sharp excision into necrotic subcutaneous tissue is required to promote healing and evaluate the extent of previous healing. Performed by: Petar Calle MD    Consent obtained: Yes    Time out taken:  Yes    Pain Control: lidocaine gel      Debridement:Excisional Debridement    Using curette, #15 blade scalpel, and forceps the wound(s) was/were sharply debrided down through and including the removal of subcutaneous tissue.         Devitalized Tissue Debrided:  fibrin, biofilm, and slough    Pre Debridement Measurements:  Are located in the debridement today    Status of wound progress and description from last visit:   about the same. Given the pain and lack of improvement in such a previously shallow wound, I suspect arterial component. Will start hydrogel for autolytic debridement. Plan:       Discharge Instructions           Discharge Instructions           PHYSICIAN ORDERS AND DISCHARGE INSTRUCTIONS     NOTE: Upon discharge from the [address], you will receive a patient experience survey. We would be grateful if you would take the time to fill this survey out. Wound care order history:                 MAY's   Right  1.2     Left 1.16              Date 7/29/2022              Vascular studies:   Date              Imaging:   Date              Cultures:   Date              Grafts:  Date              Antibiotics: KEFLEX PRIOR TO APPT. ON 7/29/2022              Earlier Wound care treatments:                          Primary care physician:     Continuing wound care orders and information:              Residence:  PRIVATE               Continue home health care with;  Referral to [place] [id number]           Wound Medications:              Wound cleansing:                          Do not scrub or use excessive force. Wash hands with soap and water before and after dressing changes. Prior to applying a clean dressing, cleanse wound with normal saline,                                wound cleanser, or mild soap and water. Ask the physician or nurse before getting the wound(s) wet in a shower              Daily Wound management:                          Keep weight off wounds and reposition every 2 hours. Avoid standing for long periods of time. Apply wraps/stockings in AM and remove at bedtime.                           If swelling is present, elevate legs to the level of the heart or above for 30 minutes 4-5 times a day and/or when sitting. When taking antibiotics take entire prescription as ordered by physician do not stop taking until medicine is all gone. Orders for this week: 8/19/2022                      LEFT LOWER LEG-- [facility] WITH SOAP AND WATER, PAT DRY. COVER WITH STIMULEN AND ANASEPT                                                   COVER WITH SORBEX                                                  WRAP WITH CONFORM AND TAPE. Change every other day            TUBI F TO BILATERAL LOWER LEGS. WRAP WITH ACE WRAP     Pharm:  RX:  Consult:  Central Scheduling: [phone]     Follow up with Dr Blaine Nava In 1 weeks in the wound care center  Primary wound care provider:  Call [phone] for any questions or concerns.   Date__________   Time____________           Treatment Note Wound 07/29/22 Tibial Left;Posterior #1-Dressing/Treatment:  (STIMULEN,ANASEPT,SORSEX,CONFORM TAPE,TUBI F, ACE)    Written Patient Dismissal Instructions Given            Electronically signed by Elsie Snellen, MD on 8/19/2022 at 4:03 PM

## 2022-08-19 NOTE — DISCHARGE INSTRUCTIONS
Discharge Instructions           PHYSICIAN ORDERS AND DISCHARGE INSTRUCTIONS     NOTE: Upon discharge from the [address], you will receive a patient experience survey. We would be grateful if you would take the time to fill this survey out. Wound care order history:                 MAY's   Right  1.2     Left 1.16              Date 7/29/2022              Vascular studies:   Date              Imaging:   Date              Cultures:   Date              Grafts:  Date              Antibiotics: KEFLEX PRIOR TO APPT. ON 7/29/2022              Earlier Wound care treatments:                          Primary care physician:     Continuing wound care orders and information:              Residence:  PRIVATE               Continue home health care with;  Referral to [place] [id number]           Wound Medications:              Wound cleansing:                          Do not scrub or use excessive force. Wash hands with soap and water before and after dressing changes. Prior to applying a clean dressing, cleanse wound with normal saline,                                wound cleanser, or mild soap and water. Ask the physician or nurse before getting the wound(s) wet in a shower              Daily Wound management:                          Keep weight off wounds and reposition every 2 hours. Avoid standing for long periods of time. Apply wraps/stockings in AM and remove at bedtime. If swelling is present, elevate legs to the level of the heart or above for 30 minutes 4-5 times a day and/or when sitting. When taking antibiotics take entire prescription as ordered by physician do not stop taking until medicine is all gone.                                                                  Orders for this week: 8/19/2022                      LEFT LOWER LEG-- [address] WITH SOAP AND WATER, PAT DRY. COVER WITH STIMULEN AND ANASEPT                                                   COVER WITH SORBEX                                                  WRAP WITH CONFORM AND TAPE. Change every other day            TUBI F TO BILATERAL LOWER LEGS. WRAP WITH ACE WRAP     Pharm:  RX:  Consult:  Central [phone]     Follow up with Dr Matti Koehler In 1 weeks in the wound care center  Primary wound care provider:  Call [phone] for any questions or concerns.   Date__________   Time____________

## 2022-08-26 ENCOUNTER — HOSPITAL ENCOUNTER (OUTPATIENT)
Dept: WOUND CARE | Age: 87
Discharge: HOME | End: 2022-08-26
Payer: MEDICARE

## 2022-08-26 VITALS
HEART RATE: 80 BPM | DIASTOLIC BLOOD PRESSURE: 75 MMHG | TEMPERATURE: 99.1 F | RESPIRATION RATE: 20 BRPM | SYSTOLIC BLOOD PRESSURE: 126 MMHG

## 2022-08-26 DIAGNOSIS — I87.312 IDIOPATHIC CHRONIC VENOUS HYPERTENSION OF LEFT LEG WITH ULCER (HCC): Primary | ICD-10-CM

## 2022-08-26 DIAGNOSIS — L97.822 NON-PRESSURE CHRONIC ULCER OF OTHER PART OF LEFT LOWER LEG WITH FAT LAYER EXPOSED (HCC): ICD-10-CM

## 2022-08-26 DIAGNOSIS — L97.929 IDIOPATHIC CHRONIC VENOUS HYPERTENSION OF LEFT LEG WITH ULCER (HCC): Primary | ICD-10-CM

## 2022-08-26 PROCEDURE — 99213 OFFICE O/P EST LOW 20 MIN: CPT

## 2022-08-26 PROCEDURE — 87075 CULTR BACTERIA EXCEPT BLOOD: CPT

## 2022-08-26 PROCEDURE — 87070 CULTURE OTHR SPECIMN AEROBIC: CPT

## 2022-08-26 RX ORDER — CLOBETASOL PROPIONATE 0.5 MG/G
OINTMENT TOPICAL ONCE
Status: CANCELLED | OUTPATIENT
Start: 2022-08-26 | End: 2022-08-26

## 2022-08-26 RX ORDER — BETAMETHASONE DIPROPIONATE 0.05 %
OINTMENT (GRAM) TOPICAL ONCE
Status: CANCELLED | OUTPATIENT
Start: 2022-08-26 | End: 2022-08-26

## 2022-08-26 ASSESSMENT — PAIN DESCRIPTION - ONSET: ONSET: ON-GOING

## 2022-08-26 ASSESSMENT — PAIN DESCRIPTION - PAIN TYPE: TYPE: CHRONIC PAIN

## 2022-08-26 ASSESSMENT — PAIN DESCRIPTION - LOCATION: LOCATION: LEG

## 2022-08-26 ASSESSMENT — PAIN DESCRIPTION - ORIENTATION: ORIENTATION: LEFT

## 2022-08-26 ASSESSMENT — PAIN - FUNCTIONAL ASSESSMENT: PAIN_FUNCTIONAL_ASSESSMENT: PREVENTS OR INTERFERES SOME ACTIVE ACTIVITIES AND ADLS

## 2022-08-26 ASSESSMENT — PAIN DESCRIPTION - FREQUENCY: FREQUENCY: INTERMITTENT

## 2022-08-26 ASSESSMENT — PAIN SCALES - GENERAL: PAINLEVEL_OUTOF10: 9

## 2022-08-26 ASSESSMENT — PAIN DESCRIPTION - DESCRIPTORS: DESCRIPTORS: SORE

## 2022-08-26 NOTE — PROGRESS NOTES
Wound Care Center Progress Note       Gisela Hannah  AGE: 80 y.o. GENDER: female  : 3/18/1929  TODAY'S DATE:  2022        Subjective:     Chief Complaint   Patient presents with    Wound Check     Left leg         HISTORY of PRESENT ILLNESS     Gisela Hannah is a 80 y.o. female who presents today for wound evaluation of Chronic undetermined ulcer(s) of leg. The ulcer is of moderate severity. The underlying cause of the wound is probably venous. Today she complains of significant pain and I can't even touch her wound today. There is some redness, the pain is worse than last visit. I discuss going ahead and getting a culture and will put her on doxycycline as this would treat most skin infections and even provide some help with the inflammation. Wound Pain Timing/Severity: excruciating  Quality of pain: throbbing, tender  Severity of pain:  8 / 10   Modifying Factors: venous stasis  Associated Signs/Symptoms: none        PAST MEDICAL HISTORY        Diagnosis Date    Age-related osteoporosis with current pathological fracture of vertebra ([address] 2022    Arthritis     Bradycardia     requiring dual chamber pacemaker at [facility]    CAD (coronary artery disease)     Cardiac pacemaker 10/2001    [employer] #[id number]  PPM- Serial # 26-[facility]- Dr Jono Sánchez    CHF (congestive heart failure) (HCC)     COPD, mild ([address] 2017    Cor pulmonale (chronic) ([address] 3/15/2022    CVA (cerebrovascular accident) ([address]     DJD (degenerative joint disease) of cervical spine     C5-C6, C6-C7    Exhaustion of cardiac pacemaker battery 2011    PPM battery replacement- [employer]    Family history of cardiovascular disease     Glaucoma Dx     H/O 24 hour EKG monitoring 2000- Intermittent episonde of a-fib/flutter    H/O cardiac catheterization 2010, 2010-Severe native vessel disease and has graft disease as well. LAD, CX totally occluded.  RCA totally occluded in proximal segment. VG to RCA widely patent. PDA 90% LAD afer LIMA anastomosis 80-90% stenosis. Proceeded with PTCA with stent next day. H/O cardiovascular stress test 10/14/2011, 6/2/2010,4/8/2010, 5/18/2009,4/6/2009, 10/30/2007, 11/12/2004, 11/6/2003, 8/9/2002, 8/9/2001,6/5/2000,     10/14/2011-Lexiscan-Abnormal Myocardial Perfusion study. Evidence of mild ischemia in the Left CX region. Abnomal study. Rest EF 63%. Global LV systolic function normal. No ECG changes. Unremarkable pharmacological stress test.    H/O cardiovascular stress test 6/10/2013    thallium--mild ischemia left circumflex EF63% no change from 10/2011 study. H/O cardiovascular stress test 10/16/2014    cardiolite-mild ischemia left circumflex,EF70%    H/O chest x-ray 4/19/2009 4/19/2009-Stable cardiomegaly. No acute cardiopulmonary disease. H/O Doppler lower venous ultrasound 09/18/2019    Significant reflux noted in RGSV, RGSV is extremely tortuous and small along the thigh and calf and would be highly unlikely to be accessed, RSSV is non compressible and has occlusive chronic SVT, LSSV is non compressible with occlusive chronic SVT, LGSV removed s/p CABG, Significant reflux in LGSV tributary,    H/O Doppler ultrasound 3/31/2010    CAROTID- 3/31/2010-INtimal thickening but no significant atherosclerotic plaque noted in ANA PAULA. Doppler flow velocities within ANA PAULA are WNL. Heterogeneous, irregular atherosclerotic plaque noted in LICA. Doppler flow velocities within the LICA are elevated, consistent with a mild, less than 50% stenosis. H/O Doppler ultrasound 5/24/2016    Carotid- normal study    H/O Doppler ultrasound 09/06/2017    carotid - normal study    H/O Doppler ultrasound     H/O echocardiogram 10/13    EF=60%, Severe Pulm. HTN, & Sclerotic aortic valve w/stenosis. H/O echocardiogram 10/16/14     EF 55-60% Normal LV. Normal LV systolic function. Severe tricuspid insufficiency with severe hypertension.      H/O echocardiogram 02/03/2017    heart cath performed this morning    H/O echocardiogram 09/18/2019    EF 50-55%, Left atrium is mild to moderately dilated, right atrium is severely dilated, mildly dilated right ventricle, Mod MR, Severe TR, Severe Pulm HTN, no pericardial effusion     History of complete ECG     10/14/2011(Lexiscan);5/6/2010, 4/30/2009,10/24/2008,9/21/2007, 10/13/2006    History of nuclear stress test 11/17/2016    lexiscan-normal,EF70%    Hx of cardiovascular stress test 12/28/2018    EF 60%  Normal study. HX OTHER MEDICAL 05/01/2017    MUGA-normal, EF53%    Hyperlipidemia     Hypertension     Mild intermittent asthma 7/28/2016    Moderate COPD (chronic obstructive pulmonary disease) ([facility]) 3/15/2022    Nausea & vomiting     Obstructive sleep apnea 5/16/2017    Paroxysmal atrial fibrillation ([address]     Post PTCA 4/21/2010    PTCA with 2.25 stent of the LIMA to LAD    Pulmonary HTN ([address]     Severe per last echo on 10/13. 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  Followed by MAZE procedure by pulmonary vein isolation.-  Dr Eliseo Bailon    S/P PTCA (percutaneous transluminal coronary angioplasty) 11/2012    PTCA with stent to RCA    Severe pulmonary hypertension ([address] 3/15/2022    Shortness of breath 3/15/2022    Thyroid disease     hypothyroi    Unspecified cerebral artery occlusion with cerebral infarction Unsure When    No Residual    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 ([address] 7/29/2022    WD-Non-pressure chronic ulcer of other part of left lower leg limited to breakdown of skin ([address] 7/29/2022       PAST SURGICAL HISTORY    Past Surgical History:   Procedure Laterality Date    APPENDECTOMY  1941    CARDIAC SURGERY  4/09    CABG (3 Bypasses), One Heart Stent in 2010    COLONOSCOPY  In 2000's    X1    CORONARY ANGIOPLASTY WITH STENT PLACEMENT  4/21/2010    PTCA with stent LIMA ->LAD CORONARY ARTERY BYPASS GRAFT  2009    LIMA->Diag,  LIMA -> LAD;  SVG->RCA going to the PDA.  Followed by MAZE procedure by pulmonary vein isolation.-  Dr Rafael Soto, TOTAL ABDOMINAL (CERVIX REMOVED)  's    MIDDLE EAR SURGERY      OTHER SURGICAL HISTORY      Ear surgery-hearing    PACEMAKER PLACEMENT      battery change 2011 Medtronic    PTCA  2012    Ptca with stent to RCA    TONSILLECTOMY  's       FAMILY HISTORY    Family History   Problem Relation Age of Onset    Cancer Mother         \"Liver Cancer\"    Arthritis Mother     Depression Mother     Hearing Loss Mother     High Blood Pressure Mother     High Cholesterol Mother     Mental Illness Mother     Miscarriages / Stillbirths Mother     Heart Disease Father     Early Death Father 36        \"Instant Death\"    High Blood Pressure Father     High Cholesterol Father     Coronary Art Dis Father         Massive MI    Heart Disease Sister     Depression Sister     Cancer Sister         \"Breast Cancer, Cancer Free Now\"    High Blood Pressure Sister     Other Daughter         \"She's Had Stomach Surgery\"    High Blood Pressure Son     High Blood Pressure Son     Early Death Paternal Grandfather        SOCIAL HISTORY    Social History     Tobacco Use    Smoking status: Former     Packs/day: 0.25     Years: 5.00     Pack years: 1.25     Types: Cigarettes     Quit date: 1970     Years since quittin.7    Smokeless tobacco: Never   Vaping Use    Vaping Use: Never used   Substance Use Topics    Alcohol use: Not Currently     Comment: no more than 3 cups of coffee each day    Drug use: No       ALLERGIES    Allergies   Allergen Reactions    Darvocet [Propoxyphene N-Acetaminophen]     Ranexa [Ranolazine Er]      Sick to her stomach    Ranolazine      Sick to her stomach    Sulfa Antibiotics Itching    Sulfasalazine Itching    Adhesive Tape Rash    Allantoin Rash    Bacitracin Rash    Gramicidin Rash    Neomycin Rash    Polymyxin B Rash    Pramoxine Hcl Rash    Silicone Rash       MEDICATIONS    Current Outpatient Medications on File Prior to Encounter   Medication Sig Dispense Refill    Turmeric (QC TUMERIC COMPLEX PO) Take 1 capsule by mouth daily      traZODone (DESYREL) 50 MG tablet Take  mg by mouth nightly as needed for Sleep      acetaminophen (TYLENOL) 500 MG tablet Take 1,000 mg by mouth every 4 hours      ferrous sulfate (IRON 325) 325 (65 Fe) MG tablet Take 1 tablet by mouth daily (with breakfast) 30 tablet 3    torsemide (DEMADEX) 20 MG tablet Take 1 tablet by mouth as needed (for fluid retention or weight gain of 3 lbs overnight) This is in addition to the 20 mg bid (Patient taking differently: Take 40 mg by mouth every morning This is in addition to the 20 mg bid-  Takes demadex 20 mg (2 tablets in a.m.) and 1 tablet at night) 30 tablet 0    spironolactone (ALDACTONE) 50 MG tablet Take 1 tablet by mouth daily (Patient taking differently: Take 25 mg by mouth daily) 30 tablet 0    PROCTOZONE-HC 2.5 % CREA rectal cream INSERT RECTALLY TWICE DAILY as directed AS NEEDED FOR HEMORRHOIDS (Patient not taking: No sig reported)      ZIOPTAN 0.0015 % SOLN Place 1 drop into both eyes Daily with supper       CETIRIZINE HCL PO Take by mouth (Patient not taking: No sig reported)      lidocaine 4 % external patch Place 1 patch onto the skin daily (Patient not taking: No sig reported) 30 patch 0    vitamin D 25 MCG (1000 UT) CAPS Take 5,000 Units by mouth daily      Probiotic Product (PROBIOTIC-10) CHEW Take by mouth (Patient not taking: No sig reported)      atorvastatin (LIPITOR) 10 MG tablet Take 10 mg by mouth daily       umeclidinium-vilanterol (ANORO ELLIPTA) 62.5-25 MCG/INH AEPB inhaler Inhale 1 puff into the lungs daily 1 each 11    CPAP Machine MISC by Does not apply route      Biotin 5 MG CAPS Take 1 capsule by mouth daily      Misc Natural Products (OSTEO BI-FLEX ADV DOUBLE ST PO) Take 1 tablet by mouth daily      timolol (TIMOPTIC-XE) 0.25 % ophthalmic gel-forming Place 1 drop into both eyes daily       carvedilol (COREG) 6.25 MG tablet Take 1 tablet by mouth 2 times daily (with meals) 180 tablet 3    albuterol (PROVENTIL HFA;VENTOLIN HFA) 108 (90 BASE) MCG/ACT inhaler Inhale 2 puffs into the lungs 2 times daily AND EVERY 4-6 HOURS AS NEEDED      Multiple Vitamins-Minerals (ICAPS) CAPS Take 1 capsule by mouth 2 times daily       vitamin B-12 (CYANOCOBALAMIN) 1000 MCG tablet Take 1,000 mcg by mouth daily. levothyroxine (SYNTHROID) 88 MCG tablet Take 88 mcg by mouth daily. aspirin 81 MG chewable tablet Take 81 mg by mouth daily. No current facility-administered medications on file prior to encounter. REVIEW OF SYSTEMS    Pertinent items are noted in HPI. Constitutional: Negative for systemic symptoms including fever, chills and malaise. Objective:      /75   Pulse 80   Temp 99.1 °F (37.3 °C) (Temporal)   Resp 20     PHYSICAL EXAM      General: The patient is in no acute distress. Mental status:  Patient is appropriate, is  oriented to place and plan of care. Dermatologic exam: Visual inspection of the periwound reveals the skin to be edematous.   Wound exam:  see wound description below     All active wounds listed below with today's date are evaluated      Wound 07/29/22 Tibial Left;Posterior #1 (Active)   Wound Image   08/19/22 1345   Wound Etiology Venous 08/26/22 1336   Dressing Status New dressing applied 08/19/22 1446   Wound Cleansed Wound cleanser 08/26/22 1336   Offloading for Diabetic Foot Ulcers Offloading not required 08/19/22 1446   Wound Length (cm) 1.3 cm 08/26/22 1336   Wound Width (cm) 0.5 cm 08/26/22 1336   Wound Depth (cm) 0.1 cm 08/26/22 1336   Wound Surface Area (cm^2) 0.65 cm^2 08/26/22 1336   Change in Wound Size % (l*w) 45.83 08/26/22 1336   Wound Volume (cm^3) 0.065 cm^3 08/26/22 1336   Wound Healing % 46 08/26/22 1336   Post-Procedure Length (cm) 1.4 cm 08/19/22 1408   Post-Procedure Width (cm) 0.6 cm 08/19/22 1408   Post-Procedure Depth (cm) 0.1 cm 08/19/22 1408   Post-Procedure Surface Area (cm^2) 0.84 cm^2 08/19/22 1408   Post-Procedure Volume (cm^3) 0.084 cm^3 08/19/22 1408   Distance Tunneling (cm) 0 cm 08/26/22 1336   Tunneling Position ___ O'Clock 0 08/26/22 1336   Undermining Starts ___ O'Clock 0 08/26/22 1336   Undermining Ends___ O'Clock 0 08/26/22 1336   Undermining Maxium Distance (cm) 0 08/26/22 1336   Wound Assessment Eschar dry 08/26/22 1336   Drainage Amount None 08/26/22 1336   Drainage Description Serosanguinous; Yellow;Brown 08/19/22 1345   Odor None 08/26/22 1336   Safia-wound Assessment Dry/flaky 08/26/22 1336   Margins Defined edges 08/26/22 1336   Wound Thickness Description not for Pressure Injury Full thickness 08/26/22 1336   Number of days: 27       Assessment:       Problem List Items Addressed This Visit       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 ([facility] - Primary    Relevant Orders    Initiate Outpatient Wound Care Protocol    WD-Non-pressure chronic ulcer of other part of left lower leg with fat layer exposed ([facility]       Status of wound progress and description from last visit:   the same- but more red today. .        Plan:     Discharge Instructions           Discharge Instructions           PHYSICIAN ORDERS AND DISCHARGE INSTRUCTIONS     NOTE: Upon discharge from the [address], you will receive a patient experience survey. We would be grateful if you would take the time to fill this survey out. Wound care order history:                 MAY's   Right  1.2     Left 1.16              Date 7/29/2022              Vascular studies:   Date              Imaging:   Date              Cultures:   Date              Grafts:  Date              Antibiotics: KEFLEX PRIOR TO APPT.   ON 7/29/2022                                   Doxycycline 100 mg BID 7 days ordered on 8/26/2022              Earlier Wound care treatments: Primary care physician:     Continuing wound care orders and information:              Residence:  PRIVATE               Continue home health care with;  Referral to [place] [id number]           Wound Medications:              Wound cleansing:                          Do not scrub or use excessive force. Wash hands with soap and water before and after dressing changes. Prior to applying a clean dressing, cleanse wound with normal saline,                                wound cleanser, or mild soap and water. Ask the physician or nurse before getting the wound(s) wet in a shower              Daily Wound management:                          Keep weight off wounds and reposition every 2 hours. Avoid standing for long periods of time. Apply wraps/stockings in AM and remove at bedtime. If swelling is present, elevate legs to the level of the heart or above for 30 minutes 4-5 times a day and/or when sitting. When taking antibiotics take entire prescription as ordered by physician do not stop taking until medicine is all gone. Orders for this week: 2022                      LEFT LOWER LEG-- [address] WITH SOAP AND WATER, PAT DRY. COVER WITH STIMULEN AND ANASEPT                                                   COVER WITH ABD                                                  WRAP WITH CONFORM AND TAPE. Change every other day            TUBI F TO BILATERAL LOWER LEGS. WRAP WITH ACE WRAP     Pharm: [facility].   RX:  Consult:  [phone]     Follow up with Dr Mayuri Marsh In 1 weeks in the wound care center  Primary wound care provider:  Call [phone] for any questions or concerns.   Date__________   Time____________           Treatment Note      Written Patient Dismissal Instructions Given            Electronically signed by Singh Parekh MD on 8/26/2022 at 2:00 PM

## 2022-08-26 NOTE — DISCHARGE INSTRUCTIONS
Discharge Instructions           PHYSICIAN ORDERS AND DISCHARGE INSTRUCTIONS     NOTE: Upon discharge from the [address], you will receive a patient experience survey. We would be grateful if you would take the time to fill this survey out. Wound care order history:                 MAY's   Right  1.2     Left 1.16              Date 7/29/2022              Vascular studies:   Date              Imaging:   Date              Cultures:   Date              Grafts:  Date              Antibiotics: KEFLEX PRIOR TO APPT. ON 7/29/2022                                   Doxycycline 100 mg BID 7 days ordered on 8/26/2022              Earlier Wound care treatments:                          Primary care physician:     Continuing wound care orders and information:              Residence:  PRIVATE               Continue home health care with;  Referral to [facility]           Wound Medications:              Wound cleansing:                          Do not scrub or use excessive force. Wash hands with soap and water before and after dressing changes. Prior to applying a clean dressing, cleanse wound with normal saline,                                wound cleanser, or mild soap and water. Ask the physician or nurse before getting the wound(s) wet in a shower              Daily Wound management:                          Keep weight off wounds and reposition every 2 hours. Avoid standing for long periods of time. Apply wraps/stockings in AM and remove at bedtime. If swelling is present, elevate legs to the level of the heart or above for 30 minutes 4-5 times a day and/or when sitting. When taking antibiotics take entire prescription as ordered by physician do not stop taking until medicine is all gone. Orders for this week: 2022                      LEFT LOWER LEG-- [address] WITH SOAP AND WATER, PAT DRY. COVER WITH STIMULEN AND ANASEPT                                                   COVER WITH ABD                                                  WRAP WITH CONFORM AND TAPE. Change every other day            TUBI F TO BILATERAL LOWER LEGS. WRAP WITH ACE WRAP     Pharm: [facility]. RX:  Consult:  [phone]     Follow up with Dr Alie More In 1 weeks in the wound care center  Primary wound care provider:  Call [phone] for any questions or concerns.   Date__________   Time____________

## 2022-08-31 LAB
CULTURE: NORMAL
Lab: NORMAL
SPECIMEN: NORMAL

## 2022-09-02 ENCOUNTER — HOSPITAL ENCOUNTER (OUTPATIENT)
Dept: WOUND CARE | Age: 87
Discharge: HOME | End: 2022-09-02
Payer: MEDICARE

## 2022-09-02 VITALS
TEMPERATURE: 97.9 F | RESPIRATION RATE: 20 BRPM | HEART RATE: 70 BPM | SYSTOLIC BLOOD PRESSURE: 141 MMHG | DIASTOLIC BLOOD PRESSURE: 59 MMHG

## 2022-09-02 DIAGNOSIS — I87.312 IDIOPATHIC CHRONIC VENOUS HYPERTENSION OF LEFT LEG WITH ULCER (HCC): Primary | ICD-10-CM

## 2022-09-02 DIAGNOSIS — L97.929 IDIOPATHIC CHRONIC VENOUS HYPERTENSION OF LEFT LEG WITH ULCER (HCC): Primary | ICD-10-CM

## 2022-09-02 DIAGNOSIS — L97.822 NON-PRESSURE CHRONIC ULCER OF OTHER PART OF LEFT LOWER LEG WITH FAT LAYER EXPOSED (HCC): ICD-10-CM

## 2022-09-02 PROCEDURE — 97597 DBRDMT OPN WND 1ST 20 CM/<: CPT

## 2022-09-02 RX ORDER — CLOBETASOL PROPIONATE 0.5 MG/G
OINTMENT TOPICAL ONCE
Status: CANCELLED | OUTPATIENT
Start: 2022-09-02 | End: 2022-09-02

## 2022-09-02 RX ORDER — BETAMETHASONE DIPROPIONATE 0.05 %
OINTMENT (GRAM) TOPICAL ONCE
Status: CANCELLED | OUTPATIENT
Start: 2022-09-02 | End: 2022-09-02

## 2022-09-02 ASSESSMENT — PAIN DESCRIPTION - ONSET: ONSET: ON-GOING

## 2022-09-02 ASSESSMENT — PAIN DESCRIPTION - FREQUENCY: FREQUENCY: CONTINUOUS

## 2022-09-02 ASSESSMENT — PAIN DESCRIPTION - DESCRIPTORS: DESCRIPTORS: BURNING;OTHER (COMMENT)

## 2022-09-02 ASSESSMENT — PAIN DESCRIPTION - LOCATION: LOCATION: LEG

## 2022-09-02 ASSESSMENT — PAIN DESCRIPTION - ORIENTATION: ORIENTATION: RIGHT;LEFT

## 2022-09-02 ASSESSMENT — PAIN - FUNCTIONAL ASSESSMENT: PAIN_FUNCTIONAL_ASSESSMENT: PREVENTS OR INTERFERES SOME ACTIVE ACTIVITIES AND ADLS

## 2022-09-02 ASSESSMENT — PAIN SCALES - GENERAL: PAINLEVEL_OUTOF10: 9

## 2022-09-02 ASSESSMENT — PAIN DESCRIPTION - PAIN TYPE: TYPE: CHRONIC PAIN

## 2022-09-02 NOTE — PROGRESS NOTES
Wound Care Center Progress Note With Procedure    Darlene Fall  AGE: 80 y.o. GENDER: female  : 3/18/1929  EPISODE DATE:  2022     Subjective:     Chief Complaint   Patient presents with    Wound Check     ble         HISTORY of PRESENT ILLNESS      Darlene Fall is a 80 y.o. female who presents today for wound evaluation of Chronic venous ulcer(s) of the left posterior leg. The ulcer is of moderate severity. The underlying cause of the wound is edema of the leg with significant drainage. She has not really had any changes this week. The culture from last week was negative. She has unchanged pain and redness. I discuss changing the dressings this week due to lack of response with previous. Her venous stasis disease does require additional compression, she has trouble tolerating the compression. In addition, starting after next week she will only be able to come bi-weekly.     Wound Pain Timing/Severity: constant  Quality of pain: tender  Severity of pain:  3 / 10   Modifying Factors: edema and venous stasis  Associated Signs/Symptoms: none        PAST MEDICAL HISTORY        Diagnosis Date    Age-related osteoporosis with current pathological fracture of vertebra ([address] 2022    Arthritis     Bradycardia     requiring dual chamber pacemaker at [facility]    CAD (coronary artery disease)     Cardiac pacemaker 10/2001    [employer] #[id number]  PPM- Serial # 26-[facility]- Dr Gui Lorenzo    CHF (congestive heart failure) ([facility])     COPD, mild ([address] 2017    Cor pulmonale (chronic) ([address] 3/15/2022    CVA (cerebrovascular accident) ([address]     DJD (degenerative joint disease) of cervical spine     C5-C6, C6-C7    Exhaustion of cardiac pacemaker battery 2011    PPM battery replacement- Medtronic    Family history of cardiovascular disease     Glaucoma Dx     H/O 24 hour EKG monitoring 2000- Intermittent episonde of a-fib/flutter    H/O cardiac catheterization 4/20/2010, 2/1989 4/20/2010-Severe native vessel disease and has graft disease as well. LAD, CX totally occluded. RCA totally occluded in proximal segment. VG to RCA widely patent. PDA 90% LAD afer LIMA anastomosis 80-90% stenosis. Proceeded with PTCA with stent next day. H/O cardiovascular stress test 10/14/2011, 6/2/2010,4/8/2010, 5/18/2009,4/6/2009, 10/30/2007, 11/12/2004, 11/6/2003, 8/9/2002, 8/9/2001,6/5/2000,     10/14/2011-Lexiscan-Abnormal Myocardial Perfusion study. Evidence of mild ischemia in the Left CX region. Abnomal study. Rest EF 63%. Global LV systolic function normal. No ECG changes. Unremarkable pharmacological stress test.    H/O cardiovascular stress test 6/10/2013    thallium--mild ischemia left circumflex EF63% no change from 10/2011 study. H/O cardiovascular stress test 10/16/2014    cardiolite-mild ischemia left circumflex,EF70%    H/O chest x-ray 4/19/2009 4/19/2009-Stable cardiomegaly. No acute cardiopulmonary disease. H/O Doppler lower venous ultrasound 09/18/2019    Significant reflux noted in RGSV, RGSV is extremely tortuous and small along the thigh and calf and would be highly unlikely to be accessed, RSSV is non compressible and has occlusive chronic SVT, LSSV is non compressible with occlusive chronic SVT, LGSV removed s/p CABG, Significant reflux in LGSV tributary,    H/O Doppler ultrasound 3/31/2010    CAROTID- 3/31/2010-INtimal thickening but no significant atherosclerotic plaque noted in ANA PAULA. Doppler flow velocities within ANA PAULA are WNL. Heterogeneous, irregular atherosclerotic plaque noted in LICA. Doppler flow velocities within the LICA are elevated, consistent with a mild, less than 50% stenosis. H/O Doppler ultrasound 5/24/2016    Carotid- normal study    H/O Doppler ultrasound 09/06/2017    carotid - normal study    H/O Doppler ultrasound     H/O echocardiogram 10/13    EF=60%, Severe Pulm. HTN, & Sclerotic aortic valve w/stenosis.     H/O echocardiogram 10/16/14     EF 55-60% Normal LV. Normal LV systolic function. Severe tricuspid insufficiency with severe hypertension. H/O echocardiogram 02/03/2017    heart cath performed this morning    H/O echocardiogram 09/18/2019    EF 50-55%, Left atrium is mild to moderately dilated, right atrium is severely dilated, mildly dilated right ventricle, Mod MR, Severe TR, Severe Pulm HTN, no pericardial effusion     History of complete ECG     10/14/2011(Lexiscan);5/6/2010, 4/30/2009,10/24/2008,9/21/2007, 10/13/2006    History of nuclear stress test 11/17/2016    lexiscan-normal,EF70%    Hx of cardiovascular stress test 12/28/2018    EF 60%  Normal study. HX OTHER MEDICAL 05/01/2017    MUGA-normal, EF53%    Hyperlipidemia     Hypertension     Mild intermittent asthma 7/28/2016    Moderate COPD (chronic obstructive pulmonary disease) ([facility]) 3/15/2022    Nausea & vomiting     Obstructive sleep apnea 5/16/2017    Paroxysmal atrial fibrillation ([address]     Post PTCA 4/21/2010    PTCA with 2.25 stent of the LIMA to LAD    Pulmonary HTN ([address]     Severe per last echo on 10/13. 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  Followed by MAZE procedure by pulmonary vein isolation.-  Dr Ant Malcolm    S/P PTCA (percutaneous transluminal coronary angioplasty) 11/2012    PTCA with stent to RCA    Severe pulmonary hypertension ([address] 3/15/2022    Shortness of breath 3/15/2022    Thyroid disease     hypothyroi    Unspecified cerebral artery occlusion with cerebral infarction Unsure When    No Residual    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 ([address] 7/29/2022    WD-Non-pressure chronic ulcer of other part of left lower leg limited to breakdown of skin ([address] 7/29/2022       PAST SURGICAL HISTORY    Past Surgical History:   Procedure Laterality Date    APPENDECTOMY  1941    CARDIAC SURGERY  4/09    CABG (3 Bypasses), One Heart Stent in 2010 COLONOSCOPY  In     X1    CORONARY ANGIOPLASTY WITH STENT PLACEMENT  2010    PTCA with stent LIMA ->LAD    CORONARY ARTERY BYPASS GRAFT  2009    LIMA->Diag,  LIMA -> LAD;  SVG->RCA going to the PDA.  Followed by MAZE procedure by pulmonary vein isolation.-  Dr Jessica Nichols, TOTAL ABDOMINAL (CERVIX REMOVED)      MIDDLE EAR SURGERY      OTHER SURGICAL HISTORY      Ear surgery-hearing    PACEMAKER PLACEMENT      battery change 2011 Medtronic    PTCA  2012    Ptca with stent to RCA    TONSILLECTOMY  's       FAMILY HISTORY    Family History   Problem Relation Age of Onset    Cancer Mother         \"Liver Cancer\"    Arthritis Mother     Depression Mother     Hearing Loss Mother     High Blood Pressure Mother     High Cholesterol Mother     Mental Illness Mother     Miscarriages / Stillbirths Mother     Heart Disease Father     Early Death Father 36        \"Instant Death\"    High Blood Pressure Father     High Cholesterol Father     Coronary Art Dis Father         Massive MI    Heart Disease Sister     Depression Sister     Cancer Sister         \"Breast Cancer, Cancer Free Now\"    High Blood Pressure Sister     Other Daughter         \"She's Had Stomach Surgery\"    High Blood Pressure Son     High Blood Pressure Son     Early Death Paternal Grandfather        SOCIAL HISTORY    Social History     Tobacco Use    Smoking status: Former     Packs/day: 0.25     Years: 5.00     Pack years: 1.25     Types: Cigarettes     Quit date: 1970     Years since quittin.7    Smokeless tobacco: Never   Vaping Use    Vaping Use: Never used   Substance Use Topics    Alcohol use: Not Currently     Comment: no more than 3 cups of coffee each day    Drug use: No       ALLERGIES    Allergies   Allergen Reactions    Darvocet [Propoxyphene N-Acetaminophen]     Ranexa [Ranolazine Er]      Sick to her stomach    Ranolazine      Sick to her stomach    Sulfa Antibiotics Itching    Sulfasalazine Itching    Adhesive Tape Rash    Allantoin Rash    Bacitracin Rash    Gramicidin Rash    Neomycin Rash    Polymyxin B Rash    Pramoxine Hcl Rash    Silicone Rash       MEDICATIONS    Current Outpatient Medications on File Prior to Encounter   Medication Sig Dispense Refill    Turmeric (QC TUMERIC COMPLEX PO) Take 1 capsule by mouth daily      traZODone (DESYREL) 50 MG tablet Take  mg by mouth nightly as needed for Sleep      acetaminophen (TYLENOL) 500 MG tablet Take 1,000 mg by mouth every 4 hours      ferrous sulfate (IRON 325) 325 (65 Fe) MG tablet Take 1 tablet by mouth daily (with breakfast) 30 tablet 3    torsemide (DEMADEX) 20 MG tablet Take 1 tablet by mouth as needed (for fluid retention or weight gain of 3 lbs overnight) This is in addition to the 20 mg bid (Patient taking differently: Take 40 mg by mouth every morning This is in addition to the 20 mg bid-  Takes demadex 20 mg (2 tablets in a.m.) and 1 tablet at night) 30 tablet 0    spironolactone (ALDACTONE) 50 MG tablet Take 1 tablet by mouth daily (Patient taking differently: Take 25 mg by mouth daily) 30 tablet 0    PROCTOZONE-HC 2.5 % CREA rectal cream INSERT RECTALLY TWICE DAILY as directed AS NEEDED FOR HEMORRHOIDS (Patient not taking: No sig reported)      ZIOPTAN 0.0015 % SOLN Place 1 drop into both eyes Daily with supper       CETIRIZINE HCL PO Take by mouth (Patient not taking: No sig reported)      lidocaine 4 % external patch Place 1 patch onto the skin daily (Patient not taking: No sig reported) 30 patch 0    vitamin D 25 MCG (1000 UT) CAPS Take 5,000 Units by mouth daily      Probiotic Product (PROBIOTIC-10) CHEW Take by mouth (Patient not taking: No sig reported)      atorvastatin (LIPITOR) 10 MG tablet Take 10 mg by mouth daily       umeclidinium-vilanterol (ANORO ELLIPTA) 62.5-25 MCG/INH AEPB inhaler Inhale 1 puff into the lungs daily 1 each 11    CPAP Machine MISC by Does not apply route      Biotin 5 MG CAPS Take 1 capsule by mouth daily      Misc Natural Products (OSTEO BI-FLEX ADV DOUBLE ST PO) Take 1 tablet by mouth daily      timolol (TIMOPTIC-XE) 0.25 % ophthalmic gel-forming Place 1 drop into both eyes daily       carvedilol (COREG) 6.25 MG tablet Take 1 tablet by mouth 2 times daily (with meals) 180 tablet 3    albuterol (PROVENTIL HFA;VENTOLIN HFA) 108 (90 BASE) MCG/ACT inhaler Inhale 2 puffs into the lungs 2 times daily AND EVERY 4-6 HOURS AS NEEDED      Multiple Vitamins-Minerals (ICAPS) CAPS Take 1 capsule by mouth 2 times daily       vitamin B-12 (CYANOCOBALAMIN) 1000 MCG tablet Take 1,000 mcg by mouth daily. levothyroxine (SYNTHROID) 88 MCG tablet Take 88 mcg by mouth daily. aspirin 81 MG chewable tablet Take 81 mg by mouth daily. No current facility-administered medications on file prior to encounter. REVIEW OF SYSTEMS    Pertinent items are noted in HPI. Constitutional: Negative for systemic symptoms including fever, chills and malaise. Objective:      BP (!) 141/59   Pulse 70   Temp 97.9 °F (36.6 °C) (Temporal)   Resp 20     PHYSICAL EXAM      General: The patient is in no acute distress. Mental status:  Patient is appropriate, is  oriented to place and plan of care. Dermatologic exam: Visual inspection of the periwound reveals the skin to be normal in turgor and texture  Wound exam: see wound description below in procedure note      Assessment:     Problem List Items Addressed This Visit       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 ([address] - Primary    Relevant Orders    Initiate Outpatient Wound Care Protocol    WD-Non-pressure chronic ulcer of other part of left lower leg with fat layer exposed ([address]     Procedure Note    Indications:  Based on my examination of this patient's wound(s) today, sharp excision into necrotic epidermis s required to promote healing and evaluate the extent of previous healing.     Performed by: Elsie Snellen, MD    Consent obtained: Yes    Time out taken:  Yes    Pain Control: lidocaine topical gel      Debridement: non-Excisional Debridement    Using forceps and blade, the wound(s) was/were sharply debrided down through and including the removal of epidermis and dermis.         Devitalized Tissue Debrided:  fibrin, biofilm, slough, and necrotic/eschar    Pre Debridement Measurements:  Are located in the Wound Documentation Flow Sheet    All active wounds listed below with today's date are evaluated  Wound(s)    debrided this date include # : 1     Post  Debridement Measurements:  Wound 07/29/22 Tibial Left;Posterior #1 (Active)   Wound Image   09/02/22 1353   Wound Etiology Venous 09/02/22 1353   Dressing Status New dressing applied 08/26/22 1436   Wound Cleansed Soap and water 09/02/22 1353   Offloading for Diabetic Foot Ulcers Offloading not required 09/02/22 1353   Wound Length (cm) 1.5 cm 09/02/22 1353   Wound Width (cm) 1.5 cm 09/02/22 1353   Wound Depth (cm) 0.1 cm 09/02/22 1353   Wound Surface Area (cm^2) 2.25 cm^2 09/02/22 1353   Change in Wound Size % (l*w) -87.5 09/02/22 1353   Wound Volume (cm^3) 0.225 cm^3 09/02/22 1353   Wound Healing % -88 09/02/22 1353   Post-Procedure Length (cm) 1.5 cm 09/02/22 1412   Post-Procedure Width (cm) 1.5 cm 09/02/22 1412   Post-Procedure Depth (cm) 0.1 cm 09/02/22 1412   Post-Procedure Surface Area (cm^2) 2.25 cm^2 09/02/22 1412   Post-Procedure Volume (cm^3) 0.225 cm^3 09/02/22 1412   Distance Tunneling (cm) 0 cm 09/02/22 1353   Tunneling Position ___ O'Clock 0 09/02/22 1353   Undermining Starts ___ O'Clock 0 09/02/22 1353   Undermining Ends___ O'Clock 0 09/02/22 1353   Undermining Maxium Distance (cm) 0 09/02/22 1353   Wound Assessment Pink/red;Devitalized tissue;Slough 09/02/22 1353   Drainage Amount Small 09/02/22 1353   Drainage Description Yellow 09/02/22 1353   Odor None 09/02/22 1353   Safia-wound Assessment Dry/flaky;Fragile 09/02/22 1353   Margins Defined edges 09/02/22 1353   Wound Thickness Description not for Pressure Injury Full thickness 09/02/22 1353   Number of days: 35       Percent of Wound(s) Debrided: approximately 100%    Total  Area  Debrided:  2.25 sq cm     Bleeding:  Minimal    Hemostasis Achieved:  by pressure    Procedural Pain:  4  / 10     Post Procedural Pain:  2 / 10     Response to treatment:  With complaints of pain. Status of wound progress and description from last visit:   wound is almost exactly the same. Plan:       Discharge Instructions         PHYSICIAN ORDERS AND DISCHARGE INSTRUCTIONS     NOTE: [facility] will receive a patient experience survey. We would be grateful if you would take the time to fill this survey out. Wound care order history:                 MAY's   Right  1.2     Left 1.16              Date 7/29/2022              Vascular studies:   Date              Imaging:   Date              Cultures:   Date              Grafts:  Date              Antibiotics: KEFLEX PRIOR TO APPT. ON 7/29/2022                                   Doxycycline 100 mg BID 7 days ordered on 8/26/2022              Earlier Wound care treatments:                          Primary care physician:     Continuing wound care orders and information:              Residence:  PRIVATE               Continue home health care with:  [facility]          Wound Medications:              Wound cleansing:                          Do not scrub or use excessive force. Wash hands with soap and water before and after dressing changes. Prior to applying a clean dressing, cleanse wound with normal saline,                                wound cleanser, or mild soap and water. Ask the physician or nurse before getting the wound(s) wet in a shower              Daily Wound management:                          Keep weight off wounds and reposition every 2 hours.                           Avoid standing for long periods of time. Apply wraps/stockings in AM and remove at bedtime. If swelling is present, elevate legs to the level of the heart or above for 30 minutes 4-5 times a day and/or when sitting. When taking antibiotics take entire prescription as ordered by physician do not stop taking until medicine is all gone. Orders for this week: 9/2/2022     FAX ORDERS TO [facility]! LEFT LOWER LEG -- [address] WITH SOAP AND WATER, PAT DRY. COVER WITH saline damp hydrofera blue cut to size of wound in clinic (home care to order and use Hydrofera Blue Ready). COVER WITH ABD. WRAP WITH CONFORM AND TAPE. Home care to change every 2-3 days. TUBI F TO BILATERAL LOWER LEGS. WRAP WITH ACE WRAP. Pharm: Mindi Terry. Rx:  Consult:  Central Scheduling: [phone]     Follow up with Dr Sudhir Gross in 2 weeks in the wound care center. Primary wound care provider:  Call [phone] for any questions or concerns.   Date__________   Time____________        Treatment Note      Written Patient Dismissal Instructions Given            Electronically signed by Neo Rico MD on 9/2/2022 at 2:34 PM

## 2022-09-02 NOTE — DISCHARGE INSTRUCTIONS
PHYSICIAN ORDERS AND DISCHARGE INSTRUCTIONS     NOTE: Upon discharge from the [address], you will receive a patient experience survey. We would be grateful if you would take the time to fill this survey out. Wound care order history:                 MAY's   Right  1.2     Left 1.16              Date 7/29/2022              Vascular studies:   Date              Imaging:   Date              Cultures:   Date              Grafts:  Date              Antibiotics: KEFLEX PRIOR TO APPT. ON 7/29/2022                                   Doxycycline 100 mg BID 7 days ordered on 8/26/2022              Earlier Wound care treatments:                          Primary care physician:     Continuing wound care orders and information:              Residence:  PRIVATE               Continue home health care with:  [facility]          Wound Medications:              Wound cleansing:                          Do not scrub or use excessive force. Wash hands with soap and water before and after dressing changes. Prior to applying a clean dressing, cleanse wound with normal saline,                                wound cleanser, or mild soap and water. Ask the physician or nurse before getting the wound(s) wet in a shower              Daily Wound management:                          Keep weight off wounds and reposition every 2 hours. Avoid standing for long periods of time. Apply wraps/stockings in AM and remove at bedtime. If swelling is present, elevate legs to the level of the heart or above for 30 minutes 4-5 times a day and/or when sitting. When taking antibiotics take entire prescription as ordered by physician do not stop taking until medicine is all gone.                                                                    Orders for this week: 2022     FAX ORDERS TO [facility]! LEFT LOWER LEG -- [address] WITH SOAP AND WATER, PAT DRY. COVER WITH saline damp hydrofera blue cut to size of wound in clinic (home care to order and use Hydrofera Blue Ready). COVER WITH silicone border gauze                                                  Home care to change every 2-3 days. TUBI F TO BILATERAL LOWER LEGS. WRAP WITH ACE WRAP. Pharm: Yane Vera. Rx:  Consult:  Central [phone]     Follow up with Dr Cassadnra Cottrell in 1 week 22 in the wound care center. Then return to Dr Cassandra Cottrell in 2 weeks on 22  Primary wound care provider:  Call [phone] for any questions or concerns.   Date__________   Time____________

## 2022-09-03 LAB
BASOPHILS ABSOLUTE: 0.1 X10E3/UL (ref 0–0.2)
BASOPHILS RELATIVE PERCENT: 1 %
EOSINOPHILS ABSOLUTE: 0.3 X10E3/UL (ref 0–0.4)
EOSINOPHILS RELATIVE PERCENT: 4 %
ERYTHROCYTES, NUCLEATED/100 LEU: ABNORMAL
FERRITIN: 70 NG/ML (ref 15–150)
HCT VFR BLD CALC: 43.1 % (ref 34–46.6)
HEMOGLOBIN: 13.9 G/DL (ref 11.1–15.9)
IMMATURE CELLS ABSOLUTE COUNT: ABNORMAL
IMMATURE GRANS (ABS): 0 X10E3/UL (ref 0–0.1)
IMMATURE GRANULOCYTES: 1 %
IRON SATURATION: 54 % (ref 15–55)
IRON: 198 UG/DL (ref 27–139)
LYMPHOCYTES ABSOLUTE: 0.8 X10E3/UL (ref 0.7–3.1)
LYMPHOCYTES RELATIVE PERCENT: 9 %
MCH RBC QN AUTO: 32 PG (ref 26.6–33)
MCHC RBC AUTO-ENTMCNC: 32.3 G/DL (ref 31.5–35.7)
MCV RBC AUTO: 99 FL (ref 79–97)
MONOCYTES ABSOLUTE: 0.6 X10E3/UL (ref 0.1–0.9)
MONOCYTES RELATIVE PERCENT: 7 %
MORPHOLOGY: ABNORMAL
NEUTROPHILS ABSOLUTE: 6.6 X10E3/UL (ref 1.4–7)
PDW BLD-RTO: 13.8 % (ref 11.7–15.4)
PLATELET # BLD: 308 X10E3/UL (ref 150–450)
RBC # BLD: 4.34 X10E6/UL (ref 3.77–5.28)
SEGMENTED NEUTROPHILS RELATIVE PERCENT: 78 %
TOTAL IRON BINDING CAPACITY: 364 UG/DL (ref 250–450)
UNSATURATED IRON BINDING CAPACITY: 166 UG/DL (ref 118–369)
WBC # BLD: 8.4 X10E3/UL (ref 3.4–10.8)

## 2022-09-07 ENCOUNTER — OFFICE VISIT (OUTPATIENT)
Dept: CARDIOLOGY CLINIC | Age: 87
End: 2022-09-07
Payer: MEDICARE

## 2022-09-07 VITALS
BODY MASS INDEX: 26.61 KG/M2 | DIASTOLIC BLOOD PRESSURE: 60 MMHG | SYSTOLIC BLOOD PRESSURE: 104 MMHG | HEIGHT: 59 IN | HEART RATE: 64 BPM | WEIGHT: 132 LBS

## 2022-09-07 DIAGNOSIS — Z95.1 S/P CABG X 3: Primary | ICD-10-CM

## 2022-09-07 DIAGNOSIS — I27.81 COR PULMONALE (CHRONIC) (HCC): ICD-10-CM

## 2022-09-07 PROCEDURE — 1036F TOBACCO NON-USER: CPT | Performed by: INTERNAL MEDICINE

## 2022-09-07 PROCEDURE — G8417 CALC BMI ABV UP PARAM F/U: HCPCS | Performed by: INTERNAL MEDICINE

## 2022-09-07 PROCEDURE — 1123F ACP DISCUSS/DSCN MKR DOCD: CPT | Performed by: INTERNAL MEDICINE

## 2022-09-07 PROCEDURE — G8427 DOCREV CUR MEDS BY ELIG CLIN: HCPCS | Performed by: INTERNAL MEDICINE

## 2022-09-07 PROCEDURE — 1090F PRES/ABSN URINE INCON ASSESS: CPT | Performed by: INTERNAL MEDICINE

## 2022-09-07 PROCEDURE — 99214 OFFICE O/P EST MOD 30 MIN: CPT | Performed by: INTERNAL MEDICINE

## 2022-09-07 NOTE — PROGRESS NOTES
Vein \"LEG PROBLEM Questionnaire\"  Do you have prominent leg veins? Yes   Do you have any skin discoloration? Yes  Do you have any healed or active sores? Yes  Do you have swelling of the legs? Yes  Do you have a family history of varicose veins? Yes  Does your profession involve pro-longed        standing or heavy lifting? Yes  7. Have you been fighting overweight problems? Yes  8. Do you have restless legs? No  9. Do you have any night time cramps? No  10. Do you have any of the following in your legs:         I  11. If Yes - Have they worn compression stockings Yes  12.  If they have worn compression stockings  Months

## 2022-09-07 NOTE — PROGRESS NOTES
CARDIOLOGY NOTE      9/7/2022    RE: Mari Navas  (3/18/1929)                               TO:  Dr. Sj Oliveira, DO            CHIEF Mcbride Fuel is a 80 y.o. female who was seen today for management of coronary artery disease                                    HPI:                   Pt has h/o coronary disease post CABG post PCI, hypertension, hyperlipidemia, permanent pacemaker implantation, obstructive sleep apnea, hypothyroidism, seen today for follow-up.  Pt has increasing shortness of breath  Mari Navas has the following history recorded in care path:  Patient Active Problem List    Diagnosis Date Noted    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 ([address] 07/29/2022    WD-Non-pressure chronic ulcer of other part of left lower leg with fat layer exposed ([address] 07/29/2022    Age-related osteoporosis with current pathological fracture of vertebra ([address] 07/08/2022    Chest pain 07/07/2022    Iron deficiency anemia secondary to inadequate dietary iron intake 04/19/2022    Abnormal liver CT -findings suggestive of cirrhosis  03/24/2022    At risk for injury associated with anticoagulation 03/24/2022    T12 vertebral fracture ([address] 03/24/2022    SAULO on CPAP 03/24/2022    CHF (congestive heart failure), NYHA class I, acute on chronic, combined ([address] 03/23/2022    Cor pulmonale (chronic) ([address] 03/15/2022    Chronic right-sided heart failure ([address] 03/15/2022    Severe pulmonary hypertension ([address] 03/15/2022    Shortness of breath 03/15/2022    Moderate COPD (chronic obstructive pulmonary disease) ([address] 03/15/2022    Generalized weakness     Gait disturbance     Acute urinary retention     Hypothyroidism (acquired)     Chronic diastolic (congestive) heart failure ([address]     Metabolic encephalopathy 36/90/9753    Hyponatremia 10/07/2021    Infection of pacemaker pocket ([address] 04/29/2021    Pacer at end of battery life 11/18/2020    PAF (paroxysmal atrial fibrillation) ([facility] 05/10/2019    Dizziness 08/30/2017    Obstructive sleep apnea 05/16/2017    Coronary artery disease involving coronary bypass graft of native heart without angina pectoris     Bradycardia 08/11/2016    Essential hypertension 09/30/2013    TIA (transient ischemic attack) 09/29/2013    Confusion 09/29/2013    Headache 09/29/2013    CAD (coronary artery disease)     Post PTCA 04/21/2010    S/P CABG x 3 04/08/2009    Cardiac pacemaker 10/01/2001     Current Outpatient Medications   Medication Sig Dispense Refill    Turmeric (QC TUMERIC COMPLEX PO) Take 1 capsule by mouth daily      traZODone (DESYREL) 50 MG tablet Take  mg by mouth nightly as needed for Sleep      acetaminophen (TYLENOL) 500 MG tablet Take 1,000 mg by mouth every 4 hours      torsemide (DEMADEX) 20 MG tablet Take 1 tablet by mouth as needed (for fluid retention or weight gain of 3 lbs overnight) This is in addition to the 20 mg bid (Patient taking differently: Take 40 mg by mouth every morning This is in addition to the 20 mg bid-  Takes demadex 20 mg (2 tablets in a.m.) and 1 tablet at night) 30 tablet 0    spironolactone (ALDACTONE) 50 MG tablet Take 1 tablet by mouth daily (Patient taking differently: Take 25 mg by mouth daily) 30 tablet 0    PROCTOZONE-HC 2.5 % CREA rectal cream INSERT RECTALLY TWICE DAILY as directed AS NEEDED FOR HEMORRHOIDS      ZIOPTAN 0.0015 % SOLN Place 1 drop into both eyes Daily with supper       CETIRIZINE HCL PO Take by mouth      vitamin D 25 MCG (1000 UT) CAPS Take 5,000 Units by mouth daily      Probiotic Product (PROBIOTIC-10) CHEW Take by mouth      atorvastatin (LIPITOR) 10 MG tablet Take 10 mg by mouth daily       umeclidinium-vilanterol (ANORO ELLIPTA) 62.5-25 MCG/INH AEPB inhaler Inhale 1 puff into the lungs daily 1 each 11    CPAP Machine MISC by Does not apply route      Biotin 5 MG CAPS Take 1 capsule by mouth daily      Misc Natural Products (OSTEO BI-FLEX ADV DOUBLE ST PO) Take 1 tablet by mouth daily      carvedilol (COREG) 6.25 MG tablet Take 1 tablet by mouth 2 times daily (with meals) 180 tablet 3    albuterol (PROVENTIL HFA;VENTOLIN HFA) 108 (90 BASE) MCG/ACT inhaler Inhale 2 puffs into the lungs 2 times daily AND EVERY 4-6 HOURS AS NEEDED      Multiple Vitamins-Minerals (ICAPS) CAPS Take 1 capsule by mouth 2 times daily       vitamin B-12 (CYANOCOBALAMIN) 1000 MCG tablet Take 1,000 mcg by mouth daily. levothyroxine (SYNTHROID) 88 MCG tablet Take 88 mcg by mouth daily. ferrous sulfate (IRON 325) 325 (65 Fe) MG tablet Take 1 tablet by mouth daily (with breakfast) (Patient not taking: Reported on 9/7/2022) 30 tablet 3    lidocaine 4 % external patch Place 1 patch onto the skin daily (Patient not taking: Reported on 9/7/2022) 30 patch 0    timolol (TIMOPTIC-XE) 0.25 % ophthalmic gel-forming Place 1 drop into both eyes daily  (Patient not taking: Reported on 9/7/2022)      aspirin 81 MG chewable tablet Take 81 mg by mouth daily. (Patient not taking: Reported on 9/7/2022)       No current facility-administered medications for this visit.      Allergies: Darvocet [propoxyphene n-acetaminophen], Ranexa [ranolazine er], Ranolazine, Sulfa antibiotics, Sulfasalazine, Adhesive tape, Allantoin, Bacitracin, Gramicidin, Neomycin, Polymyxin b, Pramoxine hcl, and Silicone  Past Medical History:   Diagnosis Date    Age-related osteoporosis with current pathological fracture of vertebra ([address] 7/8/2022    Arthritis     Bradycardia 2001    requiring dual chamber pacemaker at [facility]    CAD (coronary artery disease)     Cardiac pacemaker 10/2001    [employer] #[id number]  PPM- Serial # 26-[facility]- Dr Debbie Mast    CHF (congestive heart failure) (HCC)     COPD, mild ([address] 2/17/2017    Cor pulmonale (chronic) ([address] 3/15/2022    CVA (cerebrovascular accident) ([address]     DJD (degenerative joint disease) of cervical spine     C5-C6, C6-C7    Exhaustion of cardiac pacemaker battery 11/21/2011    PPM battery replacement- Medtr[employer]    Family history of cardiovascular disease     Glaucoma Dx 2010    H/O 24 hour EKG monitoring 8/6/2000 8/6/2000- Intermittent episonde of a-fib/flutter    H/O cardiac catheterization 4/20/2010, 2/1989 4/20/2010-Severe native vessel disease and has graft disease as well. LAD, CX totally occluded. RCA totally occluded in proximal segment. VG to RCA widely patent. PDA 90% LAD afer LIMA anastomosis 80-90% stenosis. Proceeded with PTCA with stent next day. H/O cardiovascular stress test 10/14/2011, 6/2/2010,4/8/2010, 5/18/2009,4/6/2009, 10/30/2007, 11/12/2004, 11/6/2003, 8/9/2002, 8/9/2001,6/5/2000,     10/14/2011-Lexiscan-Abnormal Myocardial Perfusion study. Evidence of mild ischemia in the Left CX region. Abnomal study. Rest EF 63%. Global LV systolic function normal. No ECG changes. Unremarkable pharmacological stress test.    H/O cardiovascular stress test 6/10/2013    thallium--mild ischemia left circumflex EF63% no change from 10/2011 study. H/O cardiovascular stress test 10/16/2014    cardiolite-mild ischemia left circumflex,EF70%    H/O chest x-ray 4/19/2009 4/19/2009-Stable cardiomegaly. No acute cardiopulmonary disease. H/O Doppler lower venous ultrasound 09/18/2019    Significant reflux noted in RGSV, RGSV is extremely tortuous and small along the thigh and calf and would be highly unlikely to be accessed, RSSV is non compressible and has occlusive chronic SVT, LSSV is non compressible with occlusive chronic SVT, LGSV removed s/p CABG, Significant reflux in LGSV tributary,    H/O Doppler ultrasound 3/31/2010    CAROTID- 3/31/2010-INtimal thickening but no significant atherosclerotic plaque noted in ANA PAULA. Doppler flow velocities within ANA PAULA are WNL. Heterogeneous, irregular atherosclerotic plaque noted in LICA. Doppler flow velocities within the LICA are elevated, consistent with a mild, less than 50% stenosis.     H/O Doppler ultrasound 5/24/2016    Carotid- normal study    H/O Doppler ultrasound 09/06/2017    carotid - normal study    H/O Doppler ultrasound     H/O echocardiogram 10/13    EF=60%, Severe Pulm. HTN, & Sclerotic aortic valve w/stenosis. H/O echocardiogram 10/16/14     EF 55-60% Normal LV. Normal LV systolic function. Severe tricuspid insufficiency with severe hypertension. H/O echocardiogram 02/03/2017    heart cath performed this morning    H/O echocardiogram 09/18/2019    EF 50-55%, Left atrium is mild to moderately dilated, right atrium is severely dilated, mildly dilated right ventricle, Mod MR, Severe TR, Severe Pulm HTN, no pericardial effusion     History of complete ECG     10/14/2011(Lexiscan);5/6/2010, 4/30/2009,10/24/2008,9/21/2007, 10/13/2006    History of nuclear stress test 11/17/2016    lexiscan-normal,EF70%    Hx of cardiovascular stress test 12/28/2018    EF 60%  Normal study. HX OTHER MEDICAL 05/01/2017    MUGA-normal, EF53%    Hyperlipidemia     Hypertension     Mild intermittent asthma 7/28/2016    Moderate COPD (chronic obstructive pulmonary disease) ([facility]) 3/15/2022    Nausea & vomiting     Obstructive sleep apnea 5/16/2017    Paroxysmal atrial fibrillation ([address]     Post PTCA 4/21/2010    PTCA with 2.25 stent of the LIMA to LAD    Pulmonary HTN ([address]     Severe per last echo on 10/13. 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  Followed by MAZE procedure by pulmonary vein isolation.-  Dr Beny Caputo    S/P PTCA (percutaneous transluminal coronary angioplasty) 11/2012    PTCA with stent to RCA    Severe pulmonary hypertension ([address] 3/15/2022    Shortness of breath 3/15/2022    Thyroid disease     hypothyroi    Unspecified cerebral artery occlusion with cerebral infarction Unsure When    No Residual    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 ([address] 7/29/2022    WD-Non-pressure chronic ulcer of other part of left lower leg limited to breakdown of skin ([facility] 2022     Past Surgical History:   Procedure Laterality Date    APPENDECTOMY      CARDIAC SURGERY      CABG (3 Bypasses), One Heart Stent in     COLONOSCOPY  In     [address] [place] [address] WITH STENT PLACEMENT  2010    PTCA with stent LIMA ->LAD    CORONARY ARTERY BYPASS GRAFT  2009    LIMA->Diag,  LIMA -> LAD;  SVG->RCA going to the [facility].  Followed by MAZE procedure by pulmonary vein isolation.-  Dr Zach Ahumada, TOTAL ABDOMINAL (CERVIX REMOVED)      MIDDLE EAR SURGERY      OTHER SURGICAL HISTORY      Ear surgery-hearing    PACEMAKER PLACEMENT      battery change 2011 Medtronic    PTCA  2012    Ptca with stent to RCA    TONSILLECTOMY  's      As reviewed   Family History   Problem Relation Age of Onset    Cancer Mother         \"Liver Cancer\"    Arthritis Mother     Depression Mother     Hearing Loss Mother     High Blood Pressure Mother     High Cholesterol Mother     Mental Illness Mother     Miscarriages / Stillbirths Mother     Heart Disease Father     Early Death Father 36        \"Instant Death\"    High Blood Pressure Father     High Cholesterol Father     Coronary Art Dis Father         Massive MI    Heart Disease Sister     Depression Sister     Cancer Sister         \"Breast Cancer, Cancer Free Now\"    High Blood Pressure Sister     Other Daughter         \"She's Had Stomach Surgery\"    High Blood Pressure Son     High Blood Pressure Son     Early Death Paternal Grandfather      Social History     Tobacco Use    Smoking status: Former     Packs/day: 0.25     Years: 5.00     Pack years: 1.25     Types: Cigarettes     Quit date: 1970     Years since quittin.8    Smokeless tobacco: Never   Substance Use Topics    Alcohol use: Not Currently     Comment: no more than 3 cups of coffee each day        Objective:    Vitals:    22 1556   BP: 104/60   Site: Right Upper Arm   Position: Sitting   Cuff Size: Medium Adult   Pulse: 64   Weight: 132 lb (59.9 kg)   Height: 4' 11\" (1.499 m)     /60 (Site: Right Upper Arm, Position: Sitting, Cuff Size: Medium Adult)   Pulse 64   Ht 4' 11\" (1.499 m)   Wt 132 lb (59.9 kg)   BMI 26.66 kg/m²     No flowsheet data found. Wt Readings from Last 3 Encounters:   09/07/22 132 lb (59.9 kg)   07/09/22 132 lb 11.2 oz (60.2 kg)   06/28/22 132 lb (59.9 kg)     Body mass index is 26.66 kg/m². GENERAL - Alert, oriented, pleasant, in no apparent distress. EYES: No jaundice, no conjunctival pallor. SKIN: It is warm & dry. No rashes. No Echhymosis    HEENT - No clinically significant abnormalities seen. Neck - Supple. No jugular venous distention noted. No carotid bruits. Cardiovascular - Normal S1 and S2 without obvious murmur or gallop. Extremities - No cyanosis, clubbing, or significant edema. Pulmonary - No respiratory distress. No wheezes or rales. Abdomen - No masses, tenderness, or organomegaly. Musculoskeletal - No significant edema. No joint deformities. No muscle wasting. Neurologic - Cranial nerves II through XII are grossly intact. There were no gross focal neurologic abnormalities.     Lab Review   Lab Results   Component Value Date/Time    CKTOTAL 89 03/18/2022 11:59 PM    TROPONINT <0.010 07/07/2022 11:44 PM     BNP:    Lab Results   Component Value Date/Time    BNP 90 09/28/2013 11:52 PM     PT/INR:    Lab Results   Component Value Date    INR 1.88 03/24/2022     No results found for: LABA1C  Lab Results   Component Value Date    WBC 8.4 09/02/2022    HCT 43.1 09/02/2022    MCV 99 (H) 09/02/2022     09/02/2022     Lab Results   Component Value Date    CHOL 105 07/08/2022    TRIG 69 07/08/2022    HDL 45 07/08/2022    LDLCALC 46 07/08/2022    LDLDIRECT 116 (H) 10/29/2018     Lab Results   Component Value Date    ALT 17 07/08/2022    AST 23 07/08/2022     BMP:    Lab Results   Component Value Date/Time     07/08/2022 08:06 AM    K 4.1 Remaining average battery life is 11.8 years. Device is programmed to VVIR mode lower rate of 60 bpm and 99.1% pacing in the ventricle. Recommend continued every three month check and follow up office visit as scheduled. Ben Pierre MD, 7/12/2022 12:37 PM     -Hypothyroidism on Synthroid 88 mcg p.o. daily last TSH on file is 1.6    -Obstructive sleep apnea on CPAP which might be causing her pulmonary hypertension    - Atrial fibrillation, pt is  compliant with meds. Patient does not have symptoms from atrial fibrillation on Coumadin    - CVI; patient has bilateral lower extremity swelling more on the left side and please note that she has reflux we have discussed the possibility of ablation which was deferred  Advise her for compression socks for now      Bob Rico MD    [facility] [place]    Please note this report has been partially produced using speech recognition software and may contain errors related to that system including errors in grammar, punctuation, and spelling, as well as words and phrases that may be inappropriate. If there are any questions or concerns please feel free to contact the dictating provider for clarification.

## 2022-09-07 NOTE — PATIENT INSTRUCTIONS
Please be informed that if you contact our office outside of normal business hours the physician on call cannot help with any scheduling or rescheduling issues, procedure instruction questions or any type of medication issue. We advise you for any urgent/emergency that you go to the nearest emergency room! PLEASE CALL OUR OFFICE DURING NORMAL BUSINESS HOURS    Monday - Friday   8 am to 5 pm    [place] [address]: [phone]    [place]:  [phone]  . [address] [facility] Locations - No appointment necessary. Sites open Monday to Friday. Call your preferred location for test preparation, business hours and other information you need. [employer] accepts [employer]. [facility] [address] [place], [address] [facility]  Phone: [phone] [facility] [address] [facility]  [address].   [facility], [address] [facility] [address]  Phone: [phone]

## 2022-09-08 ENCOUNTER — PATIENT MESSAGE (OUTPATIENT)
Dept: CARDIOLOGY CLINIC | Age: 87
End: 2022-09-08

## 2022-09-09 ENCOUNTER — HOSPITAL ENCOUNTER (OUTPATIENT)
Dept: WOUND CARE | Age: 87
Discharge: HOME | End: 2022-09-09
Payer: MEDICARE

## 2022-09-09 VITALS
TEMPERATURE: 97.9 F | HEART RATE: 82 BPM | RESPIRATION RATE: 24 BRPM | DIASTOLIC BLOOD PRESSURE: 74 MMHG | SYSTOLIC BLOOD PRESSURE: 123 MMHG

## 2022-09-09 DIAGNOSIS — L97.929 IDIOPATHIC CHRONIC VENOUS HYPERTENSION OF LEFT LEG WITH ULCER (HCC): ICD-10-CM

## 2022-09-09 DIAGNOSIS — L97.929 IDIOPATHIC CHRONIC VENOUS HYPERTENSION OF LEFT LEG WITH ULCER (HCC): Primary | ICD-10-CM

## 2022-09-09 DIAGNOSIS — I87.312 IDIOPATHIC CHRONIC VENOUS HYPERTENSION OF LEFT LEG WITH ULCER (HCC): ICD-10-CM

## 2022-09-09 DIAGNOSIS — I87.312 IDIOPATHIC CHRONIC VENOUS HYPERTENSION OF LEFT LEG WITH ULCER (HCC): Primary | ICD-10-CM

## 2022-09-09 PROCEDURE — 97597 DBRDMT OPN WND 1ST 20 CM/<: CPT

## 2022-09-09 RX ORDER — BETAMETHASONE DIPROPIONATE 0.05 %
OINTMENT (GRAM) TOPICAL ONCE
Status: CANCELLED | OUTPATIENT
Start: 2022-09-09 | End: 2022-09-09

## 2022-09-09 RX ORDER — CLOBETASOL PROPIONATE 0.5 MG/G
OINTMENT TOPICAL ONCE
Status: CANCELLED | OUTPATIENT
Start: 2022-09-09 | End: 2022-09-09

## 2022-09-09 ASSESSMENT — PAIN DESCRIPTION - FREQUENCY: FREQUENCY: INTERMITTENT

## 2022-09-09 ASSESSMENT — PAIN DESCRIPTION - PAIN TYPE: TYPE: CHRONIC PAIN

## 2022-09-09 ASSESSMENT — PAIN DESCRIPTION - ORIENTATION: ORIENTATION: LEFT

## 2022-09-09 ASSESSMENT — PAIN DESCRIPTION - ONSET: ONSET: ON-GOING

## 2022-09-09 ASSESSMENT — PAIN DESCRIPTION - DESCRIPTORS: DESCRIPTORS: OTHER (COMMENT)

## 2022-09-09 ASSESSMENT — PAIN SCALES - GENERAL: PAINLEVEL_OUTOF10: 10

## 2022-09-09 ASSESSMENT — PAIN DESCRIPTION - LOCATION: LOCATION: LEG

## 2022-09-09 NOTE — PROGRESS NOTES
Wound Care Center Progress Note With Procedure    Aldo Montgomery  AGE: 80 y.o. GENDER: female  : 3/18/1929  EPISODE DATE:  2022     Subjective:     Chief Complaint   Patient presents with    Wound Check     Left leg         HISTORY of PRESENT ILLNESS      Aldo Montgomery is a 80 y.o. female who presents today for wound evaluation of Chronic venous ulcer(s) of the left posterior leg. The ulcer is of moderate severity. The underlying cause of the wound is unclear. She is in for f/u- she actually has improvement in the wound and in the pain today. Will continue current care. Wound Pain Timing/Severity: mild  Quality of pain: tender  Severity of pain:  1 / 10   Modifying Factors: edema and venous stasis  Associated Signs/Symptoms: none        PAST MEDICAL HISTORY        Diagnosis Date    Age-related osteoporosis with current pathological fracture of vertebra ([address] 2022    Arthritis     Bradycardia     requiring dual chamber pacemaker at [facility]    CAD (coronary artery disease)     Cardiac pacemaker 10/2001    [employer] #[id number]  PPM- Serial # [id number]- Dr Jaylyn Bella    CHF (congestive heart failure) (HCC)     COPD, mild ([address] 2017    Cor pulmonale (chronic) ([address] 3/15/2022    CVA (cerebrovascular accident) ([address]     DJD (degenerative joint disease) of cervical spine     C5-C6, C6-C7    Exhaustion of cardiac pacemaker battery 2011    PPM battery replacement- [employer]tr[employer]    Family history of cardiovascular disease     Glaucoma Dx     H/O 24 hour EKG monitoring 2000- Intermittent episonde of a-fib/flutter    H/O cardiac catheterization 2010, 2010-Severe native vessel disease and has graft disease as well. LAD, CX totally occluded. RCA totally occluded in proximal segment. VG to RCA widely patent. PDA 90% LAD afer LIMA anastomosis 80-90% stenosis. Proceeded with PTCA with stent next day.     H/O cardiovascular stress test 10/14/2011, 6/2/2010,4/8/2010, 5/18/2009,4/6/2009, 10/30/2007, 11/12/2004, 11/6/2003, 8/9/2002, 8/9/2001,6/5/2000,     10/14/2011-Lexiscan-Abnormal Myocardial Perfusion study. Evidence of mild ischemia in the Left CX region. Abnomal study. Rest EF 63%. Global LV systolic function normal. No ECG changes. Unremarkable pharmacological stress test.    H/O cardiovascular stress test 6/10/2013    thallium--mild ischemia left circumflex EF63% no change from 10/2011 study. H/O cardiovascular stress test 10/16/2014    cardiolite-mild ischemia left circumflex,EF70%    H/O chest x-ray 4/19/2009 4/19/2009-Stable cardiomegaly. No acute cardiopulmonary disease. H/O Doppler lower venous ultrasound 09/18/2019    Significant reflux noted in RGSV, RGSV is extremely tortuous and small along the thigh and calf and would be highly unlikely to be accessed, RSSV is non compressible and has occlusive chronic SVT, LSSV is non compressible with occlusive chronic SVT, LGSV removed s/p CABG, Significant reflux in LGSV tributary,    H/O Doppler ultrasound 3/31/2010    CAROTID- 3/31/2010-INtimal thickening but no significant atherosclerotic plaque noted in ANA PAULA. Doppler flow velocities within ANA PAULA are WNL. Heterogeneous, irregular atherosclerotic plaque noted in LICA. Doppler flow velocities within the LICA are elevated, consistent with a mild, less than 50% stenosis. H/O Doppler ultrasound 5/24/2016    Carotid- normal study    H/O Doppler ultrasound 09/06/2017    carotid - normal study    H/O Doppler ultrasound     H/O echocardiogram 10/13    EF=60%, Severe Pulm. HTN, & Sclerotic aortic valve w/stenosis. H/O echocardiogram 10/16/14     EF 55-60% Normal LV. Normal LV systolic function. Severe tricuspid insufficiency with severe hypertension.      H/O echocardiogram 02/03/2017    heart cath performed this morning    H/O echocardiogram 09/18/2019    EF 50-55%, Left atrium is mild to moderately dilated, right atrium is severely dilated, mildly dilated right ventricle, Mod MR, Severe TR, Severe Pulm HTN, no pericardial effusion     History of complete ECG     10/14/2011(Lexiscan);5/6/2010, 4/30/2009,10/24/2008,9/21/2007, 10/13/2006    History of nuclear stress test 11/17/2016    lexiscan-normal,EF70%    Hx of cardiovascular stress test 12/28/2018    EF 60%  Normal study. HX OTHER MEDICAL 05/01/2017    MUGA-normal, EF53%    Hyperlipidemia     Hypertension     Mild intermittent asthma 7/28/2016    Moderate COPD (chronic obstructive pulmonary disease) ([facility]) 3/15/2022    Nausea & vomiting     Obstructive sleep apnea 5/16/2017    Paroxysmal atrial fibrillation ([address]     Post PTCA 4/21/2010    PTCA with 2.25 stent of the LIMA to LAD    Pulmonary HTN ([address]     Severe per last echo on 10/13. 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 Followed by MAZE procedure by pulmonary vein isolation.-  Dr Nakul Thornton    S/P PTCA (percutaneous transluminal coronary angioplasty) 11/2012    PTCA with stent to RCA    Severe pulmonary hypertension ([address] 3/15/2022    Shortness of breath 3/15/2022    Thyroid disease     hypothyroi    Unspecified cerebral artery occlusion with cerebral infarction Unsure When    No Residual    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 ([address] 7/29/2022    WD-Non-pressure chronic ulcer of other part of left lower leg limited to breakdown of skin ([address] 7/29/2022       PAST SURGICAL HISTORY    Past Surgical History:   Procedure Laterality Date    APPENDECTOMY  1941    CARDIAC SURGERY  4/09    CABG (3 Bypasses), One Heart Stent in 2010    COLONOSCOPY  In 2000's    X1    CORONARY ANGIOPLASTY WITH STENT PLACEMENT  4/21/2010    PTCA with stent LIMA ->LAD    CORONARY ARTERY BYPASS GRAFT  4/8/2009    LIMA->Diag,  LIMA -> LAD;  SVG->RCA going to the PDA.  Followed by MAZE procedure by pulmonary vein isolation.-  Dr Rafael Soto, TOTAL ABDOMINAL (CERVIX REMOVED)      MIDDLE EAR SURGERY      OTHER SURGICAL HISTORY      Ear surgery-hearing    PACEMAKER PLACEMENT      battery change 2011 Medtronic    PTCA  2012    Ptca with stent to RCA    TONSILLECTOMY  's       FAMILY HISTORY    Family History   Problem Relation Age of Onset    Cancer Mother         \"Liver Cancer\"    Arthritis Mother     Depression Mother     Hearing Loss Mother     High Blood Pressure Mother     High Cholesterol Mother     Mental Illness Mother     Miscarriages / Stillbirths Mother     Heart Disease Father     Early Death Father 36        \"Instant Death\"    High Blood Pressure Father     High Cholesterol Father     Coronary Art Dis Father         Massive MI    Heart Disease Sister     Depression Sister     Cancer Sister         \"Breast Cancer, Cancer Free Now\"    High Blood Pressure Sister     Other Daughter         \"She's Had Stomach Surgery\"    High Blood Pressure Son     High Blood Pressure Son     Early Death Paternal Grandfather        SOCIAL HISTORY    Social History     Tobacco Use    Smoking status: Former     Packs/day: 0.25     Years: 5.00     Pack years: 1.25     Types: Cigarettes     Quit date: 1970     Years since quittin.8    Smokeless tobacco: Never   Vaping Use    Vaping Use: Never used   Substance Use Topics    Alcohol use: Not Currently     Comment: no more than 3 cups of coffee each day    Drug use: No       ALLERGIES    Allergies   Allergen Reactions    Darvocet [Propoxyphene N-Acetaminophen]     Ranexa [Ranolazine Er]      Sick to her stomach    Ranolazine      Sick to her stomach    Sulfa Antibiotics Itching    Sulfasalazine Itching    Adhesive Tape Rash    Allantoin Rash    Bacitracin Rash    Gramicidin Rash    Neomycin Rash    Polymyxin B Rash    Pramoxine Hcl Rash    Silicone Rash       MEDICATIONS    Current Outpatient Medications on File Prior to Encounter   Medication Sig Dispense Refill    Turmeric (QC TUMERIC COMPLEX PO) Take 1 capsule by mouth daily      traZODone (DESYREL) 50 MG tablet Take  mg by mouth nightly as needed for Sleep      acetaminophen (TYLENOL) 500 MG tablet Take 1,000 mg by mouth every 4 hours      ferrous sulfate (IRON 325) 325 (65 Fe) MG tablet Take 1 tablet by mouth daily (with breakfast) (Patient not taking: Reported on 9/7/2022) 30 tablet 3    torsemide (DEMADEX) 20 MG tablet Take 1 tablet by mouth as needed (for fluid retention or weight gain of 3 lbs overnight) This is in addition to the 20 mg bid (Patient taking differently: Take 40 mg by mouth every morning This is in addition to the 20 mg bid-  Takes demadex 20 mg (2 tablets in a.m.) and 1 tablet at night) 30 tablet 0    spironolactone (ALDACTONE) 50 MG tablet Take 1 tablet by mouth daily (Patient taking differently: Take 25 mg by mouth daily) 30 tablet 0    PROCTOZONE-HC 2.5 % CREA rectal cream INSERT RECTALLY TWICE DAILY as directed AS NEEDED FOR HEMORRHOIDS      ZIOPTAN 0.0015 % SOLN Place 1 drop into both eyes Daily with supper       CETIRIZINE HCL PO Take by mouth      lidocaine 4 % external patch Place 1 patch onto the skin daily (Patient not taking: Reported on 9/7/2022) 30 patch 0    vitamin D 25 MCG (1000 UT) CAPS Take 5,000 Units by mouth daily      Probiotic Product (PROBIOTIC-10) CHEW Take by mouth      atorvastatin (LIPITOR) 10 MG tablet Take 10 mg by mouth daily       umeclidinium-vilanterol (ANORO ELLIPTA) 62.5-25 MCG/INH AEPB inhaler Inhale 1 puff into the lungs daily 1 each 11    CPAP Machine MISC by Does not apply route      Biotin 5 MG CAPS Take 1 capsule by mouth daily      Misc Natural Products (OSTEO BI-FLEX ADV DOUBLE ST PO) Take 1 tablet by mouth daily      timolol (TIMOPTIC-XE) 0.25 % ophthalmic gel-forming Place 1 drop into both eyes daily  (Patient not taking: Reported on 9/7/2022)      carvedilol (COREG) 6.25 MG tablet Take 1 tablet by mouth 2 times daily (with meals) 180 tablet 3    albuterol (PROVENTIL HFA;VENTOLIN HFA) 108 (90 BASE) MCG/ACT inhaler Inhale 2 puffs into the lungs 2 times daily AND EVERY 4-6 HOURS AS NEEDED      Multiple Vitamins-Minerals (ICAPS) CAPS Take 1 capsule by mouth 2 times daily       vitamin B-12 (CYANOCOBALAMIN) 1000 MCG tablet Take 1,000 mcg by mouth daily. levothyroxine (SYNTHROID) 88 MCG tablet Take 88 mcg by mouth daily. aspirin 81 MG chewable tablet Take 81 mg by mouth daily. (Patient not taking: Reported on 9/7/2022)       No current facility-administered medications on file prior to encounter. REVIEW OF SYSTEMS    Pertinent items are noted in HPI. Constitutional: Negative for systemic symptoms including fever, chills and malaise. Objective:      /74   Pulse 82   Temp 97.9 °F (36.6 °C) (Temporal)   Resp 24     PHYSICAL EXAM      General: The patient is in no acute distress. Mental status:  Patient is appropriate, is  oriented to place and plan of care. Dermatologic exam: Visual inspection of the periwound reveals the skin to be normal in turgor and texture  Wound exam: see wound description below in procedure note      Assessment:     Problem List Items Addressed This Visit       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 ([address] - Primary    Relevant Orders    Initiate Outpatient Wound Care Protocol     Procedure Note    Indications:  Based on my examination of this patient's wound(s) today, sharp excision into necrotic dermis is required to promote healing and evaluate the extent of previous healing. Performed by: Ellyn Guaman MD    Consent obtained: Yes    Time out taken:  Yes    Pain Control: lidocaine      Debridement:Non-excisional Debridement    Using curette the wound(s) was/were sharply debrided down through and including the removal of dermis.         Devitalized Tissue Debrided:  fibrin, biofilm, and slough    Pre Debridement Measurements:  Are located in the Wound Documentation Flow Sheet    All active wounds listed below with today's date are evaluated  Wound(s)    debrided this date include # : 1     Post  Debridement Measurements:  Wound 07/29/22 Tibial Left;Posterior #1 (Active)   Wound Image   09/02/22 1353   Wound Etiology Venous 09/09/22 1330   Dressing Status New dressing applied 09/02/22 1412   Wound Cleansed Wound cleanser 09/09/22 1330   Offloading for Diabetic Foot Ulcers Offloading not required 09/02/22 1353   Wound Length (cm) 1.3 cm 09/09/22 1330   Wound Width (cm) 1.2 cm 09/09/22 1330   Wound Depth (cm) 0.1 cm 09/09/22 1330   Wound Surface Area (cm^2) 1.56 cm^2 09/09/22 1330   Change in Wound Size % (l*w) -30 09/09/22 1330   Wound Volume (cm^3) 0.156 cm^3 09/09/22 1330   Wound Healing % -30 09/09/22 1330   Post-Procedure Length (cm) 1.3 cm 09/09/22 1352   Post-Procedure Width (cm) 1.2 cm 09/09/22 1352   Post-Procedure Depth (cm) 0.1 cm 09/09/22 1352   Post-Procedure Surface Area (cm^2) 1.56 cm^2 09/09/22 1352   Post-Procedure Volume (cm^3) 0.156 cm^3 09/09/22 1352   Distance Tunneling (cm) 0 cm 09/09/22 1330   Tunneling Position ___ O'Clock 0 09/09/22 1330   Undermining Starts ___ O'Clock 0 09/09/22 1330   Undermining Ends___ O'Clock 00 09/09/22 1330   Undermining Maxium Distance (cm) 0 09/09/22 1330   Wound Assessment Pink/red 09/09/22 1330   Drainage Amount Moderate 09/09/22 1330   Drainage Description Yellow 09/09/22 1330   Odor None 09/09/22 1330   Safia-wound Assessment Dry/flaky 09/09/22 1330   Margins Defined edges 09/09/22 1330   Wound Thickness Description not for Pressure Injury Full thickness 09/09/22 1330   Number of days: 42       Percent of Wound(s) Debrided: approximately 100%    Total  Area  Debrided:  1.56 sq cm     Bleeding:  Minimal    Hemostasis Achieved:  by pressure    Procedural Pain:  0  / 10     Post Procedural Pain:  0 / 10     Response to treatment:  Well tolerated by patient. Status of wound progress and description from last visit:   improved today.       Plan: Discharge Instructions         PHYSICIAN ORDERS AND DISCHARGE INSTRUCTIONS     NOTE: Upon discharge from the [address], you will receive a patient experience survey. We would be grateful if you would take the time to fill this survey out. Wound care order history:                 MAY's   Right  1.2     Left 1.16              Date 7/29/2022              Vascular studies:   Date              Imaging:   Date              Cultures:   Date              Grafts:  Date              Antibiotics: KEFLEX PRIOR TO APPT. ON 7/29/2022                                   Doxycycline 100 mg BID 7 days ordered on 8/26/2022              Earlier Wound care treatments:                          Primary care physician:     Continuing wound care orders and information:              Residence:  PRIVATE               Continue home health care with:  [facility]          Wound Medications:              Wound cleansing:                          Do not scrub or use excessive force. Wash hands with soap and water before and after dressing changes. Prior to applying a clean dressing, cleanse wound with normal saline,                                wound cleanser, or mild soap and water. Ask the physician or nurse before getting the wound(s) wet in a shower              Daily Wound management:                          Keep weight off wounds and reposition every 2 hours. Avoid standing for long periods of time. Apply wraps/stockings in AM and remove at bedtime. If swelling is present, elevate legs to the level of the heart or above for 30 minutes 4-5 times a day and/or when sitting. When taking antibiotics take entire prescription as ordered by physician do not stop taking until medicine is all gone. Orders for this week: 2022     FAX ORDERS TO [facility]! LEFT LOWER LEG -- [address] WITH SOAP AND WATER, PAT DRY. COVER WITH saline damp hydrofera blue cut to size of wound in clinic (home care to order and use Hydrofera Blue Ready). COVER WITH silicone border gauze                                                  Home care to change every 2-3 days (will need an extra visit week of 22 as client will not be in for a wound clinic appointment that week.)                                                  TUBI F TO [address] WRAP WITH ACE WRAP. (Tubi F and ACE should be wrapped from behind the toes to just below the knee/top of the calf)     Pharm: [facility]. Rx:  Consult:  Central [phone]     Follow up with Dr Kevin Ramirez in 2 weeks on 22 in the wound care center. Primary wound care provider:  Call [phone] for any questions or concerns.   Date__________   Time____________        Treatment Note      Written Patient Dismissal Instructions Given            Electronically signed by Vivek Desai MD on 2022 at 2:43 PM

## 2022-09-09 NOTE — DISCHARGE INSTRUCTIONS
PHYSICIAN ORDERS AND DISCHARGE INSTRUCTIONS     NOTE: Upon discharge from the [address], you will receive a patient experience survey. We would be grateful if you would take the time to fill this survey out. Wound care order history:                 MAY's   Right  1.2     Left 1.16              Date 7/29/2022              Vascular studies:   Date              Imaging:   Date              Cultures:   Date              Grafts:  Date              Antibiotics: KEFLEX PRIOR TO APPT. ON 7/29/2022                                   Doxycycline 100 mg BID 7 days ordered on 8/26/2022              Earlier Wound care treatments:                          Primary care physician:     Continuing wound care orders and information:              Residence:  PRIVATE               Continue home health care with:  [facility]          Wound Medications:              Wound cleansing:                          Do not scrub or use excessive force. Wash hands with soap and water before and after dressing changes. Prior to applying a clean dressing, cleanse wound with normal saline,                                wound cleanser, or mild soap and water. Ask the physician or nurse before getting the wound(s) wet in a shower              Daily Wound management:                          Keep weight off wounds and reposition every 2 hours. Avoid standing for long periods of time. Apply wraps/stockings in AM and remove at bedtime. If swelling is present, elevate legs to the level of the heart or above for 30 minutes 4-5 times a day and/or when sitting. When taking antibiotics take entire prescription as ordered by physician do not stop taking until medicine is all gone.                                                                     Orders for this week: 2022     FAX ORDERS TO [facility]! LEFT LOWER LEG -- [address] WITH SOAP AND WATER, PAT DRY. COVER WITH saline damp hydrofera blue cut to size of wound in clinic (home care to order and use Hydrofera Blue Ready). COVER WITH silicone border gauze                                                  Home care to change every 2-3 days (will need an extra visit week of 22 as client will not be in for a wound clinic appointment that week.)                                                  TUBI F TO [address] WRAP WITH ACE WRAP. (Tubi F and ACE should be wrapped from behind the toes to just below the knee/top of the calf)     Pharm: [facility]. Rx:  Consult:  Central [phone]     Follow up with Dr Derek Pereyra in 2 weeks on 22 in the wound care center. Primary wound care provider:  Call [phone] for any questions or concerns.   Date__________   Time____________

## 2022-09-10 NOTE — TELEPHONE ENCOUNTER
From: Farrah Car  To: Dr. Rex Gleason: 9/8/2022 7:51 PM EDT  Subject: Question regarding IRON AND TIBC    Should we be concerned about Nitseh iron count? It is pretty high. Should she stop taking a multivitamin that includes iron?

## 2022-09-13 ENCOUNTER — TELEPHONE (OUTPATIENT)
Dept: CARDIOLOGY CLINIC | Age: 87
End: 2022-09-13

## 2022-09-13 NOTE — TELEPHONE ENCOUNTER
Rachel Loja called from [facility] stating that patient's iron level is high and patient was advised to stop taking multivitamin but that is a very small dosage. Please call her back at ph# [phone].

## 2022-09-13 NOTE — TELEPHONE ENCOUNTER
Spoke with pharmacist and she makes pill pack for patient, states the multivitamin she takes has very minimal iron in it, but patient also is taking iron 325mg daily (in chart is marked as not taking but pharmacist states she has been putting in pill pack that she makes for patient, wondering if any changes need made for her pill pack. Please advise.

## 2022-09-16 ENCOUNTER — HOSPITAL ENCOUNTER (OUTPATIENT)
Dept: INFUSION THERAPY | Age: 87
Setting detail: INFUSION SERIES
Discharge: HOME | End: 2022-09-16
Payer: MEDICARE

## 2022-09-16 VITALS
SYSTOLIC BLOOD PRESSURE: 105 MMHG | TEMPERATURE: 97.3 F | OXYGEN SATURATION: 95 % | RESPIRATION RATE: 16 BRPM | HEART RATE: 61 BPM | DIASTOLIC BLOOD PRESSURE: 61 MMHG

## 2022-09-16 DIAGNOSIS — M80.08XA AGE-RELATED OSTEOPOROSIS WITH CURRENT PATHOLOGICAL FRACTURE OF VERTEBRA, INITIAL ENCOUNTER (HCC): Primary | ICD-10-CM

## 2022-09-16 PROCEDURE — 96372 THER/PROPH/DIAG INJ SC/IM: CPT

## 2022-09-16 PROCEDURE — 99211 OFF/OP EST MAY X REQ PHY/QHP: CPT

## 2022-09-16 PROCEDURE — 6360000002 HC RX W HCPCS

## 2022-09-16 RX ORDER — FAMOTIDINE 10 MG/ML
20 INJECTION, SOLUTION INTRAVENOUS
OUTPATIENT
Start: 2022-10-14

## 2022-09-16 RX ORDER — ALBUTEROL SULFATE 90 UG/1
4 AEROSOL, METERED RESPIRATORY (INHALATION) PRN
OUTPATIENT
Start: 2022-10-14

## 2022-09-16 RX ORDER — ONDANSETRON 2 MG/ML
8 INJECTION INTRAMUSCULAR; INTRAVENOUS
OUTPATIENT
Start: 2022-10-14

## 2022-09-16 RX ORDER — EPINEPHRINE 1 MG/ML
0.3 INJECTION, SOLUTION, CONCENTRATE INTRAVENOUS PRN
OUTPATIENT
Start: 2022-10-14

## 2022-09-16 RX ORDER — DIPHENHYDRAMINE HYDROCHLORIDE 50 MG/ML
50 INJECTION INTRAMUSCULAR; INTRAVENOUS
OUTPATIENT
Start: 2022-10-14

## 2022-09-16 RX ORDER — ACETAMINOPHEN 325 MG/1
650 TABLET ORAL
OUTPATIENT
Start: 2022-10-14

## 2022-09-16 RX ORDER — SODIUM CHLORIDE 9 MG/ML
INJECTION, SOLUTION INTRAVENOUS CONTINUOUS
OUTPATIENT
Start: 2022-10-14

## 2022-09-16 RX ADMIN — ROMOSOZUMAB-AQQG 105 MG: 105 INJECTION, SOLUTION SUBCUTANEOUS at 12:44

## 2022-09-16 RX ADMIN — ROMOSOZUMAB-AQQG 105 MG: 105 INJECTION, SOLUTION SUBCUTANEOUS at 12:43

## 2022-09-23 ENCOUNTER — HOSPITAL ENCOUNTER (OUTPATIENT)
Dept: WOUND CARE | Age: 87
Discharge: HOME | End: 2022-09-23
Payer: MEDICARE

## 2022-09-23 VITALS
HEART RATE: 74 BPM | RESPIRATION RATE: 20 BRPM | TEMPERATURE: 97.4 F | SYSTOLIC BLOOD PRESSURE: 120 MMHG | DIASTOLIC BLOOD PRESSURE: 76 MMHG

## 2022-09-23 DIAGNOSIS — I87.312 IDIOPATHIC CHRONIC VENOUS HYPERTENSION OF LEFT LEG WITH ULCER (HCC): Primary | ICD-10-CM

## 2022-09-23 DIAGNOSIS — L97.929 IDIOPATHIC CHRONIC VENOUS HYPERTENSION OF LEFT LEG WITH ULCER (HCC): Primary | ICD-10-CM

## 2022-09-23 DIAGNOSIS — L97.822 NON-PRESSURE CHRONIC ULCER OF OTHER PART OF LEFT LOWER LEG WITH FAT LAYER EXPOSED (HCC): ICD-10-CM

## 2022-09-23 PROCEDURE — 97597 DBRDMT OPN WND 1ST 20 CM/<: CPT

## 2022-09-23 RX ORDER — BETAMETHASONE DIPROPIONATE 0.05 %
OINTMENT (GRAM) TOPICAL ONCE
Status: CANCELLED | OUTPATIENT
Start: 2022-09-23 | End: 2022-09-23

## 2022-09-23 RX ORDER — CLOBETASOL PROPIONATE 0.5 MG/G
OINTMENT TOPICAL ONCE
Status: CANCELLED | OUTPATIENT
Start: 2022-09-23 | End: 2022-09-23

## 2022-09-23 NOTE — DISCHARGE INSTRUCTIONS
PHYSICIAN ORDERS AND DISCHARGE INSTRUCTIONS     NOTE: Upon discharge from the [address], you will receive a patient experience survey. We would be grateful if you would take the time to fill this survey out. Wound care order history:                 MAY's   Right  1.2     Left 1.16              Date 7/29/2022              Vascular studies:   Date              Imaging:   Date              Cultures:   Date              Grafts:  Date              Antibiotics: KEFLEX PRIOR TO APPT. ON 7/29/2022                                   Doxycycline 100 mg BID 7 days ordered on 8/26/2022              Earlier Wound care treatments:                          Primary care physician:     Continuing wound care orders and information:              Residence:  PRIVATE               Continue [facility] health care with:  [facility]          Wound Medications:              Wound cleansing:                          Do not scrub or use excessive force. Wash hands with soap and water before and after dressing changes. Prior to applying a clean dressing, cleanse wound with normal saline,                                wound cleanser, or mild soap and water. Ask the physician or nurse before getting the wound(s) wet in a shower              Daily Wound management:                          Keep weight off wounds and reposition every 2 hours. Avoid standing for long periods of time. Apply wraps/stockings in AM and remove at bedtime. If swelling is present, elevate legs to the level of the heart or above for 30 minutes 4-5 times a day and/or when sitting. When taking antibiotics take entire prescription as ordered by physician do not stop taking until medicine is all gone.                                                                     Orders for this week: 2022     FAX ORDERS TO [facility]! LEFT LOWER LEG -- [address] WITH SOAP AND WATER, PAT DRY. COVER WITH saline damp hydrofera blue cut to size of wound in clinic (home care to order and use Hydrofera Blue Ready). COVER WITH silicone border gauze. Home care to change every 2-3 days (will need an extra visit week of 22 as client will not be in for a wound clinic appointment that week.)                                                  TUBI F TO [address] WRAP WITH ACE WRAP. (Tubi F and ACE should be wrapped from behind the toes to just below the knee/top of the calf)     Pharm: [facility]. Rx:  Consult:  Central [phone]     Follow up with Dr Laurel Saunders in 2 weeks on 10/7/22 in the wound care center. Primary wound care provider:  Call [phone] for any questions or concerns.   Date__________   Time____________

## 2022-09-23 NOTE — PROGRESS NOTES
Wound Care Center Progress Note With Procedure    Radha Lewis  AGE: 80 y.o. GENDER: female  : 3/18/1929  EPISODE DATE:  2022     Subjective:     Chief Complaint   Patient presents with    Wound Check     Left posterior leg         HISTORY of PRESENT ILLNESS      Radha Lewis is a 80 y.o. female who presents today for wound evaluation of Chronic venous ulcer(s) of the left leg. The ulcer is of mild severity. The underlying cause of the wound is venous. She is in for f/u- doing well, there are no new issues. The wound is smaller today. She will be having difficulty getting here in the next few weeks. Wound Pain Timing/Severity: mild  Quality of pain: tender  Severity of pain:  2 / 10   Modifying Factors: venous stasis  Associated Signs/Symptoms: none        PAST MEDICAL HISTORY        Diagnosis Date    Age-related osteoporosis with current pathological fracture of vertebra ([address] 2022    Arthritis     Bradycardia     requiring dual chamber pacemaker at [facility] [place]    CAD (coronary artery disease)     Cardiac pacemaker 10/2001    [employer] #[id number]  PPM- Serial # 26-[facility]- Dr Denilson Rodrigez    CHF (congestive heart failure) (HCC)     COPD, mild ([address] 2017    Cor pulmonale (chronic) ([address] 3/15/2022    CVA (cerebrovascular accident) ([address]     DJD (degenerative joint disease) of cervical spine     C5-C6, C6-C7    Exhaustion of cardiac pacemaker battery 2011    PPM battery replacement- [employer]    Family history of cardiovascular disease     Glaucoma Dx     H/O 24 hour EKG monitoring 2000- Intermittent episonde of a-fib/flutter    H/O cardiac catheterization 2010, 2010-Severe native vessel disease and has graft disease as well. LAD, CX totally occluded. RCA totally occluded in proximal segment. VG to RCA widely patent. PDA 90% LAD afer LIMA anastomosis 80-90% stenosis. Proceeded with PTCA with stent next day.     H/O cardiovascular stress test 10/14/2011, 6/2/2010,4/8/2010, 5/18/2009,4/6/2009, 10/30/2007, 11/12/2004, 11/6/2003, 8/9/2002, 8/9/2001,6/5/2000,     10/14/2011-Lexiscan-Abnormal Myocardial Perfusion study. Evidence of mild ischemia in the Left CX region. Abnomal study. Rest EF 63%. Global LV systolic function normal. No ECG changes. Unremarkable pharmacological stress test.    H/O cardiovascular stress test 6/10/2013    thallium--mild ischemia left circumflex EF63% no change from 10/2011 study. H/O cardiovascular stress test 10/16/2014    cardiolite-mild ischemia left circumflex,EF70%    H/O chest x-ray 4/19/2009 4/19/2009-Stable cardiomegaly. No acute cardiopulmonary disease. H/O Doppler lower venous ultrasound 09/18/2019    Significant reflux noted in RGSV, RGSV is extremely tortuous and small along the thigh and calf and would be highly unlikely to be accessed, RSSV is non compressible and has occlusive chronic SVT, LSSV is non compressible with occlusive chronic SVT, LGSV removed s/p CABG, Significant reflux in LGSV tributary,    H/O Doppler ultrasound 3/31/2010    CAROTID- 3/31/2010-INtimal thickening but no significant atherosclerotic plaque noted in ANA PAULA. Doppler flow velocities within ANA PAULA are WNL. Heterogeneous, irregular atherosclerotic plaque noted in LICA. Doppler flow velocities within the LICA are elevated, consistent with a mild, less than 50% stenosis. H/O Doppler ultrasound 5/24/2016    Carotid- normal study    H/O Doppler ultrasound 09/06/2017    carotid - normal study    H/O Doppler ultrasound     H/O echocardiogram 10/13    EF=60%, Severe Pulm. HTN, & Sclerotic aortic valve w/stenosis. H/O echocardiogram 10/16/14     EF 55-60% Normal LV. Normal LV systolic function. Severe tricuspid insufficiency with severe hypertension.      H/O echocardiogram 02/03/2017    heart cath performed this morning    H/O echocardiogram 09/18/2019    EF 50-55%, Left atrium is mild to moderately dilated, right atrium is severely dilated, mildly dilated right ventricle, Mod MR, Severe TR, Severe Pulm HTN, no pericardial effusion     History of complete ECG     10/14/2011(Lexiscan);5/6/2010, 4/30/2009,10/24/2008,9/21/2007, 10/13/2006    History of nuclear stress test 11/17/2016    lexiscan-normal,EF70%    Hx of cardiovascular stress test 12/28/2018    EF 60%  Normal study. HX OTHER MEDICAL 05/01/2017    MUGA-normal, EF53%    Hyperlipidemia     Hypertension     Mild intermittent asthma 7/28/2016    Moderate COPD (chronic obstructive pulmonary disease) ([facility]) 3/15/2022    Nausea & vomiting     Obstructive sleep apnea 5/16/2017    Paroxysmal atrial fibrillation ([address]     Post PTCA 4/21/2010    PTCA with 2.25 stent of the LIMA to LAD    Pulmonary HTN ([address]     Severe per last echo on 10/13. 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 Followed by MAZE procedure by pulmonary vein isolation.-  Dr Kranthi Roberts    S/P PTCA (percutaneous transluminal coronary angioplasty) 11/2012    PTCA with stent to RCA    Severe pulmonary hypertension ([address] 3/15/2022    Shortness of breath 3/15/2022    Thyroid disease     hypothyroi    Unspecified cerebral artery occlusion with cerebral infarction Unsure When    No Residual    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 ([address] 7/29/2022    WD-Non-pressure chronic ulcer of other part of left lower leg limited to breakdown of skin ([address] 7/29/2022       PAST SURGICAL HISTORY    Past Surgical History:   Procedure Laterality Date    APPENDECTOMY  1941    CARDIAC SURGERY  4/09    CABG (3 Bypasses), One Heart Stent in 2010    COLONOSCOPY  In 2000's    X1    CORONARY ANGIOPLASTY WITH STENT PLACEMENT  4/21/2010    PTCA with stent LIMA ->LAD    CORONARY ARTERY BYPASS GRAFT  4/8/2009    LIMA->Diag,  LIMA -> LAD;  SVG->RCA going to the PDA.  Followed by MAZE procedure by pulmonary vein isolation.-  Dr Kranthi Roberts HYSTERECTOMY, TOTAL ABDOMINAL (CERVIX REMOVED)      MIDDLE EAR SURGERY      OTHER SURGICAL HISTORY      Ear surgery-hearing    PACEMAKER PLACEMENT      battery change 2011 Medtronic    PTCA  2012    Ptca with stent to RCA    TONSILLECTOMY  's       FAMILY HISTORY    Family History   Problem Relation Age of Onset    Cancer Mother         \"Liver Cancer\"    Arthritis Mother     Depression Mother     Hearing Loss Mother     High Blood Pressure Mother     High Cholesterol Mother     Mental Illness Mother     Miscarriages / Stillbirths Mother     Heart Disease Father     Early Death Father 36        \"Instant Death\"    High Blood Pressure Father     High Cholesterol Father     Coronary Art Dis Father         Massive MI    Heart Disease Sister     Depression Sister     Cancer Sister         \"Breast Cancer, Cancer Free Now\"    High Blood Pressure Sister     Other Daughter         \"She's Had Stomach Surgery\"    High Blood Pressure Son     High Blood Pressure Son     Early Death Paternal Grandfather        SOCIAL HISTORY    Social History     Tobacco Use    Smoking status: Former     Packs/day: 0.25     Years: 5.00     Pack years: 1.25     Types: Cigarettes     Quit date: 1970     Years since quittin.8    Smokeless tobacco: Never   Vaping Use    Vaping Use: Never used   Substance Use Topics    Alcohol use: Not Currently     Comment: no more than 3 cups of coffee each day    Drug use: No       ALLERGIES    Allergies   Allergen Reactions    Darvocet [Propoxyphene N-Acetaminophen]     Ranexa [Ranolazine Er]      Sick to her stomach    Ranolazine      Sick to her stomach    Sulfa Antibiotics Itching    Sulfasalazine Itching    Adhesive Tape Rash    Allantoin Rash    Bacitracin Rash    Gramicidin Rash    Neomycin Rash    Polymyxin B Rash    Pramoxine Hcl Rash    Silicone Rash       MEDICATIONS    Current Outpatient Medications on File Prior to Encounter   Medication Sig Dispense Refill    Turmeric (QC TUMERIC COMPLEX PO) Take 1 capsule by mouth daily      traZODone (DESYREL) 50 MG tablet Take  mg by mouth nightly as needed for Sleep      acetaminophen (TYLENOL) 500 MG tablet Take 1,000 mg by mouth every 4 hours      torsemide (DEMADEX) 20 MG tablet Take 1 tablet by mouth as needed (for fluid retention or weight gain of 3 lbs overnight) This is in addition to the 20 mg bid (Patient taking differently: Take 40 mg by mouth every morning This is in addition to the 20 mg bid-  Takes demadex 20 mg (2 tablets in a.m.) and 1 tablet at night) 30 tablet 0    spironolactone (ALDACTONE) 50 MG tablet Take 1 tablet by mouth daily (Patient taking differently: Take 25 mg by mouth daily) 30 tablet 0    PROCTOZONE-HC 2.5 % CREA rectal cream INSERT RECTALLY TWICE DAILY as directed AS NEEDED FOR HEMORRHOIDS      ZIOPTAN 0.0015 % SOLN Place 1 drop into both eyes Daily with supper       CETIRIZINE HCL PO Take by mouth      vitamin D 25 MCG (1000 UT) CAPS Take 5,000 Units by mouth daily      Probiotic Product (PROBIOTIC-10) CHEW Take by mouth      atorvastatin (LIPITOR) 10 MG tablet Take 10 mg by mouth daily       umeclidinium-vilanterol (ANORO ELLIPTA) 62.5-25 MCG/INH AEPB inhaler Inhale 1 puff into the lungs daily 1 each 11    CPAP Machine MISC by Does not apply route      Biotin 5 MG CAPS Take 1 capsule by mouth daily      Misc Natural Products (OSTEO BI-FLEX ADV DOUBLE ST PO) Take 1 tablet by mouth daily      timolol (TIMOPTIC-XE) 0.25 % ophthalmic gel-forming Place 1 drop into both eyes daily  (Patient not taking: Reported on 9/7/2022)      carvedilol (COREG) 6.25 MG tablet Take 1 tablet by mouth 2 times daily (with meals) 180 tablet 3    albuterol (PROVENTIL HFA;VENTOLIN HFA) 108 (90 BASE) MCG/ACT inhaler Inhale 2 puffs into the lungs 2 times daily AND EVERY 4-6 HOURS AS NEEDED      Multiple Vitamins-Minerals (ICAPS) CAPS Take 1 capsule by mouth 2 times daily       vitamin B-12 (CYANOCOBALAMIN) 1000 MCG tablet Take 1,000 mcg by mouth daily. levothyroxine (SYNTHROID) 88 MCG tablet Take 88 mcg by mouth daily. No current facility-administered medications on file prior to encounter. REVIEW OF SYSTEMS    Pertinent items are noted in HPI. Constitutional: Negative for systemic symptoms including fever, chills and malaise. Objective:      /76   Pulse 74   Temp 97.4 °F (36.3 °C) (Temporal)   Resp 20     PHYSICAL EXAM      General: The patient is in no acute distress. Mental status:  Patient is appropriate, is  oriented to place and plan of care. Dermatologic exam: Visual inspection of the periwound reveals the skin to be normal in turgor and texture  Wound exam: see wound description below in procedure note      Assessment:     Problem List Items Addressed This Visit       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 ([address] - Primary    Relevant Orders    Initiate Outpatient Wound Care Protocol    WD-Non-pressure chronic ulcer of other part of left lower leg with fat layer exposed ([address]     Procedure Note    Indications:  Based on my examination of this patient's wound(s) today, sharp excision into necrotic epidermis and dermis is required to promote healing and evaluate the extent of previous healing. Performed by: Niles Fox MD    Consent obtained: Yes    Time out taken:  Yes    Pain Control: lidocaine topical      Debridement:Non-excisional Debridement    Using curette the wound(s) was/were sharply debrided down through and including the removal of dermis.         Devitalized Tissue Debrided:  fibrin and biofilm    Pre Debridement Measurements:  Are located in the Wound Documentation Flow Sheet    All active wounds listed below with today's date are evaluated  Wound(s)    debrided this date include # : 1     Post  Debridement Measurements:  Wound 07/29/22 Tibial Left;Posterior #1 (Active)   Wound Image   09/02/22 1353   Wound Etiology Venous 09/23/22 1326   Dressing Status New dressing applied 09/02/22 1412   Wound Cleansed Wound cleanser 09/23/22 1326   Offloading for Diabetic Foot Ulcers Offloading not required 09/02/22 1353   Wound Length (cm) 1 cm 09/23/22 1326   Wound Width (cm) 1 cm 09/23/22 1326   Wound Depth (cm) 0.1 cm 09/23/22 1326   Wound Surface Area (cm^2) 1 cm^2 09/23/22 1326   Change in Wound Size % (l*w) 16.67 09/23/22 1326   Wound Volume (cm^3) 0.1 cm^3 09/23/22 1326   Wound Healing % 17 09/23/22 1326   Post-Procedure Length (cm) 1 cm 09/23/22 1342   Post-Procedure Width (cm) 1 cm 09/23/22 1342   Post-Procedure Depth (cm) 0.1 cm 09/23/22 1342   Post-Procedure Surface Area (cm^2) 1 cm^2 09/23/22 1342   Post-Procedure Volume (cm^3) 0.1 cm^3 09/23/22 1342   Distance Tunneling (cm) 0 cm 09/23/22 1326   Tunneling Position ___ O'Clock 0 09/23/22 1326   Undermining Starts ___ O'Clock 0 09/23/22 1326   Undermining Ends___ O'Clock 0 09/23/22 1326   Undermining Maxium Distance (cm) 0 09/23/22 1326   Wound Assessment Pink/red 09/23/22 1326   Drainage Amount Moderate 09/23/22 1326   Drainage Description Yellow 09/23/22 1326   Odor None 09/23/22 1326   Safia-wound Assessment Fragile 09/23/22 1326   Margins Defined edges 09/23/22 1326   Wound Thickness Description not for Pressure Injury Full thickness 09/23/22 1326   Number of days: 56       Percent of Wound(s) Debrided: approximately 100%    Total  Area  Debrided:  1 sq cm     Bleeding:  Minimal    Hemostasis Achieved:  by pressure    Procedural Pain:  4/ 10     Post Procedural Pain:  3 / 10     Response to treatment:  Well tolerated by patient. Status of wound progress and description from last visit:   improved. Plan:       Discharge Instructions         PHYSICIAN ORDERS AND DISCHARGE INSTRUCTIONS     NOTE: Upon discharge from the [address], you will receive a patient experience survey. We would be grateful if you would take the time to fill this survey out.      Wound care order history:                 MAY's Right  1.2     Left 1.16              Date 7/29/2022              Vascular studies:   Date              Imaging:   Date              Cultures:   Date              Grafts:  Date              Antibiotics: KEFLEX PRIOR TO APPT. ON 7/29/2022                                   Doxycycline 100 mg BID 7 days ordered on 8/26/2022              Earlier Wound care treatments:                          Primary care physician:     Continuing wound care orders and information:              Residence:  [facility]               Continue home health care with:  [facility]          Wound Medications:              Wound cleansing:                          Do not scrub or use excessive force. Wash hands with soap and water before and after dressing changes. Prior to applying a clean dressing, cleanse wound with normal saline,                                wound cleanser, or mild soap and water. Ask the physician or nurse before getting the wound(s) wet in a shower              Daily Wound management:                          Keep weight off wounds and reposition every 2 hours. Avoid standing for long periods of time. Apply wraps/stockings in AM and remove at bedtime. If swelling is present, elevate legs to the level of the heart or above for 30 minutes 4-5 times a day and/or when sitting. When taking antibiotics take entire prescription as ordered by physician do not stop taking until medicine is all gone. Orders for this week: 9/23/2022     FAX ORDERS TO [facility]! LEFT LOWER LEG -- [address] WITH SOAP AND WATER, PAT DRY.                                                   COVER WITH saline damp hydrofera blue cut to size of wound in clinic (home care to order and use Hydrofera Blue Ready). COVER WITH silicone border gauze. Home care to change every 2-3 days (will need an extra visit week of 22 as client will not be in for a wound clinic appointment that week.)                                                  TUBI F TO [address] WRAP WITH ACE WRAP. (Tubi F and ACE should be wrapped from behind the toes to just below the knee/top of the calf)     Pharm: [facility]. Rx:  Consult:  Central [phone]     Follow up with Dr Derek Pereyra in 2 weeks on 10/7/22 in the wound care center. Primary wound care provider:  Call [phone] for any questions or concerns.   Date__________   Time____________        Treatment Note Wound 22 Tibial Left;Posterior #1-Dressing/Treatment:  (hydrofera blue silicone border tubi f ace bandage)    Written Patient Dismissal Instructions Given            Electronically signed by Pee Weeks MD on 2022 at 2:33 PM

## 2022-10-07 ENCOUNTER — HOSPITAL ENCOUNTER (OUTPATIENT)
Dept: WOUND CARE | Age: 87
Discharge: HOME | End: 2022-10-07
Payer: MEDICARE

## 2022-10-07 VITALS
DIASTOLIC BLOOD PRESSURE: 71 MMHG | SYSTOLIC BLOOD PRESSURE: 147 MMHG | HEART RATE: 63 BPM | TEMPERATURE: 97.1 F | RESPIRATION RATE: 20 BRPM

## 2022-10-07 DIAGNOSIS — L97.822 NON-PRESSURE CHRONIC ULCER OF OTHER PART OF LEFT LOWER LEG WITH FAT LAYER EXPOSED (HCC): ICD-10-CM

## 2022-10-07 DIAGNOSIS — L97.929 IDIOPATHIC CHRONIC VENOUS HYPERTENSION OF LEFT LEG WITH ULCER (HCC): Primary | ICD-10-CM

## 2022-10-07 DIAGNOSIS — I87.312 IDIOPATHIC CHRONIC VENOUS HYPERTENSION OF LEFT LEG WITH ULCER (HCC): Primary | ICD-10-CM

## 2022-10-07 PROCEDURE — 11042 DBRDMT SUBQ TIS 1ST 20SQCM/<: CPT

## 2022-10-07 RX ORDER — CLOBETASOL PROPIONATE 0.5 MG/G
OINTMENT TOPICAL ONCE
OUTPATIENT
Start: 2022-10-07 | End: 2022-10-07

## 2022-10-07 RX ORDER — BETAMETHASONE DIPROPIONATE 0.05 %
OINTMENT (GRAM) TOPICAL ONCE
OUTPATIENT
Start: 2022-10-07 | End: 2022-10-07

## 2022-10-07 ASSESSMENT — PAIN DESCRIPTION - LOCATION: LOCATION: LEG

## 2022-10-07 ASSESSMENT — PAIN DESCRIPTION - ORIENTATION: ORIENTATION: LEFT

## 2022-10-07 ASSESSMENT — PAIN SCALES - GENERAL: PAINLEVEL_OUTOF10: 0

## 2022-10-07 NOTE — DISCHARGE INSTRUCTIONS
PHYSICIAN ORDERS AND DISCHARGE INSTRUCTIONS     NOTE: Upon discharge from the [address], you will receive a patient experience survey. We would be grateful if you would take the time to fill this survey out. Wound care order history:                 MAY's   Right  1.2     Left 1.16              Date 7/29/2022              Vascular studies:   Date              Imaging:   Date              Cultures:   Date              Grafts:  Date              Antibiotics: KEFLEX PRIOR TO APPT. ON 7/29/2022                                   Doxycycline 100 mg BID 7 days ordered on 8/26/2022              Earlier Wound care treatments:                          Primary care physician:     Continuing wound care orders and information:              Residence:  PRIVATE               Continue home health care with:  [facility]          Wound Medications:              Wound cleansing:                          Do not scrub or use excessive force. Wash hands with soap and water before and after dressing changes. Prior to applying a clean dressing, cleanse wound with normal saline,                                wound cleanser, or mild soap and water. Ask the physician or nurse before getting the wound(s) wet in a shower              Daily Wound management:                          Keep weight off wounds and reposition every 2 hours. Avoid standing for long periods of time. Apply wraps/stockings in AM and remove at bedtime. If swelling is present, elevate legs to the level of the heart or above for 30 minutes 4-5 times a day and/or when sitting. When taking antibiotics take entire prescription as ordered by physician do not stop taking until medicine is all gone.                                                                     Orders for this

## 2022-10-07 NOTE — PLAN OF CARE
Problem: Chronic Conditions and Co-morbidities  Goal: Patient's chronic conditions and co-morbidity symptoms are monitored and maintained or improved  Outcome: Progressing
Dr. Barbosa:  I have personally performed a face to face bedside history and physical examination of this patient. I have discussed the history, examination, review of systems, assessment and plan of management with the resident. I have reviewed the electronic medical record and amended it to reflect my history, review of systems, physical exam, assessment and plan.
Dr. Barbosa:  I have personally performed a face to face bedside history and physical examination of this patient. I have discussed the history, examination, review of systems, assessment and plan of management with the resident. I have reviewed the electronic medical record and amended it to reflect my history, review of systems, physical exam, assessment and plan.

## 2022-10-11 NOTE — PROGRESS NOTES
Wound Care Center Progress Note With Procedure    Zahra Lozano  AGE: 80 y.o. GENDER: female  : 3/18/1929  EPISODE DATE:  10/7/2022     Subjective:     Chief Complaint   Patient presents with    Wound Check     Left lower leg         HISTORY of PRESENT ILLNESS      Zahra Lozano is a 80 y.o. female who presents today for wound evaluation of Chronic venous ulcer(s) of the left posterior leg. The ulcer is of mild severity. The underlying cause of the wound is venous. She is in for f/u- less pain today and the is improving. Wound Pain Timing/Severity: none  Quality of pain: N/A  Severity of pain:  0 / 10   Modifying Factors: edema and venous stasis  Associated Signs/Symptoms: none        PAST MEDICAL HISTORY        Diagnosis Date    Age-related osteoporosis with current pathological fracture of vertebra ([address] 2022    Arthritis     Bradycardia     requiring dual chamber pacemaker at [facility]    CAD (coronary artery disease)     Cardiac pacemaker 10/2001    [employer] #[id number]  PPM- Serial # [id number]- Dr Rita Rothman    CHF (congestive heart failure) (HCC)     COPD, mild ([address] 2017    Cor pulmonale (chronic) ([address] 3/15/2022    CVA (cerebrovascular accident) ([address]     DJD (degenerative joint disease) of cervical spine     C5-C6, C6-C7    Exhaustion of cardiac pacemaker battery 2011    PPM battery replacement- Medtronic    Family history of cardiovascular disease     Glaucoma Dx     H/O 24 hour EKG monitoring 2000- Intermittent episonde of a-fib/flutter    H/O cardiac catheterization 2010, 2010-Severe native vessel disease and has graft disease as well. LAD, CX totally occluded. RCA totally occluded in proximal segment. VG to RCA widely patent. PDA 90% LAD afer LIMA anastomosis 80-90% stenosis. Proceeded with PTCA with stent next day.     H/O cardiovascular stress test 10/14/2011, 2010,2010, 2009,2009, 10/30/2007, 11/12/2004, 11/6/2003, 8/9/2002, 8/9/2001,6/5/2000,     10/14/2011-Lexiscan-Abnormal Myocardial Perfusion study. Evidence of mild ischemia in the Left CX region. Abnomal study. Rest EF 63%. Global LV systolic function normal. No ECG changes. Unremarkable pharmacological stress test.    H/O cardiovascular stress test 6/10/2013    thallium--mild ischemia left circumflex EF63% no change from 10/2011 study. H/O cardiovascular stress test 10/16/2014    cardiolite-mild ischemia left circumflex,EF70%    H/O chest x-ray 4/19/2009 4/19/2009-Stable cardiomegaly. No acute cardiopulmonary disease. H/O Doppler lower venous ultrasound 09/18/2019    Significant reflux noted in RGSV, RGSV is extremely tortuous and small along the thigh and calf and would be highly unlikely to be accessed, RSSV is non compressible and has occlusive chronic SVT, LSSV is non compressible with occlusive chronic SVT, LGSV removed s/p CABG, Significant reflux in LGSV tributary,    H/O Doppler ultrasound 3/31/2010    CAROTID- 3/31/2010-INtimal thickening but no significant atherosclerotic plaque noted in ANA PAULA. Doppler flow velocities within ANA PAULA are WNL. Heterogeneous, irregular atherosclerotic plaque noted in LICA. Doppler flow velocities within the LICA are elevated, consistent with a mild, less than 50% stenosis. H/O Doppler ultrasound 5/24/2016    Carotid- normal study    H/O Doppler ultrasound 09/06/2017    carotid - normal study    H/O Doppler ultrasound     H/O echocardiogram 10/13    EF=60%, Severe Pulm. HTN, & Sclerotic aortic valve w/stenosis. H/O echocardiogram 10/16/14     EF 55-60% Normal LV. Normal LV systolic function. Severe tricuspid insufficiency with severe hypertension.      H/O echocardiogram 02/03/2017    heart cath performed this morning    H/O echocardiogram 09/18/2019    EF 50-55%, Left atrium is mild to moderately dilated, right atrium is severely dilated, mildly dilated right ventricle, Mod MR, Severe TR, Severe Pulm HTN, no pericardial effusion     History of complete ECG     10/14/2011(Lexiscan);5/6/2010, 4/30/2009,10/24/2008,9/21/2007, 10/13/2006    History of nuclear stress test 11/17/2016    lexiscan-normal,EF70%    Hx of cardiovascular stress test 12/28/2018    EF 60%  Normal study. HX OTHER MEDICAL 05/01/2017    MUGA-normal, EF53%    Hyperlipidemia     Hypertension     Mild intermittent asthma 7/28/2016    Moderate COPD (chronic obstructive pulmonary disease) (HCC) 3/15/2022    Nausea & vomiting     Obstructive sleep apnea 5/16/2017    Paroxysmal atrial fibrillation ([address]     Post PTCA 4/21/2010    PTCA with 2.25 stent of the LIMA to LAD    Pulmonary HTN ([address]     Severe per last echo on 10/13. 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 Followed by MAZE procedure by pulmonary vein isolation.-  Dr Ewa Richardson    S/P PTCA (percutaneous transluminal coronary angioplasty) 11/2012    PTCA with stent to RCA    Severe pulmonary hypertension ([address] 3/15/2022    Shortness of breath 3/15/2022    Thyroid disease     hypothyroi    Unspecified cerebral artery occlusion with cerebral infarction Unsure When    No Residual    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 ([address] 7/29/2022    WD-Non-pressure chronic ulcer of other part of left lower leg limited to breakdown of skin ([address] 7/29/2022       PAST SURGICAL HISTORY    Past Surgical History:   Procedure Laterality Date    APPENDECTOMY  1941    CARDIAC SURGERY  4/09    CABG (3 Bypasses), One Heart Stent in 2010    COLONOSCOPY  In 2000's    X1    CORONARY ANGIOPLASTY WITH STENT PLACEMENT  4/21/2010    PTCA with stent LIMA ->LAD    CORONARY ARTERY BYPASS GRAFT  4/8/2009    LIMA->Diag,  LIMA -> LAD;  SVG->RCA going to the PDA.  Followed by MAZE procedure by pulmonary vein isolation.-  Dr Julio Martinez, TOTAL ABDOMINAL (CERVIX REMOVED)  1990's    MIDDLE EAR SURGERY OTHER SURGICAL HISTORY      Ear surgery-hearing    PACEMAKER PLACEMENT      battery change 2011 Medtronic    PTCA  2012    Ptca with stent to RCA    TONSILLECTOMY  1950's       FAMILY HISTORY    Family History   Problem Relation Age of Onset    Cancer Mother         \"Liver Cancer\"    Arthritis Mother     Depression Mother     Hearing Loss Mother     High Blood Pressure Mother     High Cholesterol Mother     Mental Illness Mother     Miscarriages / Stillbirths Mother     Heart Disease Father     Early Death Father 36        \"Instant Death\"    High Blood Pressure Father     High Cholesterol Father     Coronary Art Dis Father         Massive MI    Heart Disease Sister     Depression Sister     Cancer Sister         \"Breast Cancer, Cancer Free Now\"    High Blood Pressure Sister     Other Daughter         \"She's Had Stomach Surgery\"    High Blood Pressure Son     High Blood Pressure Son     Early Death Paternal Grandfather        SOCIAL HISTORY    Social History     Tobacco Use    Smoking status: Former     Packs/day: 0.25     Years: 5.00     Pack years: 1.25     Types: Cigarettes     Quit date: 1970     Years since quittin.8    Smokeless tobacco: Never   Vaping Use    Vaping Use: Never used   Substance Use Topics    Alcohol use: Not Currently     Comment: no more than 3 cups of coffee each day    Drug use: No       ALLERGIES    Allergies   Allergen Reactions    Darvocet [Propoxyphene N-Acetaminophen]     Ranexa [Ranolazine Er]      Sick to her stomach    Ranolazine      Sick to her stomach    Sulfa Antibiotics Itching    Sulfasalazine Itching    Adhesive Tape Rash    Allantoin Rash    Bacitracin Rash    Gramicidin Rash    Neomycin Rash    Polymyxin B Rash    Pramoxine Hcl Rash    Silicone Rash       MEDICATIONS    Current Outpatient Medications on File Prior to Encounter   Medication Sig Dispense Refill    Turmeric (QC TUMERIC COMPLEX PO) Take 1 capsule by mouth daily      traZODone (DESYREL) 50 MG tablet Take  mg by mouth nightly as needed for Sleep      acetaminophen (TYLENOL) 500 MG tablet Take 1,000 mg by mouth every 4 hours      torsemide (DEMADEX) 20 MG tablet Take 1 tablet by mouth as needed (for fluid retention or weight gain of 3 lbs overnight) This is in addition to the 20 mg bid (Patient taking differently: Take 40 mg by mouth every morning This is in addition to the 20 mg bid-  Takes demadex 20 mg (2 tablets in a.m.) and 1 tablet at night) 30 tablet 0    spironolactone (ALDACTONE) 50 MG tablet Take 1 tablet by mouth daily (Patient taking differently: Take 25 mg by mouth daily) 30 tablet 0    PROCTOZONE-HC 2.5 % CREA rectal cream INSERT RECTALLY TWICE DAILY as directed AS NEEDED FOR HEMORRHOIDS      ZIOPTAN 0.0015 % SOLN Place 1 drop into both eyes Daily with supper       CETIRIZINE HCL PO Take by mouth      vitamin D 25 MCG (1000 UT) CAPS Take 5,000 Units by mouth daily      Probiotic Product (PROBIOTIC-10) CHEW Take by mouth      atorvastatin (LIPITOR) 10 MG tablet Take 10 mg by mouth daily       umeclidinium-vilanterol (ANORO ELLIPTA) 62.5-25 MCG/INH AEPB inhaler Inhale 1 puff into the lungs daily 1 each 11    CPAP Machine MISC by Does not apply route      Biotin 5 MG CAPS Take 1 capsule by mouth daily      Misc Natural Products (OSTEO BI-FLEX ADV DOUBLE ST PO) Take 1 tablet by mouth daily      timolol (TIMOPTIC-XE) 0.25 % ophthalmic gel-forming Place 1 drop into both eyes daily  (Patient not taking: No sig reported)      carvedilol (COREG) 6.25 MG tablet Take 1 tablet by mouth 2 times daily (with meals) 180 tablet 3    albuterol (PROVENTIL HFA;VENTOLIN HFA) 108 (90 BASE) MCG/ACT inhaler Inhale 2 puffs into the lungs 2 times daily AND EVERY 4-6 HOURS AS NEEDED      Multiple Vitamins-Minerals (ICAPS) CAPS Take 1 capsule by mouth 2 times daily       vitamin B-12 (CYANOCOBALAMIN) 1000 MCG tablet Take 1,000 mcg by mouth daily.       levothyroxine (SYNTHROID) 88 MCG tablet Take 88 mcg by mouth daily. No current facility-administered medications on file prior to encounter. REVIEW OF SYSTEMS    Pertinent items are noted in HPI. Constitutional: Negative for systemic symptoms including fever, chills and malaise. Objective:      BP (!) 147/71   Pulse 63   Temp 97.1 °F (36.2 °C) (Temporal)   Resp 20     PHYSICAL EXAM      General: The patient is in no acute distress. Mental status:  Patient is appropriate, is  oriented to place and plan of care. Dermatologic exam: Visual inspection of the periwound reveals the skin to be normal in turgor and texture  Wound exam: see wound description below in procedure note      Assessment:     Problem List Items Addressed This Visit       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 ([address] - Primary    WD-Non-pressure chronic ulcer of other part of left lower leg with fat layer exposed ([address]     Procedure Note    Indications:  Based on my examination of this patient's wound(s) today, sharp excision into necrotic subcutaneous tissue is required to promote healing and evaluate the extent of previous healing. Performed by: Ramesh Ferrari MD    Consent obtained: Yes    Time out taken:  Yes    Pain Control: lidocaine topical      Debridement:Excisional Debridement    Using curette the wound(s) was/were sharply debrided down through and including the removal of subcutaneous tissue.         Devitalized Tissue Debrided:  fibrin, biofilm, and slough    Pre Debridement Measurements:  Are located in the Wound Documentation Flow Sheet    All active wounds listed below with today's date are evaluated  Wound(s)    debrided this date include # : 1     Post  Debridement Measurements:  Wound 07/29/22 Tibial Left;Posterior #1 (Active)   Wound Image   10/07/22 1356   Wound Etiology Venous 10/07/22 1438   Dressing Status New dressing applied 10/07/22 1438   Wound Cleansed Wound cleanser 10/07/22 1356   Offloading for Diabetic Foot Ulcers Offloading not required 10/07/22 1438   Wound Length (cm) 1.5 cm 10/07/22 1356   Wound Width (cm) 1.5 cm 10/07/22 1356   Wound Depth (cm) 0.2 cm 10/07/22 1356   Wound Surface Area (cm^2) 2.25 cm^2 10/07/22 1356   Change in Wound Size % (l*w) -87.5 10/07/22 1356   Wound Volume (cm^3) 0.45 cm^3 10/07/22 1356   Wound Healing % -275 10/07/22 1356   Post-Procedure Length (cm) 1 cm 10/07/22 1417   Post-Procedure Width (cm) 1.1 cm 10/07/22 1417   Post-Procedure Depth (cm) 0.2 cm 10/07/22 1417   Post-Procedure Surface Area (cm^2) 1.1 cm^2 10/07/22 1417   Post-Procedure Volume (cm^3) 0.22 cm^3 10/07/22 1417   Distance Tunneling (cm) 0 cm 10/07/22 1356   Tunneling Position ___ O'Clock 0 10/07/22 1356   Undermining Starts ___ O'Clock 0 10/07/22 1356   Undermining Ends___ O'Clock 0 10/07/22 1356   Undermining Maxium Distance (cm) 0 10/07/22 1356   Wound Assessment Slough;Pink/red 10/07/22 1356   Drainage Amount Small 10/07/22 1356   Drainage Description Yellow 10/07/22 1356   Odor None 10/07/22 1356   Safia-wound Assessment Fragile 10/07/22 1356   Margins Defined edges 10/07/22 1356   Wound Thickness Description not for Pressure Injury Full thickness 10/07/22 1356   Number of days: 73       Percent of Wound(s) Debrided: approximately 100%    Total  Area  Debrided:  1.1 sq cm     Bleeding:  Minimal    Hemostasis Achieved:  by pressure    Procedural Pain:  1  / 10     Post Procedural Pain:  0 / 10     Response to treatment:  Well tolerated by patient. Status of wound progress and description from last visit:   improved, much less inflammation today. .      Plan:       Discharge Instructions         PHYSICIAN ORDERS AND DISCHARGE INSTRUCTIONS     NOTE: [facility] will receive a patient experience survey. We would be grateful if you would take the time to fill this survey out.      Wound care order history:                 MAY's   Right  1.2     Left 1.16              Date 7/29/2022 Vascular studies:   Date              Imaging:   Date              Cultures:   Date              Grafts:  Date              Antibiotics: KEFLEX PRIOR TO APPT. ON 7/29/2022                                   Doxycycline 100 mg BID 7 days ordered on 8/26/2022              Earlier Wound care treatments:                          Primary care physician:     Continuing wound care orders and information:              Residence:  PRIVATE               Continue home health care with:  [facility]          Wound Medications:              Wound cleansing:                          Do not scrub or use excessive force. Wash hands with soap and water before and after dressing changes. Prior to applying a clean dressing, cleanse wound with normal saline,                                wound cleanser, or mild soap and water. Ask the physician or nurse before getting the wound(s) wet in a shower              Daily Wound management:                          Keep weight off wounds and reposition every 2 hours. Avoid standing for long periods of time. Apply wraps/stockings in AM and remove at bedtime. If swelling is present, elevate legs to the level of the heart or above for 30 minutes 4-5 times a day and/or when sitting. When taking antibiotics take entire prescription as ordered by physician do not stop taking until medicine is all gone. Orders for this week: 10/7/2022     FAX ORDERS TO [facility]! LEFT LOWER LEG -- [address] WITH SOAP AND WATER, PAT DRY. COVER WITH saline damp hydrofera blue cut to size of wound in clinic (home care to order and use Hydrofera Blue Ready). COVER WITH silicone border gauze. Home care to change every 2-3 days  TUBI F TO BILATERAL LOWER LEGS. WRAP WITH ACE WRAP. (Tubi F and ACE should be wrapped from behind the toes to just below the knee/top of the calf)       Pharm: [facility]. Rx:  Consult:  Central Scheduling: [phone]     Follow up with Dr Devin Mg in 2 weeks in the wound care center. Primary wound care provider:  Call [phone] for any questions or concerns.   Date__________   Time____________        Treatment Note Wound 07/29/22 Tibial Left;Posterior #1-Dressing/Treatment:  (damp hydroferablue,border,tubi f and ace wrap)    Written Patient Dismissal Instructions Given            Electronically signed by Joselin Ridley MD on 10/10/2022 at 9:44 PM

## 2022-10-12 ENCOUNTER — TELEPHONE (OUTPATIENT)
Dept: CARDIOLOGY CLINIC | Age: 87
End: 2022-10-12

## 2022-10-12 NOTE — TELEPHONE ENCOUNTER
Placed call to Daughter in [place], [place] at [phone]. Reports patient saw PCP (Josh Erwin) on 10/7. At that time, patient's lungs were clear but swelling had increased. Instructed to take extra dose of Torsemide for 5 days. Symptoms have not improved. ER precautions given and patient scheduled for [facility] visit 10/13.

## 2022-10-12 NOTE — TELEPHONE ENCOUNTER
PCP instructed to take 4-5 diuretics a day and follow up in 3 weeks. Face is puffy, stomach is bloated. Pt is SOB on little exertion. Daughter in law (Jimena) is worried her CHF is is getting worse.

## 2022-10-12 NOTE — TELEPHONE ENCOUNTER
Daughter in law called patient has gained 15 lbs she was instructed by her PCP to increase her dieretic , she is short of breath with little activity ,Daughter In law concerned

## 2022-10-13 ENCOUNTER — OFFICE VISIT (OUTPATIENT)
Dept: CARDIOLOGY CLINIC | Age: 87
End: 2022-10-13
Payer: MEDICARE

## 2022-10-13 VITALS
HEART RATE: 57 BPM | BODY MASS INDEX: 31.12 KG/M2 | RESPIRATION RATE: 24 BRPM | OXYGEN SATURATION: 98 % | SYSTOLIC BLOOD PRESSURE: 118 MMHG | DIASTOLIC BLOOD PRESSURE: 68 MMHG | WEIGHT: 154.38 LBS | HEIGHT: 59 IN

## 2022-10-13 DIAGNOSIS — I25.810 CORONARY ARTERY DISEASE INVOLVING CORONARY BYPASS GRAFT OF NATIVE HEART WITHOUT ANGINA PECTORIS: ICD-10-CM

## 2022-10-13 DIAGNOSIS — I50.811 ACUTE RIGHT-SIDED CONGESTIVE HEART FAILURE (HCC): Primary | ICD-10-CM

## 2022-10-13 DIAGNOSIS — I50.32 CHRONIC DIASTOLIC (CONGESTIVE) HEART FAILURE (HCC): ICD-10-CM

## 2022-10-13 PROCEDURE — 99215 OFFICE O/P EST HI 40 MIN: CPT | Performed by: NURSE PRACTITIONER

## 2022-10-13 PROCEDURE — 1090F PRES/ABSN URINE INCON ASSESS: CPT | Performed by: NURSE PRACTITIONER

## 2022-10-13 PROCEDURE — T1502 MEDICATION ADMIN VISIT: HCPCS | Performed by: NURSE PRACTITIONER

## 2022-10-13 PROCEDURE — 1036F TOBACCO NON-USER: CPT | Performed by: NURSE PRACTITIONER

## 2022-10-13 PROCEDURE — G8428 CUR MEDS NOT DOCUMENT: HCPCS | Performed by: NURSE PRACTITIONER

## 2022-10-13 PROCEDURE — G8484 FLU IMMUNIZE NO ADMIN: HCPCS | Performed by: NURSE PRACTITIONER

## 2022-10-13 PROCEDURE — 1123F ACP DISCUSS/DSCN MKR DOCD: CPT | Performed by: NURSE PRACTITIONER

## 2022-10-13 PROCEDURE — G8417 CALC BMI ABV UP PARAM F/U: HCPCS | Performed by: NURSE PRACTITIONER

## 2022-10-13 ASSESSMENT — ENCOUNTER SYMPTOMS: SHORTNESS OF BREATH: 1

## 2022-10-13 NOTE — PROGRESS NOTES
Verbal order received from Geovanna Cottrell CNP for Bumex 3mg IVP. Right antecubital site prepped with alcohol wipe. IV site obtained in right arm with # 22 Insyte angiocath times 1 attempts. 3mg IV Bumex given IV push slowly. Angiocath removed dressing applied. Site with out redness or swelling. Patient waited in clinic for 15 minutes. Voided times 1 prior to leaving with family.

## 2022-10-13 NOTE — PROGRESS NOTES
[address]      Visit Date: 10/13/2022  Cardiologist:  Dr. Adolfo Vance   Primary Care Physician: Dr. Frances Reeves DO    Dorian Apple is a 80 y.o. female who presents today for:  CC:   1. Acute right-sided congestive heart failure ([address]    2. Coronary artery disease involving coronary bypass graft of native heart without angina pectoris    3. Chronic diastolic (congestive) heart failure (HCC)        HPI:   Dorian Apple is a 80 y.o. female who presents to the office for a follow up in the heart failure clinic. Patient had CABG x 3 in 2009 then PCi after. Patient had LHC/RHC in 2017 which showed severe CAD with occluded LAD, LCx with filling collaterals, occluded RCA, LIMA-LAD and D1 patent stents, SVG-RCA patent with patent stents. Patient has severe pulmonary hypertension. Patient has had over 20 pound weight gain in the last month since being seen by cardiology. She reports having increased shortness of breath and lower extremity edema. Chief Complaint   Patient presents with    Congestive Heart Failure     Increase in weight (22 # since last visit) SOB, swelling       Patient has:  Last hospital admission related to Heart Failure: 7/7/22  baseline    baseline weight 140 she is up 20 lbs in one month   Chest Pain: no  Worsening SOB/orthopnea/PND: yes  Edema: yes  Any extra diuretic use: no  Weight gain: yes  Compliant checking home weight: yes  Fatigue: no  Abdominal bloating: yes  Appetite: fair  Difficulty sleeping: no  SAULO: yes  Cough: no  Compliant checking blood pressure: yes  Compliant with medication regimen: yes    Vaccinations:    flu Yes  pneumonia Yes  COVID 19 Yes      Device: No       Activity: poor  Can you walk 1-2 blocks or do a moderate amount of house/yard work? No    NYHA Class: IV   Class I   Patients with no limitation of activities; they suffer no symptoms from ordinary activities.    Class II   Patients with slight, mild limitation of activity; they are comfortable with rest or with mild exertion. Class III   Patients with marked limitation of activity; they are comfortable only at rest.   Class IV   Patients who should be at complete rest, confined to bed or chair; any physical activity brings on discomfort and symptoms occur at rest.    Sodium Restrictions: 2g  Fluid Restrictions: 48-64 oz/day  Sodium and fluid restriction compliance: yes      Past Medical History:   Diagnosis Date    Age-related osteoporosis with current pathological fracture of vertebra ([address] 7/8/2022    Arthritis     Bradycardia 2001    requiring dual chamber pacemaker at [facility]    CAD (coronary artery disease)     Cardiac pacemaker 10/2001    [employer] #[id number]  PPM- Serial # [id number]- Dr Ariana Covarrubias    CHF (congestive heart failure) ([facility])     COPD, mild ([address] 2/17/2017    Cor pulmonale (chronic) ([address] 3/15/2022    CVA (cerebrovascular accident) ([address]     DJD (degenerative joint disease) of cervical spine     C5-C6, C6-C7    Exhaustion of cardiac pacemaker battery 11/21/2011    PPM battery replacement- [employer]    Family history of cardiovascular disease     Glaucoma Dx 2010    H/O 24 hour EKG monitoring 8/6/2000 8/6/2000- Intermittent episonde of a-fib/flutter    H/O cardiac catheterization 4/20/2010, 2/1989 4/20/2010-Severe native vessel disease and has graft disease as well. LAD, CX totally occluded. RCA totally occluded in proximal segment. VG to RCA widely patent. PDA 90% LAD afer LIMA anastomosis 80-90% stenosis. Proceeded with PTCA with stent next day. H/O cardiovascular stress test 10/14/2011, 6/2/2010,4/8/2010, 5/18/2009,4/6/2009, 10/30/2007, 11/12/2004, 11/6/2003, 8/9/2002, 8/9/2001,6/5/2000,     10/14/2011-Lexiscan-Abnormal Myocardial Perfusion study. Evidence of mild ischemia in the Left CX region. Abnomal study. Rest EF 63%. Global LV systolic function normal. No ECG changes.  Unremarkable pharmacological stress test.    H/O cardiovascular stress test 6/10/2013    thallium--mild ischemia left circumflex EF63% no change from 10/2011 study. H/O cardiovascular stress test 10/16/2014    cardiolite-mild ischemia left circumflex,EF70%    H/O chest x-ray 4/19/2009 4/19/2009-Stable cardiomegaly. No acute cardiopulmonary disease. H/O Doppler lower venous ultrasound 09/18/2019    Significant reflux noted in RGSV, RGSV is extremely tortuous and small along the thigh and calf and would be highly unlikely to be accessed, RSSV is non compressible and has occlusive chronic SVT, LSSV is non compressible with occlusive chronic SVT, LGSV removed s/p CABG, Significant reflux in LGSV tributary,    H/O Doppler ultrasound 3/31/2010    CAROTID- 3/31/2010-INtimal thickening but no significant atherosclerotic plaque noted in ANA PAULA. Doppler flow velocities within ANA PAULA are WNL. Heterogeneous, irregular atherosclerotic plaque noted in LICA. Doppler flow velocities within the LICA are elevated, consistent with a mild, less than 50% stenosis. H/O Doppler ultrasound 5/24/2016    Carotid- normal study    H/O Doppler ultrasound 09/06/2017    carotid - normal study    H/O Doppler ultrasound     H/O echocardiogram 10/13    EF=60%, Severe Pulm. HTN, & Sclerotic aortic valve w/stenosis. H/O echocardiogram 10/16/14     EF 55-60% Normal LV. Normal LV systolic function. Severe tricuspid insufficiency with severe hypertension. H/O echocardiogram 02/03/2017    heart cath performed this morning    H/O echocardiogram 09/18/2019    EF 50-55%, Left atrium is mild to moderately dilated, right atrium is severely dilated, mildly dilated right ventricle, Mod MR, Severe TR, Severe Pulm HTN, no pericardial effusion     History of complete ECG     10/14/2011(Lexiscan);5/6/2010, 4/30/2009,10/24/2008,9/21/2007, 10/13/2006    History of nuclear stress test 11/17/2016    lexiscan-normal,EF70%    Hx of cardiovascular stress test 12/28/2018    EF 60%  Normal study.     [address] [place] [address] 05/01/2017 MUGA-normal, EF53%    Hyperlipidemia     Hypertension     Mild intermittent asthma 7/28/2016    Moderate COPD (chronic obstructive pulmonary disease) ([facility]) 3/15/2022    Nausea & vomiting     Obstructive sleep apnea 5/16/2017    Paroxysmal atrial fibrillation ([address]     Post PTCA 4/21/2010    PTCA with 2.25 stent of the LIMA to LAD    Pulmonary HTN ([address]     Severe per last echo on 10/13. 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 Followed by MAZE procedure by pulmonary vein isolation.-  Dr Beacher Sicard    S/P PTCA (percutaneous transluminal coronary angioplasty) 11/2012    PTCA with stent to RCA    Severe pulmonary hypertension ([address] 3/15/2022    Shortness of breath 3/15/2022    Thyroid disease     hypothyroi    Unspecified cerebral artery occlusion with cerebral infarction Unsure When    No Residual    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 ([address] 7/29/2022    WD-Non-pressure chronic ulcer of other part of left lower leg limited to breakdown of skin ([address] 7/29/2022     Past Surgical History:   Procedure Laterality Date    APPENDECTOMY  1941    CARDIAC SURGERY  4/09    CABG (3 Bypasses), One Heart Stent in 2010    COLONOSCOPY  In 2000's    X1    CORONARY ANGIOPLASTY WITH STENT PLACEMENT  4/21/2010    PTCA with stent LIMA ->LAD    CORONARY ARTERY BYPASS GRAFT  4/8/2009    LIMA->Diag,  LIMA -> LAD;  SVG->RCA going to the PDA.  Followed by MAZE procedure by pulmonary vein isolation.-  Dr Eric Su, TOTAL ABDOMINAL (CERVIX REMOVED)  1990's    MIDDLE EAR SURGERY      OTHER SURGICAL HISTORY      Ear surgery-hearing    PACEMAKER PLACEMENT      battery change 11/21/2011 Medtronic    PTCA  11/2012    Ptca with stent to RCA    TONSILLECTOMY  1950's         Current Outpatient Medications   Medication Sig Dispense Refill    Turmeric (QC TUMERIC COMPLEX PO) Take 1 capsule by mouth daily      traZODone (DESYREL) 50 MG tablet Take  mg by mouth nightly as needed for Sleep      acetaminophen (TYLENOL) 500 MG tablet Take 1,000 mg by mouth every 4 hours      torsemide (DEMADEX) 20 MG tablet Take 1 tablet by mouth as needed (for fluid retention or weight gain of 3 lbs overnight) This is in addition to the 20 mg bid (Patient taking differently: Take 40 mg by mouth every morning This is in addition to the 20 mg bid-  Takes demadex 20 mg (2 tablets in a.m.) and 1 tablet at night) 30 tablet 0    spironolactone (ALDACTONE) 50 MG tablet Take 1 tablet by mouth daily (Patient taking differently: Take 25 mg by mouth daily) 30 tablet 0    PROCTOZONE-HC 2.5 % CREA rectal cream INSERT RECTALLY TWICE DAILY as directed AS NEEDED FOR HEMORRHOIDS      ZIOPTAN 0.0015 % SOLN Place 1 drop into both eyes Daily with supper       CETIRIZINE HCL PO Take by mouth      vitamin D 25 MCG (1000 UT) CAPS Take 5,000 Units by mouth daily      Probiotic Product (PROBIOTIC-10) CHEW Take by mouth      atorvastatin (LIPITOR) 10 MG tablet Take 10 mg by mouth daily       umeclidinium-vilanterol (ANORO ELLIPTA) 62.5-25 MCG/INH AEPB inhaler Inhale 1 puff into the lungs daily 1 each 11    CPAP Machine MISC by Does not apply route      Biotin 5 MG CAPS Take 1 capsule by mouth daily      Misc Natural Products (OSTEO BI-FLEX ADV DOUBLE ST PO) Take 1 tablet by mouth daily      timolol (TIMOPTIC-XE) 0.25 % ophthalmic gel-forming Place 1 drop into both eyes daily      carvedilol (COREG) 6.25 MG tablet Take 1 tablet by mouth 2 times daily (with meals) 180 tablet 3    albuterol (PROVENTIL HFA;VENTOLIN HFA) 108 (90 BASE) MCG/ACT inhaler Inhale 2 puffs into the lungs 2 times daily AND EVERY 4-6 HOURS AS NEEDED      Multiple Vitamins-Minerals (ICAPS) CAPS Take 1 capsule by mouth 2 times daily       vitamin B-12 (CYANOCOBALAMIN) 1000 MCG tablet Take 1,000 mcg by mouth daily. levothyroxine (SYNTHROID) 88 MCG tablet Take 88 mcg by mouth daily.          No current facility-administered medications for this visit. Allergies   Allergen Reactions    Darvocet [Propoxyphene N-Acetaminophen]     Ranexa [Ranolazine Er]      Sick to her stomach    Ranolazine      Sick to her stomach    Sulfa Antibiotics Itching    Sulfasalazine Itching    Adhesive Tape Rash    Allantoin Rash    Bacitracin Rash    Gramicidin Rash    Neomycin Rash    Polymyxin B Rash    Pramoxine Hcl Rash    Silicone Rash       SUBJECTIVE:     Review of Systems   Respiratory:  Positive for shortness of breath. Cardiovascular:  Positive for leg swelling. Negative for chest pain and palpitations. Musculoskeletal: Negative. Skin: Negative. Neurological:  Positive for weakness. Negative for dizziness. All other systems reviewed and are negative. OBJECTIVE:   Today's Vitals:  /68 (Site: Left Upper Arm, Position: Sitting, Cuff Size: Medium Adult)   Pulse 57   Resp 24   Ht 4' 11\" (1.499 m)   Wt 154 lb 6 oz (70 kg)   SpO2 98%   BMI 31.18 kg/m²     Wt Readings from Last 3 Encounters:   10/13/22 154 lb 6 oz (70 kg)   09/07/22 132 lb (59.9 kg)   07/09/22 132 lb 11.2 oz (60.2 kg)     BP Readings from Last 3 Encounters:   10/13/22 118/68   10/07/22 (!) 147/71   09/23/22 120/76     Pulse Readings from Last 3 Encounters:   10/13/22 57   10/07/22 63   09/23/22 74     Body mass index is 31.18 kg/m². Physical Exam  Constitutional:       Appearance: She is well-developed. Cardiovascular:      Rate and Rhythm: Normal rate and regular rhythm. Pulses: Intact distal pulses. Dorsalis pedis pulses are 2+ on the right side and 2+ on the left side. Posterior tibial pulses are 2+ on the right side and 2+ on the left side. Heart sounds: Normal heart sounds, S1 normal and S2 normal.   Pulmonary:      Effort: Pulmonary effort is normal.      Breath sounds: Rales present. Musculoskeletal:         General: Normal range of motion. Right lower leg: Edema present.       Left lower leg: Edema present. Skin:     General: Skin is warm and dry. Neurological:      Mental Status: She is alert and oriented to person, place, and time. 6 minute walk test:  Uses wheel chair    Most recent ECHO: 7/8/22  Left ventricular systolic function is normal.   Ejection fraction is visually estimated at 50-55%. Small left ventricle cavity due to right ventricle volume overload. Mildly dilated left atrium. Severely dilated right heart. PPM wiring visualized in right heart. Mild to moderate mitral regurgitation. Severe tricuspid regurgitation; RVSP: 77 mmHg suggestive of severe PHTN. No evidence of any pericardial effusion. IVC and hepatic veins are dilated. Inferior vena cava is dilated, measuring   at 4.3 cm, and does not collapse with respiration.        Results reviewed:  BNP:   Lab Results   Component Value Date    BNP 90 09/28/2013    PROBNP 1,317 (H) 07/08/2022     CBC:   Lab Results   Component Value Date/Time    WBC 8.4 09/02/2022 03:05 PM    RBC 4.34 09/02/2022 03:05 PM    HGB 13.9 09/02/2022 03:05 PM    HCT 43.1 09/02/2022 03:05 PM     09/02/2022 03:05 PM     CMP:    Lab Results   Component Value Date/Time     07/08/2022 08:06 AM    K 4.1 07/08/2022 08:06 AM    CL 97 07/08/2022 08:06 AM    CO2 31 07/08/2022 08:06 AM    BUN 27 07/08/2022 08:06 AM    CREATININE 0.7 07/08/2022 08:06 AM    GFRAA >60 07/08/2022 08:06 AM    LABGLOM >60 07/08/2022 08:06 AM    GLUCOSE 95 07/08/2022 08:06 AM    CALCIUM 10.3 07/08/2022 08:06 AM     Hepatic Function Panel:    Lab Results   Component Value Date/Time    ALKPHOS 222 07/08/2022 08:06 AM    ALT 17 07/08/2022 08:06 AM    AST 23 07/08/2022 08:06 AM    PROT 6.2 07/08/2022 08:06 AM    PROT 6.8 11/28/2012 02:41 PM    BILITOT 1.1 07/08/2022 08:06 AM    BILIDIR 1.0 10/08/2021 02:37 AM    IBILI 1.0 10/08/2021 02:37 AM    LABALBU 4.3 07/08/2022 08:06 AM     Magnesium:    Lab Results   Component Value Date/Time    MG 2.0 03/22/2022 02:03 AM PT/INR:    Lab Results   Component Value Date/Time    PROTIME 24.4 03/24/2022 02:37 PM    PROTIME 39.6 11/18/2011 03:45 PM    INR 1.88 03/24/2022 02:37 PM     Lipids:    Lab Results   Component Value Date/Time    TRIG 69 07/08/2022 08:06 AM    HDL 45 07/08/2022 08:06 AM    LDLCALC 46 07/08/2022 08:06 AM    LDLDIRECT 116 10/29/2018 09:49 AM       Iron Studies:  No components found for: FE,  TIBC,  FERRITIN    Iron Deficiency Anemia:  No IV Iron Therapy:  No  2017 ACC/AHA HF Guidelines:   intravenous iron replacement in patients with New York Heart Association (NYHA) class II and III HF and iron deficiency (ferritin <100 ng/ml or 100-300 ng/ml if transferrin saturation <20%), to improve functional status and QoL. ASSESSMENT AND PLAN:     Acute right sided heart failure  -Patient has gained >20 lbs since last visit a month prior. Patient takes torsemide 40 mg in a.m. and 20 mg in evening. Continue with carvedilol. No SLGT2 inhibitor or MRA due to EF>35%. -Give 3 mg of IV Bumex now and hold dose of torsemide this evening. Will recheck labs next week. >55 min spent educating patient and family members along with giving IV Bumex. PAF  -continue with rate control. Patient not on anticoagulation due to fall risk and age. CAD   -CABG x 3 in 2009 then PCi after. Patient had LHC/RHC in 2017 which showed severe CAD with occluded LAD, LCx with filling collaterals, occluded RCA, LIMA-LAD and D1 patent stents, SVG-RCA patent with patent stents. Patient was instructed to call the [facility] for changes in the following symptoms:   Weight gain of 3 pounds in 1 day or 5 pounds in 1 week  Increased shortness of breath  Shortness of breath while laying down  Cough  Chest pain  Swelling in feet, ankles or legs  Tenderness or bloating in the abdomen  Fatigue   Decreased appetite or nausea   Confusion      Return in about 1 week (around 10/20/2022).  or sooner if needed     Patient given educational materials - see patient instructions. We discussed the importance of weighing oneself and recording daily. We also discussed the importance of a lowsodium diet, higher sodium foods to avoid and better low sodium food options. Discussed use, benefit, and side effects of prescribed medications. All patient questions answered. Patient verbalizes understanding of plan of care using teach back method, and is agreeable to the treatment plan. Copy of note to be sent to consulting provider and primary cardiologist   Electronicallysigned by Reshma Aguilar.  LISA Chris - CNP on 10/13/2022 at 4:53 PM

## 2022-10-13 NOTE — PATIENT INSTRUCTIONS
HEART FAILURE - CONGESTIVE  HEART FAILURE  INSTRUCTIONS:         MEDICATIONS:  Please notify the the heart failure clinic R.N. or your doctor if you are not able to take your medications for any reason. WEIGHT MONITORING:   Weigh yourself  everyday in the morning after urination and record the weight on your weight log. Notify the doctor/clinic nurse of a weight gain of 3 pounds or more in 1 day   OR  a total of 5 pounds or more in 1 week             DIET  Cardiac heart healthy diet- Low saturated / low trans fat, no added salt, caffeine restricted,   Low sodium diet- no  more than 2,000mg (2 grams) of salt / sodium per day (which equals to a little less than  a teaspoon of salt)  The doctor may also recommend a fluid limit -  Fluid restriction- 2,000 ml (milliliters) = 64 ounces = you can have 8 glasses of fluid per day (each glass 8 ounces)    Avoid using salt at the table, avoid / limit use of canned soups, processed / packaged foods, salted snacks, olives and pickles. Do not use a salt substitute without checking with the doctor. (Mrs. Cristian Leo is safe to use). NOTIFY THE  DOCTOR THE FIRST DAY OF ONSET OF ANY OF THESE   SYMPTOMS:   Weight gain of 3 pounds or more in 1 day         OR 5 pounds or more in one week  More shortness of breath  More swelling in stomach, legs, ankles or feet  Feeling more tired, No energy  Dry hacky cough  Dizziness  More chest pain / discomfort           Please visit  American Heart Association Healthy Living with Heart Failure  at [email or web address]      [facility]  Heart Failure Clinic  FELIZ CALABRESE, R.N.  Phone: [phone] or [place]

## 2022-10-13 NOTE — PROGRESS NOTES
CHF CLINICAL STAFF DOCUMENTATION    Patient was instructed to increase Torsemide for 5 days- patient was confused and did not  take extra doses     Have you had any Chest Pain? -  one a day she has a short episode of chest pain  Do you had any Shortness of Breath - Yes  If Yes - When at rest and on exertion    Have you had any dizziness - No  When do you feel dizzy none   How long does it last .none  none     Do you have any edema -  Yes  swelling in feet ankles legs      Are you on fluid restrictions - states I thought I was supposed to have 3 quarts a day - drinking Gatorade daily- advised  against Gatorade  = Reeducated on fluid and sodium intake Amount - no  Are you on sodium restrictions - No   Amount - [place] meals -but poor appetite  Do you feel fatigued - Yes    Do you have a cough - No    Lung sounds -    Normal - No   Abnormal - rales     Do you have abdominal bloating - Yes    How is your appetite - poor    Do you have difficulty sleeping - Yes  Able to lie flat? - No    Do you have a history of sleep apnea - Yes  CPAP no  CPAP machine is broken    6 min.  Walk: deferred patient in wheelchair and only able to walk 10-20 feet  Pre heart rate   Time walked   Distance    Post heart rate      Have you had Flu Vaccine - No    Have you had Pneumonia Vaccine - Yes

## 2022-10-18 ENCOUNTER — PROCEDURE VISIT (OUTPATIENT)
Dept: CARDIOLOGY CLINIC | Age: 87
End: 2022-10-18
Payer: MEDICARE

## 2022-10-18 ENCOUNTER — TELEPHONE (OUTPATIENT)
Dept: CARDIOLOGY CLINIC | Age: 87
End: 2022-10-18

## 2022-10-18 DIAGNOSIS — Z95.0 CARDIAC PACEMAKER IN SITU: Primary | ICD-10-CM

## 2022-10-18 DIAGNOSIS — I48.21 PERMANENT ATRIAL FIBRILLATION (HCC): ICD-10-CM

## 2022-10-18 PROCEDURE — 93296 REM INTERROG EVL PM/IDS: CPT | Performed by: INTERNAL MEDICINE

## 2022-10-18 PROCEDURE — 93294 REM INTERROG EVL PM/LDLS PM: CPT | Performed by: INTERNAL MEDICINE

## 2022-10-18 NOTE — TELEPHONE ENCOUNTER
Spoke with nurse and states pt. Has still been gaining weight/not losing any weight and that her Resp  20-21, her 02 is good but states she thinks that the pt. Looks in distress, family said that is how she normally looks but she is concerned. Pt. Recently seen in office for [facility]. Please advise. [phone]. Has appt. Scheduled with Dr. Brooklyn christianson.

## 2022-10-18 NOTE — LETTER
Cardiology [address]  Phone: [phone]  Fax: [phone]    10/18/2022        1210  [address]            Dear Jakob Millsman: This is your [facility] schedule. You can nela your calendar with these dates. Remember that your device is wireless and should automatically do these checks while you are sleeping. If for any reason I do not get your transmission then I will call you and ask that you send a manual transmission. If you have any questions or concerns, please call and ask for [place] at [phone]. Thank you.

## 2022-10-19 ENCOUNTER — APPOINTMENT (OUTPATIENT)
Dept: CT IMAGING | Age: 87
DRG: 291 | End: 2022-10-19
Payer: MEDICARE

## 2022-10-19 ENCOUNTER — APPOINTMENT (OUTPATIENT)
Dept: ULTRASOUND IMAGING | Age: 87
DRG: 291 | End: 2022-10-19
Payer: MEDICARE

## 2022-10-19 ENCOUNTER — APPOINTMENT (OUTPATIENT)
Dept: GENERAL RADIOLOGY | Age: 87
DRG: 291 | End: 2022-10-19
Payer: MEDICARE

## 2022-10-19 ENCOUNTER — TELEPHONE (OUTPATIENT)
Dept: CARDIOLOGY CLINIC | Age: 87
End: 2022-10-19

## 2022-10-19 ENCOUNTER — HOSPITAL ENCOUNTER (OUTPATIENT)
Dept: INFUSION THERAPY | Age: 87
Setting detail: INFUSION SERIES
Discharge: HOME | End: 2022-10-19

## 2022-10-19 ENCOUNTER — HOSPITAL ENCOUNTER (INPATIENT)
Age: 87
LOS: 10 days | Discharge: NURSING, REHAB OR LONG-TERM CARE | DRG: 291 | End: 2022-10-29
Attending: HOSPITALIST | Admitting: INTERNAL MEDICINE
Payer: MEDICARE

## 2022-10-19 ENCOUNTER — OFFICE VISIT (OUTPATIENT)
Dept: CARDIOLOGY CLINIC | Age: 87
End: 2022-10-19
Payer: MEDICARE

## 2022-10-19 VITALS
BODY MASS INDEX: 30.24 KG/M2 | SYSTOLIC BLOOD PRESSURE: 110 MMHG | WEIGHT: 150 LBS | DIASTOLIC BLOOD PRESSURE: 60 MMHG | HEIGHT: 59 IN | HEART RATE: 68 BPM

## 2022-10-19 DIAGNOSIS — R60.1 ANASARCA: ICD-10-CM

## 2022-10-19 DIAGNOSIS — I50.9 CONGESTIVE HEART FAILURE, UNSPECIFIED HF CHRONICITY, UNSPECIFIED HEART FAILURE TYPE (HCC): Primary | ICD-10-CM

## 2022-10-19 DIAGNOSIS — Z95.1 S/P CABG X 3: ICD-10-CM

## 2022-10-19 DIAGNOSIS — Z95.0 CARDIAC PACEMAKER IN SITU: Primary | ICD-10-CM

## 2022-10-19 PROBLEM — I50.33 ACUTE ON CHRONIC DIASTOLIC (CONGESTIVE) HEART FAILURE (HCC): Status: ACTIVE | Noted: 2022-10-19

## 2022-10-19 LAB
ALBUMIN SERPL-MCNC: 4.2 GM/DL (ref 3.4–5)
ALP BLD-CCNC: 257 IU/L (ref 40–129)
ALT SERPL-CCNC: 20 U/L (ref 10–40)
ANION GAP SERPL CALCULATED.3IONS-SCNC: 10 MMOL/L (ref 4–16)
AST SERPL-CCNC: 29 IU/L (ref 15–37)
BASE EXCESS MIXED: 5.3 (ref 0–2.3)
BASOPHILS ABSOLUTE: 0.1 K/CU MM
BASOPHILS RELATIVE PERCENT: 0.6 % (ref 0–1)
BILIRUB SERPL-MCNC: 1.1 MG/DL (ref 0–1)
BUN BLDV-MCNC: 30 MG/DL (ref 6–23)
CALCIUM SERPL-MCNC: 9.7 MG/DL (ref 8.3–10.6)
CHLORIDE BLD-SCNC: 93 MMOL/L (ref 99–110)
CO2: 29 MMOL/L (ref 21–32)
COMMENT: ABNORMAL
CREAT SERPL-MCNC: 0.9 MG/DL (ref 0.6–1.1)
DIFFERENTIAL TYPE: ABNORMAL
EKG ATRIAL RATE: 267 BPM
EKG DIAGNOSIS: NORMAL
EKG Q-T INTERVAL: 494 MS
EKG QRS DURATION: 140 MS
EKG QTC CALCULATION (BAZETT): 494 MS
EKG R AXIS: 225 DEGREES
EKG T AXIS: 106 DEGREES
EKG VENTRICULAR RATE: 60 BPM
EOSINOPHILS ABSOLUTE: 0.3 K/CU MM
EOSINOPHILS RELATIVE PERCENT: 3.1 % (ref 0–3)
GFR SERPL CREATININE-BSD FRML MDRD: 60 ML/MIN/1.73M2
GLUCOSE BLD-MCNC: 150 MG/DL (ref 70–99)
HCO3 VENOUS: 34 MMOL/L (ref 19–25)
HCT VFR BLD CALC: 45.4 % (ref 37–47)
HEMOGLOBIN: 13.7 GM/DL (ref 12.5–16)
IMMATURE NEUTROPHIL %: 0.5 % (ref 0–0.43)
LACTATE: 1.9 MMOL/L (ref 0.4–2)
LYMPHOCYTES ABSOLUTE: 0.6 K/CU MM
LYMPHOCYTES RELATIVE PERCENT: 6.9 % (ref 24–44)
MCH RBC QN AUTO: 30.2 PG (ref 27–31)
MCHC RBC AUTO-ENTMCNC: 30.2 % (ref 32–36)
MCV RBC AUTO: 100 FL (ref 78–100)
MONOCYTES ABSOLUTE: 0.6 K/CU MM
MONOCYTES RELATIVE PERCENT: 7.3 % (ref 0–4)
NUCLEATED RBC %: 0 %
O2 SAT, VEN: 48.7 % (ref 50–70)
PCO2, VEN: 69 MMHG (ref 38–52)
PDW BLD-RTO: 15.5 % (ref 11.7–14.9)
PH VENOUS: 7.3 (ref 7.32–7.42)
PLATELET # BLD: 392 K/CU MM (ref 140–440)
PMV BLD AUTO: 11.2 FL (ref 7.5–11.1)
PO2, VEN: 33 MMHG (ref 28–48)
POTASSIUM SERPL-SCNC: 4.1 MMOL/L (ref 3.5–5.1)
PRO-BNP: 1508 PG/ML
RBC # BLD: 4.54 M/CU MM (ref 4.2–5.4)
SEGMENTED NEUTROPHILS ABSOLUTE COUNT: 6.6 K/CU MM
SEGMENTED NEUTROPHILS RELATIVE PERCENT: 81.6 % (ref 36–66)
SODIUM BLD-SCNC: 132 MMOL/L (ref 135–145)
TOTAL IMMATURE NEUTOROPHIL: 0.04 K/CU MM
TOTAL NUCLEATED RBC: 0 K/CU MM
TOTAL PROTEIN: 6.7 GM/DL (ref 6.4–8.2)
TROPONIN T: <0.01 NG/ML
WBC # BLD: 8.1 K/CU MM (ref 4–10.5)

## 2022-10-19 PROCEDURE — 71275 CT ANGIOGRAPHY CHEST: CPT

## 2022-10-19 PROCEDURE — 1036F TOBACCO NON-USER: CPT | Performed by: INTERNAL MEDICINE

## 2022-10-19 PROCEDURE — 1123F ACP DISCUSS/DSCN MKR DOCD: CPT | Performed by: INTERNAL MEDICINE

## 2022-10-19 PROCEDURE — 6360000002 HC RX W HCPCS: Performed by: NURSE PRACTITIONER

## 2022-10-19 PROCEDURE — 84484 ASSAY OF TROPONIN QUANT: CPT

## 2022-10-19 PROCEDURE — 2700000000 HC OXYGEN THERAPY PER DAY

## 2022-10-19 PROCEDURE — 99285 EMERGENCY DEPT VISIT HI MDM: CPT

## 2022-10-19 PROCEDURE — G8417 CALC BMI ABV UP PARAM F/U: HCPCS | Performed by: INTERNAL MEDICINE

## 2022-10-19 PROCEDURE — 6370000000 HC RX 637 (ALT 250 FOR IP): Performed by: NURSE PRACTITIONER

## 2022-10-19 PROCEDURE — 71045 X-RAY EXAM CHEST 1 VIEW: CPT

## 2022-10-19 PROCEDURE — 99214 OFFICE O/P EST MOD 30 MIN: CPT | Performed by: INTERNAL MEDICINE

## 2022-10-19 PROCEDURE — 83605 ASSAY OF LACTIC ACID: CPT

## 2022-10-19 PROCEDURE — 94660 CPAP INITIATION&MGMT: CPT

## 2022-10-19 PROCEDURE — G8427 DOCREV CUR MEDS BY ELIG CLIN: HCPCS | Performed by: INTERNAL MEDICINE

## 2022-10-19 PROCEDURE — 93000 ELECTROCARDIOGRAM COMPLETE: CPT | Performed by: INTERNAL MEDICINE

## 2022-10-19 PROCEDURE — 83880 ASSAY OF NATRIURETIC PEPTIDE: CPT

## 2022-10-19 PROCEDURE — 96374 THER/PROPH/DIAG INJ IV PUSH: CPT

## 2022-10-19 PROCEDURE — 85025 COMPLETE CBC W/AUTO DIFF WBC: CPT

## 2022-10-19 PROCEDURE — 1090F PRES/ABSN URINE INCON ASSESS: CPT | Performed by: INTERNAL MEDICINE

## 2022-10-19 PROCEDURE — 6360000002 HC RX W HCPCS: Performed by: PHYSICIAN ASSISTANT

## 2022-10-19 PROCEDURE — 82805 BLOOD GASES W/O2 SATURATION: CPT

## 2022-10-19 PROCEDURE — 80053 COMPREHEN METABOLIC PANEL: CPT

## 2022-10-19 PROCEDURE — 2060000000 HC ICU INTERMEDIATE R&B

## 2022-10-19 PROCEDURE — 2580000003 HC RX 258: Performed by: NURSE PRACTITIONER

## 2022-10-19 PROCEDURE — G8484 FLU IMMUNIZE NO ADMIN: HCPCS | Performed by: INTERNAL MEDICINE

## 2022-10-19 PROCEDURE — 6360000004 HC RX CONTRAST MEDICATION: Performed by: PHYSICIAN ASSISTANT

## 2022-10-19 PROCEDURE — 93970 EXTREMITY STUDY: CPT

## 2022-10-19 PROCEDURE — 93005 ELECTROCARDIOGRAM TRACING: CPT | Performed by: NURSE PRACTITIONER

## 2022-10-19 PROCEDURE — 36415 COLL VENOUS BLD VENIPUNCTURE: CPT

## 2022-10-19 RX ORDER — LATANOPROST 50 UG/ML
1 SOLUTION/ DROPS OPHTHALMIC
Status: DISCONTINUED | OUTPATIENT
Start: 2022-10-19 | End: 2022-10-29 | Stop reason: HOSPADM

## 2022-10-19 RX ORDER — LEVOTHYROXINE SODIUM 88 UG/1
88 TABLET ORAL
Status: DISCONTINUED | OUTPATIENT
Start: 2022-10-20 | End: 2022-10-29 | Stop reason: HOSPADM

## 2022-10-19 RX ORDER — FERROUS SULFATE 325(65) MG
325 TABLET ORAL EVERY OTHER DAY
COMMUNITY

## 2022-10-19 RX ORDER — FUROSEMIDE 10 MG/ML
40 INJECTION INTRAMUSCULAR; INTRAVENOUS ONCE
Status: COMPLETED | OUTPATIENT
Start: 2022-10-19 | End: 2022-10-19

## 2022-10-19 RX ORDER — ONDANSETRON 4 MG/1
4 TABLET, ORALLY DISINTEGRATING ORAL EVERY 8 HOURS PRN
Status: DISCONTINUED | OUTPATIENT
Start: 2022-10-19 | End: 2022-10-29 | Stop reason: HOSPADM

## 2022-10-19 RX ORDER — FERROUS SULFATE 325(65) MG
325 TABLET ORAL EVERY OTHER DAY
Status: DISCONTINUED | OUTPATIENT
Start: 2022-10-20 | End: 2022-10-29 | Stop reason: HOSPADM

## 2022-10-19 RX ORDER — ATORVASTATIN CALCIUM 10 MG/1
10 TABLET, FILM COATED ORAL NIGHTLY
Status: DISCONTINUED | OUTPATIENT
Start: 2022-10-19 | End: 2022-10-29 | Stop reason: HOSPADM

## 2022-10-19 RX ORDER — THIAMINE HCL 100 MG
500 TABLET ORAL DAILY
Status: ON HOLD | COMMUNITY
End: 2022-11-07 | Stop reason: HOSPADM

## 2022-10-19 RX ORDER — ACETAMINOPHEN 325 MG/1
650 TABLET ORAL EVERY 6 HOURS PRN
Status: DISCONTINUED | OUTPATIENT
Start: 2022-10-19 | End: 2022-10-29 | Stop reason: HOSPADM

## 2022-10-19 RX ORDER — SODIUM CHLORIDE 0.9 % (FLUSH) 0.9 %
5-40 SYRINGE (ML) INJECTION EVERY 12 HOURS SCHEDULED
Status: DISCONTINUED | OUTPATIENT
Start: 2022-10-19 | End: 2022-10-29 | Stop reason: HOSPADM

## 2022-10-19 RX ORDER — TORSEMIDE 20 MG/1
20 TABLET ORAL PRN
Qty: 30 TABLET | Refills: 0 | Status: ON HOLD | OUTPATIENT
Start: 2022-10-19

## 2022-10-19 RX ORDER — ALBUTEROL SULFATE 90 UG/1
2 AEROSOL, METERED RESPIRATORY (INHALATION) 2 TIMES DAILY
Status: DISCONTINUED | OUTPATIENT
Start: 2022-10-19 | End: 2022-10-29 | Stop reason: HOSPADM

## 2022-10-19 RX ORDER — LANOLIN ALCOHOL/MO/W.PET/CERES
1000 CREAM (GRAM) TOPICAL DAILY
Status: DISCONTINUED | OUTPATIENT
Start: 2022-10-20 | End: 2022-10-29 | Stop reason: HOSPADM

## 2022-10-19 RX ORDER — ASPIRIN 81 MG/1
81 TABLET, CHEWABLE ORAL DAILY
Status: DISCONTINUED | OUTPATIENT
Start: 2022-10-20 | End: 2022-10-29 | Stop reason: HOSPADM

## 2022-10-19 RX ORDER — VITAMIN B COMPLEX
5000 TABLET ORAL DAILY
Status: DISCONTINUED | OUTPATIENT
Start: 2022-10-20 | End: 2022-10-29 | Stop reason: HOSPADM

## 2022-10-19 RX ORDER — SODIUM CHLORIDE 0.9 % (FLUSH) 0.9 %
5-40 SYRINGE (ML) INJECTION PRN
Status: DISCONTINUED | OUTPATIENT
Start: 2022-10-19 | End: 2022-10-29 | Stop reason: HOSPADM

## 2022-10-19 RX ORDER — ONDANSETRON 2 MG/ML
4 INJECTION INTRAMUSCULAR; INTRAVENOUS EVERY 6 HOURS PRN
Status: DISCONTINUED | OUTPATIENT
Start: 2022-10-19 | End: 2022-10-29 | Stop reason: HOSPADM

## 2022-10-19 RX ORDER — SODIUM CHLORIDE 9 MG/ML
INJECTION, SOLUTION INTRAVENOUS PRN
Status: DISCONTINUED | OUTPATIENT
Start: 2022-10-19 | End: 2022-10-29 | Stop reason: HOSPADM

## 2022-10-19 RX ORDER — CARVEDILOL 6.25 MG/1
6.25 TABLET ORAL 2 TIMES DAILY WITH MEALS
Status: DISCONTINUED | OUTPATIENT
Start: 2022-10-20 | End: 2022-10-29 | Stop reason: HOSPADM

## 2022-10-19 RX ORDER — ASPIRIN 81 MG/1
81 TABLET, CHEWABLE ORAL DAILY
COMMUNITY

## 2022-10-19 RX ORDER — TIMOLOL MALEATE 5 MG/ML
1 SOLUTION/ DROPS OPHTHALMIC NIGHTLY
Status: DISCONTINUED | OUTPATIENT
Start: 2022-10-19 | End: 2022-10-29 | Stop reason: HOSPADM

## 2022-10-19 RX ORDER — ENOXAPARIN SODIUM 100 MG/ML
40 INJECTION SUBCUTANEOUS DAILY
Status: DISCONTINUED | OUTPATIENT
Start: 2022-10-20 | End: 2022-10-29 | Stop reason: HOSPADM

## 2022-10-19 RX ORDER — TRAZODONE HYDROCHLORIDE 50 MG/1
50 TABLET ORAL NIGHTLY PRN
Status: DISCONTINUED | OUTPATIENT
Start: 2022-10-19 | End: 2022-10-29 | Stop reason: HOSPADM

## 2022-10-19 RX ORDER — POLYETHYLENE GLYCOL 3350 17 G/17G
17 POWDER, FOR SOLUTION ORAL DAILY PRN
Status: DISCONTINUED | OUTPATIENT
Start: 2022-10-19 | End: 2022-10-29 | Stop reason: HOSPADM

## 2022-10-19 RX ORDER — TORSEMIDE 20 MG/1
20 TABLET ORAL 2 TIMES DAILY
Status: ON HOLD | COMMUNITY
End: 2022-11-07 | Stop reason: HOSPADM

## 2022-10-19 RX ADMIN — LATANOPROST 1 DROP: 50 SOLUTION/ DROPS OPHTHALMIC at 22:20

## 2022-10-19 RX ADMIN — FUROSEMIDE 40 MG: 10 INJECTION, SOLUTION INTRAVENOUS at 18:09

## 2022-10-19 RX ADMIN — SODIUM CHLORIDE, PRESERVATIVE FREE 10 ML: 5 INJECTION INTRAVENOUS at 22:13

## 2022-10-19 RX ADMIN — IOPAMIDOL 80 ML: 755 INJECTION, SOLUTION INTRAVENOUS at 17:47

## 2022-10-19 RX ADMIN — TRAZODONE HYDROCHLORIDE 50 MG: 50 TABLET ORAL at 22:12

## 2022-10-19 RX ADMIN — ACETAMINOPHEN 650 MG: 325 TABLET ORAL at 22:12

## 2022-10-19 RX ADMIN — TIMOLOL MALEATE 1 DROP: 5 SOLUTION OPHTHALMIC at 22:20

## 2022-10-19 RX ADMIN — ATORVASTATIN CALCIUM 10 MG: 10 TABLET, FILM COATED ORAL at 22:18

## 2022-10-19 RX ADMIN — FUROSEMIDE 10 MG/HR: 10 INJECTION INTRAMUSCULAR; INTRAVENOUS at 22:11

## 2022-10-19 ASSESSMENT — PAIN SCALES - WONG BAKER
WONGBAKER_NUMERICALRESPONSE: 2
WONGBAKER_NUMERICALRESPONSE: 4

## 2022-10-19 ASSESSMENT — ENCOUNTER SYMPTOMS
SHORTNESS OF BREATH: 1
ABDOMINAL PAIN: 0
VOMITING: 0
NAUSEA: 0
CHEST TIGHTNESS: 1
COUGH: 1
ABDOMINAL DISTENTION: 1

## 2022-10-19 ASSESSMENT — PAIN - FUNCTIONAL ASSESSMENT
PAIN_FUNCTIONAL_ASSESSMENT: NONE - DENIES PAIN
PAIN_FUNCTIONAL_ASSESSMENT: PREVENTS OR INTERFERES SOME ACTIVE ACTIVITIES AND ADLS

## 2022-10-19 ASSESSMENT — PAIN DESCRIPTION - ORIENTATION
ORIENTATION: LEFT
ORIENTATION: LEFT

## 2022-10-19 ASSESSMENT — PAIN SCALES - GENERAL
PAINLEVEL_OUTOF10: 7
PAINLEVEL_OUTOF10: 4
PAINLEVEL_OUTOF10: 8

## 2022-10-19 ASSESSMENT — PAIN DESCRIPTION - DESCRIPTORS
DESCRIPTORS: SORE
DESCRIPTORS: ACHING;SORE

## 2022-10-19 ASSESSMENT — PAIN DESCRIPTION - LOCATION
LOCATION: LEG
LOCATION: LEG

## 2022-10-19 NOTE — TELEPHONE ENCOUNTER
Placed call to patient regarding cancelled [facility] appointment. Left VM inquiring about patients condition and offer to reschedule appt.

## 2022-10-19 NOTE — ED NOTES
Medication History  [facility]    Patient Name: Mariia Whiting 3/18/1929     Medication history has been completed by: Rula Cobos CPhT    Source(s) of information: insurance claims and retail pharmacy     Primary Care Physician: Yosvany Orourke DO     Pharmacy: [facility]    Allergies as of 10/19/2022 - Fully Reviewed 10/19/2022   Allergen Reaction Noted    Darvocet [propoxyphene n-acetaminophen]      Ranexa [ranolazine er]  04/25/2019    Ranolazine  04/25/2019    Sulfa antibiotics Itching 11/18/2011    Sulfasalazine Itching 11/18/2011    Adhesive tape Rash 06/22/2009    Allantoin Rash 06/22/2009    Bacitracin Rash 06/22/2009    Gramicidin Rash 06/22/2009    Neomycin Rash 06/22/2009    Polymyxin b Rash 06/22/2009    Pramoxine hcl Rash 77/59/2605    Silicone Rash 94/88/5027        Prior to Admission medications    Medication Sig Start Date End Date Taking?  Authorizing Provider   torsemide (DEMADEX) 20 MG tablet Take 20 mg by mouth 2 times daily   Yes Historical Provider, MD   aspirin 81 MG chewable tablet Take 81 mg by mouth daily   Yes Historical Provider, MD   ferrous sulfate (IRON 325) 325 (65 Fe) MG tablet Take 325 mg by mouth every other day   Yes Historical Provider, MD   Magnesium 500 MG TABS Take 500 mg by mouth daily   Yes Historical Provider, MD   torsemide (DEMADEX) 20 MG tablet Take 1 tablet by mouth as needed (for fluid retention or weight gain of 3 lbs overnight) This is in addition to the 20 mg bid 10/19/22   Shahla Esquivel MD   Turmeric (QC TUMERIC COMPLEX PO) Take 1 capsule by mouth daily    Historical Provider, MD   traZODone (DESYREL) 50 MG tablet Take  mg by mouth nightly as needed for Sleep    Historical Provider, MD   acetaminophen (TYLENOL) 500 MG tablet Take 1,000 mg by mouth every 4 hours    Historical Provider, MD   spironolactone (ALDACTONE) 50 MG tablet Take 1 tablet by mouth daily  Patient taking differently: Take 25 mg by mouth daily 3/24/22   Steve Thompson Rajendra Christopher MD   PROCTOZONE-HC 2.5 % CREA rectal cream INSERT RECTALLY TWICE DAILY as directed AS NEEDED FOR HEMORRHOIDS 2/24/22   Historical Provider, MD   ZIOPTAN 0.0015 % SOLN Place 1 drop into both eyes Daily with supper  1/25/22   Historical Provider, MD   CETIRIZINE HCL PO Take by mouth    Historical Provider, MD   vitamin D 25 MCG (1000 UT) CAPS Take 5,000 Units by mouth daily    Historical Provider, MD   Probiotic Product (PROBIOTIC-10) CHEW Take by mouth    Historical Provider, MD   atorvastatin (LIPITOR) 10 MG tablet Take 10 mg by mouth nightly 10/20/20   Historical Provider, MD   umeclidinium-vilanterol (ANORO ELLIPTA) 62.5-25 MCG/INH AEPB inhaler Inhale 1 puff into the lungs daily 7/27/20   Gagandeep English MD   CPAP Machine MISC by Does not apply route    Historical Provider, MD   Biotin 5 MG CAPS Take 1 capsule by mouth daily    Historical Provider, MD   Misc Natural Products (OSTEO BI-FLEX ADV DOUBLE ST PO) Take 1 tablet by mouth daily    Historical Provider, MD   timolol (TIMOPTIC) 0.5 % ophthalmic solution Place 1 drop into both eyes nightly    Historical Provider, MD   carvedilol (COREG) 6.25 MG tablet Take 1 tablet by mouth 2 times daily (with meals) 1/31/17   Lu Muller MD   albuterol (PROVENTIL HFA;VENTOLIN HFA) 108 (90 BASE) MCG/ACT inhaler Inhale 2 puffs into the lungs 2 times daily AND EVERY 4-6 HOURS AS NEEDED    Historical Provider, MD   Multiple Vitamins-Minerals (ICAPS) CAPS Take 1 capsule by mouth 2 times daily     Historical Provider, MD   vitamin B-12 (CYANOCOBALAMIN) 1000 MCG tablet Take 1,000 mcg by mouth daily. Historical Provider, MD   levothyroxine (SYNTHROID) 88 MCG tablet Take 88 mcg by mouth daily. Historical Provider, MD     Medications added or changed (ex. new medication, dosage change, interval change, formulation change):  Aspirin (added)  Ferrous sulfate every other day (added)  Magnesium (added)    Comments:  Unable to assess patient.   Received med list from retail pharmacy as patient receives her medications pre-packaged. Family reports patient has taken first dose of medications today. Torsemide therapy re-adjusted per cardiology today.     To my knowledge the above medication history is accurate as of 10/19/2022 3:58 PM.   Daisy Santos CPhT   10/19/2022 3:58 PM

## 2022-10-19 NOTE — TELEPHONE ENCOUNTER
[facility] [facility] was called to transport patient to [facility] Emergency room for SOB as per request from Dr. Marina Jo.

## 2022-10-19 NOTE — TELEPHONE ENCOUNTER
Placed return call to home health nurse at [phone]. States she had hoped to get patient in for an appointment yesterday. Reports patient will be able to keep appointment today with Dr. Paula Lamb. Family has been given E.R. precautions for worsening symptoms.

## 2022-10-19 NOTE — ED NOTES
Received report from Linda Muniz, [facility]. Assumed care @ this time.       Breana Galvez RN  10/19/22 1927

## 2022-10-19 NOTE — TELEPHONE ENCOUNTER
Call placed to [facility]). She states patient will be able to come to appointment today. Heart failure appt scheduled for 10/20 rescheduled with [facility].

## 2022-10-19 NOTE — PROGRESS NOTES
Vein \"LEG PROBLEM Questionnaire\"  Do you have prominent leg veins? Yes   Do you have any skin discoloration? Yes  Do you have any healed or active sores? Yes  Do you have swelling of the legs? Yes  Do you have a family history of varicose veins? Yes  Does your profession involve pro-longed        standing or heavy lifting? Yes  7. Have you been fighting overweight problems? No  8. Do you have restless legs? No  9. Do you have any night time cramps? No  10. Do you have any of the following in your legs:        Aching I  11. If Yes - Have they worn compression stockings Yes  12.  If they have worn compression stockings  Months

## 2022-10-19 NOTE — PROGRESS NOTES
CARDIOLOGY NOTE      10/19/2022    RE: Klarissa Gomez  (3/18/1929)                               TO:  Dr. Fausto Anderson, DO            CHIEF Rayo Knott is a 80 y.o. female who was seen today for management of coronary artery disease                                    HPI:                   Pt has h/o coronary disease post CABG post PCI, hypertension, hyperlipidemia, permanent pacemaker implantation, obstructive sleep apnea, hypothyroidism, seen today for follow-up.  Pt has increasing shortness of breath, no swelling in the lower extremities abdomen is also swollen as well saw PCP got a Lasix injection however keeps on getting worse hence she is now here to follow-up and further management    Klarissa Gomez has the following history recorded in care path:  Patient Active Problem List    Diagnosis Date Noted    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 ([address] 07/29/2022    WD-Non-pressure chronic ulcer of other part of left lower leg with fat layer exposed ([address] 07/29/2022    Age-related osteoporosis with current pathological fracture of vertebra ([address] 07/08/2022    Chest pain 07/07/2022    Iron deficiency anemia secondary to inadequate dietary iron intake 04/19/2022    Abnormal liver CT -findings suggestive of cirrhosis  03/24/2022    At risk for injury associated with anticoagulation 03/24/2022    T12 vertebral fracture ([address] 03/24/2022    SAULO on CPAP 03/24/2022    CHF (congestive heart failure), NYHA class I, acute on chronic, combined ([address] 03/23/2022    Cor pulmonale (chronic) ([address] 03/15/2022    Chronic right-sided heart failure ([address] 03/15/2022    Severe pulmonary hypertension ([address] 03/15/2022    Shortness of breath 03/15/2022    Moderate COPD (chronic obstructive pulmonary disease) ([address] 03/15/2022    Generalized weakness     Gait disturbance     Acute urinary retention     Hypothyroidism (acquired)     Chronic diastolic (congestive) heart failure ([address] Metabolic encephalopathy 46/93/1845    Hyponatremia 10/07/2021    Infection of pacemaker pocket ([facility] 04/29/2021    Pacer at end of battery life 11/18/2020    PAF (paroxysmal atrial fibrillation) ([facility] 05/10/2019    Dizziness 08/30/2017    Obstructive sleep apnea 05/16/2017    Coronary artery disease involving coronary bypass graft of native heart without angina pectoris     Bradycardia 08/11/2016    Essential hypertension 09/30/2013    TIA (transient ischemic attack) 09/29/2013    Confusion 09/29/2013    Headache 09/29/2013    CAD (coronary artery disease)     Post PTCA 04/21/2010    S/P CABG x 3 04/08/2009    Cardiac pacemaker 10/01/2001     Current Outpatient Medications   Medication Sig Dispense Refill    torsemide (DEMADEX) 20 MG tablet Take 1 tablet by mouth as needed (for fluid retention or weight gain of 3 lbs overnight) This is in addition to the 20 mg bid 30 tablet 0    Turmeric (QC TUMERIC COMPLEX PO) Take 1 capsule by mouth daily      traZODone (DESYREL) 50 MG tablet Take  mg by mouth nightly as needed for Sleep      acetaminophen (TYLENOL) 500 MG tablet Take 1,000 mg by mouth every 4 hours      spironolactone (ALDACTONE) 50 MG tablet Take 1 tablet by mouth daily (Patient taking differently: Take 25 mg by mouth daily) 30 tablet 0    PROCTOZONE-HC 2.5 % CREA rectal cream INSERT RECTALLY TWICE DAILY as directed AS NEEDED FOR HEMORRHOIDS      ZIOPTAN 0.0015 % SOLN Place 1 drop into both eyes Daily with supper       CETIRIZINE HCL PO Take by mouth      vitamin D 25 MCG (1000 UT) CAPS Take 5,000 Units by mouth daily      Probiotic Product (PROBIOTIC-10) CHEW Take by mouth      atorvastatin (LIPITOR) 10 MG tablet Take 10 mg by mouth daily       umeclidinium-vilanterol (ANORO ELLIPTA) 62.5-25 MCG/INH AEPB inhaler Inhale 1 puff into the lungs daily 1 each 11    CPAP Machine MISC by Does not apply route      Biotin 5 MG CAPS Take 1 capsule by mouth daily      Misc Natural Products (OSTEO BI-FLEX ADV DOUBLE ST PO) Take 1 tablet by mouth daily      timolol (TIMOPTIC-XE) 0.25 % ophthalmic gel-forming Place 1 drop into both eyes daily      carvedilol (COREG) 6.25 MG tablet Take 1 tablet by mouth 2 times daily (with meals) 180 tablet 3    albuterol (PROVENTIL HFA;VENTOLIN HFA) 108 (90 BASE) MCG/ACT inhaler Inhale 2 puffs into the lungs 2 times daily AND EVERY 4-6 HOURS AS NEEDED      Multiple Vitamins-Minerals (ICAPS) CAPS Take 1 capsule by mouth 2 times daily       vitamin B-12 (CYANOCOBALAMIN) 1000 MCG tablet Take 1,000 mcg by mouth daily. levothyroxine (SYNTHROID) 88 MCG tablet Take 88 mcg by mouth daily. No current facility-administered medications for this visit. Allergies: Darvocet [propoxyphene n-acetaminophen], Ranexa [ranolazine er], Ranolazine, Sulfa antibiotics, Sulfasalazine, Adhesive tape, Allantoin, Bacitracin, Gramicidin, Neomycin, Polymyxin b, Pramoxine hcl, and Silicone  Past Medical History:   Diagnosis Date    Age-related osteoporosis with current pathological fracture of vertebra ([facility] 7/8/2022    Arthritis     Bradycardia 2001    requiring dual chamber pacemaker at [facility]    CAD (coronary artery disease)     Cardiac pacemaker 10/2001    [employer] #[id number]  PPM- Serial # [id number]- Dr Zachery De Anda    CHF (congestive heart failure) ([facility])     COPD, mild ([address] 2/17/2017    Cor pulmonale (chronic) ([address] 3/15/2022    CVA (cerebrovascular accident) ([address]     DJD (degenerative joint disease) of cervical spine     C5-C6, C6-C7    Exhaustion of cardiac pacemaker battery 11/21/2011    PPM battery replacement- [employer]    Family history of cardiovascular disease     Glaucoma Dx 2010    H/O 24 hour EKG monitoring 8/6/2000 8/6/2000- Intermittent episonde of a-fib/flutter    H/O cardiac catheterization 4/20/2010, 2/1989 4/20/2010-Severe native vessel disease and has graft disease as well. LAD, CX totally occluded. RCA totally occluded in proximal segment. VG to RCA widely patent. PDA 90% LAD afer LIMA anastomosis 80-90% stenosis. Proceeded with PTCA with stent next day. H/O cardiovascular stress test 10/14/2011, 6/2/2010,4/8/2010, 5/18/2009,4/6/2009, 10/30/2007, 11/12/2004, 11/6/2003, 8/9/2002, 8/9/2001,6/5/2000,     10/14/2011-Lexiscan-Abnormal Myocardial Perfusion study. Evidence of mild ischemia in the Left CX region. Abnomal study. Rest EF 63%. Global LV systolic function normal. No ECG changes. Unremarkable pharmacological stress test.    H/O cardiovascular stress test 6/10/2013    thallium--mild ischemia left circumflex EF63% no change from 10/2011 study. H/O cardiovascular stress test 10/16/2014    cardiolite-mild ischemia left circumflex,EF70%    H/O chest x-ray 4/19/2009 4/19/2009-Stable cardiomegaly. No acute cardiopulmonary disease. H/O Doppler lower venous ultrasound 09/18/2019    Significant reflux noted in RGSV, RGSV is extremely tortuous and small along the thigh and calf and would be highly unlikely to be accessed, RSSV is non compressible and has occlusive chronic SVT, LSSV is non compressible with occlusive chronic SVT, LGSV removed s/p CABG, Significant reflux in LGSV tributary,    H/O Doppler ultrasound 3/31/2010    CAROTID- 3/31/2010-INtimal thickening but no significant atherosclerotic plaque noted in ANA PAULA. Doppler flow velocities within ANA PAULA are WNL. Heterogeneous, irregular atherosclerotic plaque noted in LICA. Doppler flow velocities within the LICA are elevated, consistent with a mild, less than 50% stenosis. H/O Doppler ultrasound 5/24/2016    Carotid- normal study    H/O Doppler ultrasound 09/06/2017    carotid - normal study    H/O Doppler ultrasound     H/O echocardiogram 10/13    EF=60%, Severe Pulm. HTN, & Sclerotic aortic valve w/stenosis. H/O echocardiogram 10/16/14     EF 55-60% Normal LV. Normal LV systolic function. Severe tricuspid insufficiency with severe hypertension.      H/O echocardiogram 02/03/2017    heart cath performed this morning    H/O echocardiogram 09/18/2019    EF 50-55%, Left atrium is mild to moderately dilated, right atrium is severely dilated, mildly dilated right ventricle, Mod MR, Severe TR, Severe Pulm HTN, no pericardial effusion     History of complete ECG     10/14/2011(Lexiscan);5/6/2010, 4/30/2009,10/24/2008,9/21/2007, 10/13/2006    History of nuclear stress test 11/17/2016    lexiscan-normal,EF70%    Hx of cardiovascular stress test 12/28/2018    EF 60%  Normal study. HX OTHER MEDICAL 05/01/2017    MUGA-normal, EF53%    Hyperlipidemia     Hypertension     Mild intermittent asthma 7/28/2016    Moderate COPD (chronic obstructive pulmonary disease) ([facility]) 3/15/2022    Nausea & vomiting     Obstructive sleep apnea 5/16/2017    Paroxysmal atrial fibrillation ([address]     Post PTCA 4/21/2010    PTCA with 2.25 stent of the LIMA to LAD    Pulmonary HTN ([address]     Severe per last echo on 10/13. 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  Followed by MAZE procedure by pulmonary vein isolation.-  Dr Ewa Richardson    S/P PTCA (percutaneous transluminal coronary angioplasty) 11/2012    PTCA with stent to RCA    Severe pulmonary hypertension ([address] 3/15/2022    Shortness of breath 3/15/2022    Thyroid disease     hypothyroi    Unspecified cerebral artery occlusion with cerebral infarction Unsure When    No Residual    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 ([address] 7/29/2022    WD-Non-pressure chronic ulcer of other part of left lower leg limited to breakdown of skin ([address] 7/29/2022     Past Surgical History:   [facility]    [facility]  [facility]    CABG (3 Bypasses), One Heart Stent in 2010    COLONOSCOPY  In 2000's    X1    CORONARY ANGIOPLASTY WITH STENT PLACEMENT  4/21/2010    PTCA with stent LIMA ->LAD    CORONARY ARTERY BYPASS GRAFT  4/8/2009    LIMA->Diag,  LIMA -> LAD;  SVG->RCA going to the PDA. Followed by MAZE procedure by pulmonary vein isolation.-  Dr Juárez Lab, TOTAL ABDOMINAL (CERVIX REMOVED)      MIDDLE EAR SURGERY      OTHER SURGICAL HISTORY      Ear surgery-hearing    PACEMAKER PLACEMENT      battery change 2011 Medtronic    PTCA  2012    Ptca with stent to RCA    TONSILLECTOMY  's      As reviewed   Family History   Problem Relation Age of Onset    Cancer Mother         \"Liver Cancer\"    Arthritis Mother     Depression Mother     Hearing Loss Mother     High Blood Pressure Mother     High Cholesterol Mother     Mental Illness Mother     Miscarriages / Stillbirths Mother     Heart Disease Father     Early Death Father 36        \"Instant Death\"    High Blood Pressure Father     High Cholesterol Father     Coronary Art Dis Father         Massive MI    Heart Disease Sister     Depression Sister     Cancer Sister         \"Breast Cancer, Cancer Free Now\"    High Blood Pressure Sister     Other Daughter         \"She's Had Stomach Surgery\"    High Blood Pressure Son     High Blood Pressure Son     Early Death Paternal Grandfather      Social History     Tobacco Use    Smoking status: Former     Packs/day: 0.25     Years: 5.00     Pack years: 1.25     Types: Cigarettes     Quit date: 1970     Years since quittin.9    Smokeless tobacco: Never   Substance Use Topics    Alcohol use: Not Currently     Comment: Caffiene - no more than 3 cups of coffee each day        Objective:    Vitals:    10/19/22 1344   BP: 110/60   Site: Left Upper Arm   Position: Sitting   Cuff Size: Medium Adult   Pulse: 68   Weight: 150 lb (68 kg)   Height: 4' 11\" (1.499 m)     /60 (Site: Left Upper Arm, Position: Sitting, Cuff Size: Medium Adult)   Pulse 68   Ht 4' 11\" (1.499 m)   Wt 150 lb (68 kg)   BMI 30.30 kg/m²     No flowsheet data found.      Wt Readings from Last 3 Encounters:   10/19/22 150 lb (68 kg)   10/13/22 154 lb 6 oz (70 kg)   22 132 lb (59.9 kg) Body mass index is 30.3 kg/m². GENERAL - Alert, oriented, pleasant, in no apparent distress. EYES: No jaundice, no conjunctival pallor. SKIN: It is warm & dry. No rashes. No Echhymosis    HEENT - No clinically significant abnormalities seen. Neck - Supple. No jugular venous distention noted. No carotid bruits. Cardiovascular - Normal S1 and S2 without obvious murmur or gallop. Extremities - No cyanosis, clubbing, or significant edema. Pulmonary - No respiratory distress. No wheezes or rales. Abdomen - No masses, tenderness, or organomegaly. Musculoskeletal - No significant edema. No joint deformities. No muscle wasting. Neurologic - Cranial nerves II through XII are grossly intact. There were no gross focal neurologic abnormalities.     Lab Review   Lab Results   Component Value Date/Time    CKTOTAL 89 03/18/2022 11:59 PM    TROPONINT <0.010 07/07/2022 11:44 PM     BNP:    Lab Results   Component Value Date/Time    BNP 90 09/28/2013 11:52 PM     PT/INR:    Lab Results   Component Value Date    INR 1.88 03/24/2022     No results found for: LABA1C  Lab Results   Component Value Date    WBC 8.4 09/02/2022    HCT 43.1 09/02/2022    MCV 99 (H) 09/02/2022     09/02/2022     Lab Results   Component Value Date    CHOL 105 07/08/2022    TRIG 69 07/08/2022    HDL 45 07/08/2022    LDLCALC 46 07/08/2022    LDLDIRECT 116 (H) 10/29/2018     Lab Results   Component Value Date    ALT 17 07/08/2022    AST 23 07/08/2022     BMP:    Lab Results   Component Value Date/Time     07/08/2022 08:06 AM    K 4.1 07/08/2022 08:06 AM    CL 97 07/08/2022 08:06 AM    CO2 31 07/08/2022 08:06 AM    BUN 27 07/08/2022 08:06 AM    CREATININE 0.7 07/08/2022 08:06 AM     CMP:   Lab Results   Component Value Date/Time     07/08/2022 08:06 AM    K 4.1 07/08/2022 08:06 AM    CL 97 07/08/2022 08:06 AM    CO2 31 07/08/2022 08:06 AM    BUN 27 07/08/2022 08:06 AM    PROT 6.2 07/08/2022 08:06 AM    PROT 6.8 11/28/2012 02:41 PM     TSH:    Lab Results   Component Value Date/Time    TSH 1.0 05/07/2010 12:00 AM    TSHHS 5.210 07/07/2022 07:37 PM           Assessment & Plan:    -Shortness of breath is probably secondary to pulmonary hypertension her PA pressure was markedly elevated on the last echocardiogram   Patient breathing status has gotten worse and she is edematous has ascites as well I will send her to the emergency room for IV diuretic therapy and possible admission      Patient probably also has bowel edema hence her oral diuretic is not working we will need Lasix drip her LV function is normal however severe pulmonary hypertension cardiac causing cor pulmonale      -     CORONARY ARTERY DISEASE:  asymptomatic     All available  tests in chart reviewed. Management discussed . Testing ordered  no                 On aspirin compliant       Normal stress recently           -  Hypertension: Patients blood pressure is normal. Patient is advised about low sodium diet. Present medical regimen will not be changed. On Aldactone, torsemide, Coreg compliant with blood pressures well controlled         - Pul HTN: Last echo showed severe pulmonary hypertension and going to repeat an echocardiogram to see what is the status of that   PASP 77  Pt not interested        -  LIPID MANAGEMENT:  Importance of lipid levels discussed with patient   and patient was given dietary advice. NCEP- ATP III guidelines reviewed with patient. -   Changes  in medicines made: No     Lipitor 10 mg p.o. daily  Last LDL on file is 116                           -Permanent pacemaker implantation   Pacer analysis is reviewed is consistent with normal single-chamber MRI safe Medtronic pacer function with stable RV lead and appropriate battery status for the age of the device. Remaining average battery life is 11.8 years. Device is programmed to VVIR mode lower rate of 60 bpm and 99.1% pacing in the ventricle.      Recommend continued every three month check and follow up office visit as scheduled. Ivan Russell MD, 7/12/2022 12:37 PM     -Hypothyroidism on Synthroid 88 mcg p.o. daily last TSH on file is 1.6    -Obstructive sleep apnea on CPAP which might be causing her pulmonary hypertension    - Atrial fibrillation, pt is  compliant with meds. Patient does not have symptoms from atrial fibrillation on Coumadin    - CVI; patient has bilateral lower extremity swelling more on the left side and please note that she has reflux we have discussed the possibility of ablation which was deferred  Advise her for compression socks for now      Waqas Coates MD    [facility] [place]    Please note this report has been partially produced using speech recognition software and may contain errors related to that system including errors in grammar, punctuation, and spelling, as well as words and phrases that may be inappropriate. If there are any questions or concerns please feel free to contact the dictating provider for clarification.

## 2022-10-19 NOTE — H&P
History and Physical      Name:  Zahra Lozano /Age/Sex: 3/18/1929  (80 y.o. female)   MRN & CSN:  [id number] & [id number] Admission Date/Time: 10/19/2022  2:45 PM   Location:  -A PCP: Danish Davis, [address] Day: 1     Attending physician: Dr. Tami Erickson and Plan:   Zahra Lozano is a 80 y.o.  female  who presents with Acute on chronic diastolic (congestive) heart failure ([facility] [address]    Assessment and plan:     Acute on chronic diastolic heart failure with exacerbation:  Pulmonary hypertension with cor pulmonale:  Acute respiratory failure 2/2 CHF exacerbation   Last echo on file: EF 50 to 55%, severely dilated right heart, mild to moderate MR, severe tricuspid regurg.,  RSVP 77 mmHg, severe pulmonary hypertension, diastolic unable to determine due to arrhythmia. [facility] cardiology as outpatient. Sent into ER by cardiology due to diffuse edema as home diuretics are not effective. Patient reports approximately over 20 pound weight gain over the past few weeks. EKG demonstrates ventricular paced heart rate 60. ProBNP 1508, Troponin nondetectable. Chest x-ray findings most compatible with pulmonary interstitial edema. CTA pulmonary contrast-no central pulmonary embolism. Marked enlargement of the right atrium with extensive reflux into hepatic veins. Suggestion of right heart failure.   Small right pleural effusion, anasarca with subcutaneous edema and ascites  Outpatient regimen includes furosemide, spironolactone and carvedilol  -Inpatient - Stepdown  -cont NIV  -Cardiology consult, appreciate assistance  -last echo , limited echo pending  - Lasix gtt 10 mg per hour  -Strict intake and output  -Diet restriction sodium and fluids  -Telemetry monitoring  -[facility] daily  -Monitor diuresis closely  -Serial cardiac enzymes  -ekg on admission    Anasarca with subcutaneous edema and ascites as demonstrated on CTA chest  Abdominal distention  -Consult IR in a.m. volume ascites possible paracentesis  -Lower extremity venous ultrasound negative for DVTs    Chronic ulceration/wound: Left distal calf, has been following wound care outpatient  -Wound care consult  -Heels off bed    CAD s/p CABG x 3 vessels. Follows Dr. Ketan Goode, caridiology  Continue statin and aspirin therapy    Copd: Does not appear to be exacerbation there is no wheezing present, symptomatic in setting of fluid overload. -BiPAP as above  -Continue PTA MDI regimen  -Continuous pulse ox    Glaucoma: cont eye drops per PTA regimen    Bradycardia s/p cardiac pacemaker in situ:  -telemetry monitoring    SAULO: Has home CPAP  -Currently requiring BiPAP management for CHF exacerbation    History of CVA: Continue aspirin and statin therapy    Chronic Conditions: Continue all home medications except as stated above or contraindicated. BMI 30.30: kg/m2 Life style modifications  -Daily weights for fluid overload  -Follow-up PCP    Disposition:Inpatient. Patient was accepted for continue evaluation and treatment and improvement of clinical symptoms. Discussed assessment and treatment plan with the admitting and supervising physician who agrees with the plan. Diet ADULT DIET; Regular; Low Sodium (2 gm); 2000 ml   DVT Prophylaxis [x] Lovenox, []  Heparin, [] SCDs, [] Warfarin  [] NOAC     GI Prophylaxis [] PPI,  [] H2 Blocker,  [] Carafate,  [] Diet/Tube Feeds   Code Status Full Code  MPOA-daughter Sarah Gross. Alternate decision-maker son Sekou Rosario     History of Present Illness:     Chief Complaint: Diffuse edema, shortness of breath, worsening CHF scented by cardiologist  Ace Díaz is a 80 y.o.  female, with past medical history significant for diastolic heart failure, pulmonary hypertension, essential hypertension, bradycardia s/p cardiac pacemaker, glaucoma hypothyroidism, history of CVA. Presented to the emergency room with son and daughter-in-law at bedside.   Patient came from [facility] [place] cardiology office was evaluated for worsening lower extremity swelling, unexpected weight gain, orthopnea and diffuse edema, patient was sent in for admission for diuresis by cardiology due to failure of outpatient diuretic treatment. Patient endorses shortness of breath, chest tightness, worsening swelling bilateral lower extremities to abdomen, difficulty lying flat, she denies chest pain or palpitations. Patient had recently saw her PCP was given Lasix injection in the office with little improvement of symptoms. She is on spironolactone and furosemide as outpatient. On Examination, patient is wearing BiPAP she does report improvement in symptoms since being placed on machine and IV Lasix in ER states she is able to lay more flat. On exam crackles noted bilateral lower lungs. Respirations unlabored. Patient noted to have diffuse swelling 4+ pitting edema bilateral lower extremities to abdomen, anasarca present. Abdomen is firm and rounded. Patient does report a 20+ pound weight gain over the past 2 to 3 weeks. At the ED, vital signs;T 98.1,HR 65, RR 15, /79 mm/hg,on BIPAP. significant laboratories were the following: Sodium 132, chloride 93, BUN 30, GFR 60, proBNP 1508, alk phos 257,  EKG-ventricular paced rhythm heart rate 60. Reviewed  Imaging reviewed  The patient was brought to the ED and was subsequently admitted for  further evaluation. and management          Review of Systems   Constitutional:  Positive for unexpected weight change. Negative for chills, fatigue and fever. HENT:  Negative for congestion. Respiratory:  Positive for cough, chest tightness and shortness of breath. Cardiovascular:  Positive for leg swelling. Negative for chest pain. Gastrointestinal:  Positive for abdominal distention. Negative for abdominal pain, nausea and vomiting. Genitourinary:  Positive for difficulty urinating. Negative for dysuria. Musculoskeletal:  Positive for joint swelling.    Skin:  Positive for wound. Left calf, chronic       Neurological:  Negative for dizziness, weakness and light-headedness. Hematological: Negative. Psychiatric/Behavioral: Negative. Objective:   No intake or output data in the 24 hours ending 10/19/22 2147   Vitals:   Vitals:    10/19/22 2050   BP: (!) 148/66   Pulse: 60   Resp: 15   Temp: 98.6 °F (37 °C)   SpO2: 96%     Physical Exam:   GEN- Awake female,BiPap in place. lying in bed, appears to be stated age. EYES- Pupils are equally round. No scleral erythema, discharge, or conjunctivitis. HENT- Mucous membranes are moist.   NECK- Supple, no apparent thyromegaly or masses. RESP-on Bipap, crackles in bases. CARDIO/VASC- Regular rate and rhythm,  Peripheral pulses equal bilaterally and palpable. GI- Abdomen rounded and distended. MSK- No gross joint deformities. EXTREMITIES- pulses intact, anasarca, 4+ pedal edema  SKIN- Normal coloration, warm, dry. NEURO-normal speech, no lateralizing weakness. PSYCH-Awake, alert, oriented x 4.     Past Medical History:      Past Medical History:   Diagnosis Date    Age-related osteoporosis with current pathological fracture of vertebra ([address] 7/8/2022    Arthritis     Bradycardia 2001    requiring dual chamber pacemaker at [place]    CAD (coronary artery disease)     Cardiac pacemaker 10/2001    [employer] #[id number]  PPM- Serial # 26-[facility]- Dr Haydee Gibbons    CHF (congestive heart failure) (HCC)     COPD, mild ([address] 2/17/2017    Cor pulmonale (chronic) ([address] 3/15/2022    CVA (cerebrovascular accident) ([address]     DJD (degenerative joint disease) of cervical spine     C5-C6, C6-C7    Exhaustion of cardiac pacemaker battery 11/21/2011    PPM battery replacement- [employer]    Family history of cardiovascular disease     Glaucoma Dx 2010    H/O 24 hour EKG monitoring 8/6/2000 8/6/2000- Intermittent episonde of a-fib/flutter    H/O cardiac catheterization 4/20/2010, 2/1989 4/20/2010-Severe native vessel disease and has graft disease as well. LAD, CX totally occluded. RCA totally occluded in proximal segment. VG to RCA widely patent. PDA 90% LAD afer LIMA anastomosis 80-90% stenosis. Proceeded with PTCA with stent next day. H/O cardiovascular stress test 10/14/2011, 6/2/2010,4/8/2010, 5/18/2009,4/6/2009, 10/30/2007, 11/12/2004, 11/6/2003, 8/9/2002, 8/9/2001,6/5/2000,     10/14/2011-Lexiscan-Abnormal Myocardial Perfusion study. Evidence of mild ischemia in the Left CX region. Abnomal study. Rest EF 63%. Global LV systolic function normal. No ECG changes. Unremarkable pharmacological stress test.    H/O cardiovascular stress test 6/10/2013    thallium--mild ischemia left circumflex EF63% no change from 10/2011 study. H/O cardiovascular stress test 10/16/2014    cardiolite-mild ischemia left circumflex,EF70%    H/O chest x-ray 4/19/2009 4/19/2009-Stable cardiomegaly. No acute cardiopulmonary disease. H/O Doppler lower venous ultrasound 09/18/2019    Significant reflux noted in RGSV, RGSV is extremely tortuous and small along the thigh and calf and would be highly unlikely to be accessed, RSSV is non compressible and has occlusive chronic SVT, LSSV is non compressible with occlusive chronic SVT, LGSV removed s/p CABG, Significant reflux in LGSV tributary,    H/O Doppler ultrasound 3/31/2010    CAROTID- 3/31/2010-INtimal thickening but no significant atherosclerotic plaque noted in ANA PAULA. Doppler flow velocities within ANA PAULA are WNL. Heterogeneous, irregular atherosclerotic plaque noted in LICA. Doppler flow velocities within the LICA are elevated, consistent with a mild, less than 50% stenosis. H/O Doppler ultrasound 5/24/2016    Carotid- normal study    H/O Doppler ultrasound 09/06/2017    carotid - normal study    H/O Doppler ultrasound     H/O echocardiogram 10/13    EF=60%, Severe Pulm. HTN, & Sclerotic aortic valve w/stenosis. H/O echocardiogram 10/16/14     EF 55-60% Normal LV.  Normal LV systolic function. Severe tricuspid insufficiency with severe hypertension. H/O echocardiogram 02/03/2017    heart cath performed this morning    H/O echocardiogram 09/18/2019    EF 50-55%, Left atrium is mild to moderately dilated, right atrium is severely dilated, mildly dilated right ventricle, Mod MR, Severe TR, Severe Pulm HTN, no pericardial effusion     History of complete ECG     10/14/2011(Lexiscan);5/6/2010, 4/30/2009,10/24/2008,9/21/2007, 10/13/2006    History of nuclear stress test 11/17/2016    lexiscan-normal,EF70%    Hx of cardiovascular stress test 12/28/2018    EF 60%  Normal study. HX OTHER MEDICAL 05/01/2017    MUGA-normal, EF53%    Hyperlipidemia     Hypertension     Mild intermittent asthma 7/28/2016    Moderate COPD (chronic obstructive pulmonary disease) ([facility]) 3/15/2022    Nausea & vomiting     Obstructive sleep apnea 5/16/2017    Paroxysmal atrial fibrillation ([address]     Post PTCA 4/21/2010    PTCA with 2.25 stent of the LIMA to LAD    Pulmonary HTN ([address]     Severe per last echo on 10/13. 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 Followed by MAZE procedure by pulmonary vein isolation.-  Dr Adamaris Winslow    S/P PTCA (percutaneous transluminal coronary angioplasty) 11/2012    PTCA with stent to RCA    Severe pulmonary hypertension ([address] 3/15/2022    Shortness of breath 3/15/2022    Thyroid disease     hypothyroi    Unspecified cerebral artery occlusion with cerebral infarction Unsure When    No Residual    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 ([address] 7/29/2022    WD-Non-pressure chronic ulcer of other part of left lower leg limited to breakdown of skin ([address] 7/29/2022     PSHX:  has a past surgical history that includes Appendectomy (1941); Tonsillectomy (1950's); Cardiac surgery (4/09); Hysterectomy, total abdominal (1990's);  Colonoscopy (In 2000's); pacemaker placement; Coronary artery bypass graft (2009); Coronary angioplasty with stent (2010); other surgical history; Middle ear surgery; and Percutaneous Transluminal Coronary Angio (2012). Allergies: Allergies   Allergen Reactions    Darvocet [Propoxyphene N-Acetaminophen]     Ranexa [Ranolazine Er]      Sick to her stomach    Ranolazine      Sick to her stomach    Sulfa Antibiotics Itching    Sulfasalazine Itching    Adhesive Tape Rash    Allantoin Rash    Bacitracin Rash    Gramicidin Rash    Neomycin Rash    Polymyxin B Rash    Pramoxine Hcl Rash    Silicone Rash       FAM HX: family history includes Arthritis in her mother; Cancer in her mother and sister; Coronary Art Dis in her father; Depression in her mother and sister; Early Death in her paternal grandfather; Early Death (age of onset: 36) in her father; Hearing Loss in her mother; Heart Disease in her father and sister; High Blood Pressure in her father, mother, sister, son, and son; High Cholesterol in her father and mother; Mental Illness in her mother; Nannette Walker / Mike Arriaga in her mother; Other in her daughter. Soc HX:   Social History     Socioeconomic History    Marital status:     Number of children: 4   Occupational History    Occupation: RETIRED     Comment: from [address] Use    Smoking status: Former     Packs/day: 0.25     Years: 5.00     Pack years: 1.25     Types: Cigarettes     Quit date: 1970     Years since quittin.9    Smokeless tobacco: Never   Vaping Use    Vaping Use: Never used   Substance and Sexual Activity    Alcohol use: Not Currently     Comment: Caffiene - no more than 3 cups of coffee each day    Drug use: Never    Sexual activity: Yes     Partners: Male     Comment:        Social and family history reviewed with patient/family. Medications:     Home Medication   Prior to Admission medications    Medication Sig Start Date End Date Taking?  Authorizing Provider   torsemide (DEMADEX) 20 MG tablet Take 20 mg by mouth 2 times daily   Yes Historical Provider, MD   aspirin 81 MG chewable tablet Take 81 mg by mouth daily   Yes Historical Provider, MD   ferrous sulfate (IRON 325) 325 (65 Fe) MG tablet Take 325 mg by mouth every other day   Yes Historical Provider, MD   Magnesium 500 MG TABS Take 500 mg by mouth daily   Yes Historical Provider, MD   torsemide (DEMADEX) 20 MG tablet Take 1 tablet by mouth as needed (for fluid retention or weight gain of 3 lbs overnight) This is in addition to the 20 mg bid  Patient taking differently: Take 20 mg by mouth daily as needed (for fluid retention or weight gain of 3 lbs overnight) This is in addition to the 20 mg bid 10/19/22   Curtis Monzon MD   Turmeric (QC TUMERIC COMPLEX PO) Take 1 capsule by mouth daily    Historical Provider, MD   traZODone (DESYREL) 50 MG tablet Take  mg by mouth nightly as needed for Sleep    Historical Provider, MD   acetaminophen (TYLENOL) 500 MG tablet Take 1,000 mg by mouth every 4 hours as needed for Pain or Fever    Historical Provider, MD   spironolactone (ALDACTONE) 50 MG tablet Take 1 tablet by mouth daily  Patient taking differently: Take 25 mg by mouth daily 3/24/22   Kina Zavala MD   PROCTOZONE-HC 2.5 % CREA rectal cream INSERT RECTALLY TWICE DAILY as directed AS NEEDED FOR HEMORRHOIDS 2/24/22   Historical Provider, MD   ZIOPTAN 0.0015 % SOLN Place 1 drop into both eyes Daily with supper  1/25/22   Historical Provider, MD   CETIRIZINE HCL PO Take 1 tablet by mouth daily as needed (allergies)    Historical Provider, MD   vitamin D 25 MCG (1000 UT) CAPS Take 5,000 Units by mouth daily    Historical Provider, MD   Probiotic Product (PROBIOTIC-10) CHEW Take 1 tablet by mouth daily    Historical Provider, MD   atorvastatin (LIPITOR) 10 MG tablet Take 10 mg by mouth nightly 10/20/20   Historical Provider, MD   umeclidinium-vilanterol (ANORO ELLIPTA) 62.5-25 MCG/INH AEPB inhaler Inhale 1 puff into the lungs daily 7/27/20 Ciara Gomez MD   CPAP Machine MISC by Does not apply route    Historical Provider, MD   Biotin 5 MG CAPS Take 1 capsule by mouth daily    Historical Provider, MD   Misc Natural Products (OSTEO BI-FLEX ADV DOUBLE ST PO) Take 1 tablet by mouth daily    Historical Provider, MD   timolol (TIMOPTIC) 0.5 % ophthalmic solution Place 1 drop into both eyes nightly    Historical Provider, MD   carvedilol (COREG) 6.25 MG tablet Take 1 tablet by mouth 2 times daily (with meals) 1/31/17   Etelvina Purcell MD   albuterol (PROVENTIL HFA;VENTOLIN HFA) 108 (90 BASE) MCG/ACT inhaler Inhale 2 puffs into the lungs 2 times daily AND EVERY 4-6 HOURS AS NEEDED    Historical Provider, MD   Multiple Vitamins-Minerals (ICAPS) CAPS Take 1 capsule by mouth 2 times daily     Historical Provider, MD   vitamin B-12 (CYANOCOBALAMIN) 1000 MCG tablet Take 1,000 mcg by mouth daily. Historical Provider, MD   levothyroxine (SYNTHROID) 88 MCG tablet Take 88 mcg by mouth daily.       Historical Provider, MD     Medications:    albuterol sulfate HFA  2 puff Inhalation BID    [START ON 10/20/2022] aspirin  81 mg Oral Daily    atorvastatin  10 mg Oral Nightly    [START ON 10/20/2022] carvedilol  6.25 mg Oral BID WC    [START ON 10/20/2022] ferrous sulfate  325 mg Oral Every Other Day    [START ON 10/20/2022] levothyroxine  88 mcg Oral QAM AC    timolol  1 drop Both Eyes Nightly    [START ON 10/20/2022] vitamin B-12  1,000 mcg Oral Daily    [START ON 10/20/2022] Vitamin D  5,000 Units Oral Daily    latanoprost  1 drop Both Eyes Dinner    sodium chloride flush  5-40 mL IntraVENous 2 times per day    [START ON 10/20/2022] enoxaparin  40 mg SubCUTAneous Daily    [START ON 10/20/2022] tiotropium-olodaterol  2 puff Inhalation Daily      Infusions:    furosemide (LASIX) 1mg/ml infusion      sodium chloride       PRN Meds: traZODone, 50 mg, Nightly PRN  sodium chloride flush, 5-40 mL, PRN  sodium chloride, , PRN  ondansetron, 4 mg, Q8H PRN   Or  ondansetron, 4 mg, Q6H PRN  polyethylene glycol, 17 g, Daily PRN  acetaminophen, 650 mg, Q6H PRN   Or  acetaminophen, 650 mg, Q6H PRN      Recent Labs     10/19/22  1529   WBC 8.1   HGB 13.7   HCT 45.4         Recent Labs     10/19/22  1529   *   K 4.1   CL 93*   CO2 29   BUN 30*   CREATININE 0.9     Recent Labs     10/19/22  1529   AST 29   ALT 20   BILITOT 1.1*   ALKPHOS 257*     No results for input(s): INR in the last 72 hours. Recent Labs     10/19/22  1529   TROPONINT <0.010        Imaging reviewed  XR CHEST PORTABLE    Result Date: 10/19/2022  EXAMINATION: ONE XRAY VIEW OF THE CHEST 10/19/2022 12:53 pm COMPARISON: 07/07/2022 HISTORY: ORDERING SYSTEM PROVIDED HISTORY: SOB TECHNOLOGIST PROVIDED HISTORY: Reason for exam:->SOB Reason for Exam: SOB Additional signs and symptoms: unknown Relevant Medical/Surgical History: CAD, CHF, COPD, pulm. htn FINDINGS: Cardial pericardial silhouette is very enlarged but stable. Unipolar pacemaker is again seen, unchanged in position. Midline sternotomy wires are noted, unchanged in configuration. The pulmonary vascularity is congested and that is increased when compared to the previous exam.  Increased interstitial opacity is also noted. Bibasilar airspace opacity noted, greater on the right. At least a small moderate right pleural effusion is seen and a probable small left pleural effusion. No pneumothorax identified. No free air. No acute bony abnormality. Findings most compatible with pulmonary interstitial edema. However, please correlate with any clinical evidence of superimposed pneumonia, especially in the right lung base.      VL DUP LOWER EXTREMITY VENOUS BILATERAL    Result Date: 10/19/2022  EXAMINATION: DUPLEX VENOUS ULTRASOUND OF THE BILATERAL LOWER [id number]/19/2022 6:26 pm TECHNIQUE: Duplex ultrasound using B-mode/gray scaled imaging, Doppler spectral analysis and color flow Doppler was obtained of the deep venous structures of the lower bilateral extremities. COMPARISON: March 18, 2022 HISTORY: ORDERING SYSTEM PROVIDED HISTORY: leg swelling and pain, ro dvt TECHNOLOGIST PROVIDED HISTORY: Reason for exam:->leg swelling and pain, ro dvt FINDINGS: The visualized veins of the bilateral lower extremities are patent and free of echogenic thrombus. The veins demonstrate good compressibility with normal color flow study and spectral analysis. No evidence of DVT in either lower extremity. CTA PULMONARY W CONTRAST    Result Date: 10/19/2022  EXAMINATION: CTA OF THE CHEST 10/19/2022 2:09 pm TECHNIQUE: CTA of the chest was performed after the administration of intravenous contrast.  Multiplanar reformatted images are provided for review. MIP images are provided for review. Automated exposure control, iterative reconstruction, and/or weight based adjustment of the mA/kV was utilized to reduce the radiation dose to as low as reasonably achievable. COMPARISON: 07/07/2022 HISTORY: ORDERING SYSTEM PROVIDED HISTORY: SOB, ro acute intrathoracic pathology TECHNOLOGIST PROVIDED HISTORY: Reason for exam:->SOB, ro acute intrathoracic pathology Decision Support Exception - unselect if not a suspected or confirmed emergency medical condition->Emergency Medical Condition (MA) Reason for Exam: SOB, ro acute intrathoracic pathology FINDINGS: Pulmonary Arteries: There is a suboptimal contrast bolus, along with streak artifact generated by the patient's arms at her side, limiting evaluation of the segmental and subsegmental branches. No central pulmonary embolism is detected. Main pulmonary artery is dilated, suggesting pulmonary hypertension, similar when compared to the previous exam. Mediastinum: Visualized thyroid unremarkable. No lymphadenopathy. Esophagus unremarkable. There is marked enlargement of the right atrium. The remaining cardiac chambers are dilated, albeit to a lesser extent. There is a great deal of reflux of contrast seen into the hepatic veins.  No pericardial effusion is identified. Thoracic aorta is normal in caliber. Lungs/pleura: Moderate right pleural effusion has developed, new when compared to the previous exam.  Adjacent airspace disease is seen. Parenchymal bands of opacity noted in the lower lungs bilaterally. Diffuse bronchial wall thickening is seen. No pneumothorax. Upper Abdomen: Small to moderate volume of ascites, new when compared to the previous exam. Soft Tissues/Bones: Diffuse subcutaneous edema. Extensive collateral vessels are seen within the right chest wall presumably secondary to subclavian stenosis or occlusion. The bones appear demineralized. Multilevel compression fractures are seen within the thoracic spine, unchanged when compared to the previous exam from 4 months ago. No new fractures are detected. Limited evaluation of the segmental and subsegmental pulmonary arterial branches. No central pulmonary embolism is detected. Marked enlargement of the right atrium, with extensive reflux into the hepatic veins, suggesting right heart failure. Small right pleural effusion with adjacent airspace disease. That airspace disease may reflect passive atelectasis, but pneumonia and aspiration remain in the differential. Anasarca, with subcutaneous edema and ascites.           Electronically signed by LISA Spears CNP on 10/19/2022 at 9:47 PM

## 2022-10-19 NOTE — ED NOTES
ED TO INPATIENT SBAR HANDOFF    Patient Name: Paula Spangler   :  3/18/1929  80 y.o. MRN:  [id number]  Preferred Name  n/a  ED Room #:  ED18/ED-18  Family/Caregiver Present yes   Restraints no   Sitter no   Sepsis Risk Score Sepsis Risk Score: 1.78    Situation  Code Status: Prior No additional code details. Allergies: Darvocet [propoxyphene n-acetaminophen], Ranexa [ranolazine er], Ranolazine, Sulfa antibiotics, Sulfasalazine, Adhesive tape, Allantoin, Bacitracin, Gramicidin, Neomycin, Polymyxin b, Pramoxine hcl, and Silicone  Weight: Patient Vitals for the past 96 hrs (Last 3 readings):   Weight   10/19/22 1502 150 lb (68 kg)   10/19/22 1451 150 lb (68 kg)     Arrived from: home  Chief Complaint:   Chief Complaint   Patient presents with    Leg Swelling     On lasix requiring O2 now     Hospital Problem/Diagnosis:  Active Problems:    * No active hospital problems. *  Resolved Problems:    * No resolved hospital problems. *    Imaging:   CTA PULMONARY W CONTRAST   Final Result   Limited evaluation of the segmental and subsegmental pulmonary arterial   branches. No central pulmonary embolism is detected. Marked enlargement of the right atrium, with extensive reflux into the   hepatic veins, suggesting right heart failure. Small right pleural effusion with adjacent airspace disease. That airspace   disease may reflect passive atelectasis, but pneumonia and aspiration remain   in the differential.      Anasarca, with subcutaneous edema and ascites. XR CHEST PORTABLE   Final Result   Findings most compatible with pulmonary interstitial edema. However, please   correlate with any clinical evidence of superimposed pneumonia, especially in   the right lung base.          VL DUP LOWER EXTREMITY VENOUS BILATERAL    (Results Pending)     Abnormal labs:   Abnormal Labs Reviewed   CBC WITH AUTO DIFFERENTIAL - Abnormal; Notable for the following components:       Result Value    MCHC 30.2 (*) RDW 15.5 (*)     MPV 11.2 (*)     Segs Relative 81.6 (*)     Lymphocytes % 6.9 (*)     Monocytes % 7.3 (*)     Eosinophils % 3.1 (*)     Immature Neutrophil % 0.5 (*)     All other components within normal limits   COMPREHENSIVE METABOLIC PANEL - Abnormal; Notable for the following components:    Sodium 132 (*)     Chloride 93 (*)     BUN 30 (*)     Est, Glom Filt Rate 60 (*)     Glucose 150 (*)     Total Bilirubin 1.1 (*)     Alkaline Phosphatase 257 (*)     All other components within normal limits   BRAIN NATRIURETIC PEPTIDE - Abnormal; Notable for the following components:    Pro-BNP 1,508 (*)     All other components within normal limits   BLOOD GAS, VENOUS - Abnormal; Notable for the following components:    pH, Yunior 7.30 (*)     pCO2, Yunior 69 (*)     Base Exc, Mixed 5.3 (*)     HCO3, Venous 34.0 (*)     O2 Sat, Yunior 48.7 (*)     All other components within normal limits     Critical values: no     Abnormal Assessment Findings:  On bipap now d/t fluid overload    Background  History:   Past Medical History:   Diagnosis Date    Age-related osteoporosis with current pathological fracture of vertebra ([address] 7/8/2022    Arthritis     Bradycardia 2001    requiring dual chamber pacemaker at [facility]    CAD (coronary artery disease)     Cardiac pacemaker 10/2001    [employer] #[id number]  PPM- Serial # [id number]- Dr Bob Jon    CHF (congestive heart failure) ([facility])     COPD, mild ([address] 2/17/2017    Cor pulmonale (chronic) ([address] 3/15/2022    CVA (cerebrovascular accident) ([address]     DJD (degenerative joint disease) of cervical spine     C5-C6, C6-C7    Exhaustion of cardiac pacemaker battery 11/21/2011    PPM battery replacement- [employer]    Family history of cardiovascular disease     Glaucoma Dx 2010    H/O 24 hour EKG monitoring 8/6/2000 8/6/2000- Intermittent episonde of a-fib/flutter    H/O cardiac catheterization 4/20/2010, 2/1989 4/20/2010-Severe native vessel disease and has graft disease as well. LAD, CX totally occluded. RCA totally occluded in proximal segment. VG to RCA widely patent. PDA 90% LAD afer LIMA anastomosis 80-90% stenosis. Proceeded with PTCA with stent next day. H/O cardiovascular stress test 10/14/2011, 6/2/2010,4/8/2010, 5/18/2009,4/6/2009, 10/30/2007, 11/12/2004, 11/6/2003, 8/9/2002, 8/9/2001,6/5/2000,     10/14/2011-Lexiscan-Abnormal Myocardial Perfusion study. Evidence of mild ischemia in the Left CX region. Abnomal study. Rest EF 63%. Global LV systolic function normal. No ECG changes. Unremarkable pharmacological stress test.    H/O cardiovascular stress test 6/10/2013    thallium--mild ischemia left circumflex EF63% no change from 10/2011 study. H/O cardiovascular stress test 10/16/2014    cardiolite-mild ischemia left circumflex,EF70%    H/O chest x-ray 4/19/2009 4/19/2009-Stable cardiomegaly. No acute cardiopulmonary disease. H/O Doppler lower venous ultrasound 09/18/2019    Significant reflux noted in RGSV, RGSV is extremely tortuous and small along the thigh and calf and would be highly unlikely to be accessed, RSSV is non compressible and has occlusive chronic SVT, LSSV is non compressible with occlusive chronic SVT, LGSV removed s/p CABG, Significant reflux in LGSV tributary,    H/O Doppler ultrasound 3/31/2010    CAROTID- 3/31/2010-INtimal thickening but no significant atherosclerotic plaque noted in ANA PAULA. Doppler flow velocities within ANA PAULA are WNL. Heterogeneous, irregular atherosclerotic plaque noted in LICA. Doppler flow velocities within the LICA are elevated, consistent with a mild, less than 50% stenosis. H/O Doppler ultrasound 5/24/2016    Carotid- normal study    H/O Doppler ultrasound 09/06/2017    carotid - normal study    H/O Doppler ultrasound     H/O echocardiogram 10/13    EF=60%, Severe Pulm. HTN, & Sclerotic aortic valve w/stenosis. H/O echocardiogram 10/16/14     EF 55-60% Normal LV. Normal LV systolic function.  Severe tricuspid insufficiency with severe hypertension. H/O echocardiogram 02/03/2017    heart cath performed this morning    H/O echocardiogram 09/18/2019    EF 50-55%, Left atrium is mild to moderately dilated, right atrium is severely dilated, mildly dilated right ventricle, Mod MR, Severe TR, Severe Pulm HTN, no pericardial effusion     History of complete ECG     10/14/2011(Lexiscan);5/6/2010, 4/30/2009,10/24/2008,9/21/2007, 10/13/2006    History of nuclear stress test 11/17/2016    lexiscan-normal,EF70%    Hx of cardiovascular stress test 12/28/2018    EF 60%  Normal study. HX OTHER MEDICAL 05/01/2017    MUGA-normal, EF53%    Hyperlipidemia     Hypertension     Mild intermittent asthma 7/28/2016    Moderate COPD (chronic obstructive pulmonary disease) ([facility]) 3/15/2022    Nausea & vomiting     Obstructive sleep apnea 5/16/2017    Paroxysmal atrial fibrillation ([address]     Post PTCA 4/21/2010    PTCA with 2.25 stent of the LIMA to LAD    Pulmonary HTN ([address]     Severe per last echo on 10/13. 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 ([address] 7/29/2022    WD-Non-pressure chronic ulcer of other part of left lower leg limited to breakdown of skin ([address] 7/29/2022       Assessment    Vitals/MEWS:    Level of Consciousness: Alert (0)   Vitals:    10/19/22 1500 10/19/22 1502 10/19/22 1701 10/19/22 1800   BP: 110/72   123/65   Pulse: 63   61   Resp: 15  24 22   Temp:       TempSrc:       SpO2: 100%   97%   Weight: 150 lb (68 kg)     Height:  4' 11\" (1.499 m)       FiO2 (%): high humidity nasal cannula  O2 Flow Rate: O2 Device: Heated high flow cannula O2 Flow Rate (L/min): 40 L/min  Cardiac Rhythm:    Pain Assessment: n/a [] Verbal [] Mary Carrow Scale  Pain Scale: Pain Assessment  Pain Assessment: None - Denies Pain  Last documented pain score (0-10 scale)    Last documented pain medication administered: n/a  Mental Status: oriented  C-SSRS: Risk of Suicide: No Risk  Bedside swallow:    Vic Coma Scale (GCS): [place] Coma Scale  Eye Opening: Spontaneous  Best Verbal Response: Oriented  Best Motor Response: Obeys commands  [place] Coma Scale Score: 15  Active LDA's:   Peripheral IV 10/19/22 Right Antecubital (Active)   Site Assessment Clean, dry & intact 10/19/22 1536   Line Status Blood return noted;Normal saline locked 10/19/22 1536     PO Status: Regular  Pertinent or High Risk Medications/Drips: no   If Yes, please provide details: n/a  Pending Blood Product Administration: no     You may also review the ED PT Care Timeline found under the Summary Nursing Index tab. Recommendation    Pending orders n/a  Plan for Discharge (if known):    Additional Comments: n/a   If any further questions, please call Sending RN at [phone]    Electronically signed by: Electronically signed by Tabatha Roper RN on 10/19/2022 at 6:56 PM      Tabatha Roper RN  10/19/22 [id number] [facility]

## 2022-10-20 ENCOUNTER — APPOINTMENT (OUTPATIENT)
Dept: INTERVENTIONAL RADIOLOGY/VASCULAR | Age: 87
DRG: 291 | End: 2022-10-20
Payer: MEDICARE

## 2022-10-20 ENCOUNTER — HOSPITAL ENCOUNTER (OUTPATIENT)
Dept: INFUSION THERAPY | Age: 87
Setting detail: INFUSION SERIES
Discharge: HOME | End: 2022-10-20

## 2022-10-20 LAB
ALBUMIN FLUID: 2 GM/DL
AMYLASE FLUID: 23 U/L
ANION GAP SERPL CALCULATED.3IONS-SCNC: 11 MMOL/L (ref 4–16)
APTT: 34 SECONDS (ref 25.1–37.1)
BUN BLDV-MCNC: 27 MG/DL (ref 6–23)
CALCIUM SERPL-MCNC: 9.5 MG/DL (ref 8.3–10.6)
CHLORIDE BLD-SCNC: 97 MMOL/L (ref 99–110)
CO2: 27 MMOL/L (ref 21–32)
CREAT SERPL-MCNC: 0.7 MG/DL (ref 0.6–1.1)
FLUID TYPE: NORMAL INDEX
GFR SERPL CREATININE-BSD FRML MDRD: >60 ML/MIN/1.73M2
GLUCOSE BLD-MCNC: 132 MG/DL (ref 70–99)
GLUCOSE BLD-MCNC: 83 MG/DL (ref 70–99)
INR BLD: 1.23 INDEX
LV EF: 53 %
LVEF MODALITY: NORMAL
LYMPHOCYTES, BODY FLUID: 17 %
MAGNESIUM: 2.2 MG/DL (ref 1.8–2.4)
MESOTHELIAL FLUID: 0 /100 WBC
MONOCYTE, FLUID: 76 %
NEUTROPHIL, FLUID: 7 %
OTHER CELLS FLUID: 0
POTASSIUM SERPL-SCNC: 4.5 MMOL/L (ref 3.5–5.1)
PROTHROMBIN TIME: 15.9 SECONDS (ref 11.7–14.5)
RBC FLUID: 8000 /CU MM
SODIUM BLD-SCNC: 135 MMOL/L (ref 135–145)
SOURCE FLUID: NORMAL
TROPONIN T: <0.01 NG/ML
TROPONIN T: <0.01 NG/ML
WBC FLUID: 244 /CU MM

## 2022-10-20 PROCEDURE — 6370000000 HC RX 637 (ALT 250 FOR IP): Performed by: NURSE PRACTITIONER

## 2022-10-20 PROCEDURE — 36415 COLL VENOUS BLD VENIPUNCTURE: CPT

## 2022-10-20 PROCEDURE — 2060000000 HC ICU INTERMEDIATE R&B

## 2022-10-20 PROCEDURE — 88305 TISSUE EXAM BY PATHOLOGIST: CPT | Performed by: PATHOLOGY

## 2022-10-20 PROCEDURE — 2700000000 HC OXYGEN THERAPY PER DAY

## 2022-10-20 PROCEDURE — 85730 THROMBOPLASTIN TIME PARTIAL: CPT

## 2022-10-20 PROCEDURE — 85610 PROTHROMBIN TIME: CPT

## 2022-10-20 PROCEDURE — 0W9G3ZZ DRAINAGE OF PERITONEAL CAVITY, PERCUTANEOUS APPROACH: ICD-10-PCS | Performed by: RADIOLOGY

## 2022-10-20 PROCEDURE — 93308 TTE F-UP OR LMTD: CPT

## 2022-10-20 PROCEDURE — 88108 CYTOPATH CONCENTRATE TECH: CPT | Performed by: PATHOLOGY

## 2022-10-20 PROCEDURE — 82150 ASSAY OF AMYLASE: CPT

## 2022-10-20 PROCEDURE — 6370000000 HC RX 637 (ALT 250 FOR IP): Performed by: HOSPITALIST

## 2022-10-20 PROCEDURE — 89051 BODY FLUID CELL COUNT: CPT

## 2022-10-20 PROCEDURE — 84484 ASSAY OF TROPONIN QUANT: CPT

## 2022-10-20 PROCEDURE — 49083 ABD PARACENTESIS W/IMAGING: CPT

## 2022-10-20 PROCEDURE — 82042 OTHER SOURCE ALBUMIN QUAN EA: CPT

## 2022-10-20 PROCEDURE — 6360000002 HC RX W HCPCS: Performed by: NURSE PRACTITIONER

## 2022-10-20 PROCEDURE — 83735 ASSAY OF MAGNESIUM: CPT

## 2022-10-20 PROCEDURE — 82962 GLUCOSE BLOOD TEST: CPT

## 2022-10-20 PROCEDURE — 99223 1ST HOSP IP/OBS HIGH 75: CPT | Performed by: INTERNAL MEDICINE

## 2022-10-20 PROCEDURE — 80048 BASIC METABOLIC PNL TOTAL CA: CPT

## 2022-10-20 PROCEDURE — 87205 SMEAR GRAM STAIN: CPT

## 2022-10-20 PROCEDURE — 2580000003 HC RX 258: Performed by: NURSE PRACTITIONER

## 2022-10-20 PROCEDURE — 94761 N-INVAS EAR/PLS OXIMETRY MLT: CPT

## 2022-10-20 PROCEDURE — 87070 CULTURE OTHR SPECIMN AEROBIC: CPT

## 2022-10-20 PROCEDURE — 94640 AIRWAY INHALATION TREATMENT: CPT

## 2022-10-20 PROCEDURE — 99211 OFF/OP EST MAY X REQ PHY/QHP: CPT

## 2022-10-20 RX ORDER — ALBUTEROL SULFATE 90 UG/1
2 AEROSOL, METERED RESPIRATORY (INHALATION) EVERY 4 HOURS PRN
Status: DISCONTINUED | OUTPATIENT
Start: 2022-10-20 | End: 2022-10-29 | Stop reason: HOSPADM

## 2022-10-20 RX ADMIN — LATANOPROST 1 DROP: 50 SOLUTION/ DROPS OPHTHALMIC at 17:21

## 2022-10-20 RX ADMIN — CARVEDILOL 6.25 MG: 6.25 TABLET, FILM COATED ORAL at 08:03

## 2022-10-20 RX ADMIN — ALBUTEROL SULFATE 2 PUFF: 90 AEROSOL, METERED RESPIRATORY (INHALATION) at 07:23

## 2022-10-20 RX ADMIN — FUROSEMIDE 10 MG/HR: 10 INJECTION INTRAMUSCULAR; INTRAVENOUS at 07:52

## 2022-10-20 RX ADMIN — ALBUTEROL SULFATE 2 PUFF: 90 AEROSOL, METERED RESPIRATORY (INHALATION) at 19:53

## 2022-10-20 RX ADMIN — FERROUS SULFATE TAB 325 MG (65 MG ELEMENTAL FE) 325 MG: 325 (65 FE) TAB at 12:21

## 2022-10-20 RX ADMIN — FUROSEMIDE 10 MG/HR: 10 INJECTION INTRAMUSCULAR; INTRAVENOUS at 19:03

## 2022-10-20 RX ADMIN — SODIUM CHLORIDE, PRESERVATIVE FREE 10 ML: 5 INJECTION INTRAVENOUS at 08:02

## 2022-10-20 RX ADMIN — Medication 5000 UNITS: at 08:04

## 2022-10-20 RX ADMIN — TIOTROPIUM BROMIDE AND OLODATEROL 2 PUFF: 3.124; 2.736 SPRAY, METERED RESPIRATORY (INHALATION) at 07:25

## 2022-10-20 RX ADMIN — TIMOLOL MALEATE 1 DROP: 5 SOLUTION OPHTHALMIC at 21:08

## 2022-10-20 RX ADMIN — ASPIRIN 81 MG CHEWABLE TABLET 81 MG: 81 TABLET CHEWABLE at 08:03

## 2022-10-20 RX ADMIN — TRAZODONE HYDROCHLORIDE 50 MG: 50 TABLET ORAL at 21:52

## 2022-10-20 RX ADMIN — CARVEDILOL 6.25 MG: 6.25 TABLET, FILM COATED ORAL at 17:20

## 2022-10-20 RX ADMIN — CYANOCOBALAMIN TAB 1000 MCG 1000 MCG: 1000 TAB at 08:03

## 2022-10-20 RX ADMIN — LEVOTHYROXINE SODIUM 88 MCG: 0.09 TABLET ORAL at 06:46

## 2022-10-20 RX ADMIN — ATORVASTATIN CALCIUM 10 MG: 10 TABLET, FILM COATED ORAL at 21:07

## 2022-10-20 RX ADMIN — ALBUTEROL SULFATE 2 PUFF: 90 AEROSOL, METERED RESPIRATORY (INHALATION) at 17:56

## 2022-10-20 RX ADMIN — SODIUM CHLORIDE, PRESERVATIVE FREE 10 ML: 5 INJECTION INTRAVENOUS at 21:09

## 2022-10-20 ASSESSMENT — PAIN SCALES - GENERAL: PAINLEVEL_OUTOF10: 0

## 2022-10-20 NOTE — PROGRESS NOTES
4 Eyes Skin Assessment     NAME:  Angela Galvan OF BIRTH:  3/18/1929  MEDICAL RECORD NUMBER:  [id number]    The patient is being assess for  Admission    I agree that 2 RN's have performed a thorough Head to Toe Skin Assessment on the patient. ALL assessment sites listed below have been assessed. Areas assessed by both nurses:    Head, Face, Ears, Shoulders, Back, Chest, Arms, Elbows, Hands, Sacrum. Buttock, Coccyx, Ischium, and Legs. Feet and Heels        Does the Patient have a Wound?  No noted wound(s)       Cruz Prevention initiated:  NA   Wound Care Orders initiated:  No    Pressure Injury (Stage 3,4, Unstageable, DTI, NWPT, and Complex wounds) if present place referral/consult order under [de-identified] No    New and Established Ostomies if present place consult order under : No      Nurse 1 eSignature: Electronically signed by Rogelio Agee RN on 10/20/22 at 12:54 AM EDT    **SHARE this note so that the co-signing nurse is able to place an eSignature**    Nurse 2 eSignature: Electronically signed by Alicia Fallon RN on 10/20/22 at 1:10 AM EDT

## 2022-10-20 NOTE — ED PROVIDER NOTES
[address] [facility] ICU STEPDOWN  EMERGENCY DEPARTMENT ENCOUNTER        Pt Name: Saleem Ortega  MRN: [id number]  [place] 3/18/1929  Date of evaluation: 10/19/2022  Provider: MARY Milner  PCP: Xena Rodas DO    MOOKIE. I have evaluated this patient. My supervising physician was available for consultation. Triage CHIEF COMPLAINT       Chief Complaint   Patient presents with    Leg Swelling     On lasix requiring O2 now         HISTORY OF PRESENT ILLNESS      Chief Complaint: Shortness of breath, anasarca, fluid overload    Saleem Ortega is a 80 y.o. female who presents to the emergency department today sent in by cardiology for concern of developing shortness of breath, anasarca in the setting of likely heart failure. Patient has history of a pacemaker, has obstructive sleep apnea. Has history of pulmonary hypertension. Was seen evaluate by cardiology today for progressive shortness of breath and weight gain. Was ultimately sent to the emergency department secondary to a new oxygen requirement. Describing no chest pain. Does demonstrate some abdominal swelling/anasarca, not significant amount of lower extremity edema, is complaining of leg pain secondary to a wound and seen by the wound clinic. Has had no recent fevers or chills cough congestion, no wheezing. Seen by the heart Dr. Azalia beauchamp. Nursing Notes were all reviewed and agreed with or any disagreements were addressed in the HPI. REVIEW OF SYSTEMS     Constitutional:   Denies fever, chills, weight loss or weakness   HENT:   Denies sore throat or ear pain   Cardiovascular: See HPI  Respiratory:  Denies cough or shortness of breath    GI:   Denies abdominal pain, nausea, vomiting, or diarrhea  :  Denies any urinary symptoms or vaginal symptoms.    Musculoskeletal:   Denies back pain  Skin: See HPI  Neurologic:   Denies headache, focal weakness or sensory changes   Endocrine:  Denies polyuria or polydypsia   Lymphatic: Denies swollen glands     PAST MEDICAL HISTORY     Past Medical History:   Diagnosis Date    Age-related osteoporosis with current pathological fracture of vertebra ([facility] [address] 7/8/2022    Arthritis     Bradycardia 2001    requiring dual chamber pacemaker at [facility]    CAD (coronary artery disease)     Cardiac pacemaker 10/2001    [employer] #[id number]  PPM- Serial # [id number]- Dr Loco Barnhart    CHF (congestive heart failure) ([facility])     COPD, mild ([address] 2/17/2017    Cor pulmonale (chronic) ([address] 3/15/2022    CVA (cerebrovascular accident) ([address]     DJD (degenerative joint disease) of cervical spine     C5-C6, C6-C7    Exhaustion of cardiac pacemaker battery 11/21/2011    PPM battery replacement- [employer]    Family history of cardiovascular disease     Glaucoma Dx 2010    H/O 24 hour EKG monitoring 8/6/2000 8/6/2000- Intermittent episonde of a-fib/flutter    H/O cardiac catheterization 4/20/2010, 2/1989 4/20/2010-Severe native vessel disease and has graft disease as well. LAD, CX totally occluded. RCA totally occluded in proximal segment. VG to RCA widely patent. PDA 90% LAD afer LIMA anastomosis 80-90% stenosis. Proceeded with PTCA with stent next day. H/O cardiovascular stress test 10/14/2011, 6/2/2010,4/8/2010, 5/18/2009,4/6/2009, 10/30/2007, 11/12/2004, 11/6/2003, 8/9/2002, 8/9/2001,6/5/2000,     10/14/2011-Lexiscan-Abnormal Myocardial Perfusion study. Evidence of mild ischemia in the Left CX region. Abnomal study. Rest EF 63%. Global LV systolic function normal. No ECG changes. Unremarkable pharmacological stress test.    H/O cardiovascular stress test 6/10/2013    thallium--mild ischemia left circumflex EF63% no change from 10/2011 study. H/O cardiovascular stress test 10/16/2014    cardiolite-mild ischemia left circumflex,EF70%    H/O chest x-ray 4/19/2009 4/19/2009-Stable cardiomegaly. No acute cardiopulmonary disease.     H/O Doppler lower venous ultrasound 09/18/2019    Significant reflux noted in [place] is extremely tortuous and small along the thigh and calf and would be highly unlikely to be accessed, RSSV is non compressible and has occlusive chronic SVT, LSSV is non compressible with occlusive chronic SVT, LGSV removed s/p CABG, Significant reflux in LGSV tributary,    H/O Doppler ultrasound 3/31/2010    CAROTID- 3/31/2010-INtimal thickening but no significant atherosclerotic plaque noted in [place]. Doppler flow velocities within [place] are WNL. Heterogeneous, irregular atherosclerotic plaque noted in LICA. Doppler flow velocities within the LICA are elevated, consistent with a mild, less than 50% stenosis. H/O Doppler ultrasound 5/24/2016    Carotid- normal study    H/O Doppler ultrasound 09/06/2017    carotid - normal study    H/O Doppler ultrasound     H/O echocardiogram 10/13    EF=60%, Severe Pulm. HTN, & Sclerotic aortic valve w/stenosis. H/O echocardiogram 10/16/14     EF 55-60% Normal LV. Normal LV systolic function. Severe tricuspid insufficiency with severe hypertension. H/O echocardiogram 02/03/2017    heart cath performed this morning    H/O echocardiogram 09/18/2019    EF 50-55%, Left atrium is mild to moderately dilated, right atrium is severely dilated, mildly dilated right ventricle, Mod MR, Severe TR, Severe Pulm HTN, no pericardial effusion     History of complete ECG     10/14/2011(Lexiscan);5/6/2010, 4/30/2009,10/24/2008,9/21/2007, 10/13/2006    History of nuclear stress test 11/17/2016    lexiscan-normal,EF70%    Hx of cardiovascular stress test 12/28/2018    EF 60%  Normal study.     HX OTHER MEDICAL 05/01/2017    MUGA-normal, EF53%    Hyperlipidemia     Hypertension     Mild intermittent asthma 7/28/2016    Moderate COPD (chronic obstructive pulmonary disease) (HCC) 3/15/2022    Nausea & vomiting     Obstructive sleep apnea 5/16/2017    Paroxysmal atrial fibrillation (HCC)     Post PTCA 4/21/2010    PTCA with 2.25 stent of the LIMA to LAD    Pulmonary HTN ([address] Severe per last echo on 10/13. 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 Followed by MAZE procedure by pulmonary vein isolation.-  Dr Elvia Meyer    S/P PTCA (percutaneous transluminal coronary angioplasty) 11/2012    PTCA with stent to RCA    Severe pulmonary hypertension ([address] 3/15/2022    Shortness of breath 3/15/2022    Thyroid disease     hypothyroi    Unspecified cerebral artery occlusion with cerebral infarction Unsure When    No Residual    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 ([address] 7/29/2022    WD-Non-pressure chronic ulcer of other part of left lower leg limited to breakdown of skin ([address] 7/29/2022       SURGICAL HISTORY     Past Surgical History:   Procedure Laterality Date    APPENDECTOMY  1941    CARDIAC SURGERY  4/09    CABG (3 Bypasses), One Heart Stent in 2010    COLONOSCOPY  In 2000's    X1    CORONARY ANGIOPLASTY WITH STENT PLACEMENT  4/21/2010    PTCA with stent LIMA ->LAD    CORONARY ARTERY BYPASS GRAFT  4/8/2009    LIMA->Diag,  LIMA -> LAD;  SVG->RCA going to the PDA.  Followed by MAZE procedure by pulmonary vein isolation.-  Dr Annika Dubois, TOTAL ABDOMINAL (CERVIX REMOVED)  1990's    MIDDLE EAR SURGERY      OTHER SURGICAL HISTORY      Ear surgery-hearing    PACEMAKER PLACEMENT      battery change 11/21/2011 [employer]tr[employer]    PTCA  11/2012    Ptca with stent to RCA    TONSILLECTOMY  1950's       Cody Apodaca       Current Discharge Medication List        CONTINUE these medications which have NOT CHANGED    Details   !! torsemide (DEMADEX) 20 MG tablet Take 20 mg by mouth 2 times daily      aspirin 81 MG chewable tablet Take 81 mg by mouth daily      ferrous sulfate (IRON 325) 325 (65 Fe) MG tablet Take 325 mg by mouth every other day      Magnesium 500 MG TABS Take 500 mg by mouth daily      !! torsemide (DEMADEX) 20 MG tablet Take 1 tablet by mouth as needed (for fluid retention or weight gain of 3 lbs overnight) This is in addition to the 20 mg bid  Qty: 30 tablet, Refills: 0      Turmeric (QC TUMERIC COMPLEX PO) Take 1 capsule by mouth daily      traZODone (DESYREL) 50 MG tablet Take  mg by mouth nightly as needed for Sleep      acetaminophen (TYLENOL) 500 MG tablet Take 1,000 mg by mouth every 4 hours as needed for Pain or Fever      spironolactone (ALDACTONE) 50 MG tablet Take 1 tablet by mouth daily  Qty: 30 tablet, Refills: 0      PROCTOZONE-HC 2.5 % CREA rectal cream INSERT RECTALLY TWICE DAILY as directed AS NEEDED FOR HEMORRHOIDS      ZIOPTAN 0.0015 % SOLN Place 1 drop into both eyes Daily with supper       CETIRIZINE HCL PO Take 1 tablet by mouth daily as needed (allergies)      vitamin D 25 MCG (1000 UT) CAPS Take 5,000 Units by mouth daily      Probiotic Product (PROBIOTIC-10) CHEW Take 1 tablet by mouth daily      atorvastatin (LIPITOR) 10 MG tablet Take 10 mg by mouth nightly      umeclidinium-vilanterol (ANORO ELLIPTA) 62.5-25 MCG/INH AEPB inhaler Inhale 1 puff into the lungs daily  Qty: 1 each, Refills: 11      CPAP Machine MISC by Does not apply route      Biotin 5 MG CAPS Take 1 capsule by mouth daily      Misc Natural Products (OSTEO BI-FLEX ADV DOUBLE ST PO) Take 1 tablet by mouth daily      timolol (TIMOPTIC) 0.5 % ophthalmic solution Place 1 drop into both eyes nightly      carvedilol (COREG) 6.25 MG tablet Take 1 tablet by mouth 2 times daily (with meals)  Qty: 180 tablet, Refills: 3      albuterol (PROVENTIL HFA;VENTOLIN HFA) 108 (90 BASE) MCG/ACT inhaler Inhale 2 puffs into the lungs 2 times daily AND EVERY 4-6 HOURS AS NEEDED      Multiple Vitamins-Minerals (ICAPS) CAPS Take 1 capsule by mouth 2 times daily       vitamin B-12 (CYANOCOBALAMIN) 1000 MCG tablet Take 1,000 mcg by mouth daily. levothyroxine (SYNTHROID) 88 MCG tablet Take 88 mcg by mouth daily. !! - Potential duplicate medications found. Please discuss with provider. ALLERGIES     Darvocet [propoxyphene n-acetaminophen], Ranexa [ranolazine er], Ranolazine, Sulfa antibiotics, Sulfasalazine, Adhesive tape, Allantoin, Bacitracin, Gramicidin, Neomycin, Polymyxin b, Pramoxine hcl, and Silicone    FAMILYHISTORY       Family History   Problem Relation Age of Onset    Cancer Mother         \"Liver Cancer\"    Arthritis Mother     Depression Mother     Hearing Loss Mother     High Blood Pressure Mother     High Cholesterol Mother     Mental Illness Mother     Miscarriages / Stillbirths Mother     Heart Disease Father     Early Death Father 36        \"Instant Death\"    High Blood Pressure Father     High Cholesterol Father     Coronary Art Dis Father         Massive MI    Heart Disease Sister     Depression Sister     Cancer Sister         \"Breast Cancer, Cancer Free Now\"    High Blood Pressure Sister     Other Daughter         \"She's Had Stomach Surgery\"    High Blood Pressure Son     High Blood Pressure Son     Early Death Paternal Grandfather         SOCIAL HISTORY       Social History     Socioeconomic History    Marital status:      Number of children: 4   Occupational History    Occupation: RETIRED     Comment: from [employer] [address] Use    Smoking status: Former     Packs/day: 0.25     Years: 5.00     Pack years: 1.25     Types: Cigarettes     Quit date: 1970     Years since quittin.9    Smokeless tobacco: Never   Vaping Use    Vaping Use: Never used   Substance and Sexual Activity    Alcohol use: Not Currently     Comment: Caffiene - no more than 3 cups of coffee each day    Drug use: Never    Sexual activity: Yes     Partners: Male     Comment:        SCREENINGS    [place] Coma Scale  Eye Opening: Spontaneous  Best Verbal Response: Oriented  Best Motor Response: Obeys commands  [place] Coma Scale Score: 15      PHYSICAL EXAM       ED Triage Vitals   BP Temp Temp Source Heart Rate Resp SpO2 Height Weight   10/19/22 1448 10/19/22 1452 10/19/22 1452 10/19/22 1451 10/19/22 1451 10/19/22 1451 10/19/22 1502 10/19/22 1451   121/79 98.1 °F (36.7 °C) Oral 65 15 100 % 4' 11\" (1.499 m) 150 lb (68 kg)      Constitutional:  Well developed, Well nourished. No distress  HENT:  Normocephalic, Atraumatic, PERRL. EOMI. Sclera clear. Conjunctiva normal, No discharge. Neck/Lymphatics: supple, no JVD, no swollen nodes  Cardiovascular:   RRR,  no murmurs/rubs/gallops. Evidence of anasarca in the abdomen, 1+ pitting edema bilaterally. Respiratory: Accessory muscle use with breathing, slightly tachypneic, coarse lung sounds, rhonchi. No wheezing. Abdomen: Bowel sounds normal, Soft, No tenderness, no masses. Musculoskeletal:    There is 1+ pitting edema bilaterally, no significant asymmetry, no calf tenderness. No cyanosis, no significant erythema or warmth. No cool or pale-appearing limb. Distal cap refill and pulses intact bilateral upper and lower extremities  Bilateral upper and lower extremity ROM intact without pain or obvious deficit  Integument: Open wound to the left posterior leg, consistent with a pressure ulcer. Neurologic:  Alert & oriented , No focal deficits noted. Cranial nerves II through XII grossly intact. Normal gross motor coordination & motor strength bilateral upper and lower extremities  Sensation intact.   Psychiatric:  Affect normal, Mood normal.     DIAGNOSTIC RESULTS   LABS:    Labs Reviewed   CBC WITH AUTO DIFFERENTIAL - Abnormal; Notable for the following components:       Result Value    MCHC 30.2 (*)     RDW 15.5 (*)     MPV 11.2 (*)     Segs Relative 81.6 (*)     Lymphocytes % 6.9 (*)     Monocytes % 7.3 (*)     Eosinophils % 3.1 (*)     Immature Neutrophil % 0.5 (*)     All other components within normal limits   COMPREHENSIVE METABOLIC PANEL - Abnormal; Notable for the following components:    Sodium 132 (*)     Chloride 93 (*)     BUN 30 (*)     Est, Glom Filt Rate 60 (*)     Glucose 150 (*)     Total Bilirubin 1.1 (*)     Alkaline Phosphatase 257 (*)     All other components within normal limits   BRAIN NATRIURETIC PEPTIDE - Abnormal; Notable for the following components:    Pro-BNP 1,508 (*)     All other components within normal limits   BLOOD GAS, VENOUS - Abnormal; Notable for the following components:    pH, [place] 7.30 (*)     pCO2, [place] 69 (*)     Base Exc, Mixed 5.3 (*)     HCO3, Venous 34.0 (*)     O2 Sat, [place] 48.7 (*)     All other components within normal limits   BASIC METABOLIC PANEL - Abnormal; Notable for the following components:    Chloride 97 (*)     BUN 27 (*)     All other components within normal limits   PROTIME/INR & PTT - Abnormal; Notable for the following components:    Protime 15.9 (*)     All other components within normal limits   TROPONIN   LACTIC ACID   TROPONIN   TROPONIN   MAGNESIUM   URINALYSIS       When ordered, only abnormal lab results are displayed. All other labs were within normal range or not returned as of this dictation. EKG: When ordered, EKG's are interpreted by the Emergency Department Physician in the absence of a cardiologist.  Please see their note for interpretation of EKG. RADIOLOGY:   Non-plain film images such as CT, Ultrasound and MRI are read by the radiologist. Plain radiographic images are visualized and preliminarily interpreted by the  ED Provider with the below findings:    Interpretation perthe Radiologist below, if available at the time of this note:    VL DUP LOWER EXTREMITY VENOUS BILATERAL   Final Result   No evidence of DVT in either lower extremity. CTA PULMONARY W CONTRAST   Final Result   Limited evaluation of the segmental and subsegmental pulmonary arterial   branches. No central pulmonary embolism is detected. Marked enlargement of the right atrium, with extensive reflux into the   hepatic veins, suggesting right heart failure. Small right pleural effusion with adjacent airspace disease.   That airspace   disease may reflect passive atelectasis, but pneumonia and aspiration remain   in the differential.      Anasarca, with subcutaneous edema and ascites. XR CHEST PORTABLE   Final Result   Findings most compatible with pulmonary interstitial edema. However, please   correlate with any clinical evidence of superimposed pneumonia, especially in   the right lung base. IR US GUIDED PARACENTESIS    (Results Pending)     XR CHEST PORTABLE    Result Date: 10/19/2022  EXAMINATION: ONE XRAY VIEW OF THE CHEST 10/19/2022 12:53 pm COMPARISON: 07/07/2022 HISTORY: ORDERING SYSTEM PROVIDED HISTORY: SOB TECHNOLOGIST PROVIDED HISTORY: Reason for exam:->SOB Reason for Exam: SOB Additional signs and symptoms: unknown Relevant Medical/Surgical History: CAD, CHF, COPD, pulm. htn FINDINGS: Cardial pericardial silhouette is very enlarged but stable. Unipolar pacemaker is again seen, unchanged in position. Midline sternotomy wires are noted, unchanged in configuration. The pulmonary vascularity is congested and that is increased when compared to the previous exam.  Increased interstitial opacity is also noted. Bibasilar airspace opacity noted, greater on the right. At least a small moderate right pleural effusion is seen and a probable small left pleural effusion. No pneumothorax identified. No free air. No acute bony abnormality. Findings most compatible with pulmonary interstitial edema. However, please correlate with any clinical evidence of superimposed pneumonia, especially in the right lung base. VL DUP LOWER EXTREMITY VENOUS BILATERAL    Result Date: 10/19/2022  EXAMINATION: DUPLEX VENOUS ULTRASOUND OF THE BILATERAL LOWER [id number]/19/2022 6:26 pm TECHNIQUE: Duplex ultrasound using B-mode/gray scaled imaging, Doppler spectral analysis and color flow Doppler was obtained of the deep venous structures of the lower bilateral extremities.  COMPARISON: March 18, 2022 HISTORY: ORDERING SYSTEM PROVIDED HISTORY: leg swelling and pain, ro dvt TECHNOLOGIST PROVIDED HISTORY: Reason for exam:->leg swelling and pain, ro dvt FINDINGS: The visualized veins of the bilateral lower extremities are patent and free of echogenic thrombus. The veins demonstrate good compressibility with normal color flow study and spectral analysis. No evidence of DVT in either lower extremity. CTA PULMONARY W CONTRAST    Result Date: 10/19/2022  EXAMINATION: CTA OF THE CHEST 10/19/2022 2:09 pm TECHNIQUE: CTA of the chest was performed after the administration of intravenous contrast.  Multiplanar reformatted images are provided for review. MIP images are provided for review. Automated exposure control, iterative reconstruction, and/or weight based adjustment of the mA/kV was utilized to reduce the radiation dose to as low as reasonably achievable. COMPARISON: 07/07/2022 HISTORY: ORDERING SYSTEM PROVIDED HISTORY: SOB, ro acute intrathoracic pathology TECHNOLOGIST PROVIDED HISTORY: Reason for exam:->SOB, ro acute intrathoracic pathology Decision Support Exception - unselect if not a suspected or confirmed emergency medical condition->Emergency Medical Condition (MA) Reason for Exam: SOB, ro acute intrathoracic pathology FINDINGS: Pulmonary Arteries: There is a suboptimal contrast bolus, along with streak artifact generated by the patient's arms at her side, limiting evaluation of the segmental and subsegmental branches. No central pulmonary embolism is detected. Main pulmonary artery is dilated, suggesting pulmonary hypertension, similar when compared to the previous exam. Mediastinum: Visualized thyroid unremarkable. No lymphadenopathy. Esophagus unremarkable. There is marked enlargement of the right atrium. The remaining cardiac chambers are dilated, albeit to a lesser extent. There is a great deal of reflux of contrast seen into the hepatic veins. No pericardial effusion is identified. Thoracic aorta is normal in caliber. Lungs/pleura:  Moderate right pleural effusion has developed, new when compared to the previous exam.  Adjacent airspace disease is seen. Parenchymal bands of opacity noted in the lower lungs bilaterally. Diffuse bronchial wall thickening is seen. No pneumothorax. Upper Abdomen: Small to moderate volume of ascites, new when compared to the previous exam. Soft Tissues/Bones: Diffuse subcutaneous edema. Extensive collateral vessels are seen within the right chest wall presumably secondary to subclavian stenosis or occlusion. The bones appear demineralized. Multilevel compression fractures are seen within the thoracic spine, unchanged when compared to the previous exam from 4 months ago. No new fractures are detected. Limited evaluation of the segmental and subsegmental pulmonary arterial branches. No central pulmonary embolism is detected. Marked enlargement of the right atrium, with extensive reflux into the hepatic veins, suggesting right heart failure. Small right pleural effusion with adjacent airspace disease. That airspace disease may reflect passive atelectasis, but pneumonia and aspiration remain in the differential. Anasarca, with subcutaneous edema and ascites. PROCEDURES   Unless otherwise noted below, none      CRITICAL CARE   CRITICAL CARE NOTE:   CRITICAL CARE NOTE:  There was a high probability of clinically significant life-threatening deterioration of the patient's condition requiring my urgent intervention due to decompensated heart failure. Telemetry monitoring, oxygenation, IV Lasix, BiPAP, specialty consultation, frequent reevaluation was performed to address this. Total critical care time is  30 minutes. This includes vital sign monitoring, pulse oximetry monitoring, telemetry monitoring, clinical response to the IV medications, reviewing the nursing notes, consultation time, dictation/documentation time, and interpretation of the lab work.  This time excludes time spent performing procedures and separately billable procedures and family discussion time.      CONSULTS:  IP CONSULT TO CARDIOLOGY  IP CONSULT TO HOSPITALIST  IP CONSULT TO INTERVENTIONAL RADIOLOGY      EMERGENCY DEPARTMENT COURSE and MDM:   Vitals:    Vitals:    10/20/22 0800 10/20/22 0802 10/20/22 0900 10/20/22 1000   BP: 110/60 110/60 (!) 105/56 (!) 102/52   Pulse: 60 60 60 63   Resp: 24 27 17 18   Temp:       TempSrc:       SpO2: 96% 96% 99% 96%   Weight:       Height:           Patient was given thefollowing medications:  Medications   furosemide (LASIX) 100 mg in dextrose 5 % 100 mL infusion (10 mg/hr IntraVENous New Bag 10/20/22 0752)   albuterol sulfate HFA (PROVENTIL;VENTOLIN;PROAIR) 108 (90 Base) MCG/ACT inhaler 2 puff (2 puffs Inhalation Given 10/20/22 0723)   aspirin chewable tablet 81 mg (81 mg Oral Given 10/20/22 0803)   atorvastatin (LIPITOR) tablet 10 mg (10 mg Oral Given 10/19/22 2218)   carvedilol (COREG) tablet 6.25 mg (6.25 mg Oral Given 10/20/22 0803)   ferrous sulfate (IRON 325) tablet 325 mg (has no administration in time range)   levothyroxine (SYNTHROID) tablet 88 mcg (88 mcg Oral Given 10/20/22 0646)   timolol (TIMOPTIC) 0.5 % ophthalmic solution 1 drop (1 drop Both Eyes Given 10/19/22 2220)   traZODone (DESYREL) tablet 50 mg (50 mg Oral Given 10/19/22 2212)   vitamin B-12 (CYANOCOBALAMIN) tablet 1,000 mcg (1,000 mcg Oral Given 10/20/22 0803)   Vitamin D (CHOLECALCIFEROL) tablet 5,000 Units (5,000 Units Oral Given 10/20/22 0804)   latanoprost (XALATAN) 0.005 % ophthalmic solution 1 drop (1 drop Both Eyes Given 10/19/22 2220)   sodium chloride flush 0.9 % injection 5-40 mL (10 mLs IntraVENous Given 10/20/22 0802)   sodium chloride flush 0.9 % injection 5-40 mL (has no administration in time range)   0.9 % sodium chloride infusion (has no administration in time range)   ondansetron (ZOFRAN-ODT) disintegrating tablet 4 mg (has no administration in time range)     Or   ondansetron (ZOFRAN) injection 4 mg (has no administration in time range)   polyethylene glycol (GLYCOLAX) packet 17 g (has no administration in time range)   acetaminophen (TYLENOL) tablet 650 mg (650 mg Oral Given 10/19/22 2212)     Or   acetaminophen (TYLENOL) suppository 650 mg ( Rectal See Alternative 10/19/22 2212)   enoxaparin (LOVENOX) injection 40 mg (0 mg SubCUTAneous Held 10/20/22 0705)   tiotropium-olodaterol (STIOLTO) 2.5-2.5 MCG/ACT inhaler 2 puff (2 puffs Inhalation Given 10/20/22 0725)   collagenase ointment (has no administration in time range)   furosemide (LASIX) injection 40 mg (40 mg IntraVENous Given 10/19/22 1879)   iopamidol (ISOVUE-370) 76 % injection 80 mL (80 mLs IntraVENous Given 10/19/22 7627)         Is this patient to be included in the SEP-1 Core Measure due to severe sepsis or septic shock? No   Exclusion criteria - the patient is NOT to be included for SEP-1 Core Measure due to: Infection is not suspected    MDM:  Patient presents as above. Emergent etiologies considered. Patient seen and examined. Work-up initiated secondary to presentation, physical exam findings, vital signs and medical chart review. In brief, 80-year-old female presenting to the emergency department today sent in for worsening shortness of breath, hypoxia, heart failure. Patient requirin oxygen, his labored breathing maintaining her saturations, has abdominal swelling, lower extremity swelling. Describing no chest pain. Just more progressive shortness of breath, no fevers or chills. Is currently on Lasix, will send in for likely diuresis. Patient initially placed on Vapotherm and then had to be transition to BiPAP secondary to her rate of breathing. Was given Lasix. Did have signs of vascular congestion on x-ray, CTA was negative for any underlying pulmonary emboli, signs of developing right-sided heart failure evident. Ultrasound is still pending but we will look to admit.   Will admit to medicine, did get cardiology on board advised continue diuresis. Patient otherwise remained stable. CLINICAL IMPRESSION      1. Congestive heart failure, unspecified HF chronicity, unspecified heart failure type ([facility] [address]    2. Anasarca          DISPOSITION/PLAN   DISPOSITION Admitted 10/19/2022 07:53:29 PM      (Please note that portions ofthis note were completed with a voice recognition program.  Efforts were made to edit the dictations but occasionally words are mis-transcribed. )    MARY Torre (electronically signed)            MARY Haynes  10/20/22 0913

## 2022-10-20 NOTE — CONSULTS
CARDIOLOGY CONSULT NOTE   Reason for consultation: Shortness of breath    Referring physician:  Pelon Norton MD     Primary care physician: William Colin DO      Dear   Thanks for the consult. History of present illness:Irene is a 80 y. o.year old who was sent in from primary cardio office for dyspnea and shortness of breath requiring IV Lasix initially at primary care office, patient has history of: Severe pulmonary pretension, severe COPD  enlarged RV pacemaker insertion bypass surgery and PCI in past she has been a poor historian all information obtained after review of medical record and discussion with staff, at home she was only on 20mg torsemide twice daily with Aldactone  Chief Complaint   Patient presents with    Leg Swelling     On lasix requiring O2 now     Blood pressure, cholesterol, blood glucose and weight are well controlled.     Past medical history:    has a past medical history of Age-related osteoporosis with current pathological fracture of vertebra (HCC), Arthritis, Bradycardia, CAD (coronary artery disease), Cardiac pacemaker, CHF (congestive heart failure) ([address] COPD, mild ([address] Cor pulmonale (chronic) ([address] CVA (cerebrovascular accident) ([address] DJD (degenerative joint disease) of cervical spine, Exhaustion of cardiac pacemaker battery, Family history of cardiovascular disease, Glaucoma, H/O 24 hour EKG monitoring, H/O cardiac catheterization, H/O cardiovascular stress test, H/O cardiovascular stress test, H/O cardiovascular stress test, H/O chest x-ray, H/O Doppler lower venous ultrasound, H/O Doppler ultrasound, H/O Doppler ultrasound, H/O Doppler ultrasound, H/O Doppler ultrasound, H/O echocardiogram, H/O echocardiogram, H/O echocardiogram, H/O echocardiogram, History of complete ECG, History of nuclear stress test, Hx of cardiovascular stress test, HX OTHER MEDICAL, Hyperlipidemia, Hypertension, Mild intermittent asthma, Moderate COPD (chronic obstructive pulmonary disease) ([facility] [address] Nausea & vomiting, Obstructive sleep apnea, Paroxysmal atrial fibrillation (HCC), Post PTCA, Pulmonary HTN ([facility] [address] PVD (peripheral vascular disease) ([facility] [address] S/P CABG x 3, S/P PTCA (percutaneous transluminal coronary angioplasty), Severe pulmonary hypertension ([facility] [address] Shortness of breath, Thyroid disease, Unspecified cerebral artery occlusion with cerebral infarction, 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 Past surgical history:   has a past surgical history that includes Appendectomy (1941); Tonsillectomy (1950's); Cardiac surgery (4/09); Hysterectomy, total abdominal (1990's); Colonoscopy (In 2000's); pacemaker placement; Coronary artery bypass graft (4/8/2009); Coronary angioplasty with stent (4/21/2010); other surgical history; Middle ear surgery; and Percutaneous Transluminal Coronary Angio (11/2012). Social History:   reports that she quit smoking about 51 years ago. Her smoking use included cigarettes. She has a 1.25 pack-year smoking history. She has never used smokeless tobacco. She reports that she does not currently use alcohol. She reports that she does not use drugs.   Family history:   no family history of CAD, STROKE of DM    Allergies   Allergen Reactions    Darvocet [Propoxyphene N-Acetaminophen]     Ranexa [Ranolazine Er]      Sick to her stomach    Ranolazine      Sick to her stomach    Sulfa Antibiotics Itching    Sulfasalazine Itching    Adhesive Tape Rash    Allantoin Rash    Bacitracin Rash    Gramicidin Rash    Neomycin Rash    Polymyxin B Rash    Pramoxine Hcl Rash    Silicone Rash       collagenase ointment, Daily  furosemide (LASIX) 100 mg in dextrose 5 % 100 mL infusion, Continuous  albuterol sulfate HFA (PROVENTIL;VENTOLIN;PROAIR) 108 (90 Base) MCG/ACT inhaler 2 puff, BID  aspirin chewable tablet 81 mg, Daily  atorvastatin (LIPITOR) tablet 10 mg, Nightly  carvedilol (COREG) tablet 6.25 mg, BID WC  ferrous sulfate (IRON 325) tablet 325 mg, Every Other Day  levothyroxine (SYNTHROID) tablet 88 mcg, QAM AC  timolol (TIMOPTIC) 0.5 % ophthalmic solution 1 drop, Nightly  traZODone (DESYREL) tablet 50 mg, Nightly PRN  vitamin B-12 (CYANOCOBALAMIN) tablet 1,000 mcg, Daily  Vitamin D (CHOLECALCIFEROL) tablet 5,000 Units, Daily  latanoprost (XALATAN) 0.005 % ophthalmic solution 1 drop, Dinner  sodium chloride flush 0.9 % injection 5-40 mL, 2 times per day  sodium chloride flush 0.9 % injection 5-40 mL, PRN  0.9 % sodium chloride infusion, PRN  ondansetron (ZOFRAN-ODT) disintegrating tablet 4 mg, Q8H PRN   Or  ondansetron (ZOFRAN) injection 4 mg, Q6H PRN  polyethylene glycol (GLYCOLAX) packet 17 g, Daily PRN  acetaminophen (TYLENOL) tablet 650 mg, Q6H PRN   Or  acetaminophen (TYLENOL) suppository 650 mg, Q6H PRN  enoxaparin (LOVENOX) injection 40 mg, Daily  tiotropium-olodaterol (STIOLTO) 2.5-2.5 MCG/ACT inhaler 2 puff, Daily      Current Facility-Administered Medications   Medication Dose Route Frequency Provider Last Rate Last Admin    collagenase ointment   Topical Daily Sonja Mondragon MD        furosemide (LASIX) 100 mg in dextrose 5 % 100 mL infusion  10 mg/hr IntraVENous Continuous LISA Morris - CNP 10 mL/hr at 10/20/22 0752 10 mg/hr at 10/20/22 0752    albuterol sulfate HFA (PROVENTIL;VENTOLIN;PROAIR) 108 (90 Base) MCG/ACT inhaler 2 puff  2 puff Inhalation BID Verlan Heading, APRN - CNP   2 puff at 10/20/22 0723    aspirin chewable tablet 81 mg  81 mg Oral Daily Verlan Heading, APRN - CNP   81 mg at 10/20/22 0803    atorvastatin (LIPITOR) tablet 10 mg  10 mg Oral Nightly Verlan Heading, APRN - CNP   10 mg at 10/19/22 1351    carvedilol (COREG) tablet 6.25 mg  6.25 mg Oral BID  LISA Morris CNP   6.25 mg at 10/20/22 8252    ferrous sulfate (IRON 325) tablet 325 mg  325 mg Oral Every Other Day LISA Morris - RANDI        levothyroxine (SYNTHROID) tablet 88 mcg  88 mcg Oral QAM AC Verlan Heading, APRN - CNP   88 mcg at 10/20/22 0646    timolol (TIMOPTIC) 0.5 % ophthalmic solution 1 drop  1 drop Both Eyes Nightly Verlan Heading, APRN - CNP   1 drop at 10/19/22 2220    traZODone (DESYREL) tablet 50 mg  50 mg Oral Nightly PRN Verlan Heading, APRN - CNP   50 mg at 10/19/22 2212    vitamin B-12 (CYANOCOBALAMIN) tablet 1,000 mcg  1,000 mcg Oral Daily Verlan Heading, APRN - CNP   1,000 mcg at 10/20/22 2944    Vitamin D (CHOLECALCIFEROL) tablet 5,000 Units  5,000 Units Oral Daily Verlan Heading, APRN - CNP   5,000 Units at 10/20/22 0804    latanoprost (XALATAN) 0.005 % ophthalmic solution 1 drop  1 drop Both Eyes Dinner Verlan Heading, APRN - CNP   1 drop at 10/19/22 2220    sodium chloride flush 0.9 % injection 5-40 mL  5-40 mL IntraVENous 2 times per day Verlan Heading, APRN - CNP   10 mL at 10/20/22 0802    sodium chloride flush 0.9 % injection 5-40 mL  5-40 mL IntraVENous PRN Verlan Heading, APRN - CNP        0.9 % sodium chloride infusion   IntraVENous PRN Verlan Heading, APRN - CNP        ondansetron (ZOFRAN-ODT) disintegrating tablet 4 mg  4 mg Oral Q8H PRN Verlan Heading, APRN - CNP        Or    ondansetron [facility]) injection 4 mg  4 mg IntraVENous Q6H PRN Verlan Heading, APRN - CNP        polyethylene glycol (GLYCOLAX) packet 17 g  17 g Oral Daily PRN Verlan Heading, APRN - CNP        acetaminophen (TYLENOL) tablet 650 mg  650 mg Oral Q6H PRN Verlan Heading, APRN - CNP   650 mg at 10/19/22 2212    Or    acetaminophen (TYLENOL) suppository 650 mg  650 mg Rectal Q6H PRN Verlan Heading, APRN - CNP        enoxaparin (LOVENOX) injection 40 mg  40 mg SubCUTAneous Daily Verlan Heading, APRN - CNP        tiotropium-olodaterol (STIOLTO) 2.5-2.5 MCG/ACT inhaler 2 puff  2 puff Inhalation Daily Verlan Heading, APRN - CNP   2 puff at 10/20/22 2518     Review of Systems:   Constitutional: No Fever or Weight Loss   Eyes: No Decreased Vision  ENT: No Headaches, Hearing Loss or Vertigo  Cardiovascular: No chest pain, dyspnea on exertion, palpitations or loss of consciousness  Respiratory: No cough or wheezing    Gastrointestinal: No abdominal pain, appetite loss, blood in stools, constipation, diarrhea or heartburn  Genitourinary: No dysuria, trouble voiding, or hematuria  Musculoskeletal:  No gait disturbance, weakness or joint complaints  Integumentary: No rash or pruritis  Neurological: No TIA or stroke symptoms  Psychiatric: No anxiety or depression  Endocrine: No malaise, fatigue or temperature intolerance  Hematologic/Lymphatic: No bleeding problems, blood clots or swollen lymph nodes  Allergic/Immunologic: No nasal congestion or hives  All systems negative except as marked. Physical Examination:    Vitals:    10/20/22 0802   BP: 110/60   Pulse: 60   Resp: 18   Temp: afebrile   SpO2:       Wt Readings from Last 3 Encounters:   10/19/22 150 lb (68 kg)   10/19/22 150 lb (68 kg)   10/13/22 154 lb 6 oz (70 kg)     Body mass index is 30.3 kg/m². General Appearance:  No distress, conversant    Constitutional:  Well developed, Well nourished, No acute distress, Non-toxic appearance. HENT:  Normocephalic, Atraumatic, Bilateral external ears normal, Oropharynx moist, No oral exudates, Nose normal. Neck- Normal range of motion, No tenderness, Supple, No stridor,no apical-carotid delay, no carotid bruit  Eyes:  PERRL, EOMI, Conjunctiva normal, No discharge. Respiratory:  Normal breath sounds, No respiratory distress, No wheezing, No chest tenderness. ,no use of accessory muscles, diaphragm movement is normal  Cardiovascular: (PMI) apex non displaced,no lifts no thrills, no s3,no s4, Normal heart rate, Normal rhythm, No murmurs, No rubs, No gallops.  Carotid arteries pulse and amplitude are normal no bruit, no abdominal bruit noted ( normal abdominal aorta ausculation), femoral arteries pulse and amplitude are normal no bruit, pedal pulses are normal  GI:  Bowel sounds normal, Soft, No tenderness, No masses, No pulsatile masses, no hepatosplenomegally, no bruits  : External genitalia appear normal, No masses or lesions. No discharge. No CVA tenderness. Musculoskeletal:  Intact distal pulses, No edema, No tenderness, No cyanosis, No clubbing. Good range of motion in all major joints. No tenderness to palpation or major deformities noted. Back- No tenderness. Integument:  Warm, Dry, No erythema, No rash. Skin: no rash, no ulcers  Lymphatic:  No lymphadenopathy noted. Neurologic:  Alert & oriented x 3, Normal motor function, Normal sensory function, No focal deficits noted. Psychiatric:  Affect normal, Judgment normal, Mood normal.   Lab Review   Recent Labs     10/19/22  1529   WBC 8.1   HGB 13.7   HCT 45.4         Recent Labs     10/20/22  0225      K 4.5   CL 97*   CO2 27   BUN 27*   CREATININE 0.7     Recent Labs     10/19/22  1529   AST 29   ALT 20   BILITOT 1.1*   ALKPHOS 257*     No results for input(s): TROPONINI in the last 72 hours. Lab Results   Component Value Date    BNP 90 09/28/2013     Lab Results   Component Value Date    INR 1.23 10/20/2022    PROTIME 15.9 (H) 10/20/2022         EKG:paced    Chest Xray: Pulmonary edema    ECHO: Pending  Labs, echo, meds reviewed  Assessment: 80 y. o.year old with PMH of  has a past medical history of Age-related osteoporosis with current pathological fracture of vertebra ([address] Arthritis, Bradycardia, CAD (coronary artery disease), Cardiac pacemaker, CHF (congestive heart failure) ([address] COPD, mild ([address] Cor pulmonale (chronic) ([address] CVA (cerebrovascular accident) ([address] DJD (degenerative joint disease) of cervical spine, Exhaustion of cardiac pacemaker battery, Family history of cardiovascular disease, Glaucoma, H/O 24 hour EKG monitoring, H/O cardiac catheterization, H/O cardiovascular stress test, H/O cardiovascular stress test, H/O cardiovascular stress test, H/O chest x-ray, H/O Doppler lower venous ultrasound, H/O Doppler ultrasound, H/O Doppler ultrasound, H/O Doppler ultrasound, H/O Doppler ultrasound, H/O echocardiogram, H/O echocardiogram, H/O echocardiogram, H/O echocardiogram, History of complete ECG, History of nuclear stress test, Hx of cardiovascular stress test, HX OTHER MEDICAL, Hyperlipidemia, Hypertension, Mild intermittent asthma, Moderate COPD (chronic obstructive pulmonary disease) ([address] Nausea & vomiting, Obstructive sleep apnea, Paroxysmal atrial fibrillation ([address] Post PTCA, Pulmonary HTN ([address] PVD (peripheral vascular disease) ([address] S/P CABG x 3, S/P PTCA (percutaneous transluminal coronary angioplasty), Severe pulmonary hypertension ([address] Shortness of breath, Thyroid disease, Unspecified cerebral artery occlusion with cerebral infarction, 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       Recommendations:    Acute decompensated congestive heart failure with history of cor pulmonale severe LV dilatation and severe pulmonary hypertension, admit to ICU stepdown, start IV Lasix drip will need few days of Lasix IV drip echo ordered again, her previous right heart catheterization report reviewed from 2019, her pulmonary cavity wedge pressure at that time was 17 RA pressure was 13 PA pressure was 32 mean RV pressure mean was 11 however this was 2019 once she is euvolemic will recommend to repeat right heart cath she should see pulmonary for cor pulmonale and severe pulm hypertension  COPD not controlled recommend to see pulmonary  CAD stable history of bypass and PCI of RCA in 2012 and LIMA to LAD stent in 2010 bypass surgery was 2009, continue aspirin statin beta-blocker  History of pacemaker stable we will recheck pacemaker  Health maintenance: exerise and diet  All labs, medications and tests reviewed, continue all other medications of all above medical condition listed as is.          Maida Gamino MD, 10/20/2022 10:03 AM

## 2022-10-20 NOTE — PLAN OF CARE
Problem: Discharge Planning  Goal: Discharge to home or other facility with appropriate resources  Outcome: Progressing  Flowsheets (Taken 10/20/2022 1044 by Calvin Donohue RN)  Discharge to home or other facility with appropriate resources: Identify barriers to discharge with patient and caregiver     Problem: Pain  Goal: Verbalizes/displays adequate comfort level or baseline comfort level  Outcome: Progressing     Problem: Chronic Conditions and Co-morbidities  Goal: Patient's chronic conditions and co-morbidity symptoms are monitored and maintained or improved  Outcome: Progressing     Problem: Skin/Tissue Integrity  Goal: Absence of new skin breakdown  Description: 1. Monitor for areas of redness and/or skin breakdown  2. Assess vascular access sites hourly  3. Every 4-6 hours minimum:  Change oxygen saturation probe site  4. Every 4-6 hours:  If on nasal continuous positive airway pressure, respiratory therapy assess nares and determine need for appliance change or resting period.   Outcome: Progressing

## 2022-10-20 NOTE — PROGRESS NOTES
[place]2.[place]  [facility] [place]      Name:  Dorian Apple /Age/Sex: 3/18/1929  (80 y.o. female)   MRN & CSN:  [id number] & [id number] Encounter Date/Time: 10/20/2022 2:21 PM EDT    Location:  -A PCP: [address] [place] [address] Day: 2    Assessment and Plan:   Dorian Apple is a 80 y.o. female who presents with CHF exacerbation      Plan:  Acute on chronic diastolic heart failure-on Lasix drip. Echo pending. Cardiology consult. Monitor daily I's and O's and weights. CTA chest: Enlargement of the right atrium with reflux into hepatic veins; small right pleural effusion with adjacent airspace disease; anasarca. On 4 L nasal cannula. BNP 1.5K. Troponin negative. Anasarca and ascites-paracentesis by IR today. Left distal calf wound-wound care consult  Severe pulmonary hypertension    CAD Hx CABG  Glaucoma  Hx bradycardia W/cardiac pacemaker  SAULO  History of CVA  Diet ADULT DIET; Regular; Low Sodium (2 gm); 2000 ml    DVT Prophylaxis [x] Lovenox, []  Heparin, [] SCDs, [] Ambulation,  [] Eliquis, [] Xarelto  [] Coumadin   Code Status Full Code   Disposition From: Home  Expected Disposition: TBD  Estimated Date of Discharge: 10/25  Patient requires continued admission due to CHF   Home O2 None     Subjective/Interval history:   Chief Complaint: Leg Swelling (On lasix requiring O2 now)     Has been swelling up and getting short of breath for last two months. Had worsened recently. Does not wear oxygen at home. No fever, no cough, no chest pain. Does have palpitations. Had paracentesis today    Review of Systems:    Negative unless mentioned above    Objective:      Intake/Output Summary (Last 24 hours) at 10/20/2022 1421  Last data filed at 10/20/2022 1149  Gross per 24 hour   Intake 190 ml   Output 350 ml   Net -160 ml        Vitals:   BP (!) 145/64   Pulse 62   Temp 97.6 °F (36.4 °C) (Oral)   Resp 20   Ht 4' 11\" (1.499 m)   Wt 150 lb (68 kg)   SpO2 97%   BMI 30.30 kg/m²     Physical Exam:   General: NAD, mild respiratory distress  Eyes: no discharge  HENT: NCAT  Cardiovascular: RRR  Respiratory: rales b/l bs+  Gastrointestinal: Soft, nontender, mild distension  Genitourinary: no suprapubic tenderness  Musculoskeletal: no tenderness in LE, pitting edema in LE  Skin: has bandage on back of LLE near calf  Neuro: Alert. No gross deficits  Psych: Mood appropriate.      Medications:   Medications:    collagenase   Topical Daily    albuterol sulfate HFA  2 puff Inhalation BID    aspirin  81 mg Oral Daily    atorvastatin  10 mg Oral Nightly    carvedilol  6.25 mg Oral BID WC    ferrous sulfate  325 mg Oral Every Other Day    levothyroxine  88 mcg Oral QAM AC    timolol  1 drop Both Eyes Nightly    vitamin B-12  1,000 mcg Oral Daily    Vitamin D  5,000 Units Oral Daily    latanoprost  1 drop Both Eyes Dinner    sodium chloride flush  5-40 mL IntraVENous 2 times per day    enoxaparin  40 mg SubCUTAneous Daily    tiotropium-olodaterol  2 puff Inhalation Daily      Infusions:    furosemide (LASIX) 1mg/ml infusion 10 mg/hr (10/20/22 0752)    sodium chloride       PRN Meds: traZODone, 50 mg, Nightly PRN  sodium chloride flush, 5-40 mL, PRN  sodium chloride, , PRN  ondansetron, 4 mg, Q8H PRN   Or  ondansetron, 4 mg, Q6H PRN  polyethylene glycol, 17 g, Daily PRN  acetaminophen, 650 mg, Q6H PRN   Or  acetaminophen, 650 mg, Q6H PRN        Labs      Recent Labs     10/19/22  1529   WBC 8.1   HGB 13.7   HCT 45.4         Recent Labs     10/19/22  1529 10/20/22  0225   * 135   K 4.1 4.5   CL 93* 97*   CO2 29 27   BUN 30* 27*   CREATININE 0.9 0.7     Recent Labs     10/19/22  1529   AST 29   ALT 20   BILITOT 1.1*   ALKPHOS 257*     Recent Labs     10/20/22  0827   INR 1.23     Recent Labs     10/19/22  1529 10/19/22  2316 10/20/22  0225   TROPONINT <0.010 <0.010 <0.010     No results found for: LABA1C  CALCIUM:  9.5/27 (10/20 0225)  Lab Results   Component Value Date/Time    MG 2.2 10/20/2022 02:25 AM           Electronically signed by Barbara Jason MD on 10/20/2022 at 2:21 PM

## 2022-10-20 NOTE — PROGRESS NOTES
Received phone call from out pt pharmacy. Meds for pt already delivered to room. Placed in patient bin in med room. Primary RN made aware.

## 2022-10-20 NOTE — CARE COORDINATION
CM spoke with pt's daughter/POA, Bonita, to initiate discharge planning. Role of CM explained. Pt has insurance and is able to afford medication. Pt has PCP. Pt lives home alone in a one story home. She uses a walker and a c-PAP. She has a shower chair but  prefers to wash up at the sink. Bonita provides her transportation. Bonita stated that pt no longer sees Dr Hari Burgos as he has moved to [place] [facility]. She sees Dr Leyla Cooper. This information was changed in the chart. No needs, plan home. CM contact information given to pt. CM team available as needs arise.

## 2022-10-20 NOTE — CONSULTS
[address] Continence Nurse  Consult Note       Brigette Gaviria  AGE: 80 y.o. GENDER: female  : 3/18/1929  TODAY'S DATE:  10/20/2022    Subjective:     Reason for  Evaluation and Assessment: wound care evalJuan Gaviria is a 80 y.o. female referred by:   [x] Physician  [] Nursing  [] Other:     Wound Identification:  Wound Type: venous  Contributing Factors: edema and venous stasis        PAST MEDICAL HISTORY        Diagnosis Date    Age-related osteoporosis with current pathological fracture of vertebra ([address] 2022    Arthritis     Bradycardia     requiring dual chamber pacemaker at [facility]    CAD (coronary artery disease)     Cardiac pacemaker 10/2001    [employer] #[id number]  PPM- Serial # 26-[facility]- Dr Tony Davey    CHF (congestive heart failure) ([address]     COPD, mild ([address] 2017    Cor pulmonale (chronic) ([address] 3/15/2022    CVA (cerebrovascular accident) ([address]     DJD (degenerative joint disease) of cervical spine     C5-C6, C6-C7    Exhaustion of cardiac pacemaker battery 2011    PPM battery replacement- [employer]    Family history of cardiovascular disease     Glaucoma Dx     H/O 24 hour EKG monitoring 2000- Intermittent episonde of a-fib/flutter    H/O cardiac catheterization 2010, 2010-Severe native vessel disease and has graft disease as well. LAD, CX totally occluded. RCA totally occluded in proximal segment. VG to RCA widely patent. PDA 90% LAD afer LIMA anastomosis 80-90% stenosis. Proceeded with PTCA with stent next day. H/O cardiovascular stress test 10/14/2011, 2010,2010, 2009,2009, 10/30/2007, 2004, 2003, 2002, 2001,2000,     10/14/2011-Lexiscan-Abnormal Myocardial Perfusion study. Evidence of mild ischemia in the Left CX region. Abnomal study. Rest EF 63%. Global LV systolic function normal. No ECG changes.  Unremarkable pharmacological stress test.    H/O cardiovascular stress test 6/10/2013    thallium--mild ischemia left circumflex EF63% no change from 10/2011 study. H/O cardiovascular stress test 10/16/2014    cardiolite-mild ischemia left circumflex,EF70%    H/O chest x-ray 4/19/2009 4/19/2009-Stable cardiomegaly. No acute cardiopulmonary disease. H/O Doppler lower venous ultrasound 09/18/2019    Significant reflux noted in RGSV, RGSV is extremely tortuous and small along the thigh and calf and would be highly unlikely to be accessed, RSSV is non compressible and has occlusive chronic SVT, LSSV is non compressible with occlusive chronic SVT, LGSV removed s/p CABG, Significant reflux in LGSV tributary,    H/O Doppler ultrasound 3/31/2010    CAROTID- 3/31/2010-INtimal thickening but no significant atherosclerotic plaque noted in ANA PAULA. Doppler flow velocities within ANA PAULA are WNL. Heterogeneous, irregular atherosclerotic plaque noted in LICA. Doppler flow velocities within the LICA are elevated, consistent with a mild, less than 50% stenosis. H/O Doppler ultrasound 5/24/2016    Carotid- normal study    H/O Doppler ultrasound 09/06/2017    carotid - normal study    H/O Doppler ultrasound     H/O echocardiogram 10/13    EF=60%, Severe Pulm. HTN, & Sclerotic aortic valve w/stenosis. H/O echocardiogram 10/16/14     EF 55-60% Normal LV. Normal LV systolic function. Severe tricuspid insufficiency with severe hypertension. H/O echocardiogram 02/03/2017    heart cath performed this morning    H/O echocardiogram 09/18/2019    EF 50-55%, Left atrium is mild to moderately dilated, right atrium is severely dilated, mildly dilated right ventricle, Mod MR, Severe TR, Severe Pulm HTN, no pericardial effusion     History of complete ECG     10/14/2011(Lexiscan);5/6/2010, 4/30/2009,10/24/2008,9/21/2007, 10/13/2006    History of nuclear stress test 11/17/2016    lexiscan-normal,EF70%    Hx of cardiovascular stress test 12/28/2018    EF 60%  Normal study.     HX OTHER MEDICAL 05/01/2017    MUGA-normal, EF53%    Hyperlipidemia     Hypertension     Mild intermittent asthma 7/28/2016    Moderate COPD (chronic obstructive pulmonary disease) ([facility]) 3/15/2022    Nausea & vomiting     Obstructive sleep apnea 5/16/2017    Paroxysmal atrial fibrillation ([address]     Post PTCA 4/21/2010    PTCA with 2.25 stent of the LIMA to LAD    Pulmonary HTN ([address]     Severe per last echo on 10/13. 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 Followed by MAZE procedure by pulmonary vein isolation.-  Dr Beacher Sicard    S/P PTCA (percutaneous transluminal coronary angioplasty) 11/2012    PTCA with stent to RCA    Severe pulmonary hypertension ([address] 3/15/2022    Shortness of breath 3/15/2022    Thyroid disease     hypothyroi    Unspecified cerebral artery occlusion with cerebral infarction Unsure When    No Residual    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 ([address] 7/29/2022    WD-Non-pressure chronic ulcer of other part of left lower leg limited to breakdown of skin ([address] 7/29/2022       PAST SURGICAL HISTORY    Past Surgical History:   Procedure Laterality Date    APPENDECTOMY  1941    CARDIAC SURGERY  4/09    CABG (3 Bypasses), One Heart Stent in 2010    COLONOSCOPY  In 2000's    X1    CORONARY ANGIOPLASTY WITH STENT PLACEMENT  4/21/2010    PTCA with stent LIMA ->LAD    CORONARY ARTERY BYPASS GRAFT  4/8/2009    LIMA->Diag,  LIMA -> LAD;  SVG->RCA going to the PDA.  Followed by MAZE procedure by pulmonary vein isolation.-  Dr Eric Su, TOTAL ABDOMINAL (CERVIX REMOVED)  1990's    MIDDLE EAR SURGERY      OTHER SURGICAL HISTORY      Ear surgery-hearing    PACEMAKER PLACEMENT      battery change 11/21/2011 Medtronic    PTCA  11/2012    Ptca with stent to RCA    TONSILLECTOMY  1950's       FAMILY HISTORY    Family History   Problem Relation Age of Onset    Cancer Mother         Isamar Glez" Arthritis Mother     Depression Mother     Hearing Loss Mother     High Blood Pressure Mother     High Cholesterol Mother     Mental Illness Mother     Miscarriages / Stillbirths Mother     Heart Disease Father     Early Death Father 36        \"Instant Death\"    High Blood Pressure Father     High Cholesterol Father     Coronary Art Dis Father         Massive MI    Heart Disease Sister     Depression Sister     Cancer Sister         \"Breast Cancer, Cancer Free Now\"    High Blood Pressure Sister     Other Daughter         \"She's Had Stomach Surgery\"    High Blood Pressure Son     High Blood Pressure Son     Early Death Paternal Grandfather        SOCIAL HISTORY    Social History     Tobacco Use    Smoking status: Former     Packs/day: 0.25     Years: 5.00     Pack years: 1.25     Types: Cigarettes     Quit date: 1970     Years since quittin.9    Smokeless tobacco: Never   Vaping Use    Vaping Use: Never used   Substance Use Topics    Alcohol use: Not Currently     Comment: Caffiene - no more than 3 cups of coffee each day    Drug use: Never       ALLERGIES    Allergies   Allergen Reactions    Darvocet [Propoxyphene N-Acetaminophen]     Ranexa [Ranolazine Er]      Sick to her stomach    Ranolazine      Sick to her stomach    Sulfa Antibiotics Itching    Sulfasalazine Itching    Adhesive Tape Rash    Allantoin Rash    Bacitracin Rash    Gramicidin Rash    Neomycin Rash    Polymyxin B Rash    Pramoxine Hcl Rash    Silicone Rash       MEDICATIONS    No current facility-administered medications on file prior to encounter.      Current Outpatient Medications on File Prior to Encounter   Medication Sig Dispense Refill    torsemide (DEMADEX) 20 MG tablet Take 20 mg by mouth 2 times daily      aspirin 81 MG chewable tablet Take 81 mg by mouth daily      ferrous sulfate (IRON 325) 325 (65 Fe) MG tablet Take 325 mg by mouth every other day      Magnesium 500 MG TABS Take 500 mg by mouth daily      torsemide (DEMADEX) 20 MG tablet Take 1 tablet by mouth as needed (for fluid retention or weight gain of 3 lbs overnight) This is in addition to the 20 mg bid (Patient taking differently: Take 20 mg by mouth daily as needed (for fluid retention or weight gain of 3 lbs overnight) This is in addition to the 20 mg bid) 30 tablet 0    Turmeric (QC TUMERIC COMPLEX PO) Take 1 capsule by mouth daily      traZODone (DESYREL) 50 MG tablet Take  mg by mouth nightly as needed for Sleep      acetaminophen (TYLENOL) 500 MG tablet Take 1,000 mg by mouth every 4 hours as needed for Pain or Fever      spironolactone (ALDACTONE) 50 MG tablet Take 1 tablet by mouth daily (Patient taking differently: Take 25 mg by mouth daily) 30 tablet 0    PROCTOZONE-HC 2.5 % CREA rectal cream INSERT RECTALLY TWICE DAILY as directed AS NEEDED FOR HEMORRHOIDS      ZIOPTAN 0.0015 % SOLN Place 1 drop into both eyes Daily with supper       CETIRIZINE HCL PO Take 1 tablet by mouth daily as needed (allergies)      vitamin D 25 MCG (1000 UT) CAPS Take 5,000 Units by mouth daily      Probiotic Product (PROBIOTIC-10) CHEW Take 1 tablet by mouth daily      atorvastatin (LIPITOR) 10 MG tablet Take 10 mg by mouth nightly      umeclidinium-vilanterol (ANORO ELLIPTA) 62.5-25 MCG/INH AEPB inhaler Inhale 1 puff into the lungs daily 1 each 11    CPAP Machine MISC by Does not apply route      Biotin 5 MG CAPS Take 1 capsule by mouth daily      Misc Natural Products (OSTEO BI-FLEX ADV DOUBLE ST PO) Take 1 tablet by mouth daily      timolol (TIMOPTIC) 0.5 % ophthalmic solution Place 1 drop into both eyes nightly      carvedilol (COREG) 6.25 MG tablet Take 1 tablet by mouth 2 times daily (with meals) 180 tablet 3    albuterol (PROVENTIL HFA;VENTOLIN HFA) 108 (90 BASE) MCG/ACT inhaler Inhale 2 puffs into the lungs 2 times daily AND EVERY 4-6 HOURS AS NEEDED      Multiple Vitamins-Minerals (ICAPS) CAPS Take 1 capsule by mouth 2 times daily       vitamin B-12 (CYANOCOBALAMIN) 1000 MCG tablet Take 1,000 mcg by mouth daily. levothyroxine (SYNTHROID) 88 MCG tablet Take 88 mcg by mouth daily.              Objective:      /60   Pulse 60   Temp 97.6 °F (36.4 °C) (Oral)   Resp 21   Ht 4' 11\" (1.499 m)   Wt 150 lb (68 kg)   SpO2 97%   BMI 30.30 kg/m²   Cruz Risk Score: Cruz Scale Score: 18    LABS    CBC:   Lab Results   Component Value Date/Time    WBC 8.1 10/19/2022 03:29 PM    RBC 4.54 10/19/2022 03:29 PM    HGB 13.7 10/19/2022 03:29 PM    HCT 45.4 10/19/2022 03:29 PM    .0 10/19/2022 03:29 PM    MCH 30.2 10/19/2022 03:29 PM    MCHC 30.2 10/19/2022 03:29 PM    RDW 15.5 10/19/2022 03:29 PM     10/19/2022 03:29 PM    MPV 11.2 10/19/2022 03:29 PM     CMP:    Lab Results   Component Value Date/Time     10/20/2022 02:25 AM    K 4.5 10/20/2022 02:25 AM    CL 97 10/20/2022 02:25 AM    CO2 27 10/20/2022 02:25 AM    BUN 27 10/20/2022 02:25 AM    CREATININE 0.7 10/20/2022 02:25 AM    GFRAA >60 07/08/2022 08:06 AM    LABGLOM >60 10/20/2022 02:25 AM    GLUCOSE 83 10/20/2022 02:25 AM    PROT 6.7 10/19/2022 03:29 PM    PROT 6.8 11/28/2012 02:41 PM    LABALBU 4.2 10/19/2022 03:29 PM    CALCIUM 9.5 10/20/2022 02:25 AM    BILITOT 1.1 10/19/2022 03:29 PM    ALKPHOS 257 10/19/2022 03:29 PM    AST 29 10/19/2022 03:29 PM    ALT 20 10/19/2022 03:29 PM     Albumin:    Lab Results   Component Value Date/Time    LABALBU 4.2 10/19/2022 03:29 PM     PT/INR:    Lab Results   Component Value Date/Time    PROTIME 15.9 10/20/2022 08:27 AM    PROTIME 39.6 11/18/2011 03:45 PM    INR 1.23 10/20/2022 08:27 AM     HgBA1c:  No results found for: LABA1C      Assessment:     Patient Active Problem List   Diagnosis    CAD (coronary artery disease)    Cardiac pacemaker    S/P CABG x 3    Post PTCA    TIA (transient ischemic attack)    Confusion    Headache    Essential hypertension    Bradycardia    Coronary artery disease involving coronary bypass graft of native heart without angina pectoris Obstructive sleep apnea    Dizziness    PAF (paroxysmal atrial fibrillation) (HCC)    Pacer at end of battery life    Infection of pacemaker pocket ([address]    Hyponatremia    Metabolic encephalopathy    Generalized weakness    Gait disturbance    Acute urinary retention    Hypothyroidism (acquired)    Chronic diastolic (congestive) heart failure (HCC)    Cor pulmonale (chronic) (HCC)    Chronic right-sided heart failure (HCC)    Severe pulmonary hypertension (HCC)    Shortness of breath    Moderate COPD (chronic obstructive pulmonary disease) (HCC)    CHF (congestive heart failure), NYHA class I, acute on chronic, combined (HCC)    Abnormal liver CT -findings suggestive of cirrhosis     At risk for injury associated with anticoagulation    T12 vertebral fracture (HCC)    SAULO on CPAP    Iron deficiency anemia secondary to inadequate dietary iron intake    Chest pain    Age-related osteoporosis with current pathological fracture of vertebra [facility])    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 ([address]    WD-Non-pressure chronic ulcer of other part of left lower leg with fat layer exposed ([address]    Acute on chronic diastolic (congestive) heart failure (HCC)       Measurements:  Wound 07/29/22 Tibial Left;Posterior #1 (Active)   Wound Image   10/20/22 0816   Wound Etiology Venous 10/20/22 0816   Dressing Status New dressing applied 10/20/22 0816   Wound Cleansed Cleansed with saline 10/20/22 0816   Dressing/Treatment Collagen;Silicone border 77/73/08 0816   Offloading for Diabetic Foot Ulcers Offloading not required 10/07/22 1438   Wound Length (cm) 1.5 cm 10/20/22 0816   Wound Width (cm) 1.5 cm 10/20/22 0816   Wound Depth (cm) 0.2 cm 10/20/22 0816   Wound Surface Area (cm^2) 2.25 cm^2 10/20/22 0816   Change in Wound Size % (l*w) -87.5 10/20/22 0816   Wound Volume (cm^3) 0.45 cm^3 10/20/22 0816   Wound Healing % -275 10/20/22 0816   Post-Procedure Length (cm) 1 cm 10/07/22 1417   Post-Procedure Width (cm) 1.1 cm 10/07/22 1417   Post-Procedure Depth (cm) 0.2 cm 10/07/22 1417   Post-Procedure Surface Area (cm^2) 1.1 cm^2 10/07/22 1417   Post-Procedure Volume (cm^3) 0.22 cm^3 10/07/22 1417   Distance Tunneling (cm) 0 cm 10/20/22 0816   Tunneling Position ___ O'Clock 0 10/20/22 0816   Undermining Starts ___ O'Clock 0 10/20/22 0816   Undermining Ends___ O'Clock 0 10/20/22 0816   Undermining Maxium Distance (cm) 0 10/20/22 0816   Wound Assessment Slough;Pink/red 10/20/22 0816   Drainage Amount Moderate 10/20/22 0816   Drainage Description Serosanguinous; Yellow 10/20/22 0816   Odor None 10/20/22 0816   Safia-wound Assessment Intact;Edematous 10/20/22 0816   Margins Defined edges 10/20/22 0816   Wound Thickness Description not for Pressure Injury Full thickness 10/20/22 0816   Number of days: 82       Response to treatment:  Well tolerated by patient. Pain Assessment:  Severity:  none  Quality of pain:   Wound Pain Timing/Severity:   Premedicated: no    Plan:     Plan of Care: Wound 07/29/22 Tibial Left;Posterior #[id number]: Collagen, Silicone border    Patient in bed is hard of hearing agreeable to wound care eval for left leg. Pt has a venous wound to left posterior leg and is treated at the wound clinic. Cleansed with NS measured and pictured. Wound has slough tissue. Applied collagen and foam border. Recommend santyl. Buttocks and heels intact. Pt is at mild risk for skin breakdown AEB jus. Follow jus orders. Specialty Bed Required : yes  [x] Low Air Loss   [] Pressure Redistribution  [] Fluid Immersion  [] Bariatric  [] Total Pressure Relief  [] Other:     Discharge Plan:  Placement for patient upon discharge: tbd  Hospice Care: no  Patient appropriate for Outpatient [address]: yes to continue after discharge. Patient/Caregiver Teaching:  Level of patient/caregiver understanding able to: pt voiced understanding. Electronically signed by Shellie Becker RN, on 10/20/2022 at 9:30 AM

## 2022-10-20 NOTE — OR NURSING
1130 Patient arrived to IR for a paracentesis  Dr Kristal Martinez  1139  Procedure start  350 Ml of cloudy madelaine ascitic fluid removed and sent to the lab  1150  Procedure end  Report given to [facility] None

## 2022-10-21 ENCOUNTER — APPOINTMENT (OUTPATIENT)
Dept: GENERAL RADIOLOGY | Age: 87
DRG: 291 | End: 2022-10-21
Payer: MEDICARE

## 2022-10-21 LAB
ANION GAP SERPL CALCULATED.3IONS-SCNC: 11 MMOL/L (ref 4–16)
BACTERIA: NEGATIVE /HPF
BILIRUBIN URINE: NEGATIVE MG/DL
BLOOD, URINE: ABNORMAL
BUN BLDV-MCNC: 26 MG/DL (ref 6–23)
CALCIUM SERPL-MCNC: 9.3 MG/DL (ref 8.3–10.6)
CHLORIDE BLD-SCNC: 97 MMOL/L (ref 99–110)
CLARITY: CLEAR
CO2: 30 MMOL/L (ref 21–32)
COLOR: YELLOW
CREAT SERPL-MCNC: 0.8 MG/DL (ref 0.6–1.1)
EKG ATRIAL RATE: 267 BPM
EKG DIAGNOSIS: NORMAL
EKG Q-T INTERVAL: 494 MS
EKG QRS DURATION: 140 MS
EKG QTC CALCULATION (BAZETT): 494 MS
EKG R AXIS: 225 DEGREES
EKG T AXIS: 106 DEGREES
EKG VENTRICULAR RATE: 60 BPM
GFR SERPL CREATININE-BSD FRML MDRD: >60 ML/MIN/1.73M2
GLUCOSE BLD-MCNC: 110 MG/DL (ref 70–99)
GLUCOSE, URINE: NEGATIVE MG/DL
HYALINE CASTS: 10 /LPF
KETONES, URINE: NEGATIVE MG/DL
LEUKOCYTE ESTERASE, URINE: NEGATIVE
MAGNESIUM: 2.1 MG/DL (ref 1.8–2.4)
MUCUS: ABNORMAL HPF
NITRITE URINE, QUANTITATIVE: NEGATIVE
PH, URINE: 5.5 (ref 5–8)
POTASSIUM SERPL-SCNC: 4.5 MMOL/L (ref 3.5–5.1)
PROTEIN UA: NEGATIVE MG/DL
RBC URINE: 1 /HPF (ref 0–6)
SODIUM BLD-SCNC: 138 MMOL/L (ref 135–145)
SPECIFIC GRAVITY UA: 1.01 (ref 1–1.03)
SQUAMOUS EPITHELIAL: 1 /HPF
TRICHOMONAS: ABNORMAL /HPF
UROBILINOGEN, URINE: 0.2 MG/DL (ref 0.2–1)
WBC UA: 2 /HPF (ref 0–5)

## 2022-10-21 PROCEDURE — 36415 COLL VENOUS BLD VENIPUNCTURE: CPT

## 2022-10-21 PROCEDURE — 94640 AIRWAY INHALATION TREATMENT: CPT

## 2022-10-21 PROCEDURE — 97163 PT EVAL HIGH COMPLEX 45 MIN: CPT

## 2022-10-21 PROCEDURE — 2700000000 HC OXYGEN THERAPY PER DAY

## 2022-10-21 PROCEDURE — 6360000002 HC RX W HCPCS: Performed by: HOSPITALIST

## 2022-10-21 PROCEDURE — 6370000000 HC RX 637 (ALT 250 FOR IP): Performed by: HOSPITALIST

## 2022-10-21 PROCEDURE — 6370000000 HC RX 637 (ALT 250 FOR IP): Performed by: NURSE PRACTITIONER

## 2022-10-21 PROCEDURE — 6360000002 HC RX W HCPCS: Performed by: NURSE PRACTITIONER

## 2022-10-21 PROCEDURE — 97116 GAIT TRAINING THERAPY: CPT

## 2022-10-21 PROCEDURE — 94761 N-INVAS EAR/PLS OXIMETRY MLT: CPT

## 2022-10-21 PROCEDURE — 93010 ELECTROCARDIOGRAM REPORT: CPT | Performed by: INTERNAL MEDICINE

## 2022-10-21 PROCEDURE — 81001 URINALYSIS AUTO W/SCOPE: CPT

## 2022-10-21 PROCEDURE — 71045 X-RAY EXAM CHEST 1 VIEW: CPT

## 2022-10-21 PROCEDURE — 83735 ASSAY OF MAGNESIUM: CPT

## 2022-10-21 PROCEDURE — 80048 BASIC METABOLIC PNL TOTAL CA: CPT

## 2022-10-21 PROCEDURE — 97530 THERAPEUTIC ACTIVITIES: CPT

## 2022-10-21 PROCEDURE — 2580000003 HC RX 258: Performed by: NURSE PRACTITIONER

## 2022-10-21 PROCEDURE — 99233 SBSQ HOSP IP/OBS HIGH 50: CPT | Performed by: INTERNAL MEDICINE

## 2022-10-21 PROCEDURE — 2060000000 HC ICU INTERMEDIATE R&B

## 2022-10-21 RX ORDER — SPIRONOLACTONE 50 MG/1
25 TABLET, FILM COATED ORAL DAILY
Status: DISCONTINUED | OUTPATIENT
Start: 2022-10-21 | End: 2022-10-29 | Stop reason: HOSPADM

## 2022-10-21 RX ORDER — FUROSEMIDE 10 MG/ML
60 INJECTION INTRAMUSCULAR; INTRAVENOUS 2 TIMES DAILY
Status: DISCONTINUED | OUTPATIENT
Start: 2022-10-21 | End: 2022-10-22

## 2022-10-21 RX ORDER — FUROSEMIDE 10 MG/ML
60 INJECTION INTRAMUSCULAR; INTRAVENOUS ONCE
Status: DISCONTINUED | OUTPATIENT
Start: 2022-10-21 | End: 2022-10-21

## 2022-10-21 RX ADMIN — LEVOTHYROXINE SODIUM 88 MCG: 0.09 TABLET ORAL at 08:44

## 2022-10-21 RX ADMIN — LATANOPROST 1 DROP: 50 SOLUTION/ DROPS OPHTHALMIC at 18:37

## 2022-10-21 RX ADMIN — FUROSEMIDE 10 MG/HR: 10 INJECTION INTRAMUSCULAR; INTRAVENOUS at 07:11

## 2022-10-21 RX ADMIN — ALBUTEROL SULFATE 2 PUFF: 90 AEROSOL, METERED RESPIRATORY (INHALATION) at 07:36

## 2022-10-21 RX ADMIN — CYANOCOBALAMIN TAB 1000 MCG 1000 MCG: 1000 TAB at 08:43

## 2022-10-21 RX ADMIN — FUROSEMIDE 60 MG: 10 INJECTION, SOLUTION INTRAVENOUS at 18:30

## 2022-10-21 RX ADMIN — SODIUM CHLORIDE, PRESERVATIVE FREE 10 ML: 5 INJECTION INTRAVENOUS at 14:25

## 2022-10-21 RX ADMIN — ENOXAPARIN SODIUM 40 MG: 40 INJECTION SUBCUTANEOUS at 08:43

## 2022-10-21 RX ADMIN — TIMOLOL MALEATE 1 DROP: 5 SOLUTION OPHTHALMIC at 21:19

## 2022-10-21 RX ADMIN — TIOTROPIUM BROMIDE AND OLODATEROL 2 PUFF: 3.124; 2.736 SPRAY, METERED RESPIRATORY (INHALATION) at 07:38

## 2022-10-21 RX ADMIN — ASPIRIN 81 MG CHEWABLE TABLET 81 MG: 81 TABLET CHEWABLE at 08:44

## 2022-10-21 RX ADMIN — SODIUM CHLORIDE, PRESERVATIVE FREE 10 ML: 5 INJECTION INTRAVENOUS at 21:19

## 2022-10-21 RX ADMIN — Medication 5000 UNITS: at 08:44

## 2022-10-21 RX ADMIN — ATORVASTATIN CALCIUM 10 MG: 10 TABLET, FILM COATED ORAL at 21:19

## 2022-10-21 RX ADMIN — SPIRONOLACTONE 25 MG: 50 TABLET ORAL at 18:30

## 2022-10-21 RX ADMIN — CARVEDILOL 6.25 MG: 6.25 TABLET, FILM COATED ORAL at 18:30

## 2022-10-21 RX ADMIN — ALBUTEROL SULFATE 2 PUFF: 90 AEROSOL, METERED RESPIRATORY (INHALATION) at 20:04

## 2022-10-21 RX ADMIN — CARVEDILOL 6.25 MG: 6.25 TABLET, FILM COATED ORAL at 08:44

## 2022-10-21 ASSESSMENT — PAIN SCALES - GENERAL
PAINLEVEL_OUTOF10: 0

## 2022-10-21 ASSESSMENT — PAIN SCALES - WONG BAKER
WONGBAKER_NUMERICALRESPONSE: 0

## 2022-10-21 NOTE — PROGRESS NOTES
V2.0  [facility] Hospitalist Progress Note      Name:  Velvet Baxter /Age/Sex: 3/18/1929  (80 y.o. female)   MRN & CSN:  [id number] & [id number] Encounter Date/Time: 10/21/2022 2:21 PM EDT    Location:  -A PCP: Emmy Vazquez MD       Hospital Day: 3    Assessment and Plan:   Velvet Baxter is a 80 y.o. female who presents with CHF exacerbation      Plan:  Acute on chronic diastolic heart failure  -Monitor daily I's and O's and weights. CTA chest: Enlargement of the right atrium with reflux into hepatic veins; small right pleural effusion with adjacent airspace disease; anasarca. On 4 L nasal cannula. BNP 1.5K. Troponin negative.  -Nurse states that they have been unable to get an accurate urine output, but they state patient has had a significant amount of urine output. Cardiology feels that heart failure will be a chronic condition. Cardiology recommending to switch Lasix drip to oral, however patient is still fluid overloaded and hypoxic, will switch to Lasix IV 60 mg twice daily. Continue to wean oxygen. Echo 10/20: EF 50-55%; G3 DD; severely dilated right ventricle; severe TR; severely dilated right atrium. Repeat CXR. Resume home Aldactone. Anasarca and ascites  -Paracentesis 10/20: Removed only 350 cc. Radiology could only find a relatively small amount of ascites. Consult nephrology due to fluid overload and third spacing despite Lasix drip. Left distal calf wound  -Wound care following. Severe pulmonary hypertension    CAD Hx CABG  Glaucoma  Hx bradycardia W/cardiac pacemaker  SAULO  History of CVA  Diet ADULT DIET; Regular;  Low Sodium (2 gm); 2000 ml    DVT Prophylaxis [x] Lovenox, []  Heparin, [] SCDs, [] Ambulation,  [] Eliquis, [] Xarelto  [] Coumadin   Code Status Full Code   Disposition From: Home  Expected Disposition: HHC, PT/OT  Estimated Date of Discharge: 10/25  Patient requires continued admission due to CHF   Home O2 None     Subjective/Interval history: Chief Complaint: Leg Swelling (On lasix requiring O2 now)     She can't tell if she is feeling better, she is still on oxygen. Review of Systems:    Negative unless mentioned above    Objective: Intake/Output Summary (Last 24 hours) at 10/21/2022 1435  Last data filed at 10/21/2022 0804  Gross per 24 hour   Intake 360 ml   Output 450 ml   Net -90 ml        Vitals:   BP (!) 116/54   Pulse 60   Temp 97.5 °F (36.4 °C) (Oral)   Resp 26   Ht 4' 11\" (1.499 m)   Wt 161 lb 6 oz (73.2 kg)   SpO2 98%   BMI 32.59 kg/m²     Physical Exam:   General: NAD  Eyes: no discharge  HENT: NCAT  Cardiovascular: RRR  Respiratory: rales b/l bs+  Gastrointestinal: Soft, nontender, mild distension  Genitourinary: no suprapubic te nderness  Musculoskeletal: no tenderness in LE, pitting edema in LE  Skin: has bandage on back of LLE near calf  Neuro: Alert. No gross deficits  Psych: Mood appropriate.      Medications:   Medications:    collagenase   Topical Daily    albuterol sulfate HFA  2 puff Inhalation BID    aspirin  81 mg Oral Daily    atorvastatin  10 mg Oral Nightly    carvedilol  6.25 mg Oral BID WC    ferrous sulfate  325 mg Oral Every Other Day    levothyroxine  88 mcg Oral QAM AC    timolol  1 drop Both Eyes Nightly    vitamin B-12  1,000 mcg Oral Daily    Vitamin D  5,000 Units Oral Daily    latanoprost  1 drop Both Eyes Dinner    sodium chloride flush  5-40 mL IntraVENous 2 times per day    enoxaparin  40 mg SubCUTAneous Daily    tiotropium-olodaterol  2 puff Inhalation Daily      Infusions:    furosemide (LASIX) 1mg/ml infusion 10 mg/hr (10/21/22 0711)    sodium chloride       PRN Meds: albuterol sulfate HFA, 2 puff, Q4H PRN  traZODone, 50 mg, Nightly PRN  sodium chloride flush, 5-40 mL, PRN  sodium chloride, , PRN  ondansetron, 4 mg, Q8H PRN   Or  ondansetron, 4 mg, Q6H PRN  polyethylene glycol, 17 g, Daily PRN  acetaminophen, 650 mg, Q6H PRN   Or  acetaminophen, 650 mg, Q6H PRN      Labs      Recent Labs 10/19/22  1529   WBC 8.1   HGB 13.7   HCT 45.4         Recent Labs     10/19/22  1529 10/20/22  0225 10/21/22  0924   * 135 138   K 4.1 4.5 4.5   CL 93* 97* 97*   CO2 29 27 30   BUN 30* 27* 26*   CREATININE 0.9 0.7 0.8     Recent Labs     10/19/22  1529   AST 29   ALT 20   BILITOT 1.1*   ALKPHOS 257*     Recent Labs     10/20/22  0827   INR 1.23     Recent Labs     10/19/22  1529 10/19/22  2316 10/20/22  0225   TROPONINT <0.010 <0.010 <0.010     No results found for: LABA1C  CALCIUM:  9.3/30 (10/21 0924)  Lab Results   Component Value Date/Time    MG 2.1 10/21/2022 09:24 AM           Electronically signed by Carlitos Crenshaw MD on 10/21/2022 at 2:35 PM

## 2022-10-21 NOTE — PROGRESS NOTES
Admitting diagnosis- Acute on chronic diastolic heart failure   Cardiologist- Martha  Heart Failure Education Nurse consulted-yes  Ejection fraction 50-55% as of 10/19/22   Pro BNP- 1,508 on 10/19  Hospital follow up appt-  November 3rd at the [facility] (previously scheduled) and Home Health. Patient informed of appointment and appointment added to AVS  Cardiac rehabilitation referral-N/A     Smoking Cessation information and referral-N/A   Pneumonia vaccine- Up to date  PCP-Pierre RHODES   Patient has a digital scale?yes   Transportation- family transports     Able to obtain medications without difficulty? - Yes- Patient denies difficulty in obtaining or taking medications. Patient seen for in hospital heart failure education on 7/8/2022. Patient is also a regularly seen patient in the [facility] Heart failure clinic. She receives ongoing support and education through the clinic. Visited patient on 10/24/22. She is still very weak. Wants to return to her home at discharge. States she has support from family. She lives alone, but meals and groceries are delivered.

## 2022-10-21 NOTE — PROGRESS NOTES
Physical Therapy  Facility/Department: [facility] STEPDOWN  Physical Therapy Initial Assessment    Name: Ruth Sanderson  : 3/18/1929  MRN: [id number]  Date of Service: 10/21/2022    Discharge Recommendations:  IP Rehab             Assessment   Assessment: pt is a 80 y.o. female with medical history, surgical history, co-morbidities, and personal factors including Age-related osteoporosis with current pathological fracture of vertebra (HCC), Arthritis, Bradycardia, CAD (coronary artery disease), Cardiac pacemaker, CHF (congestive heart failure) ([facility] [address] [address] COPD, mild ([address] Cor pulmonale (chronic) ([facility] [address] CVA (cerebrovascular accident) ([facility] [address] DJD (degenerative joint disease) of cervical spine, Exhaustion of cardiac pacemaker battery, Family history of cardiovascular disease, Glaucoma, H/O 24 hour EKG monitoring, H/O cardiac catheterization, H/O cardiovascular stress test, H/O cardiovascular stress test, H/O cardiovascular stress test, H/O chest x-ray, H/O Doppler lower venous ultrasound, H/O Doppler ultrasound, H/O Doppler ultrasound, H/O Doppler ultrasound, H/O Doppler ultrasound, H/O echocardiogram, H/O echocardiogram, H/O echocardiogram, H/O echocardiogram, History of complete ECG, History of nuclear stress test, Hx of cardiovascular stress test, HX OTHER MEDICAL, Hyperlipidemia, Hypertension, Mild intermittent asthma, Moderate COPD (chronic obstructive pulmonary disease) ([address] Nausea & vomiting, Obstructive sleep apnea, Paroxysmal atrial fibrillation ([address] Post PTCA, Pulmonary HTN ([address] PVD (peripheral vascular disease) ([address] S/P CABG x 3, S/P PTCA (percutaneous transluminal coronary angioplasty), Severe pulmonary hypertension ([address] Shortness of breath, Thyroid disease, Unspecified cerebral artery occlusion with cerebral infarction, 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 Prior to admission, pt was modified independent with functional mobility and ADLs. Examination of body systems reveals decreased strength, decreased balance, decreased aerobic capacity, and decreased independence with functional mobility. Therapy Prognosis: Good  Decision Making: High Complexity  Clinical Presentation: unpredictable characteristics  Requires PT Follow-Up: Yes  Activity Tolerance  Activity Tolerance: Patient tolerated evaluation without incident     Plan   Physcial Therapy Plan  General Plan: 3-5 times per week  Current Treatment Recommendations: Strengthening, Balance training, ROM, Functional mobility training, Transfer training, ADL/Self-care training, IADL training, Cognitive/Perceptual training, Endurance training, Safety education & training, Patient/Caregiver education & training, Therapeutic activities, Gait training, Stair training, Equipment evaluation, education, & procurement, Pain management, Neuromuscular re-education, Positioning, Home exercise program, Cognitive reorientation  Safety Devices  Type of Devices:  All fall risk precautions in place, Patient at risk for falls, Call light within reach, Left in chair, Chair alarm in place, Gait belt, Nurse notified     Restrictions  Restrictions/Precautions  Restrictions/Precautions: Fall Risk, General Precautions     Subjective   General  Chart Reviewed: Yes  Patient assessed for rehabilitation services?: Yes  Family / Caregiver Present: No  Follows Commands: Impaired  Subjective  Subjective: pain: denies; 0/10         Social/Functional History  Social/Functional History  Lives With: Alone  Type of Home: Condo  Home Layout: One level  Home Access: Level entry  Bathroom Shower/Tub: Tub/Shower unit, Walk-in shower  Bathroom Toilet: Standard  Bathroom Equipment: Grab bars in shower, Grab bars around toilet  Home Equipment: Jeffy Sciara, [place], Jeffy Sciara, 4 wheeled  ADL Assistance: [address]: Independent  Transfer Assistance: Independent  Active : No  Patient's  Info: Daughter in law does grocery shopping. Occupation: Retired    Vision/Hearing  Vision  Vision: Within Functional Limits  Hearing  Hearing: Exceptions to [address] [email or web address] Exceptions: Hard of hearing/hearing concerns      Cognition   Orientation  Overall Orientation Status: Impaired  Orientation Level: Disoriented to time;Oriented to person  Cognition  Overall Cognitive Status: Exceptions  Arousal/Alertness: Appropriate responses to stimuli  Following Commands:  Follows all commands without difficulty  Attention Span: Appears intact  Safety Judgement: Decreased awareness of need for safety;Decreased awareness of need for assistance  Problem Solving: Decreased awareness of errors  Insights: Decreased awareness of deficits  Initiation: Requires cues for some  Sequencing: Requires cues for some     Objective           Gross Assessment  Sensation: Intact (BLEs)        Strength RLE  Comment: knee extension: 4-/5, ankle DF: 4/5  Strength LLE  Comment: knee extension: 4-/5, ankle DF: 4/5           Bed mobility  Supine to Sit: Moderate assistance (with verbal and tactile cues for BUE and BLE placement throughout transfer; with HOB elevated)  Transfers  Sit to Stand: Minimal Assistance (with verbal cues to push through chair/bed/grab bar/commode and avoid pulling on walker)  Stand to Sit: Minimal Assistance (with verbal cues to feel bed/chair/toilet against back of legs, reach back, and sit slowly)  Ambulation  Surface: Level tile  Device: [address]: Minimal assistance; Moderate assistance  Quality of Gait: decreased gait speed, decreased step length bilaterally, intermittent deviated path toward walls in hallway, unsteady  Distance: 10 feet + 35 feet + 35 feet  Comments: with verbal and tactile cues for BLE placement, walker placement, and sequence throughout ambulation; with verbal and tactile cues to maintain upright posture in order to avoid COM shifting outside of [place]; with verbal cues to practice pursed lip breathing throughout ambulation     Balance  Posture: Fair  Sitting - Static: Good  Sitting - Dynamic: Good  Standing - Static: Fair;-  Standing - Dynamic: Poor;+         Gait Training:  Cues were given for safety, sequence, device management, balance, posture, awareness, path. Therapeutic Activity Training:   Therapeutic activity training was instructed today. Cues were given for safety, sequence, UE/LE placement, awareness, and balance. Activities performed today included bed mobility training, sup-sit, sit-stand. AM-PAC Score  AM-PAC Inpatient Mobility Raw Score : 13 (10/21/22 1941)  AM-PAC Inpatient T-Scale Score : 36.74 (10/21/22 1941)  Mobility Inpatient CMS 0-100% Score: 64.91 (10/21/22 1941)  Mobility Inpatient CMS G-Code Modifier : CL (10/21/22 1941)            Goals  Long Term Goals  Time Frame for Long Term Goals : In one week:  Long Term Goal 1: Pt will complete all bed mobility with SBAx1  Long Term Goal 2: Pt will complete sit <> stand transfers with SBAx1  Long Term Goal 3: Pt will ambulate 200 feet with SBAx1 with LRAD  Long Term Goal 4: Pt will ascend/descend 6 steps with a handrail with minAx1  Long Term Goal 5: Pt will independently complete 3 sets of 10 reps of BLE AROM exercises in available and allowed ROM       Education  Patient Education  Education Given To: Patient  Education Provided: Role of Therapy; Energy Conservation; Fall Prevention Strategies; Plan of Care;IADL Safety; ADL Adaptive Strategies; Equipment;Transfer Training  Education Method: Demonstration;Verbal  Barriers to Learning: Hearing;Cognition  Education Outcome: Verbalized understanding;Demonstrated understanding;Continued education needed      Time In: 1825  Time Out: 1906  Total Treatment Time: 41  Timed Code Treatment Minutes: [facility] René Brito DPT  License #: [id number]

## 2022-10-21 NOTE — PROGRESS NOTES
Today's plan: Patient will go for paracentesis today okay to DC lasix drip and start oral Lasix 40 twice daily okay for discharge and outpatient follow-up    Call us again if needed  Admit Date:  10/19/2022    Subjective: Better      Chief complaints on admission  Chief Complaint   Patient presents with    Leg Swelling     On lasix requiring O2 now         History of present illness:Irene is a 80 y. o.year old who  presents with had concerns including Leg Swelling (On lasix requiring O2 now).       Past medical history:    has a past medical history of Age-related osteoporosis with current pathological fracture of vertebra (HCC), Arthritis, Bradycardia, CAD (coronary artery disease), Cardiac pacemaker, CHF (congestive heart failure) ([address] COPD, mild ([address] Cor pulmonale (chronic) ([address] CVA (cerebrovascular accident) ([address] DJD (degenerative joint disease) of cervical spine, Exhaustion of cardiac pacemaker battery, Family history of cardiovascular disease, Glaucoma, H/O 24 hour EKG monitoring, H/O cardiac catheterization, H/O cardiovascular stress test, H/O cardiovascular stress test, H/O cardiovascular stress test, H/O chest x-ray, H/O Doppler lower venous ultrasound, H/O Doppler ultrasound, H/O Doppler ultrasound, H/O Doppler ultrasound, H/O Doppler ultrasound, H/O echocardiogram, H/O echocardiogram, H/O echocardiogram, H/O echocardiogram, History of complete ECG, History of nuclear stress test, Hx of cardiovascular stress test, HX OTHER MEDICAL, Hyperlipidemia, Hypertension, Mild intermittent asthma, Moderate COPD (chronic obstructive pulmonary disease) ([address] Nausea & vomiting, Obstructive sleep apnea, Paroxysmal atrial fibrillation ([address] Post PTCA, Pulmonary HTN ([address] PVD (peripheral vascular disease) ([address] S/P CABG x 3, S/P PTCA (percutaneous transluminal coronary angioplasty), Severe pulmonary hypertension ([address] Shortness of breath, Thyroid disease, Unspecified cerebral artery occlusion with cerebral infarction, 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 [address] and WD-Non-pressure chronic ulcer of other part of left lower leg limited to breakdown of skin ([facility] [address] Past surgical history:   has a past surgical history that includes Appendectomy (1941); Tonsillectomy (1950's); Cardiac surgery (4/09); Hysterectomy, total abdominal (1990's); Colonoscopy (In 2000's); pacemaker placement; Coronary artery bypass graft (4/8/2009); Coronary angioplasty with stent (4/21/2010); other surgical history; Middle ear surgery; and Percutaneous Transluminal Coronary Angio (11/2012). Social History:   reports that she quit smoking about 51 years ago. Her smoking use included cigarettes. She has a 1.25 pack-year smoking history. She has never used smokeless tobacco. She reports that she does not currently use alcohol. She reports that she does not use drugs. Family history:  family history includes Arthritis in her mother; Cancer in her mother and sister; Coronary Art Dis in her father; Depression in her mother and sister; Early Death in her paternal grandfather; Early Death (age of onset: 36) in her father; Hearing Loss in her mother; Heart Disease in her father and sister; High Blood Pressure in her father, mother, sister, son, and son; High Cholesterol in her father and mother; Mental Illness in her mother; Blake Medici / Shelvia Colace in her mother; Other in her daughter.     Allergies   Allergen Reactions    Darvocet [Propoxyphene N-Acetaminophen]     Ranexa [Ranolazine Er]      Sick to her stomach    Ranolazine      Sick to her stomach    Sulfa Antibiotics Itching    Sulfasalazine Itching    Adhesive Tape Rash    Allantoin Rash    Bacitracin Rash    Gramicidin Rash    Neomycin Rash    Polymyxin B Rash    Pramoxine Hcl Rash    Silicone Rash         Objective:   BP (!) 116/54   Pulse 60   Temp 97.5 °F (36.4 °C) (Oral)   Resp 26   Ht 4' 11\" (1.499 m)   Wt 161 lb 6 oz (73.2 kg)   SpO2 98%   BMI 32.59 kg/m²     Intake/Output Summary (Last 24 hours) at 10/21/2022 1256  Last data filed at 10/20/2022 1758  Gross per 24 hour   Intake --   Output 450 ml   Net -450 ml           Physical Exam:  Constitutional:  Well developed, Well nourished, No acute distress, Non-toxic appearance. HENT:  Normocephalic, Atraumatic, Bilateral external ears normal, Oropharynx moist, No oral exudates, Nose normal. Neck- Normal range of motion, No tenderness, Supple, No stridor. Eyes:  PERRL, EOMI, Conjunctiva normal, No discharge. Respiratory:  Normal breath sounds, No respiratory distress, No wheezing, No chest tenderness. ,no use of accessory muscles, diaphragm movement is normal  Cardiovascular: (PMI) apex non displaced,no lifts no thrills, no s3,no s4, Normal heart rate, Normal rhythm, No murmurs, No rubs, No gallops. Carotid arteries pulse and amplitude are normal no bruit, no abdominal bruit noted ( normal abdominal aorta ausculation), femoral arteries pulse and amplitude are normal no bruit, pedal pulses are normal  GI:  Bowel sounds normal, Soft, No tenderness, No masses, No pulsatile masses. : External genitalia appear normal, No masses or lesions. No discharge. No CVA tenderness. Musculoskeletal:  Intact distal pulses, No edema, No tenderness, No cyanosis, No clubbing. Good range of motion in all major joints. No tenderness to palpation or major deformities noted. Back- No tenderness. Integument:  Warm, Dry, No erythema, No rash. Lymphatic:  No lymphadenopathy noted. Neurologic:  Alert & oriented x 3, Normal motor function, Normal sensory function, No focal deficits noted.    Psychiatric:  Affect normal, Judgment normal, Mood normal.     Medications:    collagenase   Topical Daily    albuterol sulfate HFA  2 puff Inhalation BID    aspirin  81 mg Oral Daily    atorvastatin  10 mg Oral Nightly    carvedilol  6.25 mg Oral BID WC    ferrous sulfate  325 mg Oral Every Other Day    levothyroxine  88 mcg Oral QAM AC timolol  1 drop Both Eyes Nightly    vitamin B-12  1,000 mcg Oral Daily    Vitamin D  5,000 Units Oral Daily    latanoprost  1 drop Both Eyes Dinner    sodium chloride flush  5-40 mL IntraVENous 2 times per day    enoxaparin  40 mg SubCUTAneous Daily    tiotropium-olodaterol  2 puff Inhalation Daily      furosemide (LASIX) 1mg/ml infusion 10 mg/hr (10/21/22 0711)    sodium chloride       albuterol sulfate HFA, traZODone, sodium chloride flush, sodium chloride, ondansetron **OR** ondansetron, polyethylene glycol, acetaminophen **OR** acetaminophen    Lab Data:  CBC:   Recent Labs     10/19/22  1529   WBC 8.1   HGB 13.7   HCT 45.4   .0        BMP:   Recent Labs     10/19/22  1529 10/20/22  0225 10/21/22  0924   * 135 138   K 4.1 4.5 4.5   CL 93* 97* 97*   CO2 29 27 30   BUN 30* 27* 26*   CREATININE 0.9 0.7 0.8     LIVER PROFILE:   Recent Labs     10/19/22  1529   AST 29   ALT 20   BILITOT 1.1*   ALKPHOS 257*     PT/INR:   Recent Labs     10/20/22  0827   PROTIME 15.9*   INR 1.23     APTT:   Recent Labs     10/20/22  0827   APTT 34.0     BNP:  No results for input(s): BNP in the last 72 hours. TROPONIN: @TROPONINI:3@      Assessment:  80 y. o.year old who is admitted for          Plan:  Acute decompensated congestive heart failure with history of cor pulmonale severe LV dilatation and severe pulmonary hypertension, admit to ICU stepdown, start IV Lasix drip will need few days of Lasix IV drip echo ordered again, her previous right heart catheterization report reviewed from 2019, her pulmonary cavity wedge pressure at that time was 17 RA pressure was 13 PA pressure was 32 mean RV pressure mean was 11 however this was 2019 once she is euvolemic will recommend to repeat right heart cath she should see pulmonary for cor pulmonale and severe pulm hypertension  COPD not controlled recommend to see pulmonary  CAD stable history of bypass and PCI of RCA in 2012 and LIMA to LAD stent in 2010 bypass surgery was 2009, continue aspirin statin beta-blocker  History of pacemaker stable we will recheck pacemaker as an outpatient  Health maintenance: exerise and diet  All labs, medications and tests reviewed, continue all other medications of all above medical condition listed as is.       Lit Sarabia MD, MD 10/21/2022 12:56 PM

## 2022-10-21 NOTE — PLAN OF CARE
Problem: Discharge Planning  Goal: Discharge to home or other facility with appropriate resources  Outcome: Progressing     Problem: Pain  Goal: Verbalizes/displays adequate comfort level or baseline comfort level  Outcome: Progressing  Flowsheets (Taken 10/21/2022 0804)  Verbalizes/displays adequate comfort level or baseline comfort level: Encourage patient to monitor pain and request assistance     Problem: Chronic Conditions and Co-morbidities  Goal: Patient's chronic conditions and co-morbidity symptoms are monitored and maintained or improved  Outcome: Progressing     Problem: Skin/Tissue Integrity  Goal: Absence of new skin breakdown  Description: 1. Monitor for areas of redness and/or skin breakdown  2. Assess vascular access sites hourly  3. Every 4-6 hours minimum:  Change oxygen saturation probe site  4. Every 4-6 hours:  If on nasal continuous positive airway pressure, respiratory therapy assess nares and determine need for appliance change or resting period.   Outcome: Progressing

## 2022-10-21 NOTE — PROGRESS NOTES
Pt is alert and oriented, but hard of hearing, cooperative with care. Pt is cleaned after incontinence with purewick. PT has family at bedside agreeable to plan of care, pt medicated without complication, pt denies any complaints, all needs met, call light in reach, thanks staff for care.

## 2022-10-21 NOTE — CARE COORDINATION
CM reviewed chart and discussed in IDR. Per Dr César Yanez pt may discharge on Saturday. She will go home with [facility] which has been set up. Discharging nurse please call the number in the Treatment Team Sticky Note.   CM will place this chart in the weekend discharge list.

## 2022-10-22 LAB
ANION GAP SERPL CALCULATED.3IONS-SCNC: 9 MMOL/L (ref 4–16)
BASE EXCESS MIXED: 6.3 (ref 0–2.3)
BILIRUBIN URINE: NEGATIVE MG/DL
BLOOD, URINE: NEGATIVE
BUN BLDV-MCNC: 30 MG/DL (ref 6–23)
CALCIUM SERPL-MCNC: 9.6 MG/DL (ref 8.3–10.6)
CHLORIDE BLD-SCNC: 95 MMOL/L (ref 99–110)
CHLORIDE URINE RANDOM: 29 MMOL/L (ref 43–210)
CLARITY: CLEAR
CO2: 29 MMOL/L (ref 21–32)
COLOR: YELLOW
COMMENT: ABNORMAL
CREAT SERPL-MCNC: 0.7 MG/DL (ref 0.6–1.1)
CREATININE URINE: 47.4 MG/DL (ref 28–217)
GFR SERPL CREATININE-BSD FRML MDRD: >60 ML/MIN/1.73M2
GLUCOSE BLD-MCNC: 127 MG/DL (ref 70–99)
GLUCOSE, URINE: NEGATIVE MG/DL
HCO3 VENOUS: 33.1 MMOL/L (ref 19–25)
KETONES, URINE: NEGATIVE MG/DL
LEUKOCYTE ESTERASE, URINE: NEGATIVE
MAGNESIUM: 2.2 MG/DL (ref 1.8–2.4)
NITRITE URINE, QUANTITATIVE: NEGATIVE
O2 SAT, VEN: 95 % (ref 50–70)
PCO2, VEN: 56 MMHG (ref 38–52)
PH VENOUS: 7.38 (ref 7.32–7.42)
PH, URINE: 5.5 (ref 5–8)
PO2, VEN: 175 MMHG (ref 28–48)
POTASSIUM SERPL-SCNC: 4.6 MMOL/L (ref 3.5–5.1)
POTASSIUM, UR: 28.9 MMOL/L (ref 22–119)
PRO-BNP: 1968 PG/ML
PROT/CREAT RATIO, UR: 0.2
PROTEIN UA: NEGATIVE MG/DL
SODIUM BLD-SCNC: 133 MMOL/L (ref 135–145)
SODIUM URINE: 15 MMOL/L (ref 35–167)
SPECIFIC GRAVITY UA: 1.01 (ref 1–1.03)
URINE TOTAL PROTEIN: 11.6 MG/DL
UROBILINOGEN, URINE: 0.2 MG/DL (ref 0.2–1)

## 2022-10-22 PROCEDURE — 6370000000 HC RX 637 (ALT 250 FOR IP): Performed by: NURSE PRACTITIONER

## 2022-10-22 PROCEDURE — 6370000000 HC RX 637 (ALT 250 FOR IP): Performed by: HOSPITALIST

## 2022-10-22 PROCEDURE — 94640 AIRWAY INHALATION TREATMENT: CPT

## 2022-10-22 PROCEDURE — 2580000003 HC RX 258: Performed by: NURSE PRACTITIONER

## 2022-10-22 PROCEDURE — 80048 BASIC METABOLIC PNL TOTAL CA: CPT

## 2022-10-22 PROCEDURE — 51798 US URINE CAPACITY MEASURE: CPT

## 2022-10-22 PROCEDURE — 51702 INSERT TEMP BLADDER CATH: CPT

## 2022-10-22 PROCEDURE — 6360000002 HC RX W HCPCS: Performed by: INTERNAL MEDICINE

## 2022-10-22 PROCEDURE — 2580000003 HC RX 258: Performed by: INTERNAL MEDICINE

## 2022-10-22 PROCEDURE — 6360000002 HC RX W HCPCS: Performed by: NURSE PRACTITIONER

## 2022-10-22 PROCEDURE — 36415 COLL VENOUS BLD VENIPUNCTURE: CPT

## 2022-10-22 PROCEDURE — 84156 ASSAY OF PROTEIN URINE: CPT

## 2022-10-22 PROCEDURE — 83735 ASSAY OF MAGNESIUM: CPT

## 2022-10-22 PROCEDURE — 94761 N-INVAS EAR/PLS OXIMETRY MLT: CPT

## 2022-10-22 PROCEDURE — 81003 URINALYSIS AUTO W/O SCOPE: CPT

## 2022-10-22 PROCEDURE — 82805 BLOOD GASES W/O2 SATURATION: CPT

## 2022-10-22 PROCEDURE — 82570 ASSAY OF URINE CREATININE: CPT

## 2022-10-22 PROCEDURE — 2060000000 HC ICU INTERMEDIATE R&B

## 2022-10-22 PROCEDURE — 84133 ASSAY OF URINE POTASSIUM: CPT

## 2022-10-22 PROCEDURE — 84300 ASSAY OF URINE SODIUM: CPT

## 2022-10-22 PROCEDURE — 83880 ASSAY OF NATRIURETIC PEPTIDE: CPT

## 2022-10-22 PROCEDURE — 6370000000 HC RX 637 (ALT 250 FOR IP): Performed by: INTERNAL MEDICINE

## 2022-10-22 PROCEDURE — 2700000000 HC OXYGEN THERAPY PER DAY

## 2022-10-22 PROCEDURE — 82436 ASSAY OF URINE CHLORIDE: CPT

## 2022-10-22 RX ORDER — MIDODRINE HYDROCHLORIDE 5 MG/1
5 TABLET ORAL
Status: DISCONTINUED | OUTPATIENT
Start: 2022-10-22 | End: 2022-10-29 | Stop reason: HOSPADM

## 2022-10-22 RX ORDER — METOLAZONE 2.5 MG/1
5 TABLET ORAL 2 TIMES DAILY
Status: DISCONTINUED | OUTPATIENT
Start: 2022-10-22 | End: 2022-10-29 | Stop reason: HOSPADM

## 2022-10-22 RX ADMIN — ALBUTEROL SULFATE 2 PUFF: 90 AEROSOL, METERED RESPIRATORY (INHALATION) at 22:39

## 2022-10-22 RX ADMIN — LATANOPROST 1 DROP: 50 SOLUTION/ DROPS OPHTHALMIC at 16:55

## 2022-10-22 RX ADMIN — SODIUM CHLORIDE: 9 INJECTION, SOLUTION INTRAVENOUS at 11:27

## 2022-10-22 RX ADMIN — MIDODRINE HYDROCHLORIDE 5 MG: 5 TABLET ORAL at 08:49

## 2022-10-22 RX ADMIN — TIMOLOL MALEATE 1 DROP: 5 SOLUTION OPHTHALMIC at 21:30

## 2022-10-22 RX ADMIN — SODIUM CHLORIDE, PRESERVATIVE FREE 10 ML: 5 INJECTION INTRAVENOUS at 21:29

## 2022-10-22 RX ADMIN — ASPIRIN 81 MG CHEWABLE TABLET 81 MG: 81 TABLET CHEWABLE at 08:41

## 2022-10-22 RX ADMIN — CARVEDILOL 6.25 MG: 6.25 TABLET, FILM COATED ORAL at 08:42

## 2022-10-22 RX ADMIN — METOLAZONE 5 MG: 5 TABLET ORAL at 08:41

## 2022-10-22 RX ADMIN — METOLAZONE 5 MG: 5 TABLET ORAL at 21:29

## 2022-10-22 RX ADMIN — SPIRONOLACTONE 25 MG: 50 TABLET ORAL at 08:41

## 2022-10-22 RX ADMIN — LEVOTHYROXINE SODIUM 88 MCG: 0.09 TABLET ORAL at 05:34

## 2022-10-22 RX ADMIN — TIOTROPIUM BROMIDE AND OLODATEROL 2 PUFF: 3.124; 2.736 SPRAY, METERED RESPIRATORY (INHALATION) at 08:12

## 2022-10-22 RX ADMIN — COLLAGENASE SANTYL: 250 OINTMENT TOPICAL at 08:47

## 2022-10-22 RX ADMIN — Medication 5000 UNITS: at 08:41

## 2022-10-22 RX ADMIN — ACETAMINOPHEN 650 MG: 325 TABLET ORAL at 08:41

## 2022-10-22 RX ADMIN — CHLOROTHIAZIDE SODIUM 500 MG: 500 INJECTION INTRAVENOUS at 11:28

## 2022-10-22 RX ADMIN — ENOXAPARIN SODIUM 40 MG: 40 INJECTION SUBCUTANEOUS at 08:42

## 2022-10-22 RX ADMIN — FUROSEMIDE 10 MG/HR: 10 INJECTION INTRAMUSCULAR; INTRAVENOUS at 21:28

## 2022-10-22 RX ADMIN — CYANOCOBALAMIN TAB 1000 MCG 1000 MCG: 1000 TAB at 08:41

## 2022-10-22 RX ADMIN — SODIUM CHLORIDE, PRESERVATIVE FREE 10 ML: 5 INJECTION INTRAVENOUS at 08:42

## 2022-10-22 RX ADMIN — MIDODRINE HYDROCHLORIDE 5 MG: 5 TABLET ORAL at 12:23

## 2022-10-22 RX ADMIN — FUROSEMIDE 10 MG/HR: 10 INJECTION INTRAMUSCULAR; INTRAVENOUS at 14:02

## 2022-10-22 RX ADMIN — ATORVASTATIN CALCIUM 10 MG: 10 TABLET, FILM COATED ORAL at 21:29

## 2022-10-22 RX ADMIN — FERROUS SULFATE TAB 325 MG (65 MG ELEMENTAL FE) 325 MG: 325 (65 FE) TAB at 12:23

## 2022-10-22 RX ADMIN — ALBUTEROL SULFATE 2 PUFF: 90 AEROSOL, METERED RESPIRATORY (INHALATION) at 08:12

## 2022-10-22 ASSESSMENT — PAIN DESCRIPTION - ORIENTATION: ORIENTATION: MID

## 2022-10-22 ASSESSMENT — PAIN DESCRIPTION - LOCATION: LOCATION: GENERALIZED

## 2022-10-22 ASSESSMENT — PAIN SCALES - GENERAL
PAINLEVEL_OUTOF10: 0
PAINLEVEL_OUTOF10: 3
PAINLEVEL_OUTOF10: 0

## 2022-10-22 ASSESSMENT — PAIN DESCRIPTION - DESCRIPTORS: DESCRIPTORS: ACHING

## 2022-10-22 ASSESSMENT — PAIN - FUNCTIONAL ASSESSMENT: PAIN_FUNCTIONAL_ASSESSMENT: ACTIVITIES ARE NOT PREVENTED

## 2022-10-22 NOTE — PROGRESS NOTES
Nephrology Service Consultation    Patient:  Roxane Yi  MRN: [id number]  Consulting physician:  Ayse Ureña MD  Reason for Consult: Anasarca/edema    History Obtained From:  patient, electronic medical record  PCP: Vy Davila MD    HISTORY OF PRESENT ILLNESS:   The patient is a 80 y.o. female who presents with weakness fatigue confusion and states lives at home prior to coming to the hospital history of bradycardia with pacemaker/coronary disease/congestive heart failure with cor pulmonale prior CVA, degenerative joint disease now presents with worsening CHF with shortness of breath. Noted significant edema with normal renal function. Despite diuretic use edema not improved patient is awake but weak and states has been getting worse over the last 1 month admitted for therapy and now renal asked to help with increased diuresis in the setting of risk of renal function    Past Medical History:        Diagnosis Date    Age-related osteoporosis with current pathological fracture of vertebra ([address] 7/8/2022    Arthritis     Bradycardia 2001    requiring dual chamber pacemaker at [facility]    CAD (coronary artery disease)     Cardiac pacemaker 10/2001    [employer] #[id number]  PPM- Serial # 26-[facility]- Dr Kitty Cummins    CHF (congestive heart failure) (HCC)     COPD, mild ([address] 2/17/2017    Cor pulmonale (chronic) ([address] 3/15/2022    CVA (cerebrovascular accident) ([address]     DJD (degenerative joint disease) of cervical spine     C5-C6, C6-C7    Exhaustion of cardiac pacemaker battery 11/21/2011    PPM battery replacement- [employer]    Family history of cardiovascular disease     Glaucoma Dx 2010    H/O 24 hour EKG monitoring 8/6/2000 8/6/2000- Intermittent episonde of a-fib/flutter    H/O cardiac catheterization 4/20/2010, 2/1989 4/20/2010-Severe native vessel disease and has graft disease as well. LAD, CX totally occluded. RCA totally occluded in proximal segment.  VG to RCA widely patent. PDA 90% LAD afer LIMA anastomosis 80-90% stenosis. Proceeded with PTCA with stent next day. H/O cardiovascular stress test 10/14/2011, 6/2/2010,4/8/2010, 5/18/2009,4/6/2009, 10/30/2007, 11/12/2004, 11/6/2003, 8/9/2002, 8/9/2001,6/5/2000,     10/14/2011-Lexiscan-Abnormal Myocardial Perfusion study. Evidence of mild ischemia in the Left CX region. Abnomal study. Rest EF 63%. Global LV systolic function normal. No ECG changes. Unremarkable pharmacological stress test.    H/O cardiovascular stress test 6/10/2013    thallium--mild ischemia left circumflex EF63% no change from 10/2011 study. H/O cardiovascular stress test 10/16/2014    cardiolite-mild ischemia left circumflex,EF70%    H/O chest x-ray 4/19/2009 4/19/2009-Stable cardiomegaly. No acute cardiopulmonary disease. H/O Doppler lower venous ultrasound 09/18/2019    Significant reflux noted in RGSV, RGSV is extremely tortuous and small along the thigh and calf and would be highly unlikely to be accessed, RSSV is non compressible and has occlusive chronic SVT, LSSV is non compressible with occlusive chronic SVT, LGSV removed s/p CABG, Significant reflux in LGSV tributary,    H/O Doppler ultrasound 3/31/2010    CAROTID- 3/31/2010-INtimal thickening but no significant atherosclerotic plaque noted in ANA PAULA. Doppler flow velocities within ANA PAULA are WNL. Heterogeneous, irregular atherosclerotic plaque noted in LICA. Doppler flow velocities within the LICA are elevated, consistent with a mild, less than 50% stenosis. H/O Doppler ultrasound 5/24/2016    Carotid- normal study    H/O Doppler ultrasound 09/06/2017    carotid - normal study    H/O Doppler ultrasound     H/O echocardiogram 10/13    EF=60%, Severe Pulm. HTN, & Sclerotic aortic valve w/stenosis. H/O echocardiogram 10/16/14     EF 55-60% Normal LV. Normal LV systolic function. Severe tricuspid insufficiency with severe hypertension.      H/O echocardiogram 02/03/2017    heart cath performed this morning    H/O echocardiogram 09/18/2019    EF 50-55%, Left atrium is mild to moderately dilated, right atrium is severely dilated, mildly dilated right ventricle, Mod MR, Severe TR, Severe Pulm HTN, no pericardial effusion     History of complete ECG     10/14/2011(Lexiscan);5/6/2010, 4/30/2009,10/24/2008,9/21/2007, 10/13/2006    History of nuclear stress test 11/17/2016    lexiscan-normal,EF70%    Hx of cardiovascular stress test 12/28/2018    EF 60%  Normal study. HX OTHER MEDICAL 05/01/2017    MUGA-normal, EF53%    Hyperlipidemia     Hypertension     Mild intermittent asthma 7/28/2016    Moderate COPD (chronic obstructive pulmonary disease) ([facility]) 3/15/2022    Nausea & vomiting     Obstructive sleep apnea 5/16/2017    Paroxysmal atrial fibrillation ([address]     Post PTCA 4/21/2010    PTCA with 2.25 stent of the LIMA to LAD    Pulmonary HTN ([address]     Severe per last echo on 10/13. 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  Followed by MAZE procedure by pulmonary vein isolation.-  Dr Adamaris Winslow    S/P PTCA (percutaneous transluminal coronary angioplasty) 11/2012    PTCA with stent to RCA    Severe pulmonary hypertension ([address] 3/15/2022    Shortness of breath 3/15/2022    Thyroid disease     hypothyroi    Unspecified cerebral artery occlusion with cerebral infarction Unsure When    No Residual    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 ([address] 7/29/2022    WD-Non-pressure chronic ulcer of other part of left lower leg limited to breakdown of skin ([address] 7/29/2022       Past Surgical History:        Procedure Laterality Date    APPENDECTOMY  1941    CARDIAC SURGERY  4/09    CABG (3 Bypasses), One Heart Stent in 2010    COLONOSCOPY  In 2000's    X1    CORONARY ANGIOPLASTY WITH STENT PLACEMENT  4/21/2010    PTCA with stent LIMA ->LAD    CORONARY ARTERY BYPASS GRAFT  4/8/2009    LIMA->Diag,  LIMA -> LAD; SVG->RCA going to the PDA. Followed by MAZE procedure by pulmonary vein isolation.-  Dr Elouise Duane, TOTAL ABDOMINAL (CERVIX REMOVED)  1990's    MIDDLE EAR SURGERY      OTHER SURGICAL HISTORY      Ear surgery-hearing    PACEMAKER PLACEMENT      battery change 11/21/2011 [employer]    PTCA  11/2012    Ptca with stent to RCA    TONSILLECTOMY  1950's       Medications:   Scheduled Meds:   spironolactone  25 mg Oral Daily    furosemide  60 mg IntraVENous BID    collagenase   Topical Daily    albuterol sulfate HFA  2 puff Inhalation BID    aspirin  81 mg Oral Daily    atorvastatin  10 mg Oral Nightly    carvedilol  6.25 mg Oral BID WC    ferrous sulfate  325 mg Oral Every Other Day    levothyroxine  88 mcg Oral QAM AC    timolol  1 drop Both Eyes Nightly    vitamin B-12  1,000 mcg Oral Daily    Vitamin D  5,000 Units Oral Daily    latanoprost  1 drop Both Eyes Dinner    sodium chloride flush  5-40 mL IntraVENous 2 times per day    enoxaparin  40 mg SubCUTAneous Daily    tiotropium-olodaterol  2 puff Inhalation Daily     Continuous Infusions:   sodium chloride       PRN Meds:.albuterol sulfate HFA, traZODone, sodium chloride flush, sodium chloride, ondansetron **OR** ondansetron, polyethylene glycol, acetaminophen **OR** acetaminophen    Allergies:  Darvocet [propoxyphene n-acetaminophen], Ranexa [ranolazine er], Ranolazine, Sulfa antibiotics, Sulfasalazine, Adhesive tape, Allantoin, Bacitracin, Gramicidin, Neomycin, Polymyxin b, Pramoxine hcl, and Silicone    Social History:   TOBACCO:   reports that she quit smoking about 51 years ago. Her smoking use included cigarettes. She has a 1.25 pack-year smoking history. She has never used smokeless tobacco.  ETOH:   reports that she does not currently use alcohol.   OCCUPATION:      Family History:       Problem Relation Age of Onset    Cancer Mother         \"Liver Cancer\"    Arthritis Mother     Depression Mother     Hearing Loss Mother     High Blood Pressure Mother     High Cholesterol Mother     Mental Illness Mother     Miscarriages / Stillbirths Mother     Heart Disease Father     Early Death Father 36        \"Instant Death\"    High Blood Pressure Father     High Cholesterol Father     Coronary Art Dis Father         Massive MI    Heart Disease Sister     Depression Sister     Cancer Sister         \"Breast Cancer, Cancer Free Now\"    High Blood Pressure Sister     Other Daughter         \"She's Had Stomach Surgery\"    High Blood Pressure Son     High Blood Pressure Son     Early Death Paternal Grandfather        REVIEW OF SYSTEMS:  Negative except for weak tired short of breath anasarca. Physical Exam:    I/O: 10/21 0701 - 10/22 0700  In: 360 [P.O.:360]  Out: 251 [Urine:251]    Vitals: /67   Pulse 60   Temp 97.9 °F (36.6 °C) (Oral)   Resp 23   Ht 4' 11\" (1.499 m)   Wt 160 lb 11.5 oz (72.9 kg)   SpO2 98%   BMI 32.46 kg/m²   General appearance: awake weak tired  HEENT: Head: Normal, normocephalic, atraumatic. Neck: supple, symmetrical, trachea midline  Lungs: diminished breath sounds bilaterally  Heart: S1, S2 normal  Abdomen: abnormal findings:  soft NT obese ascites  Extremities: edema +2  Neurologic: Mental status: alertness: Awake      CBC:   Recent Labs     10/19/22  1529   WBC 8.1   HGB 13.7        BMP:    Recent Labs     10/19/22  1529 10/20/22  0225 10/21/22  0924   * 135 138   K 4.1 4.5 4.5   CL 93* 97* 97*   CO2 29 27 30   BUN 30* 27* 26*   CREATININE 0.9 0.7 0.8   GLUCOSE 150* 83 110*     Hepatic:   Recent Labs     10/19/22  1529   AST 29   ALT 20   BILITOT 1.1*   ALKPHOS 257*     Troponin: No results for input(s): TROPONINI in the last 72 hours. BNP: No results for input(s): BNP in the last 72 hours. Lipids: No results for input(s): CHOL, HDL in the last 72 hours.     Invalid input(s): LDLCALCU  ABGs: No results found for: PHART, PO2ART, DXU4WOC  INR:   Recent Labs     10/20/22  0827   INR 1.23     Renal Labs  Albumin:    Lab Results   Component Value Date/Time    LABALBU 4.2 10/19/2022 03:29 PM     Calcium:    Lab Results   Component Value Date/Time    CALCIUM 9.3 10/21/2022 09:24 AM     Phosphorus:    Lab Results   Component Value Date/Time    PHOS 2.4 10/13/2021 06:30 AM     U/A:    Lab Results   Component Value Date/Time    NITRU NEGATIVE 10/21/2022 11:50 AM    COLORU YELLOW 10/21/2022 11:50 AM    WBCUA 2 10/21/2022 11:50 AM    RBCUA 1 10/21/2022 11:50 AM    MUCUS RARE 10/21/2022 11:50 AM    TRICHOMONAS NONE SEEN 10/21/2022 11:50 AM    YEAST RARE 11/23/2021 07:30 AM    BACTERIA NEGATIVE 10/21/2022 11:50 AM    CLARITYU CLEAR 10/21/2022 11:50 AM    SPECGRAV 1.010 10/21/2022 11:50 AM    UROBILINOGEN 0.2 10/21/2022 11:50 AM    BILIRUBINUR NEGATIVE 10/21/2022 11:50 AM    BLOODU SMALL 10/21/2022 11:50 AM    KETUA NEGATIVE 10/21/2022 11:50 AM     ABG:  No results found for: PHART, GCT8IZN, PO2ART, ABA6NWL, BEART, THGBART, WNM9NIL, P3YWSIAF  HgBA1c:  No results found for: LABA1C  Microalbumen/Creatinine ratio:  No components found for: RUCREAT  TSH:    Lab Results   Component Value Date/Time    TSH 1.0 05/07/2010 12:00 AM     IRON:    Lab Results   Component Value Date/Time    IRON 198 09/02/2022 03:05 PM     Iron Saturation:  No components found for: PERCENTFE  TIBC:    Lab Results   Component Value Date/Time    TIBC 364 09/02/2022 03:05 PM     FERRITIN:    Lab Results   Component Value Date/Time    FERRITIN 70 09/02/2022 03:05 PM     RPR:  No results found for: RPR  PATSY:    Lab Results   Component Value Date/Time    PATSY None Detected 10/08/2019 03:55 PM    PATSY  10/08/2019 03:55 PM     (NOTE)  If suspicion of connective tissue disease is strong and PATSY EIA is   negative, consider testing for PATSY by IFA ([id number]). INTERPRETIVE INFORMATION: Anti-Nuclear Antibodies (PATSY), IgG by   SOBEIDA  Antinuclear Antibodies (PATSY), IgG by SOBEIDA: PATSY specimens are   screened using enzyme-linked immunosorbent assay (SOBEIDA)   methodology.  All SOBEIDA results reported as Detected are further   tested by indirect fluorescent assay (IFA) using HEp-2 substrate   with an IgG-specific conjugate. The PATSY SOBEIDA screen is designed   to detect antibodies against dsDNA, histones, SS-A (Ro), SS-B   (La), Carlos, Carlos/RNP, Scl-70, Arlin-1, centromeric proteins, other   antigens extracted from the HEp-2 cell nucleus. PATSY SOBEIDA assays   have been reported to have lower sensitivities than PATSY IFA for   systemic autoimmune rheumatic diseases (SARD). Negative results do not necessarily rule out SARD. Performed by [facility]  [place] [address], [id number] [facility] [phone]  www. Roxy Rausch MD, Lab. Director       24 Hour Urine for Creatinine Clearance:  No components found for: CREAT4, UHRS10, UTV10  -----------------------------------------------------------------  Impression   Limited evaluation of the segmental and subsegmental pulmonary arterial   branches. No central pulmonary embolism is detected. Marked enlargement of the right atrium, with extensive reflux into the   hepatic veins, suggesting right heart failure. Small right pleural effusion with adjacent airspace disease. That airspace   disease may reflect passive atelectasis, but pneumonia and aspiration remain   in the differential.       Anasarca, with subcutaneous edema and ascites. Summary   This is a limited echocardiogram.   Left ventricular systolic function is normal.   Ejection fraction is visually estimated at 50-55%. Moderate left ventricular hypertrophy. Grade III diastolic dysfunction. Severely dilated right ventricle (6.39 cm). PPM wiring visualized within the right ventricle. Severe tricuspid regurgitation; RVSP: 39 mmHg. Severely dilated right atrium. No evidence of any pericardial effusion. Inferior vena cava is dilated, measuring at 5.2 cm, and does not collapse   with respiration.      Assessment and Recommendations     Patient Active Problem List Diagnosis Code    CAD (coronary artery disease) I25.10    Cardiac pacemaker Z95.0    S/P CABG x 3 Z95.1    Post PTCA Z98.61    TIA (transient ischemic attack) G45.9    Confusion R41.0    Headache R51.9    Essential hypertension I10    Bradycardia R00.1    Coronary artery disease involving coronary bypass graft of native heart without angina pectoris I25.810    Obstructive sleep apnea G47.33    Dizziness R42    PAF (paroxysmal atrial fibrillation) ([facility]) I48.0    Pacer at end of battery life Z45.010    Infection of pacemaker pocket ([facility] T82. 7XXA    Hyponatremia A23.5    Metabolic encephalopathy K65.07    Generalized weakness R53.1    Gait disturbance R26.9    Acute urinary retention R33.8    Hypothyroidism (acquired) E03.9    Chronic diastolic (congestive) heart failure (HCC) I50.32    Cor pulmonale (chronic) ([facility]) I27.81    Chronic right-sided heart failure (HCC) I50.812    Severe pulmonary hypertension ([facility]) I27.20    Shortness of breath R06.02    Moderate COPD (chronic obstructive pulmonary disease) ([facility]) J44.9    CHF (congestive heart failure), NYHA class I, acute on chronic, combined ([facility]) I50.43    Abnormal liver CT -findings suggestive of cirrhosis  R93.2    At risk for injury associated with anticoagulation Z91.89    T12 vertebral fracture ([facility]) S22.089A    SAULO on CPAP G47.33, Z99.89    Iron deficiency anemia secondary to inadequate dietary iron intake D50.8    Chest pain R07.9    Age-related osteoporosis with current pathological fracture of vertebra ([facility]) M80.08XA    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 ([facility]) I87.312, L97.929    WD-Non-pressure chronic ulcer of other part of left lower leg with fat layer exposed ([facility] O08.099    Acute on chronic diastolic (congestive) heart failure (HCC) I50.33       Impression plan  #1 CHF exacerbation diastolic dysfunction with valvular heart disease/cor pulmonale  #2 pulmonary hypertension with possible respiratory failure  #3 anasarca/edema with low urine output  #4 coronary disease prior CABG  #5 bradycardia with history of pacemaker  #6 obstructive sleep apnea with history of CVA    Plan  #1 cardiology on the case maintain aggressive diuresis as able start Lasix drip with Zaroxolyn recheck urine electrolytes  #2 at this point the best treatment is aggressive diuresis renal function should be able to tolerate today recent CAT scan with IV contrast which may affect urination we will check a bladder scan and maintain with Carnes as well  #3 attempted paracentesis only 350 cc out monitor for now  #4 cardiac monitor with cardiology  #5 monitor pacemaker  #6 trial of CPAP at night  Supportive care monitor for now maximize diuretic therapy and hopefully will not need dialysis as an option  Will follow    Electronically signed by Robby Rubio MD on 10/22/2022 at 7:51 AM

## 2022-10-22 NOTE — PLAN OF CARE
Problem: Discharge Planning  Goal: Discharge to home or other facility with appropriate resources  Outcome: Progressing  Flowsheets (Taken 10/22/2022 0815)  Discharge to home or other facility with appropriate resources:   Identify barriers to discharge with patient and caregiver   Arrange for needed discharge resources and transportation as appropriate   Identify discharge learning needs (meds, wound care, etc)     Problem: Pain  Goal: Verbalizes/displays adequate comfort level or baseline comfort level  Outcome: Progressing  Flowsheets (Taken 10/22/2022 0815)  Verbalizes/displays adequate comfort level or baseline comfort level:   Encourage patient to monitor pain and request assistance   Assess pain using appropriate pain scale   Administer analgesics based on type and severity of pain and evaluate response     Problem: Chronic Conditions and Co-morbidities  Goal: Patient's chronic conditions and co-morbidity symptoms are monitored and maintained or improved  Outcome: Progressing  Flowsheets (Taken 10/22/2022 0815)  Care Plan - Patient's Chronic Conditions and Co-Morbidity Symptoms are Monitored and Maintained or Improved:   Monitor and assess patient's chronic conditions and comorbid symptoms for stability, deterioration, or improvement   Collaborate with multidisciplinary team to address chronic and comorbid conditions and prevent exacerbation or deterioration   Update acute care plan with appropriate goals if chronic or comorbid symptoms are exacerbated and prevent overall improvement and discharge     Problem: Skin/Tissue Integrity  Goal: Absence of new skin breakdown  Description: 1. Monitor for areas of redness and/or skin breakdown  2. Assess vascular access sites hourly  3. Every 4-6 hours minimum:  Change oxygen saturation probe site  4.   Every 4-6 hours:  If on nasal continuous positive airway pressure, respiratory therapy assess nares and determine need for appliance change or resting period.   Outcome: Progressing 09-Jan-2018 22:58

## 2022-10-22 NOTE — PROGRESS NOTES
V2.0  [facility] [place] [facility] Hospitalist Progress Note      Name:  Desirae Stark /Age/Sex: 3/18/1929  (80 y.o. female)   MRN & CSN:  [id number] & [id number] Encounter Date/Time: 10/22/2022 11:02 AM EDT    Location:  -A PCP: Aubree Rico MD       Hospital Day: 4    Assessment and Plan:   Desirae Stark is a 80 y.o. female with pmh of CAD, cor pulmonale, COPD, CVA, bradycardia s/p dual-chamber pacemaker, who presents with Acute on chronic diastolic (congestive) heart failure ([facility] [address]      # Acute on chronic diastolic heart failure: [address] [facility] cardiology outpatient, sent by cardiology due to anasarca, on presentation proBNP 1408, chest x-ray compatible with pulmonary interstitial edema, CTA chest no PE, marked enlargement of the right atrium suspected right heart failure, small right pleural effusion, anasarca with subcutaneous edema and ascites, started on Lasix drip, consulted cardiology, echo on 10/20/2022 showed EF 50 to 23%, grade 3 diastolic dysfunction, severely dilated right atrium and ventricle, moderate left ventricular hypertrophy, severe tricuspid regurgitation, RVSP 39 mmHg, cardiology followed and recommended to stop Lasix drip and start on p.o. Lasix but patient still edematous so continued IV Lasix and consulted nephrology  - Strict ins/O's  - Daily weights  - Nephrology recommended IV Lasix and Diuril with strict ins/O's  - Fluid and salt restriction    # Anasarca and ascites: Paracentesis 10/20 remote 350 cc  - Nephrology following    # Left distal calf wound  - Wound care following. # Severe pulmonary hypertension     CAD Hx CABG  Glaucoma  Hx bradycardia W/cardiac pacemaker  SAULO  History of CVA    Diet ADULT DIET; Regular;  Low Sodium (2 gm); 2000 ml   DVT Prophylaxis [x] Lovenox, []  Heparin, [] SCDs, [] Ambulation,  [] Eliquis, [] Xarelto  [] Coumadin   Code Status Full Code   Disposition From: Home  Expected Disposition: Home   Estimated Date of Discharge: TBD  Patient requires continued admission due to IV diuretics   Surrogate Decision Maker/ POA Son     Subjective:     Chief Complaint: Leg Swelling (On lasix requiring O2 now)       Patient seen and examined at bedside. Patient states she still feel short of breath hard of hearing         Review of Systems:    Review of Systems    Constitutional: No fever no chills  HEENT: No headache no dizziness  Cardiovascular: No chest pain no palpitations  Respiratory: No cough shortness of breath+  Abdomen: No diarrhea, no nausea, no vomiting, no abdominal pain  Musculoskeletal: Swelling all over  Neuro: No numbness or tingling  Skin: No rash      Objective: Intake/Output Summary (Last 24 hours) at 10/22/2022 1102  Last data filed at 10/21/2022 1702  Gross per 24 hour   Intake --   Output 251 ml   Net -251 ml        Vitals:   Vitals:    10/22/22 0816   BP:    Pulse:    Resp:    Temp:    SpO2: 100%       Physical Exam:     General: Afebrile, in distress requiring O2  Eyes: EOMI  ENT: neck supple, elevated JVD  Cardiovascular: S1-S2 normal no murmur  Respiratory: Air entry decreased bilaterally with bibasal crackles  Gastrointestinal: Soft, non tender, mild distention bowel sounds normal  Genitourinary: no suprapubic tenderness  Musculoskeletal: 1+ pitting edema bilateral lower extremity  Skin: warm, dry  Neuro: Alert. Psych: Mood appropriate.      Medications:   Medications:    metOLazone  5 mg Oral BID    midodrine  5 mg Oral TID WC    chlorothiazide (DIURIL) IVPB  500 mg IntraVENous Q24H    spironolactone  25 mg Oral Daily    collagenase   Topical Daily    albuterol sulfate HFA  2 puff Inhalation BID    aspirin  81 mg Oral Daily    atorvastatin  10 mg Oral Nightly    carvedilol  6.25 mg Oral BID WC    ferrous sulfate  325 mg Oral Every Other Day    levothyroxine  88 mcg Oral QAM AC    timolol  1 drop Both Eyes Nightly    vitamin B-12  1,000 mcg Oral Daily    Vitamin D  5,000 Units Oral Daily    latanoprost  1 drop Both Eyes Dinner    sodium chloride flush  5-40 mL IntraVENous 2 times per day    enoxaparin  40 mg SubCUTAneous Daily    tiotropium-olodaterol  2 puff Inhalation Daily      Infusions:    furosemide (LASIX) 1mg/ml infusion      sodium chloride       PRN Meds: albuterol sulfate HFA, 2 puff, Q4H PRN  traZODone, 50 mg, Nightly PRN  sodium chloride flush, 5-40 mL, PRN  sodium chloride, , PRN  ondansetron, 4 mg, Q8H PRN   Or  ondansetron, 4 mg, Q6H PRN  polyethylene glycol, 17 g, Daily PRN  acetaminophen, 650 mg, Q6H PRN   Or  acetaminophen, 650 mg, Q6H PRN        Labs      Recent Results (from the past 24 hour(s))   Urinalysis    Collection Time: 10/21/22 11:50 AM   Result Value Ref Range    Color, UA YELLOW YELLOW    Clarity, UA CLEAR CLEAR    Glucose, Urine NEGATIVE NEGATIVE MG/DL    Bilirubin Urine NEGATIVE NEGATIVE MG/DL    Ketones, Urine NEGATIVE NEGATIVE MG/DL    Specific Gravity, UA 1.010 1.001 - 1.035    Blood, Urine SMALL (A) NEGATIVE    pH, Urine 5.5 5.0 - 8.0    Protein, UA NEGATIVE NEGATIVE MG/DL    Urobilinogen, Urine 0.2 0.2 - 1.0 MG/DL    Nitrite Urine, Quantitative NEGATIVE NEGATIVE    Leukocyte Esterase, Urine NEGATIVE NEGATIVE   Microscopic Urinalysis    Collection Time: 10/21/22 11:50 AM   Result Value Ref Range    RBC, UA 1 0 - 6 /HPF    WBC, UA 2 0 - 5 /HPF    Bacteria, UA NEGATIVE NEGATIVE /HPF    Squam Epithel, UA 1 /HPF    Mucus, UA RARE (A) NEGATIVE HPF    Trichomonas, UA NONE SEEN NONE SEEN /HPF    Hyaline Casts, UA 10 /LPF        Imaging/Diagnostics Last 24 Hours   XR CHEST PORTABLE    Result Date: 10/21/2022  EXAMINATION: ONE XRAY VIEW OF THE CHEST 10/21/2022 4:58 pm COMPARISON: 10/19/2022 HISTORY: ORDERING SYSTEM PROVIDED HISTORY: hypoxia TECHNOLOGIST PROVIDED HISTORY: Reason for exam:->hypoxia Reason for Exam: hypoxia Additional signs and symptoms: na Relevant Medical/Surgical History: cad, copd, chf FINDINGS: Stable marked cardiomegaly. Improved lung aeration with decreased edema. Stable small right pleural effusion.   No pneumothorax or left pleural effusion. Mediastinal contours stable. Pacemaker and sternotomy wires unchanged. No acute osseous abnormality. 1. Cardiomegaly with decreased pulmonary edema. 2. Stable small right pleural effusion. IR US GUIDED PARACENTESIS    Result Date: 10/20/2022  PROCEDURE: PARACENTESIS WITH IMAGE GUIDANCE US ABDOMEN LIMITED 10/20/2022 HISTORY: ORDERING SYSTEM PROVIDED HISTORY: ascites TECHNOLOGIST PROVIDED HISTORY: Reason for exam:->ascites TECHNIQUE: Informed consent was obtained after a detailed explanation of the procedure including risks, benefits, and alternatives. Universal protocol was followed. A limited ultrasound of the abdomen was performed. This reveals a relatively small amount of ascites concentrated in the right perihepatic region. The right abdomen was prepped and draped in sterile fashion and local anesthesia was achieved with lidocaine. A 5 Cypriot one-step sheathed needle was advanced into the focal collection of ascites using real-time sonographic guidance and paracentesis was performed. The patient tolerated the procedure well. FINDINGS: Limited ultrasound of the abdomen demonstrates ascites. 4 sonographic images were obtained. A total of 350 cc of cloudy madelaine fluid was removed. It was sent to the lab for analysis. Successful ultrasound paracentesis.        Electronically signed by Haim Early MD on 10/22/2022 at 11:02 AM

## 2022-10-22 NOTE — PROGRESS NOTES
Pt on rm air, O2 saturation consistently at 95%. Pt then fell asleep and O2 saturation dropped to 83-86%. This RN put oxygen back on at 2L while pt was sleeping and O2 saturation increased to 93% and remained.

## 2022-10-22 NOTE — CARE COORDINATION
Patient on the discharge follow up list. If the patient is discharged over the weekend, discharge RN please. call [facility] [phone]) to notify pt was discharged. Also, [facility] fax number is [phone]. Fax the AVS, d/c Summary if available & Inpatient Home Health Needs order to the above number. If the fax fails to send call [facility] Sherry Deshpande to receive a additional fax number to send the mentioned information. Patient is active with [facility]. Please re-order at discharge if appropriate.

## 2022-10-22 NOTE — PROGRESS NOTES
Carnes catheter placed at this time, 1025 mL clear yellow urine immediately drained. Pt BP currently 112/54. Dr. Ksenia Suarez notified. Pt resting comfortably at this time, call light and bedside table in reach. Family member waited in hallway during procedure and returned to room once finished. No questions or requests at this time.

## 2022-10-23 LAB
ALBUMIN SERPL-MCNC: 3.9 GM/DL (ref 3.4–5)
ANION GAP SERPL CALCULATED.3IONS-SCNC: 10 MMOL/L (ref 4–16)
BUN BLDV-MCNC: 30 MG/DL (ref 6–23)
CALCIUM SERPL-MCNC: 9.5 MG/DL (ref 8.3–10.6)
CHLORIDE BLD-SCNC: 92 MMOL/L (ref 99–110)
CO2: 34 MMOL/L (ref 21–32)
CREAT SERPL-MCNC: 0.8 MG/DL (ref 0.6–1.1)
CULTURE: ABNORMAL
GFR SERPL CREATININE-BSD FRML MDRD: >60 ML/MIN/1.73M2
GLUCOSE BLD-MCNC: 86 MG/DL (ref 70–99)
GRAM SMEAR: ABNORMAL
Lab: ABNORMAL
MAGNESIUM: 2 MG/DL (ref 1.8–2.4)
PHOSPHORUS: 3.6 MG/DL (ref 2.5–4.9)
POTASSIUM SERPL-SCNC: 4.4 MMOL/L (ref 3.5–5.1)
SODIUM BLD-SCNC: 136 MMOL/L (ref 135–145)
SPECIMEN: ABNORMAL

## 2022-10-23 PROCEDURE — 97166 OT EVAL MOD COMPLEX 45 MIN: CPT

## 2022-10-23 PROCEDURE — 6360000002 HC RX W HCPCS: Performed by: NURSE PRACTITIONER

## 2022-10-23 PROCEDURE — 36415 COLL VENOUS BLD VENIPUNCTURE: CPT

## 2022-10-23 PROCEDURE — 6370000000 HC RX 637 (ALT 250 FOR IP): Performed by: INTERNAL MEDICINE

## 2022-10-23 PROCEDURE — 80048 BASIC METABOLIC PNL TOTAL CA: CPT

## 2022-10-23 PROCEDURE — 80069 RENAL FUNCTION PANEL: CPT

## 2022-10-23 PROCEDURE — 94761 N-INVAS EAR/PLS OXIMETRY MLT: CPT

## 2022-10-23 PROCEDURE — 2700000000 HC OXYGEN THERAPY PER DAY

## 2022-10-23 PROCEDURE — 94640 AIRWAY INHALATION TREATMENT: CPT

## 2022-10-23 PROCEDURE — 2060000000 HC ICU INTERMEDIATE R&B

## 2022-10-23 PROCEDURE — 83735 ASSAY OF MAGNESIUM: CPT

## 2022-10-23 PROCEDURE — 6370000000 HC RX 637 (ALT 250 FOR IP): Performed by: HOSPITALIST

## 2022-10-23 PROCEDURE — 2580000003 HC RX 258: Performed by: NURSE PRACTITIONER

## 2022-10-23 PROCEDURE — 2580000003 HC RX 258: Performed by: INTERNAL MEDICINE

## 2022-10-23 PROCEDURE — 97530 THERAPEUTIC ACTIVITIES: CPT

## 2022-10-23 PROCEDURE — 6360000002 HC RX W HCPCS: Performed by: INTERNAL MEDICINE

## 2022-10-23 PROCEDURE — 6370000000 HC RX 637 (ALT 250 FOR IP): Performed by: NURSE PRACTITIONER

## 2022-10-23 RX ADMIN — CARVEDILOL 6.25 MG: 6.25 TABLET, FILM COATED ORAL at 09:43

## 2022-10-23 RX ADMIN — MIDODRINE HYDROCHLORIDE 5 MG: 5 TABLET ORAL at 11:38

## 2022-10-23 RX ADMIN — ATORVASTATIN CALCIUM 10 MG: 10 TABLET, FILM COATED ORAL at 22:02

## 2022-10-23 RX ADMIN — SODIUM CHLORIDE: 9 INJECTION, SOLUTION INTRAVENOUS at 10:33

## 2022-10-23 RX ADMIN — ALBUTEROL SULFATE 2 PUFF: 90 AEROSOL, METERED RESPIRATORY (INHALATION) at 20:51

## 2022-10-23 RX ADMIN — SPIRONOLACTONE 25 MG: 50 TABLET ORAL at 09:43

## 2022-10-23 RX ADMIN — TIOTROPIUM BROMIDE AND OLODATEROL 2 PUFF: 3.124; 2.736 SPRAY, METERED RESPIRATORY (INHALATION) at 11:13

## 2022-10-23 RX ADMIN — SODIUM CHLORIDE, PRESERVATIVE FREE 10 ML: 5 INJECTION INTRAVENOUS at 22:03

## 2022-10-23 RX ADMIN — TRAZODONE HYDROCHLORIDE 50 MG: 50 TABLET ORAL at 22:06

## 2022-10-23 RX ADMIN — METOLAZONE 5 MG: 5 TABLET ORAL at 22:02

## 2022-10-23 RX ADMIN — ASPIRIN 81 MG CHEWABLE TABLET 81 MG: 81 TABLET CHEWABLE at 09:43

## 2022-10-23 RX ADMIN — MIDODRINE HYDROCHLORIDE 5 MG: 5 TABLET ORAL at 09:44

## 2022-10-23 RX ADMIN — TIMOLOL MALEATE 1 DROP: 5 SOLUTION OPHTHALMIC at 22:03

## 2022-10-23 RX ADMIN — CHLOROTHIAZIDE SODIUM 500 MG: 500 INJECTION INTRAVENOUS at 10:34

## 2022-10-23 RX ADMIN — ALBUTEROL SULFATE 2 PUFF: 90 AEROSOL, METERED RESPIRATORY (INHALATION) at 11:13

## 2022-10-23 RX ADMIN — METOLAZONE 5 MG: 5 TABLET ORAL at 09:44

## 2022-10-23 RX ADMIN — CYANOCOBALAMIN TAB 1000 MCG 1000 MCG: 1000 TAB at 09:44

## 2022-10-23 RX ADMIN — FUROSEMIDE 10 MG/HR: 10 INJECTION INTRAMUSCULAR; INTRAVENOUS at 06:43

## 2022-10-23 RX ADMIN — CARVEDILOL 6.25 MG: 6.25 TABLET, FILM COATED ORAL at 17:17

## 2022-10-23 RX ADMIN — FUROSEMIDE 10 MG/HR: 10 INJECTION INTRAMUSCULAR; INTRAVENOUS at 22:12

## 2022-10-23 RX ADMIN — Medication 5000 UNITS: at 09:43

## 2022-10-23 RX ADMIN — ENOXAPARIN SODIUM 40 MG: 40 INJECTION SUBCUTANEOUS at 09:44

## 2022-10-23 RX ADMIN — LEVOTHYROXINE SODIUM 88 MCG: 0.09 TABLET ORAL at 09:43

## 2022-10-23 RX ADMIN — COLLAGENASE SANTYL: 250 OINTMENT TOPICAL at 09:44

## 2022-10-23 ASSESSMENT — PAIN SCALES - GENERAL
PAINLEVEL_OUTOF10: 0

## 2022-10-23 NOTE — PROGRESS NOTES
V2.0  [facility] [place] [facility] Hospitalist Progress Note      Name:  Angelique Marie /Age/Sex: 3/18/1929  (80 y.o. female)   MRN & CSN:  [id number] & [id number] Encounter Date/Time: 10/23/2022 11:02 AM EDT    Location:  -A PCP: Marisela Royal MD       Hospital Day: 5    Assessment and Plan:   Angelique Marie is a 80 y.o. female with pmh of CAD, cor pulmonale, COPD, CVA, bradycardia s/p dual-chamber pacemaker, who presents with Acute on chronic diastolic (congestive) heart failure ([facility] [address]      # Acute on chronic diastolic heart failure: 140 [facility] cardiology outpatient, sent by cardiology due to anasarca, on presentation proBNP 1408, chest x-ray compatible with pulmonary interstitial edema, CTA chest no PE, marked enlargement of the right atrium suspected right heart failure, small right pleural effusion, anasarca with subcutaneous edema and ascites, started on Lasix drip, consulted cardiology, echo on 10/20/2022 showed EF 50 to 94%, grade 3 diastolic dysfunction, severely dilated right atrium and ventricle, moderate left ventricular hypertrophy, severe tricuspid regurgitation, RVSP 39 mmHg, cardiology followed and recommended to stop Lasix drip and start on p.o. Lasix but patient still edematous so continued IV Lasix and consulted nephrology  - Strict ins/O's  - Daily weights  - Patient on BiPAP, continue to wean BiPAP  - Nephrology recommended IV Lasix and Diuril with strict ins/O's  - Fluid and salt restriction    # Anasarca and ascites: Paracentesis 10/20 remote 350 cc  - Nephrology following    # Left distal calf wound  - Wound care following. # Severe pulmonary hypertension     CAD Hx CABG  Glaucoma  Hx bradycardia W/cardiac pacemaker  SAULO  History of CVA    Diet ADULT DIET; Regular;  Low Sodium (2 gm); 2000 ml   DVT Prophylaxis [x] Lovenox, []  Heparin, [] SCDs, [] Ambulation,  [] Eliquis, [] Xarelto  [] Coumadin   Code Status Full Code   Disposition From: Home  Expected Disposition: Home   Estimated Date of Discharge: TBD  Patient requires continued admission due to IV diuretics   Surrogate Decision Maker/ POA Son     Subjective:     Chief Complaint: Leg Swelling (On lasix requiring O2 now)       Patient seen and examined at bedside. Patient states she still feel short of breath hard of hearing         Review of Systems:    Review of Systems    Constitutional: No fever no chills  HEENT: No headache no dizziness  Cardiovascular: No chest pain no palpitations  Respiratory: No cough shortness of breath+  Abdomen: No diarrhea, no nausea, no vomiting, no abdominal pain  Musculoskeletal: Swelling all over  Neuro: No numbness or tingling  Skin: No rash      Objective: Intake/Output Summary (Last 24 hours) at 10/23/2022 1100  Last data filed at 10/23/2022 1047  Gross per 24 hour   Intake 480 ml   Output 5000 ml   Net -4520 ml          Vitals:   Vitals:    10/23/22 0745   BP: (!) 111/54   Pulse: 60   Resp: 18   Temp: 97.9 °F (36.6 °C)   SpO2: 96%       Physical Exam:     General: Afebrile, in distress requiring O2  Eyes: EOMI  ENT: neck supple, elevated JVD  Cardiovascular: S1-S2 normal no murmur  Respiratory: Air entry decreased bilaterally with bibasal crackles  Gastrointestinal: Soft, non tender, mild distention bowel sounds normal  Genitourinary: no suprapubic tenderness  Musculoskeletal: 1+ pitting edema bilateral lower extremity  Skin: warm, dry  Neuro: Alert. Psych: Mood appropriate.      Medications:   Medications:    metOLazone  5 mg Oral BID    midodrine  5 mg Oral TID WC    chlorothiazide (DIURIL) IVPB  500 mg IntraVENous Q24H    spironolactone  25 mg Oral Daily    collagenase   Topical Daily    albuterol sulfate HFA  2 puff Inhalation BID    aspirin  81 mg Oral Daily    atorvastatin  10 mg Oral Nightly    carvedilol  6.25 mg Oral BID WC    ferrous sulfate  325 mg Oral Every Other Day    levothyroxine  88 mcg Oral QAM AC    timolol  1 drop Both Eyes Nightly    vitamin B-12  1,000 mcg Oral Daily    Vitamin D 5,000 Units Oral Daily    latanoprost  1 drop Both Eyes Dinner    sodium chloride flush  5-40 mL IntraVENous 2 times per day    enoxaparin  40 mg SubCUTAneous Daily    tiotropium-olodaterol  2 puff Inhalation Daily      Infusions:    furosemide (LASIX) 1mg/ml infusion 10 mg/hr (10/23/22 0643)    sodium chloride 20 mL/hr at 10/23/22 1033     PRN Meds: albuterol sulfate HFA, 2 puff, Q4H PRN  traZODone, 50 mg, Nightly PRN  sodium chloride flush, 5-40 mL, PRN  sodium chloride, , PRN  ondansetron, 4 mg, Q8H PRN   Or  ondansetron, 4 mg, Q6H PRN  polyethylene glycol, 17 g, Daily PRN  acetaminophen, 650 mg, Q6H PRN   Or  acetaminophen, 650 mg, Q6H PRN      Labs      Recent Results (from the past 24 hour(s))   Blood Gas, Venous    Collection Time: 10/22/22 12:17 PM   Result Value Ref Range    pH, Yunior 7.38 7.32 - 7.42    pCO2, Yunior 56 (H) 38 - 52 mmHG    pO2, Yunior 175 (H) 28 - 48 mmHG    Base Exc, Mixed 6.3 (H) 0 - 2.3    HCO3, Venous 33.1 (H) 19 - 25 MMOL/L    O2 Sat, Yunior 95.0 (H) 50 - 70 %    Comment VBG    Basic Metabolic Panel    Collection Time: 10/22/22 12:17 PM   Result Value Ref Range    Sodium 133 (L) 135 - 145 MMOL/L    Potassium 4.6 3.5 - 5.1 MMOL/L    Chloride 95 (L) 99 - 110 mMol/L    CO2 29 21 - 32 MMOL/L    Anion Gap 9 4 - 16    BUN 30 (H) 6 - 23 MG/DL    Creatinine 0.7 0.6 - 1.1 MG/DL    Est, Glom Filt Rate >60 >60 mL/min/1.73m2    Glucose 127 (H) 70 - 99 MG/DL    Calcium 9.6 8.3 - 10.6 MG/DL   Magnesium    Collection Time: 10/22/22 12:17 PM   Result Value Ref Range    Magnesium 2.2 1.8 - 2.4 mg/dl   Brain Natriuretic Peptide    Collection Time: 10/22/22 12:17 PM   Result Value Ref Range    Pro-BNP 1,968 (H) <300 PG/ML   Protein / creatinine ratio, urine    Collection Time: 10/22/22  1:20 PM   Result Value Ref Range    Urine Total Protein 11.6 <12 MG/DL    Creatinine, Ur 47.4 28 - 217 MG/DL    Prot/Creat Ratio, Ur 0.2 (H) <0.2   Electrolytes urine random    Collection Time: 10/22/22  1:20 PM   Result Value Ref Range    Sodium, Ur 15 (L) 35 - 167 MMOL/L    Potassium, Ur 28.9 22 - 119 MMOL/L    Chloride 29 (L) 43 - 210 MMOL/L   Urinalysis    Collection Time: 10/22/22  1:20 PM   Result Value Ref Range    Color, UA YELLOW YELLOW    Clarity, UA CLEAR CLEAR    Glucose, Urine NEGATIVE NEGATIVE MG/DL    Bilirubin Urine NEGATIVE NEGATIVE MG/DL    Ketones, Urine NEGATIVE NEGATIVE MG/DL    Specific Gravity, UA 1.015 1.001 - 1.035    Blood, Urine NEGATIVE NEGATIVE    pH, Urine 5.5 5.0 - 8.0    Protein, UA NEGATIVE NEGATIVE MG/DL    Urobilinogen, Urine 0.2 0.2 - 1.0 MG/DL    Nitrite Urine, Quantitative NEGATIVE NEGATIVE    Leukocyte Esterase, Urine NEGATIVE NEGATIVE   Magnesium    Collection Time: 10/23/22  4:15 AM   Result Value Ref Range    Magnesium 2.0 1.8 - 2.4 mg/dl   Renal Function Panel    Collection Time: 10/23/22  4:15 AM   Result Value Ref Range    Sodium 136 135 - 145 MMOL/L    Potassium 4.4 3.5 - 5.1 MMOL/L    Chloride 92 (L) 99 - 110 mMol/L    CO2 34 (H) 21 - 32 MMOL/L    Anion Gap 10 4 - 16    BUN 30 (H) 6 - 23 MG/DL    Creatinine 0.8 0.6 - 1.1 MG/DL    Est, Glom Filt Rate >60 >60 mL/min/1.73m2    Glucose 86 70 - 99 MG/DL    Calcium 9.5 8.3 - 10.6 MG/DL    Albumin 3.9 3.4 - 5.0 GM/DL    Phosphorus 3.6 2.5 - 4.9 MG/DL        Imaging/Diagnostics Last 24 Hours   XR CHEST PORTABLE    Result Date: 10/21/2022  EXAMINATION: ONE XRAY VIEW OF THE CHEST 10/21/2022 4:58 pm COMPARISON: 10/19/2022 HISTORY: ORDERING SYSTEM PROVIDED HISTORY: hypoxia TECHNOLOGIST PROVIDED HISTORY: Reason for exam:->hypoxia Reason for Exam: hypoxia Additional signs and symptoms: na Relevant Medical/Surgical History: cad, copd, chf FINDINGS: Stable marked cardiomegaly. Improved lung aeration with decreased edema. Stable small right pleural effusion. No pneumothorax or left pleural effusion. Mediastinal contours stable. Pacemaker and sternotomy wires unchanged. No acute osseous abnormality. 1. Cardiomegaly with decreased pulmonary edema.  2. Stable small right pleural effusion. IR US GUIDED PARACENTESIS    Result Date: 10/20/2022  PROCEDURE: PARACENTESIS WITH IMAGE GUIDANCE US ABDOMEN LIMITED 10/20/2022 HISTORY: ORDERING SYSTEM PROVIDED HISTORY: ascites TECHNOLOGIST PROVIDED HISTORY: Reason for exam:->ascites TECHNIQUE: Informed consent was obtained after a detailed explanation of the procedure including risks, benefits, and alternatives. Universal protocol was followed. A limited ultrasound of the abdomen was performed. This reveals a relatively small amount of ascites concentrated in the right perihepatic region. The right abdomen was prepped and draped in sterile fashion and local anesthesia was achieved with lidocaine. A 5 Lithuanian one-step sheathed needle was advanced into the focal collection of ascites using real-time sonographic guidance and paracentesis was performed. The patient tolerated the procedure well. FINDINGS: Limited ultrasound of the abdomen demonstrates ascites. 4 sonographic images were obtained. A total of 350 cc of cloudy madelaine fluid was removed. It was sent to the lab for analysis. Successful ultrasound paracentesis.        Electronically signed by Andi Whitt MD on 10/23/2022 at 11:00 AM

## 2022-10-23 NOTE — PROGRESS NOTES
10/22/22 9567   NIV Type   NIV Started/Stopped On   Equipment Type v60   Mode Bilevel   Mask Type Full face mask   Mask Size Medium   Bonnet size Medium   Settings/Measurements   PIP Observed 12 cm H20   IPAP 12 cmH20   CPAP/EPAP 6 cmH2O   Vt (Measured) 325 mL   Rate Ordered 16   Resp 21   FiO2  35 %   I Time/ I Time % 1 s   Minute Volume (L/min) 5.8 Liters   Mask Leak (lpm) 0 lpm   Comfort Level Good   SpO2 98   Patient's Home Machine No   Patient Observation   Observations Pt is resting in bed   Alarm Settings   Alarms On Y   Low Pressure (cmH2O) 5 cmH2O   High Pressure (cmH2O) 20 cmH2O   Delay Alarm 20 sec(s)   Apnea (secs) 20 secs   RR Low (bpm) 12   RR High (bpm) 40 br/min

## 2022-10-23 NOTE — PROGRESS NOTES
Occupational [address] [place] [address] ACUTE CARE OCCUPATIONAL THERAPY EVALUATION    Klarissa Gomez, 3/18/1929, 2017/2017-A, 10/23/2022    Discharge Recommendation: Billy Juve       History:  [place]:  The primary encounter diagnosis was Congestive heart failure, unspecified HF chronicity, unspecified heart failure type ([address] A diagnosis of Anasarca was also pertinent to this visit. Past Medical History:   Diagnosis Date    Age-related osteoporosis with current pathological fracture of vertebra ([address] 7/8/2022    Arthritis     Bradycardia 2001    requiring dual chamber pacemaker at [facility] [place]    CAD (coronary artery disease)     Cardiac pacemaker 10/2001    [employer] #[id number]  PPM- Serial # [id number]- Dr Kyra George    CHF (congestive heart failure) (HCC)     COPD, mild ([address] 2/17/2017    Cor pulmonale (chronic) ([address] 3/15/2022    CVA (cerebrovascular accident) ([address]     DJD (degenerative joint disease) of cervical spine     C5-C6, C6-C7    Exhaustion of cardiac pacemaker battery 11/21/2011    PPM battery replacement- [employer]    Family history of cardiovascular disease     Glaucoma Dx 2010    H/O 24 hour EKG monitoring 8/6/2000 8/6/2000- Intermittent episonde of a-fib/flutter    H/O cardiac catheterization 4/20/2010, 2/1989 4/20/2010-Severe native vessel disease and has graft disease as well. LAD, CX totally occluded. RCA totally occluded in proximal segment. VG to RCA widely patent. PDA 90% LAD afer LIMA anastomosis 80-90% stenosis. Proceeded with PTCA with stent next day. H/O cardiovascular stress test 10/14/2011, 6/2/2010,4/8/2010, 5/18/2009,4/6/2009, 10/30/2007, 11/12/2004, 11/6/2003, 8/9/2002, 8/9/2001,6/5/2000,     10/14/2011-Lexiscan-Abnormal Myocardial Perfusion study. Evidence of mild ischemia in the Left CX region. Abnomal study. Rest EF 63%. Global LV systolic function normal. No ECG changes.  Unremarkable pharmacological stress test.    H/O cardiovascular stress test 6/10/2013    thallium--mild ischemia left circumflex EF63% no change from 10/2011 study. H/O cardiovascular stress test 10/16/2014    cardiolite-mild ischemia left circumflex,EF70%    H/O chest x-ray 4/19/2009 4/19/2009-Stable cardiomegaly. No acute cardiopulmonary disease. H/O Doppler lower venous ultrasound 09/18/2019    Significant reflux noted in RGSV, RGSV is extremely tortuous and small along the thigh and calf and would be highly unlikely to be accessed, RSSV is non compressible and has occlusive chronic SVT, LSSV is non compressible with occlusive chronic SVT, LGSV removed s/p CABG, Significant reflux in LGSV tributary,    H/O Doppler ultrasound 3/31/2010    CAROTID- 3/31/2010-INtimal thickening but no significant atherosclerotic plaque noted in ANA PAULA. Doppler flow velocities within ANA PAULA are WNL. Heterogeneous, irregular atherosclerotic plaque noted in LICA. Doppler flow velocities within the LICA are elevated, consistent with a mild, less than 50% stenosis. H/O Doppler ultrasound 5/24/2016    Carotid- normal study    H/O Doppler ultrasound 09/06/2017    carotid - normal study    H/O Doppler ultrasound     H/O echocardiogram 10/13    EF=60%, Severe Pulm. HTN, & Sclerotic aortic valve w/stenosis. H/O echocardiogram 10/16/14     EF 55-60% Normal LV. Normal LV systolic function. Severe tricuspid insufficiency with severe hypertension. H/O echocardiogram 02/03/2017    heart cath performed this morning    H/O echocardiogram 09/18/2019    EF 50-55%, Left atrium is mild to moderately dilated, right atrium is severely dilated, mildly dilated right ventricle, Mod MR, Severe TR, Severe Pulm HTN, no pericardial effusion     History of complete ECG     10/14/2011(Lexiscan);5/6/2010, 4/30/2009,10/24/2008,9/21/2007, 10/13/2006    History of nuclear stress test 11/17/2016    lexiscan-normal,EF70%    Hx of cardiovascular stress test 12/28/2018    EF 60%  Normal study.     HX OTHER MEDICAL 05/01/2017    MUGA-normal, EF53%    Hyperlipidemia     Hypertension     Mild intermittent asthma 7/28/2016    Moderate COPD (chronic obstructive pulmonary disease) ([facility]) 3/15/2022    Nausea & vomiting     Obstructive sleep apnea 5/16/2017    Paroxysmal atrial fibrillation ([address]     Post PTCA 4/21/2010    PTCA with 2.25 stent of the LIMA to LAD    Pulmonary HTN ([address]     Severe per last echo on 10/13. 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 Followed by MAZE procedure by pulmonary vein isolation.-  Dr Isis Adam    S/P PTCA (percutaneous transluminal coronary angioplasty) 11/2012    PTCA with stent to RCA    Severe pulmonary hypertension ([address] 3/15/2022    Shortness of breath 3/15/2022    Thyroid disease     hypothyroi    Unspecified cerebral artery occlusion with cerebral infarction Unsure When    No Residual    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 ([address] 7/29/2022    WD-Non-pressure chronic ulcer of other part of left lower leg limited to breakdown of skin ([address] 7/29/2022     Subjective:  Patient states: \"Oh nothing is normal right now\"  Pain:  L ankle pain, did not rate  Communication with other providers: handoff to RN  Restrictions: General Precautions, Fall Risk    Home Setup/Prior level of function:  Social/Functional History  Lives With: Alone  Type of Home: Condo  Home Layout: One level  Home Access: Level entry  Bathroom Shower/Tub: Tub/Shower unit, Walk-in shower  Bathroom Toilet: Standard  Bathroom Equipment: Grab bars in shower, Grab bars around toilet  Home Equipment: Darrell Cesar, Tina Venegas, 4 wheeled  ADL Assistance: Independent  Homemaking Assistance: Independent  Ambulation Assistance: Independent  Transfer Assistance: Independent  Active : No  Patient's  Info: Daughter in law does grocery shopping.   Occupation: Retired     Examination:  Observation: Supine in bed upon arrival, agreeable to therapy eval.  Vision: WFL  Hearing: WFL  Vitals: Nurse reported weaning pt from O2, maintained >90% supine to sitting. Dropped to 77% during ambulation, improved to 84% with pursed lip breathing. Did not improve with seated RB, donned 2L O2. Max cuing for proper breathing technique with SPO2 improving to 86%. Nurse entered room, reported has a treatment to complete and will monitor O2 levels. Body Systems and functions:  ROM: WFL   Strength: B UE grossly 4-/5 across all major joints   Sensation: WFL  Tone: Normal  Coordination: WFL  Perception: WNL      Cognitive and Psychosocial Functioning:  Overall cognitive status: alert and oriented  Affect: Normal   Arousal/Alertness Appropriate responses to stimuli   Following Commands Follows all commands without difficulty   Attention Span Appears intact   Safety Judgement Decreased awareness of need for safety; Decreased awareness of need for assistance   Problem Solving Decreased awareness of errors   Insights Decreased awareness of deficits   Initiation Requires cues for some   Sequencing Requires cues for some       Functional Mobility:  Bed mobility:  supine to sitting EOB w/ SBA  Sitting balance:  SBA    Transfers: STS from EOB w/ CGA, stand to sit w/ recliner w/ CGA  Standing balance:  CGA static and dynamic w/ RW  Functional Mobility: ambulated short functional household distance using RW w/ CGA   Toilet/Shower Transfers: NT        Activities of Daily Living (ADLs):  Feeding: set up A  Grooming: CGA - min A  UB bathing: min A  LB bathing: mod A  UB dressing: min A  LB dressing: mod A   Toileting: mod A hygiene and clothing mgmt     *ADL determined per observation of functional mobility, balance, activity tolerance, cognition, or actual ADL performance. AM-PAC 6 click short form for inpatient daily activity:   How much help from another person does the patient currently need. ..  Unable  Dep A Lot  Max A A Lot   Mod A A Little  Min A A Little CGA  SBA None   Mod I  Indep  Sup   1. Putting on and taking off regular lower body clothing? [] 1    [] 2   [x] 2   [] 3   [] 3   [] 4      2. Bathing (including washing, rinsing, drying)? [] 1   [] 2   [x] 2 [] 3 [] 3 [] 4   3. Toileting, which includes using toilet, bedpan, or urinal? [] 1    [] 2   [x] 2   [] 3   [] 3   [] 4     4. Putting on and taking off regular upper body clothing? [] 1   [] 2   [] 2   [x] 3   [] 3    [] 4      5. Taking care of personal grooming such as brushing teeth? [] 1   [] 2    [] 2 [x] 3    [] 3   [] 4      6. Eating meals? [] 1   [] 2   [] 2   [] 3   [] 3   [x] 4        Raw Score:  16     [24=0% impaired(CH), 23=1-19%(CI), 20-22=20-39%(CJ), 15-19=40-59%(CK), 10-14=60-79%(CL), 7-9=80-99%(CM), 6=100%(CN)]     Treatment:    Therapeutic Activity Training:   Therapeutic activity training was instructed today. Cues were given for safety, sequence, UE/LE placement, awareness, and balance. Activities performed today included bed mobility training, sup-sit, sit-stand, ambulation. Educated pt on role of OT, therapy POC and functional goals, progression w/ ADLs and transfers, importance of movement and OOB activity, d/c recommendations     Safety Measures: Gait belt used, Left in recliner, Alarm in place  Recommendations for NURSING activity:  Up to chair for all 3 meals and up to bathroom for all toileting needs     Assessment:  Pt is a 80 y o F admitted d/t acute on chronic CHF. Pt at baseline is IND for ADLs, IND for high level IADLs, and mod I for functional transfers/mobility. Pt currently presents w/ deficits in ADL and high level IADL independence, functional ADL transfers, strength, and functional activity tolerance. Continued OT services recommended to increase safety and independence with ADL routine and to address remaining functional deficits. Pt would benefit from continued acute care OT services w/ discharge to SNF.     Complexity: Moderate  Prognosis: Good, no significant barriers to participation at this time. Occupational Therapy Plan  Times Per Week: 3+  Current Treatment Recommendations: Strengthening, ROM, Functional mobility training, Endurance training, Cognitive reorientation, Pain management, Patient/Caregiver education & training, Safety education & training, Equipment evaluation, education, & procurement, Positioning, Self-Care / ADL, Home management training, Cognitive/Perceptual training         Goals:  Pt will complete all aspects of bed mobility for EOB/OOB ADLs w/ mod I. Pt will complete UB ADLs w/ mod I. Pt will complete LB ADLs w/ SBA. Pt will complete all functional transfers to and from bed, chair, toilet, shower chair w/ SBA. Pt will ambulate functional household distance w/ SBA. Pt will complete all aspects of toileting task w/ SBA. Pt will perform therex/theract in order to increase strength and functional activity tolerance necessary for increased independence w/ ADL routine.     Pt goal: go home, get stronger  Time Frame for STGs: discharge    Equipment: Continue to assess at next LOC    Time:   Time in: 0935  Time out: 1009  Total time: 34  Timed treatment minutes: 24        Electronically signed by:      Mike Urbano OTR/L  [id number]

## 2022-10-23 NOTE — PLAN OF CARE
Problem: Discharge Planning  Goal: Discharge to home or other facility with appropriate resources  10/23/2022 0112 by Cortez Gomez RN  Outcome: Progressing  10/22/2022 1211 by Jesica Samuel RN  Outcome: Progressing  Flowsheets (Taken 10/22/2022 0815)  Discharge to home or other facility with appropriate resources:   Identify barriers to discharge with patient and caregiver   Arrange for needed discharge resources and transportation as appropriate   Identify discharge learning needs (meds, wound care, etc)     Problem: Pain  Goal: Verbalizes/displays adequate comfort level or baseline comfort level  10/23/2022 0112 by Cortez Gomez RN  Outcome: Progressing  10/22/2022 1211 by Jesica Samuel RN  Outcome: Progressing  Flowsheets (Taken 10/22/2022 0815)  Verbalizes/displays adequate comfort level or baseline comfort level:   Encourage patient to monitor pain and request assistance   Assess pain using appropriate pain scale   Administer analgesics based on type and severity of pain and evaluate response     Problem: Chronic Conditions and Co-morbidities  Goal: Patient's chronic conditions and co-morbidity symptoms are monitored and maintained or improved  10/23/2022 0112 by Cortez Gomez RN  Outcome: Progressing  10/22/2022 1211 by Jesica Samuel RN  Outcome: Progressing  Flowsheets (Taken 10/22/2022 0815)  Care Plan - Patient's Chronic Conditions and Co-Morbidity Symptoms are Monitored and Maintained or Improved:   Monitor and assess patient's chronic conditions and comorbid symptoms for stability, deterioration, or improvement   Collaborate with multidisciplinary team to address chronic and comorbid conditions and prevent exacerbation or deterioration   Update acute care plan with appropriate goals if chronic or comorbid symptoms are exacerbated and prevent overall improvement and discharge     Problem: Skin/Tissue Integrity  Goal: Absence of new skin breakdown  Description: 1. Monitor for areas of redness and/or skin breakdown  2. Assess vascular access sites hourly  3. Every 4-6 hours minimum:  Change oxygen saturation probe site  4. Every 4-6 hours:  If on nasal continuous positive airway pressure, respiratory therapy assess nares and determine need for appliance change or resting period.   10/23/2022 0112 by Severiano Dyer, RN  Outcome: Progressing  10/22/2022 1211 by Debbie Cruz RN  Outcome: Progressing

## 2022-10-23 NOTE — PROGRESS NOTES
Nephrology Progress Note  10/23/2022 9:16 AM  Subjective: Interval History: Manjinder Jose is a 80 y.o. female with overall doing okay currently on the BiPAP machine arousable has a Carnes with good urine output        Data:   Scheduled Meds:   metOLazone  5 mg Oral BID    midodrine  5 mg Oral TID WC    chlorothiazide (DIURIL) IVPB  500 mg IntraVENous Q24H    spironolactone  25 mg Oral Daily    collagenase   Topical Daily    albuterol sulfate HFA  2 puff Inhalation BID    aspirin  81 mg Oral Daily    atorvastatin  10 mg Oral Nightly    carvedilol  6.25 mg Oral BID WC    ferrous sulfate  325 mg Oral Every Other Day    levothyroxine  88 mcg Oral QAM AC    timolol  1 drop Both Eyes Nightly    vitamin B-12  1,000 mcg Oral Daily    Vitamin D  5,000 Units Oral Daily    latanoprost  1 drop Both Eyes Dinner    sodium chloride flush  5-40 mL IntraVENous 2 times per day    enoxaparin  40 mg SubCUTAneous Daily    tiotropium-olodaterol  2 puff Inhalation Daily     Continuous Infusions:   furosemide (LASIX) 1mg/ml infusion 10 mg/hr (10/23/22 0643)    sodium chloride 20 mL/hr at 10/22/22 1127         CBC No results for input(s): WBC, HGB, HCT, PLT in the last 72 hours. BMP   Recent Labs     10/21/22  0924 10/22/22  1217 10/23/22  0415    133* 136   K 4.5 4.6 4.4   CL 97* 95* 92*   CO2 30 29 34*   PHOS  --   --  3.6   BUN 26* 30* 30*   CREATININE 0.8 0.7 0.8     Hepatic: No results for input(s): AST, ALT, ALB, BILITOT, ALKPHOS in the last 72 hours. Troponin: No results for input(s): TROPONINI in the last 72 hours. BNP: No results for input(s): BNP in the last 72 hours. Lipids: No results for input(s): CHOL, HDL in the last 72 hours. Invalid input(s): LDLCALCU  ABGs: No results found for: PHART, PO2ART, CWU0VQH  INR: No results for input(s): INR in the last 72 hours.   Renal Labs  Albumin:    Lab Results   Component Value Date/Time    LABALBU 3.9 10/23/2022 04:15 AM     Calcium:    Lab Results   Component Value Date/Time    CALCIUM 9.5 10/23/2022 04:15 AM     Phosphorus:    Lab Results   Component Value Date/Time    PHOS 3.6 10/23/2022 04:15 AM     U/A:    Lab Results   Component Value Date/Time    NITRU NEGATIVE 10/22/2022 01:20 PM    COLORU YELLOW 10/22/2022 01:20 PM    WBCUA 2 10/21/2022 11:50 AM    RBCUA 1 10/21/2022 11:50 AM    MUCUS RARE 10/21/2022 11:50 AM    TRICHOMONAS NONE SEEN 10/21/2022 11:50 AM    YEAST RARE 11/23/2021 07:30 AM    BACTERIA NEGATIVE 10/21/2022 11:50 AM    CLARITYU CLEAR 10/22/2022 01:20 PM    SPECGRAV 1.015 10/22/2022 01:20 PM    UROBILINOGEN 0.2 10/22/2022 01:20 PM    BILIRUBINUR NEGATIVE 10/22/2022 01:20 PM    BLOODU NEGATIVE 10/22/2022 01:20 PM    KETUA NEGATIVE 10/22/2022 01:20 PM     ABG:  No results found for: PHART, EVM1XJM, PO2ART, MBH3QUO, BEART, THGBART, YJB2KYJ, F6QLKJTC  HgBA1c:  No results found for: LABA1C  Microalbumen/Creatinine ratio:  No components found for: RUCREAT  TSH:    Lab Results   Component Value Date/Time    TSH 1.0 05/07/2010 12:00 AM     IRON:    Lab Results   Component Value Date/Time    IRON 198 09/02/2022 03:05 PM     Iron Saturation:  No components found for: PERCENTFE  TIBC:    Lab Results   Component Value Date/Time    TIBC 364 09/02/2022 03:05 PM     FERRITIN:    Lab Results   Component Value Date/Time    FERRITIN 70 09/02/2022 03:05 PM     RPR:  No results found for: RPR  PATSY:    Lab Results   Component Value Date/Time    PATSY None Detected 10/08/2019 03:55 PM    PATSY  10/08/2019 03:55 PM     (NOTE)  If suspicion of connective tissue disease is strong and PATSY EIA is   negative, consider testing for PATSY by IFA ([id number]). INTERPRETIVE INFORMATION: Anti-Nuclear Antibodies (PATSY), IgG by   SOBEIDA  Antinuclear Antibodies (PATSY), IgG by SOBEIDA: PATSY specimens are   screened using enzyme-linked immunosorbent assay (SOBEIDA)   methodology.  All SOBEIDA results reported as Detected are further   tested by indirect fluorescent assay (IFA) using HEp-2 substrate   with an IgG-specific conjugate. The PATSY SOBEIDA screen is designed   to detect antibodies against dsDNA, histones, SS-A (Ro), SS-B   (La), Carlos, Carlos/RNP, Scl-70, Arlin-1, centromeric proteins, other   antigens extracted from the HEp-2 cell nucleus. PATSY SOBEIDA assays   have been reported to have lower sensitivities than PATSY IFA for   systemic autoimmune rheumatic diseases (SARD). Negative results do not necessarily rule out SARD. Performed by [place] [id number], [id number] [facility] [phone]  www. Jessica Croft MD, Lab. Director       24 Hour Urine for Creatinine Clearance:  No components found for: CREAT4, UHRS10, UTV10      Objective:   I/O: 10/22 0701 - 10/23 0700  In: 480 [P.O.:480]  Out: 4450 [Urine:4450]  I/O last 3 completed shifts: In: 480 [P.O.:480]  Out: 4450 [Urine:4450]  I/O this shift:  In: -   Out: 200 [Urine:200]  Vitals: BP (!) 111/54   Pulse 60   Temp 97.9 °F (36.6 °C) (Axillary)   Resp 18   Ht 4' 11\" (1.499 m)   Wt 153 lb 3.5 oz (69.5 kg)   SpO2 96%   BMI 30.95 kg/m²  {  General appearance: awake weak  HEENT: Head: Normal, normocephalic, atraumatic.   Neck: supple, symmetrical, trachea midline  Lungs: diminished breath sounds bilaterally  Heart: S1, S2 normal  Abdomen: abnormal findings:  soft nt  Extremities: edema trace improved after Carnes  Neurologic: Mental status: alertness: Awake on BiPAP somewhat anxious        Assessment and Plan:      IMP:  #1 CHF exacerbation diastolic dysfunction with valvular heart disease/cor pulmonale  #2 pulmonary hypertension with possible respiratory failure  #3 anasarca/edema with low urine output  #4 coronary disease prior CABG  #5 bradycardia with history of pacemaker  #6 obstructive sleep apnea with history of CVA    Plan     #1 oxygenation monitor wean off BiPAP as able maintain Lasix drip over 4 L urine output  #2 monitor pulmonary status in the setting of diuresis follow-up pulmonary recs  #3 edema improved maintain with Carnes and Lasix drip for now potassium stable  #4 cardiac status monitor  #5 heart rate 60 and paced  #6 wean off BiPAP as able  Overall for now supportive care slowly improving maintain diuresis           Najma Ayala MD, MD

## 2022-10-24 ENCOUNTER — APPOINTMENT (OUTPATIENT)
Dept: GENERAL RADIOLOGY | Age: 87
DRG: 291 | End: 2022-10-24
Payer: MEDICARE

## 2022-10-24 LAB
ANION GAP SERPL CALCULATED.3IONS-SCNC: 9 MMOL/L (ref 4–16)
BUN BLDV-MCNC: 30 MG/DL (ref 6–23)
CALCIUM SERPL-MCNC: 9.6 MG/DL (ref 8.3–10.6)
CHLORIDE BLD-SCNC: 87 MMOL/L (ref 99–110)
CO2: 37 MMOL/L (ref 21–32)
CREAT SERPL-MCNC: 0.7 MG/DL (ref 0.6–1.1)
GFR SERPL CREATININE-BSD FRML MDRD: >60 ML/MIN/1.73M2
GLUCOSE BLD-MCNC: 82 MG/DL (ref 70–99)
MAGNESIUM: 1.8 MG/DL (ref 1.8–2.4)
POTASSIUM SERPL-SCNC: 4.1 MMOL/L (ref 3.5–5.1)
PRO-BNP: 1990 PG/ML
SODIUM BLD-SCNC: 133 MMOL/L (ref 135–145)

## 2022-10-24 PROCEDURE — 2580000003 HC RX 258: Performed by: NURSE PRACTITIONER

## 2022-10-24 PROCEDURE — 94660 CPAP INITIATION&MGMT: CPT

## 2022-10-24 PROCEDURE — 83880 ASSAY OF NATRIURETIC PEPTIDE: CPT

## 2022-10-24 PROCEDURE — 80048 BASIC METABOLIC PNL TOTAL CA: CPT

## 2022-10-24 PROCEDURE — 74019 RADEX ABDOMEN 2 VIEWS: CPT

## 2022-10-24 PROCEDURE — 94640 AIRWAY INHALATION TREATMENT: CPT

## 2022-10-24 PROCEDURE — 6370000000 HC RX 637 (ALT 250 FOR IP): Performed by: HOSPITALIST

## 2022-10-24 PROCEDURE — 6370000000 HC RX 637 (ALT 250 FOR IP): Performed by: INTERNAL MEDICINE

## 2022-10-24 PROCEDURE — 6360000002 HC RX W HCPCS: Performed by: NURSE PRACTITIONER

## 2022-10-24 PROCEDURE — 2060000000 HC ICU INTERMEDIATE R&B

## 2022-10-24 PROCEDURE — 51702 INSERT TEMP BLADDER CATH: CPT

## 2022-10-24 PROCEDURE — 6370000000 HC RX 637 (ALT 250 FOR IP): Performed by: NURSE PRACTITIONER

## 2022-10-24 PROCEDURE — 83735 ASSAY OF MAGNESIUM: CPT

## 2022-10-24 PROCEDURE — 36415 COLL VENOUS BLD VENIPUNCTURE: CPT

## 2022-10-24 RX ORDER — TORSEMIDE 20 MG/1
20 TABLET ORAL 2 TIMES DAILY
Status: DISCONTINUED | OUTPATIENT
Start: 2022-10-24 | End: 2022-10-27

## 2022-10-24 RX ORDER — LACTULOSE 10 G/15ML
20 SOLUTION ORAL 3 TIMES DAILY
Status: COMPLETED | OUTPATIENT
Start: 2022-10-24 | End: 2022-10-24

## 2022-10-24 RX ADMIN — CARVEDILOL 6.25 MG: 6.25 TABLET, FILM COATED ORAL at 08:55

## 2022-10-24 RX ADMIN — Medication 5000 UNITS: at 08:55

## 2022-10-24 RX ADMIN — MIDODRINE HYDROCHLORIDE 5 MG: 5 TABLET ORAL at 12:47

## 2022-10-24 RX ADMIN — ATORVASTATIN CALCIUM 10 MG: 10 TABLET, FILM COATED ORAL at 20:39

## 2022-10-24 RX ADMIN — ENOXAPARIN SODIUM 40 MG: 40 INJECTION SUBCUTANEOUS at 08:54

## 2022-10-24 RX ADMIN — LACTULOSE 20 G: 10 SOLUTION ORAL at 08:55

## 2022-10-24 RX ADMIN — TORSEMIDE 20 MG: 20 TABLET ORAL at 08:54

## 2022-10-24 RX ADMIN — SODIUM CHLORIDE, PRESERVATIVE FREE 10 ML: 5 INJECTION INTRAVENOUS at 20:42

## 2022-10-24 RX ADMIN — LATANOPROST 1 DROP: 50 SOLUTION/ DROPS OPHTHALMIC at 17:14

## 2022-10-24 RX ADMIN — CARVEDILOL 6.25 MG: 6.25 TABLET, FILM COATED ORAL at 17:14

## 2022-10-24 RX ADMIN — LACTULOSE 20 G: 10 SOLUTION ORAL at 12:47

## 2022-10-24 RX ADMIN — FERROUS SULFATE TAB 325 MG (65 MG ELEMENTAL FE) 325 MG: 325 (65 FE) TAB at 12:47

## 2022-10-24 RX ADMIN — CYANOCOBALAMIN TAB 1000 MCG 1000 MCG: 1000 TAB at 08:55

## 2022-10-24 RX ADMIN — LACTULOSE 20 G: 10 SOLUTION ORAL at 20:39

## 2022-10-24 RX ADMIN — ASPIRIN 81 MG CHEWABLE TABLET 81 MG: 81 TABLET CHEWABLE at 08:55

## 2022-10-24 RX ADMIN — METOLAZONE 5 MG: 5 TABLET ORAL at 20:39

## 2022-10-24 RX ADMIN — ACETAMINOPHEN 650 MG: 325 TABLET ORAL at 06:04

## 2022-10-24 RX ADMIN — SPIRONOLACTONE 25 MG: 50 TABLET ORAL at 08:55

## 2022-10-24 RX ADMIN — COLLAGENASE SANTYL: 250 OINTMENT TOPICAL at 08:58

## 2022-10-24 RX ADMIN — TORSEMIDE 20 MG: 20 TABLET ORAL at 17:14

## 2022-10-24 RX ADMIN — ALBUTEROL SULFATE 2 PUFF: 90 AEROSOL, METERED RESPIRATORY (INHALATION) at 19:35

## 2022-10-24 RX ADMIN — LEVOTHYROXINE SODIUM 88 MCG: 0.09 TABLET ORAL at 06:04

## 2022-10-24 RX ADMIN — METOLAZONE 5 MG: 5 TABLET ORAL at 08:55

## 2022-10-24 RX ADMIN — MIDODRINE HYDROCHLORIDE 5 MG: 5 TABLET ORAL at 17:14

## 2022-10-24 RX ADMIN — SODIUM CHLORIDE, PRESERVATIVE FREE 10 ML: 5 INJECTION INTRAVENOUS at 08:54

## 2022-10-24 RX ADMIN — MIDODRINE HYDROCHLORIDE 5 MG: 5 TABLET ORAL at 08:55

## 2022-10-24 ASSESSMENT — PAIN DESCRIPTION - DESCRIPTORS: DESCRIPTORS: ACHING

## 2022-10-24 ASSESSMENT — PAIN SCALES - GENERAL
PAINLEVEL_OUTOF10: 0
PAINLEVEL_OUTOF10: 0
PAINLEVEL_OUTOF10: 4
PAINLEVEL_OUTOF10: 0

## 2022-10-24 ASSESSMENT — PAIN DESCRIPTION - LOCATION: LOCATION: NOSE

## 2022-10-24 NOTE — PROGRESS NOTES
10/24/22 0410   NIV Type   NIV Started/Stopped On   Equipment Type v60   Mode Bilevel   Mask Type Full face mask   Mask Size Medium   Bonnet size Medium   Settings/Measurements   PIP Observed 14 cm H20   IPAP 12 cmH20   CPAP/EPAP 6 cmH2O   Rate Ordered 16   Resp 28   FiO2  30 %   I Time/ I Time % 1 s   Minute Volume (L/min) 9.4 Liters   Mask Leak (lpm) 42 lpm   Comfort Level Good   Using Accessory Muscles No   SpO2 99   Patient's Home Machine No   Patient Observation   Observations pt is sleeping

## 2022-10-24 NOTE — PLAN OF CARE
Problem: Discharge Planning  Goal: Discharge to home or other facility with appropriate resources  Outcome: Progressing  Flowsheets (Taken 10/23/2022 2004)  Discharge to home or other facility with appropriate resources:   Identify barriers to discharge with patient and caregiver   Arrange for needed discharge resources and transportation as appropriate   Identify discharge learning needs (meds, wound care, etc)   Refer to discharge planning if patient needs post-hospital services based on physician order or complex needs related to functional status, cognitive ability or social support system     Problem: Pain  Goal: Verbalizes/displays adequate comfort level or baseline comfort level  Outcome: Progressing     Problem: Chronic Conditions and Co-morbidities  Goal: Patient's chronic conditions and co-morbidity symptoms are monitored and maintained or improved  Outcome: Progressing  Flowsheets (Taken 10/23/2022 2004)  Care Plan - Patient's Chronic Conditions and Co-Morbidity Symptoms are Monitored and Maintained or Improved: Monitor and assess patient's chronic conditions and comorbid symptoms for stability, deterioration, or improvement     Problem: Skin/Tissue Integrity  Goal: Absence of new skin breakdown  Description: 1. Monitor for areas of redness and/or skin breakdown  2. Assess vascular access sites hourly  3. Every 4-6 hours minimum:  Change oxygen saturation probe site  4. Every 4-6 hours:  If on nasal continuous positive airway pressure, respiratory therapy assess nares and determine need for appliance change or resting period.   Outcome: Progressing

## 2022-10-24 NOTE — PROGRESS NOTES
V2.0  [facility] Hospitalist Progress Note      Name:  Paula Spangler /Age/Sex: 3/18/1929  (80 y.o. female)   MRN & CSN:  [id number] & [id number] Encounter Date/Time: 10/24/2022 11:02 AM EDT    Location:  -A PCP: Andrew Ernst MD       Hospital Day: 6    Assessment and Plan:   Paula Spangler is a 80 y.o. female with pmh of CAD, cor pulmonale, COPD, CVA, bradycardia s/p dual-chamber pacemaker, who presents with Acute on chronic diastolic (congestive) heart failure ([facility] [address]      # Acute on chronic diastolic heart failure: [facility] cardiology outpatient, sent by cardiology due to anasarca, on presentation proBNP 1408, chest x-ray compatible with pulmonary interstitial edema, CTA chest no PE, marked enlargement of the right atrium suspected right heart failure, small right pleural effusion, anasarca with subcutaneous edema and ascites, started on Lasix drip, consulted cardiology, echo on 10/20/2022 showed EF 50 to 28%, grade 3 diastolic dysfunction, severely dilated right atrium and ventricle, moderate left ventricular hypertrophy, severe tricuspid regurgitation, RVSP 39 mmHg, cardiology followed and recommended to stop Lasix drip and start on p.o. Lasix but patient still edematous so continued IV Lasix and consulted nephrology  - Strict ins/O's  - Daily weights  - Patient currently on 2 L of oxygen, patient is off of IV Lasix  - Nephrology started on torsemide today  - Fluid and salt restriction    # Anasarca and ascites: Paracentesis 10/20 remote 350 cc  - Nephrology following    # Left distal calf wound  - Wound care following. # Severe pulmonary hypertension     CAD Hx CABG  Glaucoma  Hx bradycardia W/cardiac pacemaker  SAULO  History of CVA    Diet ADULT DIET; Regular;  Low Sodium (2 gm); 2000 ml   DVT Prophylaxis [x] Lovenox, []  Heparin, [] SCDs, [] Ambulation,  [] Eliquis, [] Xarelto  [] Coumadin   Code Status Full Code   Disposition From: Home  Expected Disposition: Home   Estimated Date of Discharge: TBD  Patient requires continued admission due to volume overload and anasarca   Surrogate Decision Maker/ POA Son     Subjective:     Chief Complaint: Leg Swelling (On lasix requiring O2 now)       Patient seen and examined at bedside. Patient states she still feel slight short of breath, sitting on the chair at bedside but looks weak and tired with some short of breath on 2 L of oxygen         Review of Systems:    Review of Systems    Constitutional: No fever no chills  HEENT: No headache no dizziness  Cardiovascular: No chest pain no palpitations  Respiratory: No cough shortness of breath+  Abdomen: No diarrhea, no nausea, no vomiting, no abdominal pain  Musculoskeletal: Swelling all over improved from yesterday  Neuro: No numbness or tingling  Skin: No rash      Objective: Intake/Output Summary (Last 24 hours) at 10/24/2022 1326  Last data filed at 10/24/2022 0720  Gross per 24 hour   Intake 300 ml   Output 3150 ml   Net -2850 ml          Vitals:   Vitals:    10/24/22 0720   BP: 104/62   Pulse: 62   Resp: 21   Temp: 98.1 °F (36.7 °C)   SpO2: 96%       Physical Exam:     General: Afebrile, in distress requiring O2  Eyes: EOMI  ENT: neck supple, elevated JVD  Cardiovascular: S1-S2 normal no murmur  Respiratory: Air entry decreased bilaterally with bibasal crackles  Gastrointestinal: Soft, non tender, mild distention bowel sounds normal  Genitourinary: no suprapubic tenderness  Musculoskeletal: 1+ pitting edema bilateral lower extremity  Skin: warm, dry  Neuro: Alert. Psych: Mood appropriate.      Medications:   Medications:    torsemide  20 mg Oral BID    lactulose  20 g Oral TID    metOLazone  5 mg Oral BID    midodrine  5 mg Oral TID WC    spironolactone  25 mg Oral Daily    collagenase   Topical Daily    albuterol sulfate HFA  2 puff Inhalation BID    aspirin  81 mg Oral Daily    atorvastatin  10 mg Oral Nightly    carvedilol  6.25 mg Oral BID WC    ferrous sulfate  325 mg Oral Every Other Day levothyroxine  88 mcg Oral QAM AC    timolol  1 drop Both Eyes Nightly    vitamin B-12  1,000 mcg Oral Daily    Vitamin D  5,000 Units Oral Daily    latanoprost  1 drop Both Eyes Dinner    sodium chloride flush  5-40 mL IntraVENous 2 times per day    enoxaparin  40 mg SubCUTAneous Daily    tiotropium-olodaterol  2 puff Inhalation Daily      Infusions:    sodium chloride 20 mL/hr at 10/23/22 1033     PRN Meds: albuterol sulfate HFA, 2 puff, Q4H PRN  traZODone, 50 mg, Nightly PRN  sodium chloride flush, 5-40 mL, PRN  sodium chloride, , PRN  ondansetron, 4 mg, Q8H PRN   Or  ondansetron, 4 mg, Q6H PRN  polyethylene glycol, 17 g, Daily PRN  acetaminophen, 650 mg, Q6H PRN   Or  acetaminophen, 650 mg, Q6H PRN      Labs      Recent Results (from the past 24 hour(s))   Basic Metabolic Panel    Collection Time: 10/24/22  5:59 AM   Result Value Ref Range    Sodium 133 (L) 135 - 145 MMOL/L    Potassium 4.1 3.5 - 5.1 MMOL/L    Chloride 87 (L) 99 - 110 mMol/L    CO2 37 (H) 21 - 32 MMOL/L    Anion Gap 9 4 - 16    BUN 30 (H) 6 - 23 MG/DL    Creatinine 0.7 0.6 - 1.1 MG/DL    Est, Glom Filt Rate >60 >60 mL/min/1.73m2    Glucose 82 70 - 99 MG/DL    Calcium 9.6 8.3 - 10.6 MG/DL   Magnesium    Collection Time: 10/24/22  5:59 AM   Result Value Ref Range    Magnesium 1.8 1.8 - 2.4 mg/dl   Brain Natriuretic Peptide    Collection Time: 10/24/22  5:59 AM   Result Value Ref Range    Pro-BNP 1,990 (H) <300 PG/ML        Imaging/Diagnostics Last 24 Hours   XR CHEST PORTABLE    Result Date: 10/21/2022  EXAMINATION: ONE XRAY VIEW OF THE CHEST 10/21/2022 4:58 pm COMPARISON: 10/19/2022 HISTORY: ORDERING SYSTEM PROVIDED HISTORY: hypoxia TECHNOLOGIST PROVIDED HISTORY: Reason for exam:->hypoxia Reason for Exam: hypoxia Additional signs and symptoms: na Relevant Medical/Surgical History: cad, copd, chf FINDINGS: Stable marked cardiomegaly. Improved lung aeration with decreased edema. Stable small right pleural effusion.   No pneumothorax or left pleural effusion. Mediastinal contours stable. Pacemaker and sternotomy wires unchanged. No acute osseous abnormality. 1. Cardiomegaly with decreased pulmonary edema. 2. Stable small right pleural effusion. IR US GUIDED PARACENTESIS    Result Date: 10/20/2022  PROCEDURE: PARACENTESIS WITH IMAGE GUIDANCE US ABDOMEN LIMITED 10/20/2022 HISTORY: ORDERING SYSTEM PROVIDED HISTORY: ascites TECHNOLOGIST PROVIDED HISTORY: Reason for exam:->ascites TECHNIQUE: Informed consent was obtained after a detailed explanation of the procedure including risks, benefits, and alternatives. Universal protocol was followed. A limited ultrasound of the abdomen was performed. This reveals a relatively small amount of ascites concentrated in the right perihepatic region. The right abdomen was prepped and draped in sterile fashion and local anesthesia was achieved with lidocaine. A 5 [place] one-step sheathed needle was advanced into the focal collection of ascites using real-time sonographic guidance and paracentesis was performed. The patient tolerated the procedure well. FINDINGS: Limited ultrasound of the abdomen demonstrates ascites. 4 sonographic images were obtained. A total of 350 cc of cloudy madelaine fluid was removed. It was sent to the lab for analysis. Successful ultrasound paracentesis.        Electronically signed by Hans Harvey MD on 10/24/2022 at 1:26 PM

## 2022-10-24 NOTE — PROGRESS NOTES
Nephrology Progress Note  10/24/2022 6:43 AM  Subjective: Interval History: Ruth Sanderson is a 80 y.o. female somewhat weak hard of hearing denies of hearing aids nurse with me and concerned that abdominal distention still present good urine output with Carnes      Data:   Scheduled Meds:   metOLazone  5 mg Oral BID    midodrine  5 mg Oral TID WC    spironolactone  25 mg Oral Daily    collagenase   Topical Daily    albuterol sulfate HFA  2 puff Inhalation BID    aspirin  81 mg Oral Daily    atorvastatin  10 mg Oral Nightly    carvedilol  6.25 mg Oral BID WC    ferrous sulfate  325 mg Oral Every Other Day    levothyroxine  88 mcg Oral QAM AC    timolol  1 drop Both Eyes Nightly    vitamin B-12  1,000 mcg Oral Daily    Vitamin D  5,000 Units Oral Daily    latanoprost  1 drop Both Eyes Dinner    sodium chloride flush  5-40 mL IntraVENous 2 times per day    enoxaparin  40 mg SubCUTAneous Daily    tiotropium-olodaterol  2 puff Inhalation Daily     Continuous Infusions:   furosemide (LASIX) 1mg/ml infusion 10 mg/hr (10/23/22 2212)    sodium chloride 20 mL/hr at 10/23/22 1033         CBC No results for input(s): WBC, HGB, HCT, PLT in the last 72 hours. BMP   Recent Labs     10/21/22  0924 10/22/22  1217 10/23/22  0415    133* 136   K 4.5 4.6 4.4   CL 97* 95* 92*   CO2 30 29 34*   PHOS  --   --  3.6   BUN 26* 30* 30*   CREATININE 0.8 0.7 0.8     Hepatic: No results for input(s): AST, ALT, ALB, BILITOT, ALKPHOS in the last 72 hours. Troponin: No results for input(s): TROPONINI in the last 72 hours. BNP: No results for input(s): BNP in the last 72 hours. Lipids: No results for input(s): CHOL, HDL in the last 72 hours. Invalid input(s): LDLCALCU  ABGs: No results found for: PHART, PO2ART, EGT4UVP  INR: No results for input(s): INR in the last 72 hours.   Renal Labs  Albumin:    Lab Results   Component Value Date/Time    LABALBU 3.9 10/23/2022 04:15 AM     Calcium:    Lab Results   Component Value Date/Time    CALCIUM 9.5 10/23/2022 04:15 AM     Phosphorus:    Lab Results   Component Value Date/Time    PHOS 3.6 10/23/2022 04:15 AM     U/A:    Lab Results   Component Value Date/Time    NITRU NEGATIVE 10/22/2022 01:20 PM    COLORU YELLOW 10/22/2022 01:20 PM    WBCUA 2 10/21/2022 11:50 AM    RBCUA 1 10/21/2022 11:50 AM    MUCUS RARE 10/21/2022 11:50 AM    TRICHOMONAS NONE SEEN 10/21/2022 11:50 AM    YEAST RARE 11/23/2021 07:30 AM    BACTERIA NEGATIVE 10/21/2022 11:50 AM    CLARITYU CLEAR 10/22/2022 01:20 PM    SPECGRAV 1.015 10/22/2022 01:20 PM    UROBILINOGEN 0.2 10/22/2022 01:20 PM    BILIRUBINUR NEGATIVE 10/22/2022 01:20 PM    BLOODU NEGATIVE 10/22/2022 01:20 PM    KETUA NEGATIVE 10/22/2022 01:20 PM     ABG:  No results found for: PHART, IBA4XXS, PO2ART, UCP3RBY, BEART, THGBART, VCJ0ETQ, V8ELPXMC  HgBA1c:  No results found for: LABA1C  Microalbumen/Creatinine ratio:  No components found for: RUCREAT  TSH:    Lab Results   Component Value Date/Time    TSH 1.0 05/07/2010 12:00 AM     IRON:    Lab Results   Component Value Date/Time    IRON 198 09/02/2022 03:05 PM     Iron Saturation:  No components found for: PERCENTFE  TIBC:    Lab Results   Component Value Date/Time    TIBC 364 09/02/2022 03:05 PM     FERRITIN:    Lab Results   Component Value Date/Time    FERRITIN 70 09/02/2022 03:05 PM     RPR:  No results found for: RPR  PATSY:    Lab Results   Component Value Date/Time    PATSY None Detected 10/08/2019 03:55 PM    PATSY  10/08/2019 03:55 PM     (NOTE)  If suspicion of connective tissue disease is strong and PATSY EIA is   negative, consider testing for PATSY by IFA ([id number]). INTERPRETIVE INFORMATION: Anti-Nuclear Antibodies (PATSY), IgG by   SOBEIDA  Antinuclear Antibodies (PATSY), IgG by SOBEIDA: PATSY specimens are   screened using enzyme-linked immunosorbent assay (SOBEIDA)   methodology.  All SOBEIDA results reported as Detected are further   tested by indirect fluorescent assay (IFA) using HEp-2 substrate   with an IgG-specific conjugate. The PATSY SOBEIDA screen is designed   to detect antibodies against dsDNA, histones, SS-A (Ro), SS-B   (La), Carlos, Carlos/RNP, Scl-70, Arlin-1, centromeric proteins, other   antigens extracted from the HEp-2 cell nucleus. PATSY SOBEIDA assays   have been reported to have lower sensitivities than PATSY IFA for   systemic autoimmune rheumatic diseases (SARD). Negative results do not necessarily rule out SARD. Performed by [place] [id number], [address] [facility] [phone]  www. Lady Joni MD, Lab. Director       24 Hour Urine for Creatinine Clearance:  No components found for: CREAT4, UHRS10, UTV10      Objective:   I/O: 10/23 0701 - 10/24 0700  In: 540 [P.O.:540]  Out: 3250 [Urine:3250]  I/O last 3 completed shifts: In: 720 [P.O.:720]  Out: 6550 [Urine:6550]  I/O this shift:  In: 300 [P.O.:300]  Out: 1150 [Urine:1150]  Vitals: BP (!) 108/54   Pulse 60   Temp 97.6 °F (36.4 °C) (Axillary)   Resp 19   Ht 4' 11\" (1.499 m)   Wt 158 lb 1.1 oz (71.7 kg)   SpO2 98%   BMI 31.93 kg/m²  {  General appearance: awake weak  HEENT: Head: Normal, normocephalic, atraumatic.   Neck: supple, symmetrical, trachea midline  Lungs: diminished breath sounds bilaterally  Heart: S1, S2 normal  Abdomen: abnormal findings:  soft nt  Extremities: edema trace improved after Carnes  Neurologic: Mental status: alertness: Awake off BiPAP this morning        Assessment and Plan:      IMP:  #1 CHF exacerbation diastolic dysfunction with valvular heart disease/cor pulmonale  #2 pulmonary hypertension with possible respiratory failure  #3 anasarca/edema with low urine output  #4 coronary disease prior CABG  #5 bradycardia with history of pacemaker  #6 obstructive sleep apnea with history of CVA    Plan     #1 monitor oxygenation overall improving with good urine output  #2 follow-up respiratory status try to keep off CPAP if able or BiPAP  #3 edema overall improved good urine output with Carnes catheter concern will be however she can urinate without a Carnes may need urology evaluation with urine retention and sodium potassium with holding stable diuretics and change Lasix drip to oral torsemide 20 mg p.o. twice daily with Zaroxolyn  #4 cardiac status monitor  #5 heart rate stable pacemaker  #6 neuro status appears improved does need a hearing aid ear better    Concern for abdominal distention will get abdominal x-rays at baseline evaluation no recent bowel movement may need laxatives and/or CT scan of the abdomen to follow-up         Casey Isidro MD, MD

## 2022-10-24 NOTE — PLAN OF CARE
Problem: Discharge Planning  Goal: Discharge to home or other facility with appropriate resources  10/24/2022 1036 by Marcial Smiley RN  Outcome: Progressing  Flowsheets (Taken 10/24/2022 0720)  Discharge to home or other facility with appropriate resources:   Identify barriers to discharge with patient and caregiver   Arrange for needed discharge resources and transportation as appropriate   Identify discharge learning needs (meds, wound care, etc)  10/24/2022 0345 by Margarita Centeno RN  Outcome: Progressing  [address] (Taken 10/23/2022 2004)  Discharge to home or other facility with appropriate resources:   Identify barriers to discharge with patient and caregiver   Arrange for needed discharge resources and transportation as appropriate   Identify discharge learning needs (meds, wound care, etc)   Refer to discharge planning if patient needs post-hospital services based on physician order or complex needs related to functional status, cognitive ability or social support system     Problem: Pain  Goal: Verbalizes/displays adequate comfort level or baseline comfort level  10/24/2022 1036 by Marcial Smiley RN  Outcome: Progressing  10/24/2022 0345 by Margarita Centeno RN  Outcome: Progressing     Problem: Chronic Conditions and Co-morbidities  Goal: Patient's chronic conditions and co-morbidity symptoms are monitored and maintained or improved  10/24/2022 1036 by Marcial Smiley RN  Outcome: Progressing  Flowsheets (Taken 10/24/2022 0720)  Care Plan - Patient's Chronic Conditions and Co-Morbidity Symptoms are Monitored and Maintained or Improved:   Monitor and assess patient's chronic conditions and comorbid symptoms for stability, deterioration, or improvement   Collaborate with multidisciplinary team to address chronic and comorbid conditions and prevent exacerbation or deterioration   Update acute care plan with appropriate goals if chronic or comorbid symptoms are exacerbated and prevent overall improvement and discharge  10/24/2022 0345 by Ana M Valerio RN  Outcome: Progressing  [address] (Taken 10/23/2022 2004)  Care Plan - Patient's Chronic Conditions and Co-Morbidity Symptoms are Monitored and Maintained or Improved: Monitor and assess patient's chronic conditions and comorbid symptoms for stability, deterioration, or improvement     Problem: Skin/Tissue Integrity  Goal: Absence of new skin breakdown  Description: 1. Monitor for areas of redness and/or skin breakdown  2. Assess vascular access sites hourly  3. Every 4-6 hours minimum:  Change oxygen saturation probe site  4. Every 4-6 hours:  If on nasal continuous positive airway pressure, respiratory therapy assess nares and determine need for appliance change or resting period.   10/24/2022 1036 by Lore Herndon RN  Outcome: Progressing  10/24/2022 0345 by Ana M Valerio RN  Outcome: Progressing

## 2022-10-24 NOTE — CARE COORDINATION
CM reviewed chart and discussed in IDR. Pt plans to discharge home with home health care. Pt nurse came to [facility] stating that pt's DIL was in the room and they wanted to speak with CM. CARINA, Jimena, had question regarding home care, therapy, ARU and SNF. They thought home care meant that someone would be with the pt in her home all day. Explained that the pt would be assessed for needs and they would develop a care plan for pt. She asked about an aide. Explained that her insurance would not pay for an aide and if they wanted one it would be private pay. She stated that her MIL, the pt, would refuse to go to a SNF but agreed to ARU. She had been to ARU in the past and felt that it was very helpful. ELOY called Myranda, liaison, with ARU.  CM left  with referral.

## 2022-10-25 ENCOUNTER — APPOINTMENT (OUTPATIENT)
Dept: ULTRASOUND IMAGING | Age: 87
DRG: 291 | End: 2022-10-25
Payer: MEDICARE

## 2022-10-25 LAB
ALBUMIN SERPL-MCNC: 4.1 GM/DL (ref 3.4–5)
ALP BLD-CCNC: 276 IU/L (ref 40–128)
ALT SERPL-CCNC: 19 U/L (ref 10–40)
ANION GAP SERPL CALCULATED.3IONS-SCNC: 10 MMOL/L (ref 4–16)
AST SERPL-CCNC: 28 IU/L (ref 15–37)
BASOPHILS ABSOLUTE: 0 K/CU MM
BASOPHILS RELATIVE PERCENT: 0.4 % (ref 0–1)
BILIRUB SERPL-MCNC: 1.6 MG/DL (ref 0–1)
BUN BLDV-MCNC: 29 MG/DL (ref 6–23)
CALCIUM SERPL-MCNC: 9.6 MG/DL (ref 8.3–10.6)
CHLORIDE BLD-SCNC: 87 MMOL/L (ref 99–110)
CO2: 35 MMOL/L (ref 21–32)
CREAT SERPL-MCNC: 0.7 MG/DL (ref 0.6–1.1)
DIFFERENTIAL TYPE: ABNORMAL
EOSINOPHILS ABSOLUTE: 0.4 K/CU MM
EOSINOPHILS RELATIVE PERCENT: 4 % (ref 0–3)
GFR SERPL CREATININE-BSD FRML MDRD: >60 ML/MIN/1.73M2
GLUCOSE BLD-MCNC: 100 MG/DL (ref 70–99)
HCT VFR BLD CALC: 41.7 % (ref 37–47)
HEMOGLOBIN: 13.5 GM/DL (ref 12.5–16)
IMMATURE NEUTROPHIL %: 0.6 % (ref 0–0.43)
LYMPHOCYTES ABSOLUTE: 0.5 K/CU MM
LYMPHOCYTES RELATIVE PERCENT: 5 % (ref 24–44)
MCH RBC QN AUTO: 30.2 PG (ref 27–31)
MCHC RBC AUTO-ENTMCNC: 32.4 % (ref 32–36)
MCV RBC AUTO: 93.3 FL (ref 78–100)
MONOCYTES ABSOLUTE: 0.9 K/CU MM
MONOCYTES RELATIVE PERCENT: 8.7 % (ref 0–4)
NUCLEATED RBC %: 0 %
PDW BLD-RTO: 15.9 % (ref 11.7–14.9)
PLATELET # BLD: 340 K/CU MM (ref 140–440)
PMV BLD AUTO: 10.9 FL (ref 7.5–11.1)
POTASSIUM SERPL-SCNC: 4.1 MMOL/L (ref 3.5–5.1)
RBC # BLD: 4.47 M/CU MM (ref 4.2–5.4)
SEGMENTED NEUTROPHILS ABSOLUTE COUNT: 8.6 K/CU MM
SEGMENTED NEUTROPHILS RELATIVE PERCENT: 81.3 % (ref 36–66)
SODIUM BLD-SCNC: 132 MMOL/L (ref 135–145)
TOTAL IMMATURE NEUTOROPHIL: 0.06 K/CU MM
TOTAL NUCLEATED RBC: 0 K/CU MM
TOTAL PROTEIN: 5.9 GM/DL (ref 6.4–8.2)
WBC # BLD: 10.5 K/CU MM (ref 4–10.5)

## 2022-10-25 PROCEDURE — 2580000003 HC RX 258: Performed by: NURSE PRACTITIONER

## 2022-10-25 PROCEDURE — 97110 THERAPEUTIC EXERCISES: CPT

## 2022-10-25 PROCEDURE — 6370000000 HC RX 637 (ALT 250 FOR IP): Performed by: INTERNAL MEDICINE

## 2022-10-25 PROCEDURE — 36415 COLL VENOUS BLD VENIPUNCTURE: CPT

## 2022-10-25 PROCEDURE — 97116 GAIT TRAINING THERAPY: CPT

## 2022-10-25 PROCEDURE — 6370000000 HC RX 637 (ALT 250 FOR IP): Performed by: NURSE PRACTITIONER

## 2022-10-25 PROCEDURE — 94761 N-INVAS EAR/PLS OXIMETRY MLT: CPT

## 2022-10-25 PROCEDURE — 6370000000 HC RX 637 (ALT 250 FOR IP): Performed by: HOSPITALIST

## 2022-10-25 PROCEDURE — 1200000000 HC SEMI PRIVATE

## 2022-10-25 PROCEDURE — 51702 INSERT TEMP BLADDER CATH: CPT

## 2022-10-25 PROCEDURE — 94640 AIRWAY INHALATION TREATMENT: CPT

## 2022-10-25 PROCEDURE — 94660 CPAP INITIATION&MGMT: CPT

## 2022-10-25 PROCEDURE — 97535 SELF CARE MNGMENT TRAINING: CPT

## 2022-10-25 PROCEDURE — 80053 COMPREHEN METABOLIC PANEL: CPT

## 2022-10-25 PROCEDURE — 85025 COMPLETE CBC W/AUTO DIFF WBC: CPT

## 2022-10-25 PROCEDURE — 2700000000 HC OXYGEN THERAPY PER DAY

## 2022-10-25 PROCEDURE — 2500000003 HC RX 250 WO HCPCS: Performed by: INTERNAL MEDICINE

## 2022-10-25 PROCEDURE — 6360000002 HC RX W HCPCS: Performed by: NURSE PRACTITIONER

## 2022-10-25 PROCEDURE — 76705 ECHO EXAM OF ABDOMEN: CPT

## 2022-10-25 PROCEDURE — 97530 THERAPEUTIC ACTIVITIES: CPT

## 2022-10-25 RX ADMIN — CARVEDILOL 6.25 MG: 6.25 TABLET, FILM COATED ORAL at 17:35

## 2022-10-25 RX ADMIN — METOLAZONE 5 MG: 5 TABLET ORAL at 21:30

## 2022-10-25 RX ADMIN — Medication 5000 UNITS: at 09:24

## 2022-10-25 RX ADMIN — COLLAGENASE SANTYL: 250 OINTMENT TOPICAL at 09:25

## 2022-10-25 RX ADMIN — METOLAZONE 5 MG: 5 TABLET ORAL at 09:25

## 2022-10-25 RX ADMIN — ATORVASTATIN CALCIUM 10 MG: 10 TABLET, FILM COATED ORAL at 21:30

## 2022-10-25 RX ADMIN — ASPIRIN 81 MG CHEWABLE TABLET 81 MG: 81 TABLET CHEWABLE at 09:25

## 2022-10-25 RX ADMIN — SPIRONOLACTONE 25 MG: 50 TABLET ORAL at 09:24

## 2022-10-25 RX ADMIN — ALBUTEROL SULFATE 2 PUFF: 90 AEROSOL, METERED RESPIRATORY (INHALATION) at 08:09

## 2022-10-25 RX ADMIN — SODIUM CHLORIDE, PRESERVATIVE FREE 10 ML: 5 INJECTION INTRAVENOUS at 09:31

## 2022-10-25 RX ADMIN — SODIUM CHLORIDE, PRESERVATIVE FREE 10 ML: 5 INJECTION INTRAVENOUS at 21:31

## 2022-10-25 RX ADMIN — TORSEMIDE 20 MG: 20 TABLET ORAL at 09:25

## 2022-10-25 RX ADMIN — LATANOPROST 1 DROP: 50 SOLUTION/ DROPS OPHTHALMIC at 21:30

## 2022-10-25 RX ADMIN — MICONAZOLE NITRATE: 2 POWDER TOPICAL at 17:38

## 2022-10-25 RX ADMIN — TRAZODONE HYDROCHLORIDE 50 MG: 50 TABLET ORAL at 21:30

## 2022-10-25 RX ADMIN — TORSEMIDE 20 MG: 20 TABLET ORAL at 17:36

## 2022-10-25 RX ADMIN — MICONAZOLE NITRATE: 2 POWDER TOPICAL at 21:30

## 2022-10-25 RX ADMIN — CYANOCOBALAMIN TAB 1000 MCG 1000 MCG: 1000 TAB at 09:25

## 2022-10-25 RX ADMIN — ACETAMINOPHEN 650 MG: 325 TABLET ORAL at 18:04

## 2022-10-25 RX ADMIN — TIOTROPIUM BROMIDE AND OLODATEROL 2 PUFF: 3.124; 2.736 SPRAY, METERED RESPIRATORY (INHALATION) at 08:11

## 2022-10-25 RX ADMIN — ENOXAPARIN SODIUM 40 MG: 40 INJECTION SUBCUTANEOUS at 09:25

## 2022-10-25 RX ADMIN — TIMOLOL MALEATE 1 DROP: 5 SOLUTION OPHTHALMIC at 21:30

## 2022-10-25 RX ADMIN — LEVOTHYROXINE SODIUM 88 MCG: 0.09 TABLET ORAL at 06:04

## 2022-10-25 ASSESSMENT — PAIN SCALES - GENERAL
PAINLEVEL_OUTOF10: 0
PAINLEVEL_OUTOF10: 5
PAINLEVEL_OUTOF10: 0

## 2022-10-25 ASSESSMENT — PAIN SCALES - WONG BAKER
WONGBAKER_NUMERICALRESPONSE: 0
WONGBAKER_NUMERICALRESPONSE: 0

## 2022-10-25 ASSESSMENT — PAIN DESCRIPTION - ORIENTATION: ORIENTATION: LEFT

## 2022-10-25 ASSESSMENT — PAIN DESCRIPTION - DESCRIPTORS: DESCRIPTORS: SHARP

## 2022-10-25 ASSESSMENT — PAIN DESCRIPTION - LOCATION: LOCATION: LEG

## 2022-10-25 ASSESSMENT — PAIN - FUNCTIONAL ASSESSMENT: PAIN_FUNCTIONAL_ASSESSMENT: ACTIVITIES ARE NOT PREVENTED

## 2022-10-25 NOTE — PROGRESS NOTES
Nephrology Progress Note  10/25/2022 8:37 AM  Subjective: Interval History: Duane Oliveira is a 80 y.o. female patient. Doing okay weak resting in bed      Data:   Scheduled Meds:   torsemide  20 mg Oral BID    metOLazone  5 mg Oral BID    midodrine  5 mg Oral TID WC    spironolactone  25 mg Oral Daily    collagenase   Topical Daily    albuterol sulfate HFA  2 puff Inhalation BID    aspirin  81 mg Oral Daily    atorvastatin  10 mg Oral Nightly    carvedilol  6.25 mg Oral BID WC    ferrous sulfate  325 mg Oral Every Other Day    levothyroxine  88 mcg Oral QAM AC    timolol  1 drop Both Eyes Nightly    vitamin B-12  1,000 mcg Oral Daily    Vitamin D  5,000 Units Oral Daily    latanoprost  1 drop Both Eyes Dinner    sodium chloride flush  5-40 mL IntraVENous 2 times per day    enoxaparin  40 mg SubCUTAneous Daily    tiotropium-olodaterol  2 puff Inhalation Daily     Continuous Infusions:   sodium chloride 20 mL/hr at 10/23/22 1033         CBC   Recent Labs     10/25/22  0716   WBC 10.5   HGB 13.5   HCT 41.7         BMP   Recent Labs     10/23/22  0415 10/24/22  0559 10/25/22  0716    133* 132*   K 4.4 4.1 4.1   CL 92* 87* 87*   CO2 34* 37* 35*   PHOS 3.6  --   --    BUN 30* 30* 29*   CREATININE 0.8 0.7 0.7     Hepatic:   Recent Labs     10/25/22  0716   AST 28   ALT 19   BILITOT 1.6*   ALKPHOS 276*     Troponin: No results for input(s): TROPONINI in the last 72 hours. BNP: No results for input(s): BNP in the last 72 hours. Lipids: No results for input(s): CHOL, HDL in the last 72 hours. Invalid input(s): LDLCALCU  ABGs: No results found for: PHART, PO2ART, KRS7SQX  INR: No results for input(s): INR in the last 72 hours.   Renal Labs  Albumin:    Lab Results   Component Value Date/Time    LABALBU 4.1 10/25/2022 07:16 AM     Calcium:    Lab Results   Component Value Date/Time    CALCIUM 9.6 10/25/2022 07:16 AM     Phosphorus:    Lab Results   Component Value Date/Time    PHOS 3.6 10/23/2022 04:15 AM     U/A:    Lab Results   Component Value Date/Time    NITRU NEGATIVE 10/22/2022 01:20 PM    COLORU YELLOW 10/22/2022 01:20 PM    WBCUA 2 10/21/2022 11:50 AM    RBCUA 1 10/21/2022 11:50 AM    MUCUS RARE 10/21/2022 11:50 AM    TRICHOMONAS NONE SEEN 10/21/2022 11:50 AM    YEAST RARE 11/23/2021 07:30 AM    BACTERIA NEGATIVE 10/21/2022 11:50 AM    CLARITYU CLEAR 10/22/2022 01:20 PM    SPECGRAV 1.015 10/22/2022 01:20 PM    UROBILINOGEN 0.2 10/22/2022 01:20 PM    BILIRUBINUR NEGATIVE 10/22/2022 01:20 PM    BLOODU NEGATIVE 10/22/2022 01:20 PM    KETUA NEGATIVE 10/22/2022 01:20 PM     ABG:  No results found for: PHART, SSA3ODF, PO2ART, TUX6UVZ, BEART, THGBART, CSP7BOK, M5RCMHNH  HgBA1c:  No results found for: LABA1C  Microalbumen/Creatinine ratio:  No components found for: RUCREAT  TSH:    Lab Results   Component Value Date/Time    TSH 1.0 05/07/2010 12:00 AM     IRON:    Lab Results   Component Value Date/Time    IRON 198 09/02/2022 03:05 PM     Iron Saturation:  No components found for: PERCENTFE  TIBC:    Lab Results   Component Value Date/Time    TIBC 364 09/02/2022 03:05 PM     FERRITIN:    Lab Results   Component Value Date/Time    FERRITIN 70 09/02/2022 03:05 PM     RPR:  No results found for: RPR  PATSY:    Lab Results   Component Value Date/Time    PATSY None Detected 10/08/2019 03:55 PM    PATSY  10/08/2019 03:55 PM     (NOTE)  If suspicion of connective tissue disease is strong and PATSY EIA is   negative, consider testing for PATSY by IFA ([id number]). INTERPRETIVE INFORMATION: Anti-Nuclear Antibodies (PATSY), IgG by   SOBEIDA  Antinuclear Antibodies (PATSY), IgG by SOBEIDA: PATSY specimens are   screened using enzyme-linked immunosorbent assay (SOBEIDA)   methodology. All SOBEIDA results reported as Detected are further   tested by indirect fluorescent assay (IFA) using HEp-2 substrate   with an IgG-specific conjugate.  The PATSY SOBEIDA screen is designed   to detect antibodies against dsDNA, histones, SS-A (Ro), SS-B   (La), Carlos, Smith/RNP, Scl-70, Arlin-1, centromeric proteins, other   antigens extracted from the HEp-2 cell nucleus. PATSY SOBEIDA assays   have been reported to have lower sensitivities than PATSY IFA for   systemic autoimmune rheumatic diseases (SARD). Negative results do not necessarily rule out SARD. Performed by [address], [address] [facility] [phone]  www. Jensen Longoria MD, Lab. Director       24 Hour Urine for Creatinine Clearance:  No components found for: CREAT4, UHRS10, UTV10      Objective:   I/O: 10/24 0701 - 10/25 0700  In: -   Out: 2250 [Urine:2250]  I/O last 3 completed shifts: In: 300 [P.O.:300]  Out: 3400 [Urine:3400]  No intake/output data recorded. Vitals: /62   Pulse 61   Temp 97.8 °F (36.6 °C)   Resp 26   Ht 4' 11\" (1.499 m)   Wt 158 lb 1.1 oz (71.7 kg)   SpO2 98%   BMI 31.93 kg/m²  {  General appearance: awake weak  HEENT: Head: Normal, normocephalic, atraumatic.   Neck: supple, symmetrical, trachea midline  Lungs: diminished breath sounds bilaterally  Heart: S1, S2 normal  Abdomen: abnormal findings:  soft nt  Extremities: edema trace improved after Carnes  Neurologic: Mental status: alertness: Awake off BiPAP this morning        Assessment and Plan:      IMP:  #1 CHF exacerbation diastolic dysfunction with valvular heart disease/cor pulmonale  #2 pulmonary hypertension with possible respiratory failure  #3 anasarca/edema with low urine output  #4 coronary disease prior CABG  #5 bradycardia with history of pacemaker  #6 obstructive sleep apnea with history of CVA    Plan     #1 noted grade 3 diastolic dysfunction fluid overload related to that and agree with maintaining on oral diuretics for now  #2 oxygenation improved monitor less anxious  #3 edema improved maintain with Carnes for now follow-up urology outpatient about when to remove as risk for urine retention  #4 cardiac status monitor maintain pacemaker  #5 patient hard of hearing and appears overall close to baseline  Okay to work on discharge planning from renal and recommend maintain on Zaroxolyn and torsemide Aldactone and midodrine to help with blood pressure most recent potassium is stable and sodium is low stable         Smitha Santos MD, MD

## 2022-10-25 NOTE — PROGRESS NOTES
V2.0  [facility] [place]      Name:  Desirae Stark /Age/Sex: 3/18/1929  (80 y.o. female)   MRN & CSN:  [id number] & [id number] Encounter Date/Time: 10/25/2022 2:00 PM EDT    Location:  -A PCP: Aubree Rico MD       Hospital Day: 7    Assessment and Plan:   Desirae Stark is a 80 y.o. female with PMH of HFpEF, CAD s/p CABG, bradycardia s/p pacemaker, SAULO, h/o CVA who presents with Acute on chronic diastolic (congestive) heart failure (HCC)    #Acute on chronic HFpEF  #Anasarca  #Severe pulm HTN with likely RHF  -CTA chest without PE, marked enlargement of the right atrium suspected right heart failure, small right pleural effusion, anasarca with subcutaneous edema and ascites  -Echo: EF 50 to 70%, grade 3 diastolic dysfunction, severely dilated right atrium and ventricle, moderate left ventricular hypertrophy, severe tricuspid regurgitation, RVSP 39 mmHg  -Cardiology was on consult who initially recommended lasix drip however it was stopped after 1 day  -s/p paracentesis 10/20--> 350 mL    Plan:  Continue torsemide, spironolactone, metolazone and midodrine  Strict I's and o's  Daily weights  Nephrology following, appreciate recs  Abd distension--> abd US ordered    #L distal calf wound  -wound care following    Chronic medical problems:   #CAD   #s/p CABG  #Bradycardia with pacemaker  Plan: continue aspirin, atorvastatin, carvedilol,     #SAULO  -CPAP at night    #h/o CVA  -continue aspirin    #Glaucoma  -continue latanoprost and timolol      Diet ADULT DIET; Regular;  Low Sodium (2 gm); 2000 ml   DVT Prophylaxis [x] Lovenox, []  Heparin, [] SCDs, [] Ambulation,  [] Eliquis, [] Xarelto  [] Coumadin   Code Status Full Code   Disposition From: home  Expected Disposition: ARU  Estimated Date of Discharge: >24h  Patient requires continued admission due to diuresis   Surrogate Decision Maker/ POBonita Garcia     Subjective:     Chief Complaint: Leg Swelling (On lasix requiring O2 now)       Patient states that she is feeling better now. Does not feel like her abd is bigger than when it was drained however the nurse states that her abdomen on Friday when she had her was less tense. Patient still complains of SOB and dry cough. Review of Systems:    General: No fever, no chills  Heart: No chest pain, no palpitations  Lungs: + shortness of breath, + cough  Abdomen: No abdominal pain, no nausea, no vomiting, no constipation, no diarrhea, +distension  : No frequency, no dysuria, no urgency, no decreased urination  MSK: + lower extremity swelling  Neuro: No confusion, no weakness  Skin: No rashes    Objective:      Intake/Output Summary (Last 24 hours) at 10/25/2022 1400  Last data filed at 10/25/2022 0900  Gross per 24 hour   Intake --   Output 1975 ml   Net -1975 ml        Vitals:   Vitals:    10/25/22 0941   BP: (!) 114/55   Pulse: 60   Resp:    Temp:    SpO2:        Physical Exam:     General: NAD, AAO x3-4  Eyes: EOMI  HENT: Atraumatic  CVS: Normal S1 and S2, no murmurs, RRR  Respiratory: Normal breath sounds, clear to auscultation bilaterally, no respiratory distress  GI: normal bowel sounds, nontender, distended  MSK: BLE 3+ pitting edema to upper thighs, hands are also swollen  Skin: Intact, dry, warm, no rashes  Neuro: CN II to XII grossly intact  Psych: Normal mood    Medications:   Medications:    miconazole   Topical BID    torsemide  20 mg Oral BID    metOLazone  5 mg Oral BID    midodrine  5 mg Oral TID WC    spironolactone  25 mg Oral Daily    collagenase   Topical Daily    albuterol sulfate HFA  2 puff Inhalation BID    aspirin  81 mg Oral Daily    atorvastatin  10 mg Oral Nightly    carvedilol  6.25 mg Oral BID WC    ferrous sulfate  325 mg Oral Every Other Day    levothyroxine  88 mcg Oral QAM AC    timolol  1 drop Both Eyes Nightly    vitamin B-12  1,000 mcg Oral Daily    Vitamin D  5,000 Units Oral Daily    latanoprost  1 drop Both Eyes Dinner sodium chloride flush  5-40 mL IntraVENous 2 times per day    enoxaparin  40 mg SubCUTAneous Daily    tiotropium-olodaterol  2 puff Inhalation Daily      Infusions:    sodium chloride 20 mL/hr at 10/23/22 1033     PRN Meds: albuterol sulfate HFA, 2 puff, Q4H PRN  traZODone, 50 mg, Nightly PRN  sodium chloride flush, 5-40 mL, PRN  sodium chloride, , PRN  ondansetron, 4 mg, Q8H PRN   Or  ondansetron, 4 mg, Q6H PRN  polyethylene glycol, 17 g, Daily PRN  acetaminophen, 650 mg, Q6H PRN   Or  acetaminophen, 650 mg, Q6H PRN        Labs      Recent Results (from the past 24 hour(s))   CBC with Auto Differential    Collection Time: 10/25/22  7:16 AM   Result Value Ref Range    WBC 10.5 4.0 - 10.5 K/CU MM    RBC 4.47 4.2 - 5.4 M/CU MM    Hemoglobin 13.5 12.5 - 16.0 GM/DL    Hematocrit 41.7 37 - 47 %    MCV 93.3 78 - 100 FL    MCH 30.2 27 - 31 PG    MCHC 32.4 32.0 - 36.0 %    RDW 15.9 (H) 11.7 - 14.9 %    Platelets 047 366 - 812 K/CU MM    MPV 10.9 7.5 - 11.1 FL    Differential Type AUTOMATED DIFFERENTIAL     Segs Relative 81.3 (H) 36 - 66 %    Lymphocytes % 5.0 (L) 24 - 44 %    Monocytes % 8.7 (H) 0 - 4 %    Eosinophils % 4.0 (H) 0 - 3 %    Basophils % 0.4 0 - 1 %    Segs Absolute 8.6 K/CU MM    Lymphocytes Absolute 0.5 K/CU MM    Monocytes Absolute 0.9 K/CU MM    Eosinophils Absolute 0.4 K/CU MM    Basophils Absolute 0.0 K/CU MM    Nucleated RBC % 0.0 %    Total Nucleated RBC 0.0 K/CU MM    Total Immature Neutrophil 0.06 K/CU MM    Immature Neutrophil % 0.6 (H) 0 - 0.43 %   Comprehensive Metabolic Panel w/ Reflex to MG    Collection Time: 10/25/22  7:16 AM   Result Value Ref Range    Sodium 132 (L) 135 - 145 MMOL/L    Potassium 4.1 3.5 - 5.1 MMOL/L    Chloride 87 (L) 99 - 110 mMol/L    CO2 35 (H) 21 - 32 MMOL/L    BUN 29 (H) 6 - 23 MG/DL    Creatinine 0.7 0.6 - 1.1 MG/DL    Est, Glom Filt Rate >60 >60 mL/min/1.73m2    Glucose 100 (H) 70 - 99 MG/DL    Calcium 9.6 8.3 - 10.6 MG/DL    Albumin 4.1 3.4 - 5.0 GM/DL    Total Protein 5.9 (L) 6.4 - 8.2 GM/DL    Total Bilirubin 1.6 (H) 0.0 - 1.0 MG/DL    ALT 19 10 - 40 U/L    AST 28 15 - 37 IU/L    Alkaline Phosphatase 276 (H) 40 - 128 IU/L    Anion Gap 10 4 - 16        Imaging/Diagnostics Last 24 Hours   XR ABDOMEN (2 VIEWS)    Result Date: 10/24/2022  EXAMINATION: TWO XRAY VIEWS OF THE ABDOMEN 10/24/2022 8:40 am COMPARISON: None. HISTORY: ORDERING SYSTEM PROVIDED HISTORY: distended abdomen TECHNOLOGIST PROVIDED HISTORY: Reason for exam:->distended abdomen Reason for Exam: distended abdomen FINDINGS: Mild gaseous distension of the stomach. No small bowel dilation. Moderate amount of stool throughout the colon. No displacement of the bowel loops. No large mass. Dense vascular calcifications of the splenic artery. Scoliosis and spondylosis. Right pleural effusion. Large fixed hiatus hernia. Negative for bowel obstruction.        Electronically signed by Pallavi Alvarez MD on 10/25/2022 at 2:00 PM

## 2022-10-25 NOTE — PLAN OF CARE
Problem: Discharge Planning  Goal: Discharge to home or other facility with appropriate resources  Outcome: Progressing     Problem: Pain  Goal: Verbalizes/displays adequate comfort level or baseline comfort level  Outcome: Progressing     Problem: Chronic Conditions and Co-morbidities  Goal: Patient's chronic conditions and co-morbidity symptoms are monitored and maintained or improved  Outcome: Progressing     Problem: Skin/Tissue Integrity  Goal: Absence of new skin breakdown  Description: 1. Monitor for areas of redness and/or skin breakdown  2. Assess vascular access sites hourly  3. Every 4-6 hours minimum:  Change oxygen saturation probe site  4. Every 4-6 hours:  If on nasal continuous positive airway pressure, respiratory therapy assess nares and determine need for appliance change or resting period.   Outcome: Progressing

## 2022-10-25 NOTE — PROGRESS NOTES
Occupational Therapy  . Occupational Therapy Treatment Note    Name: Duane Oliveira MRN: [id number] :   3/18/1929   Date:  10/25/2022   Admission Date: 10/19/2022 Room:  -A     Primary Problem:      Restrictions/Precautions:  Restrictions/Precautions  Restrictions/Precautions: Fall Risk, General Precautions           Communication with other providers: cotx with PTA [facility] for assist and safety as well as patient tolerance with therapy and CM note. RN      Subjective:  Patient states:  I need the theodore  Pain:   Location, Type, Intensity (0/10 to 10/10):  none stated    Objective:    Observation: patient supine. . family present. Agreeable and pleasant. 3L initially- on 1L patient remained 95-97% during movement. Catheter did come out during toileting. Patient unaware. Objective Measures:  tele, 02, catheter    Treatment, including education:    ADL activity training was instructed today. Cues were given for safety, sequence, UE/LE placement, visual cues, and balance. Activities performed today included toileting, hand hygiene, and grooming. Grooming- CGA  for oral hygiene in stance at sink as well as washing hands. Toileting on BSC placed behind patient at sink. Min A for completion of tucker care. Tucker care performed initially in seated and progressed to standing. Increased time needed on toilet. Therapeutic activity training was instructed today. Cues were given for safety, sequence, UE/LE placement, awareness, and balance. Activities performed today included bed mobility training, sup-sit, sit-stand, SPT. Supine to EOB- Min A mostly for trunk. Eob scooting- CGA for safety. Eob balance- x5 min- good. Used BUEs for support when needed. Stand to FWW from EOB- CGA  Functional mobility-Min A mostly for walker guidance. Cues throughout. Short HH distances. Standing balance at sink- good. Used Bues for support on sink intermittently.    Sit to [facility] with use of sink to ease down.  Stand from [address] with cues and Tcs to place Bues on walker. Sit to EOB- CGA  (HAMPTON left patient with PTA to return supine to talk to RN)  Bridging- good with cues for technique. Patient educated on role of OT , benefits of OT and rationale for therapeutic intervention. Benefit of OOB/EOB activities, benefit of movement. AE/AD, WS/EC,     All therapeutic intervention performed c emphasis on dynamic balance / standing tolerance to increase strength, endurance and activity tolerance for increased [place] c ADL tasks and func transfers / mobility. Patient left safely in bed at end of session, with call light in reach, alarm on and nursing aware. Gait belt was used for func transfers / mobility. Assessment / Impression:    Patient's tolerance of treatment: Well  Adverse Reaction: None  Significant change in status and impact: Improved from previous session  Barriers to improvement: hard of hearing.       Plan for Next Session:    Continue with OT POC      Time in:  1002  Time out:  1056  Timed treatment minutes:  54  Total treatment time:  54      Electronically signed by:    LULA Hammond COTA/L [id number]    10/25/2022, 11:38 AM

## 2022-10-25 NOTE — PROGRESS NOTES
Physical Therapy  Name: Felicia Romo MRN: [id number] :   3/18/1929   Date:  10/25/2022   Admission Date: 10/19/2022 Room:  -A   Restrictions/Precautions:  Restrictions/Precautions  Restrictions/Precautions: Fall Risk, General Precautions         Communication with other providers: Candy NAYLOR notified of catheter dislodging during bathroom activities. Cotx with Tonia Michelle for updated notes     Subjective:  Patient states:  Patient is agreeable for rehab. Does not c/o of pain throughout session  Pain:   Location, Type, Intensity (0/10 to 10/10):  denies    Objective:    Observation:  Patient is supine in bed on 3L NC O2 at SpO2 100%. Patient NC O2 decreased to 1L during today's activities with SpO2 stable at 95-98% with mobility. Patient places back on 2L NC O2, RN notified    Patient had catheter at beginning of the session. During standing pericare in restroom, Catheter appears to be dislodged, catheter balloon minimal in size. Treatment, including education/measures:    Transfers with line management of TeleMonitor, Pulse Ox  Rolling: SBA to the right with added time  Supine to sit :Mod A with trunk rise to EOB. Patient is able to being her BLE off EOB at SBA  Sit to supine : Mod A with BLE management  Scooting :Seated scooting to EOB, CGA. Supine scooting Max A x 2. Patient demos's ability to bridge  Seated balance: SBA on EOB, CGA on toilet during self perirectal care. Sit to stand :from EOB and BSC in restroom CGA-Min A to RW  Standing tolerance: Patient is ambreen to stand ~5' for self care x2 sets with BUE support at [facility] or counter top  Stand to sit :to EOB and recliner CGA for safe eccentric control  SPT:Min A with RW stand pivot d/t poor walker guidance    Gait:  Pt amb with RW for 10 ft + 10 ft with CGA assist  Pt needed VC's for walker guidance and increase step length.      Exercises  Supine Exercises: She required contact curing to complete most activities today  Ankle pumps x 10  Glute sets x 10  Bridges x 1 for depends donning in supine  Heel slides x 10  SLR x 10    Therapeutic exercises were instructed today. Cues were given for technique, safety, recruitment, and rationale. Cues were verbal and/or tactile. Safety  Patient left safely in the bed, with call light/phone in reach with alarm applied. Gait belt and mask were used for transfers and gait. Assessment / Impression:     Patient's tolerance of treatment:  Good   Adverse Reaction: none  Significant change in status and impact:  none  Barriers to improvement:  none    Plan for Next Session:    Will cont to work towards pt's goals per patient tolerance  Time in:  1002  Time out:  1056  Timed treatment minutes:  54  Total treatment time:  47  Previously filed items:  Social/Functional History  Lives With: Alone  Type of Home: Condo  Home Layout: One level  Home Access: Level entry  Bathroom Shower/Tub: Tub/Shower unit, Walk-in shower  Bathroom Toilet: Standard  Bathroom Equipment: Grab bars in shower, Grab bars around toilet  Home Equipment: Darrell Cesar Sherl Oscar, 4 wheeled  ADL Assistance: Independent  Homemaking Assistance: Independent  Ambulation Assistance: Independent  Transfer Assistance: Independent  Active : No  Patient's  Info: Daughter in law does grocery shopping. Occupation: Retired     Long Term Goals  Time Frame for [address] [place] [address] : In one week:  Long Term Goal 1: Pt will complete all bed mobility with SBAx1  Long Term Goal 2: Pt will complete sit <> stand transfers with SBAx1  Long Term Goal 3: Pt will ambulate 200 feet with SBAx1 with LRAD  Long Term Goal 4: Pt will ascend/descend 6 steps with a handrail with minAx1  Long Term Goal 5: Pt will independently complete 3 sets of 10 reps of BLE AROM exercises in available and allowed ROM    Electronically signed by:     Marilyn Champion PTA  10/25/2022, 11:47 AM

## 2022-10-25 NOTE — CARE COORDINATION
Reviewed updated therapy notes. ARU pre-cert initiated and pending at this time. Pending ref# [id number]. Will continue to follow for determination.

## 2022-10-26 LAB — PRO-BNP: 2308 PG/ML

## 2022-10-26 PROCEDURE — 6360000002 HC RX W HCPCS: Performed by: NURSE PRACTITIONER

## 2022-10-26 PROCEDURE — 1200000000 HC SEMI PRIVATE

## 2022-10-26 PROCEDURE — 94761 N-INVAS EAR/PLS OXIMETRY MLT: CPT

## 2022-10-26 PROCEDURE — 83880 ASSAY OF NATRIURETIC PEPTIDE: CPT

## 2022-10-26 PROCEDURE — 2500000003 HC RX 250 WO HCPCS: Performed by: INTERNAL MEDICINE

## 2022-10-26 PROCEDURE — 87070 CULTURE OTHR SPECIMN AEROBIC: CPT

## 2022-10-26 PROCEDURE — 2700000000 HC OXYGEN THERAPY PER DAY

## 2022-10-26 PROCEDURE — 6370000000 HC RX 637 (ALT 250 FOR IP): Performed by: INTERNAL MEDICINE

## 2022-10-26 PROCEDURE — 94660 CPAP INITIATION&MGMT: CPT

## 2022-10-26 PROCEDURE — 2580000003 HC RX 258: Performed by: NURSE PRACTITIONER

## 2022-10-26 PROCEDURE — 6370000000 HC RX 637 (ALT 250 FOR IP): Performed by: HOSPITALIST

## 2022-10-26 PROCEDURE — 6370000000 HC RX 637 (ALT 250 FOR IP): Performed by: NURSE PRACTITIONER

## 2022-10-26 PROCEDURE — 36415 COLL VENOUS BLD VENIPUNCTURE: CPT

## 2022-10-26 PROCEDURE — 87186 SC STD MICRODIL/AGAR DIL: CPT

## 2022-10-26 PROCEDURE — 94640 AIRWAY INHALATION TREATMENT: CPT

## 2022-10-26 PROCEDURE — 6360000002 HC RX W HCPCS: Performed by: INTERNAL MEDICINE

## 2022-10-26 PROCEDURE — 87075 CULTR BACTERIA EXCEPT BLOOD: CPT

## 2022-10-26 PROCEDURE — 87077 CULTURE AEROBIC IDENTIFY: CPT

## 2022-10-26 RX ORDER — FUROSEMIDE 10 MG/ML
60 INJECTION INTRAMUSCULAR; INTRAVENOUS 2 TIMES DAILY
Status: DISCONTINUED | OUTPATIENT
Start: 2022-10-26 | End: 2022-10-27

## 2022-10-26 RX ADMIN — Medication 5000 UNITS: at 08:57

## 2022-10-26 RX ADMIN — ENOXAPARIN SODIUM 40 MG: 40 INJECTION SUBCUTANEOUS at 08:57

## 2022-10-26 RX ADMIN — MICONAZOLE NITRATE: 2 POWDER TOPICAL at 20:57

## 2022-10-26 RX ADMIN — LEVOTHYROXINE SODIUM 88 MCG: 0.09 TABLET ORAL at 06:59

## 2022-10-26 RX ADMIN — ALBUTEROL SULFATE 2 PUFF: 90 AEROSOL, METERED RESPIRATORY (INHALATION) at 07:42

## 2022-10-26 RX ADMIN — METOLAZONE 5 MG: 5 TABLET ORAL at 20:57

## 2022-10-26 RX ADMIN — CYANOCOBALAMIN TAB 1000 MCG 1000 MCG: 1000 TAB at 08:57

## 2022-10-26 RX ADMIN — MIDODRINE HYDROCHLORIDE 5 MG: 5 TABLET ORAL at 17:50

## 2022-10-26 RX ADMIN — CARVEDILOL 6.25 MG: 6.25 TABLET, FILM COATED ORAL at 08:57

## 2022-10-26 RX ADMIN — LATANOPROST 1 DROP: 50 SOLUTION/ DROPS OPHTHALMIC at 17:54

## 2022-10-26 RX ADMIN — SODIUM CHLORIDE, PRESERVATIVE FREE 10 ML: 5 INJECTION INTRAVENOUS at 08:58

## 2022-10-26 RX ADMIN — ATORVASTATIN CALCIUM 10 MG: 10 TABLET, FILM COATED ORAL at 20:57

## 2022-10-26 RX ADMIN — MICONAZOLE NITRATE: 2 POWDER TOPICAL at 08:56

## 2022-10-26 RX ADMIN — MIDODRINE HYDROCHLORIDE 5 MG: 5 TABLET ORAL at 12:11

## 2022-10-26 RX ADMIN — TORSEMIDE 20 MG: 20 TABLET ORAL at 08:57

## 2022-10-26 RX ADMIN — ASPIRIN 81 MG CHEWABLE TABLET 81 MG: 81 TABLET CHEWABLE at 08:58

## 2022-10-26 RX ADMIN — SPIRONOLACTONE 25 MG: 50 TABLET ORAL at 08:57

## 2022-10-26 RX ADMIN — SODIUM CHLORIDE, PRESERVATIVE FREE 10 ML: 5 INJECTION INTRAVENOUS at 21:01

## 2022-10-26 RX ADMIN — MIDODRINE HYDROCHLORIDE 5 MG: 5 TABLET ORAL at 08:57

## 2022-10-26 RX ADMIN — FERROUS SULFATE TAB 325 MG (65 MG ELEMENTAL FE) 325 MG: 325 (65 FE) TAB at 12:11

## 2022-10-26 RX ADMIN — TIOTROPIUM BROMIDE AND OLODATEROL 2 PUFF: 3.124; 2.736 SPRAY, METERED RESPIRATORY (INHALATION) at 07:44

## 2022-10-26 RX ADMIN — FUROSEMIDE 60 MG: 10 INJECTION, SOLUTION INTRAVENOUS at 17:50

## 2022-10-26 RX ADMIN — TIMOLOL MALEATE 1 DROP: 5 SOLUTION OPHTHALMIC at 20:57

## 2022-10-26 RX ADMIN — METOLAZONE 5 MG: 5 TABLET ORAL at 08:56

## 2022-10-26 RX ADMIN — ALBUTEROL SULFATE 2 PUFF: 90 AEROSOL, METERED RESPIRATORY (INHALATION) at 19:58

## 2022-10-26 RX ADMIN — COLLAGENASE SANTYL: 250 OINTMENT TOPICAL at 08:56

## 2022-10-26 ASSESSMENT — PAIN SCALES - GENERAL
PAINLEVEL_OUTOF10: 0

## 2022-10-26 ASSESSMENT — PAIN SCALES - WONG BAKER: WONGBAKER_NUMERICALRESPONSE: 0

## 2022-10-26 NOTE — PLAN OF CARE
Problem: Discharge Planning  Goal: Discharge to home or other facility with appropriate resources  10/26/2022 0506 by Cliff Carballo RN  Outcome: Progressing  10/25/2022 1855 by Le Ayala RN  Outcome: Progressing     Problem: Pain  Goal: Verbalizes/displays adequate comfort level or baseline comfort level  10/26/2022 0506 by Cliff Carballo RN  Outcome: Progressing  Flowsheets (Taken 10/25/2022 2204)  Verbalizes/displays adequate comfort level or baseline comfort level:   Encourage patient to monitor pain and request assistance   Assess pain using appropriate pain scale   Administer analgesics based on type and severity of pain and evaluate response   Implement non-pharmacological measures as appropriate and evaluate response  10/25/2022 1855 by Le Ayala RN  Outcome: Progressing     Problem: Chronic Conditions and Co-morbidities  Goal: Patient's chronic conditions and co-morbidity symptoms are monitored and maintained or improved  10/26/2022 0506 by Cliff Carballo RN  Outcome: Progressing  10/25/2022 1855 by Le Ayala RN  Outcome: Progressing     Problem: Skin/Tissue Integrity  Goal: Absence of new skin breakdown  Description: 1. Monitor for areas of redness and/or skin breakdown  2. Assess vascular access sites hourly  3. Every 4-6 hours minimum:  Change oxygen saturation probe site  4. Every 4-6 hours:  If on nasal continuous positive airway pressure, respiratory therapy assess nares and determine need for appliance change or resting period.   10/26/2022 0506 by Cliff Carballo RN  Outcome: Progressing  10/25/2022 1855 by Le Ayala RN  Outcome: Progressing

## 2022-10-26 NOTE — CARE COORDINATION
ARU pre-cert still pending with [facility] Medicare at this time. Will continue to follow for determination.

## 2022-10-26 NOTE — PLAN OF CARE
Problem: Discharge Planning  Goal: Discharge to home or other facility with appropriate resources  10/26/2022 1347 by Arturo Hernandez RN  Outcome: Progressing  10/26/2022 0506 by Dalia Fairbanks RN  Outcome: Progressing     Problem: Pain  Goal: Verbalizes/displays adequate comfort level or baseline comfort level  10/26/2022 1347 by Arturo Hernandez RN  Outcome: Progressing  10/26/2022 0506 by Dalia Fairbanks RN  Outcome: Progressing  Flowsheets (Taken 10/25/2022 2204)  Verbalizes/displays adequate comfort level or baseline comfort level:   Encourage patient to monitor pain and request assistance   Assess pain using appropriate pain scale   Administer analgesics based on type and severity of pain and evaluate response   Implement non-pharmacological measures as appropriate and evaluate response     Problem: Chronic Conditions and Co-morbidities  Goal: Patient's chronic conditions and co-morbidity symptoms are monitored and maintained or improved  10/26/2022 1347 by Arturo Hernandez RN  Outcome: Progressing  10/26/2022 0506 by Dalia Fairbanks RN  Outcome: Progressing     Problem: Skin/Tissue Integrity  Goal: Absence of new skin breakdown  Description: 1. Monitor for areas of redness and/or skin breakdown  2. Assess vascular access sites hourly  3. Every 4-6 hours minimum:  Change oxygen saturation probe site  4. Every 4-6 hours:  If on nasal continuous positive airway pressure, respiratory therapy assess nares and determine need for appliance change or resting period.   10/26/2022 1347 by Arturo Hernandez RN  Outcome: Progressing  10/26/2022 0506 by Dalia Fairbanks RN  Outcome: Progressing

## 2022-10-26 NOTE — PROGRESS NOTES
V2.0  [facility] [place] Hospitalist Progress Note      Name:  Paula Spangler /Age/Sex: 3/18/1929  (80 y.o. female)   MRN & CSN:  [id number] & [id number] Encounter Date/Time: 10/26/2022 2:00 PM EDT    Location:  -A PCP: Andrew Ernst MD       Hospital Day: 8    Assessment and Plan:   Paula Spangler is a 80 y.o. female with PMH of HFpEF, CAD s/p CABG, bradycardia s/p pacemaker, SAULO, h/o CVA who presents with Acute on chronic diastolic (congestive) heart failure (HCC)    #Acute on chronic HFpEF  #Anasarca  #Severe pulm HTN with likely RHF  -CTA chest without PE, marked enlargement of the right atrium suspected right heart failure, small right pleural effusion, anasarca with subcutaneous edema and ascites  -Echo: EF 50 to 87%, grade 3 diastolic dysfunction, severely dilated right atrium and ventricle, moderate left ventricular hypertrophy, severe tricuspid regurgitation, RVSP 39 mmHg  -Cardiology was on consult who initially recommended lasix drip however it was stopped after 1 day  -s/p paracentesis 10/20--> 350 mL  -Abd US with small amount of ascites  -proBNP: 2308 (trended up)     Plan:  Continue torsemide, spironolactone, metolazone and midodrine  Will switch torsemide to lasix IV 40 mg until patient's anasarca improves  Strict I's and o's  Daily weights  Nephrology following, appreciate recs    #L distal calf wound  -wound care following    Chronic medical problems:   #CAD   #s/p CABG  #Bradycardia with pacemaker  Plan: continue aspirin, atorvastatin, carvedilol,     #SAULO  -CPAP at night    #h/o CVA  -continue aspirin    #Glaucoma  -continue latanoprost and timolol      Diet ADULT DIET; Regular;  Low Sodium (2 gm); 2000 ml   DVT Prophylaxis [x] Lovenox, []  Heparin, [] SCDs, [] Ambulation,  [] Eliquis, [] Xarelto  [] Coumadin   Code Status Full Code   Disposition From: home  Expected Disposition: ARU  Estimated Date of Discharge: >24h  Patient requires continued admission due to diuresis   Surrogate Decision Maker/ Bonita Narayan     Subjective:     Chief Complaint: Leg Swelling (On lasix requiring O2 now)     Patient was confused this morning. She wanted to have her toast buttered. She did not know of her respiratory status,       Review of Systems:    General: No fever, no chills  Heart: No chest pain, no palpitations  Lungs: + shortness of breath, + cough  Abdomen: No abdominal pain, no nausea, no vomiting, no constipation, no diarrhea, +distension  : No frequency, no dysuria, no urgency, no decreased urination  MSK: + lower extremity swelling  Neuro: No confusion, no weakness  Skin: No rashes    Objective:      Intake/Output Summary (Last 24 hours) at 10/26/2022 0757  Last data filed at 10/26/2022 0600  Gross per 24 hour   Intake 240 ml   Output 825 ml   Net -585 ml        Vitals:   Vitals:    10/26/22 0744   BP:    Pulse: 62   Resp: 18   Temp:    SpO2: 96%       Physical Exam:     General: NAD  Eyes: EOMI  HENT: Atraumatic  Respiratory: Normal breath sounds, clear to auscultation bilaterally, no respiratory distress  GI: normal bowel sounds, nontender, distended  MSK: BLE 3+ pitting edema to upper thighs  Skin: Intact, dry, warm, no rashes  Neuro: CN II to XII grossly intact  Psych: Normal mood    Medications:   Medications:    miconazole   Topical BID    torsemide  20 mg Oral BID    metOLazone  5 mg Oral BID    midodrine  5 mg Oral TID WC    spironolactone  25 mg Oral Daily    collagenase   Topical Daily    albuterol sulfate HFA  2 puff Inhalation BID    aspirin  81 mg Oral Daily    atorvastatin  10 mg Oral Nightly    carvedilol  6.25 mg Oral BID WC    ferrous sulfate  325 mg Oral Every Other Day    levothyroxine  88 mcg Oral QAM AC    timolol  1 drop Both Eyes Nightly    vitamin B-12  1,000 mcg Oral Daily    Vitamin D  5,000 Units Oral Daily    latanoprost  1 drop Both Eyes Dinner    sodium chloride flush  5-40 mL IntraVENous 2 times per day    enoxaparin  40 mg SubCUTAneous Daily tiotropium-olodaterol  2 puff Inhalation Daily      Infusions:    sodium chloride 20 mL/hr at 10/23/22 1033     PRN Meds: albuterol sulfate HFA, 2 puff, Q4H PRN  traZODone, 50 mg, Nightly PRN  sodium chloride flush, 5-40 mL, PRN  sodium chloride, , PRN  ondansetron, 4 mg, Q8H PRN   Or  ondansetron, 4 mg, Q6H PRN  polyethylene glycol, 17 g, Daily PRN  acetaminophen, 650 mg, Q6H PRN   Or  acetaminophen, 650 mg, Q6H PRN      Labs      Recent Results (from the past 24 hour(s))   Brain Natriuretic Peptide    Collection Time: 10/26/22  6:52 AM   Result Value Ref Range    Pro-BNP 2,308 (H) <300 PG/ML        Imaging/Diagnostics Last 24 Hours   XR ABDOMEN (2 VIEWS)    Result Date: 10/24/2022  EXAMINATION: TWO XRAY VIEWS OF THE ABDOMEN 10/24/2022 8:40 am COMPARISON: None. HISTORY: ORDERING SYSTEM PROVIDED HISTORY: distended abdomen TECHNOLOGIST PROVIDED HISTORY: Reason for exam:->distended abdomen Reason for Exam: distended abdomen FINDINGS: Mild gaseous distension of the stomach. No small bowel dilation. Moderate amount of stool throughout the colon. No displacement of the bowel loops. No large mass. Dense vascular calcifications of the splenic artery. Scoliosis and spondylosis. Right pleural effusion. Large fixed hiatus hernia. Negative for bowel obstruction.        Electronically signed by Isabelle Kirkpatrick MD on 10/26/2022 at 7:57 AM

## 2022-10-26 NOTE — PROGRESS NOTES
Nephrology Progress Note  10/26/2022 7:54 AM  Subjective: Interval History: Chris Donohue is a 80 y.o. female weak a little more agitated today not wearing her BiPAP and hard of hearing so hard to comprehend if she is confused or just disoriented      Data:   Scheduled Meds:   miconazole   Topical BID    torsemide  20 mg Oral BID    metOLazone  5 mg Oral BID    midodrine  5 mg Oral TID WC    spironolactone  25 mg Oral Daily    collagenase   Topical Daily    albuterol sulfate HFA  2 puff Inhalation BID    aspirin  81 mg Oral Daily    atorvastatin  10 mg Oral Nightly    carvedilol  6.25 mg Oral BID WC    ferrous sulfate  325 mg Oral Every Other Day    levothyroxine  88 mcg Oral QAM AC    timolol  1 drop Both Eyes Nightly    vitamin B-12  1,000 mcg Oral Daily    Vitamin D  5,000 Units Oral Daily    latanoprost  1 drop Both Eyes Dinner    sodium chloride flush  5-40 mL IntraVENous 2 times per day    enoxaparin  40 mg SubCUTAneous Daily    tiotropium-olodaterol  2 puff Inhalation Daily     Continuous Infusions:   sodium chloride 20 mL/hr at 10/23/22 1033         CBC   Recent Labs     10/25/22  0716   WBC 10.5   HGB 13.5   HCT 41.7         BMP   Recent Labs     10/24/22  0559 10/25/22  0716   * 132*   K 4.1 4.1   CL 87* 87*   CO2 37* 35*   BUN 30* 29*   CREATININE 0.7 0.7     Hepatic:   Recent Labs     10/25/22  0716   AST 28   ALT 19   BILITOT 1.6*   ALKPHOS 276*     Troponin: No results for input(s): TROPONINI in the last 72 hours. BNP: No results for input(s): BNP in the last 72 hours. Lipids: No results for input(s): CHOL, HDL in the last 72 hours. Invalid input(s): LDLCALCU  ABGs: No results found for: PHART, PO2ART, TBO4IWJ  INR: No results for input(s): INR in the last 72 hours.   Renal Labs  Albumin:    Lab Results   Component Value Date/Time    LABALBU 4.1 10/25/2022 07:16 AM     Calcium:    Lab Results   Component Value Date/Time    CALCIUM 9.6 10/25/2022 07:16 AM     Phosphorus: Lab Results   Component Value Date/Time    PHOS 3.6 10/23/2022 04:15 AM     U/A:    Lab Results   Component Value Date/Time    NITRU NEGATIVE 10/22/2022 01:20 PM    COLORU YELLOW 10/22/2022 01:20 PM    WBCUA 2 10/21/2022 11:50 AM    RBCUA 1 10/21/2022 11:50 AM    MUCUS RARE 10/21/2022 11:50 AM    TRICHOMONAS NONE SEEN 10/21/2022 11:50 AM    YEAST RARE 11/23/2021 07:30 AM    BACTERIA NEGATIVE 10/21/2022 11:50 AM    CLARITYU CLEAR 10/22/2022 01:20 PM    SPECGRAV 1.015 10/22/2022 01:20 PM    UROBILINOGEN 0.2 10/22/2022 01:20 PM    BILIRUBINUR NEGATIVE 10/22/2022 01:20 PM    BLOODU NEGATIVE 10/22/2022 01:20 PM    KETUA NEGATIVE 10/22/2022 01:20 PM     ABG:  No results found for: PHART, YAX2UBC, PO2ART, EXK3OYO, BEART, THGBART, VOA6ZGH, F2LGXQQJ  HgBA1c:  No results found for: LABA1C  Microalbumen/Creatinine ratio:  No components found for: RUCREAT  TSH:    Lab Results   Component Value Date/Time    TSH 1.0 05/07/2010 12:00 AM     IRON:    Lab Results   Component Value Date/Time    IRON 198 09/02/2022 03:05 PM     Iron Saturation:  No components found for: PERCENTFE  TIBC:    Lab Results   Component Value Date/Time    TIBC 364 09/02/2022 03:05 PM     FERRITIN:    Lab Results   Component Value Date/Time    FERRITIN 70 09/02/2022 03:05 PM     RPR:  No results found for: RPR  PATSY:    Lab Results   Component Value Date/Time    PATSY None Detected 10/08/2019 03:55 PM    PATSY  10/08/2019 03:55 PM     (NOTE)  If suspicion of connective tissue disease is strong and PATSY EIA is   negative, consider testing for PATSY by IFA ([id number]). INTERPRETIVE INFORMATION: Anti-Nuclear Antibodies (PATSY), IgG by   SOBEIDA  Antinuclear Antibodies (PATSY), IgG by SOBEIDA: PATSY specimens are   screened using enzyme-linked immunosorbent assay (SOBEIDA)   methodology. All SOBEIDA results reported as Detected are further   tested by indirect fluorescent assay (IFA) using HEp-2 substrate   with an IgG-specific conjugate.  The PATSY SOBEIDA screen is designed   to detect antibodies against dsDNA, histones, SS-A (Ro), SS-B   (La), Carlos, Carlos/RNP, Scl-70, Arlin-1, centromeric proteins, other   antigens extracted from the HEp-2 cell nucleus. PATSY SOBEIDA assays   have been reported to have lower sensitivities than PATSY IFA for   systemic autoimmune rheumatic diseases (SARD). Negative results do not necessarily rule out SARD. Performed by [facility] [address], [address] [phone]  www. Rosy Riggs MD, Lab. Director       24 Hour Urine for Creatinine Clearance:  No components found for: CREAT4, UHRS10, UTV10      Objective:   I/O: 10/25 0701 - 10/26 0700  In: -   Out: 825 [Urine:825]  I/O last 3 completed shifts:  In: -   Out: 1975 [Urine:1975]  No intake/output data recorded. Vitals: /65   Pulse 62   Temp 97.5 °F (36.4 °C) (Axillary)   Resp 18   Ht 4' 11\" (1.499 m)   Wt 154 lb 1.6 oz (69.9 kg)   SpO2 96%   BMI 31.12 kg/m²  {  General appearance: awake weak  HEENT: Head: Normal, normocephalic, atraumatic.   Neck: supple, symmetrical, trachea midline  Lungs: diminished breath sounds bilaterally  Heart: S1, S2 normal  Abdomen: abnormal findings:  soft nt  Extremities: edema trace   Neurologic: Mental status: alertness: Awake not wearing her BiPAP even when tried to help        Assessment and Plan:      IMP:  #1 CHF exacerbation diastolic dysfunction with valvular heart disease/cor pulmonale  #2 pulmonary hypertension with possible respiratory failure  #3 anasarca/edema with low urine output  #4 coronary disease prior CABG  #5 bradycardia with history of pacemaker  #6 obstructive sleep apnea with history of CVA    Plan     #1 in the setting of CHF maintain Carnes and diuresis  #2 patient refusing to wear BiPAP which will make her overall improvement and care for difficult  #3 maintain with Carnes for now  #4 cardiac status monitor  #5 maintain with pacemaker  #6 provide supportive care currently full code but she is 80years old number currently not appear want to follow the rules to be aggressive in care may recommend palliative care/hospice consult to reevaluate her overall CODE STATUS and plan of care  Will follow with supportive care         Donal Arevalo MD, MD

## 2022-10-27 LAB
ANION GAP SERPL CALCULATED.3IONS-SCNC: 9 MMOL/L (ref 4–16)
BUN BLDV-MCNC: 34 MG/DL (ref 6–23)
CALCIUM SERPL-MCNC: 9.9 MG/DL (ref 8.3–10.6)
CHLORIDE BLD-SCNC: 86 MMOL/L (ref 99–110)
CO2: 37 MMOL/L (ref 21–32)
CREAT SERPL-MCNC: 0.7 MG/DL (ref 0.6–1.1)
GFR SERPL CREATININE-BSD FRML MDRD: >60 ML/MIN/1.73M2
GLUCOSE BLD-MCNC: 128 MG/DL (ref 70–99)
MAGNESIUM: 1.9 MG/DL (ref 1.8–2.4)
PHOSPHORUS: 3.3 MG/DL (ref 2.5–4.9)
POTASSIUM SERPL-SCNC: 4.1 MMOL/L (ref 3.5–5.1)
SODIUM BLD-SCNC: 132 MMOL/L (ref 135–145)

## 2022-10-27 PROCEDURE — 6360000002 HC RX W HCPCS: Performed by: INTERNAL MEDICINE

## 2022-10-27 PROCEDURE — 6370000000 HC RX 637 (ALT 250 FOR IP): Performed by: INTERNAL MEDICINE

## 2022-10-27 PROCEDURE — 2500000003 HC RX 250 WO HCPCS: Performed by: INTERNAL MEDICINE

## 2022-10-27 PROCEDURE — 6360000002 HC RX W HCPCS: Performed by: NURSE PRACTITIONER

## 2022-10-27 PROCEDURE — 99211 OFF/OP EST MAY X REQ PHY/QHP: CPT

## 2022-10-27 PROCEDURE — 36415 COLL VENOUS BLD VENIPUNCTURE: CPT

## 2022-10-27 PROCEDURE — 6370000000 HC RX 637 (ALT 250 FOR IP): Performed by: HOSPITALIST

## 2022-10-27 PROCEDURE — 6370000000 HC RX 637 (ALT 250 FOR IP): Performed by: NURSE PRACTITIONER

## 2022-10-27 PROCEDURE — 94660 CPAP INITIATION&MGMT: CPT

## 2022-10-27 PROCEDURE — 1200000000 HC SEMI PRIVATE

## 2022-10-27 PROCEDURE — 84100 ASSAY OF PHOSPHORUS: CPT

## 2022-10-27 PROCEDURE — 2580000003 HC RX 258: Performed by: NURSE PRACTITIONER

## 2022-10-27 PROCEDURE — 2700000000 HC OXYGEN THERAPY PER DAY

## 2022-10-27 PROCEDURE — 94761 N-INVAS EAR/PLS OXIMETRY MLT: CPT

## 2022-10-27 PROCEDURE — 80048 BASIC METABOLIC PNL TOTAL CA: CPT

## 2022-10-27 PROCEDURE — 83735 ASSAY OF MAGNESIUM: CPT

## 2022-10-27 PROCEDURE — 94640 AIRWAY INHALATION TREATMENT: CPT

## 2022-10-27 RX ORDER — TORSEMIDE 20 MG/1
40 TABLET ORAL 2 TIMES DAILY
Status: DISCONTINUED | OUTPATIENT
Start: 2022-10-27 | End: 2022-10-29 | Stop reason: HOSPADM

## 2022-10-27 RX ORDER — MAGNESIUM SULFATE 1 G/100ML
1000 INJECTION INTRAVENOUS ONCE
Status: COMPLETED | OUTPATIENT
Start: 2022-10-27 | End: 2022-10-27

## 2022-10-27 RX ADMIN — MICONAZOLE NITRATE: 2 POWDER TOPICAL at 08:21

## 2022-10-27 RX ADMIN — LEVOTHYROXINE SODIUM 88 MCG: 0.09 TABLET ORAL at 06:40

## 2022-10-27 RX ADMIN — ENOXAPARIN SODIUM 40 MG: 40 INJECTION SUBCUTANEOUS at 08:20

## 2022-10-27 RX ADMIN — SPIRONOLACTONE 25 MG: 50 TABLET ORAL at 08:20

## 2022-10-27 RX ADMIN — LATANOPROST 1 DROP: 50 SOLUTION/ DROPS OPHTHALMIC at 18:57

## 2022-10-27 RX ADMIN — MIDODRINE HYDROCHLORIDE 5 MG: 5 TABLET ORAL at 06:40

## 2022-10-27 RX ADMIN — MIDODRINE HYDROCHLORIDE 5 MG: 5 TABLET ORAL at 18:54

## 2022-10-27 RX ADMIN — TIOTROPIUM BROMIDE AND OLODATEROL 2 PUFF: 3.124; 2.736 SPRAY, METERED RESPIRATORY (INHALATION) at 07:54

## 2022-10-27 RX ADMIN — TORSEMIDE 40 MG: 20 TABLET ORAL at 18:55

## 2022-10-27 RX ADMIN — SODIUM CHLORIDE, PRESERVATIVE FREE 10 ML: 5 INJECTION INTRAVENOUS at 21:40

## 2022-10-27 RX ADMIN — MAGNESIUM SULFATE IN DEXTROSE 1000 MG: 10 INJECTION, SOLUTION INTRAVENOUS at 10:54

## 2022-10-27 RX ADMIN — TIMOLOL MALEATE 1 DROP: 5 SOLUTION OPHTHALMIC at 21:39

## 2022-10-27 RX ADMIN — SODIUM CHLORIDE, PRESERVATIVE FREE 10 ML: 5 INJECTION INTRAVENOUS at 08:21

## 2022-10-27 RX ADMIN — MICONAZOLE NITRATE: 2 POWDER TOPICAL at 21:39

## 2022-10-27 RX ADMIN — Medication 5000 UNITS: at 08:20

## 2022-10-27 RX ADMIN — METOLAZONE 5 MG: 5 TABLET ORAL at 08:20

## 2022-10-27 RX ADMIN — ALBUTEROL SULFATE 2 PUFF: 90 AEROSOL, METERED RESPIRATORY (INHALATION) at 20:34

## 2022-10-27 RX ADMIN — CARVEDILOL 6.25 MG: 6.25 TABLET, FILM COATED ORAL at 18:55

## 2022-10-27 RX ADMIN — METOLAZONE 5 MG: 5 TABLET ORAL at 21:38

## 2022-10-27 RX ADMIN — FUROSEMIDE 60 MG: 10 INJECTION, SOLUTION INTRAVENOUS at 08:19

## 2022-10-27 RX ADMIN — COLLAGENASE SANTYL: 250 OINTMENT TOPICAL at 08:22

## 2022-10-27 RX ADMIN — ALBUTEROL SULFATE 2 PUFF: 90 AEROSOL, METERED RESPIRATORY (INHALATION) at 07:54

## 2022-10-27 RX ADMIN — CARVEDILOL 6.25 MG: 6.25 TABLET, FILM COATED ORAL at 08:20

## 2022-10-27 RX ADMIN — TORSEMIDE 40 MG: 20 TABLET ORAL at 10:50

## 2022-10-27 RX ADMIN — TRAZODONE HYDROCHLORIDE 50 MG: 50 TABLET ORAL at 21:38

## 2022-10-27 RX ADMIN — ATORVASTATIN CALCIUM 10 MG: 10 TABLET, FILM COATED ORAL at 21:38

## 2022-10-27 RX ADMIN — CYANOCOBALAMIN TAB 1000 MCG 1000 MCG: 1000 TAB at 08:20

## 2022-10-27 RX ADMIN — MIDODRINE HYDROCHLORIDE 5 MG: 5 TABLET ORAL at 12:07

## 2022-10-27 RX ADMIN — ASPIRIN 81 MG CHEWABLE TABLET 81 MG: 81 TABLET CHEWABLE at 08:20

## 2022-10-27 ASSESSMENT — PAIN SCALES - GENERAL: PAINLEVEL_OUTOF10: 0

## 2022-10-27 NOTE — PROGRESS NOTES
V2.0  [facility] Hospitalist Progress Note      Name:  Gail Tolentino /Age/Sex: 3/18/1929  (80 y.o. female)   MRN & CSN:  [id number] & [id number] Encounter Date/Time: 10/27/2022 2:00 PM EDT    Location:  -A PCP: Kristal Aguilera MD       Hospital Day: 9    Assessment and Plan:   Gail Tolentino is a 80 y.o. female with PMH of HFpEF, CAD s/p CABG, bradycardia s/p pacemaker, SAULO, h/o CVA who presents with Acute on chronic diastolic (congestive) heart failure (HCC)    #Acute on chronic HFpEF  #Anasarca  #Severe pulm HTN with likely RHF  -CTA chest without PE, marked enlargement of the right atrium suspected right heart failure, small right pleural effusion, anasarca with subcutaneous edema and ascites  -Echo: EF 50 to 98%, grade 3 diastolic dysfunction, severely dilated right atrium and ventricle, moderate left ventricular hypertrophy, severe tricuspid regurgitation, RVSP 39 mmHg  -Cardiology was on consult who initially recommended lasix drip however it was stopped after 1 day  -s/p paracentesis 10/20--> 350 mL  -Abd US with small amount of ascites  -proBNP: 2308 (trended up)     Plan:  Continue torsemide, spironolactone, metolazone and midodrine  Continue IV diuresis with lasix--> swelling and abd distension improving  Strict I's and o's  Daily weights  Nephrology following, appreciate recs    #L distal calf wound  -wound care following    Chronic medical problems:   #CAD   #s/p CABG  #Bradycardia with pacemaker  Plan: continue aspirin, atorvastatin, carvedilol,     #SAULO  -CPAP at night    #h/o CVA  -continue aspirin    #Glaucoma  -continue latanoprost and timolol      Diet ADULT DIET; Regular;  Low Sodium (2 gm); 2000 ml   DVT Prophylaxis [x] Lovenox, []  Heparin, [] SCDs, [] Ambulation,  [] Eliquis, [] Xarelto  [] Coumadin   Code Status Full Code   Disposition From: home  Expected Disposition: ARU  Estimated Date of Discharge: >24h  Patient requires continued admission due to diuresis   Surrogate Decision Maker/ POA Bonita Buck     Subjective:     Chief Complaint: Leg Swelling (On lasix requiring O2 now)     Patient states that she feels a little better than yesterday. Review of Systems:    General: No fever, no chills  Heart: No chest pain, no palpitations  Lungs: + shortness of breath, + cough  Abdomen: No abdominal pain, no nausea, no vomiting, no constipation, no diarrhea, +distension  : No frequency, no dysuria, no urgency, no decreased urination  MSK: + lower extremity swelling  Neuro: No confusion, no weakness  Skin: No rashes    Objective:      Intake/Output Summary (Last 24 hours) at 10/27/2022 0898  Last data filed at 10/27/2022 2758  Gross per 24 hour   Intake 10 ml   Output 2800 ml   Net -2790 ml        Vitals:   Vitals:    10/27/22 0754   BP:    Pulse: 60   Resp: 20   Temp:    SpO2: 95%       Physical Exam:     General: NAD  Eyes: EOMI  HENT: Atraumatic  Respiratory:no respiratory distress  GI: normal bowel sounds, nontender, distended (decreased compared to yesterday)  MSK: BLE 3+ pitting edema to upper thighs  Skin: Intact, dry, warm, no rashes  Neuro: CN II to XII grossly intact  Psych: Normal mood    Medications:   Medications:    furosemide  60 mg IntraVENous BID    miconazole   Topical BID    [Held by provider] torsemide  20 mg Oral BID    metOLazone  5 mg Oral BID    midodrine  5 mg Oral TID WC    spironolactone  25 mg Oral Daily    collagenase   Topical Daily    albuterol sulfate HFA  2 puff Inhalation BID    aspirin  81 mg Oral Daily    atorvastatin  10 mg Oral Nightly    carvedilol  6.25 mg Oral BID WC    ferrous sulfate  325 mg Oral Every Other Day    levothyroxine  88 mcg Oral QAM AC    timolol  1 drop Both Eyes Nightly    vitamin B-12  1,000 mcg Oral Daily    Vitamin D  5,000 Units Oral Daily    latanoprost  1 drop Both Eyes Dinner    sodium chloride flush  5-40 mL IntraVENous 2 times per day    enoxaparin  40 mg SubCUTAneous Daily    tiotropium-olodaterol 2 puff Inhalation Daily      Infusions:    sodium chloride 20 mL/hr at 10/23/22 1033     PRN Meds: albuterol sulfate HFA, 2 puff, Q4H PRN  traZODone, 50 mg, Nightly PRN  sodium chloride flush, 5-40 mL, PRN  sodium chloride, , PRN  ondansetron, 4 mg, Q8H PRN   Or  ondansetron, 4 mg, Q6H PRN  polyethylene glycol, 17 g, Daily PRN  acetaminophen, 650 mg, Q6H PRN   Or  acetaminophen, 650 mg, Q6H PRN      Labs      Recent Results (from the past 24 hour(s))   Basic Metabolic Panel    Collection Time: 10/27/22 12:21 AM   Result Value Ref Range    Sodium 132 (L) 135 - 145 MMOL/L    Potassium 4.1 3.5 - 5.1 MMOL/L    Chloride 86 (L) 99 - 110 mMol/L    CO2 37 (H) 21 - 32 MMOL/L    Anion Gap 9 4 - 16    BUN 34 (H) 6 - 23 MG/DL    Creatinine 0.7 0.6 - 1.1 MG/DL    Est, Glom Filt Rate >60 >60 mL/min/1.73m2    Glucose 128 (H) 70 - 99 MG/DL    Calcium 9.9 8.3 - 10.6 MG/DL   Magnesium    Collection Time: 10/27/22 12:21 AM   Result Value Ref Range    Magnesium 1.9 1.8 - 2.4 mg/dl   Phosphorus    Collection Time: 10/27/22 12:21 AM   Result Value Ref Range    Phosphorus 3.3 2.5 - 4.9 MG/DL        Imaging/Diagnostics Last 24 Hours   XR ABDOMEN (2 VIEWS)    Result Date: 10/24/2022  EXAMINATION: TWO XRAY VIEWS OF THE ABDOMEN 10/24/2022 8:40 am COMPARISON: None. HISTORY: ORDERING SYSTEM PROVIDED HISTORY: distended abdomen TECHNOLOGIST PROVIDED HISTORY: Reason for exam:->distended abdomen Reason for Exam: distended abdomen FINDINGS: Mild gaseous distension of the stomach. No small bowel dilation. Moderate amount of stool throughout the colon. No displacement of the bowel loops. No large mass. Dense vascular calcifications of the splenic artery. Scoliosis and spondylosis. Right pleural effusion. Large fixed hiatus hernia. Negative for bowel obstruction.        Electronically signed by Anneliese Mccoy MD on 10/27/2022 at 8:13 AM

## 2022-10-27 NOTE — CARE COORDINATION
Received call from [facility] [employer] with an option for a P2P prior to MD at [address] [place] [address] a determination for the ARU request.     Per [facility] [employer] P2P must be completed by 2:30pm today by calling [phone] if MD wished to pursue.     PS'd Dr. Sheila Ferrari regarding information and that it's optional.

## 2022-10-27 NOTE — CONSULTS
[address] [id number] Continence Nurse  Consult Note       Marin Weaver  AGE: 80 y.o. GENDER: female  : 3/18/1929  TODAY'S DATE:  10/27/2022    Subjective:     Reason for  Evaluation and Assessment: wound care reassessment. Marin Weaver is a 80 y.o. female referred by:   [x] Physician  [] Nursing  [] Other:     Wound Identification:  Wound Type: venous and pressure  Contributing Factors: edema, venous stasis, chronic pressure, and decreased mobility        PAST MEDICAL HISTORY        Diagnosis Date    Age-related osteoporosis with current pathological fracture of vertebra ([address] 2022    Arthritis     Bradycardia     requiring dual chamber pacemaker at [facility]    CAD (coronary artery disease)     Cardiac pacemaker 10/2001    [employer] #[id number]  PPM- Serial # 26-[facility]- Dr Kristen Blum    CHF (congestive heart failure) ([address]     COPD, mild ([address] 2017    Cor pulmonale (chronic) [facility] 3/15/2022    CVA (cerebrovascular accident) ([facility]     DJD (degenerative joint disease) of cervical spine     C5-C6, C6-C7    Exhaustion of cardiac pacemaker battery 2011    PPM battery replacement- [employer]    Family history of cardiovascular disease     Glaucoma Dx     H/O 24 hour EKG monitoring 2000- Intermittent episonde of a-fib/flutter    H/O cardiac catheterization 2010, 2010-Severe native vessel disease and has graft disease as well. LAD, CX totally occluded. RCA totally occluded in proximal segment. VG to RCA widely patent. PDA 90% LAD afer LIMA anastomosis 80-90% stenosis. Proceeded with PTCA with stent next day. H/O cardiovascular stress test 10/14/2011, 2010,2010, 2009,2009, 10/30/2007, 2004, 2003, 2002, 2001,2000,     10/14/2011-Lexiscan-Abnormal Myocardial Perfusion study. Evidence of mild ischemia in the Left CX region. Abnomal study. Rest EF 63%.  Global LV systolic function normal. No ECG changes. Unremarkable pharmacological stress test.    H/O cardiovascular stress test 6/10/2013    thallium--mild ischemia left circumflex EF63% no change from 10/2011 study. H/O cardiovascular stress test 10/16/2014    cardiolite-mild ischemia left circumflex,EF70%    H/O chest x-ray 4/19/2009 4/19/2009-Stable cardiomegaly. No acute cardiopulmonary disease. H/O Doppler lower venous ultrasound 09/18/2019    Significant reflux noted in RGSV, RGSV is extremely tortuous and small along the thigh and calf and would be highly unlikely to be accessed, RSSV is non compressible and has occlusive chronic SVT, LSSV is non compressible with occlusive chronic SVT, LGSV removed s/p CABG, Significant reflux in LGSV tributary,    H/O Doppler ultrasound 3/31/2010    CAROTID- 3/31/2010-INtimal thickening but no significant atherosclerotic plaque noted in ANA PAULA. Doppler flow velocities within ANA PAULA are WNL. Heterogeneous, irregular atherosclerotic plaque noted in LICA. Doppler flow velocities within the LICA are elevated, consistent with a mild, less than 50% stenosis. H/O Doppler ultrasound 5/24/2016    Carotid- normal study    H/O Doppler ultrasound 09/06/2017    carotid - normal study    H/O Doppler ultrasound     H/O echocardiogram 10/13    EF=60%, Severe Pulm. HTN, & Sclerotic aortic valve w/stenosis. H/O echocardiogram 10/16/14     EF 55-60% Normal LV. Normal LV systolic function. Severe tricuspid insufficiency with severe hypertension.      H/O echocardiogram 02/03/2017    heart cath performed this morning    H/O echocardiogram 09/18/2019    EF 50-55%, Left atrium is mild to moderately dilated, right atrium is severely dilated, mildly dilated right ventricle, Mod MR, Severe TR, Severe Pulm HTN, no pericardial effusion     History of complete ECG     10/14/2011(Lexiscan);5/6/2010, 4/30/2009,10/24/2008,9/21/2007, 10/13/2006    History of nuclear stress test 11/17/2016    lexiscan-normal,EF70%    Hx of cardiovascular stress test 12/28/2018    EF 60%  Normal study. HX OTHER MEDICAL 05/01/2017    MUGA-normal, EF53%    Hyperlipidemia     Hypertension     Mild intermittent asthma 7/28/2016    Moderate COPD (chronic obstructive pulmonary disease) ([facility]) 3/15/2022    Nausea & vomiting     Obstructive sleep apnea 5/16/2017    Paroxysmal atrial fibrillation ([address]     Post PTCA 4/21/2010    PTCA with 2.25 stent of the LIMA to LAD    Pulmonary HTN ([address]     Severe per last echo on 10/13. 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 Followed by MAZE procedure by pulmonary vein isolation.-  Dr Missy Bianchi    S/P PTCA (percutaneous transluminal coronary angioplasty) 11/2012    PTCA with stent to RCA    Severe pulmonary hypertension ([address] 3/15/2022    Shortness of breath 3/15/2022    Thyroid disease     hypothyroi    Unspecified cerebral artery occlusion with cerebral infarction Unsure When    No Residual    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 ([address] 7/29/2022    WD-Non-pressure chronic ulcer of other part of left lower leg limited to breakdown of skin ([address] 7/29/2022       PAST SURGICAL HISTORY    Past Surgical History:   Procedure Laterality Date    APPENDECTOMY  1941    CARDIAC SURGERY  4/09    CABG (3 Bypasses), One Heart Stent in 2010    COLONOSCOPY  In 2000's    X1    CORONARY ANGIOPLASTY WITH STENT PLACEMENT  4/21/2010    PTCA with stent LIMA ->LAD    CORONARY ARTERY BYPASS GRAFT  4/8/2009    LIMA->Diag,  LIMA -> LAD;  SVG->RCA going to the PDA.  Followed by MAZE procedure by pulmonary vein isolation.-  Dr Beatriz Davis, TOTAL ABDOMINAL (CERVIX REMOVED)  1990's    MIDDLE EAR SURGERY      OTHER SURGICAL HISTORY      Ear surgery-hearing    PACEMAKER PLACEMENT      battery change 11/21/2011 [employer]tronic    PTCA  11/2012    Ptca with stent to RCA    TONSILLECTOMY  1950's       FAMILY HISTORY    Family History   Problem Relation Age of Onset    Cancer Mother         \"Liver Cancer\"    Arthritis Mother     Depression Mother     Hearing Loss Mother     High Blood Pressure Mother     High Cholesterol Mother     Mental Illness Mother     Miscarriages / Stillbirths Mother     Heart Disease Father     Early Death Father 36        \"Instant Death\"    High Blood Pressure Father     High Cholesterol Father     Coronary Art Dis Father         Massive MI    Heart Disease Sister     Depression Sister     Cancer Sister         \"Breast Cancer, Cancer Free Now\"    High Blood Pressure Sister     Other Daughter         \"She's Had Stomach Surgery\"    High Blood Pressure Son     High Blood Pressure Son     Early Death Paternal Grandfather        SOCIAL HISTORY    Social History     Tobacco Use    Smoking status: Former     Packs/day: 0.25     Years: 5.00     Pack years: 1.25     Types: Cigarettes     Quit date: 1970     Years since quittin.9    Smokeless tobacco: Never   Vaping Use    Vaping Use: Never used   Substance Use Topics    Alcohol use: Not Currently     Comment: Caffiene - no more than 3 cups of coffee each day    Drug use: Never       ALLERGIES    Allergies   Allergen Reactions    Darvocet [Propoxyphene N-Acetaminophen]     Ranexa [Ranolazine Er]      Sick to her stomach    Ranolazine      Sick to her stomach    Sulfa Antibiotics Itching    Sulfasalazine Itching    Adhesive Tape Rash    Allantoin Rash    Bacitracin Rash    Gramicidin Rash    Neomycin Rash    Polymyxin B Rash    Pramoxine Hcl Rash    Silicone Rash       MEDICATIONS    No current facility-administered medications on file prior to encounter.      Current Outpatient Medications on File Prior to Encounter   Medication Sig Dispense Refill    torsemide (DEMADEX) 20 MG tablet Take 20 mg by mouth 2 times daily      aspirin 81 MG chewable tablet Take 81 mg by mouth daily      ferrous sulfate (IRON 325) 325 (65 Fe) MG tablet Take 325 mg by mouth every other day      Magnesium 500 MG TABS Take 500 mg by mouth daily      torsemide (DEMADEX) 20 MG tablet Take 1 tablet by mouth as needed (for fluid retention or weight gain of 3 lbs overnight) This is in addition to the 20 mg bid (Patient taking differently: Take 20 mg by mouth daily as needed (for fluid retention or weight gain of 3 lbs overnight) This is in addition to the 20 mg bid) 30 tablet 0    Turmeric (QC TUMERIC COMPLEX PO) Take 1 capsule by mouth daily      traZODone (DESYREL) 50 MG tablet Take  mg by mouth nightly as needed for Sleep      acetaminophen (TYLENOL) 500 MG tablet Take 1,000 mg by mouth every 4 hours as needed for Pain or Fever      spironolactone (ALDACTONE) 50 MG tablet Take 1 tablet by mouth daily (Patient taking differently: Take 25 mg by mouth daily) 30 tablet 0    PROCTOZONE-HC 2.5 % CREA rectal cream INSERT RECTALLY TWICE DAILY as directed AS NEEDED FOR HEMORRHOIDS      ZIOPTAN 0.0015 % SOLN Place 1 drop into both eyes Daily with supper       CETIRIZINE HCL PO Take 1 tablet by mouth daily as needed (allergies)      vitamin D 25 MCG (1000 UT) CAPS Take 5,000 Units by mouth daily      Probiotic Product (PROBIOTIC-10) CHEW Take 1 tablet by mouth daily      atorvastatin (LIPITOR) 10 MG tablet Take 10 mg by mouth nightly      umeclidinium-vilanterol (ANORO ELLIPTA) 62.5-25 MCG/INH AEPB inhaler Inhale 1 puff into the lungs daily 1 each 11    CPAP Machine MISC by Does not apply route      Biotin 5 MG CAPS Take 1 capsule by mouth daily      Misc Natural Products (OSTEO BI-FLEX ADV DOUBLE ST PO) Take 1 tablet by mouth daily      timolol (TIMOPTIC) 0.5 % ophthalmic solution Place 1 drop into both eyes nightly      carvedilol (COREG) 6.25 MG tablet Take 1 tablet by mouth 2 times daily (with meals) 180 tablet 3    albuterol (PROVENTIL HFA;VENTOLIN HFA) 108 (90 BASE) MCG/ACT inhaler Inhale 2 puffs into the lungs 2 times daily AND EVERY 4-6 HOURS AS NEEDED      Multiple Vitamins-Minerals (ICAPS) CAPS Take 1 capsule by mouth 2 times daily       vitamin B-12 (CYANOCOBALAMIN) 1000 MCG tablet Take 1,000 mcg by mouth daily. levothyroxine (SYNTHROID) 88 MCG tablet Take 88 mcg by mouth daily.              Objective:      BP (!) 107/49   Pulse 60   Temp 98 °F (36.7 °C)   Resp 17   Ht 4' 11\" (1.499 m)   Wt 154 lb 1.6 oz (69.9 kg)   SpO2 94%   BMI 31.12 kg/m²   Cruz Risk Score: Cruz Scale Score: 15    LABS    CBC:   Lab Results   Component Value Date/Time    WBC 10.5 10/25/2022 07:16 AM    RBC 4.47 10/25/2022 07:16 AM    HGB 13.5 10/25/2022 07:16 AM    HCT 41.7 10/25/2022 07:16 AM    MCV 93.3 10/25/2022 07:16 AM    MCH 30.2 10/25/2022 07:16 AM    MCHC 32.4 10/25/2022 07:16 AM    RDW 15.9 10/25/2022 07:16 AM     10/25/2022 07:16 AM    MPV 10.9 10/25/2022 07:16 AM     CMP:    Lab Results   Component Value Date/Time     10/27/2022 12:21 AM    K 4.1 10/27/2022 12:21 AM    CL 86 10/27/2022 12:21 AM    CO2 37 10/27/2022 12:21 AM    BUN 34 10/27/2022 12:21 AM    CREATININE 0.7 10/27/2022 12:21 AM    GFRAA >60 07/08/2022 08:06 AM    LABGLOM >60 10/27/2022 12:21 AM    GLUCOSE 128 10/27/2022 12:21 AM    PROT 5.9 10/25/2022 07:16 AM    PROT 6.8 11/28/2012 02:41 PM    LABALBU 4.1 10/25/2022 07:16 AM    CALCIUM 9.9 10/27/2022 12:21 AM    BILITOT 1.6 10/25/2022 07:16 AM    ALKPHOS 276 10/25/2022 07:16 AM    AST 28 10/25/2022 07:16 AM    ALT 19 10/25/2022 07:16 AM     Albumin:    Lab Results   Component Value Date/Time    LABALBU 4.1 10/25/2022 07:16 AM     PT/INR:    Lab Results   Component Value Date/Time    PROTIME 15.9 10/20/2022 08:27 AM    PROTIME 39.6 11/18/2011 03:45 PM    INR 1.23 10/20/2022 08:27 AM     HgBA1c:  No results found for: LABA1C      Assessment:     Patient Active Problem List   Diagnosis    CAD (coronary artery disease)    Cardiac pacemaker    S/P CABG x 3    Post PTCA    TIA (transient ischemic attack)    Confusion    Headache    Essential hypertension    Bradycardia    Coronary artery disease involving coronary bypass graft of native heart without angina pectoris    Obstructive sleep apnea    Dizziness    PAF (paroxysmal atrial fibrillation) (HCC)    Pacer at end of battery life    Infection of pacemaker pocket ([address]    Hyponatremia    Metabolic encephalopathy    Generalized weakness    Gait disturbance    Acute urinary retention    Hypothyroidism (acquired)    Chronic diastolic (congestive) heart failure (HCC)    Cor pulmonale (chronic) (HCC)    Chronic right-sided heart failure (HCC)    Severe pulmonary hypertension (HCC)    Shortness of breath    Moderate COPD (chronic obstructive pulmonary disease) (HCC)    CHF (congestive heart failure), NYHA class I, acute on chronic, combined (HCC)    Abnormal liver CT -findings suggestive of cirrhosis     At risk for injury associated with anticoagulation    T12 vertebral fracture (HCC)    SAULO on CPAP    Iron deficiency anemia secondary to inadequate dietary iron intake    Chest pain    Age-related osteoporosis with current pathological fracture of vertebra [facility])    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 ([address]    WD-Non-pressure chronic ulcer of other part of left lower leg with fat layer exposed ([address]    Acute on chronic diastolic (congestive) heart failure (HCC)       Measurements:  Wound 07/29/22 Tibial Left;Posterior #1 (Active)   Wound Image   10/27/22 1315   Wound Etiology Venous 10/27/22 1315   Dressing Status New dressing applied 10/27/22 1315   Wound Cleansed Cleansed with saline 10/27/22 1315   Dressing/Treatment Collagen;Silicone border;Pharmaceutical agent (see MAR) 10/27/22 1315   Offloading for Diabetic Foot Ulcers Offloading not required 10/25/22 1630   Wound Length (cm) 1.5 cm 10/27/22 1315   Wound Width (cm) 1.7 cm 10/27/22 1315   Wound Depth (cm) 0.2 cm 10/27/22 1315   Wound Surface Area (cm^2) 2.55 cm^2 10/27/22 1315   Change in Wound Size % (l*w) -112.5 10/27/22 1315   Wound Volume (cm^3) 0.51 cm^3 10/27/22 1315   Wound Healing % -325 10/27/22 1315   Post-Procedure Length (cm) 1 cm 10/07/22 1417   Post-Procedure Width (cm) 1.1 cm 10/07/22 1417   Post-Procedure Depth (cm) 0.2 cm 10/07/22 1417   Post-Procedure Surface Area (cm^2) 1.1 cm^2 10/07/22 1417   Post-Procedure Volume (cm^3) 0.22 cm^3 10/07/22 1417   Distance Tunneling (cm) 0 cm 10/27/22 1315   Tunneling Position ___ O'Clock 0 10/27/22 1315   Undermining Starts ___ O'Clock 0 10/27/22 1315   Undermining Ends___ O'Clock 0 10/27/22 1315   Undermining Maxium Distance (cm) 0 10/27/22 1315   Wound Assessment Slough;Pink/red 10/27/22 1315   Drainage Amount Moderate 10/27/22 1315   Drainage Description Yellow 10/27/22 1315   Odor None 10/27/22 1315   Safia-wound Assessment Intact 10/27/22 1315   Margins Defined edges 10/27/22 1315   Wound Thickness Description not for Pressure Injury Full thickness 10/27/22 1315   Number of days: 89       Wound 10/27/22 Back Right;Proximal;Upper (Active)   Wound Image   10/27/22 1315   Wound Etiology Other 10/27/22 1315   Dressing Status New dressing applied 10/27/22 1315   Wound Cleansed Cleansed with saline 10/27/22 1315   Dressing/Treatment Silicone border 03/18/86 1315   Wound Length (cm) 0.5 cm 10/27/22 1315   Wound Width (cm) 0.5 cm 10/27/22 1315   Wound Depth (cm) 0.1 cm 10/27/22 1315   Wound Surface Area (cm^2) 0.25 cm^2 10/27/22 1315   Wound Volume (cm^3) 0.025 cm^3 10/27/22 1315   Distance Tunneling (cm) 0 cm 10/27/22 1315   Tunneling Position ___ O'Clock 0 10/27/22 1315   Undermining Starts ___ O'Clock 0 10/27/22 1315   Undermining Ends___ O'Clock 0 10/27/22 1315   Undermining Maxium Distance (cm) 0 10/27/22 1315   Wound Assessment Purple/maroon;Pink/red 10/27/22 1315   Drainage Amount None 10/27/22 1315   Odor None 10/27/22 1315   Safia-wound Assessment Intact 10/27/22 1315   Margins Attached edges 10/27/22 1315   Number of days: 0       Wound 10/27/22 Back Right;Distal (Active)   Wound Etiology Other 10/27/22 1315   Dressing Status New dressing applied 10/27/22 1315   Wound Cleansed Cleansed with saline 10/27/22 1315   Dressing/Treatment Silicone border 37/37/28 1315   Wound Length (cm) 1.5 cm 10/27/22 1315   Wound Width (cm) 2 cm 10/27/22 1315   Wound Depth (cm) 0.1 cm 10/27/22 1315   Wound Surface Area (cm^2) 3 cm^2 10/27/22 1315   Wound Volume (cm^3) 0.3 cm^3 10/27/22 1315   Distance Tunneling (cm) 0 cm 10/27/22 1315   Tunneling Position ___ O'Clock 0 10/27/22 1315   Undermining Starts ___ O'Clock 0 10/27/22 1315   Undermining Ends___ O'Clock 0 10/27/22 1315   Undermining Maxium Distance (cm) 0 10/27/22 1315   Wound Assessment Purple/maroon;Pink/red 10/27/22 1315   Drainage Amount Small 10/27/22 1315   Drainage Description Serosanguinous 10/27/22 1315   Odor None 10/27/22 1315   Safia-wound Assessment Intact 10/27/22 1315   Margins Defined edges 10/27/22 1315   Wound Thickness Description not for Pressure Injury Partial thickness 10/27/22 1315   Number of days: 0       Response to treatment:  With complaints of pain. Pain Assessment:  Severity:  moderate  Quality of pain: sore  Wound Pain Timing/Severity: lt leg  Premedicated: wound care    Plan:     Plan of Care: Wound 07/29/22 Tibial Left;Posterior #[id number]: Collagen, Silicone border, Pharmaceutical agent (see MAR) (santyl)  Wound 10/27/22 Back Right;Proximal;Upper-Dressing/Treatment: Silicone border  Wound 10/27/22 Back Right;Distal-Dressing/Treatment: Silicone border    Patient in bed agreeable to wound care reassessment of left posterior leg wound. Dressing removed cleansed with NS. Appears better this week measured and pictured applied new dressing santyl, collagen and optifoam border. Rt back with proximal and distal areas blisters cleansed with NS measured and pictured. Applied optifoam border. Buttocks intact. Left heel very red rt heel intact both heels floated. Pt turned to rt side with pillow support. Pt is at moderate risk for skin breakdown AEB jus. Follow jus orders. Specialty Bed Required : yes  [x] Low Air Loss   [] Pressure Redistribution  [] Fluid Immersion  [] Bariatric  [] Total Pressure Relief  [] Other:     Discharge Plan:  Placement for patient upon discharge: tbd  Hospice Care: no  Patient appropriate for Outpatient [address]: yes to continue    Patient/Caregiver Teaching:  Level of patient/caregiver understanding able to: pt voiced understanding. Electronically signed by Payal Lowe.  CYNDY Becker, on 10/27/2022 at 1:50 PM

## 2022-10-27 NOTE — PROGRESS NOTES
Nephrology Progress Note  10/27/2022 8:33 AM  Subjective: Interval History: Velvet Baxter is a 80 y.o. female appears less short of breath and anxious today still not wearing her BiPAP but oxygenation holding stable      Data:   Scheduled Meds:   magnesium sulfate  1,000 mg IntraVENous Once    furosemide  60 mg IntraVENous BID    miconazole   Topical BID    [Held by provider] torsemide  20 mg Oral BID    metOLazone  5 mg Oral BID    midodrine  5 mg Oral TID WC    spironolactone  25 mg Oral Daily    collagenase   Topical Daily    albuterol sulfate HFA  2 puff Inhalation BID    aspirin  81 mg Oral Daily    atorvastatin  10 mg Oral Nightly    carvedilol  6.25 mg Oral BID WC    ferrous sulfate  325 mg Oral Every Other Day    levothyroxine  88 mcg Oral QAM AC    timolol  1 drop Both Eyes Nightly    vitamin B-12  1,000 mcg Oral Daily    Vitamin D  5,000 Units Oral Daily    latanoprost  1 drop Both Eyes Dinner    sodium chloride flush  5-40 mL IntraVENous 2 times per day    enoxaparin  40 mg SubCUTAneous Daily    tiotropium-olodaterol  2 puff Inhalation Daily     Continuous Infusions:   sodium chloride 20 mL/hr at 10/23/22 1033         CBC   Recent Labs     10/25/22  0716   WBC 10.5   HGB 13.5   HCT 41.7         BMP   Recent Labs     10/25/22  0716 10/27/22  0021   * 132*   K 4.1 4.1   CL 87* 86*   CO2 35* 37*   PHOS  --  3.3   BUN 29* 34*   CREATININE 0.7 0.7     Hepatic:   Recent Labs     10/25/22  0716   AST 28   ALT 19   BILITOT 1.6*   ALKPHOS 276*     Troponin: No results for input(s): TROPONINI in the last 72 hours. BNP: No results for input(s): BNP in the last 72 hours. Lipids: No results for input(s): CHOL, HDL in the last 72 hours. Invalid input(s): LDLCALCU  ABGs: No results found for: PHART, PO2ART, QRH2CFJ  INR: No results for input(s): INR in the last 72 hours.   Renal Labs  Albumin:    Lab Results   Component Value Date/Time    LABALBU 4.1 10/25/2022 07:16 AM     Calcium:    Lab Results   Component Value Date/Time    CALCIUM 9.9 10/27/2022 12:21 AM     Phosphorus:    Lab Results   Component Value Date/Time    PHOS 3.3 10/27/2022 12:21 AM     U/A:    Lab Results   Component Value Date/Time    NITRU NEGATIVE 10/22/2022 01:20 PM    COLORU YELLOW 10/22/2022 01:20 PM    WBCUA 2 10/21/2022 11:50 AM    RBCUA 1 10/21/2022 11:50 AM    MUCUS RARE 10/21/2022 11:50 AM    TRICHOMONAS NONE SEEN 10/21/2022 11:50 AM    YEAST RARE 11/23/2021 07:30 AM    BACTERIA NEGATIVE 10/21/2022 11:50 AM    CLARITYU CLEAR 10/22/2022 01:20 PM    SPECGRAV 1.015 10/22/2022 01:20 PM    UROBILINOGEN 0.2 10/22/2022 01:20 PM    BILIRUBINUR NEGATIVE 10/22/2022 01:20 PM    BLOODU NEGATIVE 10/22/2022 01:20 PM    KETUA NEGATIVE 10/22/2022 01:20 PM     ABG:  No results found for: PHART, LOE4GDW, PO2ART, GLW0NYU, BEART, THGBART, BMK1QRS, A2OIPASB  HgBA1c:  No results found for: LABA1C  Microalbumen/Creatinine ratio:  No components found for: RUCREAT  TSH:    Lab Results   Component Value Date/Time    TSH 1.0 05/07/2010 12:00 AM     IRON:    Lab Results   Component Value Date/Time    IRON 198 09/02/2022 03:05 PM     Iron Saturation:  No components found for: PERCENTFE  TIBC:    Lab Results   Component Value Date/Time    TIBC 364 09/02/2022 03:05 PM     FERRITIN:    Lab Results   Component Value Date/Time    FERRITIN 70 09/02/2022 03:05 PM     RPR:  No results found for: RPR  PATSY:    Lab Results   Component Value Date/Time    PATSY None Detected 10/08/2019 03:55 PM    PATSY  10/08/2019 03:55 PM     (NOTE)  If suspicion of connective tissue disease is strong and PATSY EIA is   negative, consider testing for PATSY by IFA ([id number]). INTERPRETIVE INFORMATION: Anti-Nuclear Antibodies (PATSY), IgG by   SOBEIDA  Antinuclear Antibodies (PATSY), IgG by SOBEIDA: PATSY specimens are   screened using enzyme-linked immunosorbent assay (SOBEIDA)   methodology.  All SOBEIDA results reported as Detected are further   tested by indirect fluorescent assay (IFA) using HEp-2 substrate   with an IgG-specific conjugate. The PATSY SOBEIDA screen is designed   to detect antibodies against dsDNA, histones, SS-A (Ro), SS-B   (La), Carlos, Carlos/RNP, Scl-70, Arlin-1, centromeric proteins, other   antigens extracted from the HEp-2 cell nucleus. PATSY SOBEIDA assays   have been reported to have lower sensitivities than PATSY IFA for   systemic autoimmune rheumatic diseases (SARD). Negative results do not necessarily rule out SARD. Performed by Dayton Downing Jr.[email or web address] , [id number] [facility] [phone]  www. Kale Adrian MD, Lab. Director       24 Hour Urine for Creatinine Clearance:  No components found for: CREAT4, UHRS10, UTV10      Objective:   I/O: 10/26 0701 - 10/27 0700  In: 10 [I.V.:10]  Out: 2800 [Urine:2800]  I/O last 3 completed shifts: In: 250 [P.O.:240; I.V.:10]  Out: 3200 [Urine:3200]  No intake/output data recorded. Vitals: /61   Pulse 60   Temp 98 °F (36.7 °C) (Oral)   Resp 20   Ht 4' 11\" (1.499 m)   Wt 154 lb 1.6 oz (69.9 kg)   SpO2 95%   BMI 31.12 kg/m²  {  General appearance: awake weak  HEENT: Head: Normal, normocephalic, atraumatic.   Neck: supple, symmetrical, trachea midline  Lungs: diminished breath sounds bilaterally  Heart: S1, S2 normal  Abdomen: abnormal findings:  soft nt  Extremities: edema trace   Neurologic: Mental status: alertness: Awake less agitated today        Assessment and Plan:      IMP:  #1 CHF exacerbation diastolic dysfunction with valvular heart disease/cor pulmonale  #2 pulmonary hypertension with possible respiratory failure  #3 anasarca/edema with low urine output  #4 coronary disease prior CABG  #5  Hyponatremia  #6 metabolic encephalopathy    Plan     #1 maintain diuresis good urine output can switch to oral diuretic therapy for discharge with torsemide 40 mg p.o. twice daily instead of IV Lasix  #2 in the setting of pulmonary hypertension diuresis is the best option is refusing BiPAP  #3 improved edema good urine output at this time and renal function holding stable  #4 cardiac status monitor monitor heart rate  #5 sodium was stable with diuresis  #6 monitor affect in the above setting unsure what her true baseline is but supportive care  Recommend change to oral diuretics and maximize therapy and get to rehab             Klarissa Linares MD, MD

## 2022-10-27 NOTE — CARE COORDINATION
CM reviewed chart and discussed in IDR. Per note in the chart from Edwige Philippe, liaison for ARU, pt's insurance company is requesting a P2P. Myranda notified Dr Monica Murcia via PS.

## 2022-10-27 NOTE — PLAN OF CARE
Problem: Pain  Goal: Verbalizes/displays adequate comfort level or baseline comfort level  10/26/2022 2152 by Jimmy Bean RN  Outcome: Progressing  10/26/2022 1347 by Wanda Bauer RN  Outcome: Progressing     Problem: Safety - Adult  Goal: Free from fall injury  Outcome: Progressing

## 2022-10-27 NOTE — PROGRESS NOTES
Comprehensive Nutrition Assessment    Type and Reason for Visit:  Initial (LOS)    Nutrition Recommendations/Plan:   Continue current diet as ordered  Will monitor po intake, weight trends, poc     Malnutrition Assessment:  Malnutrition Status:  No malnutrition (10/27/22 1439)    Context:  Acute Illness       Nutrition Assessment:    Pt admitted for anasarca, acute on chronic diastolic  heart failure, PMH: CVA, Thyroid disease, pulmonary HTN, COPD, HLD, CHF, CAD, pt currently on 2 GM Na diet with 1800 ml fluid restriction, pt consuming % of meals per documentation, no signficiant wt loss noted per chart review, +wounds noted, will continue to follow at low nutrition risk    Nutrition Related Findings:    Na 132 Wound Type: None       Current Nutrition Intake & Therapies:    Average Meal Intake: %  Average Supplements Intake: None Ordered  ADULT DIET; Regular; Low Sodium (2 gm); 1800 ml    Anthropometric Measures:  Height: 4' 11.02\" (149.9 cm)  Ideal Body Weight (IBW): 95 lbs (43 kg)       Current Body Weight: 154 lb 1.6 oz (69.9 kg), 162.2 % IBW. Weight Source: Bed Scale  Current BMI (kg/m2): 31.1        Weight Adjustment For: No Adjustment                 BMI Categories: Obese Class 1 (BMI 30.0-34. 9)    Estimated Daily Nutrient Needs:  Energy Requirements Based On: Formula  Weight Used for Energy Requirements: Current  Energy (kcal/day): 2208-0846 ([place] [address] Jeor w/ stress factor 1.2-1.4)  Weight Used for Protein Requirements: Ideal  Protein (g/day): 52-60 (1.2-1.4 g/kg)     Fluid (ml/day): 1800 (ordered fluid restriction)    Nutrition Diagnosis:   No nutrition diagnosis at this time   Nutrition Interventions:   Food and/or Nutrient Delivery: Continue Current Diet  Nutrition Education/Counseling: No recommendation at this time  Coordination of Nutrition Care: Continue to monitor while inpatient       Goals:     Goals: PO intake 75% or greater, by next RD assessment     Nutrition Monitoring and Evaluation:   Behavioral-Environmental Outcomes: None Identified  Food/Nutrient Intake Outcomes: Food and Nutrient Intake  Physical Signs/Symptoms Outcomes: Biochemical Data, GI Status, Hemodynamic Status, Fluid Status or Edema, Weight, Skin, Meal Time Behavior    Discharge Planning:     Too soon to determine     Syed Phipps [address], [address] [place] [address],   Contact: [phone]

## 2022-10-28 LAB
ANION GAP SERPL CALCULATED.3IONS-SCNC: 11 MMOL/L (ref 4–16)
BUN BLDV-MCNC: 32 MG/DL (ref 6–23)
CALCIUM SERPL-MCNC: 10 MG/DL (ref 8.3–10.6)
CHLORIDE BLD-SCNC: 85 MMOL/L (ref 99–110)
CO2: 36 MMOL/L (ref 21–32)
CREAT SERPL-MCNC: 0.7 MG/DL (ref 0.6–1.1)
GFR SERPL CREATININE-BSD FRML MDRD: >60 ML/MIN/1.73M2
GLUCOSE BLD-MCNC: 97 MG/DL (ref 70–99)
MAGNESIUM: 1.9 MG/DL (ref 1.8–2.4)
POTASSIUM SERPL-SCNC: 4.1 MMOL/L (ref 3.5–5.1)
SODIUM BLD-SCNC: 132 MMOL/L (ref 135–145)

## 2022-10-28 PROCEDURE — 83735 ASSAY OF MAGNESIUM: CPT

## 2022-10-28 PROCEDURE — 6370000000 HC RX 637 (ALT 250 FOR IP): Performed by: NURSE PRACTITIONER

## 2022-10-28 PROCEDURE — 6370000000 HC RX 637 (ALT 250 FOR IP): Performed by: INTERNAL MEDICINE

## 2022-10-28 PROCEDURE — 94761 N-INVAS EAR/PLS OXIMETRY MLT: CPT

## 2022-10-28 PROCEDURE — 2700000000 HC OXYGEN THERAPY PER DAY

## 2022-10-28 PROCEDURE — 6360000002 HC RX W HCPCS: Performed by: NURSE PRACTITIONER

## 2022-10-28 PROCEDURE — 2500000003 HC RX 250 WO HCPCS: Performed by: INTERNAL MEDICINE

## 2022-10-28 PROCEDURE — 36415 COLL VENOUS BLD VENIPUNCTURE: CPT

## 2022-10-28 PROCEDURE — 80048 BASIC METABOLIC PNL TOTAL CA: CPT

## 2022-10-28 PROCEDURE — 94664 DEMO&/EVAL PT USE INHALER: CPT

## 2022-10-28 PROCEDURE — 97535 SELF CARE MNGMENT TRAINING: CPT

## 2022-10-28 PROCEDURE — 97530 THERAPEUTIC ACTIVITIES: CPT

## 2022-10-28 PROCEDURE — 94640 AIRWAY INHALATION TREATMENT: CPT

## 2022-10-28 PROCEDURE — 2580000003 HC RX 258: Performed by: NURSE PRACTITIONER

## 2022-10-28 PROCEDURE — 1200000000 HC SEMI PRIVATE

## 2022-10-28 PROCEDURE — 6370000000 HC RX 637 (ALT 250 FOR IP): Performed by: HOSPITALIST

## 2022-10-28 PROCEDURE — 89220 SPUTUM SPECIMEN COLLECTION: CPT

## 2022-10-28 RX ADMIN — MICONAZOLE NITRATE: 2 POWDER TOPICAL at 22:58

## 2022-10-28 RX ADMIN — CYANOCOBALAMIN TAB 1000 MCG 1000 MCG: 1000 TAB at 09:55

## 2022-10-28 RX ADMIN — SPIRONOLACTONE 25 MG: 50 TABLET ORAL at 09:56

## 2022-10-28 RX ADMIN — TIOTROPIUM BROMIDE AND OLODATEROL 2 PUFF: 3.124; 2.736 SPRAY, METERED RESPIRATORY (INHALATION) at 09:33

## 2022-10-28 RX ADMIN — COLLAGENASE SANTYL: 250 OINTMENT TOPICAL at 09:58

## 2022-10-28 RX ADMIN — ALBUTEROL SULFATE 2 PUFF: 90 AEROSOL, METERED RESPIRATORY (INHALATION) at 09:33

## 2022-10-28 RX ADMIN — FERROUS SULFATE TAB 325 MG (65 MG ELEMENTAL FE) 325 MG: 325 (65 FE) TAB at 15:57

## 2022-10-28 RX ADMIN — LEVOTHYROXINE SODIUM 88 MCG: 0.09 TABLET ORAL at 06:43

## 2022-10-28 RX ADMIN — CARVEDILOL 6.25 MG: 6.25 TABLET, FILM COATED ORAL at 15:57

## 2022-10-28 RX ADMIN — METOLAZONE 5 MG: 5 TABLET ORAL at 09:55

## 2022-10-28 RX ADMIN — METOLAZONE 5 MG: 5 TABLET ORAL at 22:57

## 2022-10-28 RX ADMIN — MIDODRINE HYDROCHLORIDE 5 MG: 5 TABLET ORAL at 09:56

## 2022-10-28 RX ADMIN — ASPIRIN 81 MG CHEWABLE TABLET 81 MG: 81 TABLET CHEWABLE at 09:55

## 2022-10-28 RX ADMIN — MICONAZOLE NITRATE: 2 POWDER TOPICAL at 09:59

## 2022-10-28 RX ADMIN — CARVEDILOL 6.25 MG: 6.25 TABLET, FILM COATED ORAL at 09:55

## 2022-10-28 RX ADMIN — ENOXAPARIN SODIUM 40 MG: 40 INJECTION SUBCUTANEOUS at 09:55

## 2022-10-28 RX ADMIN — SODIUM CHLORIDE, PRESERVATIVE FREE 10 ML: 5 INJECTION INTRAVENOUS at 22:57

## 2022-10-28 RX ADMIN — TORSEMIDE 40 MG: 20 TABLET ORAL at 18:28

## 2022-10-28 RX ADMIN — SODIUM CHLORIDE, PRESERVATIVE FREE 10 ML: 5 INJECTION INTRAVENOUS at 09:54

## 2022-10-28 RX ADMIN — ALBUTEROL SULFATE 2 PUFF: 90 AEROSOL, METERED RESPIRATORY (INHALATION) at 19:56

## 2022-10-28 RX ADMIN — LATANOPROST 1 DROP: 50 SOLUTION/ DROPS OPHTHALMIC at 18:28

## 2022-10-28 RX ADMIN — ATORVASTATIN CALCIUM 10 MG: 10 TABLET, FILM COATED ORAL at 22:58

## 2022-10-28 RX ADMIN — Medication 5000 UNITS: at 09:55

## 2022-10-28 RX ADMIN — MIDODRINE HYDROCHLORIDE 5 MG: 5 TABLET ORAL at 15:57

## 2022-10-28 RX ADMIN — TORSEMIDE 40 MG: 20 TABLET ORAL at 09:55

## 2022-10-28 NOTE — PROGRESS NOTES
V2.0  [facility] Hospitalist Progress Note      Name:  Kiel Tay /Age/Sex: 3/18/1929  (80 y.o. female)   MRN & CSN:  [id number] & [id number] Encounter Date/Time: 10/28/2022 2:00 PM EDT    [facility] PCP: Teresa Ellis MD       Hospital Day: 10    Assessment and Plan:   Kiel Tay is a 80 y.o. female with PMH of HFpEF, CAD s/p CABG, bradycardia s/p pacemaker, SAULO, h/o CVA who presents with Acute on chronic diastolic (congestive) heart failure (HCC)    #Acute on chronic HFpEF-resolving  #Anasarca-resolving  #Severe pulm HTN with likely RHF  -CTA chest without PE, marked enlargement of the right atrium suspected right heart failure, small right pleural effusion, anasarca with subcutaneous edema and ascites  -Echo: EF 50 to 30%, grade 3 diastolic dysfunction, severely dilated right atrium and ventricle, moderate left ventricular hypertrophy, severe tricuspid regurgitation, RVSP 39 mmHg  -Cardiology was on consult who initially recommended lasix drip however it was stopped after 1 day  -s/p paracentesis 10/20--> 350 mL  -Abd US with small amount of ascites    Plan:  Continue torsemide, spironolactone, metolazone and midodrine  Continue IV diuresis with lasix--> swelling and abd distension improving  Strict I's and o's  Daily weights  Nephrology following, appreciate recs    #L distal calf wound  -wound care following    Chronic medical problems:   #CAD   #s/p CABG  #Bradycardia with pacemaker  Plan: continue aspirin, atorvastatin, carvedilol,     #SAULO  -CPAP at night    #h/o CVA  -continue aspirin    #Glaucoma  -continue latanoprost and timolol      Diet ADULT DIET; Regular;  Low Sodium (2 gm); 1800 ml   DVT Prophylaxis [x] Lovenox, []  Heparin, [] SCDs, [] Ambulation,  [] Eliquis, [] Xarelto  [] Coumadin   Code Status Full Code   Disposition From: home  Expected Disposition: ARU  Estimated Date of Discharge: 1-2 days, pending placement  Patient requires continued admission due to diuresis Surrogate Decision Maker/ Bonita Henao     Subjective:     Chief Complaint: Leg Swelling (On lasix requiring O2 now)  Patient states that she is feeling better this morning. Review of Systems:    General: No fever, no chills  Heart: No chest pain, no palpitations  Lungs: + shortness of breath, + cough  Abdomen: No abdominal pain, no nausea, no vomiting, no constipation, no diarrhea, +distension  : No frequency, no dysuria, no urgency, no decreased urination  MSK: + lower extremity swelling  Neuro: No confusion, no weakness  Skin: No rashes    Objective:      Intake/Output Summary (Last 24 hours) at 10/28/2022 0819  Last data filed at 10/28/2022 0356  Gross per 24 hour   Intake --   Output 2375 ml   Net -2375 ml        Vitals:   Vitals:    10/28/22 0353   BP: 130/71   Pulse: 59   Temp: 97.5 °F (36.4 °C)   SpO2: 94%       Physical Exam:     General: NAD  Eyes: EOMI  HENT: Atraumatic  Respiratory:no respiratory distress  GI: normal bowel sounds, nontender, nondistended  MSK: BLE 2+ pitting edema, improving  Skin: Intact, dry, warm, no rashes  Neuro: CN II to XII grossly intact  Psych: Normal mood    Medications:   Medications:    torsemide  40 mg Oral BID    miconazole   Topical BID    metOLazone  5 mg Oral BID    midodrine  5 mg Oral TID WC    spironolactone  25 mg Oral Daily    collagenase   Topical Daily    albuterol sulfate HFA  2 puff Inhalation BID    aspirin  81 mg Oral Daily    atorvastatin  10 mg Oral Nightly    carvedilol  6.25 mg Oral BID WC    ferrous sulfate  325 mg Oral Every Other Day    levothyroxine  88 mcg Oral QAM AC    timolol  1 drop Both Eyes Nightly    vitamin B-12  1,000 mcg Oral Daily    Vitamin D  5,000 Units Oral Daily    latanoprost  1 drop Both Eyes Dinner    sodium chloride flush  5-40 mL IntraVENous 2 times per day    enoxaparin  40 mg SubCUTAneous Daily    tiotropium-olodaterol  2 puff Inhalation Daily      Infusions:    sodium chloride 20 mL/hr at 10/23/22 1033     PRN Meds: albuterol sulfate HFA, 2 puff, Q4H PRN  traZODone, 50 mg, Nightly PRN  sodium chloride flush, 5-40 mL, PRN  sodium chloride, , PRN  ondansetron, 4 mg, Q8H PRN   Or  ondansetron, 4 mg, Q6H PRN  polyethylene glycol, 17 g, Daily PRN  acetaminophen, 650 mg, Q6H PRN   Or  acetaminophen, 650 mg, Q6H PRN      Labs      No results found for this or any previous visit (from the past 24 hour(s)). Imaging/Diagnostics Last 24 Hours   XR ABDOMEN (2 VIEWS)    Result Date: 10/24/2022  EXAMINATION: TWO XRAY VIEWS OF THE ABDOMEN 10/24/2022 8:40 am COMPARISON: None. HISTORY: ORDERING SYSTEM PROVIDED HISTORY: distended abdomen TECHNOLOGIST PROVIDED HISTORY: Reason for exam:->distended abdomen Reason for Exam: distended abdomen FINDINGS: Mild gaseous distension of the stomach. No small bowel dilation. Moderate amount of stool throughout the colon. No displacement of the bowel loops. No large mass. Dense vascular calcifications of the splenic artery. Scoliosis and spondylosis. Right pleural effusion. Large fixed hiatus hernia. Negative for bowel obstruction.        Electronically signed by Krystal Wright MD on 10/28/2022 at 8:19 AM

## 2022-10-28 NOTE — CARE COORDINATION
Spoke with Lauren/[facility] admissions who states expedited appeal is still pending though she anticipates it resulting by tomorrow. CM following.

## 2022-10-28 NOTE — PROGRESS NOTES
Occupational Therapy    Occupational Therapy Treatment Note    Name: Zaida Jessica MRN: [id number] :   3/18/1929   Date:  10/28/2022   Admission Date: 10/19/2022 Room:  [address] [place] [address]       Restrictions/Precautions:  Restrictions/Precautions  Restrictions/Precautions: Fall Risk, General Precautions           Communication with other providers: RN    Subjective:  Patient states:  \"You've gotta help me to the bathroom. .. I really need to go\"  Pain:   Location, Type, Intensity (0/10 to 10/10):  denies    Objective:    Observation: supine in bed, agreeable   Objective Measures:  vitals WNL on 2.5 L O2    Treatment, including education:  Therapeutic Activity Training:   Therapeutic activity training was instructed today. Cues were given for safety, sequence, UE/LE placement, awareness, and balance. Activities performed today included bed mobility training, sup-sit, sit-stand, ambulation. Supine to sitting EOB w/ min A, Vcs for sequencing. STS from EOB w/ CGA. Ambulated short functional household distance using RW w/ CGA to/from BR. Vcs for safety w/ RW mgmt and negotiating turns. Stand to sit to recliner w/ CGA. Self Care Training:   Cues were given for safety, sequence, UE/LE placement, visual cues, and balance. Toilet transfer stand <> sit from standard toilet w/ CGA, Vcs for sequencing hand placement. Toilet hygiene w/ SBA, clothing mgmt min A. SBA to doff soiled depends, min A to don depends to thread Les through. Doffed/donned gown while seated w/ min A. Stood at sink w/ CGA to wash hands and wash face w/ washcloth. Left seated in recliner, all needs met. Alarm on.     Assessment / Impression:    Patient's tolerance of treatment: Well  Adverse Reaction: None  Significant change in status and impact: Improved from previous session  Barriers to improvement: None      Plan for Next Session:    Continue w/ OT POC and functional goals, address safety/independence w/ ADLs and transfers/mobility.        Time in:  0942  Time out:  1030  Timed treatment minutes:  48  Total treatment time:  48      Electronically signed by:    Chelsi Avendano OT,   10/28/2022, 10:05 AM

## 2022-10-28 NOTE — PROGRESS NOTES
Nephrology Progress Note  10/28/2022 8:21 AM  Subjective: Interval History: Shiv Babin is a 80 y.o. female weak tired but more awake pleasant still hard of hearing    Data:   Scheduled Meds:   torsemide  40 mg Oral BID    miconazole   Topical BID    metOLazone  5 mg Oral BID    midodrine  5 mg Oral TID WC    spironolactone  25 mg Oral Daily    collagenase   Topical Daily    albuterol sulfate HFA  2 puff Inhalation BID    aspirin  81 mg Oral Daily    atorvastatin  10 mg Oral Nightly    carvedilol  6.25 mg Oral BID WC    ferrous sulfate  325 mg Oral Every Other Day    levothyroxine  88 mcg Oral QAM AC    timolol  1 drop Both Eyes Nightly    vitamin B-12  1,000 mcg Oral Daily    Vitamin D  5,000 Units Oral Daily    latanoprost  1 drop Both Eyes Dinner    sodium chloride flush  5-40 mL IntraVENous 2 times per day    enoxaparin  40 mg SubCUTAneous Daily    tiotropium-olodaterol  2 puff Inhalation Daily     Continuous Infusions:   sodium chloride 20 mL/hr at 10/23/22 1033         CBC   No results for input(s): WBC, HGB, HCT, PLT in the last 72 hours. BMP   Recent Labs     10/27/22  0021   *   K 4.1   CL 86*   CO2 37*   PHOS 3.3   BUN 34*   CREATININE 0.7     Hepatic:   No results for input(s): AST, ALT, ALB, BILITOT, ALKPHOS in the last 72 hours. Troponin: No results for input(s): TROPONINI in the last 72 hours. BNP: No results for input(s): BNP in the last 72 hours. Lipids: No results for input(s): CHOL, HDL in the last 72 hours. Invalid input(s): LDLCALCU  ABGs: No results found for: PHART, PO2ART, AOW7SDG  INR: No results for input(s): INR in the last 72 hours.   Renal Labs  Albumin:    Lab Results   Component Value Date/Time    LABALBU 4.1 10/25/2022 07:16 AM     Calcium:    Lab Results   Component Value Date/Time    CALCIUM 9.9 10/27/2022 12:21 AM     Phosphorus:    Lab Results   Component Value Date/Time    PHOS 3.3 10/27/2022 12:21 AM     U/A:    Lab Results   Component Value Date/Time    NITRU NEGATIVE 10/22/2022 01:20 PM    COLORU YELLOW 10/22/2022 01:20 PM    WBCUA 2 10/21/2022 11:50 AM    RBCUA 1 10/21/2022 11:50 AM    MUCUS RARE 10/21/2022 11:50 AM    TRICHOMONAS NONE SEEN 10/21/2022 11:50 AM    YEAST RARE 11/23/2021 07:30 AM    BACTERIA NEGATIVE 10/21/2022 11:50 AM    CLARITYU CLEAR 10/22/2022 01:20 PM    SPECGRAV 1.015 10/22/2022 01:20 PM    UROBILINOGEN 0.2 10/22/2022 01:20 PM    BILIRUBINUR NEGATIVE 10/22/2022 01:20 PM    BLOODU NEGATIVE 10/22/2022 01:20 PM    KETUA NEGATIVE 10/22/2022 01:20 PM     ABG:  No results found for: PHART, OLH4JMF, PO2ART, KDA7XLG, BEART, THGBART, EPD2ICH, K4ZERXEY  HgBA1c:  No results found for: LABA1C  Microalbumen/Creatinine ratio:  No components found for: RUCREAT  TSH:    Lab Results   Component Value Date/Time    TSH 1.0 05/07/2010 12:00 AM     IRON:    Lab Results   Component Value Date/Time    IRON 198 09/02/2022 03:05 PM     Iron Saturation:  No components found for: PERCENTFE  TIBC:    Lab Results   Component Value Date/Time    TIBC 364 09/02/2022 03:05 PM     FERRITIN:    Lab Results   Component Value Date/Time    FERRITIN 70 09/02/2022 03:05 PM     RPR:  No results found for: RPR  PATSY:    Lab Results   Component Value Date/Time    PATSY None Detected 10/08/2019 03:55 PM    PATSY  10/08/2019 03:55 PM     (NOTE)  If suspicion of connective tissue disease is strong and PATSY EIA is   negative, consider testing for PATSY by IFA ([id number]). INTERPRETIVE INFORMATION: Anti-Nuclear Antibodies (PATSY), IgG by   SOBEIDA  Antinuclear Antibodies (PATSY), IgG by SOBEIDA: PATSY specimens are   screened using enzyme-linked immunosorbent assay (SOBEIDA)   methodology. All SOBEIDA results reported as Detected are further   tested by indirect fluorescent assay (IFA) using HEp-2 substrate   with an IgG-specific conjugate.  The PATSY SOBIEDA screen is designed   to detect antibodies against dsDNA, histones, SS-A (Ro), SS-B   (La), Carlos, Carlos/RNP, Scl-70, Arlin-1, centromeric proteins, other   antigens extracted from the HEp-2 cell nucleus. PATSY SOBEIDA assays   have been reported to have lower sensitivities than PATSY IFA for   systemic autoimmune rheumatic diseases (SARD). Negative results do not necessarily rule out SARD. Performed by Aftab Licha  [place] [address], [address] [facility] [phone]  www. Christopher Collier MD, Lab. Director       24 Hour Urine for Creatinine Clearance:  No components found for: CREAT4, UHRS10, UTV10      Objective:   I/O: 10/27 0701 - 10/28 0700  In: -   Out: 2375 [Urine:2375]  I/O last 3 completed shifts: In: 10 [I.V.:10]  Out: 3875 [Urine:3875]  No intake/output data recorded. Vitals: /71   Pulse 59   Temp 97.5 °F (36.4 °C)   Resp 18   Ht 4' 11.02\" (1.499 m)   Wt 143 lb 4.8 oz (65 kg)   SpO2 94%   BMI 28.93 kg/m²  {  General appearance: awake weak  HEENT: Head: Normal, normocephalic, atraumatic.   Neck: supple, symmetrical, trachea midline  Lungs: diminished breath sounds bilaterally  Heart: S1, S2 normal  Abdomen: abnormal findings:  soft nt  Extremities: edema trace   Neurologic: Mental status: alertness: Awake hard of hearing        Assessment and Plan:      IMP:  #1 CHF exacerbation diastolic dysfunction with valvular heart disease/cor pulmonale  #2 pulmonary hypertension with possible respiratory failure  #3 anasarca/edema with low urine output  #4 coronary disease prior CABG  #5  Hyponatremia  #6 metabolic encephalopathy    Plan     Cardiac monitor and improved diuresis  Fu pulm status improved  Edema better overall monitor  Na low stable  Affect improved overall hard of hearing  Supportive care as appear doing better            Paulette Salazar MD, MD

## 2022-10-29 ENCOUNTER — HOSPITAL ENCOUNTER (INPATIENT)
Age: 87
LOS: 9 days | Discharge: HOME HEALTH | DRG: 947 | End: 2022-11-07
Attending: PHYSICAL MEDICINE & REHABILITATION | Admitting: PHYSICAL MEDICINE & REHABILITATION
Payer: MEDICARE

## 2022-10-29 VITALS
HEIGHT: 59 IN | HEART RATE: 59 BPM | WEIGHT: 143.52 LBS | RESPIRATION RATE: 18 BRPM | BODY MASS INDEX: 28.93 KG/M2 | SYSTOLIC BLOOD PRESSURE: 124 MMHG | TEMPERATURE: 97.7 F | OXYGEN SATURATION: 97 % | DIASTOLIC BLOOD PRESSURE: 90 MMHG

## 2022-10-29 LAB
ANION GAP SERPL CALCULATED.3IONS-SCNC: 9 MMOL/L (ref 4–16)
BUN BLDV-MCNC: 35 MG/DL (ref 6–23)
CALCIUM SERPL-MCNC: 10.1 MG/DL (ref 8.3–10.6)
CHLORIDE BLD-SCNC: 87 MMOL/L (ref 99–110)
CO2: 39 MMOL/L (ref 21–32)
CREAT SERPL-MCNC: 0.8 MG/DL (ref 0.6–1.1)
GFR SERPL CREATININE-BSD FRML MDRD: >60 ML/MIN/1.73M2
GLUCOSE BLD-MCNC: 94 MG/DL (ref 70–99)
MAGNESIUM: 1.8 MG/DL (ref 1.8–2.4)
POTASSIUM SERPL-SCNC: 4.2 MMOL/L (ref 3.5–5.1)
SARS-COV-2, NAAT: NOT DETECTED
SODIUM BLD-SCNC: 135 MMOL/L (ref 135–145)
SOURCE: NORMAL

## 2022-10-29 PROCEDURE — 6360000002 HC RX W HCPCS: Performed by: INTERNAL MEDICINE

## 2022-10-29 PROCEDURE — 83735 ASSAY OF MAGNESIUM: CPT

## 2022-10-29 PROCEDURE — 80048 BASIC METABOLIC PNL TOTAL CA: CPT

## 2022-10-29 PROCEDURE — 2700000000 HC OXYGEN THERAPY PER DAY

## 2022-10-29 PROCEDURE — 94640 AIRWAY INHALATION TREATMENT: CPT

## 2022-10-29 PROCEDURE — 87635 SARS-COV-2 COVID-19 AMP PRB: CPT

## 2022-10-29 PROCEDURE — 6370000000 HC RX 637 (ALT 250 FOR IP): Performed by: NURSE PRACTITIONER

## 2022-10-29 PROCEDURE — 94761 N-INVAS EAR/PLS OXIMETRY MLT: CPT

## 2022-10-29 PROCEDURE — 2580000003 HC RX 258: Performed by: NURSE PRACTITIONER

## 2022-10-29 PROCEDURE — 1280000000 HC REHAB R&B

## 2022-10-29 PROCEDURE — 6370000000 HC RX 637 (ALT 250 FOR IP): Performed by: INTERNAL MEDICINE

## 2022-10-29 PROCEDURE — 2500000003 HC RX 250 WO HCPCS: Performed by: INTERNAL MEDICINE

## 2022-10-29 PROCEDURE — 6370000000 HC RX 637 (ALT 250 FOR IP): Performed by: HOSPITALIST

## 2022-10-29 PROCEDURE — 36415 COLL VENOUS BLD VENIPUNCTURE: CPT

## 2022-10-29 PROCEDURE — 6360000002 HC RX W HCPCS: Performed by: NURSE PRACTITIONER

## 2022-10-29 RX ORDER — ASPIRIN 81 MG/1
81 TABLET, CHEWABLE ORAL DAILY
Status: DISCONTINUED | OUTPATIENT
Start: 2022-10-30 | End: 2022-11-07 | Stop reason: HOSPADM

## 2022-10-29 RX ORDER — SODIUM CHLORIDE 0.9 % (FLUSH) 0.9 %
5-40 SYRINGE (ML) INJECTION PRN
Status: DISCONTINUED | OUTPATIENT
Start: 2022-10-29 | End: 2022-11-07 | Stop reason: HOSPADM

## 2022-10-29 RX ORDER — LANOLIN ALCOHOL/MO/W.PET/CERES
1000 CREAM (GRAM) TOPICAL DAILY
Status: CANCELLED | OUTPATIENT
Start: 2022-10-30

## 2022-10-29 RX ORDER — VITAMIN B COMPLEX
5000 TABLET ORAL DAILY
Status: DISCONTINUED | OUTPATIENT
Start: 2022-10-30 | End: 2022-11-07 | Stop reason: HOSPADM

## 2022-10-29 RX ORDER — FERROUS SULFATE 325(65) MG
325 TABLET ORAL EVERY OTHER DAY
Status: DISCONTINUED | OUTPATIENT
Start: 2022-10-30 | End: 2022-11-07 | Stop reason: HOSPADM

## 2022-10-29 RX ORDER — VITAMIN B COMPLEX
5000 TABLET ORAL DAILY
Status: CANCELLED | OUTPATIENT
Start: 2022-10-30

## 2022-10-29 RX ORDER — ONDANSETRON 2 MG/ML
4 INJECTION INTRAMUSCULAR; INTRAVENOUS EVERY 6 HOURS PRN
Status: CANCELLED | OUTPATIENT
Start: 2022-10-29

## 2022-10-29 RX ORDER — SPIRONOLACTONE 50 MG/1
25 TABLET, FILM COATED ORAL DAILY
Status: DISCONTINUED | OUTPATIENT
Start: 2022-10-30 | End: 2022-11-07 | Stop reason: HOSPADM

## 2022-10-29 RX ORDER — LANOLIN ALCOHOL/MO/W.PET/CERES
1000 CREAM (GRAM) TOPICAL DAILY
Status: DISCONTINUED | OUTPATIENT
Start: 2022-10-30 | End: 2022-11-07 | Stop reason: HOSPADM

## 2022-10-29 RX ORDER — ENOXAPARIN SODIUM 100 MG/ML
40 INJECTION SUBCUTANEOUS DAILY
Status: CANCELLED | OUTPATIENT
Start: 2022-10-30

## 2022-10-29 RX ORDER — ENOXAPARIN SODIUM 100 MG/ML
40 INJECTION SUBCUTANEOUS DAILY
Status: DISCONTINUED | OUTPATIENT
Start: 2022-10-30 | End: 2022-11-07

## 2022-10-29 RX ORDER — SODIUM CHLORIDE 9 MG/ML
INJECTION, SOLUTION INTRAVENOUS PRN
Status: DISCONTINUED | OUTPATIENT
Start: 2022-10-29 | End: 2022-11-07 | Stop reason: HOSPADM

## 2022-10-29 RX ORDER — ASPIRIN 81 MG/1
81 TABLET, CHEWABLE ORAL DAILY
Status: CANCELLED | OUTPATIENT
Start: 2022-10-30

## 2022-10-29 RX ORDER — SODIUM CHLORIDE 0.9 % (FLUSH) 0.9 %
5-40 SYRINGE (ML) INJECTION EVERY 12 HOURS SCHEDULED
Status: CANCELLED | OUTPATIENT
Start: 2022-10-29

## 2022-10-29 RX ORDER — SODIUM CHLORIDE 9 MG/ML
INJECTION, SOLUTION INTRAVENOUS PRN
Status: CANCELLED | OUTPATIENT
Start: 2022-10-29

## 2022-10-29 RX ORDER — ONDANSETRON 2 MG/ML
4 INJECTION INTRAMUSCULAR; INTRAVENOUS EVERY 6 HOURS PRN
Status: DISCONTINUED | OUTPATIENT
Start: 2022-10-29 | End: 2022-11-07

## 2022-10-29 RX ORDER — LEVOTHYROXINE SODIUM 88 UG/1
88 TABLET ORAL
Status: CANCELLED | OUTPATIENT
Start: 2022-10-30

## 2022-10-29 RX ORDER — ACETAMINOPHEN 325 MG/1
650 TABLET ORAL EVERY 6 HOURS PRN
Status: DISCONTINUED | OUTPATIENT
Start: 2022-10-29 | End: 2022-10-30

## 2022-10-29 RX ORDER — LEVOTHYROXINE SODIUM 88 UG/1
88 TABLET ORAL
Status: DISCONTINUED | OUTPATIENT
Start: 2022-10-30 | End: 2022-11-07 | Stop reason: HOSPADM

## 2022-10-29 RX ORDER — TRAZODONE HYDROCHLORIDE 50 MG/1
50 TABLET ORAL NIGHTLY PRN
Status: DISCONTINUED | OUTPATIENT
Start: 2022-10-29 | End: 2022-11-07 | Stop reason: HOSPADM

## 2022-10-29 RX ORDER — ACETAMINOPHEN 325 MG/1
650 TABLET ORAL EVERY 6 HOURS PRN
Status: CANCELLED | OUTPATIENT
Start: 2022-10-29

## 2022-10-29 RX ORDER — ONDANSETRON 4 MG/1
4 TABLET, ORALLY DISINTEGRATING ORAL EVERY 8 HOURS PRN
Status: CANCELLED | OUTPATIENT
Start: 2022-10-29

## 2022-10-29 RX ORDER — LATANOPROST 50 UG/ML
1 SOLUTION/ DROPS OPHTHALMIC
Status: DISCONTINUED | OUTPATIENT
Start: 2022-10-30 | End: 2022-11-07 | Stop reason: HOSPADM

## 2022-10-29 RX ORDER — METOLAZONE 2.5 MG/1
5 TABLET ORAL 2 TIMES DAILY
Status: CANCELLED | OUTPATIENT
Start: 2022-10-29

## 2022-10-29 RX ORDER — CARVEDILOL 6.25 MG/1
6.25 TABLET ORAL 2 TIMES DAILY WITH MEALS
Status: CANCELLED | OUTPATIENT
Start: 2022-10-30

## 2022-10-29 RX ORDER — AMOXICILLIN AND CLAVULANATE POTASSIUM 875; 125 MG/1; MG/1
1 TABLET, FILM COATED ORAL EVERY 12 HOURS SCHEDULED
Status: CANCELLED | OUTPATIENT
Start: 2022-10-29 | End: 2022-11-03

## 2022-10-29 RX ORDER — TIMOLOL MALEATE 5 MG/ML
1 SOLUTION/ DROPS OPHTHALMIC NIGHTLY
Status: CANCELLED | OUTPATIENT
Start: 2022-10-29

## 2022-10-29 RX ORDER — TORSEMIDE 20 MG/1
40 TABLET ORAL 2 TIMES DAILY
Status: DISCONTINUED | OUTPATIENT
Start: 2022-10-30 | End: 2022-11-07 | Stop reason: HOSPADM

## 2022-10-29 RX ORDER — TRAZODONE HYDROCHLORIDE 50 MG/1
50 TABLET ORAL NIGHTLY PRN
Status: CANCELLED | OUTPATIENT
Start: 2022-10-29

## 2022-10-29 RX ORDER — SPIRONOLACTONE 50 MG/1
25 TABLET, FILM COATED ORAL DAILY
Status: CANCELLED | OUTPATIENT
Start: 2022-10-30

## 2022-10-29 RX ORDER — ALBUTEROL SULFATE 90 UG/1
2 AEROSOL, METERED RESPIRATORY (INHALATION) EVERY 4 HOURS PRN
Status: CANCELLED | OUTPATIENT
Start: 2022-10-29

## 2022-10-29 RX ORDER — METOLAZONE 2.5 MG/1
5 TABLET ORAL 2 TIMES DAILY
Status: DISCONTINUED | OUTPATIENT
Start: 2022-10-30 | End: 2022-11-07 | Stop reason: HOSPADM

## 2022-10-29 RX ORDER — ATORVASTATIN CALCIUM 10 MG/1
10 TABLET, FILM COATED ORAL NIGHTLY
Status: CANCELLED | OUTPATIENT
Start: 2022-10-29

## 2022-10-29 RX ORDER — ALBUTEROL SULFATE 90 UG/1
2 AEROSOL, METERED RESPIRATORY (INHALATION) 2 TIMES DAILY
Status: DISCONTINUED | OUTPATIENT
Start: 2022-10-30 | End: 2022-11-07 | Stop reason: HOSPADM

## 2022-10-29 RX ORDER — LATANOPROST 50 UG/ML
1 SOLUTION/ DROPS OPHTHALMIC
Status: CANCELLED | OUTPATIENT
Start: 2022-10-30

## 2022-10-29 RX ORDER — SODIUM CHLORIDE 0.9 % (FLUSH) 0.9 %
5-40 SYRINGE (ML) INJECTION PRN
Status: CANCELLED | OUTPATIENT
Start: 2022-10-29

## 2022-10-29 RX ORDER — ATORVASTATIN CALCIUM 10 MG/1
10 TABLET, FILM COATED ORAL NIGHTLY
Status: DISCONTINUED | OUTPATIENT
Start: 2022-10-30 | End: 2022-11-07 | Stop reason: HOSPADM

## 2022-10-29 RX ORDER — AMOXICILLIN AND CLAVULANATE POTASSIUM 875; 125 MG/1; MG/1
1 TABLET, FILM COATED ORAL EVERY 12 HOURS SCHEDULED
Status: DISCONTINUED | OUTPATIENT
Start: 2022-10-29 | End: 2022-10-29 | Stop reason: HOSPADM

## 2022-10-29 RX ORDER — ONDANSETRON 4 MG/1
4 TABLET, ORALLY DISINTEGRATING ORAL EVERY 8 HOURS PRN
Status: DISCONTINUED | OUTPATIENT
Start: 2022-10-29 | End: 2022-11-07 | Stop reason: HOSPADM

## 2022-10-29 RX ORDER — ALBUTEROL SULFATE 90 UG/1
2 AEROSOL, METERED RESPIRATORY (INHALATION) EVERY 4 HOURS PRN
Status: DISCONTINUED | OUTPATIENT
Start: 2022-10-29 | End: 2022-11-07 | Stop reason: HOSPADM

## 2022-10-29 RX ORDER — CARVEDILOL 6.25 MG/1
6.25 TABLET ORAL 2 TIMES DAILY WITH MEALS
Status: DISCONTINUED | OUTPATIENT
Start: 2022-10-30 | End: 2022-11-07 | Stop reason: HOSPADM

## 2022-10-29 RX ORDER — MIDODRINE HYDROCHLORIDE 5 MG/1
5 TABLET ORAL
Status: CANCELLED | OUTPATIENT
Start: 2022-10-30

## 2022-10-29 RX ORDER — POLYETHYLENE GLYCOL 3350 17 G/17G
17 POWDER, FOR SOLUTION ORAL DAILY PRN
Status: DISCONTINUED | OUTPATIENT
Start: 2022-10-29 | End: 2022-10-30

## 2022-10-29 RX ORDER — ACETAMINOPHEN 650 MG/1
650 SUPPOSITORY RECTAL EVERY 6 HOURS PRN
Status: DISCONTINUED | OUTPATIENT
Start: 2022-10-29 | End: 2022-10-30

## 2022-10-29 RX ORDER — MIDODRINE HYDROCHLORIDE 5 MG/1
5 TABLET ORAL
Status: DISCONTINUED | OUTPATIENT
Start: 2022-10-30 | End: 2022-11-07 | Stop reason: HOSPADM

## 2022-10-29 RX ORDER — FERROUS SULFATE 325(65) MG
325 TABLET ORAL EVERY OTHER DAY
Status: CANCELLED | OUTPATIENT
Start: 2022-10-30

## 2022-10-29 RX ORDER — ALBUTEROL SULFATE 90 UG/1
2 AEROSOL, METERED RESPIRATORY (INHALATION) 2 TIMES DAILY
Status: CANCELLED | OUTPATIENT
Start: 2022-10-29

## 2022-10-29 RX ORDER — SODIUM CHLORIDE 0.9 % (FLUSH) 0.9 %
5-40 SYRINGE (ML) INJECTION EVERY 12 HOURS SCHEDULED
Status: DISCONTINUED | OUTPATIENT
Start: 2022-10-30 | End: 2022-11-07

## 2022-10-29 RX ORDER — POLYETHYLENE GLYCOL 3350 17 G/17G
17 POWDER, FOR SOLUTION ORAL DAILY PRN
Status: CANCELLED | OUTPATIENT
Start: 2022-10-29

## 2022-10-29 RX ORDER — TORSEMIDE 20 MG/1
40 TABLET ORAL 2 TIMES DAILY
Status: CANCELLED | OUTPATIENT
Start: 2022-10-30

## 2022-10-29 RX ORDER — MAGNESIUM SULFATE IN WATER 40 MG/ML
2000 INJECTION, SOLUTION INTRAVENOUS ONCE
Status: COMPLETED | OUTPATIENT
Start: 2022-10-29 | End: 2022-10-29

## 2022-10-29 RX ORDER — AMOXICILLIN AND CLAVULANATE POTASSIUM 875; 125 MG/1; MG/1
1 TABLET, FILM COATED ORAL EVERY 12 HOURS SCHEDULED
Status: COMPLETED | OUTPATIENT
Start: 2022-10-30 | End: 2022-11-03

## 2022-10-29 RX ORDER — TIMOLOL MALEATE 5 MG/ML
1 SOLUTION/ DROPS OPHTHALMIC NIGHTLY
Status: DISCONTINUED | OUTPATIENT
Start: 2022-10-30 | End: 2022-11-07 | Stop reason: HOSPADM

## 2022-10-29 RX ADMIN — MIDODRINE HYDROCHLORIDE 5 MG: 5 TABLET ORAL at 09:49

## 2022-10-29 RX ADMIN — ACETAMINOPHEN 650 MG: 325 TABLET ORAL at 21:06

## 2022-10-29 RX ADMIN — Medication 5000 UNITS: at 09:49

## 2022-10-29 RX ADMIN — ATORVASTATIN CALCIUM 10 MG: 10 TABLET, FILM COATED ORAL at 21:06

## 2022-10-29 RX ADMIN — ENOXAPARIN SODIUM 40 MG: 40 INJECTION SUBCUTANEOUS at 09:49

## 2022-10-29 RX ADMIN — CYANOCOBALAMIN TAB 1000 MCG 1000 MCG: 1000 TAB at 09:49

## 2022-10-29 RX ADMIN — METOLAZONE 5 MG: 5 TABLET ORAL at 21:05

## 2022-10-29 RX ADMIN — SODIUM CHLORIDE, PRESERVATIVE FREE 10 ML: 5 INJECTION INTRAVENOUS at 21:09

## 2022-10-29 RX ADMIN — MAGNESIUM SULFATE HEPTAHYDRATE 2000 MG: 2 INJECTION, SOLUTION INTRAVENOUS at 10:03

## 2022-10-29 RX ADMIN — MIDODRINE HYDROCHLORIDE 5 MG: 5 TABLET ORAL at 13:34

## 2022-10-29 RX ADMIN — TORSEMIDE 40 MG: 20 TABLET ORAL at 18:07

## 2022-10-29 RX ADMIN — AMOXICILLIN AND CLAVULANATE POTASSIUM 1 TABLET: 875; 125 TABLET, FILM COATED ORAL at 21:04

## 2022-10-29 RX ADMIN — ALBUTEROL SULFATE 2 PUFF: 90 AEROSOL, METERED RESPIRATORY (INHALATION) at 08:39

## 2022-10-29 RX ADMIN — LEVOTHYROXINE SODIUM 88 MCG: 0.09 TABLET ORAL at 06:51

## 2022-10-29 RX ADMIN — MIDODRINE HYDROCHLORIDE 5 MG: 5 TABLET ORAL at 18:07

## 2022-10-29 RX ADMIN — SODIUM CHLORIDE, PRESERVATIVE FREE 10 ML: 5 INJECTION INTRAVENOUS at 09:49

## 2022-10-29 RX ADMIN — MICONAZOLE NITRATE: 2 POWDER TOPICAL at 09:57

## 2022-10-29 RX ADMIN — ASPIRIN 81 MG CHEWABLE TABLET 81 MG: 81 TABLET CHEWABLE at 09:49

## 2022-10-29 RX ADMIN — AMOXICILLIN AND CLAVULANATE POTASSIUM 1 TABLET: 875; 125 TABLET, FILM COATED ORAL at 09:49

## 2022-10-29 RX ADMIN — COLLAGENASE SANTYL: 250 OINTMENT TOPICAL at 14:20

## 2022-10-29 RX ADMIN — MICONAZOLE NITRATE: 2 POWDER TOPICAL at 21:07

## 2022-10-29 RX ADMIN — METOLAZONE 5 MG: 5 TABLET ORAL at 09:48

## 2022-10-29 RX ADMIN — TORSEMIDE 40 MG: 20 TABLET ORAL at 09:49

## 2022-10-29 RX ADMIN — CARVEDILOL 6.25 MG: 6.25 TABLET, FILM COATED ORAL at 09:49

## 2022-10-29 RX ADMIN — CARVEDILOL 6.25 MG: 6.25 TABLET, FILM COATED ORAL at 18:07

## 2022-10-29 RX ADMIN — TIOTROPIUM BROMIDE AND OLODATEROL 2 PUFF: 3.124; 2.736 SPRAY, METERED RESPIRATORY (INHALATION) at 08:39

## 2022-10-29 RX ADMIN — SPIRONOLACTONE 25 MG: 50 TABLET ORAL at 09:49

## 2022-10-29 NOTE — PROGRESS NOTES
Nephrology Progress Note  10/29/2022 1:44 PM        Subjective:   Admit Date: 10/19/2022  PCP: Aubree Rico MD    Interval History: Patient seen in early morning, this is a late entry    Diet: Reported eating some    ROS: She was alert awake but difficulty hearing  No apparent distress with head of the bed elevated almost 90 degrees  No fever, acceptable blood pressure  Urine output recorded only 900 cc for the last shift    Data:     Current meds:    amoxicillin-clavulanate  1 tablet Oral 2 times per day    torsemide  40 mg Oral BID    miconazole   Topical BID    metOLazone  5 mg Oral BID    midodrine  5 mg Oral TID WC    spironolactone  25 mg Oral Daily    collagenase   Topical Daily    albuterol sulfate HFA  2 puff Inhalation BID    aspirin  81 mg Oral Daily    atorvastatin  10 mg Oral Nightly    carvedilol  6.25 mg Oral BID WC    ferrous sulfate  325 mg Oral Every Other Day    levothyroxine  88 mcg Oral QAM AC    timolol  1 drop Both Eyes Nightly    vitamin B-12  1,000 mcg Oral Daily    Vitamin D  5,000 Units Oral Daily    latanoprost  1 drop Both Eyes Dinner    sodium chloride flush  5-40 mL IntraVENous 2 times per day    enoxaparin  40 mg SubCUTAneous Daily    tiotropium-olodaterol  2 puff Inhalation Daily      sodium chloride 20 mL/hr at 10/23/22 1033         I/O last 3 completed shifts:  In: -   Out: 2000 [Urine:2000]    CBC: No results for input(s): WBC, HGB, PLT in the last 72 hours.        Recent Labs     10/27/22  0021 10/28/22  0720 10/29/22  0337   * 132* 135   K 4.1 4.1 4.2   CL 86* 85* 87*   CO2 37* 36* 39*   BUN 34* 32* 35*   CREATININE 0.7 0.7 0.8   GLUCOSE 128* 97 94       Lab Results   Component Value Date    CALCIUM 10.1 10/29/2022    PHOS 3.3 10/27/2022       Objective:     Vitals: /63   Pulse 60   Temp 97.7 °F (36.5 °C)   Resp 16   Ht 4' 11.02\" (1.499 m)   Wt 143 lb 8.3 oz (65.1 kg)   SpO2 96%   BMI 28.97 kg/m² ,    General appearance: Thin, alert, awake  HEENT: Mild conjunctival pallor  Neck: Supple, supplemental oxygen per nasal cannula  Lungs: Crackles on auscultation  Heart: Seemed regular rate and rhythm, well-healed incision from previous sternotomy, left chest wall implanted cardiac device  Abdomen: Soft  Extremities: Positive edema      Problem List :         Impression :     Hyponatremia-acute on chronic-thought to be from hypervolemia-normal now with loop therapy  High serum bicarbonate likely from diuretics and perhaps chronically elevated PCO2  Acute decompensated heart failure better compensated now-with underlying atherosclerotic cardiovascular disease  Also has obstructive sleep apnea and likely secondary pulmonary hypertension and dysrhythmia    Recommendation/Plan  :     She is 89 years young-doing well with small dose of oral loop as well as oral thiazide and mineralocorticoid receptor antagonist with acceptable electrolyte-we will mainly follow clinically, low-salt, DASH diet and daily weight-watch electrolytes periodically especially with several pharmacologic manipulation      Ne Carbajal MD MD

## 2022-10-29 NOTE — PROGRESS NOTES
V2.0  [facility] [place]      Name:  Zaida Jessica /Age/Sex: 3/18/1929  (80 y.o. female)   MRN & CSN:  [id number] & [id number] Encounter Date/Time: 10/29/2022 2:00 PM EDT    [place]  [facility]: Marcia Montalvo MD       Hospital Day: 11    Assessment and Plan:   Zaida Jessica is a 80 y.o. female with PMH of HFpEF, CAD s/p CABG, bradycardia s/p pacemaker, SAULO, h/o CVA who presents with Acute on chronic diastolic (congestive) heart failure (HCC)    #Acute on chronic HFpEF-resolving  #Anasarca-resolving  #Severe pulm HTN with likely RHF  -CTA chest without PE, marked enlargement of the right atrium suspected right heart failure, small right pleural effusion, anasarca with subcutaneous edema and ascites  -Echo: EF 50 to 37%, grade 3 diastolic dysfunction, severely dilated right atrium and ventricle, moderate left ventricular hypertrophy, severe tricuspid regurgitation, RVSP 39 mmHg  -Cardiology was on consult who initially recommended lasix drip however it was stopped after 1 day  -s/p paracentesis 10/20--> 350 mL  -Abd US with small amount of ascites    Plan:  Continue torsemide, spironolactone, metolazone and midodrine  Strict I's and o's  Daily weights  Nephrology following, appreciate recs    #L distal calf wound  -wound care following    Chronic medical problems:   #CAD   #s/p CABG  #Bradycardia with pacemaker  Plan: continue aspirin, atorvastatin, carvedilol,     #SAULO  -CPAP at night    #h/o CVA  -continue aspirin    #Glaucoma  -continue latanoprost and timolol      Diet ADULT DIET; Regular;  Low Sodium (2 gm); 1800 ml   DVT Prophylaxis [x] Lovenox, []  Heparin, [] SCDs, [] Ambulation,  [] Eliquis, [] Xarelto  [] Coumadin   Code Status Full Code   Disposition From: home  Expected Disposition: ARU  Estimated Date of Discharge: 1-2 days, pending placement  Patient requires continued admission due to diuresis   Surrogate Decision Maker/ Yue Rasmussen Subjective:     Chief Complaint: Leg Swelling (On lasix requiring O2 now)    Patient states she feels well today, She sad everyone has been so nice to her. Review of Systems:    General: No fever, no chills  Heart: No chest pain, no palpitations  Lungs: no shortness of breath, no cough  Abdomen: No abdominal pain, no nausea, no vomiting, no constipation, no diarrhea, +distension  : No frequency, no dysuria, no urgency, no decreased urination  MSK: + lower extremity swelling  Neuro: No confusion, no weakness  Skin: No rashes    Objective:      Intake/Output Summary (Last 24 hours) at 10/29/2022 1329  Last data filed at 10/29/2022 1016  Gross per 24 hour   Intake 10 ml   Output 2050 ml   Net -2040 ml        Vitals:   Vitals:    10/29/22 0949   BP: 115/63   Pulse: 60   Resp:    Temp:    SpO2:        Physical Exam:     General: NAD  Eyes: EOMI  HENT: Atraumatic  Respiratory:no respiratory distress  GI: normal bowel sounds, nontender, nondistended  MSK: BLE 2+ pitting edema, improving  Skin: Intact, dry, warm, no rashes  Neuro: CN II to XII grossly intact  Psych: Normal mood    Medications:   Medications:    amoxicillin-clavulanate  1 tablet Oral 2 times per day    torsemide  40 mg Oral BID    miconazole   Topical BID    metOLazone  5 mg Oral BID    midodrine  5 mg Oral TID WC    spironolactone  25 mg Oral Daily    collagenase   Topical Daily    albuterol sulfate HFA  2 puff Inhalation BID    aspirin  81 mg Oral Daily    atorvastatin  10 mg Oral Nightly    carvedilol  6.25 mg Oral BID WC    ferrous sulfate  325 mg Oral Every Other Day    levothyroxine  88 mcg Oral QAM AC    timolol  1 drop Both Eyes Nightly    vitamin B-12  1,000 mcg Oral Daily    Vitamin D  5,000 Units Oral Daily    latanoprost  1 drop Both Eyes Dinner    sodium chloride flush  5-40 mL IntraVENous 2 times per day    enoxaparin  40 mg SubCUTAneous Daily    tiotropium-olodaterol  2 puff Inhalation Daily      Infusions:    sodium chloride 20 mL/hr at 10/23/22 1033     PRN Meds: albuterol sulfate HFA, 2 puff, Q4H PRN  traZODone, 50 mg, Nightly PRN  sodium chloride flush, 5-40 mL, PRN  sodium chloride, , PRN  ondansetron, 4 mg, Q8H PRN   Or  ondansetron, 4 mg, Q6H PRN  polyethylene glycol, 17 g, Daily PRN  acetaminophen, 650 mg, Q6H PRN   Or  acetaminophen, 650 mg, Q6H PRN      Labs      Recent Results (from the past 24 hour(s))   Basic Metabolic Panel    Collection Time: 10/29/22  3:37 AM   Result Value Ref Range    Sodium 135 135 - 145 MMOL/L    Potassium 4.2 3.5 - 5.1 MMOL/L    Chloride 87 (L) 99 - 110 mMol/L    CO2 39 (H) 21 - 32 MMOL/L    Anion Gap 9 4 - 16    BUN 35 (H) 6 - 23 MG/DL    Creatinine 0.8 0.6 - 1.1 MG/DL    Est, Glom Filt Rate >60 >60 mL/min/1.73m2    Glucose 94 70 - 99 MG/DL    Calcium 10.1 8.3 - 10.6 MG/DL   Magnesium    Collection Time: 10/29/22  3:37 AM   Result Value Ref Range    Magnesium 1.8 1.8 - 2.4 mg/dl          Imaging/Diagnostics Last 24 Hours   XR ABDOMEN (2 VIEWS)    Result Date: 10/24/2022  EXAMINATION: TWO XRAY VIEWS OF THE ABDOMEN 10/24/2022 8:40 am COMPARISON: None. HISTORY: ORDERING SYSTEM PROVIDED HISTORY: distended abdomen TECHNOLOGIST PROVIDED HISTORY: Reason for exam:->distended abdomen Reason for Exam: distended abdomen FINDINGS: Mild gaseous distension of the stomach. No small bowel dilation. Moderate amount of stool throughout the colon. No displacement of the bowel loops. No large mass. Dense vascular calcifications of the splenic artery. Scoliosis and spondylosis. Right pleural effusion. Large fixed hiatus hernia. Negative for bowel obstruction.        Electronically signed by Trent Maria MD on 10/29/2022 at 1:29 PM

## 2022-10-29 NOTE — CARE COORDINATION
Patient approved for ARU admission. [id number]    Patient can admit tonight pending negative rapid covid and PAS signed by Dr Estuardo Barton. Perfect served Dr Sheila Ferrari and patient is stable for admission tonight. Rapid requested.   ARU charge nurse Madie chairez

## 2022-10-30 PROBLEM — I95.1 ORTHOSTATIC HYPOTENSION: Status: ACTIVE | Noted: 2022-10-30

## 2022-10-30 PROBLEM — I50.43 ACUTE ON CHRONIC COMBINED SYSTOLIC AND DIASTOLIC CHF (CONGESTIVE HEART FAILURE) (HCC): Status: ACTIVE | Noted: 2022-10-30

## 2022-10-30 PROBLEM — S80.812A ABRASION OF LEFT CALF: Status: ACTIVE | Noted: 2022-01-01

## 2022-10-30 PROBLEM — F05 SUNDOWN SYNDROME: Status: ACTIVE | Noted: 2022-10-30

## 2022-10-30 LAB
ANION GAP SERPL CALCULATED.3IONS-SCNC: 11 MMOL/L (ref 4–16)
BUN BLDV-MCNC: 40 MG/DL (ref 6–23)
CALCIUM SERPL-MCNC: 9.4 MG/DL (ref 8.3–10.6)
CHLORIDE BLD-SCNC: 87 MMOL/L (ref 99–110)
CO2: 34 MMOL/L (ref 21–32)
CREAT SERPL-MCNC: 0.7 MG/DL (ref 0.6–1.1)
GFR SERPL CREATININE-BSD FRML MDRD: >60 ML/MIN/1.73M2
GLUCOSE BLD-MCNC: 96 MG/DL (ref 70–99)
POTASSIUM SERPL-SCNC: 4 MMOL/L (ref 3.5–5.1)
SODIUM BLD-SCNC: 132 MMOL/L (ref 135–145)

## 2022-10-30 PROCEDURE — 94761 N-INVAS EAR/PLS OXIMETRY MLT: CPT

## 2022-10-30 PROCEDURE — 2700000000 HC OXYGEN THERAPY PER DAY

## 2022-10-30 PROCEDURE — 6370000000 HC RX 637 (ALT 250 FOR IP): Performed by: PHYSICAL MEDICINE & REHABILITATION

## 2022-10-30 PROCEDURE — 6370000000 HC RX 637 (ALT 250 FOR IP): Performed by: INTERNAL MEDICINE

## 2022-10-30 PROCEDURE — 2500000003 HC RX 250 WO HCPCS: Performed by: INTERNAL MEDICINE

## 2022-10-30 PROCEDURE — 80048 BASIC METABOLIC PNL TOTAL CA: CPT

## 2022-10-30 PROCEDURE — 2580000003 HC RX 258: Performed by: INTERNAL MEDICINE

## 2022-10-30 PROCEDURE — 99223 1ST HOSP IP/OBS HIGH 75: CPT | Performed by: PHYSICAL MEDICINE & REHABILITATION

## 2022-10-30 PROCEDURE — 94150 VITAL CAPACITY TEST: CPT

## 2022-10-30 PROCEDURE — 1280000000 HC REHAB R&B

## 2022-10-30 PROCEDURE — 94664 DEMO&/EVAL PT USE INHALER: CPT

## 2022-10-30 PROCEDURE — 94640 AIRWAY INHALATION TREATMENT: CPT

## 2022-10-30 PROCEDURE — 36415 COLL VENOUS BLD VENIPUNCTURE: CPT

## 2022-10-30 PROCEDURE — 6360000002 HC RX W HCPCS: Performed by: INTERNAL MEDICINE

## 2022-10-30 RX ORDER — POLYETHYLENE GLYCOL 3350 17 G/17G
17 POWDER, FOR SOLUTION ORAL DAILY PRN
Status: DISCONTINUED | OUTPATIENT
Start: 2022-10-30 | End: 2022-11-07 | Stop reason: HOSPADM

## 2022-10-30 RX ORDER — ACETAMINOPHEN 325 MG/1
650 TABLET ORAL EVERY 4 HOURS PRN
Status: DISCONTINUED | OUTPATIENT
Start: 2022-10-30 | End: 2022-11-07 | Stop reason: HOSPADM

## 2022-10-30 RX ADMIN — CARVEDILOL 6.25 MG: 6.25 TABLET, FILM COATED ORAL at 08:00

## 2022-10-30 RX ADMIN — COLLAGENASE SANTYL: 250 OINTMENT TOPICAL at 10:45

## 2022-10-30 RX ADMIN — LATANOPROST 1 DROP: 50 SOLUTION/ DROPS OPHTHALMIC at 17:41

## 2022-10-30 RX ADMIN — AMOXICILLIN AND CLAVULANATE POTASSIUM 1 TABLET: 875; 125 TABLET, FILM COATED ORAL at 07:59

## 2022-10-30 RX ADMIN — AMOXICILLIN AND CLAVULANATE POTASSIUM 1 TABLET: 875; 125 TABLET, FILM COATED ORAL at 20:05

## 2022-10-30 RX ADMIN — CYANOCOBALAMIN TAB 1000 MCG 1000 MCG: 1000 TAB at 07:59

## 2022-10-30 RX ADMIN — MIDODRINE HYDROCHLORIDE 5 MG: 5 TABLET ORAL at 08:00

## 2022-10-30 RX ADMIN — ATORVASTATIN CALCIUM 10 MG: 10 TABLET, FILM COATED ORAL at 20:05

## 2022-10-30 RX ADMIN — Medication 5000 UNITS: at 07:59

## 2022-10-30 RX ADMIN — FERROUS SULFATE TAB 325 MG (65 MG ELEMENTAL FE) 325 MG: 325 (65 FE) TAB at 12:22

## 2022-10-30 RX ADMIN — MIDODRINE HYDROCHLORIDE 5 MG: 5 TABLET ORAL at 12:22

## 2022-10-30 RX ADMIN — LEVOTHYROXINE SODIUM 88 MCG: 0.11 TABLET ORAL at 06:22

## 2022-10-30 RX ADMIN — ACETAMINOPHEN 650 MG: 325 TABLET ORAL at 03:45

## 2022-10-30 RX ADMIN — CARVEDILOL 6.25 MG: 6.25 TABLET, FILM COATED ORAL at 17:43

## 2022-10-30 RX ADMIN — TIOTROPIUM BROMIDE AND OLODATEROL 2 PUFF: 3.124; 2.736 SPRAY, METERED RESPIRATORY (INHALATION) at 08:09

## 2022-10-30 RX ADMIN — SPIRONOLACTONE 25 MG: 50 TABLET ORAL at 07:59

## 2022-10-30 RX ADMIN — SODIUM CHLORIDE, PRESERVATIVE FREE 10 ML: 5 INJECTION INTRAVENOUS at 20:23

## 2022-10-30 RX ADMIN — ALBUTEROL SULFATE 2 PUFF: 90 AEROSOL, METERED RESPIRATORY (INHALATION) at 19:57

## 2022-10-30 RX ADMIN — METOLAZONE 5 MG: 2.5 TABLET ORAL at 20:05

## 2022-10-30 RX ADMIN — ACETAMINOPHEN 650 MG: 325 TABLET ORAL at 20:03

## 2022-10-30 RX ADMIN — TORSEMIDE 40 MG: 20 TABLET ORAL at 07:59

## 2022-10-30 RX ADMIN — MICONAZOLE NITRATE: 2 POWDER TOPICAL at 20:20

## 2022-10-30 RX ADMIN — ACETAMINOPHEN 650 MG: 325 TABLET ORAL at 07:58

## 2022-10-30 RX ADMIN — TIMOLOL MALEATE 1 DROP: 5 SOLUTION OPHTHALMIC at 20:20

## 2022-10-30 RX ADMIN — MICONAZOLE NITRATE: 2 POWDER TOPICAL at 08:01

## 2022-10-30 RX ADMIN — METOLAZONE 5 MG: 2.5 TABLET ORAL at 08:00

## 2022-10-30 RX ADMIN — POLYETHYLENE GLYCOL (3350) 17 G: 17 POWDER, FOR SOLUTION ORAL at 16:17

## 2022-10-30 RX ADMIN — SODIUM CHLORIDE, PRESERVATIVE FREE 10 ML: 5 INJECTION INTRAVENOUS at 08:16

## 2022-10-30 RX ADMIN — TORSEMIDE 40 MG: 20 TABLET ORAL at 17:41

## 2022-10-30 RX ADMIN — MIDODRINE HYDROCHLORIDE 5 MG: 5 TABLET ORAL at 17:41

## 2022-10-30 RX ADMIN — ALBUTEROL SULFATE 2 PUFF: 90 AEROSOL, METERED RESPIRATORY (INHALATION) at 08:11

## 2022-10-30 RX ADMIN — ASPIRIN 81 MG CHEWABLE TABLET 81 MG: 81 TABLET CHEWABLE at 08:00

## 2022-10-30 RX ADMIN — ALBUTEROL SULFATE 2 PUFF: 90 AEROSOL, METERED RESPIRATORY (INHALATION) at 08:16

## 2022-10-30 RX ADMIN — ENOXAPARIN SODIUM 40 MG: 40 INJECTION SUBCUTANEOUS at 08:00

## 2022-10-30 RX ADMIN — ACETAMINOPHEN 650 MG: 325 TABLET ORAL at 15:06

## 2022-10-30 ASSESSMENT — PAIN SCALES - GENERAL
PAINLEVEL_OUTOF10: 0
PAINLEVEL_OUTOF10: 8
PAINLEVEL_OUTOF10: 0
PAINLEVEL_OUTOF10: 4
PAINLEVEL_OUTOF10: 10

## 2022-10-30 ASSESSMENT — PAIN DESCRIPTION - ORIENTATION
ORIENTATION: LEFT
ORIENTATION: MID
ORIENTATION: MID
ORIENTATION: RIGHT;LEFT

## 2022-10-30 ASSESSMENT — PAIN DESCRIPTION - DESCRIPTORS
DESCRIPTORS: ACHING
DESCRIPTORS: ACHING

## 2022-10-30 ASSESSMENT — PAIN DESCRIPTION - LOCATION
LOCATION: BUTTOCKS
LOCATION: LEG
LOCATION: HIP
LOCATION: ABDOMEN

## 2022-10-30 ASSESSMENT — PAIN SCALES - WONG BAKER: WONGBAKER_NUMERICALRESPONSE: 2

## 2022-10-30 ASSESSMENT — PAIN - FUNCTIONAL ASSESSMENT: PAIN_FUNCTIONAL_ASSESSMENT: PREVENTS OR INTERFERES SOME ACTIVE ACTIVITIES AND ADLS

## 2022-10-30 NOTE — PLAN OF CARE
ARU Interdisciplinary Plan of Care 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     Priority: Medium    Orthostatic hypotension [I95.1] 10/30/2022     Priority: Medium    Abrasion of left calf [S80.812A] 10/30/2022     Priority: Medium    Pulmonary hypertension ([address] [I27.20] 03/15/2022    Sinus bradycardia [R00.1] 2016     Rehabilitation Diagnosis:     Acute on chronic diastolic (congestive) heart failure [I50.33]  Acute on chronic combined systolic and diastolic CHF (congestive heart failure) ([address] [I50.43]      CARE PLAN     NURSING:  Roxane Yi while on this unit will:      Bowel and Bladder   [x] Be continent of bowel and bladder      [x] Have an adequate number of bowel movements   [x] Urinate with no urinary retention >300ml in bladder   [] Bladder Scan: (details)   [] Complete bladder protocol with connolly removal   [] Initiate Bladder Program to toilet every ___ hours   [] Initiate Bowel Program to toilet every ___hours   [] Bladder training    [] Bowel training  Pulmonary   [x] Maintain O2 SATs at 92% or greater  Pain Management   [x] Have pain managed while on ARU        [x] Be pain free by discharge    [x] Medication Management and Education  Maintenance of Skin Integrity/Wound Management   [x] Have no skin breakdown while on ARU   [] Have improved skin integrity via wound measurements   [x] Have no signs/symptoms of infection via infection protection and monitoring at the          wound site  Fall Prevention   [x] Be free from injury during hospitalization via fall prevention measures     [] Disease management and Education  Precautions   [] Weight Bearing Precautions   [] Swallowing Precautions   [] Monitoring of Risks of Complications   [] DVT Prophylaxis    [] Fluid/electrolyte/Nutrition Management    [] Complete education with patient/family with understanding demonstrated for          in-room safety with transfers to bed, toilet, wheelchair, shower as well as                bathroom activities and hygiene. [] Adjustment   [] Other:   Nursing interventions may include bowel/bladder training, education for medical assistive devices, medication education, O2 saturation management, energy conservation, stress management techniques, fall prevention, alarms protocol, seating and positioning, skin/wound care, pressure relief instruction,dressing changes,  infection protection, DVT prophylaxis, and/or assistance with in room safety with transfers to bed, toilet, wheelchair, shower as well as bathroom activities and hygiene. Patient/caregiver education for:   [] Disease/sustained injury/management      [] Medication Use   [] Surgical intervention   [] Safety/Precautions   [] Body mechanics and or joint protection   [] Health maintenance         PHYSICAL THERAPY:  Goals:                  Short Term Goals  Time Frame for Short Term Goals: 5-7 STG=LTG  Short Term Goal 1: Pt will perform bed mobility with mod I  Short Term Goal 2: Pt will perform sit to stand, pivot and car transfers with mod I if no O2, supervision if O2  Short Term Goal 3: Pt will ambulate 150' on level surfaces with 2ww and 10' on uneven surfaces with supervision with or without O2  Short Term Goal 4: Pt will ascend/descend curb step and 4 steps with rail with supervision  Short Term Goal 5: Pt will retrieve object on floor with 2ww and supervision               These goals were reviewed with this patient at the time of assessment and Ace Díaz is in agreement.      Plan of Care: Pt to be seen 5 days per week for a minimum of 60 minutes for 6 days. Current Treatment Recommendations: Strengthening, Balance training, Functional mobility training, Transfer training, ADL/Self-care training, IADL training, Cognitive/Perceptual training, Endurance training, Safety education & training, Patient/Caregiver education & training, Therapeutic activities, Gait training, Stair training, Equipment evaluation, education, & procurement, Pain management, Neuromuscular re-education, Positioning, Home exercise program, Cognitive reorientation community reintegration,animal assisted therapy, and concurrent/group therapy. PT IRF-YAMILKA scores and goals for initial assessment:   Bed Mobility:   Sit to Lying  Assistance Needed: Supervision or touching assistance  Comment: mod effort, grimacing in face. pt denied anything wrong  CARE Score: 4  Discharge Goal: Independent  Roll Left and Right  Assistance Needed: Supervision or touching assistance  Comment: supervision  CARE Score: 4  Discharge Goal: Independent  Lying to Sitting on Side of Bed  Assistance Needed: Supervision or touching assistance  Comment: as sit to lying  CARE Score: 4  Discharge Goal: Independent    Transfers:    Sit to Stand  Assistance Needed: Partial/moderate assistance  Comment: variable CG to min assist with min more common after correction through session, poor safety  CARE Score: 3  Discharge Goal: Independent  Chair/Bed-to-Chair Transfer  Assistance Needed: Partial/moderate assistance  Comment: 2ww, poor awareness of O2 line  CARE Score: 3  Discharge Goal: Independent (with no O2 line.  predict assist with line if pt still on O2)     Car Transfer  Assistance Needed: Supervision or touching assistance  Comment: assist with O2 line  CARE Score: 4  Discharge Goal: Independent    Ambulation:    Walking Ability  Does the Patient Walk?: Yes     Walk 10 Feet  Assistance Needed: Supervision or touching assistance  Comment: CG with 2ww, assist with O2 line  CARE Score: 4  Discharge Goal: Supervision or touching assistance     Walk 50 Feet with Two Turns  Assistance Needed: Supervision or touching assistance  Comment: CG with 2ww and assist with O2 line. Pt demonstrates moderate path deviation, slow alisah with variable step length  CARE Score: 4  Discharge Goal: Supervision or touching assistance     Walk 150 Feet  Assistance Needed: Supervision or touching assistance  Comment: 2ww as 50'.   CARE Score: 4  Discharge Goal: Supervision or touching assistance     Walking 10 Feet on Uneven Surfaces  Assistance Needed: Partial/moderate assistance  Comment: min assist with 2ww, decreased awareness of obstacles though pt states she sees them  CARE Score: 3  Discharge Goal: Supervision or touching assistance     1 Step (Curb)  Assistance Needed: Partial/moderate assistance  Comment: min assist with walker management, mod cues  CARE Score: 3  Discharge Goal: Supervision or touching assistance     4 Steps  Assistance Needed: Supervision or touching assistance  Comment: CG with B rails  CARE Score: 4  Discharge Goal: Supervision or touching assistance     12 Steps  Reason if not Attempted: Not applicable  CARE Score: 9  Discharge Goal: Not Applicable    Gait Deviations: []None []Slow alisha  [] Increased MANSOOR  [] Staggers []Deviated Path  [] Decreased step length  [] Decreased step height  []Decreased arm swing  [] Shuffles  [] Decreased head and trunk rotation  []other:        Wheelchair:  w/c Ability: Wheelchair Ability  Uses a Wheelchair and/or Scooter?: No                Balance:        Object: Picking Up Object  Assistance Needed: Partial/moderate assistance  Comment: min assist with 2ww, no reacher  CARE Score: 3  Discharge Goal: Supervision or touching assistance  OCCUPATIONAL THERAPY:  Goals:             Short Term Goals  Time Frame for Short Term Goals: STGs=LTGs :  Long Term Goals  Time Frame for Long Term Goals : ~10 days or until d/c  Long Term Goal 1: Pt will complete grooming tasks Ind  Long Term Goal 2: Pt will complete total body bathing c S using AE PRN  Long Term Goal 3: Pt will complete UB dressing c setup  Long Term Goal 4: Pt will complete LB dressing c S using AE PRN  Long Term Goal 5: Pt will doff/don footwear c S using AE PRN  Additional Goals?: Yes  Long Term Goal 6: Pt will complete toileting c S  Long Term Goal 7: Pt will complete functional transfers (bed, chair, toilet, shower) c DME PRN and S  Long Term Goal 8: Pt will perform therex/therax to facilitate increased strength/endurance/ax tolerance (c emphasis on dynamic standing balance/tolerance >6 mins, BUE endurance) c SBA  Long Term Goal 9: Pt will complete simple homemaking tasks c DME PRN and S :    These goals were reviewed with this patient at the time of assessment and Juwan Alfaro is in agreement    Plan of Care:  Pt to be seen 5 days per week for a minimum of 60 minutes for 10 days. Occupational Therapy Plan  Current Treatment Recommendations: Strengthening, Endurance training, Cognitive reorientation, Pain management, Patient/Caregiver education & training, Safety education & training, Equipment evaluation, education, & procurement, Self-Care / ADL, Home management training, Cognitive/Perceptual training, Balance training, Functional mobility training         cognitive training, home management, energy conservation training, community reintegration, splint fabrication, patient/caregiver education and training, animal assisted therapy, and concurrent and/or group therapy.       OT IRF-YAMILKA scores and goals for initial assessment:    ADLs:    Eating: Eating  Assistance Needed: Setup or clean-up assistance  Comment: required setup assist to open packages/containers  CARE Score: 5  Discharge Goal: Independent       Oral Hygiene: Oral Hygiene  Assistance Needed: Partial/moderate assistance  Comment: d/t low vision required assist lining up toothpaste onto toothbrush. Unable to orient partials correctly requiring physical assist to position in mouth  CARE Score: 3  Discharge Goal: Independent    UB/LB Bathing: Shower/Bathe Self  Assistance Needed: Partial/moderate assistance  Comment: required cues for sequencing, assist for B feet. Poor decision making during task; pt reported urine incontinence during stand however was not redirectable to transfer over to toilet and continued to dry bottom first  CARE Score: 3  Discharge Goal: Supervision or touching assistance    UB Dressing: Upper Body Dressing  Assistance Needed: Supervision or touching assistance  Comment: increased time/effort, assist for 02 line management  CARE Score: 4  Discharge Goal: Set-up or clean-up assistance         LB Dressing: Lower Body Dressing  Assistance Needed: Substantial/maximal assistance  Comment: poor distal reach requiring assist to thread BLEs in brief and pants; able to perform pants management up c CGA  CARE Score: 2  Discharge Goal: Supervision or touching assistance    Donning and [place] Footwear: Putting On/Taking Off Footwear  Assistance Needed: Dependent  Comment: poor distal reach requiring total A to doff/don hospital socks  CARE Score: 1  Discharge Goal: Supervision or touching assistance      Toiletin [address] needed: Partial/moderate assistance  Comment: able to manage Depends, required assist for thorough hygiene as well as cueing  CARE Score: 3  Discharge Goal: Supervision or touching assistance      Toilet Transfers:    Toilet Transfer  Assistance needed: Partial/moderate assistance  Comment: min A to<>from W/C d/t incontinence during session; used grab bars  CARE Score: 3  Discharge Goal: Supervision or touching assistance      SPEECH THERAPY: (If ordered)  Plan of Care and Goals:   LTG                                                            LTG:                           Treatments may include speech/language/communication therapy, cognitive training, animal assisted therapy, group therapy, education, and/or dysphagia therapy based on the above goals. Co-treats where appropriate with PT or OT to facilitate patient goals in functional tasks. These goals were reviewed with this patient at the time of assessment and [place] is in agreement. CASE MANAGEMENT:  Goals:   Assist patient/family with discharge planning, patient/family counseling, assistance in obtaining recommended equipment and other services, and coordination with insurance during ARU stay. Patient Goals:  Return to maximum level of independent function. Patient goal is home with her daughter staying with her upon discharge to assist.     Activities Prior to Admit:      Active : No     Occupation: Retired          Intensity of [address] will be seen a minimum of 3 hours of therapy per day/a minimum of 5 out of 7 days per week. [] In this rare instance due to the nature of this patient's medical involvement, this patient will be seen 15 hours per week (900 minutes within a 7 day period). Treatments may include therapeutic exercises, gait training, neuromuscular re-ed, transfer training, community reintegration, bed mobility, w/c mobility and training, self care, home mgmt, cognitive training, energy conservation,dysphagia tx, speech/language/communication therapy, group therapy, and patient/family education. In addition, dietician/nutritionist may monitor calorie count as well as intake and collaboratively work with SLP on dietary upgrades. Neuropsychology/Psychology may evaluate and provide necessary support. Group therapy as appropriate to facilitate improved endurance, STR, COORD, function, safety, transfers, awareness and insight into deficits, problem solving, memory, and social interaction and engagement.     Medical issues being managed closely and that require 24 hour availability of a physician:   [] Swallowing Precautions [] Weight bearing precautions   [] Wound Care                             [x] Infection Prevention   [x] DVT Prophylaxis/assessment              [x] Monitoring for complications    [x] Fall Precautions/Prevention                         [x] Fluid/Electrolyte/Nutrition Balance[x]   [] Voice Protection                            Medication Management   [x] Respiratory                   [x] Pain Mgmt   [x] Bowel/Bladder Fx    Medical Prognosis: [] Good  [x] Fair    [] Guarded   Total expected IRF days 12                                            Physician anticipated functional outcomes:  FWW and HHC OT/PT and supervision. Rehab Goals:   [] Return to premorbid function of_______________________________.    [] Independent   [] Mod I  [x] Supervision  [] CGA   [] Min A   [] Mod A  Level for ambulation []without assistive device  [x] with assistive device        [] Independent   [] Mod I  [x] Supervision [] CGA   [] Min A   [] Mod A  Level for transfers []without assistive device  [x] with assistive device         [] Independent   [] Mod I  [x] Supervision [] CGA   [] Min A   [] Mod A Level with ADL's []without assistive device   [x] with assistive device     ___________________     Level with cognitive skills requiring [] No assist [x] Supervision  [] Active Assist/Cues     [] Maximize level of mobility and ADL's to decrease burden on caregiver    IPOC brief synthesis of Preadmission Screen, Post-Admission Evaluation, and Therapy Evaluations: Acute inpatient rehabilitation with occupational and physical therapy 180 minutes 5 out of every 7 days. Will address basic and  advancing mobility with self-care instruction and adaptive equipment training. Caregiver education will be offered. Expected length of stay  prior to a supervised level of function for discharge home with a walker and HHC OT/PT is 2 weeks.      Additional recommendation:     Acute on chronic congestive heart failure with gait disturbance: The patient requires daily occupational and physical therapy. We will monitor her weights daily to evaluate her compensation of CHF. Diuresis with Aldactone, Demadex and Zaroxolyn. Blood pressure support with ProAmatine. Encouraging consistent oral intake of heart healthy diet/fluids. Aggressive pulmonary hygiene measures. She needs adaptive equipment training, DVT prophylaxis and possibly caregiver education. Outpatient follow-up with her cardiologist and PCP. DVT prophylaxis: Lovenox 40 mg subcu daily. I must monitor her hemoglobin and platelet count periodically while on this medication. Weightbearing activities will be pursued daily. GI prophylaxis is available. Pulmonary hypertension: Gentle diuresis. Monitoring her CHF. Slow and deliberate position changes. Sundowner syndrome: Sleep regulation. Consistent staffing and environment when possible. Minimizing medications that might alter her sensorium. Involving family when possible for training and therapies. Obstructive sleep apnea: The respiratory service has been consulted to assist with nighttime BiPAP. She is on albuterol and Stiolto inhalers     Anticipated discharge destination:    [] Home Independently   [x] Home with supervision    []SNF     [] Other       This plan has been reviewed with me in a language I understand.  I have had the opportunity to include my input with my therapy team.    ________________________________________________   ______________________  Patient/Significant Other      Date    I have reviewed this initial plan of care and agree with its contents:    Title   Name    Date    Time    Physician: Danae Gomez MD 10/31/2022 11:34 PM    Case Mgmt: Mohamud Ascencio MSW LSW 10/31/2022 12:01 PM     OT: Bahman Carter, [facility], OTR/L 10/31/2022 [address], PT 10/31/22, 9424    RN: Gabrielle Lopez    ST:    Dietician:     ADMIT DATE:10/29/2022

## 2022-10-30 NOTE — PROGRESS NOTES
Patient's family was not notified about her move from [facility] to the [facility]. The family members expressed concern to this nurse about nurses and therapy keeping in contact with them and wanted contact info on file. Gallosandra Triana (daughter-in-law) phone number: [phone]. Yohana Clay (son) phone number: [phone].

## 2022-10-30 NOTE — PLAN OF CARE
ARU Night Shift RN notes:  [id number] Registration was informed of patient's arrival to unit. 65 Dr. Mary Alvarado was informed of patient's admission through perfect serv and was acknowledged.

## 2022-10-30 NOTE — PROGRESS NOTES
4 Eyes Skin Assessment     NAME:  Vasyl Ramires OF BIRTH:  3/18/1929  MEDICAL RECORD NUMBER:  [id number]    The patient is being assess for  Admission    I agree that 2 RN's have performed a thorough Head to Toe Skin Assessment on the patient. ALL assessment sites listed below have been assessed. Areas assessed by both nurses:    Head, Face, Ears, Shoulders, Back, Chest, Arms, Elbows, Hands, Sacrum. Buttock, Coccyx, Ischium, and Legs. Feet and Heels    Scattered ecchymosis - arms/legs         Does the Patient have a Wound? Yes wound(s) were present on assessment. LDA wound assessment was Initiated and completed        Cruz Prevention initiated:  Yes   Wound Care Orders initiated:  No    Pressure Injury (Stage 3,4, Unstageable, DTI, NWPT, and Complex wounds) if present place referral/consult order under [de-identified] No    New and Established Ostomies if present place consult order under : No      Nurse 1 eSignature: Electronically signed by Christine Horton RN on 10/30/22 at 1:02 AM EDT    **SHARE this note so that the co-signing nurse is able to place an eSignature**    Nurse 2 eSignature: Electronically signed by Mimi Paul.  BHARATI Sandoval, RN on 10/30/22 at 1:05 AM EDT

## 2022-10-30 NOTE — PROGRESS NOTES
[facility] Admission Assessment      A Complete drug regimen review was completed for this patient this date. [x]  No clinically significant medication issue was identified   []  Yes, a clinically significant medication issue was identified     []  Adverse Drug Event:    []  Allergy:    []  Side Effect:    []  Ineffective Therapy:    []  Drug interaction:     []  Duplicate Therapy:    []  Untreated Indication:    []  Non-adherence:    []  Other:  Nursing contacted the physician:       Date:                Time:    Actions recommended by physician were completed:   Date:                 Time:  Action(s) Taken:             []  New Physician Order Received    []  Issue Noted by Physician; However No Action Required    []  Other:        Ethnicity  \"Are you of , /a, or Guamanian origin? \"  Check all that apply:  [x] A. No, not of , /a, or [place] Origin  [] B.  Yes, [place] American, [place]icano/a  [] C.  Yes, [address] [place] [address]  [] D.  Yes, [place]  [] E.  Yes, another , , or Guamanian origin  [] X. Patient unable to respond    Race  \"What is your race? \"  Check all that apply:  [x] A. White  [] B. Black or   [] C. American [place] [facility] or [place] Native  [] D.  [place] [facility]  [] E. [place]  [] F. Nauruan  [] G. [place]  [] Marlys Appl  [] I. [place]  [] J.  Other   [] K.   [] L. Austrian or Ariella  [] M. Malagasy  [] N. Other [facility]  [] X. Patient unable to respond    Language  A. \"What is your preferred language? \"   English    B. \"Do you need or want an  to communicate with a doctor or health care staff? \"  Check only one:  [x] 0. No  [] 1. Yes  [] 9. Unable to determine    Transportation  \"In the past 6-12 months, has lack of transportation kept you from medical appointments, meetings, work, or from getting things needed for daily living? \"  Check all that apply:  [] A.  Yes, it has kept me from medical appointments or from getting my medications  [] B.  Yes, it has kept me from non-medical meetings, appointments, work, or from getting things that I need  [x] C.  No  [] X. Patient unable to respond  [] Y. Patient declines to respond    Hearing  Ability to hear (with hearing aid or hearing appliances if normally used)  []  0. Adequate - no difficulty in normal conversation, social interaction, listening to TV  []  1. Minimal difficulty - difficulty in some environments (e.g. when person speaks softly or setting is noisy)  [x]  2. Moderate difficulty - speaker has to increase volume and speak distinctly   []  3. Highly impaired - absence of useful hearing    Vision  Ability to see in adequate light (with glasses or other visual appliances)  []  0. Adequate - sees fine detail, such as regular print in newspapers/books  [x]  1. Impaired - sees large print, but nto regular print in newspapers/books  []  2. Moderately impaired - limited vision; not able to see newspaper headlines but can identify objects  []  3. Highly impaired - object identification in question, but eyes appear to follow objects  []  4. Severely impaired - no vision or sees only light, colors, or shapes; eyes do not appear to follow objects    Health Literacy  \"How often do you need to have someone help you when you read instructions, pamphlets, or other written material from your doctor or pharmacy? \"  []  0. Never  []  1. Rarely  []  2. Sometimes  [x]  3. Often  []  4. Always  []  8. Patient unable to respond    Signs and Symptoms of Delirium  A. Acute Onset Mental Status Change - Is there evidence of an acute change in mental status from the patient's baseline?  [] 0. No  [x] 1. Yes    B. Inattention - Did the patient have difficulty focusing attention, for example being easily distractible or having difficulty keeping track of what was being said?  []  0. Behavior not present  []  1. Behavior continuously present, does not fluctuate  [x]  2. Behavior present, fluctuates (comes and goes, changes in severity)    C. Disorganized thinking - Was the patient's thinking disorganized or incoherent (rambling or irrelevant conversation, unclear or illogical flow of ideas, or unpredictable switching from subject to subject)? []  0. Behavior not present  []  1. Behavior continuously present, does not fluctuate  [x]  2. Behavior present, fluctuates (comes and goes, changes in severity)    D. Altered level of consciousness - Did the patient have altered level of consciousness as indicated by any of the following criteria? Vigilant - startled easily to any sound or touch  Lethargic - repeatedly dozed off while being asked questions, but responded to voice or touch  Stuporous - very difficulty to arouse and keep aroused for the interview  Comatose - could not be aroused  [x]  0. Behavior not present  []  1. Behavior continuously present, does not fluctuate  []  2. Behavior present, fluctuates (comes and goes, changes in severity)    Mood    \"Over the last 2 weeks, have you been bothered by any of the following problems?\" 1. Symptom Presence    0 = No  1 = Yes  9 = No Response 2. Symptom Frequency    0 = Never or 1 day  1 = 2-6 days (several days)  2 = 7-11 days (half or more of the days)  3 = 12-14 days (nearly every day)  **Leave blank if 'No Reponse'**      Enter scores in boxes    Column 1 Column 2   Little interest or pleasure in doing things   [x] 0. No  [] 1. Yes  [] 9. No Response [x] 0. Never or one day  [] 1.  2-6 days (several days)  [] 2.  7-11 days (half or more of days)  [] 3.  12-14 days (nearly every day)     Feeling down, depressed, or hopeless   [x] 0. No  [] 1. Yes  [] 9. No Response [x] 0. Never or one day  [] 1.  2-6 days (several days)  [] 2.  7-11 days (half or more of days)  [] 3.  12-14 days (nearly every day)   **If Question A or B in column 2 is coded 2 or 3, CONTINUE asking the questions below in box.   If not, SKIP down to \"social isolation\" question and continue**     Trouble falling or staying asleep, or sleeping too much   [x] 0. No  [] 1. Yes  [] 9. No Response [x] 0. Never or one day  [] 1.  2-6 days (several days)  [] 2.  7-11 days (half or more of days)  [] 3.  12-14 days (nearly every day   Feeling tired or having little energy   [] 0. No  [] 1. Yes  [x] 9. No Response [] 0. Never or one day  [] 1.  2-6 days (several days)  [] 2.  7-11 days (half or more of days)  [] 3.  12-14 days (nearly every day   Poor appetite or overeating     [x] 0. No  [] 1. Yes  [] 9. No Response [x] 0. Never or one day  [] 1.  2-6 days (several days)  [] 2.  7-11 days (half or more of days)  [] 3.  12-14 days (nearly every day   Feeling bad about yourself - or that you are a failure or have let yourself or your family down   [x] 0. No  [] 1. Yes  [] 9. No Response [x] 0. Never or one day  [] 1.  2-6 days (several days)  [] 2.  7-11 days (half or more of days)  [] 3.  12-14 days (nearly every day   Trouble concentrating on things, such as reading the newspaper or watching television   [x] 0. No  [] 1. Yes  [] 9. No Response [x] 0. Never or one day  [] 1.  2-6 days (several days)  [] 2.  7-11 days (half or more of days)  [] 3.  12-14 days (nearly every day   Moving or speaking so slowly that other people could have noticed. Or the opposite- being so fidgety or restless that you have been moving around a lot more than usual.   [x] 0. No  [] 1. Yes  [] 9. No Response [x] 0. Never or one day  [] 1.  2-6 days (several days)  [] 2.  7-11 days (half or more of days)  [] 3.  12-14 days (nearly every day   Thoughts that you would be better off dead, or of hurting yourself in some way. [x] 0. No  [] 1. Yes  [] 9. No Response [x] 0.   Never or one day  [] 1.  2-6 days (several days)  [] 2.  7-11 days (half or more of days)  [] 3.  12-14 days (nearly every day     Social Isolation  \"How often do you feel lonely or isolated from those around you?\"  [] 0. Never  [x] 1. Rarely  [] 2. Sometimes  [] 3. Often  [] 4. Always  [] 8. Patient unable to respond    Pain Effect on Sleep  \"Over the past 5 days, how much of the time has pain made it hard for you to sleep at night? \"  []  0. Does not apply - I have not had any pain or hurting in the past 5 days  []  1. Rarely or not at all  [x]  2. Occasionally  []  3. Frequently  []  4. Almost constantly  []  8. Unable to answer  **If the patient answers \"0. Does not apply\" to this question, skip the next two \"Pain\" questions**      Pain Interference with Therapy Activities  \"Over the past 5 days, how often have you limited your participation in rehabilitation therapy sessions due to pain? \"  []  0. Does not apply - I have not received rehabilitation therapy in the past 5 days  []  1. Rarely or not at all  []  2. Occasionally  []  3. Frequently  []  4. Almost constantly  [x]  8. Unable to answer    Pain Interference with Day-to-Day Activities: \"Over the past 5 days, how often have you limited your day-to-day activities (excluding rehabilitation therapy session)? \"  []  1. Rarely or not at all  []  2. Occasionally  []  3. Frequently  []  4. Almost constantly  [x]  8. Unable to answer    Nutritional Approaches  Check all of the following nutritional approaches that apply on admission:  []  A. Parenteral/IV feeding  []  B. Feeding tube (e.g., nasogastric or abdominal (PEG))  []  C. Mechanically altered diet - requires change in texture of food or liquids (e.g., pureed food, thickened liquids)  []  D. Therapeutic diet (e.g., low salt, diabetic, low cholesterol)  [x]  Z. None of the above    High Risk Drug Classes:  Use and Indication    Is taking: Check if the pt is taking any medications by pharmacological classification, not how it is used, in the following classes  Indication noted:  If column 1 is checked, check if there is an indication noted for all meds in the drug class    * Meds must be taken on ARU still, not exclusively during acute stay Is taking  (check all that apply) Indication noted (check all that apply)   Antipsychotic [] []   Anticoagulant [] []   Antibiotic [] []   Opioid [] []   Antiplatelet (aspirin, tylenol does not count) [] []   Hypoglycemic (including insulin) [] []   None of the above []     Special Treatments, Procedures, and Programs    Check all of the following treatments, procedures, and programs that apply on admission. On admission (check all that apply)   Cancer Treatments   A1. Chemotherapy            A2. IV []           A3. Oral []           A10. Other []   B1. Radiation []   Respiratory Therapies   C1. Oxygen Therapy            C2. Continuous [x]           C3. Intermittent []           C4. High-concentration []   D1. Suctioning            D2. Scheduled []           D3. As needed []   E1. Tracheostomy Care []   F1. Invasive Mechanical Ventilator (ventilator or respirator) []   G1. Non-invasive Mechanical Ventilator []           G2. BiPAP []           G3. CPAP []   Other   H1. IV Medications            H2. Vasoactive medications      *ensure via IV only []           H3. Antibiotics           *ensure via IV only []           H4. Anticoagulation      *ensure via IV only []           H10. Other []   I1. Transfusions []   J1. Dialysis            J2. Hemodialysis []           J3. Peritoneal dialysis []   O1. IV access            O2. Peripheral []           O3. Midline []           O4.  Central (PICC, tunneled, port) []      None of the above (select if no Cancer, Respiratory, or Other boxes are checked) []

## 2022-10-30 NOTE — PROGRESS NOTES
Called 4E RN to receive patient report. Was informed that he'll call back since he's with a patient.

## 2022-10-30 NOTE — H&P
Zaida Jessica    : 3/18/1929  Acct #: [de-identified]  MRN: [id number]              History and physical      Admitting diagnosis: Acute on chronic diastolic congestive heart failure ([address])    Comorbid diagnoses impacting rehabilitation: Generalized weakness, gait disturbance, sinus bradycardia, pulmonary hypertension, hyponatremia, sundowner syndrome, obstructive sleep apnea, left calf wound, orthostatic hypotension    Chief complaint: Leg pain and dizziness. History of present illness: Patient is a 54-year-old right-hand-dominant female who presented to our ED on 10/19/2022 after being found to be an acute CHF at her primary cardiologist office. IV diuretics were initiated in the office but did not provide adequate relief. She had significant ascites and peripheral edema. IV diuretics were continued and she had a paracentesis (350 cc of fluid removed). She has been confused and complaining of some leg pain. Bradycardia has been evaluated by cardiology and medications have been adjusted. She has a traumatic wound to her left posterior calf that has been treated by the wound care service. Positional dizziness persist.  Infrequent bowel movements. Some urinary incontinence noted. Poor sleep and eating noted. Review of systems: Leg pain (she is very vague about this). Some positional dizziness. She is confused and restless at night per staff. The remainder of their review of systems was negative except as mentioned in the history of present illness. Social History: Normally the patient lives alone in an accessible home and does not use adaptive equipment. She reports that she quit smoking about 51 years ago. Her smoking use included cigarettes. She has a 1.25 pack-year smoking history. She has never used smokeless tobacco. She reports that she does not currently use alcohol. She reports that she does not use drugs.     Prior (baseline) level of function: Independent with mobility and self-care. Current level of function: Moderate verbal cues and physical assistance needed for mobility and self-care. Allergies:  Darvocet [propoxyphene n-acetaminophen], Ranexa [ranolazine er], Ranolazine, Sulfa antibiotics, Sulfasalazine, Adhesive tape, Allantoin, Bacitracin, Gramicidin, Neomycin, Polymyxin b, Pramoxine hcl, and Silicone    Past Medical History:   Past Medical History:   Diagnosis Date    Age-related osteoporosis with current pathological fracture of vertebra ([address] 7/8/2022    Arthritis     Bradycardia 2001    requiring dual chamber pacemaker at [facility]    CAD (coronary artery disease)     Cardiac pacemaker 10/2001    [employer] #[id number]  PPM- Serial # 26-[facility]- Dr Clayton Dill    CHF (congestive heart failure) ([facility])     COPD, mild ([address] 2/17/2017    Cor pulmonale (chronic) ([address] 3/15/2022    CVA (cerebrovascular accident) ([address]     DJD (degenerative joint disease) of cervical spine     C5-C6, C6-C7    Exhaustion of cardiac pacemaker battery 11/21/2011    PPM battery replacement- [employer]    Family history of cardiovascular disease     Glaucoma Dx 2010    H/O 24 hour EKG monitoring 8/6/2000 8/6/2000- Intermittent episonde of a-fib/flutter    H/O cardiac catheterization 4/20/2010, 2/1989 4/20/2010-Severe native vessel disease and has graft disease as well. LAD, CX totally occluded. RCA totally occluded in proximal segment. VG to RCA widely patent. PDA 90% LAD afer LIMA anastomosis 80-90% stenosis. Proceeded with PTCA with stent next day. H/O cardiovascular stress test 10/14/2011, 6/2/2010,4/8/2010, 5/18/2009,4/6/2009, 10/30/2007, 11/12/2004, 11/6/2003, 8/9/2002, 8/9/2001,6/5/2000,     10/14/2011-Lexiscan-Abnormal Myocardial Perfusion study. Evidence of mild ischemia in the Left CX region. Abnomal study. Rest EF 63%. Global LV systolic function normal. No ECG changes.  Unremarkable pharmacological stress test.    H/O cardiovascular stress test 6/10/2013    thallium--mild ischemia left circumflex EF63% no change from 10/2011 study. H/O cardiovascular stress test 10/16/2014    cardiolite-mild ischemia left circumflex,EF70%    H/O chest x-ray 4/19/2009 4/19/2009-Stable cardiomegaly. No acute cardiopulmonary disease. H/O Doppler lower venous ultrasound 09/18/2019    Significant reflux noted in RGSV, RGSV is extremely tortuous and small along the thigh and calf and would be highly unlikely to be accessed, RSSV is non compressible and has occlusive chronic SVT, LSSV is non compressible with occlusive chronic SVT, LGSV removed s/p CABG, Significant reflux in LGSV tributary,    H/O Doppler ultrasound 3/31/2010    CAROTID- 3/31/2010-INtimal thickening but no significant atherosclerotic plaque noted in ANA PAULA. Doppler flow velocities within ANA PAULA are WNL. Heterogeneous, irregular atherosclerotic plaque noted in LICA. Doppler flow velocities within the LICA are elevated, consistent with a mild, less than 50% stenosis. H/O Doppler ultrasound 5/24/2016    Carotid- normal study    H/O Doppler ultrasound 09/06/2017    carotid - normal study    H/O Doppler ultrasound     H/O echocardiogram 10/13    EF=60%, Severe Pulm. HTN, & Sclerotic aortic valve w/stenosis. H/O echocardiogram 10/16/14     EF 55-60% Normal LV. Normal LV systolic function. Severe tricuspid insufficiency with severe hypertension. H/O echocardiogram 02/03/2017    heart cath performed this morning    H/O echocardiogram 09/18/2019    EF 50-55%, Left atrium is mild to moderately dilated, right atrium is severely dilated, mildly dilated right ventricle, Mod MR, Severe TR, Severe Pulm HTN, no pericardial effusion     History of complete ECG     10/14/2011(Lexiscan);5/6/2010, 4/30/2009,10/24/2008,9/21/2007, 10/13/2006    History of nuclear stress test 11/17/2016    lexiscan-normal,EF70%    Hx of cardiovascular stress test 12/28/2018    EF 60%  Normal study.     HX OTHER MEDICAL 05/01/2017    MUGA-normal, EF53% Hyperlipidemia     Hypertension     Mild intermittent asthma 7/28/2016    Moderate COPD (chronic obstructive pulmonary disease) (HCC) 3/15/2022    Nausea & vomiting     Obstructive sleep apnea 5/16/2017    Paroxysmal atrial fibrillation ([address]     Post PTCA 4/21/2010    PTCA with 2.25 stent of the LIMA to LAD    Pulmonary HTN ([address]     Severe per last echo on 10/13. 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 Followed by MAZE procedure by pulmonary vein isolation.-  Dr Shellie Pickering    S/P PTCA (percutaneous transluminal coronary angioplasty) 11/2012    PTCA with stent to RCA    Severe pulmonary hypertension ([address] 3/15/2022    Shortness of breath 3/15/2022    Thyroid disease     hypothyroi    Unspecified cerebral artery occlusion with cerebral infarction Unsure When    No Residual    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 ([address] 7/29/2022    WD-Non-pressure chronic ulcer of other part of left lower leg limited to breakdown of skin ([address] 7/29/2022        Past Surgical History:     Past Surgical History:   Procedure Laterality Date    [address]  4/09    CABG (3 Bypasses), One Heart Stent in 2010    COLONOSCOPY  In 2000's    X1    CORONARY ANGIOPLASTY WITH STENT PLACEMENT  4/21/2010    PTCA with stent LIMA ->LAD    CORONARY ARTERY BYPASS GRAFT  4/8/2009    LIMA->Diag,  LIMA -> LAD;  SVG->RCA going to the PDA.  Followed by MAZE procedure by pulmonary vein isolation.-  Dr Argentina Galarza, TOTAL ABDOMINAL (CERVIX REMOVED)  1990's    MIDDLE EAR SURGERY      OTHER SURGICAL HISTORY      Ear surgery-hearing    PACEMAKER PLACEMENT      battery change 11/21/2011 [employer]    PTCA  11/2012    Ptca with stent to RCA    TONSILLECTOMY  1950's       Current Medications:     Current Facility-Administered Medications:     acetaminophen (TYLENOL) tablet 650 mg, 650 mg, Oral, Q4H PRN, Antonio Chong Rachel Galan MD, 650 mg at 10/30/22 1506    polyethylene glycol (GLYCOLAX) packet 17 g, 17 g, Oral, Daily PRN, ERICA Mcfarlane MD, 17 g at 10/30/22 1617    0.9 % sodium chloride infusion, , IntraVENous, PRN, Isabelle Kirkpatrick MD    albuterol sulfate HFA (PROVENTIL;VENTOLIN;PROAIR) 108 (90 Base) MCG/ACT inhaler 2 puff, 2 puff, Inhalation, BID, Isabelle Kirkpatrick MD, 2 puff at 10/30/22 0816    albuterol sulfate HFA (PROVENTIL;VENTOLIN;PROAIR) 108 (90 Base) MCG/ACT inhaler 2 puff, 2 puff, Inhalation, Q4H PRN, Isabelle Kirkpatrick MD, 2 puff at 10/30/22 0811    amoxicillin-clavulanate (AUGMENTIN) 875-125 MG per tablet 1 tablet, 1 tablet, Oral, 2 times per day, Isabelle Kirkpatrick MD, 1 tablet at 10/30/22 0759    aspirin chewable tablet 81 mg, 81 mg, Oral, Daily, Isabelle Kirkpatrick MD, 81 mg at 10/30/22 0800    atorvastatin (LIPITOR) tablet 10 mg, 10 mg, Oral, Nightly, Janette Hernandez MD    carvedilol (COREG) tablet 6.25 mg, 6.25 mg, Oral, BID WC, Isabelle Kirkpatrick MD, 6.25 mg at 10/30/22 1743    collagenase ointment, , Topical, Daily, Isabelle Kirkpatrick MD, Given at 10/30/22 1045    enoxaparin (LOVENOX) injection 40 mg, 40 mg, SubCUTAneous, Daily, Isabelle Kirkpatrick MD, 40 mg at 10/30/22 0800    ferrous sulfate (IRON 325) tablet 325 mg, 325 mg, Oral, Every Other Day, Isabelle Kirkpatrick MD, 325 mg at 10/30/22 1222    latanoprost (XALATAN) 0.005 % ophthalmic solution 1 drop, 1 drop, Both Eyes, Marisa Sandhoff Tailor, MD, 1 drop at 10/30/22 1741    levothyroxine (SYNTHROID) tablet 88 mcg, 88 mcg, Oral, QAM AC, Isabelle Kirkpatrick MD, 88 mcg at 10/30/22 0622    metOLazone (ZAROXOLYN) tablet 5 mg, 5 mg, Oral, BID, Isabelle Kirkpatrick MD, 5 mg at 10/30/22 0800    miconazole (MICOTIN) 2 % powder, , Topical, BID, Isabelle Kirkpatrick MD, Given at 10/30/22 0801    midodrine (PROAMATINE) tablet 5 mg, 5 mg, Oral, TID WC, Isabelle Kirkpatrick MD, 5 mg at 10/30/22 1741    ondansetron (ZOFRAN-ODT) disintegrating tablet 4 mg, 4 mg, Oral, Q8H PRN **OR** ondansetron (ZOFRAN) injection 4 mg, 4 mg, IntraVENous, Q6H PRN, Alpa Hernandez MD    sodium chloride flush 0.9 % injection 5-40 mL, 5-40 mL, IntraVENous, 2 times per day, Susi Beal MD, 10 mL at 10/30/22 0816    sodium chloride flush 0.9 % injection 5-40 mL, 5-40 mL, IntraVENous, PRN, Susi Beal MD    spironolactone (ALDACTONE) tablet 25 mg, 25 mg, Oral, Daily, Susi Beal MD, 25 mg at 10/30/22 0759    timolol (TIMOPTIC) 0.5 % ophthalmic solution 1 drop, 1 drop, Both Eyes, Nightly, Janette Hernandez MD    tiotropium-olodaterol (STIOLTO) 2.5-2.5 MCG/ACT inhaler 2 puff, 2 puff, Inhalation, Daily, uSsi Beal MD, 2 puff at 10/30/22 0809    torsemide (DEMADEX) tablet 40 mg, 40 mg, Oral, BID, Susi Beal MD, 40 mg at 10/30/22 1741    traZODone (DESYREL) tablet 50 mg, 50 mg, Oral, Nightly PRN, Susi Beal MD    vitamin B-12 (CYANOCOBALAMIN) tablet 1,000 mcg, 1,000 mcg, Oral, Daily, Susi Beal MD, 1,000 mcg at 10/30/22 0759    Vitamin D (CHOLECALCIFEROL) tablet 5,000 Units, 5,000 Units, Oral, Daily, Susi Beal MD, 5,000 Units at 10/30/22 0759    Family History:   Family History   Problem Relation Age of Onset    Cancer Mother         \"Liver Cancer\"    Arthritis Mother     Depression Mother     Hearing Loss Mother     High Blood Pressure Mother     High Cholesterol Mother     Mental Illness Mother     Miscarriages / [place] Mother     Heart Disease Father     Early Death Father 36        \"Instant Death\"    High Blood Pressure Father     High Cholesterol Father     Coronary Art Dis Father         Massive MI    Heart Disease Sister     Depression Sister     Cancer Sister         \"Breast Cancer, Cancer Free Now\"    High Blood Pressure Sister     Other Daughter         \"She's Had Stomach Surgery\"    High Blood Pressure Son     High Blood Pressure Son     Early Death Paternal Grandfather        Exam:    Blood pressure (!) 103/58, pulse 60, temperature 97.5 °F (36.4 °C), resp.  rate 24, height 4' 11.02\" (1.499 m), weight 144 lb 2.9 oz (65.4 kg), SpO2 97 %. General: Patient was seen semirecumbent in bed. O2 per nasal cannula. She is sleepy, confused and a little agitated. Oriented to self only. HEENT: O2 per nasal cannula. MMM. No JVD. Guarded cervical rotation beyond 40 degrees bilaterally. Visual fields full. Pulmonary: Shallow respirations bilaterally with blunting in the bases. He has no cough. No wheeze or rales. Cardiac: Regular rhythm with a low rate. Abdomen: Patient's abdomen was soft and nondistended. Bowel sounds were present throughout. There was no rebound, guarding or masses noted. Spinal exam: Diffuse palpation tenderness from the dorsal apex to the sacrum. No gross malalignment noted. Skin intact. Upper extremities: Patient was able to bring both hands up to meet mine. Fair  strength without tremor. No deep tendon reflexes. Normal tone. Lower extremities: Her left calf is dressed. 1-2+ edema bilaterally. Heels clear. Give way with any attempted MMT across the knees and ankles with confusion. No signs of DVT. Sitting balance was fair. Standing balance was poor.     Lab Results   Component Value Date    WBC 10.5 10/25/2022    HGB 13.5 10/25/2022    HCT 41.7 10/25/2022    MCV 93.3 10/25/2022     10/25/2022     Lab Results   Component Value Date    INR 1.23 10/20/2022    INR 1.88 03/24/2022    INR 1.80 03/23/2022    PROTIME 15.9 (H) 10/20/2022    PROTIME 24.4 (H) 03/24/2022    PROTIME 23.3 (H) 03/23/2022     Lab Results   Component Value Date    CREATININE 0.7 10/30/2022    BUN 40 (H) 10/30/2022     (L) 10/30/2022    K 4.0 10/30/2022    CL 87 (L) 10/30/2022    CO2 34 (H) 10/30/2022     Lab Results   Component Value Date    ALT 19 10/25/2022    AST 28 10/25/2022    ALKPHOS 276 (H) 10/25/2022    BILITOT 1.6 (H) 10/25/2022         Impression: 77-year-old female with a history of pulmonary hypertension, obstructive sleep apnea and CHF who has suffered a decompensation of her CHF possibly due to pulmonary hypertension associated with hyponatremia, a left calf wound and symptomatic orthostatic hypotension. She also is suffering with sundowners syndrome. Strengths for the patient: Her accessible home and a bit of experience with adaptive equipment. Limitations/barriers for the patient: Her age, she lives alone and her confusion. Recommendation: Acute inpatient rehabilitation with occupational and physical therapy 180 minutes 5 out of every 7 days. Will address basic and  advancing mobility with self-care instruction and adaptive equipment training. Caregiver education will be offered. Expected length of stay  prior to a supervised level of function for discharge home with a walker and C OT/PT is 2 weeks. Additional recommendation:    Acute on chronic congestive heart failure with gait disturbance: The patient requires daily occupational and physical therapy. We will monitor her weights daily to evaluate her compensation of CHF. Diuresis with Aldactone, Demadex and Zaroxolyn. Blood pressure support with ProAmatine. Encouraging consistent oral intake of heart healthy diet/fluids. Aggressive pulmonary hygiene measures. She needs adaptive equipment training, DVT prophylaxis and possibly caregiver education. Outpatient follow-up with her cardiologist and PCP. DVT prophylaxis: Lovenox 40 mg subcu daily. I must monitor her hemoglobin and platelet count periodically while on this medication. Weightbearing activities will be pursued daily. GI prophylaxis is available. Pulmonary hypertension: Gentle diuresis. Monitoring her CHF. Slow and deliberate position changes. Sundowner syndrome: Sleep regulation. Consistent staffing and environment when possible. Minimizing medications that might alter her sensorium. Involving family when possible for training and therapies. Obstructive sleep apnea:  The respiratory service has been consulted to assist with nighttime BiPAP. She is on albuterol and Stiolto inhalers      I personally performed a history and physical on this patient within 24 hours of admission to the rehab unit. I have reviewed the preadmission screening and concur with its findings without change. A detailed plan of care will be established by hospital day 4 and I attest the patient is appropriate for inpatient rehabilitation at this time. I have compared the patient's current functional status noted during my history and physical with that of the preadmission screen and I have found no significant differences.

## 2022-10-31 LAB
CULTURE: ABNORMAL
CULTURE: ABNORMAL
Lab: ABNORMAL
SPECIMEN: ABNORMAL

## 2022-10-31 PROCEDURE — 1280000000 HC REHAB R&B

## 2022-10-31 PROCEDURE — 6370000000 HC RX 637 (ALT 250 FOR IP): Performed by: INTERNAL MEDICINE

## 2022-10-31 PROCEDURE — 2700000000 HC OXYGEN THERAPY PER DAY

## 2022-10-31 PROCEDURE — 6370000000 HC RX 637 (ALT 250 FOR IP): Performed by: PHYSICAL MEDICINE & REHABILITATION

## 2022-10-31 PROCEDURE — 2500000003 HC RX 250 WO HCPCS: Performed by: INTERNAL MEDICINE

## 2022-10-31 PROCEDURE — 94761 N-INVAS EAR/PLS OXIMETRY MLT: CPT

## 2022-10-31 PROCEDURE — 97167 OT EVAL HIGH COMPLEX 60 MIN: CPT

## 2022-10-31 PROCEDURE — 94150 VITAL CAPACITY TEST: CPT

## 2022-10-31 PROCEDURE — 97530 THERAPEUTIC ACTIVITIES: CPT

## 2022-10-31 PROCEDURE — 99232 SBSQ HOSP IP/OBS MODERATE 35: CPT | Performed by: PHYSICAL MEDICINE & REHABILITATION

## 2022-10-31 PROCEDURE — 2580000003 HC RX 258: Performed by: INTERNAL MEDICINE

## 2022-10-31 PROCEDURE — 6360000002 HC RX W HCPCS: Performed by: INTERNAL MEDICINE

## 2022-10-31 PROCEDURE — 97535 SELF CARE MNGMENT TRAINING: CPT

## 2022-10-31 PROCEDURE — 94640 AIRWAY INHALATION TREATMENT: CPT

## 2022-10-31 PROCEDURE — 97116 GAIT TRAINING THERAPY: CPT

## 2022-10-31 PROCEDURE — 97163 PT EVAL HIGH COMPLEX 45 MIN: CPT

## 2022-10-31 RX ADMIN — SODIUM CHLORIDE, PRESERVATIVE FREE 10 ML: 5 INJECTION INTRAVENOUS at 09:00

## 2022-10-31 RX ADMIN — Medication 5000 UNITS: at 08:33

## 2022-10-31 RX ADMIN — TIMOLOL MALEATE 1 DROP: 5 SOLUTION OPHTHALMIC at 22:22

## 2022-10-31 RX ADMIN — MICONAZOLE NITRATE: 2 POWDER TOPICAL at 11:28

## 2022-10-31 RX ADMIN — TORSEMIDE 40 MG: 20 TABLET ORAL at 08:35

## 2022-10-31 RX ADMIN — METOLAZONE 5 MG: 2.5 TABLET ORAL at 08:36

## 2022-10-31 RX ADMIN — ASPIRIN 81 MG CHEWABLE TABLET 81 MG: 81 TABLET CHEWABLE at 08:35

## 2022-10-31 RX ADMIN — ALBUTEROL SULFATE 2 PUFF: 90 AEROSOL, METERED RESPIRATORY (INHALATION) at 20:15

## 2022-10-31 RX ADMIN — SODIUM CHLORIDE, PRESERVATIVE FREE 10 ML: 5 INJECTION INTRAVENOUS at 22:00

## 2022-10-31 RX ADMIN — CYANOCOBALAMIN TAB 1000 MCG 1000 MCG: 1000 TAB at 08:34

## 2022-10-31 RX ADMIN — LATANOPROST 1 DROP: 50 SOLUTION/ DROPS OPHTHALMIC at 17:35

## 2022-10-31 RX ADMIN — ACETAMINOPHEN 650 MG: 325 TABLET ORAL at 06:08

## 2022-10-31 RX ADMIN — TIOTROPIUM BROMIDE AND OLODATEROL 2 PUFF: 3.124; 2.736 SPRAY, METERED RESPIRATORY (INHALATION) at 09:06

## 2022-10-31 RX ADMIN — SPIRONOLACTONE 25 MG: 50 TABLET ORAL at 08:34

## 2022-10-31 RX ADMIN — LEVOTHYROXINE SODIUM 88 MCG: 0.11 TABLET ORAL at 06:08

## 2022-10-31 RX ADMIN — METOLAZONE 5 MG: 2.5 TABLET ORAL at 22:21

## 2022-10-31 RX ADMIN — ATORVASTATIN CALCIUM 10 MG: 10 TABLET, FILM COATED ORAL at 22:21

## 2022-10-31 RX ADMIN — ENOXAPARIN SODIUM 40 MG: 40 INJECTION SUBCUTANEOUS at 08:36

## 2022-10-31 RX ADMIN — TORSEMIDE 40 MG: 20 TABLET ORAL at 17:34

## 2022-10-31 RX ADMIN — COLLAGENASE SANTYL: 250 OINTMENT TOPICAL at 11:26

## 2022-10-31 RX ADMIN — MIDODRINE HYDROCHLORIDE 5 MG: 5 TABLET ORAL at 17:34

## 2022-10-31 RX ADMIN — CARVEDILOL 6.25 MG: 6.25 TABLET, FILM COATED ORAL at 08:34

## 2022-10-31 RX ADMIN — MIDODRINE HYDROCHLORIDE 5 MG: 5 TABLET ORAL at 12:52

## 2022-10-31 RX ADMIN — ALBUTEROL SULFATE 2 PUFF: 90 AEROSOL, METERED RESPIRATORY (INHALATION) at 09:06

## 2022-10-31 RX ADMIN — CARVEDILOL 6.25 MG: 6.25 TABLET, FILM COATED ORAL at 17:33

## 2022-10-31 RX ADMIN — AMOXICILLIN AND CLAVULANATE POTASSIUM 1 TABLET: 875; 125 TABLET, FILM COATED ORAL at 22:21

## 2022-10-31 RX ADMIN — AMOXICILLIN AND CLAVULANATE POTASSIUM 1 TABLET: 875; 125 TABLET, FILM COATED ORAL at 08:36

## 2022-10-31 RX ADMIN — MICONAZOLE NITRATE: 2 POWDER TOPICAL at 22:51

## 2022-10-31 RX ADMIN — MIDODRINE HYDROCHLORIDE 5 MG: 5 TABLET ORAL at 08:34

## 2022-10-31 ASSESSMENT — PAIN DESCRIPTION - DESCRIPTORS: DESCRIPTORS: ACHING

## 2022-10-31 ASSESSMENT — PAIN SCALES - WONG BAKER
WONGBAKER_NUMERICALRESPONSE: 0
WONGBAKER_NUMERICALRESPONSE: 0

## 2022-10-31 ASSESSMENT — PAIN SCALES - GENERAL
PAINLEVEL_OUTOF10: 0
PAINLEVEL_OUTOF10: 0
PAINLEVEL_OUTOF10: 3

## 2022-10-31 ASSESSMENT — PAIN DESCRIPTION - LOCATION: LOCATION: BACK

## 2022-10-31 NOTE — CARE COORDINATION
Case Management Admission Note    Patient:Irene Mejia      :3/18/1929  XQU:[id number]  Rehab Dx/Hx: Acute on chronic diastolic (congestive) heart failure [I50.33]  Acute on chronic combined systolic and diastolic CHF (congestive heart failure) ([address] [I50.43]    Chief Complaint:   Past Medical History:   Diagnosis Date    Age-related osteoporosis with current pathological fracture of vertebra ([address] 2022    Arthritis     Bradycardia     requiring [facility] pac[facility]    CAD (coronary artery disease)     Cardiac pacemaker 10/2001    [employer] #[id number]  PPM- Serial # 26-[facility]- Dr Vangie Godfrey    CHF (congestive heart failure) ([address]     COPD, mild ([address] 2017    Cor pulmonale (chronic) ([address] 3/15/2022    CVA (cerebrovascular accident) ([address]     DJD (degenerative joint disease) of cervical spine     C5-C6, C6-C7    Exhaustion of cardiac pacemaker battery 2011    PPM battery replacement- [employer]    Family history of cardiovascular disease     Glaucoma Dx     H/O 24 hour EKG monitoring 2000- Intermittent episonde of a-fib/flutter    H/O cardiac catheterization 2010, 2010-Severe native vessel disease and has graft disease as well. LAD, CX totally occluded. RCA totally occluded in proximal segment. VG to RCA widely patent. PDA 90% LAD afer LIMA anastomosis 80-90% stenosis. Proceeded with PTCA with stent next day. H/O cardiovascular stress test 10/14/2011, 2010,2010, 2009,2009, 10/30/2007, 2004, 2003, 2002, 2001,2000,     10/14/2011-Lexiscan-Abnormal Myocardial Perfusion study. Evidence of mild ischemia in the Left CX region. Abnomal study. Rest EF 63%. Global LV systolic function normal. No ECG changes. Unremarkable pharmacological stress test.    H/O cardiovascular stress test 6/10/2013    thallium--mild ischemia left circumflex EF63% no change from 10/2011 study.     H/O cardiovascular stress test 10/16/2014    cardiolite-mild ischemia left circumflex,EF70%    H/O chest x-ray 4/19/2009 4/19/2009-Stable cardiomegaly. No acute cardiopulmonary disease. H/O Doppler lower venous ultrasound 09/18/2019    Significant reflux noted in RGSV, RGSV is extremely tortuous and small along the thigh and calf and would be highly unlikely to be accessed, RSSV is non compressible and has occlusive chronic SVT, LSSV is non compressible with occlusive chronic SVT, LGSV removed s/p CABG, Significant reflux in LGSV tributary,    H/O Doppler ultrasound 3/31/2010    CAROTID- 3/31/2010-INtimal thickening but no significant atherosclerotic plaque noted in ANA PAULA. Doppler flow velocities within ANA PAULA are WNL. Heterogeneous, irregular atherosclerotic plaque noted in LICA. Doppler flow velocities within the LICA are elevated, consistent with a mild, less than 50% stenosis. H/O Doppler ultrasound 5/24/2016    Carotid- normal study    H/O Doppler ultrasound 09/06/2017    carotid - normal study    H/O Doppler ultrasound     H/O echocardiogram 10/13    EF=60%, Severe Pulm. HTN, & Sclerotic aortic valve w/stenosis. H/O echocardiogram 10/16/14     EF 55-60% Normal LV. Normal LV systolic function. Severe tricuspid insufficiency with severe hypertension. H/O echocardiogram 02/03/2017    heart cath performed this morning    H/O echocardiogram 09/18/2019    EF 50-55%, Left atrium is mild to moderately dilated, right atrium is severely dilated, mildly dilated right ventricle, Mod MR, Severe TR, Severe Pulm HTN, no pericardial effusion     History of complete ECG     10/14/2011(Lexiscan);5/6/2010, 4/30/2009,10/24/2008,9/21/2007, 10/13/2006    History of nuclear stress test 11/17/2016    lexiscan-normal,EF70%    Hx of cardiovascular stress test 12/28/2018    EF 60%  Normal study.     HX OTHER MEDICAL 05/01/2017    MUGA-normal, EF53%    Hyperlipidemia     Hypertension     Mild intermittent asthma 7/28/2016    Moderate COPD (chronic obstructive pulmonary disease) ([address] 3/15/2022    Nausea & vomiting     Obstructive sleep apnea 5/16/2017    Paroxysmal atrial fibrillation (HCC)     Post PTCA 4/21/2010    PTCA with 2.25 stent of the LIMA to LAD    Pulmonary HTN ([address]     Severe per last echo on 10/13. 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 Followed by MAZE procedure by pulmonary vein isolation.-  Dr sEtela Loomis    S/P PTCA (percutaneous transluminal coronary angioplasty) 11/2012    PTCA with stent to RCA    Severe pulmonary hypertension ([address] 3/15/2022    Shortness of breath 3/15/2022    Thyroid disease     hypothyroi    Unspecified cerebral artery occlusion with cerebral infarction Unsure When    No Residual    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 ([address] 7/29/2022    WD-Non-pressure chronic ulcer of other part of left lower leg limited to breakdown of skin ([address] 7/29/2022     Past Surgical History:   Procedure Laterality Date    APPENDECTOMY  1941    CARDIAC SURGERY  4/09    CABG (3 Bypasses), One Heart Stent in 2010    COLONOSCOPY  In 2000's    X1    CORONARY ANGIOPLASTY WITH STENT PLACEMENT  4/21/2010    PTCA with stent LIMA ->LAD    CORONARY ARTERY BYPASS GRAFT  4/8/2009    LIMA->Diag,  LIMA -> LAD;  SVG->RCA going to the PDA.  Followed by MAZE procedure by pulmonary vein isolation.-  Dr Juárez Lab, TOTAL ABDOMINAL (CERVIX REMOVED)  1990's    MIDDLE EAR SURGERY      OTHER SURGICAL HISTORY      Ear surgery-hearing    PACEMAKER PLACEMENT      battery change 11/21/2011 [employer]    PTCA  11/2012    Ptca with stent to RCA    TONSILLECTOMY  1950's     Allergies   Allergen Reactions    Darvocet [Propoxyphene N-Acetaminophen]     Ranexa [Ranolazine Er]      Sick to her stomach    Ranolazine      Sick to her stomach    Sulfa Antibiotics Itching    Sulfasalazine Itching    Adhesive Tape Rash    Allantoin Rash Bacitracin Rash    Gramicidin Rash    Neomycin Rash    Polymyxin B Rash    Pramoxine Hcl Rash    Silicone Rash     Precautions: falls    Date of Admit: 10/29/2022  Room #: 9174/5868-C      Current functional status at time of admit:  Home Living/DME Available:  Type of Home: Condo  Home Access: Level entry  Bathroom Shower/Tub: Tub/Shower unit, Walk-in shower  Bathroom Toilet: Standard  Bathroom Equipment: Grab bars in shower, Grab bars around toilet  Home Equipment: Darnella Belch, [place], Darnella Belch, 4 wheeled    IADL Hx:  Active : No  Occupation: Retired    Spouse:   Family: Per patient family is supportive. Patient requested LSW contact her daughter for initial assessment. LSW met with patient for admission assessment. Patient was having difficulty hearing LSW. Patient requested LSW contact her daughter for assessment. Assessment was completed with daughter/POA. [facility]. There is a level entry and no steps inside. Patient has PCP, was independent in ADLs PTA, and uses [facility] in [place] for prescriptions. Home DME includes a rollator, shower chair and pre-existing HHC with [employer]. A RN was coming 1x week for wound care per daughter. Staff interview was completed in place of the BIMS assessment with patient. Room whiteboard updated. Plan is to D/C home with daughter staying with her upon discharge, with [facility] and DME as recommended by therapy staff. F/U to be set with Jyoti Greer MD and family will transport home.     ADDIE Everett, LSW, 10/31/2022, 2:06 PM

## 2022-10-31 NOTE — PROGRESS NOTES
Physical Therapy  [facility] ARU PHYSICAL THERAPY EVALUATION    Chart Review:  Past Medical History:   Diagnosis Date    Age-related osteoporosis with current pathological fracture of vertebra ([address] 7/8/2022    Arthritis     Bradycardia 2001    requiring dual chamber pacemaker at [facility]    CAD (coronary artery disease)     Cardiac pacemaker 10/2001    [employer] #[id number]  PPM- Serial # 26-[facility]- Dr Otto Wise    CHF (congestive heart failure) ([facility])     COPD, mild ([address] 2/17/2017    Cor pulmonale (chronic) ([address] 3/15/2022    CVA (cerebrovascular accident) ([address]     DJD (degenerative joint disease) of cervical spine     C5-C6, C6-C7    Exhaustion of cardiac pacemaker battery 11/21/2011    PPM battery replacement- [employer]    Family history of cardiovascular disease     Glaucoma Dx 2010    H/O 24 hour EKG monitoring 8/6/2000 8/6/2000- Intermittent episonde of a-fib/flutter    H/O cardiac catheterization 4/20/2010, 2/1989 4/20/2010-Severe native vessel disease and has graft disease as well. LAD, CX totally occluded. RCA totally occluded in proximal segment. VG to RCA widely patent. PDA 90% LAD afer LIMA anastomosis 80-90% stenosis. Proceeded with PTCA with stent next day. H/O cardiovascular stress test 10/14/2011, 6/2/2010,4/8/2010, 5/18/2009,4/6/2009, 10/30/2007, 11/12/2004, 11/6/2003, 8/9/2002, 8/9/2001,6/5/2000,     10/14/2011-Lexiscan-Abnormal Myocardial Perfusion study. Evidence of mild ischemia in the Left CX region. Abnomal study. Rest EF 63%. Global LV systolic function normal. No ECG changes. Unremarkable pharmacological stress test.    H/O cardiovascular stress test 6/10/2013    thallium--mild ischemia left circumflex EF63% no change from 10/2011 study. H/O cardiovascular stress test 10/16/2014    cardiolite-mild ischemia left circumflex,EF70%    H/O chest x-ray 4/19/2009 4/19/2009-Stable cardiomegaly. No acute cardiopulmonary disease.     H/O Doppler lower venous ultrasound 09/18/2019 Significant reflux noted in RGSV, RGSV is extremely tortuous and small along the thigh and calf and would be highly unlikely to be accessed, RSSV is non compressible and has occlusive chronic SVT, LSSV is non compressible with occlusive chronic SVT, LGSV removed s/p CABG, Significant reflux in LGSV tributary,    H/O Doppler ultrasound 3/31/2010    CAROTID- 3/31/2010-INtimal thickening but no significant atherosclerotic plaque noted in ANA PAULA. Doppler flow velocities within ANA PAULA are WNL. Heterogeneous, irregular atherosclerotic plaque noted in LICA. Doppler flow velocities within the LICA are elevated, consistent with a mild, less than 50% stenosis. H/O Doppler ultrasound 5/24/2016    Carotid- normal study    H/O Doppler ultrasound 09/06/2017    carotid - normal study    H/O Doppler ultrasound     H/O echocardiogram 10/13    EF=60%, Severe Pulm. HTN, & Sclerotic aortic valve w/stenosis. H/O echocardiogram 10/16/14     EF 55-60% Normal LV. Normal LV systolic function. Severe tricuspid insufficiency with severe hypertension. H/O echocardiogram 02/03/2017    heart cath performed this morning    H/O echocardiogram 09/18/2019    EF 50-55%, Left atrium is mild to moderately dilated, right atrium is severely dilated, mildly dilated right ventricle, Mod MR, Severe TR, Severe Pulm HTN, no pericardial effusion     History of complete ECG     10/14/2011(Lexiscan);5/6/2010, 4/30/2009,10/24/2008,9/21/2007, 10/13/2006    History of nuclear stress test 11/17/2016    lexiscan-normal,EF70%    Hx of cardiovascular stress test 12/28/2018    EF 60%  Normal study.     HX OTHER MEDICAL 05/01/2017    MUGA-normal, EF53%    Hyperlipidemia     Hypertension     Mild intermittent asthma 7/28/2016    Moderate COPD (chronic obstructive pulmonary disease) (HCC) 3/15/2022    Nausea & vomiting     Obstructive sleep apnea 5/16/2017    Paroxysmal atrial fibrillation (HCC)     Post PTCA 4/21/2010    PTCA with 2.25 stent of the LIMA to LAD Pulmonary HTN ([address]     Severe per last echo on 10/13. 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  Followed by MAZE procedure by pulmonary vein isolation.-  Dr Zamora Hands, TOTAL ABDOMINAL (CERVIX REMOVED)  1990's    MIDDLE EAR SURGERY      OTHER SURGICAL HISTORY      Ear surgery-hearing    PACEMAKER PLACEMENT      battery change 11/21/2011 [employer]    PTCA  11/2012    Ptca with stent to RCA    TONSILLECTOMY  1950's     Fall History: Pt poor historian, but eval from last ARU in 10/21, states multiple falls  Social History:  Social/Functional History  Lives With: Alone  Type of Home: [address] [place]: One level  Home Access: Level entry  Bathroom Shower/Tub: Tub/Shower unit, Walk-in shower  Bathroom Toilet: Standard  Bathroom Equipment: Grab bars in shower, Grab bars around toilet  Home Equipment: Tina Venegas, Darrell Sloan, Tina Venegas, 4 wheeled  ADL Assistance: [facility]: Independent  Ambulation Assistance: Independent  Transfer Assistance: Independent  Active : No  Patient's  Info: Daughter in law does grocery shopping. Occupation: Retired  Additional Comments: D/t communication difficulties related to hearing aid issues, and cognitive concerns information above obtained from IP evals and needs confirmed    Restrictions:  Restrictions/Precautions  Restrictions/Precautions: General Precautions, Fall Risk (2L02, [place])            Pain Level: 0  Pain Location: Back    Objective:              Hearing  Hearing: Exceptions to [facility]  Hearing Exceptions: Hard of hearing/hearing concerns, Bilateral hearing aid (hearing aids not working at time of eval)    Sensation:   Unable to test due to hearing loss    Observation:   Observation/Palpation  Observation: Pt in bed on 2LO2, baseline HR 63, O2 94%, RR 36. RR and HR did not deviate during session. O2 on 2L remained ~94%, on room air dropped to 88% after 100' gait  Edema: Moderate in abdomen and B thighs. ROSE hose applied to RLE only 2* L calf wound    ROM:    WFL as assessed in function                          Strength:     Pt could not understand MMT, but demonstrates at least 3+/5 grossly                 Bed Mobility:   Lying to Sitting on Side of Bed  Assistance Needed: Supervision or touching assistance  Comment: as sit to lying  CARE Score: 4  Discharge Goal: Independent  Roll Left and Right  Assistance Needed: Supervision or touching assistance  Comment: supervision  CARE Score: 4  Discharge Goal: Independent  Sit to Lying  Assistance Needed: Supervision or touching assistance  Comment: mod effort, grimacing in face.  pt denied anything wrong  CARE Score: 4  Discharge Goal: Independent    Transfers:    Sit to Stand  Assistance Needed: Partial/moderate assistance  Comment: variable CG to min assist with min more common after long-term through session, poor safety  CARE Score: 3  Discharge Goal: Independent  Chair/Bed-to-Chair Transfer  Assistance Needed: Partial/moderate assistance  Comment: 2ww, poor awareness of O2 line  CARE Score: 3  Discharge Goal: Independent (with no O2 line. predict assist with line if pt still on O2)     Car Transfer  Assistance Needed: Supervision or touching assistance  Comment: assist with O2 line  CARE Score: 4  Discharge Goal: Independent    Ambulation:   Device used PTA: 4ww   Walking Ability  Does the Patient Walk?: Yes     Walk 10 Feet  Assistance Needed: Supervision or touching assistance  Comment: CG with 2ww, assist with O2 line  CARE Score: 4  Discharge Goal: Supervision or touching assistance     Walk 50 Feet with Two Turns  Assistance Needed: Supervision or touching assistance  Comment: CG with 2ww and assist with O2 line. Pt demonstrates moderate path deviation, slow alisha with variable step length  CARE Score: 4  Discharge Goal: Supervision or touching assistance     Walk 150 Feet  Assistance Needed: Supervision or touching assistance  Comment: 2ww as 50'.   CARE Score: 4  Discharge Goal: Supervision or touching assistance     Walking 10 Feet on Uneven Surfaces  Assistance Needed: Partial/moderate assistance  Comment: min assist with 2ww, decreased awareness of obstacles though pt states she sees them  CARE Score: 3  Discharge Goal: Supervision or touching assistance     1 Step (Curb)  Assistance Needed: Partial/moderate assistance  Comment: min assist with walker management, mod cues  CARE Score: 3  Discharge Goal: Supervision or touching assistance     4 Steps  Assistance Needed: Supervision or touching assistance  Comment: CG with B rails  CARE Score: 4  Discharge Goal: Supervision or touching assistance     12 Steps  Reason if not Attempted: Not applicable  CARE Score: 9  Discharge Goal: Not Applicable    Gait Deviations: []None []Slow alisha  [] Increased MANSOOR  [] Staggers []Deviated Path  [] Decreased step length  [] Decreased step height  []Decreased arm swing  [] Shuffles  [] Decreased head and trunk rotation  []other:        Wheelchair:  w/c Ability: Wheelchair Ability  Uses a Wheelchair and/or Scooter?: No                Balance:        Object: Picking Up Object  Assistance Needed: Partial/moderate assistance  Comment: min assist with 2ww, no reacher  CARE Score: 3  Discharge Goal: Supervision or touching assistance               Assessment:   The patient is a 80year old female admitted onto ARU after hospitalization for acute CHF at her cardiologist's office. (He sent her to ER). IV diuretics were initiated in the office but did not provide adequate relief. She had significant ascites and peripheral edema. IV diuretics were continued and she had a paracentesis (350 cc of fluid removed). Bradycardia has been evaluated by cardiology and medications have been adjusted. She has a traumatic wound to her left posterior calf that has been treated by wound care. Per charting, pt lives alone and is Ind c ADLs and functional transfers/mobility using a walker and is not on 02.  pt presented with cognitive deficits including memory, problem solving, and insight. 02 was always WFL on 2L, slight drop on RA to 88% with moderate effort task. RR was elevated to 36 but did not deviate at rest or with activity. Pt was falling asleep at times during session. Pt performed overall at CG to min assist with mod to max cues, but these were diffcult to give in a timely manner due to pt's profound hearing loss. Feel pt will benefit from ARU-PT to improve function, but feel (as last admission) that pt should have 24 hour care due to safety issues.      Body Structures, Functions, Activity Limitations Requiring Skilled Therapeutic Intervention: Decreased functional mobility , Decreased ADL status, Decreased strength, Decreased vision/visual deficit, Decreased high-level IADLs, Decreased balance, Decreased posture, Increased pain, Decreased endurance, Decreased cognition, Decreased safe awareness     Therapy Prognosis: Good  Decision Making: High Complexity  Clinical Presentation: unpredictable characteristics      Patient education:   ARU schedule, ARU expectations for participation, plan of care,   Treatment Initiated:  Functional mobility training, gait training, patient education  Barriers to Improvement:  cognition, [place],   Discharge Recommendations:  home with 24/7 supervision  Equipment Recommendations:  2ww possible    Goals:  Patient Goals   Patient Goals : go home  Short Term Goals  Time Frame for Short Term Goals: 5-7 STG=LTG  Short Term Goal 1: Pt will perform bed mobility with mod I  Short Term Goal 2: Pt will perform sit to stand, pivot and car transfers with mod I if no O2, supervision if O2  Short Term Goal 3: Pt will ambulate 150' on level surfaces with 2ww and 10' on uneven surfaces with supervision with or without O2  Short Term Goal 4: Pt will ascend/descend curb step and 4 steps with rail with supervision  Short Term Goal 5: Pt will retrieve object on floor with 2ww and supervision     Plan:    Requires PT Follow-Up: Yes  Pt will be seen at least 60 minutes per day for a minimum of 5 days per week, plus group therapy as appropriate  Physcial Therapy Plan  Current Treatment Recommendations: Strengthening, Balance training, Functional mobility training, Transfer training, ADL/Self-care training, IADL training, Cognitive/Perceptual training, Endurance training, Safety education & training, Patient/Caregiver education & training, Therapeutic activities, Gait training, Stair training, Equipment evaluation, education, & procurement, Pain management, Neuromuscular re-education, Positioning, Home exercise program, Cognitive reorientation    PT Individual Minutes  Time In: 1400  Time Out: 7861  Minutes: 90               PT Evaluation 15   Gait Training 30   Therapeutic Exercise    Neuro Re-Ed    Therapeutic Activity 45   Wheelchair Propulsion    Group    Other:    TOTAL 90       Electronically signed by: Evelia Young, PT, PT   10/31/2022,

## 2022-10-31 NOTE — CARE COORDINATION
Attempted BIMS unable to complete due to patient stating she could not hear. Completed Staff assessment with the use of picture cue card. She was able to report current season and that she was in the hospital.     OT requested LSW call family to bring in batteries for patient's hearing aides. Called daughter/POA. She stated that she has already brought in 2 extra packages of batteries. They should be with the patient's items. OT notified.

## 2022-10-31 NOTE — PROGRESS NOTES
Occupational Therapy                              [facility] ARU OCCUPATIONAL THERAPY EVALUATION    Chart Review:  Past Medical History:   Diagnosis Date    Age-related osteoporosis with current pathological fracture of vertebra ([address] 7/8/2022    Arthritis     Bradycardia 2001    requiring dual chamber pacemaker at [facility]    CAD (coronary artery disease)     Cardiac pacemaker 10/2001    [employer] #[id number]  PPM- Serial # [id number]- Dr Cassy Burgos    CHF (congestive heart failure) ([facility])     COPD, mild ([address] 2/17/2017    Cor pulmonale (chronic) ([address] 3/15/2022    CVA (cerebrovascular accident) ([address]     DJD (degenerative joint disease) of cervical spine     C5-C6, C6-C7    Exhaustion of cardiac pacemaker battery 11/21/2011    PPM battery replacement- [employer]    Family history of cardiovascular disease     Glaucoma Dx 2010    H/O 24 hour EKG monitoring 8/6/2000 8/6/2000- Intermittent episonde of a-fib/flutter    H/O cardiac catheterization 4/20/2010, 2/1989 4/20/2010-Severe native vessel disease and has graft disease as well. LAD, CX totally occluded. RCA totally occluded in proximal segment. VG to RCA widely patent. PDA 90% LAD afer LIMA anastomosis 80-90% stenosis. Proceeded with PTCA with stent next day. H/O cardiovascular stress test 10/14/2011, 6/2/2010,4/8/2010, 5/18/2009,4/6/2009, 10/30/2007, 11/12/2004, 11/6/2003, 8/9/2002, 8/9/2001,6/5/2000,     10/14/2011-Lexiscan-Abnormal Myocardial Perfusion study. Evidence of mild ischemia in the Left CX region. Abnomal study. Rest EF 63%. Global LV systolic function normal. No ECG changes. Unremarkable pharmacological stress test.    H/O cardiovascular stress test 6/10/2013    thallium--mild ischemia left circumflex EF63% no change from 10/2011 study. H/O cardiovascular stress test 10/16/2014    cardiolite-mild ischemia left circumflex,EF70%    H/O chest x-ray 4/19/2009 4/19/2009-Stable cardiomegaly. No acute cardiopulmonary disease.     H/O Doppler lower venous ultrasound 09/18/2019    Significant reflux noted in RGSV, RGSV is extremely tortuous and small along the thigh and calf and would be highly unlikely to be accessed, RSSV is non compressible and has occlusive chronic SVT, LSSV is non compressible with occlusive chronic SVT, LGSV removed s/p CABG, Significant reflux in LGSV tributary,    H/O Doppler ultrasound 3/31/2010    CAROTID- 3/31/2010-INtimal thickening but no significant atherosclerotic plaque noted in ANA PAULA. Doppler flow velocities within ANA PAULA are WNL. Heterogeneous, irregular atherosclerotic plaque noted in LICA. Doppler flow velocities within the LICA are elevated, consistent with a mild, less than 50% stenosis. H/O Doppler ultrasound 5/24/2016    Carotid- normal study    H/O Doppler ultrasound 09/06/2017    carotid - normal study    H/O Doppler ultrasound     H/O echocardiogram 10/13    EF=60%, Severe Pulm. HTN, & Sclerotic aortic valve w/stenosis. H/O echocardiogram 10/16/14     EF 55-60% Normal LV. Normal LV systolic function. Severe tricuspid insufficiency with severe hypertension. H/O echocardiogram 02/03/2017    heart cath performed this morning    H/O echocardiogram 09/18/2019    EF 50-55%, Left atrium is mild to moderately dilated, right atrium is severely dilated, mildly dilated right ventricle, Mod MR, Severe TR, Severe Pulm HTN, no pericardial effusion     History of complete ECG     10/14/2011(Lexiscan);5/6/2010, 4/30/2009,10/24/2008,9/21/2007, 10/13/2006    History of nuclear stress test 11/17/2016    lexiscan-normal,EF70%    Hx of cardiovascular stress test 12/28/2018    EF 60%  Normal study.     HX OTHER MEDICAL 05/01/2017    MUGA-normal, EF53%    Hyperlipidemia     Hypertension     Mild intermittent asthma 7/28/2016    Moderate COPD (chronic obstructive pulmonary disease) (HCC) 3/15/2022    Nausea & vomiting     Obstructive sleep apnea 5/16/2017    Paroxysmal atrial fibrillation ([address]     Post PTCA 4/21/2010    PTCA with 2.25 stent of the LIMA to LAD    Pulmonary HTN (HCC)     Severe per last echo on 10/13. 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 Followed by MAZE procedure by pulmonary vein isolation.-  Dr Ewa Richardson    S/P PTCA (percutaneous transluminal coronary angioplasty) 11/2012    PTCA with stent to RCA    Severe pulmonary hypertension ([address] 3/15/2022    Shortness of breath 3/15/2022    Thyroid disease     hypothyroi    Unspecified cerebral artery occlusion with cerebral infarction Unsure When    No Residual    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 ([address] 7/29/2022    WD-Non-pressure chronic ulcer of other part of left lower leg limited to breakdown of skin ([address] 7/29/2022     Past Surgical History:   Procedure Laterality Date    APPENDECTOMY  1941    CARDIAC SURGERY  4/09    CABG (3 Bypasses), One Heart Stent in 2010    COLONOSCOPY  In 2000's    X1    CORONARY ANGIOPLASTY WITH STENT PLACEMENT  4/21/2010    PTCA with stent LIMA ->LAD    CORONARY ARTERY BYPASS GRAFT  4/8/2009    LIMA->Diag,  LIMA -> LAD;  SVG->RCA going to the PDA.  Followed by MAZE procedure by pulmonary vein isolation.-  Dr Julio Martinez, TOTAL ABDOMINAL (CERVIX REMOVED)  1990's    MIDDLE EAR SURGERY      OTHER SURGICAL HISTORY      Ear surgery-hearing    PACEMAKER PLACEMENT      battery change 11/21/2011 [employer]tr[employer]    PTCA  11/2012    Ptca with stent to RCA    TONSILLECTOMY  1950's     Social History:  Social/Functional History  Lives With: Alone  Type of Home: Condo  Home Layout: One level  Home Access: Level entry  Bathroom Shower/Tub: Tub/Shower unit, Walk-in shower  Bathroom Toilet: Standard  Bathroom Equipment: Grab bars in shower, Grab bars around toilet  Home Equipment: Delilah Roly, [place], Delilah Roly, 4 wheeled  ADL Assistance: [address]: Independent  Transfer Assistance: Independent  Active : No  Patient's  Info: Daughter in law does grocery shopping. Occupation: Retired  Additional Comments: D/t communication difficulties related to hearing aid issues, and cognitive concerns information above obtained from IP evals and needs confirmed    Restrictions:  Restrictions/Precautions  Restrictions/Precautions: General Precautions, Fall Risk (2L02, [place])                  Pain Level: 0  Pain Location: Back    IRF-Hearing and Speech: Hearing, Speech, and Vision  Expression of Ideas and Wants: Without difficulty  Understanding Verbal and Non-Verbal Content: Sometimes understands  IRF-COG:  Cognitive Patterns  Cognitive Pattern Assessment Used: Staff Assessment  Memory/Recall Ability : Current season, That he or she is in a hospital/hospital unit. IRF-CAM (Confusion Assessment Method): Confusion Assessment Method (CAM)  Is there evidence of an acute change in mental status from the patient's baseline?: Yes  Inattention: Behavior present, fluctuates (comes and goes, changes in severity)  Disorganized thinking: Behavior present, fluctuates (comes and goes, changes in severity)  Altered level of consciousness: Behavior not present    Objective:  Observation/Palpation  Observation: Pt in bed on approach, on room air but breathing labored. RN applied [phone]. Notified RN of 30 RR with light ADLs; pt with poor implementation of PLB  Edema: Moderate in abdomen and B thighs.   ROSE hose applied to RLE only 2* L calf wound                   Hearing  Hearing: Exceptions to [facility]  Hearing Exceptions: Hard of hearing/hearing concerns, Bilateral hearing aid (hearing aids not working at time of eval)    ROM:      LUE AROM (degrees)  LUE AROM : WFL     Left Hand AROM (degrees)  Left Hand AROM: WFL     RUE AROM (degrees)  RUE AROM : WFL     Right Hand AROM (degrees)  Right Hand AROM: WFL    Strength:    LUE Strength  Gross LUE Strength: WFL  L Hand General: 4/5  LUE Strength Comment: 4/5 grossly  RUE Strength  Gross RUE Strength: WFL  R Hand General: 4/5  RUE Strength Comment: 4/5 grossly    Quality of Movement: Tone RUE  RUE Tone: Normotonic  Tone LUE  LUE Tone: Normotonic  Coordination  Movements Are Fluid And Coordinated: No  Coordination and Movement Description: Fine motor impairments, Decreased speed, Right UE, Decreased accuracy, Left UE  Quality of Movement Other  Comment: arthritic changes in IP joints, decreased strength    Sensation:    KATRINA at this time 2* hearing and confusion     ADLs:    Eating: Eating  Assistance Needed: Setup or clean-up assistance  Comment: required setup assist to open packages/containers  CARE Score: 5  Discharge Goal: Independent       Oral Hygiene: Oral Hygiene  Assistance Needed: Partial/moderate assistance  Comment: d/t low vision required assist lining up toothpaste onto toothbrush. Unable to orient partials correctly requiring physical assist to position in mouth  CARE Score: 3  Discharge Goal: Independent    UB/LB Bathing: Shower/Bathe Self  Assistance Needed: Partial/moderate assistance  Comment: required cues for sequencing, assist for B feet.   Poor decision making during task; pt reported urine incontinence during stand however was not redirectable to transfer over to toilet and continued to dry bottom first  CARE Score: 3  Discharge Goal: Supervision or touching assistance    UB Dressing: Upper Body Dressing  Assistance Needed: Supervision or touching assistance  Comment: increased time/effort, assist for 02 line management  CARE Score: 4  Discharge Goal: Set-up or clean-up assistance         LB Dressing:   Lower Body Dressing  Assistance Needed: Substantial/maximal assistance  Comment: poor distal reach requiring assist to thread BLEs in brief and pants; able to perform pants management up c CGA  CARE Score: 2  Discharge Goal: Supervision or touching assistance    Donning and [place] Footwear: Putting On/Taking Off Footwear  Assistance Needed: Dependent  Comment: poor distal reach requiring total A to [facility]/don hospital socks  CARE Score: 1  Discharge Goal: Supervision or touching assistance      Toiletin [address] needed: Partial/moderate assistance  Comment: able to manage Depends, required assist for thorough hygiene as well as cueing  CARE Score: 3  Discharge Goal: Supervision or touching assistance      Bed Mobility:    Bed mobility  Supine to Sit: Stand by assistance (c bed features)    Transfers:    Transfers  Stand Pivot Transfers: Minimal assistance  Sit to stand: Minimal assistance  Stand to sit: Contact guard assistance  Transfer Comments: ranged from CGA-min A     Shower Transfers  Shower Transfers: Not tested  [employer] Transfers Comments: deferred 2* SOB     Toilet Transfer  Assistance needed: Partial/moderate assistance  Comment: min A to<>from W/C d/t incontinence during session; used grab bars  CARE Score: 3  Discharge Goal: Supervision or touching assistance    Functional Mobility:    Balance  Sitting Balance: Supervision  Standing Balance: Contact guard assistance  Standing Balance  Time: 2 stands 1-2 mins each  Activity: ADLs  Comment: Pt able to maintain balance c 0-1 UE support however became easily winded. Sp02 always 94%+  Functional Mobility  Functional Mobility Comments: Therapist propelled W/C to conserve energy for ADLs     Cognition:  Cognition  Overall Cognitive Status: Exceptions  Following Commands: Follows one step commands consistently  Safety Judgement: Decreased awareness of need for assistance, Decreased awareness of need for safety  Problem Solving: Assistance required to identify errors made, Assistance required to correct errors made, Assistance required to generate solutions, Assistance required to implement solutions  Insights: Decreased awareness of deficits (poor insight, \"I'm fine! I can do this! \")  Initiation: Requires cues for some  Sequencing: Requires cues for some    Perception:  Perception  Overall Perceptual Status: WFL      Assessment:     Performance deficits / Impairments: Decreased functional mobility , Decreased ADL status, Decreased strength, Decreased safe awareness, Decreased cognition, Decreased endurance, Decreased balance, Decreased vision/visual deficit, Decreased high-level IADLs    The patient is a 80year old female admitted onto ARU after hospitalization for acute CHF at her cardiologist's office. IV diuretics were initiated in the office but did not provide adequate relief. She had significant ascites and peripheral edema. IV diuretics were continued and she had a paracentesis (350 cc of fluid removed). Bradycardia has been evaluated by cardiology and medications have been adjusted. She has a traumatic wound to her left posterior calf that has been treated by wound care. Per charting, pt lives alone and is Ind c ADLs and functional transfers/mobility using a walker and is not on 02. However confirming this information was deferred this morning as it was difficult to complete an ADL assessment in a timely manner 2* pt's significant hearing deficit c hearing aids not currently working. On top of this pt presented with cognitive deficits including memory, problem solving, and insight. 02 was always WFL (pt was on 2L, however poorly implements PLB) but RR was elevated to 30 with light ADLs. D/t pt's breathing and cognitive status I did not set mod I goals and feel pt will need more support at discharge unless there is significant improvement with these deficits. The QI, MMT, and ROM standardized assessments were used this date to determine the above performance deficits, which compromise pt's ability to safely complete ADLs/IADLs/mobility. Pt will benefit from ARU OT services to increase functional performance and return to PLOF.                Decision Making: High Complexity  Clinical Presentation:  Evolving c unstable characteristics (I.e. HARE)  Patient education:   ARU procotol, Role of O.T., O.T. plan of care  []   Patient goal was established and reviewed in Rehab[facility]er with patient and/or family this date.   REQUIRES OT FOLLOW-UP: Yes  Discharge Recommendations:  Home c continuous assist, HHC OT   Equipment Recommendations:  TBD    Goals:     Short Term Goals  Time Frame for Short Term Goals: STGs=LTGs  Long Term Goals  Time Frame for Long Term Goals : ~10 days or until d/c  Long Term Goal 1: Pt will complete grooming tasks Ind  Long Term Goal 2: Pt will complete total body bathing c S using AE PRN  Long Term Goal 3: Pt will complete UB dressing c setup  Long Term Goal 4: Pt will complete LB dressing c S using AE PRN  Long Term Goal 5: Pt will doff/don footwear c S using AE PRN  Additional Goals?: Yes  Long Term Goal 6: Pt will complete toileting c S  Long Term Goal 7: Pt will complete functional transfers (bed, chair, toilet, shower) c DME PRN and S  Long Term Goal 8: Pt will perform therex/therax to facilitate increased strength/endurance/ax tolerance (c emphasis on dynamic standing balance/tolerance >6 mins, BUE endurance) c SBA  Long Term Goal 9: Pt will complete simple homemaking tasks c DME PRN and S    Plan:    Pt will be seen at least 60 minutes per day for a minimum of 5 days per week, plus group therapy as appropriate  Current Treatment Recommendations: Strengthening, Endurance training, Cognitive reorientation, Pain management, Patient/Caregiver education & training, Safety education & training, Equipment evaluation, education, & procurement, Self-Care / ADL, Home management training, Cognitive/Perceptual training, Balance training, Functional mobility training    OT Individual Minutes  Time In: 0830  Time Out: 1000  Minutes: 90                Number of Minutes/Billable Intervention      OT Evaluation 25   Therapeutic Exercise    ADL Self-care 65   Neuro Re-Ed    Therapeutic Activity    Group    Other:    TOTAL 90     Electronically signed by:    Ti Mandujano MS, OTR/L  License #[id number]   10/31/2022, 1:37 PM

## 2022-11-01 LAB
ANION GAP SERPL CALCULATED.3IONS-SCNC: 12 MMOL/L (ref 4–16)
BUN BLDV-MCNC: 48 MG/DL (ref 6–23)
CALCIUM SERPL-MCNC: 9.8 MG/DL (ref 8.3–10.6)
CHLORIDE BLD-SCNC: 89 MMOL/L (ref 99–110)
CO2: 33 MMOL/L (ref 21–32)
CREAT SERPL-MCNC: 0.8 MG/DL (ref 0.6–1.1)
GFR SERPL CREATININE-BSD FRML MDRD: >60 ML/MIN/1.73M2
GLUCOSE BLD-MCNC: 103 MG/DL (ref 70–99)
HCT VFR BLD CALC: 41.5 % (ref 37–47)
HEMOGLOBIN: 13 GM/DL (ref 12.5–16)
MCH RBC QN AUTO: 29.5 PG (ref 27–31)
MCHC RBC AUTO-ENTMCNC: 31.3 % (ref 32–36)
MCV RBC AUTO: 94.1 FL (ref 78–100)
PDW BLD-RTO: 16.4 % (ref 11.7–14.9)
PLATELET # BLD: 337 K/CU MM (ref 140–440)
PMV BLD AUTO: 11.6 FL (ref 7.5–11.1)
POTASSIUM SERPL-SCNC: 3.6 MMOL/L (ref 3.5–5.1)
RBC # BLD: 4.41 M/CU MM (ref 4.2–5.4)
SODIUM BLD-SCNC: 134 MMOL/L (ref 135–145)
WBC # BLD: 8.3 K/CU MM (ref 4–10.5)

## 2022-11-01 PROCEDURE — 80048 BASIC METABOLIC PNL TOTAL CA: CPT

## 2022-11-01 PROCEDURE — 97530 THERAPEUTIC ACTIVITIES: CPT

## 2022-11-01 PROCEDURE — 85027 COMPLETE CBC AUTOMATED: CPT

## 2022-11-01 PROCEDURE — 99232 SBSQ HOSP IP/OBS MODERATE 35: CPT | Performed by: PHYSICAL MEDICINE & REHABILITATION

## 2022-11-01 PROCEDURE — 6370000000 HC RX 637 (ALT 250 FOR IP): Performed by: INTERNAL MEDICINE

## 2022-11-01 PROCEDURE — 2700000000 HC OXYGEN THERAPY PER DAY

## 2022-11-01 PROCEDURE — 97110 THERAPEUTIC EXERCISES: CPT

## 2022-11-01 PROCEDURE — 2580000003 HC RX 258: Performed by: INTERNAL MEDICINE

## 2022-11-01 PROCEDURE — 1280000000 HC REHAB R&B

## 2022-11-01 PROCEDURE — 97116 GAIT TRAINING THERAPY: CPT

## 2022-11-01 PROCEDURE — 94640 AIRWAY INHALATION TREATMENT: CPT

## 2022-11-01 PROCEDURE — 94150 VITAL CAPACITY TEST: CPT

## 2022-11-01 PROCEDURE — 2500000003 HC RX 250 WO HCPCS: Performed by: INTERNAL MEDICINE

## 2022-11-01 PROCEDURE — 36415 COLL VENOUS BLD VENIPUNCTURE: CPT

## 2022-11-01 PROCEDURE — 97535 SELF CARE MNGMENT TRAINING: CPT

## 2022-11-01 PROCEDURE — 94761 N-INVAS EAR/PLS OXIMETRY MLT: CPT

## 2022-11-01 PROCEDURE — 6370000000 HC RX 637 (ALT 250 FOR IP): Performed by: PHYSICAL MEDICINE & REHABILITATION

## 2022-11-01 PROCEDURE — 6360000002 HC RX W HCPCS: Performed by: INTERNAL MEDICINE

## 2022-11-01 RX ADMIN — Medication 5000 UNITS: at 09:59

## 2022-11-01 RX ADMIN — ASPIRIN 81 MG CHEWABLE TABLET 81 MG: 81 TABLET CHEWABLE at 10:00

## 2022-11-01 RX ADMIN — CARVEDILOL 6.25 MG: 6.25 TABLET, FILM COATED ORAL at 17:36

## 2022-11-01 RX ADMIN — ENOXAPARIN SODIUM 40 MG: 40 INJECTION SUBCUTANEOUS at 09:59

## 2022-11-01 RX ADMIN — SODIUM CHLORIDE, PRESERVATIVE FREE 10 ML: 5 INJECTION INTRAVENOUS at 21:05

## 2022-11-01 RX ADMIN — ATORVASTATIN CALCIUM 10 MG: 10 TABLET, FILM COATED ORAL at 21:04

## 2022-11-01 RX ADMIN — MIDODRINE HYDROCHLORIDE 5 MG: 5 TABLET ORAL at 17:36

## 2022-11-01 RX ADMIN — ALBUTEROL SULFATE 2 PUFF: 90 AEROSOL, METERED RESPIRATORY (INHALATION) at 08:15

## 2022-11-01 RX ADMIN — SPIRONOLACTONE 25 MG: 50 TABLET ORAL at 10:00

## 2022-11-01 RX ADMIN — TORSEMIDE 40 MG: 20 TABLET ORAL at 17:36

## 2022-11-01 RX ADMIN — COLLAGENASE SANTYL: 250 OINTMENT TOPICAL at 10:01

## 2022-11-01 RX ADMIN — SODIUM CHLORIDE, PRESERVATIVE FREE 10 ML: 5 INJECTION INTRAVENOUS at 10:07

## 2022-11-01 RX ADMIN — METOLAZONE 5 MG: 2.5 TABLET ORAL at 09:59

## 2022-11-01 RX ADMIN — TIOTROPIUM BROMIDE AND OLODATEROL 2 PUFF: 3.124; 2.736 SPRAY, METERED RESPIRATORY (INHALATION) at 08:15

## 2022-11-01 RX ADMIN — MIDODRINE HYDROCHLORIDE 5 MG: 5 TABLET ORAL at 12:22

## 2022-11-01 RX ADMIN — LEVOTHYROXINE SODIUM 88 MCG: 0.11 TABLET ORAL at 05:36

## 2022-11-01 RX ADMIN — MICONAZOLE NITRATE: 2 POWDER TOPICAL at 09:59

## 2022-11-01 RX ADMIN — TORSEMIDE 40 MG: 20 TABLET ORAL at 10:00

## 2022-11-01 RX ADMIN — FERROUS SULFATE TAB 325 MG (65 MG ELEMENTAL FE) 325 MG: 325 (65 FE) TAB at 12:22

## 2022-11-01 RX ADMIN — CARVEDILOL 6.25 MG: 6.25 TABLET, FILM COATED ORAL at 09:59

## 2022-11-01 RX ADMIN — MICONAZOLE NITRATE: 2 POWDER TOPICAL at 20:55

## 2022-11-01 RX ADMIN — ALBUTEROL SULFATE 2 PUFF: 90 AEROSOL, METERED RESPIRATORY (INHALATION) at 20:25

## 2022-11-01 RX ADMIN — TIMOLOL MALEATE 1 DROP: 5 SOLUTION OPHTHALMIC at 21:06

## 2022-11-01 RX ADMIN — LATANOPROST 1 DROP: 50 SOLUTION/ DROPS OPHTHALMIC at 17:36

## 2022-11-01 RX ADMIN — AMOXICILLIN AND CLAVULANATE POTASSIUM 1 TABLET: 875; 125 TABLET, FILM COATED ORAL at 10:00

## 2022-11-01 RX ADMIN — AMOXICILLIN AND CLAVULANATE POTASSIUM 1 TABLET: 875; 125 TABLET, FILM COATED ORAL at 21:04

## 2022-11-01 RX ADMIN — ACETAMINOPHEN 650 MG: 325 TABLET ORAL at 21:05

## 2022-11-01 RX ADMIN — CYANOCOBALAMIN TAB 1000 MCG 1000 MCG: 1000 TAB at 09:59

## 2022-11-01 RX ADMIN — MIDODRINE HYDROCHLORIDE 5 MG: 5 TABLET ORAL at 09:59

## 2022-11-01 RX ADMIN — METOLAZONE 5 MG: 2.5 TABLET ORAL at 21:04

## 2022-11-01 ASSESSMENT — PAIN SCALES - GENERAL
PAINLEVEL_OUTOF10: 4
PAINLEVEL_OUTOF10: 10

## 2022-11-01 ASSESSMENT — PAIN DESCRIPTION - LOCATION: LOCATION: GENERALIZED

## 2022-11-01 ASSESSMENT — PAIN DESCRIPTION - DESCRIPTORS: DESCRIPTORS: ACHING

## 2022-11-01 ASSESSMENT — PAIN DESCRIPTION - ORIENTATION: ORIENTATION: OTHER (COMMENT)

## 2022-11-01 ASSESSMENT — PAIN - FUNCTIONAL ASSESSMENT: PAIN_FUNCTIONAL_ASSESSMENT: ACTIVITIES ARE NOT PREVENTED

## 2022-11-01 NOTE — DISCHARGE SUMMARY
V2.0  Discharge Summary    Name:  Jacobo Patrick /Age/Sex: 3/18/1929 (18 y.o. female)   Admit Date: 10/19/2022  Discharge Date: 10/29/22    MRN & CSN:  [id number] & [id number] Encounter Date and Time 22 2:40 PM EDT    Attending:  No att. providers found Discharging Provider: Ronald Dunlap MD       Hospital Course:     Brief HPI: Jacobo Patrick is a 80 y.o. female who presented with diffuse edema, SOB, worsening CHF, sent to the hospital by cardiology cardiologist.  On presentation patient was hemodynamically stable however did require BiPAP due to shortness of breath. Sodium was found to be 132, BUN of 30, proBNP of 1500 and alk phos 257. Patient was admitted and started on Lasix drip and cardiology was consulted. Echo was ordered which showed EF of 50 to 34%, grade 3 diastolic dysfunction, severely dilated right ventricle. Severe TR, RVSP of 39, severely dilated RA and a dilated IVC which does not collapse with respiration. They also recommended getting right heart cath once euvolemic. Patient was transitioned to IV pushes of Lasix on 10/21 and Lasix drip was stopped nephrology was also on consult for edema who recommended sufficient diuresis with the addition of metolazone. Patient had paracentesis with IR which showed 350 mL of fluid only. Patient was eventually transitioned from Lasix IV to torsemide. Patient was stable for discharge with improving edema. On the day before discharge, patient's nurse reported pus from a LE wound so culture was taken and it grew proteus which was pansensitive so patient was discharged on augmentin. Patient is to follow up with PCP, cardiology and nephrology once discharged from ARU.      Brief Problem Based Course:     #Acute on chronic HFpEF-resolving  #Anasarca-resolving  #Severe pulm HTN with likely RHF  -CTA chest without PE, marked enlargement of the right atrium suspected right heart failure, small right pleural effusion, anasarca with subcutaneous edema and ascites  -Echo: EF 50 to 04%, grade 3 diastolic dysfunction, severely dilated right atrium and ventricle, moderate left ventricular hypertrophy, severe tricuspid regurgitation, RVSP 39 mmHg  -Cardiology was on consult who initially recommended lasix drip however it was stopped after 1 day  -s/p paracentesis 10/20--> 350 mL  -Abd US with small amount of ascites     Plan:  Continue torsemide, spironolactone, metolazone and midodrine  Nephrology, cardiology and PCP follow-up     #L distal calf wound  -wound care following  -Wound growing pansensitive Proteus mirabilis  -Discharged on Augmentin     Chronic medical problems:   #CAD   #s/p CABG  #Bradycardia with pacemaker  Plan: continue aspirin, atorvastatin, carvedilol,      #SAULO  -CPAP at night     #h/o CVA  -continue aspirin     #Glaucoma  -continue latanoprost and timolol      The patient expressed appropriate understanding of, and agreement with the discharge recommendations, medications, and plan.      Consults this admission:  IP CONSULT TO CARDIOLOGY  IP CONSULT TO HOSPITALIST  IP CONSULT TO INTERVENTIONAL RADIOLOGY  IP CONSULT TO NEPHROLOGY    Discharge Diagnosis:   Acute on chronic diastolic (congestive) heart failure (HCC)    Anasarca  With heart failure  Decompensated pulmonary hypertension    Discharge Instruction:   Follow up appointments: Cardiology and nephrology  Primary care physician: Emmy Vazquez MD within 2 weeks  Diet: cardiac diet   Activity: activity as tolerated  Disposition: Discharged to:   []Home, []C, []SNF, [x]Acute Rehab, []Hospice   Condition on discharge: Stable  Labs and Tests to be Followed up as an outpatient by PCP or Specialist: Electrolytes, final culture results    Discharge Medications:        Medication List        CONTINUE taking these medications      CPAP Machine Misc            ASK your doctor about these medications      acetaminophen 500 MG tablet  Commonly known as: TYLENOL     albuterol sulfate  (90 Base) MCG/ACT inhaler  Commonly known as: PROVENTIL;VENTOLIN;PROAIR     aspirin 81 MG chewable tablet     atorvastatin 10 MG tablet  Commonly known as: LIPITOR     Biotin 5 MG Caps     carvedilol 6.25 MG tablet  Commonly known as: COREG  Take 1 tablet by mouth 2 times daily (with meals)     CETIRIZINE HCL PO     ferrous sulfate 325 (65 Fe) MG tablet  Commonly known as: IRON 325     ICaps Caps     levothyroxine 88 MCG tablet  Commonly known as: SYNTHROID     Magnesium 500 MG Tabs     OSTEO BI-FLEX ADV DOUBLE ST PO     Probiotic-10 Chew     Proctozone-HC 2.5 % Crea rectal cream  Generic drug: hydrocortisone     QC TUMERIC COMPLEX PO     spironolactone 50 MG tablet  Commonly known as: ALDACTONE  Take 1 tablet by mouth daily     timolol 0.5 % ophthalmic solution  Commonly known as: TIMOPTIC     * torsemide 20 MG tablet  Commonly known as: DEMADEX     * torsemide 20 MG tablet  Commonly known as: DEMADEX  Take 1 tablet by mouth as needed (for fluid retention or weight gain of 3 lbs overnight) This is in addition to the 20 mg bid     traZODone 50 MG tablet  Commonly known as: DESYREL     umeclidinium-vilanterol 62.5-25 MCG/INH Aepb inhaler  Commonly known as: ANORO ELLIPTA  Inhale 1 puff into the lungs daily     vitamin B-12 1000 MCG tablet  Commonly known as: CYANOCOBALAMIN     vitamin D 25 MCG (1000 UT) Caps     Zioptan 0.0015 % Soln  Generic drug: Tafluprost (PF)           * This list has 2 medication(s) that are the same as other medications prescribed for you. Read the directions carefully, and ask your doctor or other care provider to review them with you.                  Objective Findings at Discharge:   BP (!) 124/90   Pulse 59   Temp 97.7 °F (36.5 °C)   Resp 18   Ht 4' 11.02\" (1.499 m)   Wt 143 lb 8.3 oz (65.1 kg)   SpO2 97%   BMI 28.97 kg/m²       Physical Exam:   General: NAD  Eyes: EOMI  HENT: Atraumatic  Respiratory:no respiratory distress  GI: normal bowel sounds, nontender, nondistended  MSK: BLE 2+ pitting edema, improving  Skin: Intact, dry, warm, no rashes  Neuro: CN II to XII grossly intact  Psych: Normal mood  Labs and Imaging   US ABDOMEN LIMITED Specify organ? LIVER    Result Date: 10/25/2022  EXAMINATION: LIMITED ABDOMINAL ULTRASOUND 10/25/2022 4:21 pm COMPARISON: None. HISTORY: ORDERING SYSTEM PROVIDED HISTORY: assessing for ascites TECHNOLOGIST PROVIDED HISTORY: Reason for exam:->assessing for ascites Specify organ?->LIVER Recent paracentesis 10/20/2022 FINDINGS: Small amount of right upper quadrant ascites. No ascites noted in the other 3 quadrants. Small amount of ascites only noted in the right upper quadrant. CBC:   Recent Labs     11/01/22  0554   WBC 8.3   HGB 13.0        BMP:    Recent Labs     10/30/22  0558 11/01/22  0554   * 134*   K 4.0 3.6   CL 87* 89*   CO2 34* 33*   BUN 40* 48*   CREATININE 0.7 0.8   GLUCOSE 96 103*     Hepatic: No results for input(s): AST, ALT, ALB, BILITOT, ALKPHOS in the last 72 hours. Lipids:   Lab Results   Component Value Date/Time    CHOL 105 07/08/2022 08:06 AM    CHOL 168 07/05/2017 12:00 AM    HDL 45 07/08/2022 08:06 AM    TRIG 69 07/08/2022 08:06 AM     Hemoglobin A1C: No results found for: LABA1C  TSH:   Lab Results   Component Value Date/Time    TSH 1.0 05/07/2010 12:00 AM     Troponin:   Lab Results   Component Value Date/Time    TROPONINT <0.010 10/20/2022 02:25 AM    TROPONINT <0.010 10/19/2022 11:16 PM    TROPONINT <0.010 10/19/2022 03:29 PM     Lactic Acid: No results for input(s): LACTA in the last 72 hours. BNP: No results for input(s): PROBNP in the last 72 hours.   UA:  Lab Results   Component Value Date/Time    NITRU NEGATIVE 10/22/2022 01:20 PM    COLORU YELLOW 10/22/2022 01:20 PM    WBCUA 2 10/21/2022 11:50 AM    RBCUA 1 10/21/2022 11:50 AM    MUCUS RARE 10/21/2022 11:50 AM    TRICHOMONAS NONE SEEN 10/21/2022 11:50 AM    YEAST RARE 11/23/2021 07:30 AM    BACTERIA NEGATIVE 10/21/2022 11:50 AM    CLARITYU CLEAR 10/22/2022 01:20 PM    SPECGRAV 1.015 10/22/2022 01:20 PM    LEUKOCYTESUR NEGATIVE 10/22/2022 01:20 PM    UROBILINOGEN 0.2 10/22/2022 01:20 PM    BILIRUBINUR NEGATIVE 10/22/2022 01:20 PM    BLOODU NEGATIVE 10/22/2022 01:20 PM    KETUA NEGATIVE 10/22/2022 01:20 PM     Urine Cultures: No results found for: LABURIN  Blood Cultures: No results found for: BC  No results found for: BLOODCULT2  Organism: No results found for: ORG    Time Spent Discharging patient >30 minutes    Electronically signed by Laxmi Starr MD on 11/1/2022 at 2:40 PM

## 2022-11-01 NOTE — PROGRESS NOTES
Occupational Therapy    Physical Rehabilitation: OCCUPATIONAL THERAPY     [x] daily progress note       [] discharge       Patient Name:  Juwan Alfaro   :  3/18/1929 MRN: [id number]  Room:  [address] [place] [address] Date of Admission: 10/29/2022  Rehabilitation Diagnosis:   Acute on chronic diastolic (congestive) heart failure [I50.33]  Acute on chronic combined systolic and diastolic CHF (congestive heart failure) ([facility] [I50.43]       Date 2022       Day of ARU Week:  4   Time IN/OUT 0723-8078  [id number]   Individual Tx Minutes 80+40   Group Tx Minutes    Co-Treat Minutes    Concurrent Tx Minutes    TOTAL Tx Time Mins 120   Variance Time    Variance Time []   Refusal due to:     []   Medical hold/reason:    []   Illness   []   Off Unit for test/procedure  []   Extra time needed to complete task  []   Therapeutic need  []   Other (specify):   Restrictions Restrictions/Precautions: General Precautions, Fall Risk (2L02, [place])         Communication with other providers: [x]   OK to see per nursing:     []   Spoke with team member regarding:      Subjective observations and cognitive status: Pt sleeping in bed on approach; able to wake easily, agreeable to therapy. During session, pt appeared a little agitated stating, \"I can do all of this at home. I don't need any help at home. Everything has a place and everything in it's place! \"     Pt sitting up in wc on approach; pleasant and agreeable to therapy session. At the end of session, pt c/o of pain in L side. Pt stated, \"this is normal.\" Nsg notified. Nsg assessing pt as therapist left room.      Pain level/location:    /10       Location:    Discharge recommendations  Anticipated discharge date:  TBD  Destination: []home alone   []home alone w assist prn   [] home w/ family    [] Continuous supervision       []SNF    [] Assisted living     [] Other:   Continued therapy: []HHC OT  []OUTPATIENT  OT   [] No Further OT  Equipment needs: TBD        ADLs:    Eating: Eating  Assistance Needed: Setup or clean-up assistance  Comment: required setup assist to open packages/containers  CARE Score: 5  Discharge Goal: Independent       Oral Hygiene: Oral Hygiene  Assistance Needed: Partial/moderate assistance  Comment: d/t low vision required assist lining up toothpaste onto toothbrush. Unable to orient partials correctly requiring physical assist to position in mouth  CARE Score: 3  Discharge Goal: Independent    UB/LB Bathing: Shower/Bathe Self  Assistance Needed: Supervision or touching assistance  Comment: CG/SBA in stance at sink to bathe UB. Pt required max cues for sequencing and thoroughness  CARE Score: 4  Discharge Goal: Supervision or touching assistance    UB Dressing: Upper Body Dressing  Assistance Needed: Supervision or touching assistance  Comment: cues to orient shirt, increased time/effort and assist for 02 line management  CARE Score: 4  Discharge Goal: Set-up or clean-up assistance         LB Dressing: Lower Body Dressing  Assistance Needed: Partial/moderate assistance  Comment: d/t poor distal reach, introduced reacher to don pants. pt required assist to thread BLEs into brief and pants using reacher, CGA to perform pants management. CARE Score: 3  Discharge Goal: Supervision or touching assistance    Donning and [place] Footwear: Putting On/Taking Off Footwear  Assistance Needed: Dependent  Comment: poor distal reach requiring total A to doff/don hospital socks  CARE Score: 1  Discharge Goal: Supervision or touching assistance      Toiletin [address] needed: Partial/moderate assistance  Comment: Pt required cues to manage depends down and up; assist for thoroughness with BM hygiene  CARE Score: 3  Discharge Goal: Supervision or touching assistance      Toilet Transfers:   CGA  Toilet Transfer  Assistance needed: Supervision or touching assistance  Comment: CGA using RW and grab bars, cues for hand placement and safety  CARE Score: 4  Discharge Goal: Supervision or touching assistance  Device Used:    []   Standard Toilet         []   Grab Bars           []  Bedside Commode       []   Elevated Toilet          []   Other:        Bed Mobility:           []   Pt received out of bed   Supine --> Sit:  SBA using bed rails   Sit --> Supine:  SBA using bed rails     Transfers:    Sit--> Stand:  CGA c max cues for hand placement and safety   Stand --> Sit:   CGA, cues for hand placement   Stand-Pivot:   CGA   Other:    Assistive device required for transfer:   RW       Functional Mobility:  to bathroom   Assistance:  CGA   Device:   [x]   Rolling Walker     []   Standard Walker []   Wheelchair        []   Yves Gins       []   4-Wheeled [address]         []   Cardiac Walker       []   Other:            Additional Therapeutic activities/exercises completed this date:     [x]   ADL Training   []   Balance/Postural training     []   Bed/Transfer Training   []   Endurance Training   []   Neuromuscular Re-ed   []   Nu-step:  Time:        Level:         #Steps:       []   Rebounder:    []  Seated     []  Standing        []   Supine Ther Ex (reps/sets):     [x]   Seated Ther Ex (reps/sets): To increase BUE endurance for ADLs and transfers, pt completed 1 set x10 reps of the following therex, c a 2# weighted bar:   Concentric arm circles (clockwise and counterclockwise)  Bicep curls  Tricep curls     []   Standing Ther Ex (reps/sets):     []   Other:      Comments:      Patient/Caregiver Education and Training:   []   [employer] Equipment Use  []   Bed Mobility/Transfer Technique/Safety  []   Energy Conservation Tips  []   Family training  []   Postural Awareness  []   Safety During Functional Activities  []   Reinforced Patient's Precautions   []   Progress was updated and reviewed in Rehab[facility]er with patient and/or family this         date.     Treatment Plan for Next Session: Continue OT POC       Assessment: The patient is making progress toward established goals dressing c setup  Long Term Goal 4: Pt will complete LB dressing c S using AE PRN  Long Term Goal 5: Pt will doff/don footwear c S using AE PRN  Additional Goals?: Yes  Long Term Goal 6: Pt will complete toileting c S  Long Term Goal 7: Pt will complete functional transfers (bed, chair, toilet, shower) c DME PRN and S  Long Term Goal 8: Pt will perform therex/therax to facilitate increased strength/endurance/ax tolerance (c emphasis on dynamic standing balance/tolerance >6 mins, BUE endurance) c SBA  Long Term Goal 9: Pt will complete simple homemaking tasks c DME PRN and S:        Plan of Care                                                                              Times per week: 5 days per week for a minimum of 60 minutes/day plus group as appropriate for 60 minutes.   Treatment to include Occupational Therapy Plan  Current Treatment Recommendations: Strengthening, Endurance training, Cognitive reorientation, Pain management, Patient/Caregiver education & training, Safety education & training, Equipment evaluation, education, & procurement, Self-Care / ADL, Home management training, Cognitive/Perceptual training, Balance training, Functional mobility training    Electronically signed by   LULA Franklin,  11/1/2022, 2:46 PM

## 2022-11-01 NOTE — PROGRESS NOTES
Comprehensive Nutrition Assessment    Type and Reason for Visit:  Initial, Consult (oral nutrition supplement)    Nutrition Recommendations/Plan:   Continue current diet: low sodium with fluid restriction as needed   May offer frozen oral nutrition supplement  Will continue to follow up during stay      Malnutrition Assessment:  Malnutrition Status:  No malnutrition (11/01/22 7278)    Context:  Acute Illness       Nutrition Assessment:    Admit to acute rehab unit with hx acute/chronic CHF, anasarca. Remains on low sodium diet with 1800 ml fluid restriction. Meal intake varying, % at recent meals. Varying weights with fluid changes. Will follow at moderate nutrition risk at this time. Nutrition Related Findings:    hard of hearing, out of room x 2 and then asleep in bed on visits today. Hx COPD, CHF, CAD Wound Type:  (calf wound)       Current Nutrition Intake & Therapies:    Average Meal Intake: 26-50%, %  Average Supplements Intake: None Ordered  ADULT DIET; Regular; Low Sodium (2 gm); 1800 ml    Anthropometric Measures:  Height: 4' 11.02\" (149.9 cm)  Ideal Body Weight (IBW): 95 lbs (43 kg)    Admission Body Weight: 134 lb 14.7 oz (61.2 kg)  Current Body Weight: 141 lb 12.1 oz (64.3 kg), 149.2 % IBW. Weight Source: Bed Scale  Current BMI (kg/m2): 28.6                BMI Categories: Overweight (BMI 25.0-29. 9)    Estimated Daily Nutrient Needs:  Energy Requirements Based On: Formula  Weight Used for Energy Requirements: Current  Energy (kcal/day): 1300  Weight Used for Protein Requirements: Ideal  Protein (g/day): 52-60 (1.2-1.4 g/kg)  Method Used for Fluid Requirements: ml/Kg  Fluid (ml/day): 1600 (25 ml/kg)    Nutrition Diagnosis:   Predicted inadequate energy intake related to increase demand for energy/nutrients as evidenced by wounds    Nutrition Interventions:   Food and/or Nutrient Delivery: Continue Current Diet, Start Oral Nutrition Supplement  Nutrition Education/Counseling: No recommendation at this time  Coordination of Nutrition Care: Continue to monitor while inpatient       Goals:     Goals: PO intake 75% or greater, by next RD assessment       Nutrition Monitoring and Evaluation:   Behavioral-Environmental Outcomes: None Identified  Food/Nutrient Intake Outcomes: Food and Nutrient Intake, Supplement Intake  Physical Signs/Symptoms Outcomes: Biochemical Data, Meal Time Behavior, Skin, Weight, Nutrition Focused Physical Findings    Discharge Planning:    Continue current diet     Dwight Solorio RD, LD  Contact: [phone]

## 2022-11-01 NOTE — PROGRESS NOTES
Physical Therapy  . [x] daily progress note       [] discharge       Patient Name:  Juwan Alfaro   :  3/18/1929 MRN: [id number]  Room:  [address] [place], [place] [address] Date of Admission: 10/29/2022  Rehabilitation Diagnosis:   Acute on chronic diastolic (congestive) heart failure [I50.33]  Acute on chronic combined systolic and diastolic CHF (congest[facility] [I50.43]       Date 2022       Day of ARU Week:  4   Time IN//1000   Individual Tx Minutes 60   Group Tx Minutes    Co-Treat Minutes    Concurrent Tx Minutes    TOTAL Tx Time Mins 60   Variance Time    Variance Time []   Refusal due to:     []   Medical hold/reason:    []   Illness   []   Off Unit for test/procedure  []   Extra time needed to complete task  []   Therapeutic need  []   Other (specify):   Restrictions Restrictions/Precautions  Restrictions/Precautions: General Precautions, Fall Risk (2L02, [place])      Interdisciplinary communication [x]   Cleared for therapy per nursing     []   RN notified about issues during session  []   RN updated on pt performance  []   Spoke with   []   Spoke with OT  []   Spoke with MD  []   Other:    Subjective observations and cognitive status: Pt in bed, agreeable to therapy. Cognition very bad during this session with poor initiation, poor direction following and poor problem solving.       Pain level/location:    /10       Location:    Discharge recommendations  Anticipated discharge date:  tbd  Destination: []home alone   []home alone with assist PRN     [] home w/ family      [] Continuous supervision  []SNF    [] Assisted living     [] Other:  Continued therapy: []C PT  []OUTPATIENT  PT   [] No Further PT  []SNF PT  Caregiver training recommended: [x]Yes  [] No   Equipment needs: 2ww     Bed Mobility:           []   Pt received out of bed   Lying --> Sit:  supervision  Sit --> lying:  min assist  Bed features used: [x] Yes  [] No       Transfers:    Sit--> Stand:  vaired CG to min assist. Max cues for hand placement. Gave education on hand placement followed by 5x sit to stand with pt performing correctly. Came back to task in 5 min and pt had no recollection of technique and needed min cues. Pt does not self correct even if pulling up on walker and she is pulling it onto herself. Stand --> Sit:   Cg, min cues  Chair-->Bed/Bed --> Chair:   min assist  Toilet Transfer (if applicable): during time in gym, pt stated that she had just become incontinent of urine. PT used this opportunity to train for gait with O2 line management and ability to follow a task. Pt required 25 minutes total time and PT ended up providing max assist. Pt had no insight into what she needed for changing her wet pants and even after educating pt on need to walk to counter to get new depends, she had no initiation, poor O2 line management and walker safety and then grabbed a large bag of chux instead of the depends that was on the counter beside chux. Pt then was directed to the bed(this is where she said she performs her incontinence mgmt at home) but then could not remember why she was there. Redirected pt to task and pt with no initiation initially, then after 30 second pause, began to pull pant legs up to thighs. When asked her purpose, she did not know. Pt then took depends and began to try to put it over her pants. Redirected pt to  lay in supine(which she told therapist was her home technique), but pt could not initiate. As time was short, therapist gave max assist by using sit to stand method for pants/brief management.  Pt then needed min assist to return to supine    Gait:    Distance:  50'   Assistance:  CGA with max cues as pt running into obstacles and pushing them, even when cued to their existence   Device:  2ww  Gait Quality:  flexed posture, poor foot clearance, variable step length, moderate path deviation    O2 WFL on 2L throughout, consistnt RR though high(mid 30s)      Additional Therapeutic activities/exercises completed this date:     []   Nu-step:  Time:        Level:         #Steps:       []   Rebounder:    []  Seated     []  Standing        []   Balance training         []   Postural training    []   Supine ther ex (reps/sets):     []   Seated ther ex (reps/sets):     []   Standing ther ex (reps/sets):     []   Picking up object from floor (standing):                   []   Reacher used   []   Other:   []   Other:    Comments:      Patient/Caregiver Education and Training:   []   Role of PT  []   Education about Dx  []   Use of call light for assist   []   Wheelchair mobility/management  []   HEP provided and explained   [x]   Treatment plan reviewed  [x]   Home safety  []   Body mechanics  []   Positioning  [x]   Bed Mobility/Transfer technique  [x]   Gait technique/sequencing  [x]   Proper use of assistive device/adaptive equipment  []   Stair training/Advanced mobility safety and technique  []   Reinforced patient's precautions/mobility while maintaining precautions  []   Postural awareness  []   Family/caregiver training  []   Progress was updated and reviewed in Rehabtracker with patient and/or family this date. []   Other:      Treatment Plan for Next Session: standing balance, gait training, safety education  , ccqi    Assessment:   Assessment: This pt demonstrated a positive  response to today's treatment as evidenced by tolerated full treatment. Pt had poor carryover of training however. The patient is making little progress toward established goals as evidenced by QI scores.       Treatment/Activity Tolerance:   [] Tolerated treatment with no adverse effects    [x] Patient limited by fatigue  [] Patient limited by pain   [] Patient limited by medical complications:    [] Adverse reaction to Tx:   [] Significant change in status    Barrier/s to progress/learning:   []   None  [x]   Cognition  []   Hearing deficit  []   Pre-morbid mental/psychological status   []   Motivation  [] Communication  []   Anxiety  []   Vision deficit  []   Attention  []   Other:      Safety:       [x]  bed alarm set    []  chair alarm set    []  Pt refused alarms                []  Telesitter activated      [x]  Gait belt used during tx session      []other:         Number of Minutes/Billable Intervention  Gait Training 30   Therapeutic Exercise    Neuro Re-Ed    Therapeutic Activity 30   Wheelchair Propulsion    Group    Other:    TOTAL 60         Social History  Social/Functional History  Lives With: Alone  Type of Home: Condo  Home Layout: One level  Home Access: Level entry  Bathroom Shower/Tub: Tub/Shower unit, Walk-in shower  Bathroom Toilet: Standard  Bathroom Equipment: Grab bars in shower, Grab bars around toilet  Home Equipment: Melvia Crofts, [place], Walker, 4 wheeled  ADL Assistance: Independent  Homemaking Assistance: Independent  Ambulation Assistance: Independent  Transfer Assistance: Independent  Active : No  Patient's  Info: Daughter in law does grocery shopping.   Occupation: Retired  Additional Comments: D/t communication difficulties related to hearing aid issues, and cognitive concerns information above obtained from IP evals and needs confirmed    Objective                                                                                    Goals:  (Update in navigator)  Short Term Goals  Time Frame for Short Term Goals: 5-7 STG=LTG  Short Term Goal 1: Pt will perform bed mobility with mod I  Short Term Goal 2: Pt will perform sit to stand, pivot and car transfers with mod I if no O2, supervision if O2  Short Term Goal 3: Pt will ambulate 150' on level surfaces with 2ww and 10' on uneven surfaces with supervision with or without O2  Short Term Goal 4: Pt will ascend/descend curb step and 4 steps with rail with supervision  Short Term Goal 5: Pt will retrieve object on floor with 2ww and supervision:   :        Plan of Care Times per week: 5 days per week for a minimum of 60 minutes/day plus group as appropriate for 60 minutes.   Treatment to include Current Treatment Recommendations: Strengthening, Balance training, Functional mobility training, Transfer training, ADL/Self-care training, IADL training, Cognitive/Perceptual training, Endurance training, Safety education & training, Patient/Caregiver education & training, Therapeutic activities, Gait training, Stair training, Equipment evaluation, education, & procurement, Pain management, Neuromuscular re-education, Positioning, Home exercise program, Cognitive reorientation    Electronically signed by   Lauren Castellano PT,  11/1/2022, 9:10 AM

## 2022-11-01 NOTE — PLAN OF CARE
Problem: Discharge Planning  Goal: Discharge to home or other facility with appropriate resources  Outcome: Progressing     Problem: Skin/Tissue Integrity  Goal: Absence of new skin breakdown  Description: 1. Monitor for areas of redness and/or skin breakdown  2. Assess vascular access sites hourly  3. Every 4-6 hours minimum:  Change oxygen saturation probe site  4. Every 4-6 hours:  If on nasal continuous positive airway pressure, respiratory therapy assess nares and determine need for appliance change or resting period.   11/1/2022 1613 by Fela Ferrara LPN  Outcome: Progressing  11/1/2022 0547 by Zaiad Barboza RN  Outcome: Progressing     Problem: Safety - Adult  Goal: Free from fall injury  11/1/2022 1613 by Fela Ferrara LPN  Outcome: Progressing  11/1/2022 0547 by Zaida Barboza RN  Outcome: Progressing     Problem: ABCDS Injury Assessment  Goal: Absence of physical injury  11/1/2022 1613 by Fela Ferrara LPN  Outcome: Progressing  11/1/2022 0547 by Zaida Barboza RN  Outcome: Progressing     Problem: Pain  Goal: Verbalizes/displays adequate comfort level or baseline comfort level  Outcome: Progressing     Problem: Chronic Conditions and Co-morbidities  Goal: Patient's chronic conditions and co-morbidity symptoms are monitored and maintained or improved  Outcome: Progressing     Problem: Nutrition Deficit:  Goal: Optimize nutritional status  Outcome: Progressing

## 2022-11-01 NOTE — PROGRESS NOTES
Zachery Rascon    : 3/18/1929  Acct #: [de-identified]  MRN: [id number]              PM&R Progress Note      Admitting diagnosis: Acute on chronic diastolic congestive heart failure ([address])     Comorbid diagnoses impacting rehabilitation: Generalized weakness, gait disturbance, sinus bradycardia, pulmonary hypertension, hyponatremia, sundowner syndrome, obstructive sleep apnea, left calf wound, orthostatic hypotension     Chief complaint: Pain whenever she is touched. Prior (baseline) level of function: Independent. Current level of function:         Current  IRF-YAMILKA and Goals:   Occupational Therapy:    Short Term Goals  Time Frame for Short Term Goals: STGs=LTGs :   Long Term Goals  Time Frame for Long Term Goals : ~10 days or until d/c  Long Term Goal 1: Pt will complete grooming tasks Ind  Long Term Goal 2: Pt will complete total body bathing c S using AE PRN  Long Term Goal 3: Pt will complete UB dressing c setup  Long Term Goal 4: Pt will complete LB dressing c S using AE PRN  Long Term Goal 5: Pt will doff/don footwear c S using AE PRN  Additional Goals?: Yes  Long Term Goal 6: Pt will complete toileting c S  Long Term Goal 7: Pt will complete functional transfers (bed, chair, toilet, shower) c DME PRN and S  Long Term Goal 8: Pt will perform therex/therax to facilitate increased strength/endurance/ax tolerance (c emphasis on dynamic standing balance/tolerance >6 mins, BUE endurance) c SBA  Long Term Goal 9: Pt will complete simple homemaking tasks c DME PRN and S :                                       Eating: Eating  Assistance Needed: Setup or clean-up assistance  Comment: required setup assist to open packages/containers  CARE Score: 5  Discharge Goal: Independent       Oral Hygiene: Oral Hygiene  Assistance Needed: Partial/moderate assistance  Comment: d/t low vision required assist lining up toothpaste onto toothbrush.   Unable to orient partials correctly requiring physical assist to position in mouth  CARE Score: 3  Discharge Goal: Independent    UB/LB Bathing: Shower/Bathe Self  Assistance Needed: Partial/moderate assistance  Comment: required cues for sequencing, assist for B feet. Poor decision making during task; pt reported urine incontinence during stand however was not redirectable to transfer over to toilet and continued to dry bottom first  CARE Score: 3  Discharge Goal: Supervision or touching assistance    UB Dressing: Upper Body Dressing  Assistance Needed: Supervision or touching assistance  Comment: increased time/effort, assist for 02 line management  CARE Score: 4  Discharge Goal: Set-up or clean-up assistance         LB Dressing: Lower Body Dressing  Assistance Needed: Substantial/maximal assistance  Comment: poor distal reach requiring assist to thread BLEs in brief and pants; able to perform pants management up c CGA  CARE Score: 2  Discharge Goal: Supervision or touching assistance    Donning and [place] Footwear: Putting On/Taking Off Footwear  Assistance Needed: Dependent  Comment: poor distal reach requiring total A to doff/don hospital socks  CARE Score: 1  Discharge Goal: Supervision or touching assistance      Toiletin [address] needed: Partial/moderate assistance  Comment: able to manage Depends, required assist for thorough hygiene as well as cueing  CARE Score: 3  Discharge Goal: Supervision or touching assistance      Toilet Transfers:   Toilet Transfer  Assistance needed: Partial/moderate assistance  Comment: min A to<>from W/C d/t incontinence during session; used grab bars  CARE Score: 3  Discharge Goal: Supervision or touching assistance    Physical Therapy:   Short Term Goals  Time Frame for Short Term Goals: 5-7 STG=LTG  Short Term Goal 1: Pt will perform bed mobility with mod I  Short Term Goal 2: Pt will perform sit to stand, pivot and car transfers with mod I if no O2, supervision if O2  Short Term Goal 3: Pt will ambulate 150' on level surfaces with 2ww and 10' on uneven surfaces with supervision with or without O2  Short Term Goal 4: Pt will ascend/descend curb step and 4 steps with rail with supervision  Short Term Goal 5: Pt will retrieve object on floor with 2ww and supervision            Bed Mobility:   Sit to Lying  Assistance Needed: Supervision or touching assistance  Comment: mod effort, grimacing in face. pt denied anything wrong  CARE Score: 4  Discharge Goal: Independent  Roll Left and Right  Assistance Needed: Supervision or touching assistance  Comment: supervision  CARE Score: 4  Discharge Goal: Independent  Lying to Sitting on Side of Bed  Assistance Needed: Supervision or touching assistance  Comment: as sit to lying  CARE Score: 4  Discharge Goal: Independent    Transfers:    Sit to Stand  Assistance Needed: Partial/moderate assistance  Comment: variable CG to min assist with min more common after FPC through session, poor safety  CARE Score: 3  Discharge Goal: Independent  Chair/Bed-to-Chair Transfer  Assistance Needed: Partial/moderate assistance  Comment: 2ww, poor awareness of O2 line  CARE Score: 3  Discharge Goal: Independent (with no O2 line. predict assist with line if pt still on O2)     Car Transfer  Assistance Needed: Supervision or touching assistance  Comment: assist with O2 line  CARE Score: 4  Discharge Goal: Independent    Ambulation:    Walking Ability  Does the Patient Walk?: Yes     Walk 10 Feet  Assistance Needed: Supervision or touching assistance  Comment: CG with 2ww, assist with O2 line  CARE Score: 4  Discharge Goal: Supervision or touching assistance     Walk 50 Feet with Two Turns  Assistance Needed: Supervision or touching assistance  Comment: CG with 2ww and assist with O2 line.  Pt demonstrates moderate path deviation, slow alisha with variable step length  CARE Score: 4  Discharge Goal: Supervision or touching assistance     Walk 150 Feet  Assistance Needed: Supervision or touching assistance  Comment: 2ww as 50'. CARE Score: 4  Discharge Goal: Supervision or touching assistance     Walking 10 Feet on Uneven Surfaces  Assistance Needed: Partial/moderate assistance  Comment: min assist with 2ww, decreased awareness of obstacles though pt states she sees them  CARE Score: 3  Discharge Goal: Supervision or touching assistance     1 Step (Curb)  Assistance Needed: Partial/moderate assistance  Comment: min assist with walker management, mod cues  CARE Score: 3  Discharge Goal: Supervision or touching assistance     4 Steps  Assistance Needed: Supervision or touching assistance  Comment: CG with B rails  CARE Score: 4  Discharge Goal: Supervision or touching assistance     12 Steps  Reason if not Attempted: Not applicable  CARE Score: 9  Discharge Goal: Not Applicable       Wheelchair:  w/c Ability: Wheelchair Ability  Uses a Wheelchair and/or Scooter?: No                Balance:        Object: Picking Up Object  Assistance Needed: Partial/moderate assistance  Comment: min assist with 2ww, no reacher  CARE Score: 3  Discharge Goal: Supervision or touching assistance    I      Exam:    Blood pressure (!) 111/51, pulse 60, temperature 98.2 °F (36.8 °C), resp. rate 18, height 4' 11.02\" (1.499 m), weight 143 lb 15.4 oz (65.3 kg), SpO2 93 %. General: The patient was seen transferring back into bed after therapy and then she was helped repositioning in bed with staff. HEENT: Severely hard of hearing. This adds to some confusion and frustration for the patient. Pulmonary: Shallow respirations without wheezes or rales. Cardiac: Regular rate and rhythm. Abdomen: Patient's abdomen is soft and nondistended. Bowel sounds were present throughout. There was no rebound, guarding or masses noted. Upper extremities: Spontaneous movements of both upper limbs. Weak  but no unusual tone or tremor. Lower extremities: 1+ edema and tenderness about her vascular wound in the left calf. Some induration noted. Mild erythema and warmth. Sitting balance was fair. Standing balance was poor. Lab Results   Component Value Date    WBC 10.5 10/25/2022    HGB 13.5 10/25/2022    HCT 41.7 10/25/2022    MCV 93.3 10/25/2022     10/25/2022     Lab Results   Component Value Date    INR 1.23 10/20/2022    INR 1.88 03/24/2022    INR 1.80 03/23/2022    PROTIME 15.9 (H) 10/20/2022    PROTIME 24.4 (H) 03/24/2022    PROTIME 23.3 (H) 03/23/2022     Lab Results   Component Value Date    CREATININE 0.7 10/30/2022    BUN 40 (H) 10/30/2022     (L) 10/30/2022    K 4.0 10/30/2022    CL 87 (L) 10/30/2022    CO2 34 (H) 10/30/2022     Lab Results   Component Value Date    ALT 19 10/25/2022    AST 28 10/25/2022    ALKPHOS 276 (H) 10/25/2022    BILITOT 1.6 (H) 10/25/2022       Expected length of stay  prior to a supervised level of function for discharge home with a walker and [facility] OT/PT is 2 weeks. Recommendations:    Acute on chronic congestive heart failure with gait disturbance: We are attempting to develop the routine for her daily occupational and physical therapy. Communication issues are significant due to her severity of hearing loss and poor attention and reasoning. Monitoring her weights daily to evaluate her compensation of CHF. Ongoing diuresis with Aldactone, Demadex and Zaroxolyn. Blood pressure support with ProAmatine. Encouraging consistent oral intake of heart healthy diet/fluids. Aggressive pulmonary hygiene measures. She needs adaptive equipment training, DVT prophylaxis and possibly caregiver education. Outpatient follow-up with her cardiologist and PCP. Verbal cues and moderate physical assistance for transfers. DVT prophylaxis: Lovenox 40 mg subcu daily. I must monitor her hemoglobin and platelet count periodically while on this medication. Weightbearing activities are limited but tried daily. GI prophylaxis is available. No signs of acute blood loss.   Pulmonary hypertension: Gentle diuresis. Monitoring her CHF. Slow and deliberate position changes. Sundowner syndrome: Sleep regulation. Consistent staffing and environment when possible. Minimizing medications that might alter her sensorium. Involving family when possible for training and therapies. Obstructive sleep apnea: The respiratory service has been consulted to assist with nighttime BiPAP. She is on albuterol and Stiolto inhalers  Possible cellulitis: She is threatening cellulitis at the left calf surrounding her vascular wound. Monitoring closely. Checking blood counts in the morning.

## 2022-11-02 PROCEDURE — 2700000000 HC OXYGEN THERAPY PER DAY

## 2022-11-02 PROCEDURE — 1280000000 HC REHAB R&B

## 2022-11-02 PROCEDURE — 97530 THERAPEUTIC ACTIVITIES: CPT

## 2022-11-02 PROCEDURE — 94150 VITAL CAPACITY TEST: CPT

## 2022-11-02 PROCEDURE — 6360000002 HC RX W HCPCS: Performed by: INTERNAL MEDICINE

## 2022-11-02 PROCEDURE — 94761 N-INVAS EAR/PLS OXIMETRY MLT: CPT

## 2022-11-02 PROCEDURE — 97110 THERAPEUTIC EXERCISES: CPT

## 2022-11-02 PROCEDURE — 99232 SBSQ HOSP IP/OBS MODERATE 35: CPT | Performed by: PHYSICAL MEDICINE & REHABILITATION

## 2022-11-02 PROCEDURE — 6370000000 HC RX 637 (ALT 250 FOR IP): Performed by: INTERNAL MEDICINE

## 2022-11-02 PROCEDURE — 97535 SELF CARE MNGMENT TRAINING: CPT

## 2022-11-02 PROCEDURE — 2500000003 HC RX 250 WO HCPCS: Performed by: INTERNAL MEDICINE

## 2022-11-02 PROCEDURE — 2580000003 HC RX 258: Performed by: INTERNAL MEDICINE

## 2022-11-02 PROCEDURE — 94664 DEMO&/EVAL PT USE INHALER: CPT

## 2022-11-02 PROCEDURE — 6370000000 HC RX 637 (ALT 250 FOR IP): Performed by: PHYSICAL MEDICINE & REHABILITATION

## 2022-11-02 PROCEDURE — 94640 AIRWAY INHALATION TREATMENT: CPT

## 2022-11-02 PROCEDURE — 97116 GAIT TRAINING THERAPY: CPT

## 2022-11-02 RX ADMIN — TORSEMIDE 40 MG: 20 TABLET ORAL at 07:52

## 2022-11-02 RX ADMIN — METOLAZONE 5 MG: 2.5 TABLET ORAL at 07:52

## 2022-11-02 RX ADMIN — CYANOCOBALAMIN TAB 1000 MCG 1000 MCG: 1000 TAB at 07:52

## 2022-11-02 RX ADMIN — TORSEMIDE 40 MG: 20 TABLET ORAL at 17:42

## 2022-11-02 RX ADMIN — SPIRONOLACTONE 25 MG: 50 TABLET ORAL at 07:53

## 2022-11-02 RX ADMIN — METOLAZONE 5 MG: 2.5 TABLET ORAL at 20:06

## 2022-11-02 RX ADMIN — TIMOLOL MALEATE 1 DROP: 5 SOLUTION OPHTHALMIC at 20:07

## 2022-11-02 RX ADMIN — MIDODRINE HYDROCHLORIDE 5 MG: 5 TABLET ORAL at 17:43

## 2022-11-02 RX ADMIN — CARVEDILOL 6.25 MG: 6.25 TABLET, FILM COATED ORAL at 07:52

## 2022-11-02 RX ADMIN — TIOTROPIUM BROMIDE AND OLODATEROL 2 PUFF: 3.124; 2.736 SPRAY, METERED RESPIRATORY (INHALATION) at 08:21

## 2022-11-02 RX ADMIN — ALBUTEROL SULFATE 2 PUFF: 90 AEROSOL, METERED RESPIRATORY (INHALATION) at 08:20

## 2022-11-02 RX ADMIN — ASPIRIN 81 MG CHEWABLE TABLET 81 MG: 81 TABLET CHEWABLE at 07:52

## 2022-11-02 RX ADMIN — ENOXAPARIN SODIUM 40 MG: 40 INJECTION SUBCUTANEOUS at 07:51

## 2022-11-02 RX ADMIN — CARVEDILOL 6.25 MG: 6.25 TABLET, FILM COATED ORAL at 17:43

## 2022-11-02 RX ADMIN — SODIUM CHLORIDE, PRESERVATIVE FREE 10 ML: 5 INJECTION INTRAVENOUS at 20:08

## 2022-11-02 RX ADMIN — MIDODRINE HYDROCHLORIDE 5 MG: 5 TABLET ORAL at 12:17

## 2022-11-02 RX ADMIN — Medication 5000 UNITS: at 07:52

## 2022-11-02 RX ADMIN — LATANOPROST 1 DROP: 50 SOLUTION/ DROPS OPHTHALMIC at 18:23

## 2022-11-02 RX ADMIN — ATORVASTATIN CALCIUM 10 MG: 10 TABLET, FILM COATED ORAL at 20:06

## 2022-11-02 RX ADMIN — AMOXICILLIN AND CLAVULANATE POTASSIUM 1 TABLET: 875; 125 TABLET, FILM COATED ORAL at 20:06

## 2022-11-02 RX ADMIN — AMOXICILLIN AND CLAVULANATE POTASSIUM 1 TABLET: 875; 125 TABLET, FILM COATED ORAL at 07:52

## 2022-11-02 RX ADMIN — SODIUM CHLORIDE, PRESERVATIVE FREE 10 ML: 5 INJECTION INTRAVENOUS at 07:51

## 2022-11-02 RX ADMIN — LEVOTHYROXINE SODIUM 88 MCG: 0.11 TABLET ORAL at 06:10

## 2022-11-02 RX ADMIN — TRAZODONE HYDROCHLORIDE 50 MG: 50 TABLET ORAL at 20:06

## 2022-11-02 RX ADMIN — MICONAZOLE NITRATE: 2 POWDER TOPICAL at 20:07

## 2022-11-02 RX ADMIN — ACETAMINOPHEN 650 MG: 325 TABLET ORAL at 17:43

## 2022-11-02 RX ADMIN — MIDODRINE HYDROCHLORIDE 5 MG: 5 TABLET ORAL at 07:53

## 2022-11-02 RX ADMIN — MICONAZOLE NITRATE: 2 POWDER TOPICAL at 07:52

## 2022-11-02 RX ADMIN — ALBUTEROL SULFATE 2 PUFF: 90 AEROSOL, METERED RESPIRATORY (INHALATION) at 21:00

## 2022-11-02 RX ADMIN — COLLAGENASE SANTYL: 250 OINTMENT TOPICAL at 07:53

## 2022-11-02 ASSESSMENT — PAIN SCALES - GENERAL: PAINLEVEL_OUTOF10: 10

## 2022-11-02 NOTE — PROGRESS NOTES
Occupational Therapy    Physical Rehabilitation: OCCUPATIONAL THERAPY     [x] daily progress note       [] discharge       Patient Name:  Davina Coates   :  3/18/1929 MRN: [id number]  Room:  [address] [place] [address] Date of Admission: 10/29/2022  Rehabilitation Diagnosis:   Acute on chronic diastolic (congestive) heart failure [I50.33]  Acute on chronic combined systolic and diastolic CHF (congestive heart failure) ([facility] [I50.43]       Date 2022       Day of ARU Week:  5   Time IN/OUT 1195-6490  2831-3900   Individual Tx Minutes 95+25   Group Tx Minutes    Co-Treat Minutes    Concurrent Tx Minutes    TOTAL Tx Time Mins 120   Variance Time    Variance Time []   Refusal due to:     []   Medical hold/reason:    []   Illness   []   Off Unit for test/procedure  []   Extra time needed to complete task  []   Therapeutic need  []   Other (specify):   Restrictions Restrictions/Precautions: General Precautions, Fall Risk (2L02, [place])         Communication with other providers: [x]   OK to see per nursing:     []   Spoke with team member regarding:      Subjective observations and cognitive status: Pt sitting up in WC on approach; pt reported feeling very tired stating, \"I don't want to do anything today. I'm just too tired. \" With encouragement, pt willing to participate. Pt resting in bed, finishing lunch; pleasant and agreeable to therapy. Pt stated she felt a little better this afternoon. Pain level/location:    10/10       Location: \"all over\"- unable to specify location of pain. Notified nsg.     Discharge recommendations  Anticipated discharge date:  TBD  Destination: []home alone   []home alone w assist prn   [] home w/ family    [] Continuous supervision       []SNF    [] Assisted living     [] Other:   Continued therapy: []HHC OT  []OUTPATIENT  OT   [] No Further OT  Equipment needs: likely none        ADLs:    Oral Hygiene: Oral Hygiene  Assistance Needed: Supervision or touching assistance  Comment: Pt able to apply toothpaste onto tooth brush and brush teeth wtih SBA standing at sink  CARE Score: 4  Discharge Goal: Independent    UB/LB Bathing: Attempted bathing at sink, however, pt would only wash hands with bathing clothes despite cues to bathe UB and LB     UB Dressing: Upper Body Dressing  Assistance Needed: Supervision or touching assistance  Comment: Pt able to thread BUE's into sweater, however, pt required cues to don over head and pull down in front. CARE Score: 4  Discharge Goal: Set-up or clean-up assistance         LB Dressing: Lower Body Dressing  Assistance Needed: Partial/moderate assistance  Comment: despite education of reacher use, pt demo'd poor carryover and refused to use reacher. Pt required min A to thread BLEs into pants; SBA in stance to manage over hips. CARE Score: 3  Discharge Goal: Supervision or touching assistance    Donning and [place] Footwear: Putting On/Taking Off Footwear  Assistance Needed: Dependent  Comment: d/t poor distal reach, pt required total assist to doff/don hospital socks despite education to use reacher to doff socks. CARE Score: 1  Discharge Goal: Supervision or touching assistance      Toiletin [address] needed: Partial/moderate assistance  Comment: Pt able to manage depends up and down, however after urination, pt only performed hygiene on bottom despite cues for bladder hygiene  CARE Score: 3  Discharge Goal: Supervision or touching assistance      Toilet Transfers:   CGA Toilet Transfer  Assistance needed: Supervision or touching assistance  Comment: CGA using 4WW and grab bars, cues for hand placement and safety  CARE Score: 4  Discharge Goal: Supervision or touching assistance  Device Used:    []   Standard Toilet         []   Grab Bars           []  Bedside Commode       []   Elevated Toilet          []   Other:        Bed Mobility:           [x]   Pt received out of bed   Supine --> Sit:  SBA using bed rails   Sit --> Supine:  SBA using bed rails     Transfers:    Sit--> Stand:  SBA, cues for safety   Stand --> Sit:   SBA  Stand-Pivot:   SBA  Other:    Assistive device required for transfer:   RW       Functional Mobility:  to<>from bathroom   Assistance:  SBA  Device:   []   Rolling Walker     []   Standard Walker []   Wheelchair        []   [employer]       [x]   4-Wheeled Jeffy Sciara         []   Cardiac Walker       []   Other:          Additional Therapeutic activities/exercises completed this date:     [x]   ADL Training   []   Balance/Postural training     [x]   Bed/Transfer Training: sit<>stand transfer training with patient for safe technique and hand placement. Pt demo'd poor carryover requiring max cues for hand placement and proper technique with sit<>stand transfer x4. [x]   Endurance Training: patient instructed in bilateral reciprocal patterned movement with min resistance while seated for 10.5 minutes with 1 rest breaks to increase endurance/activity tolerance for facilitation of both ADL and mobility tasks     []   Neuromuscular Re-ed   []   Nu-step:  Time:        Level:         #Steps:       []   Rebounder:    []  Seated     []  Standing        []   Supine Ther Ex (reps/sets):     []   Seated Ther Ex (reps/sets):     []   Standing Ther Ex (reps/sets):     []   Other:      Comments:      Patient/Caregiver Education and Training:   []   [employer] Equipment Use  []   Bed Mobility/Transfer Technique/Safety  []   Energy Conservation Tips  []   Family training  []   Postural Awareness  []   Safety During Functional Activities  []   Reinforced Patient's Precautions   []   Progress was updated and reviewed in Rehabtracker with patient and/or family this         date.     Treatment Plan for Next Session: Continue OT POC         Treatment/Activity Tolerance:   [x] Tolerated treatment with no adverse effects    [] Patient limited by fatigue  [] Patient limited by pain   [] Patient limited by medical complications:    [] Adverse reaction to Tx:   [] Significant change in status    Safety:       [x]  bed alarm set    []  chair alarm set    []  Pt refused alarms                []  Telesitter activated      [x]  Gait belt used during tx session      []other:       Number of Minutes/Billable Intervention  Therapeutic Exercise 15   ADL Self-care 60+15   Neuro Re-Ed    Therapeutic Activity 20+10   Group    Other:    TOTAL 120       Social History  Social/Functional History  Lives With: Alone  Type of Home: Condo  Home Layout: One level  Home Access: Level entry  Bathroom Shower/Tub: Tub/Shower unit, Walk-in shower  Bathroom Toilet: Standard  Bathroom Equipment: Grab bars in shower, Grab bars around toilet  Home Equipment: [place], [place], Walker, 4 wheeled  ADL Assistance: Independent  Homemaking Assistance: Independent  Ambulation Assistance: Independent  Transfer Assistance: Independent  Active : No  Patient's  Info: Daughter in law does grocery shopping.   Occupation: Retired  Additional Comments: D/t communication difficulties related to hearing aid issues, and cognitive concerns information above obtained from IP evals and needs confirmed    Objective                                                                                    Goals:  (Update in navigator)  Short Term Goals  Time Frame for Short Term Goals: STGs=LTGs:  Long Term Goals  Time Frame for Long Term Goals : ~10 days or until d/c  Long Term Goal 1: Pt will complete grooming tasks Ind  Long Term Goal 2: Pt will complete total body bathing c S using AE PRN  Long Term Goal 3: Pt will complete UB dressing c setup  Long Term Goal 4: Pt will complete LB dressing c S using AE PRN  Long Term Goal 5: Pt will doff/don footwear c S using AE PRN  Additional Goals?: Yes  Long Term Goal 6: Pt will complete toileting c S  Long Term Goal 7: Pt will complete functional transfers (bed, chair, toilet, shower) c DME PRN and S  Long Term Goal 8: Pt will perform therex/therax to facilitate increased strength/endurance/ax tolerance (c emphasis on dynamic standing balance/tolerance >6 mins, BUE endurance) c SBA  Long Term Goal 9: Pt will complete simple homemaking tasks c DME PRN and S:        Plan of Care                                                                              Times per week: 5 days per week for a minimum of 60 minutes/day plus group as appropriate for 60 minutes.   Treatment to include Occupational Therapy Plan  Current Treatment Recommendations: Strengthening, Endurance training, Cognitive reorientation, Pain management, Patient/Caregiver education & training, Safety education & training, Equipment evaluation, education, & procurement, Self-Care / ADL, Home management training, Cognitive/Perceptual training, Balance training, Functional mobility training    Electronically signed by   LULA Corea,  11/2/2022, 1:04 PM

## 2022-11-02 NOTE — PATIENT CARE CONFERENCE
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 Her daughter plans to stay with her upon discharge.     PHYSICAL THERAPY (Updated in QI)  Short Term Goals  Time Frame for Short Term Goals: 5-7 STG=LTG  Short Term Goal 1: Pt will perform bed mobility with mod I  Short Term Goal 2: Pt will perform sit to stand, pivot and car transfers with mod I if no O2, supervision if O2  Short Term Goal 3: Pt will ambulate 150' on level surfaces with 2ww and 10' on uneven surfaces with supervision with or without O2  Short Term Goal 4: Pt will ascend/descend curb step and 4 steps with rail with supervision  Short Term Goal 5: Pt will retrieve object on floor with 2ww and supervision          Impairments/deficits, barriers: cognition, initiation, activity tolerance   Body Structures, Functions, Activity Limitations Requiring Skilled Therapeutic Intervention: Decreased functional mobility , Decreased ADL status, Decreased strength, Decreased vision/visual deficit, Decreased high-level IADLs, Decreased balance, Decreased posture, Increased pain, Decreased endurance, Decreased cognition, Decreased safe awareness     Therapy Prognosis: Good  Decision Making: High Complexity  Clinical Presentation: unpredictable characteristics  Equipment needed at discharge:pt has 4ww      PT IRF-YAMILKA scores since initial assessment  Bed Mobility:   Roll Left and Right  Assistance Needed: Setup or clean-up assistance  Comment: mod I with rails in place  CARE Score: 5  Discharge Goal: Independent    Sit to Lying  Assistance Needed: Supervision or touching assistance  Comment: mod effort, use of rails, cues for completing  CARE Score: 4  Discharge Goal: Independent    Lying to Sitting on Side of Bed  Assistance Needed: Supervision or touching assistance  Comment: as sit to lying  CARE Score: 4  Discharge Goal: Independent    Transfers:    Sit to Stand  Assistance Needed: Supervision or touching assistance  Comment: CGA to min assist, but more consistently CG, poor safety  CARE Score: 4  Discharge Goal: Independent    Chair/Bed-to-Chair Transfer  Assistance Needed: Supervision or touching assistance  Comment: CG  CARE Score: 4  Discharge Goal: Independent (with no O2 line.  predict assist with line if pt still on O2)         Car Transfer  Assistance Needed: Supervision or touching assistance  Comment: CG for LEs into car  CARE Score: 4  Discharge Goal: Independent    Ambulation:    Walking Ability  Does the Patient Walk?: Yes     Walk 10 Feet  Assistance Needed: Supervision or touching assistance  Comment: SBA with 4ww, no O2 line  CARE Score: 4  Discharge Goal: Supervision or touching assistance     Walk 50 Feet with Two Turns  Assistance Needed: Supervision or touching assistance  Comment: SBA with 4ww, no O2 line  CARE Score: 4  Discharge Goal: Supervision or touching assistance     Walk 150 Feet  Assistance Needed: Supervision or touching assistance  Comment: SBA with 4ww, no O2 line  CARE Score: 4  Discharge Goal: Supervision or touching assistance     Walking 10 Feet on Uneven Surfaces  Assistance Needed: Supervision or touching assistance  Comment: CG with 2ww, mod cues  CARE Score: 4  Discharge Goal: Supervision or touching assistance     1 Step (Curb)  Assistance Needed: Partial/moderate assistance  Comment: min assist with walker management, mod cues  CARE Score: 3  Discharge Goal: Supervision or touching assistance     4 Steps  Assistance Needed: Supervision or touching assistance  Comment: CG with B rails  CARE Score: 4  Discharge Goal: Supervision or touching assistance     12 Steps  Reason if not Attempted: Not applicable  CARE Score: 9  Discharge Goal: Not Applicable           Wheelchair:  w/c Ability: Wheelchair Ability  Uses a Wheelchair and/or Scooter?: No                        Balance:        Object: Picking Up Object  Assistance Needed: Partial/moderate assistance  Comment: min assist with 2ww, no reacher  CARE Score: 3  Discharge Goal: Supervision or touching assistance    Fall Risk: [x]  Yes  []  No    OCCUPATIONAL THERAPY  (Updated in QI)  Short Term Goals  Time Frame for Short Term Goals: STGs=LTGs :   Long Term Goals  Time Frame for Long Term Goals : ~10 days or until d/c  Long Term Goal 1: Pt will complete grooming tasks Ind  Long Term Goal 2: Pt will complete total body bathing c S using AE PRN  Long Term Goal 3: Pt will complete UB dressing c setup  Long Term Goal 4: Pt will complete LB dressing c S using AE PRN  Long Term Goal 5: Pt will doff/don footwear c S using AE PRN  Additional Goals?: Yes  Long Term Goal 6: Pt will complete toileting c S  Long Term Goal 7: Pt will complete functional transfers (bed, chair, toilet, shower) c DME PRN and S  Long Term Goal 8: Pt will perform therex/therax to facilitate increased strength/endurance/ax tolerance (c emphasis on dynamic standing balance/tolerance >6 mins, BUE endurance) c SBA  Long Term Goal 9: Pt will complete simple homemaking tasks c DME PRN and S :          OT IRF-YAMILKA scores and goals since initial assessment:    ADLs:    Eating: Eating  Assistance Needed: Setup or clean-up assistance  Comment: required setup assist to open packages/containers  CARE Score: 5  Discharge Goal: Independent       Oral Hygiene: Oral Hygiene  Assistance Needed: Supervision or touching assistance  Comment: Pt able to apply toothpaste onto tooth brush and brush teeth wtih SBA standing at sink  CARE Score: 4  Discharge Goal: Independent    Toiletin [address] needed: Partial/moderate assistance  Comment: Pt able to manage depends up and down, however after urination, pt only performed hygiene on bottom despite cues for bladder hygiene  CARE Score: 3  Discharge Goal: Supervision or touching assistance      UB/LB Bathing: Shower/Bathe Self  Assistance Needed: Supervision or touching assistance  Comment: CG/SBA in stance at sink to bathe UB. Pt required max cues for sequencing and thoroughness  CARE Score: 4  Discharge Goal: Supervision or touching assistance    UB Dressing: Upper Body Dressing  Assistance Needed: Supervision or touching assistance  Comment: Pt able to thread BUE's into sweater, however, pt required cues to don over head and pull down in front. CARE Score: 4  Discharge Goal: Set-up or clean-up assistance         LB Dressing: Lower Body Dressing  Assistance Needed: Partial/moderate assistance  Comment: despite education of reacher use, pt demo'd poor carryover and refused to use reacher. Pt required min A to thread BLEs into pants; SBA in stance to manage over hips. CARE Score: 3  Discharge Goal: Supervision or touching assistance    Donning and [place] Footwear: Putting On/Taking Off Footwear  Assistance Needed: Dependent  Comment: d/t poor distal reach, pt required total assist to doff/don hospital socks despite education to use reacher to doff socks. CARE Score: 1  Discharge Goal: Supervision or touching assistance      Toilet Transfers:    Toilet Transfer  Assistance needed: Supervision or touching assistance  Comment: CG/SBA using 4WW and grab bars, cues to properly align with toilet. CARE Score: 4  Discharge Goal: Supervision or touching assistance      Impairments/deficits, barriers:  Decreased balance, endurance, cognition/safety awareness/insight, vision and hearing. Assessment  Performance deficits / Impairments: Decreased functional mobility , Decreased ADL status, Decreased strength, Decreased safe awareness, Decreased cognition, Decreased endurance, Decreased balance, Decreased vision/visual deficit, Decreased high-level IADLs  Decision Making: High Complexity  Equipment needed at discharge:TBD, pending daughter's report on home setup      COGNITIVE FUNCTION/SPEECH THERAPY (AS INDICATED)  LTG                                         Nursing Current Medical Status:   [x] Is continent of bowel and bladder most of the time    [] Is incontinent of bowel and bladder    [] Has had an adequate number of bowel movements   [] Urinates with no urinary retention >300ml in bladder   [] Targeting bladder protocol with connolly removal   [x] Maintaining O2 SATs at 92% or greater with O2 2L N/C    [x] Has pain managed while on ARU         [] Has had no skin breakdown while on ARU   [x] Has improved skin integrity via wound measurements   [] Has no signs/symptoms of infection at the wound site   [] Pressure wounds Stage/Location:    [] Arrived on unit with pressure wound  [] Has been free from injury during hospitalization   [] Has experienced a fall during hospitalization  [] Ongoing education with patient/family with understanding demonstrated for:  [] Receives IV Fluids  [x] Other:concerns for safety awareness        NUTRITION  Weight: 135 lb 5.8 oz (61.4 kg) / Body mass index is 27.33 kg/m². Current diet: ADULT DIET; Regular;  Low Sodium (2 gm); 1800 ml  ADULT ORAL NUTRITION SUPPLEMENT; Dinner; Frozen Oral Supplement  Intake: Content with meals, intake 50-75%      Medical improvements/barriers: ISAEL dementia, monitor O2, mod to max cues for all safety, abdominal distention        Team goals for next treatment period/Intervention for current barriers:   [x] Pt will increase activity tolerance for daily tasks. [x] Pt will improve bed mobility with reduced assist.  [x] Pt will improve safety in fx tasks with reduced cues/assist  [x] Pt will improve transfers with reduced assist  [x] Pt will improve toileting with reduced assist  [x] Pt will improve ADL's with use of adaptive equipment with reduced assist  [] Pt will improve pain mgmt for maximum participation in tx program  [] Pt will improve communication to get basic needs met on unit  [] Pt will improve swallowing for safe diet advancement with use of strategies  []  Plan for discharge to home. Patient Strengths: Motivated, Cooperative, and Pleasant    Justification for Continued Stay  Based on my medical assessment of the patient and review of information from the interdisciplinary team as part of this weekly team conference, the patient continues to meet the following criteria for IRF level of care: The patient requires active and ongoing intervention of multiple therapy disciplines  The patient requires and intensive rehabilitation therapy program  The patient requires continued physician supervision by a rehabilitation physician  The patient requires 24 hours rehab nursing care  The patient requires an intensive and coordinated interdisciplinary team approach to the delivery of rehabilitative care.      Assessment/Plan   [x]  The patient is making good progression towards their long term goals and is actively participating in and has a reasonable expectation to continue to benefit from the intensive rehabilitation therapy program   []  The estimated discharge date has been changed from initial team conference due to:   []  The estimated discharge destination has been changed from initial team conference due to:         Ongoing tx following discharge:

## 2022-11-02 NOTE — PROGRESS NOTES
Felicia Romo    : 3/18/1929  Acct #: [de-identified]  MRN: [id number]              PM&R Progress Note      Admitting diagnosis: Acute on chronic diastolic congestive heart failure ( [facility])     Comorbid diagnoses impacting rehabilitation: Generalized weakness, gait disturbance, sinus bradycardia, pulmonary hypertension, hyponatremia, sundowner syndrome, obstructive sleep apnea, left calf wound, orthostatic hypotension     Chief complaint: Some fatigue with activity but no particular dizziness or palpitations noted. Prior (baseline) level of function: Independent. Current level of function:         Current  IRF-YAMILKA and Goals:   Occupational Therapy:    Short Term Goals  Time Frame for Short Term Goals: STGs=LTGs :   Long Term Goals  Time Frame for Long Term Goals : ~10 days or until d/c  Long Term Goal 1: Pt will complete grooming tasks Ind  Long Term Goal 2: Pt will complete total body bathing c S using AE PRN  Long Term Goal 3: Pt will complete UB dressing c setup  Long Term Goal 4: Pt will complete LB dressing c S using AE PRN  Long Term Goal 5: Pt will doff/don footwear c S using AE PRN  Additional Goals?: Yes  Long Term Goal 6: Pt will complete toileting c S  Long Term Goal 7: Pt will complete functional transfers (bed, chair, toilet, shower) c DME PRN and S  Long Term Goal 8: Pt will perform therex/therax to facilitate increased strength/endurance/ax tolerance (c emphasis on dynamic standing balance/tolerance >6 mins, BUE endurance) c SBA  Long Term Goal 9: Pt will complete simple homemaking tasks c DME PRN and S :                                       Eating: Eating  Assistance Needed: Setup or clean-up assistance  Comment: required setup assist to open packages/containers  CARE Score: 5  Discharge Goal: Independent       Oral Hygiene: Oral Hygiene  Assistance Needed: Partial/moderate assistance  Comment: d/t low vision required assist lining up toothpaste onto toothbrush.   Unable to orient partials correctly requiring physical assist to position in mouth  CARE Score: 3  Discharge Goal: Independent    UB/LB Bathing: Shower/Bathe Self  Assistance Needed: Supervision or touching assistance  Comment: CG/SBA in stance at sink to bathe UB. Pt required max cues for sequencing and thoroughness  CARE Score: 4  Discharge Goal: Supervision or touching assistance    UB Dressing: Upper Body Dressing  Assistance Needed: Supervision or touching assistance  Comment: cues to orient shirt, increased time/effort and assist for 02 line management  CARE Score: 4  Discharge Goal: Set-up or clean-up assistance         LB Dressing: Lower Body Dressing  Assistance Needed: Partial/moderate assistance  Comment: d/t poor distal reach, introduced reacher to don pants. pt required assist to thread BLEs into brief and pants using reacher, CGA to perform pants management. CARE Score: 3  Discharge Goal: Supervision or touching assistance    Donning and [place] Footwear: Putting On/Taking Off Footwear  Assistance Needed: Dependent  Comment: poor distal reach requiring total A to doff/don hospital socks  CARE Score: 1  Discharge Goal: Supervision or touching assistance      Toiletin [address] needed: Partial/moderate assistance  Comment: Pt required cues to manage depends down and up; assist for thoroughness with BM hygiene  CARE Score: 3  Discharge Goal: Supervision or touching assistance      Toilet Transfers:   Toilet Transfer  Assistance needed: Supervision or touching assistance  Comment: CGA using RW and grab bars, cues for hand placement and safety  CARE Score: 4  Discharge Goal: Supervision or touching assistance    Physical Therapy:   Short Term Goals  Time Frame for Short Term Goals: 5-7 STG=LTG  Short Term Goal 1: Pt will perform bed mobility with mod I  Short Term Goal 2: Pt will perform sit to stand, pivot and car transfers with mod I if no O2, supervision if O2  Short Term Goal 3: Pt will ambulate 150' on level surfaces with 2ww and 10' on uneven surfaces with supervision with or without O2  Short Term Goal 4: Pt will ascend/descend curb step and 4 steps with rail with supervision  Short Term Goal 5: Pt will retrieve object on floor with 2ww and supervision            Bed Mobility:   Sit to Lying  Assistance Needed: Supervision or touching assistance  Comment: mod effort, grimacing in face. pt denied anything wrong  CARE Score: 4  Discharge Goal: Independent  Roll Left and Right  Assistance Needed: Supervision or touching assistance  Comment: supervision  CARE Score: 4  Discharge Goal: Independent  Lying to Sitting on Side of Bed  Assistance Needed: Supervision or touching assistance  Comment: as sit to lying  CARE Score: 4  Discharge Goal: Independent    Transfers:    Sit to Stand  Assistance Needed: Partial/moderate assistance  Comment: variable CG to min assist with min more common after MCFP through session, poor safety  CARE Score: 3  Discharge Goal: Independent  Chair/Bed-to-Chair Transfer  Assistance Needed: Partial/moderate assistance  Comment: 2ww, poor awareness of O2 line  CARE Score: 3  Discharge Goal: Independent (with no O2 line. predict assist with line if pt still on O2)     Car Transfer  Assistance Needed: Supervision or touching assistance  Comment: assist with O2 line  CARE Score: 4  Discharge Goal: Independent    Ambulation:    Walking Ability  Does the Patient Walk?: Yes     Walk 10 Feet  Assistance Needed: Supervision or touching assistance  Comment: CG with 2ww, assist with O2 line  CARE Score: 4  Discharge Goal: Supervision or touching assistance     Walk 50 Feet with Two Turns  Assistance Needed: Supervision or touching assistance  Comment: CG with 2ww and assist with O2 line.  Pt demonstrates moderate path deviation, slow alisha with variable step length  CARE Score: 4  Discharge Goal: Supervision or touching assistance     Walk 150 Feet  Assistance Needed: Supervision or touching assistance  Comment: 2ww as 50'. CARE Score: 4  Discharge Goal: Supervision or touching assistance     Walking 10 Feet on Uneven Surfaces  Assistance Needed: Partial/moderate assistance  Comment: min assist with 2ww, decreased awareness of obstacles though pt states she sees them  CARE Score: 3  Discharge Goal: Supervision or touching assistance     1 Step (Curb)  Assistance Needed: Partial/moderate assistance  Comment: min assist with walker management, mod cues  CARE Score: 3  Discharge Goal: Supervision or touching assistance     4 Steps  Assistance Needed: Supervision or touching assistance  Comment: CG with B rails  CARE Score: 4  Discharge Goal: Supervision or touching assistance     12 Steps  Reason if not Attempted: Not applicable  CARE Score: 9  Discharge Goal: Not Applicable       Wheelchair:  w/c Ability: Wheelchair Ability  Uses a Wheelchair and/or Scooter?: No                Balance:        Object: Picking Up Object  Assistance Needed: Partial/moderate assistance  Comment: min assist with 2ww, no reacher  CARE Score: 3  Discharge Goal: Supervision or touching assistance    I      Exam:    Blood pressure 116/62, pulse 59, temperature 97.8 °F (36.6 °C), temperature source Oral, resp. rate 18, height 4' 11.02\" (1.499 m), weight 141 lb 12.1 oz (64.3 kg), SpO2 95 %. General: Sitting up in a bedside chair. Chatting with family. Easily distracted but able to answer direct questions. HEENT: MMM. No JVD or adenopathy. Pulmonary: Shallow respirations with symmetric air exchange. No coughing. Cardiac: RRR. Abdomen: Patient's abdomen is soft and nondistended. Bowel sounds were present throughout. There was no rebound, guarding or masses noted. Upper extremities: She was able to bring both hands up to meet mine. No tremor or clonus. No bruising. Lower extremities: Calf soft. Trace edema about the feet. Heels clear. Sitting balance was good. Standing balance was poor.     Lab Results   Component Value Date    WBC 8.3 11/01/2022    HGB 13.0 11/01/2022    HCT 41.5 11/01/2022    MCV 94.1 11/01/2022     11/01/2022     Lab Results   Component Value Date    INR 1.23 10/20/2022    INR 1.88 03/24/2022    INR 1.80 03/23/2022    PROTIME 15.9 (H) 10/20/2022    PROTIME 24.4 (H) 03/24/2022    PROTIME 23.3 (H) 03/23/2022     Lab Results   Component Value Date    CREATININE 0.8 11/01/2022    BUN 48 (H) 11/01/2022     (L) 11/01/2022    K 3.6 11/01/2022    CL 89 (L) 11/01/2022    CO2 33 (H) 11/01/2022     Lab Results   Component Value Date    ALT 19 10/25/2022    AST 28 10/25/2022    ALKPHOS 276 (H) 10/25/2022    BILITOT 1.6 (H) 10/25/2022       Expected length of stay  prior to a supervised level of function for discharge home with a walker and C OT/PT is 10 to 14 days. Recommendations:    Acute on chronic congestive heart failure with gait disturbance: She requires cueing to stay engaged in the activities of her daily occupational and physical therapy. Communication issues are significant due to her severity of hearing loss and poor attention and reasoning. Daily weights do not reveal any decompensation of CHF. Ongoing diuresis with Aldactone, Demadex and Zaroxolyn. Blood pressure support with ProAmatine. Encouraging consistent oral intake of heart healthy diet/fluids. Aggressive pulmonary hygiene measures. She needs adaptive equipment training, DVT prophylaxis and possibly caregiver education. Outpatient follow-up with her cardiologist and PCP. Verbal cues and minimum physical assistance for transfers. DVT prophylaxis: Lovenox 40 mg subcu daily. I must monitor her hemoglobin and platelet count periodically while on this medication. Weightbearing activities are limited but tried daily. GI prophylaxis is available. No new bruising or swelling. Pulmonary hypertension: Gentle diuresis. Monitoring her CHF. Slow and deliberate position changes.   Sundowner syndrome: Sleep regulation. Consistent staffing and environment when possible. Minimizing medications that might alter her sensorium. Involving family when possible for training and therapies. No agitation noted today. Obstructive sleep apnea: The respiratory service is monitoring her use of the nighttime BiPAP. She is on albuterol and Stiolto inhalers  Possible cellulitis: She is threatening cellulitis at the left calf surrounding her vascular wound. Monitoring closely. Checking blood counts in the morning. Minor tenderness without any deterioration.

## 2022-11-02 NOTE — PLAN OF CARE
Problem: Discharge Planning  Goal: Discharge to home or other facility with appropriate resources  11/2/2022 0236 by Genevieve Martinez LPN  Outcome: Progressing  11/1/2022 1613 by Francisco J Guidry LPN  Outcome: Progressing     Problem: Skin/Tissue Integrity  Goal: Absence of new skin breakdown  Description: 1. Monitor for areas of redness and/or skin breakdown  2. Assess vascular access sites hourly  3. Every 4-6 hours minimum:  Change oxygen saturation probe site  4. Every 4-6 hours:  If on nasal continuous positive airway pressure, respiratory therapy assess nares and determine need for appliance change or resting period.   11/2/2022 0236 by Genevieve Martinez LPN  Outcome: Progressing  11/1/2022 1613 by Francisco J Guidry LPN  Outcome: Progressing     Problem: Safety - Adult  Goal: Free from fall injury  11/2/2022 0236 by Genevieve Martinez LPN  Outcome: Progressing  11/1/2022 1613 by Francisco J Guidry LPN  Outcome: Progressing     Problem: ABCDS Injury Assessment  Goal: Absence of physical injury  11/2/2022 0236 by Genevieve Martinez LPN  Outcome: Progressing  11/1/2022 1613 by Francisco J Guidry LPN  Outcome: Progressing     Problem: Pain  Goal: Verbalizes/displays adequate comfort level or baseline comfort level  11/2/2022 0236 by Genevieve Martinez LPN  Outcome: Progressing  11/1/2022 1613 by Francisco J Guidry LPN  Outcome: Progressing     Problem: Chronic Conditions and Co-morbidities  Goal: Patient's chronic conditions and co-morbidity symptoms are monitored and maintained or improved  11/2/2022 0236 by Genevieve Martinez LPN  Outcome: Progressing  11/1/2022 1613 by Francisco J Guidry LPN  Outcome: Progressing     Problem: Nutrition Deficit:  Goal: Optimize nutritional status  11/2/2022 0236 by Genevieve Martinez LPN  Outcome: Progressing  11/1/2022 1613 by Francisco J Guidry LPN  Outcome: Progressing

## 2022-11-02 NOTE — PROGRESS NOTES
Physical Therapy  [x] daily progress note       [] discharge       Patient Name:  Jacobo Patrick   :  3/18/1929 MRN: [id number]  Room:  [address] [place], [place] [address] Date of Admission: 10/29/2022  Rehabilitation Diagnosis:   Acute on chronic diastolic (congestive) heart failure [I50.33]  Acute on chronic combined systolic and diastolic CHF [facility] [I50.43]       Date 2022       Day of ARU Week:  5   Time IN//930   Individual Tx Minutes 60   Group Tx Minutes    Co-Treat Minutes    Concurrent Tx Minutes    TOTAL Tx Time Mins 60   Variance Time    Variance Time []   Refusal due to:     []   Medical hold/reason:    []   Illness   []   Off Unit for test/procedure  []   Extra time needed to complete task  []   Therapeutic need  []   Other (specify):   Restrictions Restrictions/Precautions  Restrictions/Precautions: General Precautions, Fall Risk (2L02, [place])      Interdisciplinary communication [x]   Cleared for therapy per nursing     []   RN notified about issues during session  []   RN updated on pt performance  []   Spoke with   []   Spoke with OT  []   Spoke with MD  []   Other:    Subjective observations and cognitive status: Pt in bed, finishing breakfast. Pt states she is \"all right\" and says this with a facial gesture that makes it seem she is not fully ok. When asked to specify, pt cannot. Discussed with pt her refusal of cpap as noted by RT. Pt states first that she doesn't wear it at home, then goes into a long explanation of where she lives and continues to prattle off topic. Pt needs frequent redirection throughout the session. Pt incontinent of urine and initially denies need to go to the bathroom, then later states, \"i'm all wet. \" And agrees to go to bathroom. Pain level/location:   Pt was referred to the pain scale on her whiteboard to rate pain and the faces were described to her. At first pt stated, \"I'm at the turning of the page in October. \" Reoriented pt to the pain scale and she she chose \"clenching teeth\" which is 7-8 /10       Location: \"all over my body\". Discharge recommendations  Anticipated discharge date:  tbd  Destination: []home alone     [x]home alone with assist PRN-daughter coming to stay with her upon d/c per CM       [] home w/ family      [] Continuous supervision  []SNF    [] Assisted living     [] Other:  Continued therapy: [x]HHC PT  []OUTPATIENT PT   [] No Further PT  []SNF PT  Caregiver training recommended: []Yes  [] No   Equipment needs: possible 2ww. PT IRF-YAMILKA scores and goals for discharge assessment:   Roll Left and Right  Assistance Needed: Setup or clean-up assistance  Comment: mod I with rails in place  CARE Score: 5  Discharge Goal: Independent    Sit to Lying  Assistance Needed: Supervision or touching assistance  Comment: mod effort, use of rails, cues for completing  CARE Score: 4  Discharge Goal: Independent    Lying to Sitting on Side of Bed  Assistance Needed: Supervision or touching assistance  Comment: as sit to lying  CARE Score: 4  Discharge Goal: Independent    Sit to Stand  Assistance Needed: Supervision or touching assistance  Comment: CGA to min assist, but more consistently CG, poor safety  CARE Score: 4  Discharge Goal: Independent    Chair/Bed-to-Chair Transfer  Assistance Needed: Supervision or touching assistance  Comment: CG  CARE Score: 4  Discharge Goal: Independent (with no O2 line. predict assist with line if pt still on O2)    Toilet Transfer  Assistance needed: Supervision or touching assistance  Comment: CG/SBA using 4WW and grab bars, cues to properly align with toilet. CARE Score: 4  Discharge Goal: Supervision or touching assistance    Car Transfer  Assistance Needed: Supervision or touching assistance  Comment: CG for LEs into car  CARE Score: 4  Discharge Goal: Independent    Walk 10 Feet?   Walk 10 Feet?: Yes    1 Step  1 Step?: Yes        Wheelchair Ability  Uses a Wheelchair and/or Scooter?: No                        Walking Ability  Does the Patient Walk?: Yes    Walk 10 Feet  Assistance Needed: Supervision or touching assistance  Comment: SBA with 4ww, no O2 line  CARE Score: 4  Discharge Goal: Supervision or touching assistance    Walk 50 Feet with Two Turns  Assistance Needed: Supervision or touching assistance  Comment: SBA with 4ww, no O2 line  CARE Score: 4  Discharge Goal: Supervision or touching assistance    Walk 150 Feet  Assistance Needed: Supervision or touching assistance  Comment: SBA with 4ww, no O2 line  CARE Score: 4  Discharge Goal: Supervision or touching assistance    Walking 10 Feet on Uneven Surfaces  Assistance Needed: Supervision or touching assistance  Comment: CG with 2ww, mod cues  CARE Score: 4  Discharge Goal: Supervision or touching assistance                  Additional Therapeutic activities/exercises completed this date:     []   Nu-step:  Time:        Level:         #Steps:       []   Rebounder:    []  Seated     []  Standing        []   Balance training         []   Postural training    []   Supine ther ex (reps/sets):     []   Seated ther ex (reps/sets):     []   Standing ther ex (reps/sets):     []   Other: Toileting activity completed with    []   Other:    Comments:  progression to 4ww as this is what pt uses at home. Pt needs no more cues with 4ww than 2ww and has poor potential for learning. Feel keeping pt on her more familiar 4ww is best for her.      Patient/Caregiver Education and Training:   []   Role of PT  []   Education about Dx  []   Use of call light for assist   []   HEP provided and explained   []   Treatment plan reviewed  []   Home safety  []   Wheelchair mobility/management   []   Body mechanics  [x]   Bed Mobility/Transfer technique  [x]   Gait technique/sequencing  [x]   Proper use of assistive device/adaptive equipment  [x]   Stair training/Advanced mobility safety and technique  []   Reinforced patient's precautions/mobility while maintaining precautions  []   Postural awareness  []   Family/caregiver training  []   Progress was updated and reviewed in Rehabtracker with patient and/or family this date. []   Other:    Treatment Plan for Next Session: stair training      Assessment: This pt demonstrated a positive   response to today's treatment as evidenced by return to use of 4ww. The patient is making slight progress toward established goals as evidenced by QI scores. Ongoing deficits are observed in the areas of cognition and continued focus on encouraging 24 hour assistance are recommended. Treatment/Activity Tolerance:   [] Tolerated treatment with no adverse effects    [x] Patient limited by fatigue  [] Patient limited by pain   [] Patient limited by medical complications:    [] Adverse reaction to Tx:   [] Significant change in status    Safety:       []  bed alarm set    []  chair alarm set    []  Pt refused alarms                []  Telesitter activated      [x]  Gait belt used during tx session      [x]other:  handoff to OT     Number of Minutes/Billable Intervention  Gait Training 20   Therapeutic Exercise    Neuro Re-Ed    Therapeutic Activity 40   Wheelchair Propulsion    Group    Other:    TOTAL 60     Social History  Social/Functional History  Lives With: Alone  Type of Home: Condo  Home Layout: One level  Home Access: Level entry  Bathroom Shower/Tub: Tub/Shower unit, Walk-in shower  Bathroom Toilet: Standard  Bathroom Equipment: Grab bars in shower, Grab bars around toilet  Home Equipment: Darrell Charles Walker, 4 wheeled  ADL Assistance: Independent  Homemaking Assistance: Independent  Ambulation Assistance: Independent  Transfer Assistance: Independent  Active : No  Patient's  Info: Daughter in law does grocery shopping.   Occupation: Retired  Additional Comments: D/t communication difficulties related to hearing aid issues, and cognitive concerns information above obtained from IP evals and needs confirmed    Objective                                                                                    Goals:  (Update in navigator)  Short Term Goals  Time Frame for Short Term Goals: 5-7 STG=LTG  Short Term Goal 1: Pt will perform bed mobility with mod I  Short Term Goal 2: Pt will perform sit to stand, pivot and car transfers with mod I if no O2, supervision if O2  Short Term Goal 3: Pt will ambulate 150' on level surfaces with 2ww and 10' on uneven surfaces with supervision with or without O2  Short Term Goal 4: Pt will ascend/descend curb step and 4 steps with rail with supervision  Short Term Goal 5: Pt will retrieve object on floor with 2ww and supervision:   :        Plan of Care                                                                              Times per week: 5 days per week for a minimum of 60 minutes/day plus group as appropriate for 60 minutes.   Treatment to include Current Treatment Recommendations: Strengthening, Balance training, Functional mobility training, Transfer training, ADL/Self-care training, IADL training, Cognitive/Perceptual training, Endurance training, Safety education & training, Patient/Caregiver education & training, Therapeutic activities, Gait training, Stair training, Equipment evaluation, education, & procurement, Pain management, Neuromuscular re-education, Positioning, Home exercise program, Cognitive reorientation    Electronically signed by   Charissa Siddiqui PT,   11/2/2022, 3:49 PM

## 2022-11-02 NOTE — PLAN OF CARE
Problem: Discharge Planning  Goal: Discharge to home or other facility with appropriate resources  11/2/2022 1444 by Vivek Mcginnis LPN  Outcome: Progressing  11/2/2022 0236 by Kings Pagan LPN  Outcome: Progressing     Problem: Skin/Tissue Integrity  Goal: Absence of new skin breakdown  Description: 1. Monitor for areas of redness and/or skin breakdown  2. Assess vascular access sites hourly  3. Every 4-6 hours minimum:  Change oxygen saturation probe site  4. Every 4-6 hours:  If on nasal continuous positive airway pressure, respiratory therapy assess nares and determine need for appliance change or resting period.   11/2/2022 1444 by Vivek Mcginnis LPN  Outcome: Progressing  11/2/2022 0236 by Kings Pagan LPN  Outcome: Progressing     Problem: Safety - Adult  Goal: Free from fall injury  11/2/2022 1444 by Vivek Mcginnis LPN  Outcome: Progressing  11/2/2022 0236 by Kings Pagan LPN  Outcome: Progressing     Problem: ABCDS Injury Assessment  Goal: Absence of physical injury  11/2/2022 1444 by Vivek Mcginnis LPN  Outcome: Progressing  11/2/2022 0236 by Kings Pagan LPN  Outcome: Progressing     Problem: Pain  Goal: Verbalizes/displays adequate comfort level or baseline comfort level  11/2/2022 1444 by Vivek Mcginnis LPN  Outcome: Progressing  11/2/2022 0236 by Kings Pagan LPN  Outcome: Progressing     Problem: Chronic Conditions and Co-morbidities  Goal: Patient's chronic conditions and co-morbidity symptoms are monitored and maintained or improved  11/2/2022 1444 by Vivek Mcginnis LPN  Outcome: Progressing  11/2/2022 0236 by Kings Pagan LPN  Outcome: Progressing     Problem: Nutrition Deficit:  Goal: Optimize nutritional status  11/2/2022 1444 by Vivek Mcginnis LPN  Outcome: Progressing  11/2/2022 0236 by Kings Pagan LPN  Outcome: Progressing

## 2022-11-02 NOTE — PROGRESS NOTES
Pt has been refusing bipap for several days. Pt does wear a cpap at home and a cpap was also offered for use, but pt declined again. Pt encouraged to bring home cpap in for use.

## 2022-11-03 PROCEDURE — 1280000000 HC REHAB R&B

## 2022-11-03 PROCEDURE — 97116 GAIT TRAINING THERAPY: CPT

## 2022-11-03 PROCEDURE — 97530 THERAPEUTIC ACTIVITIES: CPT

## 2022-11-03 PROCEDURE — 6370000000 HC RX 637 (ALT 250 FOR IP): Performed by: PHYSICAL MEDICINE & REHABILITATION

## 2022-11-03 PROCEDURE — 94150 VITAL CAPACITY TEST: CPT

## 2022-11-03 PROCEDURE — 6370000000 HC RX 637 (ALT 250 FOR IP): Performed by: INTERNAL MEDICINE

## 2022-11-03 PROCEDURE — 2700000000 HC OXYGEN THERAPY PER DAY

## 2022-11-03 PROCEDURE — 97110 THERAPEUTIC EXERCISES: CPT

## 2022-11-03 PROCEDURE — 2500000003 HC RX 250 WO HCPCS: Performed by: INTERNAL MEDICINE

## 2022-11-03 PROCEDURE — 2580000003 HC RX 258: Performed by: INTERNAL MEDICINE

## 2022-11-03 PROCEDURE — 99232 SBSQ HOSP IP/OBS MODERATE 35: CPT | Performed by: PHYSICAL MEDICINE & REHABILITATION

## 2022-11-03 PROCEDURE — 94640 AIRWAY INHALATION TREATMENT: CPT

## 2022-11-03 PROCEDURE — 94761 N-INVAS EAR/PLS OXIMETRY MLT: CPT

## 2022-11-03 PROCEDURE — 99211 OFF/OP EST MAY X REQ PHY/QHP: CPT

## 2022-11-03 PROCEDURE — 94664 DEMO&/EVAL PT USE INHALER: CPT

## 2022-11-03 PROCEDURE — 6360000002 HC RX W HCPCS: Performed by: INTERNAL MEDICINE

## 2022-11-03 RX ADMIN — ATORVASTATIN CALCIUM 10 MG: 10 TABLET, FILM COATED ORAL at 20:54

## 2022-11-03 RX ADMIN — TIOTROPIUM BROMIDE AND OLODATEROL 2 PUFF: 3.124; 2.736 SPRAY, METERED RESPIRATORY (INHALATION) at 07:50

## 2022-11-03 RX ADMIN — ACETAMINOPHEN 650 MG: 325 TABLET ORAL at 20:54

## 2022-11-03 RX ADMIN — TIMOLOL MALEATE 1 DROP: 5 SOLUTION OPHTHALMIC at 20:54

## 2022-11-03 RX ADMIN — MICONAZOLE NITRATE: 2 POWDER TOPICAL at 21:02

## 2022-11-03 RX ADMIN — LEVOTHYROXINE SODIUM 88 MCG: 0.11 TABLET ORAL at 05:42

## 2022-11-03 RX ADMIN — ENOXAPARIN SODIUM 40 MG: 40 INJECTION SUBCUTANEOUS at 08:40

## 2022-11-03 RX ADMIN — CYANOCOBALAMIN TAB 1000 MCG 1000 MCG: 1000 TAB at 08:38

## 2022-11-03 RX ADMIN — TRAZODONE HYDROCHLORIDE 50 MG: 50 TABLET ORAL at 20:54

## 2022-11-03 RX ADMIN — TORSEMIDE 40 MG: 20 TABLET ORAL at 08:39

## 2022-11-03 RX ADMIN — SODIUM CHLORIDE, PRESERVATIVE FREE 10 ML: 5 INJECTION INTRAVENOUS at 08:38

## 2022-11-03 RX ADMIN — METOLAZONE 5 MG: 2.5 TABLET ORAL at 20:54

## 2022-11-03 RX ADMIN — ASPIRIN 81 MG CHEWABLE TABLET 81 MG: 81 TABLET CHEWABLE at 08:39

## 2022-11-03 RX ADMIN — AMOXICILLIN AND CLAVULANATE POTASSIUM 1 TABLET: 875; 125 TABLET, FILM COATED ORAL at 08:38

## 2022-11-03 RX ADMIN — SPIRONOLACTONE 25 MG: 50 TABLET ORAL at 08:38

## 2022-11-03 RX ADMIN — CARVEDILOL 6.25 MG: 6.25 TABLET, FILM COATED ORAL at 17:07

## 2022-11-03 RX ADMIN — CARVEDILOL 6.25 MG: 6.25 TABLET, FILM COATED ORAL at 08:39

## 2022-11-03 RX ADMIN — Medication 5000 UNITS: at 08:40

## 2022-11-03 RX ADMIN — MICONAZOLE NITRATE: 2 POWDER TOPICAL at 08:50

## 2022-11-03 RX ADMIN — ACETAMINOPHEN 650 MG: 325 TABLET ORAL at 11:32

## 2022-11-03 RX ADMIN — TORSEMIDE 40 MG: 20 TABLET ORAL at 17:06

## 2022-11-03 RX ADMIN — COLLAGENASE SANTYL: 250 OINTMENT TOPICAL at 08:42

## 2022-11-03 RX ADMIN — ACETAMINOPHEN 650 MG: 325 TABLET ORAL at 05:42

## 2022-11-03 RX ADMIN — METOLAZONE 5 MG: 2.5 TABLET ORAL at 08:39

## 2022-11-03 RX ADMIN — FERROUS SULFATE TAB 325 MG (65 MG ELEMENTAL FE) 325 MG: 325 (65 FE) TAB at 11:32

## 2022-11-03 RX ADMIN — MIDODRINE HYDROCHLORIDE 5 MG: 5 TABLET ORAL at 11:32

## 2022-11-03 RX ADMIN — SODIUM CHLORIDE, PRESERVATIVE FREE 10 ML: 5 INJECTION INTRAVENOUS at 21:01

## 2022-11-03 RX ADMIN — MIDODRINE HYDROCHLORIDE 5 MG: 5 TABLET ORAL at 17:06

## 2022-11-03 RX ADMIN — ALBUTEROL SULFATE 2 PUFF: 90 AEROSOL, METERED RESPIRATORY (INHALATION) at 07:49

## 2022-11-03 RX ADMIN — ALBUTEROL SULFATE 2 PUFF: 90 AEROSOL, METERED RESPIRATORY (INHALATION) at 21:29

## 2022-11-03 RX ADMIN — LATANOPROST 1 DROP: 50 SOLUTION/ DROPS OPHTHALMIC at 17:15

## 2022-11-03 RX ADMIN — MIDODRINE HYDROCHLORIDE 5 MG: 5 TABLET ORAL at 08:39

## 2022-11-03 ASSESSMENT — PAIN SCALES - GENERAL
PAINLEVEL_OUTOF10: 2
PAINLEVEL_OUTOF10: 0
PAINLEVEL_OUTOF10: 4
PAINLEVEL_OUTOF10: 10
PAINLEVEL_OUTOF10: 4
PAINLEVEL_OUTOF10: 8

## 2022-11-03 ASSESSMENT — PAIN DESCRIPTION - DESCRIPTORS
DESCRIPTORS: ACHING
DESCRIPTORS: CRAMPING
DESCRIPTORS: DISCOMFORT

## 2022-11-03 ASSESSMENT — PAIN DESCRIPTION - ORIENTATION
ORIENTATION: OTHER (COMMENT)
ORIENTATION: MID

## 2022-11-03 ASSESSMENT — PAIN - FUNCTIONAL ASSESSMENT
PAIN_FUNCTIONAL_ASSESSMENT: ACTIVITIES ARE NOT PREVENTED

## 2022-11-03 ASSESSMENT — PAIN SCALES - WONG BAKER
WONGBAKER_NUMERICALRESPONSE: 0
WONGBAKER_NUMERICALRESPONSE: 0

## 2022-11-03 ASSESSMENT — PAIN DESCRIPTION - LOCATION
LOCATION: GENERALIZED
LOCATION: BACK
LOCATION: GENERALIZED

## 2022-11-03 NOTE — PROGRESS NOTES
Occupational Therapy    Physical Rehabilitation: OCCUPATIONAL THERAPY     [x] daily progress note       [] discharge       Patient Name:  Angelique Marie   :  3/18/1929 MRN: [id number]  Room:  [address] [place] [address] Date of Admission: 10/29/2022  Rehabilitation Diagnosis:   Acute on chronic diastolic (congestive) heart failure [I50.33]  Acute on chronic combined systolic and diastolic CHF (congestive heart failure) ([facility] [I50.43]       Date 11/3/2022       Day of ARU Week:  6   Time IN/OUT 1415/1515   Individual Tx Minutes 60   Group Tx Minutes    Co-Treat Minutes    Concurrent Tx Minutes    TOTAL Tx Time Mins 60   Variance Time    Variance Time []   Refusal due to:     []   Medical hold/reason:    []   Illness   []   Off Unit for test/procedure  []   Extra time needed to complete task  []   Therapeutic need  []   Other (specify):   Restrictions Restrictions/Precautions: General Precautions, Fall Risk (2L02, [place])         Communication with other providers: [x]   OK to see per nursing:     []   Spoke with team member regarding:      Subjective observations and cognitive status: Pt sitting up in w/c upon entrance, pleasant and agreeable to therapy. At the end of session, Pt with slight confusion, however states \"I love you\" as therapist leaving room. Pain level/location:    /10       Location: Pt states pain all over.    Discharge recommendations  Anticipated discharge date:    Destination: []home alone   []home alone w assist prn   [x] home w/ family    [] Continuous supervision       []SNF    [] Assisted living     [] Other:   Continued therapy: [x]HHC OT  []OUTPATIENT  OT   [] No Further OT  Equipment needs: TBD       Bed Mobility:           [x]   Pt received out of bed       Transfers:    Sit--> Stand:  SBA  Stand --> Sit:   SBA  Other:    Assistive device required for transfer:   countertop    Additional Therapeutic activities/exercises completed this date:     []   ADL Training   [x]   Balance/Postural training - Pt stood to engage in dynamic standing balance/tolerance tasks with a focus on [facility] as well. Pt able to tolerate standing 10 mins with good standing balance. Performed to increase Pt's safety and independence with functional tasks. Pt required to sequence during tasks as well as OT would give 3 steps to follow at a time with Pt demonstrating good direction following (I.e. place a yellow clothespin, then blue, then red). [x]   Bed/Transfer Training   [x]   Endurance Training   []   Neuromuscular Re-ed   []   Nu-step:  Time:        Level:         #Steps:       []   Rebounder:    []  Seated     []  Standing        []   Supine Ther Ex (reps/sets):     [x]   Seated Ther Ex (reps/sets):  Pt performed 10 mins on arm bike with Min resistance. Pt required no rest breaks. Completed to increase BUE strength and endurance for safe performance of ADLs. []   Standing Ther Ex (reps/sets):     [x]   Other: Pt engaged in peg board activities including copying patterns from a visual. Pt required Max verbal cues with first pattern given, however cues lessened as Pt continued on. Comments: All intervention performed to increase pt's strength, endurance, ax tolerance, cognition (sequencing) and balance in prep for increased I c ADL/IADLs and functional transfers/mobility. Patient/Caregiver Education and Training:   []   [employer] Equipment Use  [x]   Bed Mobility/Transfer Technique/Safety  []   Energy Conservation Tips  []   Family training  []   Postural Awareness  [x]   Safety During Functional Activities  []   Reinforced Patient's Precautions   []   Progress was updated and reviewed in Rehabtracker with patient and/or family this         date. Treatment Plan for Next Session: Continue OT POC. Assessment: This pt demonstrated a positive response to today's treatment as evidenced by good sequencing during standing balance tasks.   The patient is making good progress toward established goals as evidenced by QI scores. Ongoing deficits are observed in the areas of overall cognition, functional balance, strength, vision, pain, endurance, and ADLs and continued focus on this is recommended. Treatment/Activity Tolerance:   [x] Tolerated treatment with no adverse effects    [] Patient limited by fatigue  [] Patient limited by pain   [] Patient limited by medical complications:    [] Adverse reaction to Tx:   [] Significant change in status    Safety:       []  bed alarm set    [x]  chair alarm set    []  Pt refused alarms                []  Telesitter activated      [x]  Gait belt used during tx session      []other:       Number of Minutes/Billable Intervention  Therapeutic Exercise 10   ADL Self-care    Neuro Re-Ed    Therapeutic Activity 50   Group    Other:    TOTAL 60       Social History  Social/Functional History  Lives With: Alone  Type of Home: Condo  Home Layout: One level  Home Access: Level entry  Bathroom Shower/Tub: Tub/Shower unit, Walk-in shower  Bathroom Toilet: Standard  Bathroom Equipment: Grab bars in shower, Grab bars around toilet  Home Equipment: KO-SU, [place], Walker, 4 wheeled  ADL Assistance: Independent  Homemaking Assistance: Independent  Ambulation Assistance: Independent  Transfer Assistance: Independent  Active : No  Patient's  Info: Daughter in law does grocery shopping.   Occupation: Retired  Additional Comments: D/t communication difficulties related to hearing aid issues, and cognitive concerns information above obtained from IP evals and needs confirmed    Objective                                                                                    Goals:  (Update in navigator)  Short Term Goals  Time Frame for Short Term Goals: STGs=LTGs:  Long Term Goals  Time Frame for Long Term Goals : ~10 days or until d/c  Long Term Goal 1: Pt will complete grooming tasks Ind  Long Term Goal 2: Pt will complete total body bathing c S using AE PRN  Long Term Goal 3: Pt will complete UB dressing c setup  Long Term Goal 4: Pt will complete LB dressing c S using AE PRN  Long Term Goal 5: Pt will doff/don footwear c S using AE PRN  Additional Goals?: Yes  Long Term Goal 6: Pt will complete toileting c S  Long Term Goal 7: Pt will complete functional transfers (bed, chair, toilet, shower) c DME PRN and S  Long Term Goal 8: Pt will perform therex/therax to facilitate increased strength/endurance/ax tolerance (c emphasis on dynamic standing balance/tolerance >6 mins, BUE endurance) c SBA  Long Term Goal 9: Pt will complete simple homemaking tasks c DME PRN and S:        Plan of Care                                                                              Times per week: 5 days per week for a minimum of 60 minutes/day plus group as appropriate for 60 minutes.   Treatment to include Occupational Therapy Plan  Current Treatment Recommendations: Strengthening, Endurance training, Cognitive reorientation, Pain management, Patient/Caregiver education & training, Safety education & training, Equipment evaluation, education, & procurement, Self-Care / ADL, Home management training, Cognitive/Perceptual training, Balance training, Functional mobility training    Electronically signed by   GARRY Hess, OTR/L #[id number]  11/3/2022, 4:55 PM

## 2022-11-03 NOTE — PLAN OF CARE
Problem: Discharge Planning  Goal: Discharge to home or other facility with appropriate resources  Outcome: Progressing     Problem: Skin/Tissue Integrity  Goal: Absence of new skin breakdown  Description: 1. Monitor for areas of redness and/or skin breakdown  2. Assess vascular access sites hourly  3. Every 4-6 hours minimum:  Change oxygen saturation probe site  4. Every 4-6 hours:  If on nasal continuous positive airway pressure, respiratory therapy assess nares and determine need for appliance change or resting period.   Outcome: Progressing     Problem: Safety - Adult  Goal: Free from fall injury  Outcome: Progressing     Problem: ABCDS Injury Assessment  Goal: Absence of physical injury  Outcome: Progressing     Problem: Pain  Goal: Verbalizes/displays adequate comfort level or baseline comfort level  Outcome: Progressing  Flowsheets (Taken 11/3/2022 4375)  Verbalizes/displays adequate comfort level or baseline comfort level: Encourage patient to monitor pain and request assistance     Problem: Chronic Conditions and Co-morbidities  Goal: Patient's chronic conditions and co-morbidity symptoms are monitored and maintained or improved  Outcome: Progressing     Problem: Nutrition Deficit:  Goal: Optimize nutritional status  Outcome: Progressing

## 2022-11-03 NOTE — PROGRESS NOTES
Physical Therapy      [x] daily progress note       [] discharge       Patient Name:  Christophe Mock   :  3/18/1929 MRN: [id number]  Room:  [address] [place] [address] Date of Admission: 10/29/2022  Rehabilitation Diagnosis:   Acute on chronic diastolic (congestive) heart failure [I50.33]  Acute on chronic combined systolic and diastolic CHF (congestive heart failure) ([facility] [I50.43]       Date 11/3/2022       Day of ARU Week:  6   Time IN/OUT 8521-2589  8999-5113   Individual Tx Minutes 60+60   TOTAL Tx Time Mins 120   Variance Time    Variance Time []   Refusal due to:     []   Medical hold/reason:    []   Illness   []   Off Unit for test/procedure  []   Extra time needed to complete task  []   Therapeutic need  []   Other (specify):   Restrictions Restrictions/Precautions  Restrictions/Precautions: General Precautions, Fall Risk (2L02, [place])      Communication with other providers: [x]   OK to see per nursing:     []   Spoke with team member regarding:      Subjective observations and cognitive status: Pt up in WC, drowsy, willing to participate;O2 sats on RA at rest 92%, HR 74; with ax after amb and stairs 90%, HR 82;  Pt with mild HARE but recovers quickly with rest.   2nd AM session: Pt in [facility], RN in to assist to BR, therapist took over to assist pt. Req extra time for toileting with pt attempting to have BM but unsuccessful. Pt reports did have a BM but she did not. Pt seemed more confused this session than prior req more cues for sequencing of task. Pt stating \"ouch, ouch\" when transferring from toilet and bending to pull up clothing. Pt was agreeable to Tylenol, given during session. Pt at times lost in conversation.     Pain level/location: 0/10         Discharge recommendations  Anticipated discharge date:  tbd  Destination: []home alone     [x]home alone with assist PRN-daughter coming to stay with her upon d/c per CM       [] home w/ family      [] Continuous supervision  []SNF    [] Assisted living     [] Other:  Continued therapy: [x]HHC PT  []OUTPATIENT PT   [] No Further PT  []SNF PT  Caregiver training recommended: []Yes  [] No   Equipment needs: possible 2ww. Bed Mobility:           [x]   Pt received out of bed      Transfers:    Sit--> Stand:  SBA  Stand --> Sit:   SBA  Mod cues for brake safety, unlocking brakes before attempting to amb, reaching back to chair with stand-sit  Stand-Pivot:   SBA, mod safety cues with rollator, stepping back completely to chair, reaching back vs both hands on AD  Toilet Transfer: SBA, max cues for safety with hand placement   Toileting: SB-CGA for balance, max seq cues to pull up brief, then pants due to attempting to amb away before pulling up. Assistive device required for transfer:   4WW    Gait:    Distance:  213'+58' +72'+167'+95'  Assistance:  SB-close supervision  Device:  4WW  Gait Quality:   recip pattern, no change in HONEY or balance with increased distance, stays relatively close to walker, tends to list/amb close to objects on R but clears    Stairs   # Completed:   13  Assistance:    SB-CGA,amb in non-recip pattern; good tolerance with O2 sts 90% after ax    Supportive Device:  B rails  Height:   4\"/6\"    Curb       Assistance:    CGA  Supportive Device:  4WW, max seq cues without carryover  Height:   2\"        Additional Therapeutic activities/exercises completed this date:     []   Nu-step:  Time:        Level:         #Steps:       []   Rebounder:    []  Seated     []  Standing        [x]   Balance training : Functional task at sink for hand hygiene SBA, max cues for positioning of 4WW to side to reach sink, perseverates on rinsing hands needing prompting to dry when handing pt towel. []   Postural training    []   Supine ther ex (reps/sets):     []   Seated ther ex (reps/sets):     []   Standing ther ex (reps/sets):     []   Picked up object from floor                       []   Reacher used   [x]   Other:  Footwear: pt able to doff scoots without A but req cueing to scoot to EOC due to struggling to reach. Dons shoes without A. [x]   Other: sit <>stand transfers in blocked practice amb to each chair with 4WW, pt req SBA for balance, max seq cues with task. []   Other:      Patient/Caregiver Education and Training:   [x]   Bed Mobility/Transfer technique/safety  [x]   Gait technique/sequencing  [x]   Proper use of assistive device  [x]   Advanced mobility safety and technique   []   Reinforced patient's precautions with mobility/functional tasks  []   Postural awareness  []   Family training  []   Other:    Treatment Plan for Next Session: transfer safety with rollator, LE strengthening, object retrieval , gait progression with obstacles and outside surfaces.         Treatment/Activity Tolerance:   [x] Tolerated treatment with no adverse effects    [] Patient limited by fatigue  [x] Patient limited by pain second AM session req more rest breaks   [] Patient limited by medical complications:    [] Adverse reaction to Tx:   [] Significant change in status    Safety:       []  bed alarm set    [x]  chair alarm set    []  Pt refused alarms                []  Telesitter activated      [x]  Gait belt used during tx session      [] Call light and belongings in reach       [] Other      Number of Minutes/Billable Intervention  Gait Training 60   Therapeutic Exercise    Neuro Re-Ed    Therapeutic Activity 60   Wheelchair Propulsion    Group    Other:    TOTAL 120         Social History  Social/Functional History  Lives With: Alone  Type of Home: Condo  Home Layout: One level  Home Access: Level entry  Bathroom Shower/Tub: Tub/Shower unit, Walk-in shower  Bathroom Toilet: Standard  Bathroom Equipment: Grab bars in shower, Grab bars around toilet  Home Equipment: Darrell Casey Walker, 4 wheeled  ADL Assistance: Independent  Homemaking Assistance: Independent  Ambulation Assistance: Independent  Transfer Assistance: Independent  Active : No  Patient's  Info: Daughter in law does grocery shopping. Occupation: Retired  Additional Comments: D/t communication difficulties related to hearing aid issues, and cognitive concerns information above obtained from IP evals and needs confirmed    Objective                                                                                    Goals:  (Update in navigator)  Short Term Goals  Time Frame for Short Term Goals: 5-7 STG=LTG  Short Term Goal 1: Pt will perform bed mobility with mod I  Short Term Goal 2: Pt will perform sit to stand, pivot and car transfers with mod I if no O2, supervision if O2  Short Term Goal 3: Pt will ambulate 150' on level surfaces with 2ww and 10' on uneven surfaces with supervision with or without O2  Short Term Goal 4: Pt will ascend/descend curb step and 4 steps with rail with supervision  Short Term Goal 5: Pt will retrieve object on floor with 2ww and supervision:   :        Plan of Care                                                                              Times per week: 5 days per week for a minimum of 60 minutes/day plus group as appropriate for 60 minutes.   Treatment to include Current Treatment Recommendations: Strengthening, Balance training, Functional mobility training, Transfer training, ADL/Self-care training, IADL training, Cognitive/Perceptual training, Endurance training, Safety education & training, Patient/Caregiver education & training, Therapeutic activities, Gait training, Stair training, Equipment evaluation, education, & procurement, Pain management, Neuromuscular re-education, Positioning, Home exercise program, Cognitive reorientation    Electronically signed by   Duc Dill PTA #[id number]  11/3/2022, 8:54 AM

## 2022-11-03 NOTE — CARE COORDINATION
Completed BIMS with patient today. She has batteries in her hearing aides and was able to participate in the assessment.

## 2022-11-03 NOTE — PROGRESS NOTES
Zahra Lozano    : 3/18/1929  Acct #: [de-identified]  MRN: [id number]              PM&R Progress Note      Admitting diagnosis: Acute on chronic diastolic congestive heart failure ([address])     Comorbid diagnoses impacting rehabilitation: Generalized weakness, gait disturbance, sinus bradycardia, pulmonary hypertension, hyponatremia, sundowner syndrome, obstructive sleep apnea, left calf wound, orthostatic hypotension     Chief complaint: 11/3: Patient seen in her room with nursing staff. Requiring 2 L of oxygen per nasal cannula. Dizziness improved. Intermittent confusion requiring frequent verbal cues. Ambulated 200 feet with a rolling walker contact-guard to standby assistance. However requires maximal cues. Bathing contact-guard assistance and upper body dressing supervision. Lower body dressing minimal assistance with cues. Tolerating rehab program.  Prior (baseline) level of function: Independent.     Current level of function:         Current  IRF-[facility] and Goals:   Occupational Therapy:    Short Term Goals  Time Frame for Short Term Goals: STGs=LTGs :   Long Term Goals  Time Frame for Long Term Goals : ~10 days or until d/c  Long Term Goal 1: Pt will complete grooming tasks Ind  Long Term Goal 2: Pt will complete total body bathing c S using AE PRN  Long Term Goal 3: Pt will complete UB dressing c setup  Long Term Goal 4: Pt will complete LB dressing c S using AE PRN  Long Term Goal 5: Pt will doff/don footwear c S using AE PRN  Additional Goals?: Yes  Long Term Goal 6: Pt will complete toileting c S  Long Term Goal 7: Pt will complete functional transfers (bed, chair, toilet, shower) c DME PRN and S  Long Term Goal 8: Pt will perform therex/therax to facilitate increased strength/endurance/ax tolerance (c emphasis on dynamic standing balance/tolerance >6 mins, BUE endurance) c SBA  Long Term Goal 9: Pt will complete simple homemaking tasks c DME PRN and S : Eating: Eating  Assistance Needed: Setup or clean-up assistance  Comment: required setup assist to open packages/containers  CARE Score: 5  Discharge Goal: Independent       Oral Hygiene: Oral Hygiene  Assistance Needed: Supervision or touching assistance  Comment: Pt able to apply toothpaste onto tooth brush and brush teeth wtih SBA standing at sink  CARE Score: 4  Discharge Goal: Independent    UB/LB Bathing: Shower/Bathe Self  Assistance Needed: Supervision or touching assistance  Comment: CG/SBA in stance at sink to bathe UB. Pt required max cues for sequencing and thoroughness  CARE Score: 4  Discharge Goal: Supervision or touching assistance    UB Dressing: Upper Body Dressing  Assistance Needed: Supervision or touching assistance  Comment: Pt able to thread BUE's into sweater, however, pt required cues to don over head and pull down in front. CARE Score: 4  Discharge Goal: Set-up or clean-up assistance         LB Dressing: Lower Body Dressing  Assistance Needed: Partial/moderate assistance  Comment: despite education of reacher use, pt demo'd poor carryover and refused to use reacher. Pt required min A to thread BLEs into pants; SBA in stance to manage over hips. CARE Score: 3  Discharge Goal: Supervision or touching assistance    Donning and [place] Footwear: Putting On/Taking Off Footwear  Assistance Needed: Dependent  Comment: d/t poor distal reach, pt required total assist to doff/don hospital socks despite education to use reacher to doff socks. CARE Score: 1  Discharge Goal: Supervision or touching assistance      Toiletin [address] needed: Partial/moderate assistance  Comment: Pt able to manage depends up and down, however after urination, pt only performed hygiene on bottom despite cues for bladder hygiene  CARE Score: 3  Discharge Goal: Supervision or touching assistance      Toilet Transfers:   Toilet Transfer  Assistance needed: Supervision or touching assistance  Comment: CG/SBA using 4WW and grab bars, cues to properly align with toilet. CARE Score: 4  Discharge Goal: Supervision or touching assistance    Physical Therapy:   Short Term Goals  Time Frame for Short Term Goals: 5-7 STG=LTG  Short Term Goal 1: Pt will perform bed mobility with mod I  Short Term Goal 2: Pt will perform sit to stand, pivot and car transfers with mod I if no O2, supervision if O2  Short Term Goal 3: Pt will ambulate 150' on level surfaces with 2ww and 10' on uneven surfaces with supervision with or without O2  Short Term Goal 4: Pt will ascend/descend curb step and 4 steps with rail with supervision  Short Term Goal 5: Pt will retrieve object on floor with 2ww and supervision            Bed Mobility:   Sit to Lying  Assistance Needed: Supervision or touching assistance  Comment: mod effort, use of rails, cues for completing  CARE Score: 4  Discharge Goal: Independent  Roll Left and Right  Assistance Needed: Setup or clean-up assistance  Comment: mod I with rails in place  CARE Score: 5  Discharge Goal: Independent  Lying to Sitting on Side of Bed  Assistance Needed: Supervision or touching assistance  Comment: as sit to lying  CARE Score: 4  Discharge Goal: Independent    Transfers:    Sit to Stand  Assistance Needed: Supervision or touching assistance  Comment: CGA to min assist, but more consistently CG, poor safety  CARE Score: 4  Discharge Goal: Independent  Chair/Bed-to-Chair Transfer  Assistance Needed: Supervision or touching assistance  Comment: CG  CARE Score: 4  Discharge Goal: Independent (with no O2 line.  predict assist with line if pt still on O2)     Car Transfer  Assistance Needed: Supervision or touching assistance  Comment: CG for LEs into car  CARE Score: 4  Discharge Goal: Independent    Ambulation:    Walking Ability  Does the Patient Walk?: Yes     Walk 10 Feet  Assistance Needed: Supervision or touching assistance  Comment: SBA with 4ww, no O2 line  CARE Score: 4  Discharge Goal: Supervision or touching assistance     Walk 50 Feet with Two Turns  Assistance Needed: Supervision or touching assistance  Comment: SBA with 4ww, no O2 line  CARE Score: 4  Discharge Goal: Supervision or touching assistance     Walk 150 Feet  Assistance Needed: Supervision or touching assistance  Comment: SBA with 4ww, no O2 line  CARE Score: 4  Discharge Goal: Supervision or touching assistance     Walking 10 Feet on Uneven Surfaces  Assistance Needed: Supervision or touching assistance  Comment: CG with 2ww, mod cues  CARE Score: 4  Discharge Goal: Supervision or touching assistance     1 Step (Curb)  Assistance Needed: Partial/moderate assistance  Comment: min assist with walker management, mod cues  CARE Score: 3  Discharge Goal: Supervision or touching assistance     4 Steps  Assistance Needed: Supervision or touching assistance  Comment: CG with B rails  CARE Score: 4  Discharge Goal: Supervision or touching assistance     12 Steps  Reason if not Attempted: Not applicable  CARE Score: 9  Discharge Goal: Not Applicable       Wheelchair:  w/c Ability: Wheelchair Ability  Uses a Wheelchair and/or Scooter?: No                Balance:        Object: Picking Up Object  Assistance Needed: Partial/moderate assistance  Comment: min assist with 2ww, no reacher  CARE Score: 3  Discharge Goal: Supervision or touching assistance    I      Exam:    Blood pressure (!) 109/55, pulse 61, temperature 98.1 °F (36.7 °C), temperature source Oral, resp. rate 18, height 4' 11.02\" (1.499 m), weight 135 lb 5.8 oz (61.4 kg), SpO2 94 %. General: Sitting up in a bedside chair. Very hard of hearing. Follows directions. Alert and oriented x3    HEENT: Gazing right and left. Appears well-hydrated. Neck supple. Pulmonary: Unlabored respirations with symmetric air exchange. Cardiac: Regular rate and rhythm. Abdomen: Patient's abdomen is soft and nondistended. Bowel sounds were present throughout. There was no rebound, guarding or masses noted. Upper extremities: Motor strength 5/5 in both upper limbs  Lower extremities: No edema. Calf soft. Heels clear. Sitting balance was good. Standing balance was poor. Lab Results   Component Value Date    WBC 8.3 11/01/2022    HGB 13.0 11/01/2022    HCT 41.5 11/01/2022    MCV 94.1 11/01/2022     11/01/2022     Lab Results   Component Value Date    INR 1.23 10/20/2022    INR 1.88 03/24/2022    INR 1.80 03/23/2022    PROTIME 15.9 (H) 10/20/2022    PROTIME 24.4 (H) 03/24/2022    PROTIME 23.3 (H) 03/23/2022     Lab Results   Component Value Date    CREATININE 0.8 11/01/2022    BUN 48 (H) 11/01/2022     (L) 11/01/2022    K 3.6 11/01/2022    CL 89 (L) 11/01/2022    CO2 33 (H) 11/01/2022     Lab Results   Component Value Date    ALT 19 10/25/2022    AST 28 10/25/2022    ALKPHOS 276 (H) 10/25/2022    BILITOT 1.6 (H) 10/25/2022       Expected length of stay  prior to a supervised level of function for discharge home with a walker and [facility] OT/PT is 2 weeks. Recommendations:    Acute on chronic congestive heart failure with gait disturbance: Trying to work through her difficulty hearing with instructions for safe techniques during the daily occupational and physical therapy. Communication issues are significant due to her severity of hearing loss and poor attention and reasoning. Monitoring her weights daily to evaluate her compensation of CHF. Ongoing diuresis with Aldactone, Demadex and Zaroxolyn. Blood pressure support with ProAmatine. Encouraging consistent oral intake of heart healthy diet/fluids. Aggressive pulmonary hygiene measures. She needs adaptive equipment training, DVT prophylaxis and possibly caregiver education. Outpatient follow-up with her cardiologist and PCP. Verbal cues and moderate physical assistance for transfers again today. DVT prophylaxis: Lovenox 40 mg subcu daily.   I must monitor her hemoglobin and platelet count periodically while on this medication. Weightbearing activities are limited but tried daily. GI prophylaxis is available. No new bruising or swelling. Pulmonary hypertension: Gentle diuresis. Monitoring her CHF. Slow and deliberate position changes. Sundowner syndrome: Sleep regulation. Consistent staffing and environment when possible. Minimizing medications that might alter her sensorium. Involving family when possible for training and therapies. 11/3: Daughter will be staying with her at discharge. Will need 24-hour supervision. Obstructive sleep apnea: The respiratory service has been consulted to assist with nighttime BiPAP. She is on albuterol and Stiolto inhalers. 11/3: Continue with oxygen 2 L per nasal cannula. May require home O2. We will need to check overnight pulse oximetry  Possible cellulitis: She is threatening cellulitis at the left calf surrounding her vascular wound. Monitoring closely. White count and hemoglobin are stable. 11/3: Appreciate wound care evaluation     Discharge plannin/3: Team conference completed. Anticipate discharge  with daughter's assistance.

## 2022-11-03 NOTE — CONSULTS
[address] [facility] Continence Nurse  Consult Note       Velvet Baxter  AGE: 80 y.o. GENDER: female  : 3/18/1929  TODAY'S DATE:  11/3/2022    Subjective:     Reason for CWOCN Evaluation and Assessment: wound reassessment      Velvet Baxter is a 80 y.o. female referred by:   [x] Physician  [] Nursing  [] Other:     Wound Identification:  Wound Type: venous and blisters  Contributing Factors: edema, venous stasis, chronic pressure, and decreased mobility        PAST MEDICAL HISTORY        Diagnosis Date    Age-related osteoporosis with current pathological fracture of vertebra ([address] 2022    Arthritis     Bradycardia     requiring dual chamber pacemaker at [facility]    CAD (coronary artery disease)     Cardiac pacemaker 10/2001    [employer] #[id number]  PPM- Serial # 26-[facility]- Dr David Hoang    CHF (congestive heart failure) ([address]     COPD, mild ([address] 2017    Cor pulmonale (chronic) ([address] 3/15/2022    CVA (cerebrovascular accident) ([address]     DJD (degenerative joint disease) of cervical spine     C5-C6, C6-C7    Exhaustion of cardiac pacemaker battery 2011    PPM battery replacement- [employer]    Family history of cardiovascular disease     Glaucoma Dx     H/O 24 hour EKG monitoring 2000- Intermittent episonde of a-fib/flutter    H/O cardiac catheterization 2010, 2010-Severe native vessel disease and has graft disease as well. LAD, CX totally occluded. RCA totally occluded in proximal segment. VG to RCA widely patent. PDA 90% LAD afer LIMA anastomosis 80-90% stenosis. Proceeded with PTCA with stent next day. H/O cardiovascular stress test 10/14/2011, 2010,2010, 2009,2009, 10/30/2007, 2004, 2003, 2002, 2001,2000,     10/14/2011-Lexiscan-Abnormal Myocardial Perfusion study. Evidence of mild ischemia in the Left CX region. Abnomal study. Rest EF 63%.  Global LV systolic function normal. No ECG changes. Unremarkable pharmacological stress test.    H/O cardiovascular stress test 6/10/2013    thallium--mild ischemia left circumflex EF63% no change from 10/2011 study. H/O cardiovascular stress test 10/16/2014    cardiolite-mild ischemia left circumflex,EF70%    H/O chest x-ray 4/19/2009 4/19/2009-Stable cardiomegaly. No acute cardiopulmonary disease. H/O Doppler lower venous ultrasound 09/18/2019    Significant reflux noted in RGSV, RGSV is extremely tortuous and small along the thigh and calf and would be highly unlikely to be accessed, RSSV is non compressible and has occlusive chronic SVT, LSSV is non compressible with occlusive chronic SVT, LGSV removed s/p CABG, Significant reflux in LGSV tributary,    H/O Doppler ultrasound 3/31/2010    CAROTID- 3/31/2010-INtimal thickening but no significant atherosclerotic plaque noted in ANA PAULA. Doppler flow velocities within ANA PAULA are WNL. Heterogeneous, irregular atherosclerotic plaque noted in LICA. Doppler flow velocities within the LICA are elevated, consistent with a mild, less than 50% stenosis. H/O Doppler ultrasound 5/24/2016    Carotid- normal study    H/O Doppler ultrasound 09/06/2017    carotid - normal study    H/O Doppler ultrasound     H/O echocardiogram 10/13    EF=60%, Severe Pulm. HTN, & Sclerotic aortic valve w/stenosis. H/O echocardiogram 10/16/14     EF 55-60% Normal LV. Normal LV systolic function. Severe tricuspid insufficiency with severe hypertension.      H/O echocardiogram 02/03/2017    heart cath performed this morning    H/O echocardiogram 09/18/2019    EF 50-55%, Left atrium is mild to moderately dilated, right atrium is severely dilated, mildly dilated right ventricle, Mod MR, Severe TR, Severe Pulm HTN, no pericardial effusion     History of complete ECG     10/14/2011(Lexiscan);5/6/2010, 4/30/2009,10/24/2008,9/21/2007, 10/13/2006    History of nuclear stress test 11/17/2016    lexiscan-normal,EF70%    Hx of cardiovascular stress test 12/28/2018    EF 60%  Normal study. HX OTHER MEDICAL 05/01/2017    MUGA-normal, EF53%    Hyperlipidemia     Hypertension     Mild intermittent asthma 7/28/2016    Moderate COPD (chronic obstructive pulmonary disease) ([facility]) 3/15/2022    Nausea & vomiting     Obstructive sleep apnea 5/16/2017    Paroxysmal atrial fibrillation ([address]     Post PTCA 4/21/2010    PTCA with 2.25 stent of the LIMA to LAD    Pulmonary HTN ([address]     Severe per last echo on 10/13. 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  Followed by MAZE procedure by pulmonary vein isolation.-  Dr Mayr Adhikari, TOTAL ABDOMINAL (CERVIX REMOVED)  1990's    MIDDLE EAR SURGERY      OTHER SURGICAL HISTORY      Ear surgery-hearing    PACEMAKER PLACEMENT      battery change 11/21/2011 Medtronic    PTCA  11/2012    Ptca with stent to RCA    TONSILLECTOMY  1950's       FAMILY HISTORY    Family History   Problem Relation Age of Onset    Cancer Mother         \"Liver Cancer\"    Arthritis Mother     Depression Mother     Hearing Loss Mother     High Blood Pressure Mother     High Cholesterol Mother     Mental Illness Mother     Miscarriages / Stillbirths Mother     Heart Disease Father     Early Death Father 36        \"Instant Death\"    High Blood Pressure Father     High Cholesterol Father     Coronary Art Dis Father         Massive MI    Heart Disease Sister     Depression Sister     Cancer Sister         \"Breast Cancer, Cancer Free Now\"    High Blood Pressure Sister     Other Daughter         \"She's Had Stomach Surgery\"    High Blood Pressure Son     High Blood Pressure Son     Early Death Paternal Grandfather        SOCIAL HISTORY    Social History     Tobacco Use    Smoking status: Former     Packs/day: 0.25     Years: 5.00     Pack years: 1.25     Types: Cigarettes     Quit date: 1970     Years since quittin.9    Smokeless tobacco: Never   Vaping Use    Vaping Use: Never used   Substance Use Topics    Alcohol use: Not Currently     Comment: Caffiene - no more than 3 cups of coffee each day    Drug use: Never       ALLERGIES    Allergies   Allergen Reactions    Darvocet [Propoxyphene N-Acetaminophen]     Ranexa [Ranolazine Er]      Sick to her stomach    Ranolazine      Sick to her stomach    Sulfa Antibiotics Itching    Sulfasalazine Itching    Adhesive Tape Rash    Allantoin Rash    Bacitracin Rash    Gramicidin Rash    Neomycin Rash    Polymyxin B Rash    Pramoxine Hcl Rash    Silicone Rash       MEDICATIONS    No current facility-administered medications on file prior to encounter.      Current Outpatient Medications on File Prior to Encounter   Medication Sig Dispense Refill    torsemide (DEMADEX) 20 MG tablet Take 1 tablet by mouth as needed (for fluid retention or weight gain of 3 lbs overnight) This is in addition to the 20 mg bid (Patient taking differently: Take 20 mg by mouth daily as needed (for fluid retention or weight gain of 3 lbs overnight) This is in addition to the 20 mg bid) 30 tablet 0    torsemide (DEMADEX) 20 MG tablet Take 20 mg by mouth 2 times daily      aspirin 81 MG chewable tablet Take 81 mg by mouth daily      ferrous sulfate (IRON 325) 325 (65 Fe) MG tablet Take 325 mg by mouth every other day      Magnesium 500 MG TABS Take 500 mg by mouth daily      Turmeric (QC TUMERIC COMPLEX PO) Take 1 capsule by mouth daily      traZODone (DESYREL) 50 MG tablet Take  mg by mouth nightly as needed for Sleep      acetaminophen (TYLENOL) 500 MG tablet Take 1,000 mg by mouth every 4 hours as needed for Pain or Fever      spironolactone (ALDACTONE) 50 MG tablet Take 1 tablet by mouth daily (Patient taking differently: Take 25 mg by mouth daily) 30 tablet 0    PROCTOZONE-HC 2.5 % CREA rectal cream INSERT RECTALLY TWICE DAILY as directed AS NEEDED FOR HEMORRHOIDS      ZIOPTAN 0.0015 % SOLN Place 1 drop into both eyes Daily with supper       CETIRIZINE HCL PO Take 1 tablet by mouth daily as needed (allergies)      vitamin D 25 MCG (1000 UT) CAPS Take 5,000 Units by mouth daily      Probiotic Product (PROBIOTIC-10) CHEW Take 1 tablet by mouth daily      atorvastatin (LIPITOR) 10 MG tablet Take 10 mg by mouth nightly      umeclidinium-vilanterol (ANORO ELLIPTA) 62.5-25 MCG/INH AEPB inhaler Inhale 1 puff into the lungs daily 1 each 11    CPAP Machine MISC by Does not apply route      Biotin 5 MG CAPS Take 1 capsule by mouth daily      Misc Natural Products (OSTEO BI-FLEX ADV DOUBLE ST PO) Take 1 tablet by mouth daily      timolol (TIMOPTIC) 0.5 % ophthalmic solution Place 1 drop into both eyes nightly      carvedilol (COREG) 6.25 MG tablet Take 1 tablet by mouth 2 times daily (with meals) 180 tablet 3    albuterol (PROVENTIL HFA;VENTOLIN HFA) 108 (90 BASE) MCG/ACT inhaler Inhale 2 puffs into the lungs 2 times daily AND EVERY 4-6 HOURS AS NEEDED      Multiple Vitamins-Minerals (ICAPS) CAPS Take 1 capsule by mouth 2 times daily       vitamin B-12 (CYANOCOBALAMIN) 1000 MCG tablet Take 1,000 mcg by mouth daily. levothyroxine (SYNTHROID) 88 MCG tablet Take 88 mcg by mouth daily.              Objective:      /65   Pulse 60   Temp 98 °F (36.7 °C) (Oral)   Resp 18   Ht 4' 11.02\" (1.499 m)   Wt 135 lb 5.8 oz (61.4 kg)   SpO2 94%   BMI 27.33 kg/m²   Cruz Risk Score: Cruz Scale Score: 15    LABS    CBC:   Lab Results   Component Value Date/Time    WBC 8.3 11/01/2022 05:54 AM    RBC 4.41 11/01/2022 05:54 AM    HGB 13.0 11/01/2022 05:54 AM    HCT 41.5 11/01/2022 05:54 AM    MCV 94.1 11/01/2022 05:54 AM    MCH 29.5 11/01/2022 05:54 AM    MCHC 31.3 11/01/2022 05:54 AM    RDW 16.4 11/01/2022 05:54 AM     11/01/2022 05:54 AM    MPV 11.6 11/01/2022 05:54 AM     CMP:    Lab Results   Component Value Date/Time     11/01/2022 05:54 AM    K 3.6 11/01/2022 05:54 AM    CL 89 11/01/2022 05:54 AM    CO2 33 11/01/2022 05:54 AM    BUN 48 11/01/2022 05:54 AM    CREATININE 0.8 11/01/2022 05:54 AM    GFRAA >60 07/08/2022 08:06 AM    LABGLOM >60 11/01/2022 05:54 AM    GLUCOSE 103 11/01/2022 05:54 AM    PROT 5.9 10/25/2022 07:16 AM    PROT 6.8 11/28/2012 02:41 PM    LABALBU 4.1 10/25/2022 07:16 AM    CALCIUM 9.8 11/01/2022 05:54 AM    BILITOT 1.6 10/25/2022 07:16 AM    ALKPHOS 276 10/25/2022 07:16 AM    AST 28 10/25/2022 07:16 AM    ALT 19 10/25/2022 07:16 AM     Albumin:    Lab Results   Component Value Date/Time    LABALBU 4.1 10/25/2022 07:16 AM     PT/INR:    Lab Results   Component Value Date/Time    PROTIME 15.9 10/20/2022 08:27 AM    PROTIME 39.6 11/18/2011 03:45 PM    INR 1.23 10/20/2022 08:27 AM     HgBA1c:  No results found for: LABA1C      Assessment:     Patient Active Problem List   Diagnosis    CAD (coronary artery disease)    Cardiac pacemaker    S/P CABG x 3    Post PTCA    TIA (transient ischemic attack)    Confusion    Headache    Essential hypertension    Sinus bradycardia    Coronary artery disease involving coronary bypass graft of native heart without angina pectoris    Obstructive sleep apnea    Dizziness    PAF (paroxysmal atrial fibrillation) (HCC)    Pacer at end of battery life    Infection of pacemaker pocket ([facility]    Hyponatremia    Metabolic encephalopathy    Generalized weakness    Gait disturbance    Acute urinary retention    Hypothyroidism (acquired)    Chronic diastolic (congestive) heart failure (HCC)    Cor pulmonale (chronic) (HCC)    Chronic right-sided heart failure (HCC)    Pulmonary hypertension (HCC)    Shortness of breath    Moderate COPD (chronic obstructive pulmonary disease) (HCC)    CHF (congestive heart failure), NYHA class I, acute on chronic, combined (HCC)    Abnormal liver CT -findings suggestive of cirrhosis     At risk for injury associated with anticoagulation    T12 vertebral fracture (HCC)    SAULO on CPAP    Iron deficiency anemia secondary to inadequate dietary iron intake    Chest pain    Age-related osteoporosis with current pathological fracture of vertebra [facility])    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 (HCC)    WD-Non-pressure chronic ulcer of other part of left lower leg with fat layer exposed ([facility]    Acute on chronic diastolic (congestive) heart failure (HCC)    Acute on chronic combined systolic and diastolic CHF (congestive heart failure) (HCC)    SunDown syndrome    Orthostatic hypotension    Abrasion of left calf       Measurements:  Wound 07/29/22 Tibial Left;Posterior #1 (Active)   Wound Image   11/03/22 0900   Wound Etiology Venous 11/03/22 0900   Dressing Status New dressing applied 11/03/22 0900   Wound Cleansed Cleansed with saline 11/03/22 0900   Dressing/Treatment Collagen;Silicone border;Pharmaceutical agent (see MAR) 11/03/22 0900   Offloading for Diabetic Foot Ulcers Offloading not required 10/29/22 1602   Wound Length (cm) 1.5 cm 11/03/22 0900   Wound Width (cm) 1.5 cm 11/03/22 0900   Wound Depth (cm) 0.1 cm 11/03/22 0900   Wound Surface Area (cm^2) 2.25 cm^2 11/03/22 0900   Change in Wound Size % (l*w) -87.5 11/03/22 0900   Wound Volume (cm^3) 0.225 cm^3 11/03/22 0900   Wound Healing % -88 11/03/22 0900   Post-Procedure Length (cm) 1 cm 10/07/22 1417   Post-Procedure Width (cm) 1.1 cm 10/07/22 1417   Post-Procedure Depth (cm) 0.2 cm 10/07/22 1417   Post-Procedure Surface Area (cm^2) 1.1 cm^2 10/07/22 1417   Post-Procedure Volume (cm^3) 0.22 cm^3 10/07/22 1417   Distance Tunneling (cm) 0 cm 11/03/22 0900   Tunneling Position ___ O'Clock 0 11/03/22 0900   Undermining Starts ___ O'Clock 0 11/03/22 0900   Undermining Ends___ O'Clock 0 11/03/22 0900   Undermining Maxium Distance (cm) 0 11/03/22 0900   Wound Assessment Pink/red;Slough 11/03/22 0900   Drainage Amount Scant 11/03/22 0900   Drainage Description Serosanguinous 11/03/22 0900   Odor None 11/03/22 0900   Safia-wound Assessment Ecchymosis 11/03/22 0900   Margins Defined edges 11/03/22 0900   Wound Thickness Description not for Pressure Injury Full thickness 11/03/22 0900   Number of days: 96       Wound 10/27/22 Back Right;Proximal;Upper (Active)   Wound Image   10/27/22 1315   Wound Etiology Other 11/03/22 0900   Dressing Status Reinforced dressing 11/03/22 0900   Wound Cleansed Cleansed with saline 11/03/22 0900   Dressing/Treatment Silicone border 00/08/38 0900   Dressing Change Due 11/03/22 11/03/22 0748   Wound Length (cm) 0 cm 11/03/22 0900   Wound Width (cm) 0 cm 11/03/22 0900   Wound Depth (cm) 0 cm 11/03/22 0900   Wound Surface Area (cm^2) 0 cm^2 11/03/22 0900   Change in Wound Size % (l*w) 100 11/03/22 0900   Wound Volume (cm^3) 0 cm^3 11/03/22 0900   Wound Healing % 100 11/03/22 0900   Distance Tunneling (cm) 0 cm 11/03/22 0900   Tunneling Position ___ O'Clock 0 11/03/22 0900   Undermining Starts ___ O'Clock 0 11/03/22 0900   Undermining Ends___ O'Clock 0 11/03/22 0900   Undermining Maxium Distance (cm) 0 11/03/22 0900   Wound Assessment Other (Comment) leg wound. Venous wound. Appears stable-less slough. Recommend to continue with santyl/collagen. Applied. Proximal back blister appears healed. Distal back blister dry/purple-healing. Recommend to continue silicone foam borders-applied yesterday-change every 3 days. Reinforced. Pictures and measurements taken. Per nurse, sacrum is intact. Heels intact. Atmos air pump applied to mattress. Pt is a mild risk for skin breakdown AEB Cruz. Follow Cruz orders. Specialty Bed Required : yes  [] Low Air Loss   [x] Pressure Redistribution  [] Fluid Immersion  [] Bariatric  [] Total Pressure Relief  [] Other:     Discharge Plan:  Placement for patient upon discharge: tbd  Hospice Care: no  Patient appropriate for Outpatient [address]: active    Patient/Caregiver Teaching:  Level of patient/caregiver understanding able to:   Needs reinforcement.         Electronically signed by Janina Nobles RN, Keily Shelley on 11/3/2022 at 10:09 AM

## 2022-11-03 NOTE — CARE COORDINATION
LSW met with patient and called patient's daughter following Care Conference. LSW informed patient of recommendations for 4WW. Patient currently has access to a 4WW. LSW then informed of recommendation for [facility] PT, OT, and Nursing and patient is agreeable to all. Patient verbalized understanding and chose [facility]. Patient provided with list of Medicare participating [facility] in the geographic area of the patient served. Patient selected [facility] and was provided with a comparative data handout from [address] [place] [address] website. The patient (and/or Family) was educated on the quality outcomes for each provider. Patient (and/or Family) demonstrated understanding. Per patient/family request, referral made to [facility] [address] D/C Plan:  Estimated Date: Nov 7  DME:  has recommended DME  HHC:  PT, OT, Nursing [facility])  To:   Home, daughter plans to stay with her (family will transport between 4-4:30 PM)

## 2022-11-03 NOTE — PROGRESS NOTES
Marin Weaver    : 3/18/1929  Acct #: [de-identified]  MRN: [id number]              PM&R Progress Note      Admitting diagnosis: Acute on chronic diastolic congestive heart failure ( [address])     Comorbid diagnoses impacting rehabilitation: Generalized weakness, gait disturbance, sinus bradycardia, pulmonary hypertension, hyponatremia, sundowner syndrome, obstructive sleep apnea, left calf wound, orthostatic hypotension     Chief complaint: Some positional dizziness and general fatigue. She does not like the food. Not sleeping well. Prior (baseline) level of function: Independent.     Current level of function:         Current  IRF-YAMILKA and Goals:   Occupational Therapy:    Short Term Goals  Time Frame for Short Term Goals: STGs=LTGs :   Long Term Goals  Time Frame for Long Term Goals : ~10 days or until d/c  Long Term Goal 1: Pt will complete grooming tasks Ind  Long Term Goal 2: Pt will complete total body bathing c S using AE PRN  Long Term Goal 3: Pt will complete UB dressing c setup  Long Term Goal 4: Pt will complete LB dressing c S using AE PRN  Long Term Goal 5: Pt will doff/don footwear c S using AE PRN  Additional Goals?: Yes  Long Term Goal 6: Pt will complete toileting c S  Long Term Goal 7: Pt will complete functional transfers (bed, chair, toilet, shower) c DME PRN and S  Long Term Goal 8: Pt will perform therex/therax to facilitate increased strength/endurance/ax tolerance (c emphasis on dynamic standing balance/tolerance >6 mins, BUE endurance) c SBA  Long Term Goal 9: Pt will complete simple homemaking tasks c DME PRN and S :                                       Eating: Eating  Assistance Needed: Setup or clean-up assistance  Comment: required setup assist to open packages/containers  CARE Score: 5  Discharge Goal: Independent       Oral Hygiene: Oral Hygiene  Assistance Needed: Supervision or touching assistance  Comment: Pt able to apply toothpaste onto tooth brush and brush teeth wtih SBA standing at sink  CARE Score: 4  Discharge Goal: Independent    UB/LB Bathing: Shower/Bathe Self  Assistance Needed: Supervision or touching assistance  Comment: CG/SBA in stance at sink to bathe UB. Pt required max cues for sequencing and thoroughness  CARE Score: 4  Discharge Goal: Supervision or touching assistance    UB Dressing: Upper Body Dressing  Assistance Needed: Supervision or touching assistance  Comment: Pt able to thread BUE's into sweater, however, pt required cues to don over head and pull down in front. CARE Score: 4  Discharge Goal: Set-up or clean-up assistance         LB Dressing: Lower Body Dressing  Assistance Needed: Partial/moderate assistance  Comment: despite education of reacher use, pt demo'd poor carryover and refused to use reacher. Pt required min A to thread BLEs into pants; SBA in stance to manage over hips. CARE Score: 3  Discharge Goal: Supervision or touching assistance    Donning and [place] Footwear: Putting On/Taking Off Footwear  Assistance Needed: Dependent  Comment: d/t poor distal reach, pt required total assist to doff/don hospital socks despite education to use reacher to doff socks. CARE Score: 1  Discharge Goal: Supervision or touching assistance      Toiletin [address] needed: Partial/moderate assistance  Comment: Pt able to manage depends up and down, however after urination, pt only performed hygiene on bottom despite cues for bladder hygiene  CARE Score: 3  Discharge Goal: Supervision or touching assistance      Toilet Transfers: Toilet Transfer  Assistance needed: Supervision or touching assistance  Comment: CG/SBA using 4WW and grab bars, cues to properly align with toilet.   CARE Score: 4  Discharge Goal: Supervision or touching assistance    Physical Therapy:   Short Term Goals  Time Frame for Short Term Goals: 5-7 STG=LTG  Short Term Goal 1: Pt will perform bed mobility with mod I  Short Term Goal 2: Pt will perform sit to stand, pivot and car transfers with mod I if no O2, supervision if O2  Short Term Goal 3: Pt will ambulate 150' on level surfaces with 2ww and 10' on uneven surfaces with supervision with or without O2  Short Term Goal 4: Pt will ascend/descend curb step and 4 steps with rail with supervision  Short Term Goal 5: Pt will retrieve object on floor with 2ww and supervision            Bed Mobility:   Sit to Lying  Assistance Needed: Supervision or touching assistance  Comment: mod effort, use of rails, cues for completing  CARE Score: 4  Discharge Goal: Independent  Roll Left and Right  Assistance Needed: Setup or clean-up assistance  Comment: mod I with rails in place  CARE Score: 5  Discharge Goal: Independent  Lying to Sitting on Side of Bed  Assistance Needed: Supervision or touching assistance  Comment: as sit to lying  CARE Score: 4  Discharge Goal: Independent    Transfers:    Sit to Stand  Assistance Needed: Supervision or touching assistance  Comment: CGA to min assist, but more consistently CG, poor safety  CARE Score: 4  Discharge Goal: Independent  Chair/Bed-to-Chair Transfer  Assistance Needed: Supervision or touching assistance  Comment: CG  CARE Score: 4  Discharge Goal: Independent (with no O2 line.  predict assist with line if pt still on O2)     Car Transfer  Assistance Needed: Supervision or touching assistance  Comment: CG for LEs into car  CARE Score: 4  Discharge Goal: Independent    Ambulation:    Walking Ability  Does the Patient Walk?: Yes     Walk 10 Feet  Assistance Needed: Supervision or touching assistance  Comment: SBA with 4ww, no O2 line  CARE Score: 4  Discharge Goal: Supervision or touching assistance     Walk 50 Feet with Two Turns  Assistance Needed: Supervision or touching assistance  Comment: SBA with 4ww, no O2 line  CARE Score: 4  Discharge Goal: Supervision or touching assistance     Walk 150 Feet  Assistance Needed: Supervision or touching assistance  Comment: SBA with 4ww, no O2 line  CARE Score: 4  Discharge Goal: Supervision or touching assistance     Walking 10 Feet on Uneven Surfaces  Assistance Needed: Supervision or touching assistance  Comment: CG with 2ww, mod cues  CARE Score: 4  Discharge Goal: Supervision or touching assistance     1 Step (Curb)  Assistance Needed: Partial/moderate assistance  Comment: min assist with walker management, mod cues  CARE Score: 3  Discharge Goal: Supervision or touching assistance     4 Steps  Assistance Needed: Supervision or touching assistance  Comment: CG with B rails  CARE Score: 4  Discharge Goal: Supervision or touching assistance     12 Steps  Reason if not Attempted: Not applicable  CARE Score: 9  Discharge Goal: Not Applicable       Wheelchair:  w/c Ability: Wheelchair Ability  Uses a Wheelchair and/or Scooter?: No                Balance:        Object: Picking Up Object  Assistance Needed: Partial/moderate assistance  Comment: min assist with 2ww, no reacher  CARE Score: 3  Discharge Goal: Supervision or touching assistance    I      Exam:    Blood pressure (!) 106/56, pulse 60, temperature 98.2 °F (36.8 °C), temperature source Oral, resp. rate 18, height 4' 11.02\" (1.499 m), weight 135 lb 5.8 oz (61.4 kg), SpO2 93 %. General: Sitting up in a bedside chair. Very hard of hearing. Hard to communicate with. HEENT: Gazing right and left. Appears well-hydrated. Neck supple. Pulmonary: Unlabored respirations with symmetric air exchange. Cardiac: Regular rate and rhythm. Abdomen: Patient's abdomen is soft and nondistended. Bowel sounds were present throughout. There was no rebound, guarding or masses noted. Upper extremities: Spontaneous use of both upper limbs managing her utensils at a meal.  No new bruising. Lower extremities: Trace edema. Calf soft. Heels clear. Sitting balance was good. Standing balance was poor.     Lab Results   Component Value Date    WBC 8.3 11/01/2022    HGB 13.0 11/01/2022    HCT 41.5 11/01/2022    MCV 94.1 11/01/2022     11/01/2022     Lab Results   Component Value Date    INR 1.23 10/20/2022    INR 1.88 03/24/2022    INR 1.80 03/23/2022    PROTIME 15.9 (H) 10/20/2022    PROTIME 24.4 (H) 03/24/2022    PROTIME 23.3 (H) 03/23/2022     Lab Results   Component Value Date    CREATININE 0.8 11/01/2022    BUN 48 (H) 11/01/2022     (L) 11/01/2022    K 3.6 11/01/2022    CL 89 (L) 11/01/2022    CO2 33 (H) 11/01/2022     Lab Results   Component Value Date    ALT 19 10/25/2022    AST 28 10/25/2022    ALKPHOS 276 (H) 10/25/2022    BILITOT 1.6 (H) 10/25/2022       Expected length of stay  prior to a supervised level of function for discharge home with a walker and [facility] OT/PT is 2 weeks. Recommendations:    Acute on chronic congestive heart failure with gait disturbance: Trying to work through her difficulty hearing with instructions for safe techniques during the daily occupational and physical therapy. Communication issues are significant due to her severity of hearing loss and poor attention and reasoning. Monitoring her weights daily to evaluate her compensation of CHF. Ongoing diuresis with Aldactone, Demadex and Zaroxolyn. Blood pressure support with ProAmatine. Encouraging consistent oral intake of heart healthy diet/fluids. Aggressive pulmonary hygiene measures. She needs adaptive equipment training, DVT prophylaxis and possibly caregiver education. Outpatient follow-up with her cardiologist and PCP. Verbal cues and moderate physical assistance for transfers again today. DVT prophylaxis: Lovenox 40 mg subcu daily. I must monitor her hemoglobin and platelet count periodically while on this medication. Weightbearing activities are limited but tried daily. GI prophylaxis is available. No new bruising or swelling. Pulmonary hypertension: Gentle diuresis. Monitoring her CHF. Slow and deliberate position changes. Sundowner syndrome: Sleep regulation.   Consistent staffing and environment when possible. Minimizing medications that might alter her sensorium. Involving family when possible for training and therapies. Obstructive sleep apnea: The respiratory service has been consulted to assist with nighttime BiPAP. She is on albuterol and Stiolto inhalers  Possible cellulitis: She is threatening cellulitis at the left calf surrounding her vascular wound. Monitoring closely. White count and hemoglobin are stable.

## 2022-11-04 PROCEDURE — 2700000000 HC OXYGEN THERAPY PER DAY

## 2022-11-04 PROCEDURE — 97530 THERAPEUTIC ACTIVITIES: CPT

## 2022-11-04 PROCEDURE — 6370000000 HC RX 637 (ALT 250 FOR IP): Performed by: INTERNAL MEDICINE

## 2022-11-04 PROCEDURE — 97535 SELF CARE MNGMENT TRAINING: CPT

## 2022-11-04 PROCEDURE — 94761 N-INVAS EAR/PLS OXIMETRY MLT: CPT

## 2022-11-04 PROCEDURE — 2580000003 HC RX 258: Performed by: INTERNAL MEDICINE

## 2022-11-04 PROCEDURE — 1280000000 HC REHAB R&B

## 2022-11-04 PROCEDURE — 94640 AIRWAY INHALATION TREATMENT: CPT

## 2022-11-04 PROCEDURE — 2500000003 HC RX 250 WO HCPCS: Performed by: INTERNAL MEDICINE

## 2022-11-04 PROCEDURE — 99231 SBSQ HOSP IP/OBS SF/LOW 25: CPT | Performed by: PHYSICAL MEDICINE & REHABILITATION

## 2022-11-04 PROCEDURE — 6360000002 HC RX W HCPCS: Performed by: INTERNAL MEDICINE

## 2022-11-04 PROCEDURE — 97116 GAIT TRAINING THERAPY: CPT

## 2022-11-04 PROCEDURE — 97110 THERAPEUTIC EXERCISES: CPT

## 2022-11-04 PROCEDURE — 94150 VITAL CAPACITY TEST: CPT

## 2022-11-04 PROCEDURE — 6370000000 HC RX 637 (ALT 250 FOR IP): Performed by: PHYSICAL MEDICINE & REHABILITATION

## 2022-11-04 RX ADMIN — LEVOTHYROXINE SODIUM 88 MCG: 0.11 TABLET ORAL at 06:33

## 2022-11-04 RX ADMIN — MIDODRINE HYDROCHLORIDE 5 MG: 5 TABLET ORAL at 12:17

## 2022-11-04 RX ADMIN — CYANOCOBALAMIN TAB 1000 MCG 1000 MCG: 1000 TAB at 08:52

## 2022-11-04 RX ADMIN — SODIUM CHLORIDE, PRESERVATIVE FREE 10 ML: 5 INJECTION INTRAVENOUS at 08:53

## 2022-11-04 RX ADMIN — METOLAZONE 5 MG: 2.5 TABLET ORAL at 08:52

## 2022-11-04 RX ADMIN — MICONAZOLE NITRATE: 2 POWDER TOPICAL at 09:02

## 2022-11-04 RX ADMIN — CARVEDILOL 6.25 MG: 6.25 TABLET, FILM COATED ORAL at 17:07

## 2022-11-04 RX ADMIN — ASPIRIN 81 MG CHEWABLE TABLET 81 MG: 81 TABLET CHEWABLE at 08:52

## 2022-11-04 RX ADMIN — ALBUTEROL SULFATE 2 PUFF: 90 AEROSOL, METERED RESPIRATORY (INHALATION) at 11:11

## 2022-11-04 RX ADMIN — MICONAZOLE NITRATE: 2 POWDER TOPICAL at 20:09

## 2022-11-04 RX ADMIN — ATORVASTATIN CALCIUM 10 MG: 10 TABLET, FILM COATED ORAL at 20:08

## 2022-11-04 RX ADMIN — TORSEMIDE 40 MG: 20 TABLET ORAL at 08:51

## 2022-11-04 RX ADMIN — MIDODRINE HYDROCHLORIDE 5 MG: 5 TABLET ORAL at 17:07

## 2022-11-04 RX ADMIN — TIOTROPIUM BROMIDE AND OLODATEROL 2 PUFF: 3.124; 2.736 SPRAY, METERED RESPIRATORY (INHALATION) at 11:11

## 2022-11-04 RX ADMIN — LATANOPROST 1 DROP: 50 SOLUTION/ DROPS OPHTHALMIC at 17:11

## 2022-11-04 RX ADMIN — CARVEDILOL 6.25 MG: 6.25 TABLET, FILM COATED ORAL at 08:52

## 2022-11-04 RX ADMIN — Medication 5000 UNITS: at 08:52

## 2022-11-04 RX ADMIN — SPIRONOLACTONE 25 MG: 50 TABLET ORAL at 08:52

## 2022-11-04 RX ADMIN — TORSEMIDE 40 MG: 20 TABLET ORAL at 17:07

## 2022-11-04 RX ADMIN — TRAZODONE HYDROCHLORIDE 50 MG: 50 TABLET ORAL at 20:08

## 2022-11-04 RX ADMIN — ACETAMINOPHEN 650 MG: 325 TABLET ORAL at 18:27

## 2022-11-04 RX ADMIN — MIDODRINE HYDROCHLORIDE 5 MG: 5 TABLET ORAL at 08:52

## 2022-11-04 RX ADMIN — TIMOLOL MALEATE 1 DROP: 5 SOLUTION OPHTHALMIC at 20:09

## 2022-11-04 RX ADMIN — METOLAZONE 5 MG: 2.5 TABLET ORAL at 20:08

## 2022-11-04 RX ADMIN — ENOXAPARIN SODIUM 40 MG: 40 INJECTION SUBCUTANEOUS at 08:53

## 2022-11-04 RX ADMIN — COLLAGENASE SANTYL: 250 OINTMENT TOPICAL at 08:57

## 2022-11-04 ASSESSMENT — PAIN SCALES - GENERAL
PAINLEVEL_OUTOF10: 0
PAINLEVEL_OUTOF10: 4

## 2022-11-04 NOTE — DISCHARGE INSTR - DIET

## 2022-11-04 NOTE — PROGRESS NOTES
Occupational Therapy  Physical Rehabilitation: OCCUPATIONAL THERAPY     [x] daily progress note       [] discharge       Patient Name:  Angelique Marie   :  3/18/1929 MRN: [id number]  Room:  [address] [place] [address] Date of Admission: 10/29/2022  Rehabilitation Diagnosis:   Acute on chronic diastolic (congestive) heart failure [I50.33]  Acute on chronic combined systolic and diastolic CHF (congestive heart failure) ([facility] [address] [I50.43]       Date 2022       Day of ARU Week:  7   Time IN/OUT 1725-4135   Individual Tx Minutes 60   Group Tx Minutes    Co-Treat Minutes    Concurrent Tx Minutes    TOTAL Tx Time Mins 60   Variance Time    Variance Time []   Refusal due to:     []   Medical hold/reason:    []   Illness   []   Off Unit for test/procedure  []   Extra time needed to complete task  []   Therapeutic need  []   Other (specify):   Restrictions Restrictions/Precautions: General Precautions, Fall Risk (2L02, [place])         Communication with other providers: [x]   OK to see per nursing:     []   Spoke with team member regarding:      Subjective observations and cognitive status: Pt sitting up in [facility] on approach; pt was incontinent of urine upon arrival. Pt pleasant and agreeable to therapy session. Pain level/location:    /10       Location:    Discharge recommendations  Anticipated discharge date:    Destination: []home alone   [x]home alone w assist prn   [] home w/ family    [] Continuous supervision       []SNF    [] Assisted living     [] Other:   Continued therapy: []HHC OT  []OUTPATIENT  OT   [] No Further OT  Equipment needs: none       Toileting:   SBA for clothing management c cues to pull pants all the way up. Min A for BM thoroughness.         Toilet Transfers:   SBA c cues for safety to line up with toilet  Device Used:    []   Standard Toilet         []   Grab Bars           []  Bedside Commode       []   Elevated Toilet          []   Other:        Bed Mobility:           [x]   Pt received out of bed Transfers:    Sit--> Stand:  SBA  Stand --> Sit:   SBA  Stand-Pivot:   SBA  Other:    Assistive device required for transfer:   RW       Functional Mobility:  to<>from bathroom + 1 lap around ARU   Assistance:  SBA   Device:   []   Rolling Walker     []   Standard Walker []   Wheelchair        []   Judeth Notch       [x]   4-Wheeled Gillian Paiz         []   Cardiac Walker       []   Other:        Additional Therapeutic activities/exercises completed this date:     [x]   ADL Training   []   Balance/Postural training     []   Bed/Transfer Training   [x]   Endurance Training: patient instructed in bilateral reciprocal patterned movement with min resistance while seated for 10 minutes with 0 rest breaks to increase endurance/activity tolerance for facilitation of both ADL and mobility tasks     []   Neuromuscular Re-ed   []   Nu-step:  Time:        Level:         #Steps:       []   Rebounder:    []  Seated     []  Standing        []   Supine Ther Ex (reps/sets):     []   Seated Ther Ex (reps/sets):     []   Standing Ther Ex (reps/sets):     []   Other:      Comments:      Patient/Caregiver Education and Training:   []   [employer] Equipment Use  []   Bed Mobility/Transfer Technique/Safety  []   Energy Conservation Tips  []   Family training  []   Postural Awareness  []   Safety During Functional Activities  []   Reinforced Patient's Precautions   []   Progress was updated and reviewed in Rehabtracker with patient and/or family this         date.     Treatment Plan for Next Session: Continue OT POC         Treatment/Activity Tolerance:   [x] Tolerated treatment with no adverse effects    [] Patient limited by fatigue  [] Patient limited by pain   [] Patient limited by medical complications:    [] Adverse reaction to Tx:   [] Significant change in status    Safety:       [x]  bed alarm set    []  chair alarm set    []  Pt refused alarms                []  Telesitter activated      [x]  Gait belt used during tx session      []other: Number of Minutes/Billable Intervention  Therapeutic Exercise 15   ADL Self-care 30   Neuro Re-Ed    Therapeutic Activity 15   Group    Other:    TOTAL 60       Social History  Social/Functional History  Lives With: Alone  Type of Home: Condo  Home Layout: One level  Home Access: Level entry  Bathroom Shower/Tub: Tub/Shower unit, Walk-in shower  Bathroom Toilet: Standard  Bathroom Equipment: Grab bars in shower, Grab bars around toilet  Home Equipment: Mike Art, [place], Mike Art, 4 wheeled  ADL Assistance: Independent  Homemaking Assistance: Independent  Ambulation Assistance: Independent  Transfer Assistance: Independent  Active : No  Patient's  Info: Daughter in law does grocery shopping.   Occupation: Retired  Additional Comments: D/t communication difficulties related to hearing aid issues, and cognitive concerns information above obtained from IP evals and needs confirmed    Objective                                                                                    Goals:  (Update in navigator)  Short Term Goals  Time Frame for Short Term Goals: STGs=LTGs:  Long Term Goals  Time Frame for Long Term Goals : ~10 days or until d/c  Long Term Goal 1: Pt will complete grooming tasks Ind  Long Term Goal 2: Pt will complete total body bathing c S using AE PRN  Long Term Goal 3: Pt will complete UB dressing c setup  Long Term Goal 4: Pt will complete LB dressing c S using AE PRN  Long Term Goal 5: Pt will doff/don footwear c S using AE PRN  Additional Goals?: Yes  Long Term Goal 6: Pt will complete toileting c S  Long Term Goal 7: Pt will complete functional transfers (bed, chair, toilet, shower) c DME PRN and S  Long Term Goal 8: Pt will perform therex/therax to facilitate increased strength/endurance/ax tolerance (c emphasis on dynamic standing balance/tolerance >6 mins, BUE endurance) c SBA  Long Term Goal 9: Pt will complete simple homemaking tasks c DME PRN and S:        Plan of Care Times per week: 5 days per week for a minimum of 60 minutes/day plus group as appropriate for 60 minutes.   Treatment to include Occupational Therapy Plan  Current Treatment Recommendations: Strengthening, Endurance training, Cognitive reorientation, Pain management, Patient/Caregiver education & training, Safety education & training, Equipment evaluation, education, & procurement, Self-Care / ADL, Home management training, Cognitive/Perceptual training, Balance training, Functional mobility training    Electronically signed by   Hildred Mcburney, OTA,  11/4/2022, 8:50 AM

## 2022-11-04 NOTE — PLAN OF CARE
Problem: Discharge Planning  Goal: Discharge to home or other facility with appropriate resources  Outcome: Progressing     Problem: Skin/Tissue Integrity  Goal: Absence of new skin breakdown  Description: 1. Monitor for areas of redness and/or skin breakdown  2. Assess vascular access sites hourly  3. Every 4-6 hours minimum:  Change oxygen saturation probe site  4. Every 4-6 hours:  If on nasal continuous positive airway pressure, respiratory therapy assess nares and determine need for appliance change or resting period.   Outcome: Progressing     Problem: Safety - Adult  Goal: Free from fall injury  Outcome: Progressing     Problem: ABCDS Injury Assessment  Goal: Absence of physical injury  Outcome: Progressing     Problem: Pain  Goal: Verbalizes/displays adequate comfort level or baseline comfort level  Outcome: Progressing     Problem: Chronic Conditions and Co-morbidities  Goal: Patient's chronic conditions and co-morbidity symptoms are monitored and maintained or improved  Outcome: Progressing     Problem: Nutrition Deficit:  Goal: Optimize nutritional status  Outcome: Progressing

## 2022-11-04 NOTE — PROGRESS NOTES
Physical Therapy      [x] daily progress note       [] discharge       Patient Name:  Zahra Lozano   :  3/18/1929 MRN: [id number]  Room:  [address] [place], [place] [address] Date of Admission: 10/29/2022  Rehabilitation Diagnosis:   Acute on chronic diastolic (congestive) heart failure [I50.33]  Acute on chronic combined systolic and diastolic CHF (congestive heart failure) ([facility] [I50.43]       Date 2022       Day of ARU Week:  7   Time IN/OUT 8177-3000  2736-8130   Individual Tx Minutes 60+60   TOTAL Tx Time Mins 120   Variance Time    Variance Time []   Refusal due to:     []   Medical hold/reason:    []   Illness   []   Off Unit for test/procedure  []   Extra time needed to complete task  []   Therapeutic need  []   Other (specify):   Restrictions Restrictions/Precautions  Restrictions/Precautions: General Precautions, Fall Risk (2L02, [place])      Communication with other providers: [x]   OK to see per nursing:     []   Spoke with team member regarding:      Subjective observations and cognitive status: Pt resting in bed with RN in room for medication; breakfast tray untouched, pt needing to go to the BR. A pt with toileting, LB bathing, dressing, and grooming before setting up for breakfast. Pt with intermittent periods of increased HARE, O2 sats 90-94% on RA, returned pt to 2L during eating. 2nd AM session: Dr Karlynn Krabbe in room initially for rounding, RN needing to get daily weight, RT in for inhaler.  Pt was pleasant and agreeable   Pain level/location: 010   first session    Location:    Discharge recommendations  Anticipated discharge date:    Destination: []home alone     [x]home alone with assist PRN-daughter coming to stay with her upon d/c per CM       [] home w/ family      [] Continuous supervision  []SNF    [] Assisted living     [] Other:  Continued therapy: [x]HHC PT  []OUTPATIENT PT   [] No Further PT  []SNF PT  Caregiver training recommended: []Yes  [] No   Equipment needs: none, pt has 4WW     Bed Mobility: []   Pt received out of bed   Rolling R/L:  SBA  Scooting:  SBA sit scoot to EOB  Supine --> Sit:  SBA with extra time and effort, pt reaching out to therapist for A, encouraged pt to perform on own  Bed features used:    [x] HOB elevated      [x] Bed rail                                    [] No     Transfers:    Sit--> Stand:  SBA  Stand --> Sit:   SBA  Stand-Pivot:   SBA  Continues to require max safety cues for brake safety, unlocking brakes before amb, hand placement  Toilet Transfer: SBA with locked 4WW for support, pt reports does not have grab bar? Toileting: SBA for balance, max seq cues to pull up both brief and pants before amb to sink. Car Transfers:  SBA for balance, req cues for hand placement and locking brakes before standing from car seat. Assistive device required for transfer:   4WW    Gait:    Distance:  15 x 2 / 866'+164'+95'  Assistance:  SBA  Device:  6LC  Gait Quality:   recip pattern, flexed posture, req cues to amb closer to AD when amb first time to BR. Uneven Surfaces:       Assistance:    SB-CGA ( req CGA for balance 2 episodes on grades with mild unsteadiness laterally  Device:    RW  Surfaces Completed:   [] Carpet with bean bags beneath       [] Throw rugs          [x] Outdoor pavements      [] Grass       [] Loose gravel   [] Carpet      [x]  Multi grades     [] Threshold         [x] Inclines/declines           Additional Therapeutic activities/exercises completed this date:     []   Nu-step:  Time:        Level:         #Steps:       []   Rebounder:    []  Seated     []  Standing        [x]   Balance training  Standing at sink for hand hygiene and brushing hair SBA, pt req cues for safely positioning AD to side and then to step closer to sink due to reaching out of [place].  Pt perseverates with rinsing hands, req prompting to dry        []   Postural training    []   Supine ther ex (reps/sets):     []   Seated ther ex (reps/sets):     []   Standing ther ex (reps/sets):     []   Picked up object from floor                       []   Reacher used   [x]   Other:LB bathing: in sitting Supervision with cues and extra time; In standing SB-CGA with cues/extra time. LB dressing: SBA for balance, Min A with task; with max cues: pt req cues to correctly don brief, pt states \"youre gonna have to help me with this\", Pt was able to thread over feet once positioned correctly. Req A with threading pants over feet due to SOB with ax. Able pull brief and pants up but req prompting to do so. Dons shoes in sitting without Supervision. [x]   Other:A pt with stepping up onto standing scale with CGA and cues for management of rollator to side before stepping up, safety with turning to AD after stepping off. [x]   Other:multiple transfers in blocked practice with focus on brake safety.  Pt did demo improvement with locking before transfer but continues to amb with brakes locked and cueing to reach back with sitting      Patient/Caregiver Education and Training:   [x]   Bed Mobility/Transfer technique/safety  [x]   Gait technique/sequencing  [x]   Proper use of assistive device  [x]   Advanced mobility safety and technique  []   Reinforced patient's precautions with mobility/functional tasks  []   Postural awareness  []   Family training  []   Other:    Treatment Plan for Next Session: LE therx, stair training, dynamic balance        Treatment/Activity Tolerance:   [x] Tolerated treatment with no adverse effects    [] Patient limited by fatigue  [] Patient limited by pain   [] Patient limited by medical complications:    [] Adverse reaction to Tx:   [] Significant change in status    Safety:       []  bed alarm set    [x]  chair alarm set    []  Pt refused alarms                []  Telesitter activated      [x]  Gait belt used during tx session      [] Call light and belongings in reach       [] Other      Number of Minutes/Billable Intervention  Gait Training 45   Therapeutic Exercise    Neuro Re-Ed    Therapeutic Activity 75   Wheelchair Propulsion    Group    Other:    TOTAL 120         Social History  Social/Functional History  Lives With: Alone  Type of Home: Condo  Home Layout: One level  Home Access: Level entry  Bathroom Shower/Tub: Tub/Shower unit, Walk-in shower  Bathroom Toilet: Standard  Bathroom Equipment: Grab bars in shower, Grab bars around toilet  Home Equipment: Gillian Paiz, [place], Gillian Paiz, 4 wheeled  ADL Assistance: [address]: Independent  Transfer Assistance: Independent  Active : No  Patient's  Info: Daughter in law does grocery shopping. Occupation: Retired  Additional Comments: D/t communication difficulties related to hearing aid issues, and cognitive concerns information above obtained from IP evals and needs confirmed    Objective                                                                                    Goals:  (Update in navigator)  Short Term Goals  Time Frame for Short Term Goals: 5-7 STG=LTG  Short Term Goal 1: Pt will perform bed mobility with mod I  Short Term Goal 2: Pt will perform sit to stand, pivot and car transfers with mod I if no O2, supervision if O2  Short Term Goal 3: Pt will ambulate 150' on level surfaces with 2ww and 10' on uneven surfaces with supervision with or without O2  Short Term Goal 4: Pt will ascend/descend curb step and 4 steps with rail with supervision  Short Term Goal 5: Pt will retrieve object on floor with 2ww and supervision:   :        Plan of Care                                                                              Times per week: 5 days per week for a minimum of 60 minutes/day plus group as appropriate for 60 minutes.   Treatment to include Current Treatment Recommendations: Strengthening, Balance training, Functional mobility training, Transfer training, ADL/Self-care training, IADL training, Cognitive/Perceptual training, Endurance training, Safety education & training, Patient/Caregiver education & training, Therapeutic activities, Gait training, Stair training, Equipment evaluation, education, & procurement, Pain management, Neuromuscular re-education, Positioning, Home exercise program, Cognitive reorientation    Electronically signed by   Denny De La Torre PTA #[id number]  11/4/2022, 9:43 AM

## 2022-11-04 NOTE — DISCHARGE INSTR - ACTIVITY
Pt is discharging to home with [address] [facility], [address], [facility], [address] [facility]  [phone]  A representative from [facility] will contact you at home to schedule your home care needs

## 2022-11-04 NOTE — PROGRESS NOTES
Christophe Mock    : 3/18/1929  Acct #: [de-identified]  MRN: [id number]              PM&R Progress Note      Admitting diagnosis: Acute on chronic diastolic congestive heart failure ([address])     Comorbid diagnoses impacting rehabilitation: Generalized weakness, gait disturbance, sinus bradycardia, pulmonary hypertension, hyponatremia, sundowner syndrome, obstructive sleep apnea, left calf wound, orthostatic hypotension     Chief complaint: 11/3: Patient seen in her room with nursing staff. Requiring 2 L of oxygen per nasal cannula. Dizziness improved. Intermittent confusion requiring frequent verbal cues. Ambulated 200 feet with a rolling walker contact-guard to standby assistance. However requires maximal cues. Bathing contact-guard assistance and upper body dressing supervision. Lower body dressing minimal assistance with cues. Tolerating rehab program.  : Patient seen in her room with nursing staff. Tolerating rehab. Hard of hearing. Weight has decreased from 144 pounds on admission to 135 pounds on . Fred Reyes Continues to require 2 L of oxygen per nasal cannula to maintain O2 saturation greater than 90%. Denies any dizziness. Planning on daughter assisting at discharge. Prior (baseline) level of function: Independent.     Current level of function:         Current  IRF-YAMILKA and Goals:   Occupational Therapy:    Short Term Goals  Time Frame for Short Term Goals: STGs=LTGs :   Long Term Goals  Time Frame for Long Term Goals : ~10 days or until d/c  Long Term Goal 1: Pt will complete grooming tasks Ind  Long Term Goal 2: Pt will complete total body bathing c S using AE PRN  Long Term Goal 3: Pt will complete UB dressing c setup  Long Term Goal 4: Pt will complete LB dressing c S using AE PRN  Long Term Goal 5: Pt will doff/don footwear c S using AE PRN  Additional Goals?: Yes  Long Term Goal 6: Pt will complete toileting c S  Long Term Goal 7: Pt will complete functional transfers (bed, chair, toilet, shower) c DME PRN and S  Long Term Goal 8: Pt will perform therex/therax to facilitate increased strength/endurance/ax tolerance (c emphasis on dynamic standing balance/tolerance >6 mins, BUE endurance) c SBA  Long Term Goal 9: Pt will complete simple homemaking tasks c DME PRN and S :                                       Eating: Eating  Assistance Needed: Setup or clean-up assistance  Comment: required setup assist to open packages/containers  CARE Score: 5  Discharge Goal: Independent       Oral Hygiene: Oral Hygiene  Assistance Needed: Supervision or touching assistance  Comment: Pt able to apply toothpaste onto tooth brush and brush teeth wtih SBA standing at sink  CARE Score: 4  Discharge Goal: Independent    UB/LB Bathing: Shower/Bathe Self  Assistance Needed: Supervision or touching assistance  Comment: CG/SBA in stance at sink to bathe UB. Pt required max cues for sequencing and thoroughness  CARE Score: 4  Discharge Goal: Supervision or touching assistance    UB Dressing: Upper Body Dressing  Assistance Needed: Supervision or touching assistance  Comment: Pt able to thread BUE's into sweater, however, pt required cues to don over head and pull down in front. CARE Score: 4  Discharge Goal: Set-up or clean-up assistance         LB Dressing: Lower Body Dressing  Assistance Needed: Partial/moderate assistance  Comment: despite education of reacher use, pt demo'd poor carryover and refused to use reacher. Pt required min A to thread BLEs into pants; SBA in stance to manage over hips. CARE Score: 3  Discharge Goal: Supervision or touching assistance    Donning and [place] Footwear: Putting On/Taking Off Footwear  Assistance Needed: Dependent  Comment: d/t poor distal reach, pt required total assist to doff/don hospital socks despite education to use reacher to doff socks. CARE Score: 1  Discharge Goal: Supervision or touching assistance      Toiletin [address] needed: Partial/moderate assistance  Comment: Pt able to manage depends up and down, however after urination, pt only performed hygiene on bottom despite cues for bladder hygiene  CARE Score: 3  Discharge Goal: Supervision or touching assistance      Toilet Transfers: Toilet Transfer  Assistance needed: Supervision or touching assistance  Comment: CG/SBA using 4WW and grab bars, cues to properly align with toilet. CARE Score: 4  Discharge Goal: Supervision or touching assistance    Physical Therapy:   Short Term Goals  Time Frame for Short Term Goals: 5-7 STG=LTG  Short Term Goal 1: Pt will perform bed mobility with mod I  Short Term Goal 2: Pt will perform sit to stand, pivot and car transfers with mod I if no O2, supervision if O2  Short Term Goal 3: Pt will ambulate 150' on level surfaces with 2ww and 10' on uneven surfaces with supervision with or without O2  Short Term Goal 4: Pt will ascend/descend curb step and 4 steps with rail with supervision  Short Term Goal 5: Pt will retrieve object on floor with 2ww and supervision            Bed Mobility:   Sit to Lying  Assistance Needed: Supervision or touching assistance  Comment: mod effort, use of rails, cues for completing  CARE Score: 4  Discharge Goal: Independent  Roll Left and Right  Assistance Needed: Setup or clean-up assistance  Comment: mod I with rails in place  CARE Score: 5  Discharge Goal: Independent  Lying to Sitting on Side of Bed  Assistance Needed: Supervision or touching assistance  Comment: as sit to lying  CARE Score: 4  Discharge Goal: Independent    Transfers:    Sit to Stand  Assistance Needed: Supervision or touching assistance  Comment: CGA to min assist, but more consistently CG, poor safety  CARE Score: 4  Discharge Goal: Independent  Chair/Bed-to-Chair Transfer  Assistance Needed: Supervision or touching assistance  Comment: CG  CARE Score: 4  Discharge Goal: Independent (with no O2 line.  predict assist with line if pt still on O2)     Car Transfer  Assistance Needed: Supervision or touching assistance  Comment: CG for LEs into car  CARE Score: 4  Discharge Goal: Independent    Ambulation:    Walking Ability  Does the Patient Walk?: Yes     Walk 10 Feet  Assistance Needed: Supervision or touching assistance  Comment: SBA with 4ww, no O2 line  CARE Score: 4  Discharge Goal: Supervision or touching assistance     Walk 50 Feet with Two Turns  Assistance Needed: Supervision or touching assistance  Comment: SBA with 4ww, no O2 line  CARE Score: 4  Discharge Goal: Supervision or touching assistance     Walk 150 Feet  Assistance Needed: Supervision or touching assistance  Comment: SBA with 4ww, no O2 line  CARE Score: 4  Discharge Goal: Supervision or touching assistance     Walking 10 Feet on Uneven Surfaces  Assistance Needed: Supervision or touching assistance  Comment: CG with 2ww, mod cues  CARE Score: 4  Discharge Goal: Supervision or touching assistance     1 Step (Curb)  Assistance Needed: Partial/moderate assistance  Comment: min assist with walker management, mod cues  CARE Score: 3  Discharge Goal: Supervision or touching assistance     4 Steps  Assistance Needed: Supervision or touching assistance  Comment: CG with B rails  CARE Score: 4  Discharge Goal: Supervision or touching assistance     12 Steps  Reason if not Attempted: Not applicable  CARE Score: 9  Discharge Goal: Not Applicable       Wheelchair:  w/c Ability: Wheelchair Ability  Uses a Wheelchair and/or Scooter?: No                Balance:        Object: Picking Up Object  Assistance Needed: Partial/moderate assistance  Comment: min assist with 2ww, no reacher  CARE Score: 3  Discharge Goal: Supervision or touching assistance    I      Exam:    Blood pressure 110/65, pulse 59, temperature 97.5 °F (36.4 °C), temperature source Oral, resp. rate 18, height 4' 11\" (1.499 m), weight 132 lb 2 oz (59.9 kg), SpO2 98 %. General: Sitting up in a bedside chair. Very hard of hearing. Follows directions. Alert and oriented x3    HEENT: Gazing right and left. Appears well-hydrated. Neck supple. Pulmonary: Unlabored respirations with symmetric air exchange. Cardiac: Regular rate and rhythm. Abdomen: Patient's abdomen is soft and nondistended. Bowel sounds were present throughout. There was no rebound, guarding or masses noted. Upper extremities: Motor strength 5/5 in both upper limbs  Lower extremities: No edema. Calf soft. Heels clear. Sitting balance was good. Standing balance was poor. Lab Results   Component Value Date    WBC 8.3 11/01/2022    HGB 13.0 11/01/2022    HCT 41.5 11/01/2022    MCV 94.1 11/01/2022     11/01/2022     Lab Results   Component Value Date    INR 1.23 10/20/2022    INR 1.88 03/24/2022    INR 1.80 03/23/2022    PROTIME 15.9 (H) 10/20/2022    PROTIME 24.4 (H) 03/24/2022    PROTIME 23.3 (H) 03/23/2022     Lab Results   Component Value Date    CREATININE 0.8 11/01/2022    BUN 48 (H) 11/01/2022     (L) 11/01/2022    K 3.6 11/01/2022    CL 89 (L) 11/01/2022    CO2 33 (H) 11/01/2022     Lab Results   Component Value Date    ALT 19 10/25/2022    AST 28 10/25/2022    ALKPHOS 276 (H) 10/25/2022    BILITOT 1.6 (H) 10/25/2022       Expected length of stay  prior to a supervised level of function for discharge home with a walker and [facility] OT/PT is 2 weeks. Recommendations:    Acute on chronic congestive heart failure with gait disturbance: Trying to work through her difficulty hearing with instructions for safe techniques during the daily occupational and physical therapy. Communication issues are significant due to her severity of hearing loss and poor attention and reasoning. Monitoring her weights daily to evaluate her compensation of CHF. Ongoing diuresis with Aldactone, Demadex and Zaroxolyn. Blood pressure support with ProAmatine. Encouraging consistent oral intake of heart healthy diet/fluids. Aggressive pulmonary hygiene measures. She needs adaptive equipment training, DVT prophylaxis and possibly caregiver education. Outpatient follow-up with her cardiologist and PCP. Verbal cues and moderate physical assistance for transfers again today. DVT prophylaxis: Lovenox 40 mg subcu daily. I must monitor her hemoglobin and platelet count periodically while on this medication. Weightbearing activities are limited but tried daily. GI prophylaxis is available. No new bruising or swelling. Pulmonary hypertension: Gentle diuresis. Monitoring her CHF. Slow and deliberate position changes. Sundowner syndrome: Sleep regulation. Consistent staffing and environment when possible. Minimizing medications that might alter her sensorium. Involving family when possible for training and therapies. 11/3: Daughter will be staying with her at discharge. Will need 24-hour supervision. : Requires significant verbal cues with activities  Obstructive sleep apnea: The respiratory service has been consulted to assist with nighttime BiPAP. She is on albuterol and Stiolto inhalers. 11/3: Continue with oxygen 2 L per nasal cannula. May require home O2. We will need to check overnight pulse oximetry  Possible cellulitis: She is threatening cellulitis at the left calf surrounding her vascular wound. Monitoring closely. White count and hemoglobin are stable. 11/3: Appreciate wound care evaluation     Discharge plannin/3: Team conference completed. Anticipate discharge  with daughter's assistance.

## 2022-11-05 PROCEDURE — 94150 VITAL CAPACITY TEST: CPT

## 2022-11-05 PROCEDURE — 6370000000 HC RX 637 (ALT 250 FOR IP): Performed by: INTERNAL MEDICINE

## 2022-11-05 PROCEDURE — 2700000000 HC OXYGEN THERAPY PER DAY

## 2022-11-05 PROCEDURE — 2500000003 HC RX 250 WO HCPCS: Performed by: INTERNAL MEDICINE

## 2022-11-05 PROCEDURE — 1280000000 HC REHAB R&B

## 2022-11-05 PROCEDURE — 6370000000 HC RX 637 (ALT 250 FOR IP): Performed by: PHYSICAL MEDICINE & REHABILITATION

## 2022-11-05 PROCEDURE — 6360000002 HC RX W HCPCS: Performed by: INTERNAL MEDICINE

## 2022-11-05 PROCEDURE — 94761 N-INVAS EAR/PLS OXIMETRY MLT: CPT

## 2022-11-05 PROCEDURE — 94640 AIRWAY INHALATION TREATMENT: CPT

## 2022-11-05 RX ADMIN — ENOXAPARIN SODIUM 40 MG: 40 INJECTION SUBCUTANEOUS at 09:02

## 2022-11-05 RX ADMIN — Medication 5000 UNITS: at 09:02

## 2022-11-05 RX ADMIN — LATANOPROST 1 DROP: 50 SOLUTION/ DROPS OPHTHALMIC at 16:57

## 2022-11-05 RX ADMIN — MIDODRINE HYDROCHLORIDE 5 MG: 5 TABLET ORAL at 16:55

## 2022-11-05 RX ADMIN — ATORVASTATIN CALCIUM 10 MG: 10 TABLET, FILM COATED ORAL at 19:53

## 2022-11-05 RX ADMIN — MIDODRINE HYDROCHLORIDE 5 MG: 5 TABLET ORAL at 11:39

## 2022-11-05 RX ADMIN — CYANOCOBALAMIN TAB 1000 MCG 1000 MCG: 1000 TAB at 09:01

## 2022-11-05 RX ADMIN — TRAZODONE HYDROCHLORIDE 50 MG: 50 TABLET ORAL at 19:53

## 2022-11-05 RX ADMIN — ACETAMINOPHEN 650 MG: 325 TABLET ORAL at 05:43

## 2022-11-05 RX ADMIN — MICONAZOLE NITRATE: 2 POWDER TOPICAL at 19:54

## 2022-11-05 RX ADMIN — METOLAZONE 5 MG: 2.5 TABLET ORAL at 19:53

## 2022-11-05 RX ADMIN — CARVEDILOL 6.25 MG: 6.25 TABLET, FILM COATED ORAL at 09:02

## 2022-11-05 RX ADMIN — SPIRONOLACTONE 25 MG: 50 TABLET ORAL at 09:02

## 2022-11-05 RX ADMIN — LEVOTHYROXINE SODIUM 88 MCG: 0.11 TABLET ORAL at 05:44

## 2022-11-05 RX ADMIN — TORSEMIDE 40 MG: 20 TABLET ORAL at 09:01

## 2022-11-05 RX ADMIN — ASPIRIN 81 MG CHEWABLE TABLET 81 MG: 81 TABLET CHEWABLE at 09:01

## 2022-11-05 RX ADMIN — ALBUTEROL SULFATE 2 PUFF: 90 AEROSOL, METERED RESPIRATORY (INHALATION) at 21:56

## 2022-11-05 RX ADMIN — TORSEMIDE 40 MG: 20 TABLET ORAL at 16:55

## 2022-11-05 RX ADMIN — COLLAGENASE SANTYL: 250 OINTMENT TOPICAL at 09:04

## 2022-11-05 RX ADMIN — CARVEDILOL 6.25 MG: 6.25 TABLET, FILM COATED ORAL at 16:55

## 2022-11-05 RX ADMIN — TIMOLOL MALEATE 1 DROP: 5 SOLUTION OPHTHALMIC at 19:55

## 2022-11-05 RX ADMIN — FERROUS SULFATE TAB 325 MG (65 MG ELEMENTAL FE) 325 MG: 325 (65 FE) TAB at 11:39

## 2022-11-05 RX ADMIN — TIOTROPIUM BROMIDE AND OLODATEROL 2 PUFF: 3.124; 2.736 SPRAY, METERED RESPIRATORY (INHALATION) at 08:45

## 2022-11-05 RX ADMIN — MIDODRINE HYDROCHLORIDE 5 MG: 5 TABLET ORAL at 09:02

## 2022-11-05 RX ADMIN — POLYETHYLENE GLYCOL (3350) 17 G: 17 POWDER, FOR SOLUTION ORAL at 09:02

## 2022-11-05 RX ADMIN — METOLAZONE 5 MG: 2.5 TABLET ORAL at 09:02

## 2022-11-05 RX ADMIN — ALBUTEROL SULFATE 2 PUFF: 90 AEROSOL, METERED RESPIRATORY (INHALATION) at 08:44

## 2022-11-05 RX ADMIN — MICONAZOLE NITRATE: 2 POWDER TOPICAL at 09:03

## 2022-11-05 ASSESSMENT — PAIN SCALES - GENERAL
PAINLEVEL_OUTOF10: 4
PAINLEVEL_OUTOF10: 0

## 2022-11-05 ASSESSMENT — PAIN DESCRIPTION - DESCRIPTORS: DESCRIPTORS: ACHING

## 2022-11-05 ASSESSMENT — PAIN SCALES - WONG BAKER
WONGBAKER_NUMERICALRESPONSE: 0

## 2022-11-05 ASSESSMENT — PAIN DESCRIPTION - ORIENTATION: ORIENTATION: LEFT

## 2022-11-05 ASSESSMENT — PAIN DESCRIPTION - LOCATION: LOCATION: ABDOMEN

## 2022-11-06 PROCEDURE — 6370000000 HC RX 637 (ALT 250 FOR IP): Performed by: INTERNAL MEDICINE

## 2022-11-06 PROCEDURE — 2500000003 HC RX 250 WO HCPCS: Performed by: INTERNAL MEDICINE

## 2022-11-06 PROCEDURE — 94660 CPAP INITIATION&MGMT: CPT

## 2022-11-06 PROCEDURE — 94640 AIRWAY INHALATION TREATMENT: CPT

## 2022-11-06 PROCEDURE — 2700000000 HC OXYGEN THERAPY PER DAY

## 2022-11-06 PROCEDURE — 1280000000 HC REHAB R&B

## 2022-11-06 PROCEDURE — 94761 N-INVAS EAR/PLS OXIMETRY MLT: CPT

## 2022-11-06 PROCEDURE — 6370000000 HC RX 637 (ALT 250 FOR IP): Performed by: PHYSICAL MEDICINE & REHABILITATION

## 2022-11-06 PROCEDURE — 6360000002 HC RX W HCPCS: Performed by: INTERNAL MEDICINE

## 2022-11-06 RX ADMIN — Medication 5000 UNITS: at 09:14

## 2022-11-06 RX ADMIN — CARVEDILOL 6.25 MG: 6.25 TABLET, FILM COATED ORAL at 16:44

## 2022-11-06 RX ADMIN — ENOXAPARIN SODIUM 40 MG: 40 INJECTION SUBCUTANEOUS at 09:14

## 2022-11-06 RX ADMIN — TRAZODONE HYDROCHLORIDE 50 MG: 50 TABLET ORAL at 20:30

## 2022-11-06 RX ADMIN — ATORVASTATIN CALCIUM 10 MG: 10 TABLET, FILM COATED ORAL at 20:20

## 2022-11-06 RX ADMIN — CARVEDILOL 6.25 MG: 6.25 TABLET, FILM COATED ORAL at 09:13

## 2022-11-06 RX ADMIN — LATANOPROST 1 DROP: 50 SOLUTION/ DROPS OPHTHALMIC at 17:22

## 2022-11-06 RX ADMIN — LEVOTHYROXINE SODIUM 88 MCG: 0.11 TABLET ORAL at 05:53

## 2022-11-06 RX ADMIN — MICONAZOLE NITRATE: 2 POWDER TOPICAL at 20:27

## 2022-11-06 RX ADMIN — MIDODRINE HYDROCHLORIDE 5 MG: 5 TABLET ORAL at 12:55

## 2022-11-06 RX ADMIN — POLYETHYLENE GLYCOL (3350) 17 G: 17 POWDER, FOR SOLUTION ORAL at 09:13

## 2022-11-06 RX ADMIN — METOLAZONE 5 MG: 2.5 TABLET ORAL at 09:13

## 2022-11-06 RX ADMIN — MIDODRINE HYDROCHLORIDE 5 MG: 5 TABLET ORAL at 16:44

## 2022-11-06 RX ADMIN — ALBUTEROL SULFATE 2 PUFF: 90 AEROSOL, METERED RESPIRATORY (INHALATION) at 21:56

## 2022-11-06 RX ADMIN — TIMOLOL MALEATE 1 DROP: 5 SOLUTION OPHTHALMIC at 20:21

## 2022-11-06 RX ADMIN — TORSEMIDE 40 MG: 20 TABLET ORAL at 09:13

## 2022-11-06 RX ADMIN — ASPIRIN 81 MG CHEWABLE TABLET 81 MG: 81 TABLET CHEWABLE at 09:13

## 2022-11-06 RX ADMIN — MIDODRINE HYDROCHLORIDE 5 MG: 5 TABLET ORAL at 09:13

## 2022-11-06 RX ADMIN — CYANOCOBALAMIN TAB 1000 MCG 1000 MCG: 1000 TAB at 09:13

## 2022-11-06 RX ADMIN — SPIRONOLACTONE 25 MG: 50 TABLET ORAL at 09:13

## 2022-11-06 RX ADMIN — TORSEMIDE 40 MG: 20 TABLET ORAL at 16:44

## 2022-11-06 RX ADMIN — COLLAGENASE SANTYL: 250 OINTMENT TOPICAL at 17:08

## 2022-11-06 RX ADMIN — METOLAZONE 5 MG: 2.5 TABLET ORAL at 20:20

## 2022-11-06 ASSESSMENT — PAIN SCALES - WONG BAKER
WONGBAKER_NUMERICALRESPONSE: 0

## 2022-11-06 ASSESSMENT — PAIN SCALES - GENERAL
PAINLEVEL_OUTOF10: 0

## 2022-11-07 VITALS
RESPIRATION RATE: 16 BRPM | BODY MASS INDEX: 25.42 KG/M2 | OXYGEN SATURATION: 100 % | DIASTOLIC BLOOD PRESSURE: 52 MMHG | HEIGHT: 59 IN | HEART RATE: 60 BPM | WEIGHT: 126.1 LBS | SYSTOLIC BLOOD PRESSURE: 112 MMHG | TEMPERATURE: 97.7 F

## 2022-11-07 PROCEDURE — 6360000002 HC RX W HCPCS: Performed by: INTERNAL MEDICINE

## 2022-11-07 PROCEDURE — 97530 THERAPEUTIC ACTIVITIES: CPT

## 2022-11-07 PROCEDURE — 97116 GAIT TRAINING THERAPY: CPT

## 2022-11-07 PROCEDURE — 94640 AIRWAY INHALATION TREATMENT: CPT

## 2022-11-07 PROCEDURE — 6370000000 HC RX 637 (ALT 250 FOR IP): Performed by: PHYSICAL MEDICINE & REHABILITATION

## 2022-11-07 PROCEDURE — 94761 N-INVAS EAR/PLS OXIMETRY MLT: CPT

## 2022-11-07 PROCEDURE — 2500000003 HC RX 250 WO HCPCS: Performed by: INTERNAL MEDICINE

## 2022-11-07 PROCEDURE — 6370000000 HC RX 637 (ALT 250 FOR IP): Performed by: INTERNAL MEDICINE

## 2022-11-07 PROCEDURE — 99239 HOSP IP/OBS DSCHRG MGMT >30: CPT | Performed by: PHYSICAL MEDICINE & REHABILITATION

## 2022-11-07 PROCEDURE — 2700000000 HC OXYGEN THERAPY PER DAY

## 2022-11-07 PROCEDURE — 97535 SELF CARE MNGMENT TRAINING: CPT

## 2022-11-07 RX ORDER — MIDODRINE HYDROCHLORIDE 5 MG/1
5 TABLET ORAL
Qty: 90 TABLET | Refills: 0 | Status: SHIPPED | OUTPATIENT
Start: 2022-11-07

## 2022-11-07 RX ORDER — SPIRONOLACTONE 25 MG/1
25 TABLET ORAL DAILY
Qty: 30 TABLET | Refills: 0 | Status: SHIPPED | OUTPATIENT
Start: 2022-11-08

## 2022-11-07 RX ORDER — METOLAZONE 5 MG/1
5 TABLET ORAL 2 TIMES DAILY
Qty: 60 TABLET | Refills: 0 | Status: SHIPPED | OUTPATIENT
Start: 2022-11-07

## 2022-11-07 RX ADMIN — ASPIRIN 81 MG CHEWABLE TABLET 81 MG: 81 TABLET CHEWABLE at 08:43

## 2022-11-07 RX ADMIN — MIDODRINE HYDROCHLORIDE 5 MG: 5 TABLET ORAL at 12:07

## 2022-11-07 RX ADMIN — COLLAGENASE SANTYL: 250 OINTMENT TOPICAL at 08:44

## 2022-11-07 RX ADMIN — ENOXAPARIN SODIUM 40 MG: 40 INJECTION SUBCUTANEOUS at 08:43

## 2022-11-07 RX ADMIN — ACETAMINOPHEN 650 MG: 325 TABLET ORAL at 05:52

## 2022-11-07 RX ADMIN — TORSEMIDE 40 MG: 20 TABLET ORAL at 17:23

## 2022-11-07 RX ADMIN — FERROUS SULFATE TAB 325 MG (65 MG ELEMENTAL FE) 325 MG: 325 (65 FE) TAB at 12:07

## 2022-11-07 RX ADMIN — MIDODRINE HYDROCHLORIDE 5 MG: 5 TABLET ORAL at 08:43

## 2022-11-07 RX ADMIN — ALBUTEROL SULFATE 2 PUFF: 90 AEROSOL, METERED RESPIRATORY (INHALATION) at 07:49

## 2022-11-07 RX ADMIN — MICONAZOLE NITRATE: 2 POWDER TOPICAL at 08:44

## 2022-11-07 RX ADMIN — LEVOTHYROXINE SODIUM 88 MCG: 0.11 TABLET ORAL at 05:52

## 2022-11-07 RX ADMIN — CARVEDILOL 6.25 MG: 6.25 TABLET, FILM COATED ORAL at 08:43

## 2022-11-07 RX ADMIN — METOLAZONE 5 MG: 2.5 TABLET ORAL at 08:43

## 2022-11-07 RX ADMIN — CARVEDILOL 6.25 MG: 6.25 TABLET, FILM COATED ORAL at 17:23

## 2022-11-07 RX ADMIN — MIDODRINE HYDROCHLORIDE 5 MG: 5 TABLET ORAL at 17:23

## 2022-11-07 RX ADMIN — CYANOCOBALAMIN TAB 1000 MCG 1000 MCG: 1000 TAB at 08:43

## 2022-11-07 RX ADMIN — SPIRONOLACTONE 25 MG: 50 TABLET ORAL at 08:43

## 2022-11-07 RX ADMIN — TIOTROPIUM BROMIDE AND OLODATEROL 2 PUFF: 3.124; 2.736 SPRAY, METERED RESPIRATORY (INHALATION) at 07:50

## 2022-11-07 RX ADMIN — TORSEMIDE 40 MG: 20 TABLET ORAL at 08:42

## 2022-11-07 RX ADMIN — Medication 5000 UNITS: at 08:43

## 2022-11-07 ASSESSMENT — PAIN - FUNCTIONAL ASSESSMENT: PAIN_FUNCTIONAL_ASSESSMENT: PREVENTS OR INTERFERES SOME ACTIVE ACTIVITIES AND ADLS

## 2022-11-07 ASSESSMENT — PAIN SCALES - WONG BAKER
WONGBAKER_NUMERICALRESPONSE: 0

## 2022-11-07 ASSESSMENT — PAIN SCALES - GENERAL
PAINLEVEL_OUTOF10: 0
PAINLEVEL_OUTOF10: 0

## 2022-11-07 NOTE — PLAN OF CARE
This morning patient is comfortable, denies SOB/CP, and remains on 2L O2. Plan for discharge this evening. Bed locked in lowest position, bed alarm activated, call light within reach, and frequent rounding in progress. Will continue to monitor. Problem: Discharge Planning  Goal: Discharge to home or other facility with appropriate resources  Outcome: Adequate for Discharge     Problem: Skin/Tissue Integrity  Goal: Absence of new skin breakdown  Description: 1. Monitor for areas of redness and/or skin breakdown  2. Assess vascular access sites hourly  3. Every 4-6 hours minimum:  Change oxygen saturation probe site  4. Every 4-6 hours:  If on nasal continuous positive airway pressure, respiratory therapy assess nares and determine need for appliance change or resting period.   Outcome: Adequate for Discharge     Problem: Safety - Adult  Goal: Free from fall injury  Outcome: Adequate for Discharge     Problem: ABCDS Injury Assessment  Goal: Absence of physical injury  Outcome: Adequate for Discharge     Problem: Pain  Goal: Verbalizes/displays adequate comfort level or baseline comfort level  Outcome: Adequate for Discharge     Problem: Chronic Conditions and Co-morbidities  Goal: Patient's chronic conditions and co-morbidity symptoms are monitored and maintained or improved  Outcome: Adequate for Discharge     Problem: Nutrition Deficit:  Goal: Optimize nutritional status  Outcome: Adequate for Discharge

## 2022-11-07 NOTE — CARE COORDINATION
[facility] Liaison spoke w/pts daughter Dillon Gonzalez and verified & number. Aware of discharge & will initiate [facility].

## 2022-11-07 NOTE — PROGRESS NOTES
[facility] Discharge Assessment    Transportation  \"In the past 6-12 months, has lack of transportation kept you from medical appointments, meetings, work, or from getting things needed for daily living? \"Check all that apply:  [] A.  Yes, it has kept me from medical appointments or from getting my medications  [] B.  Yes, it has kept me from non-medical meetings, appointments, work, or from getting things that I need  [x] C.  No  [] X. Patient unable to respond    Provision of Current Reconciled Medication List to Subsequent Provider at Discharge     [] No, current reconciled medication list not provided to the subsequent provider. [x] Yes, current reconciled medication list provided to the subsequent provider. ie. If D/C to Acute hospital, SNF, home with home health, YES we should  (**Select route of transmission below**)      [] Via Electronic Health Record   [] [facility]  [] Verbal (e.g. in person, telephone, video conferencing)  [] Paper-based (e.g. fax, copies, printouts)   [] Other Methods (e.g. texting, email, CDs)    Provision of Current Reconciled Medication List to Patient at Discharge  [] No, current reconciled medication list not provided to the patient, family and/or caregiver. ie. If D/C to Acute hospital, SNF, home with home health, click NO  [x] Yes, current reconciled medication list provided to the patient, family and/or caregiver.   ie. if D/C home with Outpatient or no services, YES we should  (**Select route of transmission below**)     [] Via Electronic Health Record (e.g., electronic access to patient portal)   [] [facility]  [] Verbal (e.g. in person, telephone, video conferencing)  [] Paper-based (e.g. fax, copies, printouts)   [] Other Methods (e.g. texting, email, CDs)    Health Literacy  \"How often do you need to have someone help you when you read instructions, pamphlets, or other written material from your doctor or pharmacy? \"  []  0. Never  []  1. Rarely  []  2. Sometimes  [x]  3. Often  []  4. Always  []  8. Patient unable to respond    Signs and Symptoms of Delirium  A. Acute Onset Mental Status Change - Is there evidence of an acute change in mental status from the patient's baseline? [x] 0. No  [] 1. Yes    B. Inattention - Did the patient have difficulty focusing attention, for example being easily distractible or having difficulty keeping track of what was being said? [x]  0. Behavior not present  []  1. Behavior continuously present, does not fluctuate  []  2. Behavior present, fluctuates (comes and goes, changes in severity)    C. Disorganized thinking - Was the patient's thinking disorganized or incoherent (rambling or irrelevant conversation, unclear or illogical flow of ideas, or unpredictable switching from subject to subject)? [x]  0. Behavior not present  []  1. Behavior continuously present, does not fluctuate  []  2. Behavior present, fluctuates (comes and goes, changes in severity)    D. Altered level of consciousness - Did the patient have altered level of consciousness as indicated by any of the following criteria? Vigilant - startled easily to any sound or touch  Lethargic - repeatedly dozed off while being asked questions, but responded to voice or touch  Stuporous - very difficulty to arouse and keep aroused for the interview  Comatose - could not be aroused  [x]  0. Behavior not present  []  1. Behavior continuously present, does not fluctuate  []  2. Behavior present, fluctuates (comes and goes, changes in severity)    Mood    \"Over the last 2 weeks, have you been bothered by any of the following problems?\" 1. Symptom Presence    0 = No  1 = Yes  9 = No Response 2.  Symptom Frequency    0 = Never or 1 day  1 = 2-6 days (several days)  2 = 7-11 days (half or more of the days)  3 = 12-14 days (nearly every day)  **Leave blank if 'No Reponse'**      Enter scores in boxes Column 1 Column 2   Little interest or pleasure in doing things   [x] 0. No  [] 1. Yes  [] 9. No Response [x] 0. Never or one day  [] 1.  2-6 days (several days)  [] 2.  7-11 days (half or more of days)  [] 3.  12-14 days (nearly every day)     Feeling down, depressed, or hopeless   [x] 0. No  [] 1. Yes  [] 9. No Response [x] 0. Never or one day  [] 1.  2-6 days (several days)  [] 2.  7-11 days (half or more of days)  [] 3.  12-14 days (nearly every day)   **If Question A or B in column 2 is coded 2 or 3, CONTINUE asking the questions below in box. If not, SKIP down to [facility]" question and continue**       Trouble falling or staying asleep, or sleeping too much   [] 0. No  [] 1. Yes  [] 9. No Response [] 0. Never or one day  [] 1.  2-6 days (several days)  [] 2.  7-11 days (half or more of days)  [] 3.  12-14 days (nearly every day   Feeling tired or having little energy   [] 0. No  [] 1. Yes  [] 9. No Response [] 0. Never or one day  [] 1.  2-6 days (several days)  [] 2.  7-11 days (half or more of days)  [] 3.  12-14 days (nearly every day   Poor appetite or overeating     [] 0. No  [] 1. Yes  [] 9. No Response [] 0. Never or one day  [] 1.  2-6 days (several days)  [] 2.  7-11 days (half or more of days)  [] 3.  12-14 days (nearly every day   Feeling bad about yourself - or that you are a failure or have let yourself or your family down   [] 0. No  [] 1. Yes  [] 9. No Response [] 0. Never or one day  [] 1.  2-6 days (several days)  [] 2.  7-11 days (half or more of days)  [] 3.  12-14 days (nearly every day   Trouble concentrating on things, such as reading the newspaper or watching television   [] 0. No  [] 1. Yes  [] 9. No Response [] 0. Never or one day  [] 1.  2-6 days (several days)  [] 2.  7-11 days (half or more of days)  [] 3.  12-14 days (nearly every day   Moving or speaking so slowly that other people could have noticed.   Or the opposite- being so fidgety or restless that you have been moving around a lot more than usual.   [] 0. No  [] 1. Yes  [] 9. No Response [] 0. Never or one day  [] 1.  2-6 days (several days)  [] 2.  7-11 days (half or more of days)  [] 3.  12-14 days (nearly every day   Thoughts that you would be better off dead, or of hurting yourself in some way.   [] 0. No  [] 1. Yes  [] 9. No Response [] 0. Never or one day  [] 1.  2-6 days (several days)  [] 2.  7-11 days (half or more of days)  [] 3.  12-14 days (nearly every day     Social Isolation  \"How often do you feel lonely or isolated from those around you? \"  [x] 0. Never  [] 1. Rarely  [] 2. Sometimes  [] 3. Often  [] 4. Always  [] 8. Patient unable to respond    Pain Effect on Sleep  \"Over the past 5 days, how much of the time has pain made it hard for you to sleep at night? \"  []  0. Does not apply - I have not had any pain or hurting in the past 5 days  [x]  1. Rarely or not at all  []  2. Occasionally  []  3. Frequently  []  4. Almost constantly  []  8. Unable to answer  **If the patient answers \"0. Does not apply\" to this question, skip the next two \"Pain Effect. [facility] [employer] \" questions**      Pain Interference with Therapy Activities  \"Over the past 5 days, how often have you limited your participation in rehabilitation therapy sessions due to pain? \"  []  0. Does not apply - I have not received rehabilitation therapy in the past 5 days  [x]  1. Rarely or not at all  []  2. Occasionally  []  3. Frequently  []  4. Almost constantly  []  8. Unable to answer    Pain Interference with Day-to-Day Activities: \"Over the past 5 days, how often have you limited your day-to-day activities (excluding rehabilitation therapy session)? \"  [x]  1. Rarely or not at all  []  2. Occasionally  []  3. Frequently  []  4. Almost constantly  []  8.   Unable to answer    Nutritional Approaches  Last 7 Days - Check all of the following nutritional approaches that were received within the last 7 above (select if no Cancer, Respiratory, or Other boxes are checked) []

## 2022-11-07 NOTE — DISCHARGE SUMMARY
Patient Name: Rupali Overton  Patient :  3/18/1929  Patient MRN:   [id number]      Admission Date:  10/29/2022  Discharge Date: 2022    Admitting diagnosis: Acute on chronic diastolic congestive heart failure ([address])     Comorbid diagnoses impacting rehabilitation: Generalized weakness, gait disturbance, sinus bradycardia, pulmonary hypertension, hyponatremia, sundowner syndrome, obstructive sleep apnea, left calf wound, orthostatic hypotension    Discharging diagnosis: Acute on chronic diastolic congestive heart failure ([facility] 9.0)     Comorbid diagnoses impacting rehabilitation: Generalized weakness, gait disturbance, sinus bradycardia, pulmonary hypertension, hyponatremia, sundowner syndrome, obstructive sleep apnea, left calf wound, orthostatic hypotension    History of present illness: Patient is a 49-year-old right-hand-dominant female who presented to our ED on 10/19/2022 after being found to be an acute CHF at her primary cardiologist office. IV diuretics were initiated in the office but did not provide adequate relief. She had significant ascites and peripheral edema. IV diuretics were continued and she had a paracentesis (350 cc of fluid removed). She was significantly confused and complained of some leg pain. Bradycardia was evaluated by cardiology and medications had to be adjusted. She had a traumatic wound to her left posterior calf that required treatment by the wound care service. Positional dizziness persisted. Infrequent bowel movements are described. Some urinary incontinence noted. Poor sleep and eating noted. Prior (baseline) level of function: Independent.     Current level of function:         Current  IRF-YAMILKA and Goals:   Occupational Therapy:    Short Term Goals  Time Frame for Short Term Goals: STGs=LTGs :   Long Term Goals  Time Frame for Long Term Goals : ~10 days or until d/c  Long Term Goal 1: Pt will complete grooming tasks Ind  Long Term Goal 2: Pt will complete total body bathing c S using AE PRN  Long Term Goal 3: Pt will complete UB dressing c setup  Long Term Goal 4: Pt will complete LB dressing c S using AE PRN  Long Term Goal 5: Pt will doff/don footwear c S using AE PRN  Additional Goals?: Yes  Long Term Goal 6: Pt will complete toileting c S  Long Term Goal 7: Pt will complete functional transfers (bed, chair, toilet, shower) c DME PRN and S  Long Term Goal 8: Pt will perform therex/therax to facilitate increased strength/endurance/ax tolerance (c emphasis on dynamic standing balance/tolerance >6 mins, BUE endurance) c SBA  Long Term Goal 9: Pt will complete simple homemaking tasks c DME PRN and S :                                       Eating: Eating  Assistance Needed: Setup or clean-up assistance  Comment: requires S.U assist to open packages/containers  CARE Score: 5  Discharge Goal: Independent       Oral Hygiene: Oral Hygiene  Assistance Needed: Supervision or touching assistance  Comment: required S/U to apply toothpaste onto tooth brush; cues to place denture into mouth. CARE Score: 4  Discharge Goal: Independent    UB/LB Bathing: Shower/Bathe Self  Assistance Needed: Supervision or touching assistance  Comment: SBA in stance to perform sinkside ADL; pt required max cues for sequencing and thoroughness. Pt washed face 4x, needed cues to remainder of body.   CARE Score: 4  Discharge Goal: Supervision or touching assistance    UB Dressing: Upper Body Dressing  Assistance Needed: Setup or clean-up assistance  Comment: Pt able to don shirt with S/U  CARE Score: 5  Discharge Goal: Set-up or clean-up assistance         LB Dressing: Lower Body Dressing  Assistance Needed: Partial/moderate assistance  Comment: Pt able to thread BLEs into depends, Pt able to thread LLE into pants, required assist to thread RLE into pants and manage over hips  CARE Score: 3  Discharge Goal: Supervision or touching assistance    Donning and [place] Footwear: Putting On/Taking Off Footwear  Assistance Needed: Dependent  Comment: D/t poor distal reach, pt required total A to doff/don socks  CARE Score: 1  Discharge Goal: Supervision or touching assistance      Toiletin [address] needed: Partial/moderate assistance  Comment: Pt required assist to manage depends and hospital gown; cues to perform bladder hygiene  CARE Score: 3  Discharge Goal: Supervision or touching assistance      Toilet Transfers: Toilet Transfer  Assistance needed: Supervision or touching assistance  Comment: CGA using 4WW and grab bars, cues to properly align with toilet.   CARE Score: 4  Discharge Goal: Supervision or touching assistance    Physical Therapy:   Short Term Goals  Time Frame for Short Term Goals: 5-7 STG=LTG  Short Term Goal 1: Pt will perform bed mobility with mod I  Short Term Goal 2: Pt will perform sit to stand, pivot and car transfers with mod I if no O2, supervision if O2  Short Term Goal 3: Pt will ambulate 150' on level surfaces with 2ww and 10' on uneven surfaces with supervision with or without O2  Short Term Goal 4: Pt will ascend/descend curb step and 4 steps with rail with supervision  Short Term Goal 5: Pt will retrieve object on floor with 2ww and supervision            Bed Mobility:   Sit to Lying  Assistance Needed: Supervision or touching assistance  Comment: cues for O2 line awareness due to getting caught in feet  CARE Score: 4  Discharge Goal: Independent  Roll Left and Right  Assistance Needed: Independent  Comment: without bed features, extra time  CARE Score: 6  Discharge Goal: Independent  Lying to Sitting on Side of Bed  Assistance Needed: Supervision or touching assistance  Comment: cues for O2 line awareness  CARE Score: 4  Discharge Goal: Independent    Transfers:    Sit to Stand  Assistance Needed: Supervision or touching assistance  Comment: SBA, safety cues to lock brakes, hand placement, O2 line awareness  CARE Score: 4  Discharge Goal: Independent  Chair/Bed-to-Chair Transfer  Assistance Needed: Supervision or touching assistance  Comment: SBA with cues for O2 line awareness, brake safety, reaching back to chair with at least one hand  CARE Score: 4  Discharge Goal: Independent (with no O2 line.  predict assist with line if pt still on O2)     Car Transfer  Assistance Needed: Supervision or touching assistance  Comment: SBA, cues for brake safety, O2 line awareness, hand placement  CARE Score: 4  Discharge Goal: Independent    Ambulation:    Walking Ability  Does the Patient Walk?: Yes     Walk 10 Feet  Assistance Needed: Supervision or touching assistance  Comment: SB-close supervision with 4WW  CARE Score: 4  Discharge Goal: Supervision or touching assistance     Walk 50 Feet with Two Turns  Assistance Needed: Supervision or touching assistance  Comment: SBA with 4WW  CARE Score: 4  Discharge Goal: Supervision or touching assistance     Walk 150 Feet  Assistance Needed: Supervision or touching assistance  Comment: SBA for balance/safety with 4WW, min ROSIO but O2 sats WNL on 2L O2  CARE Score: 4  Discharge Goal: Supervision or touching assistance     Walking 10 Feet on Uneven Surfaces  Assistance Needed: Supervision or touching assistance  Comment: SB-CGA for balance with 4WW, cues to amb closer to AD  CARE Score: 4  Discharge Goal: Supervision or touching assistance     1 Step (Curb)  Assistance Needed: Supervision or touching assistance  Comment: SB-CGA with 4WW for balance, max seq/safety cues  CARE Score: 4  Discharge Goal: Supervision or touching assistance     4 Steps  Assistance Needed: Supervision or touching assistance  Comment: SB-CGA for balance using  B rails in recip - non-recip pattern  CARE Score: 4  Discharge Goal: Supervision or touching assistance     12 Steps  Assistance Needed: Supervision or touching assistance  Comment: SB-CGA using B rails in recip - non-recip pattern  Reason if not Attempted: Not applicable  CARE Score: 4  Discharge Goal: Not Applicable       Wheelchair:  w/c Ability: Wheelchair Ability  Uses a Wheelchair and/or Scooter?: No                Balance:        Object: Picking Up Object  Assistance Needed: Supervision or touching assistance  Comment: SBA for balance at 4WW without AE, max safety cues for task  CARE Score: 4  Discharge Goal: Supervision or touching assistance    I      Exam:    Blood pressure 113/61, pulse 62, temperature 97.9 °F (36.6 °C), temperature source Oral, resp. rate 16, height 4' 11\" (1.499 m), weight 126 lb 1.7 oz (57.2 kg), SpO2 97 %. General: Up in a bedside chair eating lunch. A significant portion of it was resting on the napkin on her chest.  Oxygen per nasal cannula. Alert and pleasant yet very hard to communicate with. Severely hard of hearing. HEENT: Soft-spoken. Gazing right and left. Appears well-hydrated. Neck supple. Pulmonary: Unlabored respirations with symmetric air exchange. Cardiac: Regular rate and rhythm. Abdomen: Patient's abdomen is soft and nondistended. Bowel sounds were present throughout. There was no rebound, guarding or masses noted. Upper extremities: Spontaneous use of both upper limbs managing her utensils at a meal.  No new bruising. Lower extremities: Trace edema. Calf soft. Heels clear. Spontaneously repositions them while seated. Sitting balance was good.   Standing balance was fair-.      Lab Results   Component Value Date    WBC 8.3 11/01/2022    HGB 13.0 11/01/2022    HCT 41.5 11/01/2022    MCV 94.1 11/01/2022     11/01/2022     Lab Results   Component Value Date    INR 1.23 10/20/2022    INR 1.88 03/24/2022    INR 1.80 03/23/2022    PROTIME 15.9 (H) 10/20/2022    PROTIME 24.4 (H) 03/24/2022    PROTIME 23.3 (H) 03/23/2022     Lab Results   Component Value Date    CREATININE 0.8 11/01/2022    BUN 48 (H) 11/01/2022     (L) 11/01/2022    K 3.6 11/01/2022    CL 89 (L) 11/01/2022    CO2 33 (H) 11/01/2022 Lab Results   Component Value Date    ALT 19 10/25/2022    AST 28 10/25/2022    ALKPHOS 276 (H) 10/25/2022    BILITOT 1.6 (H) 10/25/2022           The patient presented to the ARU with the above history requiring a multidisciplinary treatment plan including close medical supervision by the Dennis Rodriguez Director. The patient participated in the prescribed therapy treatment plan with reasonable compliance and fair physical tolerance. They avoided significant medical complications. By the time of discharge the patient had become safer with adaptive equipment to transfer and toilet with a FWW with the supervision of family/caregivers. The patient was tolerating an oral diet without choking/coughing and was back to their baseline with regards to bowel and bladder control. Discharge instructions were reviewed with the patient and her daughter. The patient is to follow up with the cardiologist and her PCP in 1 week. No driving. [facility] PT/OT/RN will be arranged. High Risk Drug Classes:  Use and Indication    Is taking: Check if the patient is taking any medications (or has them prescribed) by pharmacological classification, not how it is used, in the following classes  Indication noted: If column 1 is checked, check if there is an indication noted for all meds in the drug class Is taking  (check all that apply) Indication noted (check all that apply)   Antipsychotic [] []   Anticoagulant [x] [x]   Antibiotic [] []   Opioid [] []   Antiplatelet [x] [x]   Hypoglycemic (including insulin) [] []   None of the above []        Medication List        START taking these medications      collagenase 250 UNIT/GM ointment  Apply topically daily. Start taking on: November 8, 2022  Notes to patient: 11/8: Tomorrow Morning     metOLazone 5 MG tablet  Commonly known as: ZAROXOLYN  Take 1 tablet by mouth in the morning and at bedtime  Notes to patient: 11/7:  Tonight     midodrine 5 MG tablet  Commonly known as: PROAMATINE  Take 1 tablet by mouth 3 times daily (with meals)  Notes to patient: 11/7: This evening with dinner            CHANGE how you take these medications      spironolactone 25 MG tablet  Commonly known as: ALDACTONE  Take 1 tablet by mouth daily  Start taking on: November 8, 2022  What changed:   medication strength  how much to take  Notes to patient: 11/8: Tomorrow Morning     Torsemide 40 MG Tabs  Take 40 mg by mouth 2 times daily  What changed:   medication strength  how much to take  when to take this  reasons to take this  additional instructions  Another medication with the same name was removed. Continue taking this medication, and follow the directions you see here. Notes to patient: 11/7: This evening            CONTINUE taking these medications      acetaminophen 500 MG tablet  Commonly known as: TYLENOL     albuterol sulfate  (90 Base) MCG/ACT inhaler  Commonly known as: PROVENTIL;VENTOLIN;PROAIR     aspirin 81 MG chewable tablet  Notes to patient: 11/8: Tomorrow Morning     atorvastatin 10 MG tablet  Commonly known as: LIPITOR  Notes to patient: 11/7: Tonight     carvedilol 6.25 MG tablet  Commonly known as: COREG  Take 1 tablet by mouth 2 times daily (with meals)  Notes to patient: 11/7: This evening with dinner     CPAP Machine Misc     ferrous sulfate 325 (65 Fe) MG tablet  Commonly known as: IRON 325  Notes to patient: 11/9: Wednesday Morning     ICaps Caps  Notes to patient: 11/7: Tonight     levothyroxine 88 MCG tablet  Commonly known as: SYNTHROID  Notes to patient: 11/8: Tomorrow Morning     OSTEO BI-FLEX ADV DOUBLE ST PO  Notes to patient: 11/8: Tomorrow Morning     timolol 0.5 % ophthalmic solution  Commonly known as: TIMOPTIC  Notes to patient: 11/7:  Tonight     traZODone 50 MG tablet  Commonly known as: DESYREL     umeclidinium-vilanterol 62.5-25 MCG/INH Aepb inhaler  Commonly known as: ANORO ELLIPTA  Inhale 1 puff into the lungs daily  Notes to patient: 11/8: Tomorrow Morning     vitamin B-12 1000 MCG tablet  Commonly known as: CYANOCOBALAMIN  Notes to patient: 11/8: Tomorrow Morning     vitamin D 25 MCG (1000 UT) Caps  Notes to patient: 11/8: Tomorrow Morning     Zioptan 0.0015 % Soln  Generic drug: Tafluprost (PF)  Notes to patient: 11/7: This evening            STOP taking these medications      Biotin 5 MG Caps     CETIRIZINE HCL PO     Magnesium 500 MG Tabs     Probiotic-10 Chew     Proctozone-HC 2.5 % Crea rectal cream  Generic drug: hydrocortisone     QC TUMERIC COMPLEX PO               Where to Get Your Medications        You can get these medications from any pharmacy    Bring a paper prescription for each of these medications  collagenase 250 UNIT/GM ointment  metOLazone 5 MG tablet  midodrine 5 MG tablet  spironolactone 25 MG tablet  Torsemide 40 MG Tabs           CONDITION ON DISCHARGE: Stable. The prognosis is fair for further improvements in ADL's and safety with adapted gait/transfers. Record review, patient exam, discharge instructions, medication reconciliation and summary for this discharge visit took more than 30 minutes.

## 2022-11-07 NOTE — PROGRESS NOTES
Night Shift RN Notes:  Patient had an uneventful night. Slept good per verbalization the whole night. This RN was able to persuade this patient to wear her CPAP machine, after education on the importance of CPAP Machine. Patient wore the CPAP from midnight to am shift change. Partial denture was removed and washed/soaked with denture tablet, to prevent choking while on CPAP. Hourly rounding was done to assure patient safety. Side rails up x2, fall kit applied, yellow socks on , yellow blanket, fall jewels, low bed, bed alarm at zone 2 and was on the whole night, call light and phone within reach. Side table with reach. Safety floor mattress applied. Education on safety was provided as well.

## 2022-11-07 NOTE — PROGRESS NOTES
Occupational Therapy    Physical Rehabilitation: OCCUPATIONAL THERAPY     [] daily progress note       [x] discharge       Patient Name:  Felicia Romo   :  3/18/1929 MRN: [id number]  Room:  [address] [place] [address] Date of Admission: 10/29/2022  Rehabilitation Diagnosis:   Acute on chronic diastolic (congestive) heart failure [I50.33]  Acute on chronic combined systolic and diastolic CHF (congestive heart failure) ([facility] [I50.43]       Date 2022       Day of ARU Week:  3   Time IN/OUT 6154-2615   Individual Tx Minutes 60   Group Tx Minutes    Co-Treat Minutes    Concurrent Tx Minutes    TOTAL Tx Time Mins 60   Variance Time    Variance Time []   Refusal due to:     []   Medical hold/reason:    []   Illness   []   Off Unit for test/procedure  []   Extra time needed to complete task  []   Therapeutic need  []   Other (specify):   Restrictions Restrictions/Precautions: General Precautions, Fall Risk (2L02, [place])         Communication with other providers: [x]   OK to see per nursing:     []   Spoke with team member regarding:      Subjective observations and cognitive status: Pt sleeping in bed on approach; easily able to wake patient. Pt pleasant and agreeable to therapy. Pain level/location:    /10       Location: \"all over\" but could not rate    Discharge recommendations  Anticipated discharge date:    Destination: []home alone   [x]home alone w assist prn   [] home w/ family    [] Continuous supervision       []SNF    [] Assisted living     [] Other:   Continued therapy: []HHC OT  []OUTPATIENT  OT   [] No Further OT  Equipment needs: none         ADLs:    Eating: Eating  Assistance Needed: Setup or clean-up assistance  Comment: requires S.U assist to open packages/containers  CARE Score: 5  Discharge Goal: Independent       Oral Hygiene: Oral Hygiene  Assistance Needed: Supervision or touching assistance  Comment: required S/U to apply toothpaste onto tooth brush; cues to place denture into mouth.   CARE Score: 4  Discharge Goal: Independent    UB/LB Bathing: Shower/Bathe Self  Assistance Needed: Supervision or touching assistance  Comment: SBA in stance to perform sinkside ADL; pt required max cues for sequencing and thoroughness. Pt washed face 4x, needed cues to remainder of body. CARE Score: 4  Discharge Goal: Supervision or touching assistance    UB Dressing: Upper Body Dressing  Assistance Needed: Setup or clean-up assistance  Comment: Pt able to don shirt with S/U  CARE Score: 5  Discharge Goal: Set-up or clean-up assistance         LB Dressing: Lower Body Dressing  Assistance Needed: Partial/moderate assistance  Comment: Pt able to thread BLEs into depends, Pt able to thread LLE into pants, required assist to thread RLE into pants and manage over hips  CARE Score: 3  Discharge Goal: Supervision or touching assistance    Don[place] [employer] [place] Footwear: Putting On/Taking Off Footwear  Assistance Needed: Dependent  Comment: D/t poor distal reach, pt required total A to doff/don socks  CARE Score: 1  Discharge Goal: Supervision or touching assistance      Toiletin [address] needed: Partial/moderate assistance  Comment: Pt required assist to manage depends and hospital gown; cues to perform bladder hygiene  CARE Score: 3  Discharge Goal: Supervision or touching assistance      Toilet Transfers:   CGA Toilet Transfer  Assistance needed: Supervision or touching assistance  Comment: CGA using 4WW and grab bars, cues to properly align with toilet.   CARE Score: 4  Discharge Goal: Supervision or touching assistance  Device Used:    []   Standard Toilet         []   Grab Bars           []  Bedside Commode       []   Elevated Toilet          []   Other:        Bed Mobility:           []   Pt received out of bed   Supine --> Sit:  min A       Transfers:    Sit--> Stand:  SBA with cues for safety   Stand --> Sit:   SBA  Stand-Pivot:   CGA  Other:    Assistive device required for transfer:   LEANA Functional Mobility:  to<>from bathroom   Assistance:  SBA  Device:   []   Rolling Walker     []   Standard Walker []   Wheelchair        []   [employer]       [x]   4-Wheeled [place]         []   Cardiac Walker       []   Other:          Additional Therapeutic activities/exercises completed this date:     [x]   ADL Training   [x]   Balance/Postural training     [x]   Bed/Transfer Training   []   Endurance Training   []   Neuromuscular Re-ed   []   Nu-step:  Time:        Level:         #Steps:       []   Rebounder:    []  Seated     []  Standing        []   Supine Ther Ex (reps/sets):     []   Seated Ther Ex (reps/sets):     []   Standing Ther Ex (reps/sets):     []   Other:      Comments: All intervention performed to increase pt's strength, endurance, ax tolerance, and balance in prep for increased I c ADL/IADLs and functional transfers/mobility. Patient/Caregiver Education and Training:   []   [employer] Equipment Use  []   Bed Mobility/Transfer Technique/Safety  []   Energy Conservation Tips  []   Family training  []   Postural Awareness  []   Safety During Functional Activities  []   Reinforced Patient's Precautions   []   Progress was updated and reviewed in Rehabtracker with patient and/or family this         date.     Treatment Plan for Next Session: D/C 11/7/22          Treatment/Activity Tolerance:   [x] Tolerated treatment with no adverse effects    [] Patient limited by fatigue  [] Patient limited by pain   [] Patient limited by medical complications:    [] Adverse reaction to Tx:   [] Significant change in status    Safety:       []  bed alarm set    [x]  chair alarm set    []  Pt refused alarms                []  Telesitter activated      [x]  Gait belt used during tx session      []other:       Number of Minutes/Billable Intervention  Therapeutic Exercise    ADL Self-care 45   Neuro Re-Ed    Therapeutic Activity 15   Group    Other:    TOTAL 60       Social History  Social/Functional History  Lives With: Alone  Type of Home: Condo  Home Layout: One level  Home Access: Level entry  Bathroom Shower/Tub: Tub/Shower unit, Walk-in shower  Bathroom Toilet: Standard  Bathroom Equipment: Grab bars in shower, Grab bars around toilet  Home Equipment: Gomez Sames, [place], [place], 4 wheeled  ADL Assistance: [address]: Independent  Transfer Assistance: Independent  Active : No  Patient's  Info: Daughter in law does grocery shopping. Occupation: Retired  Additional Comments: D/t communication difficulties related to hearing aid issues, and cognitive concerns information above obtained from IP evals and needs confirmed    Objective                                                                                    Goals:  (Update in navigator)  Short Term Goals  Time Frame for Short Term Goals: STGs=LTGs:  Long Term Goals  Time Frame for Long Term Goals : ~10 days or until d/c  Long Term Goal 1: Pt will complete grooming tasks Ind  Long Term Goal 2: Pt will complete total body bathing c S using AE PRN  Long Term Goal 3: Pt will complete UB dressing c setup  Long Term Goal 4: Pt will complete LB dressing c S using AE PRN  Long Term Goal 5: Pt will doff/don footwear c S using AE PRN  Additional Goals?: Yes  Long Term Goal 6: Pt will complete toileting c S  Long Term Goal 7: Pt will complete functional transfers (bed, chair, toilet, shower) c DME PRN and S  Long Term Goal 8: Pt will perform therex/therax to facilitate increased strength/endurance/ax tolerance (c emphasis on dynamic standing balance/tolerance >6 mins, BUE endurance) c SBA  Long Term Goal 9: Pt will complete simple homemaking tasks c DME PRN and S:        Plan of Care                                                                              Times per week: 5 days per week for a minimum of 60 minutes/day plus group as appropriate for 60 minutes.   Treatment to include Occupational Therapy Plan  Current Treatment Recommendations: Strengthening, Endurance training, Cognitive reorientation, Pain management, Patient/Caregiver education & training, Safety education & training, Equipment evaluation, education, & procurement, Self-Care / ADL, Home management training, Cognitive/Perceptual training, Balance training, Functional mobility training    Electronically signed by   LULA Rocha,  11/7/2022, 10:59 AM

## 2022-11-07 NOTE — PROGRESS NOTES
Physical Therapy      . [] daily progress note       [x] discharge       Patient Name:  Duane Oliveira   :  3/18/1929 MRN: [id number]  Room:  [address] [place], [place] [address] Date of Admission: 10/29/2022  Rehabilitation Diagnosis:   Acute on chronic diastolic (congestive) heart failure [I50.33]  Acute on chronic combined systolic and diastolic CHF (congestive heart failure[facility] [I50.43]       Date 2022       Day of ARU Week:  3   Time IN/OUT 6989-6565   Individual Tx Minutes 60   TOTAL Tx Time Mins 60   Variance Time    Variance Time []   Refusal due to:     []   Medical hold/reason:    []   Illness   []   Off Unit for test/procedure  []   Extra time needed to complete task  []   Therapeutic need  []   Other (specify):   Restrictions Restrictions/Precautions  Restrictions/Precautions: General Precautions, Fall Risk (2L02, [place])      Communication with other providers: [x]   OK to see per nursing:     []   Spoke with team member regarding:      Subjective observations and cognitive status: Pt resting in recliner, pleasant and willing to participate, Continues to req max seq and safety cues with tasks, use of 4WW and for O2 line awareness/management.       Pain level/location:   No reports of pain during session   Discharge recommendations  Anticipated discharge date:    Destination: []home alone     []home alone with assist PRN-daughter coming to stay with her upon d/c per CM       [] home w/ family      [x] Continuous supervision  []SNF    [] Assisted living     [] Other:  Continued therapy: [x]HHC PT  []OUTPATIENT PT   [] No Further PT  []SNF PT  Caregiver training recommended: []Yes  [] No   Equipment needs: none, pt has 4WW     PT QI     Bed Mobility:     Roll Left and Right  Assistance Needed: Independent  Comment: without bed features, extra time  CARE Score: 6  Discharge Goal: Independent  met  Sit to Lying  Assistance Needed: Supervision or touching assistance  Comment: cues for O2 line awareness due to getting caught in feet  CARE Score: 4  Discharge Goal: Independent  Part met  Lying to Sitting on Side of Bed  Assistance Needed: Supervision or touching assistance  Comment: cues for O2 line awareness  CARE Score: 4  Discharge Goal: Independent  Part met  Transfers:    Sit to Stand  Assistance Needed: Supervision or touching assistance  Comment: SBA, safety cues to lock brakes, hand placement, O2 line awareness  CARE Score: 4  Discharge Goal: Independent  Part met  Chair/Bed-to-Chair Transfer  Assistance Needed: Supervision or touching assistance  Comment: SBA with cues for O2 line awareness, brake safety, reaching back to chair with at least one hand  CARE Score: 4  Discharge Goal: Independent (with no O2 line.  predict assist with line if pt still on O2)  Part met       Car Transfer  Assistance Needed: Supervision or touching assistance  Comment: SBA, cues for brake safety, O2 line awareness, hand placement  CARE Score: 4  Discharge Goal: Independent   Part met        Object: Picking Up Object  Assistance Needed: Supervision or touching assistance  Comment: SBA for balance at 4WW without AE, max safety cues for task  CARE Score: 4  Discharge Goal: Supervision or touching assistance  met  Ambulation:    Walking Ability  Does the Patient Walk?: Yes     Walk 10 Feet  Assistance Needed: Supervision or touching assistance  Comment: SB-close supervision with 4WW  CARE Score: 4  Discharge Goal: Supervision or touching assistance  met   Walk 50 Feet with Two Turns  Assistance Needed: Supervision or touching assistance  Comment: SBA with 4WW  CARE Score: 4  Discharge Goal: Supervision or touching assistance  met   Walk 150 Feet  Assistance Needed: Supervision or touching assistance  Comment: SBA for balance/safety with 4WW, min ROSIO but O2 sats WNL on 2L O2  CARE Score: 4  Discharge Goal: Supervision or touching assistance  met   Walking 10 Feet on Uneven Surfaces  Assistance Needed: Supervision or touching assistance  Comment: SB-CGA for balance with 4WW, cues to amb closer to AD  CARE Score: 4  Discharge Goal: Supervision or touching assistance  met   1 Step (Curb)  Assistance Needed: Supervision or touching assistance  Comment: SB-CGA with 4WW for balance, max seq/safety cues  CARE Score: 4  Discharge Goal: Supervision or touching assistance  met   4 Steps  Assistance Needed: Supervision or touching assistance  Comment: SB-CGA for balance using  B rails in recip - non-recip pattern  CARE Score: 4  Discharge Goal: Supervision or touching assistance  met   12 Steps  Assistance Needed: Supervision or touching assistance  Comment: SB-CGA using B rails in recip - non-recip pattern  CARE Score: 4  Discharge Goal: Not Applicable      Wheelchair:  W/C Ability: Wheelchair Ability  Uses a Wheelchair and/or Scooter?: No        Patient/Caregiver Education and Training:   [x]   Bed Mobility/Transfer technique/safety  [x]   Gait technique/sequencing  []   Proper use of assistive device  []   Advanced mobility safety and technique  []   Reinforced patient's precautions/mobility while maintaining precautions  []   Postural awareness  []   Family training      Treatment Plan for Next Session: Pt to be discharged to home with support of dgtr; continuous supervision req due to cognitive issues and pt being at high risk for falls, Continued use of 4WW and HHC PT as f/u recommended. Addendum: pt met most goals, but did not meet some goals for independence due to cognitive issues in management of O2 tubing. Pt demonstrates decreased safety and is recommended to have 24/7 supervision .  JS, PT  Treatment/Activity Tolerance:   [x] Tolerated treatment with no adverse effects    [] Patient limited by fatigue  [] Patient limited by pain   [] Patient limited by medical complications:    [] Adverse reaction to Tx:   [] Significant change in status    Safety:       []  bed alarm set    [x]  chair alarm set    []  Pt refused alarms []  Telesitter activated      [x]  Gait belt used during tx session      []other:         Number of Minutes/Billable Intervention  Gait Training 30   Therapeutic Exercise    Neuro Re-Ed    Therapeutic Activity 30   Wheelchair Propulsion    Group    Other:    TOTAL 60         Social History  Social/Functional History  Lives With: Alone  Type of Home: Condo  Home Layout: One level  Home Access: Level entry  Bathroom Shower/Tub: Tub/Shower unit, Walk-in shower  Bathroom Toilet: Standard  Bathroom Equipment: Grab bars in shower, Grab bars around toilet  Home Equipment: Chely Landsman, [place], Walker, 4 wheeled  ADL Assistance: [address]: Independent  Transfer Assistance: Independent  Active : No  Patient's  Info: Daughter in law does grocery shopping. Occupation: Retired  Additional Comments: D/t communication difficulties related to hearing aid issues, and cognitive concerns information above obtained from IP evals and needs confirmed    Objective                                                                                    Goals:  (Update in navigator)  Short Term Goals  Time Frame for Short Term Goals: 5-7 STG=LTG  Short Term Goal 1: Pt will perform bed mobility with mod I  Short Term Goal 2: Pt will perform sit to stand, pivot and car transfers with mod I if no O2, supervision if O2  Short Term Goal 3: Pt will ambulate 150' on level surfaces with 2ww and 10' on uneven surfaces with supervision with or without O2  Short Term Goal 4: Pt will ascend/descend curb step and 4 steps with rail with supervision  Short Term Goal 5: Pt will retrieve object on floor with 2ww and supervision:   :        Plan of Care                                                                              Times per week: 5 days per week for a minimum of 60 minutes/day plus group as appropriate for 60 minutes.   Treatment to include Current Treatment Recommendations: Strengthening, Balance training, Functional mobility training, Transfer training, ADL/Self-care training, IADL training, Cognitive/Perceptual training, Endurance training, Safety education & training, Patient/Caregiver education & training, Therapeutic activities, Gait training, Stair training, Equipment evaluation, education, & procurement, Pain management, Neuromuscular re-education, Positioning, Home exercise program, Cognitive reorientation    Electronically signed by   Damion Baltazar PTA #[id number]  11/7/2022, 12:27 PM

## 2022-11-14 ENCOUNTER — OFFICE VISIT (OUTPATIENT)
Dept: CARDIOLOGY CLINIC | Age: 87
End: 2022-11-14
Payer: MEDICARE

## 2022-11-14 VITALS
HEIGHT: 59 IN | HEART RATE: 64 BPM | WEIGHT: 117 LBS | SYSTOLIC BLOOD PRESSURE: 102 MMHG | BODY MASS INDEX: 23.59 KG/M2 | DIASTOLIC BLOOD PRESSURE: 70 MMHG

## 2022-11-14 DIAGNOSIS — Z95.1 S/P CABG X 3: Primary | ICD-10-CM

## 2022-11-14 PROCEDURE — G8484 FLU IMMUNIZE NO ADMIN: HCPCS | Performed by: INTERNAL MEDICINE

## 2022-11-14 PROCEDURE — 1036F TOBACCO NON-USER: CPT | Performed by: INTERNAL MEDICINE

## 2022-11-14 PROCEDURE — 1123F ACP DISCUSS/DSCN MKR DOCD: CPT | Performed by: INTERNAL MEDICINE

## 2022-11-14 PROCEDURE — G8428 CUR MEDS NOT DOCUMENT: HCPCS | Performed by: INTERNAL MEDICINE

## 2022-11-14 PROCEDURE — 99214 OFFICE O/P EST MOD 30 MIN: CPT | Performed by: INTERNAL MEDICINE

## 2022-11-14 PROCEDURE — 1090F PRES/ABSN URINE INCON ASSESS: CPT | Performed by: INTERNAL MEDICINE

## 2022-11-14 PROCEDURE — 1111F DSCHRG MED/CURRENT MED MERGE: CPT | Performed by: INTERNAL MEDICINE

## 2022-11-14 PROCEDURE — G8420 CALC BMI NORM PARAMETERS: HCPCS | Performed by: INTERNAL MEDICINE

## 2022-11-14 NOTE — PROGRESS NOTES
CARDIOLOGY NOTE      11/14/2022    RE: Chris Donohue  (3/18/1929)                               TO:  Dr. Austin Park MD            [address] is a 80 y.o. female who was seen today for management of coronary artery disease                                    HPI:                   Pt has h/o coronary disease post CABG post PCI, hypertension, hyperlipidemia, permanent pacemaker implantation, obstructive sleep apnea, hypothyroidism, seen today for follow-up.    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    Orthostatic hypotension 10/30/2022    Abrasion of left calf 10/30/2022    Acute on chronic diastolic (congestive) heart failure ([address] 10/19/2022    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 ([address] 07/29/2022    WD-Non-pressure chronic ulcer of other part of left lower leg with fat layer exposed ([address] 07/29/2022    Age-related osteoporosis with current pathological fracture of vertebra ([address] 07/08/2022    Chest pain 07/07/2022    Iron deficiency anemia secondary to inadequate dietary iron intake 04/19/2022    Abnormal liver CT -findings suggestive of cirrhosis  03/24/2022    At risk for injury associated with anticoagulation 03/24/2022    T12 vertebral fracture ([address] 03/24/2022    SAULO on CPAP 03/24/2022    CHF (congestive heart failure), NYHA class I, acute on chronic, combined ([address] 03/23/2022    Cor pulmonale (chronic) ([address] 03/15/2022    Chronic right-sided heart failure ([address] 03/15/2022    Pulmonary hypertension ([address] 03/15/2022    Shortness of breath 03/15/2022    Moderate COPD (chronic obstructive pulmonary disease) (HCC) 03/15/2022    Generalized weakness Gait disturbance     Acute urinary retention     Hypothyroidism (acquired)     Chronic diastolic (congestive) heart failure (HCC)     Metabolic encephalopathy 32/51/1597    Hyponatremia 10/07/2021    Infection of pacemaker pocket ([address] 04/29/2021    Pacer at end of battery life 11/18/2020    PAF (paroxysmal atrial fibrillation) ([facility] 05/10/2019    Dizziness 08/30/2017    Obstructive sleep apnea 05/16/2017    Coronary artery disease involving coronary bypass graft of native heart without angina pectoris     Sinus bradycardia 08/11/2016    Essential hypertension 09/30/2013    TIA (transient ischemic attack) 09/29/2013    Confusion 09/29/2013    Headache 09/29/2013    CAD (coronary artery disease)     Post PTCA 04/21/2010    S/P CABG x 3 04/08/2009    Cardiac pacemaker 10/01/2001     Current Outpatient Medications   Medication Sig Dispense Refill    collagenase 250 UNIT/GM ointment Apply topically daily.  1 each 0    metOLazone (ZAROXOLYN) 5 MG tablet Take 1 tablet by mouth in the morning and at bedtime 60 tablet 0    spironolactone (ALDACTONE) 25 MG tablet Take 1 tablet by mouth daily 30 tablet 0    torsemide 40 MG TABS Take 40 mg by mouth 2 times daily 60 tablet 0    midodrine (PROAMATINE) 5 MG tablet Take 1 tablet by mouth 3 times daily (with meals) 90 tablet 0    aspirin 81 MG chewable tablet Take 81 mg by mouth daily      ferrous sulfate (IRON 325) 325 (65 Fe) MG tablet Take 325 mg by mouth every other day      traZODone (DESYREL) 50 MG tablet Take  mg by mouth nightly as needed for Sleep      acetaminophen (TYLENOL) 500 MG tablet Take 1,000 mg by mouth every 4 hours as needed for Pain or Fever      ZIOPTAN 0.0015 % SOLN Place 1 drop into both eyes Daily with supper       vitamin D 25 MCG (1000 UT) CAPS Take 5,000 Units by mouth daily      atorvastatin (LIPITOR) 10 MG tablet Take 10 mg by mouth nightly      umeclidinium-vilanterol (ANORO ELLIPTA) 62.5-25 MCG/INH AEPB inhaler Inhale 1 puff into the lungs daily 1 each 11    CPAP Machine MISC by Does not apply route      Misc Natural Products (OSTEO BI-FLEX ADV DOUBLE ST PO) Take 1 tablet by mouth daily      timolol (TIMOPTIC) 0.5 % ophthalmic solution Place 1 drop into both eyes nightly      carvedilol (COREG) 6.25 MG tablet Take 1 tablet by mouth 2 times daily (with meals) 180 tablet 3    albuterol (PROVENTIL HFA;VENTOLIN HFA) 108 (90 BASE) MCG/ACT inhaler Inhale 2 puffs into the lungs 2 times daily AND EVERY 4-6 HOURS AS NEEDED      Multiple Vitamins-Minerals (ICAPS) CAPS Take 1 capsule by mouth 2 times daily       vitamin B-12 (CYANOCOBALAMIN) 1000 MCG tablet Take 1,000 mcg by mouth daily. levothyroxine (SYNTHROID) 88 MCG tablet Take 88 mcg by mouth daily. No current facility-administered medications for this visit.      Allergies: Darvocet [propoxyphene n-acetaminophen], Ranexa [ranolazine er], Ranolazine, Sulfa antibiotics, Sulfasalazine, Adhesive tape, Allantoin, Bacitracin, Gramicidin, Neomycin, Polymyxin b, Pramoxine hcl, and Silicone  Past Medical History:   Diagnosis Date    Age-related osteoporosis with current pathological fracture of vertebra ([address] 7/8/2022    Arthritis     Bradycardia 2001    requiring dual chamber pacemaker at [facility]    CAD (coronary artery disease)     Cardiac pacemaker 10/2001    [employer] #[id number]  PPM- Serial # 26-[facility]- Dr Rita Rothman    CHF (congestive heart failure) ([facility])     COPD, mild ([address] 2/17/2017    Cor pulmonale (chronic) ([address] 3/15/2022    CVA (cerebrovascular accident) ([address]     DJD (degenerative joint disease) of cervical spine     C5-C6, C6-C7    Exhaustion of cardiac pacemaker battery 11/21/2011    PPM battery replacement- [employer]    Family history of cardiovascular disease     Glaucoma Dx 2010    H/O 24 hour EKG monitoring 8/6/2000 8/6/2000- Intermittent episonde of a-fib/flutter    H/O cardiac catheterization 4/20/2010, 2/1989 4/20/2010-Severe native vessel disease and has graft disease as well. LAD, CX totally occluded. RCA totally occluded in proximal segment. VG to RCA widely patent. PDA 90% LAD afer LIMA anastomosis 80-90% stenosis. Proceeded with PTCA with stent next day. H/O cardiovascular stress test 10/14/2011, 6/2/2010,4/8/2010, 5/18/2009,4/6/2009, 10/30/2007, 11/12/2004, 11/6/2003, 8/9/2002, 8/9/2001,6/5/2000,     10/14/2011-Lexiscan-Abnormal Myocardial Perfusion study. Evidence of mild ischemia in the Left CX region. Abnomal study. Rest EF 63%. Global LV systolic function normal. No ECG changes. Unremarkable pharmacological stress test.    H/O cardiovascular stress test 6/10/2013    thallium--mild ischemia left circumflex EF63% no change from 10/2011 study. H/O cardiovascular stress test 10/16/2014    cardiolite-mild ischemia left circumflex,EF70%    H/O chest x-ray 4/19/2009 4/19/2009-Stable cardiomegaly. No acute cardiopulmonary disease. H/O Doppler lower venous ultrasound 09/18/2019    Significant reflux noted in RGSV, RGSV is extremely tortuous and small along the thigh and calf and would be highly unlikely to be accessed, RSSV is non compressible and has occlusive chronic SVT, LSSV is non compressible with occlusive chronic SVT, LGSV removed s/p CABG, Significant reflux in LGSV tributary,    H/O Doppler ultrasound 3/31/2010    CAROTID- 3/31/2010-INtimal thickening but no significant atherosclerotic plaque noted in ANA PAULA. Doppler flow velocities within ANA PAULA are WNL. Heterogeneous, irregular atherosclerotic plaque noted in LICA. Doppler flow velocities within the LICA are elevated, consistent with a mild, less than 50% stenosis. H/O Doppler ultrasound 5/24/2016    Carotid- normal study    H/O Doppler ultrasound 09/06/2017    carotid - normal study    H/O Doppler ultrasound     H/O echocardiogram 10/13    EF=60%, Severe Pulm. HTN, & Sclerotic aortic valve w/stenosis. H/O echocardiogram 10/16/14     EF 55-60% Normal LV. Normal LV systolic function.  Severe tricuspid insufficiency with severe hypertension. H/O echocardiogram 02/03/2017    heart cath performed this morning    H/O echocardiogram 09/18/2019    EF 50-55%, Left atrium is mild to moderately dilated, right atrium is severely dilated, mildly dilated right ventricle, Mod MR, Severe TR, Severe Pulm HTN, no pericardial effusion     History of complete ECG     10/14/2011(Lexiscan);5/6/2010, 4/30/2009,10/24/2008,9/21/2007, 10/13/2006    History of nuclear stress test 11/17/2016    lexiscan-normal,EF70%    Hx of cardiovascular stress test 12/28/2018    EF 60%  Normal study. HX OTHER MEDICAL 05/01/2017    MUGA-normal, EF53%    Hyperlipidemia     Hypertension     Mild intermittent asthma 7/28/2016    Moderate COPD (chronic obstructive pulmonary disease) (HCC) 3/15/2022    Nausea & vomiting     Obstructive sleep apnea 5/16/2017    Paroxysmal atrial fibrillation ([address]     Post PTCA 4/21/2010    PTCA with 2.25 stent of the LIMA to LAD    Pulmonary HTN ([address]     Severe per last echo on 10/13. 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  Followed by MAZE procedure by pulmonary vein isolation.-  Dr Gonzalez Bautista    S/P PTCA (percutaneous transluminal coronary angioplasty) 11/2012    PTCA with stent to RCA    Severe pulmonary hypertension ([address] 3/15/2022    Shortness of breath 3/15/2022    Thyroid disease     hypothyroi    Unspecified cerebral artery occlusion with cerebral infarction Unsure When    No Residual    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 ([address] 7/29/2022    WD-Non-pressure chronic ulcer of other part of left lower leg limited to breakdown of skin ([address] 7/29/2022     Past Surgical History:   Procedure Laterality Date    APPENDECTOMY  1941    CARDIAC SURGERY  4/09    CABG (3 Bypasses), One Heart Stent in 2010    [facility]  [facility]  [facility]    PTCA with stent BROOKS ->LAD    CORONARY ARTERY BYPASS GRAFT  2009    LIMA->Diag,  LIMA -> LAD;  SVG->RCA going to the PDA. Followed by MAZE procedure by pulmonary vein isolation.-  Dr Zamora Hands, TOTAL ABDOMINAL (CERVIX REMOVED)  's    MIDDLE EAR SURGERY      OTHER SURGICAL HISTORY      Ear surgery-hearing    PACEMAKER PLACEMENT      battery change 2011 Medtronic    PTCA  2012    Ptca with stent to RCA    TONSILLECTOMY  's      As reviewed   Family History   Problem Relation Age of Onset    Cancer Mother         \"Liver Cancer\"    Arthritis Mother     Depression Mother     Hearing Loss Mother     High Blood Pressure Mother     High Cholesterol Mother     Mental Illness Mother     Miscarriages / Stillbirths Mother     Heart Disease Father     Early Death Father 36        \"Instant Death\"    High Blood Pressure Father     High Cholesterol Father     Coronary Art Dis Father         Massive MI    Heart Disease Sister     Depression Sister     Cancer Sister         \"Breast Cancer, Cancer Free Now\"    High Blood Pressure Sister     Other Daughter         \"She's Had Stomach Surgery\"    High Blood Pressure Son     High Blood Pressure Son     Early Death Paternal Grandfather      Social History     Tobacco Use    Smoking status: Former     Packs/day: 0.25     Years: 5.00     Pack years: 1.25     Types: Cigarettes     Quit date: 1970     Years since quittin.9    Smokeless tobacco: Never   Substance Use Topics    Alcohol use: Not Currently     Comment: Caffiene - no more than 3 cups of coffee each day        Objective:    Vitals:    22 1502   BP: 102/70   Site: Right Upper Arm   Position: Sitting   Cuff Size: Medium Adult   Pulse: 64   Weight: 117 lb (53.1 kg)   Height: 4' 11\" (1.499 m)     /70 (Site: Right Upper Arm, Position: Sitting, Cuff Size: Medium Adult)   Pulse 64   Ht 4' 11\" (1.499 m)   Wt 117 lb (53.1 kg)   BMI 23.63 kg/m²     No flowsheet data found.      Wt Readings from Last 3 Encounters:   11/14/22 117 lb (53.1 kg)   11/07/22 126 lb 1.7 oz (57.2 kg)   10/29/22 143 lb 8.3 oz (65.1 kg)     Body mass index is 23.63 kg/m². GENERAL - Alert, oriented, pleasant, in no apparent distress. EYES: No jaundice, no conjunctival pallor. SKIN: It is warm & dry. No rashes. No Echhymosis    HEENT - No clinically significant abnormalities seen. Neck - Supple. No jugular venous distention noted. No carotid bruits. Cardiovascular - Normal S1 and S2 without obvious murmur or gallop. Extremities - No cyanosis, clubbing, or significant edema. Pulmonary - No respiratory distress. No wheezes or rales. Abdomen - No masses, tenderness, or organomegaly. Musculoskeletal - No significant edema. No joint deformities. No muscle wasting. Neurologic - Cranial nerves II through XII are grossly intact. There were no gross focal neurologic abnormalities.     Lab Review   Lab Results   Component Value Date/Time    CKTOTAL 89 03/18/2022 11:59 PM    TROPONINT <0.010 10/20/2022 02:25 AM     BNP:    Lab Results   Component Value Date/Time    BNP 90 09/28/2013 11:52 PM     PT/INR:    Lab Results   Component Value Date    INR 1.23 10/20/2022     No results found for: LABA1C  Lab Results   Component Value Date    WBC 8.3 11/01/2022    HCT 41.5 11/01/2022    MCV 94.1 11/01/2022     11/01/2022     Lab Results   Component Value Date    CHOL 105 07/08/2022    TRIG 69 07/08/2022    HDL 45 07/08/2022    LDLCALC 46 07/08/2022    LDLDIRECT 116 (H) 10/29/2018     Lab Results   Component Value Date    ALT 19 10/25/2022    AST 28 10/25/2022     BMP:    Lab Results   Component Value Date/Time     11/01/2022 05:54 AM    K 3.6 11/01/2022 05:54 AM    CL 89 11/01/2022 05:54 AM    CO2 33 11/01/2022 05:54 AM    BUN 48 11/01/2022 05:54 AM    CREATININE 0.8 11/01/2022 05:54 AM     CMP:   Lab Results   Component Value Date/Time     11/01/2022 05:54 AM    K 3.6 11/01/2022 05:54 AM    CL 89 11/01/2022 05:54 AM CO2 33 11/01/2022 05:54 AM    BUN 48 11/01/2022 05:54 AM    PROT 5.9 10/25/2022 07:16 AM    PROT 6.8 11/28/2012 02:41 PM     TSH:    Lab Results   Component Value Date/Time    TSH 1.0 05/07/2010 12:00 AM    TSHHS 5.210 07/07/2022 07:37 PM           Assessment & Plan:        -     CORONARY ARTERY DISEASE:  asymptomatic     All available  tests in chart reviewed. Management discussed . Testing ordered  no                 On aspirin compliant       Normal stress recently           -  Hypertension: Patients blood pressure is normal. Patient is advised about low sodium diet. Present medical regimen will not be changed. On Aldactone, torsemide, Coreg compliant with blood pressures well controlled         - Pul HTN: Last echo showed severe pulmonary hypertension and going to repeat an echocardiogram to see what is the status of that   PASP 77  Pt not interested  Conclusions      Summary   This is a limited echocardiogram.   Left ventricular systolic function is normal.   Ejection fraction is visually estimated at 50-55%. Moderate left ventricular hypertrophy. Grade III diastolic dysfunction. Severely dilated right ventricle (6.39 cm). PPM wiring visualized within the right ventricle. Severe tricuspid regurgitation; RVSP: 39 mmHg. Severely dilated right atrium. No evidence of any pericardial effusion. Inferior vena cava is dilated, measuring at 5.2 cm, and does not collapse   with respiration. Signature      ------------------------------------------------------------------   Electronically signed by Leonor Zafar MD   (Interpreting physician) on 10/20/2022 at 05:41 PM      -  LIPID MANAGEMENT:  Importance of lipid levels discussed with patient   and patient was given dietary advice. NCEP- ATP III guidelines reviewed with patient.     -   Changes  in medicines made: No     Lipitor 10 mg p.o. daily  Last LDL on file is 116                           -Permanent pacemaker implantation Pacer analysis is reviewed is consistent with normal single-chamber MRI safe Medtronic pacer function with stable RV lead and appropriate battery status for the age of the device. Remaining average battery life is 11.8 years. Device is programmed to VVIR mode lower rate of 60 bpm and 99.1% pacing in the ventricle. Recommend continued every three month check and follow up office visit as scheduled. Sandeep Garcia MD, 7/12/2022 12:37 PM     -Hypothyroidism on Synthroid 88 mcg p.o. daily last TSH on file is 1.6    -Obstructive sleep apnea on CPAP which might be causing her pulmonary hypertension    - Atrial fibrillation, pt is  compliant with meds. Patient does not have symptoms from atrial fibrillation on Coumadin    - CVI; patient has bilateral lower extremity swelling more on the left side and please note that she has reflux we have discussed the possibility of ablation which was deferred  Advise her for compression socks for now      Sen Barbour MD    [facility]    Please note this report has been partially produced using speech recognition software and may contain errors related to that system including errors in grammar, punctuation, and spelling, as well as words and phrases that may be inappropriate. If there are any questions or concerns please feel free to contact the dictating provider for clarification.

## 2022-11-15 ENCOUNTER — TELEPHONE (OUTPATIENT)
Dept: WOUND CARE | Age: 87
End: 2022-11-15

## 2022-11-15 NOTE — TELEPHONE ENCOUNTER
SN called to follow up on status of wound , per daughter in law ,wound is healing well & no appointment needed at this time.

## 2022-12-15 ENCOUNTER — OFFICE VISIT (OUTPATIENT)
Dept: CARDIOLOGY CLINIC | Age: 87
End: 2022-12-15

## 2022-12-15 ENCOUNTER — HOSPITAL ENCOUNTER (INPATIENT)
Age: 87
LOS: 4 days | Discharge: HOME HEALTH | DRG: 189 | End: 2022-12-19
Attending: INTERNAL MEDICINE | Admitting: INTERNAL MEDICINE
Payer: MEDICARE

## 2022-12-15 ENCOUNTER — APPOINTMENT (OUTPATIENT)
Dept: GENERAL RADIOLOGY | Age: 87
DRG: 189 | End: 2022-12-15
Payer: MEDICARE

## 2022-12-15 ENCOUNTER — APPOINTMENT (OUTPATIENT)
Dept: CT IMAGING | Age: 87
DRG: 189 | End: 2022-12-15
Payer: MEDICARE

## 2022-12-15 VITALS
DIASTOLIC BLOOD PRESSURE: 70 MMHG | SYSTOLIC BLOOD PRESSURE: 130 MMHG | WEIGHT: 110 LBS | HEIGHT: 55 IN | HEART RATE: 66 BPM | BODY MASS INDEX: 25.46 KG/M2

## 2022-12-15 DIAGNOSIS — R77.8 ELEVATED TROPONIN: ICD-10-CM

## 2022-12-15 DIAGNOSIS — I48.0 PAF (PAROXYSMAL ATRIAL FIBRILLATION) (HCC): ICD-10-CM

## 2022-12-15 DIAGNOSIS — I50.43 CHF (CONGESTIVE HEART FAILURE), NYHA CLASS I, ACUTE ON CHRONIC, COMBINED (HCC): ICD-10-CM

## 2022-12-15 DIAGNOSIS — R00.1 SINUS BRADYCARDIA: ICD-10-CM

## 2022-12-15 DIAGNOSIS — G47.33 OBSTRUCTIVE SLEEP APNEA: ICD-10-CM

## 2022-12-15 DIAGNOSIS — R53.83 FATIGUE, UNSPECIFIED TYPE: ICD-10-CM

## 2022-12-15 DIAGNOSIS — I10 ESSENTIAL HYPERTENSION: ICD-10-CM

## 2022-12-15 DIAGNOSIS — I25.810 CORONARY ARTERY DISEASE INVOLVING CORONARY BYPASS GRAFT OF NATIVE HEART WITHOUT ANGINA PECTORIS: Primary | ICD-10-CM

## 2022-12-15 DIAGNOSIS — G45.9 TIA (TRANSIENT ISCHEMIC ATTACK): ICD-10-CM

## 2022-12-15 DIAGNOSIS — E86.0 DEHYDRATION: ICD-10-CM

## 2022-12-15 DIAGNOSIS — R09.02 HYPOXIA: ICD-10-CM

## 2022-12-15 DIAGNOSIS — I27.81 COR PULMONALE (CHRONIC) (HCC): ICD-10-CM

## 2022-12-15 DIAGNOSIS — Z95.1 S/P CABG X 3: ICD-10-CM

## 2022-12-15 DIAGNOSIS — I50.9 ACUTE ON CHRONIC CONGESTIVE HEART FAILURE, UNSPECIFIED HEART FAILURE TYPE (HCC): Primary | ICD-10-CM

## 2022-12-15 PROBLEM — J96.01 ACUTE RESPIRATORY FAILURE WITH HYPOXIA (HCC): Status: ACTIVE | Noted: 2022-12-15

## 2022-12-15 LAB
ALBUMIN SERPL-MCNC: 4.5 GM/DL (ref 3.4–5)
ALP BLD-CCNC: 284 IU/L (ref 40–129)
ALT SERPL-CCNC: 23 U/L (ref 10–40)
ANION GAP SERPL CALCULATED.3IONS-SCNC: 16 MMOL/L (ref 4–16)
AST SERPL-CCNC: 33 IU/L (ref 15–37)
BASE EXCESS MIXED: 10.4 (ref 0–2.3)
BASOPHILS ABSOLUTE: 0.1 K/CU MM
BASOPHILS RELATIVE PERCENT: 0.6 % (ref 0–1)
BILIRUB SERPL-MCNC: 1.8 MG/DL (ref 0–1)
BUN BLDV-MCNC: 85 MG/DL (ref 6–23)
CALCIUM SERPL-MCNC: 11.4 MG/DL (ref 8.3–10.6)
CHLORIDE BLD-SCNC: 84 MMOL/L (ref 99–110)
CO2: 30 MMOL/L (ref 21–32)
COMMENT: ABNORMAL
CREAT SERPL-MCNC: 0.8 MG/DL (ref 0.6–1.1)
DIFFERENTIAL TYPE: ABNORMAL
EOSINOPHILS ABSOLUTE: 0.2 K/CU MM
EOSINOPHILS RELATIVE PERCENT: 2.4 % (ref 0–3)
GFR SERPL CREATININE-BSD FRML MDRD: >60 ML/MIN/1.73M2
GLUCOSE BLD-MCNC: 111 MG/DL (ref 70–99)
HCO3 VENOUS: 37.4 MMOL/L (ref 19–25)
HCT VFR BLD CALC: 56.1 % (ref 37–47)
HEMOGLOBIN: 17.9 GM/DL (ref 12.5–16)
IMMATURE NEUTROPHIL %: 0.8 % (ref 0–0.43)
LACTIC ACID, SEPSIS: 1.4 MMOL/L (ref 0.5–1.9)
LYMPHOCYTES ABSOLUTE: 0.9 K/CU MM
LYMPHOCYTES RELATIVE PERCENT: 8.6 % (ref 24–44)
MCH RBC QN AUTO: 29.6 PG (ref 27–31)
MCHC RBC AUTO-ENTMCNC: 31.9 % (ref 32–36)
MCV RBC AUTO: 92.9 FL (ref 78–100)
MONOCYTES ABSOLUTE: 0.8 K/CU MM
MONOCYTES RELATIVE PERCENT: 8 % (ref 0–4)
NUCLEATED RBC %: 0 %
O2 SAT, VEN: 76.6 % (ref 50–70)
PCO2, VEN: 59 MMHG (ref 38–52)
PDW BLD-RTO: 20 % (ref 11.7–14.9)
PH VENOUS: 7.41 (ref 7.32–7.42)
PLATELET # BLD: 427 K/CU MM (ref 140–440)
PMV BLD AUTO: 11.4 FL (ref 7.5–11.1)
PO2, VEN: 47 MMHG (ref 28–48)
POTASSIUM SERPL-SCNC: 3.8 MMOL/L (ref 3.5–5.1)
PRO-BNP: 1342 PG/ML
RAPID INFLUENZA  B AGN: NEGATIVE
RAPID INFLUENZA A AGN: NEGATIVE
RBC # BLD: 6.04 M/CU MM (ref 4.2–5.4)
SARS-COV-2, NAAT: NOT DETECTED
SEGMENTED NEUTROPHILS ABSOLUTE COUNT: 7.9 K/CU MM
SEGMENTED NEUTROPHILS RELATIVE PERCENT: 79.6 % (ref 36–66)
SODIUM BLD-SCNC: 130 MMOL/L (ref 135–145)
SOURCE: NORMAL
TOTAL IMMATURE NEUTOROPHIL: 0.08 K/CU MM
TOTAL NUCLEATED RBC: 0 K/CU MM
TOTAL PROTEIN: 8.5 GM/DL (ref 6.4–8.2)
TROPONIN T: 0.01 NG/ML
WBC # BLD: 10 K/CU MM (ref 4–10.5)

## 2022-12-15 PROCEDURE — 1200000000 HC SEMI PRIVATE

## 2022-12-15 PROCEDURE — 85025 COMPLETE CBC W/AUTO DIFF WBC: CPT

## 2022-12-15 PROCEDURE — 96367 TX/PROPH/DG ADDL SEQ IV INF: CPT

## 2022-12-15 PROCEDURE — 6360000002 HC RX W HCPCS: Performed by: PHYSICIAN ASSISTANT

## 2022-12-15 PROCEDURE — 83880 ASSAY OF NATRIURETIC PEPTIDE: CPT

## 2022-12-15 PROCEDURE — 96365 THER/PROPH/DIAG IV INF INIT: CPT

## 2022-12-15 PROCEDURE — 2580000003 HC RX 258: Performed by: PHYSICIAN ASSISTANT

## 2022-12-15 PROCEDURE — 82805 BLOOD GASES W/O2 SATURATION: CPT

## 2022-12-15 PROCEDURE — 71250 CT THORAX DX C-: CPT

## 2022-12-15 PROCEDURE — 87635 SARS-COV-2 COVID-19 AMP PRB: CPT

## 2022-12-15 PROCEDURE — 99285 EMERGENCY DEPT VISIT HI MDM: CPT

## 2022-12-15 PROCEDURE — 83605 ASSAY OF LACTIC ACID: CPT

## 2022-12-15 PROCEDURE — 84484 ASSAY OF TROPONIN QUANT: CPT

## 2022-12-15 PROCEDURE — 93005 ELECTROCARDIOGRAM TRACING: CPT | Performed by: PHYSICIAN ASSISTANT

## 2022-12-15 PROCEDURE — 80053 COMPREHEN METABOLIC PANEL: CPT

## 2022-12-15 PROCEDURE — 87804 INFLUENZA ASSAY W/OPTIC: CPT

## 2022-12-15 PROCEDURE — 71045 X-RAY EXAM CHEST 1 VIEW: CPT

## 2022-12-15 RX ORDER — SODIUM CHLORIDE 9 MG/ML
INJECTION, SOLUTION INTRAVENOUS PRN
Status: DISCONTINUED | OUTPATIENT
Start: 2022-12-15 | End: 2022-12-19 | Stop reason: HOSPADM

## 2022-12-15 RX ORDER — ONDANSETRON 4 MG/1
4 TABLET, ORALLY DISINTEGRATING ORAL EVERY 8 HOURS PRN
Status: DISCONTINUED | OUTPATIENT
Start: 2022-12-15 | End: 2022-12-19 | Stop reason: HOSPADM

## 2022-12-15 RX ORDER — ACETAMINOPHEN 325 MG/1
650 TABLET ORAL EVERY 6 HOURS PRN
Status: DISCONTINUED | OUTPATIENT
Start: 2022-12-15 | End: 2022-12-19 | Stop reason: HOSPADM

## 2022-12-15 RX ORDER — 0.9 % SODIUM CHLORIDE 0.9 %
1000 INTRAVENOUS SOLUTION INTRAVENOUS ONCE
Status: DISCONTINUED | OUTPATIENT
Start: 2022-12-15 | End: 2022-12-15

## 2022-12-15 RX ORDER — FUROSEMIDE 10 MG/ML
20 INJECTION INTRAMUSCULAR; INTRAVENOUS ONCE
Status: DISCONTINUED | OUTPATIENT
Start: 2022-12-15 | End: 2022-12-15

## 2022-12-15 RX ORDER — ONDANSETRON 2 MG/ML
4 INJECTION INTRAMUSCULAR; INTRAVENOUS EVERY 6 HOURS PRN
Status: DISCONTINUED | OUTPATIENT
Start: 2022-12-15 | End: 2022-12-19 | Stop reason: HOSPADM

## 2022-12-15 RX ORDER — SODIUM CHLORIDE 0.9 % (FLUSH) 0.9 %
5-40 SYRINGE (ML) INJECTION PRN
Status: DISCONTINUED | OUTPATIENT
Start: 2022-12-15 | End: 2022-12-19 | Stop reason: HOSPADM

## 2022-12-15 RX ORDER — POLYETHYLENE GLYCOL 3350 17 G/17G
17 POWDER, FOR SOLUTION ORAL DAILY PRN
Status: DISCONTINUED | OUTPATIENT
Start: 2022-12-15 | End: 2022-12-16

## 2022-12-15 RX ORDER — SODIUM CHLORIDE 9 MG/ML
INJECTION, SOLUTION INTRAVENOUS CONTINUOUS
Status: ACTIVE | OUTPATIENT
Start: 2022-12-16 | End: 2022-12-16

## 2022-12-15 RX ORDER — SODIUM CHLORIDE 9 MG/ML
INJECTION, SOLUTION INTRAVENOUS CONTINUOUS
Status: DISCONTINUED | OUTPATIENT
Start: 2022-12-15 | End: 2022-12-16

## 2022-12-15 RX ORDER — ACETAMINOPHEN 650 MG/1
650 SUPPOSITORY RECTAL EVERY 6 HOURS PRN
Status: DISCONTINUED | OUTPATIENT
Start: 2022-12-15 | End: 2022-12-19 | Stop reason: HOSPADM

## 2022-12-15 RX ORDER — SODIUM CHLORIDE 0.9 % (FLUSH) 0.9 %
5-40 SYRINGE (ML) INJECTION EVERY 12 HOURS SCHEDULED
Status: DISCONTINUED | OUTPATIENT
Start: 2022-12-16 | End: 2022-12-19 | Stop reason: HOSPADM

## 2022-12-15 RX ORDER — HEPARIN SODIUM 5000 [USP'U]/ML
5000 INJECTION, SOLUTION INTRAVENOUS; SUBCUTANEOUS 2 TIMES DAILY
Status: DISCONTINUED | OUTPATIENT
Start: 2022-12-16 | End: 2022-12-19 | Stop reason: HOSPADM

## 2022-12-15 RX ADMIN — DEXTROSE MONOHYDRATE 500 MG: 50 INJECTION, SOLUTION INTRAVENOUS at 21:58

## 2022-12-15 RX ADMIN — SODIUM CHLORIDE: 9 INJECTION, SOLUTION INTRAVENOUS at 21:10

## 2022-12-15 RX ADMIN — CEFTRIAXONE SODIUM 1000 MG: 1 INJECTION, POWDER, FOR SOLUTION INTRAMUSCULAR; INTRAVENOUS at 21:13

## 2022-12-15 ASSESSMENT — PAIN - FUNCTIONAL ASSESSMENT: PAIN_FUNCTIONAL_ASSESSMENT: 0-10

## 2022-12-15 ASSESSMENT — PAIN SCALES - GENERAL: PAINLEVEL_OUTOF10: 0

## 2022-12-15 NOTE — PATIENT INSTRUCTIONS
**It is YOUR responsibilty to bring medication bottles and/or updated medication list to [address] [place] [address]. This will allow us to better serve you and all your healthcare needs**    [facility] Locations - No appointment necessary. Sites open Monday to Friday. Call your preferred location for test preparation, business hours and other information you need. [employer] accepts [employer]. [facility] [address] [facility]. [facility], [address] [facility]  Phone: [phone] [facility] [address] [facility]  [address]. [facility], [address] [facility]  Phone: [phone]       . Please be informed that if you contact our office outside of normal business hours the physician on call cannot help with any scheduling or rescheduling issues, procedure instruction questions or any type of medication issue. We advise you for any urgent/emergency that you go to the nearest emergency room! PLEASE CALL OUR OFFICE DURING NORMAL BUSINESS HOURS    Monday - Friday   8 am to 5 pm    [facility] [address]: [phone]    [place]:  [phone]    Thank you for allowing us to care for you today! We want to ensure we can follow your treatment plan and we strive to give you the best outcomes and experience possible. If you ever have a life threatening emergency and call 911 - for an ambulance (EMS)   Our providers can only care for you at:   [facility] or [facility]. Even if you have someone take you or you drive yourself we can only care for you in a [facility] facility. Our providers are not setup at the other healthcare locations!

## 2022-12-15 NOTE — ED NOTES
Patient arrived via [facility] from the [facility]. Medics state patient was 88% on room air, no complaints. Patient is now at 93/94% on 2L. Respirations equal and unlabored, skin PWD.      Penny Yan RN  12/15/22 [id number]

## 2022-12-15 NOTE — ED NOTES
Patients family now at bedside and would also like patient checked for a UTI     Maria G Fishman RN  12/15/22 [id number]

## 2022-12-15 NOTE — PROGRESS NOTES
12/15/2022  Primary cardiologist: Dr. Gurjit Perez:   Liz Parson  is an established 80 y.o.  female here for a follow up on low oxygen at night      SUBJECTIVE/OBJECTIVE:  Liz Parson is a 80 y.o. female with a history of   1. Coronary artery disease involving coronary bypass graft of native heart without angina pectoris    2. TIA (transient ischemic attack)    3. Essential hypertension    4. Sinus bradycardia    5. PAF (paroxysmal atrial fibrillation) ([address]    6. Cor pulmonale (chronic) (HCC)    7. CHF (congestive heart failure), NYHA class I, acute on chronic, combined ([address]    8. S/P CABG x 3    9. Obstructive sleep apnea           HPI :   Liz Parson is not really able to respond to questions. Her son reports that for the last 2 weeks she has been out of it. She refuses her CPAP and her home care nurse said oxygen was low yesterday. Her oxygen on arrival to office was 87 with 3 L NC applied her oxygen is improved to 95%. Son reports patient refusing to go to hospital therefore requested and appointment with cardiology to evaluate for oxygen. The home care nurse told patient son her oxygen was in the low 80's but she was able to get it up to the mid 90's with deep breathing. Patient has not been eating and she is taking her medications with a lot of prompts. Son does not have access to oxygen at home for patient. Review of Systems   Unable to perform ROS: Mental status change     Vitals:    12/15/22 1328   BP: 130/70   Site: Left Upper Arm   Position: Sitting   Cuff Size: Medium Adult   Pulse: 66   Weight: 110 lb (49.9 kg)   Height: 4' 1\" (1.245 m)     No flowsheet data found. Wt Readings from Last 3 Encounters:   12/15/22 110 lb (49.9 kg)   11/14/22 117 lb (53.1 kg)   11/07/22 126 lb 1.7 oz (57.2 kg)     Body mass index is 32.21 kg/m². Physical Exam  Vitals reviewed. Constitutional:       General: She is in acute distress. Appearance: Normal appearance. She is obese. She is ill-appearing.    HENT:      Head: Atraumatic. Neck:      Vascular: No carotid bruit. Cardiovascular:      Rate and Rhythm: Normal rate. Rhythm irregular. Pulses: Normal pulses. Heart sounds: Murmur heard. Pulmonary:      Effort: Pulmonary effort is normal. Bradypnea present. No respiratory distress. Breath sounds: Decreased breath sounds present. Musculoskeletal:      Cervical back: Neck supple. No muscular tenderness. Right lower leg: No edema. Left lower leg: No edema. Neurological:      Mental Status: She is lethargic.               Current Outpatient Medications   Medication Sig Dispense Refill    metOLazone (ZAROXOLYN) 5 MG tablet Take 1 tablet by mouth in the morning and at bedtime 60 tablet 0    spironolactone (ALDACTONE) 25 MG tablet Take 1 tablet by mouth daily 30 tablet 0    torsemide 40 MG TABS Take 40 mg by mouth 2 times daily 60 tablet 0    midodrine (PROAMATINE) 5 MG tablet Take 1 tablet by mouth 3 times daily (with meals) 90 tablet 0    aspirin 81 MG chewable tablet Take 81 mg by mouth daily      ferrous sulfate (IRON 325) 325 (65 Fe) MG tablet Take 325 mg by mouth every other day      traZODone (DESYREL) 50 MG tablet Take  mg by mouth nightly as needed for Sleep      acetaminophen (TYLENOL) 500 MG tablet Take 1,000 mg by mouth every 4 hours as needed for Pain or Fever      ZIOPTAN 0.0015 % SOLN Place 1 drop into both eyes Daily with supper       vitamin D 25 MCG (1000 UT) CAPS Take 5,000 Units by mouth daily      atorvastatin (LIPITOR) 10 MG tablet Take 10 mg by mouth nightly      umeclidinium-vilanterol (ANORO ELLIPTA) 62.5-25 MCG/INH AEPB inhaler Inhale 1 puff into the lungs daily 1 each 11    CPAP Machine MISC by Does not apply route      Misc Natural Products (OSTEO BI-FLEX ADV DOUBLE ST PO) Take 1 tablet by mouth daily      timolol (TIMOPTIC) 0.5 % ophthalmic solution Place 1 drop into both eyes nightly      carvedilol (COREG) 6.25 MG tablet Take 1 tablet by mouth 2 times daily (with meals) 180 tablet 3    albuterol (PROVENTIL HFA;VENTOLIN HFA) 108 (90 BASE) MCG/ACT inhaler Inhale 2 puffs into the lungs 2 times daily AND EVERY 4-6 HOURS AS NEEDED      Multiple Vitamins-Minerals (ICAPS) CAPS Take 1 capsule by mouth 2 times daily       vitamin B-12 (CYANOCOBALAMIN) 1000 MCG tablet Take 1,000 mcg by mouth daily. levothyroxine (SYNTHROID) 88 MCG tablet Take 88 mcg by mouth daily. No current facility-administered medications for this visit. All pertinent data reviewed and discussed with patient       ASSESSMENT/PLAN:   Recommending patient transfer to hospital via squad. Patient son wants to take her home. I reviewed with patient I do not have the ability to get her home oxygen today nor do I have the ability to figure out why she is lethargic and hypoxic. He wants to take her home as she has refused to go to the hospital.   He wants to talk to his sister and his wife before making decisions. she is refusing CPAP at home. Asked son if she would want breathing support or CPR and he was not sure. EKG is paced rhythm and Pacer interrogation has stable battery life without arrhythmia noted since 10/28/2022. After 40 min patient son decided to take her to the ED. I recommend the Squad taking her there. He is agreeable. Change in mental status with hypoxia        Tests ordered:  none  Follow-up  as scheduled     Signed:  Junette Cranker, APRN - CNP, 12/15/2022, 1:54 PM    An electronic signature was used to authenticate this note. Please note this report has been partially produced using speech recognition software and may contain errors related to that system including errors in grammar, punctuation, and spelling, as well as words and phrases that may be inappropriate. If there are any questions or concerns please feel free to contact the dictating provider for clarification.

## 2022-12-16 ENCOUNTER — APPOINTMENT (OUTPATIENT)
Dept: GENERAL RADIOLOGY | Age: 87
DRG: 189 | End: 2022-12-16
Payer: MEDICARE

## 2022-12-16 PROBLEM — E44.0 MODERATE MALNUTRITION (HCC): Status: ACTIVE | Noted: 2022-12-16

## 2022-12-16 PROBLEM — I50.9 ACUTE ON CHRONIC CONGESTIVE HEART FAILURE (HCC): Status: ACTIVE | Noted: 2022-12-16

## 2022-12-16 LAB
ANION GAP SERPL CALCULATED.3IONS-SCNC: 14 MMOL/L (ref 4–16)
ANION GAP SERPL CALCULATED.3IONS-SCNC: 20 MMOL/L (ref 4–16)
BASOPHILS ABSOLUTE: 0.1 K/CU MM
BASOPHILS RELATIVE PERCENT: 0.7 % (ref 0–1)
BUN BLDV-MCNC: 62 MG/DL (ref 6–23)
BUN BLDV-MCNC: 70 MG/DL (ref 6–23)
CALCIUM SERPL-MCNC: 10.7 MG/DL (ref 8.3–10.6)
CALCIUM SERPL-MCNC: 10.9 MG/DL (ref 8.3–10.6)
CHLORIDE BLD-SCNC: 90 MMOL/L (ref 99–110)
CHLORIDE BLD-SCNC: 93 MMOL/L (ref 99–110)
CO2: 26 MMOL/L (ref 21–32)
CO2: 32 MMOL/L (ref 21–32)
CREAT SERPL-MCNC: 0.7 MG/DL (ref 0.6–1.1)
CREAT SERPL-MCNC: 0.8 MG/DL (ref 0.6–1.1)
DIFFERENTIAL TYPE: ABNORMAL
EKG ATRIAL RATE: 59 BPM
EKG DIAGNOSIS: NORMAL
EKG Q-T INTERVAL: 502 MS
EKG QRS DURATION: 156 MS
EKG QTC CALCULATION (BAZETT): 502 MS
EKG R AXIS: -75 DEGREES
EKG T AXIS: 119 DEGREES
EKG VENTRICULAR RATE: 60 BPM
EOSINOPHILS ABSOLUTE: 0.2 K/CU MM
EOSINOPHILS RELATIVE PERCENT: 2 % (ref 0–3)
GFR SERPL CREATININE-BSD FRML MDRD: >60 ML/MIN/1.73M2
GFR SERPL CREATININE-BSD FRML MDRD: >60 ML/MIN/1.73M2
GLUCOSE BLD-MCNC: 117 MG/DL (ref 70–99)
GLUCOSE BLD-MCNC: 85 MG/DL (ref 70–99)
HCT VFR BLD CALC: 52.3 % (ref 37–47)
HEMOGLOBIN: 17.1 GM/DL (ref 12.5–16)
IMMATURE NEUTROPHIL %: 0.7 % (ref 0–0.43)
LYMPHOCYTES ABSOLUTE: 0.7 K/CU MM
LYMPHOCYTES RELATIVE PERCENT: 6.9 % (ref 24–44)
MAGNESIUM: 2.8 MG/DL (ref 1.8–2.4)
MCH RBC QN AUTO: 30.3 PG (ref 27–31)
MCHC RBC AUTO-ENTMCNC: 32.7 % (ref 32–36)
MCV RBC AUTO: 92.6 FL (ref 78–100)
MONOCYTES ABSOLUTE: 0.8 K/CU MM
MONOCYTES RELATIVE PERCENT: 7.8 % (ref 0–4)
NUCLEATED RBC %: 0 %
PDW BLD-RTO: 19.8 % (ref 11.7–14.9)
PLATELET # BLD: 380 K/CU MM (ref 140–440)
PMV BLD AUTO: 11.1 FL (ref 7.5–11.1)
POTASSIUM SERPL-SCNC: 3.3 MMOL/L (ref 3.5–5.1)
POTASSIUM SERPL-SCNC: 3.6 MMOL/L (ref 3.5–5.1)
PROCALCITONIN: 0.08
RBC # BLD: 5.65 M/CU MM (ref 4.2–5.4)
SEGMENTED NEUTROPHILS ABSOLUTE COUNT: 8.8 K/CU MM
SEGMENTED NEUTROPHILS RELATIVE PERCENT: 81.9 % (ref 36–66)
SODIUM BLD-SCNC: 136 MMOL/L (ref 135–145)
SODIUM BLD-SCNC: 139 MMOL/L (ref 135–145)
TOTAL IMMATURE NEUTOROPHIL: 0.08 K/CU MM
TOTAL NUCLEATED RBC: 0 K/CU MM
WBC # BLD: 10.7 K/CU MM (ref 4–10.5)

## 2022-12-16 PROCEDURE — 99223 1ST HOSP IP/OBS HIGH 75: CPT | Performed by: INTERNAL MEDICINE

## 2022-12-16 PROCEDURE — 97530 THERAPEUTIC ACTIVITIES: CPT

## 2022-12-16 PROCEDURE — 6370000000 HC RX 637 (ALT 250 FOR IP): Performed by: INTERNAL MEDICINE

## 2022-12-16 PROCEDURE — 97163 PT EVAL HIGH COMPLEX 45 MIN: CPT

## 2022-12-16 PROCEDURE — 85025 COMPLETE CBC W/AUTO DIFF WBC: CPT

## 2022-12-16 PROCEDURE — 2700000000 HC OXYGEN THERAPY PER DAY

## 2022-12-16 PROCEDURE — 94640 AIRWAY INHALATION TREATMENT: CPT

## 2022-12-16 PROCEDURE — 84145 PROCALCITONIN (PCT): CPT

## 2022-12-16 PROCEDURE — 80048 BASIC METABOLIC PNL TOTAL CA: CPT

## 2022-12-16 PROCEDURE — 6360000002 HC RX W HCPCS: Performed by: INTERNAL MEDICINE

## 2022-12-16 PROCEDURE — 93010 ELECTROCARDIOGRAM REPORT: CPT | Performed by: INTERNAL MEDICINE

## 2022-12-16 PROCEDURE — 2580000003 HC RX 258: Performed by: INTERNAL MEDICINE

## 2022-12-16 PROCEDURE — 97116 GAIT TRAINING THERAPY: CPT

## 2022-12-16 PROCEDURE — 1200000000 HC SEMI PRIVATE

## 2022-12-16 PROCEDURE — APPNB60 APP NON BILLABLE TIME 46-60 MINS: Performed by: NURSE PRACTITIONER

## 2022-12-16 PROCEDURE — 94761 N-INVAS EAR/PLS OXIMETRY MLT: CPT

## 2022-12-16 PROCEDURE — 83735 ASSAY OF MAGNESIUM: CPT

## 2022-12-16 PROCEDURE — 36415 COLL VENOUS BLD VENIPUNCTURE: CPT

## 2022-12-16 PROCEDURE — 74018 RADEX ABDOMEN 1 VIEW: CPT

## 2022-12-16 RX ORDER — LATANOPROST 50 UG/ML
1 SOLUTION/ DROPS OPHTHALMIC EVERY EVENING
Status: DISCONTINUED | OUTPATIENT
Start: 2022-12-16 | End: 2022-12-19 | Stop reason: HOSPADM

## 2022-12-16 RX ORDER — ATORVASTATIN CALCIUM 10 MG/1
10 TABLET, FILM COATED ORAL EVERY EVENING
Status: DISCONTINUED | OUTPATIENT
Start: 2022-12-16 | End: 2022-12-19 | Stop reason: HOSPADM

## 2022-12-16 RX ORDER — POLYETHYLENE GLYCOL 3350 17 G/17G
17 POWDER, FOR SOLUTION ORAL DAILY
Status: DISCONTINUED | OUTPATIENT
Start: 2022-12-16 | End: 2022-12-19 | Stop reason: HOSPADM

## 2022-12-16 RX ORDER — SODIUM CHLORIDE 9 MG/ML
INJECTION, SOLUTION INTRAVENOUS CONTINUOUS
Status: ACTIVE | OUTPATIENT
Start: 2022-12-16 | End: 2022-12-17

## 2022-12-16 RX ORDER — CARVEDILOL 6.25 MG/1
6.25 TABLET ORAL 2 TIMES DAILY WITH MEALS
Status: DISCONTINUED | OUTPATIENT
Start: 2022-12-16 | End: 2022-12-19 | Stop reason: HOSPADM

## 2022-12-16 RX ORDER — TIMOLOL MALEATE 5 MG/ML
1 SOLUTION/ DROPS OPHTHALMIC EVERY EVENING
Status: DISCONTINUED | OUTPATIENT
Start: 2022-12-16 | End: 2022-12-19 | Stop reason: HOSPADM

## 2022-12-16 RX ORDER — TORSEMIDE 20 MG/1
20 TABLET ORAL DAILY
Status: DISCONTINUED | OUTPATIENT
Start: 2022-12-16 | End: 2022-12-19 | Stop reason: HOSPADM

## 2022-12-16 RX ORDER — AZITHROMYCIN 250 MG/1
500 TABLET, FILM COATED ORAL DAILY
Status: DISCONTINUED | OUTPATIENT
Start: 2022-12-16 | End: 2022-12-17

## 2022-12-16 RX ORDER — ASPIRIN 81 MG/1
81 TABLET, CHEWABLE ORAL DAILY
Status: DISCONTINUED | OUTPATIENT
Start: 2022-12-16 | End: 2022-12-19 | Stop reason: HOSPADM

## 2022-12-16 RX ORDER — ALBUTEROL SULFATE 90 UG/1
2 AEROSOL, METERED RESPIRATORY (INHALATION) EVERY 6 HOURS PRN
Status: DISCONTINUED | OUTPATIENT
Start: 2022-12-16 | End: 2022-12-19 | Stop reason: HOSPADM

## 2022-12-16 RX ORDER — MIDODRINE HYDROCHLORIDE 5 MG/1
5 TABLET ORAL
Status: DISCONTINUED | OUTPATIENT
Start: 2022-12-16 | End: 2022-12-19 | Stop reason: HOSPADM

## 2022-12-16 RX ORDER — SPIRONOLACTONE 25 MG/1
12.5 TABLET ORAL DAILY
Status: DISCONTINUED | OUTPATIENT
Start: 2022-12-16 | End: 2022-12-19 | Stop reason: HOSPADM

## 2022-12-16 RX ADMIN — HEPARIN SODIUM 5000 UNITS: 5000 INJECTION INTRAVENOUS; SUBCUTANEOUS at 09:40

## 2022-12-16 RX ADMIN — ATORVASTATIN CALCIUM 10 MG: 10 TABLET, FILM COATED ORAL at 21:05

## 2022-12-16 RX ADMIN — CEFTRIAXONE SODIUM 1000 MG: 1 INJECTION, POWDER, FOR SOLUTION INTRAMUSCULAR; INTRAVENOUS at 21:04

## 2022-12-16 RX ADMIN — TIOTROPIUM BROMIDE AND OLODATEROL 2 PUFF: 3.124; 2.736 SPRAY, METERED RESPIRATORY (INHALATION) at 07:43

## 2022-12-16 RX ADMIN — TORSEMIDE 20 MG: 20 TABLET ORAL at 19:20

## 2022-12-16 RX ADMIN — CARVEDILOL 6.25 MG: 6.25 TABLET, FILM COATED ORAL at 19:20

## 2022-12-16 RX ADMIN — MIDODRINE HYDROCHLORIDE 5 MG: 5 TABLET ORAL at 09:40

## 2022-12-16 RX ADMIN — MIDODRINE HYDROCHLORIDE 5 MG: 5 TABLET ORAL at 13:14

## 2022-12-16 RX ADMIN — AZITHROMYCIN MONOHYDRATE 500 MG: 250 TABLET ORAL at 21:04

## 2022-12-16 RX ADMIN — ASPIRIN 81 MG: 81 TABLET, CHEWABLE ORAL at 09:41

## 2022-12-16 RX ADMIN — MIDODRINE HYDROCHLORIDE 5 MG: 5 TABLET ORAL at 19:20

## 2022-12-16 RX ADMIN — SODIUM CHLORIDE: 9 INJECTION, SOLUTION INTRAVENOUS at 19:25

## 2022-12-16 RX ADMIN — HEPARIN SODIUM 5000 UNITS: 5000 INJECTION INTRAVENOUS; SUBCUTANEOUS at 21:05

## 2022-12-16 NOTE — PROGRESS NOTES
Comprehensive Nutrition Assessment    Type and Reason for Visit:  Positive Nutrition Screen    Nutrition Recommendations/Plan:   Liberalize diet as able   Add frozen and standard oral nutrition supplements  Assist pt with meals as needed  Will monitor po intake, weight trends, poc     Malnutrition Assessment:  Malnutrition Status: Moderate malnutrition (12/16/22 1038)    Context:  Acute Illness       Nutrition Assessment:    Pt admitted for dehydration, hypoxia, acute respiratory failure, CHF, PMH: CVA, HTN, COPD, HLD, CAD, positive nutrition screen for weight loss and decreased appetite, pt currently on cardiac low sodium diet, weight loss per chart review, visited pt at bedside, reports good appetite, noted breakfast tray on bedside table mostly untouched, NFPE completed, pt agreeable to trial oral nutrition supplements, will follow at high nutrition risk    Nutrition Related Findings:      Wound Type: None       Current Nutrition Intake & Therapies:    Average Meal Intake: Unable to assess  Average Supplements Intake: None Ordered  ADULT DIET; Regular; Low Fat/Low Chol/High Fiber/2 gm Na    Anthropometric Measures:  Height: 4' 1.02\" (124.5 cm)  Ideal Body Weight (IBW): 45 lbs (20 kg)       Current Body Weight: 110 lb 0.2 oz (49.9 kg), 244.5 % IBW. Weight Source: Bed Scale  Current BMI (kg/m2): 32.2  Usual Body Weight: 126 lb 1.7 oz (57.2 kg) ((11/7/22) per chart review)  % Weight Change (Calculated): -12.8  Weight Adjustment For: No Adjustment                 BMI Categories: Obese Class 1 (BMI 30.0-34. 9)    Estimated Daily Nutrient Needs:        Energy (kcal/day): 1250 (25 kcal/kg)     Protein (g/day): 60 (1.2 g/kg)      Nutrition Diagnosis:   Moderate malnutrition, In context of acute illness or injury related to inadequate protein-energy intake as evidenced by weight loss greater than or equal to 5% in 1 month, mild muscle loss, mild loss of subcutaneous fat    Nutrition Interventions:   Food and/or Nutrient Delivery: Modify Current Diet, Start Oral Nutrition Supplement  Nutrition Education/Counseling: No recommendation at this time  Coordination of Nutrition Care: Continue to monitor while inpatient       Goals:     Goals: PO intake 50% or greater, within 2 days       Nutrition Monitoring and Evaluation:   Behavioral-Environmental Outcomes: None Identified  Food/Nutrient Intake Outcomes: Supplement Intake, Food and Nutrient Intake  Physical Signs/Symptoms Outcomes: Biochemical Data, GI Status, Hemodynamic Status, Fluid Status or Edema, Meal Time Behavior, Weight, Nutrition Focused Physical Findings    Discharge Planning:     Too soon to determine     Susei Rueda, [address], [place]  Contact: [phone]

## 2022-12-16 NOTE — ED PROVIDER NOTES
EKG per my interpretation demonstrates ventricular paced rhythm at a rate of 60 bpm.  Left axis deviation. Prolonged QTc interval.  No acute ST-segment changes.      [address] DO Demar  12/15/22 6986

## 2022-12-16 NOTE — CONSULTS
INPATIENT CARDIOLOGY CONSULT NOTE         Reason for consultation:   CHF     Referring physician:  Gallo Arrington MD     Primary care physician: Marisela Royal MD      Dear Gallo Arrington MD Thank you for the consult    Chief Complaint   Patient presents with    Other     Hypoxic, 88% on RA       History of present illness:Irene is a 80 y. o.year old who  presents with   Chief Complaint   Patient presents with    Other     Hypoxic, 88% on RA       Patient is a 43-year-old female with prior medical history significant of coronary disease s/p CABG, history of cor pulmonale, right heart failure, severe pulmonary hypertension, history of moderate COPD, obstructive sleep apnea, essential hypertension, hyperlipidemia diastolic heart failure, is admitted to the hospital with chief complaint of shortness of breath. Cardiology consulted for evaluation    Patient mentions that over the past 2 days she has been experiencing fatigue and tiredness and mild shortness of breath. Echocardiogram performed in October 2022 shows      Left ventricular systolic function is normal.   Ejection fraction is visually estimated at 50-55%. Moderate left ventricular hypertrophy. Grade III diastolic dysfunction. Severely dilated right ventricle (6.39 cm). PPM wiring visualized within the right ventricle. Severe tricuspid regurgitation; RVSP: 39 mmHg. Severely dilated right atrium. No evidence of any pericardial effusion. Inferior vena cava is dilated, measuring at 5.2 cm, and does not collapse   with respiration. Patient had right heart cardiac catheterization in 2019  which revealed severe pulmonary hypertension, patient was referred to pulm medicine.       [address] [place] [address] 2017     Procedure Summary    SEVERE NATIVE CAD    OCCLUDED LAD, CX (filling from collaterls), occluded RCA    LIMA to LAD and d1 patent (stents in LAD patent)    SVG to t RCA patent , patent stents    moderate pulmonary HTN ( PAP 45/20) Recommendations    Medical therapy    ? Letairis for pul HTN     Past medical history:    has a past medical history of Age-related osteoporosis with current pathological fracture of vertebra (HCC), Arthritis, Bradycardia, CAD (coronary artery disease), Cardiac pacemaker, CHF (congestive heart failure) ([address] COPD, mild ([address] Cor pulmonale (chronic) ([address] CVA (cerebrovascular accident) ([address] DJD (degenerative joint disease) of cervical spine, Exhaustion of cardiac pacemaker battery, Family history of cardiovascular disease, Glaucoma, H/O 24 hour EKG monitoring, H/O cardiac catheterization, H/O cardiovascular stress test, H/O cardiovascular stress test, H/O cardiovascular stress test, H/O chest x-ray, H/O Doppler lower venous ultrasound, H/O Doppler ultrasound, H/O Doppler ultrasound, H/O Doppler ultrasound, H/O Doppler ultrasound, H/O echocardiogram, H/O echocardiogram, H/O echocardiogram, H/O echocardiogram, History of complete ECG, History of nuclear stress test, Hx of cardiovascular stress test, HX OTHER MEDICAL, Hyperlipidemia, Hypertension, Mild intermittent asthma, Moderate COPD (chronic obstructive pulmonary disease) ([address] Nausea & vomiting, Obstructive sleep apnea, Paroxysmal atrial fibrillation ([address] Post PTCA, Pulmonary HTN ([address] PVD (peripheral vascular disease) ([address] S/P CABG x 3, S/P PTCA (percutaneous transluminal coronary angioplasty), Severe pulmonary hypertension ([address] Shortness of breath, Thyroid disease, Unspecified cerebral artery occlusion with cerebral infarction, 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 Past surgical history:   has a past surgical history that includes Appendectomy (1941); Tonsillectomy (1950's); Cardiac surgery (4/09); Hysterectomy, total abdominal (1990's);  Colonoscopy (In 2000's); pacemaker placement; Coronary artery bypass graft (4/8/2009); Coronary angioplasty with stent (4/21/2010); other surgical history; Middle ear surgery; and Percutaneous Transluminal Coronary Angio (11/2012). Social History:   reports that she quit smoking about 52 years ago. Her smoking use included cigarettes. She has a 1.25 pack-year smoking history. She has never used smokeless tobacco. She reports that she does not currently use alcohol. She reports that she does not use drugs.   Family history:   no family history of CAD, STROKE of DM    Allergies   Allergen Reactions    Darvocet [Propoxyphene N-Acetaminophen]     Ranexa [Ranolazine Er]      Sick to her stomach    Ranolazine      Sick to her stomach    Sulfa Antibiotics Itching    Sulfasalazine Itching    Adhesive Tape Rash    Allantoin Rash    Bacitracin Rash    Gramicidin Rash    Neomycin Rash    Polymyxin B Rash    Pramoxine Hcl Rash    Silicone Rash       aspirin chewable tablet 81 mg, Daily  atorvastatin (LIPITOR) tablet 10 mg, QPM  midodrine (PROAMATINE) tablet 5 mg, TID WC  tiotropium-olodaterol (STIOLTO) 2.5-2.5 MCG/ACT inhaler 2 puff, Daily  latanoprost (XALATAN) 0.005 % ophthalmic solution 1 drop, QPM  timolol (TIMOPTIC) 0.5 % ophthalmic solution 1 drop, QPM  cefTRIAXone (ROCEPHIN) 1,000 mg in dextrose 5 % 50 mL IVPB mini-bag, Q24H  azithromycin (ZITHROMAX) tablet 500 mg, Daily  albuterol sulfate HFA (PROVENTIL;VENTOLIN;PROAIR) 108 (90 Base) MCG/ACT inhaler 2 puff, Q6H PRN  polyethylene glycol (GLYCOLAX) packet 17 g, Daily  potassium bicarb-citric acid (EFFER-K) effervescent tablet 40 mEq, Once  0.9 % sodium chloride infusion, Continuous  sodium chloride flush 0.9 % injection 5-40 mL, 2 times per day  sodium chloride flush 0.9 % injection 5-40 mL, PRN  0.9 % sodium chloride infusion, PRN  heparin (porcine) injection 5,000 Units, BID  ondansetron (ZOFRAN-ODT) disintegrating tablet 4 mg, Q8H PRN   Or  ondansetron (ZOFRAN) injection 4 mg, Q6H PRN  acetaminophen (TYLENOL) tablet 650 mg, Q6H PRN Or  acetaminophen (TYLENOL) suppository 650 mg, Q6H PRN      Current Facility-Administered Medications   Medication Dose Route Frequency Provider Last Rate Last Admin    aspirin chewable tablet 81 mg  81 mg Oral Daily Ronak Quinones MD   81 mg at 12/16/22 0941    atorvastatin (LIPITOR) tablet 10 mg  10 mg Oral QPM Ronak Quinones MD        midodrine (PROAMATINE) tablet 5 mg  5 mg Oral TID WC Ronak Quinones MD   5 mg at 12/16/22 1314    tiotropium-olodaterol (STIOLTO) 2.5-2.5 MCG/ACT inhaler 2 puff  2 puff Inhalation Daily Ronak Quinones MD   2 puff at 12/16/22 0743    latanoprost (XALATAN) 0.005 % ophthalmic solution 1 drop  1 drop Both Eyes QPM Niya Lance MD        timolol (TIMOPTIC) 0.5 % ophthalmic solution 1 drop  1 drop Both Eyes QPM Niya Lance MD        cefTRIAXone (ROCEPHIN) 1,000 mg in dextrose 5 % 50 mL IVPB mini-bag  1,000 mg IntraVENous Q24H Ronak Quinones MD        azithromycin (ZITHROMAX) tablet 500 mg  500 mg Oral Daily Ronak Quinones MD        albuterol sulfate HFA (PROVENTIL;VENTOLIN;PROAIR) 108 (90 Base) MCG/ACT inhaler 2 puff  2 puff Inhalation Q6H PRN Ronak Quinones MD        polyethylene glycol (GLYCOLAX) packet 17 g  17 g Oral Daily Feliberto Gonzalez MD        potassium bicarb-citric acid (EFFER-K) effervescent tablet 40 mEq  40 mEq Oral Once Feliberto Gonzalez MD        0.9 % sodium chloride infusion   IntraVENous Continuous Feliberto Gonzalez MD        sodium chloride flush 0.9 % injection 5-40 mL  5-40 mL IntraVENous 2 times per day Ronak Quinones MD        sodium chloride flush 0.9 % injection 5-40 mL  5-40 mL IntraVENous PRN Niya Lance MD        0.9 % sodium chloride infusion   IntraVENous PRN Ronak Quinones MD        heparin (porcine) injection 5,000 Units  5,000 Units SubCUTAneous BID Ronak Quinones MD   5,000 Units at 12/16/22 0940    ondansetron (ZOFRAN-ODT) disintegrating tablet 4 mg  4 mg Oral Q8H PRN Lynnwood Soulier, MD        Or    ondansetron [facility] injection 4 mg  4 mg IntraVENous Q6H PRN Lynnwood Soulier, MD        acetaminophen (TYLENOL) tablet 650 mg  650 mg Oral Q6H PRN Lynnwood Soulier, MD        Or    acetaminophen (TYLENOL) suppository 650 mg  650 mg Rectal Q6H PRN Lynnwood Soulier, MD             Review of Systems:     Constitutional: No Fever or Weight Loss   Eyes: No Decreased Vision  ENT: No Headaches, Hearing Loss or Vertigo  Cardiovascular:   no chest pain,  ++  dyspnea on exertion,  no palpitations or loss of consciousness  Respiratory: No cough or wheezing    Gastrointestinal: No abdominal pain, appetite loss, blood in stools, constipation, diarrhea or heartburn  Genitourinary: No dysuria, trouble voiding, or hematuria  Musculoskeletal:  No gait disturbance, weakness or joint complaints  Integumentary: No rash or pruritis  Neurological: No TIA or stroke symptoms  Psychiatric: No anxiety or depression  Endocrine: No malaise, fatigue or temperature intolerance  Hematologic/Lymphatic: No bleeding problems, blood clots or swollen lymph nodes  Allergic/Immunologic: No nasal congestion or hives    All other systems were reviewed and were negative otherwise. Physical Examination:      Vitals:    12/16/22 1612   BP: (!) 107/53   Pulse: 60   Resp: 24   Temp:    SpO2: 94%      Wt Readings from Last 3 Encounters:   12/15/22 110 lb (49.9 kg)   12/15/22 110 lb (49.9 kg)   11/14/22 117 lb (53.1 kg)     Body mass index is 32.19 kg/m². General Appearance:  No distress, conversant  Constitutional:  Well developed, Well nourished  HEENT:  Normocephalic, Atraumatic, Oropharynx moist   Nose normal. Neck Supple Carotid: no carotid bruit  Eyes:  Conjunctiva normal, No discharge. Respiratory:    Normal breath sounds, No respiratory distress, No wheezing, no use of accessory muscles, diaphragm movement is normal  No chest Tenderness  Cardiovascular: S1-S2 No murmurs auscultated.  No rubs, thrills or gallops. Normal  rhythm. Pedal pulses are normal. No pedal edema  GI:  Soft Non tender, non distended. Musculoskeletal:   No tenderness, No cyanosis, No clubbing. Integument:  Warm, Dry, No erythema, No rash. Lymphatic:  No lymphadenopathy noted. Neurologic:  Alert & oriented x 3  No focal deficits noted. Psychiatric:  Affect normal, Judgment normal, Mood normal.       Lab Review     Recent Labs     12/16/22  0711   WBC 10.7*   HGB 17.1*   HCT 52.3*         Recent Labs     12/16/22  0711      K 3.3*   CL 93*   CO2 26   BUN 70*   CREATININE 0.7     Recent Labs     12/15/22  1732   AST 33   ALT 23   BILITOT 1.8*   ALKPHOS 284*     No results for input(s): TROPONINI in the last 72 hours. Lab Results   Component Value Date    BNP 90 09/28/2013     Lab Results   Component Value Date    INR 1.23 10/20/2022    PROTIME 15.9 (H) 10/20/2022         All labs, images, EKGs were personally reviewed      Assessment: 80 y. o.year old with PMH of  has a past medical history of Age-related osteoporosis with current pathological fracture of vertebra ([address] Arthritis, Bradycardia, CAD (coronary artery disease), Cardiac pacemaker, CHF (congestive heart failure) ([address] COPD, mild ([address] Cor pulmonale (chronic) ([address] CVA (cerebrovascular accident) ([address] DJD (degenerative joint disease) of cervical spine, Exhaustion of cardiac pacemaker battery, Family history of cardiovascular disease, Glaucoma, H/O 24 hour EKG monitoring, H/O cardiac catheterization, H/O cardiovascular stress test, H/O cardiovascular stress test, H/O cardiovascular stress test, H/O chest x-ray, H/O Doppler lower venous ultrasound, H/O Doppler ultrasound, H/O Doppler ultrasound, H/O Doppler ultrasound, H/O Doppler ultrasound, H/O echocardiogram, H/O echocardiogram, H/O echocardiogram, H/O echocardiogram, History of complete ECG, History of nuclear stress test, Hx of cardiovascular stress test, HX OTHER MEDICAL, Hyperlipidemia, Hypertension, Mild intermittent asthma, Moderate COPD (chronic obstructive pulmonary disease) (HCC), Nausea & vomiting, Obstructive sleep apnea, Paroxysmal atrial fibrillation (HCC), Post PTCA, Pulmonary HTN ([address] PVD (peripheral vascular disease) ([address] S/P CABG x 3, S/P PTCA (percutaneous transluminal coronary angioplasty), Severe pulmonary hypertension ([address] Shortness of breath, Thyroid disease, Unspecified cerebral artery occlusion with cerebral infarction, 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 Medical Decision Making :       Severe pulmonary hypertension, chronic finding  Severely dilated right ventricle, RV failure/cor pulmonale  Obstructive sleep apnea on CPAP  Coronary disease s/p CABG x3.  Stable  Hypoxic respiratory failure    Consult Dr. Vera Lewis EP to rule out pacemaker mediated cardiomyopathy  Pacemaker interrogation  Normal recent coronary perfusion imaging    Clinically patient appears to be dehydrated  Hold off on diuretics for now  BUN is elevated at 70 with normal creatinine,   ? patient also has hemoconcentration with hematocrit of 56.1  - Could be related to dehydration versus erythrocytosis secondary to hypoxia    Consult hematology    Patient takes Aldactone 25 daily at home in addition she is maintained on torsemide 40 twice daily. Recommend to slowly reinstitute diuretics, torsemide 20 daily and Aldactone 12.5 daily. History of orthostatic hypotension: Continue with midodrine 5 mg 3 times daily  ? Pneumonia: Patient is on antibiotics IV azithromycin and ceftriaxone as per primary team  Atrial fibrillation: Rate control strategy.   Currently paced rhythm on warfarin  Hyperlipidemia: Low-dose statin therapy  Essential hypertension: Continue with Coreg    Thank you for the consult    Dr. Destiny Rubalcava  12/16/2022 4:51 PM

## 2022-12-16 NOTE — PROGRESS NOTES
Assess for need for BIV pacer  Acute resp failure  Chronic diastolic heart failure  ? Right heart failure - severe pulmonary HTN and appears corpulmonale  Moderate COPD  Chronic atrial fibrillation - patient appears to be only on aspirin as out patient. - probably risk of fall vs bleeding.  She is on iron tablets and also midodrine for low BP      Patient is pacemaker dependent and RV paced 98% of the time  LVEF though is 50-55% so she will not be candidate for BIV pacer    Full note to follow

## 2022-12-16 NOTE — H&P
History and Physical      Name:  Johnny Alberto /Age/Sex: 3/18/1929  (80 y.o. female)   MRN & CSN:  [id number] & [id number] Encounter Date/Time: 12/15/2022 10:36 PM EST   Location:  [facility]18 PCP: Otf Linda MD       Hospital Day: 1    Assessment and Plan:     #. Acute respiratory failure with hypoxia  -As per information patient was saturating 88% on room air.  -Saturating 93-94% on 2 L of oxygen. -VBG-pH 7.41, PCO2 59, PO2 47, HCO3 37.4.  -Chest x-ray-cardiomegaly, atelectasis or infiltrates in the lung bases with some small pleural effusions. -CT chest without contrast-massive cardiomegaly, mild diffuse mosaic attenuation pattern as can be seen with reactive airway disease or bronchiolitis, edema is also a consideration. Mild right basilar dependent consolidation likely representing atelectasis. -Wean off oxygen as tolerated. #. ? Pneumonia  -Continue ceftriaxone, azithromycin. #.  Volume depletion  -BUN 85, calcium 11.4, hemoglobin 17.9 indicating volume depletion  -Continue gentle hydration and monitor with repeat lab work. #.  Hypercalcemia-continue IV fluids and repeat BMP. #.  Recent admission - for diastolic dysfunction, right heart failure    #. Moderate COPD  -Does not appear to be in exacerbation  -Patient is on Anoro Ellipta  -Albuterol HFA as needed    #. Coronary artery disease s/p CABG    #. Chronic diastolic congestive heart failure  -Echo-10/2022-EF 57-73%, grade 3 diastolic dysfunction, severely dilated right ventricle, severe tricuspid regurgitation  -Appears clinically dry  -Patient is on metolazone 5 mg twice daily, spironolactone 25 mg daily, torsemide 40 mg twice daily-resume when appropriate. #.  Atrial fibrillation  -Not on anticoagulation due to fall risk    #. Chronic hypotension-on midodrine    #. Severe pulmonary hypertension, cor pulmonale    #. S/p PPM    #. SAULO on CPAP    #. Glaucoma-on latanoprost, timolol    #. Dementia  Disposition:   Current Living situation: home    Diet Cardiac   DVT Prophylaxis [] Lovenox, [x]  Heparin, [] SCDs, [] Ambulation,  [] Eliquis, [] Xarelto   Code Status Prior   Surrogate Decision Maker/ POA Daughter [phone]     History from:   EMR, patient. History of Present Illness:     Chief Complaint: Acute respiratory failure with hypoxia ([address]  Saleem Ortega is a 80 y.o. female with coronary artery disease s/p CABG, diastolic congestive heart failure, right heart failure, severe pulmonary hypertension, moderate COPD, atrial fibrillation, s/p PPM, SAULO on CPAP, glaucoma, dementia was brought to ED for hypoxia. Patient currently cannot tell me why she is in the hospital.  No family at bedside. Tried to call patient's daughter Terry Reid), patient's son with no success. Left VM. As per information patient was noted to be hypoxic (oxygen saturation in the 80s) by home care nurse yesterday. Patient's family made an appointment with cardiology today. Patient was evaluated by cardiology and was found to be hypoxic, saturating 87% on 3 L of oxygen and was referred to ER. Patient currently denying any chest pain, shortness of breath, fever, chills, cough, denied any abdominal pain. At presentation patient was noted to have /83, HR 62, RR 16, temp 97.7, saturating 94% on 2 L of oxygen. Lab work significant for sodium 130, chloride 84, BUN 85, random glucose 111, lactic acid 1.4, proBNP 1342, troponin 0.012, total bilirubin 1.8, hemoglobin 17.9, influenza A and B-negative, rapid COVID-negative. VBG-pH 7.41, PCO2 59, PO2 47, HCO3 37.4. Chest x-ray-cardiomegaly, CT chest without contrast-massive cardiomegaly, mild diffuse mosaic attenuation pattern that can be seen with reactive airway disease or bronchiolitis. Patient received azithromycin, ceftriaxone in ED.   Review of Systems: Need 10 Elements   10 point review of systems conducted and pertinent positives and negatives as per HPI.    Objective:   No intake or output data in the 24 hours ending 12/15/22 2236   Vitals:   Vitals:    12/15/22 2002 12/15/22 2031 12/15/22 2102 12/15/22 2133   BP: 109/68 97/68 125/69 (!) 154/78   Pulse: 60 60 60 60   Resp: 23 23 20 18   Temp:       TempSrc:       SpO2: 97% 96% 94% 97%   Weight:       Height:           Medications Prior to Admission   Reviewed medications in EMR  Prior to Admission medications    Medication Sig Start Date End Date Taking?  Authorizing Provider   metOLazone (ZAROXOLYN) 5 MG tablet Take 1 tablet by mouth in the morning and at bedtime 11/7/22   C Edmar Rodas MD   spironolactone (ALDACTONE) 25 MG tablet Take 1 tablet by mouth daily 11/8/22   C Edmar Rodas MD   torsemide 40 MG TABS Take 40 mg by mouth 2 times daily 11/7/22   C Edmar Rodas MD   midodrine (PROAMATINE) 5 MG tablet Take 1 tablet by mouth 3 times daily (with meals) 11/7/22   C Edmar Rodas MD   aspirin 81 MG chewable tablet Take 81 mg by mouth daily    Historical Provider, MD   ferrous sulfate (IRON 325) 325 (65 Fe) MG tablet Take 325 mg by mouth every other day    Historical Provider, MD   traZODone (DESYREL) 50 MG tablet Take  mg by mouth nightly as needed for Sleep    Historical Provider, MD   acetaminophen (TYLENOL) 500 MG tablet Take 1,000 mg by mouth every 4 hours as needed for Pain or Fever    Historical Provider, MD   ZIOPTAN 0.0015 % SOLN Place 1 drop into both eyes Daily with supper  1/25/22   Historical Provider, MD   vitamin D 25 MCG (1000 UT) CAPS Take 5,000 Units by mouth daily    Historical Provider, MD   atorvastatin (LIPITOR) 10 MG tablet Take 10 mg by mouth nightly 10/20/20   Historical Provider, MD   umeclidinium-vilanterol (ANORO ELLIPTA) 62.5-25 MCG/INH AEPB inhaler Inhale 1 puff into the lungs daily 7/27/20   Gagandeep English MD   CPAP Machine MISC by Does not apply route    Historical Provider, MD   Misc Natural Products (OSTEO BI-FLEX ADV DOUBLE ST PO) Take 1 tablet by mouth daily    Historical Provider, MD   timolol (TIMOPTIC) 0.5 % ophthalmic solution Place 1 drop into both eyes nightly    Historical Provider, MD   carvedilol (COREG) 6.25 MG tablet Take 1 tablet by mouth 2 times daily (with meals) 1/31/17   Mindy Gabriel MD   albuterol (PROVENTIL HFA;VENTOLIN HFA) 108 (90 BASE) MCG/ACT inhaler Inhale 2 puffs into the lungs 2 times daily AND EVERY 4-6 HOURS AS NEEDED    Historical Provider, MD   Multiple Vitamins-Minerals (ICAPS) CAPS Take 1 capsule by mouth 2 times daily     Historical Provider, MD   vitamin B-12 (CYANOCOBALAMIN) 1000 MCG tablet Take 1,000 mcg by mouth daily. Historical Provider, MD   levothyroxine (SYNTHROID) 88 MCG tablet Take 88 mcg by mouth daily. Historical Provider, MD       Physical Exam: Need 8 Elements   Physical Exam     GEN  -Awake, alert, NAD.   EYES   -PERRL. HENT  -MM are dry. RESP  -LS CTA equal bilat, no wheezes, rales or rhonchi. Symmetric chest movement. No respiratory distress noted. C/V  -S1/S2 auscultated. RRR without appreciable M/R/G. No JVD or carotid bruits. Peripheral pulses equal bilaterally and palpable. No peripheral edema. No reproducible chest wall tenderness. GI  -Abdomen is soft, non-distended, no significant tenderness. No masses or guarding. + BS in all quadrants. Rectal exam deferred.   -No CVA tenderness. Carnes catheter is not present. MS  -B/L extremities - No gross joint deformities. No swelling, intact sensation symmetrical.   SKIN  -Normal coloration, warm, dry. NEURO  - Awake, alert, answering questions appropriately, following commands  PSYC  - Appropriate affect. Past Medical History:   Reviewed patient's past medical, surgical, social, family history and allergies.       PMHx   Past Medical History:   Diagnosis Date    Age-related osteoporosis with current pathological fracture of vertebra ([address] 7/8/2022    Arthritis     Bradycardia 2001    requiring dual chamber pacemaker at [facility]    CAD (coronary artery disease)     Cardiac pacemaker 10/2001    [employer] #[id number]  PPM- Serial # [id number]- Dr Mckinley Hull    CHF (congestive heart failure) (HCC)     COPD, mild ([facility] 2/17/2017    Cor pulmonale (chronic) ([facility] 3/15/2022    CVA (cerebrovascular accident) ([facility]     DJD (degenerative joint disease) of cervical spine     C5-C6, C6-C7    Exhaustion of cardiac pacemaker battery 11/21/2011    PPM battery replacement- [employer]    Family history of cardiovascular disease     Glaucoma Dx 2010    H/O 24 hour EKG monitoring 8/6/2000 8/6/2000- Intermittent episonde of a-fib/flutter    H/O cardiac catheterization 4/20/2010, 2/1989 4/20/2010-Severe native vessel disease and has graft disease as well. LAD, CX totally occluded. RCA totally occluded in proximal segment. VG to RCA widely patent. PDA 90% LAD afer LIMA anastomosis 80-90% stenosis. Proceeded with PTCA with stent next day. H/O cardiovascular stress test 10/14/2011, 6/2/2010,4/8/2010, 5/18/2009,4/6/2009, 10/30/2007, 11/12/2004, 11/6/2003, 8/9/2002, 8/9/2001,6/5/2000,     10/14/2011-Lexiscan-Abnormal Myocardial Perfusion study. Evidence of mild ischemia in the Left CX region. Abnomal study. Rest EF 63%. Global LV systolic function normal. No ECG changes. Unremarkable pharmacological stress test.    H/O cardiovascular stress test 6/10/2013    thallium--mild ischemia left circumflex EF63% no change from 10/2011 study. H/O cardiovascular stress test 10/16/2014    cardiolite-mild ischemia left circumflex,EF70%    H/O chest x-ray 4/19/2009 4/19/2009-Stable cardiomegaly. No acute cardiopulmonary disease.     H/O Doppler lower venous ultrasound 09/18/2019    Significant reflux noted in RGSV, RGSV is extremely tortuous and small along the thigh and calf and would be highly unlikely to be accessed, RSSV is non compressible and has occlusive chronic SVT, LSSV is non compressible with occlusive chronic SVT, LGSV removed s/p CABG, Significant reflux in LGSV tributary,    H/O Doppler ultrasound 3/31/2010    CAROTID- 3/31/2010-INtimal thickening but no significant atherosclerotic plaque noted in ANA PAULA. Doppler flow velocities within ANA PAULA are WNL. Heterogeneous, irregular atherosclerotic plaque noted in LICA. Doppler flow velocities within the LICA are elevated, consistent with a mild, less than 50% stenosis. H/O Doppler ultrasound 5/24/2016    Carotid- normal study    H/O Doppler ultrasound 09/06/2017    carotid - normal study    H/O Doppler ultrasound     H/O echocardiogram 10/13    EF=60%, Severe Pulm. HTN, & Sclerotic aortic valve w/stenosis. H/O echocardiogram 10/16/14     EF 55-60% Normal LV. Normal LV systolic function. Severe tricuspid insufficiency with severe hypertension. H/O echocardiogram 02/03/2017    heart cath performed this morning    H/O echocardiogram 09/18/2019    EF 50-55%, Left atrium is mild to moderately dilated, right atrium is severely dilated, mildly dilated right ventricle, Mod MR, Severe TR, Severe Pulm HTN, no pericardial effusion     History of complete ECG     10/14/2011(Lexiscan);5/6/2010, 4/30/2009,10/24/2008,9/21/2007, 10/13/2006    History of nuclear stress test 11/17/2016    lexiscan-normal,EF70%    Hx of cardiovascular stress test 12/28/2018    EF 60%  Normal study. HX OTHER MEDICAL 05/01/2017    MUGA-normal, EF53%    Hyperlipidemia     Hypertension     Mild intermittent asthma 7/28/2016    Moderate COPD (chronic obstructive pulmonary disease) (HCC) 3/15/2022    Nausea & vomiting     Obstructive sleep apnea 5/16/2017    Paroxysmal atrial fibrillation ([address]     Post PTCA 4/21/2010    PTCA with 2.25 stent of the LIMA to LAD    Pulmonary HTN ([address]     Severe per last echo on 10/13. 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  Followed by MAZE procedure by pulmonary vein isolation.-  Dr Les Tenorio    S/P PTCA (percutaneous transluminal coronary angioplasty) 11/2012    PTCA with stent to RCA    Severe pulmonary hypertension (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 7/29/2022    WD-Non-pressure chronic ulcer of other part of left lower leg limited to breakdown of skin ([facility] 7/29/2022     PSHX:  has a past surgical history that includes Appendectomy (1941); Tonsillectomy (1950's); Cardiac surgery (4/09); Hysterectomy, total abdominal (1990's); Colonoscopy (In 2000's); pacemaker placement; Coronary artery bypass graft (4/8/2009); Coronary angioplasty with stent (4/21/2010); other surgical history; Middle ear surgery; and Percutaneous Transluminal Coronary Angio (11/2012). Allergies: Allergies   Allergen Reactions    Darvocet [Propoxyphene N-Acetaminophen]     Ranexa [Ranolazine Er]      Sick to her stomach    Ranolazine      Sick to her stomach    Sulfa Antibiotics Itching    Sulfasalazine Itching    Adhesive Tape Rash    Allantoin Rash    Bacitracin Rash    Gramicidin Rash    Neomycin Rash    Polymyxin B Rash    Pramoxine Hcl Rash    Silicone Rash     Fam HX: family history includes Arthritis in her mother; Cancer in her mother and sister; Coronary Art Dis in her father; Depression in her mother and sister; Early Death in her paternal grandfather; Early Death (age of onset: 36) in her father; Hearing Loss in her mother; Heart Disease in her father and sister; High Blood Pressure in her father, mother, sister, son, and son; High Cholesterol in her father and mother; Mental Illness in her mother; Zelpha Reji / [place] in her mother; Other in her daughter. Soc HX:   Social History     Socioeconomic History    Marital status:      Number of children: 4   Occupational History    Occupation: RETIRED     Comment: from [address] Use    Smoking status: Former     Packs/day: 0.25     Years: 5.00     Pack years: 1.25     Types: Cigarettes     Quit date: 1970     Years since quittin.0    Smokeless tobacco: Never   Vaping Use    Vaping Use: Never used   Substance and Sexual Activity    Alcohol use: Not Currently     Comment: Caffiene - no more than 3 cups of coffee each day    Drug use: Never    Sexual activity: Yes     Partners: Male     Comment:        Medications:   Medications:    azithromycin  500 mg IntraVENous Once      Infusions:    sodium chloride 100 mL/hr at 12/15/22 2110     PRN Meds:      Labs      CBC:   Recent Labs     12/15/22  1732   WBC 10.0   HGB 17.9*        BMP:    Recent Labs     12/15/22  1732   *   K 3.8   CL 84*   CO2 30   BUN 85*   CREATININE 0.8   GLUCOSE 111*     Hepatic:   Recent Labs     12/15/22  1732   AST 33   ALT 23   BILITOT 1.8*   ALKPHOS 284*     Lipids:   Lab Results   Component Value Date/Time    CHOL 105 2022 08:06 AM    CHOL 168 2017 12:00 AM    HDL 45 2022 08:06 AM    TRIG 69 2022 08:06 AM     Hemoglobin A1C: No results found for: LABA1C  TSH:   Lab Results   Component Value Date/Time    TSH 1.0 2010 12:00 AM     Troponin:   Lab Results   Component Value Date/Time    TROPONINT 0.012 12/15/2022 05:32 PM    TROPONINT <0.010 10/20/2022 02:25 AM    TROPONINT <0.010 10/19/2022 11:16 PM     Lactic Acid: No results for input(s): LACTA in the last 72 hours.   BNP:   Recent Labs     12/15/22  1732   PROBNP 1,342*     UA:  Lab Results   Component Value Date/Time    NITRU NEGATIVE 10/22/2022 01:20 PM    COLORU YELLOW 10/22/2022 01:20 PM    WBCUA 2 10/21/2022 11:50 AM    RBCUA 1 10/21/2022 11:50 AM    MUCUS RARE 10/21/2022 11:50 AM    TRICHOMONAS NONE SEEN 10/21/2022 11:50 AM    YEAST RARE 2021 07:30 AM    BACTERIA NEGATIVE 10/21/2022 11:50 AM    CLARITYU CLEAR 10/22/2022 01:20 PM    SPECGRAV 1.015 10/22/2022 01:20 PM    LEUKOCYTESUR NEGATIVE 10/22/2022 01:20 PM    UROBILINOGEN 0.2 10/22/2022 01:20 PM    BILIRUBINUR NEGATIVE 10/22/2022 01:20 PM    BLOODU NEGATIVE 10/22/2022 01:20 PM    KETUA NEGATIVE 10/22/2022 01:20 PM     Urine Cultures: No results found for: Myron Solorio  Blood Cultures: No results found for: BC  No results found for: BLOODCULT2  Organism: No results found for: ORG    Imaging/Diagnostics Last 24 Hours   CT CHEST WO CONTRAST    Result Date: 12/15/2022  EXAMINATION: CT OF THE CHEST WITHOUT CONTRAST 12/15/2022 8:56 pm TECHNIQUE: CT of the chest was performed without the administration of intravenous contrast. Multiplanar reformatted images are provided for review. Automated exposure control, iterative reconstruction, and/or weight based adjustment of the mA/kV was utilized to reduce the radiation dose to as low as reasonably achievable. COMPARISON: Chest radiograph 12/15/2022. CT chest angiogram 10/19/2022. HISTORY: ORDERING SYSTEM PROVIDED HISTORY: cough hypoxia TECHNOLOGIST PROVIDED HISTORY: Reason for exam:->cough hypoxia Decision Support Exception - unselect if not a suspected or confirmed emergency medical condition->Emergency Medical Condition (MA) Reason for Exam: cough hypoxia Additional signs and symptoms: no Relevant Medical/Surgical History: none FINDINGS: Mediastinum: The thoracic aorta is normal in caliber with moderate calcific plaquing. Status post CABG. The main pulmonary artery is upper limits of normal in size. Massive cardiomegaly. Intracardiac AICD lead in the right ventricle. No pericardial effusion. The mediastinal esophagus is unremarkable. No lymphadenopathy or pneumomediastinum identified. Lungs/pleura: The central airways are grossly patent. No pleural effusion or pneumothorax. Mild right basilar dependent consolidation. Diffuse bilateral mosaic attenuation pattern. No interlobular septal thickening. Upper Abdomen: Images through the upper abdomen demonstrate no acute process.  Soft Tissues/Bones: Status post median sternotomy. No acute osseous or soft tissue abnormality. 1. Massive cardiomegaly. Mild diffuse mosaic attenuation pattern as can be seen with reactive airways disease or bronchiolitis. Edema is also a consideration. 2. Mild right basilar dependent consolidation likely representing atelectasis. Pneumonia and aspiration are not excluded. XR CHEST PORTABLE    Result Date: 12/15/2022  EXAMINATION: ONE X-RAY VIEW OF THE CHEST 12/15/2022 6:39 pm COMPARISON: 10/21/2022 HISTORY: ORDERING SYSTEM PROVIDED HISTORY:  Hypoxia TECHNOLOGIST PROVIDED HISTORY: Reason for Exam:  Hypoxia Additional signs and symptoms:  NA Relevant Medical/Surgical History:  CAD, CHF, COPD Initial evaluation FINDINGS: Monitor wires overlie the chest. The patient has had a median sternotomy. The patient has a pacemaker. The trachea is midline. There is atherosclerotic calcification of the aortic knob. There is cardiomegaly. There is no overt failure. There is suspicion for atelectasis or infiltrates in the lung bases with small pleural effusions. There is the sequela of granulomatous disease in the chest.     Pacemaker. Cardiomegaly. Atelectasis or infiltrates in the lung bases with small pleural effusions. Follow up to resolution is suggested. Personally reviewed Lab Studies, Imaging, and discussed case with ED provider.     Electronically signed by Herrera Riddle MD on 12/15/2022 at 10:36 PM

## 2022-12-16 NOTE — ED NOTES
ED TO INPATIENT SBAR HANDOFF    Patient Name: Desirae Stark   :  3/18/1929  80 y.o. MRN:  [id number]  Preferred Name  Jakob Bethea  ED Room #:  ED18/ED-18  Family/Caregiver Present no   Restraints no   Sitter no   Sepsis Risk Score Sepsis Risk Score: 2.24    Situation  Code Status: Prior No additional code details. Allergies: Darvocet [propoxyphene n-acetaminophen], Ranexa [ranolazine er], Ranolazine, Sulfa antibiotics, Sulfasalazine, Adhesive tape, Allantoin, Bacitracin, Gramicidin, Neomycin, Polymyxin b, Pramoxine hcl, and Silicone  Weight: Patient Vitals for the past 96 hrs (Last 3 readings):   Weight   12/15/22 1716 110 lb (49.9 kg)     Arrived from: nursing home ([facility])  Chief Complaint:   Chief Complaint   Patient presents with    Other     Hypoxic, 88% on RA     Hospital Problem/Diagnosis:  Active Problems:    * No active hospital problems. *  Resolved Problems:    * No resolved hospital problems. *    Imaging:   XR CHEST PORTABLE   Preliminary Result   Pacemaker. Cardiomegaly. Atelectasis or infiltrates in the lung bases with   small pleural effusions. Follow up to resolution is suggested.            Abnormal labs:   Abnormal Labs Reviewed   CBC WITH AUTO DIFFERENTIAL - Abnormal; Notable for the following components:       Result Value    RBC 6.04 (*)     Hemoglobin 17.9 (*)     Hematocrit 56.1 (*)     MCHC 31.9 (*)     RDW 20.0 (*)     MPV 11.4 (*)     Segs Relative 79.6 (*)     Lymphocytes % 8.6 (*)     Monocytes % 8.0 (*)     Immature Neutrophil % 0.8 (*)     All other components within normal limits   COMPREHENSIVE METABOLIC PANEL - Abnormal; Notable for the following components:    Sodium 130 (*)     Chloride 84 (*)     BUN 85 (*)     Glucose 111 (*)     Calcium 11.4 (*)     Total Protein 8.5 (*)     Total Bilirubin 1.8 (*)     Alkaline Phosphatase 284 (*)     All other components within normal limits   BRAIN NATRIURETIC PEPTIDE - Abnormal; Notable for the following components:    Pro-BNP 1,342 (*)     All other components within normal limits     Critical values: no     Abnormal Assessment Findings: hypoxia, new requirement of oxygen    Background  History:   Past Medical History:   Diagnosis Date    Age-related osteoporosis with current pathological fracture of vertebra ([facility] 7/8/2022    Arthritis     Bradycardia 2001    requiring dual chamber pacemaker at [facility]    CAD (coronary artery disease)     Cardiac pacemaker 10/2001    [employer] #[id number]  PPM- Serial # 26-[facility]- Dr Vivian Bergeron    CHF (congestive heart failure) (HCC)     COPD, mild ([address] 2/17/2017    Cor pulmonale (chronic) ([address] 3/15/2022    CVA (cerebrovascular accident) ([facility] [address]     DJD (degenerative joint disease) of cervical spine     C5-C6, C6-C7    Exhaustion of cardiac pacemaker battery 11/21/2011    PPM battery replacement- [employer]    Family history of cardiovascular disease     Glaucoma Dx 2010    H/O 24 hour EKG monitoring 8/6/2000 8/6/2000- Intermittent episonde of a-fib/flutter    H/O cardiac catheterization 4/20/2010, 2/1989 4/20/2010-Severe native vessel disease and has graft disease as well. LAD, CX totally occluded. RCA totally occluded in proximal segment. VG to RCA widely patent. PDA 90% LAD afer LIMA anastomosis 80-90% stenosis. Proceeded with PTCA with stent next day. H/O cardiovascular stress test 10/14/2011, 6/2/2010,4/8/2010, 5/18/2009,4/6/2009, 10/30/2007, 11/12/2004, 11/6/2003, 8/9/2002, 8/9/2001,6/5/2000,     10/14/2011-Lexiscan-Abnormal Myocardial Perfusion study. Evidence of mild ischemia in the Left CX region. Abnomal study. Rest EF 63%. Global LV systolic function normal. No ECG changes. Unremarkable pharmacological stress test.    H/O cardiovascular stress test 6/10/2013    thallium--mild ischemia left circumflex EF63% no change from 10/2011 study.     H/O cardiovascular stress test 10/16/2014    cardiolite-mild ischemia left circumflex,EF70%    H/O chest x-ray 4/19/2009 4/19/2009-Stable cardiomegaly. No acute cardiopulmonary disease. H/O Doppler lower venous ultrasound 09/18/2019    Significant reflux noted in RGSV, RGSV is extremely tortuous and small along the thigh and calf and would be highly unlikely to be accessed, RSSV is non compressible and has occlusive chronic SVT, LSSV is non compressible with occlusive chronic SVT, LGSV removed s/p CABG, Significant reflux in LGSV tributary,    H/O Doppler ultrasound 3/31/2010    CAROTID- 3/31/2010-INtimal thickening but no significant atherosclerotic plaque noted in ANA PAULA. Doppler flow velocities within ANA PAULA are WNL. Heterogeneous, irregular atherosclerotic plaque noted in LICA. Doppler flow velocities within the LICA are elevated, consistent with a mild, less than 50% stenosis. H/O Doppler ultrasound 5/24/2016    Carotid- normal study    H/O Doppler ultrasound 09/06/2017    carotid - normal study    H/O Doppler ultrasound     H/O echocardiogram 10/13    EF=60%, Severe Pulm. HTN, & Sclerotic aortic valve w/stenosis. H/O echocardiogram 10/16/14     EF 55-60% Normal LV. Normal LV systolic function. Severe tricuspid insufficiency with severe hypertension. H/O echocardiogram 02/03/2017    heart cath performed this morning    H/O echocardiogram 09/18/2019    EF 50-55%, Left atrium is mild to moderately dilated, right atrium is severely dilated, mildly dilated right ventricle, Mod MR, Severe TR, Severe Pulm HTN, no pericardial effusion     History of complete ECG     10/14/2011(Lexiscan);5/6/2010, 4/30/2009,10/24/2008,9/21/2007, 10/13/2006    History of nuclear stress test 11/17/2016    lexiscan-normal,EF70%    Hx of cardiovascular stress test 12/28/2018    EF 60%  Normal study.     HX OTHER MEDICAL 05/01/2017    MUGA-normal, EF53%    Hyperlipidemia     Hypertension     Mild intermittent asthma 7/28/2016    Moderate COPD (chronic obstructive pulmonary disease) (HCC) 3/15/2022    Nausea & vomiting     Obstructive sleep apnea 5/16/2017    Paroxysmal atrial fibrillation ([address]     Post PTCA 4/21/2010    PTCA with 2.25 stent of the LIMA to LAD    Pulmonary HTN ([address]     Severe per last echo on 10/13. 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 ([address] 7/29/2022    WD-Non-pressure chronic ulcer of other part of left lower leg limited to breakdown of skin ([facility] 7/29/2022       Assessment    Vitals/MEWS: MEWS Score: 1  Level of Consciousness: Alert (0)   Vitals:    12/15/22 1731 12/15/22 1802 12/15/22 1831 12/15/22 1901   BP: 128/70 111/65 105/66 124/73   Pulse: 60 60 60 60   Resp: 21 22 18 21   Temp:       TempSrc:       SpO2: 93% 95% 96% 92%   Weight:       Height:         FiO2 (%): n/a  O2 Flow Rate: O2 Device: Nasal cannula O2 Flow Rate (L/min): 2 L/min  Cardiac Rhythm:    Pain Assessment:  [x] Verbal [] Jaskaran Bala Scale  Pain Scale: Pain Assessment  Pain Assessment: 0-10  Pain Level: 0  Patient's Stated Pain Goal: 0 - No pain  Last documented pain score (0-10 scale) Pain Level: 0  Last documented pain medication administered: none  Mental Status: alert  NIH Score:    C-SSRS: Risk of Suicide: No Risk  Bedside swallow:    [place] Coma Scale (GCS): [place] Coma Scale  Eye Opening: Spontaneous  Best Verbal Response: Oriented  Best Motor Response: Obeys commands  [place] Coma Scale Score: 15  Active LDA's:   Peripheral IV 12/15/22 Right Forearm (Active)     PO Status: Regular  Pertinent or High Risk Medications/Drips: no   If Yes, please provide details: none    Pending Blood Product Administration: no     You may also review the ED PT Care Timeline found under the Summary Nursing Index tab. Recommendation    Pending orders none  Plan for Discharge (if known):    Additional Comments: n/a   If any further questions, please call Sending RN at 3    Electronically signed by: Electronically signed by Jermain Balbuena RN on 12/15/2022 at 7:46 PM      Jermain Balbuena RN  12/15/22 1953

## 2022-12-16 NOTE — CARE COORDINATION
Reviewed chart and spoke with pt's daughter/ZARI Burks about discharge needs/plans. Pt normally lives alone but son/daughter have been taking 2 week time periods for staying with pt 24/7 since discharged from ARU. Pt a 4 WW , and cane but has been struggling with mobility since in hospital. Pt has a PCP and insurance that covers medications. Family able to assist with transportation as needed and she is active with [employer] [employer]. Daughter is interested in [address] [place] [address] if appropriate and insurance approves if not plan will be home with family and [address]. Will leave PPO SNU list in room for pt's son  who lives in [facility] to review. Will place on Weekend CM list to f/u with family after therapy evals completed. PS to Dr Karey Alford and [facility] message to therapy.

## 2022-12-16 NOTE — CONSULTS
Cor pulmonale (chronic) ([facility] 3/15/2022    CVA (cerebrovascular accident) ([facility] [address]     DJD (degenerative joint disease) of cervical spine     C5-C6, C6-C7    Exhaustion of cardiac pacemaker battery 11/21/2011    PPM battery replacement- [employer]tr[employer]    Family history of cardiovascular disease     Glaucoma Dx 2010    H/O 24 hour EKG monitoring 8/6/2000 8/6/2000- Intermittent episonde of a-fib/flutter    H/O cardiac catheterization 4/20/2010, 2/1989 4/20/2010-Severe native vessel disease and has graft disease as well. LAD, CX totally occluded. RCA totally occluded in proximal segment. VG to RCA widely patent. PDA 90% LAD afer LIMA anastomosis 80-90% stenosis. Proceeded with PTCA with stent next day. H/O cardiovascular stress test 10/14/2011, 6/2/2010,4/8/2010, 5/18/2009,4/6/2009, 10/30/2007, 11/12/2004, 11/6/2003, 8/9/2002, 8/9/2001,6/5/2000,     10/14/2011-Lexiscan-Abnormal Myocardial Perfusion study. Evidence of mild ischemia in the Left CX region. Abnomal study. Rest EF 63%. Global LV systolic function normal. No ECG changes. Unremarkable pharmacological stress test.    H/O cardiovascular stress test 6/10/2013    thallium--mild ischemia left circumflex EF63% no change from 10/2011 study. H/O cardiovascular stress test 10/16/2014    cardiolite-mild ischemia left circumflex,EF70%    H/O chest x-ray 4/19/2009 4/19/2009-Stable cardiomegaly. No acute cardiopulmonary disease. H/O Doppler lower venous ultrasound 09/18/2019    Significant reflux noted in RGSV, RGSV is extremely tortuous and small along the thigh and calf and would be highly unlikely to be accessed, RSSV is non compressible and has occlusive chronic SVT, LSSV is non compressible with occlusive chronic SVT, LGSV removed s/p CABG, Significant reflux in LGSV tributary,    H/O Doppler ultrasound 3/31/2010    CAROTID- 3/31/2010-INtimal thickening but no significant atherosclerotic plaque noted in ANA PAULA. Doppler flow velocities within ANA PAULA are WNL. Heterogeneous, irregular atherosclerotic plaque noted in LICA. Doppler flow velocities within the LICA are elevated, consistent with a mild, less than 50% stenosis. H/O Doppler ultrasound 5/24/2016    Carotid- normal study    H/O Doppler ultrasound 09/06/2017    carotid - normal study    H/O Doppler ultrasound     H/O echocardiogram 10/13    EF=60%, Severe Pulm. HTN, & Sclerotic aortic valve w/stenosis. H/O echocardiogram 10/16/14     EF 55-60% Normal LV. Normal LV systolic function. Severe tricuspid insufficiency with severe hypertension. H/O echocardiogram 02/03/2017    heart cath performed this morning    H/O echocardiogram 09/18/2019    EF 50-55%, Left atrium is mild to moderately dilated, right atrium is severely dilated, mildly dilated right ventricle, Mod MR, Severe TR, Severe Pulm HTN, no pericardial effusion     History of complete ECG     10/14/2011(Lexiscan);5/6/2010, 4/30/2009,10/24/2008,9/21/2007, 10/13/2006    History of nuclear stress test 11/17/2016    lexiscan-normal,EF70%    Hx of cardiovascular stress test 12/28/2018    EF 60%  Normal study. HX OTHER MEDICAL 05/01/2017    MUGA-normal, EF53%    Hyperlipidemia     Hypertension     Mild intermittent asthma 7/28/2016    Moderate COPD (chronic obstructive pulmonary disease) (HCC) 3/15/2022    Nausea & vomiting     Obstructive sleep apnea 5/16/2017    Paroxysmal atrial fibrillation ([address]     Post PTCA 4/21/2010    PTCA with 2.25 stent of the LIMA to LAD    Pulmonary HTN ([address]     Severe per last echo on 10/13. 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  Followed by MAZE procedure by pulmonary vein isolation.-  Dr Elvia Meyer    S/P PTCA (percutaneous transluminal coronary angioplasty) 11/2012    PTCA with stent to RCA    Severe pulmonary hypertension ([address] 3/15/2022    Shortness of breath 3/15/2022    Thyroid disease     hypothyroi    Unspecified cerebral artery occlusion with cerebral infarction Unsure When    No Residual    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 ([facility] [address] 7/29/2022    WD-Non-pressure chronic ulcer of other part of left lower leg limited to breakdown of skin ([facility] [address] 7/29/2022       Surgical history :   Past Surgical History:   Procedure Laterality Date    [address]  4/09    CABG (3 Bypasses), One Heart Stent in 2010    COLONOSCOPY  In 2000's    X1    CORONARY ANGIOPLASTY WITH STENT PLACEMENT  4/21/2010    PTCA with stent LIMA ->LAD    CORONARY ARTERY BYPASS GRAFT  4/8/2009    LIMA->Diag,  LIMA -> LAD;  SVG->RCA going to the PDA. Followed by MAZE procedure by pulmonary vein isolation.-  Dr Elouise Duane, TOTAL ABDOMINAL (CERVIX REMOVED)  1990's    MIDDLE EAR SURGERY      OTHER SURGICAL HISTORY      Ear surgery-hearing    PACEMAKER PLACEMENT      battery change 11/21/2011 Medtronic    PTCA  11/2012    Ptca with stent to RCA    TONSILLECTOMY  1950's       Family history:   Family History   Problem Relation Age of Onset    Cancer Mother         \"Liver Cancer\"    Arthritis Mother     Depression Mother     Hearing Loss Mother     High Blood Pressure Mother     High Cholesterol Mother     Mental Illness Mother     Miscarriages / Stillbirths Mother     Heart Disease Father     Early Death Father 36        \"Instant Death\"    High Blood Pressure Father     High Cholesterol Father     Coronary Art Dis Father         Massive MI    Heart Disease Sister     Depression Sister     Cancer Sister         \"Breast Cancer, Cancer Free Now\"    High Blood Pressure Sister     Other Daughter         \"She's Had Stomach Surgery\"    High Blood Pressure Son     High Blood Pressure Son     Early Death Paternal Grandfather      Social history :  reports that she quit smoking about 52 years ago. Her smoking use included cigarettes. She has a 1.25 pack-year smoking history.  She has never used smokeless tobacco. She reports that she does not currently use alcohol. She reports that she does not use drugs. Allergies   Allergen Reactions    Darvocet [Propoxyphene N-Acetaminophen]     Ranexa [Ranolazine Er]      Sick to her stomach    Ranolazine      Sick to her stomach    Sulfa Antibiotics Itching    Sulfasalazine Itching    Adhesive Tape Rash    Allantoin Rash    Bacitracin Rash    Gramicidin Rash    Neomycin Rash    Polymyxin B Rash    Pramoxine Hcl Rash    Silicone Rash       No current facility-administered medications on file prior to encounter.      Current Outpatient Medications on File Prior to Encounter   Medication Sig Dispense Refill    metOLazone (ZAROXOLYN) 5 MG tablet Take 1 tablet by mouth in the morning and at bedtime 60 tablet 0    spironolactone (ALDACTONE) 25 MG tablet Take 1 tablet by mouth daily 30 tablet 0    torsemide 40 MG TABS Take 40 mg by mouth 2 times daily 60 tablet 0    midodrine (PROAMATINE) 5 MG tablet Take 1 tablet by mouth 3 times daily (with meals) 90 tablet 0    aspirin 81 MG chewable tablet Take 81 mg by mouth daily      ferrous sulfate (IRON 325) 325 (65 Fe) MG tablet Take 325 mg by mouth every other day      traZODone (DESYREL) 50 MG tablet Take  mg by mouth nightly as needed for Sleep      acetaminophen (TYLENOL) 500 MG tablet Take 1,000 mg by mouth every 4 hours as needed for Pain or Fever      ZIOPTAN 0.0015 % SOLN Place 1 drop into both eyes Daily with supper       vitamin D 25 MCG (1000 UT) CAPS Take 5,000 Units by mouth daily      atorvastatin (LIPITOR) 10 MG tablet Take 10 mg by mouth nightly      umeclidinium-vilanterol (ANORO ELLIPTA) 62.5-25 MCG/INH AEPB inhaler Inhale 1 puff into the lungs daily 1 each 11    CPAP Machine MISC by Does not apply route      Misc Natural Products (OSTEO BI-FLEX ADV DOUBLE ST PO) Take 1 tablet by mouth daily      timolol (TIMOPTIC) 0.5 % ophthalmic solution Place 1 drop into both eyes nightly      carvedilol (COREG) 6.25 MG tablet Take 1 tablet by mouth 2 times daily (with meals) 180 tablet 3    albuterol (PROVENTIL HFA;VENTOLIN HFA) 108 (90 BASE) MCG/ACT inhaler Inhale 2 puffs into the lungs 2 times daily AND EVERY 4-6 HOURS AS NEEDED      Multiple Vitamins-Minerals (ICAPS) CAPS Take 1 capsule by mouth 2 times daily       vitamin B-12 (CYANOCOBALAMIN) 1000 MCG tablet Take 1,000 mcg by mouth daily. levothyroxine (SYNTHROID) 88 MCG tablet Take 88 mcg by mouth daily. Review of Systems:   Review of Systems   Unable to perform ROS: Dementia         Examination:      Vitals:    12/16/22 0008 12/16/22 0743 12/16/22 0835 12/16/22 0925   BP: (!) 112/57  (!) 103/56    Pulse: 59  56    Resp: 18 18 24    Temp: 98 °F (36.7 °C)  97.5 °F (36.4 °C)    TempSrc: Oral      SpO2: 96%  91%    Weight:       Height:    4' 1.02\" (1.245 m)        Body mass index is 32.19 kg/m². Physical Exam  Constitutional:       General: She is not in acute distress. Appearance: She is not ill-appearing or diaphoretic. HENT:      Head: Normocephalic and atraumatic. Right Ear: External ear normal.      Left Ear: External ear normal.      Nose: No congestion. Mouth/Throat:      Mouth: Mucous membranes are moist.   Eyes:      Extraocular Movements: Extraocular movements intact. Conjunctiva/sclera: Conjunctivae normal.      Pupils: Pupils are equal, round, and reactive to light. Cardiovascular:      Rate and Rhythm: Normal rate and regular rhythm. Heart sounds: Murmur (grade 2/6 systolic murmur) heard. Pulmonary:      Effort: Pulmonary effort is normal. No respiratory distress. Breath sounds: Rales present. No wheezing or rhonchi. Abdominal:      General: Abdomen is flat. Bowel sounds are normal. There is no distension. Palpations: Abdomen is soft. Tenderness: There is no abdominal tenderness. Musculoskeletal:         General: No tenderness. Cervical back: Normal range of motion. No tenderness. Right lower leg: No edema.       Left lower leg: No edema. Skin:     General: Skin is warm. Neurological:      General: No focal deficit present. Mental Status: She is alert. Cranial Nerves: No cranial nerve deficit. Comments: Alert but does not appear to be oriented to place and time   Psychiatric:         Mood and Affect: Mood normal.             CBC:   Lab Results   Component Value Date/Time    WBC 10.7 12/16/2022 07:11 AM    HGB 17.1 12/16/2022 07:11 AM    HCT 52.3 12/16/2022 07:11 AM     12/16/2022 07:11 AM     Lipids:  Lab Results   Component Value Date    CHOL 105 07/08/2022    TRIG 69 07/08/2022    HDL 45 07/08/2022    LDLCALC 46 07/08/2022    LDLDIRECT 116 (H) 10/29/2018     PT/INR:   Lab Results   Component Value Date/Time    INR 1.23 10/20/2022 08:27 AM        BMP:    Lab Results   Component Value Date     12/16/2022    K 3.3 (L) 12/16/2022    CL 93 (L) 12/16/2022    CO2 26 12/16/2022    BUN 70 (H) 12/16/2022     CMP:   Lab Results   Component Value Date    AST 33 12/15/2022    PROT 8.5 (H) 12/15/2022    BILITOT 1.8 (H) 12/15/2022    ALKPHOS 284 (H) 12/15/2022     TSH:    Lab Results   Component Value Date/Time    TSH 1.0 05/07/2010 12:00 AM       EKGINTERPRETATION - EKG Interpretation:        Vitals:    12/16/22 0008 12/16/22 0743 12/16/22 0835 12/16/22 0925   BP: (!) 112/57  (!) 103/56    Pulse: 59  56    Resp: 18 18 24    Temp: 98 °F (36.7 °C)  97.5 °F (36.4 °C)    TempSrc: Oral      SpO2: 96%  91%    Weight:       Height:    4' 1.02\" (1.245 m)          IMPRESSION / RECOMMENDATIONS:     Assess for need for BIV pacer  Acute resp failure  Chronic diastolic heart failure  ? Right heart failure - severe pulmonary HTN and appears corpulmonale  Moderate COPD  Chronic atrial fibrillation - patient appears to be only on aspirin as out patient. - probably risk of fall vs bleeding.  She is on iron tablets and also midodrine for low BP        Patient is pacemaker dependent and RV paced 98% of the time  LVEF though is 50-55% so she will not be candidate for BIV pacer    Patient in HF from diastolic and RV failure with severe pulmonary HTN.  following with HF management and diuresis    Thanks again for allowing me to participate in care of this patient. Please call me if you have any questions. With best regards. Maria Gonzales MD, 12/16/2022 3:59 PM     Please note this report has been partially produced using speech recognition software and may contain errors related to that system including errors in grammar, punctuation, and spelling, as well as words and phrases that may be inappropriate. If there are any questions or concerns please feel free to contact the dictating provider for clarification.

## 2022-12-16 NOTE — ED PROVIDER NOTES
EMERGENCY DEPARTMENT ENCOUNTER    [facility] EMERGENCY DEPARTMENT        TRIAGE CHIEF COMPLAINT:   Other (Hypoxic, 88% on RA)      [place]:  Pelon Thomas is a 80 y.o. female that presents with son and daughter in law who are patient's caretakers with concern for hypoxemia, decreased appetite. Currently since patient eval by home health care's nurse yesterday and was noted to be hypoxic in the mid 80s on room air. Patient does have history of COPD, CHF, pacemaker. Does not have a baseline ox requirement. She has had a cough for the last several days, nonproductive without other upper respiratory illness symptoms. No fever or chills. Was seen by cardiology at her house today and was placed onto 2 L nasal cannula for persistent hypoxia. Family is concerned as patient has been frequently mouth breathing especially when she is sleeping. Does not utilize CPAP at night. They are also concerned for possible dehydration and decreased appetite for the last several days. No recent falls or trauma. Family believes patient may be developing some dementia associated with her age and does appear more confused, concern for UTI. No other vomiting or diarrhea. When asked, patient is denying any chest pain shortness of breath, abdominal pain or nausea. Patient has been compliant with all her medications including diuretics. No increased lower leg swelling. Patient is not currently on any anticoagulation therapy. ROS: Provided by family at bedside.   General:  No fevers   Cardiovascular:  No chest pain, no palpitations  Respiratory:  No shortness of breath, + cough, no wheezing  Gastrointestinal:  No pain, no nausea, no vomiting, no diarrhea  Neurologic:  See HPI  Genitourinary:  No dysuria   Extremities:  No edema    Past Medical History:   Diagnosis Date    Age-related osteoporosis with current pathological fracture of vertebra ([address] 7/8/2022    Arthritis Bradycardia 2001    requiring dual chamber pacemaker at [facility]    CAD (coronary artery disease)     Cardiac pacemaker 10/2001    [employer] #[id number]  PPM- Serial # [id number]- Dr Rubens Atkinson    CHF (congestive heart failure) ([facility])     COPD, mild ([facility] 2/17/2017    Cor pulmonale (chronic) ([facility] 3/15/2022    CVA (cerebrovascular accident) ([facility]     DJD (degenerative joint disease) of cervical spine     C5-C6, C6-C7    Exhaustion of cardiac pacemaker battery 11/21/2011    PPM battery replacement- [employer]    Family history of cardiovascular disease     Glaucoma Dx 2010    H/O 24 hour EKG monitoring 8/6/2000 8/6/2000- Intermittent episonde of a-fib/flutter    H/O cardiac catheterization 4/20/2010, 2/1989 4/20/2010-Severe native vessel disease and has graft disease as well. LAD, CX totally occluded. RCA totally occluded in proximal segment. VG to RCA widely patent. PDA 90% LAD afer LIMA anastomosis 80-90% stenosis. Proceeded with PTCA with stent next day. H/O cardiovascular stress test 10/14/2011, 6/2/2010,4/8/2010, 5/18/2009,4/6/2009, 10/30/2007, 11/12/2004, 11/6/2003, 8/9/2002, 8/9/2001,6/5/2000,     10/14/2011-Lexiscan-Abnormal Myocardial Perfusion study. Evidence of mild ischemia in the Left CX region. Abnomal study. Rest EF 63%. Global LV systolic function normal. No ECG changes. Unremarkable pharmacological stress test.    H/O cardiovascular stress test 6/10/2013    thallium--mild ischemia left circumflex EF63% no change from 10/2011 study. H/O cardiovascular stress test 10/16/2014    cardiolite-mild ischemia left circumflex,EF70%    H/O chest x-ray 4/19/2009 4/19/2009-Stable cardiomegaly. No acute cardiopulmonary disease.     H/O Doppler lower venous ultrasound 09/18/2019    Significant reflux noted in RGSV, RGSV is extremely tortuous and small along the thigh and calf and would be highly unlikely to be accessed, RSSV is non compressible and has occlusive chronic SVT, LSSV is non compressible with occlusive chronic SVT, LGSV removed s/p CABG, Significant reflux in LGSV tributary,    H/O Doppler ultrasound 3/31/2010    CAROTID- 3/31/2010-INtimal thickening but no significant atherosclerotic plaque noted in ANA PAULA. Doppler flow velocities within ANA PAULA are WNL. Heterogeneous, irregular atherosclerotic plaque noted in LICA. Doppler flow velocities within the LICA are elevated, consistent with a mild, less than 50% stenosis. H/O Doppler ultrasound 5/24/2016    Carotid- normal study    H/O Doppler ultrasound 09/06/2017    carotid - normal study    H/O Doppler ultrasound     H/O echocardiogram 10/13    EF=60%, Severe Pulm. HTN, & Sclerotic aortic valve w/stenosis. H/O echocardiogram 10/16/14     EF 55-60% Normal LV. Normal LV systolic function. Severe tricuspid insufficiency with severe hypertension. H/O echocardiogram 02/03/2017    heart cath performed this morning    H/O echocardiogram 09/18/2019    EF 50-55%, Left atrium is mild to moderately dilated, right atrium is severely dilated, mildly dilated right ventricle, Mod MR, Severe TR, Severe Pulm HTN, no pericardial effusion     History of complete ECG     10/14/2011(Lexiscan);5/6/2010, 4/30/2009,10/24/2008,9/21/2007, 10/13/2006    History of nuclear stress test 11/17/2016    lexiscan-normal,EF70%    Hx of cardiovascular stress test 12/28/2018    EF 60%  Normal study. HX OTHER MEDICAL 05/01/2017    MUGA-normal, EF53%    Hyperlipidemia     Hypertension     Mild intermittent asthma 7/28/2016    Moderate COPD (chronic obstructive pulmonary disease) (HCC) 3/15/2022    Nausea & vomiting     Obstructive sleep apnea 5/16/2017    Paroxysmal atrial fibrillation ([address]     Post PTCA 4/21/2010    PTCA with 2.25 stent of the LIMA to LAD    Pulmonary HTN ([address]     Severe per last echo on 10/13. 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  Followed by MAZE procedure by pulmonary vein isolation.-  Dr Denise Mathis    S/P PTCA (percutaneous transluminal coronary angioplasty) 11/2012    PTCA with stent to RCA    Severe pulmonary hypertension ([address] 3/15/2022    Shortness of breath 3/15/2022    Thyroid disease     hypothyroi    Unspecified cerebral artery occlusion with cerebral infarction Unsure When    No Residual    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 ([address] 7/29/2022    WD-Non-pressure chronic ulcer of other part of left lower leg limited to breakdown of skin ([address] 7/29/2022     Past Surgical History:   Procedure Laterality Date    APPENDECTOMY  1941    CARDIAC SURGERY  4/09    CABG (3 Bypasses), One Heart Stent in 2010    COLONOSCOPY  In 2000's    X1    CORONARY ANGIOPLASTY WITH STENT PLACEMENT  4/21/2010    PTCA with stent LIMA ->LAD    CORONARY ARTERY BYPASS GRAFT  4/8/2009    LIMA->Diag,  LIMA -> LAD;  SVG->RCA going to the PDA.  Followed by MAZE procedure by pulmonary vein isolation.-  Dr Radha Garza, TOTAL ABDOMINAL (CERVIX REMOVED)  1990's    MIDDLE EAR SURGERY      OTHER SURGICAL HISTORY      Ear surgery-hearing    PACEMAKER PLACEMENT      battery change 11/21/2011 Medtr[employer]    PTCA  11/2012    Ptca with stent to RCA    TONSILLECTOMY  65's     Family History   Problem Relation Age of Onset    Cancer Mother         \"Liver Cancer\"    Arthritis Mother     Depression Mother     Hearing Loss Mother     High Blood Pressure Mother     High Cholesterol Mother     Mental Illness Mother     Miscarriages / Stillbirths Mother     Heart Disease Father     Early Death Father 36        \"Instant Death\"    High Blood Pressure Father     High Cholesterol Father     Coronary Art Dis Father         Massive MI    Heart Disease Sister     Depression Sister     Cancer Sister         \"Breast Cancer, Cancer Free Now\"    High Blood Pressure Sister     Other Daughter         \"She's Had Stomach Surgery\"    High Blood Pressure Son     High Blood Pressure Son Early Death Paternal Grandfather      Social History     Socioeconomic History    Marital status:       Spouse name: Not on file    Number of children: 4    Years of education: Not on file    Highest education level: Not on file   Occupational History    Occupation: RETIRED     Comment: from [address] Use    Smoking status: Former     Packs/day: 0.25     Years: 5.00     Pack years: 1.25     Types: Cigarettes     Quit date: 1970     Years since quittin.0    Smokeless tobacco: Never   Vaping Use    Vaping Use: Never used   Substance and Sexual Activity    Alcohol use: Not Currently     Comment: Caffiene - no more than 3 cups of coffee each day    Drug use: Never    Sexual activity: Yes     Partners: Male     Comment:    Other Topics Concern    Not on file   Social History Narrative    Not on file     Social Determinants of Health     Financial Resource Strain: Not on file   Food Insecurity: Not on file   Transportation Needs: Not on file   Physical Activity: Not on file   Stress: Not on file   Social Connections: Not on file   Intimate Partner Violence: Not on file   Housing Stability: Not on file     Current Facility-Administered Medications   Medication Dose Route Frequency Provider Last Rate Last Admin    0.9 % sodium chloride infusion   IntraVENous Continuous Korinne Lusterio, PA-C 100 mL/hr at 12/15/22 2110 New Bag at 12/15/22 2110    azithromycin (ZITHROMAX) 500 mg in dextrose 5 % 250 mL IVPB ([id number])  500 mg IntraVENous Once Princess Ibarra PA-C 250 mL/hr at 12/15/22 2158 500 mg at 12/15/22 2158     Current Outpatient Medications   Medication Sig Dispense Refill    metOLazone (ZAROXOLYN) 5 MG tablet Take 1 tablet by mouth in the morning and at bedtime 60 tablet 0    spironolactone (ALDACTONE) 25 MG tablet Take 1 tablet by mouth daily 30 tablet 0    torsemide 40 MG TABS Take 40 mg by mouth 2 times daily 60 tablet 0    midodrine (PROAMATINE) 5 MG tablet Take 1 tablet by mouth 3 times daily (with meals) 90 tablet 0    aspirin 81 MG chewable tablet Take 81 mg by mouth daily      ferrous sulfate (IRON 325) 325 (65 Fe) MG tablet Take 325 mg by mouth every other day      traZODone (DESYREL) 50 MG tablet Take  mg by mouth nightly as needed for Sleep      acetaminophen (TYLENOL) 500 MG tablet Take 1,000 mg by mouth every 4 hours as needed for Pain or Fever      ZIOPTAN 0.0015 % SOLN Place 1 drop into both eyes Daily with supper       vitamin D 25 MCG (1000 UT) CAPS Take 5,000 Units by mouth daily      atorvastatin (LIPITOR) 10 MG tablet Take 10 mg by mouth nightly      umeclidinium-vilanterol (ANORO ELLIPTA) 62.5-25 MCG/INH AEPB inhaler Inhale 1 puff into the lungs daily 1 each 11    CPAP Machine MISC by Does not apply route      Misc Natural Products (OSTEO BI-FLEX ADV DOUBLE ST PO) Take 1 tablet by mouth daily      timolol (TIMOPTIC) 0.5 % ophthalmic solution Place 1 drop into both eyes nightly      carvedilol (COREG) 6.25 MG tablet Take 1 tablet by mouth 2 times daily (with meals) 180 tablet 3    albuterol (PROVENTIL HFA;VENTOLIN HFA) 108 (90 BASE) MCG/ACT inhaler Inhale 2 puffs into the lungs 2 times daily AND EVERY 4-6 HOURS AS NEEDED      Multiple Vitamins-Minerals (ICAPS) CAPS Take 1 capsule by mouth 2 times daily       vitamin B-12 (CYANOCOBALAMIN) 1000 MCG tablet Take 1,000 mcg by mouth daily. levothyroxine (SYNTHROID) 88 MCG tablet Take 88 mcg by mouth daily.          Allergies   Allergen Reactions    Darvocet [Propoxyphene N-Acetaminophen]     Ranexa [Ranolazine Er]      Sick to her stomach    Ranolazine      Sick to her stomach    Sulfa Antibiotics Itching    Sulfasalazine Itching    Adhesive Tape Rash    Allantoin Rash    Bacitracin Rash    Gramicidin Rash    Neomycin Rash    Polymyxin B Rash    Pramoxine Hcl Rash    Silicone Rash       Nursing Notes Reviewed  PHYSICAL EXAM    VITAL SIGNS: BP (!) 154/78   Pulse 60   Temp 97.7 °F (36.5 °C) (Oral)   Resp 5. 4 M/CU MM    Hemoglobin 17.9 (H) 12.5 - 16.0 GM/DL    Hematocrit 56.1 (H) 37 - 47 %    MCV 92.9 78 - 100 FL    MCH 29.6 27 - 31 PG    MCHC 31.9 (L) 32.0 - 36.0 %    RDW 20.0 (H) 11.7 - 14.9 %    Platelets 447 452 - 022 K/CU MM    MPV 11.4 (H) 7.5 - 11.1 FL    Differential Type AUTOMATED DIFFERENTIAL     Segs Relative 79.6 (H) 36 - 66 %    Lymphocytes % 8.6 (L) 24 - 44 %    Monocytes % 8.0 (H) 0 - 4 %    Eosinophils % 2.4 0 - 3 %    Basophils % 0.6 0 - 1 %    Segs Absolute 7.9 K/CU MM    Lymphocytes Absolute 0.9 K/CU MM    Monocytes Absolute 0.8 K/CU MM    Eosinophils Absolute 0.2 K/CU MM    Basophils Absolute 0.1 K/CU MM    Nucleated RBC % 0.0 %    Total Nucleated RBC 0.0 K/CU MM    Total Immature Neutrophil 0.08 K/CU MM    Immature Neutrophil % 0.8 (H) 0 - 0.43 %   Comprehensive Metabolic Panel   Result Value Ref Range    Sodium 130 (L) 135 - 145 MMOL/L    Potassium 3.8 3.5 - 5.1 MMOL/L    Chloride 84 (L) 99 - 110 mMol/L    CO2 30 21 - 32 MMOL/L    BUN 85 (H) 6 - 23 MG/DL    Creatinine 0.8 0.6 - 1.1 MG/DL    Est, Glom Filt Rate >60 >60 mL/min/1.73m2    Glucose 111 (H) 70 - 99 MG/DL    Calcium 11.4 (H) 8.3 - 10.6 MG/DL    Albumin 4.5 3.4 - 5.0 GM/DL    Total Protein 8.5 (H) 6.4 - 8.2 GM/DL    Total Bilirubin 1.8 (H) 0.0 - 1.0 MG/DL    ALT 23 10 - 40 U/L    AST 33 15 - 37 IU/L    Alkaline Phosphatase 284 (H) 40 - 129 IU/L    Anion Gap 16 4 - 16   Brain Natriuretic Peptide   Result Value Ref Range    Pro-BNP 1,342 (H) <300 PG/ML   Lactate, Sepsis   Result Value Ref Range    Lactic Acid, Sepsis 1.4 0.5 - 1.9 mMOL/L   Troponin   Result Value Ref Range    Troponin T 0.012 (H) <0.01 NG/ML   Blood Gas, Venous   Result Value Ref Range    pH, Yunior 7.41 7.32 - 7.42    pCO2, Yunior 59 (H) 38 - 52 mmHG    pO2, Yunior 47 28 - 48 mmHG    Base Exc, Mixed 10.4 (H) 0 - 2.3    HCO3, Venous 37.4 (H) 19 - 25 MMOL/L    O2 Sat, Yunior 76.6 (H) 50 - 70 %    Comment VBG         Radiographs (if obtained):  [] The following radiograph was interpreted by myself in the absence of a radiologist:   [] Radiologist's Report Reviewed:  CT CHEST WO CONTRAST   Final Result   1. Massive cardiomegaly. Mild diffuse mosaic attenuation pattern as can be   seen with reactive airways disease or bronchiolitis. Edema is also a   consideration. 2. Mild right basilar dependent consolidation likely representing   atelectasis. Pneumonia and aspiration are not excluded. XR CHEST PORTABLE   Preliminary Result   Pacemaker. Cardiomegaly. Atelectasis or infiltrates in the lung bases with   small pleural effusions. Follow up to resolution is suggested. EKG Interpretation  Please see ED physician's note - Dr. Marbella Yoder - for EKG interpretation      Chart review shows recent radiographs:  No results found. ED COURSE & MEDICAL DECISION MAKING       Vital signs and nursing notes reviewed during ED course. I have independently evaluated this patient . Supervising physician - Dr. Marbella Yoder - was present in ED and available for consultation throughout entirety of patient's care. All pertinent Lab data and radiographic results reviewed with patient at bedside. The patient and/or the family were informed of the results of any tests/labs/imaging, the treatment plan, and time was allotted to answer questions. Clinical Impression:  1. Acute on chronic congestive heart failure, unspecified heart failure type (HCC)    2. Elevated troponin    3. Hypoxia    4. Dehydration    5. Fatigue, unspecified type        Patient presents with family with concern for UTI, generalized weakness, hypoxia on home and cough. Work-up was initiated triage. I initially evaluated patient after a 3+ hour ED wait time. My exam, thin elderly female, appears chronically deconditioned, globally weak but otherwise awake and alert. Acting normal baseline mental status per family at bedside. Lungs with diminished air exchange with scattered crackles throughout.   Noted 2/6 systolic ejection murmur but no other cardiac abnormalities. Abdomen soft nontender. No significant pitting edema or asymmetry of the lower extremities. Ordered triage shows normal white count however elevated RBC hemoglobin hematocrit. CMP with mild hyponatremia, BUN 85 normal creatinine. Normal potassium. BNP is elevated at 1342, troponin is detectable at 0.012. Rapid COVID and rapid influenza are negative. Lactic acid is normal. Venous blood gas with normal pH with elevated pCO2 is 59. UA is ordered and pending at time of this dictation. Chest x-ray shows cardiomegaly with atelectasis versus infiltrate in lung bases with small pleural effusions. Did start patient on gentle IV fluid infusion to minimize worsening CHF or volume overload. On chart review, last echocardiogram did show an EF of 55%. Did add on blood cultures and will cover patient empirically with IV Rocephin and azithromycin for pneumonia. CT chest without contrast shows massive cardiomegaly with diffuse mosaic attenuation pattern seen in reactive airway disease versus bronchiolitis, also consider edema versus atelectasis versus pneumonia. She is tolerating nasal cannula 2 L well. At this time, presentation is concerning for acute CHF exacerbation however also lab findings concerning for dehydration with her elevated BUN. We will continue IV fluid infusion antibiotics for suspected pneumonia, hold off on additional diuretics at this time and admit to hospitalist service for further care. Family are comfortable and agreeable with this. I did consult with Dr. Davin Manley - hospitalist - and discussed patient's history, ED presentation/course including any pertinent laboratory findings and imaging study findings. He/she agrees to hospital admission. Patient is admitted to the hospital in stable condition.     In consideration of current COVID19 pandemic, with effort to minimize unnecessary provider exposure, this patient was seen at bedside by me independently. However, in compliance with current hospital MOOKIE/ED protocol, prior to admission I did discuss this patient case with emergency department physician, Dr. Patricia Verdugo, who did agree with ED workup/evaluation and plan for admission however but ED attending physician did not independently evaluate the patient. Of note, this Pt was NOT admitted to the ICU. Diagnosis and plan discussed in detail with patient who understands and agrees. Disposition referral (if applicable):  No follow-up provider specified.     Disposition medications (if applicable):  New Prescriptions    No medications on file         (Please note that portions of this note may have been completed with a voice recognition program. Efforts were made to edit the dictations but occasionally words are mis-transcribed.)         Gabbie Perry PA-C  12/15/22 [id number]

## 2022-12-16 NOTE — PROGRESS NOTES
V2.0  [facility] Hospitalist Progress Note      Name:  Kiel Tay /Age/Sex: 3/18/1929  (80 y.o. female)   MRN & CSN:  [id number] & [id number] Encounter Date/Time: 2022 3:35 PM EST    Location:  [address] [place] [address]-A PCP: Teresa Ellis MD       Hospital Day: 2    Assessment and Plan:   Kiel Tay is a 80 y.o. female with pmh of  who presents with Acute respiratory failure with hypoxia ([address]         #.  Acute respiratory failure with hypoxia  -As per information patient was hypoxic in cardiologist office.  -Saturating 93-94% on 2 L of oxygen. -VBG-pH 7.41, PCO2 59, PO2 47, HCO3 37.4.  -Chest x-ray-cardiomegaly, atelectasis or infiltrates in the lung bases with some small pleural effusions. -CT chest without contrast-massive cardiomegaly, mild diffuse mosaic attenuation pattern as can be seen with reactive airway disease or bronchiolitis, edema is also a consideration. Mild right basilar dependent consolidation likely representing atelectasis. -Wean off oxygen as tolerated. #.  Chronic diastolic congestive heart failure  -Echo-10/2022-EF 12-38%, grade 3 diastolic dysfunction, severely dilated right ventricle, severe tricuspid regurgitation  -Appears clinically dry  -Continue with gentle hydration for now. -Cardiology have been consulted for diuretic management. #. ? Pneumonia  -Continue ceftriaxone, azithromycin.  - add procalcitonin; suspicion for bacterial pneumonia low        #. Hypercalcemia-continue IV fluids and repeat BMP. #.  Moderate COPD  -Does not appear to be in exacerbation  -Patient is on Anoro Ellipta  -Albuterol HFA as needed     #. Coronary artery disease s/p CABG     #. Atrial fibrillation  -Not on anticoagulation due to fall risk     #. Chronic hypotension-on midodrine     #. Severe pulmonary hypertension, cor pulmonale     #. S/p PPM     #. SAULO on CPAP     #. Glaucoma-on latanoprost, timolol     #.   Dementia  - will add UA and X ray KUB   -per daughter patient has subtle behaviour changes     Diet ADULT DIET; Regular; Low Fat/Low Chol/High Fiber/2 gm Na  ADULT ORAL NUTRITION SUPPLEMENT; Breakfast, Dinner; Standard High Calorie/High Protein Oral Supplement  ADULT ORAL NUTRITION SUPPLEMENT; Lunch; Frozen Oral Supplement   DVT Prophylaxis [] Lovenox, []  Heparin, [] SCDs, [] Ambulation,  [] Eliquis, [] Xarelto  [] Coumadin   Code Status Full Code   Disposition From:   Expected Disposition:   Estimated Date of Discharge:  Patient requires continued admission due to    Surrogate Decision Maker/ POA      Subjective:     Chief Complaint: Other (Hypoxic, 88% on RA)       Jose Philippe is a 80 y.o. female who presents with hypoxia from outpatient cardiology clinic. Seen and examined in the morning. Has no complaints currently. Does not know why she is in the hospital.         Review of Systems:    Review of Systems        Objective: Intake/Output Summary (Last 24 hours) at 12/16/2022 1535  Last data filed at 12/16/2022 0248  Gross per 24 hour   Intake --   Output 2 ml   Net -2 ml        Vitals:   Vitals:    12/16/22 0835   BP: (!) 103/56   Pulse: 56   Resp: 24   Temp: 97.5 °F (36.4 °C)   SpO2: 91%       Physical Exam:     General: NAD  Eyes: EOMI  ENT: neck supple  Cardiovascular: Regular rate. Respiratory: Clear to auscultation  Gastrointestinal: Soft, distended belly   Genitourinary: no suprapubic tenderness  Musculoskeletal: No edema  Skin: warm, dry  Neuro: Alert.       Medications:   Medications:    aspirin  81 mg Oral Daily    atorvastatin  10 mg Oral QPM    midodrine  5 mg Oral TID     tiotropium-olodaterol  2 puff Inhalation Daily    latanoprost  1 drop Both Eyes QPM    timolol  1 drop Both Eyes QPM    cefTRIAXone (ROCEPHIN) IV  1,000 mg IntraVENous Q24H    azithromycin  500 mg Oral Daily    polyethylene glycol  17 g Oral Daily    potassium bicarb-citric acid  40 mEq Oral Once    sodium chloride flush  5-40 mL IntraVENous 2 times per day    heparin (porcine)  5,000 Units SubCUTAneous BID      Infusions:    sodium chloride       PRN Meds: albuterol sulfate HFA, 2 puff, Q6H PRN  sodium chloride flush, 5-40 mL, PRN  sodium chloride, , PRN  ondansetron, 4 mg, Q8H PRN   Or  ondansetron, 4 mg, Q6H PRN  acetaminophen, 650 mg, Q6H PRN   Or  acetaminophen, 650 mg, Q6H PRN        Labs      Recent Results (from the past 24 hour(s))   CBC with Auto Differential    Collection Time: 12/15/22  5:32 PM   Result Value Ref Range    WBC 10.0 4.0 - 10.5 K/CU MM    RBC 6.04 (H) 4.2 - 5.4 M/CU MM    Hemoglobin 17.9 (H) 12.5 - 16.0 GM/DL    Hematocrit 56.1 (H) 37 - 47 %    MCV 92.9 78 - 100 FL    MCH 29.6 27 - 31 PG    MCHC 31.9 (L) 32.0 - 36.0 %    RDW 20.0 (H) 11.7 - 14.9 %    Platelets 359 184 - 800 K/CU MM    MPV 11.4 (H) 7.5 - 11.1 FL    Differential Type AUTOMATED DIFFERENTIAL     Segs Relative 79.6 (H) 36 - 66 %    Lymphocytes % 8.6 (L) 24 - 44 %    Monocytes % 8.0 (H) 0 - 4 %    Eosinophils % 2.4 0 - 3 %    Basophils % 0.6 0 - 1 %    Segs Absolute 7.9 K/CU MM    Lymphocytes Absolute 0.9 K/CU MM    Monocytes Absolute 0.8 K/CU MM    Eosinophils Absolute 0.2 K/CU MM    Basophils Absolute 0.1 K/CU MM    Nucleated RBC % 0.0 %    Total Nucleated RBC 0.0 K/CU MM    Total Immature Neutrophil 0.08 K/CU MM    Immature Neutrophil % 0.8 (H) 0 - 0.43 %   Comprehensive Metabolic Panel    Collection Time: 12/15/22  5:32 PM   Result Value Ref Range    Sodium 130 (L) 135 - 145 MMOL/L    Potassium 3.8 3.5 - 5.1 MMOL/L    Chloride 84 (L) 99 - 110 mMol/L    CO2 30 21 - 32 MMOL/L    BUN 85 (H) 6 - 23 MG/DL    Creatinine 0.8 0.6 - 1.1 MG/DL    Est, Glom Filt Rate >60 >60 mL/min/1.73m2    Glucose 111 (H) 70 - 99 MG/DL    Calcium 11.4 (H) 8.3 - 10.6 MG/DL    Albumin 4.5 3.4 - 5.0 GM/DL    Total Protein 8.5 (H) 6.4 - 8.2 GM/DL    Total Bilirubin 1.8 (H) 0.0 - 1.0 MG/DL    ALT 23 10 - 40 U/L    AST 33 15 - 37 IU/L    Alkaline Phosphatase 284 (H) 40 - 129 IU/L    Anion Gap 16 4 - 16   Brain Natriuretic Peptide    Collection Time: 12/15/22  5:32 PM   Result Value Ref Range    Pro-BNP 1,342 (H) <300 PG/ML   Troponin    Collection Time: 12/15/22  5:32 PM   Result Value Ref Range    Troponin T 0.012 (H) <0.01 NG/ML   COVID-19, Rapid    Collection Time: 12/15/22  6:52 PM    Specimen: Nasopharyngeal   Result Value Ref Range    Source UNKNOWN     SARS-CoV-2, NAAT NOT DETECTED NOT DETECTED   Rapid Flu Swab    Collection Time: 12/15/22  6:52 PM    Specimen: Nasopharyngeal   Result Value Ref Range    Rapid Influenza A Ag NEGATIVE NEGATIVE    Rapid Influenza B Ag NEGATIVE NEGATIVE   Blood Gas, Venous    Collection Time: 12/15/22  7:15 PM   Result Value Ref Range    pH, Yunior 7.41 7.32 - 7.42    pCO2, Yunior 59 (H) 38 - 52 mmHG    pO2, Yunior 47 28 - 48 mmHG    Base Exc, Mixed 10.4 (H) 0 - 2.3    HCO3, Venous 37.4 (H) 19 - 25 MMOL/L    O2 Sat, Yunior 76.6 (H) 50 - 70 %    Comment VBG    Lactate, Sepsis    Collection Time: 12/15/22  7:34 PM   Result Value Ref Range    Lactic Acid, Sepsis 1.4 0.5 - 1.9 mMOL/L   EKG 12 Lead    Collection Time: 12/15/22 11:24 PM   Result Value Ref Range    Ventricular Rate 60 BPM    Atrial Rate 59 BPM    QRS Duration 156 ms    Q-T Interval 502 ms    QTc Calculation (Bazett) 502 ms    R Axis -75 degrees    T Axis 119 degrees    Diagnosis       Ventricular-paced rhythm  Abnormal ECG  When compared with ECG of 19-OCT-2022 21:32,  No significant change was found     Basic Metabolic Panel w/ Reflex to MG    Collection Time: 12/16/22  7:11 AM   Result Value Ref Range    Sodium 139 135 - 145 MMOL/L    Potassium 3.3 (L) 3.5 - 5.1 MMOL/L    Chloride 93 (L) 99 - 110 mMol/L    CO2 26 21 - 32 MMOL/L    Anion Gap 20 (H) 4 - 16    BUN 70 (H) 6 - 23 MG/DL    Creatinine 0.7 0.6 - 1.1 MG/DL    Est, Glom Filt Rate >60 >60 mL/min/1.73m2    Glucose 85 70 - 99 MG/DL    Calcium 10.9 (H) 8.3 - 10.6 MG/DL   CBC with Auto Differential    Collection Time: 12/16/22  7:11 AM   Result Value Ref Range    WBC 10.7 (H) 4.0 - 10.5 K/CU MM    RBC 5.65 (H) 4.2 - 5.4 M/CU MM    Hemoglobin 17.1 (H) 12.5 - 16.0 GM/DL    Hematocrit 52.3 (H) 37 - 47 %    MCV 92.6 78 - 100 FL    MCH 30.3 27 - 31 PG    MCHC 32.7 32.0 - 36.0 %    RDW 19.8 (H) 11.7 - 14.9 %    Platelets 751 722 - 384 K/CU MM    MPV 11.1 7.5 - 11.1 FL    Differential Type AUTOMATED DIFFERENTIAL     Segs Relative 81.9 (H) 36 - 66 %    Lymphocytes % 6.9 (L) 24 - 44 %    Monocytes % 7.8 (H) 0 - 4 %    Eosinophils % 2.0 0 - 3 %    Basophils % 0.7 0 - 1 %    Segs Absolute 8.8 K/CU MM    Lymphocytes Absolute 0.7 K/CU MM    Monocytes Absolute 0.8 K/CU MM    Eosinophils Absolute 0.2 K/CU MM    Basophils Absolute 0.1 K/CU MM    Nucleated RBC % 0.0 %    Total Nucleated RBC 0.0 K/CU MM    Total Immature Neutrophil 0.08 K/CU MM    Immature Neutrophil % 0.7 (H) 0 - 0.43 %   Magnesium    Collection Time: 12/16/22  7:11 AM   Result Value Ref Range    Magnesium 2.8 (H) 1.8 - 2.4 mg/dl        Imaging/Diagnostics Last 24 Hours   CT CHEST WO CONTRAST    Result Date: 12/15/2022  EXAMINATION: CT OF THE CHEST WITHOUT CONTRAST 12/15/2022 8:56 pm TECHNIQUE: CT of the chest was performed without the administration of intravenous contrast. Multiplanar reformatted images are provided for review. Automated exposure control, iterative reconstruction, and/or weight based adjustment of the mA/kV was utilized to reduce the radiation dose to as low as reasonably achievable. COMPARISON: Chest radiograph 12/15/2022. CT chest angiogram 10/19/2022. HISTORY: ORDERING SYSTEM PROVIDED HISTORY: cough hypoxia TECHNOLOGIST PROVIDED HISTORY: Reason for exam:->cough hypoxia Decision Support Exception - unselect if not a suspected or confirmed emergency medical condition->Emergency Medical Condition (MA) Reason for Exam: cough hypoxia Additional signs and symptoms: no Relevant Medical/Surgical History: none FINDINGS: Mediastinum: The thoracic aorta is normal in caliber with moderate calcific plaquing. Status post CABG.   The main pulmonary artery is upper limits of normal in size. Massive cardiomegaly. Intracardiac AICD lead in the right ventricle. No pericardial effusion. The mediastinal esophagus is unremarkable. No lymphadenopathy or pneumomediastinum identified. Lungs/pleura: The central airways are grossly patent. No pleural effusion or pneumothorax. Mild right basilar dependent consolidation. Diffuse bilateral mosaic attenuation pattern. No interlobular septal thickening. Upper Abdomen: Images through the upper abdomen demonstrate no acute process. Soft Tissues/Bones: Status post median sternotomy. No acute osseous or soft tissue abnormality. 1. Massive cardiomegaly. Mild diffuse mosaic attenuation pattern as can be seen with reactive airways disease or bronchiolitis. Edema is also a consideration. 2. Mild right basilar dependent consolidation likely representing atelectasis. Pneumonia and aspiration are not excluded. XR CHEST PORTABLE    Result Date: 12/15/2022  EXAMINATION: ONE X-RAY VIEW OF THE CHEST 12/15/2022 6:39 pm COMPARISON: 10/21/2022 HISTORY: ORDERING SYSTEM PROVIDED HISTORY:  Hypoxia TECHNOLOGIST PROVIDED HISTORY: Reason for Exam:  Hypoxia Additional signs and symptoms:  NA Relevant Medical/Surgical History:  CAD, CHF, COPD Initial evaluation FINDINGS: Monitor wires overlie the chest. The patient has had a median sternotomy. The patient has a pacemaker. The trachea is midline. There is atherosclerotic calcification of the aortic knob. There is cardiomegaly. There is no overt failure. There is suspicion for atelectasis or infiltrates in the lung bases with small pleural effusions. There is the sequela of granulomatous disease in the chest.     Pacemaker. Cardiomegaly. Atelectasis or infiltrates in the lung bases with small pleural effusions. Follow up to resolution is suggested.        Electronically signed by Gely Persaud MD on 12/16/2022 at 3:35 PM

## 2022-12-16 NOTE — PROGRESS NOTES
[address], 3/18/1929, [id number], 12/16/2022    Attempted OT eval, pt having imaging being done in room. Will re-attempt as able and appropriate.     Stefani Sewell, OTR/L  12/16/2022, 4:09 PM

## 2022-12-16 NOTE — PROGRESS NOTES
4 Eyes Skin Assessment     NAME:  Yainck Ansari OF BIRTH:  3/18/1929  MEDICAL RECORD NUMBER:  [id number]    The patient is being assessed for  Admission    I agree that One RN have performed a thorough Head to Toe Skin Assessment on the patient. ALL assessment sites listed below have been assessed. Areas assessed by both nurses:    Head, Face, Ears, Shoulders, Back, Chest, Arms, Elbows, Hands, Sacrum. Buttock, Coccyx, Ischium, and Legs. Feet and Heels        Does the Patient have a Wound? No noted wound(s)       Cruz Prevention initiated by RN: NA   Wound Care Orders initiated by RN: NA    Pressure Injury (Stage 3,4, Unstageable, DTI, NWPT, and Complex wounds) if present place referral order by RN under : NA    New and Established Ostomies, if present place, referral order under : NA      Redness to groin, scattered scabs, redness to coccyx blanchable, heels soft.     Nurse 1 eSignature: Electronically signed by Gerson Mesa RN on 12/16/22 at 5:25 AM EST    **SHARE this note so that the co-signing nurse is able to place an eSignature**    Nurse 2 eSignature: Electronically signed by Eladio Caro on 12/16/22 at 5:28 AM EST

## 2022-12-17 ENCOUNTER — APPOINTMENT (OUTPATIENT)
Dept: ULTRASOUND IMAGING | Age: 87
DRG: 189 | End: 2022-12-17
Payer: MEDICARE

## 2022-12-17 LAB
ANION GAP SERPL CALCULATED.3IONS-SCNC: 13 MMOL/L (ref 4–16)
BUN BLDV-MCNC: 59 MG/DL (ref 6–23)
CALCIUM SERPL-MCNC: 10.3 MG/DL (ref 8.3–10.6)
CHLORIDE BLD-SCNC: 91 MMOL/L (ref 99–110)
CO2: 30 MMOL/L (ref 21–32)
CREAT SERPL-MCNC: 0.7 MG/DL (ref 0.6–1.1)
GFR SERPL CREATININE-BSD FRML MDRD: >60 ML/MIN/1.73M2
GLUCOSE BLD-MCNC: 106 MG/DL (ref 70–99)
HAV IGM SER IA-ACNC: NON REACTIVE
HEPATITIS B CORE IGM ANTIBODY: NON REACTIVE
HEPATITIS B SURFACE ANTIGEN: NON REACTIVE
HEPATITIS C ANTIBODY: NON REACTIVE
MAGNESIUM: 2.5 MG/DL (ref 1.8–2.4)
POTASSIUM SERPL-SCNC: 3.3 MMOL/L (ref 3.5–5.1)
SODIUM BLD-SCNC: 134 MMOL/L (ref 135–145)

## 2022-12-17 PROCEDURE — 97166 OT EVAL MOD COMPLEX 45 MIN: CPT

## 2022-12-17 PROCEDURE — 80074 ACUTE HEPATITIS PANEL: CPT

## 2022-12-17 PROCEDURE — 83735 ASSAY OF MAGNESIUM: CPT

## 2022-12-17 PROCEDURE — 6370000000 HC RX 637 (ALT 250 FOR IP): Performed by: INTERNAL MEDICINE

## 2022-12-17 PROCEDURE — 2580000003 HC RX 258: Performed by: INTERNAL MEDICINE

## 2022-12-17 PROCEDURE — APPNB30 APP NON BILLABLE TIME 0-30 MINS: Performed by: NURSE PRACTITIONER

## 2022-12-17 PROCEDURE — 6360000002 HC RX W HCPCS: Performed by: INTERNAL MEDICINE

## 2022-12-17 PROCEDURE — 1200000000 HC SEMI PRIVATE

## 2022-12-17 PROCEDURE — 99233 SBSQ HOSP IP/OBS HIGH 50: CPT | Performed by: INTERNAL MEDICINE

## 2022-12-17 PROCEDURE — 76705 ECHO EXAM OF ABDOMEN: CPT

## 2022-12-17 PROCEDURE — 36415 COLL VENOUS BLD VENIPUNCTURE: CPT

## 2022-12-17 PROCEDURE — 76937 US GUIDE VASCULAR ACCESS: CPT

## 2022-12-17 PROCEDURE — 80048 BASIC METABOLIC PNL TOTAL CA: CPT

## 2022-12-17 PROCEDURE — 97530 THERAPEUTIC ACTIVITIES: CPT

## 2022-12-17 PROCEDURE — 99222 1ST HOSP IP/OBS MODERATE 55: CPT | Performed by: INTERNAL MEDICINE

## 2022-12-17 RX ADMIN — HEPARIN SODIUM 5000 UNITS: 5000 INJECTION INTRAVENOUS; SUBCUTANEOUS at 21:25

## 2022-12-17 RX ADMIN — ATORVASTATIN CALCIUM 10 MG: 10 TABLET, FILM COATED ORAL at 21:25

## 2022-12-17 RX ADMIN — HEPARIN SODIUM 5000 UNITS: 5000 INJECTION INTRAVENOUS; SUBCUTANEOUS at 09:28

## 2022-12-17 RX ADMIN — POTASSIUM BICARBONATE 40 MEQ: 782 TABLET, EFFERVESCENT ORAL at 11:58

## 2022-12-17 RX ADMIN — TORSEMIDE 20 MG: 20 TABLET ORAL at 09:28

## 2022-12-17 RX ADMIN — LATANOPROST 1 DROP: 50 SOLUTION/ DROPS OPHTHALMIC at 00:09

## 2022-12-17 RX ADMIN — MIDODRINE HYDROCHLORIDE 5 MG: 5 TABLET ORAL at 11:53

## 2022-12-17 RX ADMIN — SODIUM CHLORIDE, PRESERVATIVE FREE 10 ML: 5 INJECTION INTRAVENOUS at 09:26

## 2022-12-17 RX ADMIN — TIMOLOL MALEATE 1 DROP: 5 SOLUTION OPHTHALMIC at 21:26

## 2022-12-17 RX ADMIN — MIDODRINE HYDROCHLORIDE 5 MG: 5 TABLET ORAL at 18:44

## 2022-12-17 RX ADMIN — TIMOLOL MALEATE 1 DROP: 5 SOLUTION OPHTHALMIC at 00:09

## 2022-12-17 RX ADMIN — ASPIRIN 81 MG: 81 TABLET, CHEWABLE ORAL at 09:27

## 2022-12-17 RX ADMIN — POLYETHYLENE GLYCOL (3350) 17 G: 17 POWDER, FOR SOLUTION ORAL at 09:26

## 2022-12-17 RX ADMIN — LATANOPROST 1 DROP: 50 SOLUTION/ DROPS OPHTHALMIC at 21:26

## 2022-12-17 RX ADMIN — CARVEDILOL 6.25 MG: 6.25 TABLET, FILM COATED ORAL at 10:43

## 2022-12-17 RX ADMIN — MIDODRINE HYDROCHLORIDE 5 MG: 5 TABLET ORAL at 10:43

## 2022-12-17 RX ADMIN — SODIUM CHLORIDE, PRESERVATIVE FREE 10 ML: 5 INJECTION INTRAVENOUS at 00:09

## 2022-12-17 RX ADMIN — SPIRONOLACTONE 12.5 MG: 25 TABLET ORAL at 09:27

## 2022-12-17 RX ADMIN — CARVEDILOL 6.25 MG: 6.25 TABLET, FILM COATED ORAL at 18:44

## 2022-12-17 RX ADMIN — SODIUM CHLORIDE, PRESERVATIVE FREE 10 ML: 5 INJECTION INTRAVENOUS at 21:26

## 2022-12-17 RX ADMIN — POTASSIUM BICARBONATE 20 MEQ: 782 TABLET, EFFERVESCENT ORAL at 13:02

## 2022-12-17 NOTE — CONSULTS
HEMATOLOGY ONCOLOGY  Consultation Report    REASON FOR CONSULT  Elevated Hg/HCT    CHIEF COMPLAINT    Chief Complaint   Patient presents with    Other     Hypoxic, 88% on RA     HISTORY OF PRESENT ILLNESS   Juwan Alfaro is a 80 y.o. female who presented tof ED on 12/15/2022 due to hypoxia. Reportedly O2 sat on 3 L was 87%. In ER O2 sat was 94% on 2 L. She has underlying COPD, SAULO on CPAP, CHF on diuresis. 12.15.2022 WBC 10, Hg 17.9, HCT 56.1, plat 427. CBC in 11.2022 grossly unremarkable. CT chest 12/15/2022:  1. Massive cardiomegaly. Mild diffuse mosaic attenuation pattern as can be seen with reactive airways disease or bronchiolitis. Edema is also a  consideration. 2. Mild right basilar dependent consolidation likely representing  atelectasis. Pneumonia and aspiration are not excluded. CT abdomen 3/2022: The liver are has shrunken nodular contour suggestive of cirrhosis. There is associated mild splenomegaly. Small amount of ascites is also noted within the right upper quadrant area. The liver, spleen, adrenal glands, gallbladder, pancreas, kidneys and ureters and pelvic organs including the urinary bladder appear unremarkable. Likely she has secondary erythrocytosis related to dehydration and hypoxia. She is on diuretic.     PAST MEDICAL HISTORY    Past Medical History:   Diagnosis Date    Age-related osteoporosis with current pathological fracture of vertebra ([address] 7/8/2022    Arthritis     Bradycardia 2001    requiring dual chamber pacemaker at [facility]    CAD (coronary artery disease)     Cardiac pacemaker 10/2001    [employer] #[id number]  PPM- Serial # 26-[facility]- Dr Mott Staff    CHF (congestive heart failure) (HCC)     COPD, mild ([address] 2/17/2017    Cor pulmonale (chronic) ([address] 3/15/2022    CVA (cerebrovascular accident) ([address]     DJD (degenerative joint disease) of cervical spine     C5-C6, C6-C7    Exhaustion of cardiac pacemaker battery 11/21/2011    PPM battery replacement- [employer]    Family history of cardiovascular disease     Glaucoma Dx 2010    H/O 24 hour EKG monitoring 8/6/2000 8/6/2000- Intermittent episonde of a-fib/flutter    H/O cardiac catheterization 4/20/2010, 2/1989 4/20/2010-Severe native vessel disease and has graft disease as well. LAD, CX totally occluded. RCA totally occluded in proximal segment. VG to RCA widely patent. PDA 90% LAD afer LIMA anastomosis 80-90% stenosis. Proceeded with PTCA with stent next day. H/O cardiovascular stress test 10/14/2011, 6/2/2010,4/8/2010, 5/18/2009,4/6/2009, 10/30/2007, 11/12/2004, 11/6/2003, 8/9/2002, 8/9/2001,6/5/2000,     10/14/2011-Lexiscan-Abnormal Myocardial Perfusion study. Evidence of mild ischemia in the Left CX region. Abnomal study. Rest EF 63%. Global LV systolic function normal. No ECG changes. Unremarkable pharmacological stress test.    H/O cardiovascular stress test 6/10/2013    thallium--mild ischemia left circumflex EF63% no change from 10/2011 study. H/O cardiovascular stress test 10/16/2014    cardiolite-mild ischemia left circumflex,EF70%    H/O chest x-ray 4/19/2009 4/19/2009-Stable cardiomegaly. No acute cardiopulmonary disease. H/O Doppler lower venous ultrasound 09/18/2019    Significant reflux noted in RGSV, RGSV is extremely tortuous and small along the thigh and calf and would be highly unlikely to be accessed, RSSV is non compressible and has occlusive chronic SVT, LSSV is non compressible with occlusive chronic SVT, LGSV removed s/p CABG, Significant reflux in LGSV tributary,    H/O Doppler ultrasound 3/31/2010    CAROTID- 3/31/2010-INtimal thickening but no significant atherosclerotic plaque noted in ANA PAULA. Doppler flow velocities within ANA PAULA are WNL. Heterogeneous, irregular atherosclerotic plaque noted in LICA. Doppler flow velocities within the LICA are elevated, consistent with a mild, less than 50% stenosis.     H/O Doppler ultrasound 5/24/2016    Carotid- normal study    H/O Doppler ultrasound 09/06/2017    carotid - normal study    H/O Doppler ultrasound     H/O echocardiogram 10/13    EF=60%, Severe Pulm. HTN, & Sclerotic aortic valve w/stenosis. H/O echocardiogram 10/16/14     EF 55-60% Normal LV. Normal LV systolic function. Severe tricuspid insufficiency with severe hypertension. H/O echocardiogram 02/03/2017    heart cath performed this morning    H/O echocardiogram 09/18/2019    EF 50-55%, Left atrium is mild to moderately dilated, right atrium is severely dilated, mildly dilated right ventricle, Mod MR, Severe TR, Severe Pulm HTN, no pericardial effusion     History of complete ECG     10/14/2011(Lexiscan);5/6/2010, 4/30/2009,10/24/2008,9/21/2007, 10/13/2006    History of nuclear stress test 11/17/2016    lexiscan-normal,EF70%    Hx of cardiovascular stress test 12/28/2018    EF 60%  Normal study. HX OTHER MEDICAL 05/01/2017    MUGA-normal, EF53%    Hyperlipidemia     Hypertension     Mild intermittent asthma 7/28/2016    Moderate COPD (chronic obstructive pulmonary disease) ([facility]) 3/15/2022    Nausea & vomiting     Obstructive sleep apnea 5/16/2017    Paroxysmal atrial fibrillation ([address]     Post PTCA 4/21/2010    PTCA with 2.25 stent of the LIMA to LAD    Pulmonary HTN ([address]     Severe per last echo on 10/13. 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  Followed by MAZE procedure by pulmonary vein isolation.-  Dr Shellie Pickering    S/P PTCA (percutaneous transluminal coronary angioplasty) 11/2012    PTCA with stent to RCA    Severe pulmonary hypertension ([address] 3/15/2022    Shortness of breath 3/15/2022    Thyroid disease     hypothyroi    Unspecified cerebral artery occlusion with cerebral infarction Unsure When    No Residual    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 ([address] 7/29/2022    WD-Non-pressure chronic ulcer of other part of left lower leg limited to breakdown of skin ([facility] [facility] 2022     SURGICAL HISTORY    Past Surgical History:   Procedure Laterality Date    APPENDECTOMY      CARDIAC SURGERY      CABG (3 Bypasses), One Heart Stent in     COLONOSCOPY  In     [address] [place] [address] WITH STENT PLACEMENT  2010    PTCA with stent LIMA ->LAD    CORONARY ARTERY BYPASS GRAFT  2009    LIMA->Diag,  LIMA -> LAD;  SVG->RCA going to the [facility]. Followed by MAZE procedure by pulmonary vein isolation.-  Dr Lorna Fraga, TOTAL ABDOMINAL (CERVIX REMOVED)      MIDDLE EAR SURGERY      OTHER SURGICAL HISTORY      Ear surgery-hearing    PACEMAKER PLACEMENT      battery change 2011 Medtronic    PTCA  2012    Ptca with stent to RCA    TONSILLECTOMY       FAMILY HISTORY    Family History   Problem Relation Age of Onset    Cancer Mother         \"Liver Cancer\"    Arthritis Mother     Depression Mother     Hearing Loss Mother     High Blood Pressure Mother     High Cholesterol Mother     Mental Illness Mother     Miscarriages / Stillbirths Mother     Heart Disease Father     Early Death Father 36        \"Instant Death\"    High Blood Pressure Father     High Cholesterol Father     Coronary Art Dis Father         Massive MI    Heart Disease Sister     Depression Sister     Cancer Sister         \"Breast Cancer, Cancer Free Now\"    High Blood Pressure Sister     Other Daughter         \"She's Had Stomach Surgery\"    High Blood Pressure Son     High Blood Pressure Son     Early Death Paternal Grandfather      SOCIAL HISTORY    Social History     Socioeconomic History    Marital status:      Number of children: 4   Occupational History    Occupation: RETIRED     Comment: from [address] Use    Smoking status: Former     Packs/day: 0.25     Years: 5.00     Pack years: 1.25     Types: Cigarettes     Quit date: 1970     Years since quittin.0    Smokeless tobacco: Never   Vaping Use    Vaping Use: Never used   Substance and Sexual Activity    Alcohol use: Not Currently     Comment: Caffiene - no more than 3 cups of coffee each day    Drug use: Never    Sexual activity: Yes     Partners: Male     Comment:      REVIEW OF SYSTEMS    Constitutional:  Denies fever, chills, loss of appetite, weight loss, +  fatigue   HEENT:  Denies swelling of neck glands  Respiratory:  Denies cough, shortness of breath or hemoptysis  Cardiovascular:  Denies chest pain, palpitations or swelling   GI:  Denies abdominal pain, nausea, vomiting, constipation or diarrhea   Musculoskeletal:  Denies back pain   Skin:  Denies rash   Neurologic:  Denies headache, focal weakness or sensory changes   All systems negative except as marked. PHYSICAL EXAM    Vitals: /69   Pulse 59   Temp 97.3 °F (36.3 °C) (Oral)   Resp 16   Ht 4' 1.02\" (1.245 m)   Wt 115 lb 9.6 oz (52.4 kg)   SpO2 93%   BMI 33.83 kg/m²   CONSTITUTIONAL: awake, alert, cooperative, no apparent distress   EYES: pupils equal, round. Sclera clear and conjunctiva normal  ENT: Normocephalic, without obvious abnormality, atraumatic  NECK: supple, symmetrical, no jugular venous distension and no carotid bruits   HEMATOLOGIC/LYMPHATIC: no cervical, supraclavicular or axillary lymphadenopathy   LUNGS: clear to auscultation. No wheezing. CARDIOVASCULAR: regular rate and rhythm, normal S1 and S2, no murmur noted  ABDOMEN: normal bowel sound, soft, non-distended, non-tender, no palpable masses. MUSCULOSKELETAL: full range of motion noted, tone is normal  NEUROLOGIC: Motor skills grossly intact. CN II - XII grossly intact. SKIN: Dry and warm skin. EXTREMITIES: no LE edema, no cyanosis.       LABORATORY RESULTS  CBC:   Recent Labs     12/15/22  1732 12/16/22  0711   WBC 10.0 10.7*   HGB 17.9* 17.1*    380     BMP:    Recent Labs     12/16/22  0711 12/16/22  1740 12/17/22  0019    136 134*   K 3.3* 3.6 3.3*   CL 93* 90* 91*   CO2 26 32 30   BUN 70* 62* 59*   CREATININE 0.7 0.8 0.7   GLUCOSE 85 117* 106*     Hepatic:   Recent Labs     12/15/22  1732   AST 33   ALT 23   BILITOT 1.8*   ALKPHOS 284*      Latest Reference Range & Units 3/19/22 08:48 9/2/22 15:05   Ferritin 15 - 150 ng/mL 26 70   Iron 27 - 139 ug/dL 18 (L) 198 (H)   Iron Saturation 15 - 55 %  54   UIBC 118 - 369 ug/dL 254 166   TIBC 250 - 450 ug/dL 272 364   Transferrin % 10 - 44 % 7 (L)    FOLATE, FOLAT 3.1 - 17.5 NG/ML 15.4    Vitamin B-12 211 - 911 pg/ml 1227 (H)      ASSESSMENT/RECOMMENDATION    1. She has likely secondary erythrocytosis, related to dehydration and hypoxia. She is on diuretic. CT abd in 3.2022 showed no evidence of hypernephroma. BUN 86 with creatinine 0.8 on admission. Will order MPN w.u.    2. She has diastolic CHF with severe pulmonary HTN, cardiologist is on board. 3. She has mild splenomegaly related to ? Liver cirrhosis, ? due to CHF. Will have acute viral hep serology. 4. Leukocytosis is likely reactive. We will follow the patient. Thank you for allowing me to participate in the care of this very pleasant patient.

## 2022-12-17 NOTE — PROGRESS NOTES
V2.0  [facility]      Name:  Saleem Ortega /Age/Sex: 3/18/1929  (80 y.o. female)   MRN & CSN:  [id number] & [id number] Encounter Date/Time: 2022 3:35 PM EST    Location:  [address] [place] [address]-A PCP: Chemo Gore MD       Hospital Day: 3    Assessment and Plan:   Saleem Ortega is a 80 y.o. female with pmh of  who presents with Acute respiratory failure with hypoxia ([address]         #.  Acute respiratory failure with hypoxia  -As per information patient was hypoxic in cardiologist office.  -Saturating 93-94% on 2 L of oxygen. -VBG-pH 7.41, PCO2 59, PO2 47, HCO3 37.4.  -Chest x-ray-cardiomegaly, atelectasis or infiltrates in the lung bases with some small pleural effusions. -CT chest without contrast-massive cardiomegaly, mild diffuse mosaic attenuation pattern as can be seen with reactive airway disease or bronchiolitis, edema is also a consideration. Mild right basilar dependent consolidation likely representing atelectasis. -Wean off oxygen as tolerated. #.  Chronic diastolic congestive heart failure  -Echo-10/2022-EF 48-85%, grade 3 diastolic dysfunction, severely dilated right ventricle, severe tricuspid regurgitation  -Appears clinically dry  -Received gentle hydration yesterday. BUN currently improving  -Diuretics being managed by cardiology. #Ascites  -Mild ascites and ultrasound abdomen done on 10/25/2022.  -Likely due to right heart failure.  -Per daughter patient abdomen is progressively distended  -We will repeat ultrasound abdomen to assess for ascites  -Add ammonia level for tomorrow     #. ? Pneumonia  -Continue ceftriaxone, azithromycin.  -Procalcitonin low. Is no symptoms of pneumonia. Pneumonia ruled out. DC antibiotics. #.  Hypercalcemia-continue IV fluids and repeat BMP. #.  Moderate COPD  -Does not appear to be in exacerbation  -Patient is on Anoro Ellipta  -Albuterol HFA as needed     #. Coronary artery disease s/p CABG     #.   Atrial fibrillation  -Not on anticoagulation due to fall risk     #. Chronic hypotension-on midodrine     #. Severe pulmonary hypertension, cor pulmonale     #. S/p PPM     #. SAULO on CPAP     #. Glaucoma-on latanoprost, timolol     #. Dementia  - will add UA and X ray KUB   -per daughter patient has subtle behaviour changes     Diet ADULT DIET; Regular; Low Fat/Low Chol/High Fiber/2 gm Na  ADULT ORAL NUTRITION SUPPLEMENT; Breakfast, Dinner; Standard High Calorie/High Protein Oral Supplement  ADULT ORAL NUTRITION SUPPLEMENT; Lunch; Frozen Oral Supplement   DVT Prophylaxis [] Lovenox, []  Heparin, [] SCDs, [] Ambulation,  [] Eliquis, [] Xarelto  [] Coumadin   Code Status Full Code   Disposition From:   Expected Disposition:   Estimated Date of Discharge:  Patient requires continued admission due to ultrasound abdomen and placement   Surrogate Decision Maker/ POA      Subjective:     Chief Complaint: Other (Hypoxic, 88% on RA)       Meg Aguilar is a 80 y.o. female who presents with hypoxia from outpatient cardiology clinic. Seen and examined in the morning. Patient is alert. He is denying any complaints. Denies abdominal pain, nausea or vomiting. Review of Systems:    Review of Systems        Objective: Intake/Output Summary (Last 24 hours) at 12/17/2022 1733  Last data filed at 12/16/2022 2101  Gross per 24 hour   Intake 120 ml   Output --   Net 120 ml          Vitals:   Vitals:    12/17/22 1705   BP: 116/67   Pulse: 60   Resp: 16   Temp: 97.6 °F (36.4 °C)   SpO2: 95%       Physical Exam:     General: NAD  Eyes: EOMI  ENT: neck supple  Cardiovascular: Regular rate. Respiratory: Clear to auscultation  Gastrointestinal: Soft, distended belly   Genitourinary: no suprapubic tenderness  Musculoskeletal: No edema  Skin: warm, dry  Neuro: Alert.       Medications:   Medications:    aspirin  81 mg Oral Daily    atorvastatin  10 mg Oral QPM    midodrine  5 mg Oral TID     tiotropium-olodaterol  2 puff Inhalation Daily    latanoprost  1 drop Both Eyes QPM    timolol  1 drop Both Eyes QPM    polyethylene glycol  17 g Oral Daily    [Held by provider] torsemide  20 mg Oral Daily    spironolactone  12.5 mg Oral Daily    carvedilol  6.25 mg Oral BID WC    sodium chloride flush  5-40 mL IntraVENous 2 times per day    heparin (porcine)  5,000 Units SubCUTAneous BID      Infusions:    sodium chloride       PRN Meds: albuterol sulfate HFA, 2 puff, Q6H PRN  sodium chloride flush, 5-40 mL, PRN  sodium chloride, , PRN  ondansetron, 4 mg, Q8H PRN   Or  ondansetron, 4 mg, Q6H PRN  acetaminophen, 650 mg, Q6H PRN   Or  acetaminophen, 650 mg, Q6H PRN      Labs      Recent Results (from the past 24 hour(s))   Basic Metabolic Panel w/ Reflex to MG    Collection Time: 12/16/22  5:40 PM   Result Value Ref Range    Sodium 136 135 - 145 MMOL/L    Potassium 3.6 3.5 - 5.1 MMOL/L    Chloride 90 (L) 99 - 110 mMol/L    CO2 32 21 - 32 MMOL/L    Anion Gap 14 4 - 16    BUN 62 (H) 6 - 23 MG/DL    Creatinine 0.8 0.6 - 1.1 MG/DL    Est, Glom Filt Rate >60 >60 mL/min/1.73m2    Glucose 117 (H) 70 - 99 MG/DL    Calcium 10.7 (H) 8.3 - 10.6 MG/DL   Procalcitonin    Collection Time: 12/16/22  5:40 PM   Result Value Ref Range    Procalcitonin 0.800    Basic Metabolic Panel w/ Reflex to MG    Collection Time: 12/17/22 12:19 AM   Result Value Ref Range    Sodium 134 (L) 135 - 145 MMOL/L    Potassium 3.3 (L) 3.5 - 5.1 MMOL/L    Chloride 91 (L) 99 - 110 mMol/L    CO2 30 21 - 32 MMOL/L    Anion Gap 13 4 - 16    BUN 59 (H) 6 - 23 MG/DL    Creatinine 0.7 0.6 - 1.1 MG/DL    Est, Glom Filt Rate >60 >60 mL/min/1.73m2    Glucose 106 (H) 70 - 99 MG/DL    Calcium 10.3 8.3 - 10.6 MG/DL   Magnesium    Collection Time: 12/17/22 12:19 AM   Result Value Ref Range    Magnesium 2.5 (H) 1.8 - 2.4 mg/dl   Hepatitis Panel, Acute    Collection Time: 12/17/22 12:58 PM   Result Value Ref Range    Hepatitis B Surface Ag NON REACTIVE NON REACTIVE    Hep A IgM NON REACTIVE NON REACTIVE    Hep B Core Ab, IgM NON REACTIVE NON REACTIVE    Hepatitis C Ab NON REACTIVE NON REACTIVE        Imaging/Diagnostics Last 24 Hours   CT CHEST WO CONTRAST    Result Date: 12/15/2022  EXAMINATION: CT OF THE CHEST WITHOUT CONTRAST 12/15/2022 8:56 pm TECHNIQUE: CT of the chest was performed without the administration of intravenous contrast. Multiplanar reformatted images are provided for review. Automated exposure control, iterative reconstruction, and/or weight based adjustment of the mA/kV was utilized to reduce the radiation dose to as low as reasonably achievable. COMPARISON: Chest radiograph 12/15/2022. CT chest angiogram 10/19/2022. HISTORY: ORDERING SYSTEM PROVIDED HISTORY: cough hypoxia TECHNOLOGIST PROVIDED HISTORY: Reason for exam:->cough hypoxia Decision Support Exception - unselect if not a suspected or confirmed emergency medical condition->Emergency Medical Condition (MA) Reason for Exam: cough hypoxia Additional signs and symptoms: no Relevant Medical/Surgical History: none FINDINGS: Mediastinum: The thoracic aorta is normal in caliber with moderate calcific plaquing. Status post CABG. The main pulmonary artery is upper limits of normal in size. Massive cardiomegaly. Intracardiac AICD lead in the right ventricle. No pericardial effusion. The mediastinal esophagus is unremarkable. No lymphadenopathy or pneumomediastinum identified. Lungs/pleura: The central airways are grossly patent. No pleural effusion or pneumothorax. Mild right basilar dependent consolidation. Diffuse bilateral mosaic attenuation pattern. No interlobular septal thickening. Upper Abdomen: Images through the upper abdomen demonstrate no acute process. Soft Tissues/Bones: Status post median sternotomy. No acute osseous or soft tissue abnormality. 1. Massive cardiomegaly. Mild diffuse mosaic attenuation pattern as can be seen with reactive airways disease or bronchiolitis. Edema is also a consideration.  2. Mild right basilar dependent consolidation likely representing atelectasis. Pneumonia and aspiration are not excluded. XR CHEST PORTABLE    Result Date: 12/15/2022  EXAMINATION: ONE X-RAY VIEW OF THE CHEST 12/15/2022 6:39 pm COMPARISON: 10/21/2022 HISTORY: ORDERING SYSTEM PROVIDED HISTORY:  Hypoxia TECHNOLOGIST PROVIDED HISTORY: Reason for Exam:  Hypoxia Additional signs and symptoms:  NA Relevant Medical/Surgical History:  CAD, CHF, COPD Initial evaluation FINDINGS: Monitor wires overlie the chest. The patient has had a median sternotomy. The patient has a pacemaker. The trachea is midline. There is atherosclerotic calcification of the aortic knob. There is cardiomegaly. There is no overt failure. There is suspicion for atelectasis or infiltrates in the lung bases with small pleural effusions. There is the sequela of granulomatous disease in the chest.     Pacemaker. Cardiomegaly. Atelectasis or infiltrates in the lung bases with small pleural effusions. Follow up to resolution is suggested.        Electronically signed by Yue Huddleston MD on 12/17/2022 at 5:33 PM

## 2022-12-17 NOTE — PROGRESS NOTES
Cardiology Progress Note     Admit Date:  12/15/2022    Consult reason/ Seen today for :       Subjective and  Overnight Events : Pleasantly confused somewhat short of breath denies any chest pain but very hard of hearing watching television Pleasant  In A. fib but rate controlled  Evaluated by EPS service there was concern for pulmonary hypertension cor pulmonale in the setting of COPD chronic A. fib no further EP work-up needed    Chief complain on admission : 80 y. o.year old who is admitted for  Chief Complaint   Patient presents with    Other     Hypoxic, 88% on RA      Assessment / Plan:  Right heart failure with shortness of breath volume overload in the setting of severe pulmonary hypertension diuretics are currently on hold due to concerns for possible dehydration volume overload overdiuresis? Hypoxia secondary to COPD pulmonary edema? Supplemental oxygen  Echo shows RVSP of 77 with dilated right side but pacer and dilated IVC  Concern for cirrhotic liver with ascites hence on Aldactone  Repeat limited echo, replace potassium  History of atrial fibrillation not on anticoagulation due to fall risk  History of coronary artery disease s/p CABG mildly abnormal troponin level continue medical management due to frailty and advanced age, stress test in July 2022 showed no ischemia continue aspirin statins  Low blood pressure on midodrine and Coreg due to A. Fib?   DVT Prophylaxis if no contraindication    Past medical history:    has a past medical history of Age-related osteoporosis with current pathological fracture of vertebra (HCC), Arthritis, Bradycardia, CAD (coronary artery disease), Cardiac pacemaker, CHF (congestive heart failure) ([address] COPD, mild ([address] Cor pulmonale (chronic) ([address] CVA (cerebrovascular accident) ([address] DJD (degenerative joint disease) of cervical spine, Exhaustion of cardiac pacemaker battery, Family history of cardiovascular disease, Glaucoma, H/O 24 hour EKG monitoring, H/O cardiac catheterization, H/O cardiovascular stress test, H/O cardiovascular stress test, H/O cardiovascular stress test, H/O chest x-ray, H/O Doppler lower venous ultrasound, H/O Doppler ultrasound, H/O Doppler ultrasound, H/O Doppler ultrasound, H/O Doppler ultrasound, H/O echocardiogram, H/O echocardiogram, H/O echocardiogram, H/O echocardiogram, History of complete ECG, History of nuclear stress test, Hx of cardiovascular stress test, HX OTHER MEDICAL, Hyperlipidemia, Hypertension, Mild intermittent asthma, Moderate COPD (chronic obstructive pulmonary disease) ([address] Nausea & vomiting, Obstructive sleep apnea, Paroxysmal atrial fibrillation ([address] Post PTCA, Pulmonary HTN ([address] PVD (peripheral vascular disease) ([address] S/P CABG x 3, S/P PTCA (percutaneous transluminal coronary angioplasty), Severe pulmonary hypertension ([address] Shortness of breath, Thyroid disease, Unspecified cerebral artery occlusion with cerebral infarction, 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 Past surgical history:   has a past surgical history that includes Appendectomy (1941); Tonsillectomy (1950's); Cardiac surgery (4/09); Hysterectomy, total abdominal (1990's); Colonoscopy (In 2000's); pacemaker placement; Coronary artery bypass graft (4/8/2009); Coronary angioplasty with stent (4/21/2010); other surgical history; Middle ear surgery; and Percutaneous Transluminal Coronary Angio (11/2012). Social History:   reports that she quit smoking about 52 years ago. Her smoking use included cigarettes. She has a 1.25 pack-year smoking history. She has never used smokeless tobacco. She reports that she does not currently use alcohol. She reports that she does not use drugs.   Family history:  family history includes Arthritis in her mother; Cancer in her mother and sister; Coronary Art Dis in her father; Depression in her mother and sister; Early Death in her paternal grandfather; Early Death (age of onset: 36) in her father; Hearing Loss in her mother; Heart Disease in her father and sister; High Blood Pressure in her father, mother, sister, son, and son; High Cholesterol in her father and mother; Mental Illness in her mother; [place] / [place] in her mother; Other in her daughter. Allergies   Allergen Reactions    Darvocet [Propoxyphene N-Acetaminophen]     Ranexa [Ranolazine Er]      Sick to her stomach    Ranolazine      Sick to her stomach    Sulfa Antibiotics Itching    Sulfasalazine Itching    Adhesive Tape Rash    Allantoin Rash    Bacitracin Rash    Gramicidin Rash    Neomycin Rash    Polymyxin B Rash    Pramoxine Hcl Rash    Silicone Rash       Review of Systems:    All 14 systems were reviewed and are negative  Except for the positive findings  which as documented     /61   Pulse 61   Temp 97.5 °F (36.4 °C) (Oral)   Resp 16   Ht 4' 1.02\" (1.245 m)   Wt 115 lb 9.6 oz (52.4 kg)   SpO2 96%   BMI 33.83 kg/m²     Intake/Output Summary (Last 24 hours) at 12/17/2022 1529  Last data filed at 12/16/2022 2101  Gross per 24 hour   Intake 120 ml   Output --   Net 120 ml     Physical Exam:  Constitutional:  Well developed, Well nourished, No acute distress, Non-toxic appearance. HENT:  Normocephalic, Atraumatic, Bilateral external ears normal, Oropharynx moist, No oral exudates, Nose normal. Neck- Normal range of motion, No tenderness, Supple, No stridor. Eyes:  PERRL, EOMI, Conjunctiva normal, No discharge. Respiratory:  Normal breath sounds, No respiratory distress, No wheezing, No chest tenderness. Cardiovascular:  Normal heart rate, Normal rhythm, No murmurs, No rubs, No gallops, JVP not elevated  Abdomen/GI:  Bowel sounds normal, Soft, No tenderness, No masses, No pulsatile masses.      Musculoskeletal: Intact distal pulses, No edema, No tenderness, No cyanosis, No clubbing. Good range of motion in all major joints. No tenderness to palpation or major deformities noted. Back- No tenderness. Integument:  Warm, Dry, No erythema, No rash. Lymphatic:  No lymphadenopathy noted. Neurologic:  Alert & oriented x 3, Normal motor function, Normal sensory function, No focal deficits noted. Psychiatric:  Affect  and  Mood :no change    Medications:    aspirin  81 mg Oral Daily    atorvastatin  10 mg Oral QPM    midodrine  5 mg Oral TID WC    tiotropium-olodaterol  2 puff Inhalation Daily    latanoprost  1 drop Both Eyes QPM    timolol  1 drop Both Eyes QPM    polyethylene glycol  17 g Oral Daily    [Held by provider] torsemide  20 mg Oral Daily    spironolactone  12.5 mg Oral Daily    carvedilol  6.25 mg Oral BID WC    sodium chloride flush  5-40 mL IntraVENous 2 times per day    heparin (porcine)  5,000 Units SubCUTAneous BID      sodium chloride       albuterol sulfate HFA, sodium chloride flush, sodium chloride, ondansetron **OR** ondansetron, acetaminophen **OR** acetaminophen    Lab Data:  CBC:   Recent Labs     12/15/22  1732 12/16/22  0711   WBC 10.0 10.7*   HGB 17.9* 17.1*   HCT 56.1* 52.3*   MCV 92.9 92.6    380     BMP:   Recent Labs     12/16/22  0711 12/16/22  1740 12/17/22  0019    136 134*   K 3.3* 3.6 3.3*   CL 93* 90* 91*   CO2 26 32 30   BUN 70* 62* 59*   CREATININE 0.7 0.8 0.7     PT/INR: No results for input(s): PROTIME, INR in the last 72 hours. BNP:    Recent Labs     12/15/22  1732   PROBNP 1,342*     TROPONIN:   Recent Labs     12/15/22  1732   TROPONINT 0.012*        ECHO :   echocardiogram    Assessment:  80 y. o.year old who is admitted for  Chief Complaint   Patient presents with    Other     Hypoxic, 88% on RA    , active issues as noted below:  Impression:  Principal Problem:    Acute respiratory failure with hypoxia ([address]  Active Problems:     Moderate malnutrition ([address] Acute on chronic congestive heart failure (HCC)    Chronic heart failure with preserved ejection fraction (HFpEF) ([facility]  Resolved Problems:    * No resolved hospital problems. *            All labs, medications and tests reviewed by myself , continue all other medications of all above medical condition listed as is except for changes mentioned above. Thank you very much for consult , please call with questions.     Arely Mcbride MD, MD 12/17/2022 3:29 PM

## 2022-12-17 NOTE — PROGRESS NOTES
Occupational [address] [place] [address] CARE OCCUPATIONAL THERAPY EVALUATION  Zachery Rascon, 3/18/1929, [id number], 12/17/2022    History  [place]:  The primary encounter diagnosis was Acute on chronic congestive heart failure, unspecified heart failure type ([address] Diagnoses of Elevated troponin, Hypoxia, Dehydration, and Fatigue, unspecified type were also pertinent to this visit.   Patient  has a past medical history of Age-related osteoporosis with current pathological fracture of vertebra (HCC), Arthritis, Bradycardia, CAD (coronary artery disease), Cardiac pacemaker, CHF (congestive heart failure) ([address] COPD, mild ([address] Cor pulmonale (chronic) ([address] CVA (cerebrovascular accident) ([address] DJD (degenerative joint disease) of cervical spine, Exhaustion of cardiac pacemaker battery, Family history of cardiovascular disease, Glaucoma, H/O 24 hour EKG monitoring, H/O cardiac catheterization, H/O cardiovascular stress test, H/O cardiovascular stress test, H/O cardiovascular stress test, H/O chest x-ray, H/O Doppler lower venous ultrasound, H/O Doppler ultrasound, H/O Doppler ultrasound, H/O Doppler ultrasound, H/O Doppler ultrasound, H/O echocardiogram, H/O echocardiogram, H/O echocardiogram, H/O echocardiogram, History of complete ECG, History of nuclear stress test, Hx of cardiovascular stress test, HX OTHER MEDICAL, Hyperlipidemia, Hypertension, Mild intermittent asthma, Moderate COPD (chronic obstructive pulmonary disease) ([address] Nausea & vomiting, Obstructive sleep apnea, Paroxysmal atrial fibrillation ([address] Post PTCA, Pulmonary HTN ([address] PVD (peripheral vascular disease) ([address] S/P CABG x 3, S/P PTCA (percutaneous transluminal coronary angioplasty), Severe pulmonary hypertension ([address] Shortness of breath, Thyroid disease, Unspecified cerebral artery occlusion with cerebral infarction, 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 Patient  has a past surgical history that includes Appendectomy (1941); Tonsillectomy (1950's); Cardiac surgery (4/09); Hysterectomy, total abdominal (1990's); Colonoscopy (In 2000's); pacemaker placement; Coronary artery bypass graft (4/8/2009); Coronary angioplasty with stent (4/21/2010); other surgical history; Middle ear surgery; and Percutaneous Transluminal Coronary Angio (11/2012). Subjective:  Patient states:  \"I love you\" . Pain:  No.    Communication with other providers:  Handoff to RN  Restrictions: General Precautions, Fall Risk    Home Setup/Prior level of function  Social/Functional History  Lives With: Alone  Type of Home: Condo  Home Layout: One level  Home Access: Level entry  Bathroom Shower/Tub: Tub/Shower unit, Walk-in shower  Bathroom Toilet: Standard  Bathroom Equipment: Grab bars in shower, Grab bars around toilet  ADL Assistance: Independent  Homemaking Assistance: [address] [facility] [address]: Independent (mod I with RW)  Transfer Assistance: Independent  Active : No    Examination of body systems (includes body structures/functions, activity/participation limitations):  Observation:  Supine in bed upon arrival, agreeable to therapy   Vision:  WFL  Hearing:  Bilateral Hearing Aides, [place]  Cardiopulmonary:  1L of 02      Body Systems and functions:  ROM R/L:  WFL. Strength R/L:  4/5,   Sensation: WFL  Tone: Normal  Coordination: Decreased speed BUE  Perception: min decreased initiation/sequencing    Activities of Daily Living (ADLs):  Feeding: Supervision  Grooming: SBA (washing hands/face w/ warm washcloth)  UB bathing: SBA  LB bathing: CGA  UB dressing: SBA  LB dressing: CGA   Toileting: CGA    Cognitive and Psychosocial Functioning:  Overall cognitive status:  AxO to self only, decreased short term memory, decreased initiation/sequencing, follows one step commands w/ repetition  Affect: Pleasantly confused        Mobility:  Supine to sit:  SBA  Transfers: CGA from EOB up to Rw  Sitting balance:  SBA. Standing balance:  CGA w/ RW. Functional Mobility CGA w/ RW ~25 feet  Toilet/Shower Transfers: DNT             AM-PAC Daily Activity Inpatient   How much help for putting on and taking off regular lower body clothing?: A Little  How much help for Bathing?: A Little  How much help for Toileting?: A Little  How much help for putting on and taking off regular upper body clothing?: A Little  How much help for taking care of personal grooming?: A Little  How much help for eating meals?: None  AM-[facility] Inpatient Daily Activity Raw Score: 19  AM-PAC Inpatient ADL T-Scale Score : 40.22  ADL Inpatient CMS 0-100% Score: 42.8  ADL Inpatient CMS G-Code Modifier : CK    Treatment:  Self Care Training:   Cues were given for safety, sequence, UE/LE placement, visual cues, and balance. Activities performed today included grooming    Therapeutic Activity Training:   Therapeutic activity training was instructed today. Cues were given for safety, sequence, UE/LE placement, awareness, and balance. Activities performed today included bed mobility training, sup-sit, sit-stand, functional mobility, stand to sit     Safety: patient left in chair with chair alarm, call light within reach, RN notified, gait belt used. Assessment:  Pt is a 79 yo female admitted from home for acute respiratory failure. Pt at baseline is Independent for ADLs Independent for high level IADLs and Independent for functional transfers/mobility. Pt currently presents w/ deficits in ADL and high level IADL independence, functional activity tolerance, dynamic sitting and standing balance and tolerance and functional transfers, BUE strength, cognition, initiation/sequencing. Pt would benefit from continued acute care OT services w/ discharge to SNF  Complexity: Moderate  Prognosis: Good, no significant barriers to participation at this time. Occupational Therapy Plan  Times Per Week: 3x+  Times Per Day:  Once a day  Current Treatment Recommendations: Strengthening, ROM, Balance training, Endurance training, Functional mobility training, Patient/Caregiver education & training, Self-Care / ADL, Safety education & training, Pain management, Equipment evaluation, education, & procurement, Cognitive reorientation, Neuromuscular re-education, Positioning, Cognitive/Perceptual training, Home management training, Coordination training     Equipment: defer    Goals:  Pt goal: go home  Time Frame for STGs: discharge  Goal 1: Pt will perform UE ADLs Supervision  Goal 2: Pt will perform LE ADLs Supervision  Goal 3: Pt will perform toileting Supervision  Goal 4: Pt will perform functional transfer w/ AD Supervision  Goal 5: Pt will perform functional mobility w/ AD Supervision  Goal 6: Pt will perform therex/theract in order to increase functional activity tolerance and dynamic standing balance    Treatment plan:  Pt will perform functional task in stand reaching in all 3 planes in order to increase dynamic standing balance and functional activity tolerance    Recommendations for NURSING activity: Up to chair for all 3 meals and up to standard commode for all toileting needs    Time:   Time in: 1114  Time out: 1132  Timed treatment minutes: 8 minutes  Total time: 18 minutes    Electronically signed by:    Patrica MOREL/L [id number]  11:58 AM,12/17/2022

## 2022-12-17 NOTE — PROGRESS NOTES
Physical Therapy  Facility/Department: [facility]  Physical Therapy Initial Assessment    Name: Velvet Baxter  : 3/18/1929  MRN: [id number]  Date of Service: 2022    Discharge Recommendations:  Subacute/Skilled Nursing Facility              Assessment   Assessment: Pt is a 80 y.o. female with medical history, surgical history, co-morbidities, and personal factors including Age-related osteoporosis with current pathological fracture of vertebra (HCC), Arthritis, Bradycardia, CAD (coronary artery disease), Cardiac pacemaker, CHF (congestive heart failure) ([address] COPD, mild (HCC), Cor pulmonale (chronic) ([address] CVA (cerebrovascular accident) ([address] DJD (degenerative joint disease) of cervical spine, Exhaustion of cardiac pacemaker battery, Family history of cardiovascular disease, Glaucoma, H/O 24 hour EKG monitoring, H/O cardiac catheterization, H/O cardiovascular stress test, H/O cardiovascular stress test, H/O cardiovascular stress test, H/O chest x-ray, H/O Doppler lower venous ultrasound, H/O Doppler ultrasound, H/O Doppler ultrasound, H/O Doppler ultrasound, H/O Doppler ultrasound, H/O echocardiogram, H/O echocardiogram, H/O echocardiogram, H/O echocardiogram, History of complete ECG, History of nuclear stress test, Hx of cardiovascular stress test, HX OTHER MEDICAL, Hyperlipidemia, Hypertension, Mild intermittent asthma, Moderate COPD (chronic obstructive pulmonary disease) ([address] Nausea & vomiting, Obstructive sleep apnea, Paroxysmal atrial fibrillation ([address] Post PTCA, Pulmonary HTN ([address] PVD (peripheral vascular disease) ([address] S/P CABG x 3, S/P PTCA (percutaneous transluminal coronary angioplasty), Severe pulmonary hypertension ([address] Shortness of breath, Thyroid disease, Unspecified cerebral artery occlusion with cerebral infarction, 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 Prior to admission, pt was modified independent with functional mobility and ADLs. Examination of body systems reveals decreased strength, decreased balance, decreased aerobic capacity, and decreased independence with functional mobility. Therapy Prognosis: Good  Decision Making: High Complexity  Clinical Presentation: unpredictable characteristics  Requires PT Follow-Up: Yes  Activity Tolerance  Activity Tolerance: Patient tolerated evaluation without incident;Patient tolerated treatment well     Plan   Physcial Therapy Plan  General Plan: 3-5 times per week  Current Treatment Recommendations: Strengthening, ROM, Balance training, Functional mobility training, Transfer training, ADL/Self-care training, IADL training, Cognitive/Perceptual training, Endurance training, Equipment evaluation, education, & procurement, Wheelchair mobility training, Pain management, Gait training, Positioning, Stair training, Neuromuscular re-education, Safety education & training, Therapeutic activities, Patient/Caregiver education & training, Cognitive reorientation, Home exercise program  Safety Devices  Type of Devices:  All fall risk precautions in place, Patient at risk for falls, Call light within reach, Chair alarm in place, Gait belt, Nurse notified, Left in chair (patient left in bathroom with nursing assistant to complete hygiene activities)     Restrictions  Restrictions/Precautions  Restrictions/Precautions: General Precautions, Fall Risk     Subjective General  Chart Reviewed: Yes  Patient assessed for rehabilitation services?: Yes  Family / Caregiver Present: No  Follows Commands: Impaired  Subjective  Subjective: pain: denies; 0/10         Social/Functional History  Social/Functional History  Lives With: Alone  Type of Home: Condo  Home Layout: One level  Home Access: Level entry  Bathroom Shower/Tub: Tub/Shower unit, Walk-in shower  Bathroom Toilet: Standard  Bathroom Equipment: Grab bars in shower, Grab bars around toilet  ADL Assistance: Independent  Homemaking Assistance: Independent  Ambulation Assistance: Independent (mod I with RW)  Transfer Assistance: Independent  Active : No    Vision/Hearing  Vision  Vision: Within Functional Limits  Hearing  Hearing: Exceptions to [facility]  Hearing Exceptions: Hard of hearing/hearing concerns      Cognition   Orientation  Overall Orientation Status: Impaired  Orientation Level: Disoriented to time;Oriented to person;Disoriented to situation  Cognition  Overall Cognitive Status: Exceptions  Arousal/Alertness: Delayed responses to stimuli  Following Commands: Inconsistently follows commands  Attention Span: Attends with cues to redirect  Safety Judgement: Decreased awareness of need for safety;Decreased awareness of need for assistance  Problem Solving: Decreased awareness of errors  Insights: Decreased awareness of deficits  Initiation: Requires cues for some  Sequencing: Requires cues for some     Objective           Gross Assessment  Sensation: Intact (BLEs)        Strength RLE  Comment: knee extension: at least 3+/5 observed functionally  Strength LLE  Comment: knee extension: at least 3+/5 observed functionally           Bed mobility  Supine to Sit: Minimal assistance; Moderate assistance (with verbal and tactile cues for BUE and BLE placement throughout transfer; with HOB elevated)  Transfers  Sit to Stand: Minimal Assistance (with verbal cues to push through bed/chair/grab bar and avoid pulling on LRAD  Long Term Goal 4: Pt will independently complete 3 sets of 10 reps of BLE AROM exercises in available and allowed ROM       Education  Patient Education  Education Given To: Patient  Education Provided: Role of Therapy; Energy Conservation; Fall Prevention Strategies; Plan of Care;IADL Safety; ADL Adaptive Strategies; Equipment;Transfer Training  Education Method: Demonstration;Verbal  Barriers to Learning: Cognition; Hearing  Education Outcome: Verbalized understanding;Demonstrated understanding;Continued education needed      Time In: 1700  Time Out: 1825  Total Treatment Time: 85  Timed Code Treatment Minutes: [address] DPT  License #: [id number]

## 2022-12-17 NOTE — PROGRESS NOTES
Cardiology Progress Note     Today's Plan hold torsemide     Admit Date:  12/15/2022    Consult reason/ Seen today for: CHF    Subjective and  Overnight Events:  she report she is feeling better today- denies chest pain   Very [place]    Assessment / Plan:       Pulmonary  hypertension   With component of cor pulmonale   Echo 10/2022: Sever dilated RV   To use CPAP    Chronic atrial fib   Rate control with BB  No AC d/t fall risk   Appreciate EP consult: not a candidate for BIV pacemaker      HFpEF- chronic   NYHA class II-   BNP elevated however normal for her age  CXR : small effusion: CT chest ? Edema    Echo: EF 50-55% Grade III DDD  Appears dry  Denies Shortness of breath   Hold torsemide  Continue aldactone       Single chamber pacemaker in situ  Analysis reviewed   Pacing in RV 98 %   She is pacemaker dependant:      Coronary artery disease  H/o CABG  Mild bump in troponin - not ACS; demand leak   Stable- without chest pain   Medical management  asa atorvastatin coreg     Chronic hypotension  On midodrine           History of Presenting Illness:    Chief complain on admission : 80 y. o.year old who is admitted for  Chief Complaint   Patient presents with    Other     Hypoxic, 88% on RA        Past medical history:    has a past medical history of Age-related osteoporosis with current pathological fracture of vertebra (HCC), Arthritis, Bradycardia, CAD (coronary artery disease), Cardiac pacemaker, CHF (congestive heart failure) ([address] COPD, mild ([address] Cor pulmonale (chronic) ([address] CVA (cerebrovascular accident) ([address] DJD (degenerative joint disease) of cervical spine, Exhaustion of cardiac pacemaker battery, Family history of cardiovascular disease, Glaucoma, H/O 24 hour EKG monitoring, H/O cardiac catheterization, H/O cardiovascular stress test, H/O cardiovascular stress test, H/O cardiovascular stress test, H/O chest x-ray, H/O Doppler lower venous ultrasound, H/O Doppler ultrasound, H/O Doppler ultrasound, H/O Doppler ultrasound, H/O Doppler ultrasound, H/O echocardiogram, H/O echocardiogram, H/O echocardiogram, H/O echocardiogram, History of complete ECG, History of nuclear stress test, Hx of cardiovascular stress test, HX OTHER MEDICAL, Hyperlipidemia, Hypertension, Mild intermittent asthma, Moderate COPD (chronic obstructive pulmonary disease) ([address] Nausea & vomiting, Obstructive sleep apnea, Paroxysmal atrial fibrillation ([address] Post PTCA, Pulmonary HTN ([address] PVD (peripheral vascular disease) ([address] S/P CABG x 3, S/P PTCA (percutaneous transluminal coronary angioplasty), Severe pulmonary hypertension ([address] Shortness of breath, Thyroid disease, Unspecified cerebral artery occlusion with cerebral infarction, 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 Past surgical history:   has a past surgical history that includes Appendectomy (1941); Tonsillectomy (1950's); Cardiac surgery (4/09); Hysterectomy, total abdominal (1990's); Colonoscopy (In 2000's); pacemaker placement; Coronary artery bypass graft (4/8/2009); Coronary angioplasty with stent (4/21/2010); other surgical history; Middle ear surgery; and Percutaneous Transluminal Coronary Angio (11/2012). Social History:   reports that she quit smoking about 52 years ago. Her smoking use included cigarettes. She has a 1.25 pack-year smoking history. She has never used smokeless tobacco. She reports that she does not currently use alcohol. She reports that she does not use drugs.   Family history:  family history includes Arthritis in her mother; Cancer in her mother and sister; Coronary Art Dis in her father; Depression in her mother and sister; Early Death in her paternal grandfather; Early Death (age of onset: 36) in her father; Hearing Loss in her mother; Heart Disease in her father and sister; High Blood Pressure in her father, mother, sister, son, and son; High Cholesterol in her father and mother; Mental Illness in her mother; Dave Cure / Albino Sheets in her mother; Other in her daughter. Allergies   Allergen Reactions    Darvocet [Propoxyphene N-Acetaminophen]     Ranexa [Ranolazine Er]      Sick to her stomach    Ranolazine      Sick to her stomach    Sulfa Antibiotics Itching    Sulfasalazine Itching    Adhesive Tape Rash    Allantoin Rash    Bacitracin Rash    Gramicidin Rash    Neomycin Rash    Polymyxin B Rash    Pramoxine Hcl Rash    Silicone Rash       Review of Systems:   All 14 systems were reviewed and are negative  Except for the positive findings which are documented     /69   Pulse 59   Temp 97.3 °F (36.3 °C) (Oral)   Resp 16   Ht 4' 1.02\" (1.245 m)   Wt 115 lb 9.6 oz (52.4 kg)   SpO2 93%   BMI 33.83 kg/m²     Intake/Output Summary (Last 24 hours) at 12/17/2022 1057  Last data filed at 12/16/2022 2101  Gross per 24 hour   Intake 120 ml   Output --   Net 120 ml       Physical Exam:  Physical Exam  Vitals reviewed. HENT:      Head: Normocephalic. Mouth/Throat:      Mouth: Mucous membranes are dry. Cardiovascular:      Rate and Rhythm: Regular rhythm. Pulses: Normal pulses. Heart sounds: Murmur heard. Pulmonary:      Effort: No respiratory distress. Breath sounds: No wheezing. Musculoskeletal:      Right lower leg: No edema. Left lower leg: No edema. Skin:     General: Skin is warm and dry. Capillary Refill: Capillary refill takes less than 2 seconds. Neurological:      Mental Status: She is alert and oriented to person, place, and time.    Psychiatric:         Mood and Affect: Mood normal.        Medications:    potassium bicarb-citric acid  40 mEq Oral Once    Followed by    potassium bicarb-citric acid  20 mEq Oral Once    aspirin  81 mg Oral Daily    atorvastatin  10 mg Oral QPM    midodrine 5 mg Oral TID WC    tiotropium-olodaterol  2 puff Inhalation Daily    latanoprost  1 drop Both Eyes QPM    timolol  1 drop Both Eyes QPM    polyethylene glycol  17 g Oral Daily    torsemide  20 mg Oral Daily    spironolactone  12.5 mg Oral Daily    carvedilol  6.25 mg Oral BID WC    sodium chloride flush  5-40 mL IntraVENous 2 times per day    heparin (porcine)  5,000 Units SubCUTAneous BID      sodium chloride       albuterol sulfate HFA, sodium chloride flush, sodium chloride, ondansetron **OR** ondansetron, acetaminophen **OR** acetaminophen    Lab Data:  CBC:   Recent Labs     12/15/22  1732 12/16/22  0711   WBC 10.0 10.7*   HGB 17.9* 17.1*   HCT 56.1* 52.3*   MCV 92.9 92.6    380     BMP:   Recent Labs     12/16/22  0711 12/16/22  1740 12/17/22  0019    136 134*   K 3.3* 3.6 3.3*   CL 93* 90* 91*   CO2 26 32 30   BUN 70* 62* 59*   CREATININE 0.7 0.8 0.7     PT/INR: No results for input(s): PROTIME, INR in the last 72 hours. BNP:    Recent Labs     12/15/22  1732   PROBNP 1,342*     TROPONIN:   Recent Labs     12/15/22  1732   TROPONINT 0.012*              Impression:  Principal Problem:    Acute respiratory failure with hypoxia (HCC)  Active Problems: Moderate malnutrition ([address]    Acute on chronic congestive heart failure ([address]  Resolved Problems:    * No resolved hospital problems. *       All labs, medications and tests reviewed by myself, continue all other medications of all above medical condition listed as is except for changes mentioned above. Thank you   Please call with questions.     Electronically signed by LISA Nesbitt CNP on 12/17/2022 at 10:57 AM

## 2022-12-18 LAB
ALBUMIN SERPL-MCNC: 3.8 GM/DL (ref 3.4–5)
ALP BLD-CCNC: 234 IU/L (ref 40–129)
ALT SERPL-CCNC: 24 U/L (ref 10–40)
AMMONIA: 103 UMOL/L (ref 11–51)
ANION GAP SERPL CALCULATED.3IONS-SCNC: 18 MMOL/L (ref 4–16)
AST SERPL-CCNC: 37 IU/L (ref 15–37)
BACTERIA: NEGATIVE /HPF
BASOPHILS ABSOLUTE: 0.1 K/CU MM
BASOPHILS RELATIVE PERCENT: 0.5 % (ref 0–1)
BILIRUB SERPL-MCNC: 1 MG/DL (ref 0–1)
BILIRUBIN DIRECT: 0.4 MG/DL (ref 0–0.3)
BILIRUBIN URINE: NEGATIVE MG/DL
BILIRUBIN, INDIRECT: 0.6 MG/DL (ref 0–0.7)
BLOOD, URINE: NEGATIVE
BUN BLDV-MCNC: 53 MG/DL (ref 6–23)
CALCIUM SERPL-MCNC: 10.8 MG/DL (ref 8.3–10.6)
CHLORIDE BLD-SCNC: 85 MMOL/L (ref 99–110)
CLARITY: CLEAR
CO2: 27 MMOL/L (ref 21–32)
COLOR: YELLOW
CREAT SERPL-MCNC: 0.7 MG/DL (ref 0.6–1.1)
DIFFERENTIAL TYPE: ABNORMAL
EOSINOPHILS ABSOLUTE: 0.3 K/CU MM
EOSINOPHILS RELATIVE PERCENT: 3.2 % (ref 0–3)
GFR SERPL CREATININE-BSD FRML MDRD: >60 ML/MIN/1.73M2
GLUCOSE BLD-MCNC: 109 MG/DL (ref 70–99)
GLUCOSE, URINE: NEGATIVE MG/DL
HCT VFR BLD CALC: 48.5 % (ref 37–47)
HEMOGLOBIN: 15.7 GM/DL (ref 12.5–16)
HYALINE CASTS: 5 /LPF
IMMATURE NEUTROPHIL %: 0.9 % (ref 0–0.43)
KETONES, URINE: NEGATIVE MG/DL
LEUKOCYTE ESTERASE, URINE: ABNORMAL
LYMPHOCYTES ABSOLUTE: 1.1 K/CU MM
LYMPHOCYTES RELATIVE PERCENT: 11.6 % (ref 24–44)
MCH RBC QN AUTO: 30.4 PG (ref 27–31)
MCHC RBC AUTO-ENTMCNC: 32.4 % (ref 32–36)
MCV RBC AUTO: 94 FL (ref 78–100)
MONOCYTES ABSOLUTE: 0.7 K/CU MM
MONOCYTES RELATIVE PERCENT: 8 % (ref 0–4)
MUCUS: ABNORMAL HPF
NITRITE URINE, QUANTITATIVE: NEGATIVE
NUCLEATED RBC %: 0 %
PDW BLD-RTO: 19.3 % (ref 11.7–14.9)
PH, URINE: 6 (ref 5–8)
PLATELET # BLD: 394 K/CU MM (ref 140–440)
PMV BLD AUTO: 11.2 FL (ref 7.5–11.1)
POTASSIUM SERPL-SCNC: 4.1 MMOL/L (ref 3.5–5.1)
PROTEIN UA: NEGATIVE MG/DL
RBC # BLD: 5.16 M/CU MM (ref 4.2–5.4)
RBC URINE: 1 /HPF (ref 0–6)
SEGMENTED NEUTROPHILS ABSOLUTE COUNT: 6.9 K/CU MM
SEGMENTED NEUTROPHILS RELATIVE PERCENT: 75.8 % (ref 36–66)
SODIUM BLD-SCNC: 130 MMOL/L (ref 135–145)
SPECIFIC GRAVITY UA: 1.01 (ref 1–1.03)
SQUAMOUS EPITHELIAL: <1 /HPF
TOTAL IMMATURE NEUTOROPHIL: 0.08 K/CU MM
TOTAL NUCLEATED RBC: 0 K/CU MM
TOTAL PROTEIN: 6.4 GM/DL (ref 6.4–8.2)
TOTAL RETICULOCYTE COUNT: 0.1 K/CU MM
TRICHOMONAS: ABNORMAL /HPF
UROBILINOGEN, URINE: 1 MG/DL (ref 0.2–1)
WBC # BLD: 9.1 K/CU MM (ref 4–10.5)
WBC UA: 3 /HPF (ref 0–5)

## 2022-12-18 PROCEDURE — 2700000000 HC OXYGEN THERAPY PER DAY

## 2022-12-18 PROCEDURE — 94761 N-INVAS EAR/PLS OXIMETRY MLT: CPT

## 2022-12-18 PROCEDURE — APPNB30 APP NON BILLABLE TIME 0-30 MINS: Performed by: NURSE PRACTITIONER

## 2022-12-18 PROCEDURE — 99233 SBSQ HOSP IP/OBS HIGH 50: CPT | Performed by: INTERNAL MEDICINE

## 2022-12-18 PROCEDURE — 6370000000 HC RX 637 (ALT 250 FOR IP): Performed by: INTERNAL MEDICINE

## 2022-12-18 PROCEDURE — 2580000003 HC RX 258: Performed by: INTERNAL MEDICINE

## 2022-12-18 PROCEDURE — 82140 ASSAY OF AMMONIA: CPT

## 2022-12-18 PROCEDURE — 85025 COMPLETE CBC W/AUTO DIFF WBC: CPT

## 2022-12-18 PROCEDURE — 94640 AIRWAY INHALATION TREATMENT: CPT

## 2022-12-18 PROCEDURE — 1200000000 HC SEMI PRIVATE

## 2022-12-18 PROCEDURE — 81003 URINALYSIS AUTO W/O SCOPE: CPT

## 2022-12-18 PROCEDURE — 80053 COMPREHEN METABOLIC PANEL: CPT

## 2022-12-18 PROCEDURE — 36415 COLL VENOUS BLD VENIPUNCTURE: CPT

## 2022-12-18 PROCEDURE — 93308 TTE F-UP OR LMTD: CPT

## 2022-12-18 PROCEDURE — 82248 BILIRUBIN DIRECT: CPT

## 2022-12-18 PROCEDURE — 6360000002 HC RX W HCPCS: Performed by: INTERNAL MEDICINE

## 2022-12-18 RX ADMIN — MIDODRINE HYDROCHLORIDE 5 MG: 5 TABLET ORAL at 18:12

## 2022-12-18 RX ADMIN — POLYETHYLENE GLYCOL (3350) 17 G: 17 POWDER, FOR SOLUTION ORAL at 08:58

## 2022-12-18 RX ADMIN — TIMOLOL MALEATE 1 DROP: 5 SOLUTION OPHTHALMIC at 21:38

## 2022-12-18 RX ADMIN — LATANOPROST 1 DROP: 50 SOLUTION/ DROPS OPHTHALMIC at 21:38

## 2022-12-18 RX ADMIN — SODIUM CHLORIDE, PRESERVATIVE FREE 10 ML: 5 INJECTION INTRAVENOUS at 21:37

## 2022-12-18 RX ADMIN — ATORVASTATIN CALCIUM 10 MG: 10 TABLET, FILM COATED ORAL at 21:38

## 2022-12-18 RX ADMIN — MIDODRINE HYDROCHLORIDE 5 MG: 5 TABLET ORAL at 08:57

## 2022-12-18 RX ADMIN — HEPARIN SODIUM 5000 UNITS: 5000 INJECTION INTRAVENOUS; SUBCUTANEOUS at 21:38

## 2022-12-18 RX ADMIN — ASPIRIN 81 MG: 81 TABLET, CHEWABLE ORAL at 08:56

## 2022-12-18 RX ADMIN — SPIRONOLACTONE 12.5 MG: 25 TABLET ORAL at 09:33

## 2022-12-18 RX ADMIN — CARVEDILOL 6.25 MG: 6.25 TABLET, FILM COATED ORAL at 18:12

## 2022-12-18 RX ADMIN — HEPARIN SODIUM 5000 UNITS: 5000 INJECTION INTRAVENOUS; SUBCUTANEOUS at 08:57

## 2022-12-18 RX ADMIN — MIDODRINE HYDROCHLORIDE 5 MG: 5 TABLET ORAL at 12:40

## 2022-12-18 RX ADMIN — CARVEDILOL 6.25 MG: 6.25 TABLET, FILM COATED ORAL at 08:56

## 2022-12-18 RX ADMIN — ALBUTEROL SULFATE 2 PUFF: 90 AEROSOL, METERED RESPIRATORY (INHALATION) at 12:09

## 2022-12-18 RX ADMIN — SODIUM CHLORIDE, PRESERVATIVE FREE 10 ML: 5 INJECTION INTRAVENOUS at 09:00

## 2022-12-18 ASSESSMENT — PAIN SCALES - GENERAL: PAINLEVEL_OUTOF10: 0

## 2022-12-18 NOTE — PROGRESS NOTES
Consulted with respiratory in regards to setting up evaluation for home O2.  Expected to be discharged 12/19

## 2022-12-18 NOTE — PROGRESS NOTES
Patient requested at home oxygen. On 2L in hospital stating at 97%. Walked patient for a full minute without O2 and dropped to 80%. Advised Dr of findings. Pulmonary, case management and insurance will coordinate care for at home oxygen.

## 2022-12-18 NOTE — PROGRESS NOTES
V2.0  [facility] [place]      Name:  Juwan Alfaro /Age/Sex: 3/18/1929  (80 y.o. female)   MRN & CSN:  [id number] & [id number] Encounter Date/Time: 2022 3:35 PM EST    Location:  [address] [place] [address]-A PCP: Sangeeta Almeida MD       Hospital Day: 4    Assessment and Plan:   Juwan Alfaro is a 80 y.o. female with pmh of  who presents with Acute respiratory failure with hypoxia ([address]         #.  Acute respiratory failure with hypoxia  -As per information patient was hypoxic in cardiologist office.  -Saturating 93-94% on 2 L of oxygen. -VBG-pH 7.41, PCO2 59, PO2 47, HCO3 37.4.  -Chest x-ray-cardiomegaly, atelectasis or infiltrates in the lung bases with some small pleural effusions. -CT chest without contrast-massive cardiomegaly, mild diffuse mosaic attenuation pattern as can be seen with reactive airway disease or bronchiolitis, edema is also a consideration. Mild right basilar dependent consolidation likely representing atelectasis. -Wean off oxygen as tolerated. #.  Chronic diastolic congestive heart failure  -Echo-10/2022-EF 65-71%, grade 3 diastolic dysfunction, severely dilated right ventricle, severe tricuspid regurgitation  -Appears clinically dry  -Received gentle hydration  BUN currently improving  -Diuretics being managed by cardiology. #Ascites  -Mild ascites and ultrasound abdomen done on 10/25/2022.  -Likely due to right heart failure.  -Per daughter patient abdomen is progressively distended  -US without concern of ascites      #. ? Pneumonia  -Continue ceftriaxone, azithromycin.  -Procalcitonin low. Is no symptoms of pneumonia. Pneumonia ruled out. DC antibiotics. #.  Hypercalcemia-continue IV fluids and repeat BMP. #.  Moderate COPD  -Does not appear to be in exacerbation  -Patient is on Anoro Ellipta  -Albuterol HFA as needed     #. Coronary artery disease s/p CABG     #. Atrial fibrillation  -Not on anticoagulation due to fall risk     #.   Chronic hypotension-on midodrine     #. Severe pulmonary hypertension, cor pulmonale     #. S/p PPM     #. SAULO on CPAP     #. Glaucoma-on latanoprost, timolol     #. Dementia  - will add UA and X ray KUB   -per daughter patient has subtle behaviour changes     Diet ADULT DIET; Regular; Low Fat/Low Chol/High Fiber/2 gm Na  ADULT ORAL NUTRITION SUPPLEMENT; Breakfast, Dinner; Standard High Calorie/High Protein Oral Supplement  ADULT ORAL NUTRITION SUPPLEMENT; Lunch; Frozen Oral Supplement   DVT Prophylaxis [] Lovenox, []  Heparin, [] SCDs, [] Ambulation,  [] Eliquis, [] Xarelto  [] Coumadin   Code Status Full Code   Disposition From:   Expected Disposition:   Estimated Date of Discharge:  Patient requires continued admission due to right heart failure    Surrogate Decision Maker/ POA      Subjective:     Chief Complaint: Other (Hypoxic, 88% on RA)       Manjinder Jose is a 80 y.o. female who presents with hypoxia from outpatient cardiology clinic. Seen and examined in the morning. Patient is alert. Is sitting on chair and was eating her breakfast.       Review of Systems:    Review of Systems    As above    Objective: Intake/Output Summary (Last 24 hours) at 12/18/2022 1506  Last data filed at 12/17/2022 1851  Gross per 24 hour   Intake --   Output 450 ml   Net -450 ml          Vitals:   Vitals:    12/18/22 1439   BP: 117/74   Pulse: 60   Resp: 16   Temp: 97.8 °F (36.6 °C)   SpO2:        Physical Exam:     General: NAD  Eyes: EOMI  ENT: neck supple  Cardiovascular: Regular rate. Respiratory: Clear to auscultation  Gastrointestinal: Soft, distended belly   Genitourinary: no suprapubic tenderness  Musculoskeletal: No edema  Skin: warm, dry  Neuro: Alert.       Medications:   Medications:    aspirin  81 mg Oral Daily    atorvastatin  10 mg Oral QPM    midodrine  5 mg Oral TID     tiotropium-olodaterol  2 puff Inhalation Daily    latanoprost  1 drop Both Eyes QPM    timolol  1 drop Both Eyes QPM    polyethylene glycol  17 g Oral Daily    [Held by provider] torsemide  20 mg Oral Daily    spironolactone  12.5 mg Oral Daily    carvedilol  6.25 mg Oral BID WC    sodium chloride flush  5-40 mL IntraVENous 2 times per day    heparin (porcine)  5,000 Units SubCUTAneous BID      Infusions:    sodium chloride       PRN Meds: albuterol sulfate HFA, 2 puff, Q6H PRN  sodium chloride flush, 5-40 mL, PRN  sodium chloride, , PRN  ondansetron, 4 mg, Q8H PRN   Or  ondansetron, 4 mg, Q6H PRN  acetaminophen, 650 mg, Q6H PRN   Or  acetaminophen, 650 mg, Q6H PRN      Labs      No results found for this or any previous visit (from the past 24 hour(s)). Imaging/Diagnostics Last 24 Hours   CT CHEST WO CONTRAST    Result Date: 12/15/2022  EXAMINATION: CT OF THE CHEST WITHOUT CONTRAST 12/15/2022 8:56 pm TECHNIQUE: CT of the chest was performed without the administration of intravenous contrast. Multiplanar reformatted images are provided for review. Automated exposure control, iterative reconstruction, and/or weight based adjustment of the mA/kV was utilized to reduce the radiation dose to as low as reasonably achievable. COMPARISON: Chest radiograph 12/15/2022. CT chest angiogram 10/19/2022. HISTORY: ORDERING SYSTEM PROVIDED HISTORY: cough hypoxia TECHNOLOGIST PROVIDED HISTORY: Reason for exam:->cough hypoxia Decision Support Exception - unselect if not a suspected or confirmed emergency medical condition->Emergency Medical Condition (MA) Reason for Exam: cough hypoxia Additional signs and symptoms: no Relevant Medical/Surgical History: none FINDINGS: Mediastinum: The thoracic aorta is normal in caliber with moderate calcific plaquing. Status post CABG. The main pulmonary artery is upper limits of normal in size. Massive cardiomegaly. Intracardiac AICD lead in the right ventricle. No pericardial effusion. The mediastinal esophagus is unremarkable. No lymphadenopathy or pneumomediastinum identified. Lungs/pleura:  The central airways are grossly patent. No pleural effusion or pneumothorax. Mild right basilar dependent consolidation. Diffuse bilateral mosaic attenuation pattern. No interlobular septal thickening. Upper Abdomen: Images through the upper abdomen demonstrate no acute process. Soft Tissues/Bones: Status post median sternotomy. No acute osseous or soft tissue abnormality. 1. Massive cardiomegaly. Mild diffuse mosaic attenuation pattern as can be seen with reactive airways disease or bronchiolitis. Edema is also a consideration. 2. Mild right basilar dependent consolidation likely representing atelectasis. Pneumonia and aspiration are not excluded. XR CHEST PORTABLE    Result Date: 12/15/2022  EXAMINATION: ONE X-RAY VIEW OF THE CHEST 12/15/2022 6:39 pm COMPARISON: 10/21/2022 HISTORY: ORDERING SYSTEM PROVIDED HISTORY:  Hypoxia TECHNOLOGIST PROVIDED HISTORY: Reason for Exam:  Hypoxia Additional signs and symptoms:  NA Relevant Medical/Surgical History:  CAD, CHF, COPD Initial evaluation FINDINGS: Monitor wires overlie the chest. The patient has had a median sternotomy. The patient has a pacemaker. The trachea is midline. There is atherosclerotic calcification of the aortic knob. There is cardiomegaly. There is no overt failure. There is suspicion for atelectasis or infiltrates in the lung bases with small pleural effusions. There is the sequela of granulomatous disease in the chest.     Pacemaker. Cardiomegaly. Atelectasis or infiltrates in the lung bases with small pleural effusions. Follow up to resolution is suggested.        Electronically signed by Joel Chapman MD on 12/18/2022 at 3:06 PM

## 2022-12-18 NOTE — PROGRESS NOTES
Cardiology Progress Note     Admit Date:  12/15/2022    Consult reason/ Seen today for :       Subjective and  Overnight Events : Pleasant somewhat short of breath denies any chest pain but very hard of hearing   In A. fib but rate controlled  Evaluated by EPS service there was concern for pulmonary hypertension cor pulmonale in the setting of COPD chronic A. fib no further EP work-up needed    Chief complain on admission : 80 y. o.year old who is admitted for  Chief Complaint   Patient presents with    Other     Hypoxic, 88% on RA      Assessment / Plan:  Right heart failure with shortness of breath volume overload in the setting of severe pulmonary hypertension diuretics are currently on hold due to concerns for possible dehydration volume overload overdiuresis? Hypoxia secondary to COPD pulmonary edema? Supplemental oxygen  Echo shows RVSP of 77 with dilated right side but pacer and dilated IVC  Concern for cirrhotic liver with ascites hence on Aldactone  Repeat limited echo, replace potassium  History of atrial fibrillation not on anticoagulation due to fall risk  History of coronary artery disease s/p CABG mildly abnormal troponin level continue medical management due to frailty and advanced age, stress test in July 2022 showed no ischemia continue aspirin statins  Low blood pressure on midodrine and Coreg due to A. Fib?   DVT Prophylaxis if no contraindication    Past medical history:    has a past medical history of Age-related osteoporosis with current pathological fracture of vertebra (HCC), Arthritis, Bradycardia, CAD (coronary artery disease), Cardiac pacemaker, CHF (congestive heart failure) ([address] COPD, mild ([address] Cor pulmonale (chronic) ([address] CVA (cerebrovascular accident) ([address] DJD (degenerative joint disease) of cervical spine, Exhaustion of cardiac pacemaker battery, Family history of cardiovascular disease, Glaucoma, H/O 24 hour EKG monitoring, H/O cardiac catheterization, H/O cardiovascular stress test, H/O cardiovascular stress test, H/O cardiovascular stress test, H/O chest x-ray, H/O Doppler lower venous ultrasound, H/O Doppler ultrasound, H/O Doppler ultrasound, H/O Doppler ultrasound, H/O Doppler ultrasound, H/O echocardiogram, H/O echocardiogram, H/O echocardiogram, H/O echocardiogram, History of complete ECG, History of nuclear stress test, Hx of cardiovascular stress test, HX OTHER MEDICAL, Hyperlipidemia, Hypertension, Mild intermittent asthma, Moderate COPD (chronic obstructive pulmonary disease) ([address] Nausea & vomiting, Obstructive sleep apnea, Paroxysmal atrial fibrillation ([address] Post PTCA, Pulmonary HTN ([address] PVD (peripheral vascular disease) ([address] S/P CABG x 3, S/P PTCA (percutaneous transluminal coronary angioplasty), Severe pulmonary hypertension ([address] Shortness of breath, Thyroid disease, Unspecified cerebral artery occlusion with cerebral infarction, 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 Past surgical history:   has a past surgical history that includes Appendectomy (1941); Tonsillectomy (1950's); Cardiac surgery (4/09); Hysterectomy, total abdominal (1990's); Colonoscopy (In 2000's); pacemaker placement; Coronary artery bypass graft (4/8/2009); Coronary angioplasty with stent (4/21/2010); other surgical history; Middle ear surgery; and Percutaneous Transluminal Coronary Angio (11/2012). Social History:   reports that she quit smoking about 52 years ago. Her smoking use included cigarettes. She has a 1.25 pack-year smoking history. She has never used smokeless tobacco. She reports that she does not currently use alcohol. She reports that she does not use drugs.   Family history:  family history includes Arthritis in her mother; Cancer in her mother and sister; Coronary Art Dis in her father; Depression in her mother and sister; Early Death in her paternal grandfather; Early Death (age of onset: 36) in her father; Hearing Loss in her mother; Heart Disease in her father and sister; High Blood Pressure in her father, mother, sister, son, and son; High Cholesterol in her father and mother; Mental Illness in her mother; [place] Rasp / [place] in her mother; Other in her daughter. Allergies   Allergen Reactions    Darvocet [Propoxyphene N-Acetaminophen]     Ranexa [Ranolazine Er]      Sick to her stomach    Ranolazine      Sick to her stomach    Sulfa Antibiotics Itching    Sulfasalazine Itching    Adhesive Tape Rash    Allantoin Rash    Bacitracin Rash    Gramicidin Rash    Neomycin Rash    Polymyxin B Rash    Pramoxine Hcl Rash    Silicone Rash       Review of Systems:    All 14 systems were reviewed and are negative  Except for the positive findings  which as documented     /74   Pulse 60   Temp 97.8 °F (36.6 °C)   Resp 16   Ht 4' 1.02\" (1.245 m)   Wt 115 lb 9.6 oz (52.4 kg)   SpO2 95%   BMI 33.83 kg/m²     Intake/Output Summary (Last 24 hours) at 12/18/2022 1439  Last data filed at 12/17/2022 1851  Gross per 24 hour   Intake --   Output 450 ml   Net -450 ml     Physical Exam:  Constitutional:  Well developed, Well nourished, No acute distress, Non-toxic appearance. HENT:  Normocephalic, Atraumatic, Bilateral external ears normal, Oropharynx moist, No oral exudates, Nose normal. Neck- Normal range of motion, No tenderness, Supple, No stridor. Eyes:  PERRL, EOMI, Conjunctiva normal, No discharge. Respiratory:  Normal breath sounds, No respiratory distress, No wheezing, No chest tenderness. Cardiovascular:  Normal heart rate, Normal rhythm, No murmurs, No rubs, No gallops, JVP not elevated  Abdomen/GI:  Bowel sounds normal, Soft, No tenderness, No masses, No pulsatile masses.      Musculoskeletal:  Intact distal pulses, No edema, No tenderness, No cyanosis, No clubbing. Good range of motion in all major joints. No tenderness to palpation or major deformities noted. Back- No tenderness. Integument:  Warm, Dry, No erythema, No rash. Lymphatic:  No lymphadenopathy noted. Neurologic:  Alert & oriented x 3, Normal motor function, Normal sensory function, No focal deficits noted. Psychiatric:  Affect  and  Mood :no change    Medications:    aspirin  81 mg Oral Daily    atorvastatin  10 mg Oral QPM    midodrine  5 mg Oral TID WC    tiotropium-olodaterol  2 puff Inhalation Daily    latanoprost  1 drop Both Eyes QPM    timolol  1 drop Both Eyes QPM    polyethylene glycol  17 g Oral Daily    [Held by provider] torsemide  20 mg Oral Daily    spironolactone  12.5 mg Oral Daily    carvedilol  6.25 mg Oral BID WC    sodium chloride flush  5-40 mL IntraVENous 2 times per day    heparin (porcine)  5,000 Units SubCUTAneous BID      sodium chloride       albuterol sulfate HFA, sodium chloride flush, sodium chloride, ondansetron **OR** ondansetron, acetaminophen **OR** acetaminophen    Lab Data:  CBC:   Recent Labs     12/15/22  1732 12/16/22  0711   WBC 10.0 10.7*   HGB 17.9* 17.1*   HCT 56.1* 52.3*   MCV 92.9 92.6    380     BMP:   Recent Labs     12/16/22  0711 12/16/22  1740 12/17/22  0019    136 134*   K 3.3* 3.6 3.3*   CL 93* 90* 91*   CO2 26 32 30   BUN 70* 62* 59*   CREATININE 0.7 0.8 0.7     PT/INR: No results for input(s): PROTIME, INR in the last 72 hours. BNP:    Recent Labs     12/15/22  1732   PROBNP 1,342*     TROPONIN:   Recent Labs     12/15/22  1732   TROPONINT 0.012*        ECHO :   echocardiogram    Assessment:  80 y. o.year old who is admitted for  Chief Complaint   Patient presents with    Other     Hypoxic, 88% on RA    , active issues as noted below:  Impression:  Principal Problem:    Acute respiratory failure with hypoxia ([address]  Active Problems:     Moderate malnutrition ([address]    Acute on chronic congestive heart failure (HCC)    Chronic heart failure with preserved ejection fraction (HFpEF) ([facility]  Resolved Problems:    * No resolved hospital problems. *            All labs, medications and tests reviewed by myself , continue all other medications of all above medical condition listed as is except for changes mentioned above. Thank you very much for consult , please call with questions.     Shahla Maria MD, MD 12/18/2022 2:39 PM

## 2022-12-18 NOTE — PROGRESS NOTES
Cardiology Progress Note     Today's Plan limited echo in am    Admit Date:  12/15/2022    Consult reason/ Seen today for: CHF    Subjective and  Overnight Events:  up in chair -reports she is feeling well. No shortness breath or chest pain  Assessment / Plan:       Pulmonary  hypertension   With component of cor pulmonale   Echo 10/2022: Sever dilated RV   To use CPAP    Chronic atrial fib   Rate control with BB  No AC d/t fall risk   Appreciate EP consult: not a candidate for BIV pacemaker      HFpEF- chronic   NYHA class II-   BNP elevated however normal for her age  CXR : small effusion: CT chest ? Edema    Echo: EF 50-55% Grade III DDD  Appears dry  Denies Shortness of breath   Continue to hold torsemide  Continue aldactone   Limited echocardiogram in a.m. Single chamber pacemaker in situ  Analysis reviewed   Pacing in RV 98 %   She is pacemaker dependant:      Coronary artery disease  H/o CABG  Mild bump in troponin - not ACS; demand leak   Stable- without chest pain   Medical management  asa atorvastatin coreg     Chronic hypotension  On midodrine   Stable        History of Presenting Illness:    Chief complain on admission : 80 y. o.year old who is admitted for  Chief Complaint   Patient presents with    Other     Hypoxic, 88% on RA        Past medical history:    has a past medical history of Age-related osteoporosis with current pathological fracture of vertebra (HCC), Arthritis, Bradycardia, CAD (coronary artery disease), Cardiac pacemaker, CHF (congestive heart failure) ([address] COPD, mild ([address] Cor pulmonale (chronic) ([address] CVA (cerebrovascular accident) ([address] DJD (degenerative joint disease) of cervical spine, Exhaustion of cardiac pacemaker battery, Family history of cardiovascular disease, Glaucoma, H/O 24 hour EKG monitoring, H/O cardiac catheterization, H/O cardiovascular stress test, H/O cardiovascular stress test, H/O cardiovascular stress test, H/O chest x-ray, H/O Doppler lower venous ultrasound, H/O Doppler ultrasound, H/O Doppler ultrasound, H/O Doppler ultrasound, H/O Doppler ultrasound, H/O echocardiogram, H/O echocardiogram, H/O echocardiogram, H/O echocardiogram, History of complete ECG, History of nuclear stress test, Hx of cardiovascular stress test, HX OTHER MEDICAL, Hyperlipidemia, Hypertension, Mild intermittent asthma, Moderate COPD (chronic obstructive pulmonary disease) ([address] Nausea & vomiting, Obstructive sleep apnea, Paroxysmal atrial fibrillation ([address] Post PTCA, Pulmonary HTN ([address] PVD (peripheral vascular disease) ([address] S/P CABG x 3, S/P PTCA (percutaneous transluminal coronary angioplasty), Severe pulmonary hypertension ([address] Shortness of breath, Thyroid disease, Unspecified cerebral artery occlusion with cerebral infarction, 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 Past surgical history:   has a past surgical history that includes Appendectomy (1941); Tonsillectomy (1950's); Cardiac surgery (4/09); Hysterectomy, total abdominal (1990's); Colonoscopy (In 2000's); pacemaker placement; Coronary artery bypass graft (4/8/2009); Coronary angioplasty with stent (4/21/2010); other surgical history; Middle ear surgery; and Percutaneous Transluminal Coronary Angio (11/2012). Social History:   reports that she quit smoking about 52 years ago. Her smoking use included cigarettes. She has a 1.25 pack-year smoking history. She has never used smokeless tobacco. She reports that she does not currently use alcohol. She reports that she does not use drugs.   Family history:  family history includes Arthritis in her mother; Cancer in her mother and sister; Coronary Art Dis in her father; Depression in her mother and sister; Early Death in her paternal grandfather; Early Death (age of onset: 36) in her father; Hearing Loss in her mother; Heart Disease in her father and sister; High Blood Pressure in her father, mother, sister, son, and son; High Cholesterol in her father and mother; Mental Illness in her mother; Areta Valencia / [place] in her mother; Other in her daughter. Allergies   Allergen Reactions    Darvocet [Propoxyphene N-Acetaminophen]     Ranexa [Ranolazine Er]      Sick to her stomach    Ranolazine      Sick to her stomach    Sulfa Antibiotics Itching    Sulfasalazine Itching    Adhesive Tape Rash    Allantoin Rash    Bacitracin Rash    Gramicidin Rash    Neomycin Rash    Polymyxin B Rash    Pramoxine Hcl Rash    Silicone Rash       Review of Systems:   All 14 systems were reviewed and are negative  Except for the positive findings which are documented     /74   Pulse 56   Temp 97.8 °F (36.6 °C)   Resp 16   Ht 4' 1.02\" (1.245 m)   Wt 115 lb 9.6 oz (52.4 kg)   SpO2 95%   BMI 33.83 kg/m²     Intake/Output Summary (Last 24 hours) at 12/18/2022 1127  Last data filed at 12/17/2022 1851  Gross per 24 hour   Intake --   Output 450 ml   Net -450 ml         Physical Exam:  Physical Exam  Vitals reviewed. HENT:      Head: Normocephalic. Mouth/Throat:      Mouth: Mucous membranes are dry. Cardiovascular:      Rate and Rhythm: Regular rhythm. Pulses: Normal pulses. Heart sounds: Murmur heard. Pulmonary:      Effort: No respiratory distress. Breath sounds: No wheezing. Musculoskeletal:      Right lower leg: No edema. Left lower leg: No edema. Skin:     General: Skin is warm and dry. Capillary Refill: Capillary refill takes less than 2 seconds. Neurological:      Mental Status: She is alert and oriented to person, place, and time.    Psychiatric:         Mood and Affect: Mood normal.        Medications:    aspirin  81 mg Oral Daily    atorvastatin  10 mg Oral QPM    midodrine  5 mg Oral TID WC    tiotropium-olodaterol  2 puff Inhalation Daily    latanoprost  1 drop Both Eyes QPM    timolol  1 drop Both Eyes QPM    polyethylene glycol  17 g Oral Daily    [Held by provider] torsemide  20 mg Oral Daily    spironolactone  12.5 mg Oral Daily    carvedilol  6.25 mg Oral BID WC    sodium chloride flush  5-40 mL IntraVENous 2 times per day    heparin (porcine)  5,000 Units SubCUTAneous BID      sodium chloride       albuterol sulfate HFA, sodium chloride flush, sodium chloride, ondansetron **OR** ondansetron, acetaminophen **OR** acetaminophen    Lab Data:  CBC:   Recent Labs     12/15/22  1732 12/16/22  0711   WBC 10.0 10.7*   HGB 17.9* 17.1*   HCT 56.1* 52.3*   MCV 92.9 92.6    380       BMP:   Recent Labs     12/16/22  0711 12/16/22  1740 12/17/22  0019    136 134*   K 3.3* 3.6 3.3*   CL 93* 90* 91*   CO2 26 32 30   BUN 70* 62* 59*   CREATININE 0.7 0.8 0.7       PT/INR: No results for input(s): PROTIME, INR in the last 72 hours. BNP:    Recent Labs     12/15/22  1732   PROBNP 1,342*       TROPONIN:   Recent Labs     12/15/22  1732   TROPONINT 0.012*                Impression:  Principal Problem:    Acute respiratory failure with hypoxia (HCC)  Active Problems: Moderate malnutrition (HCC)    Acute on chronic congestive heart failure (HCC)    Chronic heart failure with preserved ejection fraction (HFpEF) ([facility] [address]  Resolved Problems:    * No resolved hospital problems. *       All labs, medications and tests reviewed by myself, continue all other medications of all above medical condition listed as is except for changes mentioned above. Thank you   Please call with questions.     Electronically signed by LISA Farrell CNP on 12/18/2022 at 11:27 AM

## 2022-12-19 VITALS
WEIGHT: 118 LBS | SYSTOLIC BLOOD PRESSURE: 124 MMHG | OXYGEN SATURATION: 92 % | HEIGHT: 55 IN | RESPIRATION RATE: 14 BRPM | BODY MASS INDEX: 27.31 KG/M2 | DIASTOLIC BLOOD PRESSURE: 60 MMHG | TEMPERATURE: 97.5 F | HEART RATE: 60 BPM

## 2022-12-19 LAB
ANION GAP SERPL CALCULATED.3IONS-SCNC: 9 MMOL/L (ref 4–16)
BASOPHILS ABSOLUTE: 0.1 K/CU MM
BASOPHILS RELATIVE PERCENT: 0.9 % (ref 0–1)
BUN BLDV-MCNC: 50 MG/DL (ref 6–23)
CALCIUM SERPL-MCNC: 10.9 MG/DL (ref 8.3–10.6)
CHLORIDE BLD-SCNC: 91 MMOL/L (ref 99–110)
CO2: 36 MMOL/L (ref 21–32)
CREAT SERPL-MCNC: 0.6 MG/DL (ref 0.6–1.1)
DIFFERENTIAL TYPE: ABNORMAL
EOSINOPHILS ABSOLUTE: 0.3 K/CU MM
EOSINOPHILS RELATIVE PERCENT: 3.5 % (ref 0–3)
GFR SERPL CREATININE-BSD FRML MDRD: >60 ML/MIN/1.73M2
GLUCOSE BLD-MCNC: 111 MG/DL (ref 70–99)
HCT VFR BLD CALC: 50.2 % (ref 37–47)
HEMOGLOBIN: 15.9 GM/DL (ref 12.5–16)
IMMATURE NEUTROPHIL %: 1.1 % (ref 0–0.43)
LYMPHOCYTES ABSOLUTE: 0.9 K/CU MM
LYMPHOCYTES RELATIVE PERCENT: 12.3 % (ref 24–44)
MCH RBC QN AUTO: 29.9 PG (ref 27–31)
MCHC RBC AUTO-ENTMCNC: 31.7 % (ref 32–36)
MCV RBC AUTO: 94.4 FL (ref 78–100)
MONOCYTES ABSOLUTE: 0.5 K/CU MM
MONOCYTES RELATIVE PERCENT: 7.2 % (ref 0–4)
NUCLEATED RBC %: 0 %
PDW BLD-RTO: 19.7 % (ref 11.7–14.9)
PLATELET # BLD: 341 K/CU MM (ref 140–440)
PMV BLD AUTO: 11 FL (ref 7.5–11.1)
POTASSIUM SERPL-SCNC: 4.2 MMOL/L (ref 3.5–5.1)
RBC # BLD: 5.32 M/CU MM (ref 4.2–5.4)
SEGMENTED NEUTROPHILS ABSOLUTE COUNT: 5.6 K/CU MM
SEGMENTED NEUTROPHILS RELATIVE PERCENT: 75 % (ref 36–66)
SODIUM BLD-SCNC: 136 MMOL/L (ref 135–145)
TOTAL IMMATURE NEUTOROPHIL: 0.08 K/CU MM
TOTAL NUCLEATED RBC: 0 K/CU MM
WBC # BLD: 7.4 K/CU MM (ref 4–10.5)

## 2022-12-19 PROCEDURE — 94618 PULMONARY STRESS TESTING: CPT

## 2022-12-19 PROCEDURE — 80048 BASIC METABOLIC PNL TOTAL CA: CPT

## 2022-12-19 PROCEDURE — 6360000002 HC RX W HCPCS: Performed by: INTERNAL MEDICINE

## 2022-12-19 PROCEDURE — 99233 SBSQ HOSP IP/OBS HIGH 50: CPT | Performed by: INTERNAL MEDICINE

## 2022-12-19 PROCEDURE — 85025 COMPLETE CBC W/AUTO DIFF WBC: CPT

## 2022-12-19 PROCEDURE — 6370000000 HC RX 637 (ALT 250 FOR IP): Performed by: INTERNAL MEDICINE

## 2022-12-19 PROCEDURE — 93308 TTE F-UP OR LMTD: CPT

## 2022-12-19 PROCEDURE — 36415 COLL VENOUS BLD VENIPUNCTURE: CPT

## 2022-12-19 PROCEDURE — 94640 AIRWAY INHALATION TREATMENT: CPT

## 2022-12-19 PROCEDURE — 97535 SELF CARE MNGMENT TRAINING: CPT

## 2022-12-19 PROCEDURE — 2700000000 HC OXYGEN THERAPY PER DAY

## 2022-12-19 PROCEDURE — 97116 GAIT TRAINING THERAPY: CPT

## 2022-12-19 PROCEDURE — 94761 N-INVAS EAR/PLS OXIMETRY MLT: CPT

## 2022-12-19 PROCEDURE — 2580000003 HC RX 258: Performed by: INTERNAL MEDICINE

## 2022-12-19 RX ORDER — SPIRONOLACTONE 25 MG/1
12.5 TABLET ORAL DAILY
Qty: 30 TABLET | Refills: 3 | Status: SHIPPED | OUTPATIENT
Start: 2022-12-20

## 2022-12-19 RX ADMIN — CARVEDILOL 6.25 MG: 6.25 TABLET, FILM COATED ORAL at 09:54

## 2022-12-19 RX ADMIN — MIDODRINE HYDROCHLORIDE 5 MG: 5 TABLET ORAL at 13:00

## 2022-12-19 RX ADMIN — SODIUM CHLORIDE, PRESERVATIVE FREE 10 ML: 5 INJECTION INTRAVENOUS at 09:56

## 2022-12-19 RX ADMIN — ASPIRIN 81 MG: 81 TABLET, CHEWABLE ORAL at 09:55

## 2022-12-19 RX ADMIN — HEPARIN SODIUM 5000 UNITS: 5000 INJECTION INTRAVENOUS; SUBCUTANEOUS at 09:56

## 2022-12-19 RX ADMIN — POLYETHYLENE GLYCOL (3350) 17 G: 17 POWDER, FOR SOLUTION ORAL at 09:55

## 2022-12-19 RX ADMIN — MIDODRINE HYDROCHLORIDE 5 MG: 5 TABLET ORAL at 09:54

## 2022-12-19 RX ADMIN — SPIRONOLACTONE 12.5 MG: 25 TABLET ORAL at 09:55

## 2022-12-19 RX ADMIN — TIOTROPIUM BROMIDE AND OLODATEROL 2 PUFF: 3.124; 2.736 SPRAY, METERED RESPIRATORY (INHALATION) at 12:18

## 2022-12-19 NOTE — PROGRESS NOTES
Physical Therapy    Physical Therapy Treatment Note  Name: Juwan Alfaro MRN: [id number] :   3/18/1929   Date:  2022   Admission Date: 12/15/2022 Room:  [address] [place] [address]   Restrictions/Precautions:  Restrictions/Precautions  Restrictions/Precautions: General Precautions, Fall Risk , on         Communication with other providers:  co-tx with OT, Angelica Mcnair for pt safety and tolerance  Subjective:  Patient states:  \"I love you girls\"  Pain:   Location, Type, Intensity (0/10 to 10/10):  no c/o  Objective:    Observation:  Pt is awake, up in recliner upon entry. Objective Measures:  Sp02 reads 96%-95% throughout session on - reported this to RN  Treatment, including education/measures:  Sitting balance: Seated at recliner pt demonstrates fair- balance in scooting task,UE support required, pt with use of retro lean for anterior hip progression, SBA. Sit<>Stand: Pt performed STS from EOB to RW  with CGA, v/c for proper sequencing. Pt demonstrates fair time to upright, appropriate advancement of UE to RW. Pt denies need for BR at this time. Gait: Pt AMB x120 ft in hallway with  RW, decreased alisha and foot clearance, increased need for directional assist, x2 brief standing breaks, CGA. Pt tolerated well, OT provides chair follow/  Return to seated at recliner following AMB pt demonstrates fair-control, v/c for safe sequencing- max cues for prevention of premature RTS with good carryover. End of session pt left in recliner with LE elevated, alarm,  with lines managed, call light, phone, exit alarm, tray, all needs, RN aware.     Assessment / Impression:    Patient's tolerance of treatment:  fair   Adverse Reaction: none  Significant change in status and impact:  none  Barriers to improvement:  none  Plan for Next Session:    Pt is to d/c   Time in:  12:47  Time out:  13:01  Timed treatment minutes:  14  Total treatment time:  14    Previously filed items:  Social/Functional History  Lives With: Alone  Type of Home: Condo  Home Layout: One level  Home Access: Level entry  Bathroom Shower/Tub: Tub/Shower unit, Walk-in shower  Bathroom Toilet: Standard  Bathroom Equipment: Grab bars in shower, Grab bars around toilet  ADL Assistance: Independent  Homemaking Assistance: Independent  Ambulation Assistance: Independent (mod I with RW)  Transfer Assistance: Independent  Active : No     Long Term Goals  Time Frame for Long Term Goals :  In one week:  Long Term Goal 1: Pt will complete all bed mobility with SBAx1  Long Term Goal 2: Pt will complete sit <> stand transfers with SBAx1  Long Term Goal 3: Pt will ambulate 75 feet with SBAx1 with LRAD  Long Term Goal 4: Pt will independently complete 3 sets of 10 reps of BLE AROM exercises in available and allowed ROM       Electronically signed by:    Agnieszka Harvey, PT  12/19/2022, 4:04 PM

## 2022-12-19 NOTE — PROGRESS NOTES
Physician Progress Note      Nancy Osuna  [facility] #:                  [id number]  :                       3/18/1929  ADMIT DATE:       12/15/2022 3:52 PM  [address] [place] DATE:  RESPONDING  PROVIDER #:        Ro Rivera MD          QUERY TEXT:    Hospitalists,    Pt admitted with acute respiratory failure with hypoxia with possible PNA and   PNA has been ruled out. If possible, please document in progress notes and   discharge summary the suspected cause of the respiratory failure. [Acute respiratory failure with hypoxia due COPD exacerbation::This patient   has acute respiratory failure due to COPD exacerbation.]]      The medical record reflects the following:  Risk Factors: multiple  Clinical Indicators: Per Internal Med documentation: \"#Ascites -Mild ascites   and ultrasound abdomen done on 10/25/2022.  -Likely due to right heart failure. Does not appear to be in COPD   exacerbation\"  Per Cardiology documentation: \"Right heart failure with shortness of breath   volume overload in the setting of severe pulmonary hypertension diuretics are   currently on hold due to concerns for possible dehydration volume overload   over diuresis, Hypoxia secondary to COPD pulmonary edema? \"  Treatment: labs, imaging, Cardiology consult, O2    Thank you,  Ajay Stevens RN CDS  [phone]  Options provided:  -- Acute respiratory failure with hypoxia due to CHF exacerbation  -- Acute respiratory failure with hypoxia due to cirrhosis w/ ascites  -- Acute respiratory failure with hypoxia due to pulmonary HTN/cor pulmonale  -- Acute respiratory failure with hypoxia due ##please specify, please   specify. -- Other - I will add my own diagnosis  -- Disagree - Not applicable / Not valid  -- Disagree - Clinically unable to determine / Unknown  -- Refer to Clinical Documentation Reviewer    PROVIDER RESPONSE TEXT:    This patient has acute respiratory failure due to pulmonary HTN/cor pulmonale.     Query created by: Rebekah Muniz on 12/19/2022 2:58 PM      Electronically signed by:  Steve Osorio MD 12/19/2022 4:00 PM

## 2022-12-19 NOTE — PROGRESS NOTES
12/19/2022 7:28 AM  Patient Room #: 4832/4090-L  Patient Name: Pelon Thomas    (Step 1 Done by RN if possible otherwise call Pulmonary Diagnostics)  Place patient on room air at rest for at least 30 minutes. If patient falls below 88% before 30 minutes then you can record the level and stop. Record room air saturation level _88_ %. If patient is at 88% or below, they will qualify for home oxygen and you can stop. If level does not fall below 88%, fill in level above. If indicated continue to Step 2. Signature:___Maurisio Meyer RRT__ Date: __12/19/20222_  (Step 2&3 Done by [facility])  Ambulate patient on room air until saturation falls below 89%. Record level of room air saturation with ambulation___ %. Next, place patient back on ___lpm oxygen and ambulate, record level __%. (Note:  this level must show improvement from room air level done with ambulation.)  If patients saturation on room air with ambulation is 88% or below AND patient shows improvement with oxygen during ambulation, they will qualify for home oxygen and you can stop. If patient does not drop below 89%, then patient should have an overnight oximetry trending on room air to see if level falls below 88%. Complete level in Step 3 below. Room air overnight oximetry level 88 % for___  cumulative minutes. If patients room air oxygen level is < 89% for at least 5 cumulative minutes, patient will qualify for home oxygen and you can stop. (Attach Night Trending Report)    Complete order below: Diagnosis:__CHF, COPD__  Home oxygen at:  Length of Need: ?X Lifetime ?  3 Months     _2__lpm or __%   via  [x] nasal cannula  []mask  [] other         [x]continuous [x]  with activity  [x]  Nocturnal   [x] Portable Tanks [x]  Concentrator  [x] Conserving Device        Therapist Signature:__Maurisio Meyer RRT_____     Date:  _12/19/2022__  Physician Signature:  __Electronically Signed in EMR_    Date:___  Physician Printed Name: ___Vivian Carter MD  NPI:  [id number]    [x] Patient Qualifies      [] Patient Does NOT qualify

## 2022-12-19 NOTE — DISCHARGE SUMMARY
Discharge Summary    Name:  Brigette Gaviria /Age/Sex: 3/18/1929  (80 y.o. female)   MRN & CSN:  [id number] & [id number] Admission Date/Time: 12/15/2022  3:52 PM   Attending:  Karie Freeman MD Discharging Physician: Karie Freeman MD       Admission Diagnosis:   Acute respiratory failure with hypoxia  Severe pulmonary hypertension  Chronic diastolic heart failure  Pneumonia  Hypercalcemia  Moderate COPD  CAD s/p CABG  Atrial fibrillation  SAULO  Dementia    Discharge Diagnosis:  Acute respiratory failure with hypoxia  Severe pulmonary hypertension  Chronic diastolic heart failure  Pneumonia  Hypercalcemia  Moderate COPD  CAD s/p CABG  Atrial fibrillation  SAULO  Dementia        Discharge Exam  Physical Exam    General: NAD  Eyes: EOMI  ENT: neck supple  Cardiovascular: Regular rate. Respiratory: Clear to auscultation  Gastrointestinal: Soft, distended belly   Genitourinary: no suprapubic tenderness  Musculoskeletal: No edema  Skin: warm, dry  Neuro: Alert. Hospital Course:   Brigette Gaviria is a 80 y.o.  female  who presents with Acute respiratory failure with hypoxia [facility])    Patient presented to hospital on 12/15/2022 with complaints of hypoxia. Patient was at cardiology office and was found to be hypoxic hence was sent to the ED. At presentation patient was noted to have /83, HR 62, RR 16, temp 97.7, saturating 94% on 2 L of oxygen. Lab work significant for sodium 130, chloride 84, BUN 85, random glucose 111, lactic acid 1.4, proBNP 1342, troponin 0.012, total bilirubin 1.8, hemoglobin 17.9, influenza A and B-negative, rapid COVID-negative. VBG-pH 7.41, PCO2 59, PO2 47, HCO3 37.4. Chest x-ray-cardiomegaly, CT chest without contrast-massive cardiomegaly, mild diffuse mosaic attenuation pattern that can be seen with reactive airway disease or bronchiolitis. Patient received azithromycin, ceftriaxone in ED. There was concern of overdiuresis and she was given gentle fluids.   Her pneumonia was also ruled out and her antibiotics were discontinued. Her hypoxia was likely related to pulmonary hypertension. Home oxygen eval was done prior to discharge and she qualified for it. Her diuretics were adjusted by cardiology. The discharge plan were discussed with patient's son and daughter-in-law. The patient expressed appropriate understanding of and agreement with the discharge recommendations, medications, and plan.      Consults this admission:  IP CONSULT TO CARDIOLOGY  IP CONSULT TO ONCOLOGY  IP CONSULT TO IV TEAM  IP CONSULT TO [address] [place] [address] NEEDS      Discharge Instruction:   Handoff to PCP:     Follow up appointments: cardiology   Primary care physician:     Diet:  cardiac diet   Activity: activity as tolerated  Disposition: Discharged to:   [x]Home, [x][facility], []SNF, []Acute Rehab, []Hospice   Condition on discharge: Stable    Discharge Medications:        Medication List        CHANGE how you take these medications      spironolactone 25 MG tablet  Commonly known as: ALDACTONE  Take 0.5 tablets by mouth daily  Start taking on: December 20, 2022  What changed: how much to take     Torsemide 40 MG Tabs  Take 20 mg by mouth daily  What changed:   how much to take  when to take this            CONTINUE taking these medications      acetaminophen 500 MG tablet  Commonly known as: TYLENOL     albuterol sulfate  (90 Base) MCG/ACT inhaler  Commonly known as: PROVENTIL;VENTOLIN;PROAIR     aspirin 81 MG chewable tablet     atorvastatin 10 MG tablet  Commonly known as: LIPITOR     carvedilol 6.25 MG tablet  Commonly known as: COREG  Take 1 tablet by mouth 2 times daily (with meals)     CPAP Machine Misc     ferrous sulfate 325 (65 Fe) MG tablet  Commonly known as: IRON 325     ICaps Caps     levothyroxine 88 MCG tablet  Commonly known as: SYNTHROID     midodrine 5 MG tablet  Commonly known as: PROAMATINE  Take 1 tablet by mouth 3 times daily (with meals)     OSTEO BI-FLEX ADV DOUBLE ST PO timolol 0.5 % ophthalmic solution  Commonly known as: TIMOPTIC     traZODone 50 MG tablet  Commonly known as: DESYREL     umeclidinium-vilanterol 62.5-25 MCG/INH Aepb inhaler  Commonly known as: ANORO ELLIPTA  Inhale 1 puff into the lungs daily     vitamin B-12 1000 MCG tablet  Commonly known as: CYANOCOBALAMIN     vitamin D 25 MCG (1000 UT) Caps     Zioptan 0.0015 % Soln  Generic drug: Tafluprost (PF)            STOP taking these medications      metOLazone 5 MG tablet  Commonly known as: ZAROXOLYN               Where to Get Your Medications        These medications were sent to [facility], [address] [facility] [phone] - [phone]  [address] [place] [address]      Phone: [phone]   spironolactone 25 MG tablet  Torsemide 40 MG Tabs         Objective Findings at Discharge:       BMP/CBC  Recent Labs     12/17/22  0019 12/18/22  1806 12/19/22  0453   * 130* 136   K 3.3* 4.1 4.2   CL 91* 85* 91*   CO2 30 27 36*   BUN 59* 53* 50*   CREATININE 0.7 0.7 0.6   WBC  --  9.1 7.4   HCT  --  48.5* 50.2*   PLT  --  [phone]       IMAGING:      Additional Information: Patient seen and examined day of discharge.  For more information regarding patient's care please contact [facility] records [phone]    Discharge Time of 35 minutes    Electronically signed by Mariama Gómez MD on 12/19/2022 at 4:17 PM

## 2022-12-19 NOTE — PLAN OF CARE
Problem: Discharge Planning  Goal: Discharge to home or other facility with appropriate resources  12/19/2022 1529 by Fany Saenz RN  Outcome: Completed  12/19/2022 1040 by Eric Pollock RN  Outcome: Progressing  12/19/2022 0245 by Genevieve Martinez LPN  Outcome: Progressing     Problem: Chronic Conditions and Co-morbidities  Goal: Patient's chronic conditions and co-morbidity symptoms are monitored and maintained or improved  12/19/2022 1529 by Fany Saenz RN  Outcome: Completed  12/19/2022 1040 by Eric Pollock RN  Outcome: Progressing  12/19/2022 0245 by Genevieve Martinez LPN  Outcome: Progressing     Problem: Nutrition Deficit:  Goal: Optimize nutritional status  12/19/2022 1529 by Fany Saenz RN  Outcome: Completed  12/19/2022 1040 by Eric Pollock RN  Outcome: Progressing  12/19/2022 0245 by Genevieve Martinez LPN  Outcome: Progressing     Problem: Skin/Tissue Integrity  Goal: Absence of new skin breakdown  Description: 1. Monitor for areas of redness and/or skin breakdown  2. Assess vascular access sites hourly  3. Every 4-6 hours minimum:  Change oxygen saturation probe site  4. Every 4-6 hours:  If on nasal continuous positive airway pressure, respiratory therapy assess nares and determine need for appliance change or resting period.   12/19/2022 1529 by Fany Saenz RN  Outcome: Completed  12/19/2022 1040 by Eric Pollock RN  Outcome: Progressing  12/19/2022 0245 by Genevieve Martinez LPN  Outcome: Progressing

## 2022-12-19 NOTE — PLAN OF CARE
Problem: Discharge Planning  Goal: Discharge to home or other facility with appropriate resources  Outcome: Progressing     Problem: Chronic Conditions and Co-morbidities  Goal: Patient's chronic conditions and co-morbidity symptoms are monitored and maintained or improved  Outcome: Progressing     Problem: Nutrition Deficit:  Goal: Optimize nutritional status  Outcome: Progressing     Problem: Skin/Tissue Integrity  Goal: Absence of new skin breakdown  Description: 1. Monitor for areas of redness and/or skin breakdown  2. Assess vascular access sites hourly  3. Every 4-6 hours minimum:  Change oxygen saturation probe site  4. Every 4-6 hours:  If on nasal continuous positive airway pressure, respiratory therapy assess nares and determine need for appliance change or resting period.   Outcome: Progressing

## 2022-12-19 NOTE — PROGRESS NOTES
Cardiology Progress Note     Admit Date:  12/15/2022    Consult reason/ Seen today for :       Subjective and  Overnight Events : Pleasant somewhat short of breath denies any chest pain but very hard of hearing   In A. fib but rate controlled  Evaluated by EPS service there was concern for pulmonary hypertension cor pulmonale in the setting of COPD chronic A. fib no further EP work-up needed    Chief complain on admission : 80 y. o.year old who is admitted for  Chief Complaint   Patient presents with    Other     Hypoxic, 88% on RA      Assessment / Plan:  Right heart failure with shortness of breath volume overload in the setting of severe pulmonary hypertension diuretics are currently on hold due to concerns for possible dehydration volume overload overdiuresis? Discharge on torsemide 20 mg daily ( reevluate as outpatient in 2 weeks)  Hypoxia secondary to COPD pulmonary edema? Supplemental oxygen  Echo shows RVSP of 77 with dilated right side but pacer and dilated IVC  Concern for cirrhotic liver with ascites hence on Aldactone 12.5   Repeat limited echo, replace potassium  History of atrial fibrillation not on anticoagulation due to fall risk  History of coronary artery disease s/p CABG mildly abnormal troponin level continue medical management due to frailty and advanced age, stress test in July 2022 showed no ischemia continue aspirin statins  Low blood pressure on midodrine and Coreg due to A. Fib?   DVT Prophylaxis if no contraindication    Past medical history:    has a past medical history of Age-related osteoporosis with current pathological fracture of vertebra (HCC), Arthritis, Bradycardia, CAD (coronary artery disease), Cardiac pacemaker, CHF (congestive heart failure) ([address] COPD, mild ([address] Cor pulmonale (chronic) ([address] CVA (cerebrovascular accident) ([address] DJD (degenerative joint disease) of cervical spine, Exhaustion of cardiac pacemaker battery, Family history of cardiovascular disease, Glaucoma, H/O 24 hour EKG monitoring, H/O cardiac catheterization, H/O cardiovascular stress test, H/O cardiovascular stress test, H/O cardiovascular stress test, H/O chest x-ray, H/O Doppler lower venous ultrasound, H/O Doppler ultrasound, H/O Doppler ultrasound, H/O Doppler ultrasound, H/O Doppler ultrasound, H/O echocardiogram, H/O echocardiogram, H/O echocardiogram, H/O echocardiogram, History of complete ECG, History of nuclear stress test, Hx of cardiovascular stress test, HX OTHER MEDICAL, Hyperlipidemia, Hypertension, Mild intermittent asthma, Moderate COPD (chronic obstructive pulmonary disease) ([address] Nausea & vomiting, Obstructive sleep apnea, Paroxysmal atrial fibrillation ([address] Post PTCA, Pulmonary HTN ([address] PVD (peripheral vascular disease) ([address] S/P CABG x 3, S/P PTCA (percutaneous transluminal coronary angioplasty), Severe pulmonary hypertension ([address] Shortness of breath, Thyroid disease, Unspecified cerebral artery occlusion with cerebral infarction, 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 Past surgical history:   has a past surgical history that includes Appendectomy (1941); Tonsillectomy (1950's); Cardiac surgery (4/09); Hysterectomy, total abdominal (1990's); Colonoscopy (In 2000's); pacemaker placement; Coronary artery bypass graft (4/8/2009); Coronary angioplasty with stent (4/21/2010); other surgical history; Middle ear surgery; and Percutaneous Transluminal Coronary Angio (11/2012). Social History:   reports that she quit smoking about 52 years ago. Her smoking use included cigarettes. She has a 1.25 pack-year smoking history. She has never used smokeless tobacco. She reports that she does not currently use alcohol. She reports that she does not use drugs.   Family history:  family history includes Arthritis in her mother; Cancer in her mother and sister; Coronary Art Dis in her father; Depression in her mother and sister; Early Death in her paternal grandfather; Early Death (age of onset: 36) in her father; Hearing Loss in her mother; Heart Disease in her father and sister; High Blood Pressure in her father, mother, sister, son, and son; High Cholesterol in her father and mother; Mental Illness in her mother; Beauford Daniel / Eula Bue in her mother; Other in her daughter. Allergies   Allergen Reactions    Darvocet [Propoxyphene N-Acetaminophen]     Ranexa [Ranolazine Er]      Sick to her stomach    Ranolazine      Sick to her stomach    Sulfa Antibiotics Itching    Sulfasalazine Itching    Adhesive Tape Rash    Allantoin Rash    Bacitracin Rash    Gramicidin Rash    Neomycin Rash    Polymyxin B Rash    Pramoxine Hcl Rash    Silicone Rash       Review of Systems:    All 14 systems were reviewed and are negative  Except for the positive findings  which as documented     /63   Pulse 59   Temp 97.5 °F (36.4 °C) (Oral)   Resp 14   Ht 4' 1\" (1.245 m)   Wt 118 lb (53.5 kg)   SpO2 92%   BMI 34.55 kg/m²     Intake/Output Summary (Last 24 hours) at 12/19/2022 1412  Last data filed at 12/19/2022 1032  Gross per 24 hour   Intake 360 ml   Output 250 ml   Net 110 ml     Physical Exam:  Constitutional:  Well developed, Well nourished, No acute distress, Non-toxic appearance. HENT:  Normocephalic, Atraumatic, Bilateral external ears normal, Oropharynx moist, No oral exudates, Nose normal. Neck- Normal range of motion, No tenderness, Supple, No stridor. Eyes:  PERRL, EOMI, Conjunctiva normal, No discharge. Respiratory:  Normal breath sounds, No respiratory distress, No wheezing, No chest tenderness. Cardiovascular:  Normal heart rate, Normal rhythm, No murmurs, No rubs, No gallops, JVP not elevated  Abdomen/GI:  Bowel sounds normal, Soft, No tenderness, No masses, No pulsatile masses. Musculoskeletal:  Intact distal pulses, No edema, No tenderness, No cyanosis, No clubbing. Good range of motion in all major joints. No tenderness to palpation or major deformities noted. Back- No tenderness. Integument:  Warm, Dry, No erythema, No rash. Lymphatic:  No lymphadenopathy noted. Neurologic:  Alert & oriented x 3, Normal motor function, Normal sensory function, No focal deficits noted. Psychiatric:  Affect  and  Mood :no change    Medications:    aspirin  81 mg Oral Daily    atorvastatin  10 mg Oral QPM    midodrine  5 mg Oral TID WC    tiotropium-olodaterol  2 puff Inhalation Daily    latanoprost  1 drop Both Eyes QPM    timolol  1 drop Both Eyes QPM    polyethylene glycol  17 g Oral Daily    [Held by provider] torsemide  20 mg Oral Daily    spironolactone  12.5 mg Oral Daily    carvedilol  6.25 mg Oral BID WC    sodium chloride flush  5-40 mL IntraVENous 2 times per day    heparin (porcine)  5,000 Units SubCUTAneous BID      sodium chloride       albuterol sulfate HFA, sodium chloride flush, sodium chloride, ondansetron **OR** ondansetron, acetaminophen **OR** acetaminophen    Lab Data:  CBC:   Recent Labs     12/18/22  1806 12/19/22  0453   WBC 9.1 7.4   HGB 15.7 15.9   HCT 48.5* 50.2*   MCV 94.0 94.4    341     BMP:   Recent Labs     12/17/22  0019 12/18/22  1806 12/19/22  0453   * 130* 136   K 3.3* 4.1 4.2   CL 91* 85* 91*   CO2 30 27 36*   BUN 59* 53* 50*   CREATININE 0.7 0.7 0.6     PT/INR: No results for input(s): PROTIME, INR in the last 72 hours. BNP:    No results for input(s): PROBNP in the last 72 hours. TROPONIN:   No results for input(s): TROPONINT in the last 72 hours. ECHO :   echocardiogram    Assessment:  80 y. o.year old who is admitted for  Chief Complaint   Patient presents with    Other     Hypoxic, 88% on RA    , active issues as noted below:  Impression:  Principal Problem:    Acute respiratory failure with hypoxia ([address]  Active Problems: Moderate malnutrition (HCC)    Acute on chronic congestive heart failure (HCC)    Chronic heart failure with preserved ejection fraction (HFpEF) ([facility]  Resolved Problems:    * No resolved hospital problems. *            All labs, medications and tests reviewed by myself , continue all other medications of all above medical condition listed as is except for changes mentioned above. Thank you very much for consult , please call with questions.     Nico Orellana MD, MD 12/19/2022 2:12 PM

## 2022-12-19 NOTE — CARE COORDINATION
[facility] Liaison aware of discharge & will initiate [facility].

## 2022-12-19 NOTE — PROGRESS NOTES
Occupational Therapy      Occupational Therapy Treatment Note    Name: Kiel Tay MRN: [id number] :   3/18/1929   Date:  2022   Admission Date: 12/15/2022 Room:  [address] [place] [address]-A     Primary Problem:  Acute respiratory failure    Restrictions/Precautions:  Restrictions/Precautions  Restrictions/Precautions: General Precautions, Fall Risk       2L     Communication with other providers: Nursing handoff, co-tx w/ PT    Subjective:  Patient states:  Pt requiring mod re-direction to conversation  Pain:   Location, Type, Intensity (0/10 to 10/10):  No    Objective:    Observation: Pt received sitting upright in chair, agreeable to therapy   Objective Measures:  WFL    Treatment, including education:  Therapeutic Activity Training:   Therapeutic activity training was instructed today. Cues were given for safety, sequence, UE/LE placement, awareness, and balance. Activities performed today included STS, functional mobility, stand to sit    Self Care Training:   Cues were given for safety, sequence, UE/LE placement, visual cues, and balance. Activities performed today included grooming    Grooming washing face/hands w. Warm washcloth SBA, STS from reclining chair up to RW CGA, functional mobility w/ RW CGA (See PT notes for gait details), stand to sit from RW to reclining chair CGA.     Pt sitting upright in chair, chair alarm on, call light at side, nursing notified       Assessment / Impression:    Patient's tolerance of treatment: Well  Adverse Reaction: None  Significant change in status and impact: Improved from initial evaluation  Barriers to improvement: None noted      Plan for Next Session:    Continue OT POC    Time in:  1246  Time out:  1300  Timed treatment minutes:  14 minutes  Total treatment time:  14 minutes      Electronically signed by:    Tiffany MOREL/AYLA [id number]  1:08 PM,2022

## 2022-12-19 NOTE — PROGRESS NOTES
12/19/2022 3:09 PM  Doctor Please copy and paste below in your progress note per DME requirment. Patient was seen in hospital for  CHF, COPD. I am prescribing oxygen because the diagnosis and testing requires the patient to have oxygen in the home. Conditions will improve or be benefited by oxygen use. The patient is able to perform good mobility and therefore requires the use of a portable oxygen system for ambulation.

## 2022-12-19 NOTE — PROGRESS NOTES
HEMATOLOGY ONCOLOGY  Consultation Report    REASON FOR CONSULT  Elevated Hg/HCT    CHIEF COMPLAINT    Chief Complaint   Patient presents with    Other     Hypoxic, 88% on RA     HISTORY OF PRESENT ILLNESS   Sanjiv Ascencio is a 80 y.o. female who presented tof ED on 12/15/2022 due to hypoxia. Reportedly O2 sat on 3 L was 87%. In ER O2 sat was 94% on 2 L. She has underlying COPD, SAULO on CPAP, CHF on diuresis. 12.15.2022 WBC 10, Hg 17.9, HCT 56.1, plat 427. CBC in 11.2022 grossly unremarkable. CT chest 12/15/2022:  1. Massive cardiomegaly. Mild diffuse mosaic attenuation pattern as can be seen with reactive airways disease or bronchiolitis. Edema is also a  consideration. 2. Mild right basilar dependent consolidation likely representing  atelectasis. Pneumonia and aspiration are not excluded. CT abdomen 3/2022: The liver are has shrunken nodular contour suggestive of cirrhosis. There is associated mild splenomegaly. Small amount of ascites is also noted within the right upper quadrant area. The liver, spleen, adrenal glands, gallbladder, pancreas, kidneys and ureters and pelvic organs including the urinary bladder appear unremarkable. Likely she has secondary erythrocytosis related to dehydration and hypoxia. She is on diuretic.    12/19: Pt seen and examined. She is sleepy this morning, responds very little to questions. Rouses to voice. Hgb now high normal with elevated HCT, no macrocytosis. Afebrile and HDS. On 2L NC.     PAST MEDICAL HISTORY    Past Medical History:   Diagnosis Date    Age-related osteoporosis with current pathological fracture of vertebra ([address] 7/8/2022    Arthritis     Bradycardia 2001    requiring dual chamber pacemaker at [facility]    CAD (coronary artery disease)     Cardiac pacemaker 10/2001    [employer] #[id number]  [facility]- Serial # [id number]- Dr Vivian Bergeron    CHF (congestive heart failure) (HCC)     COPD, mild ([address] 2/17/2017    Cor pulmonale (chronic) ([address] 3/15/2022    CVA (cerebrovascular accident) [facility])     DJD (degenerative joint disease) of cervical spine     C5-C6, C6-C7    Exhaustion of cardiac pacemaker battery 11/21/2011    PPM battery replacement- [employer]    Family history of cardiovascular disease     Glaucoma Dx 2010    H/O 24 hour EKG monitoring 8/6/2000 8/6/2000- Intermittent episonde of a-fib/flutter    H/O cardiac catheterization 4/20/2010, 2/1989 4/20/2010-Severe native vessel disease and has graft disease as well. LAD, CX totally occluded. RCA totally occluded in proximal segment. VG to RCA widely patent. PDA 90% LAD afer LIMA anastomosis 80-90% stenosis. Proceeded with PTCA with stent next day. H/O cardiovascular stress test 10/14/2011, 6/2/2010,4/8/2010, 5/18/2009,4/6/2009, 10/30/2007, 11/12/2004, 11/6/2003, 8/9/2002, 8/9/2001,6/5/2000,     10/14/2011-Lexiscan-Abnormal Myocardial Perfusion study. Evidence of mild ischemia in the Left CX region. Abnomal study. Rest EF 63%. Global LV systolic function normal. No ECG changes. Unremarkable pharmacological stress test.    H/O cardiovascular stress test 6/10/2013    thallium--mild ischemia left circumflex EF63% no change from 10/2011 study. H/O cardiovascular stress test 10/16/2014    cardiolite-mild ischemia left circumflex,EF70%    H/O chest x-ray 4/19/2009 4/19/2009-Stable cardiomegaly. No acute cardiopulmonary disease. H/O Doppler lower venous ultrasound 09/18/2019    Significant reflux noted in RGSV, RGSV is extremely tortuous and small along the thigh and calf and would be highly unlikely to be accessed, RSSV is non compressible and has occlusive chronic SVT, LSSV is non compressible with occlusive chronic SVT, LGSV removed s/p CABG, Significant reflux in LGSV tributary,    H/O Doppler ultrasound 3/31/2010    CAROTID- 3/31/2010-INtimal thickening but no significant atherosclerotic plaque noted in ANA PAULA. Doppler flow velocities within ANA PAULA are WNL.  Heterogeneous, irregular atherosclerotic plaque noted in LICA. Doppler flow velocities within the LICA are elevated, consistent with a mild, less than 50% stenosis. H/O Doppler ultrasound 5/24/2016    Carotid- normal study    H/O Doppler ultrasound 09/06/2017    carotid - normal study    H/O Doppler ultrasound     H/O echocardiogram 10/13    EF=60%, Severe Pulm. HTN, & Sclerotic aortic valve w/stenosis. H/O echocardiogram 10/16/14     EF 55-60% Normal LV. Normal LV systolic function. Severe tricuspid insufficiency with severe hypertension. H/O echocardiogram 02/03/2017    heart cath performed this morning    H/O echocardiogram 09/18/2019    EF 50-55%, Left atrium is mild to moderately dilated, right atrium is severely dilated, mildly dilated right ventricle, Mod MR, Severe TR, Severe Pulm HTN, no pericardial effusion     History of complete ECG     10/14/2011(Lexiscan);5/6/2010, 4/30/2009,10/24/2008,9/21/2007, 10/13/2006    History of nuclear stress test 11/17/2016    lexiscan-normal,EF70%    Hx of cardiovascular stress test 12/28/2018    EF 60%  Normal study. HX OTHER MEDICAL 05/01/2017    MUGA-normal, EF53%    Hyperlipidemia     Hypertension     Mild intermittent asthma 7/28/2016    Moderate COPD (chronic obstructive pulmonary disease) (HCC) 3/15/2022    Nausea & vomiting     Obstructive sleep apnea 5/16/2017    Paroxysmal atrial fibrillation ([address]     Post PTCA 4/21/2010    PTCA with 2.25 stent of the LIMA to LAD    Pulmonary HTN ([address]     Severe per last echo on 10/13. 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  Followed by MAZE procedure by pulmonary vein isolation.-  Dr Beacher Sicard    S/P PTCA (percutaneous transluminal coronary angioplasty) 11/2012    PTCA with stent to RCA    Severe pulmonary hypertension ([address] 3/15/2022    Shortness of breath 3/15/2022    Thyroid disease     hypothyroi    Unspecified cerebral artery occlusion with cerebral infarction Unsure When    No Residual    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 ([address] 7/29/2022    WD-Non-pressure chronic ulcer of other part of left lower leg limited to breakdown of skin ([address] 7/29/2022     SURGICAL HISTORY    Past Surgical History:   Procedure Laterality Date    APPENDECTOMY  1941    CARDIAC SURGERY  4/09    CABG (3 Bypasses), One Heart Stent in 2010    COLONOSCOPY  In 2000's    X1    CORONARY ANGIOPLASTY WITH STENT PLACEMENT  4/21/2010    PTCA with stent LIMA ->LAD    CORONARY ARTERY BYPASS GRAFT  4/8/2009    LIMA->Diag,  LIMA -> LAD;  SVG->RCA going to the PDA. Followed by MAZE procedure by pulmonary vein isolation.-  Dr Lorelei Mcintyre, TOTAL ABDOMINAL (CERVIX REMOVED)  1990's    MIDDLE EAR SURGERY      OTHER SURGICAL HISTORY      Ear surgery-hearing    PACEMAKER PLACEMENT      battery change 11/21/2011 [employer]    PTCA  11/2012    Ptca with stent to RCA    TONSILLECTOMY  1950's     FAMILY HISTORY    Family History   Problem Relation Age of Onset    Cancer Mother         \"Liver Cancer\"    Arthritis Mother     Depression Mother     Hearing Loss Mother     High Blood Pressure Mother     High Cholesterol Mother     Mental Illness Mother     Miscarriages / Stillbirths Mother     Heart Disease Father     Early Death Father 36        \"Instant Death\"    High Blood Pressure Father     High Cholesterol Father     Coronary Art Dis Father         Massive MI    Heart Disease Sister     Depression Sister     Cancer Sister         \"Breast Cancer, Cancer Free Now\"    High Blood Pressure Sister     Other Daughter         \"She's Had Stomach Surgery\"    High Blood Pressure Son     High Blood Pressure Son     Early Death Paternal Grandfather      SOCIAL HISTORY    Social History     Socioeconomic History    Marital status:      Number of children: 4   Occupational History    Occupation: RETIRED     Comment: from [address] Use    Smoking status: Former     Packs/day: 0.25     Years: 5.00     Pack years: 1.25     Types: Cigarettes     Quit date: 1970     Years since quittin.0    Smokeless tobacco: Never   Vaping Use    Vaping Use: Never used   Substance and Sexual Activity    Alcohol use: Not Currently     Comment: Caffiene - no more than 3 cups of coffee each day    Drug use: Never    Sexual activity: Yes     Partners: Male     Comment:      REVIEW OF SYSTEMS    Constitutional:  Denies fever, chills, loss of appetite, weight loss, +  fatigue   HEENT:  Denies swelling of neck glands  Respiratory:  Denies cough, shortness of breath or hemoptysis  Cardiovascular:  Denies chest pain, palpitations or swelling   GI:  Denies abdominal pain, nausea, vomiting, constipation or diarrhea   Musculoskeletal:  Denies back pain   Skin:  Denies rash   Neurologic:  Denies headache, focal weakness or sensory changes   All systems negative except as marked. PHYSICAL EXAM    Vitals: /63   Pulse 59   Temp 97.5 °F (36.4 °C) (Oral)   Resp 14   Ht 4' 1\" (1.245 m)   Wt 118 lb 1.6 oz (53.6 kg)   SpO2 95%   BMI 34.58 kg/m²   CONSTITUTIONAL: Drowsy cooperative, no apparent distress   EYES: pupils equal, round. Sclera clear and conjunctiva normal  ENT: Normocephalic, without obvious abnormality, atraumatic  NECK: supple, symmetrical, no jugular venous distension and no carotid bruits   HEMATOLOGIC/LYMPHATIC: no cervical, supraclavicular or axillary lymphadenopathy   LUNGS: clear to auscultation. No wheezing. Unlabored on 2L NC  CARDIOVASCULAR: regular rate and rhythm, normal S1 and S2, no murmur noted  ABDOMEN: normal bowel sound, soft, non-distended, non-tender, no palpable masses. MUSCULOSKELETAL: full range of motion noted, tone is normal  NEUROLOGIC: Slow to respond Motor skills grossly intact. CN II - XII grossly intact. SKIN: Dry and warm ski, no juandice. EXTREMITIES: no LE edema, no cyanosis.       LABORATORY RESULTS  CBC:   Recent Labs 12/18/22  1806 12/19/22  0453   WBC 9.1 7.4   HGB 15.7 15.9    341       BMP:    Recent Labs     12/17/22  0019 12/18/22  1806 12/19/22  0453   * 130* 136   K 3.3* 4.1 4.2   CL 91* 85* 91*   CO2 30 27 36*   BUN 59* 53* 50*   CREATININE 0.7 0.7 0.6   GLUCOSE 106* 109* 111*       Hepatic:   Recent Labs     12/18/22  1806   AST 37   ALT 24   BILITOT 1.0   ALKPHOS 234*        Latest Reference Range & Units 3/19/22 08:48 9/2/22 15:05   Ferritin 15 - 150 ng/mL 26 70   Iron 27 - 139 ug/dL 18 (L) 198 (H)   Iron Saturation 15 - 55 %  54   UIBC 118 - 369 ug/dL 254 166   TIBC 250 - 450 ug/dL 272 364   Transferrin % 10 - 44 % 7 (L)    FOLATE, FOLAT 3.1 - 17.5 NG/ML 15.4    Vitamin B-12 211 - 911 pg/ml 1227 (H)      ASSESSMENT/RECOMMENDATION    1. She has likely secondary erythrocytosis, related to dehydration and hypoxia. She is on diuretic. CT abd in 3.2022 showed no evidence of hypernephroma. BUN 86 with creatinine 0.8 on admission. -MPN workup pending, can FU in clinic 3-4 weeks after DC for results  -Hgb normalizing    2. She has diastolic CHF with severe pulmonary HTN, cardiologist is on board. 3. She has mild splenomegaly related to ? Liver cirrhosis, ? due to CHF. -Acute viral hep serology neg. 4. Leukocytosis is likely reactive, now normalized    We will follow the patient. Thank you for allowing me to participate in the care of this very pleasant patient. Renetta Sidhu PA-C    Portions of this note are copied forward from previous clinic note. Interval history, ROS, physical exam, assessment and plan has been reviewed and updated for accuracy by this provider.

## 2022-12-19 NOTE — PROGRESS NOTES
Pt home O2 paperwork faxed to [facility]. Please do not discharge pt without oxygen. This testing will be good for 48 hours and will have to be repeated if pt has not discharged prior to then. If portable oxygen concentrator or tank has not been delivered prior to pt being ready to leave, please call PFT @ [phone] or Respiratory Therapy @ [phone]. Thanks.

## 2022-12-27 ENCOUNTER — TELEPHONE (OUTPATIENT)
Dept: ONCOLOGY | Age: 87
End: 2022-12-27

## 2022-12-30 ENCOUNTER — TELEPHONE (OUTPATIENT)
Dept: ONCOLOGY | Age: 87
End: 2022-12-30

## 2023-01-01 ENCOUNTER — HOSPITAL ENCOUNTER (INPATIENT)
Age: 88
LOS: 1 days | End: 2023-03-18
Attending: INTERNAL MEDICINE | Admitting: INTERNAL MEDICINE
Payer: COMMERCIAL

## 2023-01-01 ENCOUNTER — HOSPITAL ENCOUNTER (OUTPATIENT)
Age: 88
Setting detail: SPECIMEN
Discharge: HOME | End: 2023-03-16

## 2023-01-01 ENCOUNTER — OFFICE VISIT (OUTPATIENT)
Dept: CARDIOLOGY CLINIC | Age: 88
End: 2023-01-01
Payer: MEDICARE

## 2023-01-01 ENCOUNTER — HOSPITAL ENCOUNTER (OUTPATIENT)
Age: 88
Setting detail: SPECIMEN
Discharge: HOME | End: 2023-03-17

## 2023-01-01 ENCOUNTER — HOSPITAL ENCOUNTER (EMERGENCY)
Age: 88
Discharge: HOME | End: 2023-03-17
Attending: EMERGENCY MEDICINE
Payer: MEDICARE

## 2023-01-01 VITALS
OXYGEN SATURATION: 100 % | SYSTOLIC BLOOD PRESSURE: 100 MMHG | DIASTOLIC BLOOD PRESSURE: 59 MMHG | TEMPERATURE: 94.5 F | HEART RATE: 90 BPM | RESPIRATION RATE: 23 BRPM

## 2023-01-01 VITALS
WEIGHT: 148 LBS | BODY MASS INDEX: 29.84 KG/M2 | HEART RATE: 77 BPM | RESPIRATION RATE: 24 BRPM | SYSTOLIC BLOOD PRESSURE: 142 MMHG | DIASTOLIC BLOOD PRESSURE: 91 MMHG | TEMPERATURE: 97.5 F | OXYGEN SATURATION: 84 % | HEIGHT: 59 IN

## 2023-01-01 VITALS
BODY MASS INDEX: 29.84 KG/M2 | DIASTOLIC BLOOD PRESSURE: 64 MMHG | HEIGHT: 59 IN | SYSTOLIC BLOOD PRESSURE: 106 MMHG | WEIGHT: 148 LBS

## 2023-01-01 DIAGNOSIS — I50.33 ACUTE ON CHRONIC DIASTOLIC (CONGESTIVE) HEART FAILURE (HCC): Primary | ICD-10-CM

## 2023-01-01 DIAGNOSIS — E87.5 HYPERKALEMIA: Primary | ICD-10-CM

## 2023-01-01 LAB
ALBUMIN SERPL-MCNC: 4.1 GM/DL (ref 3.4–5)
ALP BLD-CCNC: 297 IU/L (ref 40–128)
ALT SERPL-CCNC: 31 U/L (ref 10–40)
ANION GAP SERPL CALCULATED.3IONS-SCNC: 20 MMOL/L (ref 4–16)
AST SERPL-CCNC: 61 IU/L (ref 15–37)
BILIRUB SERPL-MCNC: ABNORMAL MG/DL (ref 0–1)
BUN BLDV-MCNC: 18 MG/DL
BUN SERPL-MCNC: 44 MG/DL (ref 6–23)
CALCIUM SERPL-MCNC: 10.8 MG/DL
CALCIUM SERPL-MCNC: ABNORMAL MG/DL (ref 8.3–10.6)
CHLORIDE BLD-SCNC: 90 MMOL/L (ref 99–110)
CHLORIDE BLD-SCNC: 96 MMOL/L
CO2: 24 MMOL/L (ref 21–32)
CO2: 30 MMOL/L
CREAT SERPL-MCNC: 0.79 MG/DL
CREAT SERPL-MCNC: 1.3 MG/DL (ref 0.6–1.1)
GFR CALCULATED: NORMAL
GFR SERPL CREATININE-BSD FRML MDRD: 38 ML/MIN/1.73M2
GLUCOSE BLD-MCNC: 94 MG/DL
GLUCOSE SERPL-MCNC: 135 MG/DL (ref 70–99)
HCT VFR BLD CALC: 49.6 % (ref 37–47)
HEMOGLOBIN: 14.2 GM/DL (ref 12.5–16)
MCH RBC QN AUTO: 31.9 PG (ref 27–31)
MCHC RBC AUTO-ENTMCNC: 28.6 % (ref 32–36)
MCV RBC AUTO: 111.5 FL (ref 78–100)
PDW BLD-RTO: 16.7 % (ref 11.7–14.9)
PLATELET # BLD: 388 K/CU MM (ref 140–440)
PMV BLD AUTO: 11.9 FL (ref 7.5–11.1)
POTASSIUM SERPL-SCNC: 4.6 MMOL/L
POTASSIUM SERPL-SCNC: 6.8 MMOL/L (ref 3.5–5.1)
RBC # BLD: 4.45 M/CU MM (ref 4.2–5.4)
SODIUM BLD-SCNC: 134 MMOL/L (ref 135–145)
SODIUM BLD-SCNC: 141 MMOL/L
TOTAL PROTEIN: 6.1 GM/DL (ref 6.4–8.2)
WBC # BLD: 13.2 K/CU MM (ref 4–10.5)

## 2023-01-01 PROCEDURE — 94660 CPAP INITIATION&MGMT: CPT

## 2023-01-01 PROCEDURE — 1123F ACP DISCUSS/DSCN MKR DOCD: CPT | Performed by: NURSE PRACTITIONER

## 2023-01-01 PROCEDURE — 9900360100 HC STAT COLLECTION FEE SNF

## 2023-01-01 PROCEDURE — 6370000000 HC RX 637 (ALT 250 FOR IP): Performed by: EMERGENCY MEDICINE

## 2023-01-01 PROCEDURE — 1250000000 HC SEMI PRIVATE HOSPICE R&B

## 2023-01-01 PROCEDURE — 85027 COMPLETE CBC AUTOMATED: CPT

## 2023-01-01 PROCEDURE — 6360000002 HC RX W HCPCS: Performed by: INTERNAL MEDICINE

## 2023-01-01 PROCEDURE — 2700000000 HC OXYGEN THERAPY PER DAY

## 2023-01-01 PROCEDURE — 99284 EMERGENCY DEPT VISIT MOD MDM: CPT

## 2023-01-01 PROCEDURE — 80053 COMPREHEN METABOLIC PANEL: CPT

## 2023-01-01 PROCEDURE — 94761 N-INVAS EAR/PLS OXIMETRY MLT: CPT

## 2023-01-01 PROCEDURE — 99215 OFFICE O/P EST HI 40 MIN: CPT | Performed by: NURSE PRACTITIONER

## 2023-01-01 PROCEDURE — 36415 COLL VENOUS BLD VENIPUNCTURE: CPT

## 2023-01-01 RX ORDER — BISACODYL 10 MG
10 SUPPOSITORY, RECTAL RECTAL DAILY PRN
Status: DISCONTINUED | OUTPATIENT
Start: 2023-01-01 | End: 2023-01-01 | Stop reason: HOSPADM

## 2023-01-01 RX ORDER — SODIUM POLYSTYRENE SULFONATE 15 G/60ML
15 SUSPENSION ORAL; RECTAL ONCE
Status: COMPLETED | OUTPATIENT
Start: 2023-01-01 | End: 2023-01-01

## 2023-01-01 RX ORDER — GLYCOPYRROLATE 0.2 MG/ML
0.2 INJECTION INTRAMUSCULAR; INTRAVENOUS EVERY 4 HOURS PRN
Status: DISCONTINUED | OUTPATIENT
Start: 2023-01-01 | End: 2023-01-01 | Stop reason: HOSPADM

## 2023-01-01 RX ORDER — IPRATROPIUM BROMIDE AND ALBUTEROL SULFATE 2.5; .5 MG/3ML; MG/3ML
1 SOLUTION RESPIRATORY (INHALATION) EVERY 4 HOURS PRN
Status: DISCONTINUED | OUTPATIENT
Start: 2023-01-01 | End: 2023-01-01 | Stop reason: HOSPADM

## 2023-01-01 RX ORDER — HALOPERIDOL 5 MG/ML
1 INJECTION INTRAMUSCULAR
Status: DISCONTINUED | OUTPATIENT
Start: 2023-01-01 | End: 2023-01-01 | Stop reason: HOSPADM

## 2023-01-01 RX ORDER — FUROSEMIDE 10 MG/ML
40 INJECTION INTRAMUSCULAR; INTRAVENOUS 2 TIMES DAILY
Status: DISCONTINUED | OUTPATIENT
Start: 2023-01-01 | End: 2023-01-01 | Stop reason: HOSPADM

## 2023-01-01 RX ORDER — ONDANSETRON 2 MG/ML
4 INJECTION INTRAMUSCULAR; INTRAVENOUS EVERY 6 HOURS PRN
Status: DISCONTINUED | OUTPATIENT
Start: 2023-01-01 | End: 2023-01-01 | Stop reason: HOSPADM

## 2023-01-01 RX ADMIN — SODIUM POLYSTYRENE SULFONATE 15 G: 15 SUSPENSION ORAL; RECTAL at 01:05

## 2023-01-01 RX ADMIN — HYDROMORPHONE HYDROCHLORIDE 0.5 MG: 1 INJECTION, SOLUTION INTRAMUSCULAR; INTRAVENOUS; SUBCUTANEOUS at 08:10

## 2023-01-01 RX ADMIN — FUROSEMIDE 40 MG: 10 INJECTION, SOLUTION INTRAMUSCULAR; INTRAVENOUS at 07:59

## 2023-01-01 RX ADMIN — HALOPERIDOL LACTATE 1 MG: 5 INJECTION, SOLUTION INTRAMUSCULAR at 07:54

## 2023-01-01 RX ADMIN — HYDROMORPHONE HYDROCHLORIDE 0.5 MG: 1 INJECTION, SOLUTION INTRAMUSCULAR; INTRAVENOUS; SUBCUTANEOUS at 05:14

## 2023-01-01 RX ADMIN — HALOPERIDOL LACTATE 1 MG: 5 INJECTION, SOLUTION INTRAMUSCULAR at 05:16

## 2023-01-01 ASSESSMENT — PAIN SCALES - GENERAL
PAINLEVEL_OUTOF10: 0

## 2023-01-01 ASSESSMENT — PAIN - FUNCTIONAL ASSESSMENT
PAIN_FUNCTIONAL_ASSESSMENT: PREVENTS OR INTERFERES WITH ALL ACTIVE AND SOME PASSIVE ACTIVITIES
PAIN_FUNCTIONAL_ASSESSMENT: PREVENTS OR INTERFERES SOME ACTIVE ACTIVITIES AND ADLS

## 2023-01-01 ASSESSMENT — ENCOUNTER SYMPTOMS
SHORTNESS OF BREATH: 1
ORTHOPNEA: 0
SLEEP DISTURBANCES DUE TO BREATHING: 0

## 2023-01-01 ASSESSMENT — PAIN DESCRIPTION - LOCATION: LOCATION: GENERALIZED;OTHER (COMMENT)

## 2023-01-01 ASSESSMENT — PAIN SCALES - WONG BAKER: WONGBAKER_NUMERICALRESPONSE: 0

## 2023-01-04 ENCOUNTER — TELEPHONE (OUTPATIENT)
Dept: CARDIOLOGY CLINIC | Age: 88
End: 2023-01-04

## 2023-01-04 NOTE — TELEPHONE ENCOUNTER
/62 without midodrine. POA has d/c Midodrine. Denies any dizziness. Has Gained 7lbs in 6 days, on 12.5mg aldactone and 20 mg torsemide in AM. Denies any SOB and has no swelling.

## 2023-01-04 NOTE — TELEPHONE ENCOUNTER
Mallory Record with [id number] [facility] called she spoke with daughter and Mom is up 7 lbs 6 days   No swelling /sob , she is holding her Midodrine   Bp has been a little low 114/62  She asked that we call daughter Debbie Meansbony

## 2023-01-05 ENCOUNTER — TELEPHONE (OUTPATIENT)
Dept: CARDIOLOGY CLINIC | Age: 88
End: 2023-01-05

## 2023-01-05 RX ORDER — SPIRONOLACTONE 25 MG/1
25 TABLET ORAL DAILY
Qty: 30 TABLET | Refills: 2 | Status: SHIPPED | OUTPATIENT
Start: 2023-01-05

## 2023-01-05 NOTE — TELEPHONE ENCOUNTER
Pt is currently out of town and cannot make it in to appt. Daughter wants to know if we can increase water hi and stop midodrine.

## 2023-01-09 NOTE — TELEPHONE ENCOUNTER
Per [place], pt is to have labs drawn and increase Torsemide 20mg BID.  Daughter verbally understood, lab orders faxed to [facility].

## 2023-01-09 NOTE — TELEPHONE ENCOUNTER
Swelling is getting worse, pt is up to 131.4lbs. Daughter thinks she might need something stronger till appt. Pt is currently in [facility] w/ daughter. Pt is coming in for appt 1/11 to discuss.    On aldactone 25 daily

## 2023-01-09 NOTE — TELEPHONE ENCOUNTER
Daughter strongly expressed pt does not want to go to ED. Advised that the ED would possible be able to give IV lasix to get some of the water off.  Daughter states she understands but does not want her mom in hospital.

## 2023-01-10 LAB
B-TYPE NATRIURETIC PEPTIDE: 296.1 PG/ML (ref 0–100)
BUN / CREAT RATIO: 23 (ref 12–28)
BUN BLDV-MCNC: 18 MG/DL (ref 10–36)
CALCIUM SERPL-MCNC: 10.8 MG/DL (ref 8.7–10.3)
CHLORIDE BLD-SCNC: 96 MMOL/L (ref 96–106)
CO2: 30 MMOL/L (ref 20–29)
CREAT SERPL-MCNC: 0.79 MG/DL (ref 0.57–1)
ESTIMATED GLOMERULAR FILTRATION RATE CREATININE EQUATION: 70 ML/MIN/1.73
GLUCOSE BLD-MCNC: 94 MG/DL (ref 70–99)
POTASSIUM SERPL-SCNC: 4.6 MMOL/L (ref 3.5–5.2)
SODIUM BLD-SCNC: 141 MMOL/L (ref 134–144)

## 2023-01-11 ENCOUNTER — OFFICE VISIT (OUTPATIENT)
Dept: CARDIOLOGY CLINIC | Age: 88
End: 2023-01-11
Payer: MEDICARE

## 2023-01-11 VITALS
HEART RATE: 67 BPM | DIASTOLIC BLOOD PRESSURE: 72 MMHG | WEIGHT: 132 LBS | BODY MASS INDEX: 30.55 KG/M2 | HEIGHT: 55 IN | SYSTOLIC BLOOD PRESSURE: 110 MMHG

## 2023-01-11 DIAGNOSIS — G47.33 OSA ON CPAP: ICD-10-CM

## 2023-01-11 DIAGNOSIS — Z98.61 POST PTCA: Chronic | ICD-10-CM

## 2023-01-11 DIAGNOSIS — R42 DIZZINESS: ICD-10-CM

## 2023-01-11 DIAGNOSIS — Z95.1 S/P CABG X 3: Chronic | ICD-10-CM

## 2023-01-11 DIAGNOSIS — Z92.89 HISTORY OF RECENT HOSPITALIZATION: ICD-10-CM

## 2023-01-11 DIAGNOSIS — I95.1 ORTHOSTATIC HYPOTENSION: ICD-10-CM

## 2023-01-11 DIAGNOSIS — G47.33 OBSTRUCTIVE SLEEP APNEA: ICD-10-CM

## 2023-01-11 DIAGNOSIS — I25.810 CORONARY ARTERY DISEASE INVOLVING CORONARY BYPASS GRAFT OF NATIVE HEART WITHOUT ANGINA PECTORIS: Chronic | ICD-10-CM

## 2023-01-11 DIAGNOSIS — I48.0 PAF (PAROXYSMAL ATRIAL FIBRILLATION) (HCC): ICD-10-CM

## 2023-01-11 DIAGNOSIS — Z95.0 CARDIAC PACEMAKER IN SITU: ICD-10-CM

## 2023-01-11 DIAGNOSIS — I50.33 ACUTE ON CHRONIC DIASTOLIC (CONGESTIVE) HEART FAILURE (HCC): Primary | ICD-10-CM

## 2023-01-11 DIAGNOSIS — I10 ESSENTIAL HYPERTENSION: ICD-10-CM

## 2023-01-11 DIAGNOSIS — Z91.89 AT RISK FOR INJURY ASSOCIATED WITH ANTICOAGULATION: ICD-10-CM

## 2023-01-11 DIAGNOSIS — Z99.89 OSA ON CPAP: ICD-10-CM

## 2023-01-11 DIAGNOSIS — I27.20 PULMONARY HYPERTENSION (HCC): ICD-10-CM

## 2023-01-11 DIAGNOSIS — Z95.0 CARDIAC PACEMAKER: Chronic | ICD-10-CM

## 2023-01-11 PROCEDURE — 1111F DSCHRG MED/CURRENT MED MERGE: CPT | Performed by: NURSE PRACTITIONER

## 2023-01-11 PROCEDURE — 1036F TOBACCO NON-USER: CPT | Performed by: NURSE PRACTITIONER

## 2023-01-11 PROCEDURE — G8484 FLU IMMUNIZE NO ADMIN: HCPCS | Performed by: NURSE PRACTITIONER

## 2023-01-11 PROCEDURE — G8427 DOCREV CUR MEDS BY ELIG CLIN: HCPCS | Performed by: NURSE PRACTITIONER

## 2023-01-11 PROCEDURE — 1123F ACP DISCUSS/DSCN MKR DOCD: CPT | Performed by: NURSE PRACTITIONER

## 2023-01-11 PROCEDURE — 93292 WCD DEVICE INTERROGATE: CPT | Performed by: NURSE PRACTITIONER

## 2023-01-11 PROCEDURE — 99214 OFFICE O/P EST MOD 30 MIN: CPT | Performed by: NURSE PRACTITIONER

## 2023-01-11 PROCEDURE — G8417 CALC BMI ABV UP PARAM F/U: HCPCS | Performed by: NURSE PRACTITIONER

## 2023-01-11 PROCEDURE — 1090F PRES/ABSN URINE INCON ASSESS: CPT | Performed by: NURSE PRACTITIONER

## 2023-01-11 PROCEDURE — 93280 PM DEVICE PROGR EVAL DUAL: CPT | Performed by: NURSE PRACTITIONER

## 2023-01-11 ASSESSMENT — ENCOUNTER SYMPTOMS
SLEEP DISTURBANCES DUE TO BREATHING: 0
SHORTNESS OF BREATH: 0
ORTHOPNEA: 0

## 2023-01-11 NOTE — PROGRESS NOTES
1/11/2023  Primary cardiologist: Dr. Jere Restrepo:   Beverley Irby  is an established 80 y.o.  female here for a follow up from hospitalization        SUBJECTIVE/OBJECTIVE:  Beverley Irby is a 80 y.o. female with a history of   1. Acute on chronic diastolic (congestive) heart failure (HCC)    2. Cardiac pacemaker in situ    3. History of recent hospitalization    4. At risk for injury associated with anticoagulation    5. Coronary artery disease involving coronary bypass graft of native heart without angina pectoris    6. Cardiac pacemaker    7. Dizziness    8. Essential hypertension    9. Obstructive sleep apnea    10. Orthostatic hypotension    11. SAULO on CPAP    12. PAF (paroxysmal atrial fibrillation) ([address]    13. Post PTCA    14. Pulmonary hypertension ([address]    15. S/P CABG x 3           HPI :   Beverley Irby is not really able to respond to questions. Her son reports that for the last 2 weeks she has been out of it. She refuses her CPAP and her home care nurse said oxygen was low yesterday. Her oxygen on arrival to office was 87 with 3 L NC applied her oxygen is improved to 95%. Son reports patient refusing to go to hospital therefore requested and appointment with cardiology to evaluate for oxygen. The home care nurse told patient son her oxygen was in the low 80's but she was able to get it up to the mid 90's with deep breathing. Patient has not been eating and she is taking her medications with a lot of prompts. Son does not have access to oxygen at home for patient. Review of Systems   Constitutional: Negative for malaise/fatigue. Eyes:  Negative for visual disturbance. Cardiovascular:  Positive for dyspnea on exertion (improved with oxygen but worse since fluid retention). Negative for chest pain, claudication, cyanosis, irregular heartbeat, leg swelling, near-syncope, orthopnea, palpitations, paroxysmal nocturnal dyspnea and syncope.    Respiratory:  Negative for shortness of breath and sleep disturbances due to breathing. Neurological:  Negative for focal weakness, light-headedness and weakness. Psychiatric/Behavioral:  Negative for depression. The patient is not nervous/anxious. Vitals:    01/11/23 1417   BP: 110/72   Site: Right Upper Arm   Position: Sitting   Cuff Size: Medium Adult   Pulse: 67   Weight: 132 lb (59.9 kg)   Height: 4' 1\" (1.245 m)     No flowsheet data found. Wt Readings from Last 3 Encounters:   01/11/23 132 lb (59.9 kg)   12/19/22 118 lb (53.5 kg)   12/15/22 110 lb (49.9 kg)     Body mass index is 38.65 kg/m². Physical Exam  Vitals reviewed. Constitutional:       General: She is not in acute distress. Appearance: Normal appearance. She is obese. She is not ill-appearing. HENT:      Head: Atraumatic. Neck:      Vascular: No carotid bruit. Cardiovascular:      Rate and Rhythm: Normal rate and regular rhythm. Pulses: Normal pulses. Heart sounds: Murmur heard. Pulmonary:      Effort: Pulmonary effort is normal. No respiratory distress. Breath sounds: Rales (L lower base) present. Musculoskeletal:         General: No deformity. Cervical back: Neck supple. No muscular tenderness. Right lower leg: Edema present. Left lower leg: Edema present. Neurological:      Mental Status: She is alert.               Current Outpatient Medications   Medication Sig Dispense Refill    spironolactone (ALDACTONE) 25 MG tablet Take 1 tablet by mouth daily 30 tablet 2    Torsemide 40 MG TABS Take 20 mg by mouth daily 60 tablet 1    aspirin 81 MG chewable tablet Take 81 mg by mouth daily      ferrous sulfate (IRON 325) 325 (65 Fe) MG tablet Take 325 mg by mouth every other day      traZODone (DESYREL) 50 MG tablet Take  mg by mouth nightly as needed for Sleep      acetaminophen (TYLENOL) 500 MG tablet Take 1,000 mg by mouth every 4 hours as needed for Pain or Fever      ZIOPTAN 0.0015 % SOLN Place 1 drop into both eyes Daily with supper       vitamin D 25 MCG (1000 UT) CAPS Take 5,000 Units by mouth daily      atorvastatin (LIPITOR) 10 MG tablet Take 10 mg by mouth nightly      umeclidinium-vilanterol (ANORO ELLIPTA) 62.5-25 MCG/INH AEPB inhaler Inhale 1 puff into the lungs daily 1 each 11    CPAP Machine MISC by Does not apply route      Misc Natural Products (OSTEO BI-FLEX ADV DOUBLE ST PO) Take 1 tablet by mouth daily      timolol (TIMOPTIC) 0.5 % ophthalmic solution Place 1 drop into both eyes nightly      carvedilol (COREG) 6.25 MG tablet Take 1 tablet by mouth 2 times daily (with meals) 180 tablet 3    albuterol (PROVENTIL HFA;VENTOLIN HFA) 108 (90 BASE) MCG/ACT inhaler Inhale 2 puffs into the lungs 2 times daily AND EVERY 4-6 HOURS AS NEEDED      Multiple Vitamins-Minerals (ICAPS) CAPS Take 1 capsule by mouth 2 times daily       vitamin B-12 (CYANOCOBALAMIN) 1000 MCG tablet Take 1,000 mcg by mouth daily. levothyroxine (SYNTHROID) 88 MCG tablet Take 88 mcg by mouth daily. midodrine (PROAMATINE) 5 MG tablet Take 1 tablet by mouth 3 times daily (with meals) (Patient not taking: Reported on 1/11/2023) 90 tablet 0     No current facility-administered medications for this visit. All pertinent data reviewed and discussed with patient       ASSESSMENT/PLAN:  CHF  Appears overloaded. Resume torsemide 20 mg Daily  Was staying with daughter and is eating out every day. We reviewed low sodium  Continue coreg and spirolactone  Give midodrine as needed for SBP < 100. Continue Aldactone, coreg. Check BMP in 1 week  She qualifies for palliative care/ hospice    Tests ordered:  BMP  Follow-up 2 weeks     Signed:  LISA Arzate CNP, 1/11/2023, 2:33 PM    An electronic signature was used to authenticate this note.     Please note this report has been partially produced using speech recognition software and may contain errors related to that system including errors in grammar, punctuation, and spelling, as well as words and phrases that may be inappropriate. If there are any questions or concerns please feel free to contact the dictating provider for clarification.

## 2023-01-11 NOTE — PATIENT INSTRUCTIONS
Thank you for allowing us to care for you today! We want to ensure we can follow your treatment plan and we strive to give you the best outcomes and experience possible. If you ever have a life threatening emergency and call 911 - for an ambulance (EMS)   Our providers can only care for you at:   [facility] or [facility]. Even if you have someone take you or you drive yourself we can only care for you in a [facility]. Our providers are not setup at the other healthcare locations! **It is YOUR responsibilty to bring medication bottles and/or updated medication list to [address] [place] [address]. This will allow us to better serve you and all your healthcare needs**    Please be informed that if you contact our office outside of normal business hours the physician on call cannot help with any scheduling or rescheduling issues, procedure instruction questions or any type of medication issue. We advise you for any urgent/emergency that you go to the nearest emergency room!     PLEASE CALL OUR OFFICE DURING NORMAL BUSINESS HOURS    Monday - Friday   8 am to 5 pm    [address]: [phone]    [place]:  [phone]

## 2023-01-18 ENCOUNTER — HOSPITAL ENCOUNTER (OUTPATIENT)
Age: 88
Setting detail: SPECIMEN
Discharge: HOME | End: 2023-01-18
Payer: MEDICARE

## 2023-01-18 LAB
ANION GAP SERPL CALCULATED.3IONS-SCNC: 5 MMOL/L (ref 4–16)
BUN BLDV-MCNC: 17 MG/DL (ref 6–23)
CALCIUM SERPL-MCNC: 10.1 MG/DL (ref 8.3–10.6)
CHLORIDE BLD-SCNC: 92 MMOL/L (ref 99–110)
CO2: 41 MMOL/L (ref 21–32)
CREAT SERPL-MCNC: 0.7 MG/DL (ref 0.6–1.1)
GFR SERPL CREATININE-BSD FRML MDRD: >60 ML/MIN/1.73M2
GLUCOSE BLD-MCNC: 78 MG/DL (ref 70–99)
POTASSIUM SERPL-SCNC: 5 MMOL/L (ref 3.5–5.1)
SODIUM BLD-SCNC: 138 MMOL/L (ref 135–145)

## 2023-01-18 PROCEDURE — 80048 BASIC METABOLIC PNL TOTAL CA: CPT

## 2023-01-23 PROBLEM — G47.33 OBSTRUCTIVE SLEEP APNEA: Status: RESOLVED | Noted: 2017-05-16 | Resolved: 2023-01-01

## 2023-01-23 PROBLEM — I50.43 ACUTE ON CHRONIC COMBINED SYSTOLIC AND DIASTOLIC CHF (CONGESTIVE HEART FAILURE) (HCC): Status: RESOLVED | Noted: 2022-10-30 | Resolved: 2023-01-23

## 2023-01-24 PROCEDURE — 93296 REM INTERROG EVL PM/IDS: CPT | Performed by: INTERNAL MEDICINE

## 2023-01-24 PROCEDURE — 93294 REM INTERROG EVL PM/LDLS PM: CPT | Performed by: INTERNAL MEDICINE

## 2023-01-25 ENCOUNTER — PROCEDURE VISIT (OUTPATIENT)
Dept: CARDIOLOGY CLINIC | Age: 88
End: 2023-01-25
Payer: MEDICARE

## 2023-01-25 DIAGNOSIS — I48.21 PERMANENT ATRIAL FIBRILLATION (HCC): ICD-10-CM

## 2023-01-25 DIAGNOSIS — Z95.0 CARDIAC PACEMAKER IN SITU: ICD-10-CM

## 2023-01-25 RX ORDER — SPIRONOLACTONE 25 MG/1
25 TABLET ORAL 2 TIMES DAILY
Qty: 180 TABLET | Refills: 1 | Status: SHIPPED | OUTPATIENT
Start: 2023-01-25

## 2023-01-25 NOTE — TELEPHONE ENCOUNTER
Refill/90 day-  Changed this medication to 2 a day. Spironolactone. This is not been indicated on medication list.

## 2023-01-26 ENCOUNTER — TELEPHONE (OUTPATIENT)
Dept: CARDIOLOGY CLINIC | Age: 88
End: 2023-01-26

## 2023-01-26 NOTE — TELEPHONE ENCOUNTER
Nish Quiroga at [place] [address] called to ask if patient is still taking torsemide; please call her back at ph# [phone].

## 2023-01-31 ENCOUNTER — OFFICE VISIT (OUTPATIENT)
Dept: CARDIOLOGY CLINIC | Age: 88
End: 2023-01-31
Payer: MEDICARE

## 2023-01-31 VITALS
WEIGHT: 140.2 LBS | BODY MASS INDEX: 28.26 KG/M2 | OXYGEN SATURATION: 91 % | HEIGHT: 59 IN | DIASTOLIC BLOOD PRESSURE: 78 MMHG | HEART RATE: 70 BPM | SYSTOLIC BLOOD PRESSURE: 118 MMHG

## 2023-01-31 DIAGNOSIS — I50.33 ACUTE ON CHRONIC DIASTOLIC (CONGESTIVE) HEART FAILURE (HCC): Primary | ICD-10-CM

## 2023-01-31 PROCEDURE — G8427 DOCREV CUR MEDS BY ELIG CLIN: HCPCS | Performed by: NURSE PRACTITIONER

## 2023-01-31 PROCEDURE — 1123F ACP DISCUSS/DSCN MKR DOCD: CPT | Performed by: NURSE PRACTITIONER

## 2023-01-31 PROCEDURE — 1036F TOBACCO NON-USER: CPT | Performed by: NURSE PRACTITIONER

## 2023-01-31 PROCEDURE — G8484 FLU IMMUNIZE NO ADMIN: HCPCS | Performed by: NURSE PRACTITIONER

## 2023-01-31 PROCEDURE — 1090F PRES/ABSN URINE INCON ASSESS: CPT | Performed by: NURSE PRACTITIONER

## 2023-01-31 PROCEDURE — 99214 OFFICE O/P EST MOD 30 MIN: CPT | Performed by: NURSE PRACTITIONER

## 2023-01-31 PROCEDURE — G8417 CALC BMI ABV UP PARAM F/U: HCPCS | Performed by: NURSE PRACTITIONER

## 2023-01-31 RX ORDER — MIDODRINE HYDROCHLORIDE 5 MG/1
5 TABLET ORAL 3 TIMES DAILY PRN
Qty: 90 TABLET | Refills: 0 | Status: SHIPPED | OUTPATIENT
Start: 2023-01-31

## 2023-01-31 RX ORDER — TORSEMIDE 20 MG/1
20 TABLET ORAL DAILY PRN
Qty: 30 TABLET | Refills: 3 | Status: SHIPPED | OUTPATIENT
Start: 2023-01-31

## 2023-01-31 ASSESSMENT — ENCOUNTER SYMPTOMS
SLEEP DISTURBANCES DUE TO BREATHING: 0
SHORTNESS OF BREATH: 0
ORTHOPNEA: 0

## 2023-01-31 NOTE — PROGRESS NOTES
1/31/2023  Primary cardiologist: Dr. Lisset Billings:   Shivam Li  is an established 80 y.o.  female here for a follow up from hospitalization        SUBJECTIVE/OBJECTIVE:  Shivam Li is a 80 y.o. female with a history of   1. Acute on chronic diastolic (congestive) heart failure (HCC)        HPI :   Shivam Li is feeling better. She states that her breathing is okay. She says she is wearing her CPAP. Her son and daughter came to the office with her. Family reports she is starting to retain more fluid again and she is getting a little more \"off\". She is not wearing her CPAP. She does wear her oxygen at home they do not have a portable tank. She is more alert than she has been for the last 2 OV's. Review of Systems   Constitutional: Negative for malaise/fatigue. Eyes:  Negative for visual disturbance. Cardiovascular:  Positive for dyspnea on exertion (improved with oxygen but worse since fluid retention) and leg swelling. Negative for chest pain, claudication, cyanosis, irregular heartbeat, near-syncope, orthopnea, palpitations, paroxysmal nocturnal dyspnea and syncope. Respiratory:  Negative for shortness of breath and sleep disturbances due to breathing. Neurological:  Negative for focal weakness, light-headedness and weakness. Psychiatric/Behavioral:  Negative for depression. The patient is not nervous/anxious. Vitals:    01/31/23 1608   BP: 118/78   Site: Left Upper Arm   Position: Sitting   Cuff Size: Medium Adult   Pulse: 70   SpO2: 91%   Weight: 140 lb 3.2 oz (63.6 kg)   Height: 4' 11\" (1.499 m)     No flowsheet data found. Wt Readings from Last 3 Encounters:   01/31/23 140 lb 3.2 oz (63.6 kg)   01/11/23 132 lb (59.9 kg)   12/19/22 118 lb (53.5 kg)     Body mass index is 28.32 kg/m². Physical Exam  Vitals reviewed. Constitutional:       General: She is not in acute distress. Appearance: Normal appearance. She is obese. She is not ill-appearing. HENT:      Head: Atraumatic.    Neck: Vascular: No carotid bruit. Cardiovascular:      Rate and Rhythm: Normal rate and regular rhythm. Pulses: Normal pulses. Heart sounds: Murmur heard. Pulmonary:      Effort: Pulmonary effort is normal. No respiratory distress. Breath sounds: Rales (L lower base) present. Musculoskeletal:         General: No deformity. Cervical back: Neck supple. No muscular tenderness. Right lower leg: Edema (improved) present. Left lower leg: Edema (improved) present. Neurological:      Mental Status: She is alert.               Current Outpatient Medications   Medication Sig Dispense Refill    spironolactone (ALDACTONE) 25 MG tablet Take 1 tablet by mouth 2 times daily 180 tablet 1    Torsemide 40 MG TABS Take 20 mg by mouth daily 60 tablet 1    aspirin 81 MG chewable tablet Take 81 mg by mouth daily      ferrous sulfate (IRON 325) 325 (65 Fe) MG tablet Take 325 mg by mouth every other day      traZODone (DESYREL) 50 MG tablet Take  mg by mouth nightly as needed for Sleep      acetaminophen (TYLENOL) 500 MG tablet Take 1,000 mg by mouth every 4 hours as needed for Pain or Fever      ZIOPTAN 0.0015 % SOLN Place 1 drop into both eyes Daily with supper       vitamin D 25 MCG (1000 UT) CAPS Take 5,000 Units by mouth daily      atorvastatin (LIPITOR) 10 MG tablet Take 10 mg by mouth nightly      umeclidinium-vilanterol (ANORO ELLIPTA) 62.5-25 MCG/INH AEPB inhaler Inhale 1 puff into the lungs daily 1 each 11    CPAP Machine MISC by Does not apply route      Misc Natural Products (OSTEO BI-FLEX ADV DOUBLE ST PO) Take 1 tablet by mouth daily      timolol (TIMOPTIC) 0.5 % ophthalmic solution Place 1 drop into both eyes nightly      carvedilol (COREG) 6.25 MG tablet Take 1 tablet by mouth 2 times daily (with meals) 180 tablet 3    albuterol (PROVENTIL HFA;VENTOLIN HFA) 108 (90 BASE) MCG/ACT inhaler Inhale 2 puffs into the lungs 2 times daily AND EVERY 4-6 HOURS AS NEEDED      Multiple Vitamins-Minerals (ICAPS) CAPS Take 1 capsule by mouth 2 times daily       vitamin B-12 (CYANOCOBALAMIN) 1000 MCG tablet Take 1,000 mcg by mouth daily. levothyroxine (SYNTHROID) 88 MCG tablet Take 88 mcg by mouth daily. midodrine (PROAMATINE) 5 MG tablet Take 1 tablet by mouth 3 times daily (with meals) (Patient not taking: No sig reported) 90 tablet 0     No current facility-administered medications for this visit. All pertinent data reviewed and discussed with patient       ASSESSMENT/PLAN:  CHF  Appears slightly overloaded. Increase torsemide to 20 mg BID for 5 days. If she continues to be short of breath or has increased weight gain continue BID until OV in 2 weeks. Daughter admits to feeding her Take out daily still. We reviewed sodium count in [facility] and [facility]. Reviewed good choices and bad choices. We reviewed low sodium  Continue coreg and spirolactone  Give midodrine as needed for SBP < 100. Check BMP in 1 week  She qualifies for palliative care/ hospice- referral sent, information given. Tests ordered:  BMP in 1 week    Follow-up 2 weeks     Signed:  LISA Esteves CNP, 1/31/2023, 4:38 PM    An electronic signature was used to authenticate this note. Please note this report has been partially produced using speech recognition software and may contain errors related to that system including errors in grammar, punctuation, and spelling, as well as words and phrases that may be inappropriate. If there are any questions or concerns please feel free to contact the dictating provider for clarification.

## 2023-02-01 ENCOUNTER — TELEPHONE (OUTPATIENT)
Dept: ONCOLOGY | Age: 88
End: 2023-02-01

## 2023-02-08 ENCOUNTER — HOSPITAL ENCOUNTER (OUTPATIENT)
Age: 88
Setting detail: SPECIMEN
Discharge: HOME | End: 2023-02-08
Payer: MEDICARE

## 2023-02-08 PROCEDURE — 80048 BASIC METABOLIC PNL TOTAL CA: CPT

## 2023-02-10 LAB
ANION GAP SERPL CALCULATED.3IONS-SCNC: ABNORMAL MMOL/L (ref 4–16)
BUN SERPL-MCNC: 25 MG/DL (ref 6–23)
CALCIUM SERPL-MCNC: 10.4 MG/DL (ref 8.3–10.6)
CHLORIDE BLD-SCNC: 88 MMOL/L (ref 99–110)
CO2: >50 MMOL/L (ref 21–32)
CREAT SERPL-MCNC: 0.8 MG/DL (ref 0.6–1.1)
GFR SERPL CREATININE-BSD FRML MDRD: >60 ML/MIN/1.73M2
GLUCOSE SERPL-MCNC: 62 MG/DL (ref 70–99)
POTASSIUM SERPL-SCNC: 5.2 MMOL/L (ref 3.5–5.1)
SODIUM BLD-SCNC: 136 MMOL/L (ref 135–145)

## 2023-02-13 ENCOUNTER — TELEPHONE (OUTPATIENT)
Dept: CARDIOLOGY CLINIC | Age: 88
End: 2023-02-13

## 2023-02-13 NOTE — TELEPHONE ENCOUNTER
----- Message from LISA Belle CNP sent at 2/13/2023  7:33 AM EST -----  Stop spirolactone  Please ask if she is in hopsice. If so what rojo the family want done. If not she needs to wear CPAP her CO2 is very high   ----- Message -----  From: [facility] Incoming Lab Results From Day (Epic Adt)  Sent: 2/10/2023  10:37 AM EST  To: LISA Belle CNP    Component Ref Range & Units 5 d ago   (2/8/23) 3 wk ago   (1/18/23) 1 mo ago   (1/9/23) 1 mo ago   (1/9/23) 1 mo ago   (12/19/22) 1 mo ago   (12/18/22) 1 mo ago   (12/17/22)   Sodium 135 - 145 MMOL/L 136  138  141 R  141 R  136  130 Low   134 Low     Potassium 3.5 - 5.1 MMOL/L 5.2 High   5.0  4.6 R  4.6 R  4.2  4.1  3.3 Low     Chloride 99 - 110 mMol/L 88 Low   92 Low   96 R  96 R  91 Low   85 Low   91 Low     CO2 21 - 32 MMOL/L >50 High   41 High   30 High  R  30 R  36 High   27  30    Anion Gap 4 - 16 UNABLE TO CALCULATE DUE TO HIGH CO2  5    9  18 High   13    BUN 6 - 23 MG/DL 25 High   17  18 R  18 R  50 High   53 High   59 High     Creatinine 0.6 - 1.1 MG/DL 0.8  0.7  0.79 R  0.79 R  0.6  0.7  0.7    Est, Glom Filt Rate >60 mL/min/1.73m2 >60  >60 CM    >60 CM  >60 CM  >60 CM    Comment:          These results are not intended for use in patients <25years of age. eGFR results are calculated without a race factor using the 2021 CKD-EPI equation. Careful clinical correlation is recommended, particularly when comparing to results   calculated using previous equations. The CKD-EPI equation is less accurate in patients with extremes of muscle mass, extra-renal   metabolism of creatine, excessive creatine ingestion, or following therapy that affects   renal tubular secretion.     Glucose 70 - 99 MG/DL 62 Low   78  94 R  94 R  111 High   109 High   106 High     Calcium 8.3 - 10.6 MG/DL 10.4  10.1  10.8 High  R  10.8 R  10.9 High   10.8 High   10.3    Gfr Calculated            Estimated Glomerular Filtration Rate Creatinine Equation    70 R BUN/Creatinine Ratio    23 R        Resulting Agency  [address] [place] [address] [address] [facility] [address]  [address] [facility]  [facility]  [facility]               Specimen Collected: 02/08/23 17:30 EST Last Resulted: 02/10/23 10:37 EST        Lab Flowsheet     Order Details     View Encounter     Lab and Collection Details     Routing     Result History     View Encounter Conversation        CM=Additional comments  R=Reference range differs from displayed range        Result Care Coordination      Patient Communication     Add Comments   Seen Back to [facility] with Bonita HAMEED. Bonita states they have not been able to speak to the nurse  at hospice so they are still waiting for hospice to sign off on starting care. Patient is wearing the CPaP during the day.

## 2023-02-14 NOTE — TELEPHONE ENCOUNTER
Placed another call to [facility] at ph# [phone] and left message requesting call back with information regarding referral. Alert and oriented to person, place and time

## 2023-02-14 NOTE — TELEPHONE ENCOUNTER
Received return call from Steven Weir at [facility], who stated that he now occupies the office where palliative care used to be but he has no information regarding how to reach them now. No other phone number available online. Placed call to [facility] at ph# [phone], spoke with an  several times, unable to find a phone number for palliative care. Reached out to [facility] for further guidance.

## 2023-02-15 NOTE — TELEPHONE ENCOUNTER
Received Palliative Care referral form from [facility], which included a ph# [phone] and a fax# [phone]. Placed call to Palliative Care and confirmed that they do not have access to Epic and did not receive the referral sent internally. Faxed referral, demographics, insurance card, and office note to Palliative Care.

## 2023-03-01 RX ORDER — MIDODRINE HYDROCHLORIDE 5 MG/1
5 TABLET ORAL 3 TIMES DAILY
Qty: 90 TABLET | Refills: 3 | Status: ON HOLD | OUTPATIENT
Start: 2023-03-01

## 2023-03-02 ENCOUNTER — HOSPITAL ENCOUNTER (INPATIENT)
Age: 88
LOS: 6 days | Discharge: NURSING, REHAB OR LONG-TERM CARE | DRG: 291 | End: 2023-03-08
Attending: EMERGENCY MEDICINE | Admitting: STUDENT IN AN ORGANIZED HEALTH CARE EDUCATION/TRAINING PROGRAM
Payer: MEDICARE

## 2023-03-02 ENCOUNTER — APPOINTMENT (OUTPATIENT)
Dept: GENERAL RADIOLOGY | Age: 88
DRG: 291 | End: 2023-03-02
Payer: MEDICARE

## 2023-03-02 DIAGNOSIS — J96.22 ACUTE ON CHRONIC RESPIRATORY FAILURE WITH HYPOXIA AND HYPERCAPNIA (HCC): ICD-10-CM

## 2023-03-02 DIAGNOSIS — J96.21 ACUTE ON CHRONIC RESPIRATORY FAILURE WITH HYPOXIA AND HYPERCAPNIA (HCC): ICD-10-CM

## 2023-03-02 DIAGNOSIS — I50.9 ACUTE ON CHRONIC CONGESTIVE HEART FAILURE, UNSPECIFIED HEART FAILURE TYPE (HCC): Primary | ICD-10-CM

## 2023-03-02 LAB
ALBUMIN SERPL-MCNC: 4.2 GM/DL (ref 3.4–5)
ALP BLD-CCNC: 205 IU/L (ref 40–129)
ALT SERPL-CCNC: 21 U/L (ref 10–40)
ANION GAP SERPL CALCULATED.3IONS-SCNC: 5 MMOL/L (ref 4–16)
AST SERPL-CCNC: 30 IU/L (ref 15–37)
BASE EXCESS MIXED: 14.4 (ref 0–2.3)
BASOPHILS ABSOLUTE: 0.1 K/CU MM
BASOPHILS RELATIVE PERCENT: 0.6 % (ref 0–1)
BILIRUB SERPL-MCNC: 1 MG/DL (ref 0–1)
BUN SERPL-MCNC: 21 MG/DL (ref 6–23)
CALCIUM SERPL-MCNC: 10 MG/DL (ref 8.3–10.6)
CHLORIDE BLD-SCNC: 88 MMOL/L (ref 99–110)
CO2: 39 MMOL/L (ref 21–32)
COMMENT: ABNORMAL
CREAT SERPL-MCNC: 0.7 MG/DL (ref 0.6–1.1)
DIFFERENTIAL TYPE: ABNORMAL
EOSINOPHILS ABSOLUTE: 0.4 K/CU MM
EOSINOPHILS RELATIVE PERCENT: 4.6 % (ref 0–3)
GFR SERPL CREATININE-BSD FRML MDRD: >60 ML/MIN/1.73M2
GLUCOSE BLD-MCNC: 83 MG/DL (ref 70–99)
GLUCOSE SERPL-MCNC: 106 MG/DL (ref 70–99)
HCO3 VENOUS: 45.3 MMOL/L (ref 19–25)
HCT VFR BLD CALC: 41.5 % (ref 37–47)
HEMOGLOBIN: 12.7 GM/DL (ref 12.5–16)
IMMATURE NEUTROPHIL %: 0.6 % (ref 0–0.43)
LYMPHOCYTES ABSOLUTE: 0.5 K/CU MM
LYMPHOCYTES RELATIVE PERCENT: 5.6 % (ref 24–44)
MAGNESIUM: 2.2 MG/DL (ref 1.8–2.4)
MCH RBC QN AUTO: 32 PG (ref 27–31)
MCHC RBC AUTO-ENTMCNC: 30.6 % (ref 32–36)
MCV RBC AUTO: 104.5 FL (ref 78–100)
MONOCYTES ABSOLUTE: 0.5 K/CU MM
MONOCYTES RELATIVE PERCENT: 6.3 % (ref 0–4)
NUCLEATED RBC %: 0 %
O2 SAT, VEN: 82.4 % (ref 50–70)
PCO2, VEN: 92 MMHG (ref 38–52)
PDW BLD-RTO: 18.2 % (ref 11.7–14.9)
PH VENOUS: 7.3 (ref 7.32–7.42)
PLATELET # BLD: 315 K/CU MM (ref 140–440)
PMV BLD AUTO: 11.3 FL (ref 7.5–11.1)
PO2, VEN: 53 MMHG (ref 28–48)
POTASSIUM SERPL-SCNC: 4.5 MMOL/L (ref 3.5–5.1)
PRO-BNP: 1464 PG/ML
RBC # BLD: 3.97 M/CU MM (ref 4.2–5.4)
SEGMENTED NEUTROPHILS ABSOLUTE COUNT: 7.1 K/CU MM
SEGMENTED NEUTROPHILS RELATIVE PERCENT: 82.3 % (ref 36–66)
SODIUM BLD-SCNC: 132 MMOL/L (ref 135–145)
TOTAL IMMATURE NEUTOROPHIL: 0.05 K/CU MM
TOTAL NUCLEATED RBC: 0 K/CU MM
TOTAL PROTEIN: 6.2 GM/DL (ref 6.4–8.2)
TROPONIN T: <0.01 NG/ML
WBC # BLD: 8.6 K/CU MM (ref 4–10.5)

## 2023-03-02 PROCEDURE — 83735 ASSAY OF MAGNESIUM: CPT

## 2023-03-02 PROCEDURE — 85025 COMPLETE CBC W/AUTO DIFF WBC: CPT

## 2023-03-02 PROCEDURE — 71046 X-RAY EXAM CHEST 2 VIEWS: CPT

## 2023-03-02 PROCEDURE — 96374 THER/PROPH/DIAG INJ IV PUSH: CPT

## 2023-03-02 PROCEDURE — 93005 ELECTROCARDIOGRAM TRACING: CPT | Performed by: EMERGENCY MEDICINE

## 2023-03-02 PROCEDURE — 87635 SARS-COV-2 COVID-19 AMP PRB: CPT

## 2023-03-02 PROCEDURE — 82962 GLUCOSE BLOOD TEST: CPT

## 2023-03-02 PROCEDURE — 99285 EMERGENCY DEPT VISIT HI MDM: CPT

## 2023-03-02 PROCEDURE — 82805 BLOOD GASES W/O2 SATURATION: CPT

## 2023-03-02 PROCEDURE — 2060000000 HC ICU INTERMEDIATE R&B

## 2023-03-02 PROCEDURE — 6360000002 HC RX W HCPCS: Performed by: EMERGENCY MEDICINE

## 2023-03-02 PROCEDURE — 84484 ASSAY OF TROPONIN QUANT: CPT

## 2023-03-02 PROCEDURE — 83880 ASSAY OF NATRIURETIC PEPTIDE: CPT

## 2023-03-02 PROCEDURE — 80053 COMPREHEN METABOLIC PANEL: CPT

## 2023-03-02 PROCEDURE — 94660 CPAP INITIATION&MGMT: CPT

## 2023-03-02 RX ORDER — FUROSEMIDE 10 MG/ML
40 INJECTION INTRAMUSCULAR; INTRAVENOUS ONCE
Status: COMPLETED | OUTPATIENT
Start: 2023-03-02 | End: 2023-03-02

## 2023-03-02 RX ADMIN — FUROSEMIDE 40 MG: 10 INJECTION, SOLUTION INTRAVENOUS at 17:26

## 2023-03-02 ASSESSMENT — PAIN - FUNCTIONAL ASSESSMENT: PAIN_FUNCTIONAL_ASSESSMENT: NONE - DENIES PAIN

## 2023-03-02 ASSESSMENT — PAIN SCALES - GENERAL: PAINLEVEL_OUTOF10: 0

## 2023-03-02 NOTE — ED PROVIDER NOTES
[address] ENCOUNTER      Pt Name: Radha Lewis  MRN: [id number]  [place] 3/18/1929  Date of evaluation: 3/2/2023  Provider: Janina Hess MD  Insurance:  Payor: Ayleen Soliman / Plan: [employer] - [place] / Product Type: *No Product type* /   Current Code Status   Code Status: Prior     CHIEF COMPLAINT       Chief Complaint   Patient presents with    Congestive Heart Failure     EMS called to cardiologist for low o2 saturation per staff - upon EMS arrival, staff states they never took a pulse ox reading. Pt wears 3L o2 at baseline and was 93% on RA upon arrival to ED. Pt denies shortness of breath. Leg Swelling     BLE         HISTORY OF PRESENT ILLNESS    HPI    Nursing Notes were reviewed. This is a 80 y.o. female who presents to the emergency department with worsening shortness of breath. The patient was reportedly at her cardiologist office when she was found to have low oxygen saturation and as result EMS was called and the patient was brought to the emergency department. There is some inconsistency in the report. As EMS reports that oxygen saturation on their arrival at the cardiology office was 93%. The patient says that she has no new symptoms. She says that she has had some generalized significant shortness of breath however this is not a new symptom for her. She denies chest pain. She denies abdominal pain. She denies nausea vomiting or recent fevers.     PAST MEDICAL HISTORY     Past Medical History:   Diagnosis Date    Age-related osteoporosis with current pathological fracture of vertebra ([facility] [address] 7/8/2022    Arthritis     Bradycardia 2001    requiring dual chamber pacemaker at [facility]    CAD (coronary artery disease)     Cardiac pacemaker 10/2001    [facility] #[id number]  [facility]- Serial # [id number]-[facility]- Dr Denilson Rodrigez    COPD, [facility]) 2/17/2017    Cor pulmonale (chronic) ([facility] [address] 3/15/2022    CVA (cerebrovascular accident) ([facility] [address]     DJD (degenerative joint disease) of cervical spine     C5-C6, C6-C7    Exhaustion of cardiac pacemaker battery 11/21/2011    PPM battery replacement- [employer]    Family history of cardiovascular disease     Glaucoma Dx 2010    H/O 24 hour EKG monitoring 8/6/2000 8/6/2000- Intermittent episonde of a-fib/flutter    H/O cardiac catheterization 4/20/2010, 2/1989 4/20/2010-Severe native vessel disease and has graft disease as well. LAD, CX totally occluded. RCA totally occluded in proximal segment. VG to RCA widely patent. PDA 90% LAD afer LIMA anastomosis 80-90% stenosis. Proceeded with PTCA with stent next day. H/O cardiovascular stress test 10/14/2011, 6/2/2010,4/8/2010, 5/18/2009,4/6/2009, 10/30/2007, 11/12/2004, 11/6/2003, 8/9/2002, 8/9/2001,6/5/2000,     10/14/2011-Lexiscan-Abnormal Myocardial Perfusion study. Evidence of mild ischemia in the Left CX region. Abnomal study. Rest EF 63%. Global LV systolic function normal. No ECG changes. Unremarkable pharmacological stress test.    H/O cardiovascular stress test 6/10/2013    thallium--mild ischemia left circumflex EF63% no change from 10/2011 study. H/O cardiovascular stress test 10/16/2014    cardiolite-mild ischemia left circumflex,EF70%    H/O chest x-ray 4/19/2009 4/19/2009-Stable cardiomegaly. No acute cardiopulmonary disease. H/O Doppler lower venous ultrasound 09/18/2019    Significant reflux noted in RGSV, RGSV is extremely tortuous and small along the thigh and calf and would be highly unlikely to be accessed, RSSV is non compressible and has occlusive chronic SVT, LSSV is non compressible with occlusive chronic SVT, LGSV removed s/p CABG, Significant reflux in LGSV tributary,    H/O Doppler ultrasound 3/31/2010    CAROTID- 3/31/2010-INtimal thickening but no significant atherosclerotic plaque noted in ANA PAULA. Doppler flow velocities within ANA PAULA are WNL.  Heterogeneous, irregular atherosclerotic plaque noted in LICA. Doppler flow velocities within the LICA are elevated, consistent with a mild, less than 50% stenosis. H/O Doppler ultrasound 5/24/2016    Carotid- normal study    H/O Doppler ultrasound 09/06/2017    carotid - normal study    H/O Doppler ultrasound     H/O echocardiogram 10/13    EF=60%, Severe Pulm. HTN, & Sclerotic aortic valve w/stenosis. H/O echocardiogram 10/16/14     EF 55-60% Normal LV. Normal LV systolic function. Severe tricuspid insufficiency with severe hypertension. H/O echocardiogram 02/03/2017    heart cath performed this morning    H/O echocardiogram 09/18/2019    EF 50-55%, Left atrium is mild to moderately dilated, right atrium is severely dilated, mildly dilated right ventricle, Mod MR, Severe TR, Severe Pulm HTN, no pericardial effusion     History of complete ECG     10/14/2011(Lexiscan);5/6/2010, 4/30/2009,10/24/2008,9/21/2007, 10/13/2006    History of nuclear stress test 11/17/2016    lexiscan-normal,EF70%    Hx of cardiovascular stress test 12/28/2018    EF 60%  Normal study. HX OTHER MEDICAL 05/01/2017    MUGA-normal, EF53%    Hyperlipidemia     Hypertension     Mild intermittent asthma 7/28/2016    Moderate COPD (chronic obstructive pulmonary disease) (HCC) 3/15/2022    Nausea & vomiting     Obstructive sleep apnea 5/16/2017    Post PTCA 4/21/2010    PTCA with 2.25 stent of the LIMA to LAD    Pulmonary HTN ([address]     Severe per last echo on 10/13. 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 Followed by MAZE procedure by pulmonary vein isolation.-  Dr Cuong Stewart, TOTAL ABDOMINAL (CERVIX REMOVED)  1990's    MIDDLE EAR SURGERY      OTHER SURGICAL HISTORY      Ear surgery-hearing    PACEMAKER PLACEMENT      battery change 11/21/2011 [employer]tr[employer]    PTCA  11/2012    Ptca with stent to RCA    TONSILLECTOMY  1950's         CURRENT MEDICATIONS       Previous Medications    ACETAMINOPHEN (TYLENOL) 500 MG TABLET    Take 1,000 mg by mouth every 4 hours as needed for Pain or Fever    ALBUTEROL (PROVENTIL HFA;VENTOLIN HFA) 108 (90 BASE) MCG/ACT INHALER    Inhale 2 puffs into the lungs 2 times daily AND EVERY 4-6 HOURS AS NEEDED    ASPIRIN 81 MG CHEWABLE TABLET    Take 81 mg by mouth daily    ATORVASTATIN (LIPITOR) 10 MG TABLET    Take 10 mg by mouth nightly    CARVEDILOL (COREG) 6.25 MG TABLET    Take 1 tablet by mouth 2 times daily (with meals)    CPAP MACHINE MISC    by Does not apply route    FERROUS SULFATE (IRON 325) 325 (65 FE) MG TABLET    Take 325 mg by mouth every other day    LEVOTHYROXINE (SYNTHROID) 88 MCG TABLET    Take 88 mcg by mouth daily.       MIDODRINE (PROAMATINE) 5 MG TABLET    Take 1 tablet by mouth 3 times daily    MISC NATURAL PRODUCTS (OSTEO BI-FLEX ADV DOUBLE ST PO)    Take 1 tablet by mouth daily    MULTIPLE VITAMINS-MINERALS (ICAPS) CAPS    Take 1 capsule by mouth 2 times daily     SPIRONOLACTONE (ALDACTONE) 25 MG TABLET Take 1 tablet by mouth 2 times daily    TIMOLOL (TIMOPTIC) 0.5 % OPHTHALMIC SOLUTION    Place 1 drop into both eyes nightly    TORSEMIDE (DEMADEX) 20 MG TABLET    Take 1 tablet by mouth daily as needed (for weight gain > 3 lbs or shortness of breath)    TORSEMIDE 40 MG TABS    Take 20 mg by mouth daily    TRAZODONE (DESYREL) 50 MG TABLET    Take  mg by mouth nightly as needed for Sleep    UMECLIDINIUM-VILANTEROL (ANORO ELLIPTA) 62.5-25 MCG/INH AEPB INHALER    Inhale 1 puff into the lungs daily    VITAMIN B-12 (CYANOCOBALAMIN) 1000 MCG TABLET    Take 1,000 mcg by mouth daily. VITAMIN D 25 MCG (1000 UT) CAPS    Take 5,000 Units by mouth daily    ZIOPTAN 0.0015 % SOLN    Place 1 drop into both eyes Daily with supper        ALLERGIES     Darvocet [propoxyphene n-acetaminophen], Ranexa [ranolazine er], Ranolazine, Sulfa antibiotics, Sulfasalazine, Adhesive tape, Allantoin, Bacitracin, Gramicidin, Neomycin, Polymyxin b, Pramoxine hcl, and Silicone    FAMILY HISTORY       Family History   Problem Relation Age of Onset    Cancer Mother         \"Liver Cancer\"    Arthritis Mother     Depression Mother     Hearing Loss Mother     High Blood Pressure Mother     High Cholesterol Mother     Mental Illness Mother     Miscarriages / Stillbirths Mother     Heart Disease Father     Early Death Father 36        \"Instant Death\"    High Blood Pressure Father     High Cholesterol Father     Coronary Art Dis Father         Massive MI    Heart Disease Sister     Depression Sister     Cancer Sister         \"Breast Cancer, Cancer Free Now\"    High Blood Pressure Sister     Other Daughter         \"She's Had Stomach Surgery\"    High Blood Pressure Son     High Blood Pressure Son     Early Death Paternal Grandfather           SOCIAL HISTORY       Social History     Socioeconomic History    Marital status:       Spouse name: None    Number of children: 4    Years of education: None    Highest education level: None   Occupational History    Occupation: RETIRED     Comment: from [employer]    Smoking status: Former     Packs/day: 0.25     Years: 5.00     Pack years: 1.25     Types: Cigarettes     Quit date: 1970     Years since quittin.2    Smokeless tobacco: Never   Vaping Use    Vaping Use: Never used   Substance and Sexual Activity    Alcohol use: Not Currently     Comment: Caffiene - no more than 3 cups of coffee each day    Drug use: Never    Sexual activity: Yes     Partners: Male     Comment:        PHYSICAL EXAM       ED Triage Vitals [23 1608]   BP Temp Temp Source Heart Rate Resp SpO2 Height Weight   123/64 97.6 °F (36.4 °C) Oral 60 20 97 % 4' 11\" (1.499 m) 148 lb (67.1 kg)       Physical Exam    Vitals:    Vitals:    23 1832 23 1901 23 1902 23 2102   BP: 119/70  136/73 115/81   Pulse: 60 60 62 63   Resp: 28 21 24 21   Temp:    97.3 °F (36.3 °C)   TempSrc:    Axillary   SpO2: 93% 99%  96%   Weight:       Height:           DIAGNOSTIC RESULTS     EKG: All EKG's are interpreted by the Emergency Department Physician who either signs or Co-signs this chart in the absence of a cardiologist.    Ventricular paced rhythm at 60 bpm.  QRS is 144. QTc is 46. There are no abnormal ST elevations or depressions. There is no ventricular ectopy. There is no clinically significant change from prior EKG from 2022    RADIOLOGY:   Non-plain film images such as CT, Ultrasound and MRI are read by the radiologist. Plain radiographic images are visualized and preliminarily interpreted by the emergency physician with the below findings:    Interpretation per the Radiologist below, if available at the time of this note:    XR CHEST (2 VW)   Final Result   1. Cardiomegaly with central pulmonary vascular congestion and suspected   small effusions and atelectasis.              LABS:  Labs Reviewed   COMPREHENSIVE METABOLIC PANEL - Abnormal; Notable for the following components: Result Value    Sodium 132 (*)     Chloride 88 (*)     CO2 39 (*)     Glucose 106 (*)     Total Protein 6.2 (*)     Alkaline Phosphatase 205 (*)     All other components within normal limits   CBC WITH AUTO DIFFERENTIAL - Abnormal; Notable for the following components:    RBC 3.97 (*)     .5 (*)     MCH 32.0 (*)     MCHC 30.6 (*)     RDW 18.2 (*)     MPV 11.3 (*)     Segs Relative 82.3 (*)     Lymphocytes % 5.6 (*)     Monocytes % 6.3 (*)     Eosinophils % 4.6 (*)     Immature Neutrophil % 0.6 (*)     All other components within normal limits   BRAIN NATRIURETIC PEPTIDE - Abnormal; Notable for the following components:    Pro-BNP 1,464 (*)     All other components within normal limits   BLOOD GAS, VENOUS - Abnormal; Notable for the following components:    pH, Yunior 7.30 (*)     pCO2, Yunior 92 (*)     pO2, Yunior 53 (*)     Base Exc, Mixed 14.4 (*)     HCO3, Venous 45.3 (*)     O2 Sat, Yunior 82.4 (*)     All other components within normal limits   COVID-19, RAPID   MAGNESIUM   TROPONIN   BLOOD GAS, VENOUS   TSH       All other labs were within normal range or not returned as of this dictation. MEDICAL DECISION MAKING     Medications   furosemide (LASIX) injection 40 mg (40 mg IntraVENous Given 3/2/23 1726)             Vic Coma Scale  Eye Opening: Spontaneous  Best Verbal Response: Oriented  Best Motor Response: Obeys commands  [place] Coma Scale Score: 15                     CIWA Assessment  BP: 115/81  Heart Rate: 61                 MDM  This is a 80 y.o. female who presents to the emergency department with worsening shortness of breath. The patient was reportedly at her cardiologist office when she was found to have low oxygen saturation and as result EMS was called and the patient was brought to the emergency department. There is some inconsistency in the report. As EMS reports that oxygen saturation on their arrival at the cardiology office was 93%. The patient says that she has no new symptoms. She says that she has had some generalized significant shortness of breath however this is not a new symptom for her. She denies chest pain. She denies abdominal pain. She denies nausea vomiting or recent fevers. Family reports the patient has not been using her home CPAP due to discomfort. She has had increasing somnolence and increased work of breathing    Review of medical records:  Review of the patient's cardiology records indicates the patient has acute on chronic diastolic heart failure. She was evaluated in the office on January 31 at which time she appeared slightly volume overloaded. I did increase her furosemide to 20 mg. The patient was again evaluated today in the cardiologist office and she was found to have worsening CHF. She was extremely volume overloaded and her situation was discussed with the family and the patient has decided she would prefer to come to the emergency department. She was provided with the option of Lasix in the office and ultimately the patient and family decided for on transport to the emergency department. Of note, the patient has an advanced directive stating she is do not retest to take comfort care only however she is willing to have noninvasive medical support. Patient has a mild hyponatremia of 132. BUN and creatinine are normal indicating a low likelihood of acute kidney injury or renal failure. Liver function enzymes are normal indicating a low likelihood of acute cholecystitis or hepatitis. Hemoglobin hematocrit are normal indicating no evidence of significant anemia. ECG shows no ST elevations and cardiac enzymes are normal indicating a low likelihood of STEMI or NSTEMI    X-ray does demonstrate generalized vascular congestion. Venous blood gas on this patient shows a respiratory acidosis and profound hypercarbia with a pH of 7.38, PCO2 of 92 and PO2 of 53. Patient has acute hypoxic and hypercarbic respiratory failure.   She has agreed to BiPAP support. She will be given BiPAP, diuresis and she will be admitted to the hospitalist for further evaluation and treatment    Clinical Diagnoses Addressed  1. Acute on chronic congestive heart failure, unspecified heart failure type ([address]    2. Acute on chronic respiratory failure with hypoxia and hypercapnia ([address]          CONSULTS:  IP CONSULT TO CARDIOLOGY    PROCEDURES:  Unless otherwise noted below, none     Procedures      FINAL IMPRESSION      1. Acute on chronic congestive heart failure, unspecified heart failure type ([address]    2. Acute on chronic respiratory failure with hypoxia and hypercapnia ([address]          DISPOSITION/PLAN   DISPOSITION Admitted 03/02/2023 09:16:33 PM      PATIENT REFERRED TO:  No follow-up provider specified. DISCHARGE MEDICATIONS:  New Prescriptions    No medications on file     Controlled Substances Monitoring:     No flowsheet data found.     Ankita Bill MD (electronically signed)  Attending Emergency Physician           Ankita Bill MD  03/02/23 [id number]

## 2023-03-02 NOTE — PATIENT INSTRUCTIONS
**It is YOUR responsibilty to bring medication bottles and/or updated medication list to [address] [place] [address]. This will allow us to better serve you and all your healthcare needs**    [facility] Locations - No appointment necessary. Sites open Monday to Friday. Call your preferred location for test preparation, business   hours and other information you need. [employer]. [address] [facility], [address] [facility]  Phone: [phone] [place]  [address] [facility]  [address] [place], [address] [facility]  Phone: [phone]       Please be informed that if you contact our office outside of normal business hours the physician on call cannot help with any scheduling or rescheduling issues, procedure instruction questions or any type of medication issue. We advise you for any urgent/emergency that you go to the nearest emergency room! PLEASE CALL OUR OFFICE DURING NORMAL BUSINESS HOURS    Monday - Friday   8 am to 5 pm    [facility] [address]: [phone]    [place]:  [phone]    Thank you for allowing us to care for you today! We want to ensure we can follow your treatment plan and we strive to give you the best outcomes and experience possible. If you ever have a life threatening emergency and call 911 - for an ambulance (EMS)   Our providers can only care for you at:   [facility] or [facility]. Even if you have someone take you or you drive yourself we can only care for you in a [facility]. Our providers are not setup at the other healthcare locations!

## 2023-03-02 NOTE — PROGRESS NOTES
3/2/2023  Primary cardiologist: Dr. Candelario Gordon:   Kojo Garcia  is an established 80 y.o.  female here for a follow up on CHF        SUBJECTIVE/OBJECTIVE:  Kojo Garcia is a 80 y.o. female with a history of   1. Acute on chronic diastolic (congestive) heart failure (HCC)          HPI :   Kojo Garcia is feeling terrible. She is very short of breath and unable to stay awake for conversation. Her DIL and son are with her and do not feel they can care for her at home any longer. Review of Systems   Constitutional: Positive for malaise/fatigue. Eyes:  Negative for visual disturbance. Cardiovascular:  Positive for cyanosis, dyspnea on exertion and leg swelling. Negative for chest pain, claudication, irregular heartbeat, near-syncope, orthopnea, palpitations, paroxysmal nocturnal dyspnea and syncope. Respiratory:  Positive for shortness of breath. Negative for sleep disturbances due to breathing. Musculoskeletal:  Positive for muscle weakness. Neurological:  Negative for focal weakness, light-headedness and weakness. Psychiatric/Behavioral:  Negative for depression. The patient is not nervous/anxious. Vitals:    03/02/23 1456 03/02/23 1502   BP: 110/60 106/64   Site: Left Upper Arm Left Upper Arm   Position: Sitting Sitting   Cuff Size: Medium Adult Medium Adult   Weight: 148 lb (67.1 kg)    Height: 4' 11\" (1.499 m)      No flowsheet data found. Wt Readings from Last 3 Encounters:   03/02/23 148 lb (67.1 kg)   01/31/23 140 lb 3.2 oz (63.6 kg)   01/11/23 132 lb (59.9 kg)     Body mass index is 29.89 kg/m². Physical Exam  Vitals reviewed. Constitutional:       General: She is not in acute distress. Appearance: Normal appearance. She is obese. She is ill-appearing. Comments: Opens eyes to name answers short question yes/ no   HENT:      Head: Atraumatic. Neck:      Vascular: No carotid bruit. Cardiovascular:      Rate and Rhythm: Normal rate and regular rhythm. Pulses: Normal pulses.       Heart sounds: Murmur heard. Pulmonary:      Effort: Tachypnea, accessory muscle usage and respiratory distress present. Breath sounds: Examination of the left-lower field reveals decreased breath sounds. Decreased breath sounds and rales (throughout) present. Musculoskeletal:         General: No deformity. Cervical back: Neck supple. No muscular tenderness. Right lower leg: Edema (+3) present. Left lower leg: Edema (+3) present. Neurological:      Mental Status: She is disoriented. Current Outpatient Medications   Medication Sig Dispense Refill    torsemide (DEMADEX) 20 MG tablet Take 1 tablet by mouth daily as needed (for weight gain > 3 lbs or shortness of breath) 30 tablet 3    aspirin 81 MG chewable tablet Take 81 mg by mouth daily      ferrous sulfate (IRON 325) 325 (65 Fe) MG tablet Take 325 mg by mouth every other day      traZODone (DESYREL) 50 MG tablet Take  mg by mouth nightly as needed for Sleep      acetaminophen (TYLENOL) 500 MG tablet Take 1,000 mg by mouth every 4 hours as needed for Pain or Fever      vitamin D 25 MCG (1000 UT) CAPS Take 5,000 Units by mouth daily      atorvastatin (LIPITOR) 10 MG tablet Take 10 mg by mouth nightly      albuterol (PROVENTIL HFA;VENTOLIN HFA) 108 (90 BASE) MCG/ACT inhaler Inhale 2 puffs into the lungs 2 times daily AND EVERY 4-6 HOURS AS NEEDED      vitamin B-12 (CYANOCOBALAMIN) 1000 MCG tablet Take 1,000 mcg by mouth daily. levothyroxine (SYNTHROID) 88 MCG tablet Take 88 mcg by mouth daily.         midodrine (PROAMATINE) 5 MG tablet Take 1 tablet by mouth 3 times daily (Patient not taking: Reported on 3/2/2023) 90 tablet 3    spironolactone (ALDACTONE) 25 MG tablet Take 1 tablet by mouth 2 times daily (Patient not taking: Reported on 3/2/2023) 180 tablet 1    Torsemide 40 MG TABS Take 20 mg by mouth daily (Patient not taking: Reported on 3/2/2023) 60 tablet 1    ZIOPTAN 0.0015 % SOLN Place 1 drop into both eyes Daily with supper  (Patient not taking: Reported on 3/2/2023)      umeclidinium-vilanterol (ANORO ELLIPTA) 62.5-25 MCG/INH AEPB inhaler Inhale 1 puff into the lungs daily 1 each 11    CPAP Machine MISC by Does not apply route      Misc Natural Products (OSTEO BI-FLEX ADV DOUBLE ST PO) Take 1 tablet by mouth daily (Patient not taking: Reported on 3/2/2023)      timolol (TIMOPTIC) 0.5 % ophthalmic solution Place 1 drop into both eyes nightly (Patient not taking: Reported on 3/2/2023)      carvedilol (COREG) 6.25 MG tablet Take 1 tablet by mouth 2 times daily (with meals) 180 tablet 3    Multiple Vitamins-Minerals (ICAPS) CAPS Take 1 capsule by mouth 2 times daily  (Patient not taking: Reported on 3/2/2023)       No current facility-administered medications for this visit. All pertinent data reviewed and discussed with patient       ASSESSMENT/PLAN:  CHF  Appears very overloaded. Patient wants to go to ed. Family wants her to go by squad. We reviewed IV lasix in office and they would prefer to just go to hospital.   We reviewed DNR CCA vs DNR CC. She has paperwork but it is not in system. Son will get papers and bring them to ED. Recommend hospice discussed with son and DIL for 24 mins and they will discuss with daughter whom is the POA. Tests ordered:  None    Follow-up Post hospital    Signed:  LISA Alonzo CNP, 3/2/2023, 3:26 PM    An electronic signature was used to authenticate this note. Please note this report has been partially produced using speech recognition software and may contain errors related to that system including errors in grammar, punctuation, and spelling, as well as words and phrases that may be inappropriate. If there are any questions or concerns please feel free to contact the dictating provider for clarification. Partial Purse String (Intermediate) Text: Given the location of the defect and the characteristics of the surrounding skin an intermediate purse string closure was deemed most appropriate.  Undermining was performed circumferentially around the surgical defect.  A purse string suture was then placed and tightened. Wound tension of the circular defect prevented complete closure of the wound.

## 2023-03-03 ENCOUNTER — APPOINTMENT (OUTPATIENT)
Dept: GENERAL RADIOLOGY | Age: 88
DRG: 291 | End: 2023-03-03
Payer: MEDICARE

## 2023-03-03 LAB
AMMONIA: 60 UMOL/L (ref 11–51)
BASE EXCESS MIXED: 17.4 (ref 0–2.3)
COMMENT: ABNORMAL
EKG ATRIAL RATE: 59 BPM
EKG DIAGNOSIS: NORMAL
EKG Q-T INTERVAL: 486 MS
EKG QRS DURATION: 144 MS
EKG QTC CALCULATION (BAZETT): 486 MS
EKG R AXIS: 213 DEGREES
EKG T AXIS: 108 DEGREES
EKG VENTRICULAR RATE: 60 BPM
HCO3 VENOUS: 44.4 MMOL/L (ref 19–25)
O2 SAT, VEN: 92.9 % (ref 50–70)
PCO2, VEN: 61 MMHG (ref 38–52)
PH VENOUS: 7.47 (ref 7.32–7.42)
PO2, VEN: 114 MMHG (ref 28–48)
SARS-COV-2 RDRP RESP QL NAA+PROBE: NOT DETECTED
SOURCE: NORMAL
T4 FREE SERPL-MCNC: 1.41 NG/DL (ref 0.9–1.8)
TSH SERPL DL<=0.005 MIU/L-ACNC: 9.58 UIU/ML (ref 0.27–4.2)

## 2023-03-03 PROCEDURE — 84439 ASSAY OF FREE THYROXINE: CPT

## 2023-03-03 PROCEDURE — 82805 BLOOD GASES W/O2 SATURATION: CPT

## 2023-03-03 PROCEDURE — 82140 ASSAY OF AMMONIA: CPT

## 2023-03-03 PROCEDURE — 6370000000 HC RX 637 (ALT 250 FOR IP): Performed by: STUDENT IN AN ORGANIZED HEALTH CARE EDUCATION/TRAINING PROGRAM

## 2023-03-03 PROCEDURE — 94761 N-INVAS EAR/PLS OXIMETRY MLT: CPT

## 2023-03-03 PROCEDURE — 36415 COLL VENOUS BLD VENIPUNCTURE: CPT

## 2023-03-03 PROCEDURE — 2700000000 HC OXYGEN THERAPY PER DAY

## 2023-03-03 PROCEDURE — 2060000000 HC ICU INTERMEDIATE R&B

## 2023-03-03 PROCEDURE — 6360000002 HC RX W HCPCS: Performed by: STUDENT IN AN ORGANIZED HEALTH CARE EDUCATION/TRAINING PROGRAM

## 2023-03-03 PROCEDURE — 84443 ASSAY THYROID STIM HORMONE: CPT

## 2023-03-03 PROCEDURE — 94640 AIRWAY INHALATION TREATMENT: CPT

## 2023-03-03 PROCEDURE — 2580000003 HC RX 258: Performed by: STUDENT IN AN ORGANIZED HEALTH CARE EDUCATION/TRAINING PROGRAM

## 2023-03-03 PROCEDURE — 74018 RADEX ABDOMEN 1 VIEW: CPT

## 2023-03-03 PROCEDURE — 99223 1ST HOSP IP/OBS HIGH 75: CPT | Performed by: INTERNAL MEDICINE

## 2023-03-03 PROCEDURE — 94664 DEMO&/EVAL PT USE INHALER: CPT

## 2023-03-03 PROCEDURE — 93010 ELECTROCARDIOGRAM REPORT: CPT | Performed by: INTERNAL MEDICINE

## 2023-03-03 RX ORDER — CARVEDILOL 6.25 MG/1
6.25 TABLET ORAL 2 TIMES DAILY WITH MEALS
Status: DISCONTINUED | OUTPATIENT
Start: 2023-03-03 | End: 2023-03-04

## 2023-03-03 RX ORDER — ASPIRIN 81 MG/1
81 TABLET, CHEWABLE ORAL DAILY
Status: DISCONTINUED | OUTPATIENT
Start: 2023-03-03 | End: 2023-03-08 | Stop reason: HOSPADM

## 2023-03-03 RX ORDER — SODIUM CHLORIDE 0.9 % (FLUSH) 0.9 %
5-40 SYRINGE (ML) INJECTION PRN
Status: DISCONTINUED | OUTPATIENT
Start: 2023-03-03 | End: 2023-03-08 | Stop reason: HOSPADM

## 2023-03-03 RX ORDER — ACETAMINOPHEN 325 MG/1
650 TABLET ORAL EVERY 6 HOURS PRN
Status: DISCONTINUED | OUTPATIENT
Start: 2023-03-03 | End: 2023-03-08 | Stop reason: HOSPADM

## 2023-03-03 RX ORDER — POTASSIUM CHLORIDE 7.45 MG/ML
10 INJECTION INTRAVENOUS PRN
Status: DISCONTINUED | OUTPATIENT
Start: 2023-03-03 | End: 2023-03-08 | Stop reason: HOSPADM

## 2023-03-03 RX ORDER — ACETAMINOPHEN 650 MG/1
650 SUPPOSITORY RECTAL EVERY 6 HOURS PRN
Status: DISCONTINUED | OUTPATIENT
Start: 2023-03-03 | End: 2023-03-08 | Stop reason: HOSPADM

## 2023-03-03 RX ORDER — SODIUM CHLORIDE 0.9 % (FLUSH) 0.9 %
5-40 SYRINGE (ML) INJECTION EVERY 12 HOURS SCHEDULED
Status: DISCONTINUED | OUTPATIENT
Start: 2023-03-03 | End: 2023-03-08 | Stop reason: HOSPADM

## 2023-03-03 RX ORDER — ENOXAPARIN SODIUM 100 MG/ML
40 INJECTION SUBCUTANEOUS EVERY EVENING
Status: DISCONTINUED | OUTPATIENT
Start: 2023-03-03 | End: 2023-03-08 | Stop reason: HOSPADM

## 2023-03-03 RX ORDER — TRAZODONE HYDROCHLORIDE 50 MG/1
50 TABLET ORAL NIGHTLY PRN
Status: DISCONTINUED | OUTPATIENT
Start: 2023-03-03 | End: 2023-03-03

## 2023-03-03 RX ORDER — LACTULOSE 10 G/15ML
10 SOLUTION ORAL 3 TIMES DAILY
Status: DISCONTINUED | OUTPATIENT
Start: 2023-03-03 | End: 2023-03-08 | Stop reason: HOSPADM

## 2023-03-03 RX ORDER — FUROSEMIDE 10 MG/ML
40 INJECTION INTRAMUSCULAR; INTRAVENOUS 2 TIMES DAILY
Status: DISCONTINUED | OUTPATIENT
Start: 2023-03-03 | End: 2023-03-06

## 2023-03-03 RX ORDER — MAGNESIUM SULFATE IN WATER 40 MG/ML
2000 INJECTION, SOLUTION INTRAVENOUS PRN
Status: DISCONTINUED | OUTPATIENT
Start: 2023-03-03 | End: 2023-03-08 | Stop reason: HOSPADM

## 2023-03-03 RX ORDER — FERROUS SULFATE 325(65) MG
325 TABLET ORAL EVERY OTHER DAY
Status: DISCONTINUED | OUTPATIENT
Start: 2023-03-03 | End: 2023-03-04

## 2023-03-03 RX ORDER — FLUTICASONE PROPIONATE 110 UG/1
1 AEROSOL, METERED RESPIRATORY (INHALATION) 2 TIMES DAILY
Status: DISCONTINUED | OUTPATIENT
Start: 2023-03-03 | End: 2023-03-08 | Stop reason: HOSPADM

## 2023-03-03 RX ORDER — LEVOTHYROXINE SODIUM 88 UG/1
88 TABLET ORAL DAILY
Status: DISCONTINUED | OUTPATIENT
Start: 2023-03-03 | End: 2023-03-08 | Stop reason: HOSPADM

## 2023-03-03 RX ORDER — ONDANSETRON 4 MG/1
4 TABLET, ORALLY DISINTEGRATING ORAL EVERY 8 HOURS PRN
Status: DISCONTINUED | OUTPATIENT
Start: 2023-03-03 | End: 2023-03-08 | Stop reason: HOSPADM

## 2023-03-03 RX ORDER — ATORVASTATIN CALCIUM 10 MG/1
10 TABLET, FILM COATED ORAL NIGHTLY
Status: DISCONTINUED | OUTPATIENT
Start: 2023-03-03 | End: 2023-03-08 | Stop reason: HOSPADM

## 2023-03-03 RX ORDER — POLYETHYLENE GLYCOL 3350 17 G/17G
17 POWDER, FOR SOLUTION ORAL DAILY PRN
Status: DISCONTINUED | OUTPATIENT
Start: 2023-03-03 | End: 2023-03-08 | Stop reason: HOSPADM

## 2023-03-03 RX ORDER — SODIUM CHLORIDE 9 MG/ML
INJECTION, SOLUTION INTRAVENOUS PRN
Status: DISCONTINUED | OUTPATIENT
Start: 2023-03-03 | End: 2023-03-08 | Stop reason: HOSPADM

## 2023-03-03 RX ORDER — ONDANSETRON 2 MG/ML
4 INJECTION INTRAMUSCULAR; INTRAVENOUS EVERY 6 HOURS PRN
Status: DISCONTINUED | OUTPATIENT
Start: 2023-03-03 | End: 2023-03-08 | Stop reason: HOSPADM

## 2023-03-03 RX ORDER — ALBUTEROL SULFATE 90 UG/1
2 AEROSOL, METERED RESPIRATORY (INHALATION) EVERY 6 HOURS PRN
Status: DISCONTINUED | OUTPATIENT
Start: 2023-03-03 | End: 2023-03-07

## 2023-03-03 RX ADMIN — SODIUM CHLORIDE, PRESERVATIVE FREE 10 ML: 5 INJECTION INTRAVENOUS at 20:09

## 2023-03-03 RX ADMIN — LEVOTHYROXINE SODIUM 88 MCG: 0.09 TABLET ORAL at 10:43

## 2023-03-03 RX ADMIN — FERROUS SULFATE TAB 325 MG (65 MG ELEMENTAL FE) 325 MG: 325 (65 FE) TAB at 10:43

## 2023-03-03 RX ADMIN — FUROSEMIDE 40 MG: 10 INJECTION, SOLUTION INTRAMUSCULAR; INTRAVENOUS at 17:29

## 2023-03-03 RX ADMIN — TIOTROPIUM BROMIDE AND OLODATEROL 2 PUFF: 3.124; 2.736 SPRAY, METERED RESPIRATORY (INHALATION) at 12:08

## 2023-03-03 RX ADMIN — FUROSEMIDE 40 MG: 10 INJECTION, SOLUTION INTRAMUSCULAR; INTRAVENOUS at 10:43

## 2023-03-03 RX ADMIN — ASPIRIN 81 MG CHEWABLE TABLET 81 MG: 81 TABLET CHEWABLE at 10:43

## 2023-03-03 RX ADMIN — ENOXAPARIN SODIUM 40 MG: 100 INJECTION SUBCUTANEOUS at 17:28

## 2023-03-03 RX ADMIN — FLUTICASONE PROPIONATE 1 PUFF: 110 AEROSOL, METERED RESPIRATORY (INHALATION) at 20:29

## 2023-03-03 RX ADMIN — LACTULOSE 10 G: 10 SOLUTION ORAL at 20:08

## 2023-03-03 RX ADMIN — LACTULOSE 10 G: 10 SOLUTION ORAL at 17:29

## 2023-03-03 RX ADMIN — ATORVASTATIN CALCIUM 10 MG: 10 TABLET, FILM COATED ORAL at 20:08

## 2023-03-03 RX ADMIN — FLUTICASONE PROPIONATE 1 PUFF: 110 AEROSOL, METERED RESPIRATORY (INHALATION) at 12:09

## 2023-03-03 ASSESSMENT — PAIN SCALES - GENERAL: PAINLEVEL_OUTOF10: 0

## 2023-03-03 NOTE — ED NOTES
ED TO INPATIENT SBAR HANDOFF    Patient Name: Jeni Barcenas   :  3/18/1929  80 y.o. MRN:  [id number]  Preferred Name    ED Room #:  ED28/ED-28  Family/Caregiver Present no   Restraints no   Sitter no   Sepsis Risk Score Sepsis Risk Score: 1.33    Situation  Code Status: Prior No additional code details. Allergies: Darvocet [propoxyphene n-acetaminophen], Ranexa [ranolazine er], Ranolazine, Sulfa antibiotics, Sulfasalazine, Adhesive tape, Allantoin, Bacitracin, Gramicidin, Neomycin, Polymyxin b, Pramoxine hcl, and Silicone  Weight: Patient Vitals for the past 96 hrs (Last 3 readings):   Weight   23 1608 148 lb (67.1 kg)     Arrived from: home  Chief Complaint:   Chief Complaint   Patient presents with    Congestive Heart Failure     EMS called to cardiologist for low o2 saturation per staff - upon EMS arrival, staff states they never took a pulse ox reading. Pt wears 3L o2 at baseline and was 93% on RA upon arrival to ED. Pt denies shortness of breath.  Leg Swelling     BLE     Hospital Problem/Diagnosis:  Principal Problem:    Acute decompensated heart failure ([facility] [address]  Resolved Problems:    * No resolved hospital problems. *    Imaging:   XR CHEST (2 VW)   Final Result   1. Cardiomegaly with central pulmonary vascular congestion and suspected   small effusions and atelectasis.            Abnormal labs:   Abnormal Labs Reviewed   COMPREHENSIVE METABOLIC PANEL - Abnormal; Notable for the following components:       Result Value    Sodium 132 (*)     Chloride 88 (*)     CO2 39 (*)     Glucose 106 (*)     Total Protein 6.2 (*)     Alkaline Phosphatase 205 (*)     All other components within normal limits   CBC WITH AUTO DIFFERENTIAL - Abnormal; Notable for the following components:    RBC 3.97 (*)     .5 (*)     MCH 32.0 (*)     MCHC 30.6 (*)     RDW 18.2 (*)     MPV 11.3 (*)     Segs Relative 82.3 (*)     Lymphocytes % 5.6 (*)     Monocytes % 6.3 (*)     Eosinophils % 4.6 (*)     Immature Neutrophil % 0.6 (*)     All other components within normal limits   BRAIN NATRIURETIC PEPTIDE - Abnormal; Notable for the following components:    Pro-BNP 1,464 (*)     All other components within normal limits   BLOOD GAS, VENOUS - Abnormal; Notable for the following components:    pH, Yunior 7.30 (*)     pCO2, Yunior 92 (*)     pO2, Yunior 53 (*)     Base Exc, Mixed 14.4 (*)     HCO3, Venous 45.3 (*)     O2 Sat, Yunior 82.4 (*)     All other components within normal limits     Critical values: yes     Abnormal Assessment Findings: resp distress, abnorm vbg - BIPAP    Background  History:   Past Medical History:   Diagnosis Date    Age-related osteoporosis with current pathological fracture of vertebra ([facility] [address] 7/8/2022    Arthritis     Bradycardia 2001    requiring dual chamber pacemaker at [facility] CAD (coronary artery disease)     Cardiac pacemaker 10/2001    [employer] #[id number]  [facility]- Serial # 26-[facility]- Dr Darian Moreland COPD, [facility]) 2/17/2017    Cor pulmonale (chronic) ([address] 3/15/2022    CVA (cerebrovascular accident) ([facility] [address]     DJD (degenerative joint disease) of cervical spine     C5-C6, C6-C7    Exhaustion of cardiac pacemaker battery 11/21/2011    PPM battery replacement- [employer]    Family history of cardiovascular disease     Glaucoma Dx 2010    H/O 24 hour EKG monitoring 8/6/2000 8/6/2000- Intermittent episonde of a-fib/flutter    H/O cardiac catheterization 4/20/2010, 2/1989 4/20/2010-Severe native vessel disease and has graft disease as well. LAD, CX totally occluded. RCA totally occluded in proximal segment. VG to RCA widely patent. PDA 90% LAD afer LIMA anastomosis 80-90% stenosis. Proceeded with PTCA with stent next day.  H/O cardiovascular stress test 10/14/2011, 6/2/2010,4/8/2010, 5/18/2009,4/6/2009, 10/30/2007, 11/12/2004, 11/6/2003, 8/9/2002, 8/9/2001,6/5/2000,     10/14/2011-Lexiscan-Abnormal Myocardial Perfusion study.  Evidence of mild ischemia in the Left CX region. Abnomal study. Rest EF 63%. Global LV systolic function normal. No ECG changes. Unremarkable pharmacological stress test.    H/O cardiovascular stress test 6/10/2013    thallium--mild ischemia left circumflex EF63% no change from 10/2011 study.  H/O cardiovascular stress test 10/16/2014    cardiolite-mild ischemia left circumflex,EF70%    H/O chest x-ray 4/19/2009 4/19/2009-Stable cardiomegaly. No acute cardiopulmonary disease.  H/O Doppler lower venous ultrasound 09/18/2019    Significant reflux noted in RGSV, RGSV is extremely tortuous and small along the thigh and calf and would be highly unlikely to be accessed, RSSV is non compressible and has occlusive chronic SVT, LSSV is non compressible with occlusive chronic SVT, LGSV removed s/p CABG, Significant reflux in LGSV tributary,    H/O Doppler ultrasound 3/31/2010    CAROTID- 3/31/2010-INtimal thickening but no significant atherosclerotic plaque noted in ANA PAULA. Doppler flow velocities within ANA PAULA are WNL. Heterogeneous, irregular atherosclerotic plaque noted in LICA. Doppler flow velocities within the LICA are elevated, consistent with a mild, less than 50% stenosis.  H/O Doppler ultrasound 5/24/2016    Carotid- normal study    H/O Doppler ultrasound 09/06/2017    carotid - normal study    H/O Doppler ultrasound     H/O echocardiogram 10/13    EF=60%, Severe Pulm. HTN, & Sclerotic aortic valve w/stenosis.  H/O echocardiogram 10/16/14     EF 55-60% Normal LV. Normal LV systolic function. Severe tricuspid insufficiency with severe hypertension.      H/O echocardiogram 02/03/2017    heart cath performed this morning    H/O echocardiogram 09/18/2019    EF 50-55%, Left atrium is mild to moderately dilated, right atrium is severely dilated, mildly dilated right ventricle, Mod MR, Severe TR, Severe Pulm HTN, no pericardial effusion     History of complete ECG     10/14/2011(Lexiscan);5/6/2010, 4/30/2009,10/24/2008,9/21/2007, 10/13/2006    History of nuclear stress test 11/17/2016    lexiscan-normal,EF70%    Hx of cardiovascular stress test 12/28/2018    EF 60%  Normal study.  HX OTHER MEDICAL 05/01/2017    MUGA-normal, EF53%    Hyperlipidemia     Hypertension     Mild intermittent asthma 7/28/2016    Moderate COPD (chronic obstructive pulmonary disease) (HCC) 3/15/2022    Nausea & vomiting     Obstructive sleep apnea 5/16/2017    Post PTCA 4/21/2010    PTCA with 2.25 stent of the LIMA to LAD    Pulmonary HTN ([address]     Severe per last echo on 10/13. 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  Followed by MAZE procedure by pulmonary vein isolation.-  Dr Renny Rivers S/P PTCA (percutaneous transluminal coronary angioplasty) 11/2012    PTCA with stent to RCA    Severe pulmonary hypertension ([address] 3/15/2022    Shortness of breath 3/15/2022    Thyroid disease     hypothyroi    Unspecified cerebral artery occlusion with cerebral infarction Unsure When    No Residual    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 ([address] 7/29/2022    WD-Non-pressure chronic ulcer of other part of left lower leg limited to breakdown of skin ([address] 7/29/2022       Assessment    Vitals/MEWS: MEWS Score: 1  Level of Consciousness: Alert (0)   Vitals:    03/02/23 1832 03/02/23 1901 03/02/23 1902 03/02/23 2102   BP: 119/70  136/73 115/81   Pulse: 60 60 62 63   Resp: 28 21 24 21   Temp:    97.3 °F (36.3 °C)   TempSrc:    Axillary   SpO2: 93% 99%  96%   Weight:       Height:         FiO2 (%): BIPAP  O2 Flow Rate: O2 Device: Nasal cannula O2 Flow Rate (L/min): 3 L/min  Cardiac Rhythm: nsr  Pain Assessment:  [] Verbal [] Charmaine Lashonda Scale  Pain Scale: Pain Assessment  Pain Assessment: None - Denies Pain  Last documented pain score (0-10 scale)    Last documented pain medication administered:   Mental Status: oriented  NIH Score: NIH     C-SSRS: Risk of Suicide: No Risk  Bedside swallow:    Borger Coma Scale (GCS): Borger Coma Scale  Eye Opening: Spontaneous  Best Verbal Response: Oriented  Best Motor Response: Obeys commands  Borger Coma Scale Score: 15  Active LDA's:   Peripheral IV 03/02/23 Right Antecubital (Active)   Site Assessment Clean, dry & intact 03/02/23 1638   Line Status Blood return noted;Normal saline locked;Specimen collected 03/02/23 1638   Phlebitis Assessment No symptoms 03/02/23 1638   Infiltration Assessment 0 03/02/23 1638   Alcohol Cap Used No 03/02/23 1638   Dressing Status New dressing applied 03/02/23 1638   Dressing Type Transparent 03/02/23 1638   Dressing Intervention New 03/02/23 1638     PO Status:   Pertinent or High Risk Medications/Drips: no   o If Yes, please provide details:   Pending Blood Product Administration: no     You may also review the ED PT Care Timeline found under the Summary Nursing Index tab. Recommendation    Pending orders Pls review admission orders  Plan for Discharge (if known):    Additional Comments:    If any further questions, please call Sending RN at [phone]    Electronically signed by: Electronically signed by Sumit Duffy RN on 3/2/2023 at 9:19 PM       Sumit Duffy RN  03/02/23 8297

## 2023-03-03 NOTE — CONSULTS
CARDIOLOGY CONSULT NOTE   Reason for consultation:  CHF    Referring physician:  Joaquín Hinojosa MD     Primary care physician: Lisa Shelley MD      Dear  Dr. Joaquín Hinojosa MD   Thanks for the consult.    Chief Complaints :  Chief Complaint   Patient presents with    Congestive Heart Failure     EMS called to cardiologist for low o2 saturation per staff - upon EMS arrival, staff states they never took a pulse ox reading. Pt wears 3L o2 at baseline and was 93% on RA upon arrival to ED. Pt denies shortness of breath.    Leg Swelling     BLE        History of present illness:Irene is a 89 y.o.year old who presents with shortness of breath hypoxia and increased confusion at home she has been noncompliant with CPAP was noted to have put on some weight as well  He has history of congestive heart failure with preserved EF and severe tricuspid regurgitation causing pulmonary hypertension.  This x-ray was concerning for pulmonary effusion BNP is slightly elevated  She does have history of coronary artery disease s/p CABG in 2009 and stents in 2012 chronic hyponatremia due to volume overload and history of atrial fibrillation taken off anticoagulation due to concerns for GI bleeding anemia intolerance  Had several admissions due to heart failure  Last admitted in December for a prolonged period of time due to significant right heart failure associated with liver cirrhosis and ascites  Due to low blood pressures she has been on midodrine    Past medical history:    has a past medical history of Age-related osteoporosis with current pathological fracture of vertebra (HCC), Arthritis, Bradycardia, CAD (coronary artery disease), Cardiac pacemaker, COPD, mild (HCC), Cor pulmonale (chronic) (HCC), CVA (cerebrovascular accident) ([facility]), DJD (degenerative joint disease) of cervical spine, Exhaustion of cardiac pacemaker battery, Family history of cardiovascular disease, Glaucoma, H/O 24 hour EKG monitoring, H/O cardiac  catheterization, H/O cardiovascular stress test, H/O cardiovascular stress test, H/O cardiovascular stress test, H/O chest x-ray, H/O Doppler lower venous ultrasound, H/O Doppler ultrasound, H/O Doppler ultrasound, H/O Doppler ultrasound, H/O Doppler ultrasound, H/O echocardiogram, H/O echocardiogram, H/O echocardiogram, H/O echocardiogram, History of complete ECG, History of nuclear stress test, Hx of cardiovascular stress test, HX OTHER MEDICAL, Hyperlipidemia, Hypertension, Mild intermittent asthma, Moderate COPD (chronic obstructive pulmonary disease) ([address] Nausea & vomiting, Obstructive sleep apnea, Post PTCA, Pulmonary HTN ([address] PVD (peripheral vascular disease) ([address] S/P CABG x 3, S/P PTCA (percutaneous transluminal coronary angioplasty), Severe pulmonary hypertension ([address] Shortness of breath, Thyroid disease, Unspecified cerebral artery occlusion with cerebral infarction, 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 Past surgical history:   has a past surgical history that includes Appendectomy (1941); Tonsillectomy (1950's); Cardiac surgery (4/09); Hysterectomy, total abdominal (1990's); Colonoscopy (In 2000's); pacemaker placement; Coronary artery bypass graft (4/8/2009); Coronary angioplasty with stent (4/21/2010); other surgical history; Middle ear surgery; and Percutaneous Transluminal Coronary Angio (11/2012). Social History:   reports that she quit smoking about 52 years ago. Her smoking use included cigarettes. She has a 1.25 pack-year smoking history. She has never used smokeless tobacco. She reports that she does not currently use alcohol. She reports that she does not use drugs.   Family history:   no family history of CAD, STROKE of DM at early age    Allergies   Allergen Reactions    Darvocet [Propoxyphene N-Acetaminophen]     Ranexa [Ranolazine Er]      Sick to her stomach    Ranolazine      Sick to her stomach    Sulfa Antibiotics Itching    Sulfasalazine Itching    Adhesive Tape Rash    Allantoin Rash    Bacitracin Rash    Gramicidin Rash    Neomycin Rash    Polymyxin B Rash    Pramoxine Hcl Rash    Silicone Rash       albuterol sulfate HFA (PROVENTIL;VENTOLIN;PROAIR) 108 (90 Base) MCG/ACT inhaler 2 puff, Q6H PRN  aspirin chewable tablet 81 mg, Daily  atorvastatin (LIPITOR) tablet 10 mg, Nightly  [Held by provider] carvedilol (COREG) tablet 6.25 mg, BID WC  ferrous sulfate (IRON 325) tablet 325 mg, Every Other Day  levothyroxine (SYNTHROID) tablet 88 mcg, Daily  furosemide (LASIX) injection 40 mg, BID  traZODone (DESYREL) tablet 50 mg, Nightly PRN  tiotropium-olodaterol (STIOLTO) 2.5-2.5 MCG/ACT inhaler 2 puff, Daily  sodium chloride flush 0.9 % injection 5-40 mL, 2 times per day  sodium chloride flush 0.9 % injection 5-40 mL, PRN  0.9 % sodium chloride infusion, PRN  potassium chloride 10 mEq/100 mL IVPB (Peripheral Line), PRN  magnesium sulfate 2000 mg in 50 mL IVPB premix, PRN  enoxaparin (LOVENOX) injection 40 mg, QPM  ondansetron (ZOFRAN-ODT) disintegrating tablet 4 mg, Q8H PRN   Or  ondansetron (ZOFRAN) injection 4 mg, Q6H PRN  polyethylene glycol (GLYCOLAX) packet 17 g, Daily PRN  acetaminophen (TYLENOL) tablet 650 mg, Q6H PRN   Or  acetaminophen (TYLENOL) suppository 650 mg, Q6H PRN  fluticasone (FLOVENT HFA) 110 MCG/ACT inhaler 1 puff, BID    Current Facility-Administered Medications   Medication Dose Route Frequency Provider Last Rate Last Admin    albuterol sulfate HFA (PROVENTIL;VENTOLIN;PROAIR) 108 (90 Base) MCG/ACT inhaler 2 puff  2 puff Inhalation Q6H PRN Mateo Whiteside MD        aspirin chewable tablet 81 mg  81 mg Oral Daily Mateo Whiteside MD        atorvastatin (LIPITOR) tablet 10 mg  10 mg Oral Nightly Mateo Whiteside MD        [Held by provider] carvedilol (COREG) tablet 6.25 mg  6.25 mg Oral BID  Mateo Whiteside MD        ferrous sulfate (IRON 325) tablet 325 mg  325 mg Oral Every Other Caron Slater MD        levothyroxine (SYNTHROID) tablet 88 mcg  88 mcg Oral Daily Long Rangel MD        furosemide (LASIX) injection 40 mg  40 mg IntraVENous BID Long Rangel MD        traZODone (DESYREL) tablet 50 mg  50 mg Oral Nightly PRN Long Rangel MD        tiotropium-olodaterol (STIOLTO) 2.5-2.5 MCG/ACT inhaler 2 puff  2 puff Inhalation Daily Long Rangel MD        sodium chloride flush 0.9 % injection 5-40 mL  5-40 mL IntraVENous 2 times per day Long Rangel MD        sodium chloride flush 0.9 % injection 5-40 mL  5-40 mL IntraVENous PRN Long Rangel MD        0.9 % sodium chloride infusion   IntraVENous PRN Long Rangel MD        potassium chloride 10 mEq/100 mL IVPB (Peripheral Line)  10 mEq IntraVENous PRN Lnog Rangel MD        magnesium sulfate 2000 mg in 50 mL IVPB premix  2,000 mg IntraVENous PRN Long Rangel MD        enoxaparin (LOVENOX) injection 40 mg  40 mg SubCUTAneous QPM Long Rangel MD        ondansetron (ZOFRAN-ODT) disintegrating tablet 4 mg  4 mg Oral Q8H PRN Long Rangel MD        Or    ondansetron (ZOFRAN) injection 4 mg  4 mg IntraVENous Q6H PRN Long Rangel MD        polyethylene glycol (GLYCOLAX) packet 17 g  17 g Oral Daily PRN Long Rangel MD        acetaminophen (TYLENOL) tablet 650 mg  650 mg Oral Q6H PRN Long Rangel MD        Or    acetaminophen (TYLENOL) suppository 650 mg  650 mg Rectal Q6H PRN Long Rangel MD        fluticasone (FLOVENT HFA) 110 MCG/ACT inhaler 1 puff  1 puff Inhalation BID Cam Walsh MD         Review of Systems:   Constitutional: No Fever or Weight Loss   Eyes: No Decreased Vision  ENT: No Headaches, Hearing Loss or Vertigo  Cardiovascular: As per HPI  Respiratory: As per HPI  Gastrointestinal: No abdominal pain, appetite loss, blood in stools, constipation, diarrhea or heartburn  Genitourinary: No dysuria, trouble voiding, or hematuria  Musculoskeletal:  No gait disturbance, weakness or joint complaints  Integumentary: No rash or pruritis  Neurological: No TIA or stroke symptoms  Psychiatric: No anxiety or depression  Endocrine: No malaise, fatigue or temperature intolerance  Hematologic/Lymphatic: No bleeding problems, blood clots or swollen lymph nodes  Allergic/Immunologic: No nasal congestion or hives  All systems negative except as marked. Physical Examination:    Vitals:    03/02/23 2305 03/02/23 2340 03/03/23 0000 03/03/23 0500   BP: 136/80  119/64 116/70   Pulse: 60 59 60 63   Resp: 17  16 13   Temp: 97.3 °F (36.3 °C)  97.4 °F (36.3 °C) 97.2 °F (36.2 °C)   TempSrc: Axillary  Oral Axillary   SpO2: 97%  100% 94%   Weight:       Height:           General Appearance:  No distress, conversant, lethargic somnolent drowsy    Constitutional:  Well developed, Well nourished, No acute distress, Non-toxic appearance. HENT:  Normocephalic, Atraumatic, Bilateral external ears normal, Oropharynx moist, No oral exudates, Nose normal. Neck- Normal range of motion, No tenderness, Supple, No stridor,no apical-carotid delay  Lymphatics : no palpable lymph nodes  Eyes:  PERRL, EOMI, Conjunctiva normal, No discharge. Respiratory:  Normal breath sounds, No respiratory distress, No wheezing, No chest tenderness. ,no use of accessory muscles, crackles Absent   Cardiovascular: (PMI) apex non displaced,no lifts no thrills, ankle swelling Absent  , 1+, s1 and s2 audible,Murmur. Present, JVD not noted    Abdomen /GI:  Bowel sounds normal, Soft, No tenderness, No masses, No gross visceromegaly   :  No costovertebral angle tenderness   Musculoskeletal:  No edema, no tenderness, no deformities.  Back- no tenderness  Integument:  Well hydrated, no rash   Lymphatic:  No lymphadenopathy noted   Neurologic:  Alert & oriented x 3, CN 2-12 normal, normal motor function, normal sensory function, no focal deficits noted           Medical decision making and Data review:    Lab Review   Recent Labs     03/02/23  1630   WBC 8.6   HGB 12.7   HCT 41.5         Recent Labs     03/02/23  1630   *   K 4.5   CL 88*   CO2 39*   BUN 21   CREATININE 0.7     Recent Labs     03/02/23  1630   AST 30   ALT 21   BILITOT 1.0   ALKPHOS 205*     Recent Labs     03/02/23  1630   TROPONINT <0.010       Recent Labs     03/02/23  1630   PROBNP 1,464*     Lab Results   Component Value Date    INR 1.23 10/20/2022    PROTIME 15.9 (H) 10/20/2022       EKG: (reviewed by myself)    ECHO:(reviewed by myself)    Chest Xray:(reviewed by myself)  XR CHEST (2 VW)    Result Date: 3/2/2023  EXAMINATION: TWO XRAY VIEWS OF THE CHEST 3/2/2023 6:05 pm COMPARISON: Chest x-ray and CT chest December 15, 2022 HISTORY: ORDERING SYSTEM PROVIDED HISTORY: Dyspnea TECHNOLOGIST PROVIDED HISTORY: Reason for exam:->Dyspnea Reason for Exam: Dyspnea Additional signs and symptoms: na Relevant Medical/Surgical History: CHF FINDINGS: Cardiac silhouette is enlarged but grossly stable. Postoperative changes of CABG procedure and median sternotomy. Atherosclerotic changes of the aorta. Left-sided cardiac pacer device in place. Silhouetting of the hemidiaphragms bilaterally suggestive of small effusions and atelectasis. Linear atelectasis at the right mid lung. Central pulmonary vascular congestion. 1. Cardiomegaly with central pulmonary vascular congestion and suspected small effusions and atelectasis. All labs, medications and tests reviewed by myself including data  from outside source , patient and available family . Continue all other medications of all above medical condition listed as is.      Impression:  Principal Problem:    Acute decompensated heart failure (HCC)  Active Problems:    Iron deficiency anemia secondary to inadequate dietary iron intake    Orthostatic hypotension    CAD (coronary artery disease)    S/P CABG x 3    Coronary artery disease involving coronary bypass graft of native heart without angina pectoris    PAF (paroxysmal atrial fibrillation) ([address]  Resolved Problems:    * No resolved hospital problems. *      Assessment: 80 y. o.year old with PMH of  has a past medical history of Age-related osteoporosis with current pathological fracture of vertebra (HCC), Arthritis, Bradycardia, CAD (coronary artery disease), Cardiac pacemaker, COPD, mild ([address] Cor pulmonale (chronic) ([address] CVA (cerebrovascular accident) ([address] DJD (degenerative joint disease) of cervical spine, Exhaustion of cardiac pacemaker battery, Family history of cardiovascular disease, Glaucoma, H/O 24 hour EKG monitoring, H/O cardiac catheterization, H/O cardiovascular stress test, H/O cardiovascular stress test, H/O cardiovascular stress test, H/O chest x-ray, H/O Doppler lower venous ultrasound, H/O Doppler ultrasound, H/O Doppler ultrasound, H/O Doppler ultrasound, H/O Doppler ultrasound, H/O echocardiogram, H/O echocardiogram, H/O echocardiogram, H/O echocardiogram, History of complete ECG, History of nuclear stress test, Hx of cardiovascular stress test, HX OTHER MEDICAL, Hyperlipidemia, Hypertension, Mild intermittent asthma, Moderate COPD (chronic obstructive pulmonary disease) ([address] Nausea & vomiting, Obstructive sleep apnea, Post PTCA, Pulmonary HTN ([address] PVD (peripheral vascular disease) ([address] S/P CABG x 3, S/P PTCA (percutaneous transluminal coronary angioplasty), Severe pulmonary hypertension ([address] Shortness of breath, Thyroid disease, Unspecified cerebral artery occlusion with cerebral infarction, 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       Plan and Recommendations:    Decompensated heart failure with preserved EF and altered mental status due to encephalopathy  Check ammonia levels with history of liver cirrhosis and ascites  Agree with diuresis for now  CHF: Heart failure with preserved EF acute Systolic/ Diastolic decompensated heart failure. Appears to be volume overloaded . Agree with diuresis. We can try IV Lasix 40 mg BID. Depending on response we can titrate diuretics accordingly. y. Strict Is and Os and Daily weights. chf nurse consult  Blood pressures well controlled on Coreg but historically she gets hypotensive and needs midodrine during previous hospitalization  Atrial Fibrillation: Fall risk GI bleeding ascites can consider watchman not a candidate for anticoagulation  DVT prophylaxis if no contraindication  6. Dyslipidemia: continue statins           Thank you  much for consult and giving us the opportunity in contributing in the care of this patient. Please feel free to call me for any questions.        Butch Cruz MD, 3/3/2023 10:10 AM

## 2023-03-03 NOTE — H&P
History and Physical      Name:  Katrina Bueno /Age/Sex: 3/18/1929  (80 y.o. female)   MRN & CSN:  [id number] & [id number] Encounter Date/Time: 3/2/2023 9:16 PM EST   Location:  ED[facility]28 PCP: Raimundo Serrano MD       Hospital Day: 1    Assessment and Plan:     Patient is a 80-year-old female who presented with SOB. # Acute on chronic hypoxemic hypercapnic respiratory failure secondary to acute decompensated HFpEF  - Presented with worsening SOB/HAER and orthopnea. Had hypoxia at Cardiologist's office. Discussions with PCP regarding possible hospice, however, pending family's discussion.  - Last TTE in 2022 with LVEF of 60% with moderate PH/TR and severe JEFFRY.  - VBG 7., requiring BPAP in ED. Clinical evidence of volume overload. CXR with pulmonary congestion. Has over 38 lbs weight gain since admission in 2022.   - Received IV diuresis, continue  - Cardiology consulted, follow-up. - Strict I/O's. Daily weights. Telemetry. Agree with PCP, patient will likely benefit from hospice consultation. Continue BPAP. # Moderate COPD (on 3L NC)  - No wheezing on exam.   - Continue home inhalers. # CAD s/p CABG in 2009  - On ASA and Lipitor. # AF  - Not on AC due to fall risk. # Acquired hypothyroidism  - Continue Synthroid. Checklist:  Advanced directive: DNR-CCA  Catheter: none  DVT ppx: Lovenox  Nutrition/Diet: NPO while on BPAP    Disposition: patient requires continued admission due to ADHF. MDM: high. History of Present Illness:     Chief Complaint: shortness of breath    Patient is a 80-year-old female with a PMHx of CAD s/p CABG in 2009, HFpEF, PH, moderate COPD (on 3L NC) and hypothyroidism who presented to the ED with worsening SOB/HARE and orthopnea. Had hypoxia at Cardiologist's office. Discussions with PCP regarding possible hospice, however, pending family's discussion. Hx is limited otherwise as patient unable to provide any details.  No family at bedside and unable to be reached over phone. ROS:     Ten point ROS reviewed negative, unless as noted above. Objective:     No intake or output data in the 24 hours ending 03/02/23 2116     Vitals:   Vitals:    03/02/23 1815 03/02/23 1832 03/02/23 1901 03/02/23 1902   BP: 126/65 119/70  136/73   Pulse: 60 60 60 62   Resp: 17 28 21 24   Temp:       TempSrc:       SpO2: 96% 93% 99%    Weight:       Height:         BMI: Body mass index is 29.89 kg/m². General: Awake. HEENT: Vision grossly intact. Hard of hearing. Oropharynx clear. Neck: Supple. JVD to angle of the jaw. CV: RRR. NL S1/S2. 2/6 SM at LLSB. Displaced PMI. Trace BLE edema. Pulm: Increased effort on BPAP. Moderate bibasilar rales that don't resolve with coughing, no wheezing. GI: +BS x4. Soft. : No CVA or suprapubic tenderness. No Carnes catheter. Skin: Intact. Erythema of lower extremities. MSK: No gross joint deformities. Psych: Pleasant. Past History: PMHx:   Past Medical History:   Diagnosis Date    Age-related osteoporosis with current pathological fracture of vertebra ([facility] [address] 7/8/2022    Arthritis     Bradycardia 2001    requiring dual chamber pacemaker at [facility]    CAD (coronary artery disease)     Cardiac pacemaker 10/2001    [facility][id number]  [facility], [place], operated by [facility]- Serial # [id number]- Dr Selina Lawler    COPD, mild [facility]) 2/17/2017    Cor pulmonale (chronic) ([facility] [address] 3/15/2022    CVA (cerebrovascular accident) ([facility] [address]     DJD (degenerative joint disease) of cervical spine     C5-C6, C6-C7    Exhaustion of cardiac pacemaker battery 11/21/2011    PPM battery replacement- [employer]    Family history of cardiovascular disease     Glaucoma Dx 2010    H/O 24 hour EKG monitoring 8/6/2000 8/6/2000- Intermittent episonde of a-fib/flutter    H/O cardiac catheterization 4/20/2010, 2/1989 4/20/2010-Severe native vessel disease and has graft disease as well. LAD, CX totally occluded. RCA totally occluded in proximal segment.  VG to RCA widely patent. PDA 90% LAD afer LIMA anastomosis 80-90% stenosis. Proceeded with PTCA with stent next day. H/O cardiovascular stress test 10/14/2011, 6/2/2010,4/8/2010, 5/18/2009,4/6/2009, 10/30/2007, 11/12/2004, 11/6/2003, 8/9/2002, 8/9/2001,6/5/2000,     10/14/2011-Lexiscan-Abnormal Myocardial Perfusion study. Evidence of mild ischemia in the Left CX region. Abnomal study. Rest EF 63%. Global LV systolic function normal. No ECG changes. Unremarkable pharmacological stress test.    H/O cardiovascular stress test 6/10/2013    thallium--mild ischemia left circumflex EF63% no change from 10/2011 study. H/O cardiovascular stress test 10/16/2014    cardiolite-mild ischemia left circumflex,EF70%    H/O chest x-ray 4/19/2009 4/19/2009-Stable cardiomegaly. No acute cardiopulmonary disease. H/O Doppler lower venous ultrasound 09/18/2019    Significant reflux noted in RGSV, RGSV is extremely tortuous and small along the thigh and calf and would be highly unlikely to be accessed, RSSV is non compressible and has occlusive chronic SVT, LSSV is non compressible with occlusive chronic SVT, LGSV removed s/p CABG, Significant reflux in LGSV tributary,    H/O Doppler ultrasound 3/31/2010    CAROTID- 3/31/2010-INtimal thickening but no significant atherosclerotic plaque noted in ANA PAULA. Doppler flow velocities within ANA PAULA are WNL. Heterogeneous, irregular atherosclerotic plaque noted in LICA. Doppler flow velocities within the LICA are elevated, consistent with a mild, less than 50% stenosis. H/O Doppler ultrasound 5/24/2016    Carotid- normal study    H/O Doppler ultrasound 09/06/2017    carotid - normal study    H/O Doppler ultrasound     H/O echocardiogram 10/13    EF=60%, Severe Pulm. HTN, & Sclerotic aortic valve w/stenosis. H/O echocardiogram 10/16/14     EF 55-60% Normal LV. Normal LV systolic function. Severe tricuspid insufficiency with severe hypertension.      H/O echocardiogram 02/03/2017    heart cath performed this morning    H/O echocardiogram 09/18/2019    EF 50-55%, Left atrium is mild to moderately dilated, right atrium is severely dilated, mildly dilated right ventricle, Mod MR, Severe TR, Severe Pulm HTN, no pericardial effusion     History of complete ECG     10/14/2011(Lexiscan);5/6/2010, 4/30/2009,10/24/2008,9/21/2007, 10/13/2006    History of nuclear stress test 11/17/2016    lexiscan-normal,EF70%    Hx of cardiovascular stress test 12/28/2018    EF 60%  Normal study. HX OTHER MEDICAL 05/01/2017    MUGA-normal, EF53%    Hyperlipidemia     Hypertension     Mild intermittent asthma 7/28/2016    Moderate COPD (chronic obstructive pulmonary disease) ([facility]) 3/15/2022    Nausea & vomiting     Obstructive sleep apnea 5/16/2017    Post PTCA 4/21/2010    PTCA with 2.25 stent of the LIMA to LAD    Pulmonary HTN ([address]     Severe per last echo on 10/13. 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  Followed by MAZE procedure by pulmonary vein isolation.-  Dr Marx Sons, TOTAL ABDOMINAL (CERVIX REMOVED)      MIDDLE EAR SURGERY      OTHER SURGICAL HISTORY      Ear surgery-hearing    PACEMAKER PLACEMENT      battery change 2011 Medtronic    PTCA  2012    Ptca with stent to RCA    TONSILLECTOMY         Allergies: Allergies   Allergen Reactions    Darvocet [Propoxyphene N-Acetaminophen]     Ranexa [Ranolazine Er]      Sick to her stomach    Ranolazine      Sick to her stomach    Sulfa Antibiotics Itching    Sulfasalazine Itching    Adhesive Tape Rash    Allantoin Rash    Bacitracin Rash    Gramicidin Rash    Neomycin Rash    Polymyxin B Rash    Pramoxine Hcl Rash    Silicone Rash       FHx: family history includes Arthritis in her mother; Cancer in her mother and sister; Coronary Art Dis in her father; Depression in her mother and sister; Early Death in her paternal grandfather; Early Death (age of onset: 36) in her father; Hearing Loss in her mother; Heart Disease in her father and sister; High Blood Pressure in her father, mother, sister, son, and son; High Cholesterol in her father and mother; Mental Illness in her mother; Efren Laura / Betina Rosibelhemalatha in her mother; Other in her daughter. SHx:   Social History     Socioeconomic History    Marital status:       Spouse name: None    Number of children: 4    Years of education: None    Highest education level: None   Occupational History    Occupation: RETIRED     Comment: from 8 [employer] Use    Smoking status: Former     Packs/day: 0.25     Years: 5.00     Pack years: 1.25     Types: Cigarettes     Quit date: 1970     Years since quittin.2    Smokeless tobacco: Never   Vaping Use    Vaping Use: Never used   Substance and Sexual Activity    Alcohol use: Not Currently     Comment: Caffiene - no more than 3 cups of coffee each day    Drug use: Never    Sexual activity: Yes     Partners: Male     Comment:        Medications Prior to Admission     Prior to Admission medications    Medication Sig Start Date End Date Taking?  Authorizing Provider   midodrine (PROAMATINE) 5 MG tablet Take 1 tablet by mouth 3 times daily  Patient not taking: Reported on 3/2/2023 3/1/23   LISA Padilla CNP   torsemide (DEMADEX) 20 MG tablet Take 1 tablet by mouth daily as needed (for weight gain > 3 lbs or shortness of breath) 1/31/23   LISA Padilla CNP   spironolactone (ALDACTONE) 25 MG tablet Take 1 tablet by mouth 2 times daily  Patient not taking: Reported on 3/2/2023 1/25/23   Elza Macias MD   Torsemide 40 MG TABS Take 20 mg by mouth daily  Patient not taking: Reported on 3/2/2023 12/19/22   Vilma Jose MD   aspirin 81 MG chewable tablet Take 81 mg by mouth daily    Historical Provider, MD   ferrous sulfate (IRON 325) 325 (65 Fe) MG tablet Take 325 mg by mouth every other day    Historical Provider, MD   traZODone (DESYREL) 50 MG tablet Take  mg by mouth nightly as needed for Sleep    Historical Provider, MD   acetaminophen (TYLENOL) 500 MG tablet Take 1,000 mg by mouth every 4 hours as needed for Pain or Fever    Historical Provider, MD   ZIOPTAN 0.0015 % SOLN Place 1 drop into both eyes Daily with supper   Patient not taking: Reported on 3/2/2023 1/25/22   Historical Provider, MD   vitamin D 25 MCG (1000 UT) CAPS Take 5,000 Units by mouth daily    Historical Provider, MD   atorvastatin (LIPITOR) 10 MG tablet Take 10 mg by mouth nightly 10/20/20   Historical Provider, MD   umeclidinium-vilanterol (ANORO ELLIPTA) 62.5-25 MCG/INH AEPB inhaler Inhale 1 puff into the lungs daily 7/27/20   Yuniel Nixon MD   CPAP Machine MISC by Does not apply route    Historical Provider, MD   Misc Natural Products (OSTEO BI-FLEX ADV DOUBLE ST PO) Take 1 tablet by mouth daily  Patient not taking: Reported on 3/2/2023    Historical Provider, MD   timolol (TIMOPTIC) 0.5 % ophthalmic solution Place 1 drop into both eyes nightly  Patient not taking: Reported on 3/2/2023    Historical Provider, MD   carvedilol (COREG) 6.25 MG tablet Take 1 tablet by mouth 2 times daily (with meals) 1/31/17   Kitty Herndon MD   albuterol (PROVENTIL HFA;VENTOLIN HFA) 108 (90 BASE) MCG/ACT inhaler Inhale 2 puffs into the lungs 2 times daily AND EVERY 4-6 HOURS AS NEEDED    Historical Provider, MD   Multiple Vitamins-Minerals (ICAPS) CAPS Take 1 capsule by mouth 2 times daily   Patient not taking: Reported on 3/2/2023    Historical Provider, MD   vitamin B-12 (CYANOCOBALAMIN) 1000 MCG tablet Take 1,000 mcg by mouth daily. Historical Provider, MD   levothyroxine (SYNTHROID) 88 MCG tablet Take 88 mcg by mouth daily. Historical Provider, MD       Data:     CBC:   Recent Labs     03/02/23  1630   WBC 8.6   HGB 12.7      .5*   RDW 18.2*   LYMPHOPCT 5.6*   MONOPCT 6.3*   BASOPCT 0.6   MONOSABS 0.5   LYMPHSABS 0.5   EOSABS 0.4   BASOSABS 0.1     CMP:    Recent Labs     03/02/23  1630   *   K 4.5   CL 88*   CO2 39*   BUN 21   CREATININE 0.7   GLUCOSE 106*   LABALBU 4.2   CALCIUM 10.0   BILITOT 1.0   ALKPHOS 205*   AST 30   ALT 21     Lipids:   Lab Results   Component Value Date/Time    CHOL 105 07/08/2022 08:06 AM    CHOL 168 07/05/2017 12:00 AM    HDL 45 07/08/2022 08:06 AM    TRIG 69 07/08/2022 08:06 AM     Hemoglobin A1C: No results found for: LABA1C  TSH:   Lab Results   Component Value Date/Time    TSH 1.0 05/07/2010 12:00 AM     Troponin:   Lab Results   Component Value Date/Time    TROPONINT <0.010 03/02/2023 04:30 PM    TROPONINT 0.012 12/15/2022 05:32 PM    TROPONINT <0.010 10/20/2022 02:25 AM     BNP:   Recent Labs     03/02/23  1630   PROBNP 1,464*     Lactic Acid: No results for input(s): LACTA in the last 72 hours.   UA:  Lab Results   Component Value Date/Time    NITRU NEGATIVE 12/18/2022 03:37 PM    COLORU YELLOW 12/18/2022 03:37 PM    WBCUA 3 12/18/2022 03:37 PM    RBCUA 1 12/18/2022 03:37 PM    MUCUS RARE 12/18/2022 03:37 PM    TRICHOMONAS NONE SEEN 12/18/2022 03:37 PM    YEAST RARE 11/23/2021 07:30 AM    BACTERIA NEGATIVE 12/18/2022 03:37 PM    CLARITYU CLEAR 12/18/2022 03:37 PM    SPECGRAV 1.010 12/18/2022 03:37 PM    LEUKOCYTESUR SMALL NUMBER OR AMOUNT OBSERVED 12/18/2022 03:37 PM    UROBILINOGEN 1.0 12/18/2022 03:37 PM    BILIRUBINUR NEGATIVE 12/18/2022 03:37 PM    BLOODU NEGATIVE 12/18/2022 03:37 PM    KETUA NEGATIVE 12/18/2022 03:37 PM     Urine Cultures: No results found for: LABURIN  Blood Cultures: No results found for: BC  No results found for: BLOODCULT2  Organism: No results found for: [facility]    Radiology results:  XR CHEST (2 VW)   Final Result   1. Cardiomegaly with central pulmonary vascular congestion and suspected   small effusions and atelectasis.              Medications:     Medications:        Infusions:       PRN Meds:       Gaetano Obrien MD  03/02/23 9:16 PM

## 2023-03-03 NOTE — PROGRESS NOTES
V2.0  [facility] Hospitalist Progress Note      Name:  Kelli Hoyt /Age/Sex: 3/18/1929  (80 y.o. female)   MRN & CSN:  [id number] & [id number] Encounter Date/Time: 3/3/2023 3:30 PM EST    Location:  -A PCP: Maye Garcia MD       Hospital Day: 2    Assessment and Plan:   Kelli Hoyt is a 80 y.o. female with past medical history of coronary artery disease status post CABG in  and PCI in , atrial fibrillation not on [facility] due to GI bleed, heart failure with preserved ejection fraction, permanent pacemaker (MRI safe Medtronic), hypertension, obstructive sleep apnea on CPAP, pulmonary hypertension, Cirrhosis is admitted at [facility] on 3/2/23 after being sent from cardiologist's office due to worsening shortness of breath. Acute metabolic encephalopathy  Secondary to respiratory failure w/ hypercapnia but concerned about hepatic encephalopathy. Subclinical hypothyroidism on labs. CT brain ordered  Ammonia level ordered    Acute on chronic hypoxic hypercapnic respiratory failure  Secondary to acute on chronic diastolic heart failure and underlying severe pulmonary hypertension  Echocardiogram 2022: Ejection fraction greater than 60%, dilated right side of the heart, RVSP 52 mmHg  proBNP 1464  Chest x-ray with pulmonary edema and severe cardiomegaly  IV Lasix 40 mg BID  Measure daily weight and strict intake and output  Cardiology following    Coronary artery disease status post CABG in  and PCI in   Continue Aspirin and Statin    Paroxysmal atrial fibrillation not on anticoagulation due to GI bleed  Permanent pacemaker (MRI safe Medtronic)  Monitor on telemetry  Not on AC due to chronic anemia but stable Hb so far, defer to primary cardiologist for final decision.    Continue chemical DVT prophylaxis    Hypertension  Low normal blood pressure trend  Hold Coreg    COPD/Chronic respiratory failure  Obstructive sleep apnea on CPAP  Baseline oxygen 3 L nasal cannula  Marked improvement in VBG pH, transition to nasal cannula oxygen  Bipap qHs and during naps    Cirrhosis  Hepatitis panel was negative in 2022  No significant history of alcohol abuse  Probable etiology thought to be cardiac cirrhosis  No formal work-up performed or at least I do not see any documentation  Recommend outpatient work-up  Continue Lasix  No evidence of ascites on examination  Ammonia level ordered    Constipation   Started lactulose    Hypothyroidism  Continue Synthroid    Diet ADULT DIET; Regular; Low Sodium (2 gm); 1800 ml   DVT Prophylaxis [x] Lovenox, []  Heparin, [] SCDs, [] Ambulation,  [] Eliquis, [] Xarelto  [] Coumadin   Code Status DNR-CCA   Disposition From: Home  Expected Disposition: To be decided  Estimated Date of Discharge: 2 days  Patient requires continued admission due to improvement in volume status   Surrogate Decision Maker/ POA Daughter     Subjective:     Chief Complaint: Shortness of breath    Ricky Hernandez is a 80 y.o. female who presents with worsening shortness of breath and was sent from her cardiologist office for further evaluation and management. Patient was seen and evaluated bedside earlier this morning. Patient was alert oriented x2. Patient was hemodynamically stable. Was on BiPAP, discussed with RN/RT to transition to nasal cannula given the improvement in pH. Tolerated transition with oxygen saturation above 92% on 2 L nasal cannula oxygen. Objective:   No intake or output data in the 24 hours ending 03/03/23 1530     Vitals:   Vitals:    03/03/23 1202   BP: 131/65   Pulse: 60   Resp: 18   Temp: 98.2 °F (36.8 °C)   SpO2: 97%       Physical Exam:   Physical Exam  Vitals reviewed. Constitutional:       Appearance: She is obese. She is ill-appearing. HENT:      Head: Normocephalic and atraumatic. Nose: Nose normal.      Mouth/Throat:      Mouth: Mucous membranes are dry. Pharynx: Oropharynx is clear. Eyes:      General: No scleral icterus. Conjunctiva/sclera: Conjunctivae normal.   Cardiovascular:      Rate and Rhythm: Normal rate. Pulses: Normal pulses. Heart sounds: Murmur heard. Comments: Paced rhythm  Pulmonary:      Effort: Pulmonary effort is normal.      Breath sounds: Rales present. No wheezing or rhonchi. Abdominal:      General: Bowel sounds are normal. There is distension. Palpations: Abdomen is soft. Tenderness: There is no abdominal tenderness. Musculoskeletal:         General: No deformity. Normal range of motion. Cervical back: No rigidity. Right lower leg: Edema present. Left lower leg: Edema present. Skin:     Coloration: Skin is not jaundiced or pale. Neurological:      General: No focal deficit present. Mental Status: She is alert. She is disoriented.           Medications:   Medications:    aspirin  81 mg Oral Daily    atorvastatin  10 mg Oral Nightly    [Held by provider] carvedilol  6.25 mg Oral BID WC    ferrous sulfate  325 mg Oral Every Other Day    levothyroxine  88 mcg Oral Daily    furosemide  40 mg IntraVENous BID    tiotropium-olodaterol  2 puff Inhalation Daily    sodium chloride flush  5-40 mL IntraVENous 2 times per day    enoxaparin  40 mg SubCUTAneous QPM    fluticasone  1 puff Inhalation BID    lactulose  10 g Oral TID      Infusions:    sodium chloride       PRN Meds: albuterol sulfate HFA, 2 puff, Q6H PRN  traZODone, 50 mg, Nightly PRN  sodium chloride flush, 5-40 mL, PRN  sodium chloride, , PRN  potassium chloride, 10 mEq, PRN  magnesium sulfate, 2,000 mg, PRN  ondansetron, 4 mg, Q8H PRN   Or  ondansetron, 4 mg, Q6H PRN  polyethylene glycol, 17 g, Daily PRN  acetaminophen, 650 mg, Q6H PRN   Or  acetaminophen, 650 mg, Q6H PRN        Labs      Recent Results (from the past 24 hour(s))   CMP    Collection Time: 03/02/23  4:30 PM   Result Value Ref Range    Sodium 132 (L) 135 - 145 MMOL/L    Potassium 4.5 3.5 - 5.1 MMOL/L    Chloride 88 (L) 99 - 110 mMol/L    CO2 39 (H) 21 - 32 MMOL/L    BUN 21 6 - 23 MG/DL    Creatinine 0.7 0.6 - 1.1 MG/DL    Est, Glom Filt Rate >60 >60 mL/min/1.73m2    Glucose 106 (H) 70 - 99 MG/DL    Calcium 10.0 8.3 - 10.6 MG/DL    Albumin 4.2 3.4 - 5.0 GM/DL    Total Protein 6.2 (L) 6.4 - 8.2 GM/DL    Total Bilirubin 1.0 0.0 - 1.0 MG/DL    ALT 21 10 - 40 U/L    AST 30 15 - 37 IU/L    Alkaline Phosphatase 205 (H) 40 - 129 IU/L    Anion Gap 5 4 - 16   CBC with Auto Differential    Collection Time: 03/02/23  4:30 PM   Result Value Ref Range    WBC 8.6 4.0 - 10.5 K/CU MM    RBC 3.97 (L) 4.2 - 5.4 M/CU MM    Hemoglobin 12.7 12.5 - 16.0 GM/DL    Hematocrit 41.5 37 - 47 %    .5 (H) 78 - 100 FL    MCH 32.0 (H) 27 - 31 PG    MCHC 30.6 (L) 32.0 - 36.0 %    RDW 18.2 (H) 11.7 - 14.9 %    Platelets 307 253 - 610 K/CU MM    MPV 11.3 (H) 7.5 - 11.1 FL    Differential Type AUTOMATED DIFFERENTIAL     Segs Relative 82.3 (H) 36 - 66 %    Lymphocytes % 5.6 (L) 24 - 44 %    Monocytes % 6.3 (H) 0 - 4 %    Eosinophils % 4.6 (H) 0 - 3 %    Basophils % 0.6 0 - 1 %    Segs Absolute 7.1 K/CU MM    Lymphocytes Absolute 0.5 K/CU MM    Monocytes Absolute 0.5 K/CU MM    Eosinophils Absolute 0.4 K/CU MM    Basophils Absolute 0.1 K/CU MM    Nucleated RBC % 0.0 %    Total Nucleated RBC 0.0 K/CU MM    Total Immature Neutrophil 0.05 K/CU MM    Immature Neutrophil % 0.6 (H) 0 - 0.43 %   Magnesium    Collection Time: 03/02/23  4:30 PM   Result Value Ref Range    Magnesium 2.2 1.8 - 2.4 mg/dl   Troponin    Collection Time: 03/02/23  4:30 PM   Result Value Ref Range    Troponin T <0.010 <0.01 NG/ML   Brain Natriuretic Peptide    Collection Time: 03/02/23  4:30 PM   Result Value Ref Range    Pro-BNP 1,464 (H) <300 PG/ML   Blood Gas, Venous    Collection Time: 03/02/23  4:45 PM   Result Value Ref Range    pH, Yunior 7.30 (L) 7.32 - 7.42    pCO2, Yunior 92 (H) 38 - 52 mmHG    pO2, Yunior 53 (H) 28 - 48 mmHG    Base Exc, Mixed 14.4 (H) 0 - 2.3    HCO3, Venous 45.3 (H) 19 - 25 MMOL/L    O2 Sat, Yunior 82.4 (H) 50 - 70 %    Comment VBG    COVID-19, Rapid    Collection Time: 03/02/23  5:00 PM    Specimen: Nasopharyngeal   Result Value Ref Range    Source UNKNOWN     SARS-CoV-2, NAAT NOT DETECTED NOT DETECTED   EKG 12 Lead    Collection Time: 03/02/23  5:02 PM   Result Value Ref Range    Ventricular Rate 60 BPM    Atrial Rate 59 BPM    QRS Duration 144 ms    Q-T Interval 486 ms    QTc Calculation (Bazett) 486 ms    R Axis 213 degrees    T Axis 108 degrees    Diagnosis       Ventricular-paced rhythm  Abnormal ECG  When compared with ECG of 15-DEC-2022 23:24,  No significant change was found     POCT Glucose    Collection Time: 03/02/23 10:54 PM   Result Value Ref Range    POC Glucose 83 70 - 99 MG/DL   Blood Gas, Venous    Collection Time: 03/03/23  2:06 AM   Result Value Ref Range    pH, Yunior 7.47 (H) 7.32 - 7.42    pCO2, Yunior 61 (H) 38 - 52 mmHG    pO2, Yunior 114 (H) 28 - 48 mmHG    Base Exc, Mixed 17.4 (H) 0 - 2.3    HCO3, Venous 44.4 (H) 19 - 25 MMOL/L    O2 Sat, Yunior 92.9 (H) 50 - 70 %    Comment VBG    TSH    Collection Time: 03/03/23  2:06 AM   Result Value Ref Range    TSH, High Sensitivity 9.580 (H) 0.270 - 4.20 uIu/ml   T4, Free    Collection Time: 03/03/23  2:06 AM   Result Value Ref Range    T4 Free 1.41 0.9 - 1.8 NG/DL        Imaging/Diagnostics Last 24 Hours   XR CHEST (2 VW)    Result Date: 3/2/2023  EXAMINATION: TWO XRAY VIEWS OF THE CHEST 3/2/2023 6:05 pm COMPARISON: Chest x-ray and CT chest December 15, 2022 HISTORY: ORDERING SYSTEM PROVIDED HISTORY: Dyspnea TECHNOLOGIST PROVIDED HISTORY: Reason for exam:->Dyspnea Reason for Exam: Dyspnea Additional signs and symptoms: na Relevant Medical/Surgical History: CHF FINDINGS: Cardiac silhouette is enlarged but grossly stable. Postoperative changes of CABG procedure and median sternotomy. Atherosclerotic changes of the aorta. Left-sided cardiac pacer device in place.   Silhouetting of the hemidiaphragms bilaterally suggestive of small effusions and atelectasis. Linear atelectasis at the right mid lung. Central pulmonary vascular congestion. 1. Cardiomegaly with central pulmonary vascular congestion and suspected small effusions and atelectasis. XR ABDOMEN (KUB) (SINGLE AP VIEW)    Result Date: 3/3/2023  EXAMINATION: ONE SUPINE XRAY VIEW(S) OF THE ABDOMEN 3/3/2023 10:48 am COMPARISON: 12/16/2022. HISTORY: ORDERING SYSTEM PROVIDED HISTORY: abdominal pain TECHNOLOGIST PROVIDED HISTORY: Reason for exam:->abdominal pain Reason for Exam: abdominal pain, distension FINDINGS: Nonspecific nonobstructive bowel gas pattern is seen. There is moderate amount of stool noted in the proximal colon. Evaluation of free air is limited on this supine view. There is levoscoliosis of the lumbar spine. Partially visualized are changes of prior open heart surgery and cardiac conduction device leads. Nonspecific nonobstructive bowel gas pattern with a moderate amount of stool noted in the proximal colon.        Electronically signed by Nely Shannon MD on 3/3/2023 at 3:30 PM

## 2023-03-04 LAB
ALBUMIN SERPL-MCNC: 4.2 GM/DL (ref 3.4–5)
ALP BLD-CCNC: 209 IU/L (ref 40–129)
ALT SERPL-CCNC: 19 U/L (ref 10–40)
ANION GAP SERPL CALCULATED.3IONS-SCNC: 5 MMOL/L (ref 4–16)
AST SERPL-CCNC: 27 IU/L (ref 15–37)
BASOPHILS ABSOLUTE: 0.1 K/CU MM
BASOPHILS RELATIVE PERCENT: 0.6 % (ref 0–1)
BILIRUB SERPL-MCNC: 1.2 MG/DL (ref 0–1)
BUN SERPL-MCNC: 21 MG/DL (ref 6–23)
CALCIUM SERPL-MCNC: 10 MG/DL (ref 8.3–10.6)
CHLORIDE BLD-SCNC: 89 MMOL/L (ref 99–110)
CO2: 39 MMOL/L (ref 21–32)
CREAT SERPL-MCNC: 0.8 MG/DL (ref 0.6–1.1)
DIFFERENTIAL TYPE: ABNORMAL
EOSINOPHILS ABSOLUTE: 0.3 K/CU MM
EOSINOPHILS RELATIVE PERCENT: 3.8 % (ref 0–3)
FERRITIN: 68 NG/ML (ref 15–150)
FOLATE SERPL-MCNC: >20 NG/ML (ref 3.1–17.5)
GFR SERPL CREATININE-BSD FRML MDRD: >60 ML/MIN/1.73M2
GLUCOSE SERPL-MCNC: 152 MG/DL (ref 70–99)
HCT VFR BLD CALC: 39.3 % (ref 37–47)
HEMOGLOBIN: 12.1 GM/DL (ref 12.5–16)
IMMATURE NEUTROPHIL %: 0.6 % (ref 0–0.43)
INR BLD: 1.29 INDEX
IRON: 56 UG/DL (ref 37–145)
LYMPHOCYTES ABSOLUTE: 0.5 K/CU MM
LYMPHOCYTES RELATIVE PERCENT: 5.2 % (ref 24–44)
MCH RBC QN AUTO: 31.9 PG (ref 27–31)
MCHC RBC AUTO-ENTMCNC: 30.8 % (ref 32–36)
MCV RBC AUTO: 103.7 FL (ref 78–100)
MONOCYTES ABSOLUTE: 0.6 K/CU MM
MONOCYTES RELATIVE PERCENT: 7 % (ref 0–4)
NUCLEATED RBC %: 0 %
PCT TRANSFERRIN: 15 % (ref 10–44)
PDW BLD-RTO: 17.7 % (ref 11.7–14.9)
PLATELET # BLD: 337 K/CU MM (ref 140–440)
PMV BLD AUTO: 11 FL (ref 7.5–11.1)
POTASSIUM SERPL-SCNC: 4.5 MMOL/L (ref 3.5–5.1)
PROTHROMBIN TIME: 16.7 SECONDS (ref 11.7–14.5)
RBC # BLD: 3.79 M/CU MM (ref 4.2–5.4)
RETICULOCYTE COUNT PCT: 3.2 % (ref 0.2–2.2)
SEGMENTED NEUTROPHILS ABSOLUTE COUNT: 7.4 K/CU MM
SEGMENTED NEUTROPHILS RELATIVE PERCENT: 82.8 % (ref 36–66)
SODIUM BLD-SCNC: 133 MMOL/L (ref 135–145)
TOTAL IMMATURE NEUTOROPHIL: 0.05 K/CU MM
TOTAL IRON BINDING CAPACITY: 376 UG/DL (ref 250–450)
TOTAL NUCLEATED RBC: 0 K/CU MM
TOTAL PROTEIN: 5.8 GM/DL (ref 6.4–8.2)
UNSATURATED IRON BINDING CAPACITY: 320 UG/DL (ref 110–370)
VITAMIN B-12: >2000 PG/ML (ref 211–911)
WBC # BLD: 9 K/CU MM (ref 4–10.5)

## 2023-03-04 PROCEDURE — 85025 COMPLETE CBC W/AUTO DIFF WBC: CPT

## 2023-03-04 PROCEDURE — 6370000000 HC RX 637 (ALT 250 FOR IP): Performed by: STUDENT IN AN ORGANIZED HEALTH CARE EDUCATION/TRAINING PROGRAM

## 2023-03-04 PROCEDURE — 82607 VITAMIN B-12: CPT

## 2023-03-04 PROCEDURE — 94660 CPAP INITIATION&MGMT: CPT

## 2023-03-04 PROCEDURE — 82728 ASSAY OF FERRITIN: CPT

## 2023-03-04 PROCEDURE — 97166 OT EVAL MOD COMPLEX 45 MIN: CPT

## 2023-03-04 PROCEDURE — 94640 AIRWAY INHALATION TREATMENT: CPT

## 2023-03-04 PROCEDURE — 2060000000 HC ICU INTERMEDIATE R&B

## 2023-03-04 PROCEDURE — 97535 SELF CARE MNGMENT TRAINING: CPT

## 2023-03-04 PROCEDURE — 85610 PROTHROMBIN TIME: CPT

## 2023-03-04 PROCEDURE — 83540 ASSAY OF IRON: CPT

## 2023-03-04 PROCEDURE — 94761 N-INVAS EAR/PLS OXIMETRY MLT: CPT

## 2023-03-04 PROCEDURE — 6360000002 HC RX W HCPCS: Performed by: STUDENT IN AN ORGANIZED HEALTH CARE EDUCATION/TRAINING PROGRAM

## 2023-03-04 PROCEDURE — 97116 GAIT TRAINING THERAPY: CPT

## 2023-03-04 PROCEDURE — 2580000003 HC RX 258: Performed by: STUDENT IN AN ORGANIZED HEALTH CARE EDUCATION/TRAINING PROGRAM

## 2023-03-04 PROCEDURE — 51702 INSERT TEMP BLADDER CATH: CPT

## 2023-03-04 PROCEDURE — 83550 IRON BINDING TEST: CPT

## 2023-03-04 PROCEDURE — 80053 COMPREHEN METABOLIC PANEL: CPT

## 2023-03-04 PROCEDURE — 2700000000 HC OXYGEN THERAPY PER DAY

## 2023-03-04 PROCEDURE — 82746 ASSAY OF FOLIC ACID SERUM: CPT

## 2023-03-04 PROCEDURE — 97162 PT EVAL MOD COMPLEX 30 MIN: CPT

## 2023-03-04 PROCEDURE — 36415 COLL VENOUS BLD VENIPUNCTURE: CPT

## 2023-03-04 PROCEDURE — 85045 AUTOMATED RETICULOCYTE COUNT: CPT

## 2023-03-04 RX ORDER — CARVEDILOL 6.25 MG/1
3.12 TABLET ORAL 2 TIMES DAILY WITH MEALS
Status: DISCONTINUED | OUTPATIENT
Start: 2023-03-04 | End: 2023-03-08 | Stop reason: HOSPADM

## 2023-03-04 RX ADMIN — LACTULOSE 10 G: 10 SOLUTION ORAL at 20:50

## 2023-03-04 RX ADMIN — LACTULOSE 10 G: 10 SOLUTION ORAL at 14:26

## 2023-03-04 RX ADMIN — ASPIRIN 81 MG CHEWABLE TABLET 81 MG: 81 TABLET CHEWABLE at 09:54

## 2023-03-04 RX ADMIN — TIOTROPIUM BROMIDE AND OLODATEROL 2 PUFF: 3.124; 2.736 SPRAY, METERED RESPIRATORY (INHALATION) at 07:50

## 2023-03-04 RX ADMIN — FLUTICASONE PROPIONATE 1 PUFF: 110 AEROSOL, METERED RESPIRATORY (INHALATION) at 07:52

## 2023-03-04 RX ADMIN — ATORVASTATIN CALCIUM 10 MG: 10 TABLET, FILM COATED ORAL at 20:50

## 2023-03-04 RX ADMIN — CARVEDILOL 3.12 MG: 6.25 TABLET, FILM COATED ORAL at 17:18

## 2023-03-04 RX ADMIN — FLUTICASONE PROPIONATE 1 PUFF: 110 AEROSOL, METERED RESPIRATORY (INHALATION) at 22:09

## 2023-03-04 RX ADMIN — LACTULOSE 10 G: 10 SOLUTION ORAL at 09:54

## 2023-03-04 RX ADMIN — SODIUM CHLORIDE, PRESERVATIVE FREE 10 ML: 5 INJECTION INTRAVENOUS at 09:54

## 2023-03-04 RX ADMIN — FUROSEMIDE 40 MG: 10 INJECTION, SOLUTION INTRAMUSCULAR; INTRAVENOUS at 09:54

## 2023-03-04 RX ADMIN — LEVOTHYROXINE SODIUM 88 MCG: 0.09 TABLET ORAL at 09:59

## 2023-03-04 RX ADMIN — FUROSEMIDE 40 MG: 10 INJECTION, SOLUTION INTRAMUSCULAR; INTRAVENOUS at 17:17

## 2023-03-04 RX ADMIN — ENOXAPARIN SODIUM 40 MG: 100 INJECTION SUBCUTANEOUS at 17:17

## 2023-03-04 ASSESSMENT — PAIN SCALES - GENERAL
PAINLEVEL_OUTOF10: 0
PAINLEVEL_OUTOF10: 0

## 2023-03-04 NOTE — PROGRESS NOTES
Order received to insert connolly catheter after bladder scanning pt and noted 1st attempt >527 ; 2nd attempt >570.  Pt had no urge to urinate

## 2023-03-04 NOTE — PROGRESS NOTES
Occupational Therapy  [address] [facility] ACUTE CARE OCCUPATIONAL THERAPY EVALUATION    Kimberli Birmingham, 3/18/1929, 2026/2026-A, 3/4/2023    Discharge Recommendation: Billy An      History:  [place]:  The primary encounter diagnosis was Acute on chronic congestive heart failure, unspecified heart failure type ([address] A diagnosis of Acute on chronic respiratory failure with hypoxia and hypercapnia (HCC) was also pertinent to this visit. Past Medical History:   Diagnosis Date    Age-related osteoporosis with current pathological fracture of vertebra ([address] 7/8/2022    Arthritis     Bradycardia 2001    requiring dual chamber pacemaker at [facility]    CAD (coronary artery disease)     Cardiac pacemaker 10/2001    [employer] #[id number]  [facility]- Serial # [id number]-[facility]- [facility]    COPD, [facility]) 2/17/2017    Cor pulmonale (chronic) ([address] 3/15/2022    CVA (cerebrovascular accident) ([facility]     DJD (degenerative joint disease) of cervical spine     C5-C6, C6-C7    Exhaustion of cardiac pacemaker battery 11/21/2011    PPM battery replacement- Medtronic    Family history of cardiovascular disease     Glaucoma Dx 2010    H/O 24 hour EKG monitoring 8/6/2000 8/6/2000- Intermittent episonde of a-fib/flutter    H/O cardiac catheterization 4/20/2010, 2/1989 4/20/2010-Severe native vessel disease and has graft disease as well. LAD, CX totally occluded. RCA totally occluded in proximal segment. VG to RCA widely patent. PDA 90% LAD afer LIMA anastomosis 80-90% stenosis. Proceeded with PTCA with stent next day. H/O cardiovascular stress test 10/14/2011, 6/2/2010,4/8/2010, 5/18/2009,4/6/2009, 10/30/2007, 11/12/2004, 11/6/2003, 8/9/2002, 8/9/2001,6/5/2000,     10/14/2011-Lexiscan-Abnormal Myocardial Perfusion study. Evidence of mild ischemia in the Left CX region. Abnomal study. Rest EF 63%. Global LV systolic function normal. No ECG changes.  Unremarkable pharmacological stress test.    H/O cardiovascular stress test 6/10/2013    thallium--mild ischemia left circumflex EF63% no change from 10/2011 study. H/O cardiovascular stress test 10/16/2014    cardiolite-mild ischemia left circumflex,EF70%    H/O chest x-ray 4/19/2009 4/19/2009-Stable cardiomegaly. No acute cardiopulmonary disease. H/O Doppler lower venous ultrasound 09/18/2019    Significant reflux noted in RGSV, RGSV is extremely tortuous and small along the thigh and calf and would be highly unlikely to be accessed, RSSV is non compressible and has occlusive chronic SVT, LSSV is non compressible with occlusive chronic SVT, LGSV removed s/p CABG, Significant reflux in LGSV tributary,    H/O Doppler ultrasound 3/31/2010    CAROTID- 3/31/2010-INtimal thickening but no significant atherosclerotic plaque noted in ANA PAULA. Doppler flow velocities within ANA PAULA are WNL. Heterogeneous, irregular atherosclerotic plaque noted in LICA. Doppler flow velocities within the LICA are elevated, consistent with a mild, less than 50% stenosis. H/O Doppler ultrasound 5/24/2016    Carotid- normal study    H/O Doppler ultrasound 09/06/2017    carotid - normal study    H/O Doppler ultrasound     H/O echocardiogram 10/13    EF=60%, Severe Pulm. HTN, & Sclerotic aortic valve w/stenosis. H/O echocardiogram 10/16/14     EF 55-60% Normal LV. Normal LV systolic function. Severe tricuspid insufficiency with severe hypertension. H/O echocardiogram 02/03/2017    heart cath performed this morning    H/O echocardiogram 09/18/2019    EF 50-55%, Left atrium is mild to moderately dilated, right atrium is severely dilated, mildly dilated right ventricle, Mod MR, Severe TR, Severe Pulm HTN, no pericardial effusion     History of complete ECG     10/14/2011(Lexiscan);5/6/2010, 4/30/2009,10/24/2008,9/21/2007, 10/13/2006    History of nuclear stress test 11/17/2016    lexiscan-normal,EF70%    Hx of cardiovascular stress test 12/28/2018    EF 60%  Normal study.     HX OTHER MEDICAL 05/01/2017    MUGA-normal, EF53%    Hyperlipidemia     Hypertension     Mild intermittent asthma 7/28/2016    Moderate COPD (chronic obstructive pulmonary disease) ([facility]) 3/15/2022    Nausea & vomiting     Obstructive sleep apnea 5/16/2017    Post PTCA 4/21/2010    PTCA with 2.25 stent of the LIMA to LAD    Pulmonary HTN ([address]     Severe per last echo on 10/13. 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 Followed by MAZE procedure by pulmonary vein isolation.-  Dr Shant Blake    S/P PTCA (percutaneous transluminal coronary angioplasty) 11/2012    PTCA with stent to RCA    Severe pulmonary hypertension ([address] 3/15/2022    Shortness of breath 3/15/2022    Thyroid disease     hypothyroi    Unspecified cerebral artery occlusion with cerebral infarction Unsure When    No Residual    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 ([address] 7/29/2022    WD-Non-pressure chronic ulcer of other part of left lower leg limited to breakdown of skin ([address] 7/29/2022       Subjective:  Patient states: \"You have to help me honey\"  Pain:  denies  Communication with other providers: co-rhina w/ PT, handoff to RN  Restrictions: General Precautions, Fall Risk    Home Setup/Prior level of function:    Social/Functional History  Lives With: Alone  Type of Home: Condo  Home Layout: One level  Home Access: Level entry  Bathroom Shower/Tub: Tub/Shower unit, Walk-in shower  Bathroom Toilet: Standard  Bathroom Equipment: Grab bars in shower, Grab bars around toilet  DME: rollator, O2   ADL Assistance: Needs assistance (HHA assists with bathing)   Homemaking Assistance: Needs assistance   Ambulation Assistance: Independent (mod I with Rollator)  Transfer Assistance: Independent  Active : No  Additional comments: Pt is a questionable historian and above information should be confirmed with family.  Pt reports rotating between her children's home but also has her own condo and help from [facility]. Examination:  Observation: Supine in bed upon arrival, agreeable to therapy eval.  Vision: WFL  Hearing: [place]  Vitals: Stable vitals throughout session on 3L O2      Body Systems and functions:  ROM: WFL   Strength: B UE grossly 4-/5 across all major joints   Sensation: WFL  Tone: Normal  Coordination: WFL  Perception: WNL      Cognitive and Psychosocial Functioning:  Overall cognitive status: alert and orientedx3  Affect: Normal   Arousal/Alertness Delayed responses to stimuli   Following Commands Follows one step commands with repetition   Attention Span Attends with cues to redirect   Safety Judgement Decreased awareness of need for safety; Decreased awareness of need for assistance   Problem Solving Decreased awareness of errors   Insights Decreased awareness of deficits   Initiation Requires cues for some   Sequencing Requires cues for some       Functional Mobility:  Bed mobility:  supine to sitting EOB w/ min A  Sitting balance:  SBA  static, CGA-min A dynamic  Transfers: STS from EOB w/ min A, stand to sit to recliner w/ min A and Vcs for sequencing/safety  Standing balance:  CGA-min A using RW  Functional Mobility: ambulated short functional household distance x2 bouts using RW w/ CGA - min A, limited d/t fatigue  Toilet/Shower Transfers: STS to/from standard toilet w/ min A. Vcs for sequencing and hand placement        Activities of Daily Living (ADLs):  Feeding: set up A  Grooming: CGA standing at the sink for hand hygiene  UB bathing: mod A  LB bathing: max A  UB dressing: mod A  LB dressing: max A to don socks, mod A to don/doff depends  Toileting: min A hygiene while sitting/standing, mod A clothing mgmt     *ADL determined per observation of functional mobility, balance, activity tolerance, cognition, or actual ADL performance.      AM-PAC 6 click short form for inpatient daily activity:   How much help from another person does the patient currently need... Unable  Dep A Lot  Max A A Lot   Mod A A Little  Min A A Little   CGA  SBA None   Mod I  Indep  Sup   1. Putting on and taking off regular lower body clothing? [] 1    [x] 2   [] 2   [] 3   [] 3   [] 4      2. Bathing (including washing, rinsing, drying)? [] 1   [x] 2   [] 2 [] 3 [] 3 [] 4   3. Toileting, which includes using toilet, bedpan, or urinal? [] 1    [] 2   [x] 2   [] 3   [] 3   [] 4     4. Putting on and taking off regular upper body clothing? [] 1   [] 2   [x] 2   [] 3   [] 3    [] 4      5. Taking care of personal grooming such as brushing teeth? [] 1   [] 2    [] 2 [] 3    [x] 3   [] 4      6. Eating meals? [] 1   [] 2   [] 2   [] 3   [] 3   [x] 4        Raw Score:  15     [24=0% impaired(CH), 23=1-19%(CI), 20-22=20-39%(CJ), 15-19=40-59%(CK), 10-14=60-79%(CL), 7-9=80-99%(CM), 6=100%(CN)]     Treatment:    Self Care Training:   Cues were given for safety, sequence, UE/LE placement, visual cues, and balance. Activities performed today included LB dressing tasks, toileting, hand hygiene at sink    Educated pt on role of OT, therapy POC and functional goals, progression w/ ADLs and transfers, importance of movement and OOB activity, d/c recommendations     Safety Measures: Gait belt used, Left in recliner, Alarm in place  Recommendations for NURSING activity:  Up to chair for all 3 meals and up to bathroom for all toileting needs     Assessment:  Pt is a 80 y o F admitted d/t acute decompensated heart failure. Pt at baseline has assistance for ADLs, assistance for high level IADLs, and mod I for functional transfers/mobility w/ rollator. Pt currently presents w/ deficits in ADL and high level IADL independence, functional ADL transfers, strength, and functional activity tolerance. Continued OT services recommended to increase safety and independence with ADL routine and to address remaining functional deficits.  Pt would benefit from continued acute care OT services w/ discharge to SNF.    Complexity: Moderate  Prognosis: Good, no significant barriers to participation at this time. Occupational Therapy Plan  Times Per Week: 3+         Goals:  Pt will complete all aspects of bed mobility for EOB/OOB ADLs w/ supervision. Pt will complete UB ADLs w/ supervision. Pt will complete LB ADLs w/ min A. Pt will complete all functional transfers to and from bed, chair, toilet, shower chair w/ SBA. Pt will ambulate functional household distance w/ SBA. Pt will complete all aspects of toileting task w/ SBA. Pt will perform therex/theract in order to increase strength and functional activity tolerance necessary for increased independence w/ ADL routine.     Pt goal: go home, get stronger  Time Frame for STGs: discharge    Equipment: Continue to assess at next LOC    Time:   Time in: 0828  Time out: 0855  Total time: 27  Timed treatment minutes: 17        Electronically signed by:      ADRIEL Islas/AYLA  [id number]

## 2023-03-04 NOTE — PROGRESS NOTES
03/03/23 3081   NIV Type   NIV Started/Stopped On   Equipment Type V60   Mode Bilevel   Mask Type Full face mask   Mask Size Medium   Settings/Measurements   PIP Observed 15 cm H20   IPAP 15 cmH20   CPAP/EPAP 5 cmH2O   Vt (Measured) 354 mL   Rate Ordered 12   Resp 20   FiO2  30 %   I Time/ I Time % 1 s   Minute Volume (L/min) 7.1 Liters   Mask Leak (lpm) 0 lpm   Comfort Level Good   Using Accessory Muscles No   SpO2 97   Patient's Home Machine No   Alarm Settings   Alarms On Y   Low Pressure (cmH2O) 3 cmH2O   High Pressure (cmH2O) 20 cmH2O   Delay Alarm 20 sec(s)   Apnea (secs) 20 secs   RR Low (bpm) 13   RR High (bpm) 40 br/min

## 2023-03-04 NOTE — PROGRESS NOTES
Physical Therapy  [facility] ACUTE CARE PHYSICAL THERAPY EVALUATION  Adán Ingram, 3/18/1929, 2026/2026-A, 3/4/2023    History  [place]:  The primary encounter diagnosis was Acute on chronic congestive heart failure, unspecified heart failure type ([address] A diagnosis of Acute on chronic respiratory failure with hypoxia and hypercapnia (HCC) was also pertinent to this visit. Patient  has a past medical history of Age-related osteoporosis with current pathological fracture of vertebra (HCC), Arthritis, Bradycardia, CAD (coronary artery disease), Cardiac pacemaker, COPD, mild ([address] Cor pulmonale (chronic) ([address] CVA (cerebrovascular accident) ([address] DJD (degenerative joint disease) of cervical spine, Exhaustion of cardiac pacemaker battery, Family history of cardiovascular disease, Glaucoma, H/O 24 hour EKG monitoring, H/O cardiac catheterization, H/O cardiovascular stress test, H/O cardiovascular stress test, H/O cardiovascular stress test, H/O chest x-ray, H/O Doppler lower venous ultrasound, H/O Doppler ultrasound, H/O Doppler ultrasound, H/O Doppler ultrasound, H/O Doppler ultrasound, H/O echocardiogram, H/O echocardiogram, H/O echocardiogram, H/O echocardiogram, History of complete ECG, History of nuclear stress test, Hx of cardiovascular stress test, HX OTHER MEDICAL, Hyperlipidemia, Hypertension, Mild intermittent asthma, Moderate COPD (chronic obstructive pulmonary disease) ([address] Nausea & vomiting, Obstructive sleep apnea, Post PTCA, Pulmonary HTN ([address] PVD (peripheral vascular disease) ([address] S/P CABG x 3, S/P PTCA (percutaneous transluminal coronary angioplasty), Severe pulmonary hypertension ([address] Shortness of breath, Thyroid disease, Unspecified cerebral artery occlusion with cerebral infarction, 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   Patient  has a past surgical history that includes Appendectomy (1941); Tonsillectomy (1950's); Cardiac surgery (4/09); Hysterectomy, total abdominal (1990's); Colonoscopy (In 2000's); pacemaker placement; Coronary artery bypass graft (4/8/2009); Coronary angioplasty with stent (4/21/2010); other surgical history; Middle ear surgery; and Percutaneous Transluminal Coronary Angio (11/2012). Subjective:  Patient states: \"Lets go I've got to pee\"   Pain:  denies   Communication with other providers:  RN, co-eval with OTKendall   Restrictions: general precautions, falls     Home Setup/Prior level of function  Social/Functional History  Lives With: Alone  Type of Home: Condo  Home Layout: One level  Home Access: Level entry  Bathroom Shower/Tub: Tub/Shower unit, Walk-in shower  Bathroom Toilet: Standard  Bathroom Equipment: Grab bars in shower, Grab bars around toilet  DME: rollator, O2   ADL Assistance: Needs assistance (HHA assists with bathing)   Homemaking Assistance: Needs assistance   Ambulation Assistance: Independent (mod I with Rollator)  Transfer Assistance: Independent  Active : No  Additional comments: Pt is a questionable historian and above information should be confirmed with family. Pt reports rotating between her children's home but also has her own condo and help from [facility]. Examination of body systems (includes body structures/functions, activity/participation limitations):  Observation:  Supine in bed upon arrival. Cooperative with therapy   Vision:  glasses  Hearing:  [place]   Cardiopulmonary:  stable vitals on 3L   Orientation: WFL    Musculoskeletal  ROM R/L:  WFL BLES  Strength R/L:  Minimal weakness observed in function and endurance. Mobility/treatment:   Rolling L/R:  NT   Supine to sit:  [place] for  trunk support with HOB slightly elevated  Transfers:   Sit to stand: [place] from EOB and standard toilet   Stand to sit: [place] for controlling sit to toilet and recliner.  Vcs for hand sequencing back to chair for support with dec carry over   Step pivot: [place] for steadying and device management. VCS for sequencing full turn with RW to align hip with seat surfaces. (2x)   Sitting balance:  SBA for safety at EOB and toilet, static without UE support. CGA to Celso with light dynamic activity while managing tucker care at toilet. Inc left lateral lean with dec awareness of body positioning on the toilet   Standing balance:  CGA to Celso static at RW progressing to light dynamic while managing hand hygiene at the sink without UE support x ~2 minutes   Gait:  ~10ft x 2 with RW CGA to Celso for steadying and safety. Fair to slower alisha with shuffled steps and fwd flexed posture. Limited distance due to fatigue. Educated pt on POC, role of PT, DME use, discharge. Cues provided for sequencing to inc safety and indep with mobility     [facility] 6 Clicks Inpatient Mobility:  AM-PAC Inpatient Mobility Raw Score : 17    Safety: patient left in chair with alarm, call light within reach, gait belt used. Assessment:  Pt is a 80year old female admitted with acute on chronic hypoxemic hypercapnic respiratory failure 2* acute decompensated heart failure. Recommend subacute rehab once medically stable. At baseline she reports being Jose with mobility and has help with some of her ADLs. She performed below her baseline this date with dec strength, balance, and activity tolerance. She would benefit from continued therapy to address her current deficits, dec potential fall risk, dec burden of care, and restore PLOF. Complexity: Moderate  Prognosis: Good, no significant barriers to participation at this time. Plan: 3-5 times per week  Discharge Recommendations: Subacute/Skilled Nursing Facility  Equipment: continue to assess. Also need to confirm with family what DME pt already owns.      Goals:  Short Term Goals  Time Frame for Short Term Goals: 2 weeks  Short Term Goal 1: Pt will perform sit><supine SBA  Short Term Goal 2: Pt will transfer to all surfaces SBA  Short Term Goal 3: Pt will ambulate 50ft with RW SBA  Short Term Goal 4: Pt will perform standing light dynamic activity x 3 minutes, single UE support SBA       Treatment plan:  Bed mobility, transfers, balance, gait, TA, TX,stairs     Recommendations for NURSING mobility: ambulate with RW to the bathroom     Time:   Time in: 0826  Time out: 0855  Timed treatment minutes: 14  Total time: 29 minutes     Electronically signed by:    Dora Hollis [id number]  3/4/2023, 12:19 PM

## 2023-03-04 NOTE — PROGRESS NOTES
Today's plan: continue IV lasix      Admit Date:  3/2/2023    Subjective:      Chief complaints on admission  Chief Complaint   Patient presents with    Congestive Heart Failure     EMS called to cardiologist for low o2 saturation per staff - upon EMS arrival, staff states they never took a pulse ox reading. Pt wears 3L o2 at baseline and was 93% on RA upon arrival to ED. Pt denies shortness of breath. Leg Swelling     BLE         History of present illness:Irene is a 80 y. o.year old who  presents with had concerns including Congestive Heart Failure (EMS called to cardiologist for low o2 saturation per staff - upon EMS arrival, staff states they never took a pulse ox reading. Pt wears 3L o2 at baseline and was 93% on RA upon arrival to ED. Pt denies shortness of breath.) and Leg Swelling (BLE).       Past medical history:    has a past medical history of Age-related osteoporosis with current pathological fracture of vertebra (HCC), Arthritis, Bradycardia, CAD (coronary artery disease), Cardiac pacemaker, COPD, mild ([address] Cor pulmonale (chronic) ([address] CVA (cerebrovascular accident) ([address] DJD (degenerative joint disease) of cervical spine, Exhaustion of cardiac pacemaker battery, Family history of cardiovascular disease, Glaucoma, H/O 24 hour EKG monitoring, H/O cardiac catheterization, H/O cardiovascular stress test, H/O cardiovascular stress test, H/O cardiovascular stress test, H/O chest x-ray, H/O Doppler lower venous ultrasound, H/O Doppler ultrasound, H/O Doppler ultrasound, H/O Doppler ultrasound, H/O Doppler ultrasound, H/O echocardiogram, H/O echocardiogram, H/O echocardiogram, H/O echocardiogram, History of complete ECG, History of nuclear stress test, Hx of cardiovascular stress test, HX OTHER MEDICAL, Hyperlipidemia, Hypertension, Mild intermittent asthma, Moderate COPD (chronic obstructive pulmonary disease) ([address] Nausea & vomiting, Obstructive sleep apnea, Post PTCA, Pulmonary HTN ([address] PVD (peripheral vascular disease) ([facility] [address] [facility] S/P CABG x 3, S/P PTCA (percutaneous transluminal coronary angioplasty), Severe pulmonary hypertension ([facility] [address] [facility] Shortness of breath, Thyroid disease, Unspecified cerebral artery occlusion with cerebral infarction, 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 Past surgical history:   has a past surgical history that includes Appendectomy (1941); Tonsillectomy (1950's); Cardiac surgery (4/09); Hysterectomy, total abdominal (1990's); Colonoscopy (In 2000's); pacemaker placement; Coronary artery bypass graft (4/8/2009); Coronary angioplasty with stent (4/21/2010); other surgical history; Middle ear surgery; and Percutaneous Transluminal Coronary Angio (11/2012). Social History:   reports that she quit smoking about 52 years ago. Her smoking use included cigarettes. She has a 1.25 pack-year smoking history. She has never used smokeless tobacco. She reports that she does not currently use alcohol. She reports that she does not use drugs. Family history:  family history includes Arthritis in her mother; Cancer in her mother and sister; Coronary Art Dis in her father; Depression in her mother and sister; Early Death in her paternal grandfather; Early Death (age of onset: 36) in her father; Hearing Loss in her mother; Heart Disease in her father and sister; High Blood Pressure in her father, mother, sister, son, and son; High Cholesterol in her father and mother; Mental Illness in her mother; Excell Klippel / Suleman Pen in her mother; Other in her daughter.     Allergies   Allergen Reactions    Darvocet [Propoxyphene N-Acetaminophen]     Ranexa [Ranolazine Er]      Sick to her stomach    Ranolazine      Sick to her stomach    Sulfa Antibiotics Itching    Sulfasalazine Itching    Adhesive Tape Rash    Allantoin Rash    Bacitracin Rash    Gramicidin Rash Neomycin Rash    Polymyxin B Rash    Pramoxine Hcl Rash    Silicone Rash         Objective:   /61   Pulse 60   Temp 97.2 °F (36.2 °C) (Oral)   Resp 18   Ht 4' 11\" (1.499 m)   Wt 139 lb 6.4 oz (63.2 kg)   SpO2 97%   BMI 28.16 kg/m²     Intake/Output Summary (Last 24 hours) at 3/4/2023 1703  Last data filed at 3/4/2023 1300  Gross per 24 hour   Intake 360 ml   Output 1200 ml   Net -840 ml       TELEMETRY:      Physical Exam:  Constitutional:  Well developed, Well nourished, No acute distress, Non-toxic appearance. HENT:  Normocephalic, Atraumatic, Bilateral external ears normal, Oropharynx moist, No oral exudates, Nose normal. Neck- Normal range of motion, No tenderness, Supple, No stridor. Eyes:  PERRL, EOMI, Conjunctiva normal, No discharge. Respiratory:  Normal breath sounds, No respiratory distress, No wheezing, No chest tenderness. ,no use of accessory muscles, diaphragm movement is normal  Cardiovascular: (PMI) apex non displaced,no lifts no thrills, no s3,no s4, Normal heart rate, Normal rhythm, No murmurs, No rubs, No gallops. Carotid arteries pulse and amplitude are normal no bruit, no abdominal bruit noted ( normal abdominal aorta ausculation), femoral arteries pulse and amplitude are normal no bruit, pedal pulses are normal  GI:  Bowel sounds normal, Soft, No tenderness, No masses, No pulsatile masses. : External genitalia appear normal, No masses or lesions. No discharge. No CVA tenderness. Musculoskeletal:  Intact distal pulses, No edema, No tenderness, No cyanosis, No clubbing. Good range of motion in all major joints. No tenderness to palpation or major deformities noted. Back- No tenderness. Integument:  Warm, Dry, No erythema, No rash. Lymphatic:  No lymphadenopathy noted. Neurologic:  Alert & oriented x 3, Normal motor function, Normal sensory function, No focal deficits noted.    Psychiatric:  Affect normal, Judgment normal, Mood normal.     Medications:    carvedilol 3.125 mg Oral BID WC    aspirin  81 mg Oral Daily    atorvastatin  10 mg Oral Nightly    levothyroxine  88 mcg Oral Daily    furosemide  40 mg IntraVENous BID    tiotropium-olodaterol  2 puff Inhalation Daily    sodium chloride flush  5-40 mL IntraVENous 2 times per day    enoxaparin  40 mg SubCUTAneous QPM    fluticasone  1 puff Inhalation BID    lactulose  10 g Oral TID      sodium chloride       albuterol sulfate HFA, sodium chloride flush, sodium chloride, potassium chloride, magnesium sulfate, ondansetron **OR** ondansetron, polyethylene glycol, acetaminophen **OR** acetaminophen    Lab Data:  CBC:   Recent Labs     03/02/23  1630 03/04/23  1406   WBC 8.6 9.0   HGB 12.7 12.1*   HCT 41.5 39.3   .5* 103.7*    337     BMP:   Recent Labs     03/02/23  1630 03/04/23  1406   * 133*   K 4.5 4.5   CL 88* 89*   CO2 39* 39*   BUN 21 21   CREATININE 0.7 0.8     LIVER PROFILE:   Recent Labs     03/02/23  1630 03/04/23  1406   AST 30 27   ALT 21 19   BILITOT 1.0 1.2*   ALKPHOS 205* 209*     PT/INR:   Recent Labs     03/04/23  1406   PROTIME 16.7*   INR 1.29     APTT: No results for input(s): APTT in the last 72 hours.  BNP:  No results for input(s): BNP in the last 72 hours.  TROPONIN: @TROPONINI:3@      Assessment:  89 y.o.year old who is admitted for          Plan:  Acute decompensated congestive heart failure with history of cor pulmonale severe LV dilatation and severe pulmonary hypertension, admit to ICU stepdown, started IV Lasix drip in, her previous right heart catheterization report reviewed from 2019, her pulmonary cavity wedge pressure at that time was 17 RA pressure was 13 PA pressure was 32 mean RV pressure mean was 11 however this was 2019 she may have cor pulmonale and severe pulm hypertension  COPD not controlled recommend to see pulmonary  CAD stable history of bypass and PCI of RCA in 2012 and LIMA to LAD stent in 2010 bypass surgery was 2009, continue aspirin statin beta-blocker  All  labs, medications and tests reviewed, continue all other medications of all above medical condition listed as is.       Ofelia Hammans, MD, MD 3/4/2023 5:03 PM

## 2023-03-04 NOTE — PROGRESS NOTES
V2.0  [facility] Hospitalist Progress Note      Name:  Srinivasa Corrigan /Age/Sex: 3/18/1929  (80 y.o. female)   MRN & CSN:  [id number] & [id number] Encounter Date/Time: 3/4/2023 3:30 PM EST    Location:  -A PCP: Randell Coates MD       Hospital Day: 3    Assessment and Plan:   Srinivasa Corrigan is a 80 y.o. female with past medical history of coronary artery disease status post CABG in  and PCI in , atrial fibrillation not on [facility] due to GI bleed, heart failure with preserved ejection fraction, permanent pacemaker (MRI safe Medtronic), hypertension, obstructive sleep apnea on CPAP, pulmonary hypertension, Cirrhosis is admitted at [facility] on 3/2/23 after being sent from cardiologist's office due to worsening shortness of breath. Acute metabolic encephalopathy- Improved  Secondary to respiratory failure w/ hypercapnia   Subclinical hypothyroidism on thyroid function panel  CT brain ordered yesterday due to AMS, marked improvement in mentation today, no need of imaging due to lack of focal neurological deficits and improvement in mentation. Ammonia level 60, doubt that it is the probable cause. Acute on chronic hypoxic hypercapnic respiratory failure  Secondary to acute on chronic diastolic heart failure and underlying severe pulmonary hypertension  Echocardiogram 2022: Ejection fraction greater than 60%, dilated right side of the heart, RVSP 52 mmHg. proBNP 1464  Chest x-ray with pulmonary edema and severe cardiomegaly  Continue IV Lasix 40 mg BID  Measure daily weight and strict intake and output  Cardiology following  Labs from today are pending, called phlebotomy- aware of am labs, will draw it.     Coronary artery disease status post CABG in  and PCI in   Continue Aspirin and Statin    Paroxysmal atrial fibrillation not on anticoagulation due to GI bleed  Permanent pacemaker (MRI safe Medtronic)  Monitor on telemetry  Not on AC due to chronic anemia but stable Hb so far, defer to primary cardiologist for final decision. 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  No formal work-up performed or at least I do not see any documentation  No evidence of ascites on examination  Continue Lasix  Low salt diet and fluid restriction  Repeat Ammonia level in am  PT/INR in am  Recommend outpatient work-up    Constipation   KUB from yesterday reviewed  No BM documented, but x2 Bowel movements per RN  Will continue lactulose to target 2-3 BMs/day     Hypothyroidism  Continue Synthroid    PT/OT consulted  Will call family to discuss goals of care  Consults: Cardiology    Diet ADULT DIET; Regular; Low Sodium (2 gm); 1800 ml   DVT Prophylaxis [x] Lovenox, []  Heparin, [] SCDs, [] Ambulation,  [] Eliquis, [] Xarelto  [] Coumadin   Code Status DNR-CCA   Disposition From: Home  Expected Disposition: To be decided  Estimated Date of Discharge: 2 days  Patient requires continued admission due to improvement in volume status   Surrogate Decision Maker/ POA Daughter     Subjective:     Chief Complaint: Shortness of breath    Yazan Rojas is a 80 y.o. female who presents with worsening shortness of breath and was sent from her cardiologist office for further evaluation and management. Patient was seen and evaluated bedside earlier this morning. Patient was alert oriented x3. Marked improvement in mentation. Patient was hemodynamically stable, with low normal BP trend. 2 bowel movements per RN. Objective:      Intake/Output Summary (Last 24 hours) at 3/4/2023 2677  Last data filed at 3/4/2023 0447  Gross per 24 hour   Intake --   Output 1200 ml   Net -1200 ml        Vitals:   Vitals:    03/04/23 0752   BP:    Pulse: 60   Resp: 14   Temp:    SpO2: 96%       Physical Exam:   Physical Exam  Vitals reviewed. Constitutional:       Appearance: She is obese. She is ill-appearing. HENT:      Head: Normocephalic and atraumatic. Nose: Nose normal.      Mouth/Throat:      Mouth: Mucous membranes are dry. Pharynx: Oropharynx is clear. Eyes:      General: No scleral icterus. Conjunctiva/sclera: Conjunctivae normal.   Cardiovascular:      Rate and Rhythm: Normal rate. Pulses: Normal pulses. Heart sounds: Murmur heard. Comments: Paced rhythm  Pulmonary:      Effort: Pulmonary effort is normal.      Breath sounds: Rales present. No wheezing or rhonchi. Abdominal:      General: Bowel sounds are normal. There is no distension. Palpations: Abdomen is soft. Tenderness: There is no abdominal tenderness. Musculoskeletal:         General: No deformity. Normal range of motion. Cervical back: Neck supple. No rigidity. Right lower leg: Edema present. Left lower leg: Edema present. Skin:     Coloration: Skin is not jaundiced or pale. Neurological:      General: No focal deficit present. Mental Status: She is alert and oriented to person, place, and time. Mental status is at baseline.           Medications:   Medications:    carvedilol  3.125 mg Oral BID WC    aspirin  81 mg Oral Daily    atorvastatin  10 mg Oral Nightly    levothyroxine  88 mcg Oral Daily    furosemide  40 mg IntraVENous BID    tiotropium-olodaterol  2 puff Inhalation Daily    sodium chloride flush  5-40 mL IntraVENous 2 times per day    enoxaparin  40 mg SubCUTAneous QPM    fluticasone  1 puff Inhalation BID    lactulose  10 g Oral TID      Infusions:    sodium chloride       PRN Meds: albuterol sulfate HFA, 2 puff, Q6H PRN  sodium chloride flush, 5-40 mL, PRN  sodium chloride, , PRN  potassium chloride, 10 mEq, PRN  magnesium sulfate, 2,000 mg, PRN  ondansetron, 4 mg, Q8H PRN   Or  ondansetron, 4 mg, Q6H PRN  polyethylene glycol, 17 g, Daily PRN  acetaminophen, 650 mg, Q6H PRN   Or  acetaminophen, 650 mg, Q6H PRN      Labs      Recent Results (from the past 24 hour(s))   Ammonia    Collection Time: 03/03/23  4:12 PM   Result Value Ref Range    Ammonia 60 (H) 11 - 51 UMOL/L        Imaging/Diagnostics Last 24 Hours   XR CHEST (2 VW)    Result Date: 3/2/2023  EXAMINATION: TWO XRAY VIEWS OF THE CHEST 3/2/2023 6:05 pm COMPARISON: Chest x-ray and CT chest December 15, 2022 HISTORY: ORDERING SYSTEM PROVIDED HISTORY: Dyspnea TECHNOLOGIST PROVIDED HISTORY: Reason for exam:->Dyspnea Reason for Exam: Dyspnea Additional signs and symptoms: na Relevant Medical/Surgical History: CHF FINDINGS: Cardiac silhouette is enlarged but grossly stable. Postoperative changes of CABG procedure and median sternotomy. Atherosclerotic changes of the aorta. Left-sided cardiac pacer device in place. Silhouetting of the hemidiaphragms bilaterally suggestive of small effusions and atelectasis. Linear atelectasis at the right mid lung. Central pulmonary vascular congestion. 1. Cardiomegaly with central pulmonary vascular congestion and suspected small effusions and atelectasis. XR ABDOMEN (KUB) (SINGLE AP VIEW)    Result Date: 3/3/2023  EXAMINATION: ONE SUPINE XRAY VIEW(S) OF THE ABDOMEN 3/3/2023 10:48 am COMPARISON: 12/16/2022. HISTORY: ORDERING SYSTEM PROVIDED HISTORY: abdominal pain TECHNOLOGIST PROVIDED HISTORY: Reason for exam:->abdominal pain Reason for Exam: abdominal pain, distension FINDINGS: Nonspecific nonobstructive bowel gas pattern is seen. There is moderate amount of stool noted in the proximal colon. Evaluation of free air is limited on this supine view. There is levoscoliosis of the lumbar spine.  Partially visualized are changes of prior open heart surgery and cardiac conduction device leads.     Nonspecific nonobstructive bowel gas pattern with a moderate amount of stool noted in the proximal colon.        Electronically signed by Maryam Cardenas MD on 3/4/2023 at 9:05 AM

## 2023-03-05 LAB
ALBUMIN SERPL-MCNC: 3.9 GM/DL (ref 3.4–5)
ALP BLD-CCNC: 222 IU/L (ref 40–128)
ALT SERPL-CCNC: 22 U/L (ref 10–40)
ANION GAP SERPL CALCULATED.3IONS-SCNC: 8 MMOL/L (ref 4–16)
AST SERPL-CCNC: 41 IU/L (ref 15–37)
BASOPHILS ABSOLUTE: 0.1 K/CU MM
BASOPHILS RELATIVE PERCENT: 0.6 % (ref 0–1)
BILIRUB SERPL-MCNC: 1 MG/DL (ref 0–1)
BUN SERPL-MCNC: 21 MG/DL (ref 6–23)
CALCIUM SERPL-MCNC: 10.1 MG/DL (ref 8.3–10.6)
CHLORIDE BLD-SCNC: 91 MMOL/L (ref 99–110)
CO2: 38 MMOL/L (ref 21–32)
CREAT SERPL-MCNC: 0.9 MG/DL (ref 0.6–1.1)
DIFFERENTIAL TYPE: ABNORMAL
EOSINOPHILS ABSOLUTE: 0.4 K/CU MM
EOSINOPHILS RELATIVE PERCENT: 4.3 % (ref 0–3)
GFR SERPL CREATININE-BSD FRML MDRD: 60 ML/MIN/1.73M2
GLUCOSE SERPL-MCNC: 97 MG/DL (ref 70–99)
HCT VFR BLD CALC: 40.6 % (ref 37–47)
HEMOGLOBIN: 12 GM/DL (ref 12.5–16)
IMMATURE NEUTROPHIL %: 0.6 % (ref 0–0.43)
LYMPHOCYTES ABSOLUTE: 0.7 K/CU MM
LYMPHOCYTES RELATIVE PERCENT: 7.1 % (ref 24–44)
MCH RBC QN AUTO: 31.5 PG (ref 27–31)
MCHC RBC AUTO-ENTMCNC: 29.6 % (ref 32–36)
MCV RBC AUTO: 106.6 FL (ref 78–100)
MONOCYTES ABSOLUTE: 1.1 K/CU MM
MONOCYTES RELATIVE PERCENT: 10.9 % (ref 0–4)
NUCLEATED RBC %: 0 %
PDW BLD-RTO: 17.6 % (ref 11.7–14.9)
PLATELET # BLD: 307 K/CU MM (ref 140–440)
PMV BLD AUTO: 10.5 FL (ref 7.5–11.1)
POTASSIUM SERPL-SCNC: 4.5 MMOL/L (ref 3.5–5.1)
RBC # BLD: 3.81 M/CU MM (ref 4.2–5.4)
SEGMENTED NEUTROPHILS ABSOLUTE COUNT: 7.5 K/CU MM
SEGMENTED NEUTROPHILS RELATIVE PERCENT: 76.5 % (ref 36–66)
SODIUM BLD-SCNC: 137 MMOL/L (ref 135–145)
TOTAL IMMATURE NEUTOROPHIL: 0.06 K/CU MM
TOTAL NUCLEATED RBC: 0 K/CU MM
TOTAL PROTEIN: 5.6 GM/DL (ref 6.4–8.2)
WBC # BLD: 9.8 K/CU MM (ref 4–10.5)

## 2023-03-05 PROCEDURE — 94640 AIRWAY INHALATION TREATMENT: CPT

## 2023-03-05 PROCEDURE — 2700000000 HC OXYGEN THERAPY PER DAY

## 2023-03-05 PROCEDURE — 80053 COMPREHEN METABOLIC PANEL: CPT

## 2023-03-05 PROCEDURE — 6370000000 HC RX 637 (ALT 250 FOR IP): Performed by: STUDENT IN AN ORGANIZED HEALTH CARE EDUCATION/TRAINING PROGRAM

## 2023-03-05 PROCEDURE — 94761 N-INVAS EAR/PLS OXIMETRY MLT: CPT

## 2023-03-05 PROCEDURE — 2580000003 HC RX 258: Performed by: STUDENT IN AN ORGANIZED HEALTH CARE EDUCATION/TRAINING PROGRAM

## 2023-03-05 PROCEDURE — 36415 COLL VENOUS BLD VENIPUNCTURE: CPT

## 2023-03-05 PROCEDURE — 94660 CPAP INITIATION&MGMT: CPT

## 2023-03-05 PROCEDURE — 85025 COMPLETE CBC W/AUTO DIFF WBC: CPT

## 2023-03-05 PROCEDURE — 6360000002 HC RX W HCPCS: Performed by: STUDENT IN AN ORGANIZED HEALTH CARE EDUCATION/TRAINING PROGRAM

## 2023-03-05 PROCEDURE — 97530 THERAPEUTIC ACTIVITIES: CPT

## 2023-03-05 PROCEDURE — 97535 SELF CARE MNGMENT TRAINING: CPT

## 2023-03-05 PROCEDURE — 2060000000 HC ICU INTERMEDIATE R&B

## 2023-03-05 RX ORDER — DOCUSATE SODIUM 100 MG/1
100 CAPSULE, LIQUID FILLED ORAL 2 TIMES DAILY
Status: DISCONTINUED | OUTPATIENT
Start: 2023-03-05 | End: 2023-03-08 | Stop reason: HOSPADM

## 2023-03-05 RX ORDER — SENNA PLUS 8.6 MG/1
1 TABLET ORAL 2 TIMES DAILY
Status: DISCONTINUED | OUTPATIENT
Start: 2023-03-05 | End: 2023-03-08 | Stop reason: HOSPADM

## 2023-03-05 RX ORDER — MIDODRINE HYDROCHLORIDE 5 MG/1
2.5 TABLET ORAL
Status: DISCONTINUED | OUTPATIENT
Start: 2023-03-05 | End: 2023-03-08 | Stop reason: HOSPADM

## 2023-03-05 RX ADMIN — MIDODRINE HYDROCHLORIDE 2.5 MG: 5 TABLET ORAL at 17:19

## 2023-03-05 RX ADMIN — LACTULOSE 10 G: 10 SOLUTION ORAL at 20:46

## 2023-03-05 RX ADMIN — ENOXAPARIN SODIUM 40 MG: 100 INJECTION SUBCUTANEOUS at 18:52

## 2023-03-05 RX ADMIN — FUROSEMIDE 40 MG: 10 INJECTION, SOLUTION INTRAMUSCULAR; INTRAVENOUS at 09:42

## 2023-03-05 RX ADMIN — MIDODRINE HYDROCHLORIDE 2.5 MG: 5 TABLET ORAL at 12:22

## 2023-03-05 RX ADMIN — ATORVASTATIN CALCIUM 10 MG: 10 TABLET, FILM COATED ORAL at 20:46

## 2023-03-05 RX ADMIN — ACETAMINOPHEN 650 MG: 325 TABLET ORAL at 13:21

## 2023-03-05 RX ADMIN — FLUTICASONE PROPIONATE 1 PUFF: 110 AEROSOL, METERED RESPIRATORY (INHALATION) at 08:40

## 2023-03-05 RX ADMIN — DOCUSATE SODIUM 100 MG: 100 CAPSULE, LIQUID FILLED ORAL at 09:43

## 2023-03-05 RX ADMIN — LACTULOSE 10 G: 10 SOLUTION ORAL at 13:21

## 2023-03-05 RX ADMIN — SENNOSIDES 8.6 MG: 8.6 TABLET, COATED ORAL at 09:43

## 2023-03-05 RX ADMIN — LACTULOSE 10 G: 10 SOLUTION ORAL at 09:42

## 2023-03-05 RX ADMIN — ASPIRIN 81 MG CHEWABLE TABLET 81 MG: 81 TABLET CHEWABLE at 09:42

## 2023-03-05 RX ADMIN — LEVOTHYROXINE SODIUM 88 MCG: 0.09 TABLET ORAL at 09:42

## 2023-03-05 RX ADMIN — MIDODRINE HYDROCHLORIDE 2.5 MG: 5 TABLET ORAL at 09:43

## 2023-03-05 RX ADMIN — TIOTROPIUM BROMIDE AND OLODATEROL 2 PUFF: 3.124; 2.736 SPRAY, METERED RESPIRATORY (INHALATION) at 08:40

## 2023-03-05 RX ADMIN — SODIUM CHLORIDE, PRESERVATIVE FREE 10 ML: 5 INJECTION INTRAVENOUS at 09:44

## 2023-03-05 RX ADMIN — FUROSEMIDE 40 MG: 10 INJECTION, SOLUTION INTRAMUSCULAR; INTRAVENOUS at 18:53

## 2023-03-05 RX ADMIN — SENNOSIDES 8.6 MG: 8.6 TABLET, COATED ORAL at 20:46

## 2023-03-05 RX ADMIN — DOCUSATE SODIUM 100 MG: 100 CAPSULE, LIQUID FILLED ORAL at 20:46

## 2023-03-05 ASSESSMENT — PAIN SCALES - GENERAL
PAINLEVEL_OUTOF10: 8
PAINLEVEL_OUTOF10: 2

## 2023-03-05 ASSESSMENT — PAIN DESCRIPTION - LOCATION: LOCATION: BACK

## 2023-03-05 ASSESSMENT — PAIN DESCRIPTION - DESCRIPTORS: DESCRIPTORS: ACHING;DISCOMFORT

## 2023-03-05 NOTE — PROGRESS NOTES
Today's plan: continue IV lasix      Admit Date:  3/2/2023    Subjective:      Chief complaints on admission  Chief Complaint   Patient presents with    Congestive Heart Failure     EMS called to cardiologist for low o2 saturation per staff - upon EMS arrival, staff states they never took a pulse ox reading. Pt wears 3L o2 at baseline and was 93% on RA upon arrival to ED. Pt denies shortness of breath. Leg Swelling     BLE         History of present illness:Irene is a 80 y. o.year old who  presents with had concerns including Congestive Heart Failure (EMS called to cardiologist for low o2 saturation per staff - upon EMS arrival, staff states they never took a pulse ox reading. Pt wears 3L o2 at baseline and was 93% on RA upon arrival to ED. Pt denies shortness of breath.) and Leg Swelling (BLE).       Past medical history:    has a past medical history of Age-related osteoporosis with current pathological fracture of vertebra (HCC), Arthritis, Bradycardia, CAD (coronary artery disease), Cardiac pacemaker, COPD, mild ([address] Cor pulmonale (chronic) ([address] CVA (cerebrovascular accident) ([address] DJD (degenerative joint disease) of cervical spine, Exhaustion of cardiac pacemaker battery, Family history of cardiovascular disease, Glaucoma, H/O 24 hour EKG monitoring, H/O cardiac catheterization, H/O cardiovascular stress test, H/O cardiovascular stress test, H/O cardiovascular stress test, H/O chest x-ray, H/O Doppler lower venous ultrasound, H/O Doppler ultrasound, H/O Doppler ultrasound, H/O Doppler ultrasound, H/O Doppler ultrasound, H/O echocardiogram, H/O echocardiogram, H/O echocardiogram, H/O echocardiogram, History of complete ECG, History of nuclear stress test, Hx of cardiovascular stress test, HX OTHER MEDICAL, Hyperlipidemia, Hypertension, Mild intermittent asthma, Moderate COPD (chronic obstructive pulmonary disease) ([address] Nausea & vomiting, Obstructive sleep apnea, Post PTCA, Pulmonary HTN ([address] PVD (peripheral vascular disease) ([facility] S/P CABG x 3, S/P PTCA (percutaneous transluminal coronary angioplasty), Severe pulmonary hypertension ([facility] Shortness of breath, Thyroid disease, Unspecified cerebral artery occlusion with cerebral infarction, 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 Past surgical history:   has a past surgical history that includes Appendectomy (1941); Tonsillectomy (1950's); Cardiac surgery (4/09); Hysterectomy, total abdominal (1990's); Colonoscopy (In 2000's); pacemaker placement; Coronary artery bypass graft (4/8/2009); Coronary angioplasty with stent (4/21/2010); other surgical history; Middle ear surgery; and Percutaneous Transluminal Coronary Angio (11/2012). Social History:   reports that she quit smoking about 52 years ago. Her smoking use included cigarettes. She has a 1.25 pack-year smoking history. She has never used smokeless tobacco. She reports that she does not currently use alcohol. She reports that she does not use drugs. Family history:  family history includes Arthritis in her mother; Cancer in her mother and sister; Coronary Art Dis in her father; Depression in her mother and sister; Early Death in her paternal grandfather; Early Death (age of onset: 36) in her father; Hearing Loss in her mother; Heart Disease in her father and sister; High Blood Pressure in her father, mother, sister, son, and son; High Cholesterol in her father and mother; Mental Illness in her mother; Enma Candy / Suzon Bologna in her mother; Other in her daughter.     Allergies   Allergen Reactions    Darvocet [Propoxyphene N-Acetaminophen]     Ranexa [Ranolazine Er]      Sick to her stomach    Ranolazine      Sick to her stomach    Sulfa Antibiotics Itching    Sulfasalazine Itching    Adhesive Tape Rash    Allantoin Rash    Bacitracin Rash    Gramicidin Rash Neomycin Rash    Polymyxin B Rash    Pramoxine Hcl Rash    Silicone Rash         Objective:   BP (!) 108/54   Pulse 60   Temp 98.6 °F (37 °C) (Oral)   Resp 23   Ht 4' 11\" (1.499 m)   Wt 139 lb 6.4 oz (63.2 kg)   SpO2 98%   BMI 28.16 kg/m²     Intake/Output Summary (Last 24 hours) at 3/5/2023 1530  Last data filed at 3/5/2023 1000  Gross per 24 hour   Intake 600 ml   Output 1200 ml   Net -600 ml         TELEMETRY:      Physical Exam:  Constitutional:  Well developed, Well nourished, No acute distress, Non-toxic appearance. HENT:  Normocephalic, Atraumatic, Bilateral external ears normal, Oropharynx moist, No oral exudates, Nose normal. Neck- Normal range of motion, No tenderness, Supple, No stridor. Eyes:  PERRL, EOMI, Conjunctiva normal, No discharge. Respiratory:  Normal breath sounds, No respiratory distress, No wheezing, No chest tenderness. ,no use of accessory muscles, diaphragm movement is normal  Cardiovascular: (PMI) apex non displaced,no lifts no thrills, no s3,no s4, Normal heart rate, Normal rhythm, No murmurs, No rubs, No gallops. Carotid arteries pulse and amplitude are normal no bruit, no abdominal bruit noted ( normal abdominal aorta ausculation), femoral arteries pulse and amplitude are normal no bruit, pedal pulses are normal  GI:  Bowel sounds normal, Soft, No tenderness, No masses, No pulsatile masses. : External genitalia appear normal, No masses or lesions. No discharge. No CVA tenderness. Musculoskeletal:  Intact distal pulses, No edema, No tenderness, No cyanosis, No clubbing. Good range of motion in all major joints. No tenderness to palpation or major deformities noted. Back- No tenderness. Integument:  Warm, Dry, No erythema, No rash. Lymphatic:  No lymphadenopathy noted. Neurologic:  Alert & oriented x 3, Normal motor function, Normal sensory function, No focal deficits noted.    Psychiatric:  Affect normal, Judgment normal, Mood normal.     Medications:    midodrine 2.5 mg Oral TID     docusate sodium  100 mg Oral BID    senna  1 tablet Oral BID    [Held by provider] carvedilol  3.125 mg Oral BID     aspirin  81 mg Oral Daily    atorvastatin  10 mg Oral Nightly    levothyroxine  88 mcg Oral Daily    furosemide  40 mg IntraVENous BID    tiotropium-olodaterol  2 puff Inhalation Daily    sodium chloride flush  5-40 mL IntraVENous 2 times per day    enoxaparin  40 mg SubCUTAneous QPM    fluticasone  1 puff Inhalation BID    lactulose  10 g Oral TID      sodium chloride       albuterol sulfate HFA, sodium chloride flush, sodium chloride, potassium chloride, magnesium sulfate, ondansetron **OR** ondansetron, polyethylene glycol, acetaminophen **OR** acetaminophen    Lab Data:  CBC:   Recent Labs     03/02/23  1630 03/04/23  1406 03/05/23  0108   WBC 8.6 9.0 9.8   HGB 12.7 12.1* 12.0*   HCT 41.5 39.3 40.6   .5* 103.7* 106.6*    337 307       BMP:   Recent Labs     03/02/23  1630 03/04/23  1406 03/05/23  0108   * 133* 137   K 4.5 4.5 4.5   CL 88* 89* 91*   CO2 39* 39* 38*   BUN 21 21 21   CREATININE 0.7 0.8 0.9       LIVER PROFILE:   Recent Labs     03/02/23  1630 03/04/23  1406 03/05/23  0108   AST 30 27 41*   ALT 21 19 22   BILITOT 1.0 1.2* 1.0   ALKPHOS 205* 209* 222*       PT/INR:   Recent Labs     03/04/23  1406   PROTIME 16.7*   INR 1.29       APTT: No results for input(s): APTT in the last 72 hours. BNP:  No results for input(s): BNP in the last 72 hours. TROPONIN: @TROPONINI:3@      Assessment:  80 y. o.year old who is admitted for          Plan:  Acute decompensated congestive heart failure with history of cor pulmonale severe LV dilatation and severe pulmonary hypertension, admit to ICU stepdown, started IV Lasix drip in, her previous right heart catheterization report reviewed from 2019, her pulmonary cavity wedge pressure at that time was 17 RA pressure was 13 PA pressure was 32 mean RV pressure mean was 11 however this was 2019 she may have cor pulmonale and severe pulm hypertension  COPD not controlled recommend to see pulmonary  CAD stable history of bypass and PCI of RCA in 2012 and LIMA to LAD stent in 2010 bypass surgery was 2009, continue aspirin statin beta-blocker  All labs, medications and tests reviewed, continue all other medications of all above medical condition listed as is.       Arabella Greer MD, MD 3/5/2023 3:30 PM

## 2023-03-05 NOTE — ACP (ADVANCE CARE PLANNING)
Advance care planning    After consent from patient whose mentation was better today, initiated discussion with Amy and Lazarus Grizzle (son, patient resides with him) in the presence of patient. Bonita, who lives in [place] who is power of  was also notified of this discussion. Kimberli Birmingham is a 80-year-old female with multiple comorbidities and repeated hospitalization for acute on chronic diastolic heart failure with underlying severe pulmonary hypertension and right-sided involvement. Apparently per Amy and Lazarus Grizzle, they were already discussing the possibility of hospice with their outpatient cardiologist due to progressive decline in patient's quality of life over the past 1-2 years. I started the conversation by explaining the underlying disease process, they expressed understanding. All related questions were answered to the best of my ability. Agree with do not resuscitate status. They are agreeable to continue current management without any addition of invasive procedures but would like to initiate discussion with hospice services. Hence will consult Dr. Seferino Schwab. Advised to call Garret Wright and ask her to be present for the meeting.     Total time spent 21mins

## 2023-03-05 NOTE — PROGRESS NOTES
CM in to see the pt to discuss Hospice consult. The pt was aware of Hospice consult and requested SW to contact children to discuss provider choice. The pt resides with son, Cheriserjuanita Tong, pt's daughter, Sunday Line is POA. CM attempt to contact pt's son, [phone], no answer. CM left HIPAA complaint vm requesting return call. CM contacted pt's daughter, Sunday Line, [phone]. Bonita vocalized interest in [employer]. CM placed referral with Brittny with [employer]. CM to remains available to the family as needed.      Tera Hashimoto, MSSW, LSW

## 2023-03-05 NOTE — PROGRESS NOTES
V2.0  [facility] Hospitalist Progress Note      Name:  Mary Jane Gonzalez /Age/Sex: 3/18/1929  (80 y.o. female)   MRN & CSN:  [id number] & [id number] Encounter Date/Time: 3/5/2023 3:30 PM EST    Location:  -A PCP: Suszanne Habermann, MD       Hospital Day: 4    Assessment and Plan:   Mary Jane Gonzalez is a 80 y.o. female with past medical history of coronary artery disease status post CABG in  and PCI in , [facility] due to GI bleed, heart failure with preserved ejection fraction, permanent pacemaker (MRI safe Medtronic), hypertension, obstructive sleep apnea on CPAP, pulmonary hypertension, Cirrhosis is admitted at [facility] on 3/2/23 after being sent from cardiologist's office due to worsening shortness of breath. Acute metabolic encephalopathy- Improved  Secondary to respiratory failure w/ hypercapnia   Subclinical hypothyroidism on thyroid function panel  No need of imaging due to lack of focal neurological deficits and improvement in mentation. Ammonia level 60, doubt that it is the probable cause. Acute on chronic hypoxic hypercapnic respiratory failure  Secondary to acute on chronic diastolic heart failure and underlying severe pulmonary hypertension and right sided involvement  Improving  Echocardiogram 2022: Ejection fraction greater than 60%, dilated right side of the heart, RVSP 52 mmHg.   proBNP 1464  Chest x-ray with pulmonary edema and severe cardiomegaly  Continue IV Lasix 40 mg BID  Measure daily weight and strict intake and output (9 pound weight loss since admission)  Cardiology following    Urinary retention  Carnes's catheter placed for bladder scan above 1 L yesterday  Keep Carnes's catheter, perform voiding trial before discharge    Coronary artery disease status post CABG in  and PCI in   Continue Aspirin and Statin    Paroxysmal atrial fibrillation not on anticoagulation due to GI bleed  Permanent pacemaker (MRI safe Medtronic)  Monitor on telemetry  Not on AC due to chronic anemia but stable Hb so far, defer to primary cardiologist for final decision. 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  No formal work-up performed or at least I do not see any documentation  No evidence of ascites on examination  Continue Lasix  Low salt diet and fluid restriction  Recommend outpatient work-up if aggressive management pursued    Constipation   No BM yesterday or overnight  Optimize bowel regimen  Will continue lactulose to target 2-3 BMs/day     Hypothyroidism  Continue Synthroid    PT/OT: recommended SNF, will discuss with family  Consults: Cardiology    Diet ADULT DIET; Regular; Low Sodium (2 gm); 1800 ml   DVT Prophylaxis [x] Lovenox, []  Heparin, [] SCDs, [] Ambulation,  [] Eliquis, [] Xarelto  [] Coumadin   Code Status DNR-CCA   Disposition From: Home  Expected Disposition: To be decided  Estimated Date of Discharge: 2 days  Patient requires continued admission due to improvement in volume status and discussion with hospice   Surrogate Decision Maker/ POA Daughter     Subjective:     Chief Complaint: Shortness of breath    Selena Ortiz is a 80 y.o. female who presents with worsening shortness of breath and was sent from her cardiologist office for further evaluation and management. Patient was seen and evaluated bedside earlier this morning. Patient was alert oriented x3, with waxing and waning mentation. Patient was hemodynamically stable, with low normal BP trend.   Discussed with patient regarding family's discussion about hospice, she agreed to no invasive procedures or advancing further management. Objective: Intake/Output Summary (Last 24 hours) at 3/5/2023 0809  Last data filed at 3/5/2023 0640  Gross per 24 hour   Intake 600 ml   Output 1200 ml   Net -600 ml        Vitals:   Vitals:    03/05/23 0640   BP:    Pulse: 64   Resp: (!) 31   Temp:    SpO2:        Physical Exam:   Physical Exam  Vitals reviewed. Constitutional:       Appearance: Normal appearance. She is obese. HENT:      Head: Normocephalic and atraumatic. Nose: Nose normal.      Mouth/Throat:      Mouth: Mucous membranes are dry. Pharynx: Oropharynx is clear. Eyes:      General: No scleral icterus. Conjunctiva/sclera: Conjunctivae normal.   Cardiovascular:      Rate and Rhythm: Normal rate. Pulses: Normal pulses. Heart sounds: Murmur heard. Comments: Paced rhythm  Pulmonary:      Effort: Pulmonary effort is normal.      Breath sounds: No wheezing, rhonchi or rales. Abdominal:      General: Bowel sounds are normal. There is no distension. Palpations: Abdomen is soft. Tenderness: There is no abdominal tenderness. Musculoskeletal:         General: No deformity. Normal range of motion. Cervical back: Neck supple. No rigidity. Right lower leg: Edema present. Left lower leg: Edema present. Skin:     Coloration: Skin is not jaundiced or pale. Neurological:      General: No focal deficit present. Mental Status: She is alert and oriented to person, place, and time. Mental status is at baseline.           Medications:   Medications:    midodrine  2.5 mg Oral TID WC    docusate sodium  100 mg Oral BID    senna  1 tablet Oral BID    [Held by provider] carvedilol  3.125 mg Oral BID WC    aspirin  81 mg Oral Daily    atorvastatin  10 mg Oral Nightly    levothyroxine  88 mcg Oral Daily    furosemide  40 mg IntraVENous BID    tiotropium-olodaterol  2 puff Inhalation Daily    sodium chloride flush  5-40 mL IntraVENous 2 times per day    enoxaparin  40 mg SubCUTAneous QPM    fluticasone  1 puff Inhalation BID    lactulose  10 g Oral TID      Infusions:    sodium chloride       PRN Meds: albuterol sulfate HFA, 2 puff, Q6H PRN  sodium chloride flush, 5-40 mL, PRN  sodium chloride, , PRN  potassium chloride, 10 mEq, PRN  magnesium sulfate, 2,000 mg, PRN  ondansetron, 4 mg, Q8H PRN   Or  ondansetron, 4 mg, Q6H PRN  polyethylene glycol, 17 g, Daily PRN  acetaminophen, 650 mg, Q6H PRN   Or  acetaminophen, 650 mg, Q6H PRN      Labs      Recent Results (from the past 24 hour(s))   Protime-INR    Collection Time: 03/04/23  2:06 PM   Result Value Ref Range    Protime 16.7 (H) 11.7 - 14.5 SECONDS    INR 1.29 INDEX   Comprehensive Metabolic Panel w/ Reflex to MG    Collection Time: 03/04/23  2:06 PM   Result Value Ref Range    Sodium 133 (L) 135 - 145 MMOL/L    Potassium 4.5 3.5 - 5.1 MMOL/L    Chloride 89 (L) 99 - 110 mMol/L    CO2 39 (H) 21 - 32 MMOL/L    BUN 21 6 - 23 MG/DL    Creatinine 0.8 0.6 - 1.1 MG/DL    Est, Glom Filt Rate >60 >60 mL/min/1.73m2    Glucose 152 (H) 70 - 99 MG/DL    Calcium 10.0 8.3 - 10.6 MG/DL    Albumin 4.2 3.4 - 5.0 GM/DL    Total Protein 5.8 (L) 6.4 - 8.2 GM/DL    Total Bilirubin 1.2 (H) 0.0 - 1.0 MG/DL    ALT 19 10 - 40 U/L    AST 27 15 - 37 IU/L    Alkaline Phosphatase 209 (H) 40 - 129 IU/L    Anion Gap 5 4 - 16   ANEMIA PANEL    Collection Time: 03/04/23  2:06 PM   Result Value Ref Range    WBC 9.0 4.0 - 10.5 K/CU MM    RBC 3.79 (L) 4.2 - 5.4 M/CU MM    Hemoglobin 12.1 (L) 12.5 - 16.0 GM/DL    Hematocrit 39.3 37 - 47 %    .7 (H) 78 - 100 FL    MCH 31.9 (H) 27 - 31 PG    MCHC 30.8 (L) 32.0 - 36.0 %    RDW 17.7 (H) 11.7 - 14.9 %    Platelets 944 664 - 483 K/CU MM    MPV 11.0 7.5 - 11.1 FL    Differential Type AUTOMATED DIFFERENTIAL     Segs Relative 82.8 (H) 36 - 66 %    Lymphocytes % 5.2 (L) 24 - 44 %    Monocytes % 7.0 (H) 0 - 4 %    Eosinophils % 3.8 (H) 0 - 3 %    Basophils % 0.6 0 - 1 %    Segs Absolute 7.4 K/CU MM    Lymphocytes Absolute 0.5 K/CU MM    Monocytes Absolute 0.6 K/CU MM    Eosinophils Absolute 0.3 K/CU MM    Basophils Absolute 0.1 K/CU MM    Nucleated RBC % 0.0 %    Total Nucleated RBC 0.0 K/CU MM    Total Immature Neutrophil 0.05 K/CU MM    Immature Neutrophil % 0.6 (H) 0 - 0.43 %    Vitamin B-12 >2000 (H) 211 - 911 pg/ml    Folate >20.0 (H) 3.1 - 17.5 NG/ML    Iron 56 37 - 145 ug/dL    UIBC 320 110 - 370 ug/dL    TIBC 376 250 - 450 ug/dL    Transferrin % 15 10 - 44 %    Retic Ct Pct 3.2 (H) 0.2 - 2.20 %    Ferritin 68 15 - 150 NG/ML   CBC with Auto Differential    Collection Time: 03/05/23  1:08 AM   Result Value Ref Range    WBC 9.8 4.0 - 10.5 K/CU MM    RBC 3.81 (L) 4.2 - 5.4 M/CU MM    Hemoglobin 12.0 (L) 12.5 - 16.0 GM/DL    Hematocrit 40.6 37 - 47 %    .6 (H) 78 - 100 FL    MCH 31.5 (H) 27 - 31 PG    MCHC 29.6 (L) 32.0 - 36.0 %    RDW 17.6 (H) 11.7 - 14.9 %    Platelets 056 955 - 562 K/CU MM    MPV 10.5 7.5 - 11.1 FL    Differential Type AUTOMATED DIFFERENTIAL     Segs Relative 76.5 (H) 36 - 66 %    Lymphocytes % 7.1 (L) 24 - 44 %    Monocytes % 10.9 (H) 0 - 4 %    Eosinophils % 4.3 (H) 0 - 3 %    Basophils % 0.6 0 - 1 %    Segs Absolute 7.5 K/CU MM    Lymphocytes Absolute 0.7 K/CU MM    Monocytes Absolute 1.1 K/CU MM    Eosinophils Absolute 0.4 K/CU MM    Basophils Absolute 0.1 K/CU MM    Nucleated RBC % 0.0 %    Total Nucleated RBC 0.0 K/CU MM    Total Immature Neutrophil 0.06 K/CU MM    Immature Neutrophil % 0.6 (H) 0 - 0.43 %   Comprehensive Metabolic Panel w/ Reflex to MG    Collection Time: 03/05/23  1:08 AM   Result Value Ref Range    Sodium 137 135 - 145 MMOL/L    Potassium 4.5 3.5 - 5.1 MMOL/L    Chloride 91 (L) 99 - 110 mMol/L    CO2 38 (H) 21 - 32 MMOL/L    BUN 21 6 - 23 MG/DL    Creatinine 0.9 0.6 - 1.1 MG/DL    Est, Glom Filt Rate 60 (L) >60 mL/min/1.73m2    Glucose 97 70 - 99 MG/DL    Calcium 10.1 8.3 - 10.6 MG/DL    Albumin 3.9 3.4 - 5.0 GM/DL Total Protein 5.6 (L) 6.4 - 8.2 GM/DL    Total Bilirubin 1.0 0.0 - 1.0 MG/DL    ALT 22 10 - 40 U/L    AST 41 (H) 15 - 37 IU/L    Alkaline Phosphatase 222 (H) 40 - 128 IU/L    Anion Gap 8 4 - 16        Imaging/Diagnostics Last 24 Hours   XR CHEST (2 VW)    Result Date: 3/2/2023  EXAMINATION: TWO XRAY VIEWS OF THE CHEST 3/2/2023 6:05 pm COMPARISON: Chest x-ray and CT chest December 15, 2022 HISTORY: ORDERING SYSTEM PROVIDED HISTORY: Dyspnea TECHNOLOGIST PROVIDED HISTORY: Reason for exam:->Dyspnea Reason for Exam: Dyspnea Additional signs and symptoms: na Relevant Medical/Surgical History: CHF FINDINGS: Cardiac silhouette is enlarged but grossly stable. Postoperative changes of CABG procedure and median sternotomy. Atherosclerotic changes of the aorta. Left-sided cardiac pacer device in place. Silhouetting of the hemidiaphragms bilaterally suggestive of small effusions and atelectasis. Linear atelectasis at the right mid lung. Central pulmonary vascular congestion. 1. Cardiomegaly with central pulmonary vascular congestion and suspected small effusions and atelectasis. XR ABDOMEN (KUB) (SINGLE AP VIEW)    Result Date: 3/3/2023  EXAMINATION: ONE SUPINE XRAY VIEW(S) OF THE ABDOMEN 3/3/2023 10:48 am COMPARISON: 12/16/2022. HISTORY: ORDERING SYSTEM PROVIDED HISTORY: abdominal pain TECHNOLOGIST PROVIDED HISTORY: Reason for exam:->abdominal pain Reason for Exam: abdominal pain, distension FINDINGS: Nonspecific nonobstructive bowel gas pattern is seen. There is moderate amount of stool noted in the proximal colon. Evaluation of free air is limited on this supine view. There is levoscoliosis of the lumbar spine. Partially visualized are changes of prior open heart surgery and cardiac conduction device leads. Nonspecific nonobstructive bowel gas pattern with a moderate amount of stool noted in the proximal colon.        Electronically signed by Shiloh Aly MD on 3/5/2023 at 8:09 AM

## 2023-03-05 NOTE — PROGRESS NOTES
Occupational Therapy    Occupational Therapy Treatment Note    Name: Meagan Patiño MRN: [id number] :   3/18/1929   Date:  3/5/2023   Admission Date: 3/2/2023 Room:  -A     Restrictions/Precautions:          fall risk, general precautions    Communication with other providers: RN    Subjective:  Patient states: \"My food is going to get cold\"  Pain:   Location, Type, Intensity (0/10 to 10/10):  denies    Objective:    Observation: supine in bed, agreeable. Bowel incontinence while supine prior to therapist's arrival  Objective Measures:  stable vitals on 3L O2    Treatment, including education:    Self Care Training:   Cues were given for safety, sequence, UE/LE placement, visual cues, and balance. Activities performed today included UB/LB dressing tasks, toileting, hand hygiene at sink    Max A for tucker care/LB sponge bathing while supine. Max A to don socks while seated EOB. Toilet transfer stand to sit w/ CGA, STS w/ min A and Vcs for hand placement. Max A tucker care/hygiene and LB sponge bathing while standing. Mod A to doff/down gown while seated. Mod A to don depends while sitting/standing, Vcs for initiation and participation. CGA standing at sink to wash hands. Pt required frequent Vcs throughout ADL tasks for sequencing and attention to task. Therapeutic Activity Training:   Therapeutic activity training was instructed today. Cues were given for safety, sequence, UE/LE placement, awareness, and balance. Activities performed today included bed mobility training, sup-sit, sit-stand, ambulation. Supine to sitting EOB w/ min A. STS from EOB w/ CGA. Ambulated functional distance to/from BR using RW w/ CGA. Vcs for sequencing/safety and RW mgmt + pathway negotiation. Stand to sit to recliner w/ CGA, Vcs for hand placement and controlled descent. Left seated in recliner, all needs met. RN present.     Assessment / Impression:    Patient's tolerance of treatment: Well  Adverse Reaction: None  Significant change in status and impact: None  Barriers to improvement: weakness, activity tolerance, safety/cognition      Plan for Next Session:    Continue w/ OT POC and functional goals, address safety/independence w/ ADLs and transfers/mobility.        Time in:  0856  Time out:  0936  Timed treatment minutes:  40  Total treatment time:  40      Electronically signed by:    Shaun Howell OT,   3/5/2023, 10:20 AM

## 2023-03-06 ENCOUNTER — APPOINTMENT (OUTPATIENT)
Dept: GENERAL RADIOLOGY | Age: 88
DRG: 291 | End: 2023-03-06
Payer: MEDICARE

## 2023-03-06 LAB
ALBUMIN SERPL-MCNC: 4 GM/DL (ref 3.4–5)
ALP BLD-CCNC: 215 IU/L (ref 40–128)
ALT SERPL-CCNC: 21 U/L (ref 10–40)
AMMONIA: 88 UMOL/L (ref 11–51)
ANION GAP SERPL CALCULATED.3IONS-SCNC: 9 MMOL/L (ref 4–16)
AST SERPL-CCNC: 32 IU/L (ref 15–37)
BILIRUB SERPL-MCNC: 1.3 MG/DL (ref 0–1)
BUN SERPL-MCNC: 19 MG/DL (ref 6–23)
CALCIUM SERPL-MCNC: 10 MG/DL (ref 8.3–10.6)
CHLORIDE BLD-SCNC: 88 MMOL/L (ref 99–110)
CO2: 37 MMOL/L (ref 21–32)
CREAT SERPL-MCNC: 0.8 MG/DL (ref 0.6–1.1)
GFR SERPL CREATININE-BSD FRML MDRD: >60 ML/MIN/1.73M2
GLUCOSE SERPL-MCNC: 108 MG/DL (ref 70–99)
POTASSIUM SERPL-SCNC: 4.3 MMOL/L (ref 3.5–5.1)
SODIUM BLD-SCNC: 134 MMOL/L (ref 135–145)
TOTAL PROTEIN: 5.6 GM/DL (ref 6.4–8.2)

## 2023-03-06 PROCEDURE — 94660 CPAP INITIATION&MGMT: CPT

## 2023-03-06 PROCEDURE — 2580000003 HC RX 258: Performed by: STUDENT IN AN ORGANIZED HEALTH CARE EDUCATION/TRAINING PROGRAM

## 2023-03-06 PROCEDURE — 82140 ASSAY OF AMMONIA: CPT

## 2023-03-06 PROCEDURE — 6360000002 HC RX W HCPCS: Performed by: STUDENT IN AN ORGANIZED HEALTH CARE EDUCATION/TRAINING PROGRAM

## 2023-03-06 PROCEDURE — 1200000000 HC SEMI PRIVATE

## 2023-03-06 PROCEDURE — 71045 X-RAY EXAM CHEST 1 VIEW: CPT

## 2023-03-06 PROCEDURE — 6370000000 HC RX 637 (ALT 250 FOR IP): Performed by: STUDENT IN AN ORGANIZED HEALTH CARE EDUCATION/TRAINING PROGRAM

## 2023-03-06 PROCEDURE — 94761 N-INVAS EAR/PLS OXIMETRY MLT: CPT

## 2023-03-06 PROCEDURE — 2700000000 HC OXYGEN THERAPY PER DAY

## 2023-03-06 PROCEDURE — 80053 COMPREHEN METABOLIC PANEL: CPT

## 2023-03-06 PROCEDURE — 36415 COLL VENOUS BLD VENIPUNCTURE: CPT

## 2023-03-06 RX ORDER — FUROSEMIDE 40 MG/1
40 TABLET ORAL DAILY
Status: DISCONTINUED | OUTPATIENT
Start: 2023-03-07 | End: 2023-03-08 | Stop reason: HOSPADM

## 2023-03-06 RX ADMIN — FUROSEMIDE 40 MG: 10 INJECTION, SOLUTION INTRAMUSCULAR; INTRAVENOUS at 17:13

## 2023-03-06 RX ADMIN — FUROSEMIDE 40 MG: 10 INJECTION, SOLUTION INTRAMUSCULAR; INTRAVENOUS at 08:45

## 2023-03-06 RX ADMIN — FLUTICASONE PROPIONATE 1 PUFF: 110 AEROSOL, METERED RESPIRATORY (INHALATION) at 08:09

## 2023-03-06 RX ADMIN — LACTULOSE 10 G: 10 SOLUTION ORAL at 13:43

## 2023-03-06 RX ADMIN — SODIUM CHLORIDE, PRESERVATIVE FREE 10 ML: 5 INJECTION INTRAVENOUS at 08:41

## 2023-03-06 RX ADMIN — DOCUSATE SODIUM 100 MG: 100 CAPSULE, LIQUID FILLED ORAL at 20:43

## 2023-03-06 RX ADMIN — MIDODRINE HYDROCHLORIDE 2.5 MG: 5 TABLET ORAL at 17:14

## 2023-03-06 RX ADMIN — SENNOSIDES 8.6 MG: 8.6 TABLET, COATED ORAL at 20:43

## 2023-03-06 RX ADMIN — MIDODRINE HYDROCHLORIDE 2.5 MG: 5 TABLET ORAL at 08:40

## 2023-03-06 RX ADMIN — DOCUSATE SODIUM 100 MG: 100 CAPSULE, LIQUID FILLED ORAL at 08:40

## 2023-03-06 RX ADMIN — LEVOTHYROXINE SODIUM 88 MCG: 0.09 TABLET ORAL at 08:40

## 2023-03-06 RX ADMIN — SENNOSIDES 8.6 MG: 8.6 TABLET, COATED ORAL at 08:40

## 2023-03-06 RX ADMIN — LACTULOSE 10 G: 10 SOLUTION ORAL at 20:43

## 2023-03-06 RX ADMIN — ASPIRIN 81 MG CHEWABLE TABLET 81 MG: 81 TABLET CHEWABLE at 08:40

## 2023-03-06 RX ADMIN — ENOXAPARIN SODIUM 40 MG: 100 INJECTION SUBCUTANEOUS at 17:14

## 2023-03-06 RX ADMIN — LACTULOSE 10 G: 10 SOLUTION ORAL at 08:40

## 2023-03-06 RX ADMIN — ATORVASTATIN CALCIUM 10 MG: 10 TABLET, FILM COATED ORAL at 20:43

## 2023-03-06 RX ADMIN — MIDODRINE HYDROCHLORIDE 2.5 MG: 5 TABLET ORAL at 13:43

## 2023-03-06 ASSESSMENT — PAIN SCALES - GENERAL
PAINLEVEL_OUTOF10: 0
PAINLEVEL_OUTOF10: 0

## 2023-03-06 NOTE — FLOWSHEET NOTE
4 Eyes Skin Assessment     NAME:  Daniel Lin OF BIRTH:  3/18/1929  MEDICAL RECORD NUMBER:  [id number]    The patient is being assessed for  Transfer to New Unit    I agree that One RN has performed a thorough Head to Toe Skin Assessment on the patient. ALL assessment sites listed below have been assessed. Areas assessed by both nurses:    Head, Face, Ears, Shoulders, Back, Chest, Arms, Elbows, Hands, Sacrum. Buttock, Coccyx, Ischium, and Legs. Feet and Heels        Does the Patient have a Wound?  No noted wound(s)       Cruz Prevention initiated by RN: NA   Wound Care Orders initiated by RN: NA    Pressure Injury (Stage 3,4, Unstageable, DTI, NWPT, and Complex wounds) if present, place referral order by RN under : NA    New and Established Ostomies, if present place, referral order under : NA      Nurse 1 eSignature: Electronically signed by Yanna Cesar RN on 3/6/23 at 6:39 PM EST    **SHARE this note so that the co-signing nurse can place an eSignature**    Nurse 2 eSignature: Electronically signed by Andrés Ambrosio LPN on 0/8/24 at 1:01 PM EST

## 2023-03-06 NOTE — PROGRESS NOTES
V2.0  [facility] [place]      Name:  Nela Sue /Age/Sex: 3/18/1929  (80 y.o. female)   MRN & CSN:  [id number] & [id number] Encounter Date/Time: 3/6/2023 3:30 PM EST    Location:  -A PCP: Gal Gagnon MD       Hospital Day: 5    Assessment and Plan:   Nela Sue is a 80 y.o. female with past medical history of coronary artery disease status post CABG in  and PCI in , atrial fibrillation not on [facility] due to GI bleed, heart failure with preserved ejection fraction, permanent pacemaker (MRI safe Medtronic), hypertension, obstructive sleep apnea on CPAP, pulmonary hypertension, Cirrhosis is admitted at [facility] on 3/2/23 after being sent from cardiologist's office due to worsening shortness of breath. Acute metabolic encephalopathy- Improved  Secondary to respiratory failure w/ hypercapnia   Subclinical hypothyroidism on thyroid function panel  No need of imaging due to lack of focal neurological deficits and improvement in mentation. Ammonia level 60>88, no significant evidence of hepatic encephalopathy    Acute on chronic hypoxic hypercapnic respiratory failure  Secondary to acute on chronic diastolic heart failure and underlying severe pulmonary hypertension and right sided involvement  Improving  Echocardiogram 2022: Ejection fraction greater than 60%, dilated right side of the heart, RVSP 52 mmHg.   proBNP 1464  Repeat CXR  Repeat VBG  Continue IV Lasix 40 mg BID  Measure daily weight and strict intake and output (9 pound weight loss since admission)  Cardiology following    Urinary retention  Keep Carnes's catheter, perform voiding trial before discharge    Coronary artery disease status post CABG in  and PCI in   Continue Aspirin and Statin    Paroxysmal atrial fibrillation not on anticoagulation due to GI bleed  Permanent pacemaker (MRI safe Medtronic)  Monitor on telemetry  Not on AC due to chronic anemia but stable Hb so far, defer to primary cardiologist for final decision. 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 No BM overnight  Optimize bowel regimen, will order Lactulose enema   Discussed with RN  Will continue lactulose to target 2-3 BMs/day     Hypothyroidism  Continue Synthroid    PT/OT: recommended SNF  Consults: Cardiology, Hospice  Family wants to discuss with Hospice, pending discussion. Diet ADULT DIET; Regular; Low Sodium (2 gm); 1800 ml   DVT Prophylaxis [x] Lovenox, []  Heparin, [] SCDs, [] Ambulation,  [] Eliquis, [] Xarelto  [] Coumadin   Code Status DNR-CCA   Disposition From: Home  Expected Disposition: To be decided  Estimated Date of Discharge: 2 days  Patient requires continued admission due to improvement in volume status and discussion with hospice   Surrogate Decision Maker/ POA Daughter     Subjective:     Chief Complaint: Shortness of breath    Adán Johnson is a 80 y.o. female who presents with worsening shortness of breath and was sent from her cardiologist office for further evaluation and management. Patient was seen and evaluated at bedside earlier this am. Patient was on Bipap and sleeping.  No significant change in mentation from yesterday. She is pleasant and conversational.   Hemodynamically remained stable. No acute overnight events noted.     Objective:     Intake/Output Summary (Last 24 hours) at 3/6/2023 0756  Last data filed at 3/5/2023 1800  Gross per 24 hour   Intake 480 ml   Output 350 ml   Net 130 ml        Vitals:   Vitals:    03/06/23 0405   BP:    Pulse: 63   Resp:    Temp:    SpO2:        Physical Exam:   Physical Exam  Vitals reviewed.   Constitutional:       Appearance: Normal appearance. She is obese.   HENT:      Head: Normocephalic and atraumatic.      Nose: Nose normal.      Mouth/Throat:      Mouth: Mucous membranes are dry.      Pharynx: Oropharynx is clear.   Eyes:      General: No scleral icterus.     Conjunctiva/sclera: Conjunctivae normal.   Cardiovascular:      Rate and Rhythm: Normal rate.      Pulses: Normal pulses.      Heart sounds: Murmur heard.      Comments: Paced rhythm  Pulmonary:      Effort: Pulmonary effort is normal.      Breath sounds: No wheezing, rhonchi or rales.   Abdominal:      General: Bowel sounds are normal. There is no distension.      Palpations: Abdomen is soft.      Tenderness: There is no abdominal tenderness.   Musculoskeletal:         General: No deformity. Normal range of motion.      Cervical back: Neck supple. No rigidity.      Right lower leg: Edema present.      Left lower leg: Edema present.   Skin:     Coloration: Skin is not jaundiced or pale.   Neurological:      General: No focal deficit present.      Mental Status: She is alert and oriented to person, place, and time. Mental status is at baseline.          Medications:   Medications:    lactulose enema   Rectal Once    midodrine  2.5 mg Oral TID WC    docusate sodium  100 mg Oral BID    senna  1 tablet Oral BID    [Held by provider] carvedilol  3.125 mg Oral BID     aspirin  81 mg Oral Daily    atorvastatin  10 mg Oral Nightly    levothyroxine  88 mcg Oral Daily    furosemide  40 mg IntraVENous BID     tiotropium-olodaterol  2 puff Inhalation Daily    sodium chloride flush  5-40 mL IntraVENous 2 times per day    enoxaparin  40 mg SubCUTAneous QPM    fluticasone  1 puff Inhalation BID    lactulose  10 g Oral TID      Infusions:    sodium chloride       PRN Meds: albuterol sulfate HFA, 2 puff, Q6H PRN  sodium chloride flush, 5-40 mL, PRN  sodium chloride, , PRN  potassium chloride, 10 mEq, PRN  magnesium sulfate, 2,000 mg, PRN  ondansetron, 4 mg, Q8H PRN   Or  ondansetron, 4 mg, Q6H PRN  polyethylene glycol, 17 g, Daily PRN  acetaminophen, 650 mg, Q6H PRN   Or  acetaminophen, 650 mg, Q6H PRN      Labs      Recent Results (from the past 24 hour(s))   Comprehensive Metabolic Panel w/ Reflex to MG    Collection Time: 03/06/23  3:19 AM   Result Value Ref Range    Sodium 134 (L) 135 - 145 MMOL/L    Potassium 4.3 3.5 - 5.1 MMOL/L    Chloride 88 (L) 99 - 110 mMol/L    CO2 37 (H) 21 - 32 MMOL/L    BUN 19 6 - 23 MG/DL    Creatinine 0.8 0.6 - 1.1 MG/DL    Est, Glom Filt Rate >60 >60 mL/min/1.73m2    Glucose 108 (H) 70 - 99 MG/DL    Calcium 10.0 8.3 - 10.6 MG/DL    Albumin 4.0 3.4 - 5.0 GM/DL    Total Protein 5.6 (L) 6.4 - 8.2 GM/DL    Total Bilirubin 1.3 (H) 0.0 - 1.0 MG/DL    ALT 21 10 - 40 U/L    AST 32 15 - 37 IU/L    Alkaline Phosphatase 215 (H) 40 - 128 IU/L    Anion Gap 9 4 - 16   Ammonia    Collection Time: 03/06/23  3:19 AM   Result Value Ref Range    Ammonia 88 (H) 11 - 51 UMOL/L        Imaging/Diagnostics Last 24 Hours   XR CHEST (2 VW)    Result Date: 3/2/2023  EXAMINATION: TWO XRAY VIEWS OF THE CHEST 3/2/2023 6:05 pm COMPARISON: Chest x-ray and CT chest December 15, 2022 HISTORY: ORDERING SYSTEM PROVIDED HISTORY: Dyspnea TECHNOLOGIST PROVIDED HISTORY: Reason for exam:->Dyspnea Reason for Exam: Dyspnea Additional signs and symptoms: na Relevant Medical/Surgical History: CHF FINDINGS: Cardiac silhouette is enlarged but grossly stable. Postoperative changes of CABG procedure and median sternotomy.   Atherosclerotic changes of the aorta. Left-sided cardiac pacer device in place. Silhouetting of the hemidiaphragms bilaterally suggestive of small effusions and atelectasis. Linear atelectasis at the right mid lung. Central pulmonary vascular congestion. 1. Cardiomegaly with central pulmonary vascular congestion and suspected small effusions and atelectasis. XR ABDOMEN (KUB) (SINGLE AP VIEW)    Result Date: 3/3/2023  EXAMINATION: ONE SUPINE XRAY VIEW(S) OF THE ABDOMEN 3/3/2023 10:48 am COMPARISON: 12/16/2022. HISTORY: ORDERING SYSTEM PROVIDED HISTORY: abdominal pain TECHNOLOGIST PROVIDED HISTORY: Reason for exam:->abdominal pain Reason for Exam: abdominal pain, distension FINDINGS: Nonspecific nonobstructive bowel gas pattern is seen. There is moderate amount of stool noted in the proximal colon. Evaluation of free air is limited on this supine view. There is levoscoliosis of the lumbar spine. Partially visualized are changes of prior open heart surgery and cardiac conduction device leads. Nonspecific nonobstructive bowel gas pattern with a moderate amount of stool noted in the proximal colon.        Electronically signed by Tamia Das MD on 3/6/2023 at 7:56 AM

## 2023-03-06 NOTE — PROGRESS NOTES
Nutrition Education    Pt sleeping during visit. Eating Right With Less Salt left bedside. Contact name and number provided.     Vonnie Ross RD, [place]  Contact Number: [phone]

## 2023-03-06 NOTE — PROGRESS NOTES
Physical Therapy  Attempted to see pt for PT treatment. RN requested therapy hold as pt is planning to go hospice. Will monitor chart and see for therapy if decision changes.

## 2023-03-06 NOTE — PROGRESS NOTES
03/06/23 0933   Encounter Summary   Encounter Overview/Reason  Initial Encounter   Service Provided For: Patient   Referral/Consult From: [address] Family members   Last Encounter  03/06/23  (Patient in deep sleep; did not respond to verbal prompt.   Silent prayer given.)   Complexity of Encounter Low   Begin Time 0927   End Time  0934   Total Time Calculated 7 min   Encounter    Type Initial Screen/Assessment   Spiritual/Emotional needs   Type Spiritual Support   Assessment/Intervention/Outcome   Assessment Unable to assess   Plan and Referrals   Plan/Referrals Continue to visit, (comment)  (As needed)

## 2023-03-06 NOTE — PROGRESS NOTES
Today's plan: DC IV lasix, start Lasix 40 oral severe pulm hypertension    Admit Date:  3/2/2023    Subjective:      Chief complaints on admission  Chief Complaint   Patient presents with    Congestive Heart Failure     EMS called to cardiologist for low o2 saturation per staff - upon EMS arrival, staff states they never took a pulse ox reading. Pt wears 3L o2 at baseline and was 93% on RA upon arrival to ED. Pt denies shortness of breath. Leg Swelling     BLE         History of present illness:Irene is a 80 y. o.year old who  presents with had concerns including Congestive Heart Failure (EMS called to cardiologist for low o2 saturation per staff - upon EMS arrival, staff states they never took a pulse ox reading. Pt wears 3L o2 at baseline and was 93% on RA upon arrival to ED. Pt denies shortness of breath.) and Leg Swelling (BLE).       Past medical history:    has a past medical history of Age-related osteoporosis with current pathological fracture of vertebra (HCC), Arthritis, Bradycardia, CAD (coronary artery disease), Cardiac pacemaker, COPD, mild ([address] Cor pulmonale (chronic) ([address] CVA (cerebrovascular accident) ([address] DJD (degenerative joint disease) of cervical spine, Exhaustion of cardiac pacemaker battery, Family history of cardiovascular disease, Glaucoma, H/O 24 hour EKG monitoring, H/O cardiac catheterization, H/O cardiovascular stress test, H/O cardiovascular stress test, H/O cardiovascular stress test, H/O chest x-ray, H/O Doppler lower venous ultrasound, H/O Doppler ultrasound, H/O Doppler ultrasound, H/O Doppler ultrasound, H/O Doppler ultrasound, H/O echocardiogram, H/O echocardiogram, H/O echocardiogram, H/O echocardiogram, History of complete ECG, History of nuclear stress test, Hx of cardiovascular stress test, HX OTHER MEDICAL, Hyperlipidemia, Hypertension, Mild intermittent asthma, Moderate COPD (chronic obstructive pulmonary disease) ([address] Nausea & vomiting, Obstructive sleep apnea, Post PTCA, Pulmonary HTN ([facility] PVD (peripheral vascular disease) ([facility] S/P CABG x 3, S/P PTCA (percutaneous transluminal coronary angioplasty), Severe pulmonary hypertension ([facility] Shortness of breath, Thyroid disease, Unspecified cerebral artery occlusion with cerebral infarction, 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 Past surgical history:   has a past surgical history that includes Appendectomy (1941); Tonsillectomy (1950's); Cardiac surgery (4/09); Hysterectomy, total abdominal (1990's); Colonoscopy (In 2000's); pacemaker placement; Coronary artery bypass graft (4/8/2009); Coronary angioplasty with stent (4/21/2010); other surgical history; Middle ear surgery; and Percutaneous Transluminal Coronary Angio (11/2012). Social History:   reports that she quit smoking about 52 years ago. Her smoking use included cigarettes. She has a 1.25 pack-year smoking history. She has never used smokeless tobacco. She reports that she does not currently use alcohol. She reports that she does not use drugs. Family history:  family history includes Arthritis in her mother; Cancer in her mother and sister; Coronary Art Dis in her father; Depression in her mother and sister; Early Death in her paternal grandfather; Early Death (age of onset: 36) in her father; Hearing Loss in her mother; Heart Disease in her father and sister; High Blood Pressure in her father, mother, sister, son, and son; High Cholesterol in her father and mother; Mental Illness in her mother; Koltonya Junes / Carrillo Murraybe in her mother; Other in her daughter.     Allergies   Allergen Reactions    Darvocet [Propoxyphene N-Acetaminophen]     Ranexa [Ranolazine Er]      Sick to her stomach    Ranolazine      Sick to her stomach    Sulfa Antibiotics Itching    Sulfasalazine Itching    Adhesive Tape Rash    Allantoin Rash Bacitracin Rash    Gramicidin Rash    Neomycin Rash    Polymyxin B Rash    Pramoxine Hcl Rash    Silicone Rash         Objective:   BP (!) 107/58   Pulse 77   Temp 97.4 °F (36.3 °C) (Oral)   Resp 25   Ht 4' 11\" (1.499 m)   Wt 139 lb 6.4 oz (63.2 kg)   SpO2 100%   BMI 28.16 kg/m²     Intake/Output Summary (Last 24 hours) at 3/6/2023 1733  Last data filed at 3/5/2023 1800  Gross per 24 hour   Intake 120 ml   Output 350 ml   Net -230 ml         TELEMETRY:      Physical Exam:  Constitutional:  Well developed, Well nourished, No acute distress, Non-toxic appearance. HENT:  Normocephalic, Atraumatic, Bilateral external ears normal, Oropharynx moist, No oral exudates, Nose normal. Neck- Normal range of motion, No tenderness, Supple, No stridor. Eyes:  PERRL, EOMI, Conjunctiva normal, No discharge. Respiratory:  Normal breath sounds, No respiratory distress, No wheezing, No chest tenderness. ,no use of accessory muscles, diaphragm movement is normal  Cardiovascular: (PMI) apex non displaced,no lifts no thrills, no s3,no s4, Normal heart rate, Normal rhythm, No murmurs, No rubs, No gallops. Carotid arteries pulse and amplitude are normal no bruit, no abdominal bruit noted ( normal abdominal aorta ausculation), femoral arteries pulse and amplitude are normal no bruit, pedal pulses are normal  GI:  Bowel sounds normal, Soft, No tenderness, No masses, No pulsatile masses. : External genitalia appear normal, No masses or lesions. No discharge. No CVA tenderness. Musculoskeletal:  Intact distal pulses, No edema, No tenderness, No cyanosis, No clubbing. Good range of motion in all major joints. No tenderness to palpation or major deformities noted. Back- No tenderness. Integument:  Warm, Dry, No erythema, No rash. Lymphatic:  No lymphadenopathy noted. Neurologic:  Alert & oriented x 3, Normal motor function, Normal sensory function, No focal deficits noted.    Psychiatric:  Affect normal, Judgment normal, Mood normal.     Medications:    lactulose enema   Rectal Once    midodrine  2.5 mg Oral TID WC    docusate sodium  100 mg Oral BID    senna  1 tablet Oral BID    [Held by provider] carvedilol  3.125 mg Oral BID WC    aspirin  81 mg Oral Daily    atorvastatin  10 mg Oral Nightly    levothyroxine  88 mcg Oral Daily    furosemide  40 mg IntraVENous BID    tiotropium-olodaterol  2 puff Inhalation Daily    sodium chloride flush  5-40 mL IntraVENous 2 times per day    enoxaparin  40 mg SubCUTAneous QPM    fluticasone  1 puff Inhalation BID    lactulose  10 g Oral TID      sodium chloride       albuterol sulfate HFA, sodium chloride flush, sodium chloride, potassium chloride, magnesium sulfate, ondansetron **OR** ondansetron, polyethylene glycol, acetaminophen **OR** acetaminophen    Lab Data:  CBC:   Recent Labs     03/04/23  1406 03/05/23  0108   WBC 9.0 9.8   HGB 12.1* 12.0*   HCT 39.3 40.6   .7* 106.6*    307       BMP:   Recent Labs     03/04/23  1406 03/05/23  0108 03/06/23  0319   * 137 134*   K 4.5 4.5 4.3   CL 89* 91* 88*   CO2 39* 38* 37*   BUN 21 21 19   CREATININE 0.8 0.9 0.8       LIVER PROFILE:   Recent Labs     03/04/23  1406 03/05/23  0108 03/06/23  0319   AST 27 41* 32   ALT 19 22 21   BILITOT 1.2* 1.0 1.3*   ALKPHOS 209* 222* 215*       PT/INR:   Recent Labs     03/04/23  1406   PROTIME 16.7*   INR 1.29       APTT: No results for input(s): APTT in the last 72 hours. BNP:  No results for input(s): BNP in the last 72 hours. TROPONIN: @TROPONINI:3@      Assessment:  80 y. o.year old who is admitted for          Plan:  Acute decompensated congestive heart failure with history of cor pulmonale severe LV dilatation and severe pulmonary hypertension, admit to ICU stepdown, started IV Lasix drip in, her previous right heart catheterization report reviewed from 2019, her pulmonary cavity wedge pressure at that time was 17 RA pressure was 13 PA pressure was 32 mean RV pressure mean was 11 however this was 2019 she may have cor pulmonale and severe pulm hypertension  COPD not controlled recommend to see pulmonary  CAD stable history of bypass and PCI of RCA in 2012 and LIMA to LAD stent in 2010 bypass surgery was 2009, continue aspirin statin beta-blocker  All labs, medications and tests reviewed, continue all other medications of all above medical condition listed as is.       Sara Galan MD, MD 3/6/2023 5:33 PM

## 2023-03-06 NOTE — CONSULTS
[address] [place] [address] Consult Note    Date: 3/6/2023  Name: Darlene Fall  MRN: [id number]  YOB: 1929   Patient's PCP: Ofelia Espinosa MD  Consultants during acute care: Cardiology   Referring Physician: Dr. Vi Polanco care admission date: 3/2/2023 (5 admissions in the past year with an ARU stay: 2/15 to 12/29/22, 10/19 to 10/29/22 followed by ARU stay from 10/29 to 11/7/22). Informant: Chart reviewed, discussed with the patient's nurse. I examined the patient who provides very limited information due to severe hearing loss. There are no family here. CC: heart failure     [place]: This is a 80 y.o. female with a history of coronary artery disease with prior CABG in 2009, prior PCI in 2012, pacemaker status, Diastolic heart failure, pulmonary hypertension (Transthoracic Echocardiogram grade III diastolic dysfunction and RVSP 52 mmHg in December 2022), atrial fibrillation not on anticoagulation due to prior GI bleed, hypertension, obstructive sleep apnea on CPAP, COPD on chronic oxygen at 3 lpm, hypertension, cirrhosis (no significant alcohol history and negative Hepatitis viral panel in 2022), glaucoma, severe hearing loss, who was admitted on 3/2/2023 from the Cardiology office with shortness of breath and decompensated heart failure. Chest X-ray on 3/2/23 showed cardiomegaly with pulmonary vascular congestion, Pro-BNP 3/2/23 1464. The patient has been diuresed with net negative of 1670 ml and weight declined by 9 pounds since admission. The patient has been followed by hospital medicine and Cardiology. Per notes, the patient was encephalopathic on admission which has reportedly improved. The patient is very hard of hearing and it is difficult to obtain history. Dr. Silva Lay asked hospice to meet with the patient and family for information. A meeting is being arranged. I am unable to have a goals of care conversation with the patient due to her hearing loss.  She was able to tell me that she was not in pain and had no current shortness of breath. The patient has had 5 hospitalizations in the past year plus  Acute Rehab Unit stay. She can tell me that she lives with her son and daughter in law. The patient is DNR-arrest status. Past Medical History:   Diagnosis Date    Age-related osteoporosis with current pathological fracture of vertebra ([facility] 7/8/2022    Arthritis     Bradycardia 2001    requiring dual chamber pacemaker at [facility]    CAD (coronary artery disease)     Cardiac pacemaker 10/2001    [employer] #[id number]  [facility]- Serial # 26-[facility]- Dr Billy Brimfield    COPD, [place]) 2/17/2017    Cor pulmonale (chronic) ([facility] 3/15/2022    CVA (cerebrovascular accident) ([facility]     DJD (degenerative joint disease) of cervical spine     C5-C6, C6-C7    Exhaustion of cardiac pacemaker battery 11/21/2011    PPM battery replacement- [employer]    Family history of cardiovascular disease     Glaucoma Dx 2010    H/O 24 hour EKG monitoring 8/6/2000 8/6/2000- Intermittent episonde of a-fib/flutter    H/O cardiac catheterization 4/20/2010, 2/1989 4/20/2010-Severe native vessel disease and has graft disease as well. LAD, CX totally occluded. RCA totally occluded in proximal segment. VG to RCA widely patent. PDA 90% LAD afer LIMA anastomosis 80-90% stenosis. Proceeded with PTCA with stent next day. H/O cardiovascular stress test 10/14/2011, 6/2/2010,4/8/2010, 5/18/2009,4/6/2009, 10/30/2007, 11/12/2004, 11/6/2003, 8/9/2002, 8/9/2001,6/5/2000,     10/14/2011-Lexiscan-Abnormal Myocardial Perfusion study. Evidence of mild ischemia in the Left CX region. Abnomal study. Rest EF 63%. Global LV systolic function normal. No ECG changes. Unremarkable pharmacological stress test.    H/O cardiovascular stress test 6/10/2013    thallium--mild ischemia left circumflex EF63% no change from 10/2011 study.     H/O cardiovascular stress test 10/16/2014    cardiolite-mild ischemia left circumflex,EF70% H/O chest x-ray 4/19/2009 4/19/2009-Stable cardiomegaly. No acute cardiopulmonary disease. H/O Doppler lower venous ultrasound 09/18/2019    Significant reflux noted in RGSV, RGSV is extremely tortuous and small along the thigh and calf and would be highly unlikely to be accessed, RSSV is non compressible and has occlusive chronic SVT, LSSV is non compressible with occlusive chronic SVT, LGSV removed s/p CABG, Significant reflux in LGSV tributary,    H/O Doppler ultrasound 3/31/2010    CAROTID- 3/31/2010-INtimal thickening but no significant atherosclerotic plaque noted in ANA PAULA. Doppler flow velocities within ANA PAULA are WNL. Heterogeneous, irregular atherosclerotic plaque noted in LICA. Doppler flow velocities within the LICA are elevated, consistent with a mild, less than 50% stenosis. H/O Doppler ultrasound 5/24/2016    Carotid- normal study    H/O Doppler ultrasound 09/06/2017    carotid - normal study    H/O Doppler ultrasound     H/O echocardiogram 10/13    EF=60%, Severe Pulm. HTN, & Sclerotic aortic valve w/stenosis. H/O echocardiogram 10/16/14     EF 55-60% Normal LV. Normal LV systolic function. Severe tricuspid insufficiency with severe hypertension. H/O echocardiogram 02/03/2017    heart cath performed this morning    H/O echocardiogram 09/18/2019    EF 50-55%, Left atrium is mild to moderately dilated, right atrium is severely dilated, mildly dilated right ventricle, Mod MR, Severe TR, Severe Pulm HTN, no pericardial effusion     History of complete ECG     10/14/2011(Lexiscan);5/6/2010, 4/30/2009,10/24/2008,9/21/2007, 10/13/2006    History of nuclear stress test 11/17/2016    lexiscan-normal,EF70%    Hx of cardiovascular stress test 12/28/2018    EF 60%  Normal study.     HX OTHER MEDICAL 05/01/2017    MUGA-normal, EF53%    Hyperlipidemia     Hypertension     Mild intermittent asthma 7/28/2016    Moderate COPD (chronic obstructive pulmonary disease) (HCC) 3/15/2022    Nausea & vomiting Obstructive sleep apnea 5/16/2017    Post PTCA 4/21/2010    PTCA with 2.25 stent of the LIMA to LAD    Pulmonary HTN ([address]     Severe per last echo on 10/13. 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 Followed by MAZE procedure by pulmonary vein isolation.-  Dr Felicitas Edmondson    S/P PTCA (percutaneous transluminal coronary angioplasty) 11/2012    PTCA with stent to RCA    Severe pulmonary hypertension ([address] 3/15/2022    Shortness of breath 3/15/2022    Thyroid disease     hypothyroi    Unspecified cerebral artery occlusion with cerebral infarction Unsure When    No Residual    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 ([address] 7/29/2022    WD-Non-pressure chronic ulcer of other part of left lower leg limited to breakdown of skin ([address] 7/29/2022       Past Surgical History:   Procedure Laterality Date    APPENDECTOMY  1941    CARDIAC SURGERY  4/09    CABG (3 Bypasses), One Heart Stent in 2010    COLONOSCOPY  In 2000's    X1    CORONARY ANGIOPLASTY WITH STENT PLACEMENT  4/21/2010    PTCA with stent LIMA ->LAD    CORONARY ARTERY BYPASS GRAFT  4/8/2009    LIMA->Diag,  LIMA -> LAD;  SVG->RCA going to the PDA. Followed by MAZE procedure by pulmonary vein isolation.-  Dr Julian Joshi, TOTAL ABDOMINAL (CERVIX REMOVED)  1990's    MIDDLE EAR SURGERY      OTHER SURGICAL HISTORY      Ear surgery-hearing    PACEMAKER PLACEMENT      battery change 11/21/2011 [employer]tr[employer]    PTCA  11/2012    Ptca with stent to RCA    TONSILLECTOMY  1950's       Social History     Socioeconomic History    Marital status:       Spouse name: Not on file    Number of children: 4    Years of education: Not on file    Highest education level: Not on file   Occupational History    Occupation: RETIRED     Comment: from [employer] Use    Smoking status: Former     Packs/day: 0.25     Years: 5.00     Pack years: 1.25 Types: Cigarettes     Quit date: 1970     Years since quittin.3    Smokeless tobacco: Never   Vaping Use    Vaping Use: Never used   Substance and Sexual Activity    Alcohol use: Not Currently     Comment: Caffiene - no more than 3 cups of coffee each day    Drug use: Never    Sexual activity: Yes     Partners: Male     Comment:    Other Topics Concern    Not on file   Social History Narrative    Not on file     Social Determinants of Health     Financial Resource Strain: Not on file   Food Insecurity: Not on file   Transportation Needs: Not on file   Physical Activity: Not on file   Stress: Not on file   Social Connections: Not on file   Intimate Partner Violence: Not on file   Housing Stability: Not on file       Family History   Problem Relation Age of Onset    Cancer Mother         \"Liver Cancer\"    Arthritis Mother     Depression Mother     Hearing Loss Mother     High Blood Pressure Mother     High Cholesterol Mother     Mental Illness Mother     Miscarriages / Stillbirths Mother     Heart Disease Father     Early Death Father 36        \"Instant Death\"    High Blood Pressure Father     High Cholesterol Father     Coronary Art Dis Father         Massive MI    Heart Disease Sister     Depression Sister     Cancer Sister         \"Breast Cancer, Cancer Free Now\"    High Blood Pressure Sister     Other Daughter         \"She's Had Stomach Surgery\"    High Blood Pressure Son     High Blood Pressure Son     Early Death Paternal Grandfather        Allergies   Allergen Reactions    Darvocet [Propoxyphene N-Acetaminophen]     Ranexa [Ranolazine Er]      Sick to her stomach    Ranolazine      Sick to her stomach    Sulfa Antibiotics Itching    Sulfasalazine Itching    Adhesive Tape Rash    Allantoin Rash    Bacitracin Rash    Gramicidin Rash    Neomycin Rash    Polymyxin B Rash    Pramoxine Hcl Rash    Silicone Rash       Medication list reviewed  Prior to Admission medications    Medication Sig Start Date End Date Taking?  Authorizing Provider   midodrine (PROAMATINE) 5 MG tablet Take 1 tablet by mouth 3 times daily  Patient not taking: No sig reported 3/1/23   LISA Erickson CNP   torsemide (DEMADEX) 20 MG tablet Take 1 tablet by mouth daily as needed (for weight gain > 3 lbs or shortness of breath) 1/31/23   LISA Erickson - CNP   spironolactone (ALDACTONE) 25 MG tablet Take 1 tablet by mouth 2 times daily  Patient not taking: No sig reported 1/25/23   Olya Dennis MD   Torsemide 40 MG TABS Take 20 mg by mouth daily  Patient not taking: No sig reported 12/19/22   Isaac Obando MD   aspirin 81 MG chewable tablet Take 81 mg by mouth daily    Historical Provider, MD   ferrous sulfate (IRON 325) 325 (65 Fe) MG tablet Take 325 mg by mouth every other day    Historical Provider, MD   traZODone (DESYREL) 50 MG tablet Take  mg by mouth nightly as needed for Sleep    Historical Provider, MD   acetaminophen (TYLENOL) 500 MG tablet Take 1,000 mg by mouth every 4 hours as needed for Pain or Fever    Historical Provider, MD   ZIOPTAN 0.0015 % SOLN Place 1 drop into both eyes Daily with supper   Patient not taking: No sig reported 1/25/22   Historical Provider, MD   vitamin D 25 MCG (1000 UT) CAPS Take 5,000 Units by mouth daily    Historical Provider, MD   atorvastatin (LIPITOR) 10 MG tablet Take 10 mg by mouth nightly 10/20/20   Historical Provider, MD   umeclidinium-vilanterol (ANORO ELLIPTA) 62.5-25 MCG/INH AEPB inhaler Inhale 1 puff into the lungs daily 7/27/20   Lan Marino MD   CPAP Machine MISC by Does not apply route    Historical Provider, MD   Misc Natural Products (OSTEO BI-FLEX ADV DOUBLE ST PO) Take 1 tablet by mouth daily  Patient not taking: No sig reported    Historical Provider, MD   timolol (TIMOPTIC) 0.5 % ophthalmic solution Place 1 drop into both eyes nightly  Patient not taking: No sig reported    Historical Provider, MD   carvedilol (COREG) 6.25 MG tablet Take 1 tablet by mouth 2 times daily (with meals) 1/31/17   Lavern Garcia MD   albuterol (PROVENTIL HFA;VENTOLIN HFA) 108 (90 BASE) MCG/ACT inhaler Inhale 2 puffs into the lungs 2 times daily AND EVERY 4-6 HOURS AS NEEDED    Historical Provider, MD   Multiple Vitamins-Minerals (ICAPS) CAPS Take 1 capsule by mouth 2 times daily   Patient not taking: No sig reported    Historical Provider, MD   vitamin B-12 (CYANOCOBALAMIN) 1000 MCG tablet Take 1,000 mcg by mouth daily. Historical Provider, MD   levothyroxine (SYNTHROID) 88 MCG tablet Take 88 mcg by mouth daily. Patient not taking: Reported on 3/2/2023    Historical Provider, MD       ROS: As noted in [address], all other systems are limited due to the patient's severe hearing loss:  Constitutional: generally weak, + weight gain  CV:  denies chest pan or shortness of breath     Weight:    Wt Readings from Last 5 Encounters:   03/04/23 139 lb 6.4 oz (63.2 kg)   03/02/23 148 lb (67.1 kg)   01/31/23 140 lb 3.2 oz (63.6 kg)   01/11/23 132 lb (59.9 kg)   12/19/22 118 lb (53.5 kg)       Data reviewed 3/6/2023:  Transthoracic Echocardiogram 10/19/22   This is a limited echocardiogram.   Left ventricular systolic function is normal.   Ejection fraction is visually estimated at 50-55%. Moderate left ventricular hypertrophy. Grade III diastolic dysfunction. Severely dilated right ventricle (6.39 cm). PPM wiring visualized within the right ventricle. Severe tricuspid regurgitation; RVSP: 39 mmHg. Severely dilated right atrium. No evidence of any pericardial effusion. Inferior vena cava is dilated, measuring at 5.2 cm, and does not collapse   with respiration. Transthoracic Echocardiogram 12/15/22  This is a limited echocardiogram to asses PHNT. Ejection fraction is visually estimated at >60 %. Small size ventricle with   Hypertrophy   Right side of the heart is dilated. PPM wiring visualized within the right side of the heart. Moderate tricuspid regurgitation.    Moderate Pulmonary hypertension with RVSP of 52mmHg. Inferior vena cava is dilated, measuring at 2.7 cm, and does not collapse   with respiration. Severely dilated bilateral atrium   No evidence of pericardial effusion. Possible infiltrative cardiomyopathy / restrictive pattern    Chest X-ray  3/2/23:  1. Cardiomegaly with central pulmonary vascular congestion and suspected small effusions and atelectasis.      Pro-BNP 3/2/23 1464  TSH 3/3/23 9.58    Hepatic Function Panel:    Lab Results   Component Value Date/Time    ALKPHOS 215 03/06/2023 03:19 AM    ALT 21 03/06/2023 03:19 AM    AST 32 03/06/2023 03:19 AM    PROT 5.6 03/06/2023 03:19 AM    PROT 6.8 11/28/2012 02:41 PM    BILITOT 1.3 03/06/2023 03:19 AM    BILIDIR 0.4 12/18/2022 06:06 PM    IBILI 0.6 12/18/2022 06:06 PM    LABALBU 4.0 03/06/2023 03:19 AM     CBC:   Recent Labs     03/04/23  1406 03/05/23  0108   WBC 9.0 9.8   HGB 12.1* 12.0*   HCT 39.3 40.6   .7* 106.6*    307     BMP:   Recent Labs     03/04/23  1406 03/05/23  0108 03/06/23  0319   * 137 134*   K 4.5 4.5 4.3   CL 89* 91* 88*   CO2 39* 38* 37*   BUN 21 21 19   CREATININE 0.8 0.9 0.8   GLUCOSE 152* 97 108*       Physical Exam:   BP (!) 100/59   Pulse 77   Temp 97.4 °F (36.3 °C) (Oral)   Resp 25   Ht 4' 11\" (1.499 m)   Wt 139 lb 6.4 oz (63.2 kg)   SpO2 100%   BMI 28.16 kg/m²   General: drowsy but arousable, very hard of hearing, in no acute distress  Skin: sallow  HEENT: Mucous membranes are mildly dry, sclerae are clear   Neck: carotid upstrokes are diminished without bruit, JVP is difficult to visualize due to positioning  Chest: healed sternotomy scar, pacer present in the left upper chest   Heart: paced RRR, S1S2, DALIA at left sternal border   Lungs:  coarse breath sounds bilaterally, diminished at the bases, with basilar rales, no rhonchi   Abdomen: soft, bowel sounds present, no apparent tenderness, nondistended  : connolly in place   Extremities:  No mottling, +  lower extremity edema  Neurologic: drowsy but arousable, very hard of hearing, moves extremities spontaneously,     Assessment/Plan:  Recurrent acute on chronic diastolic heart failure with hypoxic respiratory failure, underlying coronary artery disease and severe pulmonary hypertension. I agree that the patient is Hospice eligible if agreeable to comfort care. A meeting is arranged with a hospice nurse liaison. Will follow for plan.    Acute superimposed on chronic respiratory failure   Coronary artery disease with prior CABG 2009 and PCI 2012  Atrial fibrillation not on anticoagulation due to prior GI bleed  Pacemaker status  Hypertension with lower BP's during stay and meds adjusted  Hypothyroidism, TSH 9.58    Patient Active Problem List   Diagnosis Code    CAD (coronary artery disease) I25.10    Cardiac pacemaker Z95.0    S/P CABG x 3 Z95.1    Post PTCA Z98.61    TIA (transient ischemic attack) G45.9    Confusion R41.0    Headache R51.9    Essential hypertension I10    Sinus bradycardia R00.1    Coronary artery disease involving coronary bypass graft of native heart without angina pectoris I25.810    Dizziness R42    PAF (paroxysmal atrial fibrillation) ([facility]) I48.0    Pacer at end of battery life Z45.010    Hyponatremia X93.1    Metabolic encephalopathy R29.91    Generalized weakness R53.1    Gait disturbance R26.9    Acute urinary retention R33.8    Hypothyroidism (acquired) E03.9    Pulmonary hypertension (HCC) I27.20    Moderate COPD (chronic obstructive pulmonary disease) ([facility]) J44.9    Abnormal liver CT -findings suggestive of cirrhosis  R93.2    At risk for injury associated with anticoagulation Z91.89    T12 vertebral fracture ([address] S22.089A    SAULO on CPAP G47.33, Z99.89    Iron deficiency anemia secondary to inadequate dietary iron intake D50.8    Age-related osteoporosis with current pathological fracture of vertebra ([facility]) M80.08XA    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 ([address] I87.312, L97.929    WD-Non-pressure chronic ulcer of other part of left lower leg with fat layer exposed ([facility] L97.822    Acute on chronic diastolic (congestive) heart failure (HCC) I50.33    SunDown syndrome F05    Orthostatic hypotension I95.1    Abrasion of left calf S80.812A    Moderate malnutrition (HCC) E44.0    Acute decompensated heart failure (HCC) K45.0       Certification of Terminal Illness: I certify that this patient is eligible for Hospice services for a terminal diagnosis of diastolic heart failure with a life expectancy predicted to be less than 6 months if this illness follows its expected course.       Sachi Mccann MD

## 2023-03-07 PROCEDURE — 6370000000 HC RX 637 (ALT 250 FOR IP): Performed by: STUDENT IN AN ORGANIZED HEALTH CARE EDUCATION/TRAINING PROGRAM

## 2023-03-07 PROCEDURE — 94761 N-INVAS EAR/PLS OXIMETRY MLT: CPT

## 2023-03-07 PROCEDURE — 97164 PT RE-EVAL EST PLAN CARE: CPT

## 2023-03-07 PROCEDURE — 6370000000 HC RX 637 (ALT 250 FOR IP): Performed by: INTERNAL MEDICINE

## 2023-03-07 PROCEDURE — 2700000000 HC OXYGEN THERAPY PER DAY

## 2023-03-07 PROCEDURE — 97110 THERAPEUTIC EXERCISES: CPT

## 2023-03-07 PROCEDURE — 6360000002 HC RX W HCPCS: Performed by: STUDENT IN AN ORGANIZED HEALTH CARE EDUCATION/TRAINING PROGRAM

## 2023-03-07 PROCEDURE — 94640 AIRWAY INHALATION TREATMENT: CPT

## 2023-03-07 PROCEDURE — 1200000000 HC SEMI PRIVATE

## 2023-03-07 PROCEDURE — 97116 GAIT TRAINING THERAPY: CPT

## 2023-03-07 PROCEDURE — 97530 THERAPEUTIC ACTIVITIES: CPT

## 2023-03-07 PROCEDURE — 2580000003 HC RX 258: Performed by: STUDENT IN AN ORGANIZED HEALTH CARE EDUCATION/TRAINING PROGRAM

## 2023-03-07 PROCEDURE — 97535 SELF CARE MNGMENT TRAINING: CPT

## 2023-03-07 RX ORDER — IPRATROPIUM BROMIDE AND ALBUTEROL SULFATE 2.5; .5 MG/3ML; MG/3ML
1 SOLUTION RESPIRATORY (INHALATION) EVERY 4 HOURS PRN
Status: DISCONTINUED | OUTPATIENT
Start: 2023-03-07 | End: 2023-03-08 | Stop reason: HOSPADM

## 2023-03-07 RX ADMIN — TIOTROPIUM BROMIDE AND OLODATEROL 2 PUFF: 3.124; 2.736 SPRAY, METERED RESPIRATORY (INHALATION) at 11:29

## 2023-03-07 RX ADMIN — LACTULOSE 10 G: 10 SOLUTION ORAL at 09:32

## 2023-03-07 RX ADMIN — DOCUSATE SODIUM 100 MG: 100 CAPSULE, LIQUID FILLED ORAL at 09:32

## 2023-03-07 RX ADMIN — SODIUM CHLORIDE, PRESERVATIVE FREE 10 ML: 5 INJECTION INTRAVENOUS at 21:51

## 2023-03-07 RX ADMIN — MIDODRINE HYDROCHLORIDE 2.5 MG: 5 TABLET ORAL at 13:04

## 2023-03-07 RX ADMIN — LACTULOSE 10 G: 10 SOLUTION ORAL at 21:51

## 2023-03-07 RX ADMIN — SODIUM CHLORIDE, PRESERVATIVE FREE 10 ML: 5 INJECTION INTRAVENOUS at 10:29

## 2023-03-07 RX ADMIN — SENNOSIDES 8.6 MG: 8.6 TABLET, COATED ORAL at 21:51

## 2023-03-07 RX ADMIN — FLUTICASONE PROPIONATE 1 PUFF: 110 AEROSOL, METERED RESPIRATORY (INHALATION) at 11:27

## 2023-03-07 RX ADMIN — FUROSEMIDE 40 MG: 40 TABLET ORAL at 09:32

## 2023-03-07 RX ADMIN — ASPIRIN 81 MG CHEWABLE TABLET 81 MG: 81 TABLET CHEWABLE at 09:35

## 2023-03-07 RX ADMIN — SENNOSIDES 8.6 MG: 8.6 TABLET, COATED ORAL at 09:32

## 2023-03-07 RX ADMIN — LEVOTHYROXINE SODIUM 88 MCG: 0.09 TABLET ORAL at 09:31

## 2023-03-07 RX ADMIN — MIDODRINE HYDROCHLORIDE 2.5 MG: 5 TABLET ORAL at 09:31

## 2023-03-07 RX ADMIN — ATORVASTATIN CALCIUM 10 MG: 10 TABLET, FILM COATED ORAL at 21:51

## 2023-03-07 RX ADMIN — DOCUSATE SODIUM 100 MG: 100 CAPSULE, LIQUID FILLED ORAL at 21:51

## 2023-03-07 RX ADMIN — LACTULOSE 10 G: 10 SOLUTION ORAL at 14:35

## 2023-03-07 RX ADMIN — ACETAMINOPHEN 650 MG: 325 TABLET ORAL at 13:38

## 2023-03-07 RX ADMIN — ENOXAPARIN SODIUM 40 MG: 100 INJECTION SUBCUTANEOUS at 17:42

## 2023-03-07 RX ADMIN — MIDODRINE HYDROCHLORIDE 2.5 MG: 5 TABLET ORAL at 17:42

## 2023-03-07 ASSESSMENT — PAIN SCALES - GENERAL: PAINLEVEL_OUTOF10: 0

## 2023-03-07 NOTE — CARE COORDINATION
Precert pending via [address]  portal   [id number]  [facility]  03/07/2023 03/07/2023  Pending

## 2023-03-07 NOTE — PROGRESS NOTES
Occupational Therapy    Occupational Therapy Treatment Note  Name: Juanjose Feliz MRN: [id number] :   3/18/1929   Date:  3/7/2023   Admission Date: 3/2/2023 Room:  [address] [place] [address]   Restrictions/Precautions:    fall risk    Communication with other providers:   Cleared for treatment by Flex Perez RN. Subjective:  Patient states:  \"I would like to get up in the chair\"  Pain:   Location, Type, Intensity (0/10 to 10/10): Denies pain    Objective:    Observation:  pt alert and oriented supine in bed. Treatment, including education:  Transfers  Supine to sit :Mod A for trunk and B LEs  Scooting :Min A  Sit to stand :CGA  Stand to sit :CGA  SPT:CGA  Toilet: CGA with min verbal cues for walker safety. Therapeutic Exercise:  Pt completed UE exercises to increase strength and ROM to facilitate increase for ADLs and functional mobility. Pt completed B UEs with 1# dumbbell exercises with curls, shoulder presses, punch, stirring and wrist curls x 20 reps with mod rest breaks due to fatigue. Pt completed yellow theraband pulls horizontally, punches and upwards x 20 reps with mod rest breaks. Self Care Training:   Cues were given for safety, sequence, UE/LE placement, visual cues, and balance. Activities performed today included  toileting    Toileting: Mod A for incontinent BM hygiene and pt able to manage brief up/down with min verbal cues to initiate. Therapeutic Activity Training:   Pt completed approx 10' of functional mobility in room with CGA hand held Assist and Clarise All came in room and procured a RW for pt to use and pt required min verbal cues for walker safety.       Safety Measures: Gait belt used, Left in bed, Pull/Bed Alarm activated and call light left in reach        Assessment / Impression:        Patient's tolerance of treatment: Good   Adverse Reaction: None  Significant change in status and impact:  None  Barriers to improvement:  Dec strength and endurance    Plan for Next Session:   Continue with POC.    Time in:  1017  Time out:  1111  Total treatment time:  54  Billed Units: 1 ADL, 1 TA, 2 TE  Electronically signed by:    BERTA Carvajal/L LULA 1992    3/7/2023, 10:40 AM    Previously filed values:

## 2023-03-07 NOTE — PROGRESS NOTES
[address] [place] [address]  Progress Note    Date: 3/7/2023  Name: Mary Jane Gonzalez  MRN: [id number]  YOB: 1929   Patient's PCP: Suszanne Habermann, MD  Acute care admission date: 3/2/2023 (5 admissions in the past year with an ARU stay: 2/15 to 12/29/22, 10/19 to 10/29/22 followed by ARU stay from 10/29 to 11/7/22). Subjective: The patient is awake, alert, hard of hearing. The patient denies any pain or shortness of breath, and is lying flat and in no distress. Breakfast tray is at the bedside and she is waiting to be fed. No family are here. I collaborated with the hospice nurse liaison on 3/6/23 after meeting with the patient's family. They were asking about General Inpatient Hospice, but the patient does not meet admission criteria for General Inpatient Hospice. I do agree that the patient is appropriate for hospice at home or facility, and the family had asked about placement in a facility due to increasing care needs. Will follow for plan of care. Objective:     Data reviewed 3/7/2023:   Transthoracic Echocardiogram 10/19/22   This is a limited echocardiogram.   Left ventricular systolic function is normal.   Ejection fraction is visually estimated at 50-55%. Moderate left ventricular hypertrophy. Grade III diastolic dysfunction. Severely dilated right ventricle (6.39 cm). PPM wiring visualized within the right ventricle. Severe tricuspid regurgitation; RVSP: 39 mmHg. Severely dilated right atrium. No evidence of any pericardial effusion. Inferior vena cava is dilated, measuring at 5.2 cm, and does not collapse   with respiration. Transthoracic Echocardiogram 12/15/22  This is a limited echocardiogram to asses PHNT. Ejection fraction is visually estimated at >60 %. Small size ventricle with   Hypertrophy   Right side of the heart is dilated. PPM wiring visualized within the right side of the heart. Moderate tricuspid regurgitation.    Moderate Pulmonary hypertension with RVSP of 52mmHg. Inferior vena cava is dilated, measuring at 2.7 cm, and does not collapse   with respiration. Severely dilated bilateral atrium   No evidence of pericardial effusion. Possible infiltrative cardiomyopathy / restrictive pattern     Chest X-ray  3/2/23:  1. Cardiomegaly with central pulmonary vascular congestion and suspected small effusions and atelectasis. Chest X-ray  3/6/23  Findings consistent with congestive heart failure and/or bibasilar   pneumonia/pneumonitis. No large areas of pulmonary consolidation or   detectable pleural effusions. Findings may be accentuated by underlying   chronic lung disease.       Pro-BNP 3/2/23 1464  TSH 3/3/23 9.58    Hepatic Function Panel:    Lab Results   Component Value Date/Time    ALKPHOS 215 03/06/2023 03:19 AM    ALT 21 03/06/2023 03:19 AM    AST 32 03/06/2023 03:19 AM    PROT 5.6 03/06/2023 03:19 AM    PROT 6.8 11/28/2012 02:41 PM    BILITOT 1.3 03/06/2023 03:19 AM    BILIDIR 0.4 12/18/2022 06:06 PM    IBILI 0.6 12/18/2022 06:06 PM    LABALBU 4.0 03/06/2023 03:19 AM   CBC:   Recent Labs     03/04/23  1406 03/05/23  0108   WBC 9.0 9.8   HGB 12.1* 12.0*   HCT 39.3 40.6   .7* 106.6*    307     BMP:   Recent Labs     03/04/23  1406 03/05/23  0108 03/06/23  0319   * 137 134*   K 4.5 4.5 4.3   CL 89* 91* 88*   CO2 39* 38* 37*   BUN 21 21 19   CREATININE 0.8 0.9 0.8   GLUCOSE 152* 97 108*       Physical Exam:   BP (!) 109/55   Pulse 60   Temp 97.7 °F (36.5 °C) (Oral)   Resp 20   Ht 4' 11\" (1.499 m)   Wt 139 lb 6.4 oz (63.2 kg)   SpO2 94%   BMI 28.16 kg/m²   General: awake, alert, very hard of hearing, in no acute distress  Skin: sallow  HEENT: Mucous membranes are moist   Neck: carotid upstrokes are diminished without bruit  Chest: healed sternotomy scar, pacer present in the left upper chest   Heart: paced RRR, S1S2, DALIA at left sternal border   Lungs:  coarse breath sounds bilaterally, diminished at the bases, with basilar rales, no rhonchi   Abdomen: soft, bowel sounds present, no apparent tenderness, nondistended  : connolly in place   Extremities:  No mottling, +  lower extremity edema  Neurologic: alert, hard of hearing, moves extremities spontaneously,      Assessment/Plan:  Recurrent acute on chronic diastolic heart failure with hypoxic respiratory failure, underlying coronary artery disease and severe pulmonary hypertension. I agree that the patient is Hospice eligible on acute care discharge. Will follow for plan of care.     Acute superimposed on chronic respiratory failure stable on 3 lpm oxygen   Coronary artery disease with prior CABG 2009 and PCI 2012  Atrial fibrillation not on anticoagulation due to prior GI bleed  Pacemaker status  Hypertension with lower BP's during stay and meds adjusted  Hypothyroidism, TSH 9.58      Patient Active Problem List   Diagnosis Code    CAD (coronary artery disease) I25.10    Cardiac pacemaker Z95.0    S/P CABG x 3 Z95.1    Post PTCA Z98.61    TIA (transient ischemic attack) G45.9    Confusion R41.0    Headache R51.9    Essential hypertension I10    Sinus bradycardia R00.1    Coronary artery disease involving coronary bypass graft of native heart without angina pectoris I25.810    Dizziness R42    PAF (paroxysmal atrial fibrillation) (HCC) I48.0    Pacer at end of battery life Z45.010    Hyponatremia V23.8    Metabolic encephalopathy Q11.78    Generalized weakness R53.1    Gait disturbance R26.9    Acute urinary retention R33.8    Hypothyroidism (acquired) E03.9    Pulmonary hypertension (HCC) I27.20    Moderate COPD (chronic obstructive pulmonary disease) (HCC) J44.9    Abnormal liver CT -findings suggestive of cirrhosis  R93.2    At risk for injury associated with anticoagulation Z91.89    T12 vertebral fracture ([address] S22.089A    SAULO on CPAP G47.33, Z99.89    Iron deficiency anemia secondary to inadequate dietary iron intake D50.8    Age-related osteoporosis with current pathological fracture of vertebra ([facility]) M80.08XA    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 ([facility]) I87.312, L97.929    WD-Non-pressure chronic ulcer of other part of left lower leg with fat layer exposed ([facility] L97.822    Acute on chronic diastolic (congestive) heart failure (HCC) I50.33    SunDown syndrome F05    Orthostatic hypotension I95.1    Abrasion of left calf S80.812A    Moderate malnutrition ([facility]) E44.0    Acute decompensated heart failure ([facility]) I50.9       J. Denver Ready, MD  3/7/2023

## 2023-03-07 NOTE — CARE COORDINATION
Case Management Assessment  Initial Evaluation    Date/Time of Evaluation: 3/7/2023 2:05 PM  Assessment Completed by: Angus Gomez RN, BSN    If patient is discharged prior to next notation, then this note serves as note for discharge by case management. Patient Name: Jeni Barcenas                   YOB: 1929  Diagnosis: Acute decompensated heart failure ([address] [I50.9]  Acute on chronic respiratory failure with hypoxia and hypercapnia ([address] [J96.21, J96.22]  Acute on chronic congestive heart failure, unspecified heart failure type ([address] [I50.9]                   Date / Time: 3/2/2023  4:00 PM    Patient Admission Status: Inpatient   Readmission Risk (Low < 19, Mod (19-27), High > 27): Readmission Risk Score: 24.1    Current PCP: Wanda Land MD  PCP verified by CM? Yes    Chart Reviewed: Yes      History Provided by: Child/Family  Patient Orientation: Alert and Oriented    Patient Cognition: Alert    Hospitalization in the last 30 days (Readmission):  No    If yes, Readmission Assessment in CM Navigator will be completed. Advance Directives:      Code Status: DNR-CC   Patient's Primary Decision Maker is: Named in [address] [place] [address]    Primary Decision Maker: Heidi Nguyen - Child - [phone]    Primary Decision Maker: AUBREE MARRERO - Child - [phone]    Discharge Planning:    Patient lives with: Family Members Type of Home: Apartment  Primary Care Giver: Self  Patient Support Systems include: Children   Current Financial resources: Medicare  Current community resources:    Current services prior to admission: C-pap            Current DME:              Type of Home Care services:  None    ADLS  Prior functional level: Independent in ADLs/IADLs  Current functional level: Assistance with the following:    PT AM-PAC: 17 /24  OT AM-PAC:   /24    Family can provide assistance at DC:  Yes  Would you like Case Management to discuss the discharge plan with any other family members/significant others, and if so, who? Yes (daughter, son and daughter in-law)  Plans to Return to Present Housing: Yes  Other Identified Issues/Barriers to RETURNING to current housing: [facility] for short term rehab. Potential Assistance needed at discharge: N/A            Potential DME:    Patient expects to discharge to: Conrado Booth  for transportation at discharge:      Financial    Payor: Sandra Carabjal / Plan: [place] / Product Type: *No Product type* /     Does insurance require precert for SNF: Yes    Potential assistance Purchasing Medications:    Meds-to-Beds request: No      [address] [facility] [phone] - F [phone]  [facility] [address]  Phone: [phone] Fax: [phone]    [facility] [address] [facility], [address] [facility] [phone] Bradford Black [phone]  [facility] [address] [place] [address]  Phone: [phone] Fax: [phone]      Notes:    Factors facilitating achievement of predicted outcomes: Friend support, Cooperative, and Pleasant    Barriers to discharge: Limited family support    Additional Case Management Notes: Reviewed chart , discussed in [address] [place] [address], spoke with Pt and daughter in-law. Also called daughter. Family/pt met with hospice, pt not inpt hospice appropriate at this time per Dr Daniel Reeves note. We discussed option of going home or to [facility] with hospice vs SNU for short term rehab. Pt/family agree with SNU and would like [facility]. Called Referral to Theresa at [facility] and they will be able to take pt but rojo not have a LTC bed if needed down the line. Updated pt/DIL and they still would like [facility]. CM will start prior [address] [facility] Rayshawn Baldwin.      The Plan for Transition of Care is related to the following treatment goals of Acute decompensated heart failure ([facility] [I50.9]  Acute on chronic respiratory failure with hypoxia and hypercapnia (HCC) [J96.21, J96.22]  Acute on chronic congestive heart failure, unspecified heart failure type ([facility] [Y62.6]    IF APPLICABLE: The Patient and/or patient representative Yun Auguste and her family were provided with a choice of provider and agrees with the discharge plan. Freedom of choice list with basic dialogue that supports the patient's individualized plan of care/goals and shares the quality data associated with the providers was provided to: Patient   Patient Representative Name:       The Patient and/or Patient Representative Agree with the Discharge Plan?  Yes    Hugo Castaneda RN, BSN  Case Management Department  Ph: [phone]

## 2023-03-07 NOTE — PROGRESS NOTES
V2.0  [facility] Hospitalist Progress Note      Name:  Bri Thompson /Age/Sex: 3/18/1929  (80 y.o. female)   MRN & CSN:  [id number] & [id number] Encounter Date/Time: 3/7/2023 3:30 PM EST    Location:  [address] [place] [address] PCP: Austin Myers MD       Hospital Day: 6    Assessment and Plan:   Bri Thompson is a 80 y.o. female with past medical history of coronary artery disease status post CABG in  and PCI in , atrial fibrillation not on AC due to GI bleed, heart failure with preserved ejection fraction, permanent pacemaker (MRI safe Medtronic), hypertension, obstructive sleep apnea on CPAP, pulmonary hypertension, Cirrhosis is admitted at [facility] on 3/2/23 after being sent from cardiologist's office due to worsening shortness of breath. Acute metabolic encephalopathy- Improved  Secondary to respiratory failure w/ hypercapnia   Subclinical hypothyroidism on thyroid function panel  No need of imaging due to lack of focal neurological deficits and improvement in mentation. Ammonia level 60>88, no significant evidence of hepatic encephalopathy    Acute on chronic hypoxic hypercapnic respiratory failure  Secondary to acute on chronic diastolic heart failure and underlying severe pulmonary hypertension and right sided involvement  Improving  Echocardiogram 2022: Ejection fraction greater than 60%, dilated right side of the heart, RVSP 52 mmHg.   proBNP 1464  Repeat CXR  Repeat VBG  Was on IV Lasix 40 mg BID -> changed to oral lasix 40 mg daily by cardio team   Measure daily weight and strict intake and output (9 pound weight loss since admission)  Cardiology following    Urinary retention  Voiding trial    Coronary artery disease status post CABG in  and PCI in   Continue Aspirin and Statin    Paroxysmal atrial fibrillation not on anticoagulation due to GI bleed  Permanent pacemaker (MRI safe Medtronic)  Monitor on telemetry  Not on AC due to chronic anemia but stable Hb so far, defer to primary cardiologist for final decision. 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 No BM overnight  Optimize bowel regimen, will order Lactulose enema   Discussed with RN  Will continue lactulose to target 2-3 BMs/day     Hypothyroidism  Continue Synthroid    PT/OT: recommended SNF, likely [facility], not a candidate for inpatient hospice  Consults: Cardiology, Hospice      Diet ADULT DIET; Regular; Low Sodium (2 gm); 1800 ml   DVT Prophylaxis [x] Lovenox, []  Heparin, [] SCDs, [] Ambulation,  [] Eliquis, [] Xarelto  [] Coumadin   Code Status DNR-CC   Disposition From: Home  Expected Disposition: To be decided  Estimated Date of Discharge: 2 days  Patient requires continued admission due to pending placement   Surrogate Decision Maker/ POA Daughter     Subjective:     Chief Complaint: Shortness of breath    Sj Saha is a 80 y.o. female who presents with worsening shortness of breath and was sent from her cardiologist office for further evaluation and management. Patient was seen and evaluated at bedside earlier this am. Patient was on Bipap and sleeping. No significant change in mentation from yesterday.  She is pleasant and conversational. Hemodynamically remained stable. No acute overnight events noted. Objective: Intake/Output Summary (Last 24 hours) at 3/7/2023 1440  Last data filed at 3/7/2023 0956  Gross per 24 hour   Intake 300 ml   Output 850 ml   Net -550 ml          Vitals:   Vitals:    03/07/23 1405   BP: (!) 131/58   Pulse: 60   Resp: 16   Temp: 97.6 °F (36.4 °C)   SpO2: 98%       Physical Exam:   Physical Exam  Vitals reviewed. Constitutional:       Appearance: Normal appearance. She is obese. HENT:      Head: Normocephalic and atraumatic. Nose: Nose normal.      Mouth/Throat:      Mouth: Mucous membranes are dry. Pharynx: Oropharynx is clear. Eyes:      General: No scleral icterus. Conjunctiva/sclera: Conjunctivae normal.   Cardiovascular:      Rate and Rhythm: Normal rate. Pulses: Normal pulses. Heart sounds: Murmur heard. Comments: Paced rhythm  Pulmonary:      Effort: Pulmonary effort is normal.      Breath sounds: No wheezing, rhonchi or rales. Abdominal:      General: Bowel sounds are normal. There is no distension. Palpations: Abdomen is soft. Tenderness: There is no abdominal tenderness. Musculoskeletal:         General: No deformity. Normal range of motion. Cervical back: Neck supple. No rigidity. Right lower leg: Edema present. Left lower leg: Edema present. Skin:     Coloration: Skin is not jaundiced or pale. Neurological:      General: No focal deficit present. Mental Status: She is alert and oriented to person, place, and time. Mental status is at baseline.           Medications:   Medications:    lactulose enema   Rectal Once    furosemide  40 mg Oral Daily    midodrine  2.5 mg Oral TID WC    docusate sodium  100 mg Oral BID    senna  1 tablet Oral BID    [Held by provider] carvedilol  3.125 mg Oral BID WC    aspirin  81 mg Oral Daily    atorvastatin  10 mg Oral Nightly    levothyroxine  88 mcg Oral Daily    tiotropium-olodaterol  2 puff Inhalation Daily    sodium chloride flush  5-40 mL IntraVENous 2 times per day    enoxaparin  40 mg SubCUTAneous QPM    fluticasone  1 puff Inhalation BID    lactulose  10 g Oral TID      Infusions:    sodium chloride       PRN Meds: ipratropium-albuterol, 1 ampule, Q4H PRN  sodium chloride flush, 5-40 mL, PRN  sodium chloride, , PRN  potassium chloride, 10 mEq, PRN  magnesium sulfate, 2,000 mg, PRN  ondansetron, 4 mg, Q8H PRN   Or  ondansetron, 4 mg, Q6H PRN  polyethylene glycol, 17 g, Daily PRN  acetaminophen, 650 mg, Q6H PRN   Or  acetaminophen, 650 mg, Q6H PRN      Labs      No results found for this or any previous visit (from the past 24 hour(s)). Imaging/Diagnostics Last 24 Hours   XR CHEST (2 VW)    Result Date: 3/2/2023  EXAMINATION: TWO XRAY VIEWS OF THE CHEST 3/2/2023 6:05 pm COMPARISON: Chest x-ray and CT chest December 15, 2022 HISTORY: ORDERING SYSTEM PROVIDED HISTORY: Dyspnea TECHNOLOGIST PROVIDED HISTORY: Reason for exam:->Dyspnea Reason for Exam: Dyspnea Additional signs and symptoms: na Relevant Medical/Surgical History: CHF FINDINGS: Cardiac silhouette is enlarged but grossly stable. Postoperative changes of CABG procedure and median sternotomy. Atherosclerotic changes of the aorta. Left-sided cardiac pacer device in place. Silhouetting of the hemidiaphragms bilaterally suggestive of small effusions and atelectasis. Linear atelectasis at the right mid lung. Central pulmonary vascular congestion. 1. Cardiomegaly with central pulmonary vascular congestion and suspected small effusions and atelectasis. XR ABDOMEN (KUB) (SINGLE AP VIEW)    Result Date: 3/3/2023  EXAMINATION: ONE SUPINE XRAY VIEW(S) OF THE ABDOMEN 3/3/2023 10:48 am COMPARISON: 12/16/2022. HISTORY: ORDERING SYSTEM PROVIDED HISTORY: abdominal pain TECHNOLOGIST PROVIDED HISTORY: Reason for exam:->abdominal pain Reason for Exam: abdominal pain, distension FINDINGS: Nonspecific nonobstructive bowel gas pattern is seen. There is moderate amount of stool noted in the proximal colon.  Evaluation of free air is limited on this supine view.  There is levoscoliosis of the lumbar spine. Partially visualized are changes of prior open heart surgery and cardiac conduction device leads.     Nonspecific nonobstructive bowel gas pattern with a moderate amount of stool noted in the proximal colon.       Electronically signed by Kimberlee Gorman MD on 3/7/2023 at 2:40 PM

## 2023-03-07 NOTE — CONSULTS
Physical Therapy Reeval and Treatment Note  Name: Vani Lyle MRN: [id number] :   3/18/1929   Date:  3/7/2023   Admission Date: 3/2/2023 Room:  [address] [place] [address]-A   Restrictions/Precautions:          n/a  Communication with other providers:  d/w RN CM -- req eval vs f/u for snf consideration and we reviewed eval from 3/4. Discussed that [facility] hospice was not recommended by hospice provider. D/w PT Brennen Mary, confirmed old plan, received update about patient. D/w Rosendo Mack at beginning of visit today, he was completing toileting tx. Subjective:  Patient states:  agreeable to visit. Doing well. Truly desires to go live with family or to have family join her at her [de-identified]. Breathing well today. Pain:   Location, Type, Intensity (0/10 to 10/10):  low back, 2/10. Objective:    Observation:  moved well with HAMPTON and toileting. Appeared to need his assist with toileting, pls see his note. Using 3L/min O2. No SOB with exertion or at rest.  Appears comfortable and is engaged in service. Observed Nico Sinclair to walk with patient using CGA, he confirmed that, and used RW, no LOB. Objective Measures:  precautionary CGA for stand-sit-stand and ambulation with RW 25 feet in room. Subsequently SBA for sit-stand and stand-sit as well as SBA for ambulation 20 feet in room with RW. AROM BLE WFL for mobility. Slow pace with amb. Improper positioning relative to RW, atypical patterns AROM BUE WFL for mobility. No LOB. Functional power for xfers was [facility] to perform tasks with min inc effort. Treatment, including education/measures:  Gait Training:  Cues were given for safety, sequence, device management, balance, posture, awareness, path. Assessment / Impression:    Did well today, appears just BETTER performance than at initial eval from 3/4. Appears to be rapidly approaching functional baseline.    Patient's tolerance of treatment:  good   Adverse Reaction: none  Significant change in status and impact:  still doing well, the hospice hold barrier is resolved and no longer relevant. Barriers to improvement:  none  Old goals remain appropriate, reinstate them. Has made progress towards #2,3,4. D/c recs previously made for SNF. That remains appropriate, but patient is making gains and rapidly approaching functional baseline. D/w PT Carlos Dillon, we endorse brief SNF stay. I agree with patient and support her d/c plans which could ultimately result in her living with family and being alone for 5-8 hour periods during the day. If she makes it home with support after SNF, consider [facility]. Plan for Next Session:    Reinstate plan from Marry's eval, 3-5x/wk, including Bed mobility, transfers, balance, gait, TA, TX,stairs. Also requires frequent nursing standing/ambulation/toileting throughout the day, ideally with hourly nursing rounding. Time in:  1105  Time out:  1135  Timed treatment minutes:  15  Total treatment time:  25      Electronically signed by:     Sd Renee, PT  3/7/2023, 11:16 AM

## 2023-03-07 NOTE — PROGRESS NOTES
Today's plan: continue Lasix 40 oral severe pulm hypertension okay for discharge from cardiac standpoint    Admit Date:  3/2/2023    Subjective:      Chief complaints on admission  Chief Complaint   Patient presents with    Congestive Heart Failure     EMS called to cardiologist for low o2 saturation per staff - upon EMS arrival, staff states they never took a pulse ox reading. Pt wears 3L o2 at baseline and was 93% on RA upon arrival to ED. Pt denies shortness of breath. Leg Swelling     BLE         History of present illness:Irene is a 80 y. o.year old who  presents with had concerns including Congestive Heart Failure (EMS called to cardiologist for low o2 saturation per staff - upon EMS arrival, staff states they never took a pulse ox reading. Pt wears 3L o2 at baseline and was 93% on RA upon arrival to ED. Pt denies shortness of breath.) and Leg Swelling (BLE).       Past medical history:    has a past medical history of Age-related osteoporosis with current pathological fracture of vertebra (HCC), Arthritis, Bradycardia, CAD (coronary artery disease), Cardiac pacemaker, COPD, mild ([address] Cor pulmonale (chronic) ([address] CVA (cerebrovascular accident) ([address] DJD (degenerative joint disease) of cervical spine, Exhaustion of cardiac pacemaker battery, Family history of cardiovascular disease, Glaucoma, H/O 24 hour EKG monitoring, H/O cardiac catheterization, H/O cardiovascular stress test, H/O cardiovascular stress test, H/O cardiovascular stress test, H/O chest x-ray, H/O Doppler lower venous ultrasound, H/O Doppler ultrasound, H/O Doppler ultrasound, H/O Doppler ultrasound, H/O Doppler ultrasound, H/O echocardiogram, H/O echocardiogram, H/O echocardiogram, H/O echocardiogram, History of complete ECG, History of nuclear stress test, Hx of cardiovascular stress test, HX OTHER MEDICAL, Hyperlipidemia, Hypertension, Mild intermittent asthma, Moderate COPD (chronic obstructive pulmonary disease) ([address] Nausea & vomiting, Obstructive sleep apnea, Post PTCA, Pulmonary HTN ([facility] PVD (peripheral vascular disease) ([facility] S/P CABG x 3, S/P PTCA (percutaneous transluminal coronary angioplasty), Severe pulmonary hypertension ([facility] [address] [facility] Shortness of breath, Thyroid disease, Unspecified cerebral artery occlusion with cerebral infarction, 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 Past surgical history:   has a past surgical history that includes Appendectomy (1941); Tonsillectomy (1950's); Cardiac surgery (4/09); Hysterectomy, total abdominal (1990's); Colonoscopy (In 2000's); pacemaker placement; Coronary artery bypass graft (4/8/2009); Coronary angioplasty with stent (4/21/2010); other surgical history; Middle ear surgery; and Percutaneous Transluminal Coronary Angio (11/2012). Social History:   reports that she quit smoking about 52 years ago. Her smoking use included cigarettes. She has a 1.25 pack-year smoking history. She has never used smokeless tobacco. She reports that she does not currently use alcohol. She reports that she does not use drugs. Family history:  family history includes Arthritis in her mother; Cancer in her mother and sister; Coronary Art Dis in her father; Depression in her mother and sister; Early Death in her paternal grandfather; Early Death (age of onset: 36) in her father; Hearing Loss in her mother; Heart Disease in her father and sister; High Blood Pressure in her father, mother, sister, son, and son; High Cholesterol in her father and mother; Mental Illness in her mother; Terrial Aas / Marnell Askew in her mother; Other in her daughter.     Allergies   Allergen Reactions    Darvocet [Propoxyphene N-Acetaminophen]     Ranexa [Ranolazine Er]      Sick to her stomach    Ranolazine      Sick to her stomach    Sulfa Antibiotics Itching    Sulfasalazine Itching    Adhesive Tape Rash    Allantoin Rash    Bacitracin Rash    Gramicidin Rash    Neomycin Rash    Polymyxin B Rash    Pramoxine Hcl Rash    Silicone Rash         Objective:   BP (!) 131/58   Pulse 60   Temp 97.6 °F (36.4 °C)   Resp 16   Ht 4' 11\" (1.499 m)   Wt 139 lb 6.4 oz (63.2 kg)   SpO2 98%   BMI 28.16 kg/m²     Intake/Output Summary (Last 24 hours) at 3/7/2023 1805  Last data filed at 3/7/2023 2628  Gross per 24 hour   Intake 300 ml   Output 850 ml   Net -550 ml         TELEMETRY:      Physical Exam:  Constitutional:  Well developed, Well nourished, No acute distress, Non-toxic appearance. HENT:  Normocephalic, Atraumatic, Bilateral external ears normal, Oropharynx moist, No oral exudates, Nose normal. Neck- Normal range of motion, No tenderness, Supple, No stridor. Eyes:  PERRL, EOMI, Conjunctiva normal, No discharge. Respiratory:  Normal breath sounds, No respiratory distress, No wheezing, No chest tenderness. ,no use of accessory muscles, diaphragm movement is normal  Cardiovascular: (PMI) apex non displaced,no lifts no thrills, no s3,no s4, Normal heart rate, Normal rhythm, No murmurs, No rubs, No gallops. Carotid arteries pulse and amplitude are normal no bruit, no abdominal bruit noted ( normal abdominal aorta ausculation), femoral arteries pulse and amplitude are normal no bruit, pedal pulses are normal  GI:  Bowel sounds normal, Soft, No tenderness, No masses, No pulsatile masses. : External genitalia appear normal, No masses or lesions. No discharge. No CVA tenderness. Musculoskeletal:  Intact distal pulses, No edema, No tenderness, No cyanosis, No clubbing. Good range of motion in all major joints. No tenderness to palpation or major deformities noted. Back- No tenderness. Integument:  Warm, Dry, No erythema, No rash. Lymphatic:  No lymphadenopathy noted. Neurologic:  Alert & oriented x 3, Normal motor function, Normal sensory function, No focal deficits noted.    Psychiatric:  Affect normal, Judgment normal, Mood normal.     Medications:    lactulose enema   Rectal Once    furosemide  40 mg Oral Daily    midodrine  2.5 mg Oral TID WC    docusate sodium  100 mg Oral BID    senna  1 tablet Oral BID    [Held by provider] carvedilol  3.125 mg Oral BID WC    aspirin  81 mg Oral Daily    atorvastatin  10 mg Oral Nightly    levothyroxine  88 mcg Oral Daily    tiotropium-olodaterol  2 puff Inhalation Daily    sodium chloride flush  5-40 mL IntraVENous 2 times per day    enoxaparin  40 mg SubCUTAneous QPM    fluticasone  1 puff Inhalation BID    lactulose  10 g Oral TID      sodium chloride       ipratropium-albuterol, sodium chloride flush, sodium chloride, potassium chloride, magnesium sulfate, ondansetron **OR** ondansetron, polyethylene glycol, acetaminophen **OR** acetaminophen    Lab Data:  CBC:   Recent Labs     03/05/23 0108   WBC 9.8   HGB 12.0*   HCT 40.6   .6*          BMP:   Recent Labs     03/05/23 0108 03/06/23 0319    134*   K 4.5 4.3   CL 91* 88*   CO2 38* 37*   BUN 21 19   CREATININE 0.9 0.8       LIVER PROFILE:   Recent Labs     03/05/23 0108 03/06/23 0319   AST 41* 32   ALT 22 21   BILITOT 1.0 1.3*   ALKPHOS 222* 215*       PT/INR:   No results for input(s): PROTIME, INR in the last 72 hours. APTT: No results for input(s): APTT in the last 72 hours. BNP:  No results for input(s): BNP in the last 72 hours. TROPONIN: @TROPONINI:3@      Assessment:  80 y. o.year old who is admitted for          Plan:  Acute decompensated congestive heart failure with history of cor pulmonale severe LV dilatation and severe pulmonary hypertension, admit to ICU stepdown, started IV Lasix drip in, her previous right heart catheterization report reviewed from 2019, her pulmonary cavity wedge pressure at that time was 17 RA pressure was 13 PA pressure was 32 mean RV pressure mean was 11 however this was 2019 she may have cor pulmonale and severe pulm hypertension  COPD not controlled recommend to see pulmonary  CAD stable history of bypass and PCI of RCA in 2012 and LIMA to LAD stent in 2010 bypass surgery was 2009, continue aspirin statin beta-blocker  All labs, medications and tests reviewed, continue all other medications of all above medical condition listed as is.       Alejandro Hanson MD, MD 3/7/2023 6:05 PM

## 2023-03-08 VITALS
WEIGHT: 148.4 LBS | HEART RATE: 60 BPM | SYSTOLIC BLOOD PRESSURE: 120 MMHG | BODY MASS INDEX: 29.92 KG/M2 | RESPIRATION RATE: 16 BRPM | DIASTOLIC BLOOD PRESSURE: 70 MMHG | HEIGHT: 59 IN | OXYGEN SATURATION: 98 % | TEMPERATURE: 97.4 F

## 2023-03-08 PROBLEM — I50.9 ACUTE DECOMPENSATED HEART FAILURE (HCC): Status: RESOLVED | Noted: 2023-01-01 | Resolved: 2023-01-01

## 2023-03-08 LAB
SARS-COV-2 RDRP RESP QL NAA+PROBE: NOT DETECTED
SOURCE: NORMAL

## 2023-03-08 PROCEDURE — 6370000000 HC RX 637 (ALT 250 FOR IP): Performed by: STUDENT IN AN ORGANIZED HEALTH CARE EDUCATION/TRAINING PROGRAM

## 2023-03-08 PROCEDURE — 6370000000 HC RX 637 (ALT 250 FOR IP): Performed by: INTERNAL MEDICINE

## 2023-03-08 PROCEDURE — 2580000003 HC RX 258: Performed by: STUDENT IN AN ORGANIZED HEALTH CARE EDUCATION/TRAINING PROGRAM

## 2023-03-08 PROCEDURE — 87635 SARS-COV-2 COVID-19 AMP PRB: CPT

## 2023-03-08 RX ORDER — LACTULOSE 10 G/15ML
10 SOLUTION ORAL 3 TIMES DAILY
Qty: 237 ML | Refills: 2 | Status: SHIPPED | OUTPATIENT
Start: 2023-03-08

## 2023-03-08 RX ORDER — SENNA AND DOCUSATE SODIUM 50; 8.6 MG/1; MG/1
2 TABLET, FILM COATED ORAL 2 TIMES DAILY
Qty: 60 TABLET | Refills: 3 | COMMUNITY
Start: 2023-03-08

## 2023-03-08 RX ORDER — MIDODRINE HYDROCHLORIDE 2.5 MG/1
2.5 TABLET ORAL 3 TIMES DAILY
Qty: 90 TABLET | Refills: 3 | Status: SHIPPED | OUTPATIENT
Start: 2023-03-08

## 2023-03-08 RX ORDER — FUROSEMIDE 40 MG/1
40 TABLET ORAL DAILY
Qty: 60 TABLET | Refills: 3 | Status: SHIPPED | OUTPATIENT
Start: 2023-03-09

## 2023-03-08 RX ADMIN — LEVOTHYROXINE SODIUM 88 MCG: 0.09 TABLET ORAL at 05:51

## 2023-03-08 RX ADMIN — SENNOSIDES 8.6 MG: 8.6 TABLET, COATED ORAL at 09:18

## 2023-03-08 RX ADMIN — DOCUSATE SODIUM 100 MG: 100 CAPSULE, LIQUID FILLED ORAL at 09:17

## 2023-03-08 RX ADMIN — MIDODRINE HYDROCHLORIDE 2.5 MG: 5 TABLET ORAL at 09:58

## 2023-03-08 RX ADMIN — ASPIRIN 81 MG CHEWABLE TABLET 81 MG: 81 TABLET CHEWABLE at 09:17

## 2023-03-08 RX ADMIN — SODIUM CHLORIDE, PRESERVATIVE FREE 10 ML: 5 INJECTION INTRAVENOUS at 09:18

## 2023-03-08 RX ADMIN — LACTULOSE 10 G: 10 SOLUTION ORAL at 09:18

## 2023-03-08 RX ADMIN — FUROSEMIDE 40 MG: 40 TABLET ORAL at 09:17

## 2023-03-08 ASSESSMENT — PAIN SCALES - GENERAL: PAINLEVEL_OUTOF10: 2

## 2023-03-08 NOTE — DISCHARGE INSTR - COC
Continuity of Care Form    Patient Name: Bri Thompson   :  3/18/1929  MRN:  [id number]    Admit date:  3/2/2023  Discharge date:  3/8/23    Code Status Order: DNR-CC   Advance Directives:     Admitting Physician:  Madison Sands MD  PCP: Austin Myers MD    [facility]4107-A  Discharging Unit Phone Number: [phone]    Emergency Contact:   Extended Emergency Contact Information  Primary Emergency Contact: [address] Phone: [phone]  Relation: Child  Secondary Emergency Contact: AUBREE MARRERO Phone: [phone]  Relation: Child    Past Surgical History:  Past Surgical History:   Procedure Laterality Date        CABG (3 Bypasses), One Heart Stent in     COLONOSCOPY  In     [address] [place] [address] WITH STENT PLACEMENT  2010    PTCA with stent LIMA ->LAD    CORONARY ARTERY BYPASS GRAFT  2009    LIMA->Diag,  LIMA -> LAD;  SVG->RCA going to the [facility].  Followed by MAZE procedure by pulmonary vein isolation.-  Dr Laurent Rivera, TOTAL ABDOMINAL (CERVIX REMOVED)      MIDDLE EAR SURGERY      OTHER SURGICAL HISTORY      Ear surgery-hearing    PACEMAKER PLACEMENT      battery change 2011 [employer]    PTCA  2012    Ptca with stent to RCA    TONSILLECTOMY  's       Immunization History:   Immunization History   Administered Date(s) Administered    COVID-19, MODERNA BLUE border, Primary or Immunocompromised, (age 12y+), IM, 100 mcg/0.5mL 2021, 2021, 2021, 2022       Active Problems:  Patient Active Problem List   Diagnosis Code    CAD (coronary artery disease) I25.10    Cardiac pacemaker Z95.0    S/P CABG x 3 Z95.1    Post PTCA Z98.61    TIA (transient ischemic attack) G45.9    Confusion R41.0    Headache R51.9    Essential hypertension I10    Sinus bradycardia R00.1    Coronary artery disease involving coronary bypass graft of native heart without angina pectoris I25.810    Dizziness R42    PAF (paroxysmal atrial fibrillation) (HCC) I48.0    Pacer at end of battery life Z45.010    Hyponatremia U41.6    Metabolic encephalopathy R49.17    Generalized weakness R53.1    Gait disturbance R26.9    Acute urinary retention R33.8    Hypothyroidism (acquired) E03.9    Pulmonary hypertension (HCC) I27.20    Moderate COPD (chronic obstructive pulmonary disease) (HCC) J44.9    Abnormal liver CT -findings suggestive of cirrhosis  R93.2    At risk for injury associated with anticoagulation Z91.89    T12 vertebral fracture (HCC) S22.089A    SAULO on CPAP G47.33, Z99.89    Iron deficiency anemia secondary to inadequate dietary iron intake D50.8    Age-related osteoporosis with current pathological fracture of vertebra (HCC) M80.08XA    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 (HCC) I87.312, L97.929    WD-Non-pressure chronic ulcer of other part of left lower leg with fat layer exposed ([facility] M29.869    Acute on chronic diastolic (congestive) heart failure (HCC) I50.33    SunDown syndrome F05    Orthostatic hypotension I95.1    Abrasion of left calf S80.812A    Moderate malnutrition (HCC) E44.0       Isolation/Infection:   Isolation            No Isolation          Patient Infection Status       Infection Onset Added Last Indicated Last Indicated By Review Planned Expiration Resolved Resolved By    None active    Resolved    COVID-19 (Rule Out) 03/02/23 03/02/23 03/02/23 COVID-19, Rapid (Ordered)   03/03/23 Rule-Out Test Resulted    COVID-19 (Rule Out) 12/15/22 12/15/22 12/15/22 COVID-19, Rapid (Ordered)   12/15/22 Rule-Out Test Resulted    COVID-19 (Rule Out) 10/07/21 10/07/21 10/07/21 COVID-19, Rapid (Ordered)   10/07/21 Rule-Out Test Resulted    COVID-19 (Rule Out) 11/30/20 11/30/20 11/30/20 Covid-19 Ambulatory (Ordered)   12/01/20 Rule-Out Test Resulted            Nurse Assessment:  Last Vital Signs: BP 120/70   Pulse 60   Temp 97.4 °F (36.3 °C) (Oral)   Resp 16   Ht 4' 11\" (1.499 m)   Wt 148 lb 6.4 oz (67.3 kg)   SpO2 98%   BMI 29.97 kg/m²     Last documented pain score (0-10 scale): Pain Level: 2  Last Weight:   Wt Readings from Last 1 Encounters:   03/08/23 148 lb 6.4 oz (67.3 kg)     Mental Status:  oriented and alert    IV Access:  - None    Nursing Mobility/ADLs:  Walking   Assisted  Transfer  Assisted  Bathing  Assisted  Dressing  Assisted  Toileting  Independent  Feeding  [address]  Independent  Med Delivery   whole    Wound Care Documentation and Therapy:        Elimination:  Continence: Bowel: Yes  Bladder: Yes  Urinary Catheter: None   Colostomy/Ileostomy/Ileal Conduit: No       Date of Last BM: unknown  No intake or output data in the 24 hours ending 03/08/23 1138  I/O last 3 completed shifts: In: 300 [P.O.:300]  Out: 250 [Urine:250]    Safety Concerns:     None    Impairments/Disabilities:      None    Nutrition Therapy:  Current Nutrition Therapy:   - Oral Diet:  General    Routes of Feeding: None  Liquids: No Restrictions  Daily Fluid Restriction: no  Last Modified Barium Swallow with Video (Video Swallowing Test): not done    Treatments at the Time of Hospital Discharge:   Respiratory Treatments: ANORO ELLIPTA and PRN albuterol  Oxygen Therapy:  is on oxygen at 3 L/min per nasal cannula.   Ventilator:    - No ventilator support    Rehab Therapies: Physical Therapy and Occupational Therapy  Weight Bearing Status/Restrictions: No weight bearing restrictions  Other Medical Equipment (for information only, NOT a DME order):  per PT/OT recs  Other Treatments: -    Patient's personal belongings (please select all that are sent with patient):  Hearing Aides bilateral, Dentures upper and lower    RN SIGNATURE:  Electronically signed by Rupali Dunlap RN on 3/8/23 at 12:22 PM EST    CASE MANAGEMENT/SOCIAL WORK SECTION    Inpatient Status Date: ***    Readmission Risk Assessment Score:  Readmission Risk              Risk of Unplanned Readmission:  29           Discharging to Facility/ Agency   Name:   Address:  Phone:  Fax:    Dialysis Facility (if applicable)   Name:  Address:  Dialysis Schedule:  Phone:  Fax:    / signature: {Agnieszkagnature:[id number]}    PHYSICIAN SECTION    Prognosis: Guarded    Condition at Discharge: Stable    Rehab Potential (if transferring to Rehab): Fair    Recommended Labs or Other Treatments After Discharge: -    Physician Certification: I certify the above information and transfer of Natasha Amaya  is necessary for the continuing treatment of the diagnosis listed and that she requires [facility] for greater 30 days. Update Admission H&P: Changes in H&P as follows -      -Diuretics: Lasix 40 mg daily  -No antihypertensives  -Midodrine 2.5 mg three times a day - hold for SBP > 135.   -Laxatives: senokot-s and lactulose - hold if patient has > 3 bowel movements a day  -Other medications per AVS      PHYSICIAN SIGNATURE:  Electronically signed by Gayland Curling, MD on 3/8/23 at 11:45 AM EST

## 2023-03-08 NOTE — CARE COORDINATION
Per [employer] pt is approved for [facility]   [id number]  [id number]  Formerly [facility]  03/07/2023 03/07/2023-03/09/2023  Approved

## 2023-03-08 NOTE — CARE COORDINATION
Prior auth completed. PS to Dr Ellie Blizzard      1120 Updated pt's son Harmeet Shaffer that plan will be [facility] once discharge orders are in. VM left for Theresa at [facility]. 1210 Pt on discharge to [facility]. Set up with Carl for 1330. Pt/daughter , nursing and Theresa at [facility] agreeable with plan. Also left VM for aurelia Shaffer. 1215 EPASSR completed.

## 2023-03-08 NOTE — DISCHARGE SUMMARY
Discharge Summary    Name:  Meagan Patiño /Age/Sex: 3/18/1929  (80 y.o. female)   MRN & CSN:  [id number] & [id number] Admission Date/Time: 3/2/2023  4:00 PM   Attending:  No att. providers found Discharging Physician: Eliseo Hernández MD     Discharge diagnosis and plan:  Meagan Patiño is a 80 y.o. female with past medical history of coronary artery disease status post CABG in  and PCI in , atrial fibrillation not on AC due to GI bleed, heart failure with preserved ejection fraction, permanent pacemaker (MRI safe Medtronic), hypertension, obstructive sleep apnea on CPAP, pulmonary hypertension, Cirrhosis is admitted at [facility] on 3/2/23 after being sent from cardiologist's office due to worsening shortness of breath. Acute metabolic encephalopathy- resolved to baseline  Secondary to respiratory failure w/ hypercapnia   Subclinical hypothyroidism on thyroid function panel  No need of imaging due to lack of focal neurological deficits and improvement in mentation. Ammonia level 60>88, no significant evidence of hepatic encephalopathy  Patient resolved to baseline    Acute on chronic hypoxic hypercapnic respiratory failure  Secondary to acute on chronic diastolic heart failure and underlying severe pulmonary hypertension and right sided involvement  Improved  Echocardiogram 2022: Ejection fraction greater than 60%, dilated right side of the heart, RVSP 52 mmHg.   proBNP 1464  Was on IV Lasix 40 mg BID -> changed to oral lasix 40 mg daily by cardio team   Measure daily weight and strict intake and output (9 pound weight loss since admission)  Cardiology was onboard while inpatient    Urinary retention  Voiding trial - successful - monitor for urinary retention in SNF as well    Coronary artery disease status post CABG in  and PCI in   Continue Aspirin and Statin    Paroxysmal atrial fibrillation not on anticoagulation due to GI bleed  Permanent pacemaker (MRI safe Medtronic)  Monitor on telemetry  Hold off on [facility] dt hx of GI bleed while on [facility]    Hypertension  Low normal blood pressure trend  Stop Coreg, resume low-dose midodrine     COPD/Chronic respiratory failure  Obstructive sleep apnea on CPAP  Baseline oxygen 3 L nasal cannula  Utilize supplemental oxygen to maintain saturation 88-92%  Continue Stiolto  Continue CPAP at night and during naps    Cirrhosis  Hepatitis panel was negative in 2022  No significant history of alcohol abuse  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 Appearance: She is well-developed. HENT:      Head: Normocephalic and atraumatic. Right Ear: External ear normal.      Left Ear: External ear normal.      Nose: Nose normal.   Eyes:      General: No scleral icterus. Right eye: No discharge. Left eye: No discharge. Conjunctiva/sclera: Conjunctivae normal.      Pupils: Pupils are equal, round, and reactive to light. Neck:      Thyroid: No thyromegaly. Vascular: No JVD. Trachea: No tracheal deviation. Cardiovascular:      Rate and Rhythm: Normal rate and regular rhythm. Heart sounds: Normal heart sounds. No murmur heard. No friction rub. No gallop. Pulmonary:      Effort: Pulmonary effort is normal. No respiratory distress. Breath sounds: Normal breath sounds. No stridor. No wheezing or rales.    Chest:      Chest wall: No tenderness. Abdominal:      General: Bowel sounds are normal. There is no distension. Palpations: Abdomen is soft. There is no mass. Tenderness: There is no abdominal tenderness. There is no guarding or rebound. Hernia: No hernia is present. Genitourinary:     Vagina: Normal. No vaginal discharge. Rectum: Guaiac result negative. Musculoskeletal:         General: No tenderness or deformity. Normal range of motion. Cervical back: Normal range of motion and neck supple. Lymphadenopathy:      Cervical: No cervical adenopathy. Skin:     General: Skin is warm and dry. Capillary Refill: Capillary refill takes less than 2 seconds. Coloration: Skin is not pale. Findings: No erythema or rash. Neurological:      Mental Status: She is alert. Mental status is at baseline. She is disoriented. Cranial Nerves: No cranial nerve deficit. Motor: No abnormal muscle tone. Coordination: Coordination normal.      Deep Tendon Reflexes: Reflexes normal.   Psychiatric:         Behavior: Behavior normal.         Thought Content: Thought content normal.         Judgment: Judgment normal.         Hospital Course:   Ricky Hernandez is a 80 y.o.  female  who presents with Acute decompensated heart failure ([facility] [address]    -Please refer to discharge diagnosis and plan as mentioned above for details on hospital course. The patient expressed appropriate understanding of and agreement with the discharge recommendations, medications, and plan.      Consults this admission:  IP CONSULT TO [facility] [address] [facility]      Discharge Instruction:   Handoff to PCP:   -  Follow up appointments: PCP, cardiology  Primary care physician: Damion Pollard MD    Diet:  regular diet   Activity: activity as tolerated  Disposition: Discharged to:   []Home, []HHC, [x]SNF, []Acute Rehab, []Hospice   Condition on discharge: Stable    Discharge Medications:        Medication List        START taking these medications      furosemide 40 MG tablet  Commonly known as: LASIX  Take 1 tablet by mouth daily  Start taking on: March 9, 2023     lactulose 10 GM/15ML solution  Commonly known as: CHRONULAC  Take 15 mLs by mouth 3 times daily . Please hold if patient having more than 3 bowel movements a day and resume as needed. levothyroxine 88 MCG tablet  Commonly known as: SYNTHROID     sennosides-docusate sodium 8.6-50 MG tablet  Commonly known as: SENOKOT-S  Take 2 tablets by mouth 2 times daily .  Please hold if patient having more than 3 bowel movements a day            CHANGE how you take these medications      midodrine 2.5 MG tablet  Commonly known as: PROAMATINE  Take 1 tablet by mouth 3 times daily  What changed:   medication strength  how much to take     vitamin D 25 MCG (1000 UT) Caps  Take 2 capsules by mouth daily  What changed: how much to take            CONTINUE taking these medications      acetaminophen 500 MG tablet  Commonly known as: TYLENOL     albuterol sulfate  (90 Base) MCG/ACT inhaler  Commonly known as: PROVENTIL;VENTOLIN;PROAIR     aspirin 81 MG chewable tablet     atorvastatin 10 MG tablet  Commonly known as: LIPITOR     CPAP Machine Misc     ferrous sulfate 325 (65 Fe) MG tablet  Commonly known as: IRON 325     OSTEO BI-FLEX ADV DOUBLE ST PO     traZODone 50 MG tablet  Commonly known as: DESYREL     umeclidinium-vilanterol 62.5-25 MCG/INH Aepb inhaler  Commonly known as: ANORO ELLIPTA  Inhale 1 puff into the lungs daily     vitamin B-12 1000 MCG tablet  Commonly known as: CYANOCOBALAMIN            STOP taking these medications      carvedilol 6.25 MG tablet  Commonly known as: COREG     spironolactone 25 MG tablet  Commonly known as: ALDACTONE     torsemide 20 MG tablet  Commonly known as: DEMADEX     Torsemide 40 MG Tabs     Zioptan 0.0015 % Soln  Generic drug: Tafluprost (PF)            ASK your doctor about these medications      ICaps Caps     timolol 0.5 % ophthalmic solution  Commonly known as: TIMOPTIC               Where to Get Your Medications        These medications were sent to [facility] [address] [facility] [phone] - f [phone]  [address] [place] [address]      Phone: [phone]   furosemide 40 MG tablet  lactulose 10 GM/15ML solution  midodrine 2.5 MG tablet  vitamin D 25 MCG (1000 UT) Caps       You can get these medications from any pharmacy    You don't need a prescription for these medications  sennosides-docusate sodium 8.6-50 MG tablet         Objective Findings at Discharge:       BMP/CBC  Recent Labs     03/06/23  0319   *   K 4.3   CL 88*   CO2 37*   BUN 19   CREATININE 0.8       IMAGING:  -    Additional Information: Patient seen and examined day of discharge.  For more information regarding patient's care please contact [facility] records [phone]    Discharge Time of 30 minutes    Electronically signed by Mynor Vernon MD on 3/8/2023 at 3:26 PM

## 2023-03-10 ENCOUNTER — HOSPITAL ENCOUNTER (OUTPATIENT)
Age: 88
Setting detail: SPECIMEN
Discharge: HOME | End: 2023-03-10

## 2023-03-10 LAB
ALBUMIN SERPL-MCNC: 4.1 GM/DL (ref 3.4–5)
ALP BLD-CCNC: 275 IU/L (ref 40–128)
ALT SERPL-CCNC: 23 U/L (ref 10–40)
ANION GAP SERPL CALCULATED.3IONS-SCNC: 9 MMOL/L (ref 4–16)
AST SERPL-CCNC: 34 IU/L (ref 15–37)
BILIRUB SERPL-MCNC: 1.3 MG/DL (ref 0–1)
BUN SERPL-MCNC: 26 MG/DL (ref 6–23)
CALCIUM SERPL-MCNC: 10.6 MG/DL (ref 8.3–10.6)
CHLORIDE BLD-SCNC: 89 MMOL/L (ref 99–110)
CO2: 37 MMOL/L (ref 21–32)
CREAT SERPL-MCNC: 0.7 MG/DL (ref 0.6–1.1)
GFR SERPL CREATININE-BSD FRML MDRD: >60 ML/MIN/1.73M2
GLUCOSE SERPL-MCNC: 100 MG/DL (ref 70–99)
HCT VFR BLD CALC: 42.7 % (ref 37–47)
HEMOGLOBIN: 12.7 GM/DL (ref 12.5–16)
MCH RBC QN AUTO: 31.8 PG (ref 27–31)
MCHC RBC AUTO-ENTMCNC: 29.7 % (ref 32–36)
MCV RBC AUTO: 106.8 FL (ref 78–100)
PDW BLD-RTO: 17.1 % (ref 11.7–14.9)
PLATELET # BLD: 379 K/CU MM (ref 140–440)
PMV BLD AUTO: 11.3 FL (ref 7.5–11.1)
POTASSIUM SERPL-SCNC: 4.8 MMOL/L (ref 3.5–5.1)
RBC # BLD: 4 M/CU MM (ref 4.2–5.4)
SODIUM BLD-SCNC: 135 MMOL/L (ref 135–145)
TOTAL PROTEIN: 6.1 GM/DL (ref 6.4–8.2)
WBC # BLD: 9.8 K/CU MM (ref 4–10.5)

## 2023-03-10 PROCEDURE — 86480 TB TEST CELL IMMUN MEASURE: CPT

## 2023-03-10 PROCEDURE — 85027 COMPLETE CBC AUTOMATED: CPT

## 2023-03-10 PROCEDURE — 36415 COLL VENOUS BLD VENIPUNCTURE: CPT

## 2023-03-10 PROCEDURE — 80053 COMPREHEN METABOLIC PANEL: CPT

## 2023-03-11 ENCOUNTER — APPOINTMENT (OUTPATIENT)
Dept: CT IMAGING | Age: 88
End: 2023-03-11
Payer: MEDICARE

## 2023-03-11 ENCOUNTER — HOSPITAL ENCOUNTER (EMERGENCY)
Age: 88
Discharge: HOME | End: 2023-03-11
Attending: EMERGENCY MEDICINE
Payer: MEDICARE

## 2023-03-11 VITALS
OXYGEN SATURATION: 92 % | DIASTOLIC BLOOD PRESSURE: 72 MMHG | SYSTOLIC BLOOD PRESSURE: 146 MMHG | TEMPERATURE: 97.5 F | RESPIRATION RATE: 18 BRPM | HEART RATE: 60 BPM

## 2023-03-11 DIAGNOSIS — R40.4 TRANSIENT ALTERATION OF AWARENESS: ICD-10-CM
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
## 2023-03-11 LAB
ALBUMIN SERPL-MCNC: 3.5 GM/DL (ref 3.4–5)
ALP BLD-CCNC: 254 IU/L (ref 40–129)
ALT SERPL-CCNC: 25 U/L (ref 10–40)
ANION GAP SERPL CALCULATED.3IONS-SCNC: 6 MMOL/L (ref 4–16)
AST SERPL-CCNC: 47 IU/L (ref 15–37)
BASE EXCESS MIXED: 13.8 (ref 0–2.3)
BASOPHILS ABSOLUTE: 0.1 K/CU MM
BASOPHILS RELATIVE PERCENT: 0.8 % (ref 0–1)
BILIRUB SERPL-MCNC: 1 MG/DL (ref 0–1)
BUN SERPL-MCNC: 26 MG/DL (ref 6–23)
CALCIUM SERPL-MCNC: 10.4 MG/DL (ref 8.3–10.6)
CHLORIDE BLD-SCNC: 92 MMOL/L (ref 99–110)
CO2: 38 MMOL/L (ref 21–32)
COMMENT: ABNORMAL
CREAT SERPL-MCNC: 0.7 MG/DL (ref 0.6–1.1)
DIFFERENTIAL TYPE: ABNORMAL
EKG ATRIAL RATE: 78 BPM
EKG DIAGNOSIS: NORMAL
EKG Q-T INTERVAL: 452 MS
EKG QRS DURATION: 138 MS
EKG QTC CALCULATION (BAZETT): 455 MS
EKG R AXIS: 259 DEGREES
EKG T AXIS: 101 DEGREES
EKG VENTRICULAR RATE: 61 BPM
EOSINOPHILS ABSOLUTE: 0.4 K/CU MM
EOSINOPHILS RELATIVE PERCENT: 3.8 % (ref 0–3)
GFR SERPL CREATININE-BSD FRML MDRD: >60 ML/MIN/1.73M2
GLUCOSE SERPL-MCNC: 139 MG/DL (ref 70–99)
HCO3 VENOUS: 44.3 MMOL/L (ref 19–25)
HCT VFR BLD CALC: 40.8 % (ref 37–47)
HEMOGLOBIN: 12.3 GM/DL (ref 12.5–16)
IMMATURE NEUTROPHIL %: 0.6 % (ref 0–0.43)
LACTATE: 2 MMOL/L (ref 0.5–1.9)
LYMPHOCYTES ABSOLUTE: 0.7 K/CU MM
LYMPHOCYTES RELATIVE PERCENT: 6.9 % (ref 24–44)
MCH RBC QN AUTO: 32.1 PG (ref 27–31)
MCHC RBC AUTO-ENTMCNC: 30.1 % (ref 32–36)
MCV RBC AUTO: 106.5 FL (ref 78–100)
MONOCYTES ABSOLUTE: 1 K/CU MM
MONOCYTES RELATIVE PERCENT: 9.8 % (ref 0–4)
NUCLEATED RBC %: 0 %
O2 SAT, VEN: 92.9 % (ref 50–70)
PCO2, VEN: 88 MMHG (ref 38–52)
PDW BLD-RTO: 16.8 % (ref 11.7–14.9)
PH VENOUS: 7.31 (ref 7.32–7.42)
PLATELET # BLD: 363 K/CU MM (ref 140–440)
PMV BLD AUTO: 11.3 FL (ref 7.5–11.1)
PO2, VEN: 108 MMHG (ref 28–48)
POTASSIUM SERPL-SCNC: 4.9 MMOL/L (ref 3.5–5.1)
RBC # BLD: 3.83 M/CU MM (ref 4.2–5.4)
SEGMENTED NEUTROPHILS ABSOLUTE COUNT: 7.9 K/CU MM
SEGMENTED NEUTROPHILS RELATIVE PERCENT: 78.1 % (ref 36–66)
SODIUM BLD-SCNC: 136 MMOL/L (ref 135–145)
TOTAL IMMATURE NEUTOROPHIL: 0.06 K/CU MM
TOTAL NUCLEATED RBC: 0 K/CU MM
TOTAL PROTEIN: 6.3 GM/DL (ref 6.4–8.2)
TROPONIN T: <0.01 NG/ML
WBC # BLD: 10.1 K/CU MM (ref 4–10.5)

## 2023-03-11 PROCEDURE — 71250 CT THORAX DX C-: CPT

## 2023-03-11 PROCEDURE — 76376 3D RENDER W/INTRP POSTPROCES: CPT

## 2023-03-11 PROCEDURE — 83605 ASSAY OF LACTIC ACID: CPT

## 2023-03-11 PROCEDURE — 99285 EMERGENCY DEPT VISIT HI MDM: CPT

## 2023-03-11 PROCEDURE — 84484 ASSAY OF TROPONIN QUANT: CPT

## 2023-03-11 PROCEDURE — 85025 COMPLETE CBC W/AUTO DIFF WBC: CPT

## 2023-03-11 PROCEDURE — 93005 ELECTROCARDIOGRAM TRACING: CPT | Performed by: EMERGENCY MEDICINE

## 2023-03-11 PROCEDURE — 80053 COMPREHEN METABOLIC PANEL: CPT

## 2023-03-11 PROCEDURE — 70450 CT HEAD/BRAIN W/O DYE: CPT

## 2023-03-11 PROCEDURE — 72125 CT NECK SPINE W/O DYE: CPT

## 2023-03-11 PROCEDURE — 74176 CT ABD & PELVIS W/O CONTRAST: CPT

## 2023-03-11 PROCEDURE — 82805 BLOOD GASES W/O2 SATURATION: CPT

## 2023-03-11 NOTE — ACP (ADVANCE CARE PLANNING)
Patient has a Medical POA. Patient has chosen her daughter, Charles Gonzalez" as her POA and her son, \"Gagandeep Forrester,\" as her first Alternate Agent for MPOA.

## 2023-03-11 NOTE — ED PROVIDER NOTES
Triage Chief Complaint:   Fall    [place]:  Daniel Tao is a 80 y.o. female that presents via EMS from nursing facility for changes in mental status. Patient reportedly last seen normal around 11 PM and then was found facedown in her bedroom around 12:30 AM and confused. EMS reports that she is complained that she is hungry and has complained of back pain but otherwise seems confused and is not answering questions or following commands. They report normal vital signs. They did apply a nonrebreather and had a blood glucose in the 100s. No other information available at this time. Patient able to state her name on arrival but otherwise cannot give any coherent history of present illness and is moaning. ROS:  Unable to obtain    Past Medical History:   Diagnosis Date    Age-related osteoporosis with current pathological fracture of vertebra ([facility] [address] 7/8/2022    Arthritis     Bradycardia 2001    requiring dual chamber pacemaker at [facility]    CAD (coronary artery disease)     Cardiac pacemaker 10/2001    [employer] #[id number]  [facility]- Serial # [id number]- Dr Fauzia Starr    COPD, [facility]) 2/17/2017    Cor pulmonale (chronic) ([address] 3/15/2022    CVA (cerebrovascular accident) ([facility] [address]     DJD (degenerative joint disease) of cervical spine     C5-C6, C6-C7    Exhaustion of cardiac pacemaker battery 11/21/2011    PPM battery replacement- [employer]    Family history of cardiovascular disease     Glaucoma Dx 2010    H/O 24 hour EKG monitoring 8/6/2000 8/6/2000- Intermittent episonde of a-fib/flutter    H/O cardiac catheterization 4/20/2010, 2/1989 4/20/2010-Severe native vessel disease and has graft disease as well. LAD, CX totally occluded. RCA totally occluded in proximal segment. VG to RCA widely patent. PDA 90% LAD afer LIMA anastomosis 80-90% stenosis. Proceeded with PTCA with stent next day.     H/O cardiovascular stress test 10/14/2011, 6/2/2010,4/8/2010, 5/18/2009,4/6/2009, 10/30/2007, 11/12/2004, 11/6/2003, 8/9/2002, 8/9/2001,6/5/2000,     10/14/2011-Lexiscan-Abnormal Myocardial Perfusion study. Evidence of mild ischemia in the Left CX region. Abnomal study. Rest EF 63%. Global LV systolic function normal. No ECG changes. Unremarkable pharmacological stress test.    H/O cardiovascular stress test 6/10/2013    thallium--mild ischemia left circumflex EF63% no change from 10/2011 study. H/O cardiovascular stress test 10/16/2014    cardiolite-mild ischemia left circumflex,EF70%    H/O chest x-ray 4/19/2009 4/19/2009-Stable cardiomegaly. No acute cardiopulmonary disease. H/O Doppler lower venous ultrasound 09/18/2019    Significant reflux noted in RGSV, RGSV is extremely tortuous and small along the thigh and calf and would be highly unlikely to be accessed, RSSV is non compressible and has occlusive chronic SVT, LSSV is non compressible with occlusive chronic SVT, LGSV removed s/p CABG, Significant reflux in LGSV tributary,    H/O Doppler ultrasound 3/31/2010    CAROTID- 3/31/2010-INtimal thickening but no significant atherosclerotic plaque noted in ANA PAULA. Doppler flow velocities within ANA PAULA are WNL. Heterogeneous, irregular atherosclerotic plaque noted in LICA. Doppler flow velocities within the LICA are elevated, consistent with a mild, less than 50% stenosis. H/O Doppler ultrasound 5/24/2016    Carotid- normal study    H/O Doppler ultrasound 09/06/2017    carotid - normal study    H/O Doppler ultrasound     H/O echocardiogram 10/13    EF=60%, Severe Pulm. HTN, & Sclerotic aortic valve w/stenosis. H/O echocardiogram 10/16/14     EF 55-60% Normal LV. Normal LV systolic function. Severe tricuspid insufficiency with severe hypertension.      H/O echocardiogram 02/03/2017    heart cath performed this morning    H/O echocardiogram 09/18/2019    EF 50-55%, Left atrium is mild to moderately dilated, right atrium is severely dilated, mildly dilated right ventricle, Mod MR, Severe TR, Severe Pulm HTN, no pericardial effusion     History of complete ECG     10/14/2011(Lexiscan);5/6/2010, 4/30/2009,10/24/2008,9/21/2007, 10/13/2006    History of nuclear stress test 11/17/2016    lexiscan-normal,EF70%    Hx of cardiovascular stress test 12/28/2018    EF 60%  Normal study. HX OTHER MEDICAL 05/01/2017    MUGA-normal, EF53%    Hyperlipidemia     Hypertension     Mild intermittent asthma 7/28/2016    Moderate COPD (chronic obstructive pulmonary disease) ([facility]) 3/15/2022    Nausea & vomiting     Obstructive sleep apnea 5/16/2017    Post PTCA 4/21/2010    PTCA with 2.25 stent of the LIMA to LAD    Pulmonary HTN ([address]     Severe per last echo on 10/13. 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  Followed by MAZE procedure by pulmonary vein isolation.-  Dr Rafael Soto, TOTAL ABDOMINAL (CERVIX REMOVED)  1990's    MIDDLE EAR SURGERY      OTHER SURGICAL HISTORY      Ear surgery-hearing    PACEMAKER PLACEMENT battery change 2011 Medtronic    PTCA  2012    Ptca with stent to RCA    TONSILLECTOMY  65's     Family History   Problem Relation Age of Onset    Cancer Mother         \"Liver Cancer\"    Arthritis Mother     Depression Mother     Hearing Loss Mother     High Blood Pressure Mother     High Cholesterol Mother     Mental Illness Mother     Miscarriages / Stillbirths Mother     Heart Disease Father     Early Death Father 36        \"Instant Death\"    High Blood Pressure Father     High Cholesterol Father     Coronary Art Dis Father         Massive MI    Heart Disease Sister     Depression Sister     Cancer Sister         \"Breast Cancer, Cancer Free Now\"    High Blood Pressure Sister     Other Daughter         \"She's Had Stomach Surgery\"    High Blood Pressure Son     High Blood Pressure Son     Early Death Paternal Grandfather      Social History     Socioeconomic History    Marital status:       Spouse name: Not on file    Number of children: 4    Years of education: Not on file    Highest education level: Not on file   Occupational History    Occupation: RETIRED     Comment: from [employer]    Smoking status: Former     Packs/day: 0.25     Years: 5.00     Pack years: 1.25     Types: Cigarettes     Quit date: 1970     Years since quittin.3    Smokeless tobacco: Never   Vaping Use    Vaping Use: Never used   Substance and Sexual Activity    Alcohol use: Not Currently     Comment: Caffiene - no more than 3 cups of coffee each day    Drug use: Never    Sexual activity: Yes     Partners: Male     Comment:    Other Topics Concern    Not on file   Social History Narrative    Not on file     Social Determinants of Health     Financial Resource Strain: Not on file   Food Insecurity: Not on file   Transportation Needs: Not on file   Physical Activity: Not on file   Stress: Not on file   Social Connections: Not on file   Intimate Partner Violence: Not on file   Housing Stability: Not on file     No current facility-administered medications for this encounter. Current Outpatient Medications   Medication Sig Dispense Refill    vitamin D 25 MCG (1000 UT) CAPS Take 2 capsules by mouth daily 30 capsule 0    midodrine (PROAMATINE) 2.5 MG tablet Take 1 tablet by mouth 3 times daily 90 tablet 3    sennosides-docusate sodium (SENOKOT-S) 8.6-50 MG tablet Take 2 tablets by mouth 2 times daily . Please hold if patient having more than 3 bowel movements a day 60 tablet 3    furosemide (LASIX) 40 MG tablet Take 1 tablet by mouth daily 60 tablet 3    lactulose (CHRONULAC) 10 GM/15ML solution Take 15 mLs by mouth 3 times daily . Please hold if patient having more than 3 bowel movements a day and resume as needed. 237 mL 2    aspirin 81 MG chewable tablet Take 81 mg by mouth daily      ferrous sulfate (IRON 325) 325 (65 Fe) MG tablet Take 325 mg by mouth every other day      traZODone (DESYREL) 50 MG tablet Take  mg by mouth nightly as needed for Sleep      acetaminophen (TYLENOL) 500 MG tablet Take 1,000 mg by mouth every 4 hours as needed for Pain or Fever      atorvastatin (LIPITOR) 10 MG tablet Take 10 mg by mouth nightly      umeclidinium-vilanterol (ANORO ELLIPTA) 62.5-25 MCG/INH AEPB inhaler Inhale 1 puff into the lungs daily 1 each 11    CPAP Machine MISC by Does not apply route      Misc Natural Products (OSTEO BI-FLEX ADV DOUBLE ST PO) Take 1 tablet by mouth daily (Patient not taking: No sig reported)      timolol (TIMOPTIC) 0.5 % ophthalmic solution Place 1 drop into both eyes nightly (Patient not taking: No sig reported)      albuterol (PROVENTIL HFA;VENTOLIN HFA) 108 (90 BASE) MCG/ACT inhaler Inhale 2 puffs into the lungs 2 times daily AND EVERY 4-6 HOURS AS NEEDED      Multiple Vitamins-Minerals (ICAPS) CAPS Take 1 capsule by mouth 2 times daily  (Patient not taking: No sig reported)      vitamin B-12 (CYANOCOBALAMIN) 1000 MCG tablet Take 1,000 mcg by mouth daily. levothyroxine (SYNTHROID) 88 MCG tablet Take 88 mcg by mouth daily. Allergies   Allergen Reactions    Darvocet [Propoxyphene N-Acetaminophen]     Ranexa [Ranolazine Er]      Sick to her stomach    Ranolazine      Sick to her stomach    Sulfa Antibiotics Itching    Sulfasalazine Itching    Adhesive Tape Rash    Allantoin Rash    Bacitracin Rash    Gramicidin Rash    Neomycin Rash    Polymyxin B Rash    Pramoxine Hcl Rash    Silicone Rash       Nursing Notes Reviewed    Physical Exam:  ED Triage Vitals [03/11/23 0127]   Enc Vitals Group      BP (!) 156/71      Heart Rate 84      Resp 18      Temp 97.5 °F (36.4 °C)      Temp Source Axillary      SpO2 100 %      Weight       Height       Head Circumference       Peak Flow       Pain Score       Pain Loc       Pain Edu? Excl. in [address]? General appearance: Confused, moaning  Head: Normocephalic, atraumatic  Face: No bony deformity, midface stable  Skin:  Warm. Dry. No acute wounds  Eye:  Extraocular movements intact. Pupils equal and reactive  Ears, nose, mouth and throat:  Oral mucosa moist  Neck:  Trachea midline. C-collar in place  Extremity:  No swelling. Normal ROM. No tenderness to palpation x4 extremities   Back: No midline CTLS tenderness to palpation or step-offs  Heart:  Regular rate and rhythm  Respiratory:  Lungs diminished bilaterally. Respirations nonlabored. Chest: No tenderness to palpation. No ecchymosis or deformity. Abdominal:  Soft. Nontender. Non distended. Neurological:  confused. Moves all extremities spontaneously. No facial droop.   No gaze palsy    I have reviewed and interpreted all of the currently available lab results from this visit (if applicable):  Results for orders placed or performed during the hospital encounter of 03/11/23   CBC with Auto Differential   Result Value Ref Range    WBC 10.1 4.0 - 10.5 K/CU MM    RBC 3.83 (L) 4.2 - 5.4 M/CU MM    Hemoglobin 12.3 (L) 12.5 - 16.0 GM/DL    Hematocrit 40.8 37 - 47 %    .5 (H) 78 - 100 FL    MCH 32.1 (H) 27 - 31 PG    MCHC 30.1 (L) 32.0 - 36.0 %    RDW 16.8 (H) 11.7 - 14.9 %    Platelets 535 777 - 170 K/CU MM    MPV 11.3 (H) 7.5 - 11.1 FL    Differential Type AUTOMATED DIFFERENTIAL     Segs Relative 78.1 (H) 36 - 66 %    Lymphocytes % 6.9 (L) 24 - 44 %    Monocytes % 9.8 (H) 0 - 4 %    Eosinophils % 3.8 (H) 0 - 3 %    Basophils % 0.8 0 - 1 %    Segs Absolute 7.9 K/CU MM    Lymphocytes Absolute 0.7 K/CU MM    Monocytes Absolute 1.0 K/CU MM    Eosinophils Absolute 0.4 K/CU MM    Basophils Absolute 0.1 K/CU MM    Nucleated RBC % 0.0 %    Total Nucleated RBC 0.0 K/CU MM    Total Immature Neutrophil 0.06 K/CU MM    Immature Neutrophil % 0.6 (H) 0 - 0.43 %   Comprehensive Metabolic Panel w/ Reflex to MG   Result Value Ref Range    Sodium 136 135 - 145 MMOL/L    Potassium 4.9 3.5 - 5.1 MMOL/L    Chloride 92 (L) 99 - 110 mMol/L    CO2 38 (H) 21 - 32 MMOL/L    BUN 26 (H) 6 - 23 MG/DL    Creatinine 0.7 0.6 - 1.1 MG/DL    Est, Glom Filt Rate >60 >60 mL/min/1.73m2    Glucose 139 (H) 70 - 99 MG/DL    Calcium 10.4 8.3 - 10.6 MG/DL    Albumin 3.5 3.4 - 5.0 GM/DL    Total Protein 6.3 (L) 6.4 - 8.2 GM/DL    Total Bilirubin 1.0 0.0 - 1.0 MG/DL    ALT 25 10 - 40 U/L    AST 47 (H) 15 - 37 IU/L    Alkaline Phosphatase 254 (H) 40 - 129 IU/L    Anion Gap 6 4 - 16   Troponin   Result Value Ref Range    Troponin T <0.010 <0.01 NG/ML   Lactic Acid   Result Value Ref Range    Lactate 2.0 (HH) 0.5 - 1.9 mMOL/L      Radiographs (if obtained):  [] The following radiograph was interpreted by myself in the absence of a radiologist:  [x] Radiologist's Report Reviewed:    Medical Decision Making and ED Course:    CC/HPI Summary, DDx, ED Course, and Reassessment: Presents as above. On arrival, the patient has normal vital signs. She is confused and does not answer most questions appropriately or follow simple commands. She does not appear to be in respiratory distress.   She is moaning and had mentioned to EMS that her back hurts. No indication for emergent intubation at this time as I feel that the patient is appropriately protecting her airway. She has diminished lung sounds bilaterally. No signs of traumatic injury on her exam.  She is removed from the backboard but c-collar kept in place. Taken to CT for imaging to evaluate for any injury. While in CT, I did discover the patient has a signed DNR CC from March 6, 2023 from recent admission. She has history of chronic respiratory failure from CHF. I spoke with nurse taking care of the patient at nursing facility. Nurse does not know patient's mental baseline and states that no one else at the facility at this time is familiar with the patient. States that she did not know that the patient was a DNR CC. Spoke with ZARI Mesa and updated on plan who is agreeable. Dilma Moran states that patient does get confused if she gets sleepy medication. Per nurse at facility, the patient did not receive any trazodone or other medications that would be contributing to change in mental status. Nurse at facility is unsure if patient was using her CPAP or not tonight. During emergency department stay, patient's mental status significantly improved. On reevaluation, currently alert and oriented to time, place and self. Still unsure of what happened at facility. Patient does not remember what happened. She denies any active complaints. Metabolic work-up here is largely unremarkable. EKG shows a paced rhythm. Imaging shows no evidence of traumatic injury. Patient is appropriate for discharge back to facility at this time. Bonita HAMEED updated and agreeable. History from : EMS and Caregiver    Limitations to history :  Altered Mental Status    Patient was given the following medications:  Medications - No data to display    Independent Imaging Interpretation by me:     EKG (if obtained): (All EKG's are interpreted by myself in the absence of a cardiologist) 12 lead EKG as interpreted by me reveals a paced rhythm. Axis is normal. There are no ischemic ST elevations or other suspicious ST changes;  QRS interval is 138, QT interval is not prolonged. Final interpretation: Paced rhythm. Chronic conditions affecting care:     Discussion with Other Profesionals : None    Social Determinants : None      Disposition Considerations (tests considered but not done, Shared Decision Making, Pt Expectation of Test or Tx.):     Appropriate for outpatient management      I am the Primary Clinician of Record. Clinical Impression:  1. Fall, initial encounter    2.  Transient alteration of awareness      (Please note that portions of this note may have been completed with a voice recognition program. Efforts were made to edit the dictations but occasionally words are mis-transcribed.)    MD Pablito Maier MD  03/11/23 7026

## 2023-03-11 NOTE — ED NOTES
Called report to Manish Cohn RN. Informed that transport will be here in about 10-15 min.       Hunter Almazan RN  03/11/23 [id number]

## 2023-03-11 NOTE — ED NOTES
Pt O2 sat dropped to 91% went into room, told pt to take a deep breath. Pt states \"HI\". Answering questions appropriately at this time.  [facility] made aware. Pt denies pain at this time.       Leydi Guadalupe RN  03/11/23 [id number]

## 2023-03-11 NOTE — ED NOTES
Pt arrived via EMS with c-collar & back board in place. Pt not answering questions appropriately except for able to state her name at this time.       Alvin Harris RN  03/11/23 [id number]

## 2023-03-11 NOTE — ED NOTES
Pt switched to 3L NC, per home order. Pt still not answering questions at this time other than \"yea, Yea\" pt then proceeds to count to 50.       Jose Armando Kerr RN  03/11/23 [id number]

## 2023-03-13 LAB
EKG ATRIAL RATE: 78 BPM
EKG DIAGNOSIS: NORMAL
EKG Q-T INTERVAL: 452 MS
EKG QRS DURATION: 138 MS
EKG QTC CALCULATION (BAZETT): 455 MS
EKG R AXIS: 259 DEGREES
EKG T AXIS: 101 DEGREES
EKG VENTRICULAR RATE: 61 BPM

## 2023-03-13 PROCEDURE — 93010 ELECTROCARDIOGRAM REPORT: CPT | Performed by: INTERNAL MEDICINE

## 2023-03-17 NOTE — ED NOTES
Pt sent to ER for abn labs from [facility] by Dr. Brayan Low. Pt DNR-CC.  [facility] [place] spoke with PAMELA Flores RN  03/17/23 0145

## 2023-03-17 NOTE — ED PROVIDER NOTES
Triage Chief Complaint:   Other (Abn labs)    [place]:  Roxane Yi is a 80 y.o. female that presents from nursing facility for hyperkalemia. The patient is a DNR CC. Staff noticed a change in the patient's mental status today and labs showing potassium of 6.8 so she was sent here for evaluation. Patient is somnolent but arouses to painful stimuli and can answer some questions. Does not appear to be in any acute distress. Does appear to be somewhat confused. I spoke with daughter Taqueria Marks who was told that she would just be coming here to get something for her potassium and going back to the facility. She agrees that the patient is comfort care only and does not want any further work-up or hospitalization. ROS:  Unable to fully obtain    Past Medical History:   Diagnosis Date    Age-related osteoporosis with current pathological fracture of vertebra ([facility] 7/8/2022    Arthritis     Bradycardia 2001    requiring dual chamber pacemaker at [facility]    CAD (coronary artery disease)     Cardiac pacemaker 10/2001    [facility] #[id number]  [facility]- Serial # [id number]- Dr Kitty Cummins    COPD, [facility]) 2/17/2017    Cor pulmonale (chronic) ([address] 3/15/2022    CVA (cerebrovascular accident) ([facility]     DJD (degenerative joint disease) of cervical spine     C5-C6, C6-C7    Exhaustion of cardiac pacemaker battery 11/21/2011    PPM battery replacement- [employer]    Family history of cardiovascular disease     Glaucoma Dx 2010    H/O 24 hour EKG monitoring 8/6/2000 8/6/2000- Intermittent episonde of a-fib/flutter    H/O cardiac catheterization 4/20/2010, 2/1989 4/20/2010-Severe native vessel disease and has graft disease as well. LAD, CX totally occluded. RCA totally occluded in proximal segment. VG to RCA widely patent. PDA 90% LAD afer LIMA anastomosis 80-90% stenosis. Proceeded with PTCA with stent next day.     H/O cardiovascular stress test 10/14/2011, 6/2/2010,4/8/2010, 5/18/2009,4/6/2009, 10/30/2007, 11/12/2004, 11/6/2003, 8/9/2002, 8/9/2001,6/5/2000,     10/14/2011-Lexiscan-Abnormal Myocardial Perfusion study. Evidence of mild ischemia in the Left CX region. Abnomal study. Rest EF 63%. Global LV systolic function normal. No ECG changes. Unremarkable pharmacological stress test.    H/O cardiovascular stress test 6/10/2013    thallium--mild ischemia left circumflex EF63% no change from 10/2011 study. H/O cardiovascular stress test 10/16/2014    cardiolite-mild ischemia left circumflex,EF70%    H/O chest x-ray 4/19/2009 4/19/2009-Stable cardiomegaly. No acute cardiopulmonary disease. H/O Doppler lower venous ultrasound 09/18/2019    Significant reflux noted in RGSV, RGSV is extremely tortuous and small along the thigh and calf and would be highly unlikely to be accessed, RSSV is non compressible and has occlusive chronic SVT, LSSV is non compressible with occlusive chronic SVT, LGSV removed s/p CABG, Significant reflux in LGSV tributary,    H/O Doppler ultrasound 3/31/2010    CAROTID- 3/31/2010-INtimal thickening but no significant atherosclerotic plaque noted in ANA PAULA. Doppler flow velocities within ANA PAULA are WNL. Heterogeneous, irregular atherosclerotic plaque noted in LICA. Doppler flow velocities within the LICA are elevated, consistent with a mild, less than 50% stenosis. H/O Doppler ultrasound 5/24/2016    Carotid- normal study    H/O Doppler ultrasound 09/06/2017    carotid - normal study    H/O Doppler ultrasound     H/O echocardiogram 10/13    EF=60%, Severe Pulm. HTN, & Sclerotic aortic valve w/stenosis. H/O echocardiogram 10/16/14     EF 55-60% Normal LV. Normal LV systolic function. Severe tricuspid insufficiency with severe hypertension.      H/O echocardiogram 02/03/2017    heart cath performed this morning    H/O echocardiogram 09/18/2019    EF 50-55%, Left atrium is mild to moderately dilated, right atrium is severely dilated, mildly dilated right ventricle, Mod MR, Severe TR, Severe Pulm HTN, no pericardial effusion     History of complete ECG     10/14/2011(Lexiscan);5/6/2010, 4/30/2009,10/24/2008,9/21/2007, 10/13/2006    History of nuclear stress test 11/17/2016    lexiscan-normal,EF70%    Hx of cardiovascular stress test 12/28/2018    EF 60%  Normal study. HX OTHER MEDICAL 05/01/2017    MUGA-normal, EF53%    Hyperlipidemia     Hypertension     Mild intermittent asthma 7/28/2016    Moderate COPD (chronic obstructive pulmonary disease) (HCC) 3/15/2022    Nausea & vomiting     Obstructive sleep apnea 5/16/2017    Post PTCA 4/21/2010    PTCA with 2.25 stent of the LIMA to LAD    Pulmonary HTN ([address]     Severe per last echo on 10/13. 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 Followed by MAZE procedure by pulmonary vein isolation.-  Dr Gonzalez Bautista    S/P PTCA (percutaneous transluminal coronary angioplasty) 11/2012    PTCA with stent to RCA    Severe pulmonary hypertension ([address] 3/15/2022    Shortness of breath 3/15/2022    Thyroid disease     hypothyroi    Unspecified cerebral artery occlusion with cerebral infarction Unsure When    No Residual    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 ([address] 7/29/2022    WD-Non-pressure chronic ulcer of other part of left lower leg limited to breakdown of skin ([address] 7/29/2022     Past Surgical History:   Procedure Laterality Date    APPENDECTOMY  1941    CARDIAC SURGERY  4/09    CABG (3 Bypasses), One Heart Stent in 2010    COLONOSCOPY  In 2000's    X1    CORONARY ANGIOPLASTY WITH STENT PLACEMENT  4/21/2010    PTCA with stent LIMA ->LAD    CORONARY ARTERY BYPASS GRAFT  4/8/2009    LIMA->Diag,  LIMA -> LAD;  SVG->RCA going to the PDA.  Followed by MAZE procedure by pulmonary vein isolation.-  Dr Harry Mccrary, TOTAL ABDOMINAL (CERVIX REMOVED)  1990's    MIDDLE EAR SURGERY      OTHER SURGICAL HISTORY      Ear surgery-hearing    PACEMAKER PLACEMENT      battery change 2011 Medtronic    PTCA  2012    Ptca with stent to RCA    TONSILLECTOMY  65's     Family History   Problem Relation Age of Onset    Cancer Mother         \"Liver Cancer\"    Arthritis Mother     Depression Mother     Hearing Loss Mother     High Blood Pressure Mother     High Cholesterol Mother     Mental Illness Mother     Miscarriages / Stillbirths Mother     Heart Disease Father     Early Death Father 36        \"Instant Death\"    High Blood Pressure Father     High Cholesterol Father     Coronary Art Dis Father         Massive MI    Heart Disease Sister     Depression Sister     Cancer Sister         \"Breast Cancer, Cancer Free Now\"    High Blood Pressure Sister     Other Daughter         \"She's Had Stomach Surgery\"    High Blood Pressure Son     High Blood Pressure Son     Early Death Paternal Grandfather      Social History     Socioeconomic History    Marital status:       Spouse name: Not on file    Number of children: 4    Years of education: Not on file    Highest education level: Not on file   Occupational History    Occupation: RETIRED     Comment: from [employer]    Smoking status: Former     Packs/day: 0.25     Years: 5.00     Pack years: 1.25     Types: Cigarettes     Quit date: 1970     Years since quittin.3    Smokeless tobacco: Never   Vaping Use    Vaping Use: Never used   Substance and Sexual Activity    Alcohol use: Not Currently     Comment: Caffiene - no more than 3 cups of coffee each day    Drug use: Never    Sexual activity: Yes     Partners: Male     Comment:    Other Topics Concern    Not on file   Social History Narrative    Not on file     Social Determinants of Health     Financial Resource Strain: Not on file   Food Insecurity: Not on file   Transportation Needs: Not on file   Physical Activity: Not on file   Stress: Not on file   Social Connections: Not on file   Intimate Partner Violence: Not on file   Housing Stability: Not on file     Current Facility-Administered Medications   Medication Dose Route Frequency Provider Last Rate Last Admin    sodium polystyrene (KAYEXALATE) 15 GM/60ML suspension 15 g  15 g Oral Once Josh Mckeon MD         Current Outpatient Medications   Medication Sig Dispense Refill    vitamin D 25 MCG (1000 UT) CAPS Take 2 capsules by mouth daily 30 capsule 0    midodrine (PROAMATINE) 2.5 MG tablet Take 1 tablet by mouth 3 times daily 90 tablet 3    sennosides-docusate sodium (SENOKOT-S) 8.6-50 MG tablet Take 2 tablets by mouth 2 times daily . Please hold if patient having more than 3 bowel movements a day 60 tablet 3    furosemide (LASIX) 40 MG tablet Take 1 tablet by mouth daily 60 tablet 3    lactulose (CHRONULAC) 10 GM/15ML solution Take 15 mLs by mouth 3 times daily . Please hold if patient having more than 3 bowel movements a day and resume as needed.  237 mL 2    aspirin 81 MG chewable tablet Take 81 mg by mouth daily      ferrous sulfate (IRON 325) 325 (65 Fe) MG tablet Take 325 mg by mouth every other day      traZODone (DESYREL) 50 MG tablet Take  mg by mouth nightly as needed for Sleep      acetaminophen (TYLENOL) 500 MG tablet Take 1,000 mg by mouth every 4 hours as needed for Pain or Fever      atorvastatin (LIPITOR) 10 MG tablet Take 10 mg by mouth nightly      umeclidinium-vilanterol (ANORO ELLIPTA) 62.5-25 MCG/INH AEPB inhaler Inhale 1 puff into the lungs daily 1 each 11    CPAP Machine MISC by Does not apply route      Misc Natural Products (OSTEO BI-FLEX ADV DOUBLE ST PO) Take 1 tablet by mouth daily (Patient not taking: No sig reported)      timolol (TIMOPTIC) 0.5 % ophthalmic solution Place 1 drop into both eyes nightly (Patient not taking: No sig reported)      albuterol (PROVENTIL HFA;VENTOLIN HFA) 108 (90 BASE) MCG/ACT inhaler Inhale 2 puffs into the lungs 2 times daily AND EVERY 4-6 HOURS AS NEEDED      Multiple Vitamins-Minerals (ICAPS) CAPS Take 1 capsule by mouth 2 times daily  (Patient not taking: No sig reported)      vitamin B-12 (CYANOCOBALAMIN) 1000 MCG tablet Take 1,000 mcg by mouth daily. levothyroxine (SYNTHROID) 88 MCG tablet Take 88 mcg by mouth daily. Allergies   Allergen Reactions    Darvocet [Propoxyphene N-Acetaminophen]     Ranexa [Ranolazine Er]      Sick to her stomach    Ranolazine      Sick to her stomach    Sulfa Antibiotics Itching    Sulfasalazine Itching    Adhesive Tape Rash    Allantoin Rash    Bacitracin Rash    Gramicidin Rash    Neomycin Rash    Polymyxin B Rash    Pramoxine Hcl Rash    Silicone Rash       Nursing Notes Reviewed    Physical Exam:  ED Triage Vitals   Enc Vitals Group      BP 03/17/23 0033 (!) 142/91      Heart Rate 03/17/23 0033 77      Resp 03/17/23 0033 24      Temp 03/17/23 0033 97.5 °F (36.4 °C)      Temp Source 03/17/23 0033 Axillary      SpO2 03/17/23 0033 (!) 84 %      Weight 03/17/23 0042 148 lb (67.1 kg)      Height 03/17/23 0042 4' 11\" (1.499 m)      Head Circumference --       Peak Flow --       Pain Score --       Pain Loc --       Pain Edu? --       Excl. in GC? --      GENERAL APPEARANCE: Somnolent, arousable to painful stimuli  HEENT: Head NC/AT, EOM's grossly intact. Conjunctiva anicteric. Mucous membranes moist. Tolerates saliva. No trismus. Neck supple. Trachea midline. HEART: RRR. Radial pulses 2+. LUNGS: Respirations unlabored. CTAB  ABDOMEN: Soft. Non-tender. No guarding or rebound. EXTREMITIES: No acute deformities. SKIN: Warm and dry. NEUROLOGICAL: No gross facial drooping. Moves all 4 extremities spontaneously. I have reviewed and interpreted all of the currently available lab results from this visit (if applicable):  No results found for this visit on 03/17/23.    Radiographs (if obtained):  [] The following radiograph was interpreted by myself in the absence of a radiologist:  [] Radiologist's Report Reviewed:    Medical Decision Making and ED Course:    CC/HPI Summary, DDx, ED Course, and Reassessment: Patient presents as above for hyperkalemia. She is also confused. Patient is a DNR CC. She is hemodynamically stable and not in acute distress. Does not appear to be in pain. I had an extended conversation with patient's daughter who agrees with return to nursing facility at this time given comfort care status. She did prefer to have something to lower her potassium so patient was given Kayexalate here. I also spoke to LPN at nursing facility for report who did not provide additional information that was pertinent other than Dr. Jerez  wanted her to be transferred for evaluation and treatment. History from : Family daughter, EMS, and Caregiver    Limitations to history : Altered Mental Status    Patient was given the following medications:  Medications   sodium polystyrene (KAYEXALATE) 15 GM/60ML suspension 15 g (has no administration in time range)       Independent Imaging Interpretation by me:     EKG (if obtained): (All EKG's are interpreted by myself in the absence of a cardiologist)     Chronic conditions affecting care:     Discussion with Other Profesionals : None    Social Determinants : None      Disposition Considerations (tests considered but not done, Shared Decision Making, Pt Expectation of Test or Tx.):     Appropriate for outpatient management      I am the Primary Clinician of Record. Clinical Impression:  1.  Hyperkalemia      (Please note that portions of this note may have been completed with a voice recognition program. Efforts were made to edit the dictations but occasionally words are mis-transcribed.)    MD Tessa Barton MD  03/17/23 [id number]

## 2023-03-17 NOTE — ED NOTES
Report called to Dakota Paul @ [facility]. Answered all questions.      Jocelyne Cueto RN  03/17/23 6814

## 2023-03-17 NOTE — ED NOTES
Attempted to call report to TCU, no answer. Will attempt to call again.       Herb Flores RN  03/17/23 9827

## 2023-03-17 NOTE — ED TRIAGE NOTES
Pt brought in by Carl from [facility] for abn labs. Pt responds to pain, not able to have conversation.   Pt is DNR Cc

## 2023-03-18 NOTE — PROGRESS NOTES
Pt Bi-Pap was alarming at approx 0850. Checked pt. Called [employer]. At [phone] found pt had no pulse. Pt is DNR. RN Supervisor called and verified no pulse at 0900. Bi-Pap was removed.

## 2023-03-18 NOTE — PROGRESS NOTES
4 Eyes Skin Assessment     NAME:  Xuan Galvez OF BIRTH:  3/18/1929  MEDICAL RECORD NUMBER:  [id number]    The patient is being assess for  Admission    I agree that 2 RN's have performed a thorough Head to Toe Skin Assessment on the patient. ALL assessment sites listed below have been assessed. Areas assessed by both nurses:    Head, Face, Ears, Shoulders, Back, Chest, Arms, Elbows, Hands, Sacrum. Buttock, Coccyx, Ischium, and Legs. Feet and Heels        Does the Patient have a Wound?  No noted wound(s)       Cruz Prevention initiated:  Yes   Wound Care Orders initiated:  NA    Pressure Injury (Stage 3,4, Unstageable, DTI, NWPT, and Complex wounds) if present place consult order under [de-identified] NA    New and Established Ostomies if present place consult order under : NA      Nurse 1 eSignature: Electronically signed by Butch Klein RN on 3/18/23 at 3:40 AM EDT    **SHARE this note so that the co-signing nurse is able to place an eSignature**    Nurse 2 eSignature: Electronically signed by Jose Daley RN on 3/18/23 at 4:08 AM EDT

## 2023-03-18 NOTE — PROGRESS NOTES
Admit Note  81 yo with diastolic heart failure and pulmonary HTN who was at [address] [facility] for treatment of respiratory failure with CHF and possible SIRS. Due to respiratory failure she would have needed intubated, but family decided against this based on pt's prior wishes. She was put on BiPap, and transferred to [facility] for hospice end of life comfort focused care. Hospice nurse reports she is unresponsive and appears comfortable. Allergies   Allergen Reactions    Darvocet [Propoxyphene N-Acetaminophen]      Ranexa [Ranolazine Er]         Sick to her stomach    Ranolazine         Sick to her stomach    Sulfa Antibiotics Itching    Sulfasalazine Itching    Adhesive Tape Rash    Allantoin Rash    Bacitracin Rash    Gramicidin Rash    Neomycin Rash    Polymyxin B Rash    Pramoxine Hcl Rash    Silicone Rash      A/P  Respiratory failure due to diastolic heart failure- continue IV lasix to help prevent SOB. Opioid is available for sob or pain and haldol available for terminal restlessness. She will continue on BiPap until family is ready to have it removed tomorrow. They are aware she could die tonight. The floor can call me for any needs tonight if pt does not appear comfortable.     Papa Rose MD

## 2023-03-18 NOTE — PROGRESS NOTES
Pt passed at 0900. Verification with this nurse and Alejandra Harp LPN. Supervisor on unit notified. Family,, and doctor notified.

## 2023-03-18 NOTE — PROGRESS NOTES
Got a call from Hospice nurse that patient's daughter, Julio Cummings, is the primary decision maker for patient. Patient is to be kept on BiPap until family can get together 3/18 afternoon.

## 2023-03-20 NOTE — DISCHARGE SUMMARY
[address] [place] [address]    Death Summary    Date: 3/19/2023  Name: Angelique Marie  MRN: [id number]  YOB: 1929     Patient's PCP: Marisela Royal MD  Admit Date: 3/18/2023 to [address] [place] [address]  Date of Death: 3/18/2023  Time: 0900  Acute care admission: in transfer from [address] [place] [address]  Admitting Physician: Essence Briggs MD  Discharge Physician: Essence Briggs MD  Consultation: none during [address] [place] [address] stay    Invasive procedures: none during General Inpatient Hospice stay    Discharge Diagnoses:   Respiratory failure due to diastolic heart failure with pulmonary HTN.       Patient Active Problem List   Diagnosis Code    CAD (coronary artery disease) I25.10    Cardiac pacemaker Z95.0    S/P CABG x 3 Z95.1    Post PTCA Z98.61    TIA (transient ischemic attack) G45.9    Confusion R41.0    Headache R51.9    Essential hypertension I10    Sinus bradycardia R00.1    Coronary artery disease involving coronary bypass graft of native heart without angina pectoris I25.810    Dizziness R42    PAF (paroxysmal atrial fibrillation) (HCC) I48.0    Pacer at end of battery life Z45.010    Hyponatremia S82.6    Metabolic encephalopathy S51.41    Generalized weakness R53.1    Gait disturbance R26.9    Acute urinary retention R33.8    Hypothyroidism (acquired) E03.9    Pulmonary hypertension (HCC) I27.20    Moderate COPD (chronic obstructive pulmonary disease) (HCC) J44.9    Abnormal liver CT -findings suggestive of cirrhosis  R93.2    At risk for injury associated with anticoagulation Z91.89    T12 vertebral fracture (HCC) S22.089A    SAULO on CPAP G47.33, Z99.89    Iron deficiency anemia secondary to inadequate dietary iron intake D50.8    Age-related osteoporosis with current pathological fracture of vertebra (HCC) M80.08XA    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 (HCC) I87.312, L97.929    WD-Non-pressure chronic ulcer of other part of left lower leg with fat layer exposed [facility]) B84.479    Acute on chronic diastolic (congestive) heart failure (HCC) I50.33    SunDown syndrome F05    Orthostatic hypotension I95.1    Abrasion of left calf S80.812A    Moderate malnutrition (HCC) E44.0       Reason for Admission:  79 yo with diastolic heart failure and pulmonary HTN who was at [facility] for treatment of respiratory failure with CHF and possible SIRS. Due to respiratory failure she would have needed intubated, but family decided against this based on pt's prior wishes. She was put on BiPap, and transferred to [facility] for hospice end of life comfort focused care. She  hours later. Hospital Course: The patient was admitted to [facility] with the above. The patient was treated with comfort medications, and symptoms were managed. The patient  peacefully.     Cause of death: respiratory failure due to heart failure    Significant Diagnostic Studies:  See computerized record in Epic    Electronically signed by Stephen Yan MD on 3/19/2023 at 8:43 PM

## 2023-03-20 NOTE — H&P
[address] [place] [address]  General Inpatient History and Physical      Date: 3/18/2023  Name: Ruth Sanderson  MRN: [id number]  YOB: 1929  Admit Date: 3/18/2023  Primary Care:  Elenore Hammans, MD      Informant: the records and hospice nurse    Chief Complaint: respiratory failure    History of Present Illness: Ruth Sanderson is a 80 y.o. female, who is admitted to [facility] at [facility]     She has diastolic heart failure and pulmonary HTN and was at [facility] for treatment of respiratory failure with CHF and possible SIRS. Due to respiratory failure she would have needed intubated, but family decided against this based on pt's prior wishes. She was put on BiPap, and transferred to [facility] for hospice end of life comfort focused care. Hospice nurse reported at time of transfer she was unresponsive and appears comfortable. She  within hours prior to physician visit. Past Medical History:   Diagnosis Date    Age-related osteoporosis with current pathological fracture of vertebra ([facility] 2022    Arthritis     Bradycardia     requiring dual chamber pacemaker at [facility]    CAD (coronary artery disease)     Cardiac pacemaker 10/2001    [employer] #[id number]  [facility]- Serial # 26-[facility]- Dr Naomi Campbell    COPD, [facility]) 2017    Cor pulmonale (chronic) ([facility] 3/15/2022    CVA (cerebrovascular accident) ([facility] [facility]     DJD (degenerative joint disease) of cervical spine     C5-C6, C6-C7    Exhaustion of cardiac pacemaker battery 2011    PPM battery replacement- [employer]    Family history of cardiovascular disease     Glaucoma Dx     H/O 24 hour EKG monitoring 2000- Intermittent episonde of a-fib/flutter    H/O cardiac catheterization 2010, 2010-Severe native vessel disease and has graft disease as well. LAD, CX totally occluded. RCA totally occluded in proximal segment.  VG to RCA widely patent. PDA 90% LAD afer LIMA anastomosis 80-90% stenosis. Proceeded with PTCA with stent next day. H/O cardiovascular stress test 10/14/2011, 6/2/2010,4/8/2010, 5/18/2009,4/6/2009, 10/30/2007, 11/12/2004, 11/6/2003, 8/9/2002, 8/9/2001,6/5/2000,     10/14/2011-Lexiscan-Abnormal Myocardial Perfusion study. Evidence of mild ischemia in the Left CX region. Abnomal study. Rest EF 63%. Global LV systolic function normal. No ECG changes. Unremarkable pharmacological stress test.    H/O cardiovascular stress test 6/10/2013    thallium--mild ischemia left circumflex EF63% no change from 10/2011 study. H/O cardiovascular stress test 10/16/2014    cardiolite-mild ischemia left circumflex,EF70%    H/O chest x-ray 4/19/2009 4/19/2009-Stable cardiomegaly. No acute cardiopulmonary disease. H/O Doppler lower venous ultrasound 09/18/2019    Significant reflux noted in RGSV, RGSV is extremely tortuous and small along the thigh and calf and would be highly unlikely to be accessed, RSSV is non compressible and has occlusive chronic SVT, LSSV is non compressible with occlusive chronic SVT, LGSV removed s/p CABG, Significant reflux in LGSV tributary,    H/O Doppler ultrasound 3/31/2010    CAROTID- 3/31/2010-INtimal thickening but no significant atherosclerotic plaque noted in ANA PAULA. Doppler flow velocities within ANA PAULA are WNL. Heterogeneous, irregular atherosclerotic plaque noted in LICA. Doppler flow velocities within the LICA are elevated, consistent with a mild, less than 50% stenosis. H/O Doppler ultrasound 5/24/2016    Carotid- normal study    H/O Doppler ultrasound 09/06/2017    carotid - normal study    H/O Doppler ultrasound     H/O echocardiogram 10/13    EF=60%, Severe Pulm. HTN, & Sclerotic aortic valve w/stenosis. H/O echocardiogram 10/16/14     EF 55-60% Normal LV. Normal LV systolic function. Severe tricuspid insufficiency with severe hypertension.      H/O echocardiogram 02/03/2017    heart cath performed this morning    H/O echocardiogram 09/18/2019    EF 50-55%, Left atrium is mild to moderately dilated, right atrium is severely dilated, mildly dilated right ventricle, Mod MR, Severe TR, Severe Pulm HTN, no pericardial effusion     History of complete ECG     10/14/2011(Lexiscan);5/6/2010, 4/30/2009,10/24/2008,9/21/2007, 10/13/2006    History of nuclear stress test 11/17/2016    lexiscan-normal,EF70%    Hx of cardiovascular stress test 12/28/2018    EF 60%  Normal study. HX OTHER MEDICAL 05/01/2017    MUGA-normal, EF53%    Hyperlipidemia     Hypertension     Mild intermittent asthma 7/28/2016    Moderate COPD (chronic obstructive pulmonary disease) ([facility]) 3/15/2022    Nausea & vomiting     Obstructive sleep apnea 5/16/2017    Post PTCA 4/21/2010    PTCA with 2.25 stent of the LIMA to LAD    Pulmonary HTN ([address]     Severe per last echo on 10/13. 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 OB/GYN: GP    Social History:   Social History     Socioeconomic History    Marital status:     Number of children: 4   Occupational History    Occupation: RETIRED     Comment: from [address] Use    Smoking status: Former     Packs/day: 0.25     Years: 5.00     Pack years: 1.25     Types: Cigarettes     Quit date: 1970     Years since quittin.3    Smokeless tobacco: Never   Vaping Use    Vaping Use: Never used   Substance and Sexual Activity    Alcohol use: Not Currently     Comment: Caffiene - no more than 3 cups of coffee each day    Drug use: Never    Sexual activity: Yes     Partners: Male     Comment:        Family History: family history includes Arthritis in her mother; Cancer in her mother and sister; Coronary Art Dis in her father; Depression in her mother and sister; Early Death in her paternal grandfather; Early Death (age of onset: 36) in her father; Hearing Loss in her mother; Heart Disease in her father and sister; High Blood Pressure in her father, mother, sister, son, and son; High Cholesterol in her father and mother; Mental Illness in her mother; Sarah Coleen / Jenny Dearth in her mother; Other in her daughter.            Advanced Directives:  DNR-comfort care    Allergies   Allergen Reactions    Darvocet [Propoxyphene N-Acetaminophen]     Ranexa [Ranolazine Er]      Sick to her stomach    Ranolazine      Sick to her stomach    Sulfa Antibiotics Itching    Sulfasalazine Itching    Adhesive Tape Rash    Allantoin Rash    Bacitracin Rash    Gramicidin Rash    Neomycin Rash    Polymyxin B Rash    Pramoxine Hcl Rash    Silicone Rash       Medications - list of home medications reviewed       ROS: As noted in [place], all other systems are unobtainable due to the patient's clinical condition    Physical Exam:   BP (!) 100/59   Pulse 90   Temp (!) 94.5 °F (34.7 °C) (Axillary)   Resp 23   SpO2 100%   Pt  prior to exam        Assesment/Plan:    Respiratory failure due to diastolic heart failure- continue IV lasix to help prevent SOB. Opioid is available for sob or pain and haldol available for terminal restlessness. She will continue on BiPap until family is ready to have it removed tomorrow. They are aware she could die tonight. The floor can call me for any needs tonight if pt does not appear comfortable.   The patient is admitted to [address] [place] [address] for acute management of respiratory failure, end of life care   DVT prophylaxis: none indicated due to Hospice/end of life care    Patient Active Problem List   Diagnosis Code    CAD (coronary artery disease) I25.10    Cardiac pacemaker Z95.0    S/P CABG x 3 Z95.1    Post PTCA Z98.61    TIA (transient ischemic attack) G45.9    Confusion R41.0    Headache R51.9    Essential hypertension I10    Sinus bradycardia R00.1    Coronary artery disease involving coronary bypass graft of native heart without angina pectoris I25.810    Dizziness R42    PAF (paroxysmal atrial fibrillation) ([facility]) I48.0    Pacer at end of battery life Z45.010    Hyponatremia G01.0    Metabolic encephalopathy F04.75    Generalized weakness R53.1    Gait disturbance R26.9    Acute urinary retention R33.8    Hypothyroidism (acquired) E03.9    Pulmonary hypertension (HCC) I27.20    Moderate COPD (chronic obstructive pulmonary disease) (HCC) J44.9    Abnormal liver CT -findings suggestive of cirrhosis  R93.2    At risk for injury associated with anticoagulation Z91.89    T12 vertebral fracture (HCC) S22.089A    SAULO on CPAP G47.33, Z99.89    Iron deficiency anemia secondary to inadequate dietary iron intake D50.8    Age-related osteoporosis with current pathological fracture of vertebra (HCC) M80.08XA    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

## 2023-04-24 NOTE — TELEPHONE ENCOUNTER
I spoke with patient this morning and she said that she has taken the Ranexa 4 times and she is constipated. Patient said that she is always regular going at 9 am every morning. She want to know what she should do? Fluconazole Pregnancy And Lactation Text: This medication is Pregnancy Category C and it isn't know if it is safe during pregnancy. It is also excreted in breast milk. Opioid Counseling: I discussed with the patient the potential side effects of opioids including but not limited to addiction, altered mental status, and depression. I stressed avoiding alcohol, benzodiazepines, muscle relaxants and sleep aids unless specifically okayed by a physician. The patient verbalized understanding of the proper use and possible adverse effects of opioids. All of the patient's questions and concerns were addressed. They were instructed to flush the remaining pills down the toilet if they did not need them for pain. Topical Sulfur Applications Pregnancy And Lactation Text: This medication is Pregnancy Category C and has an unknown safety profile during pregnancy. It is unknown if this topical medication is excreted in breast milk. Clindamycin Pregnancy And Lactation Text: This medication can be used in pregnancy if certain situations. Clindamycin is also present in breast milk. Niacinamide Counseling: I recommended taking niacin or niacinamide, also know as vitamin B3, twice daily. Recent evidence suggests that taking vitamin B3 (500 mg twice daily) can reduce the risk of actinic keratoses and non-melanoma skin cancers. Side effects of vitamin B3 include flushing and headache. Hydroquinone Counseling:  Patient advised that medication may result in skin irritation, lightening (hypopigmentation), dryness, and burning.  In the event of skin irritation, the patient was advised to reduce the amount of the drug applied or use it less frequently.  Rarely, spots that are treated with hydroquinone can become darker (pseudoochronosis).  Should this occur, patient instructed to stop medication and call the office. The patient verbalized understanding of the proper use and possible adverse effects of hydroquinone.  All of the patient's questions and concerns were addressed. Tremfya Counseling: I discussed with the patient the risks of guselkumab including but not limited to immunosuppression, serious infections, worsening of inflammatory bowel disease and drug reactions.  The patient understands that monitoring is required including a PPD at baseline and must alert us or the primary physician if symptoms of infection or other concerning signs are noted. Topical Clindamycin Counseling: Patient counseled that this medication may cause skin irritation or allergic reactions.  In the event of skin irritation, the patient was advised to reduce the amount of the drug applied or use it less frequently.   The patient verbalized understanding of the proper use and possible adverse effects of clindamycin.  All of the patient's questions and concerns were addressed. Dupixent Pregnancy And Lactation Text: This medication likely crosses the placenta but the risk for the fetus is uncertain. This medication is excreted in breast milk. Isotretinoin Counseling: Patient should get monthly blood tests, not donate blood, not drive at night if vision affected, not share medication, and not undergo elective surgery for 6 months after tx completed. Side effects reviewed, pt to contact office should one occur. Mirvaso Pregnancy And Lactation Text: This medication has not been assigned a Pregnancy Risk Category. It is unknown if the medication is excreted in breast milk. Aklief Pregnancy And Lactation Text: It is unknown if this medication is safe to use during pregnancy.  It is unknown if this medication is excreted in breast milk.  Breastfeeding women should use the topical cream on the smallest area of the skin for the shortest time needed while breastfeeding.  Do not apply to nipple and areola. Siliq Pregnancy And Lactation Text: The risk during pregnancy and breastfeeding is uncertain with this medication. Quinolones Counseling:  I discussed with the patient the risks of fluoroquinolones including but not limited to GI upset, allergic reaction, drug rash, diarrhea, dizziness, photosensitivity, yeast infections, liver function test abnormalities, tendonitis/tendon rupture. Odomzo Pregnancy And Lactation Text: This medication is Pregnancy Category X and is absolutely contraindicated during pregnancy. It is unknown if it is excreted in breast milk. Tranexamic Acid Pregnancy And Lactation Text: It is unknown if this medication is safe during pregnancy or breast feeding. Dapsone Pregnancy And Lactation Text: This medication is Pregnancy Category C and is not considered safe during pregnancy or breast feeding. Enbrel Counseling:  I discussed with the patient the risks of etanercept including but not limited to myelosuppression, immunosuppression, autoimmune hepatitis, demyelinating diseases, lymphoma, and infections.  The patient understands that monitoring is required including a PPD at baseline and must alert us or the primary physician if symptoms of infection or other concerning signs are noted. Cantharidin Pregnancy And Lactation Text: The use of this medication during pregnancy or lactation is not recommended as there is insufficient data. Isotretinoin Pregnancy And Lactation Text: This medication is Pregnancy Category X and is considered extremely dangerous during pregnancy. It is unknown if it is excreted in breast milk. Azelaic Acid Counseling: Patient counseled that medicine may cause skin irritation and to avoid applying near the eyes.  In the event of skin irritation, the patient was advised to reduce the amount of the drug applied or use it less frequently.   The patient verbalized understanding of the proper use and possible adverse effects of azelaic acid.  All of the patient's questions and concerns were addressed. Cimetidine Counseling:  I discussed with the patient the risks of Cimetidine including but not limited to gynecomastia, headache, diarrhea, nausea, drowsiness, arrhythmias, pancreatitis, skin rashes, psychosis, bone marrow suppression and kidney toxicity. Azathioprine Counseling:  I discussed with the patient the risks of azathioprine including but not limited to myelosuppression, immunosuppression, hepatotoxicity, lymphoma, and infections.  The patient understands that monitoring is required including baseline LFTs, Creatinine, possible TPMP genotyping and weekly CBCs for the first month and then every 2 weeks thereafter.  The patient verbalized understanding of the proper use and possible adverse effects of azathioprine.  All of the patient's questions and concerns were addressed. Niacinamide Pregnancy And Lactation Text: These medications are considered safe during pregnancy. Ivermectin Pregnancy And Lactation Text: This medication is Pregnancy Category C and it isn't known if it is safe during pregnancy. It is also excreted in breast milk. Oxybutynin Counseling:  I discussed with the patient the risks of oxybutynin including but not limited to skin rash, drowsiness, dry mouth, difficulty urinating, and blurred vision. Methotrexate Pregnancy And Lactation Text: This medication is Pregnancy Category X and is known to cause fetal harm. This medication is excreted in breast milk. Sotyktu Counseling:  I discussed the most common side effects of Sotyktu including: common cold, sore throat, sinus infections, cold sores, canker sores, folliculitis, and acne.? I also discussed more serious side effects of Sotyktu including but not limited to: serious allergic reactions; increased risk for infections such as TB; cancers such as lymphomas; rhabdomyolysis and elevated CPK; and elevated triglycerides and liver enzymes.? Valtrex Counseling: I discussed with the patient the risks of valacyclovir including but not limited to kidney damage, nausea, vomiting and severe allergy.  The patient understands that if the infection seems to be worsening or is not improving, they are to call. Hydroquinone Pregnancy And Lactation Text: This medication has not been assigned a Pregnancy Risk Category but animal studies failed to show danger with the topical medication. It is unknown if the medication is excreted in breast milk. Simponi Counseling:  I discussed with the patient the risks of golimumab including but not limited to myelosuppression, immunosuppression, autoimmune hepatitis, demyelinating diseases, lymphoma, and serious infections.  The patient understands that monitoring is required including a PPD at baseline and must alert us or the primary physician if symptoms of infection or other concerning signs are noted. Topical Clindamycin Pregnancy And Lactation Text: This medication is Pregnancy Category B and is considered safe during pregnancy. It is unknown if it is excreted in breast milk. Gabapentin Counseling: I discussed with the patient the risks of gabapentin including but not limited to dizziness, somnolence, fatigue and ataxia. Cimetidine Pregnancy And Lactation Text: This medication is Pregnancy Category B and is considered safe during pregnancy. It is also excreted in breast milk and breast feeding isn't recommended. Prednisone Counseling:  I discussed with the patient the risks of prolonged use of prednisone including but not limited to weight gain, insomnia, osteoporosis, mood changes, diabetes, susceptibility to infection, glaucoma and high blood pressure.  In cases where prednisone use is prolonged, patients should be monitored with blood pressure checks, serum glucose levels and an eye exam.  Additionally, the patient may need to be placed on GI prophylaxis, PCP prophylaxis, and calcium and vitamin D supplementation and/or a bisphosphonate.  The patient verbalized understanding of the proper use and the possible adverse effects of prednisone.  All of the patient's questions and concerns were addressed. Opioid Pregnancy And Lactation Text: These medications can lead to premature delivery and should be avoided during pregnancy. These medications are also present in breast milk in small amounts. Wartpeel Counseling:  I discussed with the patient the risks of Wartpeel including but not limited to erythema, scaling, itching, weeping, crusting, and pain. Griseofulvin Counseling:  I discussed with the patient the risks of griseofulvin including but not limited to photosensitivity, cytopenia, liver damage, nausea/vomiting and severe allergy.  The patient understands that this medication is best absorbed when taken with a fatty meal (e.g., ice cream or french fries). Opzelura Counseling:  I discussed with the patient the risks of Opzelura including but not limited to nasopharngitis, bronchitis, ear infection, eosinophila, hives, diarrhea, folliculitis, tonsillitis, and rhinorrhea.  Taken orally, this medication has been linked to serious infections; higher rate of mortality; malignancy and lymphoproliferative disorders; major adverse cardiovascular events; thrombosis; thrombocytopenia, anemia, and neutropenia; and lipid elevations. Doxycycline Counseling:  Patient counseled regarding possible photosensitivity and increased risk for sunburn.  Patient instructed to avoid sunlight, if possible.  When exposed to sunlight, patients should wear protective clothing, sunglasses, and sunscreen.  The patient was instructed to call the office immediately if the following severe adverse effects occur:  hearing changes, easy bruising/bleeding, severe headache, or vision changes.  The patient verbalized understanding of the proper use and possible adverse effects of doxycycline.  All of the patient's questions and concerns were addressed. Azelaic Acid Pregnancy And Lactation Text: This medication is considered safe during pregnancy and breast feeding. Wartpeel Pregnancy And Lactation Text: This medication is Pregnancy Category X and contraindicated in pregnancy and in women who may become pregnant. It is unknown if this medication is excreted in breast milk. Rifampin Counseling: I discussed with the patient the risks of rifampin including but not limited to liver damage, kidney damage, red-orange body fluids, nausea/vomiting and severe allergy. Opzelura Pregnancy And Lactation Text: There is insufficient data to evaluate drug-associated risk for major birth defects, miscarriage, or other adverse maternal or fetal outcomes.  There is a pregnancy registry that monitors pregnancy outcomes in pregnant persons exposed to the medication during pregnancy.  It is unknown if this medication is excreted in breast milk.  Do not breastfeed during treatment and for about 4 weeks after the last dose. Dutasteride Counseling: Dustasteride Counseling:  I discussed with the patient the risks of use of dutasteride including but not limited to decreased libido, decreased ejaculate volume, and gynecomastia. Women who can become pregnant should not handle medication.  All of the patient's questions and concerns were addressed. Solaraze Counseling:  I discussed with the patient the risks of Solaraze including but not limited to erythema, scaling, itching, weeping, crusting, and pain. Gabapentin Pregnancy And Lactation Text: This medication is Pregnancy Category C and isn't considered safe during pregnancy. It is excreted in breast milk. Enbrel Pregnancy And Lactation Text: This medication is Pregnancy Category B and is considered safe during pregnancy. It is unknown if this medication is excreted in breast milk. Xolair Counseling:  Patient informed of potential adverse effects including but not limited to fever, muscle aches, rash and allergic reactions.  The patient verbalized understanding of the proper use and possible adverse effects of Xolair.  All of the patient's questions and concerns were addressed. Sotyktu Pregnancy And Lactation Text: There is insufficient data to evaluate whether or not Sotyktu is safe to use during pregnancy.? ?It is not known if Sotyktu passes into breast milk and whether or not it is safe to use when breastfeeding.?? 5-Fu Counseling: 5-Fluorouracil Counseling:  I discussed with the patient the risks of 5-fluorouracil including but not limited to erythema, scaling, itching, weeping, crusting, and pain. Azathioprine Pregnancy And Lactation Text: This medication is Pregnancy Category D and isn't considered safe during pregnancy. It is unknown if this medication is excreted in breast milk. High Dose Vitamin A Counseling: Side effects reviewed, pt to contact office should one occur. Nsaids Counseling: NSAID Counseling: I discussed with the patient that NSAIDs should be taken with food. Prolonged use of NSAIDs can result in the development of stomach ulcers.  Patient advised to stop taking NSAIDs if abdominal pain occurs.  The patient verbalized understanding of the proper use and possible adverse effects of NSAIDs.  All of the patient's questions and concerns were addressed. Prednisone Pregnancy And Lactation Text: This medication is Pregnancy Category C and it isn't know if it is safe during pregnancy. This medication is excreted in breast milk. Topical Ketoconazole Counseling: Patient counseled that this medication may cause skin irritation or allergic reactions.  In the event of skin irritation, the patient was advised to reduce the amount of the drug applied or use it less frequently.   The patient verbalized understanding of the proper use and possible adverse effects of ketoconazole.  All of the patient's questions and concerns were addressed. Azithromycin Counseling:  I discussed with the patient the risks of azithromycin including but not limited to GI upset, allergic reaction, drug rash, diarrhea, and yeast infections. Solaraze Pregnancy And Lactation Text: This medication is Pregnancy Category B and is considered safe. There is some data to suggest avoiding during the third trimester. It is unknown if this medication is excreted in breast milk. Adbry Counseling: I discussed with the patient the risks of tralokinumab including but not limited to eye infection and irritation, cold sores, injection site reactions, worsening of asthma, allergic reactions and increased risk of parasitic infection.  Live vaccines should be avoided while taking tralokinumab. The patient understands that monitoring is required and they must alert us or the primary physician if symptoms of infection or other concerning signs are noted. Valtrex Pregnancy And Lactation Text: this medication is Pregnancy Category B and is considered safe during pregnancy. This medication is not directly found in breast milk but it's metabolite acyclovir is present. Dutasteride Pregnancy And Lactation Text: This medication is absolutely contraindicated in women, especially during pregnancy and breast feeding. Feminization of male fetuses is possible if taking while pregnant. Imiquimod Counseling:  I discussed with the patient the risks of imiquimod including but not limited to erythema, scaling, itching, weeping, crusting, and pain.  Patient understands that the inflammatory response to imiquimod is variable from person to person and was educated regarded proper titration schedule.  If flu-like symptoms develop, patient knows to discontinue the medication and contact us. Griseofulvin Pregnancy And Lactation Text: This medication is Pregnancy Category X and is known to cause serious birth defects. It is unknown if this medication is excreted in breast milk but breast feeding should be avoided. Doxepin Counseling:  Patient advised that the medication is sedating and not to drive a car after taking this medication. Patient informed of potential adverse effects including but not limited to dry mouth, urinary retention, and blurry vision.  The patient verbalized understanding of the proper use and possible adverse effects of doxepin.  All of the patient's questions and concerns were addressed. Doxycycline Pregnancy And Lactation Text: This medication is Pregnancy Category D and not consider safe during pregnancy. It is also excreted in breast milk but is considered safe for shorter treatment courses. Propranolol Counseling:  I discussed with the patient the risks of propranolol including but not limited to low heart rate, low blood pressure, low blood sugar, restlessness and increased cold sensitivity. They should call the office if they experience any of these side effects. Arava Counseling:  Patient counseled regarding adverse effects of Arava including but not limited to nausea, vomiting, abnormalities in liver function tests. Patients may develop mouth sores, rash, diarrhea, and abnormalities in blood counts. The patient understands that monitoring is required including LFTs and blood counts.  There is a rare possibility of scarring of the liver and lung problems that can occur when taking methotrexate. Persistent nausea, loss of appetite, pale stools, dark urine, cough, and shortness of breath should be reported immediately. Patient advised to discontinue Arava treatment and consult with a physician prior to attempting conception. The patient will have to undergo a treatment to eliminate Arava from the body prior to conception. Picato Counseling:  I discussed with the patient the risks of Picato including but not limited to erythema, scaling, itching, weeping, crusting, and pain. Xolair Pregnancy And Lactation Text: This medication is Pregnancy Category B and is considered safe during pregnancy. This medication is excreted in breast milk. Benzoyl Peroxide Counseling: Patient counseled that medicine may cause skin irritation and bleach clothing.  In the event of skin irritation, the patient was advised to reduce the amount of the drug applied or use it less frequently.   The patient verbalized understanding of the proper use and possible adverse effects of benzoyl peroxide.  All of the patient's questions and concerns were addressed. Winlevi Counseling:  I discussed with the patient the risks of topical clascoterone including but not limited to erythema, scaling, itching, and stinging. Patient voiced their understanding. Cellcept Counseling:  I discussed with the patient the risks of mycophenolate mofetil including but not limited to infection/immunosuppression, GI upset, hypokalemia, hypercholesterolemia, bone marrow suppression, lymphoproliferative disorders, malignancy, GI ulceration/bleed/perforation, colitis, interstitial lung disease, kidney failure, progressive multifocal leukoencephalopathy, and birth defects.  The patient understands that monitoring is required including a baseline creatinine and regular CBC testing. In addition, patient must alert us immediately if symptoms of infection or other concerning signs are noted. Nsaids Pregnancy And Lactation Text: These medications are considered safe up to 30 weeks gestation. It is excreted in breast milk. Use Enhanced Medication Counseling?: No Adbry Pregnancy And Lactation Text: It is unknown if this medication will adversely affect pregnancy or breast feeding. Itraconazole Counseling:  I discussed with the patient the risks of itraconazole including but not limited to liver damage, nausea/vomiting, neuropathy, and severe allergy.  The patient understands that this medication is best absorbed when taken with acidic beverages such as non-diet cola or ginger ale.  The patient understands that monitoring is required including baseline LFTs and repeat LFTs at intervals.  The patient understands that they are to contact us or the primary physician if concerning signs are noted. Xeljanz Counseling: I discussed with the patient the risks of Xeljanz therapy including increased risk of infection, liver issues, headache, diarrhea, or cold symptoms. Live vaccines should be avoided. They were instructed to call if they have any problems. Glycopyrrolate Counseling:  I discussed with the patient the risks of glycopyrrolate including but not limited to skin rash, drowsiness, dry mouth, difficulty urinating, and blurred vision. Humira Counseling:  I discussed with the patient the risks of adalimumab including but not limited to myelosuppression, immunosuppression, autoimmune hepatitis, demyelinating diseases, lymphoma, and serious infections.  The patient understands that monitoring is required including a PPD at baseline and must alert us or the primary physician if symptoms of infection or other concerning signs are noted. High Dose Vitamin A Pregnancy And Lactation Text: High dose vitamin A therapy is contraindicated during pregnancy and breast feeding. Skyrizi Counseling: I discussed with the patient the risks of risankizumab-rzaa including but not limited to immunosuppression, and serious infections.  The patient understands that monitoring is required including a PPD at baseline and must alert us or the primary physician if symptoms of infection or other concerning signs are noted. Rifampin Pregnancy And Lactation Text: This medication is Pregnancy Category C and it isn't know if it is safe during pregnancy. It is also excreted in breast milk and should not be used if you are breast feeding. Propranolol Pregnancy And Lactation Text: This medication is Pregnancy Category C and it isn't known if it is safe during pregnancy. It is excreted in breast milk. Drysol Counseling:  I discussed with the patient the risks of drysol/aluminum chloride including but not limited to skin rash, itching, irritation, burning. Soolantra Counseling: I discussed with the patients the risks of topial Soolantra. This is a medicine which decreases the number of mites and inflammation in the skin. You experience burning, stinging, eye irritation or allergic reactions.  Please call our office if you develop any problems from using this medication. Finasteride Counseling:  I discussed with the patient the risks of use of finasteride including but not limited to decreased libido, decreased ejaculate volume, gynecomastia, and depression. Women should not handle medication.  All of the patient's questions and concerns were addressed. Xellucianz Pregnancy And Lactation Text: This medication is Pregnancy Category D and is not considered safe during pregnancy.  The risk during breast feeding is also uncertain. Cibinqo Counseling: I discussed with the patient the risks of Cibinqo therapy including but not limited to common cold, nausea, headache, cold sores, increased blood CPK levels, dizziness, UTIs, fatigue, acne, and vomitting. Live vaccines should be avoided.  This medication has been linked to serious infections; higher rate of mortality; malignancy and lymphoproliferative disorders; major adverse cardiovascular events; thrombosis; thrombocytopenia and lymphopenia; lipid elevations; and retinal detachment. Doxepin Pregnancy And Lactation Text: This medication is Pregnancy Category C and it isn't known if it is safe during pregnancy. It is also excreted in breast milk and breast feeding isn't recommended. Vtama Pregnancy And Lactation Text: It is unknown if this medication can cause problems during pregnancy and breastfeeding. Olanzapine Counseling- I discussed with the patient the common side effects of olanzapine including but are not limited to: lack of energy, dry mouth, increased appetite, sleepiness, tremor, constipation, dizziness, changes in behavior, or restlessness.  Explained that teenagers are more likely to experience headaches, abdominal pain, pain in the arms or legs, tiredness, and sleepiness.  Serious side effects include but are not limited: increased risk of death in elderly patients who are confused, have memory loss, or dementia-related psychosis; hyperglycemia; increased cholesterol and triglycerides; and weight gain. Erythromycin Counseling:  I discussed with the patient the risks of erythromycin including but not limited to GI upset, allergic reaction, drug rash, diarrhea, increase in liver enzymes, and yeast infections. Imiquimod Pregnancy And Lactation Text: This medication is Pregnancy Category C. It is unknown if this medication is excreted in breast milk. Azithromycin Pregnancy And Lactation Text: This medication is considered safe during pregnancy and is also secreted in breast milk. Erythromycin Pregnancy And Lactation Text: This medication is Pregnancy Category B and is considered safe during pregnancy. It is also excreted in breast milk. Klisyri Counseling:  I discussed with the patient the risks of Klisyri including but not limited to erythema, scaling, itching, weeping, crusting, and pain. Topical Metronidazole Counseling: Metronidazole is a topical antibiotic medication. You may experience burning, stinging, redness, or allergic reactions.  Please call our office if you develop any problems from using this medication. Sarecycline Counseling: Patient advised regarding possible photosensitivity and discoloration of the teeth, skin, lips, tongue and gums.  Patient instructed to avoid sunlight, if possible.  When exposed to sunlight, patients should wear protective clothing, sunglasses, and sunscreen.  The patient was instructed to call the office immediately if the following severe adverse effects occur:  hearing changes, easy bruising/bleeding, severe headache, or vision changes.  The patient verbalized understanding of the proper use and possible adverse effects of sarecycline.  All of the patient's questions and concerns were addressed. Winlevi Pregnancy And Lactation Text: This medication is considered safe during pregnancy and breastfeeding. Erivedge Counseling- I discussed with the patient the risks of Erivedge including but not limited to nausea, vomiting, diarrhea, constipation, weight loss, changes in the sense of taste, decreased appetite, muscle spasms, and hair loss.  The patient verbalized understanding of the proper use and possible adverse effects of Erivedge.  All of the patient's questions and concerns were addressed. Clofazimine Counseling:  I discussed with the patient the risks of clofazimine including but not limited to skin and eye pigmentation, liver damage, nausea/vomiting, gastrointestinal bleeding and allergy. Benzoyl Peroxide Pregnancy And Lactation Text: This medication is Pregnancy Category C. It is unknown if benzoyl peroxide is excreted in breast milk. Glycopyrrolate Pregnancy And Lactation Text: This medication is Pregnancy Category B and is considered safe during pregnancy. It is unknown if it is excreted breast milk. Cimzia Counseling:  I discussed with the patient the risks of Cimzia including but not limited to immunosuppression, allergic reactions and infections.  The patient understands that monitoring is required including a PPD at baseline and must alert us or the primary physician if symptoms of infection or other concerning signs are noted. Olanzapine Pregnancy And Lactation Text: This medication is pregnancy category C.   There are no adequate and well controlled trials with olanzapine in pregnant females.  Olanzapine should be used during pregnancy only if the potential benefit justifies the potential risk to the fetus.   In a study in lactating healthy women, olanzapine was excreted in breast milk.  It is recommended that women taking olanzapine should not breast feed. Soolantra Pregnancy And Lactation Text: This medication is Pregnancy Category C. This medication is considered safe during breast feeding. Finasteride Pregnancy And Lactation Text: This medication is absolutely contraindicated during pregnancy. It is unknown if it is excreted in breast milk. Ilumya Counseling: I discussed with the patient the risks of tildrakizumab including but not limited to immunosuppression, malignancy, posterior leukoencephalopathy syndrome, and serious infections.  The patient understands that monitoring is required including a PPD at baseline and must alert us or the primary physician if symptoms of infection or other concerning signs are noted. Protopic Counseling: Patient may experience a mild burning sensation during topical application. Protopic is not approved in children less than 2 years of age. There have been case reports of hematologic and skin malignancies in patients using topical calcineurin inhibitors although causality is questionable. Carac Counseling:  I discussed with the patient the risks of Carac including but not limited to erythema, scaling, itching, weeping, crusting, and pain. Hydroxyzine Counseling: Patient advised that the medication is sedating and not to drive a car after taking this medication.  Patient informed of potential adverse effects including but not limited to dry mouth, urinary retention, and blurry vision.  The patient verbalized understanding of the proper use and possible adverse effects of hydroxyzine.  All of the patient's questions and concerns were addressed. Bactrim Counseling:  I discussed with the patient the risks of sulfa antibiotics including but not limited to GI upset, allergic reaction, drug rash, diarrhea, dizziness, photosensitivity, and yeast infections.  Rarely, more serious reactions can occur including but not limited to aplastic anemia, agranulocytosis, methemoglobinemia, blood dyscrasias, liver or kidney failure, lung infiltrates or desquamative/blistering drug rashes. SSKI Counseling:  I discussed with the patient the risks of SSKI including but not limited to thyroid abnormalities, metallic taste, GI upset, fever, headache, acne, arthralgias, paraesthesias, lymphadenopathy, easy bleeding, arrhythmias, and allergic reaction. Cyclophosphamide Counseling:  I discussed with the patient the risks of cyclophosphamide including but not limited to hair loss, hormonal abnormalities, decreased fertility, abdominal pain, diarrhea, nausea and vomiting, bone marrow suppression and infection. The patient understands that monitoring is required while taking this medication. Zoryve Counseling:  I discussed with the patient that Zoryve is not for use in the eyes, mouth or vagina. The most commonly reported side effects include diarrhea, headache, insomnia, application site pain, upper respiratory tract infections, and urinary tract infections.  All of the patient's questions and concerns were addressed. Klisyri Pregnancy And Lactation Text: It is unknown if this medication can harm a developing fetus or if it is excreted in breast milk. Stelara Counseling:  I discussed with the patient the risks of ustekinumab including but not limited to immunosuppression, malignancy, posterior leukoencephalopathy syndrome, and serious infections.  The patient understands that monitoring is required including a PPD at baseline and must alert us or the primary physician if symptoms of infection or other concerning signs are noted. Detail Level: Simple Topical Metronidazole Pregnancy And Lactation Text: This medication is Pregnancy Category B and considered safe during pregnancy.  It is also considered safe to use while breastfeeding. Hydroxychloroquine Counseling:  I discussed with the patient that a baseline ophthalmologic exam is needed at the start of therapy and every year thereafter while on therapy. A CBC may also be warranted for monitoring.  The side effects of this medication were discussed with the patient, including but not limited to agranulocytosis, aplastic anemia, seizures, rashes, retinopathy, and liver toxicity. Patient instructed to call the office should any adverse effect occur.  The patient verbalized understanding of the proper use and possible adverse effects of Plaquenil.  All the patient's questions and concerns were addressed. Sski Pregnancy And Lactation Text: This medication is Pregnancy Category D and isn't considered safe during pregnancy. It is excreted in breast milk. Hydroxyzine Pregnancy And Lactation Text: This medication is not safe during pregnancy and should not be taken. It is also excreted in breast milk and breast feeding isn't recommended. Elidel Counseling: Patient may experience a mild burning sensation during topical application. Elidel is not approved in children less than 2 years of age. There have been case reports of hematologic and skin malignancies in patients using topical calcineurin inhibitors although causality is questionable. Cibinqo Pregnancy And Lactation Text: It is unknown if this medication will adversely affect pregnancy or breast feeding.  You should not take this medication if you are currently pregnant or planning a pregnancy or while breastfeeding. Cimzia Pregnancy And Lactation Text: This medication crosses the placenta but can be considered safe in certain situations. Cimzia may be excreted in breast milk. VTAMA Counseling: I discussed with the patient that VTAMA is not for use in the eyes, mouth or mouth. They should call the office if they develop any signs of allergic reactions to VTAMA. The patient verbalized understanding of the proper use and possible adverse effects of VTAMA.  All of the patient's questions and concerns were addressed. Acitretin Counseling:  I discussed with the patient the risks of acitretin including but not limited to hair loss, dry lips/skin/eyes, liver damage, hyperlipidemia, depression/suicidal ideation, photosensitivity.  Serious rare side effects can include but are not limited to pancreatitis, pseudotumor cerebri, bony changes, clot formation/stroke/heart attack.  Patient understands that alcohol is contraindicated since it can result in liver toxicity and significantly prolong the elimination of the drug by many years. Sarecycline Pregnancy And Lactation Text: This medication is Pregnancy Category D and not consider safe during pregnancy. It is also excreted in breast milk. Ketoconazole Counseling:   Patient counseled regarding improving absorption with orange juice.  Adverse effects include but are not limited to breast enlargement, headache, diarrhea, nausea, upset stomach, liver function test abnormalities, taste disturbance, and stomach pain.  There is a rare possibility of liver failure that can occur when taking ketoconazole. The patient understands that monitoring of LFTs may be required, especially at baseline. The patient verbalized understanding of the proper use and possible adverse effects of ketoconazole.  All of the patient's questions and concerns were addressed. Metronidazole Counseling:  I discussed with the patient the risks of metronidazole including but not limited to seizures, nausea/vomiting, a metallic taste in the mouth, nausea/vomiting and severe allergy. Protopic Pregnancy And Lactation Text: This medication is Pregnancy Category C. It is unknown if this medication is excreted in breast milk when applied topically. Cosentyx Counseling:  I discussed with the patient the risks of Cosentyx including but not limited to worsening of Crohn's disease, immunosuppression, allergic reactions and infections.  The patient understands that monitoring is required including a PPD at baseline and must alert us or the primary physician if symptoms of infection or other concerning signs are noted. Olumiant Counseling: I discussed with the patient the risks of Olumiant therapy including but not limited to upper respiratory tract infections, shingles, cold sores, and nausea. Live vaccines should be avoided.  This medication has been linked to serious infections; higher rate of mortality; malignancy and lymphoproliferative disorders; major adverse cardiovascular events; thrombosis; gastrointestinal perforations; neutropenia; lymphopenia; anemia; liver enzyme elevations; and lipid elevations. Acitretin Pregnancy And Lactation Text: This medication is Pregnancy Category X and should not be given to women who are pregnant or may become pregnant in the future. This medication is excreted in breast milk. Tetracycline Counseling: Patient counseled regarding possible photosensitivity and increased risk for sunburn.  Patient instructed to avoid sunlight, if possible.  When exposed to sunlight, patients should wear protective clothing, sunglasses, and sunscreen.  The patient was instructed to call the office immediately if the following severe adverse effects occur:  hearing changes, easy bruising/bleeding, severe headache, or vision changes.  The patient verbalized understanding of the proper use and possible adverse effects of tetracycline.  All of the patient's questions and concerns were addressed. Patient understands to avoid pregnancy while on therapy due to potential birth defects. Oral Minoxidil Counseling- I discussed with the patient the risks of oral minoxidil including but not limited to shortness of breath, swelling of the feet or ankles, dizziness, lightheadedness, unwanted hair growth and allergic reaction.  The patient verbalized understanding of the proper use and possible adverse effects of oral minoxidil.  All of the patient's questions and concerns were addressed. Birth Control Pills Counseling: Birth Control Pill Counseling: I discussed with the patient the potential side effects of OCPs including but not limited to increased risk of stroke, heart attack, thrombophlebitis, deep venous thrombosis, hepatic adenomas, breast changes, GI upset, headaches, and depression.  The patient verbalized understanding of the proper use and possible adverse effects of OCPs. All of the patient's questions and concerns were addressed. Ketoconazole Pregnancy And Lactation Text: This medication is Pregnancy Category C and it isn't know if it is safe during pregnancy. It is also excreted in breast milk and breast feeding isn't recommended. Hydroxychloroquine Pregnancy And Lactation Text: This medication has been shown to cause fetal harm but it isn't assigned a Pregnancy Risk Category. There are small amounts excreted in breast milk. Bactrim Pregnancy And Lactation Text: This medication is Pregnancy Category D and is known to cause fetal risk.  It is also excreted in breast milk. Cyclophosphamide Pregnancy And Lactation Text: This medication is Pregnancy Category D and it isn't considered safe during pregnancy. This medication is excreted in breast milk. Topical Retinoid counseling:  Patient advised to apply a pea-sized amount only at bedtime and wait 30 minutes after washing their face before applying.  If too drying, patient may add a non-comedogenic moisturizer. The patient verbalized understanding of the proper use and possible adverse effects of retinoids.  All of the patient's questions and concerns were addressed. Rituxan Counseling:  I discussed with the patient the risks of Rituxan infusions. Side effects can include infusion reactions, severe drug rashes including mucocutaneous reactions, reactivation of latent hepatitis and other infections and rarely progressive multifocal leukoencephalopathy.  All of the patient's questions and concerns were addressed. Cephalexin Counseling: I counseled the patient regarding use of cephalexin as an antibiotic for prophylactic and/or therapeutic purposes. Cephalexin (commonly prescribed under brand name Keflex) is a cephalosporin antibiotic which is active against numerous classes of bacteria, including most skin bacteria. Side effects may include nausea, diarrhea, gastrointestinal upset, rash, hives, yeast infections, and in rare cases, hepatitis, kidney disease, seizures, fever, confusion, neurologic symptoms, and others. Patients with severe allergies to penicillin medications are cautioned that there is about a 10% incidence of cross-reactivity with cephalosporins. When possible, patients with penicillin allergies should use alternatives to cephalosporins for antibiotic therapy. Libtayo Counseling- I discussed with the patient the risks of Libtayo including but not limited to nausea, vomiting, diarrhea, and bone or muscle pain.  The patient verbalized understanding of the proper use and possible adverse effects of Libtayo.  All of the patient's questions and concerns were addressed. Cyclosporine Counseling:  I discussed with the patient the risks of cyclosporine including but not limited to hypertension, gingival hyperplasia,myelosuppression, immunosuppression, liver damage, kidney damage, neurotoxicity, lymphoma, and serious infections. The patient understands that monitoring is required including baseline blood pressure, CBC, CMP, lipid panel and uric acid, and then 1-2 times monthly CMP and blood pressure. Metronidazole Pregnancy And Lactation Text: This medication is Pregnancy Category B and considered safe during pregnancy.  It is also excreted in breast milk. Topical Steroids Counseling: I discussed with the patient that prolonged use of topical steroids can result in the increased appearance of superficial blood vessels (telangiectasias), lightening (hypopigmentation) and thinning of the skin (atrophy).  Patient understands to avoid using high potency steroids in skin folds, the groin or the face.  The patient verbalized understanding of the proper use and possible adverse effects of topical steroids.  All of the patient's questions and concerns were addressed. Zyclara Counseling:  I discussed with the patient the risks of imiquimod including but not limited to erythema, scaling, itching, weeping, crusting, and pain.  Patient understands that the inflammatory response to imiquimod is variable from person to person and was educated regarded proper titration schedule.  If flu-like symptoms develop, patient knows to discontinue the medication and contact us. Birth Control Pills Pregnancy And Lactation Text: This medication should be avoided if pregnant and for the first 30 days post-partum. Minoxidil Counseling: Minoxidil is a topical medication which can increase blood flow where it is applied. It is uncertain how this medication increases hair growth. Side effects are uncommon and include stinging and allergic reactions. Colchicine Counseling:  Patient counseled regarding adverse effects including but not limited to stomach upset (nausea, vomiting, stomach pain, or diarrhea).  Patient instructed to limit alcohol consumption while taking this medication.  Colchicine may reduce blood counts especially with prolonged use.  The patient understands that monitoring of kidney function and blood counts may be required, especially at baseline. The patient verbalized understanding of the proper use and possible adverse effects of colchicine.  All of the patient's questions and concerns were addressed. Thalidomide Counseling: I discussed with the patient the risks of thalidomide including but not limited to birth defects, anxiety, weakness, chest pain, dizziness, cough and severe allergy. Terbinafine Counseling: Patient counseling regarding adverse effects of terbinafine including but not limited to headache, diarrhea, rash, upset stomach, liver function test abnormalities, itching, taste/smell disturbance, nausea, abdominal pain, and flatulence.  There is a rare possibility of liver failure that can occur when taking terbinafine.  The patient understands that a baseline LFT and kidney function test may be required. The patient verbalized understanding of the proper use and possible adverse effects of terbinafine.  All of the patient's questions and concerns were addressed. Qbrexza Counseling:  I discussed with the patient the risks of Qbrexza including but not limited to headache, mydriasis, blurred vision, dry eyes, nasal dryness, dry mouth, dry throat, dry skin, urinary hesitation, and constipation.  Local skin reactions including erythema, burning, stinging, and itching can also occur. Oral Minoxidil Pregnancy And Lactation Text: This medication should only be used when clearly needed if you are pregnant, attempting to become pregnant or breast feeding. Minocycline Counseling: Patient advised regarding possible photosensitivity and discoloration of the teeth, skin, lips, tongue and gums.  Patient instructed to avoid sunlight, if possible.  When exposed to sunlight, patients should wear protective clothing, sunglasses, and sunscreen.  The patient was instructed to call the office immediately if the following severe adverse effects occur:  hearing changes, easy bruising/bleeding, severe headache, or vision changes.  The patient verbalized understanding of the proper use and possible adverse effects of minocycline.  All of the patient's questions and concerns were addressed. Topical Steroids Applications Pregnancy And Lactation Text: Most topical steroids are considered safe to use during pregnancy and lactation.  Any topical steroid applied to the breast or nipple should be washed off before breastfeeding. Infliximab Counseling:  I discussed with the patient the risks of infliximab including but not limited to myelosuppression, immunosuppression, autoimmune hepatitis, demyelinating diseases, lymphoma, and serious infections.  The patient understands that monitoring is required including a PPD at baseline and must alert us or the primary physician if symptoms of infection or other concerning signs are noted. Calcipotriene Counseling:  I discussed with the patient the risks of calcipotriene including but not limited to erythema, scaling, itching, and irritation. Taltz Counseling: I discussed with the patient the risks of ixekizumab including but not limited to immunosuppression, serious infections, worsening of inflammatory bowel disease and drug reactions.  The patient understands that monitoring is required including a PPD at baseline and must alert us or the primary physician if symptoms of infection or other concerning signs are noted. Albendazole Counseling:  I discussed with the patient the risks of albendazole including but not limited to cytopenia, kidney damage, nausea/vomiting and severe allergy.  The patient understands that this medication is being used in an off-label manner. Olumiant Pregnancy And Lactation Text: Based on animal studies, Olumiant may cause embryo-fetal harm when administered to pregnant women.  The medication should not be used in pregnancy.  Breastfeeding is not recommended during treatment. Low Dose Naltrexone Counseling- I discussed with the patient the potential risks and side effects of low dose naltrexone including but not limited to: more vivid dreams, headaches, nausea, vomiting, abdominal pain, fatigue, dizziness, and anxiety. Bexarotene Counseling:  I discussed with the patient the risks of bexarotene including but not limited to hair loss, dry lips/skin/eyes, liver abnormalities, hyperlipidemia, pancreatitis, depression/suicidal ideation, photosensitivity, drug rash/allergic reactions, hypothyroidism, anemia, leukopenia, infection, cataracts, and teratogenicity.  Patient understands that they will need regular blood tests to check lipid profile, liver function tests, white blood cell count, thyroid function tests and pregnancy test if applicable. Tazorac Counseling:  Patient advised that medication is irritating and drying.  Patient may need to apply sparingly and wash off after an hour before eventually leaving it on overnight.  The patient verbalized understanding of the proper use and possible adverse effects of tazorac.  All of the patient's questions and concerns were addressed. Rituxan Pregnancy And Lactation Text: This medication is Pregnancy Category C and it isn't know if it is safe during pregnancy. It is unknown if this medication is excreted in breast milk but similar antibodies are known to be excreted. Spironolactone Counseling: Patient advised regarding risks of diarrhea, abdominal pain, hyperkalemia, birth defects (for female patients), liver toxicity and renal toxicity. The patient may need blood work to monitor liver and kidney function and potassium levels while on therapy. The patient verbalized understanding of the proper use and possible adverse effects of spironolactone.  All of the patient's questions and concerns were addressed. Eucrisa Counseling: Patient may experience a mild burning sensation during topical application. Eucrisa is not approved in children less than 2 years of age. Libtayo Pregnancy And Lactation Text: This medication is contraindicated in pregnancy and when breast feeding. Calcipotriene Pregnancy And Lactation Text: This medication has not been proven safe during pregnancy. It is unknown if this medication is excreted in breast milk. Otezla Counseling: The side effects of Otezla were discussed with the patient, including but not limited to worsening or new depression, weight loss, diarrhea, nausea, upper respiratory tract infection, and headache. Patient instructed to call the office should any adverse effect occur.  The patient verbalized understanding of the proper use and possible adverse effects of Otezla.  All the patient's questions and concerns were addressed. Cephalexin Pregnancy And Lactation Text: This medication is Pregnancy Category B and considered safe during pregnancy.  It is also excreted in breast milk but can be used safely for shorter doses. Mirvaso Counseling: Mirvaso is a topical medication which can decrease superficial blood flow where applied. Side effects are uncommon and include stinging, redness and allergic reactions. Bexarotene Pregnancy And Lactation Text: This medication is Pregnancy Category X and should not be given to women who are pregnant or may become pregnant. This medication should not be used if you are breast feeding. Topical Sulfur Applications Counseling: Topical Sulfur Counseling: Patient counseled that this medication may cause skin irritation or allergic reactions.  In the event of skin irritation, the patient was advised to reduce the amount of the drug applied or use it less frequently.   The patient verbalized understanding of the proper use and possible adverse effects of topical sulfur application.  All of the patient's questions and concerns were addressed. Aklief counseling:  Patient advised to apply a pea-sized amount only at bedtime and wait 30 minutes after washing their face before applying.  If too drying, patient may add a non-comedogenic moisturizer.  The most commonly reported side effects including irritation, redness, scaling, dryness, stinging, burning, itching, and increased risk of sunburn.  The patient verbalized understanding of the proper use and possible adverse effects of retinoids.  All of the patient's questions and concerns were addressed. Tranexamic Acid Counseling:  Patient advised of the small risk of bleeding problems with tranexamic acid. They were also instructed to call if they developed any nausea, vomiting or diarrhea. All of the patient's questions and concerns were addressed. Low Dose Naltrexone Pregnancy And Lactation Text: Naltrexone is pregnancy category C.  There have been no adequate and well-controlled studies in pregnant women.  It should be used in pregnancy only if the potential benefit justifies the potential risk to the fetus.   Limited data indicates that naltrexone is minimally excreted into breastmilk. Fluconazole Counseling:  Patient counseled regarding adverse effects of fluconazole including but not limited to headache, diarrhea, nausea, upset stomach, liver function test abnormalities, taste disturbance, and stomach pain.  There is a rare possibility of liver failure that can occur when taking fluconazole.  The patient understands that monitoring of LFTs and kidney function test may be required, especially at baseline. The patient verbalized understanding of the proper use and possible adverse effects of fluconazole.  All of the patient's questions and concerns were addressed. Rinvoq Counseling: I discussed with the patient the risks of Rinvoq therapy including but not limited to upper respiratory tract infections, shingles, cold sores, bronchitis, nausea, cough, fever, acne, and headache. Live vaccines should be avoided.  This medication has been linked to serious infections; higher rate of mortality; malignancy and lymphoproliferative disorders; major adverse cardiovascular events; thrombosis; thrombocytopenia, anemia, and neutropenia; lipid elevations; liver enzyme elevations; and gastrointestinal perforations. Odomzo Counseling- I discussed with the patient the risks of Odomzo including but not limited to nausea, vomiting, diarrhea, constipation, weight loss, changes in the sense of taste, decreased appetite, muscle spasms, and hair loss.  The patient verbalized understanding of the proper use and possible adverse effects of Odomzo.  All of the patient's questions and concerns were addressed. Dapsone Counseling: I discussed with the patient the risks of dapsone including but not limited to hemolytic anemia, agranulocytosis, rashes, methemoglobinemia, kidney failure, peripheral neuropathy, headaches, GI upset, and liver toxicity.  Patients who start dapsone require monitoring including baseline LFTs and weekly CBCs for the first month, then every month thereafter.  The patient verbalized understanding of the proper use and possible adverse effects of dapsone.  All of the patient's questions and concerns were addressed. Qbrexza Pregnancy And Lactation Text: There is no available data on Qbrexza use in pregnant women.  There is no available data on Qbrexza use in lactation. Dupixent Counseling: I discussed with the patient the risks of dupilumab including but not limited to eye infection and irritation, cold sores, injection site reactions, worsening of asthma, allergic reactions and increased risk of parasitic infection.  Live vaccines should be avoided while taking dupilumab. Dupilumab will also interact with certain medications such as warfarin and cyclosporine. The patient understands that monitoring is required and they must alert us or the primary physician if symptoms of infection or other concerning signs are noted. Methotrexate Counseling:  Patient counseled regarding adverse effects of methotrexate including but not limited to nausea, vomiting, abnormalities in liver function tests. Patients may develop mouth sores, rash, diarrhea, and abnormalities in blood counts. The patient understands that monitoring is required including LFT's and blood counts.  There is a rare possibility of scarring of the liver and lung problems that can occur when taking methotrexate. Persistent nausea, loss of appetite, pale stools, dark urine, cough, and shortness of breath should be reported immediately. Patient advised to discontinue methotrexate treatment at least three months before attempting to become pregnant.  I discussed the need for folate supplements while taking methotrexate.  These supplements can decrease side effects during methotrexate treatment. The patient verbalized understanding of the proper use and possible adverse effects of methotrexate.  All of the patient's questions and concerns were addressed. Ivermectin Counseling:  Patient instructed to take medication on an empty stomach with a full glass of water.  Patient informed of potential adverse effects including but not limited to nausea, diarrhea, dizziness, itching, and swelling of the extremities or lymph nodes.  The patient verbalized understanding of the proper use and possible adverse effects of ivermectin.  All of the patient's questions and concerns were addressed. Rinvoq Pregnancy And Lactation Text: Based on animal studies, Rinvoq may cause embryo-fetal harm when administered to pregnant women.  The medication should not be used in pregnancy.  Breastfeeding is not recommended during treatment and for 6 days after the last dose. Siliq Counseling:  I discussed with the patient the risks of Siliq including but not limited to new or worsening depression, suicidal thoughts and behavior, immunosuppression, malignancy, posterior leukoencephalopathy syndrome, and serious infections.  The patient understands that monitoring is required including a PPD at baseline and must alert us or the primary physician if symptoms of infection or other concerning signs are noted. There is also a special program designed to monitor depression which is required with Siliq. Rhofade Counseling: Rhofade is a topical medication which can decrease superficial blood flow where applied. Side effects are uncommon and include stinging, redness and allergic reactions. Spironolactone Pregnancy And Lactation Text: This medication can cause feminization of the male fetus and should be avoided during pregnancy. The active metabolite is also found in breast milk. Clindamycin Counseling: I counseled the patient regarding use of clindamycin as an antibiotic for prophylactic and/or therapeutic purposes. Clindamycin is active against numerous classes of bacteria, including skin bacteria. Side effects may include nausea, diarrhea, gastrointestinal upset, rash, hives, yeast infections, and in rare cases, colitis. Tazorac Pregnancy And Lactation Text: This medication is not safe during pregnancy. It is unknown if this medication is excreted in breast milk. Otezla Pregnancy And Lactation Text: This medication is Pregnancy Category C and it isn't known if it is safe during pregnancy. It is unknown if it is excreted in breast milk.